# Patient Record
Sex: FEMALE | Race: BLACK OR AFRICAN AMERICAN | NOT HISPANIC OR LATINO | Employment: UNEMPLOYED | URBAN - NONMETROPOLITAN AREA
[De-identification: names, ages, dates, MRNs, and addresses within clinical notes are randomized per-mention and may not be internally consistent; named-entity substitution may affect disease eponyms.]

---

## 2017-03-14 ENCOUNTER — APPOINTMENT (OUTPATIENT)
Dept: GENERAL RADIOLOGY | Facility: HOSPITAL | Age: 42
End: 2017-03-14

## 2017-03-14 ENCOUNTER — HOSPITAL ENCOUNTER (EMERGENCY)
Facility: HOSPITAL | Age: 42
Discharge: HOME | End: 2017-03-14
Attending: EMERGENCY MEDICINE | Admitting: EMERGENCY MEDICINE

## 2017-03-14 ENCOUNTER — APPOINTMENT (OUTPATIENT)
Dept: CT IMAGING | Facility: HOSPITAL | Age: 42
End: 2017-03-14

## 2017-03-14 VITALS
RESPIRATION RATE: 16 BRPM | DIASTOLIC BLOOD PRESSURE: 78 MMHG | HEIGHT: 72 IN | OXYGEN SATURATION: 98 % | TEMPERATURE: 97.9 F | BODY MASS INDEX: 30.34 KG/M2 | HEART RATE: 72 BPM | SYSTOLIC BLOOD PRESSURE: 170 MMHG | WEIGHT: 224 LBS

## 2017-03-14 DIAGNOSIS — I10 ESSENTIAL HYPERTENSION: ICD-10-CM

## 2017-03-14 DIAGNOSIS — R11.2 NON-INTRACTABLE VOMITING WITH NAUSEA, UNSPECIFIED VOMITING TYPE: Primary | ICD-10-CM

## 2017-03-14 DIAGNOSIS — N39.0 ACUTE UTI (URINARY TRACT INFECTION): ICD-10-CM

## 2017-03-14 DIAGNOSIS — R73.9 HYPERGLYCEMIA: ICD-10-CM

## 2017-03-14 LAB
ALBUMIN SERPL-MCNC: 4.5 G/DL (ref 3.5–5)
ALBUMIN/GLOB SERPL: 1.2 G/DL (ref 1.1–2.5)
ALP SERPL-CCNC: 109 U/L (ref 24–120)
ALT SERPL W P-5'-P-CCNC: 51 U/L (ref 0–54)
AMYLASE SERPL-CCNC: 92 U/L (ref 30–110)
ANION GAP SERPL CALCULATED.3IONS-SCNC: 12 MMOL/L (ref 4–13)
ARTERIAL PATENCY WRIST A: ABNORMAL
AST SERPL-CCNC: 32 U/L (ref 7–45)
ATMOSPHERIC PRESS: ABNORMAL MMHG
BACTERIA UR QL AUTO: ABNORMAL /HPF
BASE EXCESS BLDA CALC-SCNC: 2.6 MMOL/L (ref -2–2)
BASOPHILS # BLD AUTO: 0.01 10*3/MM3 (ref 0–0.2)
BASOPHILS NFR BLD AUTO: 0.1 % (ref 0–2)
BDY SITE: ABNORMAL
BILIRUB SERPL-MCNC: 0.5 MG/DL (ref 0.1–1)
BILIRUB UR QL STRIP: ABNORMAL
BUN BLD-MCNC: 13 MG/DL (ref 5–21)
BUN/CREAT SERPL: 14.9 (ref 7–25)
CALCIUM SPEC-SCNC: 9.9 MG/DL (ref 8.4–10.4)
CHLORIDE SERPL-SCNC: 100 MMOL/L (ref 98–110)
CLARITY UR: ABNORMAL
CO2 SERPL-SCNC: 29 MMOL/L (ref 24–31)
COLOR UR: ABNORMAL
CREAT BLD-MCNC: 0.87 MG/DL (ref 0.5–1.4)
D-LACTATE SERPL-SCNC: 1.2 MMOL/L (ref 0.5–2)
DEPRECATED RDW RBC AUTO: 42.6 FL (ref 40–54)
EOSINOPHIL # BLD AUTO: 0 10*3/MM3 (ref 0–0.7)
EOSINOPHIL NFR BLD AUTO: 0 % (ref 0–4)
ERYTHROCYTE [DISTWIDTH] IN BLOOD BY AUTOMATED COUNT: 14.1 % (ref 12–15)
GFR SERPL CREATININE-BSD FRML MDRD: 87 ML/MIN/1.73
GLOBULIN UR ELPH-MCNC: 3.7 GM/DL
GLUCOSE BLD-MCNC: 227 MG/DL (ref 70–100)
GLUCOSE UR STRIP-MCNC: ABNORMAL MG/DL
HCG SERPL QL: NEGATIVE
HCO3 BLDA-SCNC: 27.9 MMOL/L (ref 22–26)
HCT VFR BLD AUTO: 41.7 % (ref 37–47)
HGB BLD-MCNC: 13.9 G/DL (ref 12–16)
HGB UR QL STRIP.AUTO: NEGATIVE
HOLD SPECIMEN: NORMAL
HOLD SPECIMEN: NORMAL
HYALINE CASTS UR QL AUTO: ABNORMAL /LPF
IMM GRANULOCYTES # BLD: 0.01 10*3/MM3 (ref 0–0.03)
IMM GRANULOCYTES NFR BLD: 0.1 % (ref 0–5)
KETONES UR QL STRIP: ABNORMAL
LEUKOCYTE ESTERASE UR QL STRIP.AUTO: NEGATIVE
LIPASE SERPL-CCNC: 28 U/L (ref 23–203)
LYMPHOCYTES # BLD AUTO: 1.52 10*3/MM3 (ref 0.72–4.86)
LYMPHOCYTES NFR BLD AUTO: 17.8 % (ref 15–45)
MCH RBC QN AUTO: 27.7 PG (ref 28–32)
MCHC RBC AUTO-ENTMCNC: 33.3 G/DL (ref 33–36)
MCV RBC AUTO: 83.2 FL (ref 82–98)
MODALITY: ABNORMAL
MONOCYTES # BLD AUTO: 0.35 10*3/MM3 (ref 0.19–1.3)
MONOCYTES NFR BLD AUTO: 4.1 % (ref 4–12)
NEUTROPHILS # BLD AUTO: 6.66 10*3/MM3 (ref 1.87–8.4)
NEUTROPHILS NFR BLD AUTO: 77.9 % (ref 39–78)
NITRITE UR QL STRIP: NEGATIVE
PCO2 BLDA: 45.4 MM HG (ref 35–45)
PH BLDA: 7.41 PH UNITS (ref 7.35–7.45)
PH UR STRIP.AUTO: 7 [PH] (ref 5–8)
PLATELET # BLD AUTO: 377 10*3/MM3 (ref 130–400)
PMV BLD AUTO: 11 FL (ref 6–12)
PO2 BLDA: 70.3 MM HG (ref 80–100)
POTASSIUM BLD-SCNC: 3.9 MMOL/L (ref 3.5–5.3)
PROCALCITONIN SERPL-MCNC: <0.25 NG/ML
PROT SERPL-MCNC: 8.2 G/DL (ref 6.3–8.7)
PROT UR QL STRIP: ABNORMAL
RBC # BLD AUTO: 5.01 10*6/MM3 (ref 4.2–5.4)
RBC # UR: ABNORMAL /HPF
REF LAB TEST METHOD: ABNORMAL
SAO2 % BLDCOA: 94.2 % (ref 94–100)
SAO2 % BLDCOA: 94.2 % (ref 94–100)
SODIUM BLD-SCNC: 141 MMOL/L (ref 135–145)
SP GR UR STRIP: >1.03 (ref 1–1.03)
SQUAMOUS #/AREA URNS HPF: ABNORMAL /HPF
UROBILINOGEN UR QL STRIP: ABNORMAL
WBC NRBC COR # BLD: 8.55 10*3/MM3 (ref 4.8–10.8)
WBC UR QL AUTO: ABNORMAL /HPF
WHOLE BLOOD HOLD SPECIMEN: NORMAL
WHOLE BLOOD HOLD SPECIMEN: NORMAL

## 2017-03-14 PROCEDURE — 25010000002 ONDANSETRON PER 1 MG

## 2017-03-14 PROCEDURE — 84145 PROCALCITONIN (PCT): CPT | Performed by: EMERGENCY MEDICINE

## 2017-03-14 PROCEDURE — 25010000002 ONDANSETRON PER 1 MG: Performed by: EMERGENCY MEDICINE

## 2017-03-14 PROCEDURE — 96365 THER/PROPH/DIAG IV INF INIT: CPT

## 2017-03-14 PROCEDURE — 71010 HC CHEST PA OR AP: CPT

## 2017-03-14 PROCEDURE — 0 IOPAMIDOL PER 1 ML: Performed by: EMERGENCY MEDICINE

## 2017-03-14 PROCEDURE — 84703 CHORIONIC GONADOTROPIN ASSAY: CPT | Performed by: EMERGENCY MEDICINE

## 2017-03-14 PROCEDURE — 83605 ASSAY OF LACTIC ACID: CPT | Performed by: EMERGENCY MEDICINE

## 2017-03-14 PROCEDURE — 96361 HYDRATE IV INFUSION ADD-ON: CPT

## 2017-03-14 PROCEDURE — 81001 URINALYSIS AUTO W/SCOPE: CPT | Performed by: EMERGENCY MEDICINE

## 2017-03-14 PROCEDURE — 87086 URINE CULTURE/COLONY COUNT: CPT | Performed by: EMERGENCY MEDICINE

## 2017-03-14 PROCEDURE — 99284 EMERGENCY DEPT VISIT MOD MDM: CPT

## 2017-03-14 PROCEDURE — 74177 CT ABD & PELVIS W/CONTRAST: CPT

## 2017-03-14 PROCEDURE — 36600 WITHDRAWAL OF ARTERIAL BLOOD: CPT

## 2017-03-14 PROCEDURE — 80053 COMPREHEN METABOLIC PANEL: CPT | Performed by: EMERGENCY MEDICINE

## 2017-03-14 PROCEDURE — 87040 BLOOD CULTURE FOR BACTERIA: CPT | Performed by: EMERGENCY MEDICINE

## 2017-03-14 PROCEDURE — 85025 COMPLETE CBC W/AUTO DIFF WBC: CPT | Performed by: EMERGENCY MEDICINE

## 2017-03-14 PROCEDURE — 82803 BLOOD GASES ANY COMBINATION: CPT

## 2017-03-14 PROCEDURE — 96375 TX/PRO/DX INJ NEW DRUG ADDON: CPT

## 2017-03-14 PROCEDURE — 25010000002 HYDROMORPHONE PER 4 MG: Performed by: EMERGENCY MEDICINE

## 2017-03-14 PROCEDURE — 82150 ASSAY OF AMYLASE: CPT | Performed by: EMERGENCY MEDICINE

## 2017-03-14 PROCEDURE — 83690 ASSAY OF LIPASE: CPT | Performed by: EMERGENCY MEDICINE

## 2017-03-14 PROCEDURE — 36415 COLL VENOUS BLD VENIPUNCTURE: CPT | Performed by: EMERGENCY MEDICINE

## 2017-03-14 RX ORDER — ONDANSETRON 4 MG/1
4 TABLET, FILM COATED ORAL EVERY 6 HOURS
Qty: 15 TABLET | Refills: 0 | Status: SHIPPED | OUTPATIENT
Start: 2017-03-14 | End: 2018-02-26

## 2017-03-14 RX ORDER — BENAZEPRIL HYDROCHLORIDE 20 MG/1
20 TABLET ORAL 2 TIMES DAILY
COMMUNITY

## 2017-03-14 RX ORDER — HYDRALAZINE HYDROCHLORIDE 25 MG/1
25 TABLET, FILM COATED ORAL 2 TIMES DAILY
COMMUNITY
End: 2017-07-12 | Stop reason: HOSPADM

## 2017-03-14 RX ORDER — INSULIN GLARGINE 100 [IU]/ML
INJECTION, SOLUTION SUBCUTANEOUS DAILY
COMMUNITY
End: 2018-02-26 | Stop reason: ALTCHOICE

## 2017-03-14 RX ORDER — NIFEDIPINE 10 MG/1
10 CAPSULE ORAL ONCE
Status: COMPLETED | OUTPATIENT
Start: 2017-03-14 | End: 2017-03-14

## 2017-03-14 RX ORDER — ONDANSETRON 2 MG/ML
4 INJECTION INTRAMUSCULAR; INTRAVENOUS ONCE
Status: COMPLETED | OUTPATIENT
Start: 2017-03-14 | End: 2017-03-14

## 2017-03-14 RX ORDER — SIMVASTATIN 20 MG
20 TABLET ORAL NIGHTLY
COMMUNITY
End: 2020-05-05

## 2017-03-14 RX ORDER — RANITIDINE 150 MG/1
300 TABLET ORAL DAILY
COMMUNITY
End: 2017-09-22 | Stop reason: DRUGHIGH

## 2017-03-14 RX ORDER — ONDANSETRON 2 MG/ML
INJECTION INTRAMUSCULAR; INTRAVENOUS
Status: COMPLETED
Start: 2017-03-14 | End: 2017-03-14

## 2017-03-14 RX ORDER — NITROFURANTOIN 25; 75 MG/1; MG/1
100 CAPSULE ORAL 2 TIMES DAILY
Qty: 20 CAPSULE | Refills: 0 | Status: SHIPPED | OUTPATIENT
Start: 2017-03-14 | End: 2017-07-12 | Stop reason: HOSPADM

## 2017-03-14 RX ORDER — TIZANIDINE 4 MG/1
4 TABLET ORAL NIGHTLY
COMMUNITY
End: 2021-12-31 | Stop reason: SDUPTHER

## 2017-03-14 RX ORDER — CLONIDINE HYDROCHLORIDE 0.2 MG/1
0.3 TABLET ORAL 2 TIMES DAILY
COMMUNITY
End: 2017-07-12 | Stop reason: HOSPADM

## 2017-03-14 RX ORDER — TRAZODONE HYDROCHLORIDE 100 MG/1
100 TABLET ORAL NIGHTLY
COMMUNITY
End: 2018-02-26

## 2017-03-14 RX ORDER — DULOXETIN HYDROCHLORIDE 60 MG/1
60 CAPSULE, DELAYED RELEASE ORAL DAILY
COMMUNITY
End: 2018-02-26

## 2017-03-14 RX ORDER — AMLODIPINE BESYLATE 10 MG/1
10 TABLET ORAL DAILY
Status: ON HOLD | COMMUNITY
End: 2017-07-12

## 2017-03-14 RX ORDER — LABETALOL HYDROCHLORIDE 5 MG/ML
20 INJECTION, SOLUTION INTRAVENOUS ONCE
Status: COMPLETED | OUTPATIENT
Start: 2017-03-14 | End: 2017-03-14

## 2017-03-14 RX ORDER — FUROSEMIDE 20 MG/1
20 TABLET ORAL DAILY
COMMUNITY
End: 2017-07-12 | Stop reason: HOSPADM

## 2017-03-14 RX ORDER — POTASSIUM CHLORIDE 750 MG/1
10 CAPSULE, EXTENDED RELEASE ORAL DAILY
COMMUNITY
End: 2017-07-12 | Stop reason: HOSPADM

## 2017-03-14 RX ORDER — GABAPENTIN 300 MG/1
300 CAPSULE ORAL 4 TIMES DAILY
COMMUNITY

## 2017-03-14 RX ADMIN — NIFEDIPINE 10 MG: 10 CAPSULE, LIQUID FILLED ORAL at 15:36

## 2017-03-14 RX ADMIN — SODIUM CHLORIDE 500 ML: 0.9 INJECTION, SOLUTION INTRAVENOUS at 14:22

## 2017-03-14 RX ADMIN — ERTAPENEM SODIUM 1 G: 1 INJECTION, POWDER, LYOPHILIZED, FOR SOLUTION INTRAMUSCULAR; INTRAVENOUS at 14:27

## 2017-03-14 RX ADMIN — LABETALOL HYDROCHLORIDE 20 MG: 5 INJECTION, SOLUTION INTRAVENOUS at 13:36

## 2017-03-14 RX ADMIN — HYDROMORPHONE HYDROCHLORIDE 1 MG: 1 INJECTION, SOLUTION INTRAMUSCULAR; INTRAVENOUS; SUBCUTANEOUS at 13:33

## 2017-03-14 RX ADMIN — ONDANSETRON HYDROCHLORIDE 4 MG: 2 SOLUTION INTRAMUSCULAR; INTRAVENOUS at 13:33

## 2017-03-14 RX ADMIN — ONDANSETRON 4 MG: 2 INJECTION INTRAMUSCULAR; INTRAVENOUS at 14:46

## 2017-03-14 RX ADMIN — ONDANSETRON HYDROCHLORIDE 4 MG: 2 SOLUTION INTRAMUSCULAR; INTRAVENOUS at 14:46

## 2017-03-14 RX ADMIN — IOPAMIDOL 100 ML: 755 INJECTION, SOLUTION INTRAVENOUS at 14:53

## 2017-03-14 NOTE — ED PROVIDER NOTES
Subjective   Patient is a 41 y.o. female presenting with abdominal pain.   Abdominal Pain   Pain location:  Generalized  Pain quality: aching and bloating    Pain radiates to:  Does not radiate  Pain severity:  No pain  Onset quality:  Gradual  Timing:  Constant  Progression:  Worsening  Context: not alcohol use, not awakening from sleep, not diet changes, not previous surgeries, not recent illness, not recent travel, not sick contacts and not suspicious food intake    Relieved by:  Nothing  Worsened by:  Nothing  Ineffective treatments:  None tried  Associated symptoms: diarrhea, nausea and vomiting    Associated symptoms: no anorexia, no belching, no chest pain, no cough, no hematochezia and no sore throat    Risk factors: no alcohol abuse, no NSAID use, not obese and no recent hospitalization        Review of Systems   Constitutional: Negative.    HENT: Negative.  Negative for sore throat.    Eyes: Negative.    Respiratory: Negative.  Negative for cough.    Cardiovascular: Negative.  Negative for chest pain.   Gastrointestinal: Positive for abdominal pain, diarrhea, nausea and vomiting. Negative for anorexia and hematochezia.   Endocrine: Negative.    Genitourinary: Negative.    Skin: Negative.    Neurological: Negative.    Hematological: Negative.    All other systems reviewed and are negative.      Past Medical History   Diagnosis Date   • Arthritis    • Depression    • Diabetes mellitus    • GERD (gastroesophageal reflux disease)    • Hyperlipidemia    • Hypertension    • Injury of back        No Known Allergies    Past Surgical History   Procedure Laterality Date   •  section     • Cholecystectomy     • Hysterectomy     • Cyst removal     • Colonoscopy         History reviewed. No pertinent family history.    Social History     Social History   • Marital status:      Spouse name: N/A   • Number of children: N/A   • Years of education: N/A     Social History Main Topics   • Smoking status: Never  Smoker   • Smokeless tobacco: None   • Alcohol use No   • Drug use: No   • Sexual activity: Not Asked     Other Topics Concern   • None     Social History Narrative   • None           Objective   Physical Exam   Constitutional: She is oriented to person, place, and time. She appears well-developed and well-nourished.  Non-toxic appearance.   HENT:   Head: Normocephalic and atraumatic.   Mouth/Throat: Oropharynx is clear and moist.   Eyes: Conjunctivae are normal. Pupils are equal, round, and reactive to light.   Neck: Normal range of motion. Neck supple. No hepatojugular reflux and no JVD present.   Cardiovascular: Normal rate, regular rhythm, normal heart sounds and intact distal pulses.  PMI is not displaced.  Exam reveals no decreased pulses.    No murmur heard.  Pulmonary/Chest: Effort normal and breath sounds normal. No accessory muscle usage. No apnea. No respiratory distress. She has no decreased breath sounds. She has no wheezes.   Abdominal: Normal appearance, normal aorta and bowel sounds are normal. She exhibits no shifting dullness, no distension, no fluid wave, no abdominal bruit, no ascites, no pulsatile midline mass and no mass. There is tenderness. There is no guarding.   Musculoskeletal: Normal range of motion.   Neurological: She is alert and oriented to person, place, and time. She has normal strength and normal reflexes. No cranial nerve deficit. GCS eye subscore is 4. GCS verbal subscore is 5. GCS motor subscore is 6.   Skin: Skin is warm and dry.   Psychiatric: She has a normal mood and affect. Her behavior is normal.   Nursing note and vitals reviewed.      Procedures         ED Course  ED Course   Comment By Time   Normal sinus rhythm Linus Rodriguez MD 03/14 2186   Patient came in with nausea and vomiting a non-cath specimen reveals her to have glycosuria and the slight amount of infection chemistries are within normal limits she is a diabetic giving rise to her elevated blood sugar 227 Linus  MD Jennifer 03/14 1640   Have been able to control her blood pressure Linus Rodriguez MD 03/14 1641   Copious amount of stool is noted in the distal descending and sigmoid  colon. Otherwise this exam is unremarkable.       Linus Rodriguez MD 03/14 1641   Patient has not vomited over here hemodynamically she is remaining stable and will discharge her with a follow the primary M.D. Linus Rodriguez MD 03/14 1642                  [facility]    Final diagnoses:   Non-intractable vomiting with nausea, unspecified vomiting type   Acute UTI (urinary tract infection)   Hyperglycemia   Essential hypertension            Linus Rodriguez MD  03/14/17 1651

## 2017-03-16 LAB — BACTERIA SPEC AEROBE CULT: ABNORMAL

## 2017-03-19 LAB
BACTERIA SPEC AEROBE CULT: NORMAL
BACTERIA SPEC AEROBE CULT: NORMAL

## 2017-04-13 ENCOUNTER — TRANSCRIBE ORDERS (OUTPATIENT)
Dept: ADMINISTRATIVE | Facility: HOSPITAL | Age: 42
End: 2017-04-13

## 2017-04-13 DIAGNOSIS — D49.89 ORBITAL TUMOR: Primary | ICD-10-CM

## 2017-04-17 ENCOUNTER — TRANSCRIBE ORDERS (OUTPATIENT)
Dept: ADMINISTRATIVE | Facility: HOSPITAL | Age: 42
End: 2017-04-17

## 2017-04-17 DIAGNOSIS — D49.89 ORBITAL TUMOR: Primary | ICD-10-CM

## 2017-04-18 ENCOUNTER — HOSPITAL ENCOUNTER (OUTPATIENT)
Dept: MRI IMAGING | Facility: HOSPITAL | Age: 42
Discharge: HOME | End: 2017-04-18
Attending: OPHTHALMOLOGY | Admitting: OPHTHALMOLOGY

## 2017-04-18 ENCOUNTER — TRANSCRIBE ORDERS (OUTPATIENT)
Dept: ADMINISTRATIVE | Facility: HOSPITAL | Age: 42
End: 2017-04-18

## 2017-04-18 ENCOUNTER — APPOINTMENT (OUTPATIENT)
Dept: LAB | Facility: HOSPITAL | Age: 42
End: 2017-04-18
Attending: OPHTHALMOLOGY

## 2017-04-18 DIAGNOSIS — D49.89 ORBITAL TUMOR: ICD-10-CM

## 2017-04-18 DIAGNOSIS — D49.89 ORBITAL TUMOR: Primary | ICD-10-CM

## 2017-04-18 LAB
T3FREE SERPL-MCNC: 3.73 PG/ML (ref 2.77–5.27)
T4 FREE SERPL-MCNC: 1.71 NG/DL (ref 0.78–2.19)
TSH SERPL DL<=0.05 MIU/L-ACNC: <0.02 MIU/ML (ref 0.47–4.68)

## 2017-04-18 PROCEDURE — 70543 MRI ORBT/FAC/NCK W/O &W/DYE: CPT

## 2017-04-18 PROCEDURE — 84439 ASSAY OF FREE THYROXINE: CPT | Performed by: OPHTHALMOLOGY

## 2017-04-18 PROCEDURE — 84481 FREE ASSAY (FT-3): CPT | Performed by: OPHTHALMOLOGY

## 2017-04-18 PROCEDURE — 36415 COLL VENOUS BLD VENIPUNCTURE: CPT | Performed by: OPHTHALMOLOGY

## 2017-04-18 PROCEDURE — 84443 ASSAY THYROID STIM HORMONE: CPT | Performed by: OPHTHALMOLOGY

## 2017-04-18 PROCEDURE — 0 GADOBENATE DIMEGLUMINE 529 MG/ML SOLUTION: Performed by: OPHTHALMOLOGY

## 2017-04-18 PROCEDURE — A9577 INJ MULTIHANCE: HCPCS | Performed by: OPHTHALMOLOGY

## 2017-04-18 RX ADMIN — GADOBENATE DIMEGLUMINE 20 ML: 529 INJECTION, SOLUTION INTRAVENOUS at 10:50

## 2017-07-08 ENCOUNTER — APPOINTMENT (OUTPATIENT)
Dept: CT IMAGING | Facility: HOSPITAL | Age: 42
End: 2017-07-08

## 2017-07-08 ENCOUNTER — HOSPITAL ENCOUNTER (INPATIENT)
Facility: HOSPITAL | Age: 42
LOS: 4 days | Discharge: HOME HEALTH | End: 2017-07-12
Attending: INTERNAL MEDICINE | Admitting: INTERNAL MEDICINE

## 2017-07-08 ENCOUNTER — APPOINTMENT (OUTPATIENT)
Dept: ULTRASOUND IMAGING | Facility: HOSPITAL | Age: 42
End: 2017-07-08

## 2017-07-08 ENCOUNTER — APPOINTMENT (OUTPATIENT)
Dept: GENERAL RADIOLOGY | Facility: HOSPITAL | Age: 42
End: 2017-07-08

## 2017-07-08 DIAGNOSIS — E05.90 HYPERTHYROIDISM: ICD-10-CM

## 2017-07-08 DIAGNOSIS — R51.9 NONINTRACTABLE HEADACHE, UNSPECIFIED CHRONICITY PATTERN, UNSPECIFIED HEADACHE TYPE: ICD-10-CM

## 2017-07-08 DIAGNOSIS — Z78.9 DECREASED ACTIVITIES OF DAILY LIVING (ADL): ICD-10-CM

## 2017-07-08 DIAGNOSIS — E11.49 TYPE 2 DIABETES MELLITUS WITH OTHER NEUROLOGIC COMPLICATION: ICD-10-CM

## 2017-07-08 DIAGNOSIS — R55 SYNCOPE, UNSPECIFIED SYNCOPE TYPE: Primary | ICD-10-CM

## 2017-07-08 DIAGNOSIS — F19.10 POLYSUBSTANCE ABUSE (HCC): ICD-10-CM

## 2017-07-08 DIAGNOSIS — Z74.09 IMPAIRED MOBILITY: ICD-10-CM

## 2017-07-08 LAB
ALBUMIN SERPL-MCNC: 4.4 G/DL (ref 3.5–5)
ALBUMIN/GLOB SERPL: 1.3 G/DL (ref 1.1–2.5)
ALP SERPL-CCNC: 135 U/L (ref 24–120)
ALT SERPL W P-5'-P-CCNC: 88 U/L (ref 0–54)
AMPHET+METHAMPHET UR QL: NEGATIVE
AMYLASE SERPL-CCNC: 75 U/L (ref 30–110)
ANION GAP SERPL CALCULATED.3IONS-SCNC: 13 MMOL/L (ref 4–13)
APTT PPP: 30.7 SECONDS (ref 24.1–34.8)
ARTERIAL PATENCY WRIST A: ABNORMAL
AST SERPL-CCNC: 45 U/L (ref 7–45)
ATMOSPHERIC PRESS: ABNORMAL MMHG
BARBITURATES UR QL SCN: NEGATIVE
BASE EXCESS BLDA CALC-SCNC: -3.2 MMOL/L (ref -2–2)
BASOPHILS # BLD AUTO: 0.01 10*3/MM3 (ref 0–0.2)
BASOPHILS NFR BLD AUTO: 0.2 % (ref 0–2)
BDY SITE: ABNORMAL
BENZODIAZ UR QL SCN: NEGATIVE
BILIRUB SERPL-MCNC: 1.1 MG/DL (ref 0.1–1)
BILIRUB UR QL STRIP: NEGATIVE
BUN BLD-MCNC: 20 MG/DL (ref 5–21)
BUN/CREAT SERPL: 25 (ref 7–25)
CA-I BLDA-SCNC: 1.22 MMOL/L (ref 1.1–1.35)
CALCIUM SPEC-SCNC: 10.6 MG/DL (ref 8.4–10.4)
CANNABINOIDS SERPL QL: POSITIVE
CHLORIDE SERPL-SCNC: 106 MMOL/L (ref 98–110)
CK MB SERPL-CCNC: 1.11 NG/ML (ref 0–5)
CK SERPL-CCNC: 90 U/L (ref 0–203)
CLARITY UR: CLEAR
CO2 SERPL-SCNC: 22 MMOL/L (ref 24–31)
COCAINE UR QL: POSITIVE
COHGB MFR BLD: 0.9 % (ref 0–5.1)
COLOR UR: YELLOW
CREAT BLD-MCNC: 0.8 MG/DL (ref 0.5–1.4)
CRP SERPL-MCNC: 2.01 MG/DL (ref 0–0.99)
D DIMER PPP FEU-MCNC: 0.41 MG/L (FEU) (ref 0–0.5)
DEPRECATED RDW RBC AUTO: 35.8 FL (ref 40–54)
EOSINOPHIL # BLD AUTO: 0.02 10*3/MM3 (ref 0–0.7)
EOSINOPHIL NFR BLD AUTO: 0.4 % (ref 0–4)
ERYTHROCYTE [DISTWIDTH] IN BLOOD BY AUTOMATED COUNT: 12.7 % (ref 12–15)
GFR SERPL CREATININE-BSD FRML MDRD: 95 ML/MIN/1.73
GLOBULIN UR ELPH-MCNC: 3.4 GM/DL
GLUCOSE BLD-MCNC: 145 MG/DL (ref 70–100)
GLUCOSE BLDC GLUCOMTR-MCNC: 163 MG/DL (ref 70–130)
GLUCOSE BLDC GLUCOMTR-MCNC: 249 MG/DL (ref 70–130)
GLUCOSE UR STRIP-MCNC: NEGATIVE MG/DL
HBA1C MFR BLD: 6.9 %
HCO3 BLDA-SCNC: 18.5 MMOL/L (ref 22–26)
HCT VFR BLD AUTO: 38.9 % (ref 37–47)
HCT VFR BLD CALC: 38 % (ref 38–51)
HGB BLD-MCNC: 13.1 G/DL (ref 12–16)
HGB BLDA-MCNC: 13 G/DL (ref 12–16)
HGB UR QL STRIP.AUTO: NEGATIVE
IMM GRANULOCYTES # BLD: 0 10*3/MM3 (ref 0–0.03)
IMM GRANULOCYTES NFR BLD: 0 % (ref 0–5)
INR PPP: 1.03 (ref 0.91–1.09)
KETONES UR QL STRIP: ABNORMAL
LEUKOCYTE ESTERASE UR QL STRIP.AUTO: NEGATIVE
LIPASE SERPL-CCNC: 89 U/L (ref 23–203)
LYMPHOCYTES # BLD AUTO: 1.99 10*3/MM3 (ref 0.72–4.86)
LYMPHOCYTES NFR BLD AUTO: 42.4 % (ref 15–45)
MCH RBC QN AUTO: 26.3 PG (ref 28–32)
MCHC RBC AUTO-ENTMCNC: 33.7 G/DL (ref 33–36)
MCV RBC AUTO: 78 FL (ref 82–98)
METHADONE UR QL SCN: NEGATIVE
METHGB BLD QL: 0.2 % (ref 0.4–1.5)
MODALITY: ABNORMAL
MONOCYTES # BLD AUTO: 0.39 10*3/MM3 (ref 0.19–1.3)
MONOCYTES NFR BLD AUTO: 8.3 % (ref 4–12)
MYOGLOBIN SERPL-MCNC: 47 NG/ML (ref 0–110)
NEUTROPHILS # BLD AUTO: 2.28 10*3/MM3 (ref 1.87–8.4)
NEUTROPHILS NFR BLD AUTO: 48.7 % (ref 39–78)
NITRITE UR QL STRIP: NEGATIVE
NT-PROBNP SERPL-MCNC: 209 PG/ML (ref 0–450)
OPIATES UR QL: NEGATIVE
OXYHGB MFR BLDV: 97 % (ref 94–97)
PCO2 BLDA: 24.9 MM HG (ref 35–45)
PCP UR QL SCN: NEGATIVE
PH BLDA: 7.49 PH UNITS (ref 7.35–7.45)
PH UR STRIP.AUTO: 6 [PH] (ref 5–8)
PLATELET # BLD AUTO: 377 10*3/MM3 (ref 130–400)
PMV BLD AUTO: 10.3 FL (ref 6–12)
PO2 BLDA: 98.4 MM HG (ref 80–100)
POTASSIUM BLD-SCNC: 4.1 MMOL/L (ref 3.5–5.3)
POTASSIUM BLDA-SCNC: 3.97 MMOL/L (ref 3.5–5)
PROT SERPL-MCNC: 7.8 G/DL (ref 6.3–8.7)
PROT UR QL STRIP: NEGATIVE
PROTHROMBIN TIME: 13.8 SECONDS (ref 11.9–14.6)
RBC # BLD AUTO: 4.99 10*6/MM3 (ref 4.2–5.4)
SODIUM BLD-SCNC: 141 MMOL/L (ref 135–145)
SODIUM BLDA-SCNC: 138.3 MMOL/L (ref 135–145)
SP GR UR STRIP: 1.02 (ref 1–1.03)
T3FREE SERPL-MCNC: 21 PG/ML (ref 2.77–5.27)
T4 FREE SERPL-MCNC: 5.49 NG/DL (ref 0.78–2.19)
TROPONIN I SERPL-MCNC: 0.01 NG/ML (ref 0–0.03)
TSH SERPL DL<=0.05 MIU/L-ACNC: <0.02 MIU/ML (ref 0.47–4.68)
UROBILINOGEN UR QL STRIP: ABNORMAL
WBC NRBC COR # BLD: 4.69 10*3/MM3 (ref 4.8–10.8)

## 2017-07-08 PROCEDURE — 83690 ASSAY OF LIPASE: CPT | Performed by: NURSE PRACTITIONER

## 2017-07-08 PROCEDURE — G0378 HOSPITAL OBSERVATION PER HR: HCPCS

## 2017-07-08 PROCEDURE — 83050 HGB METHEMOGLOBIN QUAN: CPT

## 2017-07-08 PROCEDURE — 80307 DRUG TEST PRSMV CHEM ANLYZR: CPT | Performed by: INTERNAL MEDICINE

## 2017-07-08 PROCEDURE — 81003 URINALYSIS AUTO W/O SCOPE: CPT | Performed by: NURSE PRACTITIONER

## 2017-07-08 PROCEDURE — 86140 C-REACTIVE PROTEIN: CPT | Performed by: NURSE PRACTITIONER

## 2017-07-08 PROCEDURE — 85730 THROMBOPLASTIN TIME PARTIAL: CPT | Performed by: NURSE PRACTITIONER

## 2017-07-08 PROCEDURE — 82150 ASSAY OF AMYLASE: CPT | Performed by: NURSE PRACTITIONER

## 2017-07-08 PROCEDURE — 71010 HC CHEST PA OR AP: CPT

## 2017-07-08 PROCEDURE — 70450 CT HEAD/BRAIN W/O DYE: CPT

## 2017-07-08 PROCEDURE — 80050 GENERAL HEALTH PANEL: CPT | Performed by: NURSE PRACTITIONER

## 2017-07-08 PROCEDURE — 36600 WITHDRAWAL OF ARTERIAL BLOOD: CPT

## 2017-07-08 PROCEDURE — 93880 EXTRACRANIAL BILAT STUDY: CPT

## 2017-07-08 PROCEDURE — 83880 ASSAY OF NATRIURETIC PEPTIDE: CPT | Performed by: NURSE PRACTITIONER

## 2017-07-08 PROCEDURE — 85379 FIBRIN DEGRADATION QUANT: CPT | Performed by: NURSE PRACTITIONER

## 2017-07-08 PROCEDURE — 82330 ASSAY OF CALCIUM: CPT

## 2017-07-08 PROCEDURE — 25010000002 MORPHINE SULFATE (PF) 2 MG/ML SOLUTION: Performed by: INTERNAL MEDICINE

## 2017-07-08 PROCEDURE — 93005 ELECTROCARDIOGRAM TRACING: CPT | Performed by: NURSE PRACTITIONER

## 2017-07-08 PROCEDURE — 84439 ASSAY OF FREE THYROXINE: CPT | Performed by: NURSE PRACTITIONER

## 2017-07-08 PROCEDURE — 83036 HEMOGLOBIN GLYCOSYLATED A1C: CPT | Performed by: INTERNAL MEDICINE

## 2017-07-08 PROCEDURE — 99285 EMERGENCY DEPT VISIT HI MDM: CPT

## 2017-07-08 PROCEDURE — 72125 CT NECK SPINE W/O DYE: CPT

## 2017-07-08 PROCEDURE — 82553 CREATINE MB FRACTION: CPT | Performed by: NURSE PRACTITIONER

## 2017-07-08 PROCEDURE — 93010 ELECTROCARDIOGRAM REPORT: CPT | Performed by: INTERNAL MEDICINE

## 2017-07-08 PROCEDURE — 82550 ASSAY OF CK (CPK): CPT | Performed by: NURSE PRACTITIONER

## 2017-07-08 PROCEDURE — 63710000001 INSULIN LISPRO (HUMAN) PER 5 UNITS: Performed by: INTERNAL MEDICINE

## 2017-07-08 PROCEDURE — 82962 GLUCOSE BLOOD TEST: CPT

## 2017-07-08 PROCEDURE — 82805 BLOOD GASES W/O2 SATURATION: CPT

## 2017-07-08 PROCEDURE — 74000 HC ABDOMEN KUB: CPT

## 2017-07-08 PROCEDURE — 82375 ASSAY CARBOXYHB QUANT: CPT

## 2017-07-08 PROCEDURE — 86800 THYROGLOBULIN ANTIBODY: CPT | Performed by: INTERNAL MEDICINE

## 2017-07-08 PROCEDURE — 25010000002 ONDANSETRON PER 1 MG: Performed by: NURSE PRACTITIONER

## 2017-07-08 PROCEDURE — 86376 MICROSOMAL ANTIBODY EACH: CPT | Performed by: INTERNAL MEDICINE

## 2017-07-08 PROCEDURE — 63710000001 INSULIN DETEMIR PER 5 UNITS: Performed by: INTERNAL MEDICINE

## 2017-07-08 PROCEDURE — 84481 FREE ASSAY (FT-3): CPT | Performed by: INTERNAL MEDICINE

## 2017-07-08 PROCEDURE — 85610 PROTHROMBIN TIME: CPT | Performed by: NURSE PRACTITIONER

## 2017-07-08 PROCEDURE — 84484 ASSAY OF TROPONIN QUANT: CPT | Performed by: NURSE PRACTITIONER

## 2017-07-08 PROCEDURE — 83874 ASSAY OF MYOGLOBIN: CPT | Performed by: NURSE PRACTITIONER

## 2017-07-08 PROCEDURE — 25010000002 HYDROMORPHONE PER 4 MG: Performed by: NURSE PRACTITIONER

## 2017-07-08 PROCEDURE — 82533 TOTAL CORTISOL: CPT | Performed by: INTERNAL MEDICINE

## 2017-07-08 RX ORDER — AMLODIPINE BESYLATE 10 MG/1
10 TABLET ORAL DAILY
Status: DISCONTINUED | OUTPATIENT
Start: 2017-07-08 | End: 2017-07-09

## 2017-07-08 RX ORDER — ACETAMINOPHEN 325 MG/1
650 TABLET ORAL EVERY 4 HOURS PRN
Status: DISCONTINUED | OUTPATIENT
Start: 2017-07-08 | End: 2017-07-12 | Stop reason: HOSPADM

## 2017-07-08 RX ORDER — MORPHINE SULFATE 2 MG/ML
1 INJECTION, SOLUTION INTRAMUSCULAR; INTRAVENOUS EVERY 4 HOURS PRN
Status: DISCONTINUED | OUTPATIENT
Start: 2017-07-08 | End: 2017-07-10

## 2017-07-08 RX ORDER — TIZANIDINE 4 MG/1
4 TABLET ORAL EVERY 12 HOURS SCHEDULED
Status: DISCONTINUED | OUTPATIENT
Start: 2017-07-08 | End: 2017-07-12 | Stop reason: HOSPADM

## 2017-07-08 RX ORDER — ONDANSETRON 2 MG/ML
4 INJECTION INTRAMUSCULAR; INTRAVENOUS ONCE
Status: COMPLETED | OUTPATIENT
Start: 2017-07-08 | End: 2017-07-08

## 2017-07-08 RX ORDER — BISACODYL 5 MG/1
5 TABLET, DELAYED RELEASE ORAL DAILY PRN
Status: DISCONTINUED | OUTPATIENT
Start: 2017-07-08 | End: 2017-07-12 | Stop reason: HOSPADM

## 2017-07-08 RX ORDER — FAMOTIDINE 20 MG/1
20 TABLET, FILM COATED ORAL EVERY 12 HOURS SCHEDULED
Status: DISCONTINUED | OUTPATIENT
Start: 2017-07-08 | End: 2017-07-12 | Stop reason: HOSPADM

## 2017-07-08 RX ORDER — CLONIDINE HYDROCHLORIDE 0.3 MG/1
0.3 TABLET ORAL 2 TIMES DAILY
COMMUNITY
End: 2017-07-12 | Stop reason: HOSPADM

## 2017-07-08 RX ORDER — SODIUM CHLORIDE 0.9 % (FLUSH) 0.9 %
1-10 SYRINGE (ML) INJECTION AS NEEDED
Status: DISCONTINUED | OUTPATIENT
Start: 2017-07-08 | End: 2017-07-12 | Stop reason: HOSPADM

## 2017-07-08 RX ORDER — TRAZODONE HYDROCHLORIDE 100 MG/1
100 TABLET ORAL NIGHTLY
Status: DISCONTINUED | OUTPATIENT
Start: 2017-07-08 | End: 2017-07-12 | Stop reason: HOSPADM

## 2017-07-08 RX ORDER — CLONIDINE HYDROCHLORIDE 0.3 MG/1
0.3 TABLET ORAL EVERY 12 HOURS SCHEDULED
Status: DISCONTINUED | OUTPATIENT
Start: 2017-07-08 | End: 2017-07-09

## 2017-07-08 RX ORDER — POTASSIUM CHLORIDE 750 MG/1
10 CAPSULE, EXTENDED RELEASE ORAL DAILY
Status: DISCONTINUED | OUTPATIENT
Start: 2017-07-08 | End: 2017-07-12 | Stop reason: HOSPADM

## 2017-07-08 RX ORDER — FUROSEMIDE 20 MG/1
20 TABLET ORAL DAILY
Status: DISCONTINUED | OUTPATIENT
Start: 2017-07-08 | End: 2017-07-12 | Stop reason: HOSPADM

## 2017-07-08 RX ORDER — TOPIRAMATE 25 MG/1
25 TABLET ORAL 2 TIMES DAILY
COMMUNITY
Start: 2017-03-30 | End: 2017-10-26 | Stop reason: SDUPTHER

## 2017-07-08 RX ORDER — TOPIRAMATE 25 MG/1
25 TABLET ORAL EVERY 12 HOURS SCHEDULED
Status: DISCONTINUED | OUTPATIENT
Start: 2017-07-08 | End: 2017-07-12 | Stop reason: HOSPADM

## 2017-07-08 RX ORDER — ATORVASTATIN CALCIUM 10 MG/1
10 TABLET, FILM COATED ORAL DAILY
Status: DISCONTINUED | OUTPATIENT
Start: 2017-07-08 | End: 2017-07-12 | Stop reason: HOSPADM

## 2017-07-08 RX ORDER — LORAZEPAM 1 MG/1
1 TABLET ORAL EVERY 6 HOURS PRN
Status: DISCONTINUED | OUTPATIENT
Start: 2017-07-08 | End: 2017-07-10

## 2017-07-08 RX ORDER — DEXTROSE MONOHYDRATE 25 G/50ML
25 INJECTION, SOLUTION INTRAVENOUS
Status: DISCONTINUED | OUTPATIENT
Start: 2017-07-08 | End: 2017-07-12 | Stop reason: HOSPADM

## 2017-07-08 RX ORDER — NALOXONE HCL 0.4 MG/ML
0.4 VIAL (ML) INJECTION
Status: DISCONTINUED | OUTPATIENT
Start: 2017-07-08 | End: 2017-07-10

## 2017-07-08 RX ORDER — GABAPENTIN 300 MG/1
300 CAPSULE ORAL 4 TIMES DAILY
Status: DISCONTINUED | OUTPATIENT
Start: 2017-07-08 | End: 2017-07-12 | Stop reason: HOSPADM

## 2017-07-08 RX ORDER — ONDANSETRON 2 MG/ML
4 INJECTION INTRAMUSCULAR; INTRAVENOUS EVERY 6 HOURS PRN
Status: DISCONTINUED | OUTPATIENT
Start: 2017-07-08 | End: 2017-07-12 | Stop reason: HOSPADM

## 2017-07-08 RX ORDER — NICOTINE POLACRILEX 4 MG
15 LOZENGE BUCCAL
Status: DISCONTINUED | OUTPATIENT
Start: 2017-07-08 | End: 2017-07-12 | Stop reason: HOSPADM

## 2017-07-08 RX ORDER — DULOXETIN HYDROCHLORIDE 30 MG/1
60 CAPSULE, DELAYED RELEASE ORAL DAILY
Status: DISCONTINUED | OUTPATIENT
Start: 2017-07-08 | End: 2017-07-12 | Stop reason: HOSPADM

## 2017-07-08 RX ORDER — HYDRALAZINE HYDROCHLORIDE 25 MG/1
25 TABLET, FILM COATED ORAL EVERY 12 HOURS SCHEDULED
Status: DISCONTINUED | OUTPATIENT
Start: 2017-07-08 | End: 2017-07-12 | Stop reason: HOSPADM

## 2017-07-08 RX ORDER — BENAZEPRIL HYDROCHLORIDE 20 MG/1
20 TABLET ORAL EVERY 12 HOURS SCHEDULED
Status: DISCONTINUED | OUTPATIENT
Start: 2017-07-08 | End: 2017-07-12 | Stop reason: HOSPADM

## 2017-07-08 RX ORDER — SODIUM CHLORIDE 0.9 % (FLUSH) 0.9 %
10 SYRINGE (ML) INJECTION AS NEEDED
Status: DISCONTINUED | OUTPATIENT
Start: 2017-07-08 | End: 2017-07-12 | Stop reason: HOSPADM

## 2017-07-08 RX ORDER — PROPRANOLOL HCL 60 MG
120 CAPSULE, EXTENDED RELEASE 24HR ORAL DAILY
Status: DISCONTINUED | OUTPATIENT
Start: 2017-07-08 | End: 2017-07-12 | Stop reason: HOSPADM

## 2017-07-08 RX ADMIN — ONDANSETRON 4 MG: 2 INJECTION, SOLUTION INTRAMUSCULAR; INTRAVENOUS at 11:58

## 2017-07-08 RX ADMIN — HYDROMORPHONE HYDROCHLORIDE 0.5 MG: 1 INJECTION, SOLUTION INTRAMUSCULAR; INTRAVENOUS; SUBCUTANEOUS at 11:58

## 2017-07-08 RX ADMIN — INSULIN LISPRO 10 UNITS: 100 INJECTION, SOLUTION INTRAVENOUS; SUBCUTANEOUS at 18:24

## 2017-07-08 RX ADMIN — INSULIN DETEMIR 20 UNITS: 100 INJECTION, SOLUTION SUBCUTANEOUS at 21:16

## 2017-07-08 RX ADMIN — MORPHINE SULFATE 1 MG: 2 INJECTION, SOLUTION INTRAMUSCULAR; INTRAVENOUS at 22:04

## 2017-07-08 RX ADMIN — SODIUM CHLORIDE 500 ML: 9 INJECTION, SOLUTION INTRAVENOUS at 12:00

## 2017-07-08 RX ADMIN — PROPRANOLOL HYDROCHLORIDE 120 MG: 60 CAPSULE, EXTENDED RELEASE ORAL at 21:30

## 2017-07-08 RX ADMIN — BENAZEPRIL HYDROCHLORIDE 20 MG: 20 TABLET, COATED ORAL at 21:30

## 2017-07-08 RX ADMIN — GABAPENTIN 300 MG: 300 CAPSULE ORAL at 21:32

## 2017-07-08 RX ADMIN — Medication 10 ML: at 11:59

## 2017-07-08 RX ADMIN — TRAZODONE HYDROCHLORIDE 100 MG: 100 TABLET, FILM COATED ORAL at 21:30

## 2017-07-08 RX ADMIN — ATORVASTATIN CALCIUM 10 MG: 10 TABLET, FILM COATED ORAL at 21:31

## 2017-07-08 RX ADMIN — MORPHINE SULFATE 1 MG: 2 INJECTION, SOLUTION INTRAMUSCULAR; INTRAVENOUS at 18:14

## 2017-07-08 RX ADMIN — TOPIRAMATE 25 MG: 25 TABLET, FILM COATED ORAL at 21:31

## 2017-07-08 RX ADMIN — FUROSEMIDE 20 MG: 20 TABLET ORAL at 21:32

## 2017-07-08 RX ADMIN — HYDROMORPHONE HYDROCHLORIDE 0.5 MG: 1 INJECTION, SOLUTION INTRAMUSCULAR; INTRAVENOUS; SUBCUTANEOUS at 12:58

## 2017-07-08 RX ADMIN — CLONIDINE HYDROCHLORIDE 0.3 MG: 0.3 TABLET ORAL at 21:30

## 2017-07-08 RX ADMIN — FAMOTIDINE 20 MG: 20 TABLET, FILM COATED ORAL at 21:30

## 2017-07-08 RX ADMIN — POTASSIUM CHLORIDE 10 MEQ: 750 CAPSULE, EXTENDED RELEASE ORAL at 21:31

## 2017-07-08 RX ADMIN — BISACODYL 5 MG: 5 TABLET, COATED ORAL at 21:30

## 2017-07-08 NOTE — H&P
[facility] Medicine Services  HISTORY AND PHYSICAL    Date of Admission: 2017  Primary Care Physician: David Mcallister MD    Subjective     Chief Complaint: Syncope    History of Present Illness  Patient is a 41-year-old -American female past medical history of type II diabetes who presents to the emergency room with a history of several syncopal episodes in the recent past.  Parent she had another one today and EMS was called.  She also has complained of tremors problem sleeping approximately a 24 pound weight loss in the last month without trying to lose weight.  She was evaluated in the ER her workup was unremarkable except for totally suppressed TSH of less than 0.20 and a free T4 that was 5.49.  This is about more than doubled the upper limit of normal.  She has also been evaluated for possible eye tumor recently with an MRI of her orbits which was negative.  On my exam it looks like she has the beginnings of exophthalmos possibly.  Otherwise her review of systems evaluation is negative she does state her blood sugars be more difficult to control recently as well.        Review of Systems   14 point review of systems negative except for as per HPI.  Otherwise complete ROS reviewed and negative except as mentioned in the HPI.      Past Medical History:   Past Medical History:   Diagnosis Date   • Arthritis    • Depression    • Diabetes mellitus    • GERD (gastroesophageal reflux disease)    • Hyperlipidemia    • Hypertension    • Injury of back        Past Surgical History:  Past Surgical History:   Procedure Laterality Date   •  SECTION     • CHOLECYSTECTOMY     • COLONOSCOPY     • CYST REMOVAL     • HYSTERECTOMY         Social History:  reports that she has never smoked. She does not have any smokeless tobacco history on file. She reports that she does not drink alcohol or use illicit drugs.    Family History: family history is not on file.   Family  history is positive for diabetes and thyroid problems in almost every female in the family.    Allergies:  No Known Allergies    Medications:  Prior to Admission medications    Medication Sig Start Date End Date Taking? Authorizing Provider   amLODIPine (NORVASC) 10 MG tablet Take 10 mg by mouth Daily.   Yes Historical Provider, MD   benazepril (LOTENSIN) 20 MG tablet Take 20 mg by mouth 2 (Two) Times a Day.   Yes Historical Provider, MD   CloNIDine (CATAPRES) 0.3 MG tablet Take 0.3 mg by mouth 2 (Two) Times a Day.   Yes Historical Provider, MD   DULoxetine (CYMBALTA) 60 MG capsule Take 60 mg by mouth Daily.   Yes Historical Provider, MD   furosemide (LASIX) 20 MG tablet Take 20 mg by mouth Daily. Take one to two daily   Yes Historical Provider, MD   gabapentin (NEURONTIN) 300 MG capsule Take 300 mg by mouth 4 (Four) Times a Day.   Yes Historical Provider, MD   hydrALAZINE (APRESOLINE) 25 MG tablet Take 25 mg by mouth 2 (Two) Times a Day.   Yes Historical Provider, MD   insulin glargine (LANTUS) 100 UNIT/ML injection Inject  under the skin Daily.   Yes Historical Provider, MD   insulin lispro (humaLOG) 100 UNIT/ML injection Inject  under the skin 3 (Three) Times a Day Before Meals.   Yes Historical Provider, MD   metFORMIN (GLUCOPHAGE) 500 MG tablet Take 1,000 mg by mouth 2 (Two) Times a Day With Meals.   Yes Historical Provider, MD   PIROXICAM PO Take 20 mg by mouth Daily.   Yes Historical Provider, MD   potassium chloride (MICRO-K) 10 MEQ CR capsule Take 10 mEq by mouth Daily. Take one to two tablets daily   Yes Historical Provider, MD   raNITIdine (ZANTAC) 150 MG tablet Take 300 mg by mouth Daily.   Yes Historical Provider, MD   simvastatin (ZOCOR) 20 MG tablet Take 20 mg by mouth Every Night.   Yes Historical Provider, MD   tiZANidine (ZANAFLEX) 4 MG tablet Take 4 mg by mouth 2 (Two) Times a Day.   Yes Historical Provider, MD   topiramate (TOPAMAX) 25 MG tablet Take 25 mg by mouth 2 (Two) Times a Day. Medication  "bottle:Take one tablet by mouth daily for 7 days then 1 tablet by mouth two times a day for 7 days then 1 tablet three times a day for 7 days then 2 tablets two times a day thereafter 3/30/17  Yes Historical Provider, MD   traZODone (DESYREL) 100 MG tablet Take 100 mg by mouth Every Night.   Yes Historical Provider, MD   CloNIDine (CATAPRES) 0.2 MG tablet Take 0.3 mg by mouth 2 (Two) Times a Day.    Historical Provider, MD   DICLOFENAC SODIUM PO Take 75 mg by mouth Daily. Take with food    Historical Provider, MD   nitrofurantoin, macrocrystal-monohydrate, (MACROBID) 100 MG capsule Take 1 capsule by mouth 2 (Two) Times a Day. 3/14/17   Linus Rodriguez MD   ondansetron (ZOFRAN) 4 MG tablet Take 1 tablet by mouth Every 6 (Six) Hours. 3/14/17   Linus Rodriguez MD       Objective     Vital Signs: /95  Pulse 102  Temp 97.7 °F (36.5 °C) (Oral)   Resp 18  Ht 74\" (188 cm)  Wt 200 lb (90.7 kg)  SpO2 100%  BMI 25.68 kg/m2  Physical Exam  Constitutional: She is oriented to person, place, and time. She appears well-developed and well-nourished.   HENT:   Head: Normocephalic and atraumatic.   Eyes: Conjunctivae are normal. Pupils are equal, round, and reactive to light. Questionable early exophthalmus  Neck: Normal range of motion. Neck supple.   Cardiovascular: tachyl rate, regular rhythm and normal heart sounds.   Pulmonary/Chest: Effort normal and breath sounds normal.   Abdominal: Soft. Bowel sounds are normal.   Musculoskeletal: Normal range of motion.   Neurological: She is alert and oriented to person, place, and time. She also has a fine tremor.  Skin: Skin is warm and dry.   Psychiatric: She has a normal mood and affect.   Nursing note and vitals reviewed.      Results Reviewed:  Lab Results (last 24 hours)     Procedure Component Value Units Date/Time    Blood Gas, Arterial With Co-Ox [[id number]]  (Abnormal) Collected:  07/08/17 1140    Specimen:  Arterial Blood Updated:  07/08/17 1143     Site Arterial: right " radial     Bill's Test --      Documented in Rapid Comm        pH, Arterial 7.490 (H) pH units      pCO2, Arterial 24.9 (L) mm Hg      pO2, Arterial 98.4 mm Hg      HCO3, Arterial 18.5 (L) mmol/L      Base Excess, Arterial -3.2 (L) mmol/L      Hemoglobin, Blood Gas 13.0 g/dL      Hematocrit, Blood Gas 38.0 %      Oxyhemoglobin 97.0 %      Methemoglobin 0.2 (L) %      Carboxyhemoglobin 0.9 %      Sodium, Arterial 138.3 mmol/L      Potassium, Arterial 3.97 mmol/L      Barometric Pressure for Blood Gas -- mmHg       Component not reported at this site.        Modality Room air    Narrative:       Serial Number: [id number]    : [id number]    CBC & Differential [[id number]] Collected:  07/08/17 1137    Specimen:  Blood Updated:  07/08/17 1149    Narrative:       The following orders were created for panel order CBC & Differential.  Procedure                               Abnormality         Status                     ---------                               -----------         ------                     CBC Auto Differential[[id number]]        Abnormal            Final result                 Please view results for these tests on the individual orders.    CBC Auto Differential [[id number]]  (Abnormal) Collected:  07/08/17 1137    Specimen:  Blood Updated:  07/08/17 1149     WBC 4.69 (L) 10*3/mm3      RBC 4.99 10*6/mm3      Hemoglobin 13.1 g/dL      Hematocrit 38.9 %      MCV 78.0 (L) fL      MCH 26.3 (L) pg      MCHC 33.7 g/dL      RDW 12.7 %      RDW-SD 35.8 (L) fl      MPV 10.3 fL      Platelets 377 10*3/mm3      Neutrophil % 48.7 %      Lymphocyte % 42.4 %      Monocyte % 8.3 %      Eosinophil % 0.4 %      Basophil % 0.2 %      Immature Grans % 0.0 %      Neutrophils, Absolute 2.28 10*3/mm3      Lymphocytes, Absolute 1.99 10*3/mm3      Monocytes, Absolute 0.39 10*3/mm3      Eosinophils, Absolute 0.02 10*3/mm3      Basophils, Absolute 0.01 10*3/mm3      Immature Grans, Absolute 0.00 10*3/mm3     Protime-INR [[id number]]   (Normal) Collected:  07/08/17 1137    Specimen:  Blood Updated:  07/08/17 1158     Protime 13.8 Seconds      INR 1.03    aPTT [[id number]]  (Normal) Collected:  07/08/17 1137    Specimen:  Blood Updated:  07/08/17 1158     PTT 30.7 seconds     D-dimer, Quantitative [[id number]]  (Normal) Collected:  07/08/17 1137    Specimen:  Blood Updated:  07/08/17 1158     D-Dimer, Quantitative 0.41 mg/L (FEU)     Narrative:       Reference Range is 0-0.50 mg/L FEU. However, results <0.50 mg/L FEU tends to rule out DVT or PE. Results >0.50 mg/L FEU are not useful in predicting absence or presence of DVT or PE.    Amylase [[id number]]  (Normal) Collected:  07/08/17 1137    Specimen:  Blood Updated:  07/08/17 1200     Amylase 75 U/L     C-reactive Protein [[id number]]  (Abnormal) Collected:  07/08/17 1137    Specimen:  Blood Updated:  07/08/17 1200     C-Reactive Protein 2.01 (H) mg/dL     Comprehensive Metabolic Panel [[id number]]  (Abnormal) Collected:  07/08/17 1137    Specimen:  Blood Updated:  07/08/17 1200     Glucose 145 (H) mg/dL      BUN 20 mg/dL      Creatinine 0.80 mg/dL      Sodium 141 mmol/L      Potassium 4.1 mmol/L      Chloride 106 mmol/L      CO2 22.0 (L) mmol/L      Calcium 10.6 (H) mg/dL      Total Protein 7.8 g/dL      Albumin 4.40 g/dL      ALT (SGPT) 88 (H) U/L      AST (SGOT) 45 U/L      Alkaline Phosphatase 135 (H) U/L      Total Bilirubin 1.1 (H) mg/dL      eGFR  African Amer 95 mL/min/1.73      Globulin 3.4 gm/dL      A/G Ratio 1.3 g/dL      BUN/Creatinine Ratio 25.0     Anion Gap 13.0 mmol/L     CK [[id number]]  (Normal) Collected:  07/08/17 1137    Specimen:  Blood Updated:  07/08/17 1200     Creatine Kinase 90 U/L     Lipase [[id number]]  (Normal) Collected:  07/08/17 1137    Specimen:  Blood Updated:  07/08/17 1200     Lipase 89 U/L     Urinalysis With / Culture If Indicated [[id number]]  (Abnormal) Collected:  07/08/17 1153    Specimen:  Urine from Urine, Clean Catch Updated:  07/08/17 1208     Color, UA Yellow      Appearance, UA Clear     pH, UA 6.0     Specific Gravity, UA 1.020     Glucose, UA Negative     Ketones, UA 15 mg/dL (1+) (A)     Bilirubin, UA Negative     Blood, UA Negative     Protein, UA Negative     Leuk Esterase, UA Negative     Nitrite, UA Negative     Urobilinogen, UA 2.0 E.U./dL (A)    Narrative:       Urine microscopic not indicated.    BNP [[id number]]  (Normal) Collected:  07/08/17 1137    Specimen:  Blood Updated:  07/08/17 1211     proBNP 209.0 pg/mL     CK-MB [[id number]]  (Normal) Collected:  07/08/17 1137    Specimen:  Blood Updated:  07/08/17 1211     CKMB 1.11 ng/mL     Narrative:       CKMB Index not indicated    Myoglobin, Serum [[id number]]  (Normal) Collected:  07/08/17 1137    Specimen:  Blood Updated:  07/08/17 1211     Myoglobin 47.0 ng/mL     Troponin [[id number]]  (Normal) Collected:  07/08/17 1137    Specimen:  Blood Updated:  07/08/17 1212     Troponin I 0.012 ng/mL     T4, Free [[id number]]  (Abnormal) Collected:  07/08/17 1137    Specimen:  Blood Updated:  07/08/17 1213     Free T4 5.49 (H) ng/dL     TSH [[id number]]  (Abnormal) Collected:  07/08/17 1137    Specimen:  Blood Updated:  07/08/17 1227     TSH <0.020 (L) mIU/mL         Imaging Results (last 24 hours)     Procedure Component Value Units Date/Time    XR Chest 1 View [[id number]] Collected:  07/08/17 1230     Updated:  07/08/17 1233    Narrative:       EXAMINATION:   XR CHEST 1 VW-  7/8/2017 12:30 PM CDT     HISTORY: Syncope      Frontal upright radiograph of the chest 7/8/2017 11:31 AM CDT     COMPARISON: 03/14/2017.     FINDINGS:   The lungs are clear. The cardiomediastinal silhouette and pulmonary  vascularity are within normal limits.      The osseous structures and surrounding soft tissues demonstrate no acute  abnormality.       Impression:       1. No radiographic evidence of acute cardiopulmonary process.        This report was finalized on 07/08/2017 12:30 by Dr. Abhijit Murcia MD.    CT Head Without Contrast [[id number]]  Collected:  07/08/17 1304     Updated:  07/08/17 1308    Narrative:       CT BRAIN without contrast dated 7/8/2017 12:40 PM CDT     HISTORY: Headache     COMPARISON: 11/10/2015      DOSE LENGTH PRODUCT: 586 mGy cm     In order to have a CT radiation dose as low as reasonably achievable,  Automated Exposure Control was utilized for adjustment of the mA and/or  KV according to patient size.     TECHNIQUE: Serial axial tomographic images of the brain were obtained  without the use of intravenous contrast.      FINDINGS:   The midline structures are nondisplaced. The ventricles and basilar  cisterns are normal in size and configuration. There is no evidence of  intracranial hemorrhage or mass-effect. The gray-white matter  differentiation is maintained. The sulci have a normal configuration,  and there are no abnormal extra-axial fluid collections. The structures  of the posterior fossa are unremarkable.      The included orbits and their contents are unremarkable. The visualized  paranasal sinuses, mastoid air cells and middle ear cavities are clear.  The visualized osseous structures and overlying soft tissues of the  skull and face are unremarkable.        Impression:       1. No acute intracranial process.        This report was finalized on 07/08/2017 13:05 by Dr. Abhijit Murcia MD.    CT Cervical Spine Without Contrast [[id number]] Collected:  07/08/17 1308     Updated:  07/08/17 1314    Narrative:       EXAMINATION:   CT CERVICAL SPINE WO CONTRAST-  7/8/2017 1:08 PM CDT     HISTORY: CT CERVICAL SPINE WO CONTRAST- 7/8/2017 12:40 PM CDT     HISTORY: neck pain, fall     COMPARISON: 11/10/2015     DOSE LENGTH PRODUCT: 278 mGy cm. Automated exposure control was also  utilized to decrease patient radiation dose.     TECHNIQUE: Serial helical tomographic images of the cervical spine were  obtained without the use of intravenous contrast. Additionally, sagittal  and coronal reformatted images were also provided for  review.      FINDINGS:   Alignment: Mild straightening and reversal of the normal cervical  lordosis is noted     Bones: There is no evidence of fracture. Vertebral body heights are  maintained.     Disc spaces: Degenerative changes noted with loss height at C3-4 and  C5-6 disc space levels. Mild hypertrophic degenerative change is  present.     Canal and neuroforamina: Patent.     Soft tissues: Unremarkable.     Lung apices: Clear. No pneumothorax.       Impression:       1. Mild degenerative changes present. There is no acute fracture.        This report was finalized on 07/08/2017 13:11 by Dr. Abhijit Murcia MD.          I have personally reviewed and interpreted the radiology studies and ECG obtained at time of admission.     Assessment / Plan     Assessment & Plan  Hospital Problem List     Syncope        1.  Syncope: Probably due to tachyarrhythmias related to thyrotoxicosis.  We will also get carotid ultrasounds 2-D echo I do not think we need to further workup in that.  We need to control her thyroid will initiate beta blocker therapy.  2.  Thyrotoxicosis symptomatic: She is significantly hyperthyroid.  I do not believe she is in thyroid storm however going to initiate a beta blocker.  I am going to get an ultrasound of her thyroid.  I do not know if we will be able to keep her around long enough to get a thyroid uptake scan since the weekend.  I will inform her of this that a lot of this can be done as an outpatient as long control her symptoms.  I will get a free T3 as well as thyroglobulin antibodies anti-TPO antibodies.  3.  Type II diabetes question control will check hemoglobin A1c monitor sliding scale insulin and serial Accu-Cheks.  4.  Hypertension poorly controlled her blood pressures been bouncing around a elevated in the emergency room once again going to start her on a beta blocker to see if this will help control regulated better.  She is on multiple medications for her blood pressure already  at high doses.    Code Status: Full     I discussed the patients findings and my recommendations with patient and family    Estimated length of stay 1-2 days.    Shashi Avalos MD   07/08/17   4:05 PM

## 2017-07-08 NOTE — ED NOTES
Patient states she has been having terrible headaches for two weeks. Patient reports she has been seeing her PCP for headaches for almost a year and was on topamax. Patient states the pain has increasingly worsened over the past two weeks and radiates to her right arm. Patient and family report the patient has been experiencing syncope upon standing and has hit her head several times.      Dona Currie RN  07/08/17 2063

## 2017-07-08 NOTE — ED NOTES
Attempted to give report, RN unavailable to take report at this time. Charge nurse unavailable to take report.      Dona Currie RN  07/08/17 3531

## 2017-07-09 ENCOUNTER — APPOINTMENT (OUTPATIENT)
Dept: CARDIOLOGY | Facility: HOSPITAL | Age: 42
End: 2017-07-09
Attending: INTERNAL MEDICINE

## 2017-07-09 ENCOUNTER — APPOINTMENT (OUTPATIENT)
Dept: ULTRASOUND IMAGING | Facility: HOSPITAL | Age: 42
End: 2017-07-09

## 2017-07-09 LAB
BH CV ECHO MEAS - AO MAX PG (FULL): 4.4 MMHG
BH CV ECHO MEAS - AO MAX PG: 12.8 MMHG
BH CV ECHO MEAS - AO MEAN PG (FULL): 2 MMHG
BH CV ECHO MEAS - AO MEAN PG: 7 MMHG
BH CV ECHO MEAS - AO ROOT AREA: 9.6 CM^2
BH CV ECHO MEAS - AO ROOT DIAM: 3.5 CM
BH CV ECHO MEAS - AO V2 MAX: 179 CM/SEC
BH CV ECHO MEAS - AO V2 MEAN: 124 CM/SEC
BH CV ECHO MEAS - AO V2 VTI: 32.2 CM
BH CV ECHO MEAS - AVA(I,A): 3.2 CM^2
BH CV ECHO MEAS - AVA(I,D): 3.2 CM^2
BH CV ECHO MEAS - AVA(V,A): 2.5 CM^2
BH CV ECHO MEAS - AVA(V,D): 2.5 CM^2
BH CV ECHO MEAS - CONTRAST EF 4CH: 68.5 ML/M^2
BH CV ECHO MEAS - EDV(CUBED): 57.5 ML
BH CV ECHO MEAS - EDV(MOD-SP4): 74.4 ML
BH CV ECHO MEAS - EDV(TEICH): 64.3 ML
BH CV ECHO MEAS - EF(CUBED): 74.1 %
BH CV ECHO MEAS - EF(MOD-SP4): 68.5 %
BH CV ECHO MEAS - EF(TEICH): 66.7 %
BH CV ECHO MEAS - ESV(CUBED): 14.9 ML
BH CV ECHO MEAS - ESV(MOD-SP4): 23.4 ML
BH CV ECHO MEAS - ESV(TEICH): 21.4 ML
BH CV ECHO MEAS - FS: 36.3 %
BH CV ECHO MEAS - IVS/LVPW: 1.1
BH CV ECHO MEAS - IVSD: 1.1 CM
BH CV ECHO MEAS - LA DIMENSION: 3.3 CM
BH CV ECHO MEAS - LA/AO: 0.94
BH CV ECHO MEAS - LAT PEAK E' VEL: 12 CM/SEC
BH CV ECHO MEAS - LV MASS(C)D: 122.1 GRAMS
BH CV ECHO MEAS - LV MAX PG: 8.4 MMHG
BH CV ECHO MEAS - LV MEAN PG: 5 MMHG
BH CV ECHO MEAS - LV V1 MAX: 145 CM/SEC
BH CV ECHO MEAS - LV V1 MEAN: 96.9 CM/SEC
BH CV ECHO MEAS - LV V1 VTI: 32.3 CM
BH CV ECHO MEAS - LVIDD: 3.9 CM
BH CV ECHO MEAS - LVIDS: 2.5 CM
BH CV ECHO MEAS - LVLD AP4: 7.3 CM
BH CV ECHO MEAS - LVLS AP4: 6.8 CM
BH CV ECHO MEAS - LVOT AREA (M): 3.1 CM^2
BH CV ECHO MEAS - LVOT AREA: 3.1 CM^2
BH CV ECHO MEAS - LVOT DIAM: 2 CM
BH CV ECHO MEAS - LVPWD: 0.97 CM
BH CV ECHO MEAS - MV A MAX VEL: 67.9 CM/SEC
BH CV ECHO MEAS - MV DEC TIME: 0.23 SEC
BH CV ECHO MEAS - MV E MAX VEL: 88.3 CM/SEC
BH CV ECHO MEAS - MV E/A: 1.3
BH CV ECHO MEAS - RAP SYSTOLE: 5 MMHG
BH CV ECHO MEAS - RVSP: 38.6 MMHG
BH CV ECHO MEAS - SV(AO): 309.8 ML
BH CV ECHO MEAS - SV(CUBED): 42.6 ML
BH CV ECHO MEAS - SV(LVOT): 101.5 ML
BH CV ECHO MEAS - SV(MOD-SP4): 51 ML
BH CV ECHO MEAS - SV(TEICH): 42.9 ML
BH CV ECHO MEAS - TR MAX VEL: 290 CM/SEC
E/E' RATIO: 11
GLUCOSE BLDC GLUCOMTR-MCNC: 135 MG/DL (ref 70–130)
GLUCOSE BLDC GLUCOMTR-MCNC: 139 MG/DL (ref 70–130)
GLUCOSE BLDC GLUCOMTR-MCNC: 255 MG/DL (ref 70–130)
GLUCOSE BLDC GLUCOMTR-MCNC: 98 MG/DL (ref 70–130)
LEFT ATRIUM VOLUME: 52 CM3

## 2017-07-09 PROCEDURE — G0378 HOSPITAL OBSERVATION PER HR: HCPCS

## 2017-07-09 PROCEDURE — 25010000002 MORPHINE SULFATE (PF) 2 MG/ML SOLUTION: Performed by: INTERNAL MEDICINE

## 2017-07-09 PROCEDURE — 93306 TTE W/DOPPLER COMPLETE: CPT

## 2017-07-09 PROCEDURE — 93306 TTE W/DOPPLER COMPLETE: CPT | Performed by: INTERNAL MEDICINE

## 2017-07-09 PROCEDURE — 82962 GLUCOSE BLOOD TEST: CPT

## 2017-07-09 PROCEDURE — 76536 US EXAM OF HEAD AND NECK: CPT

## 2017-07-09 RX ORDER — AMLODIPINE BESYLATE 5 MG/1
5 TABLET ORAL DAILY
Status: DISCONTINUED | OUTPATIENT
Start: 2017-07-10 | End: 2017-07-12 | Stop reason: HOSPADM

## 2017-07-09 RX ORDER — CLONIDINE HYDROCHLORIDE 0.2 MG/1
0.2 TABLET ORAL EVERY 12 HOURS SCHEDULED
Status: DISCONTINUED | OUTPATIENT
Start: 2017-07-09 | End: 2017-07-12 | Stop reason: HOSPADM

## 2017-07-09 RX ADMIN — GABAPENTIN 300 MG: 300 CAPSULE ORAL at 09:33

## 2017-07-09 RX ADMIN — TIZANIDINE 4 MG: 4 TABLET ORAL at 21:30

## 2017-07-09 RX ADMIN — INSULIN LISPRO 10 UNITS: 100 INJECTION, SOLUTION INTRAVENOUS; SUBCUTANEOUS at 09:33

## 2017-07-09 RX ADMIN — TOPIRAMATE 25 MG: 25 TABLET, FILM COATED ORAL at 09:32

## 2017-07-09 RX ADMIN — TOPIRAMATE 25 MG: 25 TABLET, FILM COATED ORAL at 21:30

## 2017-07-09 RX ADMIN — MORPHINE SULFATE 1 MG: 2 INJECTION, SOLUTION INTRAMUSCULAR; INTRAVENOUS at 04:59

## 2017-07-09 RX ADMIN — INSULIN DETEMIR 20 UNITS: 100 INJECTION, SOLUTION SUBCUTANEOUS at 21:30

## 2017-07-09 RX ADMIN — GABAPENTIN 300 MG: 300 CAPSULE ORAL at 12:16

## 2017-07-09 RX ADMIN — DULOXETINE HYDROCHLORIDE 60 MG: 30 CAPSULE, DELAYED RELEASE ORAL at 09:32

## 2017-07-09 RX ADMIN — FAMOTIDINE 20 MG: 20 TABLET, FILM COATED ORAL at 09:32

## 2017-07-09 RX ADMIN — BENAZEPRIL HYDROCHLORIDE 20 MG: 20 TABLET, COATED ORAL at 21:30

## 2017-07-09 RX ADMIN — MORPHINE SULFATE 1 MG: 2 INJECTION, SOLUTION INTRAMUSCULAR; INTRAVENOUS at 10:17

## 2017-07-09 RX ADMIN — INSULIN LISPRO 10 UNITS: 100 INJECTION, SOLUTION INTRAVENOUS; SUBCUTANEOUS at 12:16

## 2017-07-09 RX ADMIN — ACETAMINOPHEN 650 MG: 325 TABLET, FILM COATED ORAL at 12:22

## 2017-07-09 RX ADMIN — GABAPENTIN 300 MG: 300 CAPSULE ORAL at 21:36

## 2017-07-09 RX ADMIN — INSULIN LISPRO 10 UNITS: 100 INJECTION, SOLUTION INTRAVENOUS; SUBCUTANEOUS at 17:09

## 2017-07-09 RX ADMIN — TIZANIDINE 4 MG: 4 TABLET ORAL at 09:32

## 2017-07-09 RX ADMIN — ATORVASTATIN CALCIUM 10 MG: 10 TABLET, FILM COATED ORAL at 09:32

## 2017-07-09 RX ADMIN — FUROSEMIDE 20 MG: 20 TABLET ORAL at 09:32

## 2017-07-09 RX ADMIN — METFORMIN HYDROCHLORIDE 1000 MG: 500 TABLET ORAL at 09:32

## 2017-07-09 RX ADMIN — PROPRANOLOL HYDROCHLORIDE 120 MG: 60 CAPSULE, EXTENDED RELEASE ORAL at 09:33

## 2017-07-09 RX ADMIN — FAMOTIDINE 20 MG: 20 TABLET, FILM COATED ORAL at 21:30

## 2017-07-09 RX ADMIN — GABAPENTIN 300 MG: 300 CAPSULE ORAL at 17:09

## 2017-07-09 RX ADMIN — TRAZODONE HYDROCHLORIDE 100 MG: 100 TABLET, FILM COATED ORAL at 21:30

## 2017-07-09 RX ADMIN — METFORMIN HYDROCHLORIDE 1000 MG: 500 TABLET ORAL at 17:09

## 2017-07-09 RX ADMIN — CLONIDINE HYDROCHLORIDE 0.2 MG: 0.2 TABLET ORAL at 21:30

## 2017-07-09 RX ADMIN — POTASSIUM CHLORIDE 10 MEQ: 750 CAPSULE, EXTENDED RELEASE ORAL at 09:32

## 2017-07-09 RX ADMIN — MORPHINE SULFATE 1 MG: 2 INJECTION, SOLUTION INTRAMUSCULAR; INTRAVENOUS at 18:29

## 2017-07-09 NOTE — PLAN OF CARE
Problem: Patient Care Overview (Adult)  Goal: Plan of Care Review  Outcome: Ongoing (interventions implemented as appropriate)    07/09/17 0250   Coping/Psychosocial Response Interventions   Plan Of Care Reviewed With patient   Patient Care Overview   Progress no change   Outcome Evaluation   Outcome Summary/Follow up Plan pt rates her headache a 9-10/10 all the time, also c/o dizziness when ambulating, morphine given as ordered, tyllenol refused, pt has been educated on medications, getting up with assitance. VSS, no neuro changes, will cont. To monitor.         Problem: Fall Risk (Adult)  Goal: Identify Related Risk Factors and Signs and Symptoms  Outcome: Outcome(s) achieved Date Met:  07/09/17  Goal: Absence of Falls  Outcome: Ongoing (interventions implemented as appropriate)    Problem: Pain, Acute (Adult)  Goal: Identify Related Risk Factors and Signs and Symptoms  Outcome: Outcome(s) achieved Date Met:  07/09/17  Goal: Acceptable Pain Control/Comfort Level  Outcome: Ongoing (interventions implemented as appropriate)    Problem: Constipation (Adult)  Goal: Identify Related Risk Factors and Signs and Symptoms  Outcome: Outcome(s) achieved Date Met:  07/09/17  Goal: Effective Bowel Elimination  Outcome: Ongoing (interventions implemented as appropriate)  Goal: Comfort  Outcome: Ongoing (interventions implemented as appropriate)

## 2017-07-09 NOTE — PROGRESS NOTES
[facility] Medicine Services  INPATIENT PROGRESS NOTE    Length of Stay: 0  Date of Admission: 7/8/2017  Primary Care Physician: David Mcallister MD    Subjective   Chief Complaint: Follow up  HPI   She was admitted yesterday for thyrotoxicosis and syncope.  She has had no further syncope episodes.  She was started on Inderal yesterday her blood pressure and pulse are back in the normal range.  Her free T3 was found to be 21.  This is 4 times be on the upper limit of normal which is significant for extreme thyrotoxicosis.  Her blood sugars have remained relatively stable.  Her tremors are much better with the Inderal.  Her BP and heart rate are also better.  Of note her urine drug screen was positive for cocaine and THC.          Review of Systems   14 point review of systems are negative except for as per HPI.  All pertinent negatives and positives are as above. All other systems have been reviewed and are negative unless otherwise stated.     Objective    Temp:  [97.4 °F (36.3 °C)-98.6 °F (37 °C)] 98.6 °F (37 °C)  Heart Rate:  [] 79  Resp:  [16-20] 20  BP: (102-180)/(49-95) 130/62  Physical Exam  Constitutional: She is oriented to person, place, and time. She appears well-developed and well-nourished.   HENT:   Head: Normocephalic and atraumatic.   Eyes: Conjunctivae are normal. Pupils are equal, round, and reactive to light.   Neck: Normal range of motion. Neck supple.   Cardiovascular: Normal rate, regular rhythm and normal heart sounds.   Pulmonary/Chest: Effort normal and breath sounds normal.   Abdominal: Soft. Bowel sounds are normal.   Musculoskeletal: Normal range of motion.   Neurological: She is alert and oriented to person, place, and time. No tremors  Skin: Skin is warm and dry.   Psychiatric: She has a normal mood and affect.   Nursing note and vitals reviewed.      Results Review:  I have reviewed the labs, radiology results, and diagnostic  studies.    Laboratory Data:     Results from last 7 days  Lab Units 07/08/17  1137   WBC 10*3/mm3 4.69*   HEMOGLOBIN g/dL 13.1   HEMATOCRIT % 38.9   PLATELETS 10*3/mm3 377          Results from last 7 days  Lab Units 07/08/17  1140 07/08/17  1137   SODIUM mmol/L  --  141   SODIUM, ARTERIAL mmol/L 138.3  --    POTASSIUM mmol/L  --  4.1   CHLORIDE mmol/L  --  106   CO2 mmol/L  --  22.0*   BUN mg/dL  --  20   CREATININE mg/dL  --  0.80   CALCIUM mg/dL  --  10.6*   BILIRUBIN mg/dL  --  1.1*   ALK PHOS U/L  --  135*   ALT (SGPT) U/L  --  88*   AST (SGOT) U/L  --  45   GLUCOSE mg/dL  --  145*       Culture Data:   [unfilled]    Radiology Data:   Imaging Results (last 24 hours)     Procedure Component Value Units Date/Time    CT Head Without Contrast [[id number]] Collected:  07/08/17 1304     Updated:  07/08/17 1308    Narrative:       CT BRAIN without contrast dated 7/8/2017 12:40 PM CDT     HISTORY: Headache     COMPARISON: 11/10/2015      DOSE LENGTH PRODUCT: 586 mGy cm     In order to have a CT radiation dose as low as reasonably achievable,  Automated Exposure Control was utilized for adjustment of the mA and/or  KV according to patient size.     TECHNIQUE: Serial axial tomographic images of the brain were obtained  without the use of intravenous contrast.      FINDINGS:   The midline structures are nondisplaced. The ventricles and basilar  cisterns are normal in size and configuration. There is no evidence of  intracranial hemorrhage or mass-effect. The gray-white matter  differentiation is maintained. The sulci have a normal configuration,  and there are no abnormal extra-axial fluid collections. The structures  of the posterior fossa are unremarkable.      The included orbits and their contents are unremarkable. The visualized  paranasal sinuses, mastoid air cells and middle ear cavities are clear.  The visualized osseous structures and overlying soft tissues of the  skull and face are unremarkable.         Impression:       1. No acute intracranial process.        This report was finalized on 07/08/2017 13:05 by Dr. Abhijit Murcia MD.    CT Cervical Spine Without Contrast [[id number]] Collected:  07/08/17 1308     Updated:  07/08/17 1314    Narrative:       EXAMINATION:   CT CERVICAL SPINE WO CONTRAST-  7/8/2017 1:08 PM CDT     HISTORY: CT CERVICAL SPINE WO CONTRAST- 7/8/2017 12:40 PM CDT     HISTORY: neck pain, fall     COMPARISON: 11/10/2015     DOSE LENGTH PRODUCT: 278 mGy cm. Automated exposure control was also  utilized to decrease patient radiation dose.     TECHNIQUE: Serial helical tomographic images of the cervical spine were  obtained without the use of intravenous contrast. Additionally, sagittal  and coronal reformatted images were also provided for review.      FINDINGS:   Alignment: Mild straightening and reversal of the normal cervical  lordosis is noted     Bones: There is no evidence of fracture. Vertebral body heights are  maintained.     Disc spaces: Degenerative changes noted with loss height at C3-4 and  C5-6 disc space levels. Mild hypertrophic degenerative change is  present.     Canal and neuroforamina: Patent.     Soft tissues: Unremarkable.     Lung apices: Clear. No pneumothorax.       Impression:       1. Mild degenerative changes present. There is no acute fracture.        This report was finalized on 07/08/2017 13:11 by Dr. Abhijit Murcia MD.    US Carotid Bilateral [[id number]] Collected:  07/08/17 1715     Updated:  07/08/17 1722    Narrative:       EXAMINATION: US CAROTID BILATERAL- 7/8/2017 5:15 PM CDT     HISTORY: Syncope     COMPARISON: None     FINDINGS:  Real time ultrasound with the assistance of color and spectral Doppler  demonstrates intimal thickening with few scattered areas of plaque  throughout the carotid arteries. Elevated peak systolic velocities are  demonstrated bilaterally, yielding a normal ICA/CCA peak systolic  velocity ratio of 0.93 on the right and 0.95 on the  left.  Peak systolic  velocities within the right CCA, ICA, and ECA are as follows: 160 cm/s,  149 cm/s, and 173 cm/s.  Peak systolic velocities within the left CCA,  ICA, and ECA are as follows: 165 cm/s, 157 cm/s, and 83 cm/s..   Antegrade vertebral artery flow is seen on the right. The left vertebral  artery was not visualized.       Impression:          1. Findings suggest 50-69% stenosis bilaterally per Society of  Radiologists in Ultrasound Consensus Criteria.  See below. This could be  confirmed with CTA neck nonemergently.     2. Left vertebral artery is not visualized.           Consensus Panel Gray-Scale and Doppler US Criteria for Diagnosis of ICA  Stenosis:                                                                                                                Primary Parameters                                         Additional  Parameters     Degree of                                ICA PSV                Plaque  Estimate                 ICA/CCA PSV              ICA EDV  Stenosis (%)                          (cm/sec)                            (%)*                                     Ratio                       (cm/sec)  ________________________________________________________________________  _________________     Normal                                         <125                               None                                      <2.0                             <40     <50                                               <125                                <50                                       <2.0                             <40     50-69                                        125-230                            >/=50                                    2.0-4.0                             >/=70 but less than near             >230                              >/=50                                      >4.0                            >100       occlusion     Near occlusion                         High, low, or                      Visible                                  Variable                      Variable                                                    undetectable     Total occlusion                        Undetectable           Visible,  no detectable                     N/A                             N/A                                                                                                 lumen               This report was finalized on 07/08/2017 17:19 by Dr. Ricki Joy MD.    XR Abdomen KUB [[id number]] Collected:  07/08/17 2015     Updated:  07/08/17 2021    Narrative:       EXAMINATION: XR ABDOMEN KUB- 7/8/2017 8:15 PM CDT     HISTORY: Abdominal pain, constipation     COMPARISON: CT abdomen and pelvis with contrast 03/14/2017     FINDINGS:  There is gaseous distention of the stomach as well as several loops of  bowel within the central abdomen and left lower quadrant. There is no  evidence of bowel obstruction. Surgical clips are seen within the right  upper quadrant. There is no appreciable organomegaly. No pathologic  calcifications are seen within the abdomen. A phlebolith is noted in the  pelvis.       Impression:       No evidence of bowel obstruction. No significant stool  burden.  This report was finalized on 07/08/2017 20:18 by Dr. Ricki Joy MD.    US Thyroid [[id number]] Collected:  07/09/17 1233     Updated:  07/09/17 1238    Narrative:       EXAMINATION:   US THYROID-  7/9/2017 12:33 PM CDT     HISTORY: Thyrotoxicosis.     Images are obtained transverse longitudinal direction usual manner.  Color flow is present.     Right lobe of thyroid gland is 2 cm in AP diameter 5.2 cm in sagittal  length.     Left lobe of thyroid gland is visualized color flow is present. Left  lobe is 17 mm in AP diameter 49 mm in sagittal length. A complex 8 x 10  mm septated cyst is noted in the left lobe thyroid gland. Smaller cyst  is also present.     The isthmus is 4 mm.        Impression:       Thyroid gland is upper limits of normal to mildly enlarged  in size.  2. There are 2 cyst associated with the left lobe of thyroid gland. One  is 8 x 10 mm. The other is 3 x 5 mm.  This report was finalized on 07/09/2017 12:35 by Dr. Abhijit Murcia MD.          I have reviewed the patient current medications.     Assessment/Plan     Hospital Problem List     Syncope          1. Syncope: Probably due to tachyarrhythmias related to thyrotoxicosis. Carotid shows some disease but can't get CTA yet due to contrast.  She also has UDS positive for cocaine fortunately she didn't have a problem with the beta blocker.    2. Thyrotoxicosis symptomatic: She is significantly hyperthyroid. I do not believe she is in thyroid storm however going to initiate a beta blocker. Thyroid ultrasound shows two nodules.  Will go ahead with thyroid uptake scan to evaluate whether she has Hashimoto's or Graves disease.    3. Type II diabetes HGbA1C is in good control.  Continue accuchecks and SSI.  4. Hypertension better control with Beta blocker.  Will decrease other medications she is on to compensate for this don't want her to get hypotensive.              Discharge Planning: I expect patient to be discharged to home in 2-3 days.    Shashi Avalos MD   07/09/17   12:57 PM

## 2017-07-09 NOTE — PLAN OF CARE
Problem: Patient Care Overview (Adult)  Goal: Plan of Care Review  Outcome: Ongoing (interventions implemented as appropriate)    07/09/17 1505   Coping/Psychosocial Response Interventions   Plan Of Care Reviewed With patient   Patient Care Overview   Progress no change   Outcome Evaluation   Outcome Summary/Follow up Plan Prn pain med given as ordered. Perrla. Alert and orientated. Natan. Up with assist. No neuro changes and safety maintained. Family at bedside. Doctor notified of pain and urine results. VSS.       07/09/17 1505   Coping/Psychosocial Response Interventions   Plan Of Care Reviewed With patient   Patient Care Overview   Progress no change   Outcome Evaluation   Outcome Summary/Follow up Plan Prn pain med given as ordered. Perrla. Alert and orientated. Natan. Up with assist. No neuro changes and safety maintained. Family at bedside. Doctor notified of pain and urine results.            Goal: Adult Individualization and Mutuality  Outcome: Ongoing (interventions implemented as appropriate)  Goal: Discharge Needs Assessment  Outcome: Ongoing (interventions implemented as appropriate)    Problem: Fall Risk (Adult)  Goal: Absence of Falls  Outcome: Ongoing (interventions implemented as appropriate)    Problem: Pain, Acute (Adult)  Goal: Acceptable Pain Control/Comfort Level  Outcome: Ongoing (interventions implemented as appropriate)    Problem: Constipation (Adult)  Goal: Effective Bowel Elimination  Outcome: Ongoing (interventions implemented as appropriate)  Goal: Comfort  Outcome: Ongoing (interventions implemented as appropriate)

## 2017-07-09 NOTE — PLAN OF CARE
Problem: Patient Care Overview (Adult)  Goal: Plan of Care Review  Outcome: Ongoing (interventions implemented as appropriate)    07/08/17 2007   Coping/Psychosocial Response Interventions   Plan Of Care Reviewed With patient   Patient Care Overview   Progress no change   Outcome Evaluation   Outcome Summary/Follow up Plan Prn morphine given as ordered. Family at bedside. Up with assist. Alert and orientated times four. Perrla. Natan. Safety maintained.        Goal: Adult Individualization and Mutuality  Outcome: Ongoing (interventions implemented as appropriate)  Goal: Discharge Needs Assessment  Outcome: Ongoing (interventions implemented as appropriate)    Problem: Fall Risk (Adult)  Goal: Identify Related Risk Factors and Signs and Symptoms  Outcome: Ongoing (interventions implemented as appropriate)  Goal: Absence of Falls  Outcome: Ongoing (interventions implemented as appropriate)    Problem: Pain, Acute (Adult)  Goal: Identify Related Risk Factors and Signs and Symptoms  Outcome: Ongoing (interventions implemented as appropriate)  Goal: Acceptable Pain Control/Comfort Level  Outcome: Ongoing (interventions implemented as appropriate)    Problem: Constipation (Adult)  Goal: Identify Related Risk Factors and Signs and Symptoms  Outcome: Ongoing (interventions implemented as appropriate)  Goal: Effective Bowel Elimination  Outcome: Ongoing (interventions implemented as appropriate)  Goal: Comfort  Outcome: Ongoing (interventions implemented as appropriate)

## 2017-07-10 PROBLEM — F19.10 POLYSUBSTANCE ABUSE: Status: ACTIVE | Noted: 2017-07-10

## 2017-07-10 PROBLEM — E05.10 THYROID ADENOMA, TOXIC: Status: ACTIVE | Noted: 2017-07-10

## 2017-07-10 PROBLEM — I10 HYPERTENSION: Status: ACTIVE | Noted: 2017-07-10

## 2017-07-10 PROBLEM — E11.9 DIABETES: Status: ACTIVE | Noted: 2017-07-10

## 2017-07-10 LAB
GLUCOSE BLDC GLUCOMTR-MCNC: 106 MG/DL (ref 70–130)
GLUCOSE BLDC GLUCOMTR-MCNC: 133 MG/DL (ref 70–130)
GLUCOSE BLDC GLUCOMTR-MCNC: 138 MG/DL (ref 70–130)
GLUCOSE BLDC GLUCOMTR-MCNC: 154 MG/DL (ref 70–130)
GLUCOSE BLDC GLUCOMTR-MCNC: 158 MG/DL (ref 70–130)

## 2017-07-10 PROCEDURE — 82962 GLUCOSE BLOOD TEST: CPT

## 2017-07-10 PROCEDURE — 94760 N-INVAS EAR/PLS OXIMETRY 1: CPT

## 2017-07-10 PROCEDURE — G0378 HOSPITAL OBSERVATION PER HR: HCPCS

## 2017-07-10 PROCEDURE — 94799 UNLISTED PULMONARY SVC/PX: CPT

## 2017-07-10 PROCEDURE — 25010000002 MORPHINE SULFATE (PF) 2 MG/ML SOLUTION: Performed by: INTERNAL MEDICINE

## 2017-07-10 RX ADMIN — INSULIN DETEMIR 20 UNITS: 100 INJECTION, SOLUTION SUBCUTANEOUS at 21:41

## 2017-07-10 RX ADMIN — GABAPENTIN 300 MG: 300 CAPSULE ORAL at 08:21

## 2017-07-10 RX ADMIN — TRAZODONE HYDROCHLORIDE 100 MG: 100 TABLET, FILM COATED ORAL at 21:40

## 2017-07-10 RX ADMIN — PROPRANOLOL HYDROCHLORIDE 120 MG: 60 CAPSULE, EXTENDED RELEASE ORAL at 08:12

## 2017-07-10 RX ADMIN — FUROSEMIDE 20 MG: 20 TABLET ORAL at 08:11

## 2017-07-10 RX ADMIN — INSULIN LISPRO 10 UNITS: 100 INJECTION, SOLUTION INTRAVENOUS; SUBCUTANEOUS at 08:11

## 2017-07-10 RX ADMIN — TOPIRAMATE 25 MG: 25 TABLET, FILM COATED ORAL at 21:40

## 2017-07-10 RX ADMIN — TIZANIDINE 4 MG: 4 TABLET ORAL at 08:11

## 2017-07-10 RX ADMIN — CLONIDINE HYDROCHLORIDE 0.2 MG: 0.2 TABLET ORAL at 08:12

## 2017-07-10 RX ADMIN — GABAPENTIN 300 MG: 300 CAPSULE ORAL at 17:38

## 2017-07-10 RX ADMIN — GABAPENTIN 300 MG: 300 CAPSULE ORAL at 11:31

## 2017-07-10 RX ADMIN — POTASSIUM CHLORIDE 10 MEQ: 750 CAPSULE, EXTENDED RELEASE ORAL at 08:11

## 2017-07-10 RX ADMIN — FAMOTIDINE 20 MG: 20 TABLET, FILM COATED ORAL at 21:41

## 2017-07-10 RX ADMIN — METFORMIN HYDROCHLORIDE 1000 MG: 500 TABLET ORAL at 17:36

## 2017-07-10 RX ADMIN — CLONIDINE HYDROCHLORIDE 0.2 MG: 0.2 TABLET ORAL at 21:41

## 2017-07-10 RX ADMIN — AMLODIPINE BESYLATE 5 MG: 5 TABLET ORAL at 08:12

## 2017-07-10 RX ADMIN — ATORVASTATIN CALCIUM 10 MG: 10 TABLET, FILM COATED ORAL at 08:12

## 2017-07-10 RX ADMIN — INSULIN LISPRO 10 UNITS: 100 INJECTION, SOLUTION INTRAVENOUS; SUBCUTANEOUS at 17:38

## 2017-07-10 RX ADMIN — GABAPENTIN 300 MG: 300 CAPSULE ORAL at 21:47

## 2017-07-10 RX ADMIN — TIZANIDINE 4 MG: 4 TABLET ORAL at 21:40

## 2017-07-10 RX ADMIN — FAMOTIDINE 20 MG: 20 TABLET, FILM COATED ORAL at 08:11

## 2017-07-10 RX ADMIN — TOPIRAMATE 25 MG: 25 TABLET, FILM COATED ORAL at 08:11

## 2017-07-10 RX ADMIN — DULOXETINE HYDROCHLORIDE 60 MG: 30 CAPSULE, DELAYED RELEASE ORAL at 08:11

## 2017-07-10 RX ADMIN — METFORMIN HYDROCHLORIDE 1000 MG: 500 TABLET ORAL at 08:12

## 2017-07-10 RX ADMIN — BENAZEPRIL HYDROCHLORIDE 20 MG: 20 TABLET, COATED ORAL at 08:12

## 2017-07-10 RX ADMIN — MORPHINE SULFATE 1 MG: 2 INJECTION, SOLUTION INTRAMUSCULAR; INTRAVENOUS at 08:21

## 2017-07-10 RX ADMIN — ACETAMINOPHEN 650 MG: 325 TABLET, FILM COATED ORAL at 21:40

## 2017-07-10 NOTE — PLAN OF CARE
Problem: Patient Care Overview (Adult)  Goal: Plan of Care Review  Outcome: Ongoing (interventions implemented as appropriate)    07/10/17 1532   Coping/Psychosocial Response Interventions   Plan Of Care Reviewed With patient   Patient Care Overview   Progress no change   Outcome Evaluation   Outcome Summary/Follow up Plan patient has maintained safety this shift with complaint of head ache times 1 given prn pain medication with relief noted, patients vss, no neuro changes, family at bedside, will continue to monitor.          Problem: Fall Risk (Adult)  Goal: Absence of Falls  Outcome: Ongoing (interventions implemented as appropriate)    Problem: Pain, Acute (Adult)  Goal: Acceptable Pain Control/Comfort Level  Outcome: Ongoing (interventions implemented as appropriate)

## 2017-07-10 NOTE — PROGRESS NOTES
Discharge Planning Assessment  [facility]     Patient Name: Lynn Magana  MRN: [id number]  Today's Date: 7/10/2017    Admit Date: 7/8/2017          Discharge Needs Assessment       07/10/17 1613    Living Environment    Lives With child(neymar), dependent   Sister is able to assist, as well as other family members    Home Accessibility no concerns    Transportation Available family or friend will provide;car    Living Environment    Provides Primary Care For no one    Primary Care Provided By other (see comments)   Sister    Quality Of Family Relationships supportive;helpful;involved    Able to Return to Prior Living Arrangements yes    Discharge Needs Assessment    Concerns To Be Addressed mental health concerns;substance/tobacco abuse/use concerns    Readmission Within The Last 30 Days no previous admission in last 30 days    Outpatient/Agency/Support Group Needs outpatient psychiatric care (specify);outpatient substance abuse treatment (specify)    Equipment Currently Used at Home none    Current Discharge Risk substance abuse    Discharge Planning Comments Patient's children live with her. Patient's sister is very involved in her care and assists patient as needed and did confirm she will be able to assist patient at time of discharge. Patient's sister is very interested in outpatient treatment at [facility] and has contact information for [facility] to be able to make an appointment. Plan outpatient [facility].             Discharge Plan       07/10/17 1603    Case Management/Social Work Plan    Plan Home    Additional Comments SW spoke to patient and sister, Bre, re discharge planning. Plan is for discharge to home. Bre did advise that she plans to assist patient in making arrangements for counseling at [facility]. Bre denies any other discharge planning needs. Patient's sister does have contact information for [facility] and will be making an appointment according to patient's  schedule and so that she can take patient to her appointment.         Discharge Placement     No information found                Demographic Summary     None            Functional Status     None            Psychosocial     None            Abuse/Neglect     None            Legal     None            Substance Abuse     None            Patient Forms     None          ALFREDO CastroW

## 2017-07-10 NOTE — PROGRESS NOTES
[facility] Medicine Services  INPATIENT PROGRESS NOTE    Length of Stay: 0  Date of Admission: 7/8/2017  Primary Care Physician: David Mcallister MD    Subjective   Chief Complaint: syncope, hyperthyroid, hypertension.     HPI   Patient denies any chest pain.  Patient denies of any sob breath.  Patient still complain weakness.    Review of Systems   Constitutional: Positive for activity change, appetite change and fatigue. Negative for chills and fever.   HENT: Negative for hearing loss, nosebleeds, tinnitus and trouble swallowing.    Eyes: Negative for visual disturbance.   Respiratory: Negative for cough, chest tightness, shortness of breath and wheezing.    Cardiovascular: Negative for chest pain, palpitations and leg swelling.   Gastrointestinal: Negative for abdominal distention, abdominal pain, blood in stool, constipation, diarrhea, nausea and vomiting.   Endocrine: Negative for cold intolerance, heat intolerance, polydipsia, polyphagia and polyuria.   Genitourinary: Negative for decreased urine volume, difficulty urinating, dysuria, flank pain, frequency and hematuria.   Musculoskeletal: Negative for arthralgias, joint swelling and myalgias.   Skin: Negative for rash.   Allergic/Immunologic: Negative for immunocompromised state.   Neurological: Positive for weakness. Negative for dizziness, syncope, light-headedness and headaches.   Hematological: Negative for adenopathy. Does not bruise/bleed easily.   Psychiatric/Behavioral: Negative for confusion and sleep disturbance. The patient is not nervous/anxious.           All pertinent negatives and positives are as above. All other systems have been reviewed and are negative unless otherwise stated.     Objective    Temp:  [97.6 °F (36.4 °C)-98.5 °F (36.9 °C)] 98.5 °F (36.9 °C)  Heart Rate:  [80-96] 96  Resp:  [16-18] 16  BP: (110-135)/(44-76) 117/64    Intake/Output Summary (Last 24 hours) at 07/10/17 5106  Last data filed at  07/10/17 1538   Gross per 24 hour   Intake              480 ml   Output             1570 ml   Net            -1090 ml     Physical Exam   Constitutional: She is oriented to person, place, and time. She appears well-developed and well-nourished.   HENT:   Head: Normocephalic and atraumatic.   Eyes: Conjunctivae and EOM are normal. Pupils are equal, round, and reactive to light.   Neck: Neck supple. No JVD present. No thyromegaly present.   Cardiovascular: Normal rate, regular rhythm, normal heart sounds and intact distal pulses.  Exam reveals no gallop and no friction rub.    No murmur heard.  Pulmonary/Chest: Effort normal and breath sounds normal. No respiratory distress. She has no wheezes. She has no rales. She exhibits no tenderness.   Abdominal: Soft. Bowel sounds are normal. She exhibits no distension. There is no tenderness. There is no rebound and no guarding.   Musculoskeletal: Normal range of motion. She exhibits no edema, tenderness or deformity.   Lymphadenopathy:     She has no cervical adenopathy.   Neurological: She is alert and oriented to person, place, and time. She displays normal reflexes. No cranial nerve deficit. She exhibits normal muscle tone. Coordination abnormal.   Skin: Skin is warm and dry. No rash noted.   Psychiatric: She has a normal mood and affect. Her behavior is normal. Judgment and thought content normal.       Results Review:  Lab Results (last 24 hours)     Procedure Component Value Units Date/Time    POC Glucose Fingerstick [[id number]]  (Abnormal) Collected:  07/09/17 1708    Specimen:  Blood Updated:  07/09/17 1727     Glucose 139 (H) mg/dL       : [id number] Mario HansonMeter ID: [id number]       POC Glucose Fingerstick [[id number]]  (Normal) Collected:  07/09/17 2118    Specimen:  Blood Updated:  07/09/17 2129     Glucose 98 mg/dL       : [id number] Lan CelesteMeter ID: [id number]       POC Glucose Fingerstick [[id number]]  (Abnormal) Collected:  07/10/17 0308     Specimen:  Blood Updated:  07/10/17 0330     Glucose 138 (H) mg/dL       : [id number] [facility] ID: [id number]       POC Glucose Fingerstick [[id number]]  (Abnormal) Collected:  07/10/17 0806    Specimen:  Blood Updated:  07/10/17 0835     Glucose 154 (H) mg/dL       : [id number] Weather MandyMeter ID: [id number]       POC Glucose Fingerstick [[id number]]  (Normal) Collected:  07/10/17 1130    Specimen:  Blood Updated:  07/10/17 1144     Glucose 106 mg/dL       : [id number] Weather MandyMeter ID: [id number]              Cultures:       Radiology Data:    Imaging Results (last 24 hours)     ** No results found for the last 24 hours. **          No Known Allergies    Scheduled meds:     amLODIPine 5 mg Oral Daily   atorvastatin 10 mg Oral Daily   benazepril 20 mg Oral Q12H   CloNIDine 0.2 mg Oral Q12H   DULoxetine 60 mg Oral Daily   famotidine 20 mg Oral Q12H   furosemide 20 mg Oral Daily   gabapentin 300 mg Oral 4x Daily   hydrALAZINE 25 mg Oral Q12H   insulin detemir 20 Units Subcutaneous Nightly   insulin lispro 10 Units Subcutaneous TID AC   metFORMIN 1,000 mg Oral BID With Meals   potassium chloride 10 mEq Oral Daily   propranolol  mg Oral Daily   tiZANidine 4 mg Oral Q12H   topiramate 25 mg Oral Q12H   traZODone 100 mg Oral Nightly       PRN meds:  •  acetaminophen  •  bisacodyl  •  dextrose  •  dextrose  •  glucagon (human recombinant)  •  LORazepam  •  Morphine **AND** naloxone  •  ondansetron  •  sodium chloride  •  Insert peripheral IV **AND** sodium chloride    Assessment/Plan     Active Problems:    Syncope    Thyroid adenoma, toxic    Polysubstance abuse    Hypertension    Diabetes      Plan:    Thyrotoxicosis/hypothyroidism- consult endocrinology.  Nuclear medicine uptake and scan pending. US enlarge thyroid and thyroid cysts.     Polysubstance abuse- discussed patient.  Patient admitted to using cocaine and marijuana.      Hypertension- on Norvasc, Lotensin, clonidine,  hydralazine, Inderal    Diabetes- on Glucophage    Deconditioning-PT and OT consult    Discharge Plannin-3 days.  Consult  for drug counseling.    Devyn Moreno MD   07/10/17   3:48 PM

## 2017-07-10 NOTE — PROGRESS NOTES
Continued Stay Note  KRISTEN Locke     Patient Name: Lynn Mgaana  MRN: [id number]  Today's Date: 7/10/2017    Admit Date: 7/8/2017          Discharge Plan       07/10/17 1603    Case Management/Social Work Plan    Plan Home    Additional Comments ZAFAR spoke to patient and sister, Bre, re discharge planning. Plan is for discharge to home. Bre did advise that she plans to assist patient in making arrangements for counseling at [facility]. Bre denies any other discharge planning needs. Patient's sister does have contact information for [facility] and will be making an appointment according to patient's schedule and so that she can take patient to her appointment.               Discharge Codes     None            EMI Castro

## 2017-07-10 NOTE — PAYOR COMM NOTE
"FROM: JOEY LIVE  PHONE: [phone]  FAX: [phone]    ID: [id number]    Lynn Magana (41 y.o. Female)     Date of Birth Social Security Number Address Home Phone MRN    1975  [id number]  [facility] [phone] [id number]    [employer] Marital Status          None        Admission Date Admission Type Admitting Provider Attending Provider Department, Room/Bed    7/8/17 Emergency Devyn Moreno MD Truong, Khai C, MD [facility] 3A, 330/1    Discharge Date Discharge Disposition Discharge Destination                      Attending Provider: Devyn Moreno MD     Allergies:  No Known Allergies    Isolation:  None   Infection:  None   Code Status:  FULL    Ht:  74\" (188 cm)   Wt:  198 lb 8 oz (90 kg)    Admission Cmt:  None   Principal Problem:  None                Active Insurance as of 7/8/2017     Primary Coverage     Payor Plan Insurance Group Employer/Plan Group    [employer] [place] [employer] [place]ICA[place]      Payor Plan Address Payor Plan Phone Number Effective From Effective To    [address] [address] [phone] 3/14/2017     [place] [address]       Subscriber Name Subscriber Birth Date Member ID       LYNN MAGANA 1975 [id number]                 Emergency Contacts      (Rel.) Home Phone Work Phone Mobile Phone    Mago Carolina (Sister) -- -- [phone]               History & Physical      Shashi Avalos MD at 7/8/2017  4:05 PM              [facility] Medicine Services  HISTORY AND PHYSICAL    Date of Admission: 7/8/2017  Primary Care Physician: David Mcallister MD    Subjective     Chief Complaint: Syncope    History of Present Illness  Patient is a 41-year-old -American female past medical history of type II diabetes who presents to the emergency room with a history of several syncopal episodes in the recent past.  Parent she had another one today and EMS was called.  She also has complained of tremors " problem sleeping approximately a 24 pound weight loss in the last month without trying to lose weight.  She was evaluated in the ER her workup was unremarkable except for totally suppressed TSH of less than 0.20 and a free T4 that was 5.49.  This is about more than doubled the upper limit of normal.  She has also been evaluated for possible eye tumor recently with an MRI of her orbits which was negative.  On my exam it looks like she has the beginnings of exophthalmos possibly.  Otherwise her review of systems evaluation is negative she does state her blood sugars be more difficult to control recently as well.        Review of Systems   14 point review of systems negative except for as per HPI.  Otherwise complete ROS reviewed and negative except as mentioned in the HPI.      Past Medical History:   Past Medical History:   Diagnosis Date   • Arthritis    • Depression    • Diabetes mellitus    • GERD (gastroesophageal reflux disease)    • Hyperlipidemia    • Hypertension    • Injury of back        Past Surgical History:  Past Surgical History:   Procedure Laterality Date   •  SECTION     • CHOLECYSTECTOMY     • COLONOSCOPY     • CYST REMOVAL     • HYSTERECTOMY         Social History:  reports that she has never smoked. She does not have any smokeless tobacco history on file. She reports that she does not drink alcohol or use illicit drugs.    Family History: family history is not on file.   Family history is positive for diabetes and thyroid problems in almost every female in the family.    Allergies:  No Known Allergies    Medications:  Prior to Admission medications    Medication Sig Start Date End Date Taking? Authorizing Provider   amLODIPine (NORVASC) 10 MG tablet Take 10 mg by mouth Daily.   Yes Historical Provider, MD   benazepril (LOTENSIN) 20 MG tablet Take 20 mg by mouth 2 (Two) Times a Day.   Yes Historical Provider, MD   CloNIDine (CATAPRES) 0.3 MG tablet Take 0.3 mg by mouth 2 (Two) Times a Day.    Yes Historical Provider, MD   DULoxetine (CYMBALTA) 60 MG capsule Take 60 mg by mouth Daily.   Yes Historical Provider, MD   furosemide (LASIX) 20 MG tablet Take 20 mg by mouth Daily. Take one to two daily   Yes Historical Provider, MD   gabapentin (NEURONTIN) 300 MG capsule Take 300 mg by mouth 4 (Four) Times a Day.   Yes Historical Provider, MD   hydrALAZINE (APRESOLINE) 25 MG tablet Take 25 mg by mouth 2 (Two) Times a Day.   Yes Historical Provider, MD   insulin glargine (LANTUS) 100 UNIT/ML injection Inject  under the skin Daily.   Yes Historical Provider, MD   insulin lispro (humaLOG) 100 UNIT/ML injection Inject  under the skin 3 (Three) Times a Day Before Meals.   Yes Historical Provider, MD   metFORMIN (GLUCOPHAGE) 500 MG tablet Take 1,000 mg by mouth 2 (Two) Times a Day With Meals.   Yes Historical Provider, MD   PIROXICAM PO Take 20 mg by mouth Daily.   Yes Historical Provider, MD   potassium chloride (MICRO-K) 10 MEQ CR capsule Take 10 mEq by mouth Daily. Take one to two tablets daily   Yes Historical Provider, MD   raNITIdine (ZANTAC) 150 MG tablet Take 300 mg by mouth Daily.   Yes Historical Provider, MD   simvastatin (ZOCOR) 20 MG tablet Take 20 mg by mouth Every Night.   Yes Historical Provider, MD   tiZANidine (ZANAFLEX) 4 MG tablet Take 4 mg by mouth 2 (Two) Times a Day.   Yes Historical Provider, MD   topiramate (TOPAMAX) 25 MG tablet Take 25 mg by mouth 2 (Two) Times a Day. Medication bottle:Take one tablet by mouth daily for 7 days then 1 tablet by mouth two times a day for 7 days then 1 tablet three times a day for 7 days then 2 tablets two times a day thereafter 3/30/17  Yes Historical Provider, MD   traZODone (DESYREL) 100 MG tablet Take 100 mg by mouth Every Night.   Yes Historical Provider, MD   CloNIDine (CATAPRES) 0.2 MG tablet Take 0.3 mg by mouth 2 (Two) Times a Day.    Historical Provider, MD   DICLOFENAC SODIUM PO Take 75 mg by mouth Daily. Take with food    Historical Provider, MD  "  nitrofurantoin, macrocrystal-monohydrate, (MACROBID) 100 MG capsule Take 1 capsule by mouth 2 (Two) Times a Day. 3/14/17   Linus Rodriguez MD   ondansetron (ZOFRAN) 4 MG tablet Take 1 tablet by mouth Every 6 (Six) Hours. 3/14/17   Linus Rodriguez MD       Objective     Vital Signs: /95  Pulse 102  Temp 97.7 °F (36.5 °C) (Oral)   Resp 18  Ht 74\" (188 cm)  Wt 200 lb (90.7 kg)  SpO2 100%  BMI 25.68 kg/m2  Physical Exam  Constitutional: She is oriented to person, place, and time. She appears well-developed and well-nourished.   HENT:   Head: Normocephalic and atraumatic.   Eyes: Conjunctivae are normal. Pupils are equal, round, and reactive to light. Questionable early exophthalmus  Neck: Normal range of motion. Neck supple.   Cardiovascular: tachyl rate, regular rhythm and normal heart sounds.   Pulmonary/Chest: Effort normal and breath sounds normal.   Abdominal: Soft. Bowel sounds are normal.   Musculoskeletal: Normal range of motion.   Neurological: She is alert and oriented to person, place, and time. She also has a fine tremor.  Skin: Skin is warm and dry.   Psychiatric: She has a normal mood and affect.   Nursing note and vitals reviewed.      Results Reviewed:  Lab Results (last 24 hours)     Procedure Component Value Units Date/Time    Blood Gas, Arterial With Co-Ox [[id number]]  (Abnormal) Collected:  07/08/17 1140    Specimen:  Arterial Blood Updated:  07/08/17 1143     Site Arterial: right radial     Bill's Test --      Documented in Rapid Comm        pH, Arterial 7.490 (H) pH units      pCO2, Arterial 24.9 (L) mm Hg      pO2, Arterial 98.4 mm Hg      HCO3, Arterial 18.5 (L) mmol/L      Base Excess, Arterial -3.2 (L) mmol/L      Hemoglobin, Blood Gas 13.0 g/dL      Hematocrit, Blood Gas 38.0 %      Oxyhemoglobin 97.0 %      Methemoglobin 0.2 (L) %      Carboxyhemoglobin 0.9 %      Sodium, Arterial 138.3 mmol/L      Potassium, Arterial 3.97 mmol/L      Barometric Pressure for Blood Gas -- mmHg      "  Component not reported at this site.        Modality Room air    Narrative:       Serial Number: [id number]    : [id number]    CBC & Differential [[id number]] Collected:  07/08/17 1137    Specimen:  Blood Updated:  07/08/17 1149    Narrative:       The following orders were created for panel order CBC & Differential.  Procedure                               Abnormality         Status                     ---------                               -----------         ------                     CBC Auto Differential[[id number]]        Abnormal            Final result                 Please view results for these tests on the individual orders.    CBC Auto Differential [[id number]]  (Abnormal) Collected:  07/08/17 1137    Specimen:  Blood Updated:  07/08/17 1149     WBC 4.69 (L) 10*3/mm3      RBC 4.99 10*6/mm3      Hemoglobin 13.1 g/dL      Hematocrit 38.9 %      MCV 78.0 (L) fL      MCH 26.3 (L) pg      MCHC 33.7 g/dL      RDW 12.7 %      RDW-SD 35.8 (L) fl      MPV 10.3 fL      Platelets 377 10*3/mm3      Neutrophil % 48.7 %      Lymphocyte % 42.4 %      Monocyte % 8.3 %      Eosinophil % 0.4 %      Basophil % 0.2 %      Immature Grans % 0.0 %      Neutrophils, Absolute 2.28 10*3/mm3      Lymphocytes, Absolute 1.99 10*3/mm3      Monocytes, Absolute 0.39 10*3/mm3      Eosinophils, Absolute 0.02 10*3/mm3      Basophils, Absolute 0.01 10*3/mm3      Immature Grans, Absolute 0.00 10*3/mm3     Protime-INR [[id number]]  (Normal) Collected:  07/08/17 1137    Specimen:  Blood Updated:  07/08/17 1158     Protime 13.8 Seconds      INR 1.03    aPTT [[id number]]  (Normal) Collected:  07/08/17 1137    Specimen:  Blood Updated:  07/08/17 1158     PTT 30.7 seconds     D-dimer, Quantitative [[id number]]  (Normal) Collected:  07/08/17 1137    Specimen:  Blood Updated:  07/08/17 1158     D-Dimer, Quantitative 0.41 mg/L (FEU)     Narrative:       Reference Range is 0-0.50 mg/L FEU. However, results <0.50 mg/L FEU tends to rule out DVT or PE. Results  >0.50 mg/L FEU are not useful in predicting absence or presence of DVT or PE.    Amylase [[id number]]  (Normal) Collected:  07/08/17 1137    Specimen:  Blood Updated:  07/08/17 1200     Amylase 75 U/L     C-reactive Protein [[id number]]  (Abnormal) Collected:  07/08/17 1137    Specimen:  Blood Updated:  07/08/17 1200     C-Reactive Protein 2.01 (H) mg/dL     Comprehensive Metabolic Panel [[id number]]  (Abnormal) Collected:  07/08/17 1137    Specimen:  Blood Updated:  07/08/17 1200     Glucose 145 (H) mg/dL      BUN 20 mg/dL      Creatinine 0.80 mg/dL      Sodium 141 mmol/L      Potassium 4.1 mmol/L      Chloride 106 mmol/L      CO2 22.0 (L) mmol/L      Calcium 10.6 (H) mg/dL      Total Protein 7.8 g/dL      Albumin 4.40 g/dL      ALT (SGPT) 88 (H) U/L      AST (SGOT) 45 U/L      Alkaline Phosphatase 135 (H) U/L      Total Bilirubin 1.1 (H) mg/dL      eGFR  African Amer 95 mL/min/1.73      Globulin 3.4 gm/dL      A/G Ratio 1.3 g/dL      BUN/Creatinine Ratio 25.0     Anion Gap 13.0 mmol/L     CK [[id number]]  (Normal) Collected:  07/08/17 1137    Specimen:  Blood Updated:  07/08/17 1200     Creatine Kinase 90 U/L     Lipase [[id number]]  (Normal) Collected:  07/08/17 1137    Specimen:  Blood Updated:  07/08/17 1200     Lipase 89 U/L     Urinalysis With / Culture If Indicated [[id number]]  (Abnormal) Collected:  07/08/17 1153    Specimen:  Urine from Urine, Clean Catch Updated:  07/08/17 1208     Color, UA Yellow     Appearance, UA Clear     pH, UA 6.0     Specific Gravity, UA 1.020     Glucose, UA Negative     Ketones, UA 15 mg/dL (1+) (A)     Bilirubin, UA Negative     Blood, UA Negative     Protein, UA Negative     Leuk Esterase, UA Negative     Nitrite, UA Negative     Urobilinogen, UA 2.0 E.U./dL (A)    Narrative:       Urine microscopic not indicated.    BNP [[id number]]  (Normal) Collected:  07/08/17 1137    Specimen:  Blood Updated:  07/08/17 1211     proBNP 209.0 pg/mL     CK-MB [[id number]]  (Normal) Collected:  07/08/17  1137    Specimen:  Blood Updated:  07/08/17 1211     CKMB 1.11 ng/mL     Narrative:       CKMB Index not indicated    Myoglobin, Serum [[id number]]  (Normal) Collected:  07/08/17 1137    Specimen:  Blood Updated:  07/08/17 1211     Myoglobin 47.0 ng/mL     Troponin [[id number]]  (Normal) Collected:  07/08/17 1137    Specimen:  Blood Updated:  07/08/17 1212     Troponin I 0.012 ng/mL     T4, Free [[id number]]  (Abnormal) Collected:  07/08/17 1137    Specimen:  Blood Updated:  07/08/17 1213     Free T4 5.49 (H) ng/dL     TSH [[id number]]  (Abnormal) Collected:  07/08/17 1137    Specimen:  Blood Updated:  07/08/17 1227     TSH <0.020 (L) mIU/mL         Imaging Results (last 24 hours)     Procedure Component Value Units Date/Time    XR Chest 1 View [[id number]] Collected:  07/08/17 1230     Updated:  07/08/17 1233    Narrative:       EXAMINATION:   XR CHEST 1 VW-  7/8/2017 12:30 PM CDT     HISTORY: Syncope      Frontal upright radiograph of the chest 7/8/2017 11:31 AM CDT     COMPARISON: 03/14/2017.     FINDINGS:   The lungs are clear. The cardiomediastinal silhouette and pulmonary  vascularity are within normal limits.      The osseous structures and surrounding soft tissues demonstrate no acute  abnormality.       Impression:       1. No radiographic evidence of acute cardiopulmonary process.        This report was finalized on 07/08/2017 12:30 by Dr. Abhijit Murcia MD.    CT Head Without Contrast [[id number]] Collected:  07/08/17 1304     Updated:  07/08/17 1308    Narrative:       CT BRAIN without contrast dated 7/8/2017 12:40 PM CDT     HISTORY: Headache     COMPARISON: 11/10/2015      DOSE LENGTH PRODUCT: 586 mGy cm     In order to have a CT radiation dose as low as reasonably achievable,  Automated Exposure Control was utilized for adjustment of the mA and/or  KV according to patient size.     TECHNIQUE: Serial axial tomographic images of the brain were obtained  without the use of intravenous contrast.      FINDINGS:   The  midline structures are nondisplaced. The ventricles and basilar  cisterns are normal in size and configuration. There is no evidence of  intracranial hemorrhage or mass-effect. The gray-white matter  differentiation is maintained. The sulci have a normal configuration,  and there are no abnormal extra-axial fluid collections. The structures  of the posterior fossa are unremarkable.      The included orbits and their contents are unremarkable. The visualized  paranasal sinuses, mastoid air cells and middle ear cavities are clear.  The visualized osseous structures and overlying soft tissues of the  skull and face are unremarkable.        Impression:       1. No acute intracranial process.        This report was finalized on 07/08/2017 13:05 by Dr. Abhijit Murcia MD.    CT Cervical Spine Without Contrast [[id number]] Collected:  07/08/17 1308     Updated:  07/08/17 1314    Narrative:       EXAMINATION:   CT CERVICAL SPINE WO CONTRAST-  7/8/2017 1:08 PM CDT     HISTORY: CT CERVICAL SPINE WO CONTRAST- 7/8/2017 12:40 PM CDT     HISTORY: neck pain, fall     COMPARISON: 11/10/2015     DOSE LENGTH PRODUCT: 278 mGy cm. Automated exposure control was also  utilized to decrease patient radiation dose.     TECHNIQUE: Serial helical tomographic images of the cervical spine were  obtained without the use of intravenous contrast. Additionally, sagittal  and coronal reformatted images were also provided for review.      FINDINGS:   Alignment: Mild straightening and reversal of the normal cervical  lordosis is noted     Bones: There is no evidence of fracture. Vertebral body heights are  maintained.     Disc spaces: Degenerative changes noted with loss height at C3-4 and  C5-6 disc space levels. Mild hypertrophic degenerative change is  present.     Canal and neuroforamina: Patent.     Soft tissues: Unremarkable.     Lung apices: Clear. No pneumothorax.       Impression:       1. Mild degenerative changes present. There is no acute  fracture.        This report was finalized on 07/08/2017 13:11 by Dr. Abhijit Murcia MD.          I have personally reviewed and interpreted the radiology studies and ECG obtained at time of admission.     Assessment / Plan     Assessment & Plan  Hospital Problem List     Syncope        1.  Syncope: Probably due to tachyarrhythmias related to thyrotoxicosis.  We will also get carotid ultrasounds 2-D echo I do not think we need to further workup in that.  We need to control her thyroid will initiate beta blocker therapy.  2.  Thyrotoxicosis symptomatic: She is significantly hyperthyroid.  I do not believe she is in thyroid storm however going to initiate a beta blocker.  I am going to get an ultrasound of her thyroid.  I do not know if we will be able to keep her around long enough to get a thyroid uptake scan since the weekend.  I will inform her of this that a lot of this can be done as an outpatient as long control her symptoms.  I will get a free T3 as well as thyroglobulin antibodies anti-TPO antibodies.  3.  Type II diabetes question control will check hemoglobin A1c monitor sliding scale insulin and serial Accu-Cheks.  4.  Hypertension poorly controlled her blood pressures been bouncing around a elevated in the emergency room once again going to start her on a beta blocker to see if this will help control regulated better.  She is on multiple medications for her blood pressure already at high doses.    Code Status: Full     I discussed the patients findings and my recommendations with patient and family    Estimated length of stay 1-2 days.    Shashi Avalos MD   07/08/17   4:05 PM             Electronically signed by Shashi Avalos MD at 7/9/2017  7:32 AM           Emergency Department Notes      Dona Currie, RN at 7/8/2017 11:07 AM          Patient states she has been having terrible headaches for two weeks. Patient reports she has been seeing her PCP for headaches for almost a year and was on  topamax. Patient states the pain has increasingly worsened over the past two weeks and radiates to her right arm. Patient and family report the patient has been experiencing syncope upon standing and has hit her head several times.      Dona Currie RN  07/08/17 1412       Electronically signed by Dona Currie RN at 7/8/2017  2:12 PM      BRIANA Jeter at 7/8/2017 11:20 AM     Attestation signed by Linus Rodriguez MD at 7/8/2017  3:25 PM              For this patient encounter, I reviewed the NP or PA documentation, treatment plan, and medical decision making. Linus Rodriguez MD 7/8/2017 3:25 PM                               Subjective   HPI Comments: Patient is a 41-year-old -American female who presents to the ER today per EMS with complaint of headache, shaking, syncope, and shortness of breath.  The patient reports that she has a history of migraine headaches and is currently treated for Topamax.  She states that over the past 2 weeks her headache has become constant and severe.  States that it radiates from the back of her head to the front of her head.  Patient denies any visual changes.  She denies any head trauma, or thunderclap headache.  The patient states that she was previously thought to have a tumor in her eye.  She did see Dr. Duenas and had a MRI of the orbits and facials done in April 2017.  This exam was normal.  I did review the findings at this time.  The patient reports that for the past 2 weeks she has had constant uncontrollable shaking.  She states that she is concerned that she may have Parkinson's disease.  Patient denies any previous family history of this.  The patient also is reporting shortness of breath.  She denies any chest pain.  Patient states that she had shortness of breath for several months however has progressively gotten worse.  The patient is a nonsmoker.  The patient denies any recent long-distance travel, she denies any hormone treatment of birth  control use.  Patient denies any previous DVT or PE in the past.  The patient also reports that for the past 2 weeks every time she stands up she gets dizzy and has a syncopal episode.  Her daughter is at the bedside and states that this does not last very long and that she is able to wake the patient up without difficulty.  Patient reports that at one time when she had a syncopal episode she did lose control of her bowels at home.  The patient denies any known seizure history.  Patient denies any previous cardiac history.  She presents to the ER today for further evaluation.    Patient is a 41 y.o. female presenting with headaches.   History provided by:  Patient   used: No    Headache   Pain location:  Occipital  Quality:  Sharp  Radiates to:  Does not radiate  Severity currently:  8/10  Onset quality:  Sudden  Duration:  2 weeks  Timing:  Constant  Progression:  Partially resolved  Chronicity:  New  Similar to prior headaches: no    Context: not activity, not exposure to bright light, not caffeine, not coughing, not defecating, not eating, not stress, not exposure to cold air, not intercourse, not loud noise and not straining    Relieved by:  Nothing  Worsened by:  Nothing  Ineffective treatments:  None tried  Associated symptoms: syncope    Associated symptoms: no abdominal pain, no back pain, no blurred vision, no congestion, no cough, no diarrhea, no dizziness, no drainage, no ear pain, no eye pain, no facial pain, no fatigue, no fever, no focal weakness, no hearing loss, no loss of balance, no myalgias, no nausea, no near-syncope, no neck pain, no neck stiffness, no numbness, no paresthesias, no photophobia, no seizures, no sinus pressure, no sore throat, no swollen glands, no tingling, no URI, no visual change, no vomiting and no weakness    Risk factors: no anger, no family hx of SAH, does not have insomnia and lifestyle not sedentary        Review of Systems   Constitutional: Negative  for fatigue and fever.   HENT: Negative for congestion, ear pain, hearing loss, postnasal drip, sinus pressure and sore throat.    Eyes: Negative for blurred vision, photophobia and pain.   Respiratory: Negative for cough.    Cardiovascular: Positive for syncope. Negative for near-syncope.   Gastrointestinal: Negative for abdominal pain, diarrhea, nausea and vomiting.   Musculoskeletal: Negative for back pain, myalgias, neck pain and neck stiffness.   Neurological: Positive for headaches. Negative for dizziness, focal weakness, seizures, weakness, numbness, paresthesias and loss of balance.   All other systems reviewed and are negative.      Past Medical History:   Diagnosis Date   • Arthritis    • Depression    • Diabetes mellitus    • GERD (gastroesophageal reflux disease)    • Hyperlipidemia    • Hypertension    • Injury of back        No Known Allergies    Past Surgical History:   Procedure Laterality Date   •  SECTION     • CHOLECYSTECTOMY     • COLONOSCOPY     • CYST REMOVAL     • HYSTERECTOMY         History reviewed. No pertinent family history.    Social History     Social History   • Marital status:      Spouse name: N/A   • Number of children: N/A   • Years of education: N/A     Social History Main Topics   • Smoking status: Never Smoker   • Smokeless tobacco: None   • Alcohol use No   • Drug use: No   • Sexual activity: Not Asked     Other Topics Concern   • None     Social History Narrative           Objective   Physical Exam   Constitutional: She is oriented to person, place, and time. She appears well-developed and well-nourished.   HENT:   Head: Normocephalic and atraumatic.   Eyes: Conjunctivae are normal. Pupils are equal, round, and reactive to light.   Neck: Normal range of motion. Neck supple.   Cardiovascular: Normal rate, regular rhythm and normal heart sounds.    Pulmonary/Chest: Effort normal and breath sounds normal.   Abdominal: Soft. Bowel sounds are normal.    Musculoskeletal: Normal range of motion.   Neurological: She is alert and oriented to person, place, and time.   Skin: Skin is warm and dry.   Psychiatric: She has a normal mood and affect.   Nursing note and vitals reviewed.      Procedures        ED Course  ED Course   Comment By Time   Patient labs and CT scans were reviewed.  The patient's case was discussed with Dr. Roman, the hospitalist on call.  At this time the patient will be admitted in stable condition.  Patient is advised off all of the Xray, CT, radiology, and lab results. Kimberly GREGG Sr, APRN 07/08 1507        CT Cervical Spine Without Contrast   Final Result   1. Mild degenerative changes present. There is no acute fracture.           This report was finalized on 07/08/2017 13:11 by Dr. Abhijit Murcia MD.      CT Head Without Contrast   Final Result   1. No acute intracranial process.           This report was finalized on 07/08/2017 13:05 by Dr. Abhijit Murcia MD.      XR Chest 1 View   Final Result   1. No radiographic evidence of acute cardiopulmonary process.           This report was finalized on 07/08/2017 12:30 by Dr. Abhijit Murcia MD.        Labs Reviewed   COMPREHENSIVE METABOLIC PANEL - Abnormal; Notable for the following:        Result Value    Glucose 145 (*)     CO2 22.0 (*)     Calcium 10.6 (*)     ALT (SGPT) 88 (*)     Alkaline Phosphatase 135 (*)     Total Bilirubin 1.1 (*)     All other components within normal limits   URINALYSIS W/ CULTURE IF INDICATED - Abnormal; Notable for the following:     Ketones, UA 15 mg/dL (1+) (*)     Urobilinogen, UA 2.0 E.U./dL (*)     All other components within normal limits    Narrative:     Urine microscopic not indicated.   C-REACTIVE PROTEIN - Abnormal; Notable for the following:     C-Reactive Protein 2.01 (*)     All other components within normal limits   T4, FREE - Abnormal; Notable for the following:     Free T4 5.49 (*)     All other components within normal limits   TSH -  Abnormal; Notable for the following:     TSH <0.020 (*)     All other components within normal limits   CBC WITH AUTO DIFFERENTIAL - Abnormal; Notable for the following:     WBC 4.69 (*)     MCV 78.0 (*)     MCH 26.3 (*)     RDW-SD 35.8 (*)     All other components within normal limits   BLOOD GAS, ARTERIAL W/CO-OXIMETRY - Abnormal; Notable for the following:     pH, Arterial 7.490 (*)     pCO2, Arterial 24.9 (*)     HCO3, Arterial 18.5 (*)     Base Excess, Arterial -3.2 (*)     Methemoglobin 0.2 (*)     All other components within normal limits    Narrative:     Serial Number: [id number]    : [id number]   PROTIME-INR - Normal   APTT - Normal   BNP (IN-HOUSE) - Normal   AMYLASE - Normal   LIPASE - Normal   D-DIMER, QUANTITATIVE - Normal    Narrative:     Reference Range is 0-0.50 mg/L FEU. However, results <0.50 mg/L FEU tends to rule out DVT or PE. Results >0.50 mg/L FEU are not useful in predicting absence or presence of DVT or PE.   TROPONIN (IN-HOUSE) - Normal   CK MB - Normal    Narrative:     CKMB Index not indicated   MYOGLOBIN, SERUM - Normal   CK - Normal   BLOOD GAS, ARTERIAL W/CO-OXIMETRY   CBC AND DIFFERENTIAL    Narrative:     The following orders were created for panel order CBC & Differential.  Procedure                               Abnormality         Status                     ---------                               -----------         ------                     CBC Auto Differential[[id number]]        Abnormal            Final result                 Please view results for these tests on the individual orders.               MDM  Number of Diagnoses or Management Options  Hyperthyroidism: new and requires workup  Nonintractable headache, unspecified chronicity pattern, unspecified headache type: new and requires workup  Syncope, unspecified syncope type: new and requires workup     Amount and/or Complexity of Data Reviewed  Clinical lab tests: ordered and reviewed  Tests in the radiology section of  CPT®:  ordered and reviewed  Discuss the patient with other providers: yes    Patient Progress  Patient progress: stable      Final diagnoses:   Syncope, unspecified syncope type   Hyperthyroidism   Nonintractable headache, unspecified chronicity pattern, unspecified headache type            Kimberly ESCOBEDO Sr, BRIANA  07/08/17 1508       Electronically signed by Linus Rodriguez MD at 7/8/2017  3:25 PM      Dona Currie RN at 7/8/2017 11:32 AM          EKG completed.     Dona Currie RN  07/08/17 1139       Electronically signed by Dona Currie RN at 7/8/2017 11:39 AM      Dona Currie RN at 7/8/2017  3:13 PM          EKG completed     Dona Currie RN  07/08/17 1533       Electronically signed by Dona Currie RN at 7/8/2017  3:33 PM      Dona Currie RN at 7/8/2017  4:05 PM          Attempted to give report, RN unavailable to take report at this time. Charge nurse unavailable to take report.      Dona Currie RN  07/08/17 1606       Electronically signed by Dona Currie RN at 7/8/2017  4:06 PM        Vital Signs (last 72 hrs)       07/07 0700  -  07/08 0659 07/08 0700  -  07/09 0659 07/09 0700  -  07/10 0659 07/10 0700  -  07/10 1054   Most Recent    Temp (°F)   97.4 -  98.3    97.6 -  98.6      98.5     98.5 (36.9)    Heart Rate   85 -  116    79 -  85      96     96    Resp   16 -  20    18 -  20      16     16    BP   102/63 -  (!) 189/95    109/52 -  135/76      117/64     117/64    SpO2 (%)   97 -  100    98 -  100      99     99          Hospital Medications (all)       Dose Frequency Start End    acetaminophen (TYLENOL) tablet 650 mg 650 mg Every 4 Hours PRN 7/8/2017     Sig - Route: Take 2 tablets by mouth Every 4 (Four) Hours As Needed for Mild Pain . - Oral    amLODIPine (NORVASC) tablet 5 mg 5 mg Daily 7/10/2017     Sig - Route: Take 1 tablet by mouth Daily. - Oral    atorvastatin (LIPITOR) tablet 10 mg 10 mg Daily 7/8/2017     Sig - Route: Take 1 tablet by mouth Daily. -  Oral    benazepril (LOTENSIN) tablet 20 mg 20 mg Every 12 Hours Scheduled 7/8/2017     Sig - Route: Take 1 tablet by mouth Every 12 (Twelve) Hours. - Oral    bisacodyl (DULCOLAX) EC tablet 5 mg 5 mg Daily PRN 7/8/2017     Sig - Route: Take 1 tablet by mouth Daily As Needed for Constipation. - Oral    CloNIDine (CATAPRES) tablet 0.2 mg 0.2 mg Every 12 Hours Scheduled 7/9/2017     Sig - Route: Take 1 tablet by mouth Every 12 (Twelve) Hours. - Oral    dextrose (D50W) solution 25 g 25 g Every 15 Minutes PRN 7/8/2017     Sig - Route: Infuse 50 mL into a venous catheter Every 15 (Fifteen) Minutes As Needed for Low Blood Sugar (Blood Sugar Less Than 70, Patient Has IV Access - Unresponsive, NPO or Unable To Safely Swallow). - Intravenous    dextrose (GLUTOSE) oral gel 15 g 15 g Every 15 Minutes PRN 7/8/2017     Sig - Route: Take 15 g by mouth Every 15 (Fifteen) Minutes As Needed for Low Blood Sugar (Blood Sugar Less Than 70, Patient Alert, Is Not NPO & Can Safely Swallow). - Oral    DULoxetine (CYMBALTA) DR capsule 60 mg 60 mg Daily 7/8/2017     Sig - Route: Take 2 capsules by mouth Daily. - Oral    famotidine (PEPCID) tablet 20 mg 20 mg Every 12 Hours Scheduled 7/8/2017     Sig - Route: Take 1 tablet by mouth Every 12 (Twelve) Hours. - Oral    furosemide (LASIX) tablet 20 mg 20 mg Daily 7/8/2017     Sig - Route: Take 1 tablet by mouth Daily. - Oral    gabapentin (NEURONTIN) capsule 300 mg 300 mg 4 Times Daily 7/8/2017     Sig - Route: Take 1 capsule by mouth 4 (Four) Times a Day. - Oral    glucagon (human recombinant) (GLUCAGEN DIAGNOSTIC) injection 1 mg 1 mg Every 15 Minutes PRN 7/8/2017     Sig - Route: Inject 1 mg under the skin Every 15 (Fifteen) Minutes As Needed (Blood Glucose Less Than 70 - Patient Without IV Access - Unresponsive, NPO or Unable To Safely Swallow). - Subcutaneous    hydrALAZINE (APRESOLINE) tablet 25 mg 25 mg Every 12 Hours Scheduled 7/8/2017     Sig - Route: Take 1 tablet by mouth Every 12  "(Twelve) Hours. - Oral    HYDROmorphone (DILAUDID) injection 0.5 mg 0.5 mg Once 7/8/2017 7/8/2017    Sig - Route: Infuse 0.5 mg into a venous catheter 1 (One) Time. - Intravenous    Cosign for Ordering: Accepted by Linus Rodriguez MD on 7/8/2017  3:56 PM    HYDROmorphone (DILAUDID) injection 0.5 mg 0.5 mg Once 7/8/2017 7/8/2017    Sig - Route: Infuse 0.5 mg into a venous catheter 1 (One) Time. - Intravenous    Cosign for Ordering: Accepted by Linus Rodriguez MD on 7/8/2017  3:56 PM    insulin detemir (LEVEMIR) injection 20 Units 20 Units Nightly 7/8/2017     Sig - Route: Inject 20 Units under the skin Every Night. - Subcutaneous    insulin lispro (humaLOG) injection 10 Units 10 Units 3 Times Daily Before Meals 7/8/2017     Sig - Route: Inject 10 Units under the skin 3 (Three) Times a Day Before Meals. - Subcutaneous    LORazepam (ATIVAN) tablet 1 mg 1 mg Every 6 Hours PRN 7/8/2017 7/18/2017    Sig - Route: Take 1 tablet by mouth Every 6 (Six) Hours As Needed for Anxiety. - Oral    metFORMIN (GLUCOPHAGE) tablet 1,000 mg 1,000 mg 2 Times Daily With Meals 7/8/2017     Sig - Route: Take 2 tablets by mouth 2 (Two) Times a Day With Meals. - Oral    Morphine sulfate (PF) injection 1 mg 1 mg Every 4 Hours PRN 7/8/2017 7/18/2017    Sig - Route: Infuse 0.5 mL into a venous catheter Every 4 (Four) Hours As Needed for Moderate Pain (4-6). - Intravenous    Linked Group 1:  \"And\" Linked Group Details        naloxone (NARCAN) injection 0.4 mg 0.4 mg Every 5 Minutes PRN 7/8/2017     Sig - Route: Infuse 1 mL into a venous catheter Every 5 (Five) Minutes As Needed for Respiratory Depression. - Intravenous    Linked Group 1:  \"And\" Linked Group Details        ondansetron (ZOFRAN) injection 4 mg 4 mg Once 7/8/2017 7/8/2017    Sig - Route: Infuse 2 mL into a venous catheter 1 (One) Time. - Intravenous    Cosign for Ordering: Accepted by Linus Rodriguez MD on 7/8/2017  3:56 PM    ondansetron (ZOFRAN) injection 4 mg 4 mg Every 6 Hours PRN " "7/8/2017     Sig - Route: Infuse 2 mL into a venous catheter Every 6 (Six) Hours As Needed for Nausea or Vomiting. - Intravenous    potassium chloride (MICRO-K) CR capsule 10 mEq 10 mEq Daily 7/8/2017     Sig - Route: Take 1 capsule by mouth Daily. - Oral    propranolol LA (INDERAL LA) 24 hr capsule 120 mg 120 mg Daily 7/8/2017     Sig - Route: Take 2 capsules by mouth Daily. - Oral    sodium chloride 0.9 % bolus 500 mL 500 mL Once 7/8/2017 7/8/2017    Sig - Route: Infuse 500 mL into a venous catheter 1 (One) Time. - Intravenous    Cosign for Ordering: Accepted by Linus Rodriguez MD on 7/8/2017  3:56 PM    sodium chloride 0.9 % flush 1-10 mL 1-10 mL As Needed 7/8/2017     Sig - Route: Infuse 1-10 mL into a venous catheter As Needed for Line Care. - Intravenous    sodium chloride 0.9 % flush 10 mL 10 mL As Needed 7/8/2017     Sig - Route: Infuse 10 mL into a venous catheter As Needed for Line Care. - Intravenous    Cosign for Ordering: Accepted by Linus Rodriguez MD on 7/8/2017  3:56 PM    Linked Group 2:  \"And\" Linked Group Details        tiZANidine (ZANAFLEX) tablet 4 mg 4 mg Every 12 Hours Scheduled 7/8/2017     Sig - Route: Take 1 tablet by mouth Every 12 (Twelve) Hours. - Oral    topiramate (TOPAMAX) tablet 25 mg 25 mg Every 12 Hours Scheduled 7/8/2017     Sig - Route: Take 1 tablet by mouth Every 12 (Twelve) Hours. - Oral    traZODone (DESYREL) tablet 100 mg 100 mg Nightly 7/8/2017     Sig - Route: Take 1 tablet by mouth Every Night. - Oral    amLODIPine (NORVASC) tablet 10 mg (Discontinued) 10 mg Daily 7/8/2017 7/9/2017    Sig - Route: Take 1 tablet by mouth Daily. - Oral    CloNIDine (CATAPRES) tablet 0.3 mg (Discontinued) 0.3 mg Every 12 Hours Scheduled 7/8/2017 7/9/2017    Sig - Route: Take 1 tablet by mouth Every 12 (Twelve) Hours. - Oral          Lab Results (last 72 hours)     Procedure Component Value Units Date/Time    Blood Gas, Arterial With Co-Ox [[id number]]  (Abnormal) Collected:  07/08/17 1140    " Specimen:  Arterial Blood Updated:  07/08/17 1143     Site Arterial: right radial     Bill's Test --      Documented in Rapid Comm        pH, Arterial 7.490 (H) pH units      pCO2, Arterial 24.9 (L) mm Hg      pO2, Arterial 98.4 mm Hg      HCO3, Arterial 18.5 (L) mmol/L      Base Excess, Arterial -3.2 (L) mmol/L      Hemoglobin, Blood Gas 13.0 g/dL      Hematocrit, Blood Gas 38.0 %      Oxyhemoglobin 97.0 %      Methemoglobin 0.2 (L) %      Carboxyhemoglobin 0.9 %      Sodium, Arterial 138.3 mmol/L      Potassium, Arterial 3.97 mmol/L      Barometric Pressure for Blood Gas -- mmHg       Component not reported at this site.        Modality Room air    Narrative:       Serial Number: [id number]    : [id number]    CBC & Differential [[id number]] Collected:  07/08/17 1137    Specimen:  Blood Updated:  07/08/17 1149    Narrative:       The following orders were created for panel order CBC & Differential.  Procedure                               Abnormality         Status                     ---------                               -----------         ------                     CBC Auto Differential[[id number]]        Abnormal            Final result                 Please view results for these tests on the individual orders.    CBC Auto Differential [[id number]]  (Abnormal) Collected:  07/08/17 1137    Specimen:  Blood Updated:  07/08/17 1149     WBC 4.69 (L) 10*3/mm3      RBC 4.99 10*6/mm3      Hemoglobin 13.1 g/dL      Hematocrit 38.9 %      MCV 78.0 (L) fL      MCH 26.3 (L) pg      MCHC 33.7 g/dL      RDW 12.7 %      RDW-SD 35.8 (L) fl      MPV 10.3 fL      Platelets 377 10*3/mm3      Neutrophil % 48.7 %      Lymphocyte % 42.4 %      Monocyte % 8.3 %      Eosinophil % 0.4 %      Basophil % 0.2 %      Immature Grans % 0.0 %      Neutrophils, Absolute 2.28 10*3/mm3      Lymphocytes, Absolute 1.99 10*3/mm3      Monocytes, Absolute 0.39 10*3/mm3      Eosinophils, Absolute 0.02 10*3/mm3      Basophils, Absolute 0.01 10*3/mm3       Immature Grans, Absolute 0.00 10*3/mm3     Protime-INR [[id number]]  (Normal) Collected:  07/08/17 1137    Specimen:  Blood Updated:  07/08/17 1158     Protime 13.8 Seconds      INR 1.03    aPTT [[id number]]  (Normal) Collected:  07/08/17 1137    Specimen:  Blood Updated:  07/08/17 1158     PTT 30.7 seconds     D-dimer, Quantitative [[id number]]  (Normal) Collected:  07/08/17 1137    Specimen:  Blood Updated:  07/08/17 1158     D-Dimer, Quantitative 0.41 mg/L (FEU)     Narrative:       Reference Range is 0-0.50 mg/L FEU. However, results <0.50 mg/L FEU tends to rule out DVT or PE. Results >0.50 mg/L FEU are not useful in predicting absence or presence of DVT or PE.    Amylase [[id number]]  (Normal) Collected:  07/08/17 1137    Specimen:  Blood Updated:  07/08/17 1200     Amylase 75 U/L     C-reactive Protein [[id number]]  (Abnormal) Collected:  07/08/17 1137    Specimen:  Blood Updated:  07/08/17 1200     C-Reactive Protein 2.01 (H) mg/dL     Comprehensive Metabolic Panel [[id number]]  (Abnormal) Collected:  07/08/17 1137    Specimen:  Blood Updated:  07/08/17 1200     Glucose 145 (H) mg/dL      BUN 20 mg/dL      Creatinine 0.80 mg/dL      Sodium 141 mmol/L      Potassium 4.1 mmol/L      Chloride 106 mmol/L      CO2 22.0 (L) mmol/L      Calcium 10.6 (H) mg/dL      Total Protein 7.8 g/dL      Albumin 4.40 g/dL      ALT (SGPT) 88 (H) U/L      AST (SGOT) 45 U/L      Alkaline Phosphatase 135 (H) U/L      Total Bilirubin 1.1 (H) mg/dL      eGFR  African Amer 95 mL/min/1.73      Globulin 3.4 gm/dL      A/G Ratio 1.3 g/dL      BUN/Creatinine Ratio 25.0     Anion Gap 13.0 mmol/L     CK [[id number]]  (Normal) Collected:  07/08/17 1137    Specimen:  Blood Updated:  07/08/17 1200     Creatine Kinase 90 U/L     Lipase [[id number]]  (Normal) Collected:  07/08/17 1137    Specimen:  Blood Updated:  07/08/17 1200     Lipase 89 U/L     Urinalysis With / Culture If Indicated [[id number]]  (Abnormal) Collected:  07/08/17 1153    Specimen:  Urine  from Urine, Clean Catch Updated:  07/08/17 1208     Color, UA Yellow     Appearance, UA Clear     pH, UA 6.0     Specific Gravity, UA 1.020     Glucose, UA Negative     Ketones, UA 15 mg/dL (1+) (A)     Bilirubin, UA Negative     Blood, UA Negative     Protein, UA Negative     Leuk Esterase, UA Negative     Nitrite, UA Negative     Urobilinogen, UA 2.0 E.U./dL (A)    Narrative:       Urine microscopic not indicated.    BNP [[id number]]  (Normal) Collected:  07/08/17 1137    Specimen:  Blood Updated:  07/08/17 1211     proBNP 209.0 pg/mL     CK-MB [[id number]]  (Normal) Collected:  07/08/17 1137    Specimen:  Blood Updated:  07/08/17 1211     CKMB 1.11 ng/mL     Narrative:       CKMB Index not indicated    Myoglobin, Serum [[id number]]  (Normal) Collected:  07/08/17 1137    Specimen:  Blood Updated:  07/08/17 1211     Myoglobin 47.0 ng/mL     Troponin [[id number]]  (Normal) Collected:  07/08/17 1137    Specimen:  Blood Updated:  07/08/17 1212     Troponin I 0.012 ng/mL     T4, Free [[id number]]  (Abnormal) Collected:  07/08/17 1137    Specimen:  Blood Updated:  07/08/17 1213     Free T4 5.49 (H) ng/dL     TSH [[id number]]  (Abnormal) Collected:  07/08/17 1137    Specimen:  Blood Updated:  07/08/17 1227     TSH <0.020 (L) mIU/mL     Anti-Thyroglobulin Antibody [[id number]] Collected:  07/08/17 1754    Specimen:  Blood Updated:  07/08/17 1806    Thyroid Peroxidase Antibody [[id number]] Collected:  07/08/17 1754    Specimen:  Blood Updated:  07/08/17 1806    Cortisol [[id number]] Collected:  07/08/17 1754    Specimen:  Blood Updated:  07/08/17 1806    Calcium, Ionized [[id number]]  (Normal) Collected:  07/08/17 1814    Specimen:  Blood Updated:  07/08/17 1817     Ionized Calcium, Arterial 1.22 mmol/L     Narrative:       Serial Number: [id number]    : [id number]    Urine Drug Screen [[id number]]  (Abnormal) Collected:  07/08/17 1153    Specimen:  Urine from Urine, Clean Catch Updated:  07/08/17 1823     Amphetamine Screen, Urine Negative      Barbiturates Screen, Urine Negative     Benzodiazepine Screen, Urine Negative     Cocaine Screen, Urine Positive (A)     Methadone Screen, Urine Negative     Opiate Screen Negative     Phencyclidine (PCP), Urine Negative     THC, Screen, Urine Positive (A)    Narrative:       Negative Thresholds For Drugs Screened in Urine:    Amphetamines          500 ng/ml  Barbiturates          200 ng/ml  Benzodiazepines       200 ng/ml  Cocaine               150 ng/ml  Methadone             150 ng/ml  Opiates               300 ng/ml  Phencyclidine         25 ng/ml  THC                      50 ng/ml    The normal value for all drugs tested is negative. This report includes final unconfirmed screening results.  A positive result by this assay can be, at your request, sent to the Reference Lab for confirmation by gas chromatography. Unconfirmed results must not be used for non-medical purposes, such as employment or legal testing. Clinical consideration should be applied to any drug of abuse test result, particularly when unconfirmed results are used.    POC Glucose Fingerstick [[id number]]  (Abnormal) Collected:  07/08/17 1813    Specimen:  Blood Updated:  07/08/17 1825     Glucose 249 (H) mg/dL       : [id number] Mario [employer] ID: [id number]       Hemoglobin A1c [[id number]] Collected:  07/08/17 1754    Specimen:  Blood Updated:  07/08/17 1838     Hemoglobin A1C 6.9 %     Narrative:       Less than 6.0           Non-Diabetic Range  6.0-7.0                 ADA Therapeutic Target  Greater than 7.0        Action Suggested    T3, Free [[id number]]  (Abnormal) Collected:  07/08/17 1754    Specimen:  Blood Updated:  07/08/17 1848     T3, Free 21.00 (H) pg/mL     POC Glucose Fingerstick [[id number]]  (Abnormal) Collected:  07/08/17 2111    Specimen:  Blood Updated:  07/08/17 2132     Glucose 163 (H) mg/dL       : [id number] Lan [employer]eter ID: [id number]       POC Glucose Fingerstick [[id number]]  (Abnormal) Collected:   07/09/17 0822    Specimen:  Blood Updated:  07/09/17 0842     Glucose 255 (H) mg/dL       : [id number] Mario [employer] ID: [id number]       POC Glucose Fingerstick [[id number]]  (Abnormal) Collected:  07/09/17 1215    Specimen:  Blood Updated:  07/09/17 1230     Glucose 135 (H) mg/dL       : [id number] Mario LindseyMeter ID: [id number]       POC Glucose Fingerstick [[id number]]  (Abnormal) Collected:  07/09/17 1708    Specimen:  Blood Updated:  07/09/17 1727     Glucose 139 (H) mg/dL       : [id number] [employer]Meter ID: [id number]       POC Glucose Fingerstick [[id number]]  (Normal) Collected:  07/09/17 2118    Specimen:  Blood Updated:  07/09/17 2129     Glucose 98 mg/dL       : [id number] Lan [place]eter ID: [id number]       POC Glucose Fingerstick [[id number]]  (Abnormal) Collected:  07/10/17 0308    Specimen:  Blood Updated:  07/10/17 0330     Glucose 138 (H) mg/dL       : [id number] Lan [place]eter ID: [id number]       POC Glucose Fingerstick [[id number]]  (Abnormal) Collected:  07/10/17 0806    Specimen:  Blood Updated:  07/10/17 0835     Glucose 154 (H) mg/dL       : [id number] [facility] [employer] ID: [id number]             Imaging Results (last 72 hours)     Procedure Component Value Units Date/Time    XR Chest 1 View [[id number]] Collected:  07/08/17 1230     Updated:  07/08/17 1233    Narrative:       EXAMINATION:   XR CHEST 1 VW-  7/8/2017 12:30 PM CDT     HISTORY: Syncope      Frontal upright radiograph of the chest 7/8/2017 11:31 AM CDT     COMPARISON: 03/14/2017.     FINDINGS:   The lungs are clear. The cardiomediastinal silhouette and pulmonary  vascularity are within normal limits.      The osseous structures and surrounding soft tissues demonstrate no acute  abnormality.       Impression:       1. No radiographic evidence of acute cardiopulmonary process.        This report was finalized on 07/08/2017 12:30 by Dr. Abhijit Murcia MD.    CT Head Without Contrast  [[id number]] Collected:  07/08/17 1304     Updated:  07/08/17 1308    Narrative:       CT BRAIN without contrast dated 7/8/2017 12:40 PM CDT     HISTORY: Headache     COMPARISON: 11/10/2015      DOSE LENGTH PRODUCT: 586 mGy cm     In order to have a CT radiation dose as low as reasonably achievable,  Automated Exposure Control was utilized for adjustment of the mA and/or  KV according to patient size.     TECHNIQUE: Serial axial tomographic images of the brain were obtained  without the use of intravenous contrast.      FINDINGS:   The midline structures are nondisplaced. The ventricles and basilar  cisterns are normal in size and configuration. There is no evidence of  intracranial hemorrhage or mass-effect. The gray-white matter  differentiation is maintained. The sulci have a normal configuration,  and there are no abnormal extra-axial fluid collections. The structures  of the posterior fossa are unremarkable.      The included orbits and their contents are unremarkable. The visualized  paranasal sinuses, mastoid air cells and middle ear cavities are clear.  The visualized osseous structures and overlying soft tissues of the  skull and face are unremarkable.        Impression:       1. No acute intracranial process.        This report was finalized on 07/08/2017 13:05 by Dr. Abhijit Murcia MD.    CT Cervical Spine Without Contrast [[id number]] Collected:  07/08/17 1308     Updated:  07/08/17 1314    Narrative:       EXAMINATION:   CT CERVICAL SPINE WO CONTRAST-  7/8/2017 1:08 PM CDT     HISTORY: CT CERVICAL SPINE WO CONTRAST- 7/8/2017 12:40 PM CDT     HISTORY: neck pain, fall     COMPARISON: 11/10/2015     DOSE LENGTH PRODUCT: 278 mGy cm. Automated exposure control was also  utilized to decrease patient radiation dose.     TECHNIQUE: Serial helical tomographic images of the cervical spine were  obtained without the use of intravenous contrast. Additionally, sagittal  and coronal reformatted images were also  provided for review.      FINDINGS:   Alignment: Mild straightening and reversal of the normal cervical  lordosis is noted     Bones: There is no evidence of fracture. Vertebral body heights are  maintained.     Disc spaces: Degenerative changes noted with loss height at C3-4 and  C5-6 disc space levels. Mild hypertrophic degenerative change is  present.     Canal and neuroforamina: Patent.     Soft tissues: Unremarkable.     Lung apices: Clear. No pneumothorax.       Impression:       1. Mild degenerative changes present. There is no acute fracture.        This report was finalized on 07/08/2017 13:11 by Dr. Abhijit Murcia MD.    US Carotid Bilateral [[id number]] Collected:  07/08/17 1715     Updated:  07/08/17 1722    Narrative:       EXAMINATION: US CAROTID BILATERAL- 7/8/2017 5:15 PM CDT     HISTORY: Syncope     COMPARISON: None     FINDINGS:  Real time ultrasound with the assistance of color and spectral Doppler  demonstrates intimal thickening with few scattered areas of plaque  throughout the carotid arteries. Elevated peak systolic velocities are  demonstrated bilaterally, yielding a normal ICA/CCA peak systolic  velocity ratio of 0.93 on the right and 0.95 on the left.  Peak systolic  velocities within the right CCA, ICA, and ECA are as follows: 160 cm/s,  149 cm/s, and 173 cm/s.  Peak systolic velocities within the left CCA,  ICA, and ECA are as follows: 165 cm/s, 157 cm/s, and 83 cm/s..   Antegrade vertebral artery flow is seen on the right. The left vertebral  artery was not visualized.       Impression:          1. Findings suggest 50-69% stenosis bilaterally per Society of  Radiologists in Ultrasound Consensus Criteria.  See below. This could be  confirmed with CTA neck nonemergently.     2. Left vertebral artery is not visualized.           Consensus Panel Gray-Scale and Doppler US Criteria for Diagnosis of ICA  Stenosis:                                                                                                                 Primary Parameters                                         Additional  Parameters     Degree of                                ICA PSV                Plaque  Estimate                 ICA/CCA PSV              ICA EDV  Stenosis (%)                          (cm/sec)                            (%)*                                     Ratio                       (cm/sec)  ________________________________________________________________________  _________________     Normal                                         <125                               None                                      <2.0                             <40     <50                                               <125                                <50                                       <2.0                             <40     50-69                                        125-230                            >/=50                                    2.0-4.0                             >/=70 but less than near             >230                              >/=50                                      >4.0                            >100       occlusion     Near occlusion                        High, low, or                      Visible                                  Variable                      Variable                                                    undetectable     Total occlusion                        Undetectable           Visible,  no detectable                     N/A                             N/A                                                                                                 lumen               This report was finalized on 07/08/2017 17:19 by Dr. Ricki Joy MD.    XR Abdomen KUB [[id number]] Collected:  07/08/17 2015     Updated:  07/08/17 2021    Narrative:       EXAMINATION: XR ABDOMEN KUB- 7/8/2017 8:15 PM CDT     HISTORY: Abdominal pain, constipation     COMPARISON: CT abdomen and pelvis  with contrast 03/14/2017     FINDINGS:  There is gaseous distention of the stomach as well as several loops of  bowel within the central abdomen and left lower quadrant. There is no  evidence of bowel obstruction. Surgical clips are seen within the right  upper quadrant. There is no appreciable organomegaly. No pathologic  calcifications are seen within the abdomen. A phlebolith is noted in the  pelvis.       Impression:       No evidence of bowel obstruction. No significant stool  burden.  This report was finalized on 07/08/2017 20:18 by Dr. Ricki Joy MD.    US Thyroid [[id number]] Collected:  07/09/17 1233     Updated:  07/09/17 1238    Narrative:       EXAMINATION:   US THYROID-  7/9/2017 12:33 PM CDT     HISTORY: Thyrotoxicosis.     Images are obtained transverse longitudinal direction usual manner.  Color flow is present.     Right lobe of thyroid gland is 2 cm in AP diameter 5.2 cm in sagittal  length.     Left lobe of thyroid gland is visualized color flow is present. Left  lobe is 17 mm in AP diameter 49 mm in sagittal length. A complex 8 x 10  mm septated cyst is noted in the left lobe thyroid gland. Smaller cyst  is also present.     The isthmus is 4 mm.       Impression:       Thyroid gland is upper limits of normal to mildly enlarged  in size.  2. There are 2 cyst associated with the left lobe of thyroid gland. One  is 8 x 10 mm. The other is 3 x 5 mm.  This report was finalized on 07/09/2017 12:35 by Dr. Abhijit Murcia MD.          ECG/EMG Results (last 72 hours)     Procedure Component Value Units Date/Time    ECG 12 Lead [[id number]] Collected:  07/08/17 1513     Updated:  07/09/17 0727    Narrative:       Test Reason : tachycardia  Blood Pressure : **/** mmHG  Vent. Rate : 090 BPM     Atrial Rate : 090 BPM     P-R Int : 120 ms          QRS Dur : 068 ms      QT Int : 378 ms       P-R-T Axes : 049 019 063 degrees     QTc Int : 462 ms    Normal sinus rhythm  Nonspecific T wave abnormality  Prolonged  QT interval or tu fusion, consider myocardial disease,  electrolyte imbalance, or drug effects  Abnormal ECG  When compared with ECG of 08-JUL-2017 11:32,  No significant change was found    Referred By:  ADI           Confirmed By:Elbert Baugh MD    ECG 12 Lead [[id number]] Collected:  07/08/17 1132     Updated:  07/09/17 0730    Narrative:       Test Reason : syncope  Blood Pressure : **/** mmHG  Vent. Rate : 093 BPM     Atrial Rate : 093 BPM     P-R Int : 126 ms          QRS Dur : 066 ms      QT Int : 392 ms       P-R-T Axes : 052 018 067 degrees     QTc Int : 487 ms    Normal sinus rhythm  Nonspecific T wave abnormality  Prolonged QT interval or tu fusion, consider myocardial disease,  electrolyte imbalance, or drug effects  Abnormal ECG  When compared with ECG of 10-NOV-2015 18:34,  No significant change was found    Referred By:  ADI           Confirmed By:Elbert Baugh MD    Transthoracic Echocardiogram 2D Complete [[id number]] Collected:  07/09/17 0821     Updated:  07/09/17 1129     IVSd 1.1 cm      LVIDd 3.9 cm      LVIDs 2.5 cm      LVPWd 0.97 cm      IVS/LVPW 1.1     FS 36.3 %      EDV(Teich) 64.3 ml      ESV(Teich) 21.4 ml      EF(Teich) 66.7 %      EDV(cubed) 57.5 ml      ESV(cubed) 14.9 ml      EF(cubed) 74.1 %      LV mass(C)d 122.1 grams      SV(Teich) 42.9 ml      SV(cubed) 42.6 ml      Ao root diam 3.5 cm      Ao root area 9.6 cm^2      LA dimension 3.3 cm      LA/Ao 0.94     LVOT diam 2.0 cm      LVOT area 3.1 cm^2      LVOT area(traced) 3.1 cm^2      LVLd ap4 7.3 cm      EDV(MOD-sp4) 74.4 ml      LVLs ap4 6.8 cm      ESV(MOD-sp4) 23.4 ml      EF(MOD-sp4) 68.5 %      SV(MOD-sp4) 51.0 ml      CONTRAST EF 4CH 68.5 ml/m^2      MV E max kim 88.3 cm/sec      MV A max kim 67.9 cm/sec      MV E/A 1.3     MV dec time 0.23 sec      Ao pk kim 179.0 cm/sec      Ao max PG 12.8 mmHg      Ao max PG (full) 4.4 mmHg      Ao V2 mean 124.0 cm/sec      Ao mean PG 7.0 mmHg      Ao mean PG (full) 2.0 mmHg      Ao  V2 VTI 32.2 cm      SUGAR(I,A) 3.2 cm^2      SUGAR(I,D) 3.2 cm^2      SUGAR(V,A) 2.5 cm^2      SUGAR(V,D) 2.5 cm^2      LV V1 max PG 8.4 mmHg      LV V1 mean PG 5.0 mmHg      LV V1 max 145.0 cm/sec      LV V1 mean 96.9 cm/sec      LV V1 VTI 32.3 cm      SV(Ao) 309.8 ml      SV(LVOT) 101.5 ml      TR max robinson 290.0 cm/sec      RVSP(TR) 38.6 mmHg      RAP systole 5.0 mmHg      LA volume 52.0 cm3      E/E' ratio 11.0     Lat Peak E' Robinson 12.0 cm/sec     Narrative:       · Normal size and function of all cardiac chambers. LVEF >65%.  · No significant valvular pathology.             Orders (last 72 hrs)     Start     Ordered    07/10/17 0900  amLODIPine (NORVASC) tablet 5 mg  Daily      07/09/17 1251    07/10/17 0836  POC Glucose Fingerstick  Once      07/10/17 0835    07/10/17 0331  POC Glucose Fingerstick  Once      07/10/17 0330    07/10/17 0000  NM Thyroid Uptake & Scan  1 Time Imaging      07/09/17 1314    07/09/17 2129  POC Glucose Fingerstick  Once      07/09/17 2128    07/09/17 2100  CloNIDine (CATAPRES) tablet 0.2 mg  Every 12 Hours Scheduled      07/09/17 1252    07/09/17 1728  POC Glucose Fingerstick  Once      07/09/17 1727    07/09/17 1231  POC Glucose Fingerstick  Once      07/09/17 1230    07/09/17 0843  POC Glucose Fingerstick  Once      07/09/17 0842    07/08/17 2200  POC Glucose Fingerstick  4 Times Daily Before Meals & at Bedtime      07/08/17 1744 07/08/17 2133  POC Glucose Fingerstick  Once      07/08/17 2132 07/08/17 2100  benazepril (LOTENSIN) tablet 20 mg  Every 12 Hours Scheduled      07/08/17 1743    07/08/17 2100  CloNIDine (CATAPRES) tablet 0.3 mg  Every 12 Hours Scheduled,   Status:  Discontinued      07/08/17 1743    07/08/17 2100  gabapentin (NEURONTIN) capsule 300 mg  4 Times Daily      07/08/17 1743 07/08/17 2100  traZODone (DESYREL) tablet 100 mg  Nightly      07/08/17 1743 07/08/17 2100  insulin detemir (LEVEMIR) injection 20 Units  Nightly      07/08/17 1743 07/08/17 2100   tiZANidine (ZANAFLEX) tablet 4 mg  Every 12 Hours Scheduled      07/08/17 1743    07/08/17 2100  hydrALAZINE (APRESOLINE) tablet 25 mg  Every 12 Hours Scheduled      07/08/17 1743    07/08/17 2100  famotidine (PEPCID) tablet 20 mg  Every 12 Hours Scheduled      07/08/17 1743    07/08/17 2100  topiramate (TOPAMAX) tablet 25 mg  Every 12 Hours Scheduled      07/08/17 1743    07/08/17 2000  Vital Signs  Every 4 Hours      07/08/17 1744 07/08/17 1845  propranolol LA (INDERAL LA) 24 hr capsule 120 mg  Daily      07/08/17 1811 07/08/17 1826  POC Glucose Fingerstick  Once      07/08/17 1825 07/08/17 1818  Calcium, Ionized  Once      07/08/17 1817 07/08/17 1815  amLODIPine (NORVASC) tablet 10 mg  Daily,   Status:  Discontinued      07/08/17 1743    07/08/17 1815  metFORMIN (GLUCOPHAGE) tablet 1,000 mg  2 Times Daily With Meals      07/08/17 1743    07/08/17 1815  potassium chloride (MICRO-K) CR capsule 10 mEq  Daily      07/08/17 1743    07/08/17 1815  insulin lispro (humaLOG) injection 10 Units  3 Times Daily Before Meals      07/08/17 1743    07/08/17 1815  atorvastatin (LIPITOR) tablet 10 mg  Daily      07/08/17 1743    07/08/17 1815  DULoxetine (CYMBALTA) DR capsule 60 mg  Daily      07/08/17 1743    07/08/17 1815  furosemide (LASIX) tablet 20 mg  Daily      07/08/17 1743    07/08/17 1804  Urine Drug Screen  STAT      07/08/17 1744 07/08/17 1800  Strict Intake and Output  Every Hour      07/08/17 1744 07/08/17 1745  Weigh Patient  Once      07/08/17 1744 07/08/17 1745  Do NOT Hold Basal Insulin When Patient is NPO, Hold Bolus Dose if NPO  Continuous      07/08/17 1744 07/08/17 1745  Follow [facility] Hypoglycemia Protocol For Blood Glucose Less Than 70 mg/dL  Until Discontinued      07/08/17 1744 07/08/17 1745  Hypoglycemia Treatment - Alert Patient That is Not NPO and Can Safely Swallow  Until Discontinued     Comments:  Administer 4 oz Fruit Juice OR 4 oz Regular Soda OR 8 oz Milk OR 15-30 grams (1  tube) of Glucose Gel  Recheck Blood Glucose 15 Minutes After Ingestion, Repeat Treatment & Continue to Recheck Blood Sugar Every 15 Minutes Until Blood Glucose is 70 or Higher  Once Blood Glucose is 70 or Higher, Give Snack (Peanut Butter & Crackers) if Next Meal Will Be Given in More Than 60 Minutes, Provide Meal Tray As Soon As Possible    07/08/17 1744 07/08/17 1745  Hypoglycemia Treatment - Patient Has IV Access - Unresponsive, NPO or Unable To Safely Swallow  Until Discontinued     Comments:  Administer 25g D50W IV Push (1 Whole Vial)  Recheck Blood Glucose 15 Minutes After Administration, if Blood Glucose Remains Less Than 70, Repeat Treatment   Recheck Blood Glucose 15 Minutes After 2nd Administration, if Blood Glucose Remains Less Than 70 After 2nd Dose of D50W Contact Provider for Further Treatment Orders & Consider Adding IVF With D5 for Maintenance    07/08/17 1744 07/08/17 1745  Hypoglycemia Treatment - Patient Without IV Access - Unresponsive, NPO or Unable To Safely Swallow  Until Discontinued     Comments:  Administer 1mg Glucagon SQ & Establish IV Access  Turn Patient on Side - Nausea / Vomiting May Occur  Recheck Blood Glucose 15 Minutes After Administration  If IV Access Has Not Been Established & Blood Glucose Remains Less Than 70, Repeat Treatment With Glucagon  If IV Access Established, Administer 25g D50W IV Push (1 Whole Vial)  Recheck Blood Glucose 15 Minutes After Administration of 2nd Medication, if Blood Glucose Remains Less Than 70, Contact Provider for Further Treatment Orders & Consider Adding IVF With D5 for Maintenance    07/08/17 1744 07/08/17 1745  Oxygen Therapy-  Continuous      07/08/17 1744    07/08/17 1745  Insert Peripheral IV  Once      07/08/17 1744    07/08/17 1745  Saline Lock & Maintain IV Access  Continuous      07/08/17 1744    07/08/17 1745  Full Code  Continuous      07/08/17 1744 07/08/17 1745  VTE Risk Assessment - Low Risk  Once      07/08/17 1744     07/08/17 1745  Pharmacologic VTE Prophylaxis Not Indicated: Low Risk for VTE  Once      07/08/17 1744    07/08/17 1745  Place Sequential Compression Device  Once      07/08/17 1744    07/08/17 1745  Maintain Sequential Compression Device  Continuous      07/08/17 1744    07/08/17 1745  Cardiac Monitoring  Continuous      07/08/17 1744    07/08/17 1745  Diet Regular; Cardiac, Consistent Carbohydrate  Diet Effective Now      07/08/17 1744    07/08/17 1745  Hemoglobin A1c  STAT      07/08/17 1744    07/08/17 1745  Calcium, Ionized  STAT      07/08/17 1744    07/08/17 1745  Cortisol  Once      07/08/17 1744    07/08/17 1745  T3, Free  Once      07/08/17 1744    07/08/17 1745  Anti-Thyroglobulin Antibody  Once      07/08/17 1744    07/08/17 1745  US Thyroid  1 Time Imaging      07/08/17 1744    07/08/17 1745  Thyroid Peroxidase Antibody  Once      07/08/17 1744    07/08/17 1745  Transthoracic Echocardiogram 2D Complete  Once      07/08/17 1744    07/08/17 1745  XR Abdomen KUB  1 Time Imaging      07/08/17 1744    07/08/17 1744  dextrose (GLUTOSE) oral gel 15 g  Every 15 Minutes PRN      07/08/17 1744    07/08/17 1744  dextrose (D50W) solution 25 g  Every 15 Minutes PRN      07/08/17 1744    07/08/17 1744  glucagon (human recombinant) (GLUCAGEN DIAGNOSTIC) injection 1 mg  Every 15 Minutes PRN      07/08/17 1744    07/08/17 1744  sodium chloride 0.9 % flush 1-10 mL  As Needed      07/08/17 1744    07/08/17 1744  LORazepam (ATIVAN) tablet 1 mg  Every 6 Hours PRN      07/08/17 1744    07/08/17 1744  bisacodyl (DULCOLAX) EC tablet 5 mg  Daily PRN      07/08/17 1744    07/08/17 1744  ondansetron (ZOFRAN) injection 4 mg  Every 6 Hours PRN      07/08/17 1744    07/08/17 1744  acetaminophen (TYLENOL) tablet 650 mg  Every 4 Hours PRN      07/08/17 1744    07/08/17 1744  Morphine sulfate (PF) injection 1 mg  Every 4 Hours PRN      07/08/17 1744    07/08/17 1744  naloxone (NARCAN) injection 0.4 mg  Every 5 Minutes PRN      07/08/17  1744    07/08/17 1606  US Carotid Bilateral  1 Time Imaging      07/08/17 1606    07/08/17 1512  ECG 12 Lead  STAT      07/08/17 1511    07/08/17 1508  Initiate Observation Status  Once      07/08/17 1507    07/08/17 1229  HYDROmorphone (DILAUDID) injection 0.5 mg  Once      07/08/17 1227    07/08/17 1223  CT Head Without Contrast  1 Time Imaging      07/08/17 1223    07/08/17 1223  CT Cervical Spine Without Contrast  1 Time Imaging      07/08/17 1223    07/08/17 1146  HYDROmorphone (DILAUDID) injection 0.5 mg  Once      07/08/17 1144    07/08/17 1146  ondansetron (ZOFRAN) injection 4 mg  Once      07/08/17 1144    07/08/17 1144  Blood Gas, Arterial With Co-Ox  Once      07/08/17 1143    07/08/17 1122  sodium chloride 0.9 % bolus 500 mL  Once      07/08/17 1120    07/08/17 1121  Blood Gas, Arterial With Co-Ox  STAT      07/08/17 1120    07/08/17 1120  Orthostatic blood pressure  Once      07/08/17 1120    07/08/17 1119  CBC & Differential  Once      07/08/17 1120    07/08/17 1119  Comprehensive Metabolic Panel  Once      07/08/17 1120    07/08/17 1119  Protime-INR  Once      07/08/17 1120    07/08/17 1119  aPTT  Once      07/08/17 1120    07/08/17 1119  BNP  Once      07/08/17 1120    07/08/17 1119  Amylase  Once      07/08/17 1120    07/08/17 1119  Lipase  Once      07/08/17 1120    07/08/17 1119  D-dimer, Quantitative  Once      07/08/17 1120    07/08/17 1119  Urinalysis With / Culture If Indicated  Once      07/08/17 1120    07/08/17 1119  Troponin  Once      07/08/17 1120    07/08/17 1119  CK-MB  Once      07/08/17 1120    07/08/17 1119  Myoglobin, Serum  Once      07/08/17 1120    07/08/17 1119  CK  Once      07/08/17 1120    07/08/17 1119  C-reactive Protein  Once      07/08/17 1120    07/08/17 1119  T4, Free  Once      07/08/17 1120    07/08/17 1119  TSH  Once      07/08/17 1120    07/08/17 1119  ECG 12 Lead  Once      07/08/17 1120    07/08/17 1119  XR Chest 1 View  1 Time Imaging      07/08/17 1120     07/08/17 1119  NPO Diet  Diet Effective Now,   Status:  Canceled      07/08/17 1120    07/08/17 1119  Cardiac monitoring  Once      07/08/17 1120    07/08/17 1119  Pulse Oximetry, Continuous  Per Hospital Policy      07/08/17 1120    07/08/17 1119  Vital signs  Per Hospital Policy      07/08/17 1120    07/08/17 1119  Insert peripheral IV  Once      07/08/17 1120    07/08/17 1119  CBC Auto Differential  PROCEDURE ONCE      07/08/17 1120    07/08/17 1118  sodium chloride 0.9 % flush 10 mL  As Needed      07/08/17 1120    03/30/17 0000  topiramate (TOPAMAX) 25 MG tablet  2 Times Daily      07/08/17 1224    --  CloNIDine (CATAPRES) 0.3 MG tablet  2 Times Daily      07/08/17 1224    --  SCANNED - CARDIOLOGY      07/08/17 0000    --  SCANNED - TELEMETRY        07/08/17 0000             Physician Progress Notes (last 72 hours) (Notes from 7/7/2017 10:55 AM through 7/10/2017 10:55 AM)      Shashi Avalos MD at 7/9/2017  7:31 AM  Version 2 of 2             [facility] Medicine Services  INPATIENT PROGRESS NOTE    Length of Stay: 0  Date of Admission: 7/8/2017  Primary Care Physician: David Mcallister MD    Subjective   Chief Complaint: Follow up  HPI   She was admitted yesterday for thyrotoxicosis and syncope.  She has had no further syncope episodes.  She was started on Inderal yesterday her blood pressure and pulse are back in the normal range.  Her free T3 was found to be 21.  This is 4 times be on the upper limit of normal which is significant for extreme thyrotoxicosis.  Her blood sugars have remained relatively stable.  Her tremors are much better with the Inderal.  Her BP and heart rate are also better.  Of note her urine drug screen was positive for cocaine and THC.          Review of Systems   14 point review of systems are negative except for as per HPI.  All pertinent negatives and positives are as above. All other systems have been reviewed and are negative unless otherwise  stated.     Objective    Temp:  [97.4 °F (36.3 °C)-98.6 °F (37 °C)] 98.6 °F (37 °C)  Heart Rate:  [] 79  Resp:  [16-20] 20  BP: (102-180)/(49-95) 130/62  Physical Exam  Constitutional: She is oriented to person, place, and time. She appears well-developed and well-nourished.   HENT:   Head: Normocephalic and atraumatic.   Eyes: Conjunctivae are normal. Pupils are equal, round, and reactive to light.   Neck: Normal range of motion. Neck supple.   Cardiovascular: Normal rate, regular rhythm and normal heart sounds.   Pulmonary/Chest: Effort normal and breath sounds normal.   Abdominal: Soft. Bowel sounds are normal.   Musculoskeletal: Normal range of motion.   Neurological: She is alert and oriented to person, place, and time. No tremors  Skin: Skin is warm and dry.   Psychiatric: She has a normal mood and affect.   Nursing note and vitals reviewed.      Results Review:  I have reviewed the labs, radiology results, and diagnostic studies.    Laboratory Data:     Results from last 7 days  Lab Units 07/08/17  1137   WBC 10*3/mm3 4.69*   HEMOGLOBIN g/dL 13.1   HEMATOCRIT % 38.9   PLATELETS 10*3/mm3 377          Results from last 7 days  Lab Units 07/08/17  1140 07/08/17  1137   SODIUM mmol/L  --  141   SODIUM, ARTERIAL mmol/L 138.3  --    POTASSIUM mmol/L  --  4.1   CHLORIDE mmol/L  --  106   CO2 mmol/L  --  22.0*   BUN mg/dL  --  20   CREATININE mg/dL  --  0.80   CALCIUM mg/dL  --  10.6*   BILIRUBIN mg/dL  --  1.1*   ALK PHOS U/L  --  135*   ALT (SGPT) U/L  --  88*   AST (SGOT) U/L  --  45   GLUCOSE mg/dL  --  145*       Culture Data:   [facility]    Radiology Data:   Imaging Results (last 24 hours)     Procedure Component Value Units Date/Time    CT Head Without Contrast [[id number]] Collected:  07/08/17 1304     Updated:  07/08/17 1308    Narrative:       CT BRAIN without contrast dated 7/8/2017 12:40 PM CDT     HISTORY: Headache     COMPARISON: 11/10/2015      DOSE LENGTH PRODUCT: 586 mGy cm     In order to  have a CT radiation dose as low as reasonably achievable,  Automated Exposure Control was utilized for adjustment of the mA and/or  KV according to patient size.     TECHNIQUE: Serial axial tomographic images of the brain were obtained  without the use of intravenous contrast.      FINDINGS:   The midline structures are nondisplaced. The ventricles and basilar  cisterns are normal in size and configuration. There is no evidence of  intracranial hemorrhage or mass-effect. The gray-white matter  differentiation is maintained. The sulci have a normal configuration,  and there are no abnormal extra-axial fluid collections. The structures  of the posterior fossa are unremarkable.      The included orbits and their contents are unremarkable. The visualized  paranasal sinuses, mastoid air cells and middle ear cavities are clear.  The visualized osseous structures and overlying soft tissues of the  skull and face are unremarkable.        Impression:       1. No acute intracranial process.        This report was finalized on 07/08/2017 13:05 by Dr. Abhijit Murcia MD.    CT Cervical Spine Without Contrast [[id number]] Collected:  07/08/17 1308     Updated:  07/08/17 1314    Narrative:       EXAMINATION:   CT CERVICAL SPINE WO CONTRAST-  7/8/2017 1:08 PM CDT     HISTORY: CT CERVICAL SPINE WO CONTRAST- 7/8/2017 12:40 PM CDT     HISTORY: neck pain, fall     COMPARISON: 11/10/2015     DOSE LENGTH PRODUCT: 278 mGy cm. Automated exposure control was also  utilized to decrease patient radiation dose.     TECHNIQUE: Serial helical tomographic images of the cervical spine were  obtained without the use of intravenous contrast. Additionally, sagittal  and coronal reformatted images were also provided for review.      FINDINGS:   Alignment: Mild straightening and reversal of the normal cervical  lordosis is noted     Bones: There is no evidence of fracture. Vertebral body heights are  maintained.     Disc spaces: Degenerative changes  noted with loss height at C3-4 and  C5-6 disc space levels. Mild hypertrophic degenerative change is  present.     Canal and neuroforamina: Patent.     Soft tissues: Unremarkable.     Lung apices: Clear. No pneumothorax.       Impression:       1. Mild degenerative changes present. There is no acute fracture.        This report was finalized on 07/08/2017 13:11 by Dr. Abhijit Murcia MD.    US Carotid Bilateral [[id number]] Collected:  07/08/17 1715     Updated:  07/08/17 1722    Narrative:       EXAMINATION: US CAROTID BILATERAL- 7/8/2017 5:15 PM CDT     HISTORY: Syncope     COMPARISON: None     FINDINGS:  Real time ultrasound with the assistance of color and spectral Doppler  demonstrates intimal thickening with few scattered areas of plaque  throughout the carotid arteries. Elevated peak systolic velocities are  demonstrated bilaterally, yielding a normal ICA/CCA peak systolic  velocity ratio of 0.93 on the right and 0.95 on the left.  Peak systolic  velocities within the right CCA, ICA, and ECA are as follows: 160 cm/s,  149 cm/s, and 173 cm/s.  Peak systolic velocities within the left CCA,  ICA, and ECA are as follows: 165 cm/s, 157 cm/s, and 83 cm/s..   Antegrade vertebral artery flow is seen on the right. The left vertebral  artery was not visualized.       Impression:          1. Findings suggest 50-69% stenosis bilaterally per Society of  Radiologists in Ultrasound Consensus Criteria.  See below. This could be  confirmed with CTA neck nonemergently.     2. Left vertebral artery is not visualized.           Consensus Panel Gray-Scale and Doppler US Criteria for Diagnosis of ICA  Stenosis:                                                                                                                Primary Parameters                                         Additional  Parameters     Degree of                                ICA PSV                Plaque  Estimate                 ICA/CCA PSV              ICA  EDV  Stenosis (%)                          (cm/sec)                            (%)*                                     Ratio                       (cm/sec)  ________________________________________________________________________  _________________     Normal                                         <125                               None                                      <2.0                             <40     <50                                               <125                                <50                                       <2.0                             <40     50-69                                        125-230                            >/=50                                    2.0-4.0                             >/=70 but less than near             >230                              >/=50                                      >4.0                            >100       occlusion     Near occlusion                        High, low, or                      Visible                                  Variable                      Variable                                                    undetectable     Total occlusion                        Undetectable           Visible,  no detectable                     N/A                             N/A                                                                                                 lumen               This report was finalized on 07/08/2017 17:19 by Dr. Ricki Joy MD.    XR Abdomen KUB [[id number]] Collected:  07/08/17 2015     Updated:  07/08/17 2021    Narrative:       EXAMINATION: XR ABDOMEN KUB- 7/8/2017 8:15 PM CDT     HISTORY: Abdominal pain, constipation     COMPARISON: CT abdomen and pelvis with contrast 03/14/2017     FINDINGS:  There is gaseous distention of the stomach as well as several loops of  bowel within the central abdomen and left lower quadrant. There is no  evidence of bowel obstruction. Surgical clips are seen  within the right  upper quadrant. There is no appreciable organomegaly. No pathologic  calcifications are seen within the abdomen. A phlebolith is noted in the  pelvis.       Impression:       No evidence of bowel obstruction. No significant stool  burden.  This report was finalized on 07/08/2017 20:18 by Dr. Ricki Joy MD.    US Thyroid [[id number]] Collected:  07/09/17 1233     Updated:  07/09/17 1238    Narrative:       EXAMINATION:   US THYROID-  7/9/2017 12:33 PM CDT     HISTORY: Thyrotoxicosis.     Images are obtained transverse longitudinal direction usual manner.  Color flow is present.     Right lobe of thyroid gland is 2 cm in AP diameter 5.2 cm in sagittal  length.     Left lobe of thyroid gland is visualized color flow is present. Left  lobe is 17 mm in AP diameter 49 mm in sagittal length. A complex 8 x 10  mm septated cyst is noted in the left lobe thyroid gland. Smaller cyst  is also present.     The isthmus is 4 mm.       Impression:       Thyroid gland is upper limits of normal to mildly enlarged  in size.  2. There are 2 cyst associated with the left lobe of thyroid gland. One  is 8 x 10 mm. The other is 3 x 5 mm.  This report was finalized on 07/09/2017 12:35 by Dr. Abhijit Murcia MD.          I have reviewed the patient current medications.     Assessment/Plan     Hospital Problem List     Syncope          1. Syncope: Probably due to tachyarrhythmias related to thyrotoxicosis. Carotid shows some disease but can't get CTA yet due to contrast.  She also has UDS positive for cocaine fortunately she didn't have a problem with the beta blocker.    2. Thyrotoxicosis symptomatic: She is significantly hyperthyroid. I do not believe she is in thyroid storm however going to initiate a beta blocker. Thyroid ultrasound shows two nodules.  Will go ahead with thyroid uptake scan to evaluate whether she has Hashimoto's or Graves disease.    3. Type II diabetes HGbA1C is in good control.  Continue accuchecks  and SSI.  4. Hypertension better control with Beta blocker.  Will decrease other medications she is on to compensate for this don't want her to get hypotensive.              Discharge Planning: I expect patient to be discharged to home in 2-3 days.    Shashi Avalos MD   07/09/17   12:57 PM       Electronically signed by Shashi Avalos MD at 7/9/2017  1:16 PM      Shashi Avalos MD at 7/9/2017  7:31 AM  Version 1 of 2             [facility] Medicine Services  INPATIENT PROGRESS NOTE    Length of Stay: 0  Date of Admission: 7/8/2017  Primary Care Physician: David Mcallister MD    Subjective   Chief Complaint: Follow up  HPI   She was admitted yesterday for thyrotoxicosis and syncope.  She has had no further syncope episodes.  She was started on Inderal yesterday her blood pressure and pulse are back in the normal range.  Her free T3 was found to be 21.  This is 4 times be on the upper limit of normal which is significant for extreme thyrotoxicosis.  Her blood sugars have remained relatively stable.  Her tremors are much better with the Inderal.  Her BP and heart rate are also better.  Of note her urine drug screen was positive for cocaine and THC.          Review of Systems   14 point review of systems are negative except for as per HPI.  All pertinent negatives and positives are as above. All other systems have been reviewed and are negative unless otherwise stated.     Objective    Temp:  [97.4 °F (36.3 °C)-98.6 °F (37 °C)] 98.6 °F (37 °C)  Heart Rate:  [] 79  Resp:  [16-20] 20  BP: (102-180)/(49-95) 130/62  Physical Exam  Constitutional: She is oriented to person, place, and time. She appears well-developed and well-nourished.   HENT:   Head: Normocephalic and atraumatic.   Eyes: Conjunctivae are normal. Pupils are equal, round, and reactive to light.   Neck: Normal range of motion. Neck supple.   Cardiovascular: Normal rate, regular rhythm and normal heart sounds.    Pulmonary/Chest: Effort normal and breath sounds normal.   Abdominal: Soft. Bowel sounds are normal.   Musculoskeletal: Normal range of motion.   Neurological: She is alert and oriented to person, place, and time. No tremors  Skin: Skin is warm and dry.   Psychiatric: She has a normal mood and affect.   Nursing note and vitals reviewed.      Results Review:  I have reviewed the labs, radiology results, and diagnostic studies.    Laboratory Data:     Results from last 7 days  Lab Units 07/08/17  1137   WBC 10*3/mm3 4.69*   HEMOGLOBIN g/dL 13.1   HEMATOCRIT % 38.9   PLATELETS 10*3/mm3 377          Results from last 7 days  Lab Units 07/08/17  1140 07/08/17  1137   SODIUM mmol/L  --  141   SODIUM, ARTERIAL mmol/L 138.3  --    POTASSIUM mmol/L  --  4.1   CHLORIDE mmol/L  --  106   CO2 mmol/L  --  22.0*   BUN mg/dL  --  20   CREATININE mg/dL  --  0.80   CALCIUM mg/dL  --  10.6*   BILIRUBIN mg/dL  --  1.1*   ALK PHOS U/L  --  135*   ALT (SGPT) U/L  --  88*   AST (SGOT) U/L  --  45   GLUCOSE mg/dL  --  145*       Culture Data:   @[facility]@    Radiology Data:   Imaging Results (last 24 hours)     Procedure Component Value Units Date/Time    CT Head Without Contrast [[id number]] Collected:  07/08/17 1304     Updated:  07/08/17 1308    Narrative:       CT BRAIN without contrast dated 7/8/2017 12:40 PM CDT     HISTORY: Headache     COMPARISON: 11/10/2015      DOSE LENGTH PRODUCT: 586 mGy cm     In order to have a CT radiation dose as low as reasonably achievable,  Automated Exposure Control was utilized for adjustment of the mA and/or  KV according to patient size.     TECHNIQUE: Serial axial tomographic images of the brain were obtained  without the use of intravenous contrast.      FINDINGS:   The midline structures are nondisplaced. The ventricles and basilar  cisterns are normal in size and configuration. There is no evidence of  intracranial hemorrhage or mass-effect. The gray-white matter  differentiation is  maintained. The sulci have a normal configuration,  and there are no abnormal extra-axial fluid collections. The structures  of the posterior fossa are unremarkable.      The included orbits and their contents are unremarkable. The visualized  paranasal sinuses, mastoid air cells and middle ear cavities are clear.  The visualized osseous structures and overlying soft tissues of the  skull and face are unremarkable.        Impression:       1. No acute intracranial process.        This report was finalized on 07/08/2017 13:05 by Dr. Abhijit Murcia MD.    CT Cervical Spine Without Contrast [[id number]] Collected:  07/08/17 1308     Updated:  07/08/17 1314    Narrative:       EXAMINATION:   CT CERVICAL SPINE WO CONTRAST-  7/8/2017 1:08 PM CDT     HISTORY: CT CERVICAL SPINE WO CONTRAST- 7/8/2017 12:40 PM CDT     HISTORY: neck pain, fall     COMPARISON: 11/10/2015     DOSE LENGTH PRODUCT: 278 mGy cm. Automated exposure control was also  utilized to decrease patient radiation dose.     TECHNIQUE: Serial helical tomographic images of the cervical spine were  obtained without the use of intravenous contrast. Additionally, sagittal  and coronal reformatted images were also provided for review.      FINDINGS:   Alignment: Mild straightening and reversal of the normal cervical  lordosis is noted     Bones: There is no evidence of fracture. Vertebral body heights are  maintained.     Disc spaces: Degenerative changes noted with loss height at C3-4 and  C5-6 disc space levels. Mild hypertrophic degenerative change is  present.     Canal and neuroforamina: Patent.     Soft tissues: Unremarkable.     Lung apices: Clear. No pneumothorax.       Impression:       1. Mild degenerative changes present. There is no acute fracture.        This report was finalized on 07/08/2017 13:11 by Dr. Abhijit Murcia MD.    US Carotid Bilateral [[id number]] Collected:  07/08/17 1715     Updated:  07/08/17 1722    Narrative:       EXAMINATION: US  CAROTID BILATERAL- 7/8/2017 5:15 PM CDT     HISTORY: Syncope     COMPARISON: None     FINDINGS:  Real time ultrasound with the assistance of color and spectral Doppler  demonstrates intimal thickening with few scattered areas of plaque  throughout the carotid arteries. Elevated peak systolic velocities are  demonstrated bilaterally, yielding a normal ICA/CCA peak systolic  velocity ratio of 0.93 on the right and 0.95 on the left.  Peak systolic  velocities within the right CCA, ICA, and ECA are as follows: 160 cm/s,  149 cm/s, and 173 cm/s.  Peak systolic velocities within the left CCA,  ICA, and ECA are as follows: 165 cm/s, 157 cm/s, and 83 cm/s..   Antegrade vertebral artery flow is seen on the right. The left vertebral  artery was not visualized.       Impression:          1. Findings suggest 50-69% stenosis bilaterally per Society of  Radiologists in Ultrasound Consensus Criteria.  See below. This could be  confirmed with CTA neck nonemergently.     2. Left vertebral artery is not visualized.           Consensus Panel Gray-Scale and Doppler US Criteria for Diagnosis of ICA  Stenosis:                                                                                                                Primary Parameters                                         Additional  Parameters     Degree of                                ICA PSV                Plaque  Estimate                 ICA/CCA PSV              ICA EDV  Stenosis (%)                          (cm/sec)                            (%)*                                     Ratio                       (cm/sec)  ________________________________________________________________________  _________________     Normal                                         <125                               None                                      <2.0                             <40     <50                                               <125                                <50                                        <2.0                             <40     50-69                                        125-230                            >/=50                                    2.0-4.0                             >/=70 but less than near             >230                              >/=50                                      >4.0                            >100       occlusion     Near occlusion                        High, low, or                      Visible                                  Variable                      Variable                                                    undetectable     Total occlusion                        Undetectable           Visible,  no detectable                     N/A                             N/A                                                                                                 lumen               This report was finalized on 07/08/2017 17:19 by Dr. Ricki Joy MD.    XR Abdomen KUB [[id number]] Collected:  07/08/17 2015     Updated:  07/08/17 2021    Narrative:       EXAMINATION: XR ABDOMEN KUB- 7/8/2017 8:15 PM CDT     HISTORY: Abdominal pain, constipation     COMPARISON: CT abdomen and pelvis with contrast 03/14/2017     FINDINGS:  There is gaseous distention of the stomach as well as several loops of  bowel within the central abdomen and left lower quadrant. There is no  evidence of bowel obstruction. Surgical clips are seen within the right  upper quadrant. There is no appreciable organomegaly. No pathologic  calcifications are seen within the abdomen. A phlebolith is noted in the  pelvis.       Impression:       No evidence of bowel obstruction. No significant stool  burden.  This report was finalized on 07/08/2017 20:18 by Dr. Ricki Joy MD.    US Thyroid [[id number]] Collected:  07/09/17 1233     Updated:  07/09/17 1238    Narrative:       EXAMINATION:   US THYROID-  7/9/2017 12:33 PM CDT     HISTORY: Thyrotoxicosis.     Images are  obtained transverse longitudinal direction usual manner.  Color flow is present.     Right lobe of thyroid gland is 2 cm in AP diameter 5.2 cm in sagittal  length.     Left lobe of thyroid gland is visualized color flow is present. Left  lobe is 17 mm in AP diameter 49 mm in sagittal length. A complex 8 x 10  mm septated cyst is noted in the left lobe thyroid gland. Smaller cyst  is also present.     The isthmus is 4 mm.       Impression:       Thyroid gland is upper limits of normal to mildly enlarged  in size.  2. There are 2 cyst associated with the left lobe of thyroid gland. One  is 8 x 10 mm. The other is 3 x 5 mm.  This report was finalized on 07/09/2017 12:35 by Dr. Abhijit Murcia MD.          I have reviewed the patient current medications.     Assessment/Plan     Hospital Problem List     Syncope          1. Syncope: Probably due to tachyarrhythmias related to thyrotoxicosis. Carotid shows some disease but can't get CTA yet due to contrast.  She also has UDS positive for cocaine fortunately she didn't have a problem with the beta blocker.    2. Thyrotoxicosis symptomatic: She is significantly hyperthyroid. I do not believe she is in thyroid storm however going to initiate a beta blocker. Thyroid ultrasound shows two nodules.  Will go ahead with thyroid uptake scan to evaluate whether she has Hashimoto's or Graves disease.    3. Type II diabetes HGbA1C is in good control.  Continue accuchecks and SSI.  4. Hypertension poorly controlled her blood pressures been bouncing around a elevated in the emergency room once again going to start her on a beta blocker to see if this will help control regulated better. She is on multiple medications for her blood pressure already at high doses.              Discharge Planning: I expect patient to be discharged to home in 2-3 days.    Shashi Avalos MD   07/09/17   12:57 PM       Electronically signed by Shashi Avalos MD at 7/9/2017  1:13 PM        Consult Notes  (last 72 hours) (Notes from 7/7/2017 10:55 AM through 7/10/2017 10:55 AM)     No notes of this type exist for this encounter.

## 2017-07-10 NOTE — PROGRESS NOTES
[facility] does not do drug counseling but has spoken to patient and sister. Plan is for patient to go outpatient to [facility].

## 2017-07-10 NOTE — PLAN OF CARE
Problem: Patient Care Overview (Adult)  Goal: Plan of Care Review  Outcome: Ongoing (interventions implemented as appropriate)    07/10/17 0249   Coping/Psychosocial Response Interventions   Plan Of Care Reviewed With patient   Patient Care Overview   Progress no change   Outcome Evaluation   Outcome Summary/Follow up Plan pt has been resting well this shift, becomes drowsy after medication, has had family at bedside all night, VSS, no neuro chnages, will cont to monitor         Problem: Fall Risk (Adult)  Goal: Absence of Falls  Outcome: Ongoing (interventions implemented as appropriate)    Problem: Pain, Acute (Adult)  Goal: Acceptable Pain Control/Comfort Level  Outcome: Ongoing (interventions implemented as appropriate)    Problem: Constipation (Adult)  Goal: Effective Bowel Elimination  Outcome: Outcome(s) achieved Date Met:  07/10/17  Goal: Comfort  Outcome: Outcome(s) achieved Date Met:  07/10/17

## 2017-07-11 ENCOUNTER — APPOINTMENT (OUTPATIENT)
Dept: NUCLEAR MEDICINE | Facility: HOSPITAL | Age: 42
End: 2017-07-11

## 2017-07-11 LAB
ALBUMIN SERPL-MCNC: 3.4 G/DL (ref 3.5–5)
ALBUMIN/GLOB SERPL: 1.2 G/DL (ref 1.1–2.5)
ALP SERPL-CCNC: 79 U/L (ref 24–120)
ALT SERPL W P-5'-P-CCNC: 53 U/L (ref 0–54)
ANION GAP SERPL CALCULATED.3IONS-SCNC: 9 MMOL/L (ref 4–13)
AST SERPL-CCNC: 26 U/L (ref 7–45)
BASOPHILS # BLD AUTO: 0 10*3/MM3 (ref 0–0.2)
BASOPHILS NFR BLD AUTO: 0 % (ref 0–2)
BILIRUB SERPL-MCNC: 0.6 MG/DL (ref 0.1–1)
BUN BLD-MCNC: 24 MG/DL (ref 5–21)
BUN/CREAT SERPL: 28.9 (ref 7–25)
CALCIUM SPEC-SCNC: 9.5 MG/DL (ref 8.4–10.4)
CHLORIDE SERPL-SCNC: 106 MMOL/L (ref 98–110)
CO2 SERPL-SCNC: 23 MMOL/L (ref 24–31)
CORTIS SERPL-MCNC: 3.1 UG/DL
CREAT BLD-MCNC: 0.83 MG/DL (ref 0.5–1.4)
DEPRECATED RDW RBC AUTO: 35.4 FL (ref 40–54)
EOSINOPHIL # BLD AUTO: 0.04 10*3/MM3 (ref 0–0.7)
EOSINOPHIL NFR BLD AUTO: 1.1 % (ref 0–4)
ERYTHROCYTE [DISTWIDTH] IN BLOOD BY AUTOMATED COUNT: 12.4 % (ref 12–15)
GFR SERPL CREATININE-BSD FRML MDRD: 91 ML/MIN/1.73
GLOBULIN UR ELPH-MCNC: 2.9 GM/DL
GLUCOSE BLD-MCNC: 182 MG/DL (ref 70–100)
GLUCOSE BLDC GLUCOMTR-MCNC: 170 MG/DL (ref 70–130)
GLUCOSE BLDC GLUCOMTR-MCNC: 175 MG/DL (ref 70–130)
GLUCOSE BLDC GLUCOMTR-MCNC: 179 MG/DL (ref 70–130)
GLUCOSE BLDC GLUCOMTR-MCNC: 200 MG/DL (ref 70–130)
HCT VFR BLD AUTO: 34.9 % (ref 37–47)
HGB BLD-MCNC: 11.4 G/DL (ref 12–16)
IMM GRANULOCYTES # BLD: 0.01 10*3/MM3 (ref 0–0.03)
IMM GRANULOCYTES NFR BLD: 0.3 % (ref 0–5)
LYMPHOCYTES # BLD AUTO: 1.76 10*3/MM3 (ref 0.72–4.86)
LYMPHOCYTES NFR BLD AUTO: 46.7 % (ref 15–45)
MCH RBC QN AUTO: 25.6 PG (ref 28–32)
MCHC RBC AUTO-ENTMCNC: 32.7 G/DL (ref 33–36)
MCV RBC AUTO: 78.4 FL (ref 82–98)
MONOCYTES # BLD AUTO: 0.34 10*3/MM3 (ref 0.19–1.3)
MONOCYTES NFR BLD AUTO: 9 % (ref 4–12)
NEUTROPHILS # BLD AUTO: 1.62 10*3/MM3 (ref 1.87–8.4)
NEUTROPHILS NFR BLD AUTO: 42.9 % (ref 39–78)
PLATELET # BLD AUTO: 320 10*3/MM3 (ref 130–400)
PMV BLD AUTO: 10.2 FL (ref 6–12)
POTASSIUM BLD-SCNC: 3.8 MMOL/L (ref 3.5–5.3)
PROT SERPL-MCNC: 6.3 G/DL (ref 6.3–8.7)
RBC # BLD AUTO: 4.45 10*6/MM3 (ref 4.2–5.4)
SODIUM BLD-SCNC: 138 MMOL/L (ref 135–145)
THYROGLOB AB SERPL-ACNC: <1 IU/ML (ref 0–0.9)
THYROPEROXIDASE AB SERPL-ACNC: 10 IU/ML (ref 0–34)
WBC NRBC COR # BLD: 3.77 10*3/MM3 (ref 4.8–10.8)

## 2017-07-11 PROCEDURE — 0 SODIUM IODIDE 3.7 MBQ CAPSULE: Performed by: FAMILY MEDICINE

## 2017-07-11 PROCEDURE — G8978 MOBILITY CURRENT STATUS: HCPCS | Performed by: PHYSICAL THERAPIST

## 2017-07-11 PROCEDURE — 82962 GLUCOSE BLOOD TEST: CPT

## 2017-07-11 PROCEDURE — 97116 GAIT TRAINING THERAPY: CPT

## 2017-07-11 PROCEDURE — G8988 SELF CARE GOAL STATUS: HCPCS

## 2017-07-11 PROCEDURE — G8987 SELF CARE CURRENT STATUS: HCPCS

## 2017-07-11 PROCEDURE — G0378 HOSPITAL OBSERVATION PER HR: HCPCS

## 2017-07-11 PROCEDURE — 85025 COMPLETE CBC W/AUTO DIFF WBC: CPT | Performed by: FAMILY MEDICINE

## 2017-07-11 PROCEDURE — A9516 IODINE I-123 SOD IODIDE MIC: HCPCS | Performed by: FAMILY MEDICINE

## 2017-07-11 PROCEDURE — 97161 PT EVAL LOW COMPLEX 20 MIN: CPT | Performed by: PHYSICAL THERAPIST

## 2017-07-11 PROCEDURE — G8979 MOBILITY GOAL STATUS: HCPCS | Performed by: PHYSICAL THERAPIST

## 2017-07-11 PROCEDURE — 97110 THERAPEUTIC EXERCISES: CPT

## 2017-07-11 PROCEDURE — 97165 OT EVAL LOW COMPLEX 30 MIN: CPT

## 2017-07-11 PROCEDURE — 80053 COMPREHEN METABOLIC PANEL: CPT | Performed by: FAMILY MEDICINE

## 2017-07-11 RX ORDER — SODIUM IODIDE I 123 100 UCI/1
1 CAPSULE, GELATIN COATED ORAL
Status: COMPLETED | OUTPATIENT
Start: 2017-07-11 | End: 2017-07-11

## 2017-07-11 RX ADMIN — PROPRANOLOL HYDROCHLORIDE 120 MG: 60 CAPSULE, EXTENDED RELEASE ORAL at 08:02

## 2017-07-11 RX ADMIN — HYDRALAZINE HYDROCHLORIDE 25 MG: 25 TABLET ORAL at 08:02

## 2017-07-11 RX ADMIN — HYDRALAZINE HYDROCHLORIDE 25 MG: 25 TABLET ORAL at 22:19

## 2017-07-11 RX ADMIN — INSULIN LISPRO 10 UNITS: 100 INJECTION, SOLUTION INTRAVENOUS; SUBCUTANEOUS at 07:52

## 2017-07-11 RX ADMIN — TRAZODONE HYDROCHLORIDE 100 MG: 100 TABLET, FILM COATED ORAL at 22:18

## 2017-07-11 RX ADMIN — TOPIRAMATE 25 MG: 25 TABLET, FILM COATED ORAL at 08:02

## 2017-07-11 RX ADMIN — INSULIN LISPRO 10 UNITS: 100 INJECTION, SOLUTION INTRAVENOUS; SUBCUTANEOUS at 11:40

## 2017-07-11 RX ADMIN — GABAPENTIN 300 MG: 300 CAPSULE ORAL at 11:40

## 2017-07-11 RX ADMIN — Medication 1 CAPSULE: at 07:14

## 2017-07-11 RX ADMIN — TIZANIDINE 4 MG: 4 TABLET ORAL at 22:19

## 2017-07-11 RX ADMIN — ATORVASTATIN CALCIUM 10 MG: 10 TABLET, FILM COATED ORAL at 08:02

## 2017-07-11 RX ADMIN — GABAPENTIN 300 MG: 300 CAPSULE ORAL at 07:52

## 2017-07-11 RX ADMIN — GABAPENTIN 300 MG: 300 CAPSULE ORAL at 22:34

## 2017-07-11 RX ADMIN — CLONIDINE HYDROCHLORIDE 0.2 MG: 0.2 TABLET ORAL at 22:19

## 2017-07-11 RX ADMIN — FAMOTIDINE 20 MG: 20 TABLET, FILM COATED ORAL at 22:19

## 2017-07-11 RX ADMIN — TIZANIDINE 4 MG: 4 TABLET ORAL at 08:02

## 2017-07-11 RX ADMIN — AMLODIPINE BESYLATE 5 MG: 5 TABLET ORAL at 08:02

## 2017-07-11 RX ADMIN — ACETAMINOPHEN 650 MG: 325 TABLET, FILM COATED ORAL at 16:23

## 2017-07-11 RX ADMIN — FUROSEMIDE 20 MG: 20 TABLET ORAL at 08:02

## 2017-07-11 RX ADMIN — POTASSIUM CHLORIDE 10 MEQ: 750 CAPSULE, EXTENDED RELEASE ORAL at 08:02

## 2017-07-11 RX ADMIN — INSULIN LISPRO 10 UNITS: 100 INJECTION, SOLUTION INTRAVENOUS; SUBCUTANEOUS at 16:53

## 2017-07-11 RX ADMIN — GABAPENTIN 300 MG: 300 CAPSULE ORAL at 17:01

## 2017-07-11 RX ADMIN — DULOXETINE HYDROCHLORIDE 60 MG: 30 CAPSULE, DELAYED RELEASE ORAL at 08:02

## 2017-07-11 RX ADMIN — CLONIDINE HYDROCHLORIDE 0.2 MG: 0.2 TABLET ORAL at 08:02

## 2017-07-11 RX ADMIN — INSULIN DETEMIR 20 UNITS: 100 INJECTION, SOLUTION SUBCUTANEOUS at 22:36

## 2017-07-11 RX ADMIN — BENAZEPRIL HYDROCHLORIDE 20 MG: 20 TABLET, COATED ORAL at 08:02

## 2017-07-11 RX ADMIN — BENAZEPRIL HYDROCHLORIDE 20 MG: 20 TABLET, COATED ORAL at 22:20

## 2017-07-11 RX ADMIN — METFORMIN HYDROCHLORIDE 1000 MG: 500 TABLET ORAL at 17:01

## 2017-07-11 RX ADMIN — METFORMIN HYDROCHLORIDE 1000 MG: 500 TABLET ORAL at 07:52

## 2017-07-11 RX ADMIN — FAMOTIDINE 20 MG: 20 TABLET, FILM COATED ORAL at 08:02

## 2017-07-11 RX ADMIN — TOPIRAMATE 25 MG: 25 TABLET, FILM COATED ORAL at 22:19

## 2017-07-11 RX ADMIN — ACETAMINOPHEN 650 MG: 325 TABLET, FILM COATED ORAL at 22:35

## 2017-07-11 NOTE — PLAN OF CARE
Problem: Patient Care Overview (Adult)  Goal: Plan of Care Review  Outcome: Ongoing (interventions implemented as appropriate)    07/11/17 4699   Coping/Psychosocial Response Interventions   Plan Of Care Reviewed With patient   Patient Care Overview   Progress improving   Outcome Evaluation   Outcome Summary/Follow up Plan Patient has maintained safety this shift with complaint of pain to right shoulder given prn tylenol with minimal relief noted. Patient states that her legs still feel very weak when trying to ambulate to the bathroom, tolerating walker well, vss, will continue to monitor.          Problem: Fall Risk (Adult)  Goal: Absence of Falls  Outcome: Ongoing (interventions implemented as appropriate)    Problem: Pain, Acute (Adult)  Goal: Acceptable Pain Control/Comfort Level  Outcome: Ongoing (interventions implemented as appropriate)

## 2017-07-11 NOTE — PLAN OF CARE
"Problem: Patient Care Overview (Adult)  Goal: Plan of Care Review  Outcome: Ongoing (interventions implemented as appropriate)    07/11/17 1139   Coping/Psychosocial Response Interventions   Plan Of Care Reviewed With patient   Patient Care Overview   Progress progress toward functional goals as expected   Outcome Evaluation   Outcome Summary/Follow up Plan PT evaluation completed. The patient complains of her legs feeling \"wobbly\" however when standing and transfering to a wheelchair she did not demonstrate signs of this. The patient was unsteady when standing so PT will continue to work with the pt to improve her balance and her ability to walk farther with less assistance from anyone other than the walker.          Problem: Inpatient Physical Therapy  Goal: Transfer Training Goal 1 LTG- PT  Outcome: Ongoing (interventions implemented as appropriate)    07/11/17 1139   Transfer Training PT LTG   Transfer Training PT LTG, Date Established 07/11/17   Transfer Training PT LTG, Time to Achieve by discharge   Transfer Training PT LTG, Activity Type bed to chair /chair to bed;sit to stand/stand to sit   Transfer Training PT LTG, [place] Level independent       Goal: Gait Training Goal LTG- PT  Outcome: Ongoing (interventions implemented as appropriate)    07/11/17 1139   Gait Training PT LTG   Gait Training Goal PT LTG, Date Established 07/11/17   Gait Training Goal PT LTG, Time to Achieve by discharge   Gait Training Goal PT LTG, [place] Level supervision required   Gait Training Goal PT LTG, Assist Device walker, rolling   Gait Training Goal PT LTG, Distance to Achieve 200 ft       Goal: Stair Training Goal LTG- PT  Outcome: Ongoing (interventions implemented as appropriate)    07/11/17 1139   Stair Training PT LTG   Stair Training Goal PT LTG, Date Established 07/11/17   Stair Training Goal PT LTG, Time to Achieve by discharge   Stair Training Goal PT LTG, Number of Steps 11   Stair Training Goal PT LTG, " [place] Level contact guard assist       Goal: Dynamic Standing Balance Goal LTG- PT  Outcome: Ongoing (interventions implemented as appropriate)    07/11/17 1139   Dynamic Standing Balance PT LTG   Dynamic Standing Balance PT LTG, Date Established 07/11/17   Dynamic Standing Balance PT LTG, Time to Achieve by discharge   Dynamic Standing Balance PT LTG, [place] Level supervision required   Dynamic Standing Balance PT LTG, Additional Goal 1 UE support while reaching in all directions

## 2017-07-11 NOTE — THERAPY TREATMENT NOTE
Acute Care - Physical Therapy Treatment Note  [facility]     Patient Name: Lynn Magana  : 1975  MRN: [id number]  Today's Date: 2017  Onset of Illness/Injury or Date of Surgery Date: 17  Date of Referral to PT: 07/10/17  Referring Physician: Dr. Moreno    Admit Date: 2017    Visit Dx:    ICD-10-CM ICD-9-CM   1. Syncope, unspecified syncope type R55 780.2   2. Hyperthyroidism E05.90 242.90   3. Nonintractable headache, unspecified chronicity pattern, unspecified headache type R51 784.0   4. Impaired mobility Z74.09 799.89   5. Decreased activities of daily living (ADL) Z78.9 V49.89     Patient Active Problem List   Diagnosis   • Syncope   • Thyroid adenoma, toxic   • Polysubstance abuse   • Hypertension   • Diabetes               Adult Rehabilitation Note       17 1551          Rehab Assessment/Intervention    Discipline physical therapy assistant  -      Document Type therapy note (daily note)  -      Subjective Information agree to therapy;complains of;weakness  -      Precautions/Limitations fall precautions  -      Recorded by [] Veena Sr PTA      Pain Assessment    Pain Assessment 0-10  -MF      Pain Score 10  -      Pain Type Acute pain  -MF      Pain Location Leg  -MF      Pain Orientation Right;Left  -MF      Pain Descriptors Aching  -MF      Pain Frequency Constant/continuous  -MF      Pain Intervention(s) Medication (See MAR);Repositioned  -MF      Response to Interventions tolerated  -MF      Multiple Pain Sites Yes  -MF      Recorded by [] Veena Sr PTA      Pain 2    Pain Score 2 10  -MF      Pain Type 2 Chronic pain  -MF      Pain Location 2 Shoulder  -MF      Pain Orientation 2 Right  -MF      Pain Descriptors 2 Aching  -MF      Pain Frequency 2 Constant/continuous  -MF      Pain Intervention(s) 2 Repositioned;Medication (See MAR)  -MF      Response to Interventions 2 tolerated  -MF      Recorded by [] Veena Sr PTA       Bed Mobility, Assessment/Treatment    Bed Mob, Supine to Sit, [place] conditional independence  -      Bed Mob, Sit to Supine, [place] independent  -MF      Recorded by [] Veena Sr PTA      Transfer Assessment/Treatment    Transfers, Sit-Stand [place] contact guard assist  -      Transfers, Stand-Sit [place] verbal cues required;contact guard assist  -      Recorded by [] Veena Sr PTA      Gait Assessment/Treatment    Gait, [place] Level verbal cues required;contact guard assist  -      Gait, Assistive Device rolling walker  -      Gait, Distance (Feet) 30   x 2 with one standing rest  -      Gait, Gait Deviations sin decreased;narrow base;decreased heel strike  -      Gait, Safety Issues step length decreased  -      Gait, Comment Pt. states she feels dizzy and weak. Pt. took standing rest due to dizizness  -      Recorded by [] Veena Sr PTA      Balance Skills Training    Sitting-Level of Assistance Independent  -      Sitting-Balance Support Feet supported  -      Recorded by [CANDY] Veena Sr PTA      Therapy Exercises    Bilateral Lower Extremities AROM:;15 reps;sitting;ankle pumps/circles;hip flexion;LAQ   with rest breaks  -      Recorded by [CANDY] Veena Sr PTA      Positioning and Restraints    Pre-Treatment Position in bed  -      Post Treatment Position bed  -MF      In Bed supine;notified nsg;call light within reach;encouraged to call for assist;with family/caregiver;side rails up x2  -MF      Recorded by [CANDY] Veena Sr PTA        User Key  (r) = Recorded By, (t) = Taken By, (c) = Cosigned By    Initials Name Effective Dates    CANDY Sr PTA 08/02/16 -                 IP PT Goals       07/11/17 1139          Transfer Training PT LTG    Transfer Training PT LTG, Date Established 07/11/17  -MS      Transfer Training PT LTG, Time to Achieve by discharge  -MS      Transfer  Training PT LTG, Activity Type bed to chair /chair to bed;sit to stand/stand to sit  -MS      Transfer Training PT LTG, [place] Level independent  -MS      Gait Training PT LTG    Gait Training Goal PT LTG, Date Established 07/11/17  -MS      Gait Training Goal PT LTG, Time to Achieve by discharge  -MS      Gait Training Goal PT LTG, [place] Level supervision required  -MS      Gait Training Goal PT LTG, Assist Device walker, rolling  -MS      Gait Training Goal PT LTG, Distance to Achieve 200 ft  -MS      Stair Training PT LTG    Stair Training Goal PT LTG, Date Established 07/11/17  -MS      Stair Training Goal PT LTG, Time to Achieve by discharge  -MS      Stair Training Goal PT LTG, Number of Steps 11  -MS      Stair Training Goal PT LTG, [place] Level contact guard assist  -MS      Dynamic Standing Balance PT LTG    Dynamic Standing Balance PT LTG, Date Established 07/11/17  -MS      Dynamic Standing Balance PT LTG, Time to Achieve by discharge  -MS      Dynamic Standing Balance PT LTG, [place] Level supervision required  -MS      Dynamic Standing Balance PT LTG, Additional Goal 1 UE support while reaching in all directions  -MS        User Key  (r) = Recorded By, (t) = Taken By, (c) = Cosigned By    Initials Name Provider Type    MS Elizabeth Foster, PT DPT Physical Therapist          Physical Therapy Education     Title: PT OT SLP Therapies (Active)     Topic: Physical Therapy (Active)     Point: Mobility training (Done)    Learning Progress Summary    Learner Readiness Method Response Comment Documented by Status   Patient Acceptance E VU role of PT to assist improve her strength and safety MS 07/11/17 1138 Done                      User Key     Initials Effective Dates Name Provider Type Discipline    MS 08/02/16 -  Elizabeth Foster PT DPT Physical Therapist PT                    PT Recommendation and Plan  Anticipated Equipment Needs At Discharge: front wheeled walker  Anticipated  Discharge Disposition: home with assist  Planned Therapy Interventions: balance training, gait training, patient/family education, strengthening, stair training, transfer training  PT Frequency: daily, 2 times/day, per priority policy  Plan of Care Review  Plan Of Care Reviewed With: patient  Progress: progress toward functional goals is gradual  Outcome Summary/Follow up Plan: Pt. c/o weakness, dizziness when up, and pain in legs/R shoulder. Pt. participated with therapy and performed seated LE exercises. Pt. did these exercises actively, but had to take rest breaks. Pt. stood and walked 30' x 2 with rwx, CGA x x1. Will continue to work with pt. on strengthening and mobility while she is here.          Outcome Measures       07/11/17 1551 07/11/17 1400 07/11/17 1043    How much help from another person do you currently need...    Turning from your back to your side while in flat bed without using bedrails? 4  -MF  4  -MS    Moving from lying on back to sitting on the side of a flat bed without bedrails? 4  -MF  4  -MS    Moving to and from a bed to a chair (including a wheelchair)? 3  -MF  3  -MS    Standing up from a chair using your arms (e.g., wheelchair, bedside chair)? 3  -MF  3  -MS    Climbing 3-5 steps with a railing? 2  -MF  2  -MS    To walk in hospital room? 3  -MF  3  -MS    AM-PAC 6 Clicks Score 19  -MF  19  -MS    How much help from another is currently needed...    Putting on and taking off regular lower body clothing?  3  -ND     Bathing (including washing, rinsing, and drying)  3  -ND     Toileting (which includes using toilet bed pan or urinal)  3  -ND     Putting on and taking off regular upper body clothing  4  -ND     Taking care of personal grooming (such as brushing teeth)  4  -ND     Eating meals  4  -ND     Score  21  -ND     Functional Assessment    Outcome Measure Options AM-PAC 6 Clicks Basic Mobility (PT)  -MF AM-PAC 6 Clicks Daily Activity (OT)  -ND AM-PAC 6 Clicks Basic Mobility (PT)   -MS      User Key  (r) = Recorded By, (t) = Taken By, (c) = Cosigned By    Initials Name Provider Type    MS Elizabeth Foster, PT DPT Physical Therapist     Veena Sr PTA Physical Therapy Assistant    HUBERT Sloan, OTR/L Occupational Therapist           Time Calculation:         PT Charges       07/11/17 1628 07/11/17 1142       Time Calculation    Start Time 1551  -MF 1038  -MS     Stop Time 1618  -MF 1105  -MS     Time Calculation (min) 27 min  -MF 27 min  -MS     PT Non-Billable Time (min) 0 min  -MF      PT Received On 07/11/17  -MF 07/11/17  -MS     PT Goal Re-Cert Due Date 07/21/17  - 07/21/17  -MS     Time Calculation- PT    Total Timed Code Minutes- PT 27 minute(s)  -MF        User Key  (r) = Recorded By, (t) = Taken By, (c) = Cosigned By    Initials Name Provider Type    MS Elizabeth Foster, PT DPT Physical Therapist     Veena Sr PTA Physical Therapy Assistant          Therapy Charges for Today     Code Description Service Date Service Provider Modifiers Qty    83631737696 HC GAIT TRAINING EA 15 MIN 7/11/2017 Veena Sr PTA GP, KX 1    34794717278 HC PT THER PROC EA 15 MIN 7/11/2017 Veena Sr PTA GP, KX 1          PT G-Codes  Outcome Measure Options: AM-PAC 6 Clicks Basic Mobility (PT)  Score: 19  Functional Limitation: Mobility: Walking and moving around  Mobility: Walking and Moving Around Current Status (): At least 20 percent but less than 40 percent impaired, limited or restricted  Mobility: Walking and Moving Around Goal Status (): At least 1 percent but less than 20 percent impaired, limited or restricted    Veena Sr PTA  7/11/2017

## 2017-07-11 NOTE — PLAN OF CARE
"Problem: Patient Care Overview (Adult)  Goal: Plan of Care Review  Outcome: Ongoing (interventions implemented as appropriate)    07/11/17 1449   Coping/Psychosocial Response Interventions   Plan Of Care Reviewed With patient   Patient Care Overview   Progress progress toward functional goals as expected   Outcome Evaluation   Outcome Summary/Follow up Plan OT eval completed. Pt. c/o of BLE feeling like \"jello\" and blurry vision with movement. Pt. CGA for sit to stand t/f and fx mob with r/w. Pt. sup for LB dressing. Pt. cont to benefit from skilled OT d/t decreased balance, decreased strength, decreased knowledge, decreased ind in ADLs. 1450 7/11/17         Problem: Inpatient Occupational Therapy  Goal: Transfer Training Goal 1 LTG- OT  Outcome: Ongoing (interventions implemented as appropriate)    07/11/17 1449   Transfer Training OT LTG   Transfer Training OT LTG, Date Established 07/11/17   Transfer Training OT LTG, Time to Achieve by discharge   Transfer Training OT LTG, Activity Type all transfers   Transfer Training OT LTG, [place] Level conditional independence   Transfer Training OT LTG, Assist Device walker, rolling       Goal: Strength Goal LTG- OT  Outcome: Ongoing (interventions implemented as appropriate)    07/11/17 1449   Strength OT LTG   Strength Goal OT LTG, Date Established 07/11/17   Strength Goal OT LTG, Time to Achieve by discharge   Strength Goal OT LTG, Measure to Achieve Pt. will complete BUE strengthening exercises to increase BUE strength to 5/5 for ADLs.        Goal: ADL Goal LTG- OT  Outcome: Ongoing (interventions implemented as appropriate)    07/11/17 1449   ADL OT LTG   ADL OT LTG, Date Established 07/11/17   ADL OT LTG, Time to Achieve by discharge   ADL OT LTG, Activity Type ADL skills   ADL OT LTG, [place] Level contact guard           "

## 2017-07-11 NOTE — THERAPY EVALUATION
"Acute Care - Occupational Therapy Initial Evaluation  [facility]     Patient Name: Lynn Magana  : 1975  MRN: [id number]  Today's Date: 2017  Onset of Illness/Injury or Date of Surgery Date: 17  Date of Referral to OT: 07/10/17  Referring Physician: Dr. Moreno    Admit Date: 2017       ICD-10-CM ICD-9-CM   1. Syncope, unspecified syncope type R55 780.2   2. Hyperthyroidism E05.90 242.90   3. Nonintractable headache, unspecified chronicity pattern, unspecified headache type R51 784.0   4. Impaired mobility Z74.09 799.89   5. Decreased activities of daily living (ADL) Z78.9 V49.89     Patient Active Problem List   Diagnosis   • Syncope   • Thyroid adenoma, toxic   • Polysubstance abuse   • Hypertension   • Diabetes     Past Medical History:   Diagnosis Date   • Arthritis    • Depression    • Diabetes mellitus    • GERD (gastroesophageal reflux disease)    • Hyperlipidemia    • Hypertension    • Injury of back      Past Surgical History:   Procedure Laterality Date   •  SECTION     • CHOLECYSTECTOMY     • COLONOSCOPY     • CYST REMOVAL     • HYSTERECTOMY            OT ASSESSMENT FLOWSHEET (last 72 hours)      OT Evaluation       17 1101 17 1043 07/10/17 1613 17 2202 17 2200    Rehab Evaluation    Document Type evaluation   See isidoro SANCHEZ room at 1423  -ND evaluation  -MS       Subjective Information agree to therapy;complains of;weakness   \"legs like jello  -ND agree to therapy;complains of;dizziness   \"legs are wobbly\"  -MS       Evaluation Not Performed --  -ND        Evaluation Not Performed, Comment --  -ND --  -MS       Symptoms Noted Comment  pt reports for the past 3 weeks her daughter has been helping her walk and befor that she was holding onto the walls  -MS       General Information    Patient Profile Review yes  -ND yes  -MS       Onset of Illness/Injury or Date of Surgery Date 17  -ND 17  -MS       Referring Physician Dr. Moreno  -ND " Dr. Moreno  -MS       General Observations Pt. fowlers, alert, IV  -ND pt laying in bed, awake and alert in no apparent distress  -MS       Pertinent History Of Current Problem Pt. admitted d/t increased headaches radiating down R arm, SOB, shakiness, syncope. Positive drug screen for Cocaine and THC. Dx: Hyperthyroidism, thyrotoxicosis. H&H 11.4/34.9.   -ND admit for mulitple syncopal episodes, dx: thyrotoxcosis, positive for cocaine and THC  -MS       Precautions/Limitations fall precautions  -ND fall precautions  -MS       Prior Level of Function min assist:;transfer;bed mobility;mod assist:;ADL's;dependent:;driving  -ND min assist:;gait;mod assist:;dressing;bathing  -MS       Equipment Currently Used at Home none  -ND  none  -KP  none  -GG    Plans/Goals Discussed With patient;agreed upon  -ND patient;agreed upon  -MS       Risks Reviewed patient:;nausea/vomiting;LOB;dizziness;increased discomfort;change in vital signs;increased drainage;lines disloged  -ND patient:;LOB;nausea/vomiting;dizziness;increased discomfort  -MS       Benefits Reviewed patient:;improve function;increase independence;increase strength;decrease pain;decrease risk of DVT;increase balance;improve skin integrity;increase knowledge  -ND patient:;improve function;increase independence;increase strength;increase balance;decrease pain;increase knowledge  -MS       Barriers to Rehab medically complex;previous functional deficit;physical barrier  -ND medically complex;previous functional deficit;physical barrier  -MS       Living Environment    Lives With child(neymar), dependent   SISTER able to assist  -ND  child(neymar), dependent   Sister is able to assist, as well as other family members  - child(neymar), dependent  -GG     Living Arrangements apartment  -ND   apartment  -GG     Home Accessibility bed and bath on same level;stairs to enter home;tub/shower is not walk in  -ND stairs to enter home  -MS no concerns  -KP no concerns  -GG     Number of  Stairs to Enter Home 5  -ND 11  -MS       Stair Railings at Home none  -ND none  -MS  none  -GG     Type of Financial/Environmental Concern    none  -GG     Transportation Available   family or friend will provide;car  -KP car  -GG     Living Environment Comment  1 daughter 10 yo,  worksout [facility]  -MS       Clinical Impression    Date of Referral to OT 07/10/17  -ND        OT Diagnosis DECREASED ADL  -ND        Prognosis good  -ND        Impairments Found (describe specific impairments) gait, locomotion, and balance;ergonomics and body mechanics  -ND        Patient/Family Goals Statement go home  -ND        Criteria for Skilled Therapeutic Interventions Met yes;treatment indicated  -ND        Rehab Potential good, to achieve stated therapy goals  -ND        Therapy Frequency 3-5 times/wk  -ND        Predicted Duration of Therapy Intervention (days/wks) 10 days  -ND        Anticipated Discharge Disposition home with assist;home with home health  -ND        Functional Level Prior    Ambulation     0-->independent  -GG    Transferring     0-->independent  -GG    Toileting     0-->independent  -GG    Bathing     0-->independent  -GG    Dressing     0-->independent  -GG    Eating     0-->independent  -GG    Communication     0-->understands/communicates without difficulty  -GG    Swallowing     0-->swallows foods/liquids without difficulty  -GG    Pain Assessment    Pain Assessment No/denies pain  -ND No/denies pain  -MS       Vision Assessment/Intervention    Visual Impairment Comment Pt. states that her vision gets blurry with movement and standing.   -ND pt reports that her vision is blurry at times but not right now  -MS       Cognitive Assessment/Intervention    Current Cognitive/Communication Assessment functional  -ND functional  -MS       Orientation Status oriented x 4  -ND oriented x 4  -MS       Follows Commands/Answers Questions 100% of the time;able to follow multi-step instructions  -% of the  time  -MS       Personal Safety WNL/WFL  -ND WNL/WFL  -MS       Personal Safety Interventions fall prevention program maintained;gait belt;supervised activity;toileting scheduled  -ND fall prevention program maintained;gait belt;muscle strengthening facilitated;nonskid shoes/slippers when out of bed;supervised activity  -MS       ROM (Range of Motion)    General ROM upper extremity range of motion deficits identified  -ND no range of motion deficits identified  -MS       General ROM Detail R shoulder impaired approx 75% d/t pain   -ND        MMT (Manual Muscle Testing)    General MMT Assessment --  -ND        General MMT Assessment Detail Functionally 4/5 R shoulder 3-/5  -ND UEs grossly 5/5, LEs grossly 4/5  -MS       Bed Mobility, Assessment/Treatment    Bed Mobility, Roll Right, [place] conditional independence  -ND independent  -MS       Bed Mobility, Scoot/Bridge, [place] conditional independence  -ND independent  -MS       Bed Mob, Supine to Sit, [place] conditional independence  -ND independent  -MS       Transfer Assessment/Treatment    Transfers, Bed-Chair [place]  contact guard assist;hand held assist  -MS       Transfers, Sit-Stand [place] verbal cues required;nonverbal cues required (demo/gesture);contact guard assist  -ND contact guard assist  -MS       Transfers, Stand-Sit [place] verbal cues required;nonverbal cues required (demo/gesture);contact guard assist  -ND contact guard assist  -MS       Transfers, Sit-Stand-Sit, Assist Device rolling walker  -ND        Transfer, Safety Issues step length decreased;knees buckling  -ND        Transfer, Impairments strength decreased;impaired balance  -ND        Functional Mobility    Functional Mobility- Ind. Level verbal cues required;nonverbal cues required (demo/gesture);contact guard assist  -ND        Functional Mobility- Device rolling walker  -ND        Functional Mobility-Distance (Feet) --   steps on side of bed L and R   -ND        Functional Mobility- Safety Issues step length decreased  -ND        Lower Body Dressing Assessment/Training    LB Dressing Assess/Train, Clothing Type donning:;doffing:;socks  -ND        LB Dressing Assess/Train, Position edge of bed  -ND        LB Dressing Assess/Train, [place] supervision required  -ND        LB Dressing Assess/Train, Impairments strength decreased;impaired balance  -ND        Motor Skills/Interventions    Additional Documentation Balance Skills Training (Group);Fine Motor Coordination Training (Group);Gross Motor Coordination Training (Group)  -ND Balance Skills Training (Group)  -MS       Balance Skills Training    Sitting-Level of Assistance Independent  -ND Independent  -MS       Sitting-Balance Support Feet supported  -ND        Standing-Level of Assistance Contact guard  -ND Contact guard  -MS       Static Standing Balance Support assistive device  -ND Right upper extremity supported;Left upper extremity supported  -MS       Gait Balance-Level of Assistance Contact guard  -ND        Gait Balance Support assistive device  -ND        Gross Motor Coordination Training    Gross Motor Skill, Impairments Detail FTN intact LUE, refused to do RUE d/t pain  -ND        Fine Motor Coordination Training    Opposition Right:;Left:;intact  -ND        Sensory Assessment/Intervention    Sensory Impairment --   WFL per pt.   -ND        General Therapy Interventions    Planned Therapy Interventions activity intolerance;ADL retraining;balance training;bed mobility training;energy conservation;home exercise program;strengthening;transfer training  -ND        Positioning and Restraints    Pre-Treatment Position in bed  -ND        Post Treatment Position bed  -ND chair  -MS       In Bed fowlers;call light within reach;encouraged to call for assist;with family/caregiver;side rails up x2  -ND        In Chair  sitting;with other staff   in wheelchair going for testing  -MS         User Key  (r) =  Recorded By, (t) = Taken By, (c) = Cosigned By    Initials Name Effective Dates    MS Elizabeth CARROLL Cristian, PT DPT 08/02/16 -     NOEMY Montenegro RN 08/02/16 -     EDUARDO Montalvo, BSW 09/15/16 -     ND Melanie Sloan OTR/FROILAN 10/21/16 -            Occupational Therapy Education     Title: PT OT SLP Therapies (Active)     Topic: Occupational Therapy (Done)     Point: ADL training (Done)    Description: Instruct learner(s) on proper safety adaptation and remediation techniques during self care or transfers.   Instruct in proper use of assistive devices.    Learning Progress Summary    Learner Readiness Method Response Comment Documented by Status   Patient Acceptance E VU,NR Pt. educated on role of OT, safe t/f, benefit of activity, progression with poc ND 07/11/17 1448 Done               Point: Home exercise program (Done)    Description: Instruct learner(s) on appropriate technique for monitoring, assisting and/or progressing therapeutic exercises/activities.    Learning Progress Summary    Learner Readiness Method Response Comment Documented by Status   Patient Acceptance E VU,NR Pt. educated on role of OT, safe t/f, benefit of activity, progression with poc ND 07/11/17 1448 Done               Point: Body mechanics (Done)    Description: Instruct learner(s) on proper positioning and spine alignment during self-care, functional mobility activities and/or exercises.    Learning Progress Summary    Learner Readiness Method Response Comment Documented by Status   Patient Acceptance E VU,NR Pt. educated on role of OT, safe t/f, benefit of activity, progression with poc ND 07/11/17 1448 Done                      User Key     Initials Effective Dates Name Provider Type Discipline    ND 10/21/16 -  Melanie Sloan, OTR/L Occupational Therapist OT                  OT Recommendation and Plan  Anticipated Discharge Disposition: home with assist, home with home health  Planned Therapy Interventions: activity intolerance,  "ADL retraining, balance training, bed mobility training, energy conservation, home exercise program, strengthening, transfer training  Therapy Frequency: 3-5 times/wk  Plan of Care Review  Plan Of Care Reviewed With: patient  Progress: progress toward functional goals as expected  Outcome Summary/Follow up Plan: OT rajiv completed. Pt. c/o of BLE feeling like \"jello\" and blurry vision with movement. Pt. CGA for sit to stand t/f and fx mob with r/w. Pt. sup for LB dressing. Pt. cont to benefit from skilled OT d/t decreased balance, decreased strength, decreased knowledge, decreased ind in ADLs. 1450 7/11/17          OT Goals       07/11/17 1449          Transfer Training OT LTG    Transfer Training OT LTG, Date Established 07/11/17  -ND      Transfer Training OT LTG, Time to Achieve by discharge  -ND      Transfer Training OT LTG, Activity Type all transfers  -ND      Transfer Training OT LTG, [place] Level conditional independence  -ND      Transfer Training OT LTG, Assist Device walker, rolling  -ND      Strength OT LTG    Strength Goal OT LTG, Date Established 07/11/17  -ND      Strength Goal OT LTG, Time to Achieve by discharge  -ND      Strength Goal OT LTG, Measure to Achieve Pt. will complete BUE strengthening exercises to increase BUE strength to 5/5 for ADLs.   -ND      ADL OT LTG    ADL OT LTG, Date Established 07/11/17  -ND      ADL OT LTG, Time to Achieve by discharge  -ND      ADL OT LTG, Activity Type ADL skills  -ND      ADL OT LTG, [place] Level contact guard  -ND        User Key  (r) = Recorded By, (t) = Taken By, (c) = Cosigned By    Initials Name Provider Type    HUBERT Sloan, OTR/L Occupational Therapist                Outcome Measures       07/11/17 1400 07/11/17 1043       How much help from another person do you currently need...    Turning from your back to your side while in flat bed without using bedrails?  4  -MS     Moving from lying on back to sitting on the side of a flat " bed without bedrails?  4  -MS     Moving to and from a bed to a chair (including a wheelchair)?  3  -MS     Standing up from a chair using your arms (e.g., wheelchair, bedside chair)?  3  -MS     Climbing 3-5 steps with a railing?  2  -MS     To walk in hospital room?  3  -MS     AM-PAC 6 Clicks Score  19  -MS     How much help from another is currently needed...    Putting on and taking off regular lower body clothing? 3  -ND      Bathing (including washing, rinsing, and drying) 3  -ND      Toileting (which includes using toilet bed pan or urinal) 3  -ND      Putting on and taking off regular upper body clothing 4  -ND      Taking care of personal grooming (such as brushing teeth) 4  -ND      Eating meals 4  -ND      Score 21  -ND      Functional Assessment    Outcome Measure Options AM-PAC 6 Clicks Daily Activity (OT)  -ND AM-PAC 6 Clicks Basic Mobility (PT)  -MS       User Key  (r) = Recorded By, (t) = Taken By, (c) = Cosigned By    Initials Name Provider Type    MS Elizabeth Foster, PT DPT Physical Therapist    ND Melanie Sloan OTR/L Occupational Therapist          Time Calculation:   OT Start Time: 1423 (12 min chart review not included)  OT Stop Time: 1450  OT Time Calculation (min): 27 min    Therapy Charges for Today     Code Description Service Date Service Provider Modifiers Qty    82765758815 HC OT SELFCARE CURRENT 7/11/2017 Melanie Sloan OTR/L GO, CJ 1    [id number] HC OT SELFCARE PROJECTED 7/11/2017 Melanie Sloan OTR/L GO, CI 1    [id number]  OT EVAL LOW COMPLEXITY 3 7/11/2017 Melanie Sloan OTR/L GO, KX 1          OT G-codes  OT Functional Scales Options: AM-PAC 6 Clicks Daily Activity (OT)  Functional Limitation: Self care  Self Care Current Status (): At least 20 percent but less than 40 percent impaired, limited or restricted  Self Care Goal Status (): At least 1 percent but less than 20 percent impaired, limited or restricted    DAVID Porras/FROILAN  7/11/2017

## 2017-07-11 NOTE — PLAN OF CARE
Problem: Patient Care Overview (Adult)  Goal: Plan of Care Review  Outcome: Ongoing (interventions implemented as appropriate)    07/11/17 9261   Coping/Psychosocial Response Interventions   Plan Of Care Reviewed With patient   Patient Care Overview   Progress progress toward functional goals is gradual   Outcome Evaluation   Outcome Summary/Follow up Plan Pt. c/o weakness, dizziness when up, and pain in legs/R shoulder. Pt. participated with therapy and performed seated LE exercises. Pt. did these exercises actively, but had to take rest breaks. Pt. stood and walked 30' x 2 with rwx, CGA x x1. Will continue to work with pt. on strengthening and mobility while she is here.

## 2017-07-11 NOTE — PROGRESS NOTES
[facility] Medicine Services  INPATIENT PROGRESS NOTE    Length of Stay: 0  Date of Admission: 7/8/2017  Primary Care Physician: David Mcallister MD    Subjective   Chief Complaint: syncope, hyperthyroid, hypertension    HPI   Pt has been walking with walker with physical therapy. Patient still complaining weakness.    Review of Systems   Constitutional: Positive for activity change, appetite change and fatigue. Negative for chills and fever.   HENT: Negative for hearing loss, nosebleeds, tinnitus and trouble swallowing.   Eyes: Negative for visual disturbance.   Respiratory: Negative for cough, chest tightness, shortness of breath and wheezing.   Cardiovascular: Negative for chest pain, palpitations and leg swelling.   Gastrointestinal: Negative for abdominal distention, abdominal pain, blood in stool, constipation, diarrhea, nausea and vomiting.   Endocrine: Negative for cold intolerance, heat intolerance, polydipsia, polyphagia and polyuria.   Genitourinary: Negative for decreased urine volume, difficulty urinating, dysuria, flank pain, frequency and hematuria.   Musculoskeletal: Negative for arthralgias, joint swelling and myalgias.   Skin: Negative for rash.   Allergic/Immunologic: Negative for immunocompromised state.   Neurological: Positive for weakness. Negative for dizziness, syncope, light-headedness and headaches.   Hematological: Negative for adenopathy. Does not bruise/bleed easily.   Psychiatric/Behavioral: Negative for confusion and sleep disturbance. The patient is not nervous/anxious.          All pertinent negatives and positives are as above. All other systems have been reviewed and are negative unless otherwise stated.     Objective    Temp:  [98.6 °F (37 °C)-98.9 °F (37.2 °C)] 98.9 °F (37.2 °C)  Heart Rate:  [97] 97  Resp:  [16] 16  BP: (112-137)/(64-74) 137/74    Intake/Output Summary (Last 24 hours) at 07/11/17 5614  Last data filed at 07/11/17 3503   Gross  per 24 hour   Intake              750 ml   Output              800 ml   Net              -50 ml     Physical Exam  Constitutional: She is oriented to person, place, and time. She appears well-developed and well-nourished.   HENT:   Head: Normocephalic and atraumatic.   Eyes: Conjunctivae and EOM are normal. Pupils are equal, round, and reactive to light.   Neck: Neck supple. No JVD present. No thyromegaly present.   Cardiovascular: Normal rate, regular rhythm, normal heart sounds and intact distal pulses. Exam reveals no gallop and no friction rub.   No murmur heard.  Pulmonary/Chest: Effort normal and breath sounds normal. No respiratory distress. She has no wheezes. She has no rales. She exhibits no tenderness.   Abdominal: Soft. Bowel sounds are normal. She exhibits no distension. There is no tenderness. There is no rebound and no guarding.   Musculoskeletal: Normal range of motion. She exhibits no edema, tenderness or deformity.   Lymphadenopathy:   She has no cervical adenopathy.   Neurological: She is alert and oriented to person, place, and time. She displays normal reflexes. No cranial nerve deficit. She exhibits normal muscle tone. Coordination abnormal.   Skin: Skin is warm and dry. No rash noted.   Psychiatric: She has a normal mood and affect. Her behavior is normal. Judgment and thought content normal.      Results Review:  Lab Results (last 24 hours)     Procedure Component Value Units Date/Time    POC Glucose Fingerstick [[id number]]  (Abnormal) Collected:  07/10/17 1642    Specimen:  Blood Updated:  07/10/17 1655     Glucose 158 (H) mg/dL       : [id number] Weather MandyMeter ID: [id number]       POC Glucose Fingerstick [[id number]]  (Abnormal) Collected:  07/10/17 2142    Specimen:  Blood Updated:  07/10/17 2153     Glucose 133 (H) mg/dL       : [id number] T[place] AshleyMeter ID: [id number]       CBC & Differential [[id number]] Collected:  07/11/17 0431    Specimen:  Blood Updated:  07/11/17  0500    Narrative:       The following orders were created for panel order CBC & Differential.  Procedure                               Abnormality         Status                     ---------                               -----------         ------                     CBC Auto Differential[[id number]]        Abnormal            Final result                 Please view results for these tests on the individual orders.    CBC Auto Differential [[id number]]  (Abnormal) Collected:  07/11/17 0431    Specimen:  Blood Updated:  07/11/17 0500     WBC 3.77 (L) 10*3/mm3      RBC 4.45 10*6/mm3      Hemoglobin 11.4 (L) g/dL      Hematocrit 34.9 (L) %      MCV 78.4 (L) fL      MCH 25.6 (L) pg      MCHC 32.7 (L) g/dL      RDW 12.4 %      RDW-SD 35.4 (L) fl      MPV 10.2 fL      Platelets 320 10*3/mm3      Neutrophil % 42.9 %      Lymphocyte % 46.7 (H) %      Monocyte % 9.0 %      Eosinophil % 1.1 %      Basophil % 0.0 %      Immature Grans % 0.3 %      Neutrophils, Absolute 1.62 (L) 10*3/mm3      Lymphocytes, Absolute 1.76 10*3/mm3      Monocytes, Absolute 0.34 10*3/mm3      Eosinophils, Absolute 0.04 10*3/mm3      Basophils, Absolute 0.00 10*3/mm3      Immature Grans, Absolute 0.01 10*3/mm3     Comprehensive Metabolic Panel [[id number]]  (Abnormal) Collected:  07/11/17 0431    Specimen:  Blood Updated:  07/11/17 0511     Glucose 182 (H) mg/dL      BUN 24 (H) mg/dL      Creatinine 0.83 mg/dL      Sodium 138 mmol/L      Potassium 3.8 mmol/L      Chloride 106 mmol/L      CO2 23.0 (L) mmol/L      Calcium 9.5 mg/dL      Total Protein 6.3 g/dL      Albumin 3.40 (L) g/dL      ALT (SGPT) 53 U/L      AST (SGOT) 26 U/L      Alkaline Phosphatase 79 U/L      Total Bilirubin 0.6 mg/dL      eGFR  African Amer 91 mL/min/1.73      Globulin 2.9 gm/dL      A/G Ratio 1.2 g/dL      BUN/Creatinine Ratio 28.9 (H)     Anion Gap 9.0 mmol/L     Cortisol [[id number]] Collected:  07/08/17 1754    Specimen:  Blood Updated:  07/11/17 0731     Cortisol 3.1  ug/dL                               Cortisol AM         6.2 - 19.4                          Cortisol PM         2.3 - 11.9       Narrative:       Performed at:  [address] [place] [address]  [id number]  : Raúl Matthews PhD, Phone:  [phone]    POC Glucose Fingerstick [[id number]]  (Abnormal) Collected:  07/11/17 0751    Specimen:  Blood Updated:  07/11/17 0819     Glucose 200 (H) mg/dL       : [id number] Weather MandyMeter ID: [id number]       Anti-Thyroglobulin Antibody [[id number]] Collected:  07/08/17 1754    Specimen:  Blood Updated:  07/11/17 1115     Thyroglobulin Ab <1.0 IU/mL       Thyroglobulin Antibody measured by [place] [employer]       Narrative:       Performed at:  [address] [place] [address]  [id number]  : Raúl Matthews PhD, Phone:  [phone]    Thyroid Peroxidase Antibody [[id number]] Collected:  07/08/17 1754    Specimen:  Blood Updated:  07/11/17 1115     Thyroid Peroxidase Antibody 10 IU/mL     Narrative:       Performed at:  [address] [place] [address]  [id number]  : Raúl Matthews PhD, Phone:  [phone]    POC Glucose Fingerstick [[id number]]  (Abnormal) Collected:  07/11/17 1139    Specimen:  Blood Updated:  07/11/17 1204     Glucose 170 (H) mg/dL       : [id number] Weather MandyMeter ID: [id number]              Cultures:       Radiology Data:    Imaging Results (last 24 hours)     ** No results found for the last 24 hours. **          No Known Allergies    Scheduled meds:     amLODIPine 5 mg Oral Daily   atorvastatin 10 mg Oral Daily   benazepril 20 mg Oral Q12H   CloNIDine 0.2 mg Oral Q12H   DULoxetine 60 mg Oral Daily   famotidine 20 mg Oral Q12H   furosemide 20 mg Oral Daily   gabapentin 300 mg Oral 4x Daily   hydrALAZINE 25 mg Oral Q12H   insulin detemir 20 Units Subcutaneous Nightly   insulin lispro 10 Units Subcutaneous TID AC   metFORMIN 1,000 mg Oral BID With  Meals   potassium chloride 10 mEq Oral Daily   propranolol  mg Oral Daily   tiZANidine 4 mg Oral Q12H   topiramate 25 mg Oral Q12H   traZODone 100 mg Oral Nightly       PRN meds:  •  acetaminophen  •  bisacodyl  •  dextrose  •  dextrose  •  glucagon (human recombinant)  •  ondansetron  •  sodium chloride  •  Insert peripheral IV **AND** sodium chloride    Assessment/Plan     Active Problems:    Syncope    Thyroid adenoma, toxic    Polysubstance abuse    Hypertension    Diabetes      Plan:  Thyrotoxicosis/hypothyroidism- consult endocrinology. Nuclear medicine uptake and scan pending. US- enlarge thyroid and thyroid cysts.      Polysubstance abuse- discussed patient. Patient admitted to using cocaine and marijuana.      Hypertension-Stable.  On Norvasc, Lotensin, clonidine, hydralazine, Inderal.     Diabetes- on Glucophage     Deconditioning-PT and OT consult     Discharge Plannin-3 days.  Consult  for drug counseling.    Devyn Moreno MD   17   4:35 PM

## 2017-07-11 NOTE — PLAN OF CARE
Problem: Patient Care Overview (Adult)  Goal: Plan of Care Review  Outcome: Ongoing (interventions implemented as appropriate)    07/11/17 0336   Coping/Psychosocial Response Interventions   Plan Of Care Reviewed With patient   Patient Care Overview   Progress improving   Outcome Evaluation   Outcome Summary/Follow up Plan no syncope noted during shift, pt c/o severe headache pain relieved with PRN tylenol, VSS, will continue to monitor         Problem: Fall Risk (Adult)  Goal: Absence of Falls  Outcome: Ongoing (interventions implemented as appropriate)    Problem: Pain, Acute (Adult)  Goal: Acceptable Pain Control/Comfort Level  Outcome: Ongoing (interventions implemented as appropriate)

## 2017-07-11 NOTE — THERAPY EVALUATION
"Acute Care - Physical Therapy Initial Evaluation  [facility]     Patient Name: Lynn Magana  : 1975  MRN: [id number]  Today's Date: 2017   Onset of Illness/Injury or Date of Surgery Date: 17  Date of Referral to PT: 07/10/17  Referring Physician: Dr. Moreno      Admit Date: 2017     Visit Dx:    ICD-10-CM ICD-9-CM   1. Syncope, unspecified syncope type R55 780.2   2. Hyperthyroidism E05.90 242.90   3. Nonintractable headache, unspecified chronicity pattern, unspecified headache type R51 784.0   4. Impaired mobility Z74.09 799.89     Patient Active Problem List   Diagnosis   • Syncope   • Thyroid adenoma, toxic   • Polysubstance abuse   • Hypertension   • Diabetes     Past Medical History:   Diagnosis Date   • Arthritis    • Depression    • Diabetes mellitus    • GERD (gastroesophageal reflux disease)    • Hyperlipidemia    • Hypertension    • Injury of back      Past Surgical History:   Procedure Laterality Date   •  SECTION     • CHOLECYSTECTOMY     • COLONOSCOPY     • CYST REMOVAL     • HYSTERECTOMY            PT ASSESSMENT (last 72 hours)      PT Evaluation       17 1101 17 1043    Rehab Evaluation    Document Type --  -ND evaluation  -MS    Subjective Information  agree to therapy;complains of;dizziness   \"legs are wobbly\"  -MS    Evaluation Not Performed other (see comments)  -ND     Evaluation Not Performed, Comment Pt. down for testing at this time  -ND --  -MS    Symptoms Noted Comment  pt reports for the past 3 weeks her daughter has been helping her walk and befor that she was holding onto the walls  -MS    General Information    Patient Profile Review yes  -ND yes  -MS    Onset of Illness/Injury or Date of Surgery Date 17  -ND 17  -MS    Referring Physician Dr. Moreno  -ND Dr. Moreno  -MS    General Observations  pt laying in bed, awake and alert in no apparent distress  -MS    Pertinent History Of Current Problem Pt. admitted d/t increased " headaches radiating down R arm, SOB, shakiness, syncope. Positive drug screen for Cocaine and THC. Dx: Hyperthyroidism, thyrotoxicosis. H&H 11.4/34.9.   -ND admit for mulitple syncopal episodes, dx: thyrotoxcosis, positive for cocaine and THC  -MS    Precautions/Limitations  fall precautions  -MS    Prior Level of Function --  -ND min assist:;gait;mod assist:;dressing;bathing  -MS    Plans/Goals Discussed With  patient;agreed upon  -MS    Risks Reviewed  patient:;LOB;nausea/vomiting;dizziness;increased discomfort  -MS    Benefits Reviewed  patient:;improve function;increase independence;increase strength;increase balance;decrease pain;increase knowledge  -MS    Barriers to Rehab  medically complex;previous functional deficit;physical barrier  -MS    Living Environment    Home Accessibility  stairs to enter home  -MS    Number of Stairs to Enter Home  11  -MS    Stair Railings at Home  none  -MS    Living Environment Comment  1 daughter 10 yo,  worksout of [facility]  -MS    Clinical Impression    Date of Referral to PT  07/10/17  -MS    Patient/Family Goals Statement  return home and to walk without assistance  -MS    Criteria for Skilled Therapeutic Interventions Met  yes;treatment indicated  -MS    Pathology/Pathophysiology Noted (Describe Specifically for Each System)  musculoskeletal;endocrine/metabolic   possibly related to her thyroid issue  -MS    Impairments Found (describe specific impairments)  gait, locomotion, and balance;muscle performance  -MS    Rehab Potential  good, to achieve stated therapy goals  -MS    Predicted Duration of Therapy Intervention (days/wks)  until discharge  -MS    Pain Assessment    Pain Assessment  No/denies pain  -MS    Vision Assessment/Intervention    Visual Impairment Comment  pt reports that her vision is blurry at times but not right now  -MS    Cognitive Assessment/Intervention    Current Cognitive/Communication Assessment  functional  -MS    Orientation Status  oriented x  4  -MS    Follows Commands/Answers Questions  100% of the time  -MS    Personal Safety  WNL/WFL  -MS    Personal Safety Interventions  fall prevention program maintained;gait belt;muscle strengthening facilitated;nonskid shoes/slippers when out of bed;supervised activity  -MS    ROM (Range of Motion)    General ROM  no range of motion deficits identified  -MS    MMT (Manual Muscle Testing)    General MMT Assessment Detail  UEs grossly 5/5, LEs grossly 4/5  -MS    Bed Mobility, Assessment/Treatment    Bed Mobility, Roll Right, [place]  independent  -MS    Bed Mobility, Scoot/Bridge, [place]  independent  -MS    Bed Mob, Supine to Sit, [place]  independent  -MS    Transfer Assessment/Treatment    Transfers, Bed-Chair [place]  contact guard assist;hand held assist  -MS    Transfers, Sit-Stand [place]  contact guard assist  -MS    Transfers, Stand-Sit [place]  contact guard assist  -MS    Motor Skills/Interventions    Additional Documentation  Balance Skills Training (Group)  -MS    Balance Skills Training    Sitting-Level of Assistance  Independent  -MS    Standing-Level of Assistance  Contact guard  -MS    Static Standing Balance Support  Right upper extremity supported;Left upper extremity supported  -MS    Positioning and Restraints    Post Treatment Position  chair  -MS    In Chair  sitting;with other staff   in wheelchair going for testing  -MS      07/10/17 1613 07/08/17 2202    General Information    Equipment Currently Used at Home none  -     Living Environment    Lives With child(neymar), dependent   Sister is able to assist, as well as other family members  - child(neymar), dependent  -    Living Arrangements  apartment  -    Home Accessibility no concerns  - no concerns  -    Stair Railings at Home  none  -GG    Type of Financial/Environmental Concern  none  -GG    Transportation Available family or friend will provide;car  - car  -GG      07/08/17 2200       General  "Information    Equipment Currently Used at Home none  -GG       User Key  (r) = Recorded By, (t) = Taken By, (c) = Cosigned By    Initials Name Provider Type    MS Elizabeth Foster, PT DPT Physical Therapist    NOEMY Montenegro, RN Registered Nurse    EDUARDO Montalvo, BSW     HUBERT Sloan, OTR/L Occupational Therapist          Physical Therapy Education     Title: PT OT SLP Therapies (Active)     Topic: Physical Therapy (Active)     Point: Mobility training (Done)    Learning Progress Summary    Learner Readiness Method Response Comment Documented by Status   Patient Acceptance E VU role of PT to assist improve her strength and safety MS 07/11/17 1138 Done                      User Key     Initials Effective Dates Name Provider Type Discipline    MS 08/02/16 -  Elizabeth Foster, PT DPT Physical Therapist PT                PT Recommendation and Plan  Anticipated Equipment Needs At Discharge: front wheeled walker  Anticipated Discharge Disposition: home with assist  Planned Therapy Interventions: balance training, gait training, patient/family education, strengthening, stair training, transfer training  PT Frequency: daily, 2 times/day, per priority policy  Plan of Care Review  Plan Of Care Reviewed With: patient  Progress: progress toward functional goals as expected  Outcome Summary/Follow up Plan: PT evaluation completed. The patient complains of her legs feeling \"wobbly\" however when standing and transfering to a wheelchair she did not demonstrate signs of this. The patient was unsteady when standing so PT will continue to work with the pt to improve her balance and her ability to walk farther with less assistance from anyone other than the walker.           IP PT Goals       07/11/17 1139          Transfer Training PT LTG    Transfer Training PT LTG, Date Established 07/11/17  -MS      Transfer Training PT LTG, Time to Achieve by discharge  -MS      Transfer Training PT LTG, Activity Type " bed to chair /chair to bed;sit to stand/stand to sit  -MS      Transfer Training PT LTG, [place] Level independent  -MS      Gait Training PT LTG    Gait Training Goal PT LTG, Date Established 07/11/17  -MS      Gait Training Goal PT LTG, Time to Achieve by discharge  -MS      Gait Training Goal PT LTG, [place] Level supervision required  -MS      Gait Training Goal PT LTG, Assist Device walker, rolling  -MS      Gait Training Goal PT LTG, Distance to Achieve 200 ft  -MS      Stair Training PT LTG    Stair Training Goal PT LTG, Date Established 07/11/17  -MS      Stair Training Goal PT LTG, Time to Achieve by discharge  -MS      Stair Training Goal PT LTG, Number of Steps 11  -MS      Stair Training Goal PT LTG, [place] Level contact guard assist  -MS      Dynamic Standing Balance PT LTG    Dynamic Standing Balance PT LTG, Date Established 07/11/17  -MS      Dynamic Standing Balance PT LTG, Time to Achieve by discharge  -MS      Dynamic Standing Balance PT LTG, [place] Level supervision required  -MS      Dynamic Standing Balance PT LTG, Additional Goal 1 UE support while reaching in all directions  -MS        User Key  (r) = Recorded By, (t) = Taken By, (c) = Cosigned By    Initials Name Provider Type    MS Elizabeth Foster, PT DPT Physical Therapist                Outcome Measures       07/11/17 1043          How much help from another person do you currently need...    Turning from your back to your side while in flat bed without using bedrails? 4  -MS      Moving from lying on back to sitting on the side of a flat bed without bedrails? 4  -MS      Moving to and from a bed to a chair (including a wheelchair)? 3  -MS      Standing up from a chair using your arms (e.g., wheelchair, bedside chair)? 3  -MS      Climbing 3-5 steps with a railing? 2  -MS      To walk in hospital room? 3  -MS      AM-PAC 6 Clicks Score 19  -MS      Functional Assessment    Outcome Measure Options AM-PAC 6 Clicks Basic  Mobility (PT)  -MS        User Key  (r) = Recorded By, (t) = Taken By, (c) = Cosigned By    Initials Name Provider Type    MS Elizabeth Foster, PT DPT Physical Therapist           Time Calculation:         PT Charges       07/11/17 1142          Time Calculation    Start Time 1038  -MS      Stop Time 1105  -MS      Time Calculation (min) 27 min  -MS      PT Received On 07/11/17  -MS      PT Goal Re-Cert Due Date 07/21/17  -MS        User Key  (r) = Recorded By, (t) = Taken By, (c) = Cosigned By    Initials Name Provider Type    MS Elizabeth Foster, PT DPT Physical Therapist          Therapy Charges for Today     Code Description Service Date Service Provider Modifiers Qty    13984279245 HC PT MOBILITY CURRENT 7/11/2017 Elizabeth Foster, PT DPT GP, CJ 1    00841807411 HC PT MOBILITY PROJECTED 7/11/2017 Elizabeth Foster, PT DPT GP, CI 1    55910857678 HC PT EVAL LOW COMPLEXITY 2 7/11/2017 Elizabeth Foster, PT DPT GP 1          PT G-Codes  Outcome Measure Options: AM-PAC 6 Clicks Basic Mobility (PT)  Score: 19  Functional Limitation: Mobility: Walking and moving around  Mobility: Walking and Moving Around Current Status (): At least 20 percent but less than 40 percent impaired, limited or restricted  Mobility: Walking and Moving Around Goal Status (): At least 1 percent but less than 20 percent impaired, limited or restricted      Elizabeth Foster PT DPT  7/11/2017

## 2017-07-12 ENCOUNTER — APPOINTMENT (OUTPATIENT)
Dept: NUCLEAR MEDICINE | Facility: HOSPITAL | Age: 42
End: 2017-07-12

## 2017-07-12 VITALS
SYSTOLIC BLOOD PRESSURE: 96 MMHG | TEMPERATURE: 97.8 F | WEIGHT: 198.5 LBS | RESPIRATION RATE: 14 BRPM | HEART RATE: 85 BPM | DIASTOLIC BLOOD PRESSURE: 56 MMHG | HEIGHT: 72 IN | OXYGEN SATURATION: 100 % | BODY MASS INDEX: 26.89 KG/M2

## 2017-07-12 LAB
ALBUMIN SERPL-MCNC: 3.8 G/DL (ref 3.5–5)
ALBUMIN/GLOB SERPL: 1.4 G/DL (ref 1.1–2.5)
ALP SERPL-CCNC: 83 U/L (ref 24–120)
ALT SERPL W P-5'-P-CCNC: 50 U/L (ref 0–54)
ANION GAP SERPL CALCULATED.3IONS-SCNC: 11 MMOL/L (ref 4–13)
AST SERPL-CCNC: 31 U/L (ref 7–45)
BASOPHILS # BLD AUTO: 0 10*3/MM3 (ref 0–0.2)
BASOPHILS NFR BLD AUTO: 0 % (ref 0–2)
BILIRUB SERPL-MCNC: 0.8 MG/DL (ref 0.1–1)
BUN BLD-MCNC: 24 MG/DL (ref 5–21)
BUN/CREAT SERPL: 27 (ref 7–25)
CALCIUM SPEC-SCNC: 10.1 MG/DL (ref 8.4–10.4)
CHLORIDE SERPL-SCNC: 107 MMOL/L (ref 98–110)
CO2 SERPL-SCNC: 22 MMOL/L (ref 24–31)
CREAT BLD-MCNC: 0.89 MG/DL (ref 0.5–1.4)
DEPRECATED RDW RBC AUTO: 35.4 FL (ref 40–54)
EOSINOPHIL # BLD AUTO: 0.03 10*3/MM3 (ref 0–0.7)
EOSINOPHIL NFR BLD AUTO: 0.7 % (ref 0–4)
ERYTHROCYTE [DISTWIDTH] IN BLOOD BY AUTOMATED COUNT: 12.4 % (ref 12–15)
GFR SERPL CREATININE-BSD FRML MDRD: 84 ML/MIN/1.73
GLOBULIN UR ELPH-MCNC: 2.8 GM/DL
GLUCOSE BLD-MCNC: 174 MG/DL (ref 70–100)
GLUCOSE BLDC GLUCOMTR-MCNC: 165 MG/DL (ref 70–130)
GLUCOSE BLDC GLUCOMTR-MCNC: 213 MG/DL (ref 70–130)
GLUCOSE BLDC GLUCOMTR-MCNC: 96 MG/DL (ref 70–130)
HCT VFR BLD AUTO: 33.7 % (ref 37–47)
HGB BLD-MCNC: 11 G/DL (ref 12–16)
IMM GRANULOCYTES # BLD: 0 10*3/MM3 (ref 0–0.03)
IMM GRANULOCYTES NFR BLD: 0 % (ref 0–5)
LYMPHOCYTES # BLD AUTO: 1.99 10*3/MM3 (ref 0.72–4.86)
LYMPHOCYTES NFR BLD AUTO: 47.5 % (ref 15–45)
MCH RBC QN AUTO: 25.4 PG (ref 28–32)
MCHC RBC AUTO-ENTMCNC: 32.6 G/DL (ref 33–36)
MCV RBC AUTO: 77.8 FL (ref 82–98)
MONOCYTES # BLD AUTO: 0.3 10*3/MM3 (ref 0.19–1.3)
MONOCYTES NFR BLD AUTO: 7.2 % (ref 4–12)
NEUTROPHILS # BLD AUTO: 1.87 10*3/MM3 (ref 1.87–8.4)
NEUTROPHILS NFR BLD AUTO: 44.6 % (ref 39–78)
PLATELET # BLD AUTO: 352 10*3/MM3 (ref 130–400)
PMV BLD AUTO: 10.6 FL (ref 6–12)
POTASSIUM BLD-SCNC: 3.8 MMOL/L (ref 3.5–5.3)
PROT SERPL-MCNC: 6.6 G/DL (ref 6.3–8.7)
RBC # BLD AUTO: 4.33 10*6/MM3 (ref 4.2–5.4)
SODIUM BLD-SCNC: 140 MMOL/L (ref 135–145)
WBC NRBC COR # BLD: 4.19 10*3/MM3 (ref 4.8–10.8)

## 2017-07-12 PROCEDURE — 85025 COMPLETE CBC W/AUTO DIFF WBC: CPT | Performed by: FAMILY MEDICINE

## 2017-07-12 PROCEDURE — 78014 THYROID IMAGING W/BLOOD FLOW: CPT

## 2017-07-12 PROCEDURE — G0378 HOSPITAL OBSERVATION PER HR: HCPCS

## 2017-07-12 PROCEDURE — 97110 THERAPEUTIC EXERCISES: CPT

## 2017-07-12 PROCEDURE — 82962 GLUCOSE BLOOD TEST: CPT

## 2017-07-12 PROCEDURE — 80053 COMPREHEN METABOLIC PANEL: CPT | Performed by: FAMILY MEDICINE

## 2017-07-12 RX ORDER — PROPRANOLOL HYDROCHLORIDE 120 MG/1
120 CAPSULE, EXTENDED RELEASE ORAL DAILY
Qty: 30 CAPSULE | Refills: 2 | Status: SHIPPED | OUTPATIENT
Start: 2017-07-12 | End: 2020-05-05

## 2017-07-12 RX ORDER — METHIMAZOLE 10 MG/1
20 TABLET ORAL DAILY
Qty: 30 TABLET | Refills: 0 | Status: SHIPPED | OUTPATIENT
Start: 2017-07-12 | End: 2018-04-25 | Stop reason: SDUPTHER

## 2017-07-12 RX ORDER — METHIMAZOLE 10 MG/1
20 TABLET ORAL DAILY
Status: DISCONTINUED | OUTPATIENT
Start: 2017-07-12 | End: 2017-07-12 | Stop reason: HOSPADM

## 2017-07-12 RX ORDER — AMLODIPINE BESYLATE 10 MG/1
5 TABLET ORAL DAILY
Start: 2017-07-12

## 2017-07-12 RX ORDER — PROPRANOLOL HYDROCHLORIDE 120 MG/1
120 CAPSULE, EXTENDED RELEASE ORAL DAILY
Qty: 30 CAPSULE | Refills: 2 | Status: SHIPPED | OUTPATIENT
Start: 2017-07-12 | End: 2017-07-12

## 2017-07-12 RX ADMIN — GABAPENTIN 300 MG: 300 CAPSULE ORAL at 09:04

## 2017-07-12 RX ADMIN — BENAZEPRIL HYDROCHLORIDE 20 MG: 20 TABLET, COATED ORAL at 09:03

## 2017-07-12 RX ADMIN — FUROSEMIDE 20 MG: 20 TABLET ORAL at 09:05

## 2017-07-12 RX ADMIN — ATORVASTATIN CALCIUM 10 MG: 10 TABLET, FILM COATED ORAL at 09:05

## 2017-07-12 RX ADMIN — CLONIDINE HYDROCHLORIDE 0.2 MG: 0.2 TABLET ORAL at 09:04

## 2017-07-12 RX ADMIN — PROPRANOLOL HYDROCHLORIDE 120 MG: 60 CAPSULE, EXTENDED RELEASE ORAL at 09:04

## 2017-07-12 RX ADMIN — FAMOTIDINE 20 MG: 20 TABLET, FILM COATED ORAL at 09:03

## 2017-07-12 RX ADMIN — METFORMIN HYDROCHLORIDE 1000 MG: 500 TABLET ORAL at 09:03

## 2017-07-12 RX ADMIN — GABAPENTIN 300 MG: 300 CAPSULE ORAL at 17:23

## 2017-07-12 RX ADMIN — POTASSIUM CHLORIDE 10 MEQ: 750 CAPSULE, EXTENDED RELEASE ORAL at 09:05

## 2017-07-12 RX ADMIN — AMLODIPINE BESYLATE 5 MG: 5 TABLET ORAL at 09:04

## 2017-07-12 RX ADMIN — INSULIN LISPRO 10 UNITS: 100 INJECTION, SOLUTION INTRAVENOUS; SUBCUTANEOUS at 09:03

## 2017-07-12 RX ADMIN — METFORMIN HYDROCHLORIDE 1000 MG: 500 TABLET ORAL at 17:23

## 2017-07-12 RX ADMIN — ACETAMINOPHEN 650 MG: 325 TABLET, FILM COATED ORAL at 13:13

## 2017-07-12 RX ADMIN — INSULIN LISPRO 10 UNITS: 100 INJECTION, SOLUTION INTRAVENOUS; SUBCUTANEOUS at 11:32

## 2017-07-12 RX ADMIN — TIZANIDINE 4 MG: 4 TABLET ORAL at 09:04

## 2017-07-12 RX ADMIN — GABAPENTIN 300 MG: 300 CAPSULE ORAL at 11:29

## 2017-07-12 RX ADMIN — HYDRALAZINE HYDROCHLORIDE 25 MG: 25 TABLET ORAL at 09:03

## 2017-07-12 RX ADMIN — METHIMAZOLE 20 MG: 10 TABLET ORAL at 15:36

## 2017-07-12 RX ADMIN — TOPIRAMATE 25 MG: 25 TABLET, FILM COATED ORAL at 09:04

## 2017-07-12 RX ADMIN — DULOXETINE HYDROCHLORIDE 60 MG: 30 CAPSULE, DELAYED RELEASE ORAL at 09:04

## 2017-07-12 RX ADMIN — ACETAMINOPHEN 650 MG: 325 TABLET, FILM COATED ORAL at 09:05

## 2017-07-12 NOTE — PLAN OF CARE
Problem: Patient Care Overview (Adult)  Goal: Plan of Care Review  Outcome: Ongoing (interventions implemented as appropriate)    07/12/17 1002   Coping/Psychosocial Response Interventions   Plan Of Care Reviewed With patient   Patient Care Overview   Progress progress toward functional goals as expected   Outcome Evaluation   Outcome Summary/Follow up Plan Pt is independent with bed mobility. Pt deferred ambulation due to extreme dizziness and weakness. Pt c/o R shoulder pain and BLE pain. Sat EOB and performed BLE exercises. Will continue to benefit from therapy to increase strength and improve mobility.

## 2017-07-12 NOTE — PROGRESS NOTES
Continued Stay Note   [place]     Patient Name: Lynn Magana  MRN: [id number]  Today's Date: 7/12/2017    Admit Date: 7/8/2017          Discharge Plan       07/12/17 1551    Case Management/Social Work Plan    Plan [facility]    Final Note    Final Note Patient is discharged today and Dr. Moreno has ordered home health. SW spoke to patient in room re this and provided options to her for home health. Patient has selected [facility]. ZAFAR made referral to Dorene with [facility] [phone].              Discharge Codes       07/12/17 1551    Discharge Codes    Discharge Codes 86  R- To home health [facility]        Expected Discharge Date and Time     Expected Discharge Date Expected Discharge Time    Jul 12, 2017             EMI Castro

## 2017-07-12 NOTE — DISCHARGE SUMMARY
[facility] Medicine Services  DISCHARGE SUMMARY       Date of Admission: 7/8/2017  Date of Discharge:  7/12/2017  Primary Care Physician: David Mcallister MD    Presenting Problem/History of Present Illness:  Syncope, unspecified syncope type [R55]     Final Discharge Diagnoses:  Hospital Problem List     Syncope    Thyroid adenoma, toxic    Polysubstance abuse    Hypertension    Diabetes          Consults: Endocrinology    Pertinent Test Results:   IMPRESSION: Thyroid uptake and scan  1. Enlarged thyroid gland.  2. Abnormal 24-hour uptake of 63.8%. This may be due to either Graves'  disease or thyroiditis.    IMPRESSION: Ultrasound gallbladder  Thyroid gland is upper limits of normal to mildly enlarged  in size.  2. There are 2 cyst associated with the left lobe of thyroid gland. One  is 8 x 10 mm. The other is 3 x 5 mm.    IMPRESSION: CT scan ahead  1. No acute intracranial process    IMPRESSION: Carotid duplex  1. Findings suggest 50-69% stenosis bilaterally per Society of  Radiologists in Ultrasound Consensus Criteria. See below. This could be  confirmed with CTA neck nonemergently.    2. Left vertebral artery is not visualized.    IMPRESSION: C-spine  1. Mild degenerative changes present. There is no acute fracture.    Interpretation Summary echocardiogram      · Normal size and function of all cardiac chambers. LVEF >65%.  · No significant valvular pathology           Chief Complaint on Day of Discharge: none    History of Present Illness on Day of Discharge:   Patient is a 41-year-old -American female past medical history of type II diabetes who presents to the emergency room with a history of several syncopal episodes in the recent past. Parent she had another one today and EMS was called. She also has complained of tremors problem sleeping approximately a 24 pound weight loss in the last month without trying to lose weight. She was evaluated in the ER her workup was  "unremarkable except for totally suppressed TSH of less than 0.20 and a free T4 that was 5.49. This is about more than doubled the upper limit of normal. She has also been evaluated for possible eye tumor recently with an MRI of her orbits which was negative. On my exam it looks like she has the beginnings of exophthalmos possibly. Otherwise her review of systems evaluation is negative she does state her blood sugars be more difficult to control recently as well.    Hospital Course:  The patient is a 41 y.o. female who presented to [facility] with syncope episode and frequent falling.  During course evaluation discomfort, patient is very hypotensive with tachycardia.  Thyroid function shows hyper thyroidism.  Nuclear medicine uptake and scan shows Graves' disease. Endocrinology was consulted on the case.  Patient was started on methimazole.  Blood pressure has been fairly well controlled with medication.  Diabetes has also been stable with current treatment.  Patient has a positive drug screen for cocaine and marijuana.  Patient did admit to using it.  Drug rehabilitation was offered, patient refused.  Patient still remained weak but ambulatory.  Vital sign is stable, afebrile.  Endocrinology is has cleared patient to go home.  Plan to discharge patient home.  Patient follow with his PCP in 1 week.  Patient also to get lab checked by his PCP of  thyroid function in 3 weeks.  This is been discussed patient.    Condition on Discharge:  Stable    Physical Exam on Discharge:  BP 96/56 (BP Location: Left arm, Patient Position: Lying)  Pulse 85  Temp 97.8 °F (36.6 °C) (Oral)   Resp 14  Ht 74\" (188 cm)  Wt 198 lb 8 oz (90 kg)  SpO2 100%  BMI 25.49 kg/m2  Physical Exam   Constitutional: She is oriented to person, place, and time. She appears well-developed and well-nourished.   HENT:   Head: Normocephalic and atraumatic.   Eyes: Conjunctivae and EOM are normal. Pupils are equal, round, and reactive to " light.   Neck: Neck supple. No JVD present. No thyromegaly present.   Cardiovascular: Normal rate, regular rhythm, normal heart sounds and intact distal pulses.  Exam reveals no gallop and no friction rub.    No murmur heard.  Pulmonary/Chest: Effort normal and breath sounds normal. No respiratory distress. She has no wheezes. She has no rales. She exhibits no tenderness.   Abdominal: Soft. Bowel sounds are normal. She exhibits no distension. There is no tenderness. There is no rebound and no guarding.   Musculoskeletal: Normal range of motion. She exhibits no edema, tenderness or deformity.   Generalized weakness   Lymphadenopathy:     She has no cervical adenopathy.   Neurological: She is alert and oriented to person, place, and time. She displays normal reflexes. No cranial nerve deficit. She exhibits normal muscle tone.   Skin: Skin is warm and dry. No rash noted.   Psychiatric: She has a normal mood and affect. Her behavior is normal. Judgment and thought content normal.         Discharge Disposition:  Home or Self Care    Discharge Medications:   Lynn Magana   Home Medication Instructions CUCO:[id number]    Printed on:07/12/17 2891   Medication Information                      amLODIPine (NORVASC) 10 MG tablet  Take 0.5 tablets by mouth Daily.             benazepril (LOTENSIN) 20 MG tablet  Take 20 mg by mouth 2 (Two) Times a Day.             DULoxetine (CYMBALTA) 60 MG capsule  Take 60 mg by mouth Daily.             gabapentin (NEURONTIN) 300 MG capsule  Take 300 mg by mouth 4 (Four) Times a Day.             insulin glargine (LANTUS) 100 UNIT/ML injection  Inject  under the skin Daily.             insulin lispro (humaLOG) 100 UNIT/ML injection  Inject  under the skin 3 (Three) Times a Day Before Meals.             metFORMIN (GLUCOPHAGE) 500 MG tablet  Take 2 tablets by mouth 2 (Two) Times a Day With Meals.             methIMAzole (TAPAZOLE) 10 MG tablet  Take 2 tablets by mouth Daily.              ondansetron (ZOFRAN) 4 MG tablet  Take 1 tablet by mouth Every 6 (Six) Hours.             propranolol LA (INDERAL LA) 120 MG 24 hr capsule  Take 1 capsule by mouth Daily.             raNITIdine (ZANTAC) 150 MG tablet  Take 300 mg by mouth Daily.             simvastatin (ZOCOR) 20 MG tablet  Take 20 mg by mouth Every Night.             tiZANidine (ZANAFLEX) 4 MG tablet  Take 4 mg by mouth 2 (Two) Times a Day.             topiramate (TOPAMAX) 25 MG tablet  Take 25 mg by mouth 2 (Two) Times a Day. Medication bottle:Take one tablet by mouth daily for 7 days then 1 tablet by mouth two times a day for 7 days then 1 tablet three times a day for 7 days then 2 tablets two times a day thereafter             traZODone (DESYREL) 100 MG tablet  Take 100 mg by mouth Every Night.                 Discharge Diet:   Diet Instructions     Advance Diet As Tolerated                     Activity at Discharge:   Activity Instructions     Activity as Tolerated                     Discharge Care Plan/Instructions:   Patient to be discharge with home health.    Follow-up Appointments:   Follow-up with PCP 1 week.  Patient follow-up with PCP in 3 weeks to recheck TSH and free T3.    Devyn Moreno MD  07/12/17  4:13 PM    Time:Greater than 30 minutes

## 2017-07-12 NOTE — THERAPY TREATMENT NOTE
Acute Care - Physical Therapy Treatment Note  [facility]     Patient Name: Lynn Magana  : 1975  MRN: [id number]  Today's Date: 2017  Onset of Illness/Injury or Date of Surgery Date: 17  Date of Referral to PT: 07/10/17  Referring Physician: Dr. Moreno    Admit Date: 2017    Visit Dx:    ICD-10-CM ICD-9-CM   1. Syncope, unspecified syncope type R55 780.2   2. Hyperthyroidism E05.90 242.90   3. Nonintractable headache, unspecified chronicity pattern, unspecified headache type R51 784.0   4. Impaired mobility Z74.09 799.89   5. Decreased activities of daily living (ADL) Z78.9 V49.89     Patient Active Problem List   Diagnosis   • Syncope   • Thyroid adenoma, toxic   • Polysubstance abuse   • Hypertension   • Diabetes               Adult Rehabilitation Note       17 0948 17 1551       Rehab Assessment/Intervention    Discipline physical therapy assistant  -CW physical therapy assistant  -     Document Type therapy note (daily note)  - therapy note (daily note)  -     Subjective Information agree to therapy;complains of;pain;fatigue;weakness;dizziness  - agree to therapy;complains of;weakness  -     Patient Effort, Rehab Treatment adequate  -CW      Symptoms Noted During/After Treatment dizziness;fatigue  -CW      Precautions/Limitations fall precautions  -CW fall precautions  -     Recorded by [CW] Susanna Hernandez PTA [MF] Veena Sr PTA     Pain Assessment    Pain Assessment 0-10  -CW 0-10  -MF     Pain Score 7  -CW 10  -MF     Pain Type Acute pain  -CW Acute pain  -     Pain Location Leg  -CW Leg  -MF     Pain Orientation Right;Left  -CW Right;Left  -MF     Pain Descriptors Aching  -CW Aching  -MF     Pain Frequency Constant/continuous  -CW Constant/continuous  -MF     Pain Intervention(s) Repositioned  -CW Medication (See MAR);Repositioned  -MF     Response to Interventions  tolerated  -MF     Multiple Pain Sites  Yes  -MF     Recorded by [CW]  Susanna Hernandez PTA [MF] Veena Sr PTA     Pain 2    Pain Score 2 10  -CW 10  -MF     Pain Type 2 Chronic pain  -CW Chronic pain  -MF     Pain Location 2 Shoulder  -CW Shoulder  -MF     Pain Orientation 2 Right  -CW Right  -MF     Pain Descriptors 2 Aching  -CW Aching  -MF     Pain Frequency 2 Constant/continuous  -CW Constant/continuous  -MF     Pain Intervention(s) 2 Repositioned  -CW Repositioned;Medication (See MAR)  -MF     Response to Interventions 2  tolerated  -MF     Recorded by [CW] Susanna Hernandez PTA [MF] Veena Sr PTA     Cognitive Assessment/Intervention    Personal Safety Interventions fall prevention program maintained;gait belt;nonskid shoes/slippers when out of bed  -CW      Recorded by [CW] Susanna Hernandez PTA      Bed Mobility, Assessment/Treatment    Bed Mob, Supine to Sit, [place] independent  -CW conditional independence  -MF     Bed Mob, Sit to Supine, [place] independent  -CW independent  -MF     Recorded by [CW] Susanna Hernandez PTA [MF] Veena Sr PTA     Transfer Assessment/Treatment    Transfers, Sit-Stand [place]  contact guard assist  -MF     Transfers, Stand-Sit [place]  verbal cues required;contact guard assist  -MF     Transfer, Comment Deferred due to dizziness  -CW      Recorded by [CW] Susanna Hernandez PTA [MF] Veena Sr PTA     Gait Assessment/Treatment    Gait, [place] Level  verbal cues required;contact guard assist  -MF     Gait, Assistive Device  rolling walker  -     Gait, Distance (Feet)  30   x 2 with one standing rest  -     Gait, Gait Deviations  sin decreased;narrow base;decreased heel strike  -     Gait, Safety Issues  step length decreased  -     Gait, Comment  Pt. states she feels dizzy and weak. Pt. took standing rest due to dizizness  -MF     Recorded by  [MF] Veena Sr PTA     Balance Skills Training    Sitting-Level of Assistance  Independent  -      Sitting-Balance Support  Feet supported  -MF     Recorded by  [MF] Veena Sr PTA     Therapy Exercises    Bilateral Lower Extremities AROM:;15 reps;sitting  -CW AROM:;15 reps;sitting;ankle pumps/circles;hip flexion;LAQ   with rest breaks  -MF     Recorded by [CW] Susanna Hernandez PTA [MF] Veena Sr PTA     Positioning and Restraints    Pre-Treatment Position in bed  -CW in bed  -MF     Post Treatment Position bed  -CW bed  -MF     In Bed fowlers;call light within reach;encouraged to call for assist  -CW supine;notified nsg;call light within reach;encouraged to call for assist;with family/caregiver;side rails up x2  -MF     Recorded by [CW] Susanna Hernandez PTA [MF] Veena Sr PTA       User Key  (r) = Recorded By, (t) = Taken By, (c) = Cosigned By    Initials Name Effective Dates     Susanna Hernandez PTA 06/22/15 -      Veena Sr PTA 08/02/16 -                 IP PT Goals       07/11/17 1139          Transfer Training PT LTG    Transfer Training PT LTG, Date Established 07/11/17  -MS      Transfer Training PT LTG, Time to Achieve by discharge  -MS      Transfer Training PT LTG, Activity Type bed to chair /chair to bed;sit to stand/stand to sit  -MS      Transfer Training PT LTG, [place] Level independent  -MS      Gait Training PT LTG    Gait Training Goal PT LTG, Date Established 07/11/17  -MS      Gait Training Goal PT LTG, Time to Achieve by discharge  -MS      Gait Training Goal PT LTG, [place] Level supervision required  -MS      Gait Training Goal PT LTG, Assist Device walker, rolling  -MS      Gait Training Goal PT LTG, Distance to Achieve 200 ft  -MS      Stair Training PT LTG    Stair Training Goal PT LTG, Date Established 07/11/17  -MS      Stair Training Goal PT LTG, Time to Achieve by discharge  -MS      Stair Training Goal PT LTG, Number of Steps 11  -MS      Stair Training Goal PT LTG, [place] Level contact guard assist  -MS       Dynamic Standing Balance PT LTG    Dynamic Standing Balance PT LTG, Date Established 07/11/17  -MS      Dynamic Standing Balance PT LTG, Time to Achieve by discharge  -MS      Dynamic Standing Balance PT LTG, [place] Level supervision required  -MS      Dynamic Standing Balance PT LTG, Additional Goal 1 UE support while reaching in all directions  -MS        User Key  (r) = Recorded By, (t) = Taken By, (c) = Cosigned By    Initials Name Provider Type    MS Elizabeth Foster, PT DPT Physical Therapist          Physical Therapy Education     Title: PT OT SLP Therapies (Active)     Topic: Physical Therapy (Active)     Point: Mobility training (Done)    Learning Progress Summary    Learner Readiness Method Response Comment Documented by Status   Patient Acceptance E VU role of PT to assist improve her strength and safety MS 07/11/17 1138 Done               Point: Home exercise program (Active)    Learning Progress Summary    Learner Readiness Method Response Comment Documented by Status   Patient Acceptance E NR Benefit of activity, exercise, plan of care  07/12/17 1002 Active                      User Key     Initials Effective Dates Name Provider Type Discipline    MS 08/02/16 -  Elizabeth Foster, PT DPT Physical Therapist PT     06/22/15 -  Susanna Hernandez, CARLOS MANUEL Physical Therapy Assistant PT                    PT Recommendation and Plan  Anticipated Equipment Needs At Discharge: front wheeled walker  Anticipated Discharge Disposition: home with assist  Planned Therapy Interventions: balance training, gait training, patient/family education, strengthening, stair training, transfer training  PT Frequency: daily, 2 times/day, per priority policy  Plan of Care Review  Plan Of Care Reviewed With: patient  Progress: progress toward functional goals as expected  Outcome Summary/Follow up Plan: Pt is independent with bed mobility.  Pt deferred ambulation due to extreme dizziness and weakness.  Pt c/o R shoulder pain  and BLE pain.  Sat EOB and performed BLE exercises.  Will continue to benefit from therapy to increase strength and improve mobility.          Outcome Measures       07/12/17 1000 07/11/17 1551 07/11/17 1400    How much help from another person do you currently need...    Turning from your back to your side while in flat bed without using bedrails? 4  -CW 4  -MF     Moving from lying on back to sitting on the side of a flat bed without bedrails? 4  -CW 4  -MF     Moving to and from a bed to a chair (including a wheelchair)? 3  -CW 3  -MF     Standing up from a chair using your arms (e.g., wheelchair, bedside chair)? 3  -CW 3  -MF     Climbing 3-5 steps with a railing? 3  -CW 2  -MF     To walk in hospital room? 3  -CW 3  -MF     AM-PAC 6 Clicks Score 20  -CW 19  -MF     How much help from another is currently needed...    Putting on and taking off regular lower body clothing?   3  -ND    Bathing (including washing, rinsing, and drying)   3  -ND    Toileting (which includes using toilet bed pan or urinal)   3  -ND    Putting on and taking off regular upper body clothing   4  -ND    Taking care of personal grooming (such as brushing teeth)   4  -ND    Eating meals   4  -ND    Score   21  -ND    Functional Assessment    Outcome Measure Options AM-PAC 6 Clicks Basic Mobility (PT)  -CW AM-PAC 6 Clicks Basic Mobility (PT)  -MF AM-PAC 6 Clicks Daily Activity (OT)  -ND      07/11/17 1043          How much help from another person do you currently need...    Turning from your back to your side while in flat bed without using bedrails? 4  -MS      Moving from lying on back to sitting on the side of a flat bed without bedrails? 4  -MS      Moving to and from a bed to a chair (including a wheelchair)? 3  -MS      Standing up from a chair using your arms (e.g., wheelchair, bedside chair)? 3  -MS      Climbing 3-5 steps with a railing? 2  -MS      To walk in hospital room? 3  -MS      AM-PAC 6 Clicks Score 19  -MS      Functional  Assessment    Outcome Measure Options AM-PAC 6 Clicks Basic Mobility (PT)  -MS        User Key  (r) = Recorded By, (t) = Taken By, (c) = Cosigned By    Initials Name Provider Type    MS Elizabeth CARROLL Cristian, PT DPT Physical Therapist    CW Susanna Hernandez PTA Physical Therapy Assistant    CANDY Sr PTA Physical Therapy Assistant    HUBERT Sloan, OTR/L Occupational Therapist           Time Calculation:         PT Charges       07/12/17 1005          Time Calculation    Start Time 0948  -CW      Stop Time 1006  -CW      Time Calculation (min) 18 min  -CW      PT Received On 07/12/17  -CW      PT Goal Re-Cert Due Date 07/21/17  -CW      Time Calculation- PT    Total Timed Code Minutes- PT 18 minute(s)  -CW        User Key  (r) = Recorded By, (t) = Taken By, (c) = Cosigned By    Initials Name Provider Type    CW Susanna Hernandez PTA Physical Therapy Assistant          Therapy Charges for Today     Code Description Service Date Service Provider Modifiers Qty    08991433764 HC PT THER PROC EA 15 MIN 7/12/2017 Susanna Hernandez PTA GP, KX 1          PT G-Codes  Outcome Measure Options: AM-PAC 6 Clicks Basic Mobility (PT)  Score: 19  Functional Limitation: Mobility: Walking and moving around  Mobility: Walking and Moving Around Current Status (): At least 20 percent but less than 40 percent impaired, limited or restricted  Mobility: Walking and Moving Around Goal Status (): At least 1 percent but less than 20 percent impaired, limited or restricted    Susanna Hernandez PTA  7/12/2017

## 2017-07-12 NOTE — PLAN OF CARE
Problem: Patient Care Overview (Adult)  Goal: Plan of Care Review  Outcome: Ongoing (interventions implemented as appropriate)    07/12/17 6168   Coping/Psychosocial Response Interventions   Plan Of Care Reviewed With patient   Patient Care Overview   Progress no change   Outcome Evaluation   Outcome Summary/Follow up Plan Up X1 with walker. Pt states her legs feel weak when ambulating. A&O X4. Safety maintained. Room air. C/O right shoulder pain, PRN pain medication given. Pt will be discharged today. No neuro changes. LEY.        Goal: Adult Individualization and Mutuality  Outcome: Ongoing (interventions implemented as appropriate)  Goal: Discharge Needs Assessment  Outcome: Ongoing (interventions implemented as appropriate)    Problem: Fall Risk (Adult)  Goal: Absence of Falls  Outcome: Ongoing (interventions implemented as appropriate)    Problem: Pain, Acute (Adult)  Goal: Acceptable Pain Control/Comfort Level  Outcome: Ongoing (interventions implemented as appropriate)

## 2017-07-12 NOTE — PLAN OF CARE
Problem: Patient Care Overview (Adult)  Goal: Plan of Care Review  Outcome: Ongoing (interventions implemented as appropriate)    07/12/17 0540   Coping/Psychosocial Response Interventions   Plan Of Care Reviewed With patient   Patient Care Overview   Progress no change   Outcome Evaluation   Outcome Summary/Follow up Plan c/o R shoulder pain, tylenol given, c/o weakness to legs, up with SBA and wx to BR, plans home possibly today after finishing thyroid testing       Goal: Adult Individualization and Mutuality  Outcome: Ongoing (interventions implemented as appropriate)    Problem: Fall Risk (Adult)  Goal: Absence of Falls  Outcome: Ongoing (interventions implemented as appropriate)    Problem: Pain, Acute (Adult)  Goal: Acceptable Pain Control/Comfort Level  Outcome: Ongoing (interventions implemented as appropriate)

## 2017-07-12 NOTE — PROGRESS NOTES
" Endocrinology Progress Note    Patient:  Lynn Magana  YOB: 1975  MRN: [id number]     Acct: [id number]   Admit date: 7/8/2017    Subjective:     We were asked to see her hyperthyroidism.    Over the last month, she has lost 30 pounds. TSH was < 0.02, free T4 was 5.49 4 days ago.    She still gets tremors when she stands up and she is feeling poorly today. Diarrhea persists.    4 hour uptake was 23%, 24 hour uptake was 63.8%    Fasting blood sugar was 174 today, noon time sugar was 165.    Diet:  Diet Regular; Cardiac, Consistent Carbohydrate    Medications:  Scheduled Meds:  amLODIPine 5 mg Oral Daily   atorvastatin 10 mg Oral Daily   benazepril 20 mg Oral Q12H   CloNIDine 0.2 mg Oral Q12H   DULoxetine 60 mg Oral Daily   famotidine 20 mg Oral Q12H   furosemide 20 mg Oral Daily   gabapentin 300 mg Oral 4x Daily   hydrALAZINE 25 mg Oral Q12H   insulin detemir 20 Units Subcutaneous Nightly   insulin lispro 10 Units Subcutaneous TID AC   metFORMIN 1,000 mg Oral BID With Meals   potassium chloride 10 mEq Oral Daily   propranolol  mg Oral Daily   tiZANidine 4 mg Oral Q12H   topiramate 25 mg Oral Q12H   traZODone 100 mg Oral Nightly     Continuous Infusions:   PRN Meds:•  acetaminophen  •  bisacodyl  •  dextrose  •  dextrose  •  glucagon (human recombinant)  •  ondansetron  •  sodium chloride  •  Insert peripheral IV **AND** sodium chloride    Objective:    Physical Exam:  Vitals: BP 97/41 (BP Location: Left arm, Patient Position: Lying)  Pulse 78  Temp 98.1 °F (36.7 °C) (Oral)   Resp 14  Ht 74\" (188 cm)  Wt 198 lb 8 oz (90 kg)  SpO2 100%  BMI 25.49 kg/m2  24 hour intake/output:  Intake/Output Summary (Last 24 hours) at 07/12/17 1354  Last data filed at 07/12/17 1113   Gross per 24 hour   Intake              240 ml   Output             1350 ml   Net            -1110 ml     Last 3 weights:  Wt Readings from Last 3 Encounters:   07/08/17 198 lb 8 oz (90 kg)   03/14/17 224 lb (102 kg) "         General appearance - Awake, tired looking, Not in any obvious distress.  HEENT - Full eye movements, anicteric sclerae, no proptosis, no lid lag  Neck-  No neck vein engorgement, symmetrical, no masses or lymphadenopathy  Thyroid is not enlarged. No bruit.  Chest - Fair air entry, no rales, no wheezing, no crackles, no rhonchi, no tachypnea,  Heart - Distinct heart sounds,iregular rate and regular rhythm, S1 and S2 normal   Abdomen - Soft, nontender, non-distended, no masses or organomegaly  Neurological - Deep tendon reflexes are +1, no tremors, no fasciculations  Integumentary - Skin color, texture, turgor normal. No rashes or lesions.  Musculoskeletal - Good muscle tone, no muscle wasting or asymmetry  Extremities- Fair range of motion, no obvious deformities, no edema, no cyanosis, fair pulses    Labs:  4 hour uptake  23% , 24 hour uptake 63.8%  Results for MARCELO HAJI (MRN [id number]) as of 7/12/2017 14:02   Ref. Range 7/8/2017 11:37   TSH Baseline Latest Ref Range: 0.470 - 4.680 mIU/mL <0.020 (L)   Free T4 Latest Ref Range: 0.78 - 2.19 ng/dL 5.49 (H)   Results for MARCELO HAJI (MRN [id number]) as of 7/12/2017 14:02   Ref. Range 7/11/2017 16:52 7/11/2017 22:10 7/12/2017 04:17 7/12/2017 09:02 7/12/2017 11:31   Glucose Latest Ref Range: 70 - 130 mg/dL 175 (H) 179 (H) 174 (H) 213 (H) 165 (H)       Assessment:  Hyperthyroidism  Type 2 diabetes mellitus      Plan:  1.Start methimazole 20 mg daily. In 3 weeks, she can get a TSH and total T4 as an outpatient. Her PCP can do this for her.    2. Continue Levemir 20 units at bedtime, Humalog 10 units with meals, metformin 1000 mg twice a day.    3. She can go home any time from an Endocrine standpoint.    Electronically signed by Yogesh Poole MD on 7/12/2017 at 1:54 PM

## 2017-07-12 NOTE — PROGRESS NOTES
Spoke with patient about plans for [facility] and she is agreeable to admission. Patient requested a walker for DC. Requested Dr. Moreno to order and informed bedside [facility]. Dorene Quintanilla RN, [facility]

## 2017-07-12 NOTE — CONSULTS
Attending Physician: Devyn Moreno MD  Referring Provider: Shashi Avalos MD  Date of Admission: 7/8/2017  Today's Date: 07/11/17      Reason for Consultation:     We were asked to see her for hyperthyroidism    History of Present Illness:    In the last month, she has lost over 30 pounds. She has gotten dizzy with tremors on standing and she has felt really tired. Diarrhea started in the hospital 3 days ago.    She has had heat intolerance and she simply has not felt good. TSH was < 0.020, free T4 was 5.49 on 07/08/17. Her previous TSH was < 0.020 with a free T4 of 1.71 on 04/18/17.    The last time she received any contrast was about 4 months ago. A thyroid uptake and scan is underway, the 24 hour uptake will be done tomorrow.    She has not been amiodarone or biotin. She has never been on any medication for her thyroid and she is on no weight loss drugs.    She has had diabetes for the last 12 years. Hemoglobin A1c was 6.9% on 07/08/17.     Fasting sugar is near 160 and during that day this is closer to 190. She is on Levemir 20 units at bedtime, Humalog 10 units with meals, metformin 1000 mg twice a day.    Review of Systems:   Review of Systems   Constitutional: Positive for activity change, appetite change, fatigue and unexpected weight change. Negative for chills, diaphoresis and fever.   HENT: Negative for congestion, dental problem, drooling, ear discharge, ear pain, hearing loss, nosebleeds, postnasal drip, rhinorrhea, sneezing, sore throat and tinnitus.    Eyes: Negative for photophobia, pain, discharge, redness and itching.   Respiratory: Negative for apnea, cough, choking, chest tightness, shortness of breath and wheezing.    Cardiovascular: Negative for chest pain, palpitations and leg swelling.   Gastrointestinal: Positive for diarrhea. Negative for abdominal distention, abdominal pain, anal bleeding, constipation, nausea and rectal pain.   Endocrine: Positive for heat intolerance and polyphagia.  Negative for cold intolerance, polydipsia and polyuria.   Genitourinary: Negative for decreased urine volume, difficulty urinating, dyspareunia, enuresis, flank pain, genital sores, hematuria, urgency and vaginal bleeding.   Musculoskeletal: Positive for gait problem and myalgias. Negative for arthralgias and neck stiffness.   Skin: Negative for color change.   Allergic/Immunologic: Negative for environmental allergies and immunocompromised state.   Neurological: Positive for dizziness and light-headedness. Negative for tremors, seizures, facial asymmetry and numbness.   Hematological: Negative for adenopathy.   Psychiatric/Behavioral: Positive for suicidal ideas. Negative for agitation, behavioral problems and hallucinations.       Subjective        History  Past Medical History:   Diagnosis Date   • Arthritis    • Depression    • Diabetes mellitus    • GERD (gastroesophageal reflux disease)    • Hyperlipidemia    • Hypertension    • Injury of back      Past Surgical History:   Procedure Laterality Date   •  SECTION     • CHOLECYSTECTOMY     • COLONOSCOPY     • CYST REMOVAL     • HYSTERECTOMY       Family History:  Sister has hypothyroidism, another sister has hyperthyroidism.    Social History   Substance Use Topics   • Smoking status: Never Smoker   • Smokeless tobacco: None   • Alcohol use No   She used to work in fast food.    Prescriptions Prior to Admission   Medication Sig Dispense Refill Last Dose   • amLODIPine (NORVASC) 10 MG tablet Take 10 mg by mouth Daily.   2017 at Unknown time   • benazepril (LOTENSIN) 20 MG tablet Take 20 mg by mouth 2 (Two) Times a Day.   2017 at Unknown time   • CloNIDine (CATAPRES) 0.3 MG tablet Take 0.3 mg by mouth 2 (Two) Times a Day.   2017 at Unknown time   • DULoxetine (CYMBALTA) 60 MG capsule Take 60 mg by mouth Daily.   2017 at Unknown time   • furosemide (LASIX) 20 MG tablet Take 20 mg by mouth Daily. Take one to two daily   2017 at Unknown  time   • gabapentin (NEURONTIN) 300 MG capsule Take 300 mg by mouth 4 (Four) Times a Day.   7/7/2017 at Unknown time   • hydrALAZINE (APRESOLINE) 25 MG tablet Take 25 mg by mouth 2 (Two) Times a Day.   7/7/2017 at Unknown time   • insulin glargine (LANTUS) 100 UNIT/ML injection Inject  under the skin Daily.   7/7/2017 at Unknown time   • insulin lispro (humaLOG) 100 UNIT/ML injection Inject  under the skin 3 (Three) Times a Day Before Meals.   7/7/2017 at Unknown time   • metFORMIN (GLUCOPHAGE) 500 MG tablet Take 1,000 mg by mouth 2 (Two) Times a Day With Meals.   7/7/2017 at Unknown time   • PIROXICAM PO Take 20 mg by mouth Daily.   7/7/2017 at Unknown time   • potassium chloride (MICRO-K) 10 MEQ CR capsule Take 10 mEq by mouth Daily. Take one to two tablets daily   7/7/2017 at Unknown time   • raNITIdine (ZANTAC) 150 MG tablet Take 300 mg by mouth Daily.   7/7/2017 at Unknown time   • simvastatin (ZOCOR) 20 MG tablet Take 20 mg by mouth Every Night.   7/7/2017 at Unknown time   • tiZANidine (ZANAFLEX) 4 MG tablet Take 4 mg by mouth 2 (Two) Times a Day.   7/7/2017 at Unknown time   • topiramate (TOPAMAX) 25 MG tablet Take 25 mg by mouth 2 (Two) Times a Day. Medication bottle:Take one tablet by mouth daily for 7 days then 1 tablet by mouth two times a day for 7 days then 1 tablet three times a day for 7 days then 2 tablets two times a day thereafter   7/7/2017 at Unknown time   • traZODone (DESYREL) 100 MG tablet Take 100 mg by mouth Every Night.   7/7/2017 at Unknown time   • CloNIDine (CATAPRES) 0.2 MG tablet Take 0.3 mg by mouth 2 (Two) Times a Day.      • DICLOFENAC SODIUM PO Take 75 mg by mouth Daily. Take with food      • nitrofurantoin, macrocrystal-monohydrate, (MACROBID) 100 MG capsule Take 1 capsule by mouth 2 (Two) Times a Day. 20 capsule 0    • ondansetron (ZOFRAN) 4 MG tablet Take 1 tablet by mouth Every 6 (Six) Hours. 15 tablet 0        Scheduled Meds:   amLODIPine 5 mg Oral Daily   atorvastatin 10 mg  "Oral Daily   benazepril 20 mg Oral Q12H   CloNIDine 0.2 mg Oral Q12H   DULoxetine 60 mg Oral Daily   famotidine 20 mg Oral Q12H   furosemide 20 mg Oral Daily   gabapentin 300 mg Oral 4x Daily   hydrALAZINE 25 mg Oral Q12H   insulin detemir 20 Units Subcutaneous Nightly   insulin lispro 10 Units Subcutaneous TID AC   metFORMIN 1,000 mg Oral BID With Meals   potassium chloride 10 mEq Oral Daily   propranolol  mg Oral Daily   tiZANidine 4 mg Oral Q12H   topiramate 25 mg Oral Q12H   traZODone 100 mg Oral Nightly       Allergies:  Review of patient's allergies indicates no known allergies.    Objective     Vital Signs   Blood pressure 137/74, pulse 97, temperature 98.9 °F (37.2 °C), temperature source Oral, resp. rate 16, height 74\" (188 cm), weight 198 lb 8 oz (90 kg), SpO2 94 %.      Physical Exam:     General Appearance:    Alert, cooperative, in no acute distress   Head:    Normocephalic, without obvious abnormality, atraumatic   Eyes:            Lids and lashes normal, conjunctivae and sclerae normal, no   icterus, no pallor, corneas clear, PERRLA   Ears:    Ears appear intact with no abnormalities noted   Throat:   No tonsillar congestion, no discharge.   Neck:   No adenopathy, supple, trachea midline, no neck vein engorgement, no thyromegaly, no  thyroid  bruit,  thyroid is < 30 gm in volume   Back:     No kyphosis present, no scoliosis present, no skin lesions,      erythema or scars, no tenderness to percussion or                   palpation,   range of motion normal   Lungs:     Clear to auscultation,respirations regular, even and                  unlabored    Heart:    Irregular rhythm and normal rate, normal S1 and S2, no            murmur, no gallop, no rub, no click   Chest Wall:    No abnormalities observed   Abdomen:     Normal bowel sounds, no masses, no organomegaly, soft        non-tender, flabby,  no guarding, no rebound                tenderness   Extremities:   Moves all extremities well, no " edema, no cyanosis, no             Redness, No ulcerations, No erythema   Pulses:   Pulses palpable and equal bilaterally   Skin:   No bleeding, bruising or rash   Lymph nodes:   No palpable adenopathy   Neurologic:   Cranial nerves 2 - 12 grossly intact, sensation intact, DTR      + 2 with tremors on standing       Results Review:    CBC:   Lab Results   Component Value Date    WBC 3.77 (L) 07/11/2017    HGB 11.4 (L) 07/11/2017    HCT 34.9 (L) 07/11/2017    MCV 78.4 (L) 07/11/2017     07/11/2017       BMP: Lab Results   Component Value Date    GLUCOSE 182 (H) 07/11/2017    CALCIUM 9.5 07/11/2017     07/11/2017    K 3.8 07/11/2017    CO2 23.0 (L) 07/11/2017     07/11/2017    BUN 24 (H) 07/11/2017    CREATININE 0.83 07/11/2017    EGFRIFAFRI 91 07/11/2017    BCR 28.9 (H) 07/11/2017    ANIONGAP 9.0 07/11/2017       CMP: Lab Results   Component Value Date    GLUCOSE 182 (H) 07/11/2017    BUN 24 (H) 07/11/2017    CREATININE 0.83 07/11/2017    EGFRIFAFRI 91 07/11/2017    BCR 28.9 (H) 07/11/2017    CO2 23.0 (L) 07/11/2017    CALCIUM 9.5 07/11/2017    ALBUMIN 3.40 (L) 07/11/2017    LABIL2 1.2 07/11/2017    AST 26 07/11/2017    ALT 53 07/11/2017   Results for SHAMEKA HAJILEY (MRN [id number]) as of 7/11/2017 19:04   Ref. Range 4/18/2017 10:18 7/8/2017 11:37   TSH Baseline Latest Ref Range: 0.470 - 4.680 mIU/mL <0.020 (L) <0.020 (L)   Free T4 Latest Ref Range: 0.78 - 2.19 ng/dL 1.71 5.49 (H)     Results for RAISSA MARCELO (MRN [id number]) as of 7/11/2017 19:04   Ref. Range 7/8/2017 17:54   Hemoglobin A1C Latest Units: % 6.9          Assessment/Plan     Assessment:   Hyperthyroidism  Type 2 diabetes mellitus    Plan:   1. Follow up thyroid uptake and scan, the 24 hour uptake should be completed by tomorrow.    2. She should  Get a total T4 as a baseline study to aid with monitoring progress as an outpatient. This test is not available in this facility.    3. If her thyroid uptake is elevated, we will  offer her methimazole. She can then get a TSH and total T4 in 3 or 4 weeks..    4. It would be reasonable to continue Levemir 20 units at bedtime, Humalog 10 units with meals, metformin 1000 mg twice a day.    5. After she completes her 24 hour uptake, it should be okay for her to go home from an Endocrine standpoint.        Yogesh Poole MD  07/11/17  7:04 PM

## 2017-07-13 NOTE — THERAPY DISCHARGE NOTE
Acute Care - Physical Therapy Discharge Summary  [facility]       Patient Name: Lynn Magana  : 1975  MRN: [id number]    Today's Date: 2017  Onset of Illness/Injury or Date of Surgery Date: 17    Date of Referral to PT: 07/10/17  Referring Physician: Dr. Moreno      Admit Date: 2017      PT Recommendation and Plan    Visit Dx:    ICD-10-CM ICD-9-CM   1. Syncope, unspecified syncope type R55 780.2   2. Hyperthyroidism E05.90 242.90   3. Nonintractable headache, unspecified chronicity pattern, unspecified headache type R51 784.0   4. Impaired mobility Z74.09 799.89   5. Decreased activities of daily living (ADL) Z78.9 V49.89   6. Polysubstance abuse F19.10 305.90   7. Type 2 diabetes mellitus with other neurologic complication E11.49              Outcome Measures       17 1000 17 1551 17 1400    How much help from another person do you currently need...    Turning from your back to your side while in flat bed without using bedrails? 4  -CW 4  -MF     Moving from lying on back to sitting on the side of a flat bed without bedrails? 4  -CW 4  -MF     Moving to and from a bed to a chair (including a wheelchair)? 3  -CW 3  -MF     Standing up from a chair using your arms (e.g., wheelchair, bedside chair)? 3  -CW 3  -MF     Climbing 3-5 steps with a railing? 3  -CW 2  -MF     To walk in hospital room? 3  -CW 3  -MF     AM-PAC 6 Clicks Score 20  -CW 19  -MF     How much help from another is currently needed...    Putting on and taking off regular lower body clothing?   3  -ND    Bathing (including washing, rinsing, and drying)   3  -ND    Toileting (which includes using toilet bed pan or urinal)   3  -ND    Putting on and taking off regular upper body clothing   4  -ND    Taking care of personal grooming (such as brushing teeth)   4  -ND    Eating meals   4  -ND    Score   21  -ND    Functional Assessment    Outcome Measure Options AM-PAC 6 Clicks Basic Mobility (PT)  -CW AM-PAC 6  Clicks Basic Mobility (PT)  -[facility] 6 Clicks Daily Activity (OT)  -ND      07/11/17 1043          How much help from another person do you currently need...    Turning from your back to your side while in flat bed without using bedrails? 4  -MS      Moving from lying on back to sitting on the side of a flat bed without bedrails? 4  -MS      Moving to and from a bed to a chair (including a wheelchair)? 3  -MS      Standing up from a chair using your arms (e.g., wheelchair, bedside chair)? 3  -MS      Climbing 3-5 steps with a railing? 2  -MS      To walk in hospital room? 3  -MS      AM-PAC 6 Clicks Score 19  -MS      Functional Assessment    Outcome Measure Options AM-PAC 6 Clicks Basic Mobility (PT)  -MS        User Key  (r) = Recorded By, (t) = Taken By, (c) = Cosigned By    Initials Name Provider Type    MS Elizabeth Foster, PT DPT Physical Therapist     Susanna Hernandez, PTA Physical Therapy Assistant     Veena Sr, PTA Physical Therapy Assistant    HUBERT Sloan, OTR/L Occupational Therapist                      IP PT Goals       07/13/17 1548 07/11/17 1139       Transfer Training PT LTG    Transfer Training PT LTG, Date Established  07/11/17  -MS     Transfer Training PT LTG, Time to Achieve  by discharge  -MS     Transfer Training PT LTG, Activity Type  bed to chair /chair to bed;sit to stand/stand to sit  -MS     Transfer Training PT LTG, [place] Level  independent  -MS     Transfer Training PT  LTG, Date Goal Reviewed 07/13/17  -      Transfer Training PT LTG, Outcome goal not met  -CW      Transfer Training PT LTG, Reason Goal Not Met discharged from facility  -CW      Gait Training PT LTG    Gait Training Goal PT LTG, Date Established  07/11/17  -MS     Gait Training Goal PT LTG, Time to Achieve  by discharge  -MS     Gait Training Goal PT LTG, [place] Level  supervision required  -MS     Gait Training Goal PT LTG, Assist Device  walker, rolling  -MS     Gait Training  Goal PT LTG, Distance to Achieve  200 ft  -MS     Gait Training Goal PT LTG, Date Goal Reviewed 07/13/17  -CW      Gait Training Goal PT LTG, Outcome goal not met  -CW      Gait Training Goal PT LTG, Reason Goal Not Met discharged from facility  -CW      Stair Training PT LTG    Stair Training Goal PT LTG, Date Established  07/11/17  -MS     Stair Training Goal PT LTG, Time to Achieve  by discharge  -MS     Stair Training Goal PT LTG, Number of Steps  11  -MS     Stair Training Goal PT LTG, [place] Level  contact guard assist  -MS     Stair Training Goal PT LTG, Date Goal Reviewed 07/13/17  -CW      Stair Training Goal PT LTG, Outcome goal not met  -CW      Stair Training Goal PT LTG, Reason Goal Not Met discharged from facility  -CW      Dynamic Standing Balance PT LTG    Dynamic Standing Balance PT LTG, Date Established  07/11/17  -MS     Dynamic Standing Balance PT LTG, Time to Achieve  by discharge  -MS     Dynamic Standing Balance PT LTG, [place] Level  supervision required  -MS     Dynamic Standing Balance PT LTG, Additional Goal  1 UE support while reaching in all directions  -MS     Dynamic Standing Balance PT LTG, Date Goal Reviewed 07/13/17  -CW      Dynamic Standing Balance PT LTG, Outcome goal not met  -CW      Dynamic Standing Balance PT LTG, Reason Goal Not Met discharged from facility  -        User Key  (r) = Recorded By, (t) = Taken By, (c) = Cosigned By    Initials Name Provider Type    MS Elizabeth Foster, PT DPT Physical Therapist    CW Susanna Hernandez, CARLOS MANUEL Physical Therapy Assistant          Therapy Charges for Today     Code Description Service Date Service Provider Modifiers Qty    55674736222 HC PT THER PROC EA 15 MIN 7/12/2017 Susanna Hernandez PTA GP, KX 1          PT Discharge Summary  Reason for Discharge: Discharge from facility  Outcomes Achieved: Refer to plan of care for updates on goals achieved  Discharge Destination: Home with assist      Susanna Hernandez  PTA   7/13/2017

## 2017-07-13 NOTE — PLAN OF CARE
Problem: Inpatient Physical Therapy  Goal: Transfer Training Goal 1 LTG- PT  Outcome: Unable to achieve outcome(s) by discharge Date Met:  07/13/17 07/13/17 1548   Transfer Training PT LTG   Transfer Training PT LTG, Date Goal Reviewed 07/13/17   Transfer Training PT LTG, Outcome goal not met   Transfer Training PT LTG, Reason Goal Not Met discharged from facility       Goal: Gait Training Goal LTG- PT  Outcome: Unable to achieve outcome(s) by discharge Date Met:  07/13/17 07/13/17 1548   Gait Training PT LTG   Gait Training Goal PT LTG, Date Goal Reviewed 07/13/17   Gait Training Goal PT LTG, Outcome goal not met   Gait Training Goal PT LTG, Reason Goal Not Met discharged from facility       Goal: Stair Training Goal LTG- PT  Outcome: Unable to achieve outcome(s) by discharge Date Met:  07/13/17 07/13/17 1548   Stair Training PT LTG   Stair Training Goal PT LTG, Date Goal Reviewed 07/13/17   Stair Training Goal PT LTG, Outcome goal not met   Stair Training Goal PT LTG, Reason Goal Not Met discharged from facility       Goal: Dynamic Standing Balance Goal LTG- PT  Outcome: Unable to achieve outcome(s) by discharge Date Met:  07/13/17 07/13/17 1548   Dynamic Standing Balance PT LTG   Dynamic Standing Balance PT LTG, Date Goal Reviewed 07/13/17   Dynamic Standing Balance PT LTG, Outcome goal not met   Dynamic Standing Balance PT LTG, Reason Goal Not Met discharged from facility

## 2017-07-13 NOTE — THERAPY DISCHARGE NOTE
"Acute Care - Occupational Therapy Initial Eval/Discharge  [facility]     Patient Name: Lynn Magana  : 1975  MRN: [id number]  Today's Date: 2017  Onset of Illness/Injury or Date of Surgery Date: 17  Date of Referral to OT: 07/10/17  Referring Physician: Dr. Moreno      Admit Date: 2017       ICD-10-CM ICD-9-CM   1. Syncope, unspecified syncope type R55 780.2   2. Hyperthyroidism E05.90 242.90   3. Nonintractable headache, unspecified chronicity pattern, unspecified headache type R51 784.0   4. Impaired mobility Z74.09 799.89   5. Decreased activities of daily living (ADL) Z78.9 V49.89   6. Polysubstance abuse F19.10 305.90   7. Type 2 diabetes mellitus with other neurologic complication E11.49      Patient Active Problem List   Diagnosis   • Syncope   • Thyroid adenoma, toxic   • Polysubstance abuse   • Hypertension   • Diabetes     Past Medical History:   Diagnosis Date   • Arthritis    • Depression    • Diabetes mellitus    • GERD (gastroesophageal reflux disease)    • Hyperlipidemia    • Hypertension    • Injury of back      Past Surgical History:   Procedure Laterality Date   •  SECTION     • CHOLECYSTECTOMY     • COLONOSCOPY     • CYST REMOVAL     • HYSTERECTOMY            OT ASSESSMENT FLOWSHEET (last 72 hours)      OT Evaluation       17 0704 17 0948 17 1551 17 1101 17 1043    Rehab Evaluation    Document Type  therapy note (daily note)  -CW therapy note (daily note)  - evaluation   See MAR, entered room at 1423  -ND evaluation  -MS    Subjective Information  agree to therapy;complains of;pain;fatigue;weakness;dizziness  -CW agree to therapy;complains of;weakness  - agree to therapy;complains of;weakness   \"legs like jello  -ND agree to therapy;complains of;dizziness   \"legs are wobbly\"  -MS    Evaluation Not Performed    --  -ND     Evaluation Not Performed, Comment    --  -ND --  -MS    Patient Effort, Rehab Treatment  adequate  -CW       " Symptoms Noted During/After Treatment  dizziness;fatigue  -CW       Symptoms Noted Comment     pt reports for the past 3 weeks her daughter has been helping her walk and befor that she was holding onto the walls  -MS    General Information    Patient Profile Review    yes  -ND yes  -MS    Onset of Illness/Injury or Date of Surgery Date    07/08/17  -ND 07/08/17  -MS    Referring Physician    Dr. Moreno  -ND Dr. Moreno  -MS    General Observations    Pt. fowlers, alert, IV  -ND pt laying in bed, awake and alert in no apparent distress  -MS    Pertinent History Of Current Problem    Pt. admitted d/t increased headaches radiating down R arm, SOB, shakiness, syncope. Positive drug screen for Cocaine and THC. Dx: Hyperthyroidism, thyrotoxicosis. H&H 11.4/34.9.   -ND admit for mulitple syncopal episodes, dx: thyrotoxcosis, positive for cocaine and THC  -MS    Precautions/Limitations  fall precautions  -CW fall precautions  -MF fall precautions  -ND fall precautions  -MS    Prior Level of Function    min assist:;transfer;bed mobility;mod assist:;ADL's;dependent:;driving  -ND min assist:;gait;mod assist:;dressing;bathing  -MS    Equipment Currently Used at Home    none  -ND     Plans/Goals Discussed With    patient;agreed upon  -ND patient;agreed upon  -MS    Risks Reviewed    patient:;nausea/vomiting;LOB;dizziness;increased discomfort;change in vital signs;increased drainage;lines disloged  -ND patient:;LOB;nausea/vomiting;dizziness;increased discomfort  -MS    Benefits Reviewed    patient:;improve function;increase independence;increase strength;decrease pain;decrease risk of DVT;increase balance;improve skin integrity;increase knowledge  -ND patient:;improve function;increase independence;increase strength;increase balance;decrease pain;increase knowledge  -MS    Barriers to Rehab    medically complex;previous functional deficit;physical barrier  -ND medically complex;previous functional deficit;physical barrier  -MS     Living Environment    Lives With    child(neymar), dependent   SISTER able to assist  -ND     Living Arrangements    apartment  -ND     Home Accessibility    bed and bath on same level;stairs to enter home;tub/shower is not walk in  -ND stairs to enter home  -MS    Number of Stairs to Enter Home    5  -ND 11  -MS    Stair Railings at Home    none  -ND none  -MS    Living Environment Comment     1 daughter 8 yo,  worksout of [facility]  -MS    Clinical Impression    Date of Referral to OT    07/10/17  -ND     OT Diagnosis    DECREASED ADL  -ND     Prognosis    good  -ND     Impairments Found (describe specific impairments)    gait, locomotion, and balance;ergonomics and body mechanics  -ND     Patient/Family Goals Statement    go home  -ND     Criteria for Skilled Therapeutic Interventions Met    yes;treatment indicated  -ND     Rehab Potential    good, to achieve stated therapy goals  -ND     Therapy Frequency    3-5 times/wk  -ND     Predicted Duration of Therapy Intervention (days/wks)    10 days  -ND     Anticipated Discharge Disposition home  -ND   home with assist;home with home health  -ND     Pain Assessment    Pain Assessment  0-10  -CW 0-10  -MF No/denies pain  -ND No/denies pain  -MS    Pain Score  7  -CW 10  -MF      Pain Type  Acute pain  -CW Acute pain  -MF      Pain Location  Leg  -CW Leg  -MF      Pain Orientation  Right;Left  -CW Right;Left  -MF      Pain Descriptors  Aching  -CW Aching  -MF      Pain Frequency  Constant/continuous  -CW Constant/continuous  -MF      Pain Intervention(s)  Repositioned  -CW Medication (See MAR);Repositioned  -MF      Response to Interventions   tolerated  -MF      Multiple Pain Sites   Yes  -MF      Pain 2    Pain Score 2  10  -CW 10  -MF      Pain Type 2  Chronic pain  -CW Chronic pain  -MF      Pain Location 2  Shoulder  -CW Shoulder  -MF      Pain Orientation 2  Right  -CW Right  -MF      Pain Descriptors 2  Aching  -CW Aching  -MF      Pain Frequency 2   Constant/continuous  -CW Constant/continuous  -MF      Pain Intervention(s) 2  Repositioned  -CW Repositioned;Medication (See MAR)  -MF      Response to Interventions 2   tolerated  -MF      Vision Assessment/Intervention    Visual Impairment Comment    Pt. states that her vision gets blurry with movement and standing.   -ND pt reports that her vision is blurry at times but not right now  -MS    Cognitive Assessment/Intervention    Current Cognitive/Communication Assessment    functional  -ND functional  -MS    Orientation Status    oriented x 4  -ND oriented x 4  -MS    Follows Commands/Answers Questions    100% of the time;able to follow multi-step instructions  -% of the time  -MS    Personal Safety    WNL/WFL  -ND WNL/WFL  -MS    Personal Safety Interventions  fall prevention program maintained;gait belt;nonskid shoes/slippers when out of bed  -CW  fall prevention program maintained;gait belt;supervised activity;toileting scheduled  -ND fall prevention program maintained;gait belt;muscle strengthening facilitated;nonskid shoes/slippers when out of bed;supervised activity  -MS    ROM (Range of Motion)    General ROM    upper extremity range of motion deficits identified  -ND no range of motion deficits identified  -MS    General ROM Detail    R shoulder impaired approx 75% d/t pain   -ND     MMT (Manual Muscle Testing)    General MMT Assessment    --  -ND     General MMT Assessment Detail    Functionally 4/5 R shoulder 3-/5  -ND UEs grossly 5/5, LEs grossly 4/5  -MS    Bed Mobility, Assessment/Treatment    Bed Mobility, Roll Right, [place]    conditional independence  -ND independent  -MS    Bed Mobility, Scoot/Bridge, [place]    conditional independence  -ND independent  -MS    Bed Mob, Supine to Sit, Dawson  independent  -CW conditional independence  -MF conditional independence  -ND independent  -MS    Bed Mob, Sit to Supine, Dawson  independent  -CW independent  -MF      Transfer  Assessment/Treatment    Transfers, Bed-Chair Deuel     contact guard assist;hand held assist  -MS    Transfers, Sit-Stand Deuel   contact guard assist  -MF verbal cues required;nonverbal cues required (demo/gesture);contact guard assist  -ND contact guard assist  -MS    Transfers, Stand-Sit Deuel   verbal cues required;contact guard assist  -MF verbal cues required;nonverbal cues required (demo/gesture);contact guard assist  -ND contact guard assist  -MS    Transfers, Sit-Stand-Sit, Assist Device    rolling walker  -ND     Transfer, Safety Issues    step length decreased;knees buckling  -ND     Transfer, Impairments    strength decreased;impaired balance  -ND     Transfer, Comment  Deferred due to dizziness  -CW       Functional Mobility    Functional Mobility- Ind. Level    verbal cues required;nonverbal cues required (demo/gesture);contact guard assist  -ND     Functional Mobility- Device    rolling walker  -ND     Functional Mobility-Distance (Feet)    --   steps on side of bed L and R  -ND     Functional Mobility- Safety Issues    step length decreased  -ND     Lower Body Dressing Assessment/Training    LB Dressing Assess/Train, Clothing Type    donning:;doffing:;socks  -ND     LB Dressing Assess/Train, Position    edge of bed  -ND     LB Dressing Assess/Train, Deuel    supervision required  -ND     LB Dressing Assess/Train, Impairments    strength decreased;impaired balance  -ND     Motor Skills/Interventions    Additional Documentation    Balance Skills Training (Group);Fine Motor Coordination Training (Group);Gross Motor Coordination Training (Group)  -ND Balance Skills Training (Group)  -MS    Balance Skills Training    Sitting-Level of Assistance   Independent  -MF Independent  -ND Independent  -MS    Sitting-Balance Support   Feet supported  -MF Feet supported  -ND     Standing-Level of Assistance    Contact guard  -ND Contact guard  -MS    Static Standing Balance Support     assistive device  -ND Right upper extremity supported;Left upper extremity supported  -MS    Gait Balance-Level of Assistance    Contact guard  -ND     Gait Balance Support    assistive device  -ND     Therapy Exercises    Bilateral Lower Extremities  AROM:;15 reps;sitting  -CW AROM:;15 reps;sitting;ankle pumps/circles;hip flexion;LAQ   with rest breaks  -MF      Gross Motor Coordination Training    Gross Motor Skill, Impairments Detail    FTN intact LUE, refused to do RUE d/t pain  -ND     Fine Motor Coordination Training    Opposition    Right:;Left:;intact  -ND     Sensory Assessment/Intervention    Sensory Impairment    --   WFL per pt.   -ND     General Therapy Interventions    Planned Therapy Interventions    activity intolerance;ADL retraining;balance training;bed mobility training;energy conservation;home exercise program;strengthening;transfer training  -ND     Positioning and Restraints    Pre-Treatment Position  in bed  -CW in bed  -MF in bed  -ND     Post Treatment Position  bed  -CW bed  -MF bed  -ND chair  -MS    In Bed  fowlers;call light within reach;encouraged to call for assist  -CW supine;notified nsg;call light within reach;encouraged to call for assist;with family/caregiver;side rails up x2  -MF fowlers;call light within reach;encouraged to call for assist;with family/caregiver;side rails up x2  -ND     In Chair     sitting;with other staff   in wheelchair going for testing  -MS      07/10/17 1613                General Information    Equipment Currently Used at Home none  -        Living Environment    Lives With child(neymar), dependent   Sister is able to assist, as well as other family members  -        Home Accessibility no concerns  -        Transportation Available family or friend will provide;car  -          User Key  (r) = Recorded By, (t) = Taken By, (c) = Cosigned By    Initials Name Effective Dates    MS Elizabeth CARROLL Cristian, PT DPT 08/02/16 -     CW Susanna Hernandez, PTA 06/22/15  -      Veena Sr, PTA 08/02/16 -      Carlota Montalvo, BSW 09/15/16 -     ND Melanie Sloan, OTR/L 10/21/16 -           Occupational Therapy Education     Title: PT OT SLP Therapies (Active)     Topic: Occupational Therapy (Resolved)     Point: ADL training (Resolved)    Description: Instruct learner(s) on proper safety adaptation and remediation techniques during self care or transfers.   Instruct in proper use of assistive devices.    Learning Progress Summary    Learner Readiness Method Response Comment Documented by Status   Patient Acceptance E VU,NR Pt. educated on role of OT, safe t/f, benefit of activity, progression with poc ND 07/11/17 1448 Done               Point: Home exercise program (Resolved)    Description: Instruct learner(s) on appropriate technique for monitoring, assisting and/or progressing therapeutic exercises/activities.    Learning Progress Summary    Learner Readiness Method Response Comment Documented by Status   Patient Acceptance E VU,NR Pt. educated on role of OT, safe t/f, benefit of activity, progression with poc ND 07/11/17 1448 Done               Point: Body mechanics (Resolved)    Description: Instruct learner(s) on proper positioning and spine alignment during self-care, functional mobility activities and/or exercises.    Learning Progress Summary    Learner Readiness Method Response Comment Documented by Status   Patient Acceptance E VU,NR Pt. educated on role of OT, safe t/f, benefit of activity, progression with poc ND 07/11/17 1448 Done                      User Key     Initials Effective Dates Name Provider Type Discipline    ND 10/21/16 -  Melanie Sloan, OTR/L Occupational Therapist OT                OT Recommendation and Plan  Anticipated Discharge Disposition: home  Planned Therapy Interventions: activity intolerance, ADL retraining, balance training, bed mobility training, energy conservation, home exercise program, strengthening, transfer  "training  Therapy Frequency: 3-5 times/wk  Plan of Care Review  Plan Of Care Reviewed With: patient  Progress: progress toward functional goals as expected  Outcome Summary/Follow up Plan: OT rajiv completed. Pt. c/o of BLE feeling like \"jello\" and blurry vision with movement. Pt. CGA for sit to stand t/f and fx mob with r/w. Pt. sup for LB dressing. Pt. cont to benefit from skilled OT d/t decreased balance, decreased strength, decreased knowledge, decreased ind in ADLs. 1450 7/11/17           OT Goals       07/13/17 0703 07/11/17 1449       Transfer Training OT LTG    Transfer Training OT LTG, Date Established  07/11/17  -ND     Transfer Training OT LTG, Time to Achieve  by discharge  -ND     Transfer Training OT LTG, Activity Type  all transfers  -ND     Transfer Training OT LTG, [place] Level  conditional independence  -ND     Transfer Training OT LTG, Assist Device  walker, rolling  -ND     Transfer Training OT LTG, Date Goal Reviewed 07/13/17  -ND      Transfer Training OT LTG, Outcome goal not met  -ND      Transfer Training OT LTG, Reason Goal Not Met discharged from facility  -ND      Strength OT LTG    Strength Goal OT LTG, Date Established  07/11/17  -ND     Strength Goal OT LTG, Time to Achieve  by discharge  -ND     Strength Goal OT LTG, Measure to Achieve  Pt. will complete BUE strengthening exercises to increase BUE strength to 5/5 for ADLs.   -ND     Strength Goal OT LTG, Date Goal Reviewed 07/13/17  -ND      Strength Goal OT LTG, Outcome goal not met  -ND      Strength Goal OT LTG, Reason Goal Not Met discharged from facility  -ND      ADL OT LTG    ADL OT LTG, Date Established  07/11/17  -ND     ADL OT LTG, Time to Achieve  by discharge  -ND     ADL OT LTG, Activity Type  ADL skills  -ND     ADL OT LTG, [place] Level  contact guard  -ND     ADL OT LTG, Date Goal Reviewed 07/13/17  -ND      ADL OT LTG, Outcome goal not met  -ND      ADL OT LTG, Reason Goal Not Met discharged from facility  " -ND        User Key  (r) = Recorded By, (t) = Taken By, (c) = Cosigned By    Initials Name Provider Type    HUBERT Sloan OTR/L Occupational Therapist                Outcome Measures       07/12/17 1000 07/11/17 1551 07/11/17 1400    How much help from another person do you currently need...    Turning from your back to your side while in flat bed without using bedrails? 4  -CW 4  -MF     Moving from lying on back to sitting on the side of a flat bed without bedrails? 4  -CW 4  -MF     Moving to and from a bed to a chair (including a wheelchair)? 3  -CW 3  -MF     Standing up from a chair using your arms (e.g., wheelchair, bedside chair)? 3  -CW 3  -MF     Climbing 3-5 steps with a railing? 3  -CW 2  -MF     To walk in hospital room? 3  -CW 3  -MF     AM-PAC 6 Clicks Score 20  -CW 19  -MF     How much help from another is currently needed...    Putting on and taking off regular lower body clothing?   3  -ND    Bathing (including washing, rinsing, and drying)   3  -ND    Toileting (which includes using toilet bed pan or urinal)   3  -ND    Putting on and taking off regular upper body clothing   4  -ND    Taking care of personal grooming (such as brushing teeth)   4  -ND    Eating meals   4  -ND    Score   21  -ND    Functional Assessment    Outcome Measure Options AM-PAC 6 Clicks Basic Mobility (PT)  -CW AM-PAC 6 Clicks Basic Mobility (PT)  -MF AM-PAC 6 Clicks Daily Activity (OT)  -ND      07/11/17 1043          How much help from another person do you currently need...    Turning from your back to your side while in flat bed without using bedrails? 4  -MS      Moving from lying on back to sitting on the side of a flat bed without bedrails? 4  -MS      Moving to and from a bed to a chair (including a wheelchair)? 3  -MS      Standing up from a chair using your arms (e.g., wheelchair, bedside chair)? 3  -MS      Climbing 3-5 steps with a railing? 2  -MS      To walk in hospital room? 3  -MS      AM-PAC 6 Clicks  Score 19  -MS      Functional Assessment    Outcome Measure Options AM-PAC 6 Clicks Basic Mobility (PT)  -MS        User Key  (r) = Recorded By, (t) = Taken By, (c) = Cosigned By    Initials Name Provider Type    MS Elizabeth CARLY Foster, PT DPT Physical Therapist    CW Susanna Hernandez, PTA Physical Therapy Assistant    MF Veena Sr, PTA Physical Therapy Assistant    HUBERT Slaon, OTR/L Occupational Therapist          Time Calculation:           OT G-codes  OT Functional Scales Options: AM-PAC 6 Clicks Daily Activity (OT)  Functional Limitation: Self care  Self Care Current Status (): At least 20 percent but less than 40 percent impaired, limited or restricted  Self Care Goal Status (): At least 1 percent but less than 20 percent impaired, limited or restricted    OT Discharge Summary  Anticipated Discharge Disposition: home  Reason for Discharge: Discharge from facility  Outcomes Achieved: Refer to plan of care for updates on goals achieved  Discharge Destination: Home    Melanie Sloan, OTR/L  7/13/2017

## 2017-07-13 NOTE — PLAN OF CARE
Problem: Inpatient Occupational Therapy  Goal: Transfer Training Goal 1 LTG- OT  Outcome: Unable to achieve outcome(s) by discharge Date Met:  07/13/17 07/13/17 0703   Transfer Training OT LTG   Transfer Training OT LTG, Date Goal Reviewed 07/13/17   Transfer Training OT LTG, Outcome goal not met   Transfer Training OT LTG, Reason Goal Not Met discharged from facility       Goal: Strength Goal LTG- OT  Outcome: Unable to achieve outcome(s) by discharge Date Met:  07/13/17 07/13/17 0703   Strength OT LTG   Strength Goal OT LTG, Date Goal Reviewed 07/13/17   Strength Goal OT LTG, Outcome goal not met   Strength Goal OT LTG, Reason Goal Not Met discharged from facility       Goal: ADL Goal LTG- OT  Outcome: Unable to achieve outcome(s) by discharge Date Met:  07/13/17 07/13/17 0703   ADL OT LTG   ADL OT LTG, Date Goal Reviewed 07/13/17   ADL OT LTG, Outcome goal not met   ADL OT LTG, Reason Goal Not Met discharged from facility

## 2017-07-13 NOTE — PAYOR COMM NOTE
"[place] HOME 7-12-17  [id number]      Lynn Magana (41 y.o. Female)     Date of Birth Social Security Number Address Home Phone MRN    1975  [id number]  [facility] [phone] [id number]    Taoist Marital Status          None        Admission Date Admission Type Admitting Provider Attending Provider Department, Room/Bed    7/8/17 Emergency Devyn Moreno MD  [facility] 3A, 330/1    Discharge Date Discharge Disposition Discharge Destination        7/12/2017 Home or Self Care             Attending Provider: (none)    Allergies:  No Known Allergies    Isolation:  None   Infection:  None   Code Status:  Prior    Ht:  74\" (188 cm)   Wt:  198 lb 8 oz (90 kg)    Admission Cmt:  None   Principal Problem:  None                Active Insurance as of 7/8/2017     Primary Coverage     Payor Plan Insurance Group Employer/Plan Group    [employer] [place] [employer] [place]      Payor Plan Address Payor Plan Phone Number Effective From Effective To    [address] [address] [phone] 3/14/2017     [place] [address]       Subscriber Name Subscriber Birth Date Member ID       LYNN MAGANA 1975 [id number]                 Emergency Contacts      (Rel.) Home Phone Work Phone Mobile Phone    Mago Carolina (Sister) -- -- [phone]               Discharge Summary      Devyn Moreno MD at 7/12/2017  3:46 PM              [facility]  DISCHARGE SUMMARY       Date of Admission: 7/8/2017  Date of Discharge:  7/12/2017  Primary Care Physician: David Mcallister MD    Presenting Problem/History of Present Illness:  Syncope, unspecified syncope type [R55]     Final Discharge Diagnoses:  Hospital Problem List     Syncope    Thyroid adenoma, toxic    Polysubstance abuse    Hypertension    Diabetes          Consults: Endocrinology    Pertinent Test Results:   IMPRESSION: Thyroid uptake and scan  1. Enlarged thyroid gland.  2. Abnormal " 24-hour uptake of 63.8%. This may be due to either Graves'  disease or thyroiditis.    IMPRESSION: Ultrasound gallbladder  Thyroid gland is upper limits of normal to mildly enlarged  in size.  2. There are 2 cyst associated with the left lobe of thyroid gland. One  is 8 x 10 mm. The other is 3 x 5 mm.    IMPRESSION: CT scan ahead  1. No acute intracranial process    IMPRESSION: Carotid duplex  1. Findings suggest 50-69% stenosis bilaterally per Society of  Radiologists in Ultrasound Consensus Criteria. See below. This could be  confirmed with CTA neck nonemergently.    2. Left vertebral artery is not visualized.    IMPRESSION: C-spine  1. Mild degenerative changes present. There is no acute fracture.    Interpretation Summary echocardiogram      · Normal size and function of all cardiac chambers. LVEF >65%.  · No significant valvular pathology           Chief Complaint on Day of Discharge: none    History of Present Illness on Day of Discharge:   Patient is a 41-year-old -American female past medical history of type II diabetes who presents to the emergency room with a history of several syncopal episodes in the recent past. Parent she had another one today and EMS was called. She also has complained of tremors problem sleeping approximately a 24 pound weight loss in the last month without trying to lose weight. She was evaluated in the ER her workup was unremarkable except for totally suppressed TSH of less than 0.20 and a free T4 that was 5.49. This is about more than doubled the upper limit of normal. She has also been evaluated for possible eye tumor recently with an MRI of her orbits which was negative. On my exam it looks like she has the beginnings of exophthalmos possibly. Otherwise her review of systems evaluation is negative she does state her blood sugars be more difficult to control recently as well.    Hospital Course:  The patient is a 41 y.o. female who presented to  with  "syncope episode and frequent falling.  During course evaluation discomfort, patient is very hypotensive with tachycardia.  Thyroid function shows hyper thyroidism.  Nuclear medicine uptake and scan shows Graves' disease. Endocrinology was consulted on the case.  Patient was started on methimazole.  Blood pressure has been fairly well controlled with medication.  Diabetes has also been stable with current treatment.  Patient has a positive drug screen for cocaine and marijuana.  Patient did admit to using it.  Drug rehabilitation was offered, patient refused.  Patient still remained weak but ambulatory.  Vital sign is stable, afebrile.  Endocrinology is has cleared patient to go home.  Plan to discharge patient home.  Patient follow with his PCP in 1 week.  Patient also to get lab checked by his PCP of  thyroid function in 3 weeks.  This is been discussed patient.    Condition on Discharge:  Stable    Physical Exam on Discharge:  BP 96/56 (BP Location: Left arm, Patient Position: Lying)  Pulse 85  Temp 97.8 °F (36.6 °C) (Oral)   Resp 14  Ht 74\" (188 cm)  Wt 198 lb 8 oz (90 kg)  SpO2 100%  BMI 25.49 kg/m2  Physical Exam   Constitutional: She is oriented to person, place, and time. She appears well-developed and well-nourished.   HENT:   Head: Normocephalic and atraumatic.   Eyes: Conjunctivae and EOM are normal. Pupils are equal, round, and reactive to light.   Neck: Neck supple. No JVD present. No thyromegaly present.   Cardiovascular: Normal rate, regular rhythm, normal heart sounds and intact distal pulses.  Exam reveals no gallop and no friction rub.    No murmur heard.  Pulmonary/Chest: Effort normal and breath sounds normal. No respiratory distress. She has no wheezes. She has no rales. She exhibits no tenderness.   Abdominal: Soft. Bowel sounds are normal. She exhibits no distension. There is no tenderness. There is no rebound and no guarding.   Musculoskeletal: Normal range of motion. She exhibits no " edema, tenderness or deformity.   Generalized weakness   Lymphadenopathy:     She has no cervical adenopathy.   Neurological: She is alert and oriented to person, place, and time. She displays normal reflexes. No cranial nerve deficit. She exhibits normal muscle tone.   Skin: Skin is warm and dry. No rash noted.   Psychiatric: She has a normal mood and affect. Her behavior is normal. Judgment and thought content normal.         Discharge Disposition:  Home or Self Care    Discharge Medications:   Lynn Magana   Home Medication Instructions CUCO:[id number]    Printed on:07/12/17 1284   Medication Information                      amLODIPine (NORVASC) 10 MG tablet  Take 0.5 tablets by mouth Daily.             benazepril (LOTENSIN) 20 MG tablet  Take 20 mg by mouth 2 (Two) Times a Day.             DULoxetine (CYMBALTA) 60 MG capsule  Take 60 mg by mouth Daily.             gabapentin (NEURONTIN) 300 MG capsule  Take 300 mg by mouth 4 (Four) Times a Day.             insulin glargine (LANTUS) 100 UNIT/ML injection  Inject  under the skin Daily.             insulin lispro (humaLOG) 100 UNIT/ML injection  Inject  under the skin 3 (Three) Times a Day Before Meals.             metFORMIN (GLUCOPHAGE) 500 MG tablet  Take 2 tablets by mouth 2 (Two) Times a Day With Meals.             methIMAzole (TAPAZOLE) 10 MG tablet  Take 2 tablets by mouth Daily.             ondansetron (ZOFRAN) 4 MG tablet  Take 1 tablet by mouth Every 6 (Six) Hours.             propranolol LA (INDERAL LA) 120 MG 24 hr capsule  Take 1 capsule by mouth Daily.             raNITIdine (ZANTAC) 150 MG tablet  Take 300 mg by mouth Daily.             simvastatin (ZOCOR) 20 MG tablet  Take 20 mg by mouth Every Night.             tiZANidine (ZANAFLEX) 4 MG tablet  Take 4 mg by mouth 2 (Two) Times a Day.             topiramate (TOPAMAX) 25 MG tablet  Take 25 mg by mouth 2 (Two) Times a Day. Medication bottle:Take one tablet by mouth daily for 7 days then 1  tablet by mouth two times a day for 7 days then 1 tablet three times a day for 7 days then 2 tablets two times a day thereafter             traZODone (DESYREL) 100 MG tablet  Take 100 mg by mouth Every Night.                 Discharge Diet:   Diet Instructions     Advance Diet As Tolerated                     Activity at Discharge:   Activity Instructions     Activity as Tolerated                     Discharge Care Plan/Instructions:   Patient to be discharge with home health.    Follow-up Appointments:   Follow-up with PCP 1 week.  Patient follow-up with PCP in 3 weeks to recheck TSH and free T3.    Devyn Moreno MD  07/12/17  4:13 PM    Time:Greater than 30 minutes         Electronically signed by Devyn Moreno MD at 7/12/2017  4:13 PM

## 2017-09-15 ENCOUNTER — HOSPITAL ENCOUNTER (OUTPATIENT)
Dept: MRI IMAGING | Facility: HOSPITAL | Age: 42
Discharge: HOME | End: 2017-09-15
Attending: PSYCHIATRY & NEUROLOGY

## 2017-09-15 ENCOUNTER — LAB (OUTPATIENT)
Dept: LAB | Facility: HOSPITAL | Age: 42
End: 2017-09-15
Attending: PSYCHIATRY & NEUROLOGY

## 2017-09-15 ENCOUNTER — DOCUMENTATION (OUTPATIENT)
Dept: NEUROLOGY | Facility: CLINIC | Age: 42
End: 2017-09-15

## 2017-09-15 ENCOUNTER — HOSPITAL ENCOUNTER (OUTPATIENT)
Dept: NEUROLOGY | Facility: HOSPITAL | Age: 42
Discharge: HOME | End: 2017-09-15
Attending: PSYCHIATRY & NEUROLOGY | Admitting: PSYCHIATRY & NEUROLOGY

## 2017-09-15 ENCOUNTER — OFFICE VISIT (OUTPATIENT)
Dept: NEUROLOGY | Facility: CLINIC | Age: 42
End: 2017-09-15

## 2017-09-15 VITALS
HEART RATE: 74 BPM | WEIGHT: 194 LBS | RESPIRATION RATE: 18 BRPM | DIASTOLIC BLOOD PRESSURE: 80 MMHG | BODY MASS INDEX: 26.28 KG/M2 | SYSTOLIC BLOOD PRESSURE: 132 MMHG | HEIGHT: 72 IN

## 2017-09-15 DIAGNOSIS — R40.4 EPISODE OF ALTERED CONSCIOUSNESS: ICD-10-CM

## 2017-09-15 DIAGNOSIS — I65.23 BILATERAL CAROTID ARTERY STENOSIS: ICD-10-CM

## 2017-09-15 DIAGNOSIS — R51.9 CHRONIC DAILY HEADACHE: ICD-10-CM

## 2017-09-15 DIAGNOSIS — R40.4 EPISODE OF ALTERED CONSCIOUSNESS: Primary | ICD-10-CM

## 2017-09-15 DIAGNOSIS — S09.90XA HEAD TRAUMA, INITIAL ENCOUNTER: ICD-10-CM

## 2017-09-15 LAB
ERYTHROCYTE [SEDIMENTATION RATE] IN BLOOD: 10 MM/HR (ref 0–20)
FOLATE SERPL-MCNC: 6.25 NG/ML
HBA1C MFR BLD: 6.4 %
MAGNESIUM SERPL-MCNC: 1.9 MG/DL (ref 1.4–2.2)
T4 FREE SERPL-MCNC: 0.1 NG/DL (ref 0.78–2.19)
VIT B12 BLD-MCNC: 536 PG/ML (ref 239–931)

## 2017-09-15 PROCEDURE — 83735 ASSAY OF MAGNESIUM: CPT | Performed by: PSYCHIATRY & NEUROLOGY

## 2017-09-15 PROCEDURE — 70551 MRI BRAIN STEM W/O DYE: CPT

## 2017-09-15 PROCEDURE — 36415 COLL VENOUS BLD VENIPUNCTURE: CPT

## 2017-09-15 PROCEDURE — 95816 EEG AWAKE AND DROWSY: CPT

## 2017-09-15 PROCEDURE — 83036 HEMOGLOBIN GLYCOSYLATED A1C: CPT | Performed by: PSYCHIATRY & NEUROLOGY

## 2017-09-15 PROCEDURE — 82607 VITAMIN B-12: CPT | Performed by: PSYCHIATRY & NEUROLOGY

## 2017-09-15 PROCEDURE — 82746 ASSAY OF FOLIC ACID SERUM: CPT | Performed by: PSYCHIATRY & NEUROLOGY

## 2017-09-15 PROCEDURE — 80201 ASSAY OF TOPIRAMATE: CPT | Performed by: PSYCHIATRY & NEUROLOGY

## 2017-09-15 PROCEDURE — 99204 OFFICE O/P NEW MOD 45 MIN: CPT | Performed by: PSYCHIATRY & NEUROLOGY

## 2017-09-15 PROCEDURE — 95816 EEG AWAKE AND DROWSY: CPT | Performed by: PSYCHIATRY & NEUROLOGY

## 2017-09-15 PROCEDURE — 84439 ASSAY OF FREE THYROXINE: CPT | Performed by: PSYCHIATRY & NEUROLOGY

## 2017-09-15 PROCEDURE — 85651 RBC SED RATE NONAUTOMATED: CPT | Performed by: PSYCHIATRY & NEUROLOGY

## 2017-09-15 NOTE — PROGRESS NOTES
Patient has been notified of the lab results and MRI of brain.  Patient is to get back with her primary care doctor for further orders.  Labs faxed to Dr.Dale Mcallister.

## 2017-09-15 NOTE — PATIENT INSTRUCTIONS
Patient not to be driving.  Patient not to be using sharp cutting tools.  Patient safety precautions to take showers not baths and not to be climbing.  Not to be working over hot fires or water.  Not to be swimming by self.  If further seizures occur patient needs to be brought to the emergency room immediately  
No

## 2017-09-15 NOTE — PROGRESS NOTES
I contacted the patient and her home phone [phone] and discuss with her the fact that her EEG did not show any active seizure activity but to go to emergency room if further events occur.  She is getting with her family doctor about her thyroid problems

## 2017-09-15 NOTE — PROGRESS NOTES
Subjective   Lynn Magana, 1975, is a female who is being seen today for   Chief Complaint   Patient presents with   • Headache   • Seizures       HISTORY OF PRESENT ILLNESS: Patient seen for episodes of altered consciousness.  Patient is had these all her life with blackout spells supposedly particularly when she went out in the sun.  Patient is in July was in the hospital with thyrotoxicosis.  Patient on August 4 had her sister witnessed a generalized seizure with tonic-clonic activity both sides hitting her head against the wall.  Patient apparently had some incontinence with that event.  Patient had no tongue biting.  Patient has chronic daily headaches which are bifrontal and she takes muscle relaxants her for that.  Patient was noted on the hospitalization to have bilateral carotid stenosis and family did not know anything about that.  Apparently minutes 50-69%.  Patient is to get with PCP about whether she might be a candidate for using coated baby aspirin daily and she is seeing the PCP next Monday.  Patient is diabetic.  Patient is not taking her blood sugars during these events.  Patient does take gabapentin and also Topamax for her chronic daily headaches.  Patient is had previous use of cocaine and THC was positive for that on last drug screen in the hospital.  Patient has had apparently no blackout spells since August 4.  Patient has a father with seizures.  Patient does not drive.  Patient does not work.    REVIEW OF SYSTEMS:   GENERAL: No acute fever/chills  PULMONARY: No acute shortness of breath  CVS:  No acute chest pain  GASTROINTESTINAL: Patient has constipation  GENITOURINARY: No acute  distress  GYN: Post hysterectomy  MUSCULOSKELETAL: Chronic musculoskeletal symptoms  HEENT:  No acute vision or hearing change  ENDOCRINE:  As above  PSYCHIATRIC: No acute psychiatric symptoms  HEMATOLOGY: Patient has had a history of anemia in the past  SKIN: No acute skin changes  Family history  reviewed and noncontributory other than as above.  Social history: Patient denies smoking alcohol or drug use other than as noted above.    PHYSICAL EXAMINATION:    GENERAL: Patient has some mild diffuse tenderness over the cranium to palpation  CRANIUM: Normocephalic/atraumatic  HEENT: No acute fundic abnormalities.  Pupils equal round reactive to light.       EYES: Fields full to confrontation and EOMs intact without nystagmus       EARS:  Tympanic membranes normal hears tuning fork bilaterally       THROAT: No oropharynx abnormalities       NECK:  Neck is supple no bruits over the neck: No lymphadenopathy  CHEST: No acute cardiopulmonary abnormalities by auscultation  ABDOMEN: Nondistended  EXTREMITIES: Pulses symmetrical  NEURO: Patient alert and follows commands without difficulty  SPEECH:  Normal    CRANIAL NERVES:  Motor sensory about the face normal and symmetric    MOTOR STRENGTH:  Motor strength upper and lower extremities normal  STATION AND GAIT:  Gait is normal/Romberg negative  CEREBELLAR:  Finger-nose and heel shin normal  SENSORY:  Patient claims some decreased pin sensation in the fingers bilaterally but has normal cold sensation.  Patient has normal pin and vibration lower extremities except for some very slight decrease in vibration in the toes bilaterally/ otherwise normal pin and vibration throughout  REFLEXES:  Reflexes present but somewhat decreased throughout upper and lower extremities without Babinski's or clonus      ASSESSMENT AND PLAN:  Patient with episodes of altered consciousness with getting stat EEG and MRI brain.  Also doing further blood work.  Patient is to return to emergency room immediately if further episodes occur and safety precautions are reviewed in detail.      Lynn was seen today for headache and seizures.    Diagnoses and all orders for this visit:    Episode of altered consciousness  -     Cancel: EEG  -     Lipid Panel; Future  -     Magnesium; Future  -     MRI  Brain Without Contrast; Future  -     Sedimentation Rate; Future  -     T4, Free; Future  -     Vitamin B12; Future  -     Folate; Future  -     EEG (Hospital Performed); Future    Head trauma, initial encounter  -     Cancel: CT Head Without Contrast    Bilateral carotid artery stenosis  -     Ambulatory Referral to Vascular Surgery      Answers for HPI/ROS submitted by the patient on 9/8/2017   Neurologic complaint  loss of balance: Yes  memory loss: Yes  syncope: Yes  Chronicity: chronic  Onset: more than 1 year ago  Onset quality: suddenly  Progression since onset: rapidly worsening  Focality: no focality noted  abdominal pain: No  auditory change: No  aura: Yes  bladder incontinence: Yes  bowel incontinence: Yes  diaphoresis: Yes  dizziness: Yes  fatigue: Yes  fever: No  headaches: Yes  light-headedness: Yes  nausea: No  neck pain: No  palpitations: No  shortness of breath: Yes  vertigo: Yes  vomiting: No  Treatments tried: bed rest, medication  Improvement on treatment: no relief

## 2017-09-18 LAB — TOPIRAMATE, SERUM: 2 UG/ML (ref 2–25)

## 2017-10-05 ENCOUNTER — TELEPHONE (OUTPATIENT)
Dept: VASCULAR SURGERY | Facility: CLINIC | Age: 42
End: 2017-10-05

## 2017-10-05 NOTE — TELEPHONE ENCOUNTER
LEFT MESSAGE ABOUT MISSED APPOINTMENT AT 1 AND ADVISED HER TO CALL OFFICE TO RESCHEDULE HER APPOINTMENT.

## 2017-10-26 ENCOUNTER — OFFICE VISIT (OUTPATIENT)
Dept: VASCULAR SURGERY | Facility: CLINIC | Age: 42
End: 2017-10-26

## 2017-10-26 ENCOUNTER — OFFICE VISIT (OUTPATIENT)
Dept: NEUROLOGY | Facility: CLINIC | Age: 42
End: 2017-10-26

## 2017-10-26 VITALS
DIASTOLIC BLOOD PRESSURE: 80 MMHG | HEIGHT: 72 IN | BODY MASS INDEX: 26.28 KG/M2 | WEIGHT: 194 LBS | SYSTOLIC BLOOD PRESSURE: 122 MMHG | HEART RATE: 78 BPM

## 2017-10-26 VITALS
HEIGHT: 72 IN | WEIGHT: 194 LBS | DIASTOLIC BLOOD PRESSURE: 70 MMHG | SYSTOLIC BLOOD PRESSURE: 108 MMHG | BODY MASS INDEX: 26.28 KG/M2 | HEART RATE: 78 BPM | RESPIRATION RATE: 18 BRPM

## 2017-10-26 DIAGNOSIS — R40.4 EPISODE OF ALTERED CONSCIOUSNESS: ICD-10-CM

## 2017-10-26 DIAGNOSIS — R55 SYNCOPE, UNSPECIFIED SYNCOPE TYPE: ICD-10-CM

## 2017-10-26 DIAGNOSIS — I65.23 BILATERAL CAROTID ARTERY STENOSIS: Primary | ICD-10-CM

## 2017-10-26 DIAGNOSIS — I10 ESSENTIAL HYPERTENSION: ICD-10-CM

## 2017-10-26 DIAGNOSIS — I95.1 ORTHOSTASIS: ICD-10-CM

## 2017-10-26 DIAGNOSIS — R51.9 CHRONIC DAILY HEADACHE: Primary | ICD-10-CM

## 2017-10-26 PROCEDURE — 99214 OFFICE O/P EST MOD 30 MIN: CPT | Performed by: PSYCHIATRY & NEUROLOGY

## 2017-10-26 PROCEDURE — 99204 OFFICE O/P NEW MOD 45 MIN: CPT | Performed by: SURGERY

## 2017-10-26 RX ORDER — TOPIRAMATE 25 MG/1
TABLET ORAL
Qty: 90 TABLET | Refills: 1 | Status: SHIPPED | OUTPATIENT
Start: 2017-10-26 | End: 2018-01-03 | Stop reason: DRUGHIGH

## 2017-10-26 NOTE — PATIENT INSTRUCTIONS
Patient to get with PCP about diary.  Patient to get with PCP about orthostasis.  Patient take blood pressures regularly sitting and standing twice daily and keep diary for her PCP

## 2017-10-26 NOTE — PROGRESS NOTES
10/26/2017      Vitaly Caldera MD  [address]  [facility] [address]  [place] [address]    Lynn Magana  1975    Chief Complaint   Patient presents with   • Carotid Artery Disease     Recent seizures.States she passes out frequently Occasional weakness to left and sometimes right side.       Dear Vitaly Caldera MD:      HPI  I had the pleasure of seeing your patient Lynn Magana in the office today.  Thank you kindly for this consultation.  As you recall, Lynn Magana is a 42 y.o.  female who you are currently following for headaches and seizures.  She states her symptoms began about 4 months ago.  She states she passes out, particularly in the sun.  She reports being afraid of the outside.  She admits some shortness of breath And palpitations during these episodes.  She denies any classic strokelike symptoms, and she had noninvasive testing in which I personally reviewed.    Past Medical History:   Diagnosis Date   • Arthritis    • Carotid artery stenosis    • Depression    • Diabetes mellitus    • GERD (gastroesophageal reflux disease)    • Hyperlipidemia    • Hypertension    • Injury of back        Past Surgical History:   Procedure Laterality Date   •  SECTION     • CHOLECYSTECTOMY     • COLONOSCOPY     • CYST REMOVAL     • HYSTERECTOMY         Family History   Problem Relation Age of Onset   • Stroke Mother    • Diabetes Mother    • Stroke Father    • Diabetes Father    • Diabetes Sister    • Coronary artery disease Brother    • Diabetes Brother    • Stroke Sister    • Seizures Sister        Social History     Social History   • Marital status:      Spouse name: N/A   • Number of children: N/A   • Years of education: N/A     Occupational History   • Not on file.     Social History Main Topics   • Smoking status: Never Smoker   • Smokeless tobacco: Never Used   • Alcohol use No   • Drug use: No      Comment: past use marijuana cocaine   • Sexual activity: Defer     Other  Topics Concern   • Not on file     Social History Narrative       No Known Allergies    Prior to Admission medications    Medication Sig Start Date End Date Taking? Authorizing Provider   amLODIPine (NORVASC) 10 MG tablet Take 0.5 tablets by mouth Daily. 7/12/17  Yes Devyn Moreno MD   benazepril (LOTENSIN) 20 MG tablet Take 20 mg by mouth 2 (Two) Times a Day.   Yes Historical Provider, MD   DULoxetine (CYMBALTA) 60 MG capsule Take 60 mg by mouth Daily.   Yes Historical Provider, MD   gabapentin (NEURONTIN) 300 MG capsule Take 300 mg by mouth 4 (Four) Times a Day.   Yes Historical Provider, MD   HYDROcodone-acetaminophen (NORCO) 7.5-325 MG per tablet  9/18/17  Yes Historical Provider, MD   Insulin Glargine (BASAGLAR KWIKPEN) 100 UNIT/ML injection pen  9/11/17  Yes Historical Provider, MD   insulin glargine (LANTUS) 100 UNIT/ML injection Inject  under the skin Daily.   Yes Historical Provider, MD   insulin lispro (humaLOG) 100 UNIT/ML injection Inject  under the skin 3 (Three) Times a Day Before Meals.   Yes Historical Provider, MD   methIMAzole (TAPAZOLE) 10 MG tablet Take 2 tablets by mouth Daily. 7/12/17  Yes Devyn Moreno MD   ondansetron (ZOFRAN) 4 MG tablet Take 1 tablet by mouth Every 6 (Six) Hours. 3/14/17  Yes iLnus Rodriguez MD   polyethylene glycol (MIRALAX) powder  9/11/17  Yes Historical Provider, MD   propranolol LA (INDERAL LA) 120 MG 24 hr capsule Take 1 capsule by mouth Daily. 7/12/17  Yes Devyn Moreno MD   raNITIdine (ZANTAC) 300 MG tablet  9/11/17  Yes Historical Provider, MD   simvastatin (ZOCOR) 20 MG tablet Take 20 mg by mouth Every Night.   Yes Historical Provider, MD   tiZANidine (ZANAFLEX) 4 MG tablet Take 4 mg by mouth 2 (Two) Times a Day.   Yes Historical Provider, MD   topiramate (TOPAMAX) 25 MG tablet Take 25 mg by mouth 2 (Two) Times a Day. Medication bottle:Take one tablet by mouth daily for 7 days then 1 tablet by mouth two times a day for 7 days then 1 tablet three times a day for 7  "days then 2 tablets two times a day thereafter 3/30/17  Yes Historical Provider, MD   traZODone (DESYREL) 100 MG tablet Take 100 mg by mouth Every Night.   Yes Historical Provider, MD   Insulin Glargine (Insulin Glargine) 100 UNIT/ML injection pen  9/11/17 10/26/17 Yes Historical Provider, MD   CloNIDine (CATAPRES) 0.3 MG tablet  9/11/17 10/26/17  Historical Provider, MD   furosemide (LASIX) 20 MG tablet  9/11/17 10/26/17  Historical Provider, MD   hydrALAZINE (APRESOLINE) 25 MG tablet  9/11/17 10/26/17  Historical Provider, MD   metFORMIN ER (GLUCOPHAGE-XR) 500 MG 24 hr tablet  9/11/17 10/26/17  Historical Provider, MD       Review of Systems   Constitutional: Negative.    HENT: Negative.    Eyes: Negative.    Respiratory: Negative.    Cardiovascular: Positive for palpitations.   Gastrointestinal: Negative.    Endocrine: Negative.    Genitourinary: Negative.    Musculoskeletal: Negative.    Skin: Negative.    Allergic/Immunologic: Negative.    Neurological: Positive for dizziness.   Hematological: Negative.    Psychiatric/Behavioral: Negative.    All other systems reviewed and are negative.      /80  Pulse 78  Ht 74\" (188 cm)  Wt 194 lb (88 kg)  BMI 24.91 kg/m2  Physical Exam   Constitutional: She is oriented to person, place, and time. She appears well-developed and well-nourished.   HENT:   Head: Normocephalic and atraumatic.   Eyes: Pupils are equal, round, and reactive to light. No scleral icterus.   Neck: Neck supple. No JVD present. Carotid bruit is not present. No thyromegaly present.   Cardiovascular: Normal rate, regular rhythm and normal heart sounds.    Pulses:       Carotid pulses are 2+ on the right side, and 2+ on the left side.       Femoral pulses are 2+ on the right side, and 2+ on the left side.       Popliteal pulses are 2+ on the right side, and 2+ on the left side.        Dorsalis pedis pulses are 2+ on the right side, and 2+ on the left side.        Posterior tibial pulses are 2+ on " the right side, and 2+ on the left side.   Pulmonary/Chest: Effort normal and breath sounds normal.   Abdominal: Soft. Bowel sounds are normal. She exhibits no distension, no abdominal bruit and no mass. There is no hepatosplenomegaly. There is no tenderness.   Musculoskeletal: Normal range of motion. She exhibits no edema.   Lymphadenopathy:     She has no cervical adenopathy.   Neurological: She is alert and oriented to person, place, and time. She has normal strength. No cranial nerve deficit or sensory deficit.   Skin: Skin is warm, dry and intact.   Psychiatric: She has a normal mood and affect.   Nursing note and vitals reviewed.      Diagnostic data:  EXAMINATION: US CAROTID BILATERAL- 7/8/2017 5:15 PM CDT      HISTORY: Syncope      COMPARISON: None      FINDINGS:  Real time ultrasound with the assistance of color and spectral Doppler  demonstrates intimal thickening with few scattered areas of plaque  throughout the carotid arteries. Elevated peak systolic velocities are  demonstrated bilaterally, yielding a normal ICA/CCA peak systolic  velocity ratio of 0.93 on the right and 0.95 on the left.  Peak systolic  velocities within the right CCA, ICA, and ECA are as follows: 160 cm/s,  149 cm/s, and 173 cm/s.  Peak systolic velocities within the left CCA,  ICA, and ECA are as follows: 165 cm/s, 157 cm/s, and 83 cm/s..   Antegrade vertebral artery flow is seen on the right. The left vertebral  artery was not visualized.      IMPRESSION:      1. Findings suggest 50-69% stenosis bilaterally per Society of  Radiologists in Ultrasound Consensus Criteria.  See below. This could be  confirmed with CTA neck nonemergently.      2. Left vertebral artery is not visualized.    Patient Active Problem List   Diagnosis   • Syncope   • Thyroid adenoma, toxic   • Polysubstance abuse   • Hypertension   • Diabetes         ICD-10-CM ICD-9-CM   1. Bilateral carotid artery stenosis I65.23 433.10     433.30   2. Syncope, unspecified  syncope type R55 780.2   3. Essential hypertension I10 401.9       Lab Frequency Next Occurrence   MRI Orbit Face Neck With Contrast Once 4/18/2017   Lipid Panel Once 10/31/2017       Plan: After thoroughly evaluating Lynn Magana, I believe the best course of action is to Remain conservative from a vascular surgery standpoint.  I reviewed her testing very carefully and she has hyperdynamic flow.  Her symptoms sound more anxiety related with the palpitations and nervousness of being outside.  She may even need to see cardiology to make sure she has no underlying arrhythmias.  It is okay she takes a baby aspirin daily.  She can follow-up as needed.  I did  her about her vascular risk factors with regards to hypertension and diabetes.  The patient can continue taking her current medication regimen as previously planned.  This was all discussed in full with complete understanding.    Thank you for allowing me to participate in the care of your patient.  Please do not hesitate with any questions or concerns.  I will keep you aware of any further encounters with Lynn Magana.        Sincerely yours,         Edis Enriquez, DO David Mcallister MD

## 2017-10-26 NOTE — PROGRESS NOTES
Subjective   Lynn Magana, 1975, is a female who is being seen today for   Chief Complaint   Patient presents with   • Seizures   • Headache       HISTORY OF PRESENT ILLNESS:Extended follow-up.  Patient has been seen previously for chronic daily headaches/bifrontal with minimal nausea/vision change and episode of altered consciousness.  Patient had EEG that was normal and MRI brain normal.   Patient has seen Dr. Enriquez and he feels that the carotid stenosis is a false reading and does not recommend that she be on aspirin daily.  Patient had another episode of altered consciousness briefly where she was in the kitchen and was out briefly.  Patient  apparently had just gotten up to go to the kitchen.  Patient is having blood pressure drop.  Patient's blood pressure 120/80 sitting and 108/70 standing.  Patient continued to have abnormal drug screen with positive cocaine in THC.  Patient continues thyroid problems and is following with PCP on that.  Patient's Topamax level was 2.0.  Patient does not drive.  Patient does not work.  REVIEW OF SYSTEMS:   GENERAL: No fever/chills  PULMONARY: No acute shortness of breath    CVS:  No acute chest pain  GASTROINTESTINAL: as above  MUSCULOSKELETAL: chronic musculoskeletal symptoms   HEENT:  As above   ENDOCRINE:  as above   PSYCHIATRIC: no acute psychiatric symptoms  HEMATOLOGY:Patient has history of anemia  GENITOURINARY: No acute  distress   GYN: Post hysterectomy    family history of father with the seizure  Social history: Patient denies alcohol or smoking.    PHYSICAL EXAMINATION:    GENERAL: No acute distress.  Patient does walk with a walker   CRANIUM: Mild tenderness over the scalp areas to palpation   HEENT: No acute fundic abnormalities.  Pupils equal round reactive to light.  EYES: EOMs intact without nystagmus and fields full to confrontation        EARS:  Tympanic membranes normal hears tuning fork bilaterally        THROAT: No oropharynx abnormalities         NECK:  No bruits/no lymphadenopathy   CHEST: No acute cardiopulmonary abnormalities by auscultation   ABDOMEN: Nondistended  NEURO: Patient alert and follows commands without difficulty   SPEECH:  Normal     CRANIAL NERVES:  Motor sensory about the face normal and symmetric   EXTREMITIES:  PULSES SYMMETRICAL   MOTOR STRENGTH:  MOTOR STRENGTH UPPER AND LOWER EXTREMITIES NORMAL   STATION AND GAIT:  patient uses her walker to walk but no tendency to fall on Romberg negative   CEREBELLAR:  finger-nose and heel shin normal   SENSORY: Patient claims decreased pin and vibration distal to proximal in lower extremities and upper extremities bilaterally.  In the lower extremity this is to above the ankle bilaterally/ in the upper extremities is to the wrist bilaterally   REFLEXES:Reflexes decreased throughout upper and lower extremities without Babinski's or clonus.    ASSESSMENT AND PLAN:  patient with history of chronic daily headaches which may have some migraine component and were increasing her Topamax to 25 mg in the morning and 50 mg at night warning of side effects.  Patient is to get CBC and CMP in 2 weeks.  Patient has orthostasis and is to get back with PCP after checking her blood pressures regularly sitting and standing.  Safety precautions reviewed about her episodes of altered consciousness .  I spent 25 minutes with this patient with 15 minutes in counseling      Lynn was seen today for seizures and headache.    Diagnoses and all orders for this visit:    Chronic daily headache  -     CBC & Differential; Future  -     Comprehensive Metabolic Panel; Future    Orthostasis    Episode of altered consciousness    Other orders  -     topiramate (TOPAMAX) 25 MG tablet; Take 1 tab po in am and 2 tabs po at bedtime

## 2017-10-31 ENCOUNTER — DOCUMENTATION (OUTPATIENT)
Dept: NEUROLOGY | Facility: CLINIC | Age: 42
End: 2017-10-31

## 2017-10-31 NOTE — PROGRESS NOTES
Spoke with patient in regards to  suggestion to start a low dose baby ASA daily.  Patient is to first contact her PCP to discuss before starting the ASA.  I also reminded her to speak with her PCP about the heart palpitations she is having.  Patient voices understanding and states she will contact her family MD.

## 2017-11-09 ENCOUNTER — LAB (OUTPATIENT)
Dept: LAB | Facility: HOSPITAL | Age: 42
End: 2017-11-09
Attending: PSYCHIATRY & NEUROLOGY

## 2017-11-09 DIAGNOSIS — R40.4 EPISODE OF ALTERED CONSCIOUSNESS: ICD-10-CM

## 2017-11-09 DIAGNOSIS — R51.9 CHRONIC DAILY HEADACHE: ICD-10-CM

## 2017-11-09 LAB
ALBUMIN SERPL-MCNC: 4.3 G/DL (ref 3.5–5)
ALBUMIN/GLOB SERPL: 1.3 G/DL (ref 1.1–2.5)
ALP SERPL-CCNC: 115 U/L (ref 24–120)
ALT SERPL W P-5'-P-CCNC: 32 U/L (ref 0–54)
ANION GAP SERPL CALCULATED.3IONS-SCNC: 11 MMOL/L (ref 4–13)
ARTICHOKE IGE QN: 96 MG/DL (ref 0–99)
AST SERPL-CCNC: 22 U/L (ref 7–45)
BASOPHILS # BLD AUTO: 0.01 10*3/MM3 (ref 0–0.2)
BASOPHILS NFR BLD AUTO: 0.2 % (ref 0–2)
BILIRUB SERPL-MCNC: 0.3 MG/DL (ref 0.1–1)
BUN BLD-MCNC: 15 MG/DL (ref 5–21)
BUN/CREAT SERPL: 12.4 (ref 7–25)
CALCIUM SPEC-SCNC: 9.4 MG/DL (ref 8.4–10.4)
CHLORIDE SERPL-SCNC: 107 MMOL/L (ref 98–110)
CHOLEST SERPL-MCNC: 200 MG/DL (ref 130–200)
CO2 SERPL-SCNC: 24 MMOL/L (ref 24–31)
CREAT BLD-MCNC: 1.21 MG/DL (ref 0.5–1.4)
DEPRECATED RDW RBC AUTO: 45.6 FL (ref 40–54)
EOSINOPHIL # BLD AUTO: 0.12 10*3/MM3 (ref 0–0.7)
EOSINOPHIL NFR BLD AUTO: 2.3 % (ref 0–4)
ERYTHROCYTE [DISTWIDTH] IN BLOOD BY AUTOMATED COUNT: 15.8 % (ref 12–15)
GFR SERPL CREATININE-BSD FRML MDRD: 59 ML/MIN/1.73
GLOBULIN UR ELPH-MCNC: 3.2 GM/DL
GLUCOSE BLD-MCNC: 133 MG/DL (ref 70–100)
HCT VFR BLD AUTO: 42.7 % (ref 37–47)
HDLC SERPL-MCNC: 84 MG/DL
HGB BLD-MCNC: 14.5 G/DL (ref 12–16)
LDLC/HDLC SERPL: 1.1 {RATIO}
LYMPHOCYTES # BLD AUTO: 1.69 10*3/MM3 (ref 0.72–4.86)
LYMPHOCYTES NFR BLD AUTO: 32.9 % (ref 15–45)
MCH RBC QN AUTO: 27 PG (ref 28–32)
MCHC RBC AUTO-ENTMCNC: 34 G/DL (ref 33–36)
MCV RBC AUTO: 79.5 FL (ref 82–98)
MONOCYTES # BLD AUTO: 0.23 10*3/MM3 (ref 0.19–1.3)
MONOCYTES NFR BLD AUTO: 4.5 % (ref 4–12)
NEUTROPHILS # BLD AUTO: 3.09 10*3/MM3 (ref 1.87–8.4)
NEUTROPHILS NFR BLD AUTO: 60.1 % (ref 39–78)
PLATELET # BLD AUTO: 319 10*3/MM3 (ref 130–400)
PMV BLD AUTO: 10.8 FL (ref 6–12)
POTASSIUM BLD-SCNC: 4.4 MMOL/L (ref 3.5–5.3)
PROT SERPL-MCNC: 7.5 G/DL (ref 6.3–8.7)
RBC # BLD AUTO: 5.37 10*6/MM3 (ref 4.2–5.4)
SODIUM BLD-SCNC: 142 MMOL/L (ref 135–145)
TRIGL SERPL-MCNC: 120 MG/DL (ref 0–149)
WBC NRBC COR # BLD: 5.14 10*3/MM3 (ref 4.8–10.8)

## 2017-11-09 PROCEDURE — 80053 COMPREHEN METABOLIC PANEL: CPT | Performed by: PSYCHIATRY & NEUROLOGY

## 2017-11-09 PROCEDURE — 80061 LIPID PANEL: CPT | Performed by: PSYCHIATRY & NEUROLOGY

## 2017-11-09 PROCEDURE — 85025 COMPLETE CBC W/AUTO DIFF WBC: CPT | Performed by: PSYCHIATRY & NEUROLOGY

## 2017-11-27 ENCOUNTER — OFFICE VISIT (OUTPATIENT)
Dept: SURGERY | Age: 42
End: 2017-11-27
Payer: MEDICAID

## 2017-11-27 VITALS
WEIGHT: 204.4 LBS | BODY MASS INDEX: 27.68 KG/M2 | RESPIRATION RATE: 18 BRPM | DIASTOLIC BLOOD PRESSURE: 80 MMHG | SYSTOLIC BLOOD PRESSURE: 138 MMHG | HEART RATE: 84 BPM | HEIGHT: 72 IN

## 2017-11-27 DIAGNOSIS — R92.8 ABNORMAL MAMMOGRAM: ICD-10-CM

## 2017-11-27 DIAGNOSIS — N63.0 LUMP OR MASS IN BREAST: ICD-10-CM

## 2017-11-27 PROCEDURE — 99203 OFFICE O/P NEW LOW 30 MIN: CPT | Performed by: SURGERY

## 2017-11-27 RX ORDER — SIMVASTATIN 20 MG
20 TABLET ORAL
COMMUNITY

## 2017-11-27 RX ORDER — ONDANSETRON 4 MG/1
4 TABLET, FILM COATED ORAL
COMMUNITY
Start: 2017-03-14

## 2017-11-27 RX ORDER — METHIMAZOLE 10 MG/1
20 TABLET ORAL
COMMUNITY
Start: 2017-07-12 | End: 2018-12-10 | Stop reason: ALTCHOICE

## 2017-11-27 RX ORDER — DULOXETIN HYDROCHLORIDE 60 MG/1
60 CAPSULE, DELAYED RELEASE ORAL DAILY
COMMUNITY
End: 2017-12-19 | Stop reason: SDUPTHER

## 2017-11-27 RX ORDER — TIZANIDINE 4 MG/1
4 TABLET ORAL
COMMUNITY

## 2017-11-27 RX ORDER — PROPRANOLOL HYDROCHLORIDE 120 MG/1
120 CAPSULE, EXTENDED RELEASE ORAL
COMMUNITY
Start: 2017-07-12

## 2017-11-27 RX ORDER — TRAZODONE HYDROCHLORIDE 100 MG/1
100 TABLET ORAL
COMMUNITY

## 2017-11-27 RX ORDER — AMLODIPINE BESYLATE 10 MG/1
10 TABLET ORAL DAILY
COMMUNITY

## 2017-11-27 RX ORDER — RANITIDINE 300 MG/1
TABLET ORAL
COMMUNITY
Start: 2017-09-11

## 2017-11-27 RX ORDER — TOPIRAMATE 25 MG/1
TABLET ORAL
COMMUNITY
Start: 2017-10-26

## 2017-11-27 RX ORDER — INSULIN GLARGINE 100 [IU]/ML
INJECTION, SOLUTION SUBCUTANEOUS
COMMUNITY
Start: 2017-09-11

## 2017-11-27 RX ORDER — GABAPENTIN 300 MG/1
300 CAPSULE ORAL 2 TIMES DAILY
COMMUNITY

## 2017-11-27 RX ORDER — DULOXETIN HYDROCHLORIDE 60 MG/1
60 CAPSULE, DELAYED RELEASE ORAL
COMMUNITY

## 2017-11-27 RX ORDER — HYDROCODONE BITARTRATE AND ACETAMINOPHEN 7.5; 325 MG/1; MG/1
TABLET ORAL
COMMUNITY
Start: 2017-09-18 | End: 2018-12-10 | Stop reason: ALTCHOICE

## 2017-11-27 RX ORDER — POLYETHYLENE GLYCOL 3350 17 G/17G
POWDER, FOR SOLUTION ORAL
COMMUNITY
Start: 2017-09-11

## 2017-11-27 RX ORDER — BENAZEPRIL HYDROCHLORIDE 20 MG/1
20 TABLET ORAL DAILY
COMMUNITY

## 2017-11-27 NOTE — PROGRESS NOTES
HISTORY OF PRESENT ILLNESS:    Ms. Lupe Hernadez is a 36 y.o. black female who presents with a palpable mass in the right breast.      Mammogram done at [facility] demonstrates a lesion in this area. Ultrasound shows a 14 x 13 x 7 mm hyperechoic area with a lucent center. I suspect this represents trauma rather than neoplasm. She has a family history of breast cancer in her maternal aunt    She is premenopausal.    She has had no prior breast problems    BREAST EXAM:    Glen Zhao  has fibrocystic changes throughout both breasts. There is a palpable mass on the right but no skin or nipple changes and no axillary adenopathy. IMPRESSION: Palpable and ultrasound abnormality right breast                          Benign fibrocystic changes                           Low probability of malignancy    PLAN: I would recommend  ultrasound-guided mammotome biopsy at [facility] at her convenience. We discussed the risks and benefits and she understands and is agreeable to proceed. DISCUSSION:  We discussed  the fibrocystic disease and its relationship to breast cancer  , the pathophysiology of breast cancer, the pathophysiology of fibrocystic disease, the importance of routine mammograms, the technique of good breast self-examination and encouraged her and the effect of caffeine on her breasts    Over 50% of visit time was spent counseling patient. 30 minutes of face to face time spent with patient.

## 2017-12-02 PROBLEM — R92.8 ABNORMAL MAMMOGRAM: Status: ACTIVE | Noted: 2017-12-02

## 2017-12-02 PROBLEM — N63.0 LUMP OR MASS IN BREAST: Status: ACTIVE | Noted: 2017-12-02

## 2017-12-07 ENCOUNTER — HOSPITAL ENCOUNTER (OUTPATIENT)
Dept: WOMENS IMAGING | Age: 42
Discharge: HOME | End: 2017-12-07
Payer: MEDICAID

## 2017-12-07 DIAGNOSIS — N63.10 BREAST MASS, RIGHT: ICD-10-CM

## 2017-12-07 PROCEDURE — G0206 DX MAMMO INCL CAD UNI: HCPCS

## 2017-12-07 PROCEDURE — 19083 BX BREAST 1ST LESION US IMAG: CPT

## 2017-12-07 PROCEDURE — 88305 TISSUE EXAM BY PATHOLOGIST: CPT

## 2017-12-13 ENCOUNTER — TELEPHONE (OUTPATIENT)
Dept: SURGERY | Age: 42
End: 2017-12-13

## 2017-12-19 ENCOUNTER — OFFICE VISIT (OUTPATIENT)
Dept: SURGERY | Age: 42
End: 2017-12-19
Payer: MEDICAID

## 2017-12-19 VITALS — DIASTOLIC BLOOD PRESSURE: 86 MMHG | SYSTOLIC BLOOD PRESSURE: 110 MMHG | HEART RATE: 72 BPM

## 2017-12-19 DIAGNOSIS — N64.1 FAT NECROSIS (SEGMENTAL) OF BREAST: Primary | ICD-10-CM

## 2017-12-19 PROCEDURE — 99212 OFFICE O/P EST SF 10 MIN: CPT | Performed by: PHYSICIAN ASSISTANT

## 2017-12-24 NOTE — PROGRESS NOTES
HISTORY OF PRESENT ILLNESS:    Ms. Faheem Rowland comes in today for 2 week follow-up after uncomplicated  Right  ultrasound guided breast biopsy. Pathology demonstrated fat necrosis    She has appropriate postoperative discomfort, and no significant complaints. PHYSICAL EXAM:  Her wound is well healed with no evidence of infection. She has a small hematoma.       IMPRESSION:  fat necrosis    PLAN:  I'll need to see her back in 6 months for  unilateral right mammogram.

## 2018-01-03 ENCOUNTER — OFFICE VISIT (OUTPATIENT)
Dept: NEUROLOGY | Facility: CLINIC | Age: 43
End: 2018-01-03

## 2018-01-03 VITALS
SYSTOLIC BLOOD PRESSURE: 120 MMHG | HEIGHT: 72 IN | WEIGHT: 200 LBS | BODY MASS INDEX: 27.09 KG/M2 | DIASTOLIC BLOOD PRESSURE: 80 MMHG | HEART RATE: 74 BPM

## 2018-01-03 DIAGNOSIS — R55 SYNCOPE, UNSPECIFIED SYNCOPE TYPE: ICD-10-CM

## 2018-01-03 DIAGNOSIS — R68.89 SPELLS OF DECREASED ATTENTIVENESS: ICD-10-CM

## 2018-01-03 DIAGNOSIS — G89.29 CHRONIC INTRACTABLE HEADACHE, UNSPECIFIED HEADACHE TYPE: Primary | ICD-10-CM

## 2018-01-03 DIAGNOSIS — I10 ESSENTIAL HYPERTENSION: ICD-10-CM

## 2018-01-03 DIAGNOSIS — R51.9 CHRONIC INTRACTABLE HEADACHE, UNSPECIFIED HEADACHE TYPE: Primary | ICD-10-CM

## 2018-01-03 DIAGNOSIS — F19.10 POLYSUBSTANCE ABUSE (HCC): ICD-10-CM

## 2018-01-03 PROCEDURE — 99214 OFFICE O/P EST MOD 30 MIN: CPT | Performed by: CLINICAL NURSE SPECIALIST

## 2018-01-03 RX ORDER — TOPIRAMATE 50 MG/1
50 TABLET, FILM COATED ORAL 2 TIMES DAILY
Qty: 60 TABLET | Refills: 4 | Status: SHIPPED | OUTPATIENT
Start: 2018-01-03 | End: 2018-06-07 | Stop reason: SDUPTHER

## 2018-01-03 NOTE — PROGRESS NOTES
Subjective     Chief Complaint   Patient presents with   • Headache     No change   • Seizures     She states she has had 3 sz since last visit- they were not as intense as before       Lynn Magana is a 42 y.o. female right handed individual, not currently working. She is here today for follow up for headache and syncope/altered consciousness. She is accompanied by her . She was last seen 10/26/17. She was started on Topamax 25 mg AM and 50 mg PM. She reports no improvement of her headaches. She denies side effects with topamax. Denies paresthesias or impaired cognition.    Since October 2017 had 3 spells. One type was wrestling with her daughter and bumped the back of her head and was disoriented. Did not bite tongue, did not have incontinence of bowel/bladder. Lasts less than 5 minutes. Would put cold towel on back of neck and come right out of it. Patient initially described her spells in relation as she is afraid of being in the outside and would develop palpitations.     Reports Has not used marijuana or cocaine since September 2017. She denies alcohol use.     Carotid duplex scan did show 50-69% stenosis. Patient was referred to vascular surgery who suggested was results from hyperdynamic flow and was cleared to take ASA 81 mg daily but patient has not started.     Headache    This is a chronic problem. The current episode started more than 1 year ago (on/off for 10 years). Episode frequency: 3 days weekly. last about 6 hours or longer. The problem has been unchanged. The pain is located in the bilateral and frontal (occasionally occipital) region. The pain does not radiate. The pain quality is not similar to prior headaches. The quality of the pain is described as throbbing. Associated symptoms include blurred vision, nausea, phonophobia, photophobia and seizures. Pertinent negatives include no dizziness, vomiting or weakness. The symptoms are aggravated by noise. Treatments tried: topamax, beta  "blocker, gabapentin, trazodone. The treatment provided no relief. Her past medical history is significant for hypertension, migraine headaches, migraines in the family (sister ) and obesity. (Thyroid disorder, DM)   Seizures    Associated symptoms include headaches and nausea. Pertinent negatives include no confusion, no chest pain and no vomiting.   Altered Mental Status   This is a chronic problem. Episode onset: July 2017. The problem has been unchanged. Associated symptoms include headaches and nausea. Pertinent negatives include no arthralgias, chest pain, myalgias, vomiting or weakness. Associated symptoms comments: Started in July when out I bright light and very hot.waiting at bust stop.  Not light headed and passed out. Was unconscious for a few minutes but felt weak. No tongue biting, she did vomit. She made it to [employer] and \"passed out again\" . Very diaphoretic. She was incontinent of bowel. She had a HA that day and lasted for 2-3 days. She was shaking all over.. Exacerbated by: cocaine, THC.        Current Outpatient Prescriptions   Medication Sig Dispense Refill   • amLODIPine (NORVASC) 10 MG tablet Take 0.5 tablets by mouth Daily.     • benazepril (LOTENSIN) 20 MG tablet Take 20 mg by mouth 2 (Two) Times a Day.     • DULoxetine (CYMBALTA) 60 MG capsule Take 60 mg by mouth Daily.     • gabapentin (NEURONTIN) 300 MG capsule Take 300 mg by mouth 4 (Four) Times a Day.     • HYDROcodone-acetaminophen (NORCO) 7.5-325 MG per tablet Take 1 tablet by mouth Every 4 (Four) Hours As Needed.     • Insulin Glargine (BASAGLAR KWIKPEN) 100 UNIT/ML injection pen      • insulin glargine (LANTUS) 100 UNIT/ML injection Inject  under the skin Daily.     • insulin lispro (humaLOG) 100 UNIT/ML injection Inject  under the skin 3 (Three) Times a Day Before Meals.     • methIMAzole (TAPAZOLE) 10 MG tablet Take 2 tablets by mouth Daily. 30 tablet 0   • ondansetron (ZOFRAN) 4 MG tablet Take 1 tablet by mouth Every 6 (Six) Hours. " 15 tablet 0   • polyethylene glycol (MIRALAX) powder Take 17 g by mouth As Needed.     • propranolol LA (INDERAL LA) 120 MG 24 hr capsule Take 1 capsule by mouth Daily. 30 capsule 2   • raNITIdine (ZANTAC) 300 MG tablet Take 300 mg by mouth Every Night.     • simvastatin (ZOCOR) 20 MG tablet Take 20 mg by mouth Every Night.     • tiZANidine (ZANAFLEX) 4 MG tablet Take 4 mg by mouth 2 (Two) Times a Day.     • traZODone (DESYREL) 100 MG tablet Take 100 mg by mouth Every Night.     • topiramate (TOPAMAX) 50 MG tablet Take 1 tablet by mouth 2 (Two) Times a Day. 60 tablet 4     No current facility-administered medications for this visit.        Past Medical History:   Diagnosis Date   • Arthritis    • Carotid artery stenosis    • Depression    • Diabetes mellitus    • GERD (gastroesophageal reflux disease)    • Hyperlipidemia    • Hypertension    • Injury of back        Past Surgical History:   Procedure Laterality Date   •  SECTION     • CHOLECYSTECTOMY     • COLONOSCOPY     • CYST REMOVAL     • HYSTERECTOMY         family history includes Coronary artery disease in her brother; Diabetes in her brother, father, mother, and sister; Seizures in her sister; Stroke in her father, mother, and sister.    Social History   Substance Use Topics   • Smoking status: Never Smoker   • Smokeless tobacco: Never Used   • Alcohol use No       Review of Systems   Constitutional: Negative for unexpected weight change.   HENT: Negative for postnasal drip.    Eyes: Positive for blurred vision and photophobia.   Respiratory: Negative for chest tightness.    Cardiovascular: Negative for chest pain.   Gastrointestinal: Positive for nausea. Negative for vomiting.   Genitourinary: Negative for dysuria and frequency.   Musculoskeletal: Negative for arthralgias, gait problem and myalgias.   Neurological: Positive for seizures and headaches. Negative for dizziness and weakness.   Hematological: Negative for adenopathy.  "  Psychiatric/Behavioral: Negative for agitation, confusion and hallucinations.       Objective     /80  Pulse 74  Ht 188 cm (74\")  Wt 90.7 kg (200 lb)  BMI 25.68 kg/m2, Body mass index is 25.68 kg/(m^2).    Physical Exam   Constitutional: She is oriented to person, place, and time. She appears well-developed and well-nourished.   HENT:   Head: Normocephalic and atraumatic.   Right Ear: External ear normal.   Left Ear: External ear normal.   Nose: Nose normal.   Mouth/Throat: Oropharynx is clear and moist.   Eyes: Conjunctivae, EOM and lids are normal. Pupils are equal, round, and reactive to light.   Neck: Normal range of motion. Neck supple. Carotid bruit is not present.   Cardiovascular: Normal rate, regular rhythm and normal heart sounds.    No murmur heard.  Pulmonary/Chest: Effort normal and breath sounds normal.   Abdominal: Soft. Bowel sounds are normal.   Musculoskeletal: Normal range of motion.   Neurological: She is alert and oriented to person, place, and time. She has normal strength and normal reflexes. She displays no tremor. No sensory deficit. She exhibits normal muscle tone. She displays a negative Romberg sign. Gait normal. GCS eye subscore is 4. GCS verbal subscore is 5. GCS motor subscore is 6.   Reflex Scores:       Tricep reflexes are 2+ on the right side and 2+ on the left side.       Bicep reflexes are 2+ on the right side and 2+ on the left side.       Brachioradialis reflexes are 2+ on the right side and 2+ on the left side.       Patellar reflexes are 2+ on the right side and 2+ on the left side.       Achilles reflexes are 2+ on the right side and 2+ on the left side.  CN II:  Visual fields full.  Pupils equally reactive to light  CN III, IV, VI:  Extraocular Muscles full with no signs of nystagmus  CN V:  Facial sensory is symmetric with no asymetries.  CN VII:  Facial motor symmetric  CN VIII:  Gross hearing intact bilaterally  CN IX:  Palate elevates symmetrically  CN X:  " Palate elevates symmetrically  CN XI:  Shoulder shrug symmetric  CN XII:  Tongue is midline on protrusion   Skin: Skin is warm and dry.   Psychiatric: She has a normal mood and affect. Her speech is normal and behavior is normal. Cognition and memory are normal.   Nursing note and vitals reviewed.      Results for orders placed or performed in visit on 11/09/17   Comprehensive Metabolic Panel   Result Value Ref Range    Glucose 133 (H) 70 - 100 mg/dL    BUN 15 5 - 21 mg/dL    Creatinine 1.21 0.50 - 1.40 mg/dL    Sodium 142 135 - 145 mmol/L    Potassium 4.4 3.5 - 5.3 mmol/L    Chloride 107 98 - 110 mmol/L    CO2 24.0 24.0 - 31.0 mmol/L    Calcium 9.4 8.4 - 10.4 mg/dL    Total Protein 7.5 6.3 - 8.7 g/dL    Albumin 4.30 3.50 - 5.00 g/dL    ALT (SGPT) 32 0 - 54 U/L    AST (SGOT) 22 7 - 45 U/L    Alkaline Phosphatase 115 24 - 120 U/L    Total Bilirubin 0.3 0.1 - 1.0 mg/dL    eGFR  African Amer 59 (L) >60 mL/min/1.73    Globulin 3.2 gm/dL    A/G Ratio 1.3 1.1 - 2.5 g/dL    BUN/Creatinine Ratio 12.4 7.0 - 25.0    Anion Gap 11.0 4.0 - 13.0 mmol/L   Lipid Panel   Result Value Ref Range    Total Cholesterol 200 130 - 200 mg/dL    Triglycerides 120 0 - 149 mg/dL    HDL Cholesterol 84 >=50 mg/dL    LDL Cholesterol  96 0 - 99 mg/dL    LDL/HDL Ratio 1.10    CBC Auto Differential   Result Value Ref Range    WBC 5.14 4.80 - 10.80 10*3/mm3    RBC 5.37 4.20 - 5.40 10*6/mm3    Hemoglobin 14.5 12.0 - 16.0 g/dL    Hematocrit 42.7 37.0 - 47.0 %    MCV 79.5 (L) 82.0 - 98.0 fL    MCH 27.0 (L) 28.0 - 32.0 pg    MCHC 34.0 33.0 - 36.0 g/dL    RDW 15.8 (H) 12.0 - 15.0 %    RDW-SD 45.6 40.0 - 54.0 fl    MPV 10.8 6.0 - 12.0 fL    Platelets 319 130 - 400 10*3/mm3    Neutrophil % 60.1 39.0 - 78.0 %    Lymphocyte % 32.9 15.0 - 45.0 %    Monocyte % 4.5 4.0 - 12.0 %    Eosinophil % 2.3 0.0 - 4.0 %    Basophil % 0.2 0.0 - 2.0 %    Neutrophils, Absolute 3.09 1.87 - 8.40 10*3/mm3    Lymphocytes, Absolute 1.69 0.72 - 4.86 10*3/mm3    Monocytes, Absolute  0.23 0.19 - 1.30 10*3/mm3    Eosinophils, Absolute 0.12 0.00 - 0.70 10*3/mm3    Basophils, Absolute 0.01 0.00 - 0.20 10*3/mm3        ASSESSMENT/PLAN    Diagnoses and all orders for this visit:    Chronic intractable headache, unspecified headache type    Polysubstance abuse  -     Ambulatory EEG (Hospital Performed); Future    Syncope, unspecified syncope type  -     Ambulatory EEG (Hospital Performed); Future    Essential hypertension    Spells of decreased attentiveness  -     Ambulatory EEG (Hospital Performed); Future    Other orders  -     topiramate (TOPAMAX) 50 MG tablet; Take 1 tablet by mouth 2 (Two) Times a Day.      MEDICAL DECISION MAKIN. Increase topamax to 50 mg BID.  2. Uncertain etiology of spells. Will obtain 24 hour EEG. Spells may be anxiety related.  3. Patient applauded for cessation of marijuana and cocaine.  4. BP management per PCP  5. Does not use tobacco.  6. Patient's BMI is above normal parameters. Follow-up plan includes:  no follow-up required.  7. Patient does not drive.      At least 20 minutes of 25 spent in coordination of care and answering questions.      allergies and all known medications/prescriptions have been reviewed using resources available on this encounter.    Return in about 6 weeks (around 2018).        BRIANA Wallace

## 2018-01-03 NOTE — PATIENT INSTRUCTIONS

## 2018-01-16 ENCOUNTER — APPOINTMENT (OUTPATIENT)
Dept: NEUROLOGY | Facility: HOSPITAL | Age: 43
End: 2018-01-16

## 2018-01-17 ENCOUNTER — APPOINTMENT (OUTPATIENT)
Dept: NEUROLOGY | Facility: HOSPITAL | Age: 43
End: 2018-01-17

## 2018-02-26 ENCOUNTER — OFFICE VISIT (OUTPATIENT)
Dept: GASTROENTEROLOGY | Facility: CLINIC | Age: 43
End: 2018-02-26

## 2018-02-26 VITALS
OXYGEN SATURATION: 98 % | DIASTOLIC BLOOD PRESSURE: 70 MMHG | TEMPERATURE: 97.9 F | HEIGHT: 72 IN | BODY MASS INDEX: 26.01 KG/M2 | SYSTOLIC BLOOD PRESSURE: 118 MMHG | WEIGHT: 192 LBS | HEART RATE: 99 BPM

## 2018-02-26 DIAGNOSIS — K21.9 GERD WITHOUT ESOPHAGITIS: ICD-10-CM

## 2018-02-26 DIAGNOSIS — Z78.9 NONSMOKER: ICD-10-CM

## 2018-02-26 DIAGNOSIS — I10 ESSENTIAL HYPERTENSION: ICD-10-CM

## 2018-02-26 DIAGNOSIS — Z79.4 CONTROLLED TYPE 2 DIABETES MELLITUS WITHOUT COMPLICATION, WITH LONG-TERM CURRENT USE OF INSULIN (HCC): ICD-10-CM

## 2018-02-26 DIAGNOSIS — R11.2 NAUSEA AND VOMITING, INTRACTABILITY OF VOMITING NOT SPECIFIED, UNSPECIFIED VOMITING TYPE: Primary | ICD-10-CM

## 2018-02-26 DIAGNOSIS — E11.9 CONTROLLED TYPE 2 DIABETES MELLITUS WITHOUT COMPLICATION, WITH LONG-TERM CURRENT USE OF INSULIN (HCC): ICD-10-CM

## 2018-02-26 DIAGNOSIS — K59.01 SLOW TRANSIT CONSTIPATION: ICD-10-CM

## 2018-02-26 PROCEDURE — 99214 OFFICE O/P EST MOD 30 MIN: CPT | Performed by: CLINICAL NURSE SPECIALIST

## 2018-02-26 RX ORDER — ASPIRIN 81 MG/1
81 TABLET ORAL DAILY
COMMUNITY

## 2018-02-26 RX ORDER — DULOXETIN HYDROCHLORIDE 60 MG/1
60 CAPSULE, DELAYED RELEASE ORAL DAILY
COMMUNITY

## 2018-02-26 RX ORDER — OMEPRAZOLE 20 MG/1
20 CAPSULE, DELAYED RELEASE ORAL DAILY
Qty: 30 CAPSULE | Refills: 11 | Status: SHIPPED | OUTPATIENT
Start: 2018-02-26 | End: 2019-03-12 | Stop reason: SDUPTHER

## 2018-02-26 NOTE — PROGRESS NOTES
Lynn Magana  1975    2018  Chief Complaint   Patient presents with   • GI Problem     gastritis     Subjective   HPI  Lynn Magana is a 42 y.o. female who presents with a complaint of constant ongoing nausea with intermittent vomiting. She says that she has associated constipation that is severe not going for weeks this is worsening than usual for her. She says that she has always had an underlying constipation but the last 3 months worse. No certain triggers. She takes Miralax once per day and she still is not going as much as she should. No wt loss. No fever chills or sweats. No BRBPR. No melena. No new medications. She does take Zantac 150 mg once daily for her reflux that she has had ongoing for years. This manages fair.  No NSAIDs. She is taking Norco 7.5 mg for chronic back pain approx 3 times per week. She has had a barium enema at [facility] and this was normal per the patient approx 4 years ago. She has had a colonoscopy years ago but she is unsure. Her uncle has a hx of colon cancer.     Past Medical History:   Diagnosis Date   • Arthritis    • Carotid artery stenosis    • Depression    • Diabetes mellitus    • GERD (gastroesophageal reflux disease)    • Hyperlipidemia    • Hypertension    • Injury of back    • Seizure      Past Surgical History:   Procedure Laterality Date   •  SECTION     • CHOLECYSTECTOMY     • COLONOSCOPY     • CYST REMOVAL     • HYSTERECTOMY       Outpatient Prescriptions Marked as Taking for the 18 encounter (Office Visit) with BRIANA Lares   Medication Sig Dispense Refill   • amLODIPine (NORVASC) 10 MG tablet Take 0.5 tablets by mouth Daily.     • aspirin 81 MG EC tablet Take 81 mg by mouth Daily.     • benazepril (LOTENSIN) 20 MG tablet Take 20 mg by mouth 2 (Two) Times a Day.     • DULoxetine (CYMBALTA) 60 MG capsule Take 60 mg by mouth Daily.     • gabapentin (NEURONTIN) 300 MG capsule Take 300 mg by mouth 4 (Four) Times a  Day.     • HYDROcodone-acetaminophen (NORCO) 7.5-325 MG per tablet Take 1 tablet by mouth Every 4 (Four) Hours As Needed.     • Insulin Glargine (BASAGLAR KWIKPEN) 100 UNIT/ML injection pen      • insulin lispro (humaLOG) 100 UNIT/ML injection Inject  under the skin 3 (Three) Times a Day Before Meals.     • methIMAzole (TAPAZOLE) 10 MG tablet Take 2 tablets by mouth Daily. 30 tablet 0   • polyethylene glycol (MIRALAX) powder Take 17 g by mouth As Needed.     • propranolol LA (INDERAL LA) 120 MG 24 hr capsule Take 1 capsule by mouth Daily. 30 capsule 2   • raNITIdine (ZANTAC) 300 MG tablet Take 300 mg by mouth Every Night.     • simvastatin (ZOCOR) 20 MG tablet Take 20 mg by mouth Every Night.     • tiZANidine (ZANAFLEX) 4 MG tablet Take 4 mg by mouth 2 (Two) Times a Day.     • topiramate (TOPAMAX) 50 MG tablet Take 1 tablet by mouth 2 (Two) Times a Day. 60 tablet 4     Allergies   Allergen Reactions   • Oxycodone-Acetaminophen      Other reaction(s): Throat swelling     Social History     Social History   • Marital status:      Spouse name: N/A   • Number of children: N/A   • Years of education: N/A     Occupational History   • Not on file.     Social History Main Topics   • Smoking status: Never Smoker   • Smokeless tobacco: Never Used   • Alcohol use No   • Drug use: Yes     Special: Marijuana      Comment: past use marijuana cocaine   • Sexual activity: Defer     Other Topics Concern   • Not on file     Social History Narrative     Family History   Problem Relation Age of Onset   • Stroke Mother    • Diabetes Mother    • Stroke Father    • Diabetes Father    • Diabetes Sister    • Coronary artery disease Brother    • Diabetes Brother    • Stroke Sister    • Seizures Sister    • Colon cancer Paternal Uncle      Health Maintenance   Topic Date Due   • DIABETIC FOOT EXAM  1975   • DIABETIC EYE EXAM  1975   • URINE MICROALBUMIN  1975   • PNEUMOCOCCAL VACCINE (19-64 MEDIUM RISK) (1 of 1 -  "PPSV23) 09/15/1993   • TDAP/TD VACCINES (1 - Tdap) 09/15/1993   • PAP SMEAR  07/08/2017   • INFLUENZA VACCINE  08/01/2017   • HEMOGLOBIN A1C  03/15/2018   • LIPID PANEL  11/09/2018     Review of Systems   Constitutional: Positive for fatigue. Negative for activity change, appetite change, chills, diaphoresis, fever and unexpected weight change.   HENT: Negative for ear pain, hearing loss, mouth sores, sore throat, trouble swallowing and voice change.    Eyes: Negative.    Respiratory: Negative for cough, choking, shortness of breath and wheezing.    Cardiovascular: Negative for chest pain and palpitations.   Gastrointestinal: Positive for constipation, nausea and vomiting. Negative for abdominal pain, anal bleeding, blood in stool and diarrhea.   Endocrine: Negative for cold intolerance and heat intolerance.   Genitourinary: Negative for decreased urine volume, dysuria, frequency, hematuria and urgency.   Musculoskeletal: Negative for back pain, gait problem and myalgias.   Skin: Negative for color change, pallor and rash.   Allergic/Immunologic: Negative for food allergies and immunocompromised state.   Neurological: Negative for dizziness, tremors, seizures, syncope, weakness, light-headedness, numbness and headaches.   Hematological: Negative for adenopathy. Does not bruise/bleed easily.   Psychiatric/Behavioral: Negative for agitation and confusion. The patient is not nervous/anxious.    All other systems reviewed and are negative.    Objective   Vitals:    02/26/18 0809   BP: 118/70   BP Location: Left arm   Patient Position: Sitting   Cuff Size: Adult   Pulse: 99   Temp: 97.9 °F (36.6 °C)   SpO2: 98%   Weight: 87.1 kg (192 lb)   Height: 188 cm (74\")     Body mass index is 24.65 kg/(m^2).  Physical Exam   Constitutional: She is oriented to person, place, and time. She appears well-developed and well-nourished.   HENT:   Head: Normocephalic and atraumatic.   Eyes: Pupils are equal, round, and reactive to light. "   Neck: Normal range of motion. Neck supple. No tracheal deviation present. Thyromegaly present.   Cardiovascular: Normal rate, regular rhythm and normal heart sounds.  Exam reveals no gallop and no friction rub.    No murmur heard.  Pulmonary/Chest: Effort normal and breath sounds normal. No respiratory distress. She has no wheezes. She has no rales. She exhibits no tenderness.   Abdominal: Soft. Bowel sounds are normal. She exhibits no distension. There is no hepatosplenomegaly. There is no tenderness. There is no rigidity, no rebound and no guarding.   Musculoskeletal: Normal range of motion. She exhibits no edema, tenderness or deformity.   Neurological: She is alert and oriented to person, place, and time. She has normal reflexes.   Skin: Skin is warm and dry. No rash noted. No pallor.   Psychiatric: She has a normal mood and affect. Her behavior is normal. Judgment and thought content normal.     Assessment/Plan   Lynn was seen today for gi problem.    Diagnoses and all orders for this visit:    Nausea and vomiting, intractability of vomiting not specified, unspecified vomiting type  -     Case Request; Standing  -     Implement Anesthesia Orders Day of Procedure; Standing  -     Obtain Informed Consent; Standing  -     Case Request  -     omeprazole (priLOSEC) 20 MG capsule; Take 1 capsule by mouth Daily.    Slow transit constipation  -     polyethylene glycol (GoLYTELY) 236 g solution; Take as directed by office instructions.  -     Case Request; Standing  -     Implement Anesthesia Orders Day of Procedure; Standing  -     Obtain Informed Consent; Standing  -     Verify bowel prep was successful; Standing  -     Case Request    Essential hypertension    Nonsmoker    Controlled type 2 diabetes mellitus without complication, with long-term current use of insulin    GERD without esophagitis  Comments:  changed medication to Prilosec      Problem List Items Addressed This Visit        Cardiovascular and  Mediastinum    Hypertension       Digestive    Nausea and vomiting - Primary    Relevant Medications    omeprazole (priLOSEC) 20 MG capsule    Other Relevant Orders    Case Request (Completed)    NM gastric emptying    Slow transit constipation    Relevant Medications    polyethylene glycol (GoLYTELY) 236 g solution    Other Relevant Orders    Case Request (Completed)    GERD without esophagitis    Relevant Medications    omeprazole (priLOSEC) 20 MG capsule       Endocrine    Controlled type 2 diabetes mellitus without complication, with long-term current use of insulin       Other    Nonsmoker        COLONOSCOPY WITH ANESTHESIA (N/A)  EMR Dragon/transcription disclaimer: Much of this encounter note is electronic transcription/translation of spoken language to printed text. The electronic translation of spoken language may be erroneous, or at times, nonsensical words or phrases may be inadvertently transcribed. Although I have reviewed the note for such errors, some may still exist.  Body mass index is 24.65 kg/(m^2).  No Follow-up on file.    Patient's BMI is within normal parameters. No follow-up required.    I have made diet recommendations to include avoiding constipating foods. Miralax twice per day.  I changed medications to Prilosec rather than Zantac  I discussed non pharmaceutical treatment of gerd.  This includes gradually losing weight to achieve ideal body wt., elevation of the head of bed by 4-6 inches, nothing to eat or drink 3 hours prior to lying down, avoiding tight clothing, stress reduction, tobacco cessation, reduction of alcohol intake, and dietary restrictions (avoiding caffeine, coffee, fatty foods, mints, chocolate, spicy foods and tomato based sauces as much as possible).      I explained the effects diabetes may have on the digestive system and to make sure her sugar is managed. Her glucose on 1/22/2018 was 451. This has to be under better control for us to rule this out as a possible  source for her persistent nausea and vomiting. A1C was 9.2.   I noted enlarged thyroid on exam and she is following with Dr Mcallister in regards to this. She had an ultrasound on 7/8/2017 reviewed today and I have suggested she maintain follow up regarding this. I also feel that her hypothyroidism is a contributing factor to her constipation.     All risks, benefits, alternatives, and indications of colonoscopy and/or Endoscopy procedure have been discussed with the patient. Risks to include perforation of the colon requiring possible surgery or colostomy, risk of bleeding from biopsies or removal of colon tissue, possibility of missing a colon polyp or cancer, or adverse drug reaction.  Benefits to include the diagnosis and management of disease of the colon and rectum. Alternatives to include barium enema, radiographic evaluation, lab testing or no intervention. Pt verbalizes understanding and agrees.     Zora Stallworth, APRN  2/26/2018  8:58 AM

## 2018-03-15 PROBLEM — E05.00 GRAVES' DISEASE: Status: ACTIVE | Noted: 2017-07-10

## 2018-03-15 PROBLEM — E05.90 HYPERTHYROIDISM: Status: ACTIVE | Noted: 2018-03-15

## 2018-03-21 ENCOUNTER — TELEPHONE (OUTPATIENT)
Dept: GASTROENTEROLOGY | Facility: CLINIC | Age: 43
End: 2018-03-21

## 2018-03-21 NOTE — TELEPHONE ENCOUNTER
Janis, does Zora still want the NM Gastric Emptying test that was ordered on 2/26/18?    Thanks, Kerri De Los Santos MA 03/21/18

## 2018-03-26 ENCOUNTER — HOSPITAL ENCOUNTER (INPATIENT)
Facility: HOSPITAL | Age: 43
LOS: 2 days | Discharge: HOME | End: 2018-03-28
Attending: FAMILY MEDICINE | Admitting: INTERNAL MEDICINE

## 2018-03-26 ENCOUNTER — APPOINTMENT (OUTPATIENT)
Dept: CT IMAGING | Facility: HOSPITAL | Age: 43
End: 2018-03-26

## 2018-03-26 DIAGNOSIS — R11.2 NAUSEA AND VOMITING, INTRACTABILITY OF VOMITING NOT SPECIFIED, UNSPECIFIED VOMITING TYPE: Primary | ICD-10-CM

## 2018-03-26 DIAGNOSIS — E88.89 KETOSIS (HCC): ICD-10-CM

## 2018-03-26 DIAGNOSIS — R73.9 HYPERGLYCEMIA: ICD-10-CM

## 2018-03-26 DIAGNOSIS — R10.84 GENERALIZED ABDOMINAL PAIN: ICD-10-CM

## 2018-03-26 LAB
ACETONE BLD QL: ABNORMAL
ALBUMIN SERPL-MCNC: 3.3 G/DL (ref 3.5–5)
ALBUMIN SERPL-MCNC: 4 G/DL (ref 3.5–5)
ALBUMIN/GLOB SERPL: 1.1 G/DL (ref 1.1–2.5)
ALBUMIN/GLOB SERPL: 1.3 G/DL (ref 1.1–2.5)
ALP SERPL-CCNC: 124 U/L (ref 24–120)
ALP SERPL-CCNC: 96 U/L (ref 24–120)
ALT SERPL W P-5'-P-CCNC: 52 U/L (ref 0–54)
ALT SERPL W P-5'-P-CCNC: 54 U/L (ref 0–54)
ANION GAP SERPL CALCULATED.3IONS-SCNC: 10 MMOL/L (ref 4–13)
ANION GAP SERPL CALCULATED.3IONS-SCNC: 17 MMOL/L (ref 4–13)
ARTERIAL PATENCY WRIST A: POSITIVE
AST SERPL-CCNC: 38 U/L (ref 7–45)
AST SERPL-CCNC: 48 U/L (ref 7–45)
ATMOSPHERIC PRESS: 754 MMHG
B-HCG UR QL: NEGATIVE
BASE EXCESS BLDA CALC-SCNC: 1.1 MMOL/L (ref 0–2)
BASOPHILS # BLD AUTO: 0.02 10*3/MM3 (ref 0–0.2)
BASOPHILS NFR BLD AUTO: 0.3 % (ref 0–2)
BDY SITE: ABNORMAL
BILIRUB SERPL-MCNC: 0.4 MG/DL (ref 0.1–1)
BILIRUB SERPL-MCNC: 0.7 MG/DL (ref 0.1–1)
BILIRUB UR QL STRIP: NEGATIVE
BODY TEMPERATURE: 37 C
BUN BLD-MCNC: 11 MG/DL (ref 5–21)
BUN BLD-MCNC: 14 MG/DL (ref 5–21)
BUN/CREAT SERPL: 21.6 (ref 7–25)
BUN/CREAT SERPL: 23.3 (ref 7–25)
CALCIUM SPEC-SCNC: 10.1 MG/DL (ref 8.4–10.4)
CALCIUM SPEC-SCNC: 9.5 MG/DL (ref 8.4–10.4)
CHLORIDE SERPL-SCNC: 104 MMOL/L (ref 98–110)
CHLORIDE SERPL-SCNC: 99 MMOL/L (ref 98–110)
CLARITY UR: CLEAR
CO2 SERPL-SCNC: 22 MMOL/L (ref 24–31)
CO2 SERPL-SCNC: 26 MMOL/L (ref 24–31)
COLOR UR: YELLOW
CREAT BLD-MCNC: 0.51 MG/DL (ref 0.5–1.4)
CREAT BLD-MCNC: 0.6 MG/DL (ref 0.5–1.4)
D-LACTATE SERPL-SCNC: 1.1 MMOL/L (ref 0.5–2)
D-LACTATE SERPL-SCNC: 2.7 MMOL/L (ref 0.5–2)
DEPRECATED RDW RBC AUTO: 36.4 FL (ref 40–54)
EOSINOPHIL # BLD AUTO: 0 10*3/MM3 (ref 0–0.7)
EOSINOPHIL NFR BLD AUTO: 0 % (ref 0–4)
ERYTHROCYTE [DISTWIDTH] IN BLOOD BY AUTOMATED COUNT: 13.6 % (ref 12–15)
GFR SERPL CREATININE-BSD FRML MDRD: 132 ML/MIN/1.73
GFR SERPL CREATININE-BSD FRML MDRD: >150 ML/MIN/1.73
GLOBULIN UR ELPH-MCNC: 2.9 GM/DL
GLOBULIN UR ELPH-MCNC: 3.2 GM/DL
GLUCOSE BLD-MCNC: 224 MG/DL (ref 70–100)
GLUCOSE BLD-MCNC: 281 MG/DL (ref 70–100)
GLUCOSE BLDC GLUCOMTR-MCNC: 51 MG/DL (ref 70–130)
GLUCOSE UR STRIP-MCNC: ABNORMAL MG/DL
HCO3 BLDA-SCNC: 25 MMOL/L (ref 20–26)
HCT VFR BLD AUTO: 38.7 % (ref 37–47)
HGB BLD-MCNC: 12.9 G/DL (ref 12–16)
HGB UR QL STRIP.AUTO: NEGATIVE
HOLD SPECIMEN: NORMAL
IMM GRANULOCYTES # BLD: 0.03 10*3/MM3 (ref 0–0.03)
IMM GRANULOCYTES NFR BLD: 0.4 % (ref 0–5)
INTERNAL NEGATIVE CONTROL: NEGATIVE
INTERNAL POSITIVE CONTROL: POSITIVE
KETONES UR QL STRIP: ABNORMAL
LEUKOCYTE ESTERASE UR QL STRIP.AUTO: NEGATIVE
LIPASE SERPL-CCNC: 61 U/L (ref 23–203)
LYMPHOCYTES # BLD AUTO: 1.98 10*3/MM3 (ref 0.72–4.86)
LYMPHOCYTES NFR BLD AUTO: 25.9 % (ref 15–45)
Lab: ABNORMAL
Lab: NORMAL
MCH RBC QN AUTO: 25 PG (ref 28–32)
MCHC RBC AUTO-ENTMCNC: 33.3 G/DL (ref 33–36)
MCV RBC AUTO: 75 FL (ref 82–98)
MODALITY: ABNORMAL
MONOCYTES # BLD AUTO: 0.49 10*3/MM3 (ref 0.19–1.3)
MONOCYTES NFR BLD AUTO: 6.4 % (ref 4–12)
NEUTROPHILS # BLD AUTO: 5.13 10*3/MM3 (ref 1.87–8.4)
NEUTROPHILS NFR BLD AUTO: 67 % (ref 39–78)
NITRITE UR QL STRIP: NEGATIVE
NRBC BLD MANUAL-RTO: 0 /100 WBC (ref 0–0)
PCO2 BLDA: 36.4 MM HG (ref 35–45)
PH BLDA: 7.45 PH UNITS (ref 7.35–7.45)
PH UR STRIP.AUTO: <=5 [PH] (ref 5–8)
PLATELET # BLD AUTO: 540 10*3/MM3 (ref 130–400)
PMV BLD AUTO: 9.9 FL (ref 6–12)
PO2 BLDA: 81.5 MM HG (ref 83–108)
POTASSIUM BLD-SCNC: 3.7 MMOL/L (ref 3.5–5.3)
POTASSIUM BLD-SCNC: 3.8 MMOL/L (ref 3.5–5.3)
PROT SERPL-MCNC: 6.2 G/DL (ref 6.3–8.7)
PROT SERPL-MCNC: 7.2 G/DL (ref 6.3–8.7)
PROT UR QL STRIP: ABNORMAL
RBC # BLD AUTO: 5.16 10*6/MM3 (ref 4.2–5.4)
SAO2 % BLDCOA: 97.1 % (ref 94–99)
SODIUM BLD-SCNC: 138 MMOL/L (ref 135–145)
SODIUM BLD-SCNC: 140 MMOL/L (ref 135–145)
SP GR UR STRIP: >1.03 (ref 1–1.03)
TROPONIN I SERPL-MCNC: <0.012 NG/ML (ref 0–0.03)
UROBILINOGEN UR QL STRIP: ABNORMAL
VENTILATOR MODE: ABNORMAL
WBC NRBC COR # BLD: 7.65 10*3/MM3 (ref 4.8–10.8)
WHOLE BLOOD HOLD SPECIMEN: NORMAL
WHOLE BLOOD HOLD SPECIMEN: NORMAL

## 2018-03-26 PROCEDURE — 94760 N-INVAS EAR/PLS OXIMETRY 1: CPT

## 2018-03-26 PROCEDURE — 85025 COMPLETE CBC W/AUTO DIFF WBC: CPT | Performed by: FAMILY MEDICINE

## 2018-03-26 PROCEDURE — 25010000002 IOPAMIDOL 61 % SOLUTION: Performed by: FAMILY MEDICINE

## 2018-03-26 PROCEDURE — 74177 CT ABD & PELVIS W/CONTRAST: CPT

## 2018-03-26 PROCEDURE — 80053 COMPREHEN METABOLIC PANEL: CPT | Performed by: FAMILY MEDICINE

## 2018-03-26 PROCEDURE — 25010000002 ONDANSETRON PER 1 MG: Performed by: FAMILY MEDICINE

## 2018-03-26 PROCEDURE — 25010000002 ENOXAPARIN PER 10 MG: Performed by: FAMILY MEDICINE

## 2018-03-26 PROCEDURE — 87040 BLOOD CULTURE FOR BACTERIA: CPT | Performed by: FAMILY MEDICINE

## 2018-03-26 PROCEDURE — 83690 ASSAY OF LIPASE: CPT | Performed by: FAMILY MEDICINE

## 2018-03-26 PROCEDURE — 84100 ASSAY OF PHOSPHORUS: CPT | Performed by: FAMILY MEDICINE

## 2018-03-26 PROCEDURE — 84484 ASSAY OF TROPONIN QUANT: CPT | Performed by: FAMILY MEDICINE

## 2018-03-26 PROCEDURE — 83605 ASSAY OF LACTIC ACID: CPT | Performed by: FAMILY MEDICINE

## 2018-03-26 PROCEDURE — 99285 EMERGENCY DEPT VISIT HI MDM: CPT

## 2018-03-26 PROCEDURE — 82962 GLUCOSE BLOOD TEST: CPT

## 2018-03-26 PROCEDURE — 93005 ELECTROCARDIOGRAM TRACING: CPT | Performed by: FAMILY MEDICINE

## 2018-03-26 PROCEDURE — 83735 ASSAY OF MAGNESIUM: CPT | Performed by: FAMILY MEDICINE

## 2018-03-26 PROCEDURE — 82009 KETONE BODYS QUAL: CPT | Performed by: FAMILY MEDICINE

## 2018-03-26 PROCEDURE — 93010 ELECTROCARDIOGRAM REPORT: CPT | Performed by: INTERNAL MEDICINE

## 2018-03-26 PROCEDURE — 94799 UNLISTED PULMONARY SVC/PX: CPT

## 2018-03-26 PROCEDURE — 83036 HEMOGLOBIN GLYCOSYLATED A1C: CPT | Performed by: FAMILY MEDICINE

## 2018-03-26 PROCEDURE — 82803 BLOOD GASES ANY COMBINATION: CPT

## 2018-03-26 PROCEDURE — 36600 WITHDRAWAL OF ARTERIAL BLOOD: CPT

## 2018-03-26 PROCEDURE — 81003 URINALYSIS AUTO W/O SCOPE: CPT | Performed by: FAMILY MEDICINE

## 2018-03-26 RX ORDER — POTASSIUM CHLORIDE 1.5 G/1.77G
20 POWDER, FOR SOLUTION ORAL AS NEEDED
Status: DISCONTINUED | OUTPATIENT
Start: 2018-03-26 | End: 2018-03-28 | Stop reason: HOSPADM

## 2018-03-26 RX ORDER — METHIMAZOLE 10 MG/1
20 TABLET ORAL DAILY
Status: DISCONTINUED | OUTPATIENT
Start: 2018-03-27 | End: 2018-03-28 | Stop reason: HOSPADM

## 2018-03-26 RX ORDER — PANTOPRAZOLE SODIUM 40 MG/1
40 TABLET, DELAYED RELEASE ORAL
Status: DISCONTINUED | OUTPATIENT
Start: 2018-03-27 | End: 2018-03-28 | Stop reason: HOSPADM

## 2018-03-26 RX ORDER — POLYETHYLENE GLYCOL 3350 17 G/17G
17 POWDER, FOR SOLUTION ORAL DAILY
Status: DISCONTINUED | OUTPATIENT
Start: 2018-03-27 | End: 2018-03-28 | Stop reason: HOSPADM

## 2018-03-26 RX ORDER — FAMOTIDINE 20 MG/1
40 TABLET, FILM COATED ORAL NIGHTLY
Status: DISCONTINUED | OUTPATIENT
Start: 2018-03-26 | End: 2018-03-27

## 2018-03-26 RX ORDER — POTASSIUM CHLORIDE 7.46 G/1000ML
10 INJECTION, SOLUTION INTRAVENOUS AS NEEDED
Status: DISCONTINUED | OUTPATIENT
Start: 2018-03-26 | End: 2018-03-28 | Stop reason: HOSPADM

## 2018-03-26 RX ORDER — SODIUM CHLORIDE 9 MG/ML
250 INJECTION, SOLUTION INTRAVENOUS CONTINUOUS
Status: DISCONTINUED | OUTPATIENT
Start: 2018-03-26 | End: 2018-03-27

## 2018-03-26 RX ORDER — LABETALOL HYDROCHLORIDE 5 MG/ML
20 INJECTION, SOLUTION INTRAVENOUS ONCE
Status: COMPLETED | OUTPATIENT
Start: 2018-03-26 | End: 2018-03-26

## 2018-03-26 RX ORDER — TIZANIDINE 4 MG/1
4 TABLET ORAL EVERY 12 HOURS PRN
Status: DISCONTINUED | OUTPATIENT
Start: 2018-03-26 | End: 2018-03-28 | Stop reason: HOSPADM

## 2018-03-26 RX ORDER — DEXTROSE AND SODIUM CHLORIDE 5; .45 G/100ML; G/100ML
150 INJECTION, SOLUTION INTRAVENOUS CONTINUOUS PRN
Status: DISCONTINUED | OUTPATIENT
Start: 2018-03-26 | End: 2018-03-28 | Stop reason: HOSPADM

## 2018-03-26 RX ORDER — PROPRANOLOL HCL 60 MG
120 CAPSULE, EXTENDED RELEASE 24HR ORAL DAILY
Status: DISCONTINUED | OUTPATIENT
Start: 2018-03-27 | End: 2018-03-28 | Stop reason: HOSPADM

## 2018-03-26 RX ORDER — POTASSIUM CHLORIDE 1.5 G/1.77G
10 POWDER, FOR SOLUTION ORAL AS NEEDED
Status: DISCONTINUED | OUTPATIENT
Start: 2018-03-26 | End: 2018-03-28 | Stop reason: HOSPADM

## 2018-03-26 RX ORDER — GABAPENTIN 300 MG/1
300 CAPSULE ORAL EVERY 12 HOURS SCHEDULED
Status: DISCONTINUED | OUTPATIENT
Start: 2018-03-26 | End: 2018-03-28 | Stop reason: HOSPADM

## 2018-03-26 RX ORDER — DEXTROSE MONOHYDRATE 25 G/50ML
12.5 INJECTION, SOLUTION INTRAVENOUS
Status: DISCONTINUED | OUTPATIENT
Start: 2018-03-26 | End: 2018-03-27 | Stop reason: ALTCHOICE

## 2018-03-26 RX ORDER — ASPIRIN 81 MG/1
81 TABLET ORAL DAILY
Status: DISCONTINUED | OUTPATIENT
Start: 2018-03-27 | End: 2018-03-28 | Stop reason: HOSPADM

## 2018-03-26 RX ORDER — ONDANSETRON 2 MG/ML
4 INJECTION INTRAMUSCULAR; INTRAVENOUS ONCE
Status: COMPLETED | OUTPATIENT
Start: 2018-03-26 | End: 2018-03-26

## 2018-03-26 RX ORDER — DEXTROSE, SODIUM CHLORIDE, AND POTASSIUM CHLORIDE 5; .45; .15 G/100ML; G/100ML; G/100ML
150 INJECTION INTRAVENOUS CONTINUOUS PRN
Status: DISCONTINUED | OUTPATIENT
Start: 2018-03-26 | End: 2018-03-28 | Stop reason: HOSPADM

## 2018-03-26 RX ORDER — SODIUM CHLORIDE AND POTASSIUM CHLORIDE 150; 450 MG/100ML; MG/100ML
250 INJECTION, SOLUTION INTRAVENOUS CONTINUOUS PRN
Status: DISCONTINUED | OUTPATIENT
Start: 2018-03-26 | End: 2018-03-28 | Stop reason: HOSPADM

## 2018-03-26 RX ORDER — SODIUM CHLORIDE 450 MG/100ML
250 INJECTION, SOLUTION INTRAVENOUS CONTINUOUS
Status: DISCONTINUED | OUTPATIENT
Start: 2018-03-26 | End: 2018-03-27

## 2018-03-26 RX ORDER — AMLODIPINE BESYLATE 5 MG/1
5 TABLET ORAL DAILY
Status: DISCONTINUED | OUTPATIENT
Start: 2018-03-27 | End: 2018-03-28 | Stop reason: HOSPADM

## 2018-03-26 RX ORDER — SODIUM CHLORIDE 0.9 % (FLUSH) 0.9 %
1-10 SYRINGE (ML) INJECTION AS NEEDED
Status: DISCONTINUED | OUTPATIENT
Start: 2018-03-26 | End: 2018-03-28 | Stop reason: HOSPADM

## 2018-03-26 RX ORDER — DULOXETIN HYDROCHLORIDE 30 MG/1
60 CAPSULE, DELAYED RELEASE ORAL DAILY
Status: DISCONTINUED | OUTPATIENT
Start: 2018-03-27 | End: 2018-03-28 | Stop reason: HOSPADM

## 2018-03-26 RX ORDER — POTASSIUM CHLORIDE 750 MG/1
20 CAPSULE, EXTENDED RELEASE ORAL AS NEEDED
Status: DISCONTINUED | OUTPATIENT
Start: 2018-03-26 | End: 2018-03-28 | Stop reason: HOSPADM

## 2018-03-26 RX ORDER — TOPIRAMATE 25 MG/1
50 TABLET ORAL 2 TIMES DAILY
Status: DISCONTINUED | OUTPATIENT
Start: 2018-03-26 | End: 2018-03-28 | Stop reason: HOSPADM

## 2018-03-26 RX ORDER — POTASSIUM CHLORIDE 750 MG/1
40 CAPSULE, EXTENDED RELEASE ORAL AS NEEDED
Status: DISCONTINUED | OUTPATIENT
Start: 2018-03-26 | End: 2018-03-28 | Stop reason: HOSPADM

## 2018-03-26 RX ORDER — POTASSIUM CHLORIDE 750 MG/1
10 CAPSULE, EXTENDED RELEASE ORAL AS NEEDED
Status: DISCONTINUED | OUTPATIENT
Start: 2018-03-26 | End: 2018-03-28 | Stop reason: HOSPADM

## 2018-03-26 RX ORDER — POTASSIUM CHLORIDE 1.5 G/1.77G
40 POWDER, FOR SOLUTION ORAL AS NEEDED
Status: DISCONTINUED | OUTPATIENT
Start: 2018-03-26 | End: 2018-03-28 | Stop reason: HOSPADM

## 2018-03-26 RX ORDER — ATORVASTATIN CALCIUM 10 MG/1
10 TABLET, FILM COATED ORAL NIGHTLY
Status: DISCONTINUED | OUTPATIENT
Start: 2018-03-26 | End: 2018-03-28 | Stop reason: HOSPADM

## 2018-03-26 RX ORDER — DEXTROSE MONOHYDRATE 25 G/50ML
25-50 INJECTION, SOLUTION INTRAVENOUS
Status: CANCELLED | OUTPATIENT
Start: 2018-03-26

## 2018-03-26 RX ADMIN — SODIUM CHLORIDE 1000 ML: 9 INJECTION, SOLUTION INTRAVENOUS at 20:10

## 2018-03-26 RX ADMIN — GABAPENTIN 300 MG: 300 CAPSULE ORAL at 22:13

## 2018-03-26 RX ADMIN — IOPAMIDOL 100 ML: 612 INJECTION, SOLUTION INTRAVENOUS at 17:12

## 2018-03-26 RX ADMIN — SODIUM CHLORIDE 1000 ML: 9 INJECTION, SOLUTION INTRAVENOUS at 16:15

## 2018-03-26 RX ADMIN — LABETALOL HYDROCHLORIDE 20 MG: 5 INJECTION, SOLUTION INTRAVENOUS at 22:07

## 2018-03-26 RX ADMIN — ENOXAPARIN SODIUM 40 MG: 40 INJECTION SUBCUTANEOUS at 22:13

## 2018-03-26 RX ADMIN — TOPIRAMATE 50 MG: 25 TABLET, FILM COATED ORAL at 22:13

## 2018-03-26 RX ADMIN — ONDANSETRON HYDROCHLORIDE 4 MG: 2 INJECTION INTRAMUSCULAR; INTRAVENOUS at 16:15

## 2018-03-26 RX ADMIN — SODIUM CHLORIDE 250 ML/HR: 9 INJECTION, SOLUTION INTRAVENOUS at 21:14

## 2018-03-26 RX ADMIN — ATORVASTATIN CALCIUM 10 MG: 10 TABLET, FILM COATED ORAL at 22:13

## 2018-03-26 RX ADMIN — SODIUM CHLORIDE, POTASSIUM CHLORIDE, SODIUM LACTATE AND CALCIUM CHLORIDE 1000 ML: 600; 310; 30; 20 INJECTION, SOLUTION INTRAVENOUS at 17:25

## 2018-03-26 RX ADMIN — FAMOTIDINE 40 MG: 20 TABLET, FILM COATED ORAL at 22:13

## 2018-03-27 LAB
ALBUMIN SERPL-MCNC: 3.1 G/DL (ref 3.5–5)
ALBUMIN/GLOB SERPL: 1.1 G/DL (ref 1.1–2.5)
ALP SERPL-CCNC: 85 U/L (ref 24–120)
ALT SERPL W P-5'-P-CCNC: 43 U/L (ref 0–54)
ANION GAP SERPL CALCULATED.3IONS-SCNC: 11 MMOL/L (ref 4–13)
ANION GAP SERPL CALCULATED.3IONS-SCNC: 11 MMOL/L (ref 4–13)
ANION GAP SERPL CALCULATED.3IONS-SCNC: 9 MMOL/L (ref 4–13)
ARTERIAL PATENCY WRIST A: ABNORMAL
AST SERPL-CCNC: 24 U/L (ref 7–45)
ATMOSPHERIC PRESS: 753 MMHG
BASE EXCESS BLDA CALC-SCNC: -0.8 MMOL/L (ref 0–2)
BASOPHILS # BLD AUTO: 0.01 10*3/MM3 (ref 0–0.2)
BASOPHILS # BLD AUTO: 0.01 10*3/MM3 (ref 0–0.2)
BASOPHILS NFR BLD AUTO: 0.1 % (ref 0–2)
BASOPHILS NFR BLD AUTO: 0.1 % (ref 0–2)
BDY SITE: ABNORMAL
BILIRUB SERPL-MCNC: 0.5 MG/DL (ref 0.1–1)
BILIRUB UR QL STRIP: NEGATIVE
BODY TEMPERATURE: 37 C
BUN BLD-MCNC: 10 MG/DL (ref 5–21)
BUN BLD-MCNC: 6 MG/DL (ref 5–21)
BUN BLD-MCNC: 9 MG/DL (ref 5–21)
BUN/CREAT SERPL: 10.9 (ref 7–25)
BUN/CREAT SERPL: 16.7 (ref 7–25)
BUN/CREAT SERPL: 18.5 (ref 7–25)
CA-I BLD-MCNC: 4.67 MG/DL (ref 4.6–5.4)
CA-I BLD-MCNC: 4.7 MG/DL (ref 4.6–5.4)
CA-I BLD-MCNC: 4.74 MG/DL (ref 4.6–5.4)
CALCIUM SPEC-SCNC: 8.9 MG/DL (ref 8.4–10.4)
CALCIUM SPEC-SCNC: 8.9 MG/DL (ref 8.4–10.4)
CALCIUM SPEC-SCNC: 9.2 MG/DL (ref 8.4–10.4)
CHLORIDE SERPL-SCNC: 103 MMOL/L (ref 98–110)
CHLORIDE SERPL-SCNC: 103 MMOL/L (ref 98–110)
CHLORIDE SERPL-SCNC: 104 MMOL/L (ref 98–110)
CLARITY UR: CLEAR
CO2 SERPL-SCNC: 23 MMOL/L (ref 24–31)
CO2 SERPL-SCNC: 25 MMOL/L (ref 24–31)
CO2 SERPL-SCNC: 26 MMOL/L (ref 24–31)
COLOR UR: YELLOW
CREAT BLD-MCNC: 0.54 MG/DL (ref 0.5–1.4)
CREAT BLD-MCNC: 0.54 MG/DL (ref 0.5–1.4)
CREAT BLD-MCNC: 0.55 MG/DL (ref 0.5–1.4)
DEPRECATED RDW RBC AUTO: 37.4 FL (ref 40–54)
DEPRECATED RDW RBC AUTO: 39 FL (ref 40–54)
EOSINOPHIL # BLD AUTO: 0.01 10*3/MM3 (ref 0–0.7)
EOSINOPHIL # BLD AUTO: 0.02 10*3/MM3 (ref 0–0.7)
EOSINOPHIL NFR BLD AUTO: 0.1 % (ref 0–4)
EOSINOPHIL NFR BLD AUTO: 0.3 % (ref 0–4)
ERYTHROCYTE [DISTWIDTH] IN BLOOD BY AUTOMATED COUNT: 13.7 % (ref 12–15)
ERYTHROCYTE [DISTWIDTH] IN BLOOD BY AUTOMATED COUNT: 13.8 % (ref 12–15)
GFR SERPL CREATININE-BSD FRML MDRD: 146 ML/MIN/1.73
GFR SERPL CREATININE-BSD FRML MDRD: 149 ML/MIN/1.73
GFR SERPL CREATININE-BSD FRML MDRD: 149 ML/MIN/1.73
GLOBULIN UR ELPH-MCNC: 2.7 GM/DL
GLUCOSE BLD-MCNC: 181 MG/DL (ref 70–100)
GLUCOSE BLD-MCNC: 202 MG/DL (ref 70–100)
GLUCOSE BLD-MCNC: 219 MG/DL (ref 70–100)
GLUCOSE BLDC GLUCOMTR-MCNC: 125 MG/DL (ref 70–130)
GLUCOSE BLDC GLUCOMTR-MCNC: 174 MG/DL (ref 70–130)
GLUCOSE BLDC GLUCOMTR-MCNC: 178 MG/DL (ref 70–130)
GLUCOSE BLDC GLUCOMTR-MCNC: 201 MG/DL (ref 70–130)
GLUCOSE BLDC GLUCOMTR-MCNC: 255 MG/DL (ref 70–130)
GLUCOSE UR STRIP-MCNC: ABNORMAL MG/DL
HBA1C MFR BLD: 9.2 %
HCO3 BLDA-SCNC: 22.9 MMOL/L (ref 20–26)
HCT VFR BLD AUTO: 30.5 % (ref 37–47)
HCT VFR BLD AUTO: 31.4 % (ref 37–47)
HGB BLD-MCNC: 10.1 G/DL (ref 12–16)
HGB BLD-MCNC: 10.3 G/DL (ref 12–16)
HGB UR QL STRIP.AUTO: NEGATIVE
IMM GRANULOCYTES # BLD: 0.02 10*3/MM3 (ref 0–0.03)
IMM GRANULOCYTES # BLD: 0.03 10*3/MM3 (ref 0–0.03)
IMM GRANULOCYTES NFR BLD: 0.3 % (ref 0–5)
IMM GRANULOCYTES NFR BLD: 0.4 % (ref 0–5)
KETONES UR QL STRIP: ABNORMAL
LEUKOCYTE ESTERASE UR QL STRIP.AUTO: NEGATIVE
LYMPHOCYTES # BLD AUTO: 3.08 10*3/MM3 (ref 0.72–4.86)
LYMPHOCYTES # BLD AUTO: 3.11 10*3/MM3 (ref 0.72–4.86)
LYMPHOCYTES NFR BLD AUTO: 37.9 % (ref 15–45)
LYMPHOCYTES NFR BLD AUTO: 43.1 % (ref 15–45)
Lab: ABNORMAL
Lab: NORMAL
MAGNESIUM SERPL-MCNC: 1.4 MG/DL (ref 1.4–2.2)
MAGNESIUM SERPL-MCNC: 1.4 MG/DL (ref 1.4–2.2)
MCH RBC QN AUTO: 25 PG (ref 28–32)
MCH RBC QN AUTO: 25.5 PG (ref 28–32)
MCHC RBC AUTO-ENTMCNC: 32.8 G/DL (ref 33–36)
MCHC RBC AUTO-ENTMCNC: 33.1 G/DL (ref 33–36)
MCV RBC AUTO: 76.2 FL (ref 82–98)
MCV RBC AUTO: 77 FL (ref 82–98)
MODALITY: ABNORMAL
MONOCYTES # BLD AUTO: 0.49 10*3/MM3 (ref 0.19–1.3)
MONOCYTES # BLD AUTO: 0.56 10*3/MM3 (ref 0.19–1.3)
MONOCYTES NFR BLD AUTO: 6.8 % (ref 4–12)
MONOCYTES NFR BLD AUTO: 6.9 % (ref 4–12)
NEUTROPHILS # BLD AUTO: 3.57 10*3/MM3 (ref 1.87–8.4)
NEUTROPHILS # BLD AUTO: 4.43 10*3/MM3 (ref 1.87–8.4)
NEUTROPHILS NFR BLD AUTO: 49.4 % (ref 39–78)
NEUTROPHILS NFR BLD AUTO: 54.6 % (ref 39–78)
NITRITE UR QL STRIP: NEGATIVE
NRBC BLD MANUAL-RTO: 0 /100 WBC (ref 0–0)
NRBC BLD MANUAL-RTO: 0 /100 WBC (ref 0–0)
PCO2 BLDA: 33.4 MM HG (ref 35–45)
PH BLDA: 7.44 PH UNITS (ref 7.35–7.45)
PH UR STRIP.AUTO: 5.5 [PH] (ref 5–8)
PHOSPHATE SERPL-MCNC: 3.4 MG/DL (ref 2.5–4.5)
PHOSPHATE SERPL-MCNC: 4 MG/DL (ref 2.5–4.5)
PLATELET # BLD AUTO: 368 10*3/MM3 (ref 130–400)
PLATELET # BLD AUTO: 378 10*3/MM3 (ref 130–400)
PMV BLD AUTO: 10.1 FL (ref 6–12)
PMV BLD AUTO: 10.4 FL (ref 6–12)
PO2 BLDA: 93 MM HG (ref 83–108)
POTASSIUM BLD-SCNC: 3 MMOL/L (ref 3.5–5.3)
POTASSIUM BLD-SCNC: 3 MMOL/L (ref 3.5–5.3)
POTASSIUM BLD-SCNC: 4.1 MMOL/L (ref 3.5–5.3)
PROT SERPL-MCNC: 5.8 G/DL (ref 6.3–8.7)
PROT UR QL STRIP: NEGATIVE
RBC # BLD AUTO: 3.96 10*6/MM3 (ref 4.2–5.4)
RBC # BLD AUTO: 4.12 10*6/MM3 (ref 4.2–5.4)
SAO2 % BLDCOA: 98.4 % (ref 94–99)
SODIUM BLD-SCNC: 136 MMOL/L (ref 135–145)
SODIUM BLD-SCNC: 139 MMOL/L (ref 135–145)
SODIUM BLD-SCNC: 140 MMOL/L (ref 135–145)
SP GR UR STRIP: >1.03 (ref 1–1.03)
UROBILINOGEN UR QL STRIP: ABNORMAL
VENTILATOR MODE: ABNORMAL
WBC NRBC COR # BLD: 7.22 10*3/MM3 (ref 4.8–10.8)
WBC NRBC COR # BLD: 8.12 10*3/MM3 (ref 4.8–10.8)

## 2018-03-27 PROCEDURE — 81003 URINALYSIS AUTO W/O SCOPE: CPT | Performed by: FAMILY MEDICINE

## 2018-03-27 PROCEDURE — 82330 ASSAY OF CALCIUM: CPT

## 2018-03-27 PROCEDURE — 93005 ELECTROCARDIOGRAM TRACING: CPT | Performed by: FAMILY MEDICINE

## 2018-03-27 PROCEDURE — 84100 ASSAY OF PHOSPHORUS: CPT | Performed by: FAMILY MEDICINE

## 2018-03-27 PROCEDURE — 25010000002 ONDANSETRON PER 1 MG: Performed by: INTERNAL MEDICINE

## 2018-03-27 PROCEDURE — 63710000001 INSULIN DETEMIR PER 5 UNITS: Performed by: INTERNAL MEDICINE

## 2018-03-27 PROCEDURE — 82803 BLOOD GASES ANY COMBINATION: CPT

## 2018-03-27 PROCEDURE — 63710000001 INSULIN LISPRO (HUMAN) PER 5 UNITS: Performed by: FAMILY MEDICINE

## 2018-03-27 PROCEDURE — 83735 ASSAY OF MAGNESIUM: CPT | Performed by: FAMILY MEDICINE

## 2018-03-27 PROCEDURE — 82962 GLUCOSE BLOOD TEST: CPT

## 2018-03-27 PROCEDURE — 85025 COMPLETE CBC W/AUTO DIFF WBC: CPT | Performed by: FAMILY MEDICINE

## 2018-03-27 PROCEDURE — 25810000003 POTASSIUM CHLORIDE PER 2 MEQ: Performed by: INTERNAL MEDICINE

## 2018-03-27 PROCEDURE — 80053 COMPREHEN METABOLIC PANEL: CPT | Performed by: FAMILY MEDICINE

## 2018-03-27 PROCEDURE — 93010 ELECTROCARDIOGRAM REPORT: CPT | Performed by: INTERNAL MEDICINE

## 2018-03-27 PROCEDURE — 25010000002 ENOXAPARIN PER 10 MG: Performed by: FAMILY MEDICINE

## 2018-03-27 PROCEDURE — 36600 WITHDRAWAL OF ARTERIAL BLOOD: CPT

## 2018-03-27 RX ORDER — LABETALOL HYDROCHLORIDE 5 MG/ML
10 INJECTION, SOLUTION INTRAVENOUS ONCE
Status: COMPLETED | OUTPATIENT
Start: 2018-03-27 | End: 2018-03-27

## 2018-03-27 RX ORDER — DOCUSATE SODIUM 100 MG/1
100 CAPSULE, LIQUID FILLED ORAL 2 TIMES DAILY
Status: DISCONTINUED | OUTPATIENT
Start: 2018-03-27 | End: 2018-03-28 | Stop reason: HOSPADM

## 2018-03-27 RX ORDER — SODIUM CHLORIDE AND POTASSIUM CHLORIDE 150; 450 MG/100ML; MG/100ML
100 INJECTION, SOLUTION INTRAVENOUS CONTINUOUS
Status: DISCONTINUED | OUTPATIENT
Start: 2018-03-27 | End: 2018-03-28 | Stop reason: HOSPADM

## 2018-03-27 RX ORDER — SODIUM CHLORIDE 450 MG/100ML
100 INJECTION, SOLUTION INTRAVENOUS CONTINUOUS
Status: DISCONTINUED | OUTPATIENT
Start: 2018-03-27 | End: 2018-03-27 | Stop reason: SDUPTHER

## 2018-03-27 RX ORDER — DEXTROSE MONOHYDRATE 25 G/50ML
25 INJECTION, SOLUTION INTRAVENOUS
Status: DISCONTINUED | OUTPATIENT
Start: 2018-03-27 | End: 2018-03-28 | Stop reason: HOSPADM

## 2018-03-27 RX ORDER — NICOTINE POLACRILEX 4 MG
15 LOZENGE BUCCAL
Status: DISCONTINUED | OUTPATIENT
Start: 2018-03-27 | End: 2018-03-28 | Stop reason: HOSPADM

## 2018-03-27 RX ORDER — ONDANSETRON 2 MG/ML
4 INJECTION INTRAMUSCULAR; INTRAVENOUS EVERY 6 HOURS PRN
Status: DISCONTINUED | OUTPATIENT
Start: 2018-03-27 | End: 2018-03-28 | Stop reason: HOSPADM

## 2018-03-27 RX ORDER — SODIUM CHLORIDE 9 MG/ML
125 INJECTION, SOLUTION INTRAVENOUS CONTINUOUS
Status: DISCONTINUED | OUTPATIENT
Start: 2018-03-27 | End: 2018-03-27

## 2018-03-27 RX ORDER — ACETAMINOPHEN 325 MG/1
650 TABLET ORAL ONCE
Status: COMPLETED | OUTPATIENT
Start: 2018-03-27 | End: 2018-03-27

## 2018-03-27 RX ORDER — LISINOPRIL 10 MG/1
10 TABLET ORAL
Status: DISCONTINUED | OUTPATIENT
Start: 2018-03-27 | End: 2018-03-28 | Stop reason: HOSPADM

## 2018-03-27 RX ORDER — PROMETHAZINE HYDROCHLORIDE 25 MG/ML
12.5 INJECTION, SOLUTION INTRAMUSCULAR; INTRAVENOUS EVERY 6 HOURS PRN
Status: DISCONTINUED | OUTPATIENT
Start: 2018-03-27 | End: 2018-03-28 | Stop reason: HOSPADM

## 2018-03-27 RX ORDER — HYDROCODONE BITARTRATE AND ACETAMINOPHEN 7.5; 325 MG/1; MG/1
1 TABLET ORAL EVERY 6 HOURS PRN
Status: DISCONTINUED | OUTPATIENT
Start: 2018-03-27 | End: 2018-03-28 | Stop reason: HOSPADM

## 2018-03-27 RX ADMIN — ATORVASTATIN CALCIUM 10 MG: 10 TABLET, FILM COATED ORAL at 21:25

## 2018-03-27 RX ADMIN — POLYETHYLENE GLYCOL 3350 17 G: 17 POWDER, FOR SOLUTION ORAL at 08:20

## 2018-03-27 RX ADMIN — ENOXAPARIN SODIUM 40 MG: 40 INJECTION SUBCUTANEOUS at 21:25

## 2018-03-27 RX ADMIN — ONDANSETRON HYDROCHLORIDE 4 MG: 2 INJECTION INTRAMUSCULAR; INTRAVENOUS at 10:00

## 2018-03-27 RX ADMIN — PANTOPRAZOLE SODIUM 40 MG: 40 TABLET, DELAYED RELEASE ORAL at 08:10

## 2018-03-27 RX ADMIN — DOCUSATE SODIUM 100 MG: 100 CAPSULE, LIQUID FILLED ORAL at 21:25

## 2018-03-27 RX ADMIN — INSULIN LISPRO 2 UNITS: 100 INJECTION, SOLUTION INTRAVENOUS; SUBCUTANEOUS at 11:41

## 2018-03-27 RX ADMIN — GABAPENTIN 300 MG: 300 CAPSULE ORAL at 08:12

## 2018-03-27 RX ADMIN — PROPRANOLOL HYDROCHLORIDE 120 MG: 60 CAPSULE, EXTENDED RELEASE ORAL at 08:12

## 2018-03-27 RX ADMIN — GABAPENTIN 300 MG: 300 CAPSULE ORAL at 21:25

## 2018-03-27 RX ADMIN — DOCUSATE SODIUM 100 MG: 100 CAPSULE, LIQUID FILLED ORAL at 09:16

## 2018-03-27 RX ADMIN — LISINOPRIL 10 MG: 10 TABLET ORAL at 09:17

## 2018-03-27 RX ADMIN — DULOXETINE HYDROCHLORIDE 60 MG: 30 CAPSULE, DELAYED RELEASE ORAL at 08:12

## 2018-03-27 RX ADMIN — METHIMAZOLE 20 MG: 10 TABLET ORAL at 08:12

## 2018-03-27 RX ADMIN — INSULIN DETEMIR 25 UNITS: 100 INJECTION, SOLUTION SUBCUTANEOUS at 21:24

## 2018-03-27 RX ADMIN — SODIUM CHLORIDE 125 ML/HR: 9 INJECTION, SOLUTION INTRAVENOUS at 04:05

## 2018-03-27 RX ADMIN — INSULIN LISPRO 2 UNITS: 100 INJECTION, SOLUTION INTRAVENOUS; SUBCUTANEOUS at 21:24

## 2018-03-27 RX ADMIN — INSULIN LISPRO 4 UNITS: 100 INJECTION, SOLUTION INTRAVENOUS; SUBCUTANEOUS at 17:11

## 2018-03-27 RX ADMIN — TIZANIDINE 4 MG: 4 TABLET ORAL at 17:53

## 2018-03-27 RX ADMIN — ACETAMINOPHEN 650 MG: 325 TABLET, FILM COATED ORAL at 01:28

## 2018-03-27 RX ADMIN — AMLODIPINE BESYLATE 5 MG: 5 TABLET ORAL at 08:10

## 2018-03-27 RX ADMIN — POTASSIUM CHLORIDE 40 MEQ: 1.5 POWDER, FOR SOLUTION ORAL at 13:22

## 2018-03-27 RX ADMIN — LABETALOL HYDROCHLORIDE 10 MG: 5 INJECTION, SOLUTION INTRAVENOUS at 09:59

## 2018-03-27 RX ADMIN — SODIUM CHLORIDE AND POTASSIUM CHLORIDE 100 ML/HR: 4.5; 1.49 INJECTION, SOLUTION INTRAVENOUS at 17:56

## 2018-03-27 RX ADMIN — TOPIRAMATE 50 MG: 25 TABLET, FILM COATED ORAL at 21:25

## 2018-03-27 RX ADMIN — SODIUM CHLORIDE 250 ML/HR: 4.5 INJECTION, SOLUTION INTRAVENOUS at 01:09

## 2018-03-27 RX ADMIN — ASPIRIN 81 MG: 81 TABLET ORAL at 08:10

## 2018-03-27 RX ADMIN — INSULIN LISPRO 3 UNITS: 100 INJECTION, SOLUTION INTRAVENOUS; SUBCUTANEOUS at 04:04

## 2018-03-27 RX ADMIN — SODIUM CHLORIDE AND POTASSIUM CHLORIDE 100 ML/HR: 4.5; 1.49 INJECTION, SOLUTION INTRAVENOUS at 09:16

## 2018-03-27 RX ADMIN — TOPIRAMATE 50 MG: 25 TABLET, FILM COATED ORAL at 08:12

## 2018-03-27 NOTE — H&P
[facility] Medicine Services  HISTORY AND PHYSICAL    Date of Admission: 3/26/2018  Primary Care Physician: David Mcallister MD    Subjective     Chief Complaint: Intractable nausea/vomiting    History of Present Illness     42-year-old female with past medical history of diabetes mellitus, Graves' disease, hyperlipidemia, hypertension and hyperthyroidism comes into the ER with intractable nausea/vomiting for 4 days.  Patient states she has not been tolerating water or food for last 3-4 days.  She had multiple episodes of vomiting.  During initial evaluation patient's vital was noted for tachycardia.  He was noted for ketonuria.  Acetone was positive.  Close was noted to be above 280.  Anion gap was 17.  Initial lactate was 2.7 but repeat was noted to be normalized.  And was administered IV fluid in the ER.  EKG was noted for sinus tachycardia.  Even after IV fluid resuscitation her heart rate was elevated to 120s.  Patient will be admitted for possible underlying DKA/hyperosmolar hyperglycemic state with dehydration and tachycardia.    Critical care assessment time: 60 minutes    Review of Systems     Otherwise complete ROS reviewed and negative except as mentioned in the HPI.    Past Medical History:   Past Medical History:   Diagnosis Date   • Arthritis    • Carotid artery stenosis    • Depression    • Diabetes mellitus    • GERD (gastroesophageal reflux disease)    • Hyperlipidemia    • Hypertension    • Injury of back    • Seizure      Past Surgical History:  Past Surgical History:   Procedure Laterality Date   •  SECTION     • CHOLECYSTECTOMY     • COLONOSCOPY     • CYST REMOVAL     • HYSTERECTOMY       Social History:  reports that she has never smoked. She has never used smokeless tobacco. She reports that she uses drugs, including Marijuana. She reports that she does not drink alcohol.    Family History: family history includes Colon cancer in her paternal uncle;  Coronary artery disease in her brother; Diabetes in her brother, father, mother, and sister; Seizures in her sister; Stroke in her father, mother, and sister.      Allergies:  Allergies   Allergen Reactions   • Oxycodone-Acetaminophen      Other reaction(s): Throat swelling     Medications:  Prior to Admission medications    Medication Sig Start Date End Date Taking? Authorizing Provider   amLODIPine (NORVASC) 10 MG tablet Take 0.5 tablets by mouth Daily. 7/12/17   Devyn Moreno MD   aspirin 81 MG EC tablet Take 81 mg by mouth Daily.    Historical Provider, MD   benazepril (LOTENSIN) 20 MG tablet Take 20 mg by mouth 2 (Two) Times a Day.    Historical Provider, MD   DULoxetine (CYMBALTA) 60 MG capsule Take 60 mg by mouth Daily.    Historical Provider, MD   gabapentin (NEURONTIN) 300 MG capsule Take 300 mg by mouth 4 (Four) Times a Day.    Historical Provider, MD   HYDROcodone-acetaminophen (NORCO) 7.5-325 MG per tablet Take 1 tablet by mouth Every 4 (Four) Hours As Needed. 9/18/17   Historical Provider, MD   Insulin Glargine (BASAGLAR KWIKPEN) 100 UNIT/ML injection pen  9/11/17   Historical Provider, MD   insulin lispro (humaLOG) 100 UNIT/ML injection Inject  under the skin 3 (Three) Times a Day Before Meals.    Historical Provider, MD   methIMAzole (TAPAZOLE) 10 MG tablet Take 2 tablets by mouth Daily. 7/12/17   Devyn Moreno MD   omeprazole (priLOSEC) 20 MG capsule Take 1 capsule by mouth Daily. 2/26/18   BRIANA Lares   polyethylene glycol (GoLYTELY) 236 g solution Take as directed by office instructions. 2/26/18   BRIANA Lares   polyethylene glycol (MIRALAX) powder Take 17 g by mouth As Needed. 9/11/17   Historical Provider, MD   propranolol LA (INDERAL LA) 120 MG 24 hr capsule Take 1 capsule by mouth Daily. 7/12/17   Devyn Moreno MD   raNITIdine (ZANTAC) 300 MG tablet Take 300 mg by mouth Every Night. 9/11/17   Historical Provider, MD   simvastatin (ZOCOR) 20 MG tablet Take 20 mg by  "mouth Every Night.    Historical Provider, MD   tiZANidine (ZANAFLEX) 4 MG tablet Take 4 mg by mouth 2 (Two) Times a Day.    Historical Provider, MD   topiramate (TOPAMAX) 50 MG tablet Take 1 tablet by mouth 2 (Two) Times a Day. 1/3/18 1/3/19  Abi GREENE Lisbet, BRIANA     Objective     Vital Signs: /74   Pulse (!) 127   Temp 99.3 °F (37.4 °C) (Oral)   Resp 19   Ht 188 cm (74\")   Wt 81.6 kg (180 lb)   SpO2 95%   BMI 23.11 kg/m²        Physical Exam   Constitutional: She is oriented to person, place, and time. She appears well-developed and well-nourished.   HENT:   Head: Normocephalic and atraumatic.   Eyes: EOM are normal. Pupils are equal, round, and reactive to light.   Neck: Normal range of motion. Neck supple.   Cardiovascular: Normal rate, regular rhythm and normal heart sounds.    Pulmonary/Chest: Effort normal and breath sounds normal.   Abdominal: Soft. Bowel sounds are normal.   Musculoskeletal: Normal range of motion.   Neurological: She is alert and oriented to person, place, and time.   Skin: Skin is warm and dry.   Psychiatric: She has a normal mood and affect. Her behavior is normal. Thought content normal.             Results Reviewed:  Lab Results (last 24 hours)     Procedure Component Value Units Date/Time    Lactic Acid, Reflex [[id number]]  (Normal) Collected:  03/26/18 2003    Specimen:  Blood Updated:  03/26/18 2023     Lactate 1.1 mmol/L     Lactic Acid, Reflex Timer (This will reflex a repeat order 3-3:15 hours after ordered.) [[id number]] Collected:  03/26/18 1607    Specimen:  Blood Updated:  03/26/18 2001     Extra Tube Hold for add-ons.     Comment: Auto resulted.       Ketone Bodies, Serum (Not performed at [facility]) [[id number]] Collected:  03/26/18 1626    Specimen:  Blood Updated:  03/26/18 1939    Narrative:       The following orders were created for panel order Ketone Bodies, Serum (Not performed at [facility]).  Procedure                               Abnormality         " Status                     ---------                               -----------         ------                     Acetone[[id number]]                      Abnormal            Final result                 Please view results for these tests on the individual orders.    Acetone [[id number]]  (Abnormal) Collected:  03/26/18 1626    Specimen:  Blood Updated:  03/26/18 1939     Acetone Small (A)    Blood Gas, Arterial [[id number]]  (Abnormal) Collected:  03/26/18 1932    Specimen:  Arterial Blood Updated:  03/26/18 1938     Site Left Radial     Bill's Test Positive     pH, Arterial 7.445 pH units      pCO2, Arterial 36.4 mm Hg      pO2, Arterial 81.5 (L) mm Hg      HCO3, Arterial 25.0 mmol/L      Base Excess, Arterial 1.1 mmol/L      O2 Saturation, Arterial 97.1 %      Temperature 37.0 C      Barometric Pressure for Blood Gas 754 mmHg      Modality Room Air     Ventilator Mode NA     Collected by [id number]    [facility] Draw [[id number]] Collected:  03/26/18 1607    Specimen:  Blood Updated:  03/26/18 1716    Narrative:       The following orders were created for panel order [facility] Draw.  Procedure                               Abnormality         Status                     ---------                               -----------         ------                     Light Blue Top[[id number]]                                   Final result               Green Top (Gel)[[id number]]                                  Final result               Lavender Top[[id number]]                                     Final result               Red Top[[id number]]                                          Final result               Blood Culture Bottle Set[[id number]]                         Final result                 Please view results for these tests on the individual orders.    Blood Culture Bottle Set [[id number]] Collected:  03/26/18 1607    Specimen:  Blood from Arm, Right Updated:  03/26/18 1716     Extra Tube Hold for add-ons.     Comment: Auto  resulted.       Light Blue Top [[id number]] Collected:  03/26/18 1607    Specimen:  Blood Updated:  03/26/18 1716     Extra Tube hold for add-on     Comment: Auto resulted       Green Top (Gel) [[id number]] Collected:  03/26/18 1607    Specimen:  Blood Updated:  03/26/18 1716     Extra Tube Hold for add-ons.     Comment: Auto resulted.       Lavender Top [[id number]] Collected:  03/26/18 1607    Specimen:  Blood Updated:  03/26/18 1716     Extra Tube hold for add-on     Comment: Auto resulted       Red Top [[id number]] Collected:  03/26/18 1607    Specimen:  Blood Updated:  03/26/18 1716     Extra Tube Hold for add-ons.     Comment: Auto resulted.       Urinalysis With / Microscopic If Indicated - Urine, Clean Catch [[id number]]  (Abnormal) Collected:  03/26/18 1653    Specimen:  Urine from Urine, Clean Catch Updated:  03/26/18 1712     Color, UA Yellow     Appearance, UA Clear     pH, UA <=5.0     Specific Gravity, UA >1.030 (H)     Glucose, UA >=1000 mg/dL (3+) (A)     Ketones, UA 80 mg/dL (3+) (A)     Bilirubin, UA Negative     Blood, UA Negative     Protein, UA Trace (A)     Leuk Esterase, UA Negative     Nitrite, UA Negative     Urobilinogen, UA 1.0 E.U./dL    Narrative:       Urine microscopic not indicated.    Blood Culture - Blood, [[id number]] Collected:  03/26/18 1607    Specimen:  Blood from Arm, Right Updated:  03/26/18 1657    POCT Pregnancy, Urine [[id number]] Collected:  03/26/18 1654    Specimen:  Urine Updated:  03/26/18 1654     HCG, Urine, QL Negative     Lot Number \[id number]\     Internal Positive Control Positive     Internal Negative Control Negative    Lactic Acid, Plasma [[id number]]  (Abnormal) Collected:  03/26/18 1607    Specimen:  Blood Updated:  03/26/18 1652     Lactate 2.7 (C) mmol/L     Blood Culture - Blood, [[id number]] Collected:  03/26/18 1626    Specimen:  Blood from Arm, Left Updated:  03/26/18 1637    Comprehensive Metabolic Panel [[id number]]  (Abnormal) Collected:  03/26/18 1606     Specimen:  Blood Updated:  03/26/18 1631     Glucose 281 (H) mg/dL      BUN 14 mg/dL      Creatinine 0.60 mg/dL      Sodium 138 mmol/L      Potassium 3.7 mmol/L      Chloride 99 mmol/L      CO2 22.0 (L) mmol/L      Calcium 10.1 mg/dL      Total Protein 7.2 g/dL      Albumin 4.00 g/dL      ALT (SGPT) 52 U/L      AST (SGOT) 48 (H) U/L      Alkaline Phosphatase 124 (H) U/L      Total Bilirubin 0.7 mg/dL      eGFR  African Amer 132 mL/min/1.73      Globulin 3.2 gm/dL      A/G Ratio 1.3 g/dL      BUN/Creatinine Ratio 23.3     Anion Gap 17.0 (H) mmol/L     Lipase [[id number]]  (Normal) Collected:  03/26/18 1607    Specimen:  Blood Updated:  03/26/18 1631     Lipase 61 U/L     CBC & Differential [[id number]] Collected:  03/26/18 1607    Specimen:  Blood Updated:  03/26/18 1620    Narrative:       The following orders were created for panel order CBC & Differential.  Procedure                               Abnormality         Status                     ---------                               -----------         ------                     CBC Auto Differential[[id number]]        Abnormal            Final result                 Please view results for these tests on the individual orders.    CBC Auto Differential [[id number]]  (Abnormal) Collected:  03/26/18 1607    Specimen:  Blood Updated:  03/26/18 1620     WBC 7.65 10*3/mm3      RBC 5.16 10*6/mm3      Hemoglobin 12.9 g/dL      Hematocrit 38.7 %      MCV 75.0 (L) fL      MCH 25.0 (L) pg      MCHC 33.3 g/dL      RDW 13.6 %      RDW-SD 36.4 (L) fl      MPV 9.9 fL      Platelets 540 (H) 10*3/mm3      Neutrophil % 67.0 %      Lymphocyte % 25.9 %      Monocyte % 6.4 %      Eosinophil % 0.0 %      Basophil % 0.3 %      Immature Grans % 0.4 %      Neutrophils, Absolute 5.13 10*3/mm3      Lymphocytes, Absolute 1.98 10*3/mm3      Monocytes, Absolute 0.49 10*3/mm3      Eosinophils, Absolute 0.00 10*3/mm3      Basophils, Absolute 0.02 10*3/mm3      Immature Grans, Absolute 0.03 10*3/mm3       nRBC 0.0 /100 WBC         Imaging Results (last 24 hours)     Procedure Component Value Units Date/Time    CT Abdomen Pelvis With Contrast [[id number]] Collected:  03/26/18 1715     Updated:  03/26/18 1721    Narrative:       EXAMINATION: CT ABDOMEN PELVIS W CONTRAST-      3/26/2018 5:02 PM CDT     HISTORY: bilious vomiting, abdominal pain     In order to have a CT radiation dose as low as reasonably achievable  Automated Exposure Control was utilized for adjustment of the mA and/or  KV according to patient size.     DLP in mGycm= 481.     Axial, sagittal, and coronal CT imaging of the abdomen/pelvis with IV  contrast injection.     Comparison is made with 03/14/2017.     Normal heart size.  Clear lung bases.  Cholecystectomy clips.  Normal liver, pancreas, and spleen.  No bile duct dilation.  Normal and symmetric adrenal glands and kidneys.     No bowel dilation.  No appendicitis or diverticulitis.  No sign of colitis.     No pelvic mass or free fluid.     2 cm left ovarian cyst diffusely measured 1.5 cm.  3 cm right ovarian cyst noted previously has resolved.  The uterus is absent.     Summary:  1. No mass, fluid collection, or inflammatory process is seen.                                   This report was finalized on 03/26/2018 17:18 by Dr. Isaiah Diaz MD.        I have personally reviewed and interpreted the radiology studies and ECG obtained at time of admission.     Assessment / Plan     Assessment:   Hospital Problem List     Nausea and vomiting        1.  Intractable nausea/vomiting possibly secondary to underlying DKA/hyperosmolar hyperglycemic state  2.  Ketonuria  3.  Elevated serum acetone level  4.  Elevated anion gap  5.  Hyperglycemia    Plan:      -Admit to the unit  -Monitor vitals next line-continue cardiac monitoring  -Continuous pulse ox  -Initial EKG noted for sinus tachycardia  -Follow-up repeat EKG  -Consider echocardiogram and/or cardiac workup if indicated  -Continue DKA/hyperosmolar  state protocol  -Follow-up anion gap  -Lactate noted to be within acceptable range  -Status post IV fluid resuscitation in the ER  -Continue aggressive IV fluid resuscitation  -Monitor blood glucose level  -Continue home medications  -GI prophylaxis  -DVT prophylaxis        Code Status: Full code     I discussed the patients findings and my recommendations with the patient's RN    Estimated length of stay 2-3 days    Farhat Valle MD   03/26/18   9:11 PM

## 2018-03-27 NOTE — PAYOR COMM NOTE
"FROM: JOEY LIVE  PHONE: [phone]  FAX: [phone]    Id: [id number]    Lynn Magana (42 y.o. Female)     Date of Birth Social Security Number Address Home Phone MRN    1975  [id number]  [facility] [id number] [phone] [id number]    Voodoo Marital Status          Hinduism        Admission Date Admission Type Admitting Provider Attending Provider Department, Room/Bed    3/26/18 Emergency Rubin Bustos DO [place], Ali Janell, DO [facility], C012/1    Discharge Date Discharge Disposition Discharge Destination                       Attending Provider:  Rubin Bustos DO    Allergies:  Oxycodone-acetaminophen    Isolation:  None   Infection:  None   Code Status:  FULL    Ht:  182.9 cm (72\")   Wt:  82.9 kg (182 lb 12.8 oz)    Admission Cmt:  None   Principal Problem:  None                Active Insurance as of 3/26/2018     Primary Coverage     Payor Plan Insurance Group Employer/Plan Group    [employer] [place] [employer] [place]ICA[place]      Payor Plan Address Payor Plan Phone Number Effective From Effective To    [address] [address] [phone] 3/14/2017     [place] [address]       Subscriber Name Subscriber Birth Date Member ID       LYNN MAGANA 1975 [id number]                 Emergency Contacts      (Rel.) Home Phone Work Phone Mobile Phone    Gillian Cifuentes (Spouse) [phone] -- --               History & Physical      Farhat Valle MD at 3/26/2018  9:11 PM              [facility] Medicine Services  HISTORY AND PHYSICAL    Date of Admission: 3/26/2018  Primary Care Physician: David Mcallister MD    Subjective     Chief Complaint: Intractable nausea/vomiting    History of Present Illness     42-year-old female with past medical history of diabetes mellitus, Graves' disease, hyperlipidemia, hypertension and hyperthyroidism comes into the ER with intractable nausea/vomiting for 4 days.  Patient " states she has not been tolerating water or food for last 3-4 days.  She had multiple episodes of vomiting.  During initial evaluation patient's vital was noted for tachycardia.  He was noted for ketonuria.  Acetone was positive.  Close was noted to be above 280.  Anion gap was 17.  Initial lactate was 2.7 but repeat was noted to be normalized.  And was administered IV fluid in the ER.  EKG was noted for sinus tachycardia.  Even after IV fluid resuscitation her heart rate was elevated to 120s.  Patient will be admitted for possible underlying DKA/hyperosmolar hyperglycemic state with dehydration and tachycardia.    Critical care assessment time: 60 minutes    Review of Systems     Otherwise complete ROS reviewed and negative except as mentioned in the HPI.    Past Medical History:   Past Medical History:   Diagnosis Date   • Arthritis    • Carotid artery stenosis    • Depression    • Diabetes mellitus    • GERD (gastroesophageal reflux disease)    • Hyperlipidemia    • Hypertension    • Injury of back    • Seizure      Past Surgical History:  Past Surgical History:   Procedure Laterality Date   •  SECTION     • CHOLECYSTECTOMY     • COLONOSCOPY     • CYST REMOVAL     • HYSTERECTOMY       Social History:  reports that she has never smoked. She has never used smokeless tobacco. She reports that she uses drugs, including Marijuana. She reports that she does not drink alcohol.    Family History: family history includes Colon cancer in her paternal uncle; Coronary artery disease in her brother; Diabetes in her brother, father, mother, and sister; Seizures in her sister; Stroke in her father, mother, and sister.      Allergies:  Allergies   Allergen Reactions   • Oxycodone-Acetaminophen      Other reaction(s): Throat swelling     Medications:  Prior to Admission medications    Medication Sig Start Date End Date Taking? Authorizing Provider   amLODIPine (NORVASC) 10 MG tablet Take 0.5 tablets by mouth Daily.  "7/12/17   Devyn Moreno MD   aspirin 81 MG EC tablet Take 81 mg by mouth Daily.    Historical Provider, MD   benazepril (LOTENSIN) 20 MG tablet Take 20 mg by mouth 2 (Two) Times a Day.    Historical Provider, MD   DULoxetine (CYMBALTA) 60 MG capsule Take 60 mg by mouth Daily.    Historical Provider, MD   gabapentin (NEURONTIN) 300 MG capsule Take 300 mg by mouth 4 (Four) Times a Day.    Historical Provider, MD   HYDROcodone-acetaminophen (NORCO) 7.5-325 MG per tablet Take 1 tablet by mouth Every 4 (Four) Hours As Needed. 9/18/17   Historical Provider, MD   Insulin Glargine (BASAGLAR KWIKPEN) 100 UNIT/ML injection pen  9/11/17   Historical Provider, MD   insulin lispro (humaLOG) 100 UNIT/ML injection Inject  under the skin 3 (Three) Times a Day Before Meals.    Historical Provider, MD   methIMAzole (TAPAZOLE) 10 MG tablet Take 2 tablets by mouth Daily. 7/12/17   Devyn Moreno MD   omeprazole (priLOSEC) 20 MG capsule Take 1 capsule by mouth Daily. 2/26/18   BRIANA Lares   polyethylene glycol (GoLYTELY) 236 g solution Take as directed by office instructions. 2/26/18   BRIANA Lares   polyethylene glycol (MIRALAX) powder Take 17 g by mouth As Needed. 9/11/17   Historical Provider, MD   propranolol LA (INDERAL LA) 120 MG 24 hr capsule Take 1 capsule by mouth Daily. 7/12/17   Devyn Moreno MD   raNITIdine (ZANTAC) 300 MG tablet Take 300 mg by mouth Every Night. 9/11/17   Historical Provider, MD   simvastatin (ZOCOR) 20 MG tablet Take 20 mg by mouth Every Night.    Historical Provider, MD   tiZANidine (ZANAFLEX) 4 MG tablet Take 4 mg by mouth 2 (Two) Times a Day.    Historical Provider, MD   topiramate (TOPAMAX) 50 MG tablet Take 1 tablet by mouth 2 (Two) Times a Day. 1/3/18 1/3/19  BRIANA White     Objective     Vital Signs: /74   Pulse (!) 127   Temp 99.3 °F (37.4 °C) (Oral)   Resp 19   Ht 188 cm (74\")   Wt 81.6 kg (180 lb)   SpO2 95%   BMI 23.11 kg/m²         Physical " Exam   Constitutional: She is oriented to person, place, and time. She appears well-developed and well-nourished.   HENT:   Head: Normocephalic and atraumatic.   Eyes: EOM are normal. Pupils are equal, round, and reactive to light.   Neck: Normal range of motion. Neck supple.   Cardiovascular: Normal rate, regular rhythm and normal heart sounds.    Pulmonary/Chest: Effort normal and breath sounds normal.   Abdominal: Soft. Bowel sounds are normal.   Musculoskeletal: Normal range of motion.   Neurological: She is alert and oriented to person, place, and time.   Skin: Skin is warm and dry.   Psychiatric: She has a normal mood and affect. Her behavior is normal. Thought content normal.             Results Reviewed:  Lab Results (last 24 hours)     Procedure Component Value Units Date/Time    Lactic Acid, Reflex [[id number]]  (Normal) Collected:  03/26/18 2003    Specimen:  Blood Updated:  03/26/18 2023     Lactate 1.1 mmol/L     Lactic Acid, Reflex Timer (This will reflex a repeat order 3-3:15 hours after ordered.) [[id number]] Collected:  03/26/18 1607    Specimen:  Blood Updated:  03/26/18 2001     Extra Tube Hold for add-ons.     Comment: Auto resulted.       Ketone Bodies, Serum (Not performed at [place]) [[id number]] Collected:  03/26/18 1626    Specimen:  Blood Updated:  03/26/18 1939    Narrative:       The following orders were created for panel order Ketone Bodies, Serum (Not performed at [place]).  Procedure                               Abnormality         Status                     ---------                               -----------         ------                     Acetone[[id number]]                      Abnormal            Final result                 Please view results for these tests on the individual orders.    Acetone [[id number]]  (Abnormal) Collected:  03/26/18 1626    Specimen:  Blood Updated:  03/26/18 1939     Acetone Small (A)    Blood Gas, Arterial [[id number]]  (Abnormal) Collected:  03/26/18  1932    Specimen:  Arterial Blood Updated:  03/26/18 1938     Site Left Radial     Bill's Test Positive     pH, Arterial 7.445 pH units      pCO2, Arterial 36.4 mm Hg      pO2, Arterial 81.5 (L) mm Hg      HCO3, Arterial 25.0 mmol/L      Base Excess, Arterial 1.1 mmol/L      O2 Saturation, Arterial 97.1 %      Temperature 37.0 C      Barometric Pressure for Blood Gas 754 mmHg      Modality Room Air     Ventilator Mode NA     Collected by [id number]    [place] Draw [[id number]] Collected:  03/26/18 1607    Specimen:  Blood Updated:  03/26/18 1716    Narrative:       The following orders were created for panel order [place] Draw.  Procedure                               Abnormality         Status                     ---------                               -----------         ------                     Light Blue Top[[id number]]                                   Final result               Green Top (Gel)[[id number]]                                  Final result               Lavender Top[[id number]]                                     Final result               Red Top[[id number]]                                          Final result               Blood Culture Bottle Set[[id number]]                         Final result                 Please view results for these tests on the individual orders.    Blood Culture Bottle Set [[id number]] Collected:  03/26/18 1607    Specimen:  Blood from Arm, Right Updated:  03/26/18 1716     Extra Tube Hold for add-ons.     Comment: Auto resulted.       Light Blue Top [[id number]] Collected:  03/26/18 1607    Specimen:  Blood Updated:  03/26/18 1716     Extra Tube hold for add-on     Comment: Auto resulted       Green Top (Gel) [[id number]] Collected:  03/26/18 1607    Specimen:  Blood Updated:  03/26/18 1716     Extra Tube Hold for add-ons.     Comment: Auto resulted.       Lavender Top [[id number]] Collected:  03/26/18 1607    Specimen:  Blood Updated:  03/26/18 1716     Extra Tube hold for  add-on     Comment: Auto resulted       Red Top [[id number]] Collected:  03/26/18 1607    Specimen:  Blood Updated:  03/26/18 1716     Extra Tube Hold for add-ons.     Comment: Auto resulted.       Urinalysis With / Microscopic If Indicated - Urine, Clean Catch [[id number]]  (Abnormal) Collected:  03/26/18 1653    Specimen:  Urine from Urine, Clean Catch Updated:  03/26/18 1712     Color, UA Yellow     Appearance, UA Clear     pH, UA <=5.0     Specific Gravity, UA >1.030 (H)     Glucose, UA >=1000 mg/dL (3+) (A)     Ketones, UA 80 mg/dL (3+) (A)     Bilirubin, UA Negative     Blood, UA Negative     Protein, UA Trace (A)     Leuk Esterase, UA Negative     Nitrite, UA Negative     Urobilinogen, UA 1.0 E.U./dL    Narrative:       Urine microscopic not indicated.    Blood Culture - Blood, [[id number]] Collected:  03/26/18 1607    Specimen:  Blood from Arm, Right Updated:  03/26/18 1657    POCT Pregnancy, Urine [[id number]] Collected:  03/26/18 1654    Specimen:  Urine Updated:  03/26/18 1654     HCG, Urine, QL Negative     Lot Number \[id number]\     Internal Positive Control Positive     Internal Negative Control Negative    Lactic Acid, Plasma [[id number]]  (Abnormal) Collected:  03/26/18 1607    Specimen:  Blood Updated:  03/26/18 1652     Lactate 2.7 (C) mmol/L     Blood Culture - Blood, [[id number]] Collected:  03/26/18 1626    Specimen:  Blood from Arm, Left Updated:  03/26/18 1637    Comprehensive Metabolic Panel [[id number]]  (Abnormal) Collected:  03/26/18 1607    Specimen:  Blood Updated:  03/26/18 1631     Glucose 281 (H) mg/dL      BUN 14 mg/dL      Creatinine 0.60 mg/dL      Sodium 138 mmol/L      Potassium 3.7 mmol/L      Chloride 99 mmol/L      CO2 22.0 (L) mmol/L      Calcium 10.1 mg/dL      Total Protein 7.2 g/dL      Albumin 4.00 g/dL      ALT (SGPT) 52 U/L      AST (SGOT) 48 (H) U/L      Alkaline Phosphatase 124 (H) U/L      Total Bilirubin 0.7 mg/dL      eGFR  African Amer 132 mL/min/1.73      Globulin 3.2  gm/dL      A/G Ratio 1.3 g/dL      BUN/Creatinine Ratio 23.3     Anion Gap 17.0 (H) mmol/L     Lipase [[id number]]  (Normal) Collected:  03/26/18 1607    Specimen:  Blood Updated:  03/26/18 1631     Lipase 61 U/L     CBC & Differential [[id number]] Collected:  03/26/18 1607    Specimen:  Blood Updated:  03/26/18 1620    Narrative:       The following orders were created for panel order CBC & Differential.  Procedure                               Abnormality         Status                     ---------                               -----------         ------                     CBC Auto Differential[[id number]]        Abnormal            Final result                 Please view results for these tests on the individual orders.    CBC Auto Differential [[id number]]  (Abnormal) Collected:  03/26/18 1607    Specimen:  Blood Updated:  03/26/18 1620     WBC 7.65 10*3/mm3      RBC 5.16 10*6/mm3      Hemoglobin 12.9 g/dL      Hematocrit 38.7 %      MCV 75.0 (L) fL      MCH 25.0 (L) pg      MCHC 33.3 g/dL      RDW 13.6 %      RDW-SD 36.4 (L) fl      MPV 9.9 fL      Platelets 540 (H) 10*3/mm3      Neutrophil % 67.0 %      Lymphocyte % 25.9 %      Monocyte % 6.4 %      Eosinophil % 0.0 %      Basophil % 0.3 %      Immature Grans % 0.4 %      Neutrophils, Absolute 5.13 10*3/mm3      Lymphocytes, Absolute 1.98 10*3/mm3      Monocytes, Absolute 0.49 10*3/mm3      Eosinophils, Absolute 0.00 10*3/mm3      Basophils, Absolute 0.02 10*3/mm3      Immature Grans, Absolute 0.03 10*3/mm3      nRBC 0.0 /100 WBC         Imaging Results (last 24 hours)     Procedure Component Value Units Date/Time    CT Abdomen Pelvis With Contrast [[id number]] Collected:  03/26/18 1715     Updated:  03/26/18 1721    Narrative:       EXAMINATION: CT ABDOMEN PELVIS W CONTRAST-      3/26/2018 5:02 PM CDT     HISTORY: bilious vomiting, abdominal pain     In order to have a CT radiation dose as low as reasonably achievable  Automated Exposure Control was utilized  for adjustment of the mA and/or  KV according to patient size.     DLP in mGycm= 481.     Axial, sagittal, and coronal CT imaging of the abdomen/pelvis with IV  contrast injection.     Comparison is made with 03/14/2017.     Normal heart size.  Clear lung bases.  Cholecystectomy clips.  Normal liver, pancreas, and spleen.  No bile duct dilation.  Normal and symmetric adrenal glands and kidneys.     No bowel dilation.  No appendicitis or diverticulitis.  No sign of colitis.     No pelvic mass or free fluid.     2 cm left ovarian cyst diffusely measured 1.5 cm.  3 cm right ovarian cyst noted previously has resolved.  The uterus is absent.     Summary:  1. No mass, fluid collection, or inflammatory process is seen.                                   This report was finalized on 03/26/2018 17:18 by Dr. Isaiah Diaz MD.        I have personally reviewed and interpreted the radiology studies and ECG obtained at time of admission.     Assessment / Plan     Assessment:   Hospital Problem List     Nausea and vomiting        1.  Intractable nausea/vomiting possibly secondary to underlying DKA/hyperosmolar hyperglycemic state  2.  Ketonuria  3.  Elevated serum acetone level  4.  Elevated anion gap  5.  Hyperglycemia    Plan:      -Admit to the unit  -Monitor vitals next line-continue cardiac monitoring  -Continuous pulse ox  -Initial EKG noted for sinus tachycardia  -Follow-up repeat EKG  -Consider echocardiogram and/or cardiac workup if indicated  -Continue DKA/hyperosmolar state protocol  -Follow-up anion gap  -Lactate noted to be within acceptable range  -Status post IV fluid resuscitation in the ER  -Continue aggressive IV fluid resuscitation  -Monitor blood glucose level  -Continue home medications  -GI prophylaxis  -DVT prophylaxis        Code Status: Full code     I discussed the patients findings and my recommendations with the patient's RN    Estimated length of stay 2-3 days    Farhat Valle MD   03/26/18   9:11  "PM              Electronically signed by Farhat Valle MD at 3/26/2018  9:34 PM          Emergency Department Notes      Lia Velazquez DO at 3/26/2018  8:49 PM              [facility]  eMERGENCY dEPARTMENT eNCOUnter      Pt Name: Lynn Magana  MRN: [id number]  Birthdate 1975  Date of evaluation: 3/26/2018      CHIEF COMPLAINT       Chief Complaint   Patient presents with   • Abdominal Pain   • Vomiting   • Nausea   • Rapid Heart Rate       Nurses Notes reviewed and I agree except as noted in the HPI.      HISTORY OF PRESENT ILLNESS    Lynn Magana is a 42 y.o. female who presents     Patient presents for abdominal pain and vomiting.  Patient reports that she has had several days of intractable vomiting or what she tries to drink or eat it comes right back up.  She states she is not eaten since last night's she is now puking up bilious material that is very foul tasting.  Patient states that she does have diabetes she reports that her hemoglobin A1c is \"very out of whack\" per her PCP.  She does admit to not checking her glucose as instructed secondary to \"i get very tired of sticking myself in the finger.\"    REVIEW OF SYSTEMS     Review of Systems  CONSTITUION: No Fever, No chills, No activity change, No diaphoresis, No unexpected wt change.    HENT: No congestion, no dental problems, no ear pain or discharge,   EYES: No drainage, no itching, no photophobia, no visual disturbance  RESPIRATORY: No apnea, no chest tightness, no cough, no shortness of breath, no stridor, no wheezing  CARDIOVASCULAR: No chest pain, no palpitations, no leg swelling  GI: As per the HPI otherwise negative.  ENDOCRINE: As per the HPI otherwise negative.  : No difficulty urinating, no dyspareunia, nodysuria, no flank pain, no frequency, no genital sore, no hematuria, no menstrual problem if female, no decreased urniation, no hesitation of urination, no vaginal discharge if female, no vaginal pain if " "female no penile pain if male  ALLERG/IMMUNO:  No env or food allergies, not immunocompromised  NEUROLOGICAL: No dizziness, no facial asymmetry, no Headaches, no Light-headedness, no numbess nor paresthesias, no seizures, no speech difficulty, No syncope, no temors, no weakness  HEMATOLOGIC: No adenopathy, no unusual bleeding or bruising  MUSC: No arthralgia, no back pain, no joint swelling, no myalgias, no neck pain, no neck stiffness  SKIN: No rash, no color change, no pallor, no wound patient does report foul-smelling material that she is able to get from her umbilicus periodically despite cleaning it regularly.  PSYCH: No agitation, no behavior problem, no confusion, no decr concentration, no hallucinations, no suicidal ideation, no homicidal ideation, no self injury, no sleep disturbance  All other systems negative      PHYSICAL EXAM     INITIAL VITALS:  height is 188 cm (74\") and weight is 81.6 kg (180 lb). Her oral temperature is 99.3 °F (37.4 °C). Her blood pressure is 105/74 and her pulse is 127 (abnormal). Her respiration is 19 and oxygen saturation is 95%.    Physical Exam    CONSTITUTIONAL: Well developed, well nourished, not diaphoretic nor distressed  HENT: Normocephalic, atraumatic, oropharynx clear and dry strong odor of ketones  EYES: PERRL, EOM normal, no discharge, no scleral icterus  NECK: ROM normal, supple, no tracheal deviation nor JVD, no stridor  CARDIOVASCULAR: Normal rate and rhythm, heart sounds normal, no rub no gallop, intact distal pulses, normal cap refill  PULMONARY: Normal effort and breath sounds, no distress, no wheezes, rhonci or rales, no chest tenderness  ABDOMINAL: Soft, nontender, no guarding, no mass, no rebound, no hernia umbilicus normal inspection  GENITOURINARY/ANORECTAL: deferred  MUSCKULOSKELETAL: ROM normal, no tenderness nor deformity, no edema  NEUROLOGICAL: Alert, oriented x 3, DTRS normal, CN x 10 normal, normal tone  SKIN: Warm, dry, no erythema, no rash, normal " color   PSYCH: Mood and affect normal, behavior normal, thought content and judgement normal.    DIFFERENTIAL DIAGNOSIS:         DIAGNOSTIC RESULTS     EKG: All EKG's are interpreted by the Emergency Department Physician who either signs or Co-signs this chart in the absence of a cardiologist.      RADIOLOGY: non-plain film images(s) such as CT, Ultrasound and MRI are read by the radiologist.  Plain radiographic images are visualized and preliminarily interpreted by the emergency physician unless otherwise stated below.  Ct Abdomen Pelvis With Contrast    Result Date: 3/26/2018  Narrative: EXAMINATION: CT ABDOMEN PELVIS W CONTRAST-   3/26/2018 5:02 PM CDT  HISTORY: bilious vomiting, abdominal pain  In order to have a CT radiation dose as low as reasonably achievable Automated Exposure Control was utilized for adjustment of the mA and/or KV according to patient size.  DLP in mGycm= 481.  Axial, sagittal, and coronal CT imaging of the abdomen/pelvis with IV contrast injection.  Comparison is made with 03/14/2017.  Normal heart size. Clear lung bases. Cholecystectomy clips. Normal liver, pancreas, and spleen. No bile duct dilation. Normal and symmetric adrenal glands and kidneys.  No bowel dilation. No appendicitis or diverticulitis. No sign of colitis.  No pelvic mass or free fluid.  2 cm left ovarian cyst diffusely measured 1.5 cm. 3 cm right ovarian cyst noted previously has resolved. The uterus is absent.  Summary: 1. No mass, fluid collection, or inflammatory process is seen.            This report was finalized on 03/26/2018 17:18 by Dr. Isaiah Diaz MD.             LABS:   Lab Results (last 24 hours)     Procedure Component Value Units Date/Time    Blood Culture Bottle Set [[id number]] Collected:  03/26/18 1607    Specimen:  Blood from Arm, Right Updated:  03/26/18 1716     Extra Tube Hold for add-ons.     Comment: Auto resulted.       CBC & Differential [[id number]] Collected:  03/26/18 1607    Specimen:  Blood  Updated:  03/26/18 1620    Narrative:       The following orders were created for panel order CBC & Differential.  Procedure                               Abnormality         Status                     ---------                               -----------         ------                     CBC Auto Differential[[id number]]        Abnormal            Final result                 Please view results for these tests on the individual orders.    Comprehensive Metabolic Panel [[id number]]  (Abnormal) Collected:  03/26/18 1607    Specimen:  Blood Updated:  03/26/18 1631     Glucose 281 (H) mg/dL      BUN 14 mg/dL      Creatinine 0.60 mg/dL      Sodium 138 mmol/L      Potassium 3.7 mmol/L      Chloride 99 mmol/L      CO2 22.0 (L) mmol/L      Calcium 10.1 mg/dL      Total Protein 7.2 g/dL      Albumin 4.00 g/dL      ALT (SGPT) 52 U/L      AST (SGOT) 48 (H) U/L      Alkaline Phosphatase 124 (H) U/L      Total Bilirubin 0.7 mg/dL      eGFR  African Amer 132 mL/min/1.73      Globulin 3.2 gm/dL      A/G Ratio 1.3 g/dL      BUN/Creatinine Ratio 23.3     Anion Gap 17.0 (H) mmol/L     Lipase [[id number]]  (Normal) Collected:  03/26/18 1607    Specimen:  Blood Updated:  03/26/18 1631     Lipase 61 U/L     Lactic Acid, Plasma [[id number]]  (Abnormal) Collected:  03/26/18 1607    Specimen:  Blood Updated:  03/26/18 1652     Lactate 2.7 (C) mmol/L     CBC Auto Differential [[id number]]  (Abnormal) Collected:  03/26/18 1607    Specimen:  Blood Updated:  03/26/18 1620     WBC 7.65 10*3/mm3      RBC 5.16 10*6/mm3      Hemoglobin 12.9 g/dL      Hematocrit 38.7 %      MCV 75.0 (L) fL      MCH 25.0 (L) pg      MCHC 33.3 g/dL      RDW 13.6 %      RDW-SD 36.4 (L) fl      MPV 9.9 fL      Platelets 540 (H) 10*3/mm3      Neutrophil % 67.0 %      Lymphocyte % 25.9 %      Monocyte % 6.4 %      Eosinophil % 0.0 %      Basophil % 0.3 %      Immature Grans % 0.4 %      Neutrophils, Absolute 5.13 10*3/mm3      Lymphocytes, Absolute 1.98 10*3/mm3       Monocytes, Absolute 0.49 10*3/mm3      Eosinophils, Absolute 0.00 10*3/mm3      Basophils, Absolute 0.02 10*3/mm3      Immature Grans, Absolute 0.03 10*3/mm3      nRBC 0.0 /100 WBC     Blood Culture - Blood, [[id number]] Collected:  03/26/18 1607    Specimen:  Blood from Arm, Right Updated:  03/26/18 1657    Lactic Acid, Reflex Timer (This will reflex a repeat order 3-3:15 hours after ordered.) [[id number]] Collected:  03/26/18 1607    Specimen:  Blood Updated:  03/26/18 2001     Extra Tube Hold for add-ons.     Comment: Auto resulted.       Blood Culture - Blood, [[id number]] Collected:  03/26/18 1626    Specimen:  Blood from Arm, Left Updated:  03/26/18 1637    Ketone Bodies, Serum (Not performed at [facility]) [[id number]] Collected:  03/26/18 1626    Specimen:  Blood Updated:  03/26/18 1939    Narrative:       The following orders were created for panel order Ketone Bodies, Serum (Not performed at [facility]).  Procedure                               Abnormality         Status                     ---------                               -----------         ------                     Acetone[[id number]]                      Abnormal            Final result                 Please view results for these tests on the individual orders.    Acetone [[id number]]  (Abnormal) Collected:  03/26/18 1626    Specimen:  Blood Updated:  03/26/18 1939     Acetone Small (A)    Urinalysis With / Microscopic If Indicated - Urine, Clean Catch [[id number]]  (Abnormal) Collected:  03/26/18 1653    Specimen:  Urine from Urine, Clean Catch Updated:  03/26/18 1712     Color, UA Yellow     Appearance, UA Clear     pH, UA <=5.0     Specific Gravity, UA >1.030 (H)     Glucose, UA >=1000 mg/dL (3+) (A)     Ketones, UA 80 mg/dL (3+) (A)     Bilirubin, UA Negative     Blood, UA Negative     Protein, UA Trace (A)     Leuk Esterase, UA Negative     Nitrite, UA Negative     Urobilinogen, UA 1.0 E.U./dL    Narrative:       Urine microscopic not indicated.     POCT Pregnancy, Urine [[id number]] Collected:  03/26/18 1654    Specimen:  Urine Updated:  03/26/18 1654     HCG, Urine, QL Negative     Lot Number \[id number]\     Internal Positive Control Positive     Internal Negative Control Negative    Blood Gas, Arterial [[id number]]  (Abnormal) Collected:  03/26/18 1932    Specimen:  Arterial Blood Updated:  03/26/18 1938     Site Left Radial     Bill's Test Positive     pH, Arterial 7.445 pH units      pCO2, Arterial 36.4 mm Hg      pO2, Arterial 81.5 (L) mm Hg      HCO3, Arterial 25.0 mmol/L      Base Excess, Arterial 1.1 mmol/L      O2 Saturation, Arterial 97.1 %      Temperature 37.0 C      Barometric Pressure for Blood Gas 754 mmHg      Modality Room Air     Ventilator Mode NA     Collected by [id number]    Lactic Acid, Reflex [[id number]]  (Normal) Collected:  03/26/18 2003    Specimen:  Blood Updated:  03/26/18 2023     Lactate 1.1 mmol/L           EMERGENCY DEPARTMENT COURSE:   Vitals:    Vitals:    03/26/18 1944 03/26/18 1953 03/26/18 2014 03/26/18 2029   BP:  105/74     BP Location:       Patient Position:       Pulse: 119 (!) 126 113 (!) 127   Resp:       Temp:       TempSrc:       SpO2: 100% 96% 99% 95%   Weight:       Height:           The patient was given the following medications:  Medications   sodium chloride 0.9 % bolus 1,000 mL (1,000 mL Intravenous New Bag 3/26/18 2010)   sodium chloride 0.9 % infusion (not administered)   ondansetron (ZOFRAN) injection 4 mg (4 mg Intravenous Given 3/26/18 1615)   sodium chloride 0.9 % bolus 1,000 mL (0 mL Intravenous Stopped 3/26/18 1725)   lactated ringers bolus 1,000 mL (0 mL Intravenous Stopped 3/26/18 1909)   iopamidol (ISOVUE-300) 61 % injection 100 mL (100 mL Intravenous Given 3/26/18 1712)       ED Course       CRITICAL CARE:  none    CONSULTS:  none    PROCEDURES:  None    FINAL IMPRESSION      1. Nausea and vomiting, intractability of vomiting not specified, unspecified vomiting type    2. Generalized abdominal pain     3. Hyperglycemia    4. Ketosis          DISPOSITION/PLAN   Patient admitted for further evaluation and treatment to the hospitalist team.      PATIENT REFERRED TO:  No follow-up provider specified.    DISCHARGE MEDICATIONS:     Medication List      ASK your doctor about these medications    amLODIPine 10 MG tablet  Commonly known as:  NORVASC  Take 0.5 tablets by mouth Daily.     aspirin 81 MG EC tablet     benazepril 20 MG tablet  Commonly known as:  LOTENSIN     DULoxetine 60 MG capsule  Commonly known as:  CYMBALTA     gabapentin 300 MG capsule  Commonly known as:  NEURONTIN     HYDROcodone-acetaminophen 7.5-325 MG per tablet  Commonly known as:  NORCO     Insulin Glargine 100 UNIT/ML injection pen  Commonly known as:  BASAGLAR KWIKPEN     insulin lispro 100 UNIT/ML injection  Commonly known as:  humaLOG     methIMAzole 10 MG tablet  Commonly known as:  TAPAZOLE  Take 2 tablets by mouth Daily.     omeprazole 20 MG capsule  Commonly known as:  priLOSEC  Take 1 capsule by mouth Daily.     polyethylene glycol 236 g solution  Commonly known as:  GoLYTELY  Take as directed by office instructions.     polyethylene glycol powder  Commonly known as:  MIRALAX     propranolol  MG 24 hr capsule  Commonly known as:  INDERAL LA  Take 1 capsule by mouth Daily.     raNITIdine 300 MG tablet  Commonly known as:  ZANTAC     simvastatin 20 MG tablet  Commonly known as:  ZOCOR     tiZANidine 4 MG tablet  Commonly known as:  ZANAFLEX     topiramate 50 MG tablet  Commonly known as:  TOPAMAX  Take 1 tablet by mouth 2 (Two) Times a Day.          (Please note that portions of this note were completed with a voice recognition program.)    DO Lia Marcial DO  03/26/18 2054      Electronically signed by Lia Velazquez DO at 3/26/2018  8:54 PM             ICU Vital Signs     Row Name 03/27/18 0758 03/27/18 0650 03/27/18 0520 03/27/18 0435 03/27/18 0305       Vitals    Temp 98.7 °F (37.1  "°C)  --  -- 98.5 °F (36.9 °C)  --    Temp src Oral  --  -- Oral  --    Pulse  -- 98 108 105 104    BP  -- 140/71 155/70 148/74 132/59    Noninvasive MAP (mmHg)  -- 90 93 93 79       Oxygen Therapy    SpO2  -- 100 % 100 % 100 %  --    Row Name 03/27/18 0205 03/27/18 0105 03/27/18 0005 03/26/18 2253 03/26/18 2235       Vitals    Temp  --  -- 98.8 °F (37.1 °C)  --  --    Temp src  --  -- Oral  --  --    Pulse 113 113 113 113 110    /59 144/78 140/70  -- 149/71    Noninvasive MAP (mmHg) 80 91 88  -- 95       Oxygen Therapy    SpO2 99 % 100 % 100 % 100 % 100 %    Device (Oxygen Therapy)  --  --  -- room air  --    Row Name 03/26/18 2200 03/26/18 2150 03/26/18 2136 03/26/18 2115 03/26/18 2100       Height and Weight    Height  --  -- 182.9 cm (72\")  --  --    Height Method  --  -- Stated  --  --    Weight  --  -- 82.9 kg (182 lb 12.8 oz)  --  --    Weight Method  --  -- Bed scale  --  --    Ideal Body Weight (IBW) (kg)  --  -- 73.29  --  --    BSA (Calculated - sq m)  --  -- 2.05 sq meters  --  --    BMI (Calculated)  --  -- 24.8  --  --    Weight in (lb) to have BMI = 25  --  -- 183.9  --  --       Vitals    Temp  --  -- 99.1 °F (37.3 °C) 98.7 °F (37.1 °C)  --    Temp src  --  -- Oral  --  --    Pulse 118 (!)  126  -- 114 (!)  123    Resp  --  --  -- 17  --    /62 (!)  190/82   MD notified. See orders  -- 158/84  --    Noninvasive MAP (mmHg) 90 115  --  --  --       Oxygen Therapy    SpO2 100 % 100 %  -- 100 % 100 %    Row Name 03/26/18 2045 03/26/18 20:29:48 03/26/18 2014 03/26/18 1953 03/26/18 1944       Vitals    Pulse (!)  124 (!)  127 113 (!)  126 119    BP  --  --  -- 105/74  --    Noninvasive MAP (mmHg)  --  --  -- 82  --       Oxygen Therapy    SpO2 100 % 95 % 99 % 96 % 100 %    Row Name 03/26/18 1930 03/26/18 1914 03/26/18 1901 03/26/18 1831 03/26/18 1802       Vitals    Pulse 120 (!)  121 (!)  124 (!)  124 (!)  128    BP  --  -- 173/78 152/92 131/79    Noninvasive MAP (mmHg)  --  -- 102 109 97       " "Oxygen Therapy    SpO2 100 % 100 % 100 % 100 % 100 %    Row Name 03/26/18 1731 03/26/18 1715 03/26/18 1631 03/26/18 1612 03/26/18 1605       Vitals    Temp  --  --  -- 99.3 °F (37.4 °C)  --    Temp src  --  --  -- Oral  --    Pulse (!)  124 (!)  129 (!)  129  --  --    /69 153/95 167/82  -- 132/100    Noninvasive MAP (mmHg) 91 107 104  --  --    BP Location  --  --  --  -- Left arm    BP Method  --  --  --  -- Automatic    Patient Position  --  --  --  -- Sitting       Oxygen Therapy    SpO2 99 % 94 % 100 %  --  --    Row Name 03/26/18 1603                   Height and Weight    Height 188 cm (74\")        Height Method Stated        Weight 81.6 kg (180 lb)        Weight Method Stated        Ideal Body Weight (IBW) (kg) 77.86        BSA (Calculated - sq m) 2.08 sq meters        BMI (Calculated) 23.1        Weight in (lb) to have BMI = 25 194.3           Vitals    Pulse (!)  145        Heart Rate Source Monitor        Resp 19        Resp Rate Source Monitor           Oxygen Therapy    SpO2 100 %            Hospital Medications (all)       Dose Frequency Start End    acetaminophen (TYLENOL) tablet 650 mg 650 mg Once 3/27/2018 3/27/2018    Sig - Route: Take 2 tablets by mouth 1 (One) Time. - Oral    amLODIPine (NORVASC) tablet 5 mg 5 mg Daily 3/27/2018     Sig - Route: Take 1 tablet by mouth Daily. - Oral    aspirin EC tablet 81 mg 81 mg Daily 3/27/2018     Sig - Route: Take 1 tablet by mouth Daily. - Oral    atorvastatin (LIPITOR) tablet 10 mg 10 mg Nightly 3/26/2018     Sig - Route: Take 1 tablet by mouth Every Night. - Oral    dextrose (D50W) solution 25 g 25 g Every 15 Minutes PRN 3/27/2018     Sig - Route: Infuse 50 mL into a venous catheter Every 15 (Fifteen) Minutes As Needed for Low Blood Sugar (Blood Sugar Less Than 70). - Intravenous    dextrose (GLUTOSE) oral gel 15 g 15 g Every 15 Minutes PRN 3/27/2018     Sig - Route: Take 15 g by mouth Every 15 (Fifteen) Minutes As Needed for Low Blood Sugar (Blood " sugar less than 70). - Oral    dextrose 5 % and sodium chloride 0.45 % infusion 150 mL/hr Continuous PRN 3/26/2018     Sig - Route: Infuse 150 mL/hr into a venous catheter Continuous As Needed (Once Glucose Less Than or Equal to 200 mg/dL and Serum Potassium Greater Than 5). - Intravenous    dextrose 5 % and sodium chloride 0.45 % with KCl 20 mEq/L infusion 150 mL/hr Continuous PRN 3/26/2018     Sig - Route: Infuse 150 mL/hr into a venous catheter Continuous As Needed (Once Glucose Less Than or Equal to 200 mg/dL and Serum Potassium Less Than or Equal to 5). - Intravenous    docusate sodium (COLACE) capsule 100 mg 100 mg 2 Times Daily 3/27/2018     Sig - Route: Take 1 capsule by mouth 2 (Two) Times a Day. - Oral    DULoxetine (CYMBALTA) DR capsule 60 mg 60 mg Daily 3/27/2018     Sig - Route: Take 2 capsules by mouth Daily. - Oral    enoxaparin (LOVENOX) syringe 40 mg 40 mg Nightly 3/26/2018     Sig - Route: Inject 0.4 mL under the skin Every Night. - Subcutaneous    gabapentin (NEURONTIN) capsule 300 mg 300 mg Every 12 Hours Scheduled 3/26/2018     Sig - Route: Take 1 capsule by mouth Every 12 (Twelve) Hours. - Oral    glucagon (human recombinant) (GLUCAGEN DIAGNOSTIC) injection 1 mg 1 mg As Needed 3/27/2018     Sig - Route: Inject 1 mg under the skin As Needed (Blood Glucose Less Than 70). - Subcutaneous    HYDROcodone-acetaminophen (NORCO) 7.5-325 MG per tablet 1 tablet 1 tablet Every 6 Hours PRN 3/27/2018     Sig - Route: Take 1 tablet by mouth Every 6 (Six) Hours As Needed for Mild Pain . - Oral    insulin detemir (LEVEMIR) injection 25 Units 25 Units Nightly 3/27/2018     Sig - Route: Inject 25 Units under the skin Every Night. - Subcutaneous    insulin lispro (humaLOG) injection 2-7 Units 2-7 Units 4 Times Daily With Meals & Nightly 3/27/2018     Sig - Route: Inject 2-7 Units under the skin 4 (Four) Times a Day With Meals & at Bedtime. - Subcutaneous    iopamidol (ISOVUE-300) 61 % injection 100 mL 100 mL Once  "in Imaging 3/26/2018 3/26/2018    Sig - Route: Infuse 100 mL into a venous catheter Once. - Intravenous    labetalol (NORMODYNE,TRANDATE) injection 20 mg 20 mg Once 3/26/2018 3/26/2018    Sig - Route: Infuse 4 mL into a venous catheter 1 (One) Time. - Intravenous    lactated ringers bolus 1,000 mL 1,000 mL Once 3/26/2018 3/26/2018    Sig - Route: Infuse 1,000 mL into a venous catheter 1 (One) Time. - Intravenous    lisinopril (PRINIVIL,ZESTRIL) tablet 10 mg 10 mg Every 24 Hours Scheduled 3/27/2018     Sig - Route: Take 1 tablet by mouth Daily. - Oral    methIMAzole (TAPAZOLE) tablet 20 mg 20 mg Daily 3/27/2018     Sig - Route: Take 2 tablets by mouth Daily. - Oral    ondansetron (ZOFRAN) injection 4 mg 4 mg Once 3/26/2018 3/26/2018    Sig - Route: Infuse 2 mL into a venous catheter 1 (One) Time. - Intravenous    pantoprazole (PROTONIX) EC tablet 40 mg 40 mg Every Early Morning 3/27/2018     Sig - Route: Take 1 tablet by mouth Every Morning. - Oral    polyethylene glycol (MIRALAX) powder 17 g 17 g Daily 3/27/2018     Sig - Route: Take 17 g by mouth Daily. - Oral    potassium chloride (KLOR-CON) packet 10 mEq 10 mEq As Needed 3/26/2018     Sig - Route: Take 10 mEq by mouth As Needed (Potassium replacement per admin instructions). - Oral    Linked Group 1:  \"Or\" Linked Group Details        potassium chloride (KLOR-CON) packet 20 mEq 20 mEq As Needed 3/26/2018     Sig - Route: Take 20 mEq by mouth As Needed (Potassium replacement per admin instructions). - Oral    Linked Group 2:  \"Or\" Linked Group Details        potassium chloride (KLOR-CON) packet 40 mEq 40 mEq As Needed 3/26/2018     Sig - Route: Take 40 mEq by mouth As Needed (Potassium replacement per admin instructions). - Oral    Linked Group 3:  \"Or\" Linked Group Details        potassium chloride (MICRO-K) CR capsule 10 mEq 10 mEq As Needed 3/26/2018     Sig - Route: Take 1 capsule by mouth As Needed (Potassium replacement per admin instructions). - Oral    " "Linked Group 1:  \"Or\" Linked Group Details        potassium chloride (MICRO-K) CR capsule 20 mEq 20 mEq As Needed 3/26/2018     Sig - Route: Take 2 capsules by mouth As Needed (Potassium replacement per admin instructions). - Oral    Linked Group 2:  \"Or\" Linked Group Details        potassium chloride (MICRO-K) CR capsule 40 mEq 40 mEq As Needed 3/26/2018     Sig - Route: Take 4 capsules by mouth As Needed (Potassium replacement per admin instructions). - Oral    Linked Group 3:  \"Or\" Linked Group Details        potassium chloride 10 mEq in 100 mL IVPB 10 mEq As Needed 3/26/2018     Sig - Route: Infuse 100 mL into a venous catheter As Needed (Potassium replacement per admin instructions). - Intravenous    Linked Group 3:  \"Or\" Linked Group Details        potassium chloride 10 mEq in 100 mL IVPB 10 mEq As Needed 3/26/2018     Sig - Route: Infuse 100 mL into a venous catheter As Needed (Potassium replacement per admin instructions). - Intravenous    Linked Group 2:  \"Or\" Linked Group Details        potassium chloride 10 mEq in 100 mL IVPB 10 mEq As Needed 3/26/2018     Sig - Route: Infuse 100 mL into a venous catheter As Needed (Potassium replacement per admin instructions). - Intravenous    Linked Group 1:  \"Or\" Linked Group Details        propranolol LA (INDERAL LA) 24 hr capsule 120 mg 120 mg Daily 3/27/2018     Sig - Route: Take 2 capsules by mouth Daily. - Oral    sodium chloride 0.45 % with KCl 20 mEq/L infusion 250 mL/hr Continuous PRN 3/26/2018     Sig - Route: Infuse 250 mL/hr into a venous catheter Continuous As Needed (After Initial 2 Hours - If Potassium Level is Less Than or Equal to 5 (If Greater Than 5 use 0.45%)). - Intravenous    sodium chloride 0.45 % with KCl 20 mEq/L infusion 100 mL/hr Continuous 3/27/2018     Sig - Route: Infuse 100 mL/hr into a venous catheter Continuous. - Intravenous    sodium chloride 0.9 % bolus 1,000 mL 1,000 mL Once 3/26/2018 3/26/2018    Sig - Route: Infuse 1,000 mL into a " venous catheter 1 (One) Time. - Intravenous    sodium chloride 0.9 % bolus 1,000 mL 1,000 mL Once 3/26/2018 3/26/2018    Sig - Route: Infuse 1,000 mL into a venous catheter 1 (One) Time. - Intravenous    sodium chloride 0.9 % bolus 1,000 mL 1,000 mL Once 3/26/2018     Sig - Route: Infuse 1,000 mL into a venous catheter 1 (One) Time. - Intravenous    sodium chloride 0.9 % flush 1-10 mL 1-10 mL As Needed 3/26/2018     Sig - Route: Infuse 1-10 mL into a venous catheter As Needed for Line Care. - Intravenous    tiZANidine (ZANAFLEX) tablet 4 mg 4 mg Every 12 Hours PRN 3/26/2018     Sig - Route: Take 1 tablet by mouth Every 12 (Twelve) Hours As Needed for Muscle Spasms. - Oral    topiramate (TOPAMAX) tablet 50 mg 50 mg 2 Times Daily 3/26/2018 1/3/2019    Sig - Route: Take 2 tablets by mouth 2 (Two) Times a Day. - Oral    dextrose (D50W) solution 12.5 g (Discontinued) 12.5 g Every 15 Minutes PRN 3/26/2018 3/27/2018    Sig - Route: Infuse 25 mL into a venous catheter Every 15 (Fifteen) Minutes As Needed for Low Blood Sugar (for blood glucose < 100 mg/dL). - Intravenous    Reason for Discontinue: Alternate therapy    famotidine (PEPCID) tablet 40 mg (Discontinued) 40 mg Nightly 3/26/2018 3/27/2018    Sig - Route: Take 2 tablets by mouth Every Night. - Oral    sodium chloride 0.45 % infusion (Discontinued) 250 mL/hr Continuous 3/26/2018 3/27/2018    Sig - Route: Infuse 250 mL/hr into a venous catheter Continuous. - Intravenous    sodium chloride 0.45 % infusion (Discontinued) 100 mL/hr Continuous 3/27/2018 3/27/2018    Sig - Route: Infuse 100 mL/hr into a venous catheter Continuous. - Intravenous    Notes to Pharmacy: Please add 20 meq of potassium    Reason for Discontinue: Reorder    sodium chloride 0.9 % infusion (Discontinued) 250 mL/hr Continuous 3/26/2018 3/27/2018    Sig - Route: Infuse 250 mL/hr into a venous catheter Continuous. - Intravenous    sodium chloride 0.9 % infusion (Discontinued) 125 mL/hr Continuous  3/27/2018 3/27/2018    Sig - Route: Infuse 125 mL/hr into a venous catheter Continuous. - Intravenous            Lab Results (last 24 hours)     Procedure Component Value Units Date/Time    Calcium, Ionized [[id number]] Collected:  03/27/18 0809    Specimen:  Blood Updated:  03/27/18 0823     Ionized Calcium 4.67 mg/dL      Collected by [id number]    POC Glucose Once [[id number]]  (Normal) Collected:  03/27/18 0808    Specimen:  Blood Updated:  03/27/18 0819     Glucose 125 mg/dL      Comment: : [id number] Matt Hodges ID: [id number]       Phosphorus [[id number]]  (Normal) Collected:  03/27/18 0418    Specimen:  Blood Updated:  03/27/18 0542     Phosphorus 4.0 mg/dL     Magnesium [[id number]]  (Normal) Collected:  03/27/18 0418    Specimen:  Blood Updated:  03/27/18 0542     Magnesium 1.4 mg/dL     Comprehensive Metabolic Panel [[id number]]  (Abnormal) Collected:  03/27/18 0418    Specimen:  Blood Updated:  03/27/18 0535     Glucose 181 (H) mg/dL      BUN 9 mg/dL      Creatinine 0.54 mg/dL      Sodium 139 mmol/L      Potassium 3.0 (L) mmol/L      Chloride 103 mmol/L      CO2 25.0 mmol/L      Calcium 8.9 mg/dL      Total Protein 5.8 (L) g/dL      Albumin 3.10 (L) g/dL      ALT (SGPT) 43 U/L      AST (SGOT) 24 U/L      Alkaline Phosphatase 85 U/L      Total Bilirubin 0.5 mg/dL      eGFR  African Amer 149 mL/min/1.73      Globulin 2.7 gm/dL      A/G Ratio 1.1 g/dL      BUN/Creatinine Ratio 16.7     Anion Gap 11.0 mmol/L     CBC Auto Differential [[id number]]  (Abnormal) Collected:  03/27/18 0418    Specimen:  Blood Updated:  03/27/18 0517     WBC 7.22 10*3/mm3      RBC 4.12 (L) 10*6/mm3      Hemoglobin 10.3 (L) g/dL      Hematocrit 31.4 (L) %      MCV 76.2 (L) fL      MCH 25.0 (L) pg      MCHC 32.8 (L) g/dL      RDW 13.7 %      RDW-SD 37.4 (L) fl      MPV 10.4 fL      Platelets 368 10*3/mm3      Neutrophil % 49.4 %      Lymphocyte % 43.1 %      Monocyte % 6.8 %      Eosinophil % 0.3 %      Basophil % 0.1 %       Immature Grans % 0.3 %      Neutrophils, Absolute 3.57 10*3/mm3      Lymphocytes, Absolute 3.11 10*3/mm3      Monocytes, Absolute 0.49 10*3/mm3      Eosinophils, Absolute 0.02 10*3/mm3      Basophils, Absolute 0.01 10*3/mm3      Immature Grans, Absolute 0.02 10*3/mm3      nRBC 0.0 /100 WBC     Blood Culture - Blood, [[id number]]  (Normal) Collected:  03/26/18 1607    Specimen:  Blood from Arm, Right Updated:  03/27/18 0501     Blood Culture No growth at less than 24 hours    Blood Culture - Blood, [[id number]]  (Normal) Collected:  03/26/18 1626    Specimen:  Blood from Arm, Left Updated:  03/27/18 0446     Blood Culture No growth at less than 24 hours    Calcium, Ionized [[id number]] Collected:  03/27/18 0420    Specimen:  Blood Updated:  03/27/18 0422     Ionized Calcium 4.74 mg/dL      Collected by [id number]    POC Glucose Once [[id number]]  (Abnormal) Collected:  03/27/18 0346    Specimen:  Blood Updated:  03/27/18 0358     Glucose 201 (H) mg/dL      Comment: : [id number] Ely TaKbrentonhaMeter ID: [id number]       Urinalysis With / Culture If Indicated - Urine, Clean Catch [[id number]]  (Abnormal) Collected:  03/27/18 0133    Specimen:  Urine from Urine, Clean Catch Updated:  03/27/18 0152     Color, UA Yellow     Appearance, UA Clear     pH, UA 5.5     Specific Gravity, UA >1.030 (H)     Glucose, UA >=1000 mg/dL (3+) (A)     Ketones, UA 40 mg/dL (2+) (A)     Bilirubin, UA Negative     Blood, UA Negative     Protein, UA Negative     Leuk Esterase, UA Negative     Nitrite, UA Negative     Urobilinogen, UA >=8.0 E.U./dL (A)    Narrative:       Urine microscopic not indicated.    Hemoglobin A1c [[id number]] Collected:  03/26/18 2328    Specimen:  Blood Updated:  03/27/18 0134     Hemoglobin A1C 9.2 %     Narrative:       Less than 6.0           Non-Diabetic Range  6.0-7.0                 ADA Therapeutic Target  Greater than 7.0        Action Suggested    CBC & Differential [[id number]] Collected:  03/26/18 2327     Specimen:  Blood Updated:  03/27/18 0026    Narrative:       The following orders were created for panel order CBC & Differential.  Procedure                               Abnormality         Status                     ---------                               -----------         ------                     CBC Auto Differential[[id number]]        Abnormal            Final result                 Please view results for these tests on the individual orders.    CBC Auto Differential [[id number]]  (Abnormal) Collected:  03/26/18 2328    Specimen:  Blood Updated:  03/27/18 0026     WBC 8.12 10*3/mm3      RBC 3.96 (L) 10*6/mm3      Hemoglobin 10.1 (L) g/dL      Hematocrit 30.5 (L) %      MCV 77.0 (L) fL      MCH 25.5 (L) pg      MCHC 33.1 g/dL      RDW 13.8 %      RDW-SD 39.0 (L) fl      MPV 10.1 fL      Platelets 378 10*3/mm3      Neutrophil % 54.6 %      Lymphocyte % 37.9 %      Monocyte % 6.9 %      Eosinophil % 0.1 %      Basophil % 0.1 %      Immature Grans % 0.4 %      Neutrophils, Absolute 4.43 10*3/mm3      Lymphocytes, Absolute 3.08 10*3/mm3      Monocytes, Absolute 0.56 10*3/mm3      Eosinophils, Absolute 0.01 10*3/mm3      Basophils, Absolute 0.01 10*3/mm3      Immature Grans, Absolute 0.03 10*3/mm3      nRBC 0.0 /100 WBC     Blood Gas, Arterial [[id number]]  (Abnormal) Collected:  03/27/18 0020    Specimen:  Arterial Blood Updated:  03/27/18 0023     Site Left Brachial     Bill's Test N/A     pH, Arterial 7.444 pH units      pCO2, Arterial 33.4 (L) mm Hg      pO2, Arterial 93.0 mm Hg      HCO3, Arterial 22.9 mmol/L      Base Excess, Arterial -0.8 (L) mmol/L      O2 Saturation, Arterial 98.4 %      Temperature 37.0 C      Barometric Pressure for Blood Gas 753 mmHg      Modality Room Air     Ventilator Mode NA     Collected by [id number]    Calcium, Ionized [[id number]] Collected:  03/27/18 0020    Specimen:  Blood Updated:  03/27/18 0021     Ionized Calcium 4.70 mg/dL      Collected by [id number]    Phosphorus [[id number]]   (Normal) Collected:  03/26/18 2328    Specimen:  Blood Updated:  03/27/18 0008     Phosphorus 3.4 mg/dL     Basic Metabolic Panel [[id number]]  (Abnormal) Collected:  03/26/18 2328    Specimen:  Blood Updated:  03/27/18 0008     Glucose 202 (H) mg/dL      BUN 10 mg/dL      Creatinine 0.54 mg/dL      Sodium 140 mmol/L      Potassium 3.0 (L) mmol/L      Chloride 103 mmol/L      CO2 26.0 mmol/L      Calcium 8.9 mg/dL      eGFR  African Amer 149 mL/min/1.73      BUN/Creatinine Ratio 18.5     Anion Gap 11.0 mmol/L     Narrative:       GFR Normal >60  Chronic Kidney Disease <60  Kidney Failure <15    Magnesium [[id number]]  (Normal) Collected:  03/26/18 2328    Specimen:  Blood Updated:  03/27/18 0008     Magnesium 1.4 mg/dL     Troponin [[id number]]  (Normal) Collected:  03/26/18 2003    Specimen:  Blood Updated:  03/26/18 2254     Troponin I <0.012 ng/mL     Comprehensive Metabolic Panel [[id number]]  (Abnormal) Collected:  03/26/18 2003    Specimen:  Blood Updated:  03/26/18 2237     Glucose 224 (H) mg/dL      BUN 11 mg/dL      Creatinine 0.51 mg/dL      Sodium 140 mmol/L      Potassium 3.8 mmol/L      Chloride 104 mmol/L      CO2 26.0 mmol/L      Calcium 9.5 mg/dL      Total Protein 6.2 (L) g/dL      Albumin 3.30 (L) g/dL      ALT (SGPT) 54 U/L      AST (SGOT) 38 U/L      Alkaline Phosphatase 96 U/L      Total Bilirubin 0.4 mg/dL      eGFR  African Amer >150 mL/min/1.73      Globulin 2.9 gm/dL      A/G Ratio 1.1 g/dL      BUN/Creatinine Ratio 21.6     Anion Gap 10.0 mmol/L     POC Glucose Once [[id number]]  (Abnormal) Collected:  03/26/18 2110    Specimen:  Blood Updated:  03/26/18 2121     Glucose 51 (L) mg/dL      Comment: : [id number]  [employer] ID: [id number]       Lactic Acid, Reflex [[id number]]  (Normal) Collected:  03/26/18 2003    Specimen:  Blood Updated:  03/26/18 2023     Lactate 1.1 mmol/L     Lactic Acid, Reflex Timer (This will reflex a repeat order 3-3:15 hours after ordered.) [[id number]]  Collected:  03/26/18 1607    Specimen:  Blood Updated:  03/26/18 2001     Extra Tube Hold for add-ons.     Comment: Auto resulted.       Ketone Bodies, Serum (Not performed at [facility]) [[id number]] Collected:  03/26/18 1626    Specimen:  Blood Updated:  03/26/18 1939    Narrative:       The following orders were created for panel order Ketone Bodies, Serum (Not performed at [facility]).  Procedure                               Abnormality         Status                     ---------                               -----------         ------                     Acetone[[id number]]                      Abnormal            Final result                 Please view results for these tests on the individual orders.    Acetone [[id number]]  (Abnormal) Collected:  03/26/18 1626    Specimen:  Blood Updated:  03/26/18 1939     Acetone Small (A)    Blood Gas, Arterial [[id number]]  (Abnormal) Collected:  03/26/18 1932    Specimen:  Arterial Blood Updated:  03/26/18 1938     Site Left Radial     Bill's Test Positive     pH, Arterial 7.445 pH units      pCO2, Arterial 36.4 mm Hg      pO2, Arterial 81.5 (L) mm Hg      HCO3, Arterial 25.0 mmol/L      Base Excess, Arterial 1.1 mmol/L      O2 Saturation, Arterial 97.1 %      Temperature 37.0 C      Barometric Pressure for Blood Gas 754 mmHg      Modality Room Air     Ventilator Mode NA     Collected by [id number]    [place] Draw [[id number]] Collected:  03/26/18 1607    Specimen:  Blood Updated:  03/26/18 1716    Narrative:       The following orders were created for panel order [place] Draw.  Procedure                               Abnormality         Status                     ---------                               -----------         ------                     Light Blue Top[[id number]]                                   Final result               Green Top (Gel)[[id number]]                                  Final result               Lavender Top[[id number]]                                      Final result               Red Top[[id number]]                                          Final result               Blood Culture Bottle Set[[id number]]                         Final result                 Please view results for these tests on the individual orders.    Blood Culture Bottle Set [[id number]] Collected:  03/26/18 1607    Specimen:  Blood from Arm, Right Updated:  03/26/18 1716     Extra Tube Hold for add-ons.     Comment: Auto resulted.       Light Blue Top [[id number]] Collected:  03/26/18 1607    Specimen:  Blood Updated:  03/26/18 1716     Extra Tube hold for add-on     Comment: Auto resulted       Green Top (Gel) [[id number]] Collected:  03/26/18 1607    Specimen:  Blood Updated:  03/26/18 1716     Extra Tube Hold for add-ons.     Comment: Auto resulted.       Lavender Top [[id number]] Collected:  03/26/18 1607    Specimen:  Blood Updated:  03/26/18 1716     Extra Tube hold for add-on     Comment: Auto resulted       Red Top [[id number]] Collected:  03/26/18 1607    Specimen:  Blood Updated:  03/26/18 1716     Extra Tube Hold for add-ons.     Comment: Auto resulted.       Urinalysis With / Microscopic If Indicated - Urine, Clean Catch [[id number]]  (Abnormal) Collected:  03/26/18 1653    Specimen:  Urine from Urine, Clean Catch Updated:  03/26/18 1712     Color, UA Yellow     Appearance, UA Clear     pH, UA <=5.0     Specific Gravity, UA >1.030 (H)     Glucose, UA >=1000 mg/dL (3+) (A)     Ketones, UA 80 mg/dL (3+) (A)     Bilirubin, UA Negative     Blood, UA Negative     Protein, UA Trace (A)     Leuk Esterase, UA Negative     Nitrite, UA Negative     Urobilinogen, UA 1.0 E.U./dL    Narrative:       Urine microscopic not indicated.    POCT Pregnancy, Urine [[id number]] Collected:  03/26/18 1654    Specimen:  Urine Updated:  03/26/18 1654     HCG, Urine, QL Negative     Lot Number \[id number]\     Internal Positive Control Positive     Internal Negative Control Negative    Lactic Acid, Plasma [[id number]]   (Abnormal) Collected:  03/26/18 1607    Specimen:  Blood Updated:  03/26/18 1652     Lactate 2.7 (C) mmol/L     Comprehensive Metabolic Panel [[id number]]  (Abnormal) Collected:  03/26/18 1607    Specimen:  Blood Updated:  03/26/18 1631     Glucose 281 (H) mg/dL      BUN 14 mg/dL      Creatinine 0.60 mg/dL      Sodium 138 mmol/L      Potassium 3.7 mmol/L      Chloride 99 mmol/L      CO2 22.0 (L) mmol/L      Calcium 10.1 mg/dL      Total Protein 7.2 g/dL      Albumin 4.00 g/dL      ALT (SGPT) 52 U/L      AST (SGOT) 48 (H) U/L      Alkaline Phosphatase 124 (H) U/L      Total Bilirubin 0.7 mg/dL      eGFR  African Amer 132 mL/min/1.73      Globulin 3.2 gm/dL      A/G Ratio 1.3 g/dL      BUN/Creatinine Ratio 23.3     Anion Gap 17.0 (H) mmol/L     Lipase [[id number]]  (Normal) Collected:  03/26/18 1607    Specimen:  Blood Updated:  03/26/18 1631     Lipase 61 U/L     CBC & Differential [[id number]] Collected:  03/26/18 1607    Specimen:  Blood Updated:  03/26/18 1620    Narrative:       The following orders were created for panel order CBC & Differential.  Procedure                               Abnormality         Status                     ---------                               -----------         ------                     CBC Auto Differential[[id number]]        Abnormal            Final result                 Please view results for these tests on the individual orders.    CBC Auto Differential [[id number]]  (Abnormal) Collected:  03/26/18 1607    Specimen:  Blood Updated:  03/26/18 1620     WBC 7.65 10*3/mm3      RBC 5.16 10*6/mm3      Hemoglobin 12.9 g/dL      Hematocrit 38.7 %      MCV 75.0 (L) fL      MCH 25.0 (L) pg      MCHC 33.3 g/dL      RDW 13.6 %      RDW-SD 36.4 (L) fl      MPV 9.9 fL      Platelets 540 (H) 10*3/mm3      Neutrophil % 67.0 %      Lymphocyte % 25.9 %      Monocyte % 6.4 %      Eosinophil % 0.0 %      Basophil % 0.3 %      Immature Grans % 0.4 %      Neutrophils, Absolute 5.13 10*3/mm3       Lymphocytes, Absolute 1.98 10*3/mm3      Monocytes, Absolute 0.49 10*3/mm3      Eosinophils, Absolute 0.00 10*3/mm3      Basophils, Absolute 0.02 10*3/mm3      Immature Grans, Absolute 0.03 10*3/mm3      nRBC 0.0 /100 WBC         Imaging Results (last 24 hours)     Procedure Component Value Units Date/Time    CT Abdomen Pelvis With Contrast [[id number]] Collected:  03/26/18 1715     Updated:  03/26/18 1721    Narrative:       EXAMINATION: CT ABDOMEN PELVIS W CONTRAST-      3/26/2018 5:02 PM CDT     HISTORY: bilious vomiting, abdominal pain     In order to have a CT radiation dose as low as reasonably achievable  Automated Exposure Control was utilized for adjustment of the mA and/or  KV according to patient size.     DLP in mGycm= 481.     Axial, sagittal, and coronal CT imaging of the abdomen/pelvis with IV  contrast injection.     Comparison is made with 03/14/2017.     Normal heart size.  Clear lung bases.  Cholecystectomy clips.  Normal liver, pancreas, and spleen.  No bile duct dilation.  Normal and symmetric adrenal glands and kidneys.     No bowel dilation.  No appendicitis or diverticulitis.  No sign of colitis.     No pelvic mass or free fluid.     2 cm left ovarian cyst diffusely measured 1.5 cm.  3 cm right ovarian cyst noted previously has resolved.  The uterus is absent.     Summary:  1. No mass, fluid collection, or inflammatory process is seen.                                   This report was finalized on 03/26/2018 17:18 by Dr. sIaiah Diaz MD.        ECG/EMG Results (last 24 hours)     Procedure Component Value Units Date/Time    SCANNED EKG [[id number]] Resulted:  03/26/18      Updated:  03/27/18 0603    ECG 12 Lead [[id number]] Collected:  03/27/18 0655     Updated:  03/27/18 0657    Narrative:       Test Reason : tachy  Blood Pressure : **/** mmHG  Vent. Rate : 106 BPM     Atrial Rate : 106 BPM     P-R Int : 076 ms          QRS Dur : 080 ms      QT Int : 502 ms       P-R-T Axes : 059 434 036  degrees     QTc Int : 666 ms    Sinus tachycardia with short MT with Premature supraventricular complexes  T wave abnormality, consider inferior ischemia  Prolonged QT interval or tu fusion, consider myocardial disease,  electrolyte imbalance, or drug effects  Abnormal ECG  When compared with ECG of 26-MAR-2018 16:01,  T wave inversion now evident in Inferior leads  Nonspecific T wave abnormality now evident in Anterior-lateral leads    Referred By:  MITZI           Confirmed By:     ECG 12 Lead [[id number]] Collected:  03/26/18 1601     Updated:  03/27/18 0812    Narrative:       Test Reason : abd pain  Blood Pressure : **/** mmHG  Vent. Rate : 144 BPM     Atrial Rate : 144 BPM     P-R Int : 128 ms          QRS Dur : 064 ms      QT Int : 282 ms       P-R-T Axes : 067 045 068 degrees     QTc Int : 436 ms    Sinus tachycardia  Otherwise normal ECG  When compared with ECG of 08-JUL-2017 15:13,  Vent. rate has increased BY  54 BPM  Nonspecific T wave abnormality no longer evident in Anterior-lateral leads    Referred By:  LUCIAN           Confirmed By:Velasquez Coates MD          Orders (last 24 hrs)     Start     Ordered    03/27/18 2100  insulin detemir (LEVEMIR) injection 25 Units  Nightly      03/27/18 0836    03/27/18 1600  Basic Metabolic Panel  Now Then Every 8 Hours     Comments:  After 24 hours, change frequency to q 6 hours until DKA resolved. If Potassium is greater than 5mEq/L, obtain BMP every 2 hours until less than 5 mEq/L.      03/27/18 0830    03/27/18 1200  POC Glucose Q4H  Every 4 Hours      03/27/18 0830    03/27/18 0930  sodium chloride 0.45 % with KCl 20 mEq/L infusion  Continuous      03/27/18 0845    03/27/18 0915  sodium chloride 0.45 % infusion  Continuous,   Status:  Discontinued     Comments:  Please add 20 meq of potassium    03/27/18 0830    03/27/18 0915  docusate sodium (COLACE) capsule 100 mg  2 Times Daily      03/27/18 0832    03/27/18 0915  lisinopril (PRINIVIL,ZESTRIL) tablet 10  mg  Every 24 Hours Scheduled      03/27/18 0836    03/27/18 0900  methIMAzole (TAPAZOLE) tablet 20 mg  Daily      03/26/18 2157 03/27/18 0900  polyethylene glycol (MIRALAX) powder 17 g  Daily      03/26/18 2157 03/27/18 0900  propranolol LA (INDERAL LA) 24 hr capsule 120 mg  Daily      03/26/18 2157 03/27/18 0900  amLODIPine (NORVASC) tablet 5 mg  Daily      03/26/18 2157 03/27/18 0900  aspirin EC tablet 81 mg  Daily      03/26/18 2157 03/27/18 0900  DULoxetine (CYMBALTA) DR capsule 60 mg  Daily      03/26/18 2157 03/27/18 0837  Transfer Patient  Once      03/27/18 0836    03/27/18 0833  HYDROcodone-acetaminophen (NORCO) 7.5-325 MG per tablet 1 tablet  Every 6 Hours PRN      03/27/18 0836    03/27/18 0831  Transfer Patient  Once      03/27/18 0830    03/27/18 0829  Diet Regular; Consistent Carbohydrate  Diet Effective Now      03/27/18 0830    03/27/18 0824  Calcium, Ionized  Once      03/27/18 0823    03/27/18 0820  POC Glucose Once  Once      03/27/18 0819    03/27/18 0800  insulin lispro (humaLOG) injection 2-7 Units  4 Times Daily With Meals & Nightly      03/27/18 0358    03/27/18 0700  POC Glucose 4x Daily AC & at Bedtime  4 Times Daily Before Meals & at Bedtime      03/27/18 0358    03/27/18 0600  pantoprazole (PROTONIX) EC tablet 40 mg  Every Early Morning      03/26/18 2157 03/27/18 0600  Blood Gas, Arterial  Morning Draw,   Status:  Canceled      03/26/18 2156 03/27/18 0600  CBC Auto Differential  Morning Draw      03/26/18 2156 03/27/18 0600  Comprehensive Metabolic Panel  Morning Draw      03/26/18 2156 03/27/18 0600  Magnesium  Morning Draw,   Status:  Canceled      03/26/18 2156 03/27/18 0600  Phosphorus  Morning Draw,   Status:  Canceled      03/26/18 2156 03/27/18 0500  ECG 12 Lead  Tomorrow AM      03/26/18 2156    03/27/18 0430  sodium chloride 0.9 % infusion  Continuous,   Status:  Discontinued      03/27/18 0359    03/27/18 0423  Calcium, Ionized  Once       03/27/18 0422    03/27/18 0359  POC Glucose Once  Once      03/27/18 0358 03/27/18 0357  Do NOT Hold Basal Insulin When Patient is NPO, Hold Bolus Dose if NPO  Continuous      03/27/18 0358 03/27/18 0357  Follow [facility] Hypoglycemia Protocol For Blood Glucose Less Than 70 mg/dL  Until Discontinued      03/27/18 0358 03/27/18 0357  Hypoglycemia Treatment - Alert Patient That is Not NPO and Can Safely Swallow  Until Discontinued     Comments:  Administer 4 oz Fruit Juice OR 4 oz Regular Soda OR 8 oz Milk OR 15-30 grams (1 tube) of Glucose Gel.  Recheck Blood Glucose 15 Minutes After Ingestion, Repeat Treatment & Continue to Recheck Blood Sugar Every 15 Minutes Until Blood Glucose is 70 mg/dL or Higher.  Once Blood Glucose is 70 mg/dL or Higher and if It Will Be more than 60 Minutes Until the Next Meal, Provide Appropriate Snack (Including Carbohydrate Food) Based on Meal Plan Order. Give Meal Tray As Soon As Possible.    03/27/18 0358 03/27/18 0357  Hypoglycemia Treatment - Patient Has IV Access - Unresponsive, NPO or Unable To Safely Swallow  Until Discontinued     Comments:  Administer 25g (50ml) D50W IV Push.  Recheck Blood Glucose 15 Minutes After Administration, if Blood Glucose Remains Less Than 70 mg/dl, Repeat Treatment   Recheck Blood Glucose 15 Minutes After 2nd Administration, if Blood Glucose Remains Less Than 70 mg/dL After 2nd Dose of D50W, Contact Provider for Further Treatment Orders & Consider Adding IVF With D5W for Maintenance    03/27/18 0358 03/27/18 0357  Hypoglycemia Treatment - Patient Without IV Access - Unresponsive, NPO or Unable To Safely Swallow  Until Discontinued     Comments:  Administer 1mg Glucagon SQ & Establish IV Access.  Turn Patient on Side - Nausea / Vomiting May Occur.  Recheck Blood Glucose 15 Minutes After Administration.  If Blood Glucose Remains Less Than 70, Administer 25g D50W IV Push (50ml).  Recheck Blood Glucose 15 Minutes After Administration of D50W, if  Blood Glucose Remains Less Than 70 mg/dl, Contact Provider for Further Treatment Orders & Consider Adding IVF With D5 for Maintenance    03/27/18 0358    03/27/18 0357  Notify Provider of event. Communicate needs and document in EMR.  Once      03/27/18 0358    03/27/18 0357  Document event and patients response to treatment in EMR, any medications given to be documented on MAR.  Once      03/27/18 0358    03/27/18 0356  dextrose (GLUTOSE) oral gel 15 g  Every 15 Minutes PRN      03/27/18 0358    03/27/18 0356  dextrose (D50W) solution 25 g  Every 15 Minutes PRN      03/27/18 0358    03/27/18 0356  glucagon (human recombinant) (GLUCAGEN DIAGNOSTIC) injection 1 mg  As Needed      03/27/18 0358    03/27/18 0200  Blood Gas, Arterial  Once      03/26/18 2156 03/27/18 0200  acetaminophen (TYLENOL) tablet 650 mg  Once      03/27/18 0120    03/27/18 0024  Blood Gas, Arterial  Once      03/27/18 0023    03/27/18 0022  Calcium, Ionized  Once      03/27/18 0021    03/27/18 0000  Strict Intake & Output  Every 4 Hours     Comments:  While on insulin infusion.    03/26/18 2156 03/27/18 0000  Phosphorus  Every 4 Hours,   Status:  Canceled     Comments:  After 24 hours, change frequency to q 6 hours until DKA resolved.       03/26/18 2156 03/27/18 0000  Basic Metabolic Panel  Every 4 Hours,   Status:  Canceled     Comments:  After 24 hours, change frequency to q 6 hours until DKA resolved. If Potassium is greater than 5mEq/L, obtain BMP every 2 hours until less than 5 mEq/L.      03/26/18 2156 03/27/18 0000  Magnesium  Every 4 Hours,   Status:  Canceled     Comments:  After 24 hours, change frequency to q 6 hours until DKA resolved.       03/26/18 2156 03/27/18 0000  Calcium, Ionized  Every 4 Hours,   Status:  Canceled     Comments:  After 24 hours, change frequency to q 6 hours until DKA resolved.      03/26/18 2156 03/26/18 2330  sodium chloride 0.45 % infusion  Continuous,   Status:  Discontinued      03/26/18  2156 03/26/18 2245  labetalol (NORMODYNE,TRANDATE) injection 20 mg  Once      03/26/18 2203 03/26/18 2230  topiramate (TOPAMAX) tablet 50 mg  2 Times Daily      03/26/18 2157 03/26/18 2230  gabapentin (NEURONTIN) capsule 300 mg  Every 12 Hours Scheduled      03/26/18 2157 03/26/18 2230  sodium chloride 0.9 % bolus 1,000 mL  Once      03/26/18 2156 03/26/18 2215  famotidine (PEPCID) tablet 40 mg  Nightly,   Status:  Discontinued      03/26/18 2157 03/26/18 2215  atorvastatin (LIPITOR) tablet 10 mg  Nightly      03/26/18 2157 03/26/18 2215  enoxaparin (LOVENOX) syringe 40 mg  Nightly      03/26/18 2156 03/26/18 2200  Vital Signs Every Hour and Per Hospital Policy Based on Patient Condition  Every Hour      03/26/18 2156 03/26/18 2200  POC Glucose Q1H  Every Hour,   Status:  Canceled      03/26/18 2156 03/26/18 2200  Intake and Output  Every Hour      03/26/18 2156 03/26/18 2157  tiZANidine (ZANAFLEX) tablet 4 mg  Every 12 Hours PRN      03/26/18 2157 03/26/18 2157  Cardiac Monitoring  Continuous      03/26/18 2156 03/26/18 2157  Continuous Pulse Oximetry  Continuous      03/26/18 2156 03/26/18 2157  Strict Bed Rest  Until Discontinued      03/26/18 2156 03/26/18 2157  Use Mobility Guidelines for Advancement of Activity  Continuous      03/26/18 2156 03/26/18 2157  Use a Dedicated Line for Insulin Infusion (If Possible).  May Use a Carrier Fluid of NS at KVO Rate if Insulin Rate is Insufficient to Maintain IV Patency.  May Prime IV Line With Insulin Infusion  Continuous      03/26/18 2156 03/26/18 2157  Notify Provider - SBP Less Than 90, Urine Output Less Than 50 mL/hr, Changes in Mental Status, Low Calcium Level or Resolution of DKA  Until Discontinued      03/26/18 2156 03/26/18 2157  Initiate Hypoglycemia Protocol if BG is Less Than 70 mg/dL  Continuous      03/26/18 2156 03/26/18 2157  Potassium Replacement Orders Per DKA Replacement Protocol  Continuous       03/26/18 2156 03/26/18 2157  DKA - Blood Glucose Testing (BG) TARGET Range is 150-200 mg/dl. See Insulin Infusion Table.  Continuous      03/26/18 2156 03/26/18 2157  DKA - May Use Fingerstick, Venous or Arterial Blood As Indicated By Patient Condition  Continuous      03/26/18 2156 03/26/18 2157  DKA - STAT Serum Glucose if Fingerstick is Greater Than 450 or Less Than 50  Continuous      03/26/18 2156 03/26/18 2157  DKA - If Hourly Glucose Remains in Desired Range for 4 Consecutive Hours May Test Glucose Every 2 Hours  Continuous      03/26/18 2156 03/26/18 2157  DKA - If Every 2 Hour Glucose Remains in Target Range for 4 Consecutive Checks (8 Hours) May Test Blood Glucose Every 4 Hours  Continuous      03/26/18 2156 03/26/18 2157  DKA - If Clinical Condition Changes Resume Hourly Glucose Monitoring  Continuous     Comments:  Clinical Condition Change:  Glucose Outside Target Range  Insulin Dose Change  Hemodynamically Unstable  Change in Level of Consciousness    03/26/18 2156 03/26/18 2157  Height & Weight  Once      03/26/18 2156 03/26/18 2157  Daily Weights  Daily      03/26/18 2156 03/26/18 2157  NPO Diet  Diet Effective Now,   Status:  Canceled      03/26/18 2156 03/26/18 2157  Inpatient Nutrition Consult  Once     Provider:  (Not yet assigned)    03/26/18 2156 03/26/18 2157  Inpatient Diabetes Educator Consult  Once     Provider:  (Not yet assigned)    03/26/18 2156 03/26/18 2157  Hemoglobin A1c  STAT      03/26/18 2156 03/26/18 2157  CBC & Differential  STAT      03/26/18 2156 03/26/18 2157  Comprehensive Metabolic Panel  STAT      03/26/18 2156 03/26/18 2157  Urinalysis With / Culture If Indicated - Urine, Clean Catch  STAT      03/26/18 2156 03/26/18 2157  Insert Peripheral IV  Once      03/26/18 2156 03/26/18 2157  Saline Lock & Maintain IV Access  Continuous      03/26/18 2156 03/26/18 2157  Calculate Corrected Serum Sodium Level With Each BMP -  Contact Provider if Less Than 130mEq/L  Continuous     Comments:  Corrected Serum Sodium Formula - Use Approved Formula for Your Facility    03/26/18 2156 03/26/18 2157  Contact Provider for IV Fluid Orders When DKA is Resolved  Continuous     Comments:  DKA Resolved When:  - Glucose Less Than 200 mg/dL AND  2 of the Following:  - Serum Bicarb / CO2 Greater Than or Equal to 15  - Venous pH Greater Than 7.3  - Anion Gap Less Than or Equal to 12    03/26/18 2156 03/26/18 2157  No Potassium Replacement  if K+ 4.5 mmol/L or Greater  Continuous      03/26/18 2156 03/26/18 2157  If initial BG is less than 200 mg/dL contact provider for subcutaneous insulin orders  Once     Comments:  If initial BG is less than 200 mg/dL contact provider for subcutaneous insulin orders    03/26/18 2156 03/26/18 2157  Full Code  Continuous      03/26/18 2156 03/26/18 2157  VTE Risk Assessment - Moderate Risk  Once      03/26/18 2156 03/26/18 2157  Place Compression Stockings (TEDs)  Once      03/26/18 2156 03/26/18 2157  Remove & Replace Compression Stockings (TEDS) Daily  Daily      03/26/18 2156 03/26/18 2157  Troponin  STAT      03/26/18 2156 03/26/18 2157  CBC Auto Differential  PROCEDURE ONCE      03/26/18 2156 03/26/18 2156  sodium chloride 0.9 % flush 1-10 mL  As Needed      03/26/18 2156 03/26/18 2156  potassium chloride (MICRO-K) CR capsule 40 mEq  As Needed      03/26/18 2156 03/26/18 2156  potassium chloride (KLOR-CON) packet 40 mEq  As Needed      03/26/18 2156 03/26/18 2156  potassium chloride 10 mEq in 100 mL IVPB  As Needed      03/26/18 2156 03/26/18 2156  potassium chloride (MICRO-K) CR capsule 20 mEq  As Needed      03/26/18 2156 03/26/18 2156  potassium chloride (KLOR-CON) packet 20 mEq  As Needed      03/26/18 2156 03/26/18 2156  potassium chloride 10 mEq in 100 mL IVPB  As Needed      03/26/18 2156 03/26/18 2156  potassium chloride (MICRO-K) CR capsule 10 mEq  As Needed       03/26/18 2156 03/26/18 2156  potassium chloride (KLOR-CON) packet 10 mEq  As Needed      03/26/18 2156 03/26/18 2156  potassium chloride 10 mEq in 100 mL IVPB  As Needed      03/26/18 2156 03/26/18 2156  sodium chloride 0.45 % with KCl 20 mEq/L infusion  Continuous PRN      03/26/18 2156 03/26/18 2156  dextrose 5 % and sodium chloride 0.45 % infusion  Continuous PRN      03/26/18 2156 03/26/18 2156  dextrose 5 % and sodium chloride 0.45 % with KCl 20 mEq/L infusion  Continuous PRN      03/26/18 2156 03/26/18 2156  dextrose (D50W) solution 12.5 g  Every 15 Minutes PRN,   Status:  Discontinued      03/26/18 2156 03/26/18 2130  Inpatient Admission  Once      03/26/18 2133 03/26/18 2122  POC Glucose Once  Once      03/26/18 2121 03/26/18 2114  POC Glucose STAT  STAT      03/26/18 2113 03/26/18 2048  sodium chloride 0.9 % infusion  Continuous,   Status:  Discontinued      03/26/18 2046 03/26/18 2048  Inpatient Admission  Once      03/26/18 2048 03/26/18 2047  POC Glucose Once  Once      03/26/18 2046 03/26/18 2009  sodium chloride 0.9 % bolus 1,000 mL  Once      03/26/18 2007 03/26/18 2002  Lactic Acid, Reflex  STAT      03/26/18 2001 03/26/18 1939  Blood Gas, Arterial  Once      03/26/18 1938 03/26/18 1915  Ketone Bodies, Serum (Not performed at [place])  Once      03/26/18 1914 03/26/18 1915  Acetone  PROCEDURE ONCE      03/26/18 1914    03/26/18 1914  Blood Gas, Arterial  Once      03/26/18 1914 03/26/18 1712  iopamidol (ISOVUE-300) 61 % injection 100 mL  Once in Imaging      03/26/18 1710    03/26/18 1653  Lactic Acid, Reflex Timer (This will reflex a repeat order 3-3:15 hours after ordered.)  Once      03/26/18 1652    03/26/18 1622  Blood Culture - Blood,  Once      03/26/18 1621    03/26/18 1619  CBC Auto Differential  Once      03/26/18 1618    03/26/18 1618  sodium chloride 0.9 % bolus 1,000 mL  Once      03/26/18 1616    03/26/18 1618  lactated ringers  bolus 1,000 mL  Once      03/26/18 1616    03/26/18 1617  CT Abdomen Pelvis With Contrast  1 Time Imaging     Comments:  IV CONTRAST ONLY      03/26/18 1616    03/26/18 1616  CBC & Differential  Once      03/26/18 1616    03/26/18 1616  Comprehensive Metabolic Panel  Once      03/26/18 1616    03/26/18 1616  Lipase  Once      03/26/18 1616    03/26/18 1616  POCT Pregnancy, Urine  Once      03/26/18 1616    03/26/18 1616  Urinalysis With / Microscopic If Indicated - Urine, Clean Catch  Once      03/26/18 1616    03/26/18 1616  Lactic Acid, Plasma  Once      03/26/18 1616    03/26/18 1616  Blood Culture - Blood,  Once,   Status:  Canceled      03/26/18 1616    03/26/18 1616  Blood Culture - Blood,  Once      03/26/18 1616    03/26/18 1614  ondansetron (ZOFRAN) injection 4 mg  Once      03/26/18 1612    03/26/18 1607  [place] Draw  Once      03/26/18 1607    03/26/18 1607  Light Blue Top  PROCEDURE ONCE      03/26/18 1607    03/26/18 1607  Green Top (Gel)  PROCEDURE ONCE      03/26/18 1607    03/26/18 1607  Lavender Top  PROCEDURE ONCE      03/26/18 1607    03/26/18 1607  Red Top  PROCEDURE ONCE      03/26/18 1607    03/26/18 1607  Blood Culture Bottle Set  PROCEDURE ONCE      03/26/18 1607    03/26/18 1558  ECG 12 Lead  Once      03/26/18 1557    Signed and Held  Initiate Insulin Drip if Patient Has Blood Glucose 180 or Greater on 2 Consecutive Checks, or if Blood Glucose 250 or Greater at Any Time  Continuous,   Status:  Canceled      Signed and Held    Signed and Held  Ensure All Previous Diabetes Medication Orders Are Discontinued  Once,   Status:  Canceled      Signed and Held    Signed and Held  Check Blood Glucose On Arrival & Every Hour; If Glucose 100-180 on Same Insulin Rate for 4 Hours, Check Blood Glucose Every 2 Hours  Continuous,   Status:  Canceled      Signed and Held    Signed and Held  Goal - Lower Blood Glucose By No More Than 100/hr - Optimal Glucose 111-180  Continuous,   Status:  Canceled       Signed and Held    Signed and Held  Insulin Infusion Adjustment Criteria  Continuous,   Status:  Canceled     Comments:  A) Glucose Drops  mg/dL Since Last Check - Do NOT Increase Insulin Infusion  B) Glucose Drops Greater Than 100 mg/dL Since Last Check - STOP INSULIN Infusion & Recheck Glucose Every Hour       - When Blood Glucose Greater Than 180, Restart Insulin Drip at 50% of Previous Insulin Rate (Round to the Nearest Whole Unit)    Signed and Held    Signed and Held  Notify Provider if Glucose Remains Greater Than 250 After 4 Hours of Insulin Infusion  Until Discontinued,   Status:  Canceled      Signed and Held    Signed and Held  Notify Provider for Changes in Nutrition or Steroids  Until Discontinued,   Status:  Canceled      Signed and Held    Signed and Held  Do NOT Discontinue Insulin Drip Without Transition Insulin or Subcutaneous Insulin Orders  Continuous,   Status:  Canceled      Signed and Held    Signed and Held  POC Glucose Q1H  Every Hour,   Status:  Canceled      Signed and Held    Signed and Held  insulin regular (HumuLIN R,NovoLIN R) 100 Units in sodium chloride 0.9 % 100 mL (1 Units/mL) infusion  Titrated,   Status:  Canceled      Signed and Held    Signed and Held  dextrose (D50W) solution 25-50 mL  Every 30 Minutes PRN,   Status:  Canceled      Signed and Held    --  SCANNED EKG      03/26/18 0000             Physician Progress Notes (last 24 hours) (Notes from 3/26/2018  8:58 AM through 3/27/2018  8:58 AM)      Rubin Bustos DO at 3/27/2018  8:37 AM              [facility] Medicine Services  INPATIENT PROGRESS NOTE    Length of Stay: 1  Date of Admission: 3/26/2018  Primary Care Physician: David Mcallister MD    Subjective   Chief Complaint:   NV, abdominal pain    HPI   43 YO BF admitted on 3/26/18. Patient has not had any vomiting. Patient states that she has not eaten anything for 4 days. States that she has not had a BM for 2 weeks. Mild  abdominal pain, she states she thinks that it is from vomiting. Patient was not placed on an insulin drip. States that she does feel like eating something at this point.         Review of Systems   Abdominal pain  NV  Constipation    All pertinent negatives and positives are as above. All other systems have been reviewed and are negative unless otherwise stated.     Objective    Temp:  [98.5 °F (36.9 °C)-99.3 °F (37.4 °C)] 98.7 °F (37.1 °C)  Heart Rate:  [] 98  Resp:  [17-19] 17  BP: (105-190)/() 140/71  Physical Exam   HENT:   Head: Normocephalic and atraumatic.   Nose: Nose normal.   Mouth/Throat: Oropharynx is clear and moist.   Eyes: Conjunctivae and EOM are normal.   Neck: Normal range of motion. Neck supple.   Cardiovascular: Regular rhythm and normal heart sounds.    Tachycardia   Pulmonary/Chest: Effort normal and breath sounds normal.   Abdominal: Soft. Bowel sounds are normal.   Musculoskeletal: She exhibits no edema or tenderness.   Neurological: She is alert. No cranial nerve deficit.   Skin: Skin is warm and dry.   Psychiatric: She has a normal mood and affect.   Vitals reviewed.          Results Review:  I have reviewed the labs, radiology results, and diagnostic studies.    Laboratory Data:       Assessment/Plan     Hospital Problem List     Nausea and vomiting          Impression:  1. Mild DKA resolved with fluids  2. NV  3. Abdominal pain  4. Constipation  5. Non-specific anemia, possibly dilutional  6. Hypokalemia    Plan:  1. Advance diet  2. Add colace BID  3. PRN zofran  4. Restart patient's home long acting insulin and deescalate labs and accu-checks  5. Replace potassium/add to fluids    Transfer to med/surg      Discharge Planning: I expect the patient to be discharged to home in 2-3 days.    Rubin Bustos DO   03/27/18   8:37 AM      Electronically signed by Rubin Bustos DO at 3/27/2018  8:44 AM       Consult Notes (last 24 hours) (Notes from 3/26/2018  8:58 AM through  3/27/2018  8:58 AM)     No notes of this type exist for this encounter.

## 2018-03-27 NOTE — NURSING NOTE
Patient arrived to CCU at 2136 3/26/18 via stretcher, with monitor and RN present. Initial 's/100's. MD notified. One time dose 20 mg Labetalol IV push administered.  SBP has since maintained between 120's-150's. Elevated 's upon arrival, since has decreased to low 100's. C/O of H/A this morning. One time dose 650 mg acetaminophen administered with effectiveness. AC/HS accuchecks ordered along with low dose SSI. Patient has not complained about nausea this shift. Currently NPO, tolerates sips and chips. Continue to assess.

## 2018-03-27 NOTE — PROGRESS NOTES
Discharge Planning Assessment  [facility]     Patient Name: Lynn Magana  MRN: [id number]  Today's Date: 3/27/2018    Admit Date: 3/26/2018          Discharge Needs Assessment     Row Name 03/27/18 0956       Living Environment    Lives With child(neymar), dependent;significant other    Current Living Arrangements home/apartment/condo    Primary Care Provided by self    Provides Primary Care For child(neymar)    Family Caregiver if Needed none    Quality of Family Relationships unable to assess    Able to Return to Prior Arrangements yes       Resource/Environmental Concerns    Resource/Environmental Concerns none    Transportation Concerns car, none       Transition Planning    Patient/Family Anticipates Transition to home with family    Patient/Family Anticipated Services at Transition none    Transportation Anticipated family or friend will provide       Discharge Needs Assessment    Readmission Within the Last 30 Days no previous admission in last 30 days    Concerns to be Addressed childcare    Equipment Currently Used at Home walker, standard    Anticipated Changes Related to Illness none    Equipment Needed After Discharge none    Current Discharge Risk lack of support system/caregiver    Discharge Coordination/Progress Patient was admitted from home where she resides with her significant other and 10 year old daughter.  Patient's nurse advised patient's 10 year old daughter is currently staying at hospital with patient and patient states she has no family to care for child and her fiance is out of town.  Guest trays to be ordered for child.  CPS report made to [facility], ID# [id number].  Patient plans to return home at discharge.              Discharge Plan    No documentation.       Destination     No service coordination in this encounter.      Durable Medical Equipment     No service coordination in this encounter.      Dialysis/Infusion     No service coordination in this encounter.      Home Medical Care     No  service coordination in this encounter.      Social Care     No service coordination in this encounter.                Demographic Summary    No documentation.           Functional Status    No documentation.           Psychosocial    No documentation.           Abuse/Neglect    No documentation.           Legal    No documentation.           Substance Abuse    No documentation.           Patient Forms    No documentation.         EMI Figueroa

## 2018-03-27 NOTE — PLAN OF CARE
Problem: Patient Care Overview  Goal: Plan of Care Review  Outcome: Ongoing (interventions implemented as appropriate)   03/27/18 1335   Coping/Psychosocial   Plan of Care Reviewed With patient   Plan of Care Review   Progress improving   Coping/Psychosocial   Patient Agreement with Plan of Care agrees   OTHER   Outcome Summary Transfer to  from CCU. C/o intermittent nausea and vomiting. Pt states she vomited x2 earlier this morning, however she has had no nausea or vomiting since. Tolerated small amount of lunch and fluids. Accuchecks AC/HS, SS insulin given per parameters. Up ad norm, voiding, BM x3. K+=3. K+ replacement given per PRN protocol orders. IVF continues.      Goal: Individualization and Mutuality  Outcome: Ongoing (interventions implemented as appropriate)    Goal: Discharge Needs Assessment  Outcome: Ongoing (interventions implemented as appropriate)    Goal: Interprofessional Rounds/Family Conf  Outcome: Ongoing (interventions implemented as appropriate)      Problem: Diabetes, Type 2 (Adult)  Goal: Signs and Symptoms of Listed Potential Problems Will be Absent, Minimized or Managed (Diabetes, Type 2)  Outcome: Ongoing (interventions implemented as appropriate)   03/27/18 0542 03/27/18 1335   Goal/Outcome Evaluation   Problems Assessed (Type 2 Diabetes) --  all   Problems Present (Type 2 Diabetes) hyperglycemia;situational response --        Problem: Hypertensive Disease/Crisis (Arterial) (Adult)  Goal: Signs and Symptoms of Listed Potential Problems Will be Absent, Minimized or Managed (Hypertensive Disease/Crisis)  Outcome: Ongoing (interventions implemented as appropriate)   03/27/18 1335   Goal/Outcome Evaluation   Problems Assessed (Hypertensive Disease/Crisis (Arterial)) all   Problems Present (Hypertensive Disease) situational response       Problem: Fall Risk (Adult)  Goal: Absence of Fall  Outcome: Ongoing (interventions implemented as appropriate)      Problem: Skin Injury Risk  (Adult)  Goal: Identify Related Risk Factors and Signs and Symptoms  Outcome: Ongoing (interventions implemented as appropriate)    Goal: Skin Health and Integrity  Outcome: Ongoing (interventions implemented as appropriate)      Problem: Pain, Chronic (Adult)  Goal: Acceptable Pain/Comfort Level and Functional Ability  Outcome: Ongoing (interventions implemented as appropriate)      Problem: Patient Care Overview  Goal: Plan of Care Review  Outcome: Ongoing (interventions implemented as appropriate)    Goal: Individualization and Mutuality  Outcome: Ongoing (interventions implemented as appropriate)    Goal: Discharge Needs Assessment  Outcome: Ongoing (interventions implemented as appropriate)    Goal: Interprofessional Rounds/Family Conf  Outcome: Ongoing (interventions implemented as appropriate)

## 2018-03-27 NOTE — PROGRESS NOTES
[facility] Medicine Services  INPATIENT PROGRESS NOTE    Length of Stay: 1  Date of Admission: 3/26/2018  Primary Care Physician: David Mcallister MD    Subjective   Chief Complaint:   NV, abdominal pain    HPI   43 YO BF admitted on 3/26/18. Patient has not had any vomiting. Patient states that she has not eaten anything for 4 days. States that she has not had a BM for 2 weeks. Mild abdominal pain, she states she thinks that it is from vomiting. Patient was not placed on an insulin drip. States that she does feel like eating something at this point.         Review of Systems   Abdominal pain  NV  Constipation    All pertinent negatives and positives are as above. All other systems have been reviewed and are negative unless otherwise stated.     Objective    Temp:  [98.5 °F (36.9 °C)-99.3 °F (37.4 °C)] 98.7 °F (37.1 °C)  Heart Rate:  [] 98  Resp:  [17-19] 17  BP: (105-190)/() 140/71  Physical Exam   HENT:   Head: Normocephalic and atraumatic.   Nose: Nose normal.   Mouth/Throat: Oropharynx is clear and moist.   Eyes: Conjunctivae and EOM are normal.   Neck: Normal range of motion. Neck supple.   Cardiovascular: Regular rhythm and normal heart sounds.    Tachycardia   Pulmonary/Chest: Effort normal and breath sounds normal.   Abdominal: Soft. Bowel sounds are normal.   Musculoskeletal: She exhibits no edema or tenderness.   Neurological: She is alert. No cranial nerve deficit.   Skin: Skin is warm and dry.   Psychiatric: She has a normal mood and affect.   Vitals reviewed.          Results Review:  I have reviewed the labs, radiology results, and diagnostic studies.    Laboratory Data:     Results from last 7 days  Lab Units 03/27/18 0418 03/26/18 2328 03/26/18  1607   WBC 10*3/mm3 7.22 8.12 7.65   HEMOGLOBIN g/dL 10.3* 10.1* 12.9   HEMATOCRIT % 31.4* 30.5* 38.7   PLATELETS 10*3/mm3 368 378 540*          Results from last 7 days  Lab Units 03/27/18 0418 03/26/18 2328  03/26/18 2003 03/26/18  1607   SODIUM mmol/L 139 140 140 138   POTASSIUM mmol/L 3.0* 3.0* 3.8 3.7   CHLORIDE mmol/L 103 103 104 99   CO2 mmol/L 25.0 26.0 26.0 22.0*   BUN mg/dL 9 10 11 14   CREATININE mg/dL 0.54 0.54 0.51 0.60   CALCIUM mg/dL 8.9 8.9 9.5 10.1   BILIRUBIN mg/dL 0.5  --  0.4 0.7   ALK PHOS U/L 85  --  96 124*   ALT (SGPT) U/L 43  --  54 52   AST (SGOT) U/L 24  --  38 48*   GLUCOSE mg/dL 181* 202* 224* 281*       Culture Data:   Blood Culture   Date Value Ref Range Status   03/26/2018 No growth at less than 24 hours  Preliminary   03/26/2018 No growth at less than 24 hours  Preliminary       Radiology Data:   Imaging Results (last 24 hours)     Procedure Component Value Units Date/Time    CT Abdomen Pelvis With Contrast [[id number]] Collected:  03/26/18 1715     Updated:  03/26/18 1721    Narrative:       EXAMINATION: CT ABDOMEN PELVIS W CONTRAST-      3/26/2018 5:02 PM CDT     HISTORY: bilious vomiting, abdominal pain     In order to have a CT radiation dose as low as reasonably achievable  Automated Exposure Control was utilized for adjustment of the mA and/or  KV according to patient size.     DLP in mGycm= 481.     Axial, sagittal, and coronal CT imaging of the abdomen/pelvis with IV  contrast injection.     Comparison is made with 03/14/2017.     Normal heart size.  Clear lung bases.  Cholecystectomy clips.  Normal liver, pancreas, and spleen.  No bile duct dilation.  Normal and symmetric adrenal glands and kidneys.     No bowel dilation.  No appendicitis or diverticulitis.  No sign of colitis.     No pelvic mass or free fluid.     2 cm left ovarian cyst diffusely measured 1.5 cm.  3 cm right ovarian cyst noted previously has resolved.  The uterus is absent.     Summary:  1. No mass, fluid collection, or inflammatory process is seen.                                   This report was finalized on 03/26/2018 17:18 by Dr. Isaiah Diaz MD.          I have reviewed the patient current medications.      Assessment/Plan     Hospital Problem List     Nausea and vomiting          Impression:  1. Mild DKA resolved with fluids  2. NV  3. Abdominal pain  4. Constipation  5. Non-specific anemia, possibly dilutional  6. Hypokalemia    Plan:  1. Advance diet  2. Add colace BID  3. PRN zofran  4. Restart patient's home long acting insulin and deescalate labs and accu-checks  5. Replace potassium/add to fluids    Transfer to med/surg      Discharge Planning: I expect the patient to be discharged to home in 2-3 days.    Rubin Bustos,    03/27/18   8:37 AM

## 2018-03-27 NOTE — PLAN OF CARE
Problem: Patient Care Overview  Goal: Plan of Care Review  Outcome: Ongoing (interventions implemented as appropriate)      Problem: Patient Care Overview  Goal: Plan of Care Review  Outcome: Ongoing (interventions implemented as appropriate)   03/27/18 1441   Plan of Care Review   Progress improving   OTHER   Outcome Summary CCHO diet, appetite improving per pt. DM education provided. cont to follow   Coping/Psychosocial   Plan of Care Reviewed With patient

## 2018-03-27 NOTE — ED PROVIDER NOTES
"    [facility]  eMERGENCY dEPARTMENT eNCOUnter      Pt Name: Lynn Magana  MRN: [id number]  Birthdate 1975  Date of evaluation: 3/26/2018      CHIEF COMPLAINT       Chief Complaint   Patient presents with   • Abdominal Pain   • Vomiting   • Nausea   • Rapid Heart Rate       Nurses Notes reviewed and I agree except as noted in the HPI.      HISTORY OF PRESENT ILLNESS    Lynn Magana is a 42 y.o. female who presents     Patient presents for abdominal pain and vomiting.  Patient reports that she has had several days of intractable vomiting or what she tries to drink or eat it comes right back up.  She states she is not eaten since last night's she is now puking up bilious material that is very foul tasting.  Patient states that she does have diabetes she reports that her hemoglobin A1c is \"very out of whack\" per her PCP.  She does admit to not checking her glucose as instructed secondary to \"i get very tired of sticking myself in the finger.\"    REVIEW OF SYSTEMS     Review of Systems  CONSTITUION: No Fever, No chills, No activity change, No diaphoresis, No unexpected wt change.    HENT: No congestion, no dental problems, no ear pain or discharge,   EYES: No drainage, no itching, no photophobia, no visual disturbance  RESPIRATORY: No apnea, no chest tightness, no cough, no shortness of breath, no stridor, no wheezing  CARDIOVASCULAR: No chest pain, no palpitations, no leg swelling  GI: As per the HPI otherwise negative.  ENDOCRINE: As per the HPI otherwise negative.  : No difficulty urinating, no dyspareunia, nodysuria, no flank pain, no frequency, no genital sore, no hematuria, no menstrual problem if female, no decreased urniation, no hesitation of urination, no vaginal discharge if female, no vaginal pain if female no penile pain if male  ALLERG/IMMUNO:  No env or food allergies, not immunocompromised  NEUROLOGICAL: No dizziness, no facial asymmetry, no Headaches, no Light-headedness, " no numbess nor paresthesias, no seizures, no speech difficulty, No syncope, no temors, no weakness  HEMATOLOGIC: No adenopathy, no unusual bleeding or bruising  MUSC: No arthralgia, no back pain, no joint swelling, no myalgias, no neck pain, no neck stiffness  SKIN: No rash, no color change, no pallor, no wound patient does report foul-smelling material that she is able to get from her umbilicus periodically despite cleaning it regularly.  PSYCH: No agitation, no behavior problem, no confusion, no decr concentration, no hallucinations, no suicidal ideation, no homicidal ideation, no self injury, no sleep disturbance  All other systems negative    PAST MEDICAL HISTORY     Past Medical History:   Diagnosis Date   • Arthritis    • Carotid artery stenosis    • Depression    • Diabetes mellitus    • GERD (gastroesophageal reflux disease)    • Hyperlipidemia    • Hypertension    • Injury of back    • Seizure        SURGICAL HISTORY      has a past surgical history that includes  section; Cholecystectomy; Hysterectomy; Cyst Removal; and Colonoscopy.    CURRENT MEDICATIONS        Medication List      ASK your doctor about these medications    amLODIPine 10 MG tablet  Commonly known as:  NORVASC  Take 0.5 tablets by mouth Daily.     aspirin 81 MG EC tablet     benazepril 20 MG tablet  Commonly known as:  LOTENSIN     DULoxetine 60 MG capsule  Commonly known as:  CYMBALTA     gabapentin 300 MG capsule  Commonly known as:  NEURONTIN     HYDROcodone-acetaminophen 7.5-325 MG per tablet  Commonly known as:  NORCO     Insulin Glargine 100 UNIT/ML injection pen  Commonly known as:  BASAGLAR KWIKPEN     insulin lispro 100 UNIT/ML injection  Commonly known as:  humaLOG     methIMAzole 10 MG tablet  Commonly known as:  TAPAZOLE  Take 2 tablets by mouth Daily.     omeprazole 20 MG capsule  Commonly known as:  priLOSEC  Take 1 capsule by mouth Daily.     polyethylene glycol 236 g solution  Commonly known as:  GoLYTELY  Take as  "directed by office instructions.     polyethylene glycol powder  Commonly known as:  MIRALAX     propranolol  MG 24 hr capsule  Commonly known as:  INDERAL LA  Take 1 capsule by mouth Daily.     raNITIdine 300 MG tablet  Commonly known as:  ZANTAC     simvastatin 20 MG tablet  Commonly known as:  ZOCOR     tiZANidine 4 MG tablet  Commonly known as:  ZANAFLEX     topiramate 50 MG tablet  Commonly known as:  TOPAMAX  Take 1 tablet by mouth 2 (Two) Times a Day.          ALLERGIES     is allergic to oxycodone-acetaminophen.    FAMILY HISTORY     indicated that her mother is . She indicated that her father is . She indicated that three of her four sisters are alive. She indicated that the status of her brother is unknown. She indicated that the status of her paternal uncle is unknown.    family history includes Colon cancer in her paternal uncle; Coronary artery disease in her brother; Diabetes in her brother, father, mother, and sister; Seizures in her sister; Stroke in her father, mother, and sister.    SOCIAL HISTORY      reports that she has never smoked. She has never used smokeless tobacco. She reports that she uses drugs, including Marijuana. She reports that she does not drink alcohol.    PHYSICAL EXAM     INITIAL VITALS:  height is 188 cm (74\") and weight is 81.6 kg (180 lb). Her oral temperature is 99.3 °F (37.4 °C). Her blood pressure is 105/74 and her pulse is 127 (abnormal). Her respiration is 19 and oxygen saturation is 95%.    Physical Exam    CONSTITUTIONAL: Well developed, well nourished, not diaphoretic nor distressed  HENT: Normocephalic, atraumatic, oropharynx clear and dry strong odor of ketones  EYES: PERRL, EOM normal, no discharge, no scleral icterus  NECK: ROM normal, supple, no tracheal deviation nor JVD, no stridor  CARDIOVASCULAR: Normal rate and rhythm, heart sounds normal, no rub no gallop, intact distal pulses, normal cap refill  PULMONARY: Normal effort and breath " sounds, no distress, no wheezes, rhonci or rales, no chest tenderness  ABDOMINAL: Soft, nontender, no guarding, no mass, no rebound, no hernia umbilicus normal inspection  GENITOURINARY/ANORECTAL: deferred  MUSCKULOSKELETAL: ROM normal, no tenderness nor deformity, no edema  NEUROLOGICAL: Alert, oriented x 3, DTRS normal, CN x 10 normal, normal tone  SKIN: Warm, dry, no erythema, no rash, normal color   PSYCH: Mood and affect normal, behavior normal, thought content and judgement normal.    DIFFERENTIAL DIAGNOSIS:         DIAGNOSTIC RESULTS     EKG: All EKG's are interpreted by the Emergency Department Physician who either signs or Co-signs this chart in the absence of a cardiologist.      RADIOLOGY: non-plain film images(s) such as CT, Ultrasound and MRI are read by the radiologist.  Plain radiographic images are visualized and preliminarily interpreted by the emergency physician unless otherwise stated below.  Ct Abdomen Pelvis With Contrast    Result Date: 3/26/2018  Narrative: EXAMINATION: CT ABDOMEN PELVIS W CONTRAST-   3/26/2018 5:02 PM CDT  HISTORY: bilious vomiting, abdominal pain  In order to have a CT radiation dose as low as reasonably achievable Automated Exposure Control was utilized for adjustment of the mA and/or KV according to patient size.  DLP in mGycm= 481.  Axial, sagittal, and coronal CT imaging of the abdomen/pelvis with IV contrast injection.  Comparison is made with 03/14/2017.  Normal heart size. Clear lung bases. Cholecystectomy clips. Normal liver, pancreas, and spleen. No bile duct dilation. Normal and symmetric adrenal glands and kidneys.  No bowel dilation. No appendicitis or diverticulitis. No sign of colitis.  No pelvic mass or free fluid.  2 cm left ovarian cyst diffusely measured 1.5 cm. 3 cm right ovarian cyst noted previously has resolved. The uterus is absent.  Summary: 1. No mass, fluid collection, or inflammatory process is seen.            This report was finalized on  03/26/2018 17:18 by Dr. Isaiah Diaz MD.             LABS:   Lab Results (last 24 hours)     Procedure Component Value Units Date/Time    Blood Culture Bottle Set [[id number]] Collected:  03/26/18 1607    Specimen:  Blood from Arm, Right Updated:  03/26/18 1716     Extra Tube Hold for add-ons.     Comment: Auto resulted.       CBC & Differential [[id number]] Collected:  03/26/18 1607    Specimen:  Blood Updated:  03/26/18 1620    Narrative:       The following orders were created for panel order CBC & Differential.  Procedure                               Abnormality         Status                     ---------                               -----------         ------                     CBC Auto Differential[[id number]]        Abnormal            Final result                 Please view results for these tests on the individual orders.    Comprehensive Metabolic Panel [[id number]]  (Abnormal) Collected:  03/26/18 1607    Specimen:  Blood Updated:  03/26/18 1631     Glucose 281 (H) mg/dL      BUN 14 mg/dL      Creatinine 0.60 mg/dL      Sodium 138 mmol/L      Potassium 3.7 mmol/L      Chloride 99 mmol/L      CO2 22.0 (L) mmol/L      Calcium 10.1 mg/dL      Total Protein 7.2 g/dL      Albumin 4.00 g/dL      ALT (SGPT) 52 U/L      AST (SGOT) 48 (H) U/L      Alkaline Phosphatase 124 (H) U/L      Total Bilirubin 0.7 mg/dL      eGFR  African Amer 132 mL/min/1.73      Globulin 3.2 gm/dL      A/G Ratio 1.3 g/dL      BUN/Creatinine Ratio 23.3     Anion Gap 17.0 (H) mmol/L     Lipase [[id number]]  (Normal) Collected:  03/26/18 1607    Specimen:  Blood Updated:  03/26/18 1631     Lipase 61 U/L     Lactic Acid, Plasma [[id number]]  (Abnormal) Collected:  03/26/18 1607    Specimen:  Blood Updated:  03/26/18 1652     Lactate 2.7 (C) mmol/L     CBC Auto Differential [[id number]]  (Abnormal) Collected:  03/26/18 1607    Specimen:  Blood Updated:  03/26/18 1620     WBC 7.65 10*3/mm3      RBC 5.16 10*6/mm3      Hemoglobin 12.9 g/dL       Hematocrit 38.7 %      MCV 75.0 (L) fL      MCH 25.0 (L) pg      MCHC 33.3 g/dL      RDW 13.6 %      RDW-SD 36.4 (L) fl      MPV 9.9 fL      Platelets 540 (H) 10*3/mm3      Neutrophil % 67.0 %      Lymphocyte % 25.9 %      Monocyte % 6.4 %      Eosinophil % 0.0 %      Basophil % 0.3 %      Immature Grans % 0.4 %      Neutrophils, Absolute 5.13 10*3/mm3      Lymphocytes, Absolute 1.98 10*3/mm3      Monocytes, Absolute 0.49 10*3/mm3      Eosinophils, Absolute 0.00 10*3/mm3      Basophils, Absolute 0.02 10*3/mm3      Immature Grans, Absolute 0.03 10*3/mm3      nRBC 0.0 /100 WBC     Blood Culture - Blood, [[id number]] Collected:  03/26/18 1607    Specimen:  Blood from Arm, Right Updated:  03/26/18 1657    Lactic Acid, Reflex Timer (This will reflex a repeat order 3-3:15 hours after ordered.) [[id number]] Collected:  03/26/18 1607    Specimen:  Blood Updated:  03/26/18 2001     Extra Tube Hold for add-ons.     Comment: Auto resulted.       Blood Culture - Blood, [[id number]] Collected:  03/26/18 1626    Specimen:  Blood from Arm, Left Updated:  03/26/18 1637    Ketone Bodies, Serum (Not performed at [facility]) [[id number]] Collected:  03/26/18 1626    Specimen:  Blood Updated:  03/26/18 1939    Narrative:       The following orders were created for panel order Ketone Bodies, Serum (Not performed at [facility]).  Procedure                               Abnormality         Status                     ---------                               -----------         ------                     Acetone[[id number]]                      Abnormal            Final result                 Please view results for these tests on the individual orders.    Acetone [[id number]]  (Abnormal) Collected:  03/26/18 1626    Specimen:  Blood Updated:  03/26/18 1939     Acetone Small (A)    Urinalysis With / Microscopic If Indicated - Urine, Clean Catch [[id number]]  (Abnormal) Collected:  03/26/18 1653    Specimen:  Urine from Urine, Clean Catch Updated:   03/26/18 1712     Color, UA Yellow     Appearance, UA Clear     pH, UA <=5.0     Specific Gravity, UA >1.030 (H)     Glucose, UA >=1000 mg/dL (3+) (A)     Ketones, UA 80 mg/dL (3+) (A)     Bilirubin, UA Negative     Blood, UA Negative     Protein, UA Trace (A)     Leuk Esterase, UA Negative     Nitrite, UA Negative     Urobilinogen, UA 1.0 E.U./dL    Narrative:       Urine microscopic not indicated.    POCT Pregnancy, Urine [[id number]] Collected:  03/26/18 1654    Specimen:  Urine Updated:  03/26/18 1654     HCG, Urine, QL Negative     Lot Number \[id number]     Internal Positive Control Positive     Internal Negative Control Negative    Blood Gas, Arterial [[id number]]  (Abnormal) Collected:  03/26/18 1932    Specimen:  Arterial Blood Updated:  03/26/18 1938     Site Left Radial     Bill's Test Positive     pH, Arterial 7.445 pH units      pCO2, Arterial 36.4 mm Hg      pO2, Arterial 81.5 (L) mm Hg      HCO3, Arterial 25.0 mmol/L      Base Excess, Arterial 1.1 mmol/L      O2 Saturation, Arterial 97.1 %      Temperature 37.0 C      Barometric Pressure for Blood Gas 754 mmHg      Modality Room Air     Ventilator Mode NA     Collected by [id number]    Lactic Acid, Reflex [[id number]]  (Normal) Collected:  03/26/18 2003    Specimen:  Blood Updated:  03/26/18 2023     Lactate 1.1 mmol/L           EMERGENCY DEPARTMENT COURSE:   Vitals:    Vitals:    03/26/18 1944 03/26/18 1953 03/26/18 2014 03/26/18 2029   BP:  105/74     BP Location:       Patient Position:       Pulse: 119 (!) 126 113 (!) 127   Resp:       Temp:       TempSrc:       SpO2: 100% 96% 99% 95%   Weight:       Height:           The patient was given the following medications:  Medications   sodium chloride 0.9 % bolus 1,000 mL (1,000 mL Intravenous New Bag 3/26/18 2010)   sodium chloride 0.9 % infusion (not administered)   ondansetron (ZOFRAN) injection 4 mg (4 mg Intravenous Given 3/26/18 1615)   sodium chloride 0.9 % bolus 1,000 mL (0 mL Intravenous Stopped  3/26/18 1725)   lactated ringers bolus 1,000 mL (0 mL Intravenous Stopped 3/26/18 1909)   iopamidol (ISOVUE-300) 61 % injection 100 mL (100 mL Intravenous Given 3/26/18 1712)       ED Course       CRITICAL CARE:  none    CONSULTS:  none    PROCEDURES:  None    FINAL IMPRESSION      1. Nausea and vomiting, intractability of vomiting not specified, unspecified vomiting type    2. Generalized abdominal pain    3. Hyperglycemia    4. Ketosis          DISPOSITION/PLAN   Patient admitted for further evaluation and treatment to the hospitalist team.      PATIENT REFERRED TO:  No follow-up provider specified.    DISCHARGE MEDICATIONS:     Medication List      ASK your doctor about these medications    amLODIPine 10 MG tablet  Commonly known as:  NORVASC  Take 0.5 tablets by mouth Daily.     aspirin 81 MG EC tablet     benazepril 20 MG tablet  Commonly known as:  LOTENSIN     DULoxetine 60 MG capsule  Commonly known as:  CYMBALTA     gabapentin 300 MG capsule  Commonly known as:  NEURONTIN     HYDROcodone-acetaminophen 7.5-325 MG per tablet  Commonly known as:  NORCO     Insulin Glargine 100 UNIT/ML injection pen  Commonly known as:  BASAGLAR KWIKPEN     insulin lispro 100 UNIT/ML injection  Commonly known as:  humaLOG     methIMAzole 10 MG tablet  Commonly known as:  TAPAZOLE  Take 2 tablets by mouth Daily.     omeprazole 20 MG capsule  Commonly known as:  priLOSEC  Take 1 capsule by mouth Daily.     polyethylene glycol 236 g solution  Commonly known as:  GoLYTELY  Take as directed by office instructions.     polyethylene glycol powder  Commonly known as:  MIRALAX     propranolol  MG 24 hr capsule  Commonly known as:  INDERAL LA  Take 1 capsule by mouth Daily.     raNITIdine 300 MG tablet  Commonly known as:  ZANTAC     simvastatin 20 MG tablet  Commonly known as:  ZOCOR     tiZANidine 4 MG tablet  Commonly known as:  ZANAFLEX     topiramate 50 MG tablet  Commonly known as:  TOPAMAX  Take 1 tablet by mouth 2 (Two)  Times a Day.          (Please note that portions of this note were completed with a voice recognition program.)    Lia Velazquez, DO Lia Velazquez,   03/26/18 2054

## 2018-03-27 NOTE — PLAN OF CARE
Problem: Patient Care Overview  Goal: Plan of Care Review   03/27/18 0542   Coping/Psychosocial   Plan of Care Reviewed With patient   Plan of Care Review   Progress improving       Problem: Fall Risk (Adult)  Goal: Identify Related Risk Factors and Signs and Symptoms  Outcome: Outcome(s) achieved Date Met: 03/27/18      Problem: Pain, Chronic (Adult)  Goal: Identify Related Risk Factors and Signs and Symptoms  Outcome: Outcome(s) achieved Date Met: 03/27/18

## 2018-03-28 VITALS
TEMPERATURE: 98 F | BODY MASS INDEX: 24.76 KG/M2 | HEIGHT: 72 IN | WEIGHT: 182.8 LBS | OXYGEN SATURATION: 98 % | HEART RATE: 87 BPM | DIASTOLIC BLOOD PRESSURE: 54 MMHG | RESPIRATION RATE: 18 BRPM | SYSTOLIC BLOOD PRESSURE: 115 MMHG

## 2018-03-28 LAB
ANION GAP SERPL CALCULATED.3IONS-SCNC: 11 MMOL/L (ref 4–13)
ANION GAP SERPL CALCULATED.3IONS-SCNC: 7 MMOL/L (ref 4–13)
BUN BLD-MCNC: 6 MG/DL (ref 5–21)
BUN BLD-MCNC: 7 MG/DL (ref 5–21)
BUN/CREAT SERPL: 10.7 (ref 7–25)
BUN/CREAT SERPL: 12.5 (ref 7–25)
CALCIUM SPEC-SCNC: 9 MG/DL (ref 8.4–10.4)
CALCIUM SPEC-SCNC: 9.3 MG/DL (ref 8.4–10.4)
CHLORIDE SERPL-SCNC: 108 MMOL/L (ref 98–110)
CHLORIDE SERPL-SCNC: 109 MMOL/L (ref 98–110)
CO2 SERPL-SCNC: 23 MMOL/L (ref 24–31)
CO2 SERPL-SCNC: 23 MMOL/L (ref 24–31)
CREAT BLD-MCNC: 0.56 MG/DL (ref 0.5–1.4)
CREAT BLD-MCNC: 0.56 MG/DL (ref 0.5–1.4)
GFR SERPL CREATININE-BSD FRML MDRD: 143 ML/MIN/1.73
GFR SERPL CREATININE-BSD FRML MDRD: 143 ML/MIN/1.73
GLUCOSE BLD-MCNC: 128 MG/DL (ref 70–100)
GLUCOSE BLD-MCNC: 217 MG/DL (ref 70–100)
GLUCOSE BLDC GLUCOMTR-MCNC: 122 MG/DL (ref 70–130)
GLUCOSE BLDC GLUCOMTR-MCNC: 279 MG/DL (ref 70–130)
POTASSIUM BLD-SCNC: 3.5 MMOL/L (ref 3.5–5.3)
POTASSIUM BLD-SCNC: 3.9 MMOL/L (ref 3.5–5.3)
SODIUM BLD-SCNC: 139 MMOL/L (ref 135–145)
SODIUM BLD-SCNC: 142 MMOL/L (ref 135–145)

## 2018-03-28 PROCEDURE — 25810000003 POTASSIUM CHLORIDE PER 2 MEQ: Performed by: INTERNAL MEDICINE

## 2018-03-28 PROCEDURE — 63710000001 INSULIN LISPRO (HUMAN) PER 5 UNITS: Performed by: FAMILY MEDICINE

## 2018-03-28 PROCEDURE — 82962 GLUCOSE BLOOD TEST: CPT

## 2018-03-28 PROCEDURE — 80048 BASIC METABOLIC PNL TOTAL CA: CPT | Performed by: INTERNAL MEDICINE

## 2018-03-28 RX ORDER — ONDANSETRON 4 MG/1
4 TABLET, FILM COATED ORAL EVERY 8 HOURS PRN
Qty: 30 TABLET | Refills: 0 | Status: SHIPPED | OUTPATIENT
Start: 2018-03-28 | End: 2020-05-05

## 2018-03-28 RX ADMIN — PROPRANOLOL HYDROCHLORIDE 120 MG: 60 CAPSULE, EXTENDED RELEASE ORAL at 09:28

## 2018-03-28 RX ADMIN — AMLODIPINE BESYLATE 5 MG: 5 TABLET ORAL at 09:27

## 2018-03-28 RX ADMIN — GABAPENTIN 300 MG: 300 CAPSULE ORAL at 10:44

## 2018-03-28 RX ADMIN — ASPIRIN 81 MG: 81 TABLET ORAL at 09:27

## 2018-03-28 RX ADMIN — DOCUSATE SODIUM 100 MG: 100 CAPSULE, LIQUID FILLED ORAL at 09:29

## 2018-03-28 RX ADMIN — SODIUM CHLORIDE AND POTASSIUM CHLORIDE 100 ML/HR: 4.5; 1.49 INJECTION, SOLUTION INTRAVENOUS at 03:56

## 2018-03-28 RX ADMIN — PANTOPRAZOLE SODIUM 40 MG: 40 TABLET, DELAYED RELEASE ORAL at 05:57

## 2018-03-28 RX ADMIN — POLYETHYLENE GLYCOL 3350 17 G: 17 POWDER, FOR SOLUTION ORAL at 09:29

## 2018-03-28 RX ADMIN — LISINOPRIL 10 MG: 10 TABLET ORAL at 09:29

## 2018-03-28 RX ADMIN — DULOXETINE HYDROCHLORIDE 60 MG: 30 CAPSULE, DELAYED RELEASE ORAL at 09:29

## 2018-03-28 RX ADMIN — INSULIN LISPRO 4 UNITS: 100 INJECTION, SOLUTION INTRAVENOUS; SUBCUTANEOUS at 11:36

## 2018-03-28 RX ADMIN — METHIMAZOLE 20 MG: 10 TABLET ORAL at 09:29

## 2018-03-28 RX ADMIN — TOPIRAMATE 50 MG: 25 TABLET, FILM COATED ORAL at 09:27

## 2018-03-28 NOTE — DISCHARGE SUMMARY
[facility] Medicine Services  DISCHARGE SUMMARY       Date of Admission: 3/26/2018  Date of Discharge:  3/28/2018  Primary Care Physician: David Mcallister MD    Discharge Diagnoses:  Diabetic ketoacidosis, mild resolved, A1c greater than 9  Nausea and vomiting resolved  Abdominal pain resolved  Hypokalemia resolved    Procedures Performed: None    Pertinent Test Results:   Lab Results (last 72 hours)     Procedure Component Value Units Date/Time    Blood Culture - Blood, [[id number]]  (Normal) Collected:  03/26/18 1607    Specimen:  Blood from Arm, Right Updated:  03/28/18 1701     Blood Culture No growth at 2 days    Blood Culture - Blood, [[id number]]  (Normal) Collected:  03/26/18 1626    Specimen:  Blood from Arm, Left Updated:  03/28/18 1646     Blood Culture No growth at 2 days    POC Glucose Once [[id number]]  (Abnormal) Collected:  03/28/18 1132    Specimen:  Blood Updated:  03/28/18 1144     Glucose 279 (H) mg/dL      Comment: : [id number] Remotium ID: [id number]       Basic Metabolic Panel [[id number]]  (Abnormal) Collected:  03/28/18 0849    Specimen:  Blood Updated:  03/28/18 0920     Glucose 128 (H) mg/dL      BUN 7 mg/dL      Creatinine 0.56 mg/dL      Sodium 142 mmol/L      Potassium 3.5 mmol/L      Chloride 108 mmol/L      CO2 23.0 (L) mmol/L      Calcium 9.3 mg/dL      eGFR  African Amer 143 mL/min/1.73      BUN/Creatinine Ratio 12.5     Anion Gap 11.0 mmol/L     Narrative:       GFR Normal >60  Chronic Kidney Disease <60  Kidney Failure <15    POC Glucose Once [[id number]]  (Normal) Collected:  03/28/18 0741    Specimen:  Blood Updated:  03/28/18 0753     Glucose 122 mg/dL      Comment: : [id number] Remotium ID: [id number]       Basic Metabolic Panel [[id number]]  (Abnormal) Collected:  03/28/18 0009    Specimen:  Blood Updated:  03/28/18 0037     Glucose 217 (H) mg/dL      BUN 6 mg/dL      Creatinine 0.56 mg/dL      Sodium 139 mmol/L       Potassium 3.9 mmol/L      Chloride 109 mmol/L      CO2 23.0 (L) mmol/L      Calcium 9.0 mg/dL      eGFR  African Amer 143 mL/min/1.73      BUN/Creatinine Ratio 10.7     Anion Gap 7.0 mmol/L     POC Glucose Once [[id number]]  (Abnormal) Collected:  03/27/18 2114    Specimen:  Blood Updated:  03/27/18 2127     Glucose 174 (H) mg/dL      Comment: : [id number] Harsh AngelitaMeter ID: [id number]       POC Glucose Once [[id number]]  (Abnormal) Collected:  03/27/18 1645    Specimen:  Blood Updated:  03/27/18 1657     Glucose 255 (H) mg/dL      Comment: : [id number] Eduard KristieMeter ID: [id number]       Basic Metabolic Panel [[id number]]  (Abnormal) Collected:  03/27/18 1505    Specimen:  Blood Updated:  03/27/18 1528     Glucose 219 (H) mg/dL      BUN 6 mg/dL      Creatinine 0.55 mg/dL      Sodium 136 mmol/L      Potassium 4.1 mmol/L      Chloride 104 mmol/L      CO2 23.0 (L) mmol/L      Calcium 9.2 mg/dL      eGFR  African Amer 146 mL/min/1.73      BUN/Creatinine Ratio 10.9     Anion Gap 9.0 mmol/L     POC Glucose Once [[id number]]  (Abnormal) Collected:  03/27/18 1120    Specimen:  Blood Updated:  03/27/18 1131     Glucose 178 (H) mg/dL      Comment: : [id number] Eduard KristieMeter ID: [id number]       Calcium, Ionized [[id number]] Collected:  03/27/18 0809    Specimen:  Blood Updated:  03/27/18 0823     Ionized Calcium 4.67 mg/dL      Collected by [id number]    POC Glucose Once [[id number]]  (Normal) Collected:  03/27/18 0808    Specimen:  Blood Updated:  03/27/18 0819     Glucose 125 mg/dL      Comment: : [id number] Matt Graf  AMeter ID: [id number]       Phosphorus [[id number]]  (Normal) Collected:  03/27/18 0418    Specimen:  Blood Updated:  03/27/18 0542     Phosphorus 4.0 mg/dL     Magnesium [[id number]]  (Normal) Collected:  03/27/18 0418    Specimen:  Blood Updated:  03/27/18 0542     Magnesium 1.4 mg/dL     Comprehensive Metabolic Panel [[id number]]  (Abnormal) Collected:  03/27/18 0418     Specimen:  Blood Updated:  03/27/18 0535     Glucose 181 (H) mg/dL      BUN 9 mg/dL      Creatinine 0.54 mg/dL      Sodium 139 mmol/L      Potassium 3.0 (L) mmol/L      Chloride 103 mmol/L      CO2 25.0 mmol/L      Calcium 8.9 mg/dL      Total Protein 5.8 (L) g/dL      Albumin 3.10 (L) g/dL      ALT (SGPT) 43 U/L      AST (SGOT) 24 U/L      Alkaline Phosphatase 85 U/L      Total Bilirubin 0.5 mg/dL      eGFR  African Amer 149 mL/min/1.73      Globulin 2.7 gm/dL      A/G Ratio 1.1 g/dL      BUN/Creatinine Ratio 16.7     Anion Gap 11.0 mmol/L     CBC Auto Differential [[id number]]  (Abnormal) Collected:  03/27/18 0418    Specimen:  Blood Updated:  03/27/18 0517     WBC 7.22 10*3/mm3      RBC 4.12 (L) 10*6/mm3      Hemoglobin 10.3 (L) g/dL      Hematocrit 31.4 (L) %      MCV 76.2 (L) fL      MCH 25.0 (L) pg      MCHC 32.8 (L) g/dL      RDW 13.7 %      RDW-SD 37.4 (L) fl      MPV 10.4 fL      Platelets 368 10*3/mm3      Neutrophil % 49.4 %      Lymphocyte % 43.1 %      Monocyte % 6.8 %      Eosinophil % 0.3 %      Basophil % 0.1 %      Immature Grans % 0.3 %      Neutrophils, Absolute 3.57 10*3/mm3      Lymphocytes, Absolute 3.11 10*3/mm3      Monocytes, Absolute 0.49 10*3/mm3      Eosinophils, Absolute 0.02 10*3/mm3      Basophils, Absolute 0.01 10*3/mm3      Immature Grans, Absolute 0.02 10*3/mm3      nRBC 0.0 /100 WBC     Calcium, Ionized [[id number]] Collected:  03/27/18 0420    Specimen:  Blood Updated:  03/27/18 0422     Ionized Calcium 4.74 mg/dL      Collected by [id number]    POC Glucose Once [[id number]]  (Abnormal) Collected:  03/27/18 0346    Specimen:  Blood Updated:  03/27/18 0358     Glucose 201 (H) mg/dL      Comment: : [id number] Ely Montalvo ID: [id number]       Urinalysis With / Culture If Indicated - Urine, Clean Catch [[id number]]  (Abnormal) Collected:  03/27/18 0133    Specimen:  Urine from Urine, Clean Catch Updated:  03/27/18 0152     Color, UA Yellow     Appearance, UA Clear     pH, UA  5.5     Specific Gravity, UA >1.030 (H)     Glucose, UA >=1000 mg/dL (3+) (A)     Ketones, UA 40 mg/dL (2+) (A)     Bilirubin, UA Negative     Blood, UA Negative     Protein, UA Negative     Leuk Esterase, UA Negative     Nitrite, UA Negative     Urobilinogen, UA >=8.0 E.U./dL (A)    Narrative:       Urine microscopic not indicated.    Hemoglobin A1c [[id number]] Collected:  03/26/18 2328    Specimen:  Blood Updated:  03/27/18 0134     Hemoglobin A1C 9.2 %     Narrative:       Less than 6.0           Non-Diabetic Range  6.0-7.0                 ADA Therapeutic Target  Greater than 7.0        Action Suggested    CBC Auto Differential [[id number]]  (Abnormal) Collected:  03/26/18 2328    Specimen:  Blood Updated:  03/27/18 0026     WBC 8.12 10*3/mm3      RBC 3.96 (L) 10*6/mm3      Hemoglobin 10.1 (L) g/dL      Hematocrit 30.5 (L) %      MCV 77.0 (L) fL      MCH 25.5 (L) pg      MCHC 33.1 g/dL      RDW 13.8 %      RDW-SD 39.0 (L) fl      MPV 10.1 fL      Platelets 378 10*3/mm3      Neutrophil % 54.6 %      Lymphocyte % 37.9 %      Monocyte % 6.9 %      Eosinophil % 0.1 %      Basophil % 0.1 %      Immature Grans % 0.4 %      Neutrophils, Absolute 4.43 10*3/mm3      Lymphocytes, Absolute 3.08 10*3/mm3      Monocytes, Absolute 0.56 10*3/mm3      Eosinophils, Absolute 0.01 10*3/mm3      Basophils, Absolute 0.01 10*3/mm3      Immature Grans, Absolute 0.03 10*3/mm3      nRBC 0.0 /100 WBC     Blood Gas, Arterial [[id number]]  (Abnormal) Collected:  03/27/18 0020    Specimen:  Arterial Blood Updated:  03/27/18 0023     Site Left Brachial     Bill's Test N/A     pH, Arterial 7.444 pH units      pCO2, Arterial 33.4 (L) mm Hg      pO2, Arterial 93.0 mm Hg      HCO3, Arterial 22.9 mmol/L      Base Excess, Arterial -0.8 (L) mmol/L      O2 Saturation, Arterial 98.4 %      Temperature 37.0 C      Barometric Pressure for Blood Gas 753 mmHg      Modality Room Air     Ventilator Mode NA     Collected by [id number]    Calcium, Ionized  [[id number]] Collected:  03/27/18 0020    Specimen:  Blood Updated:  03/27/18 0021     Ionized Calcium 4.70 mg/dL      Collected by [id number]    Phosphorus [[id number]]  (Normal) Collected:  03/26/18 2328    Specimen:  Blood Updated:  03/27/18 0008     Phosphorus 3.4 mg/dL     Basic Metabolic Panel [[id number]]  (Abnormal) Collected:  03/26/18 2328    Specimen:  Blood Updated:  03/27/18 0008     Glucose 202 (H) mg/dL      BUN 10 mg/dL      Creatinine 0.54 mg/dL      Sodium 140 mmol/L      Potassium 3.0 (L) mmol/L      Chloride 103 mmol/L      CO2 26.0 mmol/L      Calcium 8.9 mg/dL      eGFR  African Amer 149 mL/min/1.73      BUN/Creatinine Ratio 18.5     Anion Gap 11.0 mmol/L     Magnesium [[id number]]  (Normal) Collected:  03/26/18 2328    Specimen:  Blood Updated:  03/27/18 0008     Magnesium 1.4 mg/dL     Troponin [[id number]]  (Normal) Collected:  03/26/18 2003    Specimen:  Blood Updated:  03/26/18 2254     Troponin I <0.012 ng/mL     Comprehensive Metabolic Panel [[id number]]  (Abnormal) Collected:  03/26/18 2003    Specimen:  Blood Updated:  03/26/18 2237     Glucose 224 (H) mg/dL      BUN 11 mg/dL      Creatinine 0.51 mg/dL      Sodium 140 mmol/L      Potassium 3.8 mmol/L      Chloride 104 mmol/L      CO2 26.0 mmol/L      Calcium 9.5 mg/dL      Total Protein 6.2 (L) g/dL      Albumin 3.30 (L) g/dL      ALT (SGPT) 54 U/L      AST (SGOT) 38 U/L      Alkaline Phosphatase 96 U/L      Total Bilirubin 0.4 mg/dL      eGFR  African Amer >150 mL/min/1.73      Globulin 2.9 gm/dL      A/G Ratio 1.1 g/dL      BUN/Creatinine Ratio 21.6     Anion Gap 10.0 mmol/L     POC Glucose Once [[id number]]  (Abnormal) Collected:  03/26/18 2110    Specimen:  Blood Updated:  03/26/18 2121     Glucose 51 (L) mg/dL      Comment: : [id number]  CharityMeter ID: [id number]       Lactic Acid, Reflex [[id number]]  (Normal) Collected:  03/26/18 2003    Specimen:  Blood Updated:  03/26/18 2023     Lactate 1.1 mmol/L     Lactic Acid, Reflex  Timer (This will reflex a repeat order 3-3:15 hours after ordered.) [[id number]] Collected:  03/26/18 1607    Specimen:  Blood Updated:  03/26/18 2001     Extra Tube Hold for add-ons.     Comment: Auto resulted.       Ketone Bodies, Serum (Not performed at [place]) [[id number]] Collected:  03/26/18 1626    Acetone [[id number]]  (Abnormal) Collected:  03/26/18 1626    Specimen:  Blood Updated:  03/26/18 1939     Acetone Small (A)    Blood Gas, Arterial [[id number]]  (Abnormal) Collected:  03/26/18 1932    Specimen:  Arterial Blood Updated:  03/26/18 1938     Site Left Radial     Bill's Test Positive     pH, Arterial 7.445 pH units      pCO2, Arterial 36.4 mm Hg      pO2, Arterial 81.5 (L) mm Hg      HCO3, Arterial 25.0 mmol/L      Base Excess, Arterial 1.1 mmol/L      O2 Saturation, Arterial 97.1 %      Temperature 37.0 C      Barometric Pressure for Blood Gas 754 mmHg      Modality Room Air     Ventilator Mode NA     Collected by [id number]    Urinalysis With / Microscopic If Indicated - Urine, Clean Catch [[id number]]  (Abnormal) Collected:  03/26/18 1653    Specimen:  Urine from Urine, Clean Catch Updated:  03/26/18 1712     Color, UA Yellow     Appearance, UA Clear     pH, UA <=5.0     Specific Gravity, UA >1.030 (H)     Glucose, UA >=1000 mg/dL (3+) (A)     Ketones, UA 80 mg/dL (3+) (A)     Bilirubin, UA Negative     Blood, UA Negative     Protein, UA Trace (A)     Leuk Esterase, UA Negative     Nitrite, UA Negative     Urobilinogen, UA 1.0 E.U./dL    Narrative:       Urine microscopic not indicated.    POCT Pregnancy, Urine [[id number]] Collected:  03/26/18 1654    Specimen:  Urine Updated:  03/26/18 1654     HCG, Urine, QL Negative     Lot Number \[id number]\     Internal Positive Control Positive     Internal Negative Control Negative    Lactic Acid, Plasma [[id number]]  (Abnormal) Collected:  03/26/18 1607    Specimen:  Blood Updated:  03/26/18 1652     Lactate 2.7 (C) mmol/L     Comprehensive Metabolic Panel  [[id number]]  (Abnormal) Collected:  03/26/18 1607    Specimen:  Blood Updated:  03/26/18 1631     Glucose 281 (H) mg/dL      BUN 14 mg/dL      Creatinine 0.60 mg/dL      Sodium 138 mmol/L      Potassium 3.7 mmol/L      Chloride 99 mmol/L      CO2 22.0 (L) mmol/L      Calcium 10.1 mg/dL      Total Protein 7.2 g/dL      Albumin 4.00 g/dL      ALT (SGPT) 52 U/L      AST (SGOT) 48 (H) U/L      Alkaline Phosphatase 124 (H) U/L      Total Bilirubin 0.7 mg/dL      eGFR  African Amer 132 mL/min/1.73      Globulin 3.2 gm/dL      A/G Ratio 1.3 g/dL      BUN/Creatinine Ratio 23.3     Anion Gap 17.0 (H) mmol/L     Lipase [[id number]]  (Normal) Collected:  03/26/18 1607    Specimen:  Blood Updated:  03/26/18 1631     Lipase 61 U/L     CBC Auto Differential [[id number]]  (Abnormal) Collected:  03/26/18 1607    Specimen:  Blood Updated:  03/26/18 1620     WBC 7.65 10*3/mm3      RBC 5.16 10*6/mm3      Hemoglobin 12.9 g/dL      Hematocrit 38.7 %      MCV 75.0 (L) fL      MCH 25.0 (L) pg      MCHC 33.3 g/dL      RDW 13.6 %      RDW-SD 36.4 (L) fl      MPV 9.9 fL      Platelets 540 (H) 10*3/mm3      Neutrophil % 67.0 %      Lymphocyte % 25.9 %      Monocyte % 6.4 %      Eosinophil % 0.0 %      Basophil % 0.3 %      Immature Grans % 0.4 %      Neutrophils, Absolute 5.13 10*3/mm3      Lymphocytes, Absolute 1.98 10*3/mm3      Monocytes, Absolute 0.49 10*3/mm3      Eosinophils, Absolute 0.00 10*3/mm3      Basophils, Absolute 0.02 10*3/mm3      Immature Grans, Absolute 0.03 10*3/mm3      nRBC 0.0 /100 WBC         Imaging Results (all)     Procedure Component Value Units Date/Time    CT Abdomen Pelvis With Contrast [[id number]] Collected:  03/26/18 1715     Updated:  03/26/18 1721    Narrative:       EXAMINATION: CT ABDOMEN PELVIS W CONTRAST-      3/26/2018 5:02 PM CDT     HISTORY: bilious vomiting, abdominal pain     In order to have a CT radiation dose as low as reasonably achievable  Automated Exposure Control was utilized for adjustment of  the mA and/or  KV according to patient size.     DLP in mGycm= 481.     Axial, sagittal, and coronal CT imaging of the abdomen/pelvis with IV  contrast injection.     Comparison is made with 03/14/2017.     Normal heart size.  Clear lung bases.  Cholecystectomy clips.  Normal liver, pancreas, and spleen.  No bile duct dilation.  Normal and symmetric adrenal glands and kidneys.     No bowel dilation.  No appendicitis or diverticulitis.  No sign of colitis.     No pelvic mass or free fluid.     2 cm left ovarian cyst diffusely measured 1.5 cm.  3 cm right ovarian cyst noted previously has resolved.  The uterus is absent.     Summary:  1. No mass, fluid collection, or inflammatory process is seen.      This report was finalized on 03/26/2018 17:18 by Dr. Isaiah Diaz MD.          Consults: None    Chief Complaint on Day of Discharge: Patient setting up on side of bed eating chicken.  She denies nausea, abdominal pain or tenderness.  She is looking forward to discharge today.    Hospital Course  Patient is a 42 y.o. female presented with intractable nausea and vomiting.  Patient has history of diabetes mellitus type II, Graves' disease, hyperlipidemia hypertension and hyperthyroidism.  Patient reported that she had had 4 days of nausea and vomiting and was unable to tolerate liquids or solids.  ER evaluation revealed tachycardia, ketonuria, acetone positive, eat and ion gap 17, lactic acid 2.7-repeat normalized, glucose of 281.  Patient was diagnosed with diabetic keto acidosis however mild was not treated with insulin drip only with fluids.  She did have delusional hypokalemia which was normalized after replacement.  Abdominal pain nausea vomiting resolved quickly patient is quite stable for discharge home.  She does have a microcytic anemia that will need to be followed up by primary care provider.  Patient is stable for discharge via private vehicle    Condition on Discharge:  Stable    Physical Exam on  "Discharge:  /54 (BP Location: Left arm, Patient Position: Lying)   Pulse 87   Temp 98 °F (36.7 °C) (Oral)   Resp 18   Ht 182.9 cm (72\")   Wt 82.9 kg (182 lb 12.8 oz)   SpO2 98%   BMI 24.79 kg/m²      Physical Exam   Constitutional: She is oriented to person, place, and time. She appears well-developed and well-nourished. No distress.   HENT:   Head: Normocephalic and atraumatic.   Eyes: Conjunctivae and EOM are normal. Pupils are equal, round, and reactive to light. No scleral icterus.   Neck: Normal range of motion. Neck supple. No JVD present. No tracheal deviation present.   Cardiovascular: Normal rate, regular rhythm, normal heart sounds and intact distal pulses.  Exam reveals no gallop.    No murmur heard.  Pulmonary/Chest: Effort normal and breath sounds normal. No respiratory distress. She has no wheezes. She has no rales.   Abdominal: Soft. Bowel sounds are normal. She exhibits no distension. There is no tenderness. There is no guarding.   Musculoskeletal: Normal range of motion. She exhibits no edema.   Neurological: She is alert and oriented to person, place, and time.   Skin: Skin is warm and dry. No rash noted. She is not diaphoretic. No erythema. No pallor.   Psychiatric: She has a normal mood and affect. Her behavior is normal.   Vitals reviewed.      Discharge Disposition:  Home or Self Care    Discharge Medications:   Lynn Magana   Home Medication Instructions CUCO:[id number]    Printed on:03/28/18 3362   Medication Information                      amLODIPine (NORVASC) 10 MG tablet  Take 0.5 tablets by mouth Daily.             aspirin 81 MG EC tablet  Take 81 mg by mouth Daily.             benazepril (LOTENSIN) 20 MG tablet  Take 20 mg by mouth 2 (Two) Times a Day.             DULoxetine (CYMBALTA) 60 MG capsule  Take 60 mg by mouth Daily.             gabapentin (NEURONTIN) 300 MG capsule  Take 300 mg by mouth 4 (Four) Times a Day.             HYDROcodone-acetaminophen (NORCO) " 7.5-325 MG per tablet  Take 1 tablet by mouth Every 4 (Four) Hours As Needed.             Insulin Glargine (BASAGLAR KWIKPEN) 100 UNIT/ML injection pen               insulin lispro (humaLOG) 100 UNIT/ML injection  Inject  under the skin 3 (Three) Times a Day Before Meals.             methIMAzole (TAPAZOLE) 10 MG tablet  Take 2 tablets by mouth Daily.             omeprazole (priLOSEC) 20 MG capsule  Take 1 capsule by mouth Daily.             ondansetron (ZOFRAN) 4 MG tablet  Take 1 tablet by mouth Every 8 (Eight) Hours As Needed for Nausea or Vomiting.             polyethylene glycol (GoLYTELY) 236 g solution  Take as directed by office instructions.             polyethylene glycol (MIRALAX) powder  Take 17 g by mouth As Needed.             propranolol LA (INDERAL LA) 120 MG 24 hr capsule  Take 1 capsule by mouth Daily.             raNITIdine (ZANTAC) 300 MG tablet  Take 300 mg by mouth Every Night.             simvastatin (ZOCOR) 20 MG tablet  Take 20 mg by mouth Every Night.             tiZANidine (ZANAFLEX) 4 MG tablet  Take 4 mg by mouth 2 (Two) Times a Day.             topiramate (TOPAMAX) 50 MG tablet  Take 1 tablet by mouth 2 (Two) Times a Day.                 Discharge Diet:   Diet Instructions     Diet: Cardiac, Consistent Carbohydrate; Thin       Discharge Diet:   Cardiac  Consistent Carbohydrate       Fluid Consistency:  Thin    Low fat          Discharge Care Plan / Instructions: Return to emergency department if symptoms return.    Activity at Discharge:   Activity Instructions     Activity as Tolerated             Follow-up Appointments: Primary care provider 1 week    Test Results Pending at Discharge: None     Plan discussed with Dr. Rubin Dubois.     Patient seen and examined. Family at bedside. Patient eating KF. No abdominal pain and no further nausea and vomiting. Agree with DC.     Time spent in face-to-face evaluation, chart review, planning and education 35 minutes.    Alma Rhodes,  APRN  03/28/18  5:40 PM

## 2018-03-28 NOTE — PLAN OF CARE
Problem: Patient Care Overview  Goal: Plan of Care Review  Outcome: Ongoing (interventions implemented as appropriate)   03/28/18 0509   Coping/Psychosocial   Plan of Care Reviewed With patient   Plan of Care Review   Progress improving   Coping/Psychosocial   Patient Agreement with Plan of Care agrees   OTHER   Outcome Summary Pt feeling better. HS  with long acting and SSI coverage. VSS. Pt is up ad norm. Cont to monitor.     Goal: Individualization and Mutuality  Outcome: Ongoing (interventions implemented as appropriate)    Goal: Discharge Needs Assessment  Outcome: Ongoing (interventions implemented as appropriate)      Problem: Diabetes, Type 2 (Adult)  Goal: Signs and Symptoms of Listed Potential Problems Will be Absent, Minimized or Managed (Diabetes, Type 2)  Outcome: Ongoing (interventions implemented as appropriate)      Problem: Hypertensive Disease/Crisis (Arterial) (Adult)  Goal: Signs and Symptoms of Listed Potential Problems Will be Absent, Minimized or Managed (Hypertensive Disease/Crisis)  Outcome: Ongoing (interventions implemented as appropriate)      Problem: Fall Risk (Adult)  Goal: Absence of Fall  Outcome: Ongoing (interventions implemented as appropriate)      Problem: Skin Injury Risk (Adult)  Goal: Identify Related Risk Factors and Signs and Symptoms  Outcome: Outcome(s) achieved Date Met: 03/28/18    Goal: Skin Health and Integrity  Outcome: Outcome(s) achieved Date Met: 03/28/18      Problem: Pain, Chronic (Adult)  Goal: Acceptable Pain/Comfort Level and Functional Ability  Outcome: Outcome(s) achieved Date Met: 03/28/18

## 2018-03-28 NOTE — PLAN OF CARE
Problem: Patient Care Overview  Goal: Plan of Care Review  Outcome: Ongoing (interventions implemented as appropriate)  Patient wants to go home today    03/28/18 5474   Coping/Psychosocial   Plan of Care Reviewed With patient   Plan of Care Review   Progress improving       Problem: Diabetes, Type 2 (Adult)  Goal: Signs and Symptoms of Listed Potential Problems Will be Absent, Minimized or Managed (Diabetes, Type 2)  Outcome: Ongoing (interventions implemented as appropriate)  Blood glucose continue to need coverage with sliding scale medications.  Lunch blood glucose 287   03/28/18 1535   Goal/Outcome Evaluation   Problems Assessed (Type 2 Diabetes) all   Problems Present (Type 2 Diabetes) none       Problem: Hypertensive Disease/Crisis (Arterial) (Adult)  Goal: Signs and Symptoms of Listed Potential Problems Will be Absent, Minimized or Managed (Hypertensive Disease/Crisis)  Outcome: Ongoing (interventions implemented as appropriate)  No hypertension today that needs prn medication     Problem: Fall Risk (Adult)  Goal: Absence of Fall  Outcome: Ongoing (interventions implemented as appropriate)      Problem: Patient Care Overview  Goal: Plan of Care Review  Outcome: Ongoing (interventions implemented as appropriate)    Goal: Individualization and Mutuality  Outcome: Ongoing (interventions implemented as appropriate)    Goal: Discharge Needs Assessment  Outcome: Ongoing (interventions implemented as appropriate)

## 2018-03-29 NOTE — PAYOR COMM NOTE
"DC HOME 3-28-18  UR  [id number]    Lynn Magana (42 y.o. Female)     Date of Birth Social Security Number Address Home Phone MRN    [id number]  [id number]  [facility] [phone] [id number]    [employer] Marital Status          Christian        Admission Date Admission Type Admitting Provider Attending Provider Department, Room/Bed    3/26/18 Emergency Rubin Bustos DO  Psychiatric 3C, 388/1    Discharge Date Discharge Disposition Discharge Destination        3/28/2018 Home or Self Care              Attending Provider:  (none)   Allergies:  Oxycodone-acetaminophen    Isolation:  None   Infection:  None   Code Status:  Prior    Ht:  182.9 cm (72\")   Wt:  82.9 kg (182 lb 12.8 oz)    Admission Cmt:  None   Principal Problem:  None                Active Insurance as of 3/26/2018     Primary Coverage     Payor Plan Insurance Group Employer/Plan Group    [employer] [place] [employer] [place]      Payor Plan Address Payor Plan Phone Number Effective From Effective To    [address] [address] [phone] 3/14/2017     [place] [address]       Subscriber Name Subscriber Birth Date Member ID       LYNN MAGANA 1975 [id number]                 Emergency Contacts      (Rel.) Home Phone Work Phone Mobile Phone    Gillian Cifuentes (Spouse) [phone] -- --               Physician Progress Notes (last 72 hours) (Notes from 3/26/2018  6:26 AM through 3/29/2018  6:26 AM)      Rubin Bustos DO at 3/27/2018  8:37 AM              [facility] Medicine Services  INPATIENT PROGRESS NOTE    Length of Stay: 1  Date of Admission: 3/26/2018  Primary Care Physician: David Mcallister MD    Subjective   Chief Complaint:   NV, abdominal pain    HPI   43 YO BF admitted on 3/26/18. Patient has not had any vomiting. Patient states that she has not eaten anything for 4 days. States that she has not had a BM for 2 weeks. Mild abdominal pain, she " states she thinks that it is from vomiting. Patient was not placed on an insulin drip. States that she does feel like eating something at this point.         Review of Systems   Abdominal pain  NV  Constipation    All pertinent negatives and positives are as above. All other systems have been reviewed and are negative unless otherwise stated.     Objective    Temp:  [98.5 °F (36.9 °C)-99.3 °F (37.4 °C)] 98.7 °F (37.1 °C)  Heart Rate:  [] 98  Resp:  [17-19] 17  BP: (105-190)/() 140/71  Physical Exam   HENT:   Head: Normocephalic and atraumatic.   Nose: Nose normal.   Mouth/Throat: Oropharynx is clear and moist.   Eyes: Conjunctivae and EOM are normal.   Neck: Normal range of motion. Neck supple.   Cardiovascular: Regular rhythm and normal heart sounds.    Tachycardia   Pulmonary/Chest: Effort normal and breath sounds normal.   Abdominal: Soft. Bowel sounds are normal.   Musculoskeletal: She exhibits no edema or tenderness.   Neurological: She is alert. No cranial nerve deficit.   Skin: Skin is warm and dry.   Psychiatric: She has a normal mood and affect.   Vitals reviewed.          Results Review:  I have reviewed the labs, radiology results, and diagnostic studies.    Laboratory Data:     Results from last 7 days  Lab Units 03/27/18 0418 03/26/18 2328 03/26/18  1607   WBC 10*3/mm3 7.22 8.12 7.65   HEMOGLOBIN g/dL 10.3* 10.1* 12.9   HEMATOCRIT % 31.4* 30.5* 38.7   PLATELETS 10*3/mm3 368 378 540*          Results from last 7 days  Lab Units 03/27/18 0418 03/26/18 2328 03/26/18 2003 03/26/18  1607   SODIUM mmol/L 139 140 140 138   POTASSIUM mmol/L 3.0* 3.0* 3.8 3.7   CHLORIDE mmol/L 103 103 104 99   CO2 mmol/L 25.0 26.0 26.0 22.0*   BUN mg/dL 9 10 11 14   CREATININE mg/dL 0.54 0.54 0.51 0.60   CALCIUM mg/dL 8.9 8.9 9.5 10.1   BILIRUBIN mg/dL 0.5  --  0.4 0.7   ALK PHOS U/L 85  --  96 124*   ALT (SGPT) U/L 43  --  54 52   AST (SGOT) U/L 24  --  38 48*   GLUCOSE mg/dL 181* 202* 224* 281*       Culture  Data:   Blood Culture   Date Value Ref Range Status   03/26/2018 No growth at less than 24 hours  Preliminary   03/26/2018 No growth at less than 24 hours  Preliminary       Radiology Data:   Imaging Results (last 24 hours)     Procedure Component Value Units Date/Time    CT Abdomen Pelvis With Contrast [[id number]] Collected:  03/26/18 1715     Updated:  03/26/18 1721    Narrative:       EXAMINATION: CT ABDOMEN PELVIS W CONTRAST-      3/26/2018 5:02 PM CDT     HISTORY: bilious vomiting, abdominal pain     In order to have a CT radiation dose as low as reasonably achievable  Automated Exposure Control was utilized for adjustment of the mA and/or  KV according to patient size.     DLP in mGycm= 481.     Axial, sagittal, and coronal CT imaging of the abdomen/pelvis with IV  contrast injection.     Comparison is made with 03/14/2017.     Normal heart size.  Clear lung bases.  Cholecystectomy clips.  Normal liver, pancreas, and spleen.  No bile duct dilation.  Normal and symmetric adrenal glands and kidneys.     No bowel dilation.  No appendicitis or diverticulitis.  No sign of colitis.     No pelvic mass or free fluid.     2 cm left ovarian cyst diffusely measured 1.5 cm.  3 cm right ovarian cyst noted previously has resolved.  The uterus is absent.     Summary:  1. No mass, fluid collection, or inflammatory process is seen.                                   This report was finalized on 03/26/2018 17:18 by Dr. Isaiah Diaz MD.          I have reviewed the patient current medications.     Assessment/Plan     Hospital Problem List     Nausea and vomiting          Impression:  1. Mild DKA resolved with fluids  2. NV  3. Abdominal pain  4. Constipation  5. Non-specific anemia, possibly dilutional  6. Hypokalemia    Plan:  1. Advance diet  2. Add colace BID  3. PRN zofran  4. Restart patient's home long acting insulin and deescalate labs and accu-checks  5. Replace potassium/add to fluids    Transfer to  med/surg      Discharge Planning: I expect the patient to be discharged to home in 2-3 days.    Rubin Bustos DO   03/27/18   8:37 AM      Electronically signed by Rubin Bustos DO at 3/27/2018  8:44 AM       Consult Notes (last 7 days) (Notes from 03/22/18 through 03/29/18)     No notes of this type exist for this encounter.           Discharge Summary      Rubin Bustos DO at 3/28/2018  5:30 PM              [facility] Medicine Services  DISCHARGE SUMMARY       Date of Admission: 3/26/2018  Date of Discharge:  3/28/2018  Primary Care Physician: David Mcallister MD    Discharge Diagnoses:  Diabetic ketoacidosis, mild resolved, A1c greater than 9  Nausea and vomiting resolved  Abdominal pain resolved  Hypokalemia resolved    Procedures Performed: None    Pertinent Test Results:   Lab Results (last 72 hours)     Procedure Component Value Units Date/Time    Blood Culture - Blood, [[id number]]  (Normal) Collected:  03/26/18 1607    Specimen:  Blood from Arm, Right Updated:  03/28/18 1701     Blood Culture No growth at 2 days    Blood Culture - Blood, [[id number]]  (Normal) Collected:  03/26/18 1626    Specimen:  Blood from Arm, Left Updated:  03/28/18 1646     Blood Culture No growth at 2 days    POC Glucose Once [[id number]]  (Abnormal) Collected:  03/28/18 1132    Specimen:  Blood Updated:  03/28/18 1144     Glucose 279 (H) mg/dL      Comment: : [id number] Anurag Muñiz ID: [id number]       Basic Metabolic Panel [[id number]]  (Abnormal) Collected:  03/28/18 0849    Specimen:  Blood Updated:  03/28/18 0920     Glucose 128 (H) mg/dL      BUN 7 mg/dL      Creatinine 0.56 mg/dL      Sodium 142 mmol/L      Potassium 3.5 mmol/L      Chloride 108 mmol/L      CO2 23.0 (L) mmol/L      Calcium 9.3 mg/dL      eGFR  African Amer 143 mL/min/1.73      BUN/Creatinine Ratio 12.5     Anion Gap 11.0 mmol/L     Narrative:       GFR Normal >60  Chronic Kidney Disease <60  Kidney  Failure <15    POC Glucose Once [[id number]]  (Normal) Collected:  03/28/18 0741    Specimen:  Blood Updated:  03/28/18 0753     Glucose 122 mg/dL      Comment: : [id number] Anurag Muñiz ID: [id number]       Basic Metabolic Panel [[id number]]  (Abnormal) Collected:  03/28/18 0009    Specimen:  Blood Updated:  03/28/18 0037     Glucose 217 (H) mg/dL      BUN 6 mg/dL      Creatinine 0.56 mg/dL      Sodium 139 mmol/L      Potassium 3.9 mmol/L      Chloride 109 mmol/L      CO2 23.0 (L) mmol/L      Calcium 9.0 mg/dL      eGFR  African Amer 143 mL/min/1.73      BUN/Creatinine Ratio 10.7     Anion Gap 7.0 mmol/L     POC Glucose Once [[id number]]  (Abnormal) Collected:  03/27/18 2114    Specimen:  Blood Updated:  03/27/18 2127     Glucose 174 (H) mg/dL      Comment: : [id number] Harsh AngelitaMeter ID: [id number]       POC Glucose Once [[id number]]  (Abnormal) Collected:  03/27/18 1645    Specimen:  Blood Updated:  03/27/18 1657     Glucose 255 (H) mg/dL      Comment: : [id number] Montgomery KristieMeter ID: [id number]       Basic Metabolic Panel [[id number]]  (Abnormal) Collected:  03/27/18 1505    Specimen:  Blood Updated:  03/27/18 1528     Glucose 219 (H) mg/dL      BUN 6 mg/dL      Creatinine 0.55 mg/dL      Sodium 136 mmol/L      Potassium 4.1 mmol/L      Chloride 104 mmol/L      CO2 23.0 (L) mmol/L      Calcium 9.2 mg/dL      eGFR  African Amer 146 mL/min/1.73      BUN/Creatinine Ratio 10.9     Anion Gap 9.0 mmol/L     POC Glucose Once [[id number]]  (Abnormal) Collected:  03/27/18 1120    Specimen:  Blood Updated:  03/27/18 1131     Glucose 178 (H) mg/dL      Comment: : [id number] Montgomery KristieMeter ID: [id number]       Calcium, Ionized [[id number]] Collected:  03/27/18 0809    Specimen:  Blood Updated:  03/27/18 0823     Ionized Calcium 4.67 mg/dL      Collected by [id number]    POC Glucose Once [[id number]]  (Normal) Collected:  03/27/18 0808    Specimen:  Blood Updated:  03/27/18 0819     Glucose 125  mg/dL      Comment: : [id number] Matt Hodges ID: [id number]       Phosphorus [[id number]]  (Normal) Collected:  03/27/18 0418    Specimen:  Blood Updated:  03/27/18 0542     Phosphorus 4.0 mg/dL     Magnesium [[id number]]  (Normal) Collected:  03/27/18 0418    Specimen:  Blood Updated:  03/27/18 0542     Magnesium 1.4 mg/dL     Comprehensive Metabolic Panel [[id number]]  (Abnormal) Collected:  03/27/18 0418    Specimen:  Blood Updated:  03/27/18 0535     Glucose 181 (H) mg/dL      BUN 9 mg/dL      Creatinine 0.54 mg/dL      Sodium 139 mmol/L      Potassium 3.0 (L) mmol/L      Chloride 103 mmol/L      CO2 25.0 mmol/L      Calcium 8.9 mg/dL      Total Protein 5.8 (L) g/dL      Albumin 3.10 (L) g/dL      ALT (SGPT) 43 U/L      AST (SGOT) 24 U/L      Alkaline Phosphatase 85 U/L      Total Bilirubin 0.5 mg/dL      eGFR  African Amer 149 mL/min/1.73      Globulin 2.7 gm/dL      A/G Ratio 1.1 g/dL      BUN/Creatinine Ratio 16.7     Anion Gap 11.0 mmol/L     CBC Auto Differential [[id number]]  (Abnormal) Collected:  03/27/18 0418    Specimen:  Blood Updated:  03/27/18 0517     WBC 7.22 10*3/mm3      RBC 4.12 (L) 10*6/mm3      Hemoglobin 10.3 (L) g/dL      Hematocrit 31.4 (L) %      MCV 76.2 (L) fL      MCH 25.0 (L) pg      MCHC 32.8 (L) g/dL      RDW 13.7 %      RDW-SD 37.4 (L) fl      MPV 10.4 fL      Platelets 368 10*3/mm3      Neutrophil % 49.4 %      Lymphocyte % 43.1 %      Monocyte % 6.8 %      Eosinophil % 0.3 %      Basophil % 0.1 %      Immature Grans % 0.3 %      Neutrophils, Absolute 3.57 10*3/mm3      Lymphocytes, Absolute 3.11 10*3/mm3      Monocytes, Absolute 0.49 10*3/mm3      Eosinophils, Absolute 0.02 10*3/mm3      Basophils, Absolute 0.01 10*3/mm3      Immature Grans, Absolute 0.02 10*3/mm3      nRBC 0.0 /100 WBC     Calcium, Ionized [[id number]] Collected:  03/27/18 0420    Specimen:  Blood Updated:  03/27/18 0422     Ionized Calcium 4.74 mg/dL      Collected by [id number]    POC Glucose Once  [[id number]]  (Abnormal) Collected:  03/27/18 0346    Specimen:  Blood Updated:  03/27/18 0358     Glucose 201 (H) mg/dL      Comment: : [id number] Montalvo ID: [id number]       Urinalysis With / Culture If Indicated - Urine, Clean Catch [[id number]]  (Abnormal) Collected:  03/27/18 0133    Specimen:  Urine from Urine, Clean Catch Updated:  03/27/18 0152     Color, UA Yellow     Appearance, UA Clear     pH, UA 5.5     Specific Gravity, UA >1.030 (H)     Glucose, UA >=1000 mg/dL (3+) (A)     Ketones, UA 40 mg/dL (2+) (A)     Bilirubin, UA Negative     Blood, UA Negative     Protein, UA Negative     Leuk Esterase, UA Negative     Nitrite, UA Negative     Urobilinogen, UA >=8.0 E.U./dL (A)    Narrative:       Urine microscopic not indicated.    Hemoglobin A1c [[id number]] Collected:  03/26/18 2328    Specimen:  Blood Updated:  03/27/18 0134     Hemoglobin A1C 9.2 %     Narrative:       Less than 6.0           Non-Diabetic Range  6.0-7.0                 ADA Therapeutic Target  Greater than 7.0        Action Suggested    CBC Auto Differential [[id number]]  (Abnormal) Collected:  03/26/18 2328    Specimen:  Blood Updated:  03/27/18 0026     WBC 8.12 10*3/mm3      RBC 3.96 (L) 10*6/mm3      Hemoglobin 10.1 (L) g/dL      Hematocrit 30.5 (L) %      MCV 77.0 (L) fL      MCH 25.5 (L) pg      MCHC 33.1 g/dL      RDW 13.8 %      RDW-SD 39.0 (L) fl      MPV 10.1 fL      Platelets 378 10*3/mm3      Neutrophil % 54.6 %      Lymphocyte % 37.9 %      Monocyte % 6.9 %      Eosinophil % 0.1 %      Basophil % 0.1 %      Immature Grans % 0.4 %      Neutrophils, Absolute 4.43 10*3/mm3      Lymphocytes, Absolute 3.08 10*3/mm3      Monocytes, Absolute 0.56 10*3/mm3      Eosinophils, Absolute 0.01 10*3/mm3      Basophils, Absolute 0.01 10*3/mm3      Immature Grans, Absolute 0.03 10*3/mm3      nRBC 0.0 /100 WBC     Blood Gas, Arterial [[id number]]  (Abnormal) Collected:  03/27/18 0020    Specimen:  Arterial Blood Updated:   03/27/18 0023     Site Left Brachial     Bill's Test N/A     pH, Arterial 7.444 pH units      pCO2, Arterial 33.4 (L) mm Hg      pO2, Arterial 93.0 mm Hg      HCO3, Arterial 22.9 mmol/L      Base Excess, Arterial -0.8 (L) mmol/L      O2 Saturation, Arterial 98.4 %      Temperature 37.0 C      Barometric Pressure for Blood Gas 753 mmHg      Modality Room Air     Ventilator Mode NA     Collected by [id number]    Calcium, Ionized [[id number]] Collected:  03/27/18 0020    Specimen:  Blood Updated:  03/27/18 0021     Ionized Calcium 4.70 mg/dL      Collected by [id number]    Phosphorus [[id number]]  (Normal) Collected:  03/26/18 2328    Specimen:  Blood Updated:  03/27/18 0008     Phosphorus 3.4 mg/dL     Basic Metabolic Panel [[id number]]  (Abnormal) Collected:  03/26/18 2328    Specimen:  Blood Updated:  03/27/18 0008     Glucose 202 (H) mg/dL      BUN 10 mg/dL      Creatinine 0.54 mg/dL      Sodium 140 mmol/L      Potassium 3.0 (L) mmol/L      Chloride 103 mmol/L      CO2 26.0 mmol/L      Calcium 8.9 mg/dL      eGFR  African Amer 149 mL/min/1.73      BUN/Creatinine Ratio 18.5     Anion Gap 11.0 mmol/L     Magnesium [[id number]]  (Normal) Collected:  03/26/18 2328    Specimen:  Blood Updated:  03/27/18 0008     Magnesium 1.4 mg/dL     Troponin [[id number]]  (Normal) Collected:  03/26/18 2003    Specimen:  Blood Updated:  03/26/18 2254     Troponin I <0.012 ng/mL     Comprehensive Metabolic Panel [[id number]]  (Abnormal) Collected:  03/26/18 2003    Specimen:  Blood Updated:  03/26/18 2237     Glucose 224 (H) mg/dL      BUN 11 mg/dL      Creatinine 0.51 mg/dL      Sodium 140 mmol/L      Potassium 3.8 mmol/L      Chloride 104 mmol/L      CO2 26.0 mmol/L      Calcium 9.5 mg/dL      Total Protein 6.2 (L) g/dL      Albumin 3.30 (L) g/dL      ALT (SGPT) 54 U/L      AST (SGOT) 38 U/L      Alkaline Phosphatase 96 U/L      Total Bilirubin 0.4 mg/dL      eGFR  African Amer >150 mL/min/1.73      Globulin 2.9 gm/dL      A/G Ratio 1.1 g/dL       BUN/Creatinine Ratio 21.6     Anion Gap 10.0 mmol/L     POC Glucose Once [[id number]]  (Abnormal) Collected:  03/26/18 2110    Specimen:  Blood Updated:  03/26/18 2121     Glucose 51 (L) mg/dL      Comment: : [id number]  CharityMeter ID: [id number]       Lactic Acid, Reflex [[id number]]  (Normal) Collected:  03/26/18 2003    Specimen:  Blood Updated:  03/26/18 2023     Lactate 1.1 mmol/L     Lactic Acid, Reflex Timer (This will reflex a repeat order 3-3:15 hours after ordered.) [[id number]] Collected:  03/26/18 1607    Specimen:  Blood Updated:  03/26/18 2001     Extra Tube Hold for add-ons.     Comment: Auto resulted.       Ketone Bodies, Serum (Not performed at [place]) [[id number]] Collected:  03/26/18 1626    Acetone [[id number]]  (Abnormal) Collected:  03/26/18 1626    Specimen:  Blood Updated:  03/26/18 1939     Acetone Small (A)    Blood Gas, Arterial [[id number]]  (Abnormal) Collected:  03/26/18 1932    Specimen:  Arterial Blood Updated:  03/26/18 1938     Site Left Radial     Bill's Test Positive     pH, Arterial 7.445 pH units      pCO2, Arterial 36.4 mm Hg      pO2, Arterial 81.5 (L) mm Hg      HCO3, Arterial 25.0 mmol/L      Base Excess, Arterial 1.1 mmol/L      O2 Saturation, Arterial 97.1 %      Temperature 37.0 C      Barometric Pressure for Blood Gas 754 mmHg      Modality Room Air     Ventilator Mode NA     Collected by [id number]    Urinalysis With / Microscopic If Indicated - Urine, Clean Catch [[id number]]  (Abnormal) Collected:  03/26/18 1653    Specimen:  Urine from Urine, Clean Catch Updated:  03/26/18 1712     Color, UA Yellow     Appearance, UA Clear     pH, UA <=5.0     Specific Gravity, UA >1.030 (H)     Glucose, UA >=1000 mg/dL (3+) (A)     Ketones, UA 80 mg/dL (3+) (A)     Bilirubin, UA Negative     Blood, UA Negative     Protein, UA Trace (A)     Leuk Esterase, UA Negative     Nitrite, UA Negative     Urobilinogen, UA 1.0 E.U./dL    Narrative:       Urine microscopic not indicated.     POCT Pregnancy, Urine [[id number]] Collected:  03/26/18 1654    Specimen:  Urine Updated:  03/26/18 1654     HCG, Urine, QL Negative     Lot Number \[id number]\     Internal Positive Control Positive     Internal Negative Control Negative    Lactic Acid, Plasma [[id number]]  (Abnormal) Collected:  03/26/18 1607    Specimen:  Blood Updated:  03/26/18 1652     Lactate 2.7 (C) mmol/L     Comprehensive Metabolic Panel [[id number]]  (Abnormal) Collected:  03/26/18 1607    Specimen:  Blood Updated:  03/26/18 1631     Glucose 281 (H) mg/dL      BUN 14 mg/dL      Creatinine 0.60 mg/dL      Sodium 138 mmol/L      Potassium 3.7 mmol/L      Chloride 99 mmol/L      CO2 22.0 (L) mmol/L      Calcium 10.1 mg/dL      Total Protein 7.2 g/dL      Albumin 4.00 g/dL      ALT (SGPT) 52 U/L      AST (SGOT) 48 (H) U/L      Alkaline Phosphatase 124 (H) U/L      Total Bilirubin 0.7 mg/dL      eGFR  African Amer 132 mL/min/1.73      Globulin 3.2 gm/dL      A/G Ratio 1.3 g/dL      BUN/Creatinine Ratio 23.3     Anion Gap 17.0 (H) mmol/L     Lipase [[id number]]  (Normal) Collected:  03/26/18 1607    Specimen:  Blood Updated:  03/26/18 1631     Lipase 61 U/L     CBC Auto Differential [[id number]]  (Abnormal) Collected:  03/26/18 1607    Specimen:  Blood Updated:  03/26/18 1620     WBC 7.65 10*3/mm3      RBC 5.16 10*6/mm3      Hemoglobin 12.9 g/dL      Hematocrit 38.7 %      MCV 75.0 (L) fL      MCH 25.0 (L) pg      MCHC 33.3 g/dL      RDW 13.6 %      RDW-SD 36.4 (L) fl      MPV 9.9 fL      Platelets 540 (H) 10*3/mm3      Neutrophil % 67.0 %      Lymphocyte % 25.9 %      Monocyte % 6.4 %      Eosinophil % 0.0 %      Basophil % 0.3 %      Immature Grans % 0.4 %      Neutrophils, Absolute 5.13 10*3/mm3      Lymphocytes, Absolute 1.98 10*3/mm3      Monocytes, Absolute 0.49 10*3/mm3      Eosinophils, Absolute 0.00 10*3/mm3      Basophils, Absolute 0.02 10*3/mm3      Immature Grans, Absolute 0.03 10*3/mm3      nRBC 0.0 /100 WBC         Imaging Results  (all)     Procedure Component Value Units Date/Time    CT Abdomen Pelvis With Contrast [[id number]] Collected:  03/26/18 1715     Updated:  03/26/18 1721    Narrative:       EXAMINATION: CT ABDOMEN PELVIS W CONTRAST-      3/26/2018 5:02 PM CDT     HISTORY: bilious vomiting, abdominal pain     In order to have a CT radiation dose as low as reasonably achievable  Automated Exposure Control was utilized for adjustment of the mA and/or  KV according to patient size.     DLP in mGycm= 481.     Axial, sagittal, and coronal CT imaging of the abdomen/pelvis with IV  contrast injection.     Comparison is made with 03/14/2017.     Normal heart size.  Clear lung bases.  Cholecystectomy clips.  Normal liver, pancreas, and spleen.  No bile duct dilation.  Normal and symmetric adrenal glands and kidneys.     No bowel dilation.  No appendicitis or diverticulitis.  No sign of colitis.     No pelvic mass or free fluid.     2 cm left ovarian cyst diffusely measured 1.5 cm.  3 cm right ovarian cyst noted previously has resolved.  The uterus is absent.     Summary:  1. No mass, fluid collection, or inflammatory process is seen.      This report was finalized on 03/26/2018 17:18 by Dr. Isaiah Diaz MD.          Consults: None    Chief Complaint on Day of Discharge: Patient setting up on side of bed eating chicken.  She denies nausea, abdominal pain or tenderness.  She is looking forward to discharge today.    Hospital Course  Patient is a 42 y.o. female presented with intractable nausea and vomiting.  Patient has history of diabetes mellitus type II, Graves' disease, hyperlipidemia hypertension and hyperthyroidism.  Patient reported that she had had 4 days of nausea and vomiting and was unable to tolerate liquids or solids.  ER evaluation revealed tachycardia, ketonuria, acetone positive, eat and ion gap 17, lactic acid 2.7-repeat normalized, glucose of 281.  Patient was diagnosed with diabetic keto acidosis however mild was not treated  "with insulin drip only with fluids.  She did have delusional hypokalemia which was normalized after replacement.  Abdominal pain nausea vomiting resolved quickly patient is quite stable for discharge home.  She does have a microcytic anemia that will need to be followed up by primary care provider.  Patient is stable for discharge via private vehicle    Condition on Discharge:  Stable    Physical Exam on Discharge:  /54 (BP Location: Left arm, Patient Position: Lying)   Pulse 87   Temp 98 °F (36.7 °C) (Oral)   Resp 18   Ht 182.9 cm (72\")   Wt 82.9 kg (182 lb 12.8 oz)   SpO2 98%   BMI 24.79 kg/m²       Physical Exam   Constitutional: She is oriented to person, place, and time. She appears well-developed and well-nourished. No distress.   HENT:   Head: Normocephalic and atraumatic.   Eyes: Conjunctivae and EOM are normal. Pupils are equal, round, and reactive to light. No scleral icterus.   Neck: Normal range of motion. Neck supple. No JVD present. No tracheal deviation present.   Cardiovascular: Normal rate, regular rhythm, normal heart sounds and intact distal pulses.  Exam reveals no gallop.    No murmur heard.  Pulmonary/Chest: Effort normal and breath sounds normal. No respiratory distress. She has no wheezes. She has no rales.   Abdominal: Soft. Bowel sounds are normal. She exhibits no distension. There is no tenderness. There is no guarding.   Musculoskeletal: Normal range of motion. She exhibits no edema.   Neurological: She is alert and oriented to person, place, and time.   Skin: Skin is warm and dry. No rash noted. She is not diaphoretic. No erythema. No pallor.   Psychiatric: She has a normal mood and affect. Her behavior is normal.   Vitals reviewed.      Discharge Disposition:  Home or Self Care    Discharge Medications:   Lynn Magana   Home Medication Instructions CUCO:[id number]    Printed on:03/28/18 8469   Medication Information                      amLODIPine (NORVASC) 10 MG " tablet  Take 0.5 tablets by mouth Daily.             aspirin 81 MG EC tablet  Take 81 mg by mouth Daily.             benazepril (LOTENSIN) 20 MG tablet  Take 20 mg by mouth 2 (Two) Times a Day.             DULoxetine (CYMBALTA) 60 MG capsule  Take 60 mg by mouth Daily.             gabapentin (NEURONTIN) 300 MG capsule  Take 300 mg by mouth 4 (Four) Times a Day.             HYDROcodone-acetaminophen (NORCO) 7.5-325 MG per tablet  Take 1 tablet by mouth Every 4 (Four) Hours As Needed.             Insulin Glargine (BASAGLAR KWIKPEN) 100 UNIT/ML injection pen               insulin lispro (humaLOG) 100 UNIT/ML injection  Inject  under the skin 3 (Three) Times a Day Before Meals.             methIMAzole (TAPAZOLE) 10 MG tablet  Take 2 tablets by mouth Daily.             omeprazole (priLOSEC) 20 MG capsule  Take 1 capsule by mouth Daily.             ondansetron (ZOFRAN) 4 MG tablet  Take 1 tablet by mouth Every 8 (Eight) Hours As Needed for Nausea or Vomiting.             polyethylene glycol (GoLYTELY) 236 g solution  Take as directed by office instructions.             polyethylene glycol (MIRALAX) powder  Take 17 g by mouth As Needed.             propranolol LA (INDERAL LA) 120 MG 24 hr capsule  Take 1 capsule by mouth Daily.             raNITIdine (ZANTAC) 300 MG tablet  Take 300 mg by mouth Every Night.             simvastatin (ZOCOR) 20 MG tablet  Take 20 mg by mouth Every Night.             tiZANidine (ZANAFLEX) 4 MG tablet  Take 4 mg by mouth 2 (Two) Times a Day.             topiramate (TOPAMAX) 50 MG tablet  Take 1 tablet by mouth 2 (Two) Times a Day.                 Discharge Diet:   Diet Instructions     Diet: Cardiac, Consistent Carbohydrate; Thin       Discharge Diet:   Cardiac  Consistent Carbohydrate       Fluid Consistency:  Thin    Low fat          Discharge Care Plan / Instructions: Return to emergency department if symptoms return.    Activity at Discharge:   Activity Instructions     Activity as  Tolerated             Follow-up Appointments: Primary care provider 1 week    Test Results Pending at Discharge: None     Plan discussed with Dr. Rubin Dubois.     Patient seen and examined. Family at bedside. Patient eating [employer]. No abdominal pain and no further nausea and vomiting. Agree with DC.     Time spent in face-to-face evaluation, chart review, planning and education 35 minutes.    Alma Rhodes, BRIANA  03/28/18  5:40 PM                  Electronically signed by Rubin Bustos DO at 3/28/2018  6:22 PM

## 2018-03-31 LAB
BACTERIA SPEC AEROBE CULT: NORMAL
BACTERIA SPEC AEROBE CULT: NORMAL

## 2018-04-16 ENCOUNTER — HOSPITAL ENCOUNTER (OUTPATIENT)
Dept: NUCLEAR MEDICINE | Facility: HOSPITAL | Age: 43
Discharge: HOME | End: 2018-04-16

## 2018-04-16 DIAGNOSIS — R11.2 NAUSEA AND VOMITING, INTRACTABILITY OF VOMITING NOT SPECIFIED, UNSPECIFIED VOMITING TYPE: ICD-10-CM

## 2018-04-16 PROCEDURE — 78264 GASTRIC EMPTYING IMG STUDY: CPT

## 2018-04-16 PROCEDURE — A9541 TC99M SULFUR COLLOID: HCPCS | Performed by: CLINICAL NURSE SPECIALIST

## 2018-04-16 PROCEDURE — 0 TECHNETIUM SULFUR COLLOID: Performed by: CLINICAL NURSE SPECIALIST

## 2018-04-16 RX ADMIN — TECHNETIUM TC 99M SULFUR COLLOID 1 DOSE: KIT at 10:00

## 2018-04-17 ENCOUNTER — HOSPITAL ENCOUNTER (OUTPATIENT)
Facility: HOSPITAL | Age: 43
Setting detail: HOSPITAL OUTPATIENT SURGERY
Discharge: HOME | End: 2018-04-17
Attending: INTERNAL MEDICINE | Admitting: INTERNAL MEDICINE

## 2018-04-17 ENCOUNTER — ANESTHESIA (OUTPATIENT)
Dept: GASTROENTEROLOGY | Facility: HOSPITAL | Age: 43
End: 2018-04-17

## 2018-04-17 ENCOUNTER — ANESTHESIA EVENT (OUTPATIENT)
Dept: GASTROENTEROLOGY | Facility: HOSPITAL | Age: 43
End: 2018-04-17

## 2018-04-17 VITALS
BODY MASS INDEX: 25.06 KG/M2 | DIASTOLIC BLOOD PRESSURE: 74 MMHG | RESPIRATION RATE: 18 BRPM | WEIGHT: 185 LBS | OXYGEN SATURATION: 100 % | SYSTOLIC BLOOD PRESSURE: 127 MMHG | HEART RATE: 73 BPM | TEMPERATURE: 97.3 F | HEIGHT: 72 IN

## 2018-04-17 DIAGNOSIS — R11.2 NAUSEA AND VOMITING, INTRACTABILITY OF VOMITING NOT SPECIFIED, UNSPECIFIED VOMITING TYPE: ICD-10-CM

## 2018-04-17 LAB — GLUCOSE BLDC GLUCOMTR-MCNC: 121 MG/DL (ref 70–130)

## 2018-04-17 PROCEDURE — 43239 EGD BIOPSY SINGLE/MULTIPLE: CPT | Performed by: INTERNAL MEDICINE

## 2018-04-17 PROCEDURE — 25010000002 PROPOFOL 10 MG/ML EMULSION: Performed by: NURSE ANESTHETIST, CERTIFIED REGISTERED

## 2018-04-17 PROCEDURE — 82962 GLUCOSE BLOOD TEST: CPT

## 2018-04-17 PROCEDURE — 87081 CULTURE SCREEN ONLY: CPT | Performed by: INTERNAL MEDICINE

## 2018-04-17 RX ORDER — PROPOFOL 10 MG/ML
VIAL (ML) INTRAVENOUS AS NEEDED
Status: DISCONTINUED | OUTPATIENT
Start: 2018-04-17 | End: 2018-04-17 | Stop reason: SURG

## 2018-04-17 RX ORDER — LIDOCAINE HYDROCHLORIDE 20 MG/ML
INJECTION, SOLUTION INFILTRATION; PERINEURAL AS NEEDED
Status: DISCONTINUED | OUTPATIENT
Start: 2018-04-17 | End: 2018-04-17 | Stop reason: SURG

## 2018-04-17 RX ORDER — SODIUM CHLORIDE 0.9 % (FLUSH) 0.9 %
3 SYRINGE (ML) INJECTION AS NEEDED
Status: DISCONTINUED | OUTPATIENT
Start: 2018-04-17 | End: 2018-04-17 | Stop reason: HOSPADM

## 2018-04-17 RX ORDER — SODIUM CHLORIDE 9 MG/ML
500 INJECTION, SOLUTION INTRAVENOUS CONTINUOUS PRN
Status: DISCONTINUED | OUTPATIENT
Start: 2018-04-17 | End: 2018-04-17 | Stop reason: HOSPADM

## 2018-04-17 RX ADMIN — LIDOCAINE HYDROCHLORIDE 0.5 ML: 10 INJECTION, SOLUTION EPIDURAL; INFILTRATION; INTRACAUDAL; PERINEURAL at 07:56

## 2018-04-17 RX ADMIN — LIDOCAINE HYDROCHLORIDE 100 MG: 20 INJECTION, SOLUTION INFILTRATION; PERINEURAL at 09:21

## 2018-04-17 RX ADMIN — PROPOFOL 100 MG: 10 INJECTION, EMULSION INTRAVENOUS at 09:18

## 2018-04-17 RX ADMIN — LIDOCAINE HYDROCHLORIDE 100 MG: 20 INJECTION, SOLUTION INFILTRATION; PERINEURAL at 09:19

## 2018-04-17 RX ADMIN — SODIUM CHLORIDE 500 ML: 9 INJECTION, SOLUTION INTRAVENOUS at 07:56

## 2018-04-17 NOTE — ANESTHESIA POSTPROCEDURE EVALUATION
Patient: Lynn Magana    Procedure Summary     Date:  04/17/18 Room / Location:  [facility] ENDOSCOPY 2 / [facility] ENDOSCOPY    Anesthesia Start:  0916 Anesthesia Stop:  0924    Procedure:  ESOPHAGOGASTRODUODENOSCOPY WITH ANESTHESIA (N/A Esophagus) Diagnosis:       Nausea and vomiting, intractability of vomiting not specified, unspecified vomiting type      (Nausea and vomiting, intractability of vomiting not specified, unspecified vomiting type [R11.2])    Surgeon:  Jose Ivey MD Provider:  Hollis Adrian CRNA    Anesthesia Type:  general ASA Status:  3          Anesthesia Type: general  Last vitals  BP   (P) 103/64 (04/17/18 0926)   Temp   97.3 °F (36.3 °C) (04/17/18 0735)   Pulse   (P) 73 (04/17/18 0926)   Resp   (P) 15 (04/17/18 0926)     SpO2   (P) 99 % (04/17/18 0926)     Post Anesthesia Care and Evaluation    Patient location during evaluation: PHASE II  Patient participation: complete - patient participated  Level of consciousness: awake and alert  Pain management: adequate  Airway patency: patent  Anesthetic complications: No anesthetic complications    Cardiovascular status: acceptable  Respiratory status: acceptable  Hydration status: acceptable

## 2018-04-17 NOTE — H&P
[facility] Gastroenterology  Pre Procedure History & Physical    Chief Complaint:   Abdominal pain    Subjective     HPI:   Here for endoscopy.  History of abdominal pain.    Past Medical History:   Past Medical History:   Diagnosis Date   • Arthritis    • Carotid artery stenosis    • Depression    • Diabetes mellitus    • GERD (gastroesophageal reflux disease)    • Hyperlipidemia    • Hypertension    • Injury of back    • Seizure        Past Surgical History:  Past Surgical History:   Procedure Laterality Date   •  SECTION     • CHOLECYSTECTOMY     • COLONOSCOPY     • CYST REMOVAL     • HYSTERECTOMY         Family History:  Family History   Problem Relation Age of Onset   • Stroke Mother    • Diabetes Mother    • Stroke Father    • Diabetes Father    • Diabetes Sister    • Coronary artery disease Brother    • Diabetes Brother    • Stroke Sister    • Seizures Sister    • Colon cancer Paternal Uncle        Social History:   reports that she has never smoked. She has never used smokeless tobacco. She reports that she uses drugs, including Marijuana. She reports that she does not drink alcohol.    Medications:   Prior to Admission medications    Medication Sig Start Date End Date Taking? Authorizing Provider   amLODIPine (NORVASC) 10 MG tablet Take 0.5 tablets by mouth Daily. 17  Yes Dveyn Moreno MD   aspirin 81 MG EC tablet Take 81 mg by mouth Daily.   Yes Historical Provider, MD   benazepril (LOTENSIN) 20 MG tablet Take 20 mg by mouth 2 (Two) Times a Day.   Yes Historical Provider, MD   DULoxetine (CYMBALTA) 60 MG capsule Take 60 mg by mouth Daily.   Yes Historical Provider, MD   gabapentin (NEURONTIN) 300 MG capsule Take 300 mg by mouth 4 (Four) Times a Day.   Yes Historical Provider, MD   Insulin Glargine (BASAGLAR KWIKPEN) 100 UNIT/ML injection pen  17  Yes Historical Provider, MD   insulin lispro (humaLOG) 100 UNIT/ML injection Inject  under the skin 3 (Three) Times a Day Before Meals.    "Yes Historical Provider, MD   methIMAzole (TAPAZOLE) 10 MG tablet Take 2 tablets by mouth Daily. 7/12/17  Yes Devyn Moreno MD   omeprazole (priLOSEC) 20 MG capsule Take 1 capsule by mouth Daily. 2/26/18  Yes BRIAAN Lares   ondansetron (ZOFRAN) 4 MG tablet Take 1 tablet by mouth Every 8 (Eight) Hours As Needed for Nausea or Vomiting. 3/28/18  Yes BRIANA Tolentino   polyethylene glycol (MIRALAX) powder Take 17 g by mouth As Needed. 9/11/17  Yes Historical Provider, MD   propranolol LA (INDERAL LA) 120 MG 24 hr capsule Take 1 capsule by mouth Daily. 7/12/17  Yes Devyn Moreno MD   raNITIdine (ZANTAC) 300 MG tablet Take 300 mg by mouth Every Night. 9/11/17  Yes Historical Provider, MD   simvastatin (ZOCOR) 20 MG tablet Take 20 mg by mouth Every Night.   Yes Historical Provider, MD   tiZANidine (ZANAFLEX) 4 MG tablet Take 4 mg by mouth 2 (Two) Times a Day.   Yes Historical Provider, MD   topiramate (TOPAMAX) 50 MG tablet Take 1 tablet by mouth 2 (Two) Times a Day. 1/3/18 1/3/19 Yes BRIANA White   polyethylene glycol (GoLYTELY) 236 g solution Take as directed by office instructions. 2/26/18   BRIANA Lares   HYDROcodone-acetaminophen (NORCO) 7.5-325 MG per tablet Take 1 tablet by mouth Every 4 (Four) Hours As Needed. 9/18/17 4/17/18  Historical Provider, MD       Allergies:  Oxycodone-acetaminophen    Objective     Blood pressure 127/83, pulse 89, temperature 97.3 °F (36.3 °C), temperature source Temporal Artery , resp. rate 18, height 188 cm (74\"), weight 83.9 kg (185 lb), SpO2 100 %, not currently breastfeeding.    Physical Exam   Constitutional: Pt is oriented to person, place, and in no distress.   HENT: Mouth/Throat: Oropharynx is clear.   Cardiovascular: Normal rate, regular rhythm.    Pulmonary/Chest: Effort normal. No respiratory distress. No  wheezes.   Abdominal: Soft. Non-distended.  Skin: Skin is warm and dry.   Psychiatric: Mood, memory, affect and judgment appear " normal.     Assessment/Plan     Diagnosis:  Abdominal pain    Anticipated Surgical Procedure:    Proceed with endoscopy as scheduled    The following major R/B/A were discussed with the patient, however the list is not all inclusive . Risk:  Bleeding (immediate and delayed), perforation (rupture or tear), reaction to medication, missed lesion/cancer, pain during the procedure, infection, need for surgery, need for ostomy, need for mechanical ventilation (breathing machine), death.  Benefits: removal of polyp/tissue, burn/clip/or inject to stop bleeding, removal of foreign body, dilate any stricture.  Alternatives: Xray or CT, surgery, do nothing with associated risk   The patient was given time to ask question and received explanation, and agrees to proceed as per History and Physical.   No guarantee given or expressed.    EMR Dragon/transcription disclaimer: Much of this encounter note is an electronic transcription/translation of spoken language to printed text.  The electronic translation of spoken language may permit erroneous, or at times, nonsensical words or phrases to be inadvertently transcribed.  Although I have reviewed the note for such errors, some may still exist.    Jose Ivey MD  9:18 AM  4/17/2018

## 2018-04-17 NOTE — ANESTHESIA PREPROCEDURE EVALUATION
Anesthesia Evaluation     Patient summary reviewed and Nursing notes reviewed   NPO Solid Status: > 8 hours  NPO Liquid Status: > 8 hours           Airway   Mallampati: I  TM distance: >3 FB  Neck ROM: full  No difficulty expected  Dental      Pulmonary    (+) shortness of breath,   (-) not a smoker  Cardiovascular   Exercise tolerance: poor (<4 METS)    Patient on routine beta blocker and Beta blocker given within 24 hours of surgery    (+) hypertension, hyperlipidemia,  carotid artery disease (50-69% bilat stenosis) carotid bilateral      Neuro/Psych  (+) seizures (last seizure 1 month ago, sees Dr. Caldera, increased topamax dose after last seizure), headaches, psychiatric history Depression,     GI/Hepatic/Renal/Endo    (+)  GERD,  diabetes mellitus type 2 using insulin, hyperthyroidism (grave's disease, on methimazole, stable)    Musculoskeletal     Abdominal    Substance History      OB/GYN          Other   (+) arthritis                     Anesthesia Plan    ASA 3     general     intravenous induction   Anesthetic plan and risks discussed with patient.

## 2018-04-18 LAB — UREASE TISS QL: NEGATIVE

## 2018-04-20 ENCOUNTER — TELEPHONE (OUTPATIENT)
Dept: GASTROENTEROLOGY | Facility: HOSPITAL | Age: 43
End: 2018-04-20

## 2018-04-23 NOTE — PROGRESS NOTES
Vitaly Givens MD     Chief Complaint   Patient presents with   • Thyroid Problem       HPI   Lynn Magana is a  42 y.o.  female who presents for evaluation of thyroid problems. She has had findings of thyroid nodules and hyperthyroidism. This was found on routine laboratory workup in the past. She has had with rapid heart rate that is controlled with propranolol.  She has had jitteriness.  She has had a sensation of neck tightness that has mild to moderate in nature.  The patient denies a history of radiation exposure in the past. The patient denies a family history of thyroid malignancy.     Review of Systems:  Reviewed per patient intake note    Past History:  Past Medical History:   Diagnosis Date   • Arthritis    • Carotid artery stenosis    • Degenerative disc disease, cervical    • Depression    • Diabetes mellitus     type 1   • GERD (gastroesophageal reflux disease)    • Hyperlipidemia    • Hypertension    • Injury of back    • Seizure      Past Surgical History:   Procedure Laterality Date   •  SECTION     • CHOLECYSTECTOMY     • COLONOSCOPY     • CYST REMOVAL     • ENDOSCOPY N/A 2018    Procedure: ESOPHAGOGASTRODUODENOSCOPY WITH ANESTHESIA;  Surgeon: Jose Ivey MD;  Location: [facility] ENDOSCOPY;  Service: Gastroenterology   • HYSTERECTOMY       Family History   Problem Relation Age of Onset   • Stroke Mother    • Diabetes Mother    • Stroke Father    • Diabetes Father    • Diabetes Sister    • Coronary artery disease Brother    • Diabetes Brother    • Stroke Sister    • Seizures Sister    • Colon cancer Paternal Uncle      Social History   Substance Use Topics   • Smoking status: Never Smoker   • Smokeless tobacco: Never Used   • Alcohol use No     Outpatient Prescriptions Marked as Taking for the 18 encounter (Office Visit) with Vitaly Givens MD   Medication Sig Dispense Refill   • amLODIPine (NORVASC) 10 MG tablet Take 0.5 tablets by mouth Daily.     • aspirin 81  MG EC tablet Take 81 mg by mouth Daily.     • benazepril (LOTENSIN) 20 MG tablet Take 20 mg by mouth 2 (Two) Times a Day.     • DULoxetine (CYMBALTA) 60 MG capsule Take 60 mg by mouth Daily.     • gabapentin (NEURONTIN) 300 MG capsule Take 300 mg by mouth 4 (Four) Times a Day.     • Insulin Glargine (BASAGLAR KWIKPEN) 100 UNIT/ML injection pen      • insulin lispro (humaLOG) 100 UNIT/ML injection Inject  under the skin 3 (Three) Times a Day Before Meals.     • methIMAzole (TAPAZOLE) 10 MG tablet Take 1 tablet by mouth 3 (Three) Times a Day. 30 tablet 0   • omeprazole (priLOSEC) 20 MG capsule Take 1 capsule by mouth Daily. 30 capsule 11   • ondansetron (ZOFRAN) 4 MG tablet Take 1 tablet by mouth Every 8 (Eight) Hours As Needed for Nausea or Vomiting. 30 tablet 0   • polyethylene glycol (MIRALAX) powder Take 17 g by mouth As Needed.     • propranolol LA (INDERAL LA) 120 MG 24 hr capsule Take 1 capsule by mouth Daily. 30 capsule 2   • raNITIdine (ZANTAC) 300 MG tablet Take 300 mg by mouth Every Night.     • simvastatin (ZOCOR) 20 MG tablet Take 20 mg by mouth Every Night.     • tiZANidine (ZANAFLEX) 4 MG tablet Take 4 mg by mouth 2 (Two) Times a Day.     • topiramate (TOPAMAX) 50 MG tablet Take 1 tablet by mouth 2 (Two) Times a Day. 60 tablet 4   • [DISCONTINUED] methIMAzole (TAPAZOLE) 10 MG tablet Take 2 tablets by mouth Daily. 30 tablet 0     Allergies:  Oxycodone-acetaminophen    Vital Signs:  Temp:  [98.1 °F (36.7 °C)] 98.1 °F (36.7 °C)  Heart Rate:  [77] 77  Resp:  [20] 20  BP: (125)/(88) 125/88    Physical Exam  CONSTITUTIONAL: well nourished, well-developed, alert, oriented, in no acute distress   COMMUNICATION AND VOICE: able to communicate normally, normal voice quality  HEAD: normocephalic, no lesions, atraumatic, no tenderness, no masses   FACE: appearance normal, no lesions, no tenderness, no deformities, facial motion symmetric  SALIVARY GLANDS: parotid glands with no tenderness, no swelling, no masses,  submandibular glands with normal size, nontender  EYES: ocular motility normal, eyelids normal, orbits normal, no proptosis, conjunctiva normal , pupils equal, round  HEARING: response to conversational voice normal bilaterally   EXTERNAL EARS: auricles without lesions  EXTERNAL EAR CANALS: normal ear canals without stenosis or significant cerumen  TYMPANIC MEMBRANES: tympanic membrane appearance normal, no lesions, no perforation, normal mobility, no fluid  EXTERNAL NOSE: structure normal, no tenderness on palpation, no nasal discharge, no lesions, no evidence of trauma, nostrils patent  INTRANASAL EXAM: nasal mucosa normal, vestibule within normal limits, inferior turbinate normal,  nasal septum without overt anterior deviation  NASOPHARYNX: nasopharyngeal mucosa, adenoids within normal limits  LIPS: structure normal, no tenderness on palpation, no lesions, no evidence of trauma  TEETH: dentition within normal limits for age  GUMS: gingivae healthy  ORAL MUCOSA: oral mucosa normal, no mucosal lesions  FLOOR OF MOUTH: Warthin's duct patent, mucosa normal  TONGUE: lingual mucosa normal without lesions, normal tongue mobility  PALATE: soft and hard palates with normal mucosa and structure  OROPHARYNX: oropharyngeal mucosa normal, tonsil fossa normal in appearance  HYPOPHARYNX: hypopharyngeal mucosa normal  LARYNX: epiglottis and arytenoid cartilage within normal limits, vocal cord mucosa normal with normal mobility   NECK: neck appearance normal, no masses or tenderness  THYROID: diffuse moderate to severe bilateral thyromegaly present  LYMPH NODES: no lymphadenopathy  CHEST/RESPIRATORY: respiratory effort normal, normal breath sounds  CARDIOVASCULAR: rate and rhythm normal, extremities without cyanosis or edema, no overt jugulovenous distension present  NEUROLOGIC/PSYCHIATRIC: oriented appropriately for age, mood normal, affect appropriate, cranial nerves intact grossly unless specifically mentioned above      RESULTS REVIEW:    Labs at :  Lab Results   Component Value Date    T3FREE 21.00 (H) 07/08/2017    T3FREE 3.73 04/18/2017    TSH <0.020 (L) 07/08/2017    TSH <0.020 (L) 04/18/2017    TSH 0.78 11/10/2015    FREET4 0.10 (L) 09/15/2017    FREET4 5.49 (H) 07/08/2017    FREET4 1.71 04/18/2017    THYROIDAB 10 07/08/2017     On 12/21/2017, her TSH was 29.65 with a T4 level of 0.16 (low).  On 01/22/2018 her TSH was 0.008 with a T4 level of 1.77  On 07/09/2017, and iodine scan showed a 24 hour uptake of 63%.  The thyroid gland was noted to be enlarged  Thyroid ultrasound dated 07/09/2017 showed mild thyroid enlargement with a right lobe measuring 5.2 x 2 cm in the left lobe measuring 4.9 x 1.7 cm.  There were small cysts noted.    Assessment   1. Hyperthyroidism    2. Graves' disease        Plan    Orders Placed This Encounter   Procedures   • T3   • T3, Free   • T4   • TSH   • Thyroid Stimulating Immunoglobulin   • Thyroid Peroxidase Antibody     New Medications Ordered This Visit   Medications   • methIMAzole (TAPAZOLE) 10 MG tablet     Sig: Take 1 tablet by mouth 3 (Three) Times a Day.     Dispense:  30 tablet     Refill:  0     We will need to get her thyroid levels stabilized and then proceed with thyroidectomy.  We discussed radioactive iodine ablation, but she prefers surgical management.  We will repeat her labs in 6 weeks after the new change for methimazole dosing to 10 mg 3 times a day.    Return in about 6 weeks (around 6/6/2018).    Vitaly Givens MD  04/25/18  8:42 AM

## 2018-04-25 ENCOUNTER — OFFICE VISIT (OUTPATIENT)
Dept: OTOLARYNGOLOGY | Facility: CLINIC | Age: 43
End: 2018-04-25

## 2018-04-25 VITALS
DIASTOLIC BLOOD PRESSURE: 88 MMHG | WEIGHT: 189 LBS | SYSTOLIC BLOOD PRESSURE: 125 MMHG | BODY MASS INDEX: 25.6 KG/M2 | RESPIRATION RATE: 20 BRPM | HEIGHT: 72 IN | TEMPERATURE: 98.1 F | HEART RATE: 77 BPM

## 2018-04-25 DIAGNOSIS — E05.00 GRAVES' DISEASE: ICD-10-CM

## 2018-04-25 DIAGNOSIS — E05.90 HYPERTHYROIDISM: Primary | ICD-10-CM

## 2018-04-25 PROCEDURE — 99204 OFFICE O/P NEW MOD 45 MIN: CPT | Performed by: OTOLARYNGOLOGY

## 2018-04-25 RX ORDER — METHIMAZOLE 10 MG/1
10 TABLET ORAL 3 TIMES DAILY
Qty: 30 TABLET | Refills: 0 | Status: SHIPPED | OUTPATIENT
Start: 2018-04-25 | End: 2018-05-29 | Stop reason: SDUPTHER

## 2018-04-25 NOTE — PROGRESS NOTES
Kirstin Appiah RN   Patient Intake Note    Review of Systems  Review of Systems   Constitutional: Negative for chills, fatigue and fever.   HENT:        See HPI   Eyes: Negative.    Respiratory: Positive for choking and shortness of breath. Negative for cough.    Cardiovascular: Positive for palpitations.   Gastrointestinal: Negative for diarrhea, nausea and vomiting.   Endocrine: Positive for cold intolerance.   Genitourinary: Negative.    Musculoskeletal: Positive for back pain.   Skin: Negative.    Neurological: Positive for dizziness and light-headedness. Negative for headaches.   Hematological: Bruises/bleeds easily.   Psychiatric/Behavioral: Negative for sleep disturbance. The patient is not nervous/anxious.        QUALITY MEASURES    Body Mass Index Screening and Follow-Up Plan  Body mass index is 24.27 kg/m².      Tobacco Use: Screening and Cessation Intervention  Smoking status: Never Smoker                                                              Smokeless tobacco: Never Used                            Kirstin Appiah RN  4/25/2018  8:22 AM

## 2018-05-07 ENCOUNTER — HOSPITAL ENCOUNTER (OUTPATIENT)
Dept: WOMENS IMAGING | Age: 43
Discharge: HOME | End: 2018-05-07
Payer: MEDICAID

## 2018-05-07 DIAGNOSIS — N64.1 FAT NECROSIS (SEGMENTAL) OF BREAST: ICD-10-CM

## 2018-05-07 PROCEDURE — G0279 TOMOSYNTHESIS, MAMMO: HCPCS

## 2018-05-24 ENCOUNTER — APPOINTMENT (OUTPATIENT)
Dept: LAB | Facility: HOSPITAL | Age: 43
End: 2018-05-24
Attending: OTOLARYNGOLOGY

## 2018-05-24 LAB
T3FREE SERPL-MCNC: 7.52 PG/ML (ref 2.77–5.27)
TSH SERPL DL<=0.05 MIU/L-ACNC: <0.02 MIU/ML (ref 0.47–4.68)

## 2018-05-24 PROCEDURE — 84436 ASSAY OF TOTAL THYROXINE: CPT | Performed by: OTOLARYNGOLOGY

## 2018-05-24 PROCEDURE — 84481 FREE ASSAY (FT-3): CPT | Performed by: OTOLARYNGOLOGY

## 2018-05-24 PROCEDURE — 84443 ASSAY THYROID STIM HORMONE: CPT | Performed by: OTOLARYNGOLOGY

## 2018-05-24 PROCEDURE — 84445 ASSAY OF TSI GLOBULIN: CPT | Performed by: OTOLARYNGOLOGY

## 2018-05-24 PROCEDURE — 86376 MICROSOMAL ANTIBODY EACH: CPT | Performed by: OTOLARYNGOLOGY

## 2018-05-24 PROCEDURE — 84480 ASSAY TRIIODOTHYRONINE (T3): CPT | Performed by: OTOLARYNGOLOGY

## 2018-05-24 PROCEDURE — 36415 COLL VENOUS BLD VENIPUNCTURE: CPT | Performed by: OTOLARYNGOLOGY

## 2018-05-25 LAB
T3 SERPL-MCNC: 266 NG/DL (ref 71–180)
T4 SERPL-MCNC: 7.7 UG/DL (ref 4.5–12)
THYROPEROXIDASE AB SERPL-ACNC: 15 IU/ML (ref 0–34)

## 2018-05-26 LAB — TSI SER-MCNC: 37.1 IU/L (ref 0–0.55)

## 2018-05-29 ENCOUNTER — TELEPHONE (OUTPATIENT)
Dept: OTOLARYNGOLOGY | Facility: CLINIC | Age: 43
End: 2018-05-29

## 2018-05-29 DIAGNOSIS — E05.90 HYPERTHYROIDISM: Primary | ICD-10-CM

## 2018-05-29 RX ORDER — METHIMAZOLE 10 MG/1
10 TABLET ORAL 3 TIMES DAILY
Qty: 90 TABLET | Refills: 2 | Status: ON HOLD | OUTPATIENT
Start: 2018-05-29 | End: 2018-11-19

## 2018-05-29 NOTE — TELEPHONE ENCOUNTER
----- Message from Vitaly Givens MD sent at 5/26/2018  4:58 PM CDT -----  She is sstill hyperthyroid- continue methimazole at current dose and recheck full thyroid panel (tsh and T3 and T4) in 6 weeks (move back follow up till after labs)    LAN Givens MD

## 2018-06-07 RX ORDER — TOPIRAMATE 50 MG/1
TABLET, FILM COATED ORAL
Qty: 60 TABLET | Refills: 0 | Status: SHIPPED | OUTPATIENT
Start: 2018-06-07 | End: 2018-07-02 | Stop reason: SDUPTHER

## 2018-06-12 ENCOUNTER — TELEPHONE (OUTPATIENT)
Dept: SURGERY | Age: 43
End: 2018-06-12

## 2018-07-02 RX ORDER — TOPIRAMATE 50 MG/1
TABLET, FILM COATED ORAL
Qty: 60 TABLET | Refills: 5 | Status: ON HOLD | OUTPATIENT
Start: 2018-07-02 | End: 2018-11-20

## 2018-10-11 DIAGNOSIS — Z12.39 SCREENING BREAST EXAMINATION: Primary | ICD-10-CM

## 2018-10-17 ENCOUNTER — TELEPHONE (OUTPATIENT)
Dept: SURGERY | Age: 43
End: 2018-10-17

## 2018-10-24 ENCOUNTER — LAB (OUTPATIENT)
Dept: LAB | Facility: HOSPITAL | Age: 43
End: 2018-10-24
Attending: OTOLARYNGOLOGY

## 2018-10-24 DIAGNOSIS — E05.90 HYPERTHYROIDISM: ICD-10-CM

## 2018-10-24 LAB — TSH SERPL DL<=0.05 MIU/L-ACNC: 62.3 MIU/ML (ref 0.47–4.68)

## 2018-10-24 PROCEDURE — 36415 COLL VENOUS BLD VENIPUNCTURE: CPT

## 2018-10-24 PROCEDURE — 84443 ASSAY THYROID STIM HORMONE: CPT | Performed by: OTOLARYNGOLOGY

## 2018-10-24 PROCEDURE — 84436 ASSAY OF TOTAL THYROXINE: CPT | Performed by: OTOLARYNGOLOGY

## 2018-10-24 PROCEDURE — 84481 FREE ASSAY (FT-3): CPT | Performed by: OTOLARYNGOLOGY

## 2018-10-24 PROCEDURE — 84480 ASSAY TRIIODOTHYRONINE (T3): CPT | Performed by: OTOLARYNGOLOGY

## 2018-10-25 LAB
T3 SERPL-MCNC: 42 NG/DL (ref 71–180)
T3FREE SERPL-MCNC: 0.7 PG/ML (ref 2–4.4)
T4 SERPL-MCNC: 0.5 UG/DL (ref 4.5–12)

## 2018-10-28 NOTE — PROGRESS NOTES
Vitaly Givens MD     Chief Complaint   Patient presents with   • Follow-up       HPI   Lynn Magana is a  43 y.o. female who is here for follow up.She has a history of hyperthyroidism.  We have been treating her with methimazole in preparation for an anticipated total thyroidectomy.  Her initial levels were not appropriate for surgery and therefore the methimazole was continued for a little longer.  Her most recent TSH was 62 on 10/24/2018. She has had fatigue and fullness in the neck.  She complains of tearing of the eyes.    Review of Systems:  Reviewed per patient intake note    Past History:  Past medical and surgical history, family history and social history reviewed and updated when appropriate.  Current medications and allergies reviewed and updated when appropriate.  Allergies:  Oxycodone-acetaminophen    Vital Signs:   Temp:  [96.3 °F (35.7 °C)] 96.3 °F (35.7 °C)  BP: (144)/(92) 144/92    Physical Exam   CONSTITUTIONAL: well nourished, well-developed, alert, oriented, in no acute distress   COMMUNICATION AND VOICE: able to communicate normally, normal voice quality  HEAD: normocephalic, no lesions, atraumatic, no tenderness, no masses   FACE: appearance normal, no lesions, no tenderness, no deformities, facial motion symmetric  EYES: ocular motility normal, no nystagmus present, eyelids normal, mild conjunctival erythema, no exopthalmus  HEARING: response to conversational voice normal bilaterally   EXTERNAL EARS: auricles without lesions  EXTERNAL NOSE: structure normal, no tenderness on palpation, no nasal discharge, no lesions, no evidence of trauma, nostrils patent  LIPS: structure normal, no tenderness on palpation, no lesions, no evidence of trauma  NECK: neck appearance normal  THYROID: diffuse moderate to severe bilateral nodular thyromegaly present  LYMPH NODES: no lymphadenopathy  CHEST/RESPIRATORY: respiratory effort normal  CARDIOVASCULAR: extremities without cyanosis or edema, no  overt jugulovenous distension present  NEUROLOGIC/PSYCHIATRIC: oriented appropriately for age, mood normal, affect appropriate, cranial nerves intact grossly unless specifically mentioned above     RESULTS REVIEW:    Lab Results   Component Value Date    TSH 62.300 (H) 10/24/2018    [id number] 42 (L) 10/24/2018    [id number] 0.5 (L) 10/24/2018    THYROIDAB 15 05/24/2018       Assessment   1. Hyperthyroidism    2. Graves' disease    3. Thyromegaly        Plan   We can go down on her methimazole dosing but keep her on it to prevent thyroid storm during surgery.     -----SURGERY SCHEDULING:-----  Schedule total thyroidectomy     ---INFORMED CONSENT DISCUSSION:---  THYROIDECTOMY: A thyroidectomy was recommended. The risks and benefits were explained including but not limited to bleeding, infection, persistent and/or recurrent disease, risks of the general anesthesia, pain, recurrent laryngeal nerve injury with hoarseness and airway loss, parathyroid injury and hypocalcemia. Operative possibilities including hemithyroidectomy, total thyroidectomy and darrell dissections were discussed. Possibilities for delayed need for total thyroidectomy pending pathologic diagnosis were also discussed. Alternatives were discussed. No guarantees were made or implied. Questions were asked appropriately answered.       ---PREOPERATIVE WORKUP:---  CBC  CMP  Chest X-Ray  EKG  TSH preoperatively     Follow up  Postoperatively    We may need to consider ophthalmologic evaluation to rule out Graves' ophthalmopathy.  If so, we may need to get a referral for surgical considerations.    Vitaly Givens MD  10/31/18  4:05 PM

## 2018-10-29 ENCOUNTER — PREP FOR SURGERY (OUTPATIENT)
Dept: OTHER | Facility: HOSPITAL | Age: 43
End: 2018-10-29

## 2018-10-29 DIAGNOSIS — E05.90 HYPERTHYROIDISM: Primary | ICD-10-CM

## 2018-10-29 DIAGNOSIS — E05.00 GRAVES DISEASE: ICD-10-CM

## 2018-10-31 ENCOUNTER — OFFICE VISIT (OUTPATIENT)
Dept: OTOLARYNGOLOGY | Facility: CLINIC | Age: 43
End: 2018-10-31

## 2018-10-31 VITALS
DIASTOLIC BLOOD PRESSURE: 92 MMHG | WEIGHT: 224.6 LBS | HEIGHT: 72 IN | SYSTOLIC BLOOD PRESSURE: 144 MMHG | BODY MASS INDEX: 30.42 KG/M2 | TEMPERATURE: 96.3 F

## 2018-10-31 DIAGNOSIS — E01.0 THYROMEGALY: ICD-10-CM

## 2018-10-31 DIAGNOSIS — E05.00 GRAVES' DISEASE: ICD-10-CM

## 2018-10-31 DIAGNOSIS — E05.90 HYPERTHYROIDISM: Primary | ICD-10-CM

## 2018-10-31 PROCEDURE — 99214 OFFICE O/P EST MOD 30 MIN: CPT | Performed by: OTOLARYNGOLOGY

## 2018-10-31 NOTE — PROGRESS NOTES
Margie Amador MA   Patient Intake Note    Review of Systems  Review of Systems   Constitutional: Positive for chills.   HENT:        See hpi     Respiratory: Positive for cough, shortness of breath and wheezing. Negative for choking.    Gastrointestinal: Negative for nausea and vomiting.   Endocrine: Positive for cold intolerance.   Neurological: Negative for dizziness, light-headedness and headaches.   Psychiatric/Behavioral: Negative for sleep disturbance.   All other systems reviewed and are negative.      QUALITY MEASURES    Body Mass Index Screening and Follow-Up Plan  Body mass index is 28.84 kg/m².  Patient's Body mass index is 28.84 kg/m². BMI is above normal parameters. Recommendations include: referral to primary care.    Tobacco Use: Screening and Cessation Intervention  Smoking status: Never Smoker                                                              Smokeless tobacco: Never Used                            Margie Amador MA  10/31/2018  3:25 PM

## 2018-10-31 NOTE — PATIENT INSTRUCTIONS
MyPlate from [employer]  The general, healthful diet is based on the 2010 Dietary Guidelines for Americans. The amount of food you need to eat from each food group depends on your age, sex, and level of physical activity and can be individualized by a dietitian. Go to ChooseMyPlate.gov for more information.  What do I need to know about the MyPlate plan?  · Enjoy your food, but eat less.  · Avoid oversized portions.  ? ½ of your plate should include fruits and vegetables.  ? ¼ of your plate should be grains.  ? ¼ of your plate should be protein.  Grains  · Make at least half of your grains whole grains.  · For a 2,000 calorie daily food plan, eat 6 oz every day.  · 1 oz is about 1 slice bread, 1 cup cereal, or ½ cup cooked rice, cereal, or pasta.  Vegetables  · Make half your plate fruits and vegetables.  · For a 2,000 calorie daily food plan, eat 2½ cups every day.  · 1 cup is about 1 cup raw or cooked vegetables or vegetable juice or 2 cups raw leafy greens.  Fruits  · Make half your plate fruits and vegetables.  · For a 2,000 calorie daily food plan, eat 2 cups every day.  · 1 cup is about 1 cup fruit or 100% fruit juice or ½ cup dried fruit.  Protein  · For a 2,000 calorie daily food plan, eat 5½ oz every day.  · 1 oz is about 1 oz meat, poultry, or fish, ¼ cup cooked beans, 1 egg, 1 Tbsp peanut butter, or ½ oz nuts or seeds.  Dairy  · Switch to fat-free or low-fat (1%) milk.  · For a 2,000 calorie daily food plan, eat 3 cups every day.  · 1 cup is about 1 cup milk or yogurt or soy milk (soy beverage), 1½ oz natural cheese, or 2 oz processed cheese.  Fats, Oils, and Empty Calories  · Only small amounts of oils are recommended.  · Empty calories are calories from solid fats or added sugars.  · Compare sodium in foods like soup, bread, and frozen meals. Choose the foods with lower numbers.  · Drink water instead of sugary drinks.  What foods can I eat?  Grains  Whole grains such as whole wheat, quinoa, millet, and  bulgur. Bread, rolls, and pasta made from whole grains. Brown or wild rice. Hot or cold cereals made from whole grains and without added sugar.  Vegetables  All fresh vegetables, especially fresh red, dark green, or orange vegetables. Peas and beans. Low-sodium frozen or canned vegetables prepared without added salt. Low-sodium vegetable juices.  Fruits  All fresh, frozen, and dried fruits. Canned fruit packed in water or fruit juice without added sugar. Fruit juices without added sugar.  Meats and Other Protein Sources  Boiled, baked, or grilled lean meat trimmed of fat. Skinless poultry. Fresh seafood and shellfish. Canned seafood packed in water. Unsalted nuts and unsalted nut butters. Tofu. Dried beans and pea. Eggs.  Dairy  Low-fat or fat-free milk, yogurt, and cheeses.  Sweets and Desserts  Frozen desserts made from low-fat milk.  Fats and Oils  Olive, peanut, and canola oils and margarine. Salad dressing and mayonnaise made from these oils.  Other  Soups and casseroles made from allowed ingredients and without added fat or salt.  The items listed above may not be a complete list of recommended foods or beverages. Contact your dietitian for more options.  What foods are not recommended?  Grains  Sweetened, low-fiber cereals. Packaged baked goods. Snack crackers and chips. Cheese crackers, butter crackers, and biscuits. Frozen waffles, sweet breads, doughnuts, pastries, packaged baking mixes, pancakes, cakes, and cookies.  Vegetables  Regular canned or frozen vegetables or vegetables prepared with salt. Canned tomatoes. Canned tomato sauce. Fried vegetables. Vegetables in cream sauce or cheese sauce.  Fruits  Fruits packed in syrup or made with added sugar.  Meats and Other Protein Sources  Marbled or fatty meats such as ribs. Poultry with skin. Fried meats, poultry, eggs, or fish. Sausages, hot dogs, and deli meats such as pastrami, bologna, or salami.  Dairy  Whole milk, cream, cheeses made from whole milk,  sour cream. Ice cream or yogurt made from whole milk or with added sugar.  Beverages  For adults, no more than one alcoholic drink per day. Regular soft drinks or other sugary beverages. Juice drinks.  Sweets and Desserts  Sugary or fatty desserts, candy, and other sweets.  Fats and Oils  Solid shortening or partially hydrogenated oils. Solid margarine. Margarine that contains trans fats. Butter.  The items listed above may not be a complete list of foods and beverages to avoid. Contact your dietitian for more information.  This information is not intended to replace advice given to you by your health care provider. Make sure you discuss any questions you have with your health care provider.  Document Released: 01/06/2009 Document Revised: 05/25/2017 Document Reviewed: 11/26/2014  [employer] Interactive Patient Education © 2018 [employer]

## 2018-11-01 PROBLEM — E01.0 THYROMEGALY: Status: ACTIVE | Noted: 2018-11-01

## 2018-11-12 ENCOUNTER — HOSPITAL ENCOUNTER (OUTPATIENT)
Dept: GENERAL RADIOLOGY | Facility: HOSPITAL | Age: 43
Discharge: HOME | End: 2018-11-12
Admitting: OTOLARYNGOLOGY

## 2018-11-12 ENCOUNTER — APPOINTMENT (OUTPATIENT)
Dept: PREADMISSION TESTING | Facility: HOSPITAL | Age: 43
End: 2018-11-12

## 2018-11-12 VITALS
RESPIRATION RATE: 15 BRPM | BODY MASS INDEX: 31.29 KG/M2 | WEIGHT: 231.04 LBS | SYSTOLIC BLOOD PRESSURE: 144 MMHG | DIASTOLIC BLOOD PRESSURE: 89 MMHG | HEART RATE: 72 BPM | HEIGHT: 72 IN | OXYGEN SATURATION: 100 %

## 2018-11-12 DIAGNOSIS — E05.00 GRAVES' DISEASE: ICD-10-CM

## 2018-11-12 DIAGNOSIS — E01.0 THYROMEGALY: ICD-10-CM

## 2018-11-12 DIAGNOSIS — E05.90 HYPERTHYROIDISM: ICD-10-CM

## 2018-11-12 LAB
ALBUMIN SERPL-MCNC: 4.2 G/DL (ref 3.5–5)
ALBUMIN/GLOB SERPL: 1.4 G/DL (ref 1.1–2.5)
ALP SERPL-CCNC: 85 U/L (ref 24–120)
ALT SERPL W P-5'-P-CCNC: 55 U/L (ref 0–54)
ANION GAP SERPL CALCULATED.3IONS-SCNC: 6 MMOL/L (ref 4–13)
AST SERPL-CCNC: 44 U/L (ref 7–45)
BILIRUB SERPL-MCNC: 0.3 MG/DL (ref 0.1–1)
BUN BLD-MCNC: 15 MG/DL (ref 5–21)
BUN/CREAT SERPL: 12.3 (ref 7–25)
CALCIUM SPEC-SCNC: 8.6 MG/DL (ref 8.4–10.4)
CHLORIDE SERPL-SCNC: 102 MMOL/L (ref 98–110)
CO2 SERPL-SCNC: 31 MMOL/L (ref 24–31)
CREAT BLD-MCNC: 1.22 MG/DL (ref 0.5–1.4)
DEPRECATED RDW RBC AUTO: 45.1 FL (ref 40–54)
ERYTHROCYTE [DISTWIDTH] IN BLOOD BY AUTOMATED COUNT: 14.2 % (ref 12–15)
GFR SERPL CREATININE-BSD FRML MDRD: 58 ML/MIN/1.73
GLOBULIN UR ELPH-MCNC: 3.1 GM/DL
GLUCOSE BLD-MCNC: 66 MG/DL (ref 70–100)
HCT VFR BLD AUTO: 43.4 % (ref 37–47)
HGB BLD-MCNC: 14.3 G/DL (ref 12–16)
MCH RBC QN AUTO: 28.9 PG (ref 28–32)
MCHC RBC AUTO-ENTMCNC: 32.9 G/DL (ref 33–36)
MCV RBC AUTO: 87.7 FL (ref 82–98)
PLATELET # BLD AUTO: 263 10*3/MM3 (ref 130–400)
PMV BLD AUTO: 10.9 FL (ref 6–12)
POTASSIUM BLD-SCNC: 4.2 MMOL/L (ref 3.5–5.3)
PROT SERPL-MCNC: 7.3 G/DL (ref 6.3–8.7)
RBC # BLD AUTO: 4.95 10*6/MM3 (ref 4.2–5.4)
SODIUM BLD-SCNC: 139 MMOL/L (ref 135–145)
WBC NRBC COR # BLD: 6.12 10*3/MM3 (ref 4.8–10.8)

## 2018-11-12 PROCEDURE — 36415 COLL VENOUS BLD VENIPUNCTURE: CPT

## 2018-11-12 PROCEDURE — 71046 X-RAY EXAM CHEST 2 VIEWS: CPT

## 2018-11-12 PROCEDURE — 80053 COMPREHEN METABOLIC PANEL: CPT | Performed by: OTOLARYNGOLOGY

## 2018-11-12 PROCEDURE — 93010 ELECTROCARDIOGRAM REPORT: CPT | Performed by: INTERNAL MEDICINE

## 2018-11-12 PROCEDURE — 85027 COMPLETE CBC AUTOMATED: CPT | Performed by: OTOLARYNGOLOGY

## 2018-11-12 PROCEDURE — 93005 ELECTROCARDIOGRAM TRACING: CPT

## 2018-11-12 NOTE — DISCHARGE INSTRUCTIONS
DAY OF SURGERY INSTRUCTIONS        YOUR SURGEON: ***ISAIAS GALINDO     PROCEDURE: ***TOTAL THYROIDECTOMY    DATE OF SURGERY: ***11/19/2018  ARRIVAL TIME: AS DIRECTED BY OFFICE    DAY OF SURGERY TAKE ONLY THESE MEDICATIONS UNLESS OTHERWISE INSTRUCTED BY YOUR PHYSICIAN: ***NONE        MANAGING PAIN AFTER SURGERY    We know you are probably wondering what your pain will be like after surgery.  Following surgery it is unrealistic to expect you will not have pain.   Pain is how our bodies let us know that something is wrong or cautions us to be careful.  That said, our goal is to make your pain tolerable.    Methods we may use to treat your pain include (oral or IV medications, PCAs, epidurals, nerve blocks, etc.)   While some procedures require IV pain medications for a short time after surgery, transitioning to pain medications by mouth allows for better management of pain.   Your nurse will encourage you to take oral pain medications whenever possible.  IV medications work almost immediately, but only last a short while.  Taking medications by mouth allows for a more constant level of medication in your blood stream for a longer period of time.      Once your pain is out of control it is harder to get back under control.  It is important you are aware when your next dose of pain medication is due.  If you are admitted, your nurse may write the time of your next dose on the white board in your room to help you remember.      We are interested in your pain and encourage you to inform us about aggravating factors during your visit.   Many times a simple repositioning every few hours can make a big difference.    If your physician says it is okay, do not let your pain prevent you from getting out of bed. Be sure to call your nurse for assistance prior to getting up so you do not fall.      Before surgery, please decide your tolerable pain goal.  These faces help describe the pain ratings we use on a 0-10 scale.   Be  prepared to tell us your goal and whether or not you take pain or anxiety medications at home.          BEFORE YOU COME TO THE HOSPITAL  (Pre-op instructions)  • Do not eat, drink, smoke or chew gum after midnight the night before surgery.  This also includes no mints.  • Morning of surgery take only the medicines you have been instructed with a sip of water unless otherwise instructed  by your physician.  • Do not shave, wear makeup or dark nail polish.  • Remove all jewelry including rings.  • Leave anything you consider valuable at home.  • Leave your suitcase in the car until after your surgery.  • Bring the following with you if applicable:  o Picture ID and insurance, Medicare or Medicaid cards  o Co-pay/deductible required by insurance (cash, check, credit card)  o Copy of advance directive, living will or power-of- documents if not brought to PAT  o CPAP or BIPAP mask and tubing  o Relaxation aids (MP3 player, book, magazine)  • On the day of surgery check in at registration located at the main entrance of the hospital.       Outpatient Surgery Guidelines, Adult  Outpatient procedures are those for which the person having the procedure is allowed to go home the same day as the procedure. Various procedures are done on an outpatient basis. You should follow some general guidelines if you will be having an outpatient procedure.  LET YOUR HEALTH CARE PROVIDER KNOW ABOUT:  · Any allergies you have.  · All medicines you are taking, including vitamins, herbs, eye drops, creams, and over-the-counter medicines.  · Previous problems you or members of your family have had with the use of anesthetics.  · Any blood disorders you have.  · Previous surgeries you have had.  · Medical conditions you have.  RISKS AND COMPLICATIONS  Your health care provider will discuss possible risks and complications with you before surgery. Common risks and complications include:    · Problems due to the use of  anesthetics.  · Blood loss and replacement (does not apply to minor surgical procedures).  · Temporary increase in pain due to surgery.  · Uncorrected pain or problems that the surgery was meant to correct.  · Infection.  · New damage.  BEFORE THE PROCEDURE  · Ask your health care provider about changing or stopping your regular medicines. You may need to stop taking certain medicines in the days or weeks before the procedure.  · Stop smoking at least 2 weeks before surgery. This lowers your risk for complications during and after surgery. Ask your health care provider for help with this if needed.  · Eat your usual meals and a light supper the day before surgery. Continue fluid intake. Do not drink alcohol.  · Do not eat or drink after midnight the night before your surgery.   · Arrange for someone to take you home and to stay with you for 24 hours after the procedure. Medicine given for your procedure may affect your ability to drive or to care for yourself.  · Call your health care provider's office if you develop an illness or problem that may prevent you from safely having your procedure.  AFTER THE PROCEDURE  After surgery, you will be taken to a recovery area, where your progress will be monitored. If there are no complications, you will be allowed to go home when you are awake, stable, and taking fluids well. You may have numbness around the surgical site. Healing will take some time. You will have tenderness at the surgical site and may have some swelling and bruising. You may also have some nausea.  HOME CARE INSTRUCTIONS  · Do not drive for 24 hours, or as directed by your health care provider. Do not drive while taking prescription pain medicines.  · Do not drink alcohol for 24 hours.  · Do not make important decisions or sign legal documents for 24 hours.  · You may resume a normal diet and activities as directed.  · Do not lift anything heavier than 10 pounds (4.5 kg) or play contact sports until your  health care provider says it is okay.  · Change your bandages (dressings) as directed.  · Only take over-the-counter or prescription medicines as directed by your health care provider.  · Follow up with your health care provider as directed.  SEEK MEDICAL CARE IF:  · You have increased bleeding (more than a small spot) from the surgical site.  · You have redness, swelling, or increasing pain in the wound.  · You see pus coming from the wound.  · You have a fever.  · You notice a bad smell coming from the wound or dressing.  · You feel lightheaded or faint.  · You develop a rash.  · You have trouble breathing.  · You develop allergies.  MAKE SURE YOU:  · Understand these instructions.  · Will watch your condition.  · Will get help right away if you are not doing well or get worse.     This information is not intended to replace advice given to you by your health care provider. Make sure you discuss any questions you have with your health care provider.     Document Released: 09/12/2002 Document Revised: 05/03/2016 Document Reviewed: 05/22/2014  [employer] Interactive Patient Education ©2016 [employer]       Fall Prevention in Hospitals, Adult  As a hospital patient, your condition and the treatments you receive can increase your risk for falls. Some additional risk factors for falls in a hospital include:  · Being in an unfamiliar environment.  · Being on bed rest.  · Your surgery.  · Taking certain medicines.  · Your tubing requirements, such as intravenous (IV) therapy or catheters.  It is important that you learn how to decrease fall risks while at the hospital. Below are important tips that can help prevent falls.  SAFETY TIPS FOR PREVENTING FALLS  Talk about your risk of falling.  · Ask your health care provider why you are at risk for falling. Is it your medicine, illness, tubing placement, or something else?  · Make a plan with your health care provider to keep you safe from falls.  · Ask your health care  provider or pharmacist about side effects of your medicines. Some medicines can make you dizzy or affect your coordination.  Ask for help.  · Ask for help before getting out of bed. You may need to press your call button.  · Ask for assistance in getting safely to the toilet.  · Ask for a walker or cane to be put at your bedside. Ask that most of the side rails on your bed be placed up before your health care provider leaves the room.  · Ask family or friends to sit with you.  · Ask for things that are out of your reach, such as your glasses, hearing aids, telephone, bedside table, or call button.  Follow these tips to avoid falling:  · Stay lying or seated, rather than standing, while waiting for help.  · Wear rubber-soled slippers or shoes whenever you walk in the hospital.  · Avoid quick, sudden movements.  ¨ Change positions slowly.  ¨ Sit on the side of your bed before standing.  ¨ Stand up slowly and wait before you start to walk.  · Let your health care provider know if there is a spill on the floor.  · Pay careful attention to the medical equipment, electrical cords, and tubes around you.  · When you need help, use your call button by your bed or in the bathroom. Wait for one of your health care providers to help you.  · If you feel dizzy or unsure of your footing, return to bed and wait for assistance.  · Avoid being distracted by the TV, telephone, or another person in your room.  · Do not lean or support yourself on rolling objects, such as IV poles or bedside tables.     This information is not intended to replace advice given to you by your health care provider. Make sure you discuss any questions you have with your health care provider.     Document Released: 12/15/2001 Document Revised: 01/08/2016 Document Reviewed: 08/25/2013  [employer] Interactive Patient Education ©2016 [employer]       Surgical Site Infections FAQs  What is a Surgical Site Infection (SSI)?  A surgical site infection is an  infection that occurs after surgery in the part of the body where the surgery took place. Most patients who have surgery do not develop an infection. However, infections develop in about 1 to 3 out of every 100 patients who have surgery.  Some of the common symptoms of a surgical site infection are:  · Redness and pain around the area where you had surgery  · Drainage of cloudy fluid from your surgical wound  · Fever  Can SSIs be treated?  Yes. Most surgical site infections can be treated with antibiotics. The antibiotic given to you depends on the bacteria (germs) causing the infection. Sometimes patients with SSIs also need another surgery to treat the infection.  What are some of the things that hospitals are doing to prevent SSIs?  To prevent SSIs, doctors, nurses, and other healthcare providers:  · Clean their hands and arms up to their elbows with an antiseptic agent just before the surgery.  · Clean their hands with soap and water or an alcohol-based hand rub before and after caring for each patient.  · May remove some of your hair immediately before your surgery using electric clippers if the hair is in the same area where the procedure will occur. They should not shave you with a razor.  · Wear special hair covers, masks, gowns, and gloves during surgery to keep the surgery area clean.  · Give you antibiotics before your surgery starts. In most cases, you should get antibiotics within 60 minutes before the surgery starts and the antibiotics should be stopped within 24 hours after surgery.  · Clean the skin at the site of your surgery with a special soap that kills germs.  What can I do to help prevent SSIs?  Before your surgery:  · Tell your doctor about other medical problems you may have. Health problems such as allergies, diabetes, and obesity could affect your surgery and your treatment.  · Quit smoking. Patients who smoke get more infections. Talk to your doctor about how you can quit before your  surgery.  · Do not shave near where you will have surgery. Shaving with a razor can irritate your skin and make it easier to develop an infection.  At the time of your surgery:  · Speak up if someone tries to shave you with a razor before surgery. Ask why you need to be shaved and talk with your surgeon if you have any concerns.  · Ask if you will get antibiotics before surgery.  After your surgery:  · Make sure that your healthcare providers clean their hands before examining you, either with soap and water or an alcohol-based hand rub.  · If you do not see your providers clean their hands, please ask them to do so.  · Family and friends who visit you should not touch the surgical wound or dressings.  · Family and friends should clean their hands with soap and water or an alcohol-based hand rub before and after visiting you. If you do not see them clean their hands, ask them to clean their hands.  What do I need to do when I go home from the hospital?  · Before you go home, your doctor or nurse should explain everything you need to know about taking care of your wound. Make sure you understand how to care for your wound before you leave the hospital.  · Always clean your hands before and after caring for your wound.  · Before you go home, make sure you know who to contact if you have questions or problems after you get home.  · If you have any symptoms of an infection, such as redness and pain at the surgery site, drainage, or fever, call your doctor immediately.  If you have additional questions, please ask your doctor or nurse.  Developed and co-sponsored by The Society for Healthcare Epidemiology of [place] (SHEA); Infectious Diseases Society of [place] (IDSA); American Hospital Association; Association for Professionals in Infection Control and Epidemiology (APIC); Centers for Disease Control and Prevention (CDC); and The Joint Commission.     This information is not intended to replace advice given to you by  your health care provider. Make sure you discuss any questions you have with your health care provider.     Document Released: 12/23/2014 Document Revised: 01/08/2016 Document Reviewed: 03/02/2016  [employer] Interactive Patient Education ©2016 [employer]       [facility]  CHG 4% Patient Instruction Sheet    Preparing the Skin Before Surgery  Preparing or “prepping” skin before surgery can reduce the risk of infection at the surgical site. To make the process easier,[facility] has chosen 4% Chlorhexidine Gluconate (CHG) antiseptic solution.   The steps below outline the prepping process and should be carefully followed.                                                                                                                                                      Prep the skin at the following time(s):                                                      We recommend you shower the night before surgery, and again the morning of surgery with the 4% CHG antiseptic solution using half of the bottle and a cloth each time.  Dress in clean clothes/sleepwear after showering.  See instructions below for application.          Do not apply any lotions or moisturizers.       Do not shave the area to be prepped for at least 2 days prior to surgery.    Clipping the hair may be done immediately prior to your surgery at the hospital    if needed.    Directions:  Thoroughly rinse your body with water.  Apply 4% CHG to a cloth and wash skin gently, paying special attention to the operative site.  Rinse again thoroughly.  Once you have begun using this product do not apply anything else to your skin. If itching or redness persists, rinse affected areas and discontinue use.    When using this product:  • Keep out of eyes, ears, and mouth.  • If solution should contact these areas, rinse out promptly and thoroughly with water.  • For external use only.  • Do not use in genital area, on your face or  head.      PATIENT/FAMILY/RESPONSIBLE PARTY VERBALIZES UNDERSTANDING OF ABOVE EDUCATION.  COPY OF PAIN SCALE GIVEN AND REVIEWED WITH VERBALIZED UNDERSTANDING.

## 2018-11-19 ENCOUNTER — ANESTHESIA EVENT (OUTPATIENT)
Dept: PERIOP | Facility: HOSPITAL | Age: 43
End: 2018-11-19

## 2018-11-19 ENCOUNTER — HOSPITAL ENCOUNTER (OUTPATIENT)
Facility: HOSPITAL | Age: 43
Discharge: HOME | End: 2018-11-20
Attending: OTOLARYNGOLOGY | Admitting: OTOLARYNGOLOGY

## 2018-11-19 ENCOUNTER — ANESTHESIA (OUTPATIENT)
Dept: PERIOP | Facility: HOSPITAL | Age: 43
End: 2018-11-19

## 2018-11-19 DIAGNOSIS — E01.0 THYROMEGALY: ICD-10-CM

## 2018-11-19 DIAGNOSIS — E05.90 HYPERTHYROIDISM: ICD-10-CM

## 2018-11-19 DIAGNOSIS — E05.00 GRAVES' DISEASE: ICD-10-CM

## 2018-11-19 DIAGNOSIS — E89.0 POST-SURGICAL HYPOTHYROIDISM: Primary | ICD-10-CM

## 2018-11-19 LAB
CALCIUM SPEC-SCNC: 8.5 MG/DL (ref 8.4–10.4)
CALCIUM SPEC-SCNC: 9.7 MG/DL (ref 8.4–10.4)
GLUCOSE BLDC GLUCOMTR-MCNC: 129 MG/DL (ref 70–130)
GLUCOSE BLDC GLUCOMTR-MCNC: 130 MG/DL (ref 70–130)
GLUCOSE BLDC GLUCOMTR-MCNC: 150 MG/DL (ref 70–130)
GLUCOSE BLDC GLUCOMTR-MCNC: 169 MG/DL (ref 70–130)
PTH-INTACT SERPL-MCNC: 226.4 PG/ML (ref 7.5–53.5)
TSH SERPL DL<=0.05 MIU/L-ACNC: 66.6 MIU/ML (ref 0.47–4.68)

## 2018-11-19 PROCEDURE — 60240 REMOVAL OF THYROID: CPT | Performed by: OTOLARYNGOLOGY

## 2018-11-19 PROCEDURE — 25010000002 SUCCINYLCHOLINE PER 20 MG: Performed by: NURSE ANESTHETIST, CERTIFIED REGISTERED

## 2018-11-19 PROCEDURE — 88307 TISSUE EXAM BY PATHOLOGIST: CPT | Performed by: OTOLARYNGOLOGY

## 2018-11-19 PROCEDURE — 25010000002 FENTANYL CITRATE (PF) 250 MCG/5ML SOLUTION: Performed by: NURSE ANESTHETIST, CERTIFIED REGISTERED

## 2018-11-19 PROCEDURE — 25010000002 DEXAMETHASONE PER 1 MG: Performed by: ANESTHESIOLOGY

## 2018-11-19 PROCEDURE — 63710000001 INSULIN LISPRO (HUMAN) PER 5 UNITS: Performed by: OTOLARYNGOLOGY

## 2018-11-19 PROCEDURE — 63710000001 INSULIN DETEMIR PER 5 UNITS: Performed by: OTOLARYNGOLOGY

## 2018-11-19 PROCEDURE — 82310 ASSAY OF CALCIUM: CPT | Performed by: OTOLARYNGOLOGY

## 2018-11-19 PROCEDURE — 25010000003 CEFAZOLIN PER 500 MG: Performed by: NURSE ANESTHETIST, CERTIFIED REGISTERED

## 2018-11-19 PROCEDURE — 82962 GLUCOSE BLOOD TEST: CPT

## 2018-11-19 PROCEDURE — 83970 ASSAY OF PARATHORMONE: CPT | Performed by: OTOLARYNGOLOGY

## 2018-11-19 PROCEDURE — 88305 TISSUE EXAM BY PATHOLOGIST: CPT | Performed by: OTOLARYNGOLOGY

## 2018-11-19 PROCEDURE — 88331 PATH CONSLTJ SURG 1 BLK 1SPC: CPT | Performed by: PATHOLOGY

## 2018-11-19 PROCEDURE — 25010000002 PROPOFOL 10 MG/ML EMULSION: Performed by: NURSE ANESTHETIST, CERTIFIED REGISTERED

## 2018-11-19 PROCEDURE — 25010000002 HYDROMORPHONE PER 4 MG: Performed by: NURSE ANESTHETIST, CERTIFIED REGISTERED

## 2018-11-19 PROCEDURE — 25010000002 MIDAZOLAM PER 1 MG: Performed by: ANESTHESIOLOGY

## 2018-11-19 PROCEDURE — 84443 ASSAY THYROID STIM HORMONE: CPT | Performed by: OTOLARYNGOLOGY

## 2018-11-19 PROCEDURE — 25010000002 ONDANSETRON PER 1 MG: Performed by: NURSE ANESTHETIST, CERTIFIED REGISTERED

## 2018-11-19 RX ORDER — DEXTROSE MONOHYDRATE 25 G/50ML
25 INJECTION, SOLUTION INTRAVENOUS
Status: DISCONTINUED | OUTPATIENT
Start: 2018-11-19 | End: 2018-11-20 | Stop reason: HOSPADM

## 2018-11-19 RX ORDER — PROPRANOLOL HCL 60 MG
120 CAPSULE, EXTENDED RELEASE 24HR ORAL DAILY
Status: DISCONTINUED | OUTPATIENT
Start: 2018-11-19 | End: 2018-11-20 | Stop reason: HOSPADM

## 2018-11-19 RX ORDER — IBUPROFEN 600 MG/1
600 TABLET ORAL ONCE AS NEEDED
Status: DISCONTINUED | OUTPATIENT
Start: 2018-11-19 | End: 2018-11-19 | Stop reason: HOSPADM

## 2018-11-19 RX ORDER — SODIUM CHLORIDE 0.9 % (FLUSH) 0.9 %
1-10 SYRINGE (ML) INJECTION AS NEEDED
Status: DISCONTINUED | OUTPATIENT
Start: 2018-11-19 | End: 2018-11-19 | Stop reason: HOSPADM

## 2018-11-19 RX ORDER — NALOXONE HCL 0.4 MG/ML
0.4 VIAL (ML) INJECTION AS NEEDED
Status: DISCONTINUED | OUTPATIENT
Start: 2018-11-19 | End: 2018-11-19 | Stop reason: HOSPADM

## 2018-11-19 RX ORDER — LISINOPRIL 20 MG/1
20 TABLET ORAL EVERY 12 HOURS SCHEDULED
Status: DISCONTINUED | OUTPATIENT
Start: 2018-11-19 | End: 2018-11-20 | Stop reason: HOSPADM

## 2018-11-19 RX ORDER — PANTOPRAZOLE SODIUM 40 MG/1
40 TABLET, DELAYED RELEASE ORAL
Status: DISCONTINUED | OUTPATIENT
Start: 2018-11-20 | End: 2018-11-20 | Stop reason: HOSPADM

## 2018-11-19 RX ORDER — NICOTINE POLACRILEX 4 MG
15 LOZENGE BUCCAL
Status: DISCONTINUED | OUTPATIENT
Start: 2018-11-19 | End: 2018-11-20 | Stop reason: HOSPADM

## 2018-11-19 RX ORDER — VASOPRESSIN 20 U/ML
INJECTION PARENTERAL AS NEEDED
Status: DISCONTINUED | OUTPATIENT
Start: 2018-11-19 | End: 2018-11-19 | Stop reason: SURG

## 2018-11-19 RX ORDER — FENTANYL CITRATE 50 UG/ML
25 INJECTION, SOLUTION INTRAMUSCULAR; INTRAVENOUS AS NEEDED
Status: DISCONTINUED | OUTPATIENT
Start: 2018-11-19 | End: 2018-11-19 | Stop reason: HOSPADM

## 2018-11-19 RX ORDER — SODIUM CHLORIDE 0.9 % (FLUSH) 0.9 %
3 SYRINGE (ML) INJECTION EVERY 12 HOURS SCHEDULED
Status: DISCONTINUED | OUTPATIENT
Start: 2018-11-19 | End: 2018-11-19 | Stop reason: HOSPADM

## 2018-11-19 RX ORDER — ROCURONIUM BROMIDE 10 MG/ML
INJECTION, SOLUTION INTRAVENOUS AS NEEDED
Status: DISCONTINUED | OUTPATIENT
Start: 2018-11-19 | End: 2018-11-19 | Stop reason: SURG

## 2018-11-19 RX ORDER — HYDROCODONE BITARTRATE AND ACETAMINOPHEN 5; 325 MG/1; MG/1
1 TABLET ORAL EVERY 4 HOURS PRN
Status: DISCONTINUED | OUTPATIENT
Start: 2018-11-19 | End: 2018-11-20 | Stop reason: HOSPADM

## 2018-11-19 RX ORDER — GLYCOPYRROLATE 0.2 MG/ML
INJECTION INTRAMUSCULAR; INTRAVENOUS AS NEEDED
Status: DISCONTINUED | OUTPATIENT
Start: 2018-11-19 | End: 2018-11-19 | Stop reason: SURG

## 2018-11-19 RX ORDER — ACETAMINOPHEN 500 MG
1000 TABLET ORAL ONCE
Status: COMPLETED | OUTPATIENT
Start: 2018-11-19 | End: 2018-11-19

## 2018-11-19 RX ORDER — HYDROMORPHONE HCL 110MG/55ML
PATIENT CONTROLLED ANALGESIA SYRINGE INTRAVENOUS AS NEEDED
Status: DISCONTINUED | OUTPATIENT
Start: 2018-11-19 | End: 2018-11-19 | Stop reason: SURG

## 2018-11-19 RX ORDER — ONDANSETRON 2 MG/ML
4 INJECTION INTRAMUSCULAR; INTRAVENOUS ONCE AS NEEDED
Status: DISCONTINUED | OUTPATIENT
Start: 2018-11-19 | End: 2018-11-19 | Stop reason: HOSPADM

## 2018-11-19 RX ORDER — SODIUM CHLORIDE 0.9 % (FLUSH) 0.9 %
3 SYRINGE (ML) INJECTION AS NEEDED
Status: DISCONTINUED | OUTPATIENT
Start: 2018-11-19 | End: 2018-11-19 | Stop reason: HOSPADM

## 2018-11-19 RX ORDER — LABETALOL HYDROCHLORIDE 5 MG/ML
5 INJECTION, SOLUTION INTRAVENOUS
Status: DISCONTINUED | OUTPATIENT
Start: 2018-11-19 | End: 2018-11-19 | Stop reason: HOSPADM

## 2018-11-19 RX ORDER — MORPHINE SULFATE 2 MG/ML
2 INJECTION, SOLUTION INTRAMUSCULAR; INTRAVENOUS
Status: DISCONTINUED | OUTPATIENT
Start: 2018-11-19 | End: 2018-11-19 | Stop reason: HOSPADM

## 2018-11-19 RX ORDER — SODIUM CHLORIDE AND POTASSIUM CHLORIDE 150; 450 MG/100ML; MG/100ML
75 INJECTION, SOLUTION INTRAVENOUS CONTINUOUS
Status: DISCONTINUED | OUTPATIENT
Start: 2018-11-19 | End: 2018-11-20 | Stop reason: HOSPADM

## 2018-11-19 RX ORDER — FAMOTIDINE 10 MG/ML
20 INJECTION, SOLUTION INTRAVENOUS
Status: DISCONTINUED | OUTPATIENT
Start: 2018-11-19 | End: 2018-11-19 | Stop reason: HOSPADM

## 2018-11-19 RX ORDER — MIDAZOLAM HYDROCHLORIDE 1 MG/ML
2 INJECTION INTRAMUSCULAR; INTRAVENOUS
Status: DISCONTINUED | OUTPATIENT
Start: 2018-11-19 | End: 2018-11-19 | Stop reason: HOSPADM

## 2018-11-19 RX ORDER — CEFAZOLIN SODIUM 1 G/3ML
INJECTION, POWDER, FOR SOLUTION INTRAMUSCULAR; INTRAVENOUS AS NEEDED
Status: DISCONTINUED | OUTPATIENT
Start: 2018-11-19 | End: 2018-11-19 | Stop reason: SURG

## 2018-11-19 RX ORDER — ONDANSETRON 2 MG/ML
INJECTION INTRAMUSCULAR; INTRAVENOUS AS NEEDED
Status: DISCONTINUED | OUTPATIENT
Start: 2018-11-19 | End: 2018-11-19 | Stop reason: SURG

## 2018-11-19 RX ORDER — METOPROLOL TARTRATE 5 MG/5ML
2 INJECTION INTRAVENOUS ONCE AS NEEDED
Status: COMPLETED | OUTPATIENT
Start: 2018-11-19 | End: 2018-11-19

## 2018-11-19 RX ORDER — ONDANSETRON 2 MG/ML
4 INJECTION INTRAMUSCULAR; INTRAVENOUS ONCE AS NEEDED
Status: DISCONTINUED | OUTPATIENT
Start: 2018-11-19 | End: 2018-11-20 | Stop reason: HOSPADM

## 2018-11-19 RX ORDER — PROPOFOL 10 MG/ML
VIAL (ML) INTRAVENOUS AS NEEDED
Status: DISCONTINUED | OUTPATIENT
Start: 2018-11-19 | End: 2018-11-19 | Stop reason: SURG

## 2018-11-19 RX ORDER — LEVOTHYROXINE SODIUM 0.12 MG/1
125 TABLET ORAL
Status: DISCONTINUED | OUTPATIENT
Start: 2018-11-20 | End: 2018-11-20 | Stop reason: HOSPADM

## 2018-11-19 RX ORDER — FAMOTIDINE 20 MG/1
40 TABLET, FILM COATED ORAL DAILY
Status: DISCONTINUED | OUTPATIENT
Start: 2018-11-20 | End: 2018-11-20 | Stop reason: HOSPADM

## 2018-11-19 RX ORDER — AMLODIPINE BESYLATE 5 MG/1
5 TABLET ORAL DAILY
Status: DISCONTINUED | OUTPATIENT
Start: 2018-11-19 | End: 2018-11-20 | Stop reason: HOSPADM

## 2018-11-19 RX ORDER — FENTANYL CITRATE 50 UG/ML
INJECTION, SOLUTION INTRAMUSCULAR; INTRAVENOUS AS NEEDED
Status: DISCONTINUED | OUTPATIENT
Start: 2018-11-19 | End: 2018-11-19 | Stop reason: SURG

## 2018-11-19 RX ORDER — HYDROCODONE BITARTRATE AND ACETAMINOPHEN 7.5; 325 MG/1; MG/1
1 TABLET ORAL ONCE AS NEEDED
Status: DISCONTINUED | OUTPATIENT
Start: 2018-11-19 | End: 2018-11-19 | Stop reason: HOSPADM

## 2018-11-19 RX ORDER — DEXAMETHASONE SODIUM PHOSPHATE 4 MG/ML
4 INJECTION, SOLUTION INTRA-ARTICULAR; INTRALESIONAL; INTRAMUSCULAR; INTRAVENOUS; SOFT TISSUE ONCE AS NEEDED
Status: COMPLETED | OUTPATIENT
Start: 2018-11-19 | End: 2018-11-19

## 2018-11-19 RX ORDER — METOCLOPRAMIDE HYDROCHLORIDE 5 MG/ML
5 INJECTION INTRAMUSCULAR; INTRAVENOUS
Status: DISCONTINUED | OUTPATIENT
Start: 2018-11-19 | End: 2018-11-19 | Stop reason: HOSPADM

## 2018-11-19 RX ORDER — MEPERIDINE HYDROCHLORIDE 25 MG/ML
12.5 INJECTION INTRAMUSCULAR; INTRAVENOUS; SUBCUTANEOUS
Status: DISCONTINUED | OUTPATIENT
Start: 2018-11-19 | End: 2018-11-19 | Stop reason: HOSPADM

## 2018-11-19 RX ORDER — TIZANIDINE 4 MG/1
4 TABLET ORAL NIGHTLY
Status: DISCONTINUED | OUTPATIENT
Start: 2018-11-19 | End: 2018-11-20 | Stop reason: HOSPADM

## 2018-11-19 RX ORDER — ATORVASTATIN CALCIUM 10 MG/1
10 TABLET, FILM COATED ORAL NIGHTLY
Status: DISCONTINUED | OUTPATIENT
Start: 2018-11-19 | End: 2018-11-20 | Stop reason: HOSPADM

## 2018-11-19 RX ORDER — SODIUM CHLORIDE, SODIUM LACTATE, POTASSIUM CHLORIDE, CALCIUM CHLORIDE 600; 310; 30; 20 MG/100ML; MG/100ML; MG/100ML; MG/100ML
1000 INJECTION, SOLUTION INTRAVENOUS CONTINUOUS
Status: DISCONTINUED | OUTPATIENT
Start: 2018-11-19 | End: 2018-11-19 | Stop reason: HOSPADM

## 2018-11-19 RX ORDER — SODIUM CHLORIDE, SODIUM LACTATE, POTASSIUM CHLORIDE, CALCIUM CHLORIDE 600; 310; 30; 20 MG/100ML; MG/100ML; MG/100ML; MG/100ML
100 INJECTION, SOLUTION INTRAVENOUS CONTINUOUS
Status: DISCONTINUED | OUTPATIENT
Start: 2018-11-19 | End: 2018-11-19 | Stop reason: HOSPADM

## 2018-11-19 RX ORDER — TOPIRAMATE 25 MG/1
50 TABLET ORAL 2 TIMES DAILY
Status: DISCONTINUED | OUTPATIENT
Start: 2018-11-19 | End: 2018-11-20 | Stop reason: HOSPADM

## 2018-11-19 RX ORDER — LIDOCAINE HYDROCHLORIDE AND EPINEPHRINE 10; 10 MG/ML; UG/ML
INJECTION, SOLUTION INFILTRATION; PERINEURAL AS NEEDED
Status: DISCONTINUED | OUTPATIENT
Start: 2018-11-19 | End: 2018-11-19 | Stop reason: HOSPADM

## 2018-11-19 RX ORDER — HYDROCODONE BITARTRATE AND ACETAMINOPHEN 10; 325 MG/1; MG/1
1 TABLET ORAL ONCE AS NEEDED
Status: DISCONTINUED | OUTPATIENT
Start: 2018-11-19 | End: 2018-11-19 | Stop reason: HOSPADM

## 2018-11-19 RX ORDER — SUCCINYLCHOLINE CHLORIDE 20 MG/ML
INJECTION INTRAMUSCULAR; INTRAVENOUS AS NEEDED
Status: DISCONTINUED | OUTPATIENT
Start: 2018-11-19 | End: 2018-11-19 | Stop reason: SURG

## 2018-11-19 RX ORDER — GABAPENTIN 300 MG/1
300 CAPSULE ORAL EVERY 8 HOURS SCHEDULED
Status: DISCONTINUED | OUTPATIENT
Start: 2018-11-19 | End: 2018-11-20 | Stop reason: HOSPADM

## 2018-11-19 RX ORDER — IPRATROPIUM BROMIDE AND ALBUTEROL SULFATE 2.5; .5 MG/3ML; MG/3ML
3 SOLUTION RESPIRATORY (INHALATION) ONCE AS NEEDED
Status: DISCONTINUED | OUTPATIENT
Start: 2018-11-19 | End: 2018-11-19 | Stop reason: HOSPADM

## 2018-11-19 RX ORDER — DULOXETIN HYDROCHLORIDE 30 MG/1
60 CAPSULE, DELAYED RELEASE ORAL DAILY
Status: DISCONTINUED | OUTPATIENT
Start: 2018-11-19 | End: 2018-11-20 | Stop reason: HOSPADM

## 2018-11-19 RX ORDER — ONDANSETRON 4 MG/1
4 TABLET, FILM COATED ORAL EVERY 8 HOURS PRN
Status: DISCONTINUED | OUTPATIENT
Start: 2018-11-19 | End: 2018-11-20 | Stop reason: HOSPADM

## 2018-11-19 RX ORDER — SODIUM CHLORIDE 9 MG/ML
INJECTION, SOLUTION INTRAVENOUS AS NEEDED
Status: DISCONTINUED | OUTPATIENT
Start: 2018-11-19 | End: 2018-11-19 | Stop reason: HOSPADM

## 2018-11-19 RX ORDER — CALCIUM CARBONATE 200(500)MG
3 TABLET,CHEWABLE ORAL 3 TIMES DAILY
Status: DISCONTINUED | OUTPATIENT
Start: 2018-11-19 | End: 2018-11-20 | Stop reason: HOSPADM

## 2018-11-19 RX ORDER — PHENYLEPHRINE HCL IN 0.9% NACL 0.8MG/10ML
SYRINGE (ML) INTRAVENOUS AS NEEDED
Status: DISCONTINUED | OUTPATIENT
Start: 2018-11-19 | End: 2018-11-19 | Stop reason: SURG

## 2018-11-19 RX ORDER — MIDAZOLAM HYDROCHLORIDE 1 MG/ML
1 INJECTION INTRAMUSCULAR; INTRAVENOUS
Status: DISCONTINUED | OUTPATIENT
Start: 2018-11-19 | End: 2018-11-19 | Stop reason: HOSPADM

## 2018-11-19 RX ADMIN — Medication 80 MCG: at 10:36

## 2018-11-19 RX ADMIN — LISINOPRIL 20 MG: 20 TABLET ORAL at 21:43

## 2018-11-19 RX ADMIN — Medication 80 MCG: at 11:01

## 2018-11-19 RX ADMIN — CEFAZOLIN 2 G: 1 INJECTION, POWDER, FOR SOLUTION INTRAVENOUS at 10:20

## 2018-11-19 RX ADMIN — ACETAMINOPHEN 1000 MG: 500 TABLET ORAL at 09:54

## 2018-11-19 RX ADMIN — GLYCOPYRROLATE 0.1 MG: 0.2 INJECTION, SOLUTION INTRAMUSCULAR; INTRAVENOUS at 10:36

## 2018-11-19 RX ADMIN — INSULIN LISPRO 2 UNITS: 100 INJECTION, SOLUTION INTRAVENOUS; SUBCUTANEOUS at 21:42

## 2018-11-19 RX ADMIN — DULOXETINE HYDROCHLORIDE 60 MG: 30 CAPSULE, DELAYED RELEASE ORAL at 14:12

## 2018-11-19 RX ADMIN — Medication 80 MCG: at 11:34

## 2018-11-19 RX ADMIN — ROCURONIUM BROMIDE 10 MG: 10 INJECTION INTRAVENOUS at 10:08

## 2018-11-19 RX ADMIN — FENTANYL CITRATE 150 MCG: 50 INJECTION INTRAMUSCULAR; INTRAVENOUS at 10:08

## 2018-11-19 RX ADMIN — ONDANSETRON HYDROCHLORIDE 4 MG: 2 SOLUTION INTRAMUSCULAR; INTRAVENOUS at 11:22

## 2018-11-19 RX ADMIN — SUCCINYLCHOLINE CHLORIDE 120 MG: 20 INJECTION, SOLUTION INTRAMUSCULAR; INTRAVENOUS at 10:10

## 2018-11-19 RX ADMIN — Medication 80 MCG: at 10:28

## 2018-11-19 RX ADMIN — TOPIRAMATE 50 MG: 25 TABLET, FILM COATED ORAL at 21:43

## 2018-11-19 RX ADMIN — SODIUM CHLORIDE, POTASSIUM CHLORIDE, SODIUM LACTATE AND CALCIUM CHLORIDE: 600; 310; 30; 20 INJECTION, SOLUTION INTRAVENOUS at 11:44

## 2018-11-19 RX ADMIN — DEXAMETHASONE SODIUM PHOSPHATE 4 MG: 4 INJECTION, SOLUTION INTRA-ARTICULAR; INTRALESIONAL; INTRAMUSCULAR; INTRAVENOUS; SOFT TISSUE at 09:54

## 2018-11-19 RX ADMIN — GLYCOPYRROLATE 0.1 MG: 0.2 INJECTION, SOLUTION INTRAMUSCULAR; INTRAVENOUS at 10:40

## 2018-11-19 RX ADMIN — INSULIN LISPRO 2 UNITS: 100 INJECTION, SOLUTION INTRAVENOUS; SUBCUTANEOUS at 17:18

## 2018-11-19 RX ADMIN — CALCIUM CARBONATE 3 TABLET: 500 TABLET, CHEWABLE ORAL at 21:43

## 2018-11-19 RX ADMIN — Medication 80 MCG: at 10:40

## 2018-11-19 RX ADMIN — GABAPENTIN 300 MG: 300 CAPSULE ORAL at 21:43

## 2018-11-19 RX ADMIN — PROPRANOLOL HYDROCHLORIDE 120 MG: 60 CAPSULE, EXTENDED RELEASE ORAL at 14:12

## 2018-11-19 RX ADMIN — POTASSIUM CHLORIDE AND SODIUM CHLORIDE 75 ML/HR: 450; 150 INJECTION, SOLUTION INTRAVENOUS at 14:12

## 2018-11-19 RX ADMIN — FENTANYL CITRATE 100 MCG: 50 INJECTION INTRAMUSCULAR; INTRAVENOUS at 10:29

## 2018-11-19 RX ADMIN — AMLODIPINE BESYLATE 5 MG: 5 TABLET ORAL at 14:12

## 2018-11-19 RX ADMIN — HYDROMORPHONE HYDROCHLORIDE 1 MG: 2 INJECTION, SOLUTION INTRAMUSCULAR; INTRAVENOUS; SUBCUTANEOUS at 11:19

## 2018-11-19 RX ADMIN — PROPOFOL 150 MG: 10 INJECTION, EMULSION INTRAVENOUS at 10:08

## 2018-11-19 RX ADMIN — VASOPRESSIN 0.5 UNITS: 20 INJECTION INTRAVENOUS at 10:49

## 2018-11-19 RX ADMIN — HYDROCODONE BITARTRATE AND ACETAMINOPHEN 1 TABLET: 5; 325 TABLET ORAL at 17:22

## 2018-11-19 RX ADMIN — HYDROCODONE BITARTRATE AND ACETAMINOPHEN 1 TABLET: 5; 325 TABLET ORAL at 21:43

## 2018-11-19 RX ADMIN — ATORVASTATIN CALCIUM 10 MG: 10 TABLET, FILM COATED ORAL at 21:43

## 2018-11-19 RX ADMIN — CALCIUM CARBONATE 3 TABLET: 500 TABLET, CHEWABLE ORAL at 15:00

## 2018-11-19 RX ADMIN — LIDOCAINE HYDROCHLORIDE 0.5 ML: 10 INJECTION, SOLUTION EPIDURAL; INFILTRATION; INTRACAUDAL; PERINEURAL at 08:09

## 2018-11-19 RX ADMIN — GABAPENTIN 300 MG: 300 CAPSULE ORAL at 14:12

## 2018-11-19 RX ADMIN — FAMOTIDINE 20 MG: 10 INJECTION INTRAVENOUS at 09:54

## 2018-11-19 RX ADMIN — INSULIN DETEMIR 73 UNITS: 100 INJECTION, SOLUTION SUBCUTANEOUS at 21:48

## 2018-11-19 RX ADMIN — TIZANIDINE 4 MG: 4 TABLET ORAL at 21:43

## 2018-11-19 RX ADMIN — VASOPRESSIN 0.5 UNITS: 20 INJECTION INTRAVENOUS at 10:42

## 2018-11-19 RX ADMIN — MIDAZOLAM HYDROCHLORIDE 2 MG: 1 INJECTION, SOLUTION INTRAMUSCULAR; INTRAVENOUS at 09:54

## 2018-11-19 RX ADMIN — METOPROLOL TARTRATE 2 MG: 5 INJECTION, SOLUTION INTRAVENOUS at 09:54

## 2018-11-19 RX ADMIN — SODIUM CHLORIDE, POTASSIUM CHLORIDE, SODIUM LACTATE AND CALCIUM CHLORIDE 1000 ML: 600; 310; 30; 20 INJECTION, SOLUTION INTRAVENOUS at 08:10

## 2018-11-19 NOTE — PLAN OF CARE
Problem: Patient Care Overview  Goal: Plan of Care Review  Outcome: Ongoing (interventions implemented as appropriate)   11/19/18 1550   Coping/Psychosocial   Plan of Care Reviewed With patient;spouse   Plan of Care Review   Progress improving   OTHER   Outcome Summary Pt admitted to  following a thyroidectomy per Dr. Givens. Steri strips in place. DIYA with dark red output. DTV. IVF infusing. Calcium: 9.7, tums given. AccuChecks ac&hs with ss coverage available. VSS. Will continue to monitor.       Problem: Surgery Nonspecified (Adult)  Goal: Signs and Symptoms of Listed Potential Problems Will be Absent, Minimized or Managed (Surgery Nonspecified)  Outcome: Ongoing (interventions implemented as appropriate)   11/19/18 1550   Goal/Outcome Evaluation   Problems Assessed (Surgery) all   Problems Present (Surgery) none

## 2018-11-19 NOTE — ANESTHESIA POSTPROCEDURE EVALUATION
"Patient: Lynn Magana    Procedure Summary     Date:  11/19/18 Room / Location:   [facility] 03 /  [facility]    Anesthesia Start:  1003 Anesthesia Stop:  1250    Procedure:  total thyroidectomy (Bilateral Neck) Diagnosis:       Thyromegaly      Graves' disease      Hyperthyroidism      (Thyromegaly [E01.0])      (Graves' disease [E05.00])      (Hyperthyroidism [E05.90])    Surgeon:  Vitaly Givens MD Provider:  Odalys David CRNA    Anesthesia Type:  general ASA Status:  3          Anesthesia Type: general  Last vitals  BP   130/92 (11/19/18 1416)   Temp   97.8 °F (36.6 °C) (11/19/18 1416)   Pulse   66 (11/19/18 1416)   Resp   16 (11/19/18 1416)     SpO2   94 % (11/19/18 1416)     Post Anesthesia Care and Evaluation    Patient location during evaluation: PACU  Patient participation: complete - patient participated  Level of consciousness: awake and alert  Pain management: adequate  Airway patency: patent  Anesthetic complications: No anesthetic complications    Cardiovascular status: acceptable  Respiratory status: acceptable  Hydration status: acceptable    Comments: Blood pressure 130/92, pulse 66, temperature 97.8 °F (36.6 °C), temperature source Oral, resp. rate 16, height 185 cm (72.84\"), weight 105 kg (231 lb 0.7 oz), SpO2 94 %, not currently breastfeeding.    Pt discharged from PACU based on juan score >8      "

## 2018-11-19 NOTE — ANESTHESIA PREPROCEDURE EVALUATION
Anesthesia Evaluation     Patient summary reviewed and Nursing notes reviewed   no history of anesthetic complications:  NPO Solid Status: > 8 hours  NPO Liquid Status: > 8 hours           Airway   Mallampati: III  TM distance: >3 FB  Neck ROM: full  Large neck circumference  Dental      Pulmonary    (-) not a smoker  Cardiovascular   Exercise tolerance: poor (<4 METS)    ECG reviewed  Patient on routine beta blocker and Beta blocker given within 24 hours of surgery    (+) hypertension, dysrhythmias (palpitations, 6 months ago, now resolved), hyperlipidemia,  carotid artery disease (50-69% bilateral) carotid bilateral    ROS comment: Pt was instructed to call her PCP about prooranolol perioperatively. Pt reports she could not get ahold of him. She stopped the propranolol on 11/12. Will pre tx with toprol iv.     Neuro/Psych  (+) seizures (on topamax, most recent 3 months prior), headaches, psychiatric history Depression,     GI/Hepatic/Renal/Endo    (+) obesity,  GERD,  diabetes mellitus type 2 using insulin, hyperthyroidism    ROS Comment: Hyperthyroid with graves, now treated with methimazole and Dr Givens reports she is now hypothyroid    Musculoskeletal     Abdominal    Substance History   (+) drug use (marijuana smoking, previously using cocaine but quit 8 months ago)     OB/GYN          Other   (+) arthritis         Phys Exam Other: Very large bilateral thyroid enlargement                Anesthesia Plan    ASA 3     general     intravenous induction   Anesthetic plan, all risks, benefits, and alternatives have been provided, discussed and informed consent has been obtained with: patient.

## 2018-11-19 NOTE — OP NOTE
Vitaly Givens MD   Operative Note    Lynn Magana  11/19/2018    Pre-op Diagnosis:   Thyromegaly [E01.0]  Graves' disease [E05.00]  Hyperthyroidism [E05.90]    Post-op Diagnosis:     Post-Op Diagnosis Codes:     * Thyromegaly [E01.0]     * Graves' disease [E05.00]     * Hyperthyroidism [E05.90]    Procedure/CPT® Codes:  IA THYROIDECTOMY [87441]    Procedure(s):  total thyroidectomy    Surgeon(s):  Vitaly Givens MD    Anesthesia: General    Staff:   Circulator: Srinivasan Lerma RN; Yogesh Fallon RN  Scrub Person: Jane Kumar  Assistant: Daxa HAINES RN; Srinivasan Holguin; Tj Irving    Estimated Blood Loss:   < 20 cc    Specimens:                ID Type Source Tests Collected by Time   A :  Tissue Thyroid TISSUE PATHOLOGY EXAM Vitaly Givens MD 11/19/2018 1041   B : Left Inferior Parathyroid Tissue Parathyroid Gland TISSUE PATHOLOGY EXAM Vitaly Givens MD 11/19/2018 1105         Drains:   10f Prakash drain    Findings:   There is a very enlarged thyroid with multiple nodules.  There is increased vascularity of the thyroid.  The recurrent laryngeal nerves were intact bilaterally.  I did not specifically encounter the parathyroid glands    Complications:   none    Reason for the Operation:  Lynn Magana is a 43 y.o. female who presented to the office for evaluation of Graves disease.  A total thyroidectomy was recommended. The risks and benefits were explained including but not limited to bleeding, infection, persistent and/or recurrent disease, risks of the general anesthesia, pain, recurrent laryngeal nerve injury with hoarseness and airway loss, parathyroid injury and hypocalcemia. Possibilities for further medical and or surgical therapy pending the pathologic diagnosis were also discussed. Alternatives were discussed. No guarantees were made or implied. Questions were asked appropriately answered.      Procedure Description:  The patient was taken back  to the operating room, placed supine on the operating table and placed under anesthesia by the anesthesia staff. Once this was done a time out was performed to confirm the patient and the proper procedure. The [employer] Nerve Integrity Monitor (NIM) was set up to monitor the recurrent laryngeal by placing a nerve monitoring endotracheal tube and placement of ground and reference leads. The NIM was then tested and found to be in appropriate working order. After sterile prep, a handheld stimulating probe was attached and used for verifying nerve integrity. A total thyroidectomy was performed in the following fashion: A shoulder roll was placed under the patient's shoulders and the neck was extended. A horizontal incision was made one to two finger breadths above the clavicles and placed in a skin crease. The strap muscles were identified in the midline and dissection was carried out under the muscle over the lobe of the thyroid. The strap muscles were retracted for exposure of the thyroid. The lateral aspect of the left lobe was dissected from the overlying musculature. The thyroid was very enlarged and extended underneath the sternocleidomastoid muscle laterally.  There is multiple large veins feeding the thyroid itself. The middle thyroid vein was identified and ligated with harmonic scalpel. The superior pole was dissected out and the vessles were divided with the harmonic scalpel close the the capsule of the gland. The remaining attatchments of the superior pole were bluntly dissected free of the surrounding tissue. The inferior pole was dissected free and the inferior pole vessles were similarly divided with the harmonic scalpel. The thyroid was then mobilized to rotate it medially and delivered out of the wound. Dissection was then slowly performed in the area of the tracheoesophageal groove to locate the recurrent laryngeal nerve. The nerve was identified and confirmed with the stimulating probe. The nerve was  dissected superiorly into the cricothyroid joint. The remainder of the thyroid gland and isthmus was freed from the trachea and was left intact to the opposite side at the isthmus. Then, a similar excision was carried out on the opposite side. Dissection was carried out under the muscle over the lobe of the thyroid. The strap muscles were retracted for exposure of the thyroid. The lateral aspect of the lobe was dissected from the overlying musculature. Again this side was also enlarged with increased vascularity. The middle thyroid vein was identified and ligated with harmonic scalpel. The superior pole was dissected out and the vessles were divided with the harmonic scalpel close the the capsule of the gland. The remaining attatchments of the superior pole were bluntly dissected free of the surrounding tissue. The inferior pole was dissected free and the inferior pole vessles were similarly divided with the harmonic scalpel. The thyroid was then mobilized to rotate it medially and delivered out of the wound. Dissection was then slowly performed in the area of the tracheoesophageal groove to locate the recurrent laryngeal nerve. The nerve was identified and confirmed with the stimulating probe. The nerve was dissected superiorly into the cricothyroid joint. The remander of the thyroid and isthmus was freed up from the trachea to the opposite lobe and the entire gland was removed. The thyroid was sent for permanent section after inspecting for potential parathyroid tissue. There was no pair of thyroid tissue noted.  There was one small nodule on the left side which was sent for frozen section but turned out to be normal thyroid tissue. The wound was then irrigated with warm saline solution. Adequate hemostasis was attained with bipolar cautery. A Prakash drain was placed. This was secured with a silk suture.  The muscle layer was closed with 3-0 vicryl interrupted sutures. The skin was closed with a 4-0 monocryl  subcuticular sitich. Mastisol and steristrips were placed.   The patient was then turned over to the anesthesia team and allowed to wake from anesthesia. The patient was transported to the recovery room in a stable condition.       Vitaly Givens MD     Date: 11/19/2018  Time: 12:33 PM

## 2018-11-19 NOTE — ANESTHESIA PROCEDURE NOTES
ANESTHESIA INTUBATION  Urgency: elective    Airway not difficult    General Information and Staff    Patient location during procedure: OR  CRNA: Odalys David CRNA    Indications and Patient Condition  Indications for airway management: airway protection    Preoxygenated: yes  Mask difficulty assessment: 2 - vent by mask + OA or adjuvant +/- NMBA    Final Airway Details  Final airway type: endotracheal airway      Successful airway: ETT and NIM tube  Cuffed: yes   Successful intubation technique: direct laryngoscopy  Facilitating devices/methods: intubating stylet  Endotracheal tube insertion site: oral  Blade: Deluna  Blade size: 4  ETT size (mm): 7.0  Cormack-Lehane Classification: grade I - full view of glottis  Placement verified by: chest auscultation and capnometry   Measured from: lips  ETT to lips (cm): 22  Number of attempts at approach: 1    Additional Comments  Easy MV. Small mouth opening. First intubation, ETT came out with inflation of balloon. Second ETT placed atraumatically. Slight bronchospasm post intubation with wheezing in left upper lung. Resolved with inhalational. VC were deviated to the left. Atraumatic. Dentition unchanged.

## 2018-11-20 ENCOUNTER — TELEPHONE (OUTPATIENT)
Dept: OTOLARYNGOLOGY | Facility: CLINIC | Age: 43
End: 2018-11-20

## 2018-11-20 VITALS
SYSTOLIC BLOOD PRESSURE: 116 MMHG | RESPIRATION RATE: 16 BRPM | TEMPERATURE: 98.3 F | OXYGEN SATURATION: 100 % | WEIGHT: 231.04 LBS | DIASTOLIC BLOOD PRESSURE: 65 MMHG | HEIGHT: 72 IN | HEART RATE: 61 BPM | BODY MASS INDEX: 31.29 KG/M2

## 2018-11-20 PROBLEM — E05.90 HYPERTHYROIDISM: Status: RESOLVED | Noted: 2018-03-15 | Resolved: 2018-11-20

## 2018-11-20 PROBLEM — E01.0 THYROMEGALY: Status: RESOLVED | Noted: 2018-11-01 | Resolved: 2018-11-20

## 2018-11-20 PROBLEM — E05.00 GRAVES' DISEASE: Status: RESOLVED | Noted: 2017-07-10 | Resolved: 2018-11-20

## 2018-11-20 PROBLEM — E89.0 POST-SURGICAL HYPOTHYROIDISM: Status: ACTIVE | Noted: 2018-11-20

## 2018-11-20 LAB
CALCIUM SPEC-SCNC: 8.5 MG/DL (ref 8.4–10.4)
CALCIUM SPEC-SCNC: 8.9 MG/DL (ref 8.4–10.4)
CYTO UR: NORMAL
GLUCOSE BLDC GLUCOMTR-MCNC: 225 MG/DL (ref 70–130)
GLUCOSE BLDC GLUCOMTR-MCNC: 86 MG/DL (ref 70–130)
LAB AP CASE REPORT: NORMAL
Lab: NORMAL
PATH REPORT.FINAL DX SPEC: NORMAL
PATH REPORT.GROSS SPEC: NORMAL

## 2018-11-20 PROCEDURE — 82310 ASSAY OF CALCIUM: CPT | Performed by: OTOLARYNGOLOGY

## 2018-11-20 PROCEDURE — 82962 GLUCOSE BLOOD TEST: CPT

## 2018-11-20 PROCEDURE — 99024 POSTOP FOLLOW-UP VISIT: CPT | Performed by: OTOLARYNGOLOGY

## 2018-11-20 PROCEDURE — 63710000001 INSULIN LISPRO (HUMAN) PER 5 UNITS: Performed by: OTOLARYNGOLOGY

## 2018-11-20 RX ORDER — HYDROCODONE BITARTRATE AND ACETAMINOPHEN 5; 325 MG/1; MG/1
1 TABLET ORAL EVERY 4 HOURS PRN
Qty: 25 TABLET | Refills: 0 | Status: SHIPPED | OUTPATIENT
Start: 2018-11-20 | End: 2018-11-20

## 2018-11-20 RX ORDER — TOPIRAMATE 50 MG/1
50 TABLET, FILM COATED ORAL 2 TIMES DAILY
COMMUNITY
End: 2019-03-12 | Stop reason: SDUPTHER

## 2018-11-20 RX ORDER — HYDROCODONE BITARTRATE AND ACETAMINOPHEN 5; 325 MG/1; MG/1
1 TABLET ORAL EVERY 4 HOURS PRN
Qty: 25 TABLET | Refills: 0 | Status: SHIPPED | OUTPATIENT
Start: 2018-11-20 | End: 2018-11-23

## 2018-11-20 RX ORDER — LEVOTHYROXINE SODIUM 0.12 MG/1
125 TABLET ORAL DAILY
Qty: 30 TABLET | Refills: 3 | Status: SHIPPED | OUTPATIENT
Start: 2018-11-20 | End: 2018-12-10

## 2018-11-20 RX ADMIN — TOPIRAMATE 50 MG: 25 TABLET, FILM COATED ORAL at 09:03

## 2018-11-20 RX ADMIN — FAMOTIDINE 40 MG: 20 TABLET, FILM COATED ORAL at 09:04

## 2018-11-20 RX ADMIN — GABAPENTIN 300 MG: 300 CAPSULE ORAL at 06:46

## 2018-11-20 RX ADMIN — PANTOPRAZOLE SODIUM 40 MG: 40 TABLET, DELAYED RELEASE ORAL at 06:45

## 2018-11-20 RX ADMIN — POTASSIUM CHLORIDE AND SODIUM CHLORIDE 75 ML/HR: 450; 150 INJECTION, SOLUTION INTRAVENOUS at 03:47

## 2018-11-20 RX ADMIN — HYDROCODONE BITARTRATE AND ACETAMINOPHEN 1 TABLET: 5; 325 TABLET ORAL at 15:27

## 2018-11-20 RX ADMIN — CALCIUM CARBONATE 3 TABLET: 500 TABLET, CHEWABLE ORAL at 15:27

## 2018-11-20 RX ADMIN — INSULIN LISPRO 4 UNITS: 100 INJECTION, SOLUTION INTRAVENOUS; SUBCUTANEOUS at 12:05

## 2018-11-20 RX ADMIN — PROPRANOLOL HYDROCHLORIDE 120 MG: 60 CAPSULE, EXTENDED RELEASE ORAL at 09:03

## 2018-11-20 RX ADMIN — HYDROCODONE BITARTRATE AND ACETAMINOPHEN 1 TABLET: 5; 325 TABLET ORAL at 06:50

## 2018-11-20 RX ADMIN — DULOXETINE HYDROCHLORIDE 60 MG: 30 CAPSULE, DELAYED RELEASE ORAL at 09:03

## 2018-11-20 RX ADMIN — HYDROCODONE BITARTRATE AND ACETAMINOPHEN 1 TABLET: 5; 325 TABLET ORAL at 11:07

## 2018-11-20 RX ADMIN — CALCIUM CARBONATE 3 TABLET: 500 TABLET, CHEWABLE ORAL at 09:04

## 2018-11-20 RX ADMIN — LEVOTHYROXINE SODIUM 125 MCG: 0.12 TABLET ORAL at 06:46

## 2018-11-20 RX ADMIN — LISINOPRIL 20 MG: 20 TABLET ORAL at 09:03

## 2018-11-20 RX ADMIN — AMLODIPINE BESYLATE 5 MG: 5 TABLET ORAL at 09:03

## 2018-11-20 RX ADMIN — GABAPENTIN 300 MG: 300 CAPSULE ORAL at 14:34

## 2018-11-20 NOTE — PLAN OF CARE
Problem: Patient Care Overview  Goal: Plan of Care Review  Outcome: Ongoing (interventions implemented as appropriate)  ivf cont. Po pain med effective.  Neck incision with steri strip patent, edges well approx. kia drain x1 with serosang drainage.  Voiding without difficulty.  Hs blood sugar 150, covered with ssi per order. Continue to monitor.   11/20/18 0132   Coping/Psychosocial   Plan of Care Reviewed With patient   Plan of Care Review   Progress improving     Goal: Individualization and Mutuality  Outcome: Ongoing (interventions implemented as appropriate)    Goal: Discharge Needs Assessment  Outcome: Ongoing (interventions implemented as appropriate)      Problem: Surgery Nonspecified (Adult)  Goal: Signs and Symptoms of Listed Potential Problems Will be Absent, Minimized or Managed (Surgery Nonspecified)  Outcome: Ongoing (interventions implemented as appropriate)

## 2018-11-20 NOTE — DISCHARGE SUMMARY
Vitaly Givens MD   DISCHARGE SUMMARY:     PATIENT NAME: Lynn Magana  ACCOUNT NUMBER: [id number]  ADMISSION DATE:  11/19/2018  DISCHARGE DATE:  11/20/2018  ATTENDING PHYSICIAN: Vitaly Givens MD  CONDITION ON DISCHARGE: stable    ADMITTING DIAGNOSIS:   1. Post-surgical hypothyroidism    2. Thyromegaly    3. Graves' disease    4. Hyperthyroidism        PROCEDURES:   total thyroidectomy     REASON FOR ADMISSION:   Lynn Magana is a 43 y.o. female who was admitted for observation after surgery.    HOSPITAL COURSE:   She underwent the procedure and was recovered and then sent to the floor for observation. She was observed overnight on IV fluids, The next morning, she was tolerating oral intake well with adequate pain control and no overt nausea or vomiting. The calcium levels were stable with of symptoms of hypocalcemia. The drain had decreasing amount and was pulled before discharge. It was felt she could be discharged to home.    LABS:  Lab Results   Component Value Date    .4 (H) 11/19/2018    CALCIUM 8.9 11/20/2018    CAION 1.22 07/08/2017    PHOS 4.0 03/27/2018       PHYSICAL EXAM:  Vitals:    11/20/18 1137   BP: 116/65   Pulse: 61   Resp: 16   Temp: 98.3 °F (36.8 °C)   SpO2: 100%     CONSTITUTIONAL: well nourished, well-developed, alert, oriented, in no acute distress   COMMUNICATION AND VOICE: able to communicate normally for age, normal voice quality  HEAD: normocephalic, no lesions, atraumatic, no tenderness, no masses   FACE: appearance normal, no lesions, no tenderness, no deformities, facial motion symmetric  EYES: ocular motility normal, eyelids normal, orbits normal, no proptosis, conjunctiva normal , pupils equal, round   EARS:  Hearing: hearing to conversational voice intact bilaterally   External Ears: normal bilaterally, no lesions  NOSE:  External Nose: external nasal structure normal, no tenderness on palpation, no nasal discharge, no lesions, no evidence of trauma,  nostrils patent   ORAL:  Lips: upper and lower lips without lesion   NECK:  Inspection and Palpation: neck appearance normal, no masses or tenderness  THYROID: healing thyroidectomy incision, no signs of infection or hematoma   CHEST/RESPIRATORY: normal respiratory effort   CARDIOVASCULAR: no cyanosis or edema   NEUROLOGICAL/PSYCHIATRIC: oriented, mood normal, affect appropriate, CN II-XII intact grossly      DISCHARGE MEDICATIONS:     Discharge Medications      New Medications      Instructions Start Date   levothyroxine 125 MCG tablet  Commonly known as:  SYNTHROID, LEVOTHROID   125 mcg, Oral, Daily         Continue These Medications      Instructions Start Date   amLODIPine 10 MG tablet  Commonly known as:  NORVASC   5 mg, Oral, Daily      aspirin 81 MG EC tablet   81 mg, Oral, Daily      benazepril 20 MG tablet  Commonly known as:  LOTENSIN   20 mg, Oral, 2 Times Daily      DULoxetine 60 MG capsule  Commonly known as:  CYMBALTA   60 mg, Oral, Daily      gabapentin 300 MG capsule  Commonly known as:  NEURONTIN   300 mg, Oral, 4 Times Daily      Insulin Glargine 100 UNIT/ML injection pen  Commonly known as:  BASAGLAR KWIKPEN   72 Units, Nightly      omeprazole 20 MG capsule  Commonly known as:  priLOSEC   20 mg, Oral, Daily      ondansetron 4 MG tablet  Commonly known as:  ZOFRAN   4 mg, Oral, Every 8 Hours PRN      raNITIdine 300 MG tablet  Commonly known as:  ZANTAC   300 mg, Oral, Nightly      simvastatin 20 MG tablet  Commonly known as:  ZOCOR   20 mg, Oral, Nightly      tiZANidine 4 MG tablet  Commonly known as:  ZANAFLEX   4 mg, Oral, Nightly      topiramate 50 MG tablet  Commonly known as:  TOPAMAX   TAKE 1 TABLET BY MOUTH TWICE DAILY.         ASK your doctor about these medications      Instructions Start Date   propranolol  MG 24 hr capsule  Commonly known as:  INDERAL LA   120 mg, Oral, Daily             DISCHARGE INSTRUCTIONS:         Your medication list      START taking these medications       Instructions Last Dose Given Next Dose Due   levothyroxine 125 MCG tablet  Commonly known as:  SYNTHROID, LEVOTHROID      Take 1 tablet by mouth Daily.          CONTINUE taking these medications      Instructions Last Dose Given Next Dose Due   amLODIPine 10 MG tablet  Commonly known as:  NORVASC      Take 0.5 tablets by mouth Daily.       aspirin 81 MG EC tablet      Take 81 mg by mouth Daily.       benazepril 20 MG tablet  Commonly known as:  LOTENSIN      Take 20 mg by mouth 2 (Two) Times a Day.       DULoxetine 60 MG capsule  Commonly known as:  CYMBALTA      Take 60 mg by mouth Daily.       gabapentin 300 MG capsule  Commonly known as:  NEURONTIN      Take 300 mg by mouth 4 (Four) Times a Day.       Insulin Glargine 100 UNIT/ML injection pen  Commonly known as:  BASAGLAR KWIKPEN      72 Units Every Night.       omeprazole 20 MG capsule  Commonly known as:  priLOSEC      Take 1 capsule by mouth Daily.       ondansetron 4 MG tablet  Commonly known as:  ZOFRAN      Take 1 tablet by mouth Every 8 (Eight) Hours As Needed for Nausea or Vomiting.       raNITIdine 300 MG tablet  Commonly known as:  ZANTAC      Take 300 mg by mouth Every Night.       simvastatin 20 MG tablet  Commonly known as:  ZOCOR      Take 20 mg by mouth Every Night.       tiZANidine 4 MG tablet  Commonly known as:  ZANAFLEX      Take 4 mg by mouth Every Night.       topiramate 50 MG tablet  Commonly known as:  TOPAMAX      TAKE 1 TABLET BY MOUTH TWICE DAILY.          ASK your doctor about these medications      Instructions Last Dose Given Next Dose Due   propranolol  MG 24 hr capsule  Commonly known as:  INDERAL LA      Take 1 capsule by mouth Daily.             Where to Get Your Medications      These medications were sent to [facility] - [place], [place] - [address]  - [phone]  - [phone] FX  [address], [place] [address]    Phone:  [phone]   · levothyroxine 125 MCG tablet         FOLLOW UP:  Follow up in 3 weeks  with with midlevel practitioner  TSH in 2-3 weeks  Calcium level in 2-3 weeks  Repeat her parathyroid level.  I suspect this is irritation from the surgery causing the increased levels.      Vitaly Givens MD  11/20/2018  1:14 PM

## 2018-11-21 NOTE — TELEPHONE ENCOUNTER
Patient called for pain medication to be called in to pharmacy for her.  Called LAN Givens MD and medication sent to pharmacy for her to .

## 2018-12-07 ENCOUNTER — LAB (OUTPATIENT)
Dept: LAB | Facility: HOSPITAL | Age: 43
End: 2018-12-07
Attending: OTOLARYNGOLOGY

## 2018-12-07 DIAGNOSIS — E05.00 GRAVES' DISEASE: ICD-10-CM

## 2018-12-07 DIAGNOSIS — E89.0 POST-SURGICAL HYPOTHYROIDISM: ICD-10-CM

## 2018-12-07 LAB
CALCIUM SPEC-SCNC: 9.8 MG/DL (ref 8.4–10.4)
PTH-INTACT SERPL-MCNC: 86.3 PG/ML (ref 7.5–53.5)
TSH SERPL DL<=0.05 MIU/L-ACNC: 23.6 MIU/ML (ref 0.47–4.68)

## 2018-12-07 PROCEDURE — 84443 ASSAY THYROID STIM HORMONE: CPT | Performed by: OTOLARYNGOLOGY

## 2018-12-07 PROCEDURE — 36415 COLL VENOUS BLD VENIPUNCTURE: CPT

## 2018-12-07 PROCEDURE — 83970 ASSAY OF PARATHORMONE: CPT | Performed by: OTOLARYNGOLOGY

## 2018-12-07 PROCEDURE — 82310 ASSAY OF CALCIUM: CPT | Performed by: OTOLARYNGOLOGY

## 2018-12-08 ENCOUNTER — APPOINTMENT (OUTPATIENT)
Dept: CT IMAGING | Facility: HOSPITAL | Age: 43
End: 2018-12-08

## 2018-12-08 ENCOUNTER — HOSPITAL ENCOUNTER (EMERGENCY)
Facility: HOSPITAL | Age: 43
Discharge: HOME | End: 2018-12-08
Admitting: EMERGENCY MEDICINE

## 2018-12-08 VITALS
WEIGHT: 225 LBS | BODY MASS INDEX: 30.48 KG/M2 | TEMPERATURE: 98.3 F | RESPIRATION RATE: 18 BRPM | SYSTOLIC BLOOD PRESSURE: 144 MMHG | DIASTOLIC BLOOD PRESSURE: 99 MMHG | HEART RATE: 81 BPM | HEIGHT: 72 IN | OXYGEN SATURATION: 99 %

## 2018-12-08 DIAGNOSIS — R11.2 NON-INTRACTABLE VOMITING WITH NAUSEA, UNSPECIFIED VOMITING TYPE: ICD-10-CM

## 2018-12-08 DIAGNOSIS — K52.9 GASTROENTERITIS: Primary | ICD-10-CM

## 2018-12-08 DIAGNOSIS — N39.0 ACUTE UTI: ICD-10-CM

## 2018-12-08 DIAGNOSIS — K59.00 CONSTIPATION, UNSPECIFIED CONSTIPATION TYPE: ICD-10-CM

## 2018-12-08 LAB
ALBUMIN SERPL-MCNC: 4.8 G/DL (ref 3.5–5)
ALBUMIN/GLOB SERPL: 1.4 G/DL (ref 1.1–2.5)
ALP SERPL-CCNC: 109 U/L (ref 24–120)
ALT SERPL W P-5'-P-CCNC: 35 U/L (ref 0–54)
AMYLASE SERPL-CCNC: 109 U/L (ref 30–110)
ANION GAP SERPL CALCULATED.3IONS-SCNC: 13 MMOL/L (ref 4–13)
AST SERPL-CCNC: 27 U/L (ref 7–45)
BACTERIA UR QL AUTO: ABNORMAL /HPF
BASOPHILS # BLD AUTO: 0.02 10*3/MM3 (ref 0–0.2)
BASOPHILS NFR BLD AUTO: 0.3 % (ref 0–2)
BILIRUB SERPL-MCNC: 0.5 MG/DL (ref 0.1–1)
BILIRUB UR QL STRIP: ABNORMAL
BUN BLD-MCNC: 17 MG/DL (ref 5–21)
BUN/CREAT SERPL: 18.7 (ref 7–25)
CALCIUM SPEC-SCNC: 9.5 MG/DL (ref 8.4–10.4)
CHLORIDE SERPL-SCNC: 96 MMOL/L (ref 98–110)
CLARITY UR: ABNORMAL
CO2 SERPL-SCNC: 30 MMOL/L (ref 24–31)
COLOR UR: ABNORMAL
CREAT BLD-MCNC: 0.91 MG/DL (ref 0.5–1.4)
D-LACTATE SERPL-SCNC: 1.9 MMOL/L (ref 0.5–2)
DEPRECATED RDW RBC AUTO: 44 FL (ref 40–54)
EOSINOPHIL # BLD AUTO: 0 10*3/MM3 (ref 0–0.7)
EOSINOPHIL NFR BLD AUTO: 0 % (ref 0–4)
ERYTHROCYTE [DISTWIDTH] IN BLOOD BY AUTOMATED COUNT: 14.5 % (ref 12–15)
GFR SERPL CREATININE-BSD FRML MDRD: 81 ML/MIN/1.73
GLOBULIN UR ELPH-MCNC: 3.4 GM/DL
GLUCOSE BLD-MCNC: 186 MG/DL (ref 70–100)
GLUCOSE BLDC GLUCOMTR-MCNC: 190 MG/DL (ref 70–130)
GLUCOSE UR STRIP-MCNC: ABNORMAL MG/DL
HCT VFR BLD AUTO: 40.5 % (ref 37–47)
HGB BLD-MCNC: 13.9 G/DL (ref 12–16)
HGB UR QL STRIP.AUTO: NEGATIVE
HOLD SPECIMEN: NORMAL
HOLD SPECIMEN: NORMAL
IMM GRANULOCYTES # BLD: 0.02 10*3/MM3 (ref 0–0.03)
IMM GRANULOCYTES NFR BLD: 0.3 % (ref 0–5)
KETONES UR QL STRIP: ABNORMAL
LEUKOCYTE ESTERASE UR QL STRIP.AUTO: NEGATIVE
LIPASE SERPL-CCNC: 133 U/L (ref 23–203)
LYMPHOCYTES # BLD AUTO: 1.76 10*3/MM3 (ref 0.72–4.86)
LYMPHOCYTES NFR BLD AUTO: 22.6 % (ref 15–45)
MCH RBC QN AUTO: 28.9 PG (ref 28–32)
MCHC RBC AUTO-ENTMCNC: 34.3 G/DL (ref 33–36)
MCV RBC AUTO: 84.2 FL (ref 82–98)
MONOCYTES # BLD AUTO: 0.31 10*3/MM3 (ref 0.19–1.3)
MONOCYTES NFR BLD AUTO: 4 % (ref 4–12)
MUCOUS THREADS URNS QL MICRO: ABNORMAL /HPF
NEUTROPHILS # BLD AUTO: 5.69 10*3/MM3 (ref 1.87–8.4)
NEUTROPHILS NFR BLD AUTO: 72.8 % (ref 39–78)
NITRITE UR QL STRIP: NEGATIVE
NRBC BLD MANUAL-RTO: 0 /100 WBC (ref 0–0)
PH UR STRIP.AUTO: 6 [PH] (ref 5–8)
PLATELET # BLD AUTO: 299 10*3/MM3 (ref 130–400)
PMV BLD AUTO: 10.6 FL (ref 6–12)
POTASSIUM BLD-SCNC: 3.8 MMOL/L (ref 3.5–5.3)
PROT SERPL-MCNC: 8.2 G/DL (ref 6.3–8.7)
PROT UR QL STRIP: ABNORMAL
RBC # BLD AUTO: 4.81 10*6/MM3 (ref 4.2–5.4)
RBC # UR: ABNORMAL /HPF
REF LAB TEST METHOD: ABNORMAL
SODIUM BLD-SCNC: 139 MMOL/L (ref 135–145)
SP GR UR STRIP: >=1.03 (ref 1–1.03)
SQUAMOUS #/AREA URNS HPF: ABNORMAL /HPF
UROBILINOGEN UR QL STRIP: ABNORMAL
WBC NRBC COR # BLD: 7.8 10*3/MM3 (ref 4.8–10.8)
WBC UR QL AUTO: ABNORMAL /HPF
WHOLE BLOOD HOLD SPECIMEN: NORMAL
WHOLE BLOOD HOLD SPECIMEN: NORMAL

## 2018-12-08 PROCEDURE — 25010000002 IOPAMIDOL 61 % SOLUTION: Performed by: NURSE PRACTITIONER

## 2018-12-08 PROCEDURE — 99284 EMERGENCY DEPT VISIT MOD MDM: CPT

## 2018-12-08 PROCEDURE — 82962 GLUCOSE BLOOD TEST: CPT

## 2018-12-08 PROCEDURE — 81001 URINALYSIS AUTO W/SCOPE: CPT | Performed by: NURSE PRACTITIONER

## 2018-12-08 PROCEDURE — 83690 ASSAY OF LIPASE: CPT | Performed by: NURSE PRACTITIONER

## 2018-12-08 PROCEDURE — 80053 COMPREHEN METABOLIC PANEL: CPT | Performed by: NURSE PRACTITIONER

## 2018-12-08 PROCEDURE — 25010000002 PROMETHAZINE PER 50 MG: Performed by: NURSE PRACTITIONER

## 2018-12-08 PROCEDURE — 85025 COMPLETE CBC W/AUTO DIFF WBC: CPT | Performed by: NURSE PRACTITIONER

## 2018-12-08 PROCEDURE — 96361 HYDRATE IV INFUSION ADD-ON: CPT

## 2018-12-08 PROCEDURE — 25010000002 ONDANSETRON PER 1 MG: Performed by: NURSE PRACTITIONER

## 2018-12-08 PROCEDURE — 96374 THER/PROPH/DIAG INJ IV PUSH: CPT

## 2018-12-08 PROCEDURE — 74177 CT ABD & PELVIS W/CONTRAST: CPT

## 2018-12-08 PROCEDURE — 82150 ASSAY OF AMYLASE: CPT | Performed by: NURSE PRACTITIONER

## 2018-12-08 PROCEDURE — 96375 TX/PRO/DX INJ NEW DRUG ADDON: CPT

## 2018-12-08 PROCEDURE — 25010000002 DIPHENHYDRAMINE PER 50 MG: Performed by: NURSE PRACTITIONER

## 2018-12-08 PROCEDURE — 83605 ASSAY OF LACTIC ACID: CPT | Performed by: NURSE PRACTITIONER

## 2018-12-08 RX ORDER — PROMETHAZINE HYDROCHLORIDE 25 MG/1
25 TABLET ORAL EVERY 6 HOURS PRN
Qty: 10 TABLET | Refills: 0 | Status: SHIPPED | OUTPATIENT
Start: 2018-12-08 | End: 2020-05-05

## 2018-12-08 RX ORDER — CEFDINIR 300 MG/1
300 CAPSULE ORAL 2 TIMES DAILY
Qty: 20 CAPSULE | Refills: 0 | Status: SHIPPED | OUTPATIENT
Start: 2018-12-08 | End: 2018-12-18

## 2018-12-08 RX ORDER — PROMETHAZINE HYDROCHLORIDE 25 MG/ML
12.5 INJECTION, SOLUTION INTRAMUSCULAR; INTRAVENOUS ONCE
Status: COMPLETED | OUTPATIENT
Start: 2018-12-08 | End: 2018-12-08

## 2018-12-08 RX ORDER — FAMOTIDINE 10 MG/ML
20 INJECTION, SOLUTION INTRAVENOUS ONCE
Status: COMPLETED | OUTPATIENT
Start: 2018-12-08 | End: 2018-12-08

## 2018-12-08 RX ORDER — ONDANSETRON 2 MG/ML
8 INJECTION INTRAMUSCULAR; INTRAVENOUS ONCE
Status: COMPLETED | OUTPATIENT
Start: 2018-12-08 | End: 2018-12-08

## 2018-12-08 RX ORDER — POLYETHYLENE GLYCOL 3350 17 G/17G
17 POWDER, FOR SOLUTION ORAL DAILY
Qty: 30 PACKET | Refills: 0 | Status: SHIPPED | OUTPATIENT
Start: 2018-12-08

## 2018-12-08 RX ORDER — DIPHENHYDRAMINE HYDROCHLORIDE 50 MG/ML
50 INJECTION INTRAMUSCULAR; INTRAVENOUS ONCE
Status: COMPLETED | OUTPATIENT
Start: 2018-12-08 | End: 2018-12-08

## 2018-12-08 RX ADMIN — IOPAMIDOL 100 ML: 612 INJECTION, SOLUTION INTRAVENOUS at 11:24

## 2018-12-08 RX ADMIN — FAMOTIDINE 20 MG: 10 INJECTION, SOLUTION INTRAVENOUS at 10:10

## 2018-12-08 RX ADMIN — SODIUM CHLORIDE 1000 ML: 9 INJECTION, SOLUTION INTRAVENOUS at 10:07

## 2018-12-08 RX ADMIN — DIPHENHYDRAMINE HYDROCHLORIDE 50 MG: 50 INJECTION, SOLUTION INTRAMUSCULAR; INTRAVENOUS at 13:41

## 2018-12-08 RX ADMIN — PROMETHAZINE HYDROCHLORIDE 12.5 MG: 25 INJECTION INTRAMUSCULAR; INTRAVENOUS at 12:51

## 2018-12-08 RX ADMIN — ONDANSETRON HYDROCHLORIDE 8 MG: 2 INJECTION, SOLUTION INTRAMUSCULAR; INTRAVENOUS at 10:13

## 2018-12-08 NOTE — DISCHARGE INSTRUCTIONS
Return to ER if symptoms worsen   Clear liquids only for 24 hours, then advance as tolerated  Increase water intake    Antibiotic Medicine, Adult  Antibiotic medicines treat infections caused by a type of germ called bacteria. They work by killing the bacteria that make you sick.  When do I need to take antibiotics?  You often need these medicines to treat bacterial infections, such as:  · A urinary tract infection (UTI).  · Strep throat.  · Meningitis. This affects the spinal cord and brain.  · A bad lung infection.    You may start the medicines while your doctor waits for tests to come back. When the tests come back, your doctor may change or stop your medicine.  When are antibiotics not needed?  You do not need these medicines for most common illnesses, such as:  · A cold.  · The flu.  · A sore throat.    Antibiotics are not always needed for all infections caused by bacteria. Do not ask for these medicines, or take them, when they are not needed.  What are the risks of taking antibiotics?  Most antibiotics can cause an infection called Clostridium difficile.This causes watery poop (diarrhea). Let your doctor know right away if:  · You have watery poop while taking an antibiotic.  · You have watery poop after you stop taking an antibiotic. The illness can happen weeks after you stop the medicine.    You also have a risk of getting an infection in the future that antibiotics cannot treat (antibiotic-resistant infection). This type of infection can be dangerous.  What else should I know about taking antibiotics?  · You need to take the entire prescription.  ? Take the medicine for as long as told by your doctor.  ? Do not stop taking it even if you start to feel better.  · Try not to miss any doses. If you miss a dose, call your doctor.  · Birth control pills may not work. If you take birth control pills:  ? Keep on taking them.  ? Use a second form of birth control, such as a condom. Do this for as long as told  by your doctor.  · Ask your doctor:  ? How long to wait in between doses.  ? If you should take the medicine with food.  ? If there is anything you should stay away from while taking the antibiotic, such as:  ? Food.  ? Drinks.  ? Medicines.  ? If there are any side effects you should watch for.  · Only take the medicines that your doctor told you to take. Do not take medicines that were given to someone else.  · Drink a large glass of water with the medicine.  · Ask the pharmacist for a tool to measure the medicine, such as:  ? A syringe.  ? A cup.  ? A spoon.  · Throw away any extra medicine.  Contact a doctor if:  · You get worse.  · You have new joint pain or muscle aches after starting the medicine.  · You have side effects from the medicine, such as:  ? Stomach pain.  ? Watery poop.  ? Feeling sick to your stomach (nausea).  Get help right away if:  · You have signs of a very bad allergic reaction. If this happens, stop taking the medicine right away. Signs may include:  ? Hives. These are raised, itchy, red bumps on the skin.  ? Skin rash.  ? Trouble breathing.  ? Wheezing.  ? Swelling.  ? Feeling dizzy.  ? Throwing up (vomiting).  · Your pee (urine) is dark, or is the color of blood.  · Your skin turns yellow.  · You bruise easily.  · You bleed easily.  · You have very bad watery poop and cramps in your belly.  · You have a very bad headache.  Summary  · Antibiotics are often used to treat infections caused by bacteria.  · Only take these medicines when needed.  · Let your doctor know if you have watery poop while taking an antibiotic.  · You need to take the entire prescription.  This information is not intended to replace advice given to you by your health care provider. Make sure you discuss any questions you have with your health care provider.  Document Released: 09/26/2009 Document Revised: 12/20/2017 Document Reviewed: 12/20/2017  [employer] Interactive Patient Education © 2017 [employer]      Constipation, Adult  Constipation is when a person:  · Poops (has a bowel movement) fewer times in a week than normal.  · Has a hard time pooping.  · Has poop that is dry, hard, or bigger than normal.    Follow these instructions at home:  Eating and drinking    · Eat foods that have a lot of fiber, such as:  ? Fresh fruits and vegetables.  ? Whole grains.  ? Beans.  · Eat less of foods that are high in fat, low in fiber, or overly processed, such as:  ? French fries.  ? Hamburgers.  ? Cookies.  ? Candy.  ? Soda.  · Drink enough fluid to keep your pee (urine) clear or pale yellow.  General instructions  · Exercise regularly or as told by your doctor.  · Go to the restroom when you feel like you need to poop. Do not hold it in.  · Take over-the-counter and prescription medicines only as told by your doctor. These include any fiber supplements.  · Do pelvic floor retraining exercises, such as:  ? Doing deep breathing while relaxing your lower belly (abdomen).  ? Relaxing your pelvic floor while pooping.  · Watch your condition for any changes.  · Keep all follow-up visits as told by your doctor. This is important.  Contact a doctor if:  · You have pain that gets worse.  · You have a fever.  · You have not pooped for 4 days.  · You throw up (vomit).  · You are not hungry.  · You lose weight.  · You are bleeding from the anus.  · You have thin, pencil-like poop (stool).  Get help right away if:  · You have a fever, and your symptoms suddenly get worse.  · You leak poop or have blood in your poop.  · Your belly feels hard or bigger than normal (is bloated).  · You have very bad belly pain.  · You feel dizzy or you faint.  This information is not intended to replace advice given to you by your health care provider. Make sure you discuss any questions you have with your health care provider.  Document Released: 06/05/2009 Document Revised: 07/07/2017 Document Reviewed: 06/07/2017  [employer] Patient  Education © 2018 [employer]      Constipation, Adult  Constipation is when a person has fewer bowel movements in a week than normal, has difficulty having a bowel movement, or has stools that are dry, hard, or larger than normal. Constipation may be caused by an underlying condition. It may become worse with age if a person takes certain medicines and does not take in enough fluids.  Follow these instructions at home:  Eating and drinking    · Eat foods that have a lot of fiber, such as fresh fruits and vegetables, whole grains, and beans.  · Limit foods that are high in fat, low in fiber, or overly processed, such as french fries, hamburgers, cookies, candies, and soda.  · Drink enough fluid to keep your urine clear or pale yellow.  General instructions  · Exercise regularly or as told by your health care provider.  · Go to the restroom when you have the urge to go. Do not hold it in.  · Take over-the-counter and prescription medicines only as told by your health care provider. These include any fiber supplements.  · Practice pelvic floor retraining exercises, such as deep breathing while relaxing the lower abdomen and pelvic floor relaxation during bowel movements.  · Watch your condition for any changes.  · Keep all follow-up visits as told by your health care provider. This is important.  Contact a health care provider if:  · You have pain that gets worse.  · You have a fever.  · You do not have a bowel movement after 4 days.  · You vomit.  · You are not hungry.  · You lose weight.  · You are bleeding from the anus.  · You have thin, pencil-like stools.  Get help right away if:  · You have a fever and your symptoms suddenly get worse.  · You leak stool or have blood in your stool.  · Your abdomen is bloated.  · You have severe pain in your abdomen.  · You feel dizzy or you faint.  This information is not intended to replace advice given to you by your health care provider. Make sure you discuss any questions  you have with your health care provider.  Document Released: 09/15/2005 Document Revised: 07/07/2017 Document Reviewed: 06/07/2017  [employer] Interactive Patient Education © 2018 [employer]      Nausea and Vomiting, Adult  Feeling sick to your stomach (nausea) means that your stomach is upset or you feel like you have to throw up (vomit). Feeling more and more sick to your stomach can lead to throwing up. Throwing up happens when food and liquid from your stomach are thrown up and out the mouth. Throwing up can make you feel weak and cause you to get dehydrated. Dehydration can make you tired and thirsty, make you have a dry mouth, and make it so you pee (urinate) less often. Older adults and people with other diseases or a weak defense system (immune system) are at higher risk for dehydration. If you feel sick to your stomach or if you throw up, it is important to follow instructions from your doctor about how to take care of yourself.  Follow these instructions at home:  Eating and drinking  Follow these instructions as told by your doctor:  · Take an oral rehydration solution (ORS). This is a drink that is sold at pharmacies and stores.  · Drink clear fluids in small amounts as you are able, such as:  ? Water.  ? Ice chips.  ? Diluted fruit juice.  ? Low-calorie sports drinks.  · Eat bland, easy-to-digest foods in small amounts as you are able, such as:  ? Bananas.  ? Applesauce.  ? Rice.  ? Low-fat (lean) meats.  ? Toast.  ? Crackers.  · Avoid fluids that have a lot of sugar or caffeine in them.  · Avoid alcohol.  · Avoid spicy or fatty foods.    General instructions  · Drink enough fluid to keep your pee (urine) clear or pale yellow.  · Wash your hands often. If you cannot use soap and water, use hand .  · Make sure that all people in your home wash their hands well and often.  · Take over-the-counter and prescription medicines only as told by your doctor.  · Rest at home while you get  better.  · Watch your condition for any changes.  · Breathe slowly and deeply when you feel sick to your stomach.  · Keep all follow-up visits as told by your doctor. This is important.  Contact a doctor if:  · You have a fever.  · You cannot keep fluids down.  · Your symptoms get worse.  · You have new symptoms.  · You feel sick to your stomach for more than two days.  · You feel light-headed or dizzy.  · You have a headache.  · You have muscle cramps.  Get help right away if:  · You have pain in your chest, neck, arm, or jaw.  · You feel very weak or you pass out (faint).  · You throw up again and again.  · You see blood in your throw-up.  · Your throw-up looks like black coffee grounds.  · You have bloody or black poop (stools) or poop that look like tar.  · You have a very bad headache, a stiff neck, or both.  · You have a rash.  · You have very bad pain, cramping, or bloating in your belly (abdomen).  · You have trouble breathing.  · You are breathing very quickly.  · Your heart is beating very quickly.  · Your skin feels cold and clammy.  · You feel confused.  · You have pain when you pee.  · You have signs of dehydration, such as:  ? Dark pee, hardly any pee, or no pee.  ? Cracked lips.  ? Dry mouth.  ? Sunken eyes.  ? Sleepiness.  ? Weakness.  These symptoms may be an emergency. Do not wait to see if the symptoms will go away. Get medical help right away. Call your local emergency services (911 in the [place] Do not drive yourself to the hospital.  This information is not intended to replace advice given to you by your health care provider. Make sure you discuss any questions you have with your health care provider.  Document Released: 06/05/2009 Document Revised: 07/07/2017 Document Reviewed: 08/23/2016  [employer] Interactive Patient Education © 2018 [employer]      Urinary Tract Infection, Adult  A urinary tract infection (UTI) is an infection of any part of the urinary tract, which includes the kidneys,  ureters, bladder, and urethra. These organs make, store, and get rid of urine in the body. UTI can be a bladder infection (cystitis) or kidney infection (pyelonephritis).  What are the causes?  This infection may be caused by fungi, viruses, or bacteria. Bacteria are the most common cause of UTIs. This condition can also be caused by repeated incomplete emptying of the bladder during urination.  What increases the risk?  This condition is more likely to develop if:  · You ignore your need to urinate or hold urine for long periods of time.  · You do not empty your bladder completely during urination.  · You wipe back to front after urinating or having a bowel movement, if you are female.  · You are uncircumcised, if you are male.  · You are constipated.  · You have a urinary catheter that stays in place (indwelling).  · You have a weak defense (immune) system.  · You have a medical condition that affects your bowels, kidneys, or bladder.  · You have diabetes.  · You take antibiotic medicines frequently or for long periods of time, and the antibiotics no longer work well against certain types of infections (antibiotic resistance).  · You take medicines that irritate your urinary tract.  · You are exposed to chemicals that irritate your urinary tract.  · You are female.    What are the signs or symptoms?  Symptoms of this condition include:  · Fever.  · Frequent urination or passing small amounts of urine frequently.  · Needing to urinate urgently.  · Pain or burning with urination.  · Urine that smells bad or unusual.  · Cloudy urine.  · Pain in the lower abdomen or back.  · Trouble urinating.  · Blood in the urine.  · Vomiting or being less hungry than normal.  · Diarrhea or abdominal pain.  · Vaginal discharge, if you are female.    How is this diagnosed?  This condition is diagnosed with a medical history and physical exam. You will also need to provide a urine sample to test your urine. Other tests may be done,  including:  · Blood tests.  · Sexually transmitted disease (STD) testing.    If you have had more than one UTI, a cystoscopy or imaging studies may be done to determine the cause of the infections.  How is this treated?  Treatment for this condition often includes a combination of two or more of the following:  · Antibiotic medicine.  · Other medicines to treat less common causes of UTI.  · Over-the-counter medicines to treat pain.  · Drinking enough water to stay hydrated.    Follow these instructions at home:  · Take over-the-counter and prescription medicines only as told by your health care provider.  · If you were prescribed an antibiotic, take it as told by your health care provider. Do not stop taking the antibiotic even if you start to feel better.  · Avoid alcohol, caffeine, tea, and carbonated beverages. They can irritate your bladder.  · Drink enough fluid to keep your urine clear or pale yellow.  · Keep all follow-up visits as told by your health care provider. This is important.  · Make sure to:  ? Empty your bladder often and completely. Do not hold urine for long periods of time.  ? Empty your bladder before and after sex.  ? Wipe from front to back after a bowel movement if you are female. Use each tissue one time when you wipe.  Contact a health care provider if:  · You have back pain.  · You have a fever.  · You feel nauseous or vomit.  · Your symptoms do not get better after 3 days.  · Your symptoms go away and then return.  Get help right away if:  · You have severe back pain or lower abdominal pain.  · You are vomiting and cannot keep down any medicines or water.  This information is not intended to replace advice given to you by your health care provider. Make sure you discuss any questions you have with your health care provider.  Document Released: 09/27/2006 Document Revised: 05/31/2017 Document Reviewed: 11/07/2016  [employer] Interactive Patient Education © 2018 [employer]

## 2018-12-08 NOTE — ED NOTES
Pt has not vomited or had a BM since she has been here.  Pt has been resting quietly.     Georgette Cordova RN  12/08/18 [id number]

## 2018-12-08 NOTE — ED PROVIDER NOTES
Subjective   Patient is a 43-year-old black female presents with generalized abdominal pain, nausea, and vomiting for about the past 12 hours.  She states last time she vomited was just prior to arrival.  She denies diarrhea.  She states her last bowel movement has been about a week ago.  She denies fever or chills.  Patient states that she is about 1 week postop thyroidectomy and has been doing well from that.        History provided by:  Patient   used: No        Review of Systems   Constitutional: Negative.    HENT: Negative.    Eyes: Negative.    Respiratory: Negative.    Cardiovascular: Negative.    Gastrointestinal:        Patient is a 43-year-old black female presents with generalized abdominal pain, nausea, and vomiting for about the past 12 hours.  She states last time she vomited was just prior to arrival.  She denies diarrhea.  She states her last bowel movement has been about a week ago.  She denies fever or chills.  Patient states that she is about 1 week postop thyroidectomy and has been doing well from that.   Endocrine: Negative.    Genitourinary: Negative.    Musculoskeletal: Negative.    Skin: Negative.    Allergic/Immunologic: Negative.    Neurological: Negative.    Hematological: Negative.    Psychiatric/Behavioral: Negative.    All other systems reviewed and are negative.      Past Medical History:   Diagnosis Date   • Arthritis    • Carotid artery stenosis    • Degenerative disc disease, cervical    • Depression    • Diabetes mellitus (CMS/HCC)     type 1   • Disease of thyroid gland    • GERD (gastroesophageal reflux disease)    • Graves disease    • Heart palpitations    • Hyperlipidemia    • Hypertension    • Injury of back    • Seizure (CMS/HCC)        Allergies   Allergen Reactions   • Oxycodone-Acetaminophen Swelling     Other reaction(s): Throat swelling       Past Surgical History:   Procedure Laterality Date   •  SECTION     • CHOLECYSTECTOMY     •  COLONOSCOPY     • CYST REMOVAL     • HYSTERECTOMY         Family History   Problem Relation Age of Onset   • Stroke Mother    • Diabetes Mother    • Stroke Father    • Diabetes Father    • Diabetes Sister    • Coronary artery disease Brother    • Diabetes Brother    • Stroke Sister    • Seizures Sister    • Colon cancer Paternal Uncle        Social History     Socioeconomic History   • Marital status: Significant Other     Spouse name: Not on file   • Number of children: Not on file   • Years of education: Not on file   • Highest education level: Not on file   Tobacco Use   • Smoking status: Never Smoker   • Smokeless tobacco: Never Used   Substance and Sexual Activity   • Alcohol use: No   • Drug use: Yes     Types: Marijuana   • Sexual activity: Defer       Prior to Admission medications    Medication Sig Start Date End Date Taking? Authorizing Provider   amLODIPine (NORVASC) 10 MG tablet Take 0.5 tablets by mouth Daily. 7/12/17   Devyn Moreno MD   aspirin 81 MG EC tablet Take 81 mg by mouth Daily.    ProviderLuciano MD   benazepril (LOTENSIN) 20 MG tablet Take 20 mg by mouth 2 (Two) Times a Day.    Luciano Hayes MD   DULoxetine (CYMBALTA) 60 MG capsule Take 60 mg by mouth Daily.    Luciano Hayes MD   gabapentin (NEURONTIN) 300 MG capsule Take 300 mg by mouth 4 (Four) Times a Day.    Luciano Hayes MD   Insulin Glargine (BASAGLAR KWIKPEN) 100 UNIT/ML injection pen 72 Units Every Night. 9/11/17   Luciano Haeys MD   levothyroxine (SYNTHROID, LEVOTHROID) 125 MCG tablet Take 1 tablet by mouth Daily. 11/20/18   Vitaly Givens MD   omeprazole (priLOSEC) 20 MG capsule Take 1 capsule by mouth Daily. 2/26/18   Zora Stallworth APRN   ondansetron (ZOFRAN) 4 MG tablet Take 1 tablet by mouth Every 8 (Eight) Hours As Needed for Nausea or Vomiting. 3/28/18   Alma Rhodes APRN   propranolol LA (INDERAL LA) 120 MG 24 hr capsule Take 1 capsule by mouth Daily. 7/12/17    "Devyn Moreno MD   raNITIdine (ZANTAC) 300 MG tablet Take 300 mg by mouth Every Night. 9/11/17   Luciano Hayes MD   simvastatin (ZOCOR) 20 MG tablet Take 20 mg by mouth Every Night.    Luciano Hayes MD   tiZANidine (ZANAFLEX) 4 MG tablet Take 4 mg by mouth Every Night.    Luciano Hayes MD   topiramate (TOPAMAX) 50 MG tablet Take 50 mg by mouth 2 (Two) Times a Day.    Luciano Hayes MD       /99   Pulse 81   Temp 98.3 °F (36.8 °C)   Resp 18   Ht 188 cm (74\")   Wt 102 kg (225 lb)   SpO2 99%   BMI 28.89 kg/m²     Objective   Physical Exam   Constitutional: She is oriented to person, place, and time. She appears well-developed and well-nourished.   HENT:   Head: Normocephalic and atraumatic.   Eyes: Conjunctivae and EOM are normal. Pupils are equal, round, and reactive to light.   Neck: Normal range of motion. Neck supple. No tracheal deviation present. No thyromegaly present.   Cardiovascular: Normal rate, regular rhythm, normal heart sounds and intact distal pulses.   Pulmonary/Chest: Effort normal and breath sounds normal. No respiratory distress. She has no wheezes. She has no rales. She exhibits no tenderness.   Abdominal: Soft. Bowel sounds are normal.   abd soft, non distended. Moderate tenderness to generalized abd. No guarding or rebound. bs x 4 quads.    Musculoskeletal: Normal range of motion.   Neurological: She is alert and oriented to person, place, and time. She has normal reflexes. No cranial nerve deficit.   Skin: Skin is warm and dry.   Sl pallor noted.    Psychiatric: She has a normal mood and affect. Her behavior is normal. Judgment and thought content normal.   Nursing note and vitals reviewed.      Procedures         Lab Results (last 24 hours)     Procedure Component Value Units Date/Time    POC Glucose Once [[id number]]  (Abnormal) Collected:  12/08/18 0931    Specimen:  Blood Updated:  12/08/18 0943     Glucose 190 mg/dL      Comment: : [id number] " Art Gannon ID: [id number]       Urinalysis With Culture If Indicated - Urine, Clean Catch [[id number]]  (Abnormal) Collected:  12/08/18 0948    Specimen:  Urine, Clean Catch Updated:  12/08/18 1031     Color, UA Dark Yellow     Appearance, UA Cloudy     pH, UA 6.0     Specific Gravity, UA >=1.030     Glucose,  mg/dL (1+)     Ketones, UA Trace     Bilirubin, UA Small (1+)     Blood, UA Negative     Protein, UA 30 mg/dL (1+)     Leuk Esterase, UA Negative     Nitrite, UA Negative     Urobilinogen, UA 1.0 E.U./dL    Urinalysis, Microscopic Only - Urine, Clean Catch [[id number]]  (Abnormal) Collected:  12/08/18 0948    Specimen:  Urine, Clean Catch Updated:  12/08/18 1031     RBC, UA None Seen /HPF      WBC, UA 0-2 /HPF      Bacteria, UA Trace /HPF      Squamous Epithelial Cells, UA 3-6 /HPF      Mucus, UA Small/1+ /HPF      Methodology Manual Light Microscopy    CBC & Differential [[id number]] Collected:  12/08/18 1006    Specimen:  Blood Updated:  12/08/18 1015    Narrative:       The following orders were created for panel order CBC & Differential.  Procedure                               Abnormality         Status                     ---------                               -----------         ------                     CBC Auto Differential[[id number]]        Normal              Final result                 Please view results for these tests on the individual orders.    Lipase [[id number]]  (Normal) Collected:  12/08/18 1006    Specimen:  Blood Updated:  12/08/18 1030     Lipase 133 U/L     Amylase [[id number]]  (Normal) Collected:  12/08/18 1006    Specimen:  Blood Updated:  12/08/18 1030     Amylase 109 U/L     Comprehensive Metabolic Panel [[id number]]  (Abnormal) Collected:  12/08/18 1006    Specimen:  Blood Updated:  12/08/18 1030     Glucose 186 mg/dL      BUN 17 mg/dL      Creatinine 0.91 mg/dL      Sodium 139 mmol/L      Potassium 3.8 mmol/L      Chloride 96 mmol/L      CO2 30.0 mmol/L      Calcium  9.5 mg/dL      Total Protein 8.2 g/dL      Albumin 4.80 g/dL      ALT (SGPT) 35 U/L      AST (SGOT) 27 U/L      Alkaline Phosphatase 109 U/L      Total Bilirubin 0.5 mg/dL      eGFR   Amer 81 mL/min/1.73      Globulin 3.4 gm/dL      A/G Ratio 1.4 g/dL      BUN/Creatinine Ratio 18.7     Anion Gap 13.0 mmol/L     Lactic Acid, Plasma [[id number]]  (Normal) Collected:  12/08/18 1006    Specimen:  Blood Updated:  12/08/18 1031     Lactate 1.9 mmol/L     CBC Auto Differential [[id number]]  (Normal) Collected:  12/08/18 1006    Specimen:  Blood Updated:  12/08/18 1015     WBC 7.80 10*3/mm3      RBC 4.81 10*6/mm3      Hemoglobin 13.9 g/dL      Hematocrit 40.5 %      MCV 84.2 fL      MCH 28.9 pg      MCHC 34.3 g/dL      RDW 14.5 %      RDW-SD 44.0 fl      MPV 10.6 fL      Platelets 299 10*3/mm3      Neutrophil % 72.8 %      Lymphocyte % 22.6 %      Monocyte % 4.0 %      Eosinophil % 0.0 %      Basophil % 0.3 %      Immature Grans % 0.3 %      Neutrophils, Absolute 5.69 10*3/mm3      Lymphocytes, Absolute 1.76 10*3/mm3      Monocytes, Absolute 0.31 10*3/mm3      Eosinophils, Absolute 0.00 10*3/mm3      Basophils, Absolute 0.02 10*3/mm3      Immature Grans, Absolute 0.02 10*3/mm3      nRBC 0.0 /100 WBC           CT Abdomen Pelvis With Contrast   ED Interpretation   1. There is mild constipation with increased stool within the right and   transverse colon. There is no evidence of obstruction or free air. No   localized inflammatory process is demonstrated.   2. The patient is status post cholecystectomy and hysterectomy.   3. Small hiatal hernia.   4. Mild steatosis of the liver.   5. Right adrenal nodule-likely representing an adenoma and unchanged   from the previous study. This is also unchanged from previous more   remote study of 3/14/2017..            This report was finalized on 12/08/2018 11:56 by Dr. Tito Sweet MD.      Final Result   1. There is mild constipation with increased stool within the right and    transverse colon. There is no evidence of obstruction or free air. No   localized inflammatory process is demonstrated.   2. The patient is status post cholecystectomy and hysterectomy.   3. Small hiatal hernia.   4. Mild steatosis of the liver.   5. Right adrenal nodule-likely representing an adenoma and unchanged   from the previous study. This is also unchanged from previous more   remote study of 3/14/2017..            This report was finalized on 12/08/2018 11:56 by Dr. Tito Sweet MD.          ED Course  ED Course as of Dec 08 1618   Sat Dec 08, 2018   1107 Pending ct scan of abd at this time   [CW]   1234 Reeval pt- states that she is still feeling nauseated. Have ordered phenergan at this time and will reeval   [CW]   1322 Pt states that she is still nauseated at this time. Will order benadryl 50mg iv and will reeeval   [CW]   1414 Pt nausea is improved with benadryl. Will discharged home shortly in stable condition to follow up with dr martinez this next week- advised to return if symptoms worsen   [CW]      ED Course User Index  [CW] Michelle Pedraza, BRIANA          MDM  Number of Diagnoses or Management Options  Acute UTI: minor  Constipation, unspecified constipation type: minor  Gastroenteritis: minor  Non-intractable vomiting with nausea, unspecified vomiting type: minor     Amount and/or Complexity of Data Reviewed  Clinical lab tests: ordered and reviewed  Tests in the radiology section of CPT®: ordered and reviewed  Independent visualization of images, tracings, or specimens: yes    Patient Progress  Patient progress: stable      Final diagnoses:   Gastroenteritis   Non-intractable vomiting with nausea, unspecified vomiting type   Constipation, unspecified constipation type   Acute UTI          Michelle Pedraza APRN  12/08/18 1618

## 2018-12-10 ENCOUNTER — TELEPHONE (OUTPATIENT)
Dept: OTOLARYNGOLOGY | Facility: CLINIC | Age: 43
End: 2018-12-10

## 2018-12-10 ENCOUNTER — HOSPITAL ENCOUNTER (OUTPATIENT)
Dept: WOMENS IMAGING | Age: 43
Discharge: HOME | End: 2018-12-10
Payer: MEDICAID

## 2018-12-10 ENCOUNTER — OFFICE VISIT (OUTPATIENT)
Dept: SURGERY | Age: 43
End: 2018-12-10
Payer: MEDICAID

## 2018-12-10 VITALS
TEMPERATURE: 97.2 F | SYSTOLIC BLOOD PRESSURE: 114 MMHG | WEIGHT: 224.8 LBS | BODY MASS INDEX: 30.45 KG/M2 | DIASTOLIC BLOOD PRESSURE: 70 MMHG | HEIGHT: 72 IN

## 2018-12-10 DIAGNOSIS — Z12.39 SCREENING BREAST EXAMINATION: Primary | ICD-10-CM

## 2018-12-10 DIAGNOSIS — E89.0 POSTOPERATIVE HYPOTHYROIDISM: Primary | ICD-10-CM

## 2018-12-10 DIAGNOSIS — Z12.39 SCREENING BREAST EXAMINATION: ICD-10-CM

## 2018-12-10 PROCEDURE — 99212 OFFICE O/P EST SF 10 MIN: CPT | Performed by: PHYSICIAN ASSISTANT

## 2018-12-10 PROCEDURE — 77067 SCR MAMMO BI INCL CAD: CPT

## 2018-12-10 RX ORDER — PROMETHAZINE HYDROCHLORIDE 25 MG/1
25 TABLET ORAL EVERY 6 HOURS PRN
COMMUNITY

## 2018-12-10 RX ORDER — CEFDINIR 300 MG/1
300 CAPSULE ORAL 2 TIMES DAILY
COMMUNITY

## 2018-12-10 RX ORDER — LEVOTHYROXINE SODIUM 0.15 MG/1
150 TABLET ORAL DAILY
Qty: 30 TABLET | Refills: 1 | Status: SHIPPED | OUTPATIENT
Start: 2018-12-10 | End: 2019-02-13 | Stop reason: SDUPTHER

## 2018-12-10 NOTE — TELEPHONE ENCOUNTER
----- Message from Vitaly Givens MD sent at 12/7/2018 12:12 PM CST -----  She is hypothyroid. Needs to go up on synthroid by 0.025mg and recheck TSH in 3-4 weeks. Her PTH was elevated postop but she has normal calcium levels. I would watch and recheck a PTH in 4 months. Oma Molina is seeing postop. Make sure she knows I am aware of PTH and that we will follow with PTH level and follow up in 4 months     LAN Givens MD

## 2018-12-11 ENCOUNTER — OFFICE VISIT (OUTPATIENT)
Dept: OTOLARYNGOLOGY | Facility: CLINIC | Age: 43
End: 2018-12-11

## 2018-12-11 VITALS
HEIGHT: 72 IN | BODY MASS INDEX: 31.02 KG/M2 | TEMPERATURE: 96.7 F | DIASTOLIC BLOOD PRESSURE: 88 MMHG | HEART RATE: 78 BPM | SYSTOLIC BLOOD PRESSURE: 137 MMHG | WEIGHT: 229 LBS

## 2018-12-11 DIAGNOSIS — E34.9 ELEVATED PARATHYROID HORMONE: ICD-10-CM

## 2018-12-11 DIAGNOSIS — E89.0 POSTOPERATIVE HYPOTHYROIDISM: ICD-10-CM

## 2018-12-11 DIAGNOSIS — R53.83 OTHER FATIGUE: Primary | ICD-10-CM

## 2018-12-11 PROCEDURE — 99024 POSTOP FOLLOW-UP VISIT: CPT | Performed by: NURSE PRACTITIONER

## 2018-12-11 NOTE — PROGRESS NOTES
YOB: 1975  Location: [facility]  Location Address: [address] [place] [address], [facility], [address] [place] [address]  Location Phone: [phone]    Chief Complaint   Patient presents with   • Thyroid Problem       History of Present Illness  Lynn Magana is a 43 y.o. female.  Lynn Magana is here for follow up of ENT complaints. The patient has had problems with fatigue s/p total thyroidectomy.  The symptoms are not localized to a particular location. The patient has had improving symptoms. The symptoms have been present for the last several weeks The symptoms are aggravated by  surgery. The symptoms are improved by medications.      Tissue Pathology Exam [[id number]] (Order [id number])   Order   Date: 2018 Department: [address] Released By: Srinivasan Lerma RN Authorizing: Vitaly Givens MD   Reprint Order Requisition     Tissue Pathology Exam (Order #[id number]) on 18       Result Notes for Tissue Pathology Exam     Notes recorded by Kirstin Appiah RN on 2018 at 12:06 PM CST  Called patient and let her know pathology and reminded her of her follow up appointment.  ------    Notes recorded by Vitaly Givens MD on 2018 at 2:44 PM CST  Call pt about benign pathology   Tissue Pathology Exam: [id number]   Order: [id number]   Collected:  2018 11:05 Status:  Final result   Visible to patient:  Yes (MyChart) Dx:  Thyromegaly; Graves' disease; Hyperth...   Component    Final Diagnosis   1.  Left neck soft tissue, biopsy:       Benign thyroid glandular tissue.     2.  Thyroid gland, total thyroidectomy:       Hyperplastic thyroid glandular tissue consistent with Graves' disease.   Electronically signed by Nathaniel Lewis MD on 2018 at 1351                  Past Medical History:   Diagnosis Date   • Arthritis    • Carotid artery stenosis    • Degenerative disc disease, cervical    • Depression    • Diabetes mellitus (CMS/HCC)     type  1   • Disease of thyroid gland    • GERD (gastroesophageal reflux disease)    • Graves disease    • Heart palpitations    • Hyperlipidemia    • Hypertension    • Hypothyroidism    • Seizure (CMS/HCC)    • Thyromegaly        Past Surgical History:   Procedure Laterality Date   •  SECTION     • CHOLECYSTECTOMY     • COLONOSCOPY     • CYST REMOVAL     • HYSTERECTOMY         Outpatient Medications Marked as Taking for the 18 encounter (Office Visit) with Oma Molina APRN   Medication Sig Dispense Refill   • amLODIPine (NORVASC) 10 MG tablet Take 0.5 tablets by mouth Daily.     • aspirin 81 MG EC tablet Take 81 mg by mouth Daily.     • benazepril (LOTENSIN) 20 MG tablet Take 20 mg by mouth 2 (Two) Times a Day.     • cefdinir (OMNICEF) 300 MG capsule Take 1 capsule by mouth 2 (Two) Times a Day for 10 days. 20 capsule 0   • DULoxetine (CYMBALTA) 60 MG capsule Take 60 mg by mouth Daily.     • gabapentin (NEURONTIN) 300 MG capsule Take 300 mg by mouth 4 (Four) Times a Day.     • Insulin Glargine (BASAGLAR KWIKPEN) 100 UNIT/ML injection pen 72 Units Every Night.     • levothyroxine (SYNTHROID, LEVOTHROID) 150 MCG tablet Take 1 tablet by mouth Daily. 30 tablet 1   • omeprazole (priLOSEC) 20 MG capsule Take 1 capsule by mouth Daily. 30 capsule 11   • ondansetron (ZOFRAN) 4 MG tablet Take 1 tablet by mouth Every 8 (Eight) Hours As Needed for Nausea or Vomiting. 30 tablet 0   • polyethylene glycol (MIRALAX) packet Take 17 g by mouth Daily. 30 packet 0   • promethazine (PHENERGAN) 25 MG tablet Take 1 tablet by mouth Every 6 (Six) Hours As Needed for Nausea or Vomiting. 10 tablet 0   • propranolol LA (INDERAL LA) 120 MG 24 hr capsule Take 1 capsule by mouth Daily. 30 capsule 2   • raNITIdine (ZANTAC) 300 MG tablet Take 300 mg by mouth Every Night.     • simvastatin (ZOCOR) 20 MG tablet Take 20 mg by mouth Every Night.     • tiZANidine (ZANAFLEX) 4 MG tablet Take 4 mg by mouth Every Night.     • topiramate (TOPAMAX)  50 MG tablet Take 50 mg by mouth 2 (Two) Times a Day.         Oxycodone-acetaminophen    Family History   Problem Relation Age of Onset   • Stroke Mother    • Diabetes Mother    • Stroke Father    • Diabetes Father    • Diabetes Sister    • Coronary artery disease Brother    • Diabetes Brother    • Stroke Sister    • Seizures Sister    • Colon cancer Paternal Uncle        Social History     Socioeconomic History   • Marital status: Significant Other     Spouse name: Not on file   • Number of children: Not on file   • Years of education: Not on file   • Highest education level: Not on file   Social Needs   • Financial resource strain: Not on file   • Food insecurity - worry: Not on file   • Food insecurity - inability: Not on file   • Transportation needs - medical: Not on file   • Transportation needs - non-medical: Not on file   Occupational History   • Not on file   Tobacco Use   • Smoking status: Never Smoker   • Smokeless tobacco: Never Used   Substance and Sexual Activity   • Alcohol use: No   • Drug use: Yes     Types: Marijuana   • Sexual activity: Defer   Other Topics Concern   • Not on file   Social History Narrative   • Not on file       Review of Systems   Constitutional: Positive for fatigue.   HENT:        SEE HPI   Eyes: Negative.    Respiratory: Negative.    Cardiovascular: Negative.    Gastrointestinal: Negative.    Endocrine: Negative.    Genitourinary: Negative.    Musculoskeletal: Negative.    Skin: Negative.    Allergic/Immunologic: Negative.    Neurological: Negative.    Hematological: Negative.    Psychiatric/Behavioral: Negative.        Vitals:    12/11/18 0938   BP: 137/88   Pulse: 78   Temp: 96.7 °F (35.9 °C)       Body mass index is 29.4 kg/m².    Objective     Physical Exam  CONSTITUTIONAL: well nourished, alert, oriented, in no acute distress     COMMUNICATION AND VOICE: able to communicate normally, normal voice quality    HEAD: normocephalic, no lesions, atraumatic, no tenderness, no  masses     FACE: appearance normal, no lesions, no tenderness, no deformities, facial motion symmetric    SALIVARY GLANDS: parotid glands with no tenderness, no swelling, no masses, submandibular glands with normal size, nontender    EYES: ocular motility normal, eyelids normal, orbits normal, no proptosis, conjunctiva normal , pupils equal, round     EARS:  Hearing: response to conversational voice normal bilaterally   External Ears: auricles without lesions  Otoscopic: tympanic membrane appearance normal, no lesions, no perforation, normal mobility, no fluid    NOSE:  External Nose: structure normal, no tenderness on palpation, no nasal discharge, no lesions, no evidence of trauma, nostrils patent   Intranasal Exam: nasal mucosa normal, vestibule within normal limits, inferior turbinate normal, nasal septum midline     ORAL:  Lips: upper and lower lips without lesion   Teeth: dentition within normal limits for age   Gums: gingivae healthy   Oral Mucosa: oral mucosa normal, no mucosal lesions   Floor of Mouth: Warthin’s duct patent, mucosa normal  Tongue: lingual mucosa normal without lesions, normal tongue mobility   Palate: soft and hard palates with normal mucosa and structure  Oropharynx: oropharyngeal mucosa normal    NECK: incision well healed  THYROID:absent  LYMPH NODES: no lymphadenopathy    CHEST/RESPIRATORY: respiratory effort normal,     CARDIOVASCULAR:  extremities without cyanosis or edema      NEUROLOGIC/PSYCHIATRIC: oriented to time, place and person, mood normal, affect appropriate, CN II-XII intact grossly    Assessment/Plan   Lynn was seen today for thyroid problem.    Diagnoses and all orders for this visit:    Other fatigue    Postoperative hypothyroidism    Elevated parathyroid hormone      * Surgery not found *  No orders of the defined types were placed in this encounter.    Return in about 4 months (around 4/11/2019).       Patient Instructions   Call for problems or worsening  symptoms    Synthroid was increased to 150mcg continue this an recheck labs in 3-4 weeks.

## 2018-12-11 NOTE — PATIENT INSTRUCTIONS
Call for problems or worsening symptoms    Synthroid was increased to 150mcg continue this an recheck labs in 3-4 weeks.

## 2019-02-06 ENCOUNTER — TRANSCRIBE ORDERS (OUTPATIENT)
Dept: ADMINISTRATIVE | Facility: HOSPITAL | Age: 44
End: 2019-02-06

## 2019-02-06 DIAGNOSIS — H57.89 THYROID EYE DISEASE: Primary | ICD-10-CM

## 2019-02-06 DIAGNOSIS — H05.20 EXOPHTHALMOS: ICD-10-CM

## 2019-02-06 DIAGNOSIS — E07.9 THYROID EYE DISEASE: Primary | ICD-10-CM

## 2019-02-13 ENCOUNTER — HOSPITAL ENCOUNTER (OUTPATIENT)
Dept: MRI IMAGING | Facility: HOSPITAL | Age: 44
Discharge: HOME | End: 2019-02-13

## 2019-02-13 ENCOUNTER — HOSPITAL ENCOUNTER (OUTPATIENT)
Dept: MRI IMAGING | Facility: HOSPITAL | Age: 44
Discharge: HOME | End: 2019-02-13
Admitting: OPHTHALMOLOGY

## 2019-02-13 DIAGNOSIS — H57.89 THYROID EYE DISEASE: ICD-10-CM

## 2019-02-13 DIAGNOSIS — H05.20 EXOPHTHALMOS: ICD-10-CM

## 2019-02-13 DIAGNOSIS — E07.9 THYROID EYE DISEASE: ICD-10-CM

## 2019-02-13 LAB — CREAT BLDA-MCNC: 0.9 MG/DL (ref 0.6–1.3)

## 2019-02-13 PROCEDURE — 70553 MRI BRAIN STEM W/O & W/DYE: CPT

## 2019-02-13 PROCEDURE — A9577 INJ MULTIHANCE: HCPCS | Performed by: INTERNAL MEDICINE

## 2019-02-13 PROCEDURE — 0 GADOBENATE DIMEGLUMINE 529 MG/ML SOLUTION: Performed by: INTERNAL MEDICINE

## 2019-02-13 PROCEDURE — 70543 MRI ORBT/FAC/NCK W/O &W/DYE: CPT

## 2019-02-13 PROCEDURE — 82565 ASSAY OF CREATININE: CPT

## 2019-02-13 RX ORDER — LEVOTHYROXINE SODIUM 0.15 MG/1
TABLET ORAL
Qty: 30 TABLET | Refills: 0 | Status: SHIPPED | OUTPATIENT
Start: 2019-02-13 | End: 2019-03-12 | Stop reason: SDUPTHER

## 2019-02-13 RX ADMIN — GADOBENATE DIMEGLUMINE 20 ML: 529 INJECTION, SOLUTION INTRAVENOUS at 07:13

## 2019-02-18 RX ORDER — BENAZEPRIL HYDROCHLORIDE 20 MG/1
TABLET ORAL
Qty: 60 TABLET | Refills: 0 | OUTPATIENT
Start: 2019-02-18

## 2019-02-18 NOTE — TELEPHONE ENCOUNTER
Dr. Givens is not the prescribing physician for this medication.  She should contact her primary care provider.

## 2019-03-12 DIAGNOSIS — R11.2 NAUSEA AND VOMITING, INTRACTABILITY OF VOMITING NOT SPECIFIED, UNSPECIFIED VOMITING TYPE: ICD-10-CM

## 2019-03-12 RX ORDER — OMEPRAZOLE 20 MG/1
20 CAPSULE, DELAYED RELEASE ORAL DAILY
Qty: 30 CAPSULE | Refills: 11 | Status: SHIPPED | OUTPATIENT
Start: 2019-03-12 | End: 2020-05-05 | Stop reason: SDUPTHER

## 2019-03-12 RX ORDER — TOPIRAMATE 50 MG/1
TABLET, FILM COATED ORAL
Qty: 60 TABLET | Refills: 0 | Status: SHIPPED | OUTPATIENT
Start: 2019-03-12 | End: 2019-04-23 | Stop reason: SDUPTHER

## 2019-03-12 RX ORDER — LEVOTHYROXINE SODIUM 0.15 MG/1
TABLET ORAL
Qty: 30 TABLET | Refills: 0 | Status: SHIPPED | OUTPATIENT
Start: 2019-03-12 | End: 2019-05-20 | Stop reason: DRUGHIGH

## 2019-03-20 RX ORDER — HYDROCODONE BITARTRATE AND ACETAMINOPHEN 7.5; 325 MG/1; MG/1
1 TABLET ORAL EVERY 6 HOURS PRN
Qty: 20 TABLET | Refills: 0 | Status: SHIPPED | OUTPATIENT
Start: 2019-03-20 | End: 2019-05-20

## 2019-03-20 RX ORDER — PREDNISONE 20 MG/1
60 TABLET ORAL DAILY
Qty: 90 TABLET | Refills: 1 | Status: SHIPPED | OUTPATIENT
Start: 2019-03-20 | End: 2020-05-05

## 2019-03-28 ENCOUNTER — DOCUMENTATION (OUTPATIENT)
Dept: OTOLARYNGOLOGY | Facility: CLINIC | Age: 44
End: 2019-03-28

## 2019-03-28 DIAGNOSIS — E03.9 HYPOTHYROIDISM, UNSPECIFIED TYPE: Primary | ICD-10-CM

## 2019-03-28 RX ORDER — LEVOTHYROXINE SODIUM 175 MCG
175 TABLET ORAL DAILY
Qty: 30 TABLET | Refills: 3 | Status: SHIPPED | OUTPATIENT
Start: 2019-03-28 | End: 2019-04-27

## 2019-03-28 NOTE — PROGRESS NOTES
Discussed to increase to 175mcg, she did not get the 150mcg apparently did not escribe but theoretically needed an increase at last TSH draw

## 2019-04-11 ENCOUNTER — TELEPHONE (OUTPATIENT)
Dept: OTOLARYNGOLOGY | Facility: CLINIC | Age: 44
End: 2019-04-11

## 2019-04-23 RX ORDER — TOPIRAMATE 50 MG/1
TABLET, FILM COATED ORAL
Qty: 60 TABLET | Refills: 0 | Status: SHIPPED | OUTPATIENT
Start: 2019-04-23 | End: 2019-05-20

## 2019-04-23 RX ORDER — LEVOTHYROXINE SODIUM 0.15 MG/1
TABLET ORAL
Qty: 30 TABLET | Refills: 0 | OUTPATIENT
Start: 2019-04-23

## 2019-04-23 NOTE — TELEPHONE ENCOUNTER
Patient has been told to have labwork done prior to any refills.  She has not had her blood drawn to see what strength synthroid is appropriate for her.

## 2019-05-16 DIAGNOSIS — E03.9 HYPOTHYROIDISM, UNSPECIFIED TYPE: ICD-10-CM

## 2019-05-16 DIAGNOSIS — E89.0 POSTOPERATIVE HYPOTHYROIDISM: ICD-10-CM

## 2019-05-17 ENCOUNTER — APPOINTMENT (OUTPATIENT)
Dept: LAB | Facility: HOSPITAL | Age: 44
End: 2019-05-17

## 2019-05-17 ENCOUNTER — TRANSCRIBE ORDERS (OUTPATIENT)
Dept: ADMINISTRATIVE | Facility: HOSPITAL | Age: 44
End: 2019-05-17

## 2019-05-17 DIAGNOSIS — E03.9 HYPOTHYROIDISM, UNSPECIFIED TYPE: Primary | ICD-10-CM

## 2019-05-17 LAB — TSH SERPL DL<=0.05 MIU/L-ACNC: 2.12 MIU/ML (ref 0.47–4.68)

## 2019-05-17 PROCEDURE — 36415 COLL VENOUS BLD VENIPUNCTURE: CPT

## 2019-05-17 PROCEDURE — 84443 ASSAY THYROID STIM HORMONE: CPT | Performed by: OTOLARYNGOLOGY

## 2019-05-20 ENCOUNTER — OFFICE VISIT (OUTPATIENT)
Dept: OTOLARYNGOLOGY | Facility: CLINIC | Age: 44
End: 2019-05-20

## 2019-05-20 VITALS
SYSTOLIC BLOOD PRESSURE: 146 MMHG | HEIGHT: 72 IN | TEMPERATURE: 98 F | WEIGHT: 224.2 LBS | BODY MASS INDEX: 30.37 KG/M2 | DIASTOLIC BLOOD PRESSURE: 109 MMHG | OXYGEN SATURATION: 98 % | HEART RATE: 70 BPM

## 2019-05-20 DIAGNOSIS — E89.0 POST-SURGICAL HYPOTHYROIDISM: ICD-10-CM

## 2019-05-20 DIAGNOSIS — E05.00 GRAVES' DISEASE: Primary | ICD-10-CM

## 2019-05-20 PROCEDURE — 99213 OFFICE O/P EST LOW 20 MIN: CPT | Performed by: OTOLARYNGOLOGY

## 2019-05-20 RX ORDER — LEVOTHYROXINE SODIUM 175 UG/1
175 TABLET ORAL DAILY
COMMUNITY
End: 2019-08-30 | Stop reason: SDUPTHER

## 2019-05-20 RX ORDER — INSULIN LISPRO 100 U/ML
INJECTION, SOLUTION SUBCUTANEOUS
COMMUNITY
Start: 2019-03-12

## 2019-05-20 NOTE — PROGRESS NOTES
Vitaly Givens MD     Chief Complaint   Patient presents with   • Thyroid Problem        History of Present Illness  Lynn Magana is a  43 y.o. female who is here for follow up. She is sp total thyroidectomy for Graves disase on 11/19/18. She has had no current complaints. She just had eye surgery for the Graves effects    Review of Systems  Reviewed per patient intake note    Past History:  Past medical and surgical history, family history and social history reviewed and updated when appropriate.  Current medications and allergies reviewed and updated when appropriate.  Allergies:  Oxycodone-acetaminophen        Vital Signs:   Temp:  [98 °F (36.7 °C)] 98 °F (36.7 °C)  Heart Rate:  [70] 70  BP: (146)/(109) 146/109    Physical Exam:  CONSTITUTIONAL: well nourished, well-developed, alert, oriented, in no acute distress   COMMUNICATION AND VOICE: able to communicate normally, normal voice quality  HEAD: normocephalic, no lesions, atraumatic, no tenderness, no masses   FACE: appearance normal, no lesions, no tenderness, no deformities, facial motion symmetric  EYES: ocular motility normal, eyelids normal, orbits normal, no proptosis, conjunctiva normal , pupils equal, round  HEARING: response to conversational voice normal bilaterally   EXTERNAL EARS: auricles without lesions  EXTERNAL NOSE: structure normal, no tenderness on palpation, no nasal discharge, no lesions, no evidence of trauma, nostrils patent  LIPS: structure normal, no tenderness on palpation, no lesions, no evidence of trauma  NECK: neck appearance normal  THYROID: well healed thyroidectomy scar, no mass or nodule present  LYMPH NODES: no lymphadenopathy  CHEST/RESPIRATORY: respiratory effort normal  CARDIOVASCULAR: extremities without cyanosis or edema, no overt jugulovenous distension present  NEUROLOGIC/PSYCHIATRIC: oriented appropriately for age, mood normal, affect appropriate, cranial nerves intact grossly unless specifically mentioned  above     RESULTS REVIEW:   Lab Results   Component Value Date    TSH 2.120 05/17/2019         Assessment   1. Graves' disease    2. Post-surgical hypothyroidism        Plan    Continue current management plan.  Continue currect Synthoid dose (150 cmg)  Follow up with primary care physician for routine thyroid monitoring and refills. Call or return if problems or questions.           Return if symptoms worsen or fail to improve.    Vitaly Givens MD  05/20/19  12:41 PM

## 2019-05-20 NOTE — PROGRESS NOTES
Kirstin Appiah RN   Patient Intake Note    Review of Systems  Review of Systems   Constitutional: Negative for chills and fever.   HENT:        See HPI   Respiratory: Negative for cough, choking and shortness of breath.    Gastrointestinal: Negative for diarrhea, nausea and vomiting.   Neurological: Negative for headaches.       QUALITY MEASURES    Body Mass Index Screening and Follow-Up Plan  Body mass index is 28.79 kg/m².  Patient's Body mass index is 28.79 kg/m². BMI is above normal parameters. Recommendations include: referral to primary care.    Tobacco Use: Screening and Cessation Intervention  Social History    Tobacco Use      Smoking status: Never Smoker      Smokeless tobacco: Never Used        Kirstin Appiah RN  5/20/2019  2:25 PM

## 2019-06-10 NOTE — ED PROVIDER NOTES
Subjective   HPI Comments: Patient is a 41-year-old -American female who presents to the ER today per EMS with complaint of headache, shaking, syncope, and shortness of breath.  The patient reports that she has a history of migraine headaches and is currently treated for Topamax.  She states that over the past 2 weeks her headache has become constant and severe.  States that it radiates from the back of her head to the front of her head.  Patient denies any visual changes.  She denies any head trauma, or thunderclap headache.  The patient states that she was previously thought to have a tumor in her eye.  She did see Dr. Duenas and had a MRI of the orbits and facials done in April 2017.  This exam was normal.  I did review the findings at this time.  The patient reports that for the past 2 weeks she has had constant uncontrollable shaking.  She states that she is concerned that she may have Parkinson's disease.  Patient denies any previous family history of this.  The patient also is reporting shortness of breath.  She denies any chest pain.  Patient states that she had shortness of breath for several months however has progressively gotten worse.  The patient is a nonsmoker.  The patient denies any recent long-distance travel, she denies any hormone treatment of birth control use.  Patient denies any previous DVT or PE in the past.  The patient also reports that for the past 2 weeks every time she stands up she gets dizzy and has a syncopal episode.  Her daughter is at the bedside and states that this does not last very long and that she is able to wake the patient up without difficulty.  Patient reports that at one time when she had a syncopal episode she did lose control of her bowels at home.  The patient denies any known seizure history.  Patient denies any previous cardiac history.  She presents to the ER today for further evaluation.    Patient is a 41 y.o. female presenting with headaches.    History provided by:  Patient   used: No    Headache   Pain location:  Occipital  Quality:  Sharp  Radiates to:  Does not radiate  Severity currently:  8/10  Onset quality:  Sudden  Duration:  2 weeks  Timing:  Constant  Progression:  Partially resolved  Chronicity:  New  Similar to prior headaches: no    Context: not activity, not exposure to bright light, not caffeine, not coughing, not defecating, not eating, not stress, not exposure to cold air, not intercourse, not loud noise and not straining    Relieved by:  Nothing  Worsened by:  Nothing  Ineffective treatments:  None tried  Associated symptoms: syncope    Associated symptoms: no abdominal pain, no back pain, no blurred vision, no congestion, no cough, no diarrhea, no dizziness, no drainage, no ear pain, no eye pain, no facial pain, no fatigue, no fever, no focal weakness, no hearing loss, no loss of balance, no myalgias, no nausea, no near-syncope, no neck pain, no neck stiffness, no numbness, no paresthesias, no photophobia, no seizures, no sinus pressure, no sore throat, no swollen glands, no tingling, no URI, no visual change, no vomiting and no weakness    Risk factors: no anger, no family hx of SAH, does not have insomnia and lifestyle not sedentary        Review of Systems   Constitutional: Negative for fatigue and fever.   HENT: Negative for congestion, ear pain, hearing loss, postnasal drip, sinus pressure and sore throat.    Eyes: Negative for blurred vision, photophobia and pain.   Respiratory: Negative for cough.    Cardiovascular: Positive for syncope. Negative for near-syncope.   Gastrointestinal: Negative for abdominal pain, diarrhea, nausea and vomiting.   Musculoskeletal: Negative for back pain, myalgias, neck pain and neck stiffness.   Neurological: Positive for headaches. Negative for dizziness, focal weakness, seizures, weakness, numbness, paresthesias and loss of balance.   All other systems reviewed and are  negative.      Past Medical History:   Diagnosis Date   • Arthritis    • Depression    • Diabetes mellitus    • GERD (gastroesophageal reflux disease)    • Hyperlipidemia    • Hypertension    • Injury of back        No Known Allergies    Past Surgical History:   Procedure Laterality Date   •  SECTION     • CHOLECYSTECTOMY     • COLONOSCOPY     • CYST REMOVAL     • HYSTERECTOMY         History reviewed. No pertinent family history.    Social History     Social History   • Marital status:      Spouse name: N/A   • Number of children: N/A   • Years of education: N/A     Social History Main Topics   • Smoking status: Never Smoker   • Smokeless tobacco: None   • Alcohol use No   • Drug use: No   • Sexual activity: Not Asked     Other Topics Concern   • None     Social History Narrative           Objective   Physical Exam   Constitutional: She is oriented to person, place, and time. She appears well-developed and well-nourished.   HENT:   Head: Normocephalic and atraumatic.   Eyes: Conjunctivae are normal. Pupils are equal, round, and reactive to light.   Neck: Normal range of motion. Neck supple.   Cardiovascular: Normal rate, regular rhythm and normal heart sounds.    Pulmonary/Chest: Effort normal and breath sounds normal.   Abdominal: Soft. Bowel sounds are normal.   Musculoskeletal: Normal range of motion.   Neurological: She is alert and oriented to person, place, and time.   Skin: Skin is warm and dry.   Psychiatric: She has a normal mood and affect.   Nursing note and vitals reviewed.      Procedures         ED Course  ED Course   Comment By Time   Patient labs and CT scans were reviewed.  The patient's case was discussed with Dr. Roman, the hospitalist on call.  At this time the patient will be admitted in stable condition.  Patient is advised off all of the Xray, CT, radiology, and lab results. Kimberly Sr, APRN  1507        CT Cervical Spine Without Contrast   Final Result   1.  Mild degenerative changes present. There is no acute fracture.           This report was finalized on 07/08/2017 13:11 by Dr. Abhijit Murcia MD.      CT Head Without Contrast   Final Result   1. No acute intracranial process.           This report was finalized on 07/08/2017 13:05 by Dr. Abhijit Murcia MD.      XR Chest 1 View   Final Result   1. No radiographic evidence of acute cardiopulmonary process.           This report was finalized on 07/08/2017 12:30 by Dr. Abhijit Murcia MD.        Labs Reviewed   COMPREHENSIVE METABOLIC PANEL - Abnormal; Notable for the following:        Result Value    Glucose 145 (*)     CO2 22.0 (*)     Calcium 10.6 (*)     ALT (SGPT) 88 (*)     Alkaline Phosphatase 135 (*)     Total Bilirubin 1.1 (*)     All other components within normal limits   URINALYSIS W/ CULTURE IF INDICATED - Abnormal; Notable for the following:     Ketones, UA 15 mg/dL (1+) (*)     Urobilinogen, UA 2.0 E.U./dL (*)     All other components within normal limits    Narrative:     Urine microscopic not indicated.   C-REACTIVE PROTEIN - Abnormal; Notable for the following:     C-Reactive Protein 2.01 (*)     All other components within normal limits   T4, FREE - Abnormal; Notable for the following:     Free T4 5.49 (*)     All other components within normal limits   TSH - Abnormal; Notable for the following:     TSH <0.020 (*)     All other components within normal limits   CBC WITH AUTO DIFFERENTIAL - Abnormal; Notable for the following:     WBC 4.69 (*)     MCV 78.0 (*)     MCH 26.3 (*)     RDW-SD 35.8 (*)     All other components within normal limits   BLOOD GAS, ARTERIAL W/CO-OXIMETRY - Abnormal; Notable for the following:     pH, Arterial 7.490 (*)     pCO2, Arterial 24.9 (*)     HCO3, Arterial 18.5 (*)     Base Excess, Arterial -3.2 (*)     Methemoglobin 0.2 (*)     All other components within normal limits    Narrative:     Serial Number: [id number]    : [id number]   PROTIME-INR - Normal   APTT - Normal    BNP (IN-HOUSE) - Normal   AMYLASE - Normal   LIPASE - Normal   D-DIMER, QUANTITATIVE - Normal    Narrative:     Reference Range is 0-0.50 mg/L FEU. However, results <0.50 mg/L FEU tends to rule out DVT or PE. Results >0.50 mg/L FEU are not useful in predicting absence or presence of DVT or PE.   TROPONIN (IN-HOUSE) - Normal   CK MB - Normal    Narrative:     CKMB Index not indicated   MYOGLOBIN, SERUM - Normal   CK - Normal   BLOOD GAS, ARTERIAL W/CO-OXIMETRY   CBC AND DIFFERENTIAL    Narrative:     The following orders were created for panel order CBC & Differential.  Procedure                               Abnormality         Status                     ---------                               -----------         ------                     CBC Auto Differential[[id number]]        Abnormal            Final result                 Please view results for these tests on the individual orders.               MDM  Number of Diagnoses or Management Options  Hyperthyroidism: new and requires workup  Nonintractable headache, unspecified chronicity pattern, unspecified headache type: new and requires workup  Syncope, unspecified syncope type: new and requires workup     Amount and/or Complexity of Data Reviewed  Clinical lab tests: ordered and reviewed  Tests in the radiology section of CPT®: ordered and reviewed  Discuss the patient with other providers: yes    Patient Progress  Patient progress: stable      Final diagnoses:   Syncope, unspecified syncope type   Hyperthyroidism   Nonintractable headache, unspecified chronicity pattern, unspecified headache type            Kimberly Sr, APRN  07/08/17 1508     normal...

## 2019-06-27 RX ORDER — TOPIRAMATE 50 MG/1
TABLET, FILM COATED ORAL
Qty: 60 TABLET | Refills: 0 | OUTPATIENT
Start: 2019-06-27

## 2019-06-27 NOTE — TELEPHONE ENCOUNTER
No I do not want to refill her Topamax. She will need to contact PCP or be seen next available which will be probably in September.

## 2019-06-27 NOTE — TELEPHONE ENCOUNTER
[facility] said she filled Topiramate 4-23-19.  She called and requested all her meds be refilled.  Do you want to refill it?

## 2019-07-30 ENCOUNTER — TRANSCRIBE ORDERS (OUTPATIENT)
Dept: ADMINISTRATIVE | Facility: HOSPITAL | Age: 44
End: 2019-07-30

## 2019-07-30 DIAGNOSIS — Z98.890 POSTSURGICAL STATE, EYE: ICD-10-CM

## 2019-07-30 DIAGNOSIS — E05.00 GRAVES' DISEASE: ICD-10-CM

## 2019-07-30 DIAGNOSIS — G89.18 POST-OPERATIVE PAIN: Primary | ICD-10-CM

## 2019-08-19 ENCOUNTER — HOSPITAL ENCOUNTER (EMERGENCY)
Facility: HOSPITAL | Age: 44
Discharge: HOME | End: 2019-08-19
Attending: EMERGENCY MEDICINE | Admitting: EMERGENCY MEDICINE

## 2019-08-19 ENCOUNTER — APPOINTMENT (OUTPATIENT)
Dept: CT IMAGING | Facility: HOSPITAL | Age: 44
End: 2019-08-19

## 2019-08-19 VITALS
HEIGHT: 62 IN | DIASTOLIC BLOOD PRESSURE: 105 MMHG | SYSTOLIC BLOOD PRESSURE: 166 MMHG | TEMPERATURE: 97.8 F | HEART RATE: 93 BPM | BODY MASS INDEX: 42.33 KG/M2 | WEIGHT: 230 LBS | OXYGEN SATURATION: 100 % | RESPIRATION RATE: 17 BRPM

## 2019-08-19 DIAGNOSIS — R11.2 NAUSEA AND VOMITING, INTRACTABILITY OF VOMITING NOT SPECIFIED, UNSPECIFIED VOMITING TYPE: Primary | ICD-10-CM

## 2019-08-19 DIAGNOSIS — K52.9 ENTERITIS: ICD-10-CM

## 2019-08-19 LAB
ALBUMIN SERPL-MCNC: 4.6 G/DL (ref 3.5–5)
ALBUMIN/GLOB SERPL: 1.5 G/DL (ref 1.1–2.5)
ALP SERPL-CCNC: 75 U/L (ref 24–120)
ALT SERPL W P-5'-P-CCNC: 39 U/L (ref 0–54)
ANION GAP SERPL CALCULATED.3IONS-SCNC: 12 MMOL/L (ref 4–13)
AST SERPL-CCNC: 30 U/L (ref 7–45)
BASOPHILS # BLD AUTO: 0.02 10*3/MM3 (ref 0–0.2)
BASOPHILS NFR BLD AUTO: 0.3 % (ref 0–1.5)
BILIRUB SERPL-MCNC: 0.7 MG/DL (ref 0.1–1)
BILIRUB UR QL STRIP: NEGATIVE
BUN BLD-MCNC: 11 MG/DL (ref 5–21)
BUN/CREAT SERPL: 12.1 (ref 7–25)
CALCIUM SPEC-SCNC: 9.8 MG/DL (ref 8.4–10.4)
CHLORIDE SERPL-SCNC: 100 MMOL/L (ref 98–110)
CLARITY UR: CLEAR
CO2 SERPL-SCNC: 25 MMOL/L (ref 24–31)
COLOR UR: YELLOW
CREAT BLD-MCNC: 0.91 MG/DL (ref 0.5–1.4)
DEPRECATED RDW RBC AUTO: 46.2 FL (ref 37–54)
EOSINOPHIL # BLD AUTO: 0 10*3/MM3 (ref 0–0.4)
EOSINOPHIL NFR BLD AUTO: 0 % (ref 0.3–6.2)
ERYTHROCYTE [DISTWIDTH] IN BLOOD BY AUTOMATED COUNT: 14.4 % (ref 12.3–15.4)
GFR SERPL CREATININE-BSD FRML MDRD: 81 ML/MIN/1.73
GLOBULIN UR ELPH-MCNC: 3.1 GM/DL
GLUCOSE BLD-MCNC: 368 MG/DL (ref 70–100)
GLUCOSE BLDC GLUCOMTR-MCNC: 190 MG/DL (ref 70–130)
GLUCOSE UR STRIP-MCNC: ABNORMAL MG/DL
HCT VFR BLD AUTO: 45.2 % (ref 34–46.6)
HGB BLD-MCNC: 14.7 G/DL (ref 12–15.9)
HGB UR QL STRIP.AUTO: NEGATIVE
HOLD SPECIMEN: NORMAL
HOLD SPECIMEN: NORMAL
IMM GRANULOCYTES # BLD AUTO: 0.02 10*3/MM3 (ref 0–0.05)
IMM GRANULOCYTES NFR BLD AUTO: 0.3 % (ref 0–0.5)
KETONES UR QL STRIP: ABNORMAL
LEUKOCYTE ESTERASE UR QL STRIP.AUTO: NEGATIVE
LIPASE SERPL-CCNC: 78 U/L (ref 23–203)
LYMPHOCYTES # BLD AUTO: 0.95 10*3/MM3 (ref 0.7–3.1)
LYMPHOCYTES NFR BLD AUTO: 13.5 % (ref 19.6–45.3)
MCH RBC QN AUTO: 28.4 PG (ref 26.6–33)
MCHC RBC AUTO-ENTMCNC: 32.5 G/DL (ref 31.5–35.7)
MCV RBC AUTO: 87.3 FL (ref 79–97)
MONOCYTES # BLD AUTO: 0.28 10*3/MM3 (ref 0.1–0.9)
MONOCYTES NFR BLD AUTO: 4 % (ref 5–12)
NEUTROPHILS # BLD AUTO: 5.77 10*3/MM3 (ref 1.7–7)
NEUTROPHILS NFR BLD AUTO: 81.9 % (ref 42.7–76)
NITRITE UR QL STRIP: NEGATIVE
NRBC BLD AUTO-RTO: 0 /100 WBC (ref 0–0.2)
PH UR STRIP.AUTO: 6 [PH] (ref 5–8)
PLATELET # BLD AUTO: 241 10*3/MM3 (ref 140–450)
PMV BLD AUTO: 11.3 FL (ref 6–12)
POTASSIUM BLD-SCNC: 4 MMOL/L (ref 3.5–5.3)
PROT SERPL-MCNC: 7.7 G/DL (ref 6.3–8.7)
PROT UR QL STRIP: NEGATIVE
RBC # BLD AUTO: 5.18 10*6/MM3 (ref 3.77–5.28)
SODIUM BLD-SCNC: 137 MMOL/L (ref 135–145)
SP GR UR STRIP: >1.03 (ref 1–1.03)
UROBILINOGEN UR QL STRIP: ABNORMAL
WBC NRBC COR # BLD: 7.04 10*3/MM3 (ref 3.4–10.8)
WHOLE BLOOD HOLD SPECIMEN: NORMAL
WHOLE BLOOD HOLD SPECIMEN: NORMAL

## 2019-08-19 PROCEDURE — 81003 URINALYSIS AUTO W/O SCOPE: CPT | Performed by: EMERGENCY MEDICINE

## 2019-08-19 PROCEDURE — 96361 HYDRATE IV INFUSION ADD-ON: CPT

## 2019-08-19 PROCEDURE — 36415 COLL VENOUS BLD VENIPUNCTURE: CPT

## 2019-08-19 PROCEDURE — 83690 ASSAY OF LIPASE: CPT | Performed by: EMERGENCY MEDICINE

## 2019-08-19 PROCEDURE — 80053 COMPREHEN METABOLIC PANEL: CPT | Performed by: EMERGENCY MEDICINE

## 2019-08-19 PROCEDURE — 25010000002 IOPAMIDOL 61 % SOLUTION: Performed by: EMERGENCY MEDICINE

## 2019-08-19 PROCEDURE — 85025 COMPLETE CBC W/AUTO DIFF WBC: CPT | Performed by: EMERGENCY MEDICINE

## 2019-08-19 PROCEDURE — 82962 GLUCOSE BLOOD TEST: CPT

## 2019-08-19 PROCEDURE — 74177 CT ABD & PELVIS W/CONTRAST: CPT

## 2019-08-19 PROCEDURE — 63710000001 INSULIN REGULAR HUMAN PER 5 UNITS: Performed by: EMERGENCY MEDICINE

## 2019-08-19 PROCEDURE — 99283 EMERGENCY DEPT VISIT LOW MDM: CPT

## 2019-08-19 PROCEDURE — 96374 THER/PROPH/DIAG INJ IV PUSH: CPT

## 2019-08-19 PROCEDURE — 96375 TX/PRO/DX INJ NEW DRUG ADDON: CPT

## 2019-08-19 PROCEDURE — 25010000002 METOCLOPRAMIDE PER 10 MG: Performed by: EMERGENCY MEDICINE

## 2019-08-19 PROCEDURE — 93010 ELECTROCARDIOGRAM REPORT: CPT | Performed by: INTERNAL MEDICINE

## 2019-08-19 PROCEDURE — 93005 ELECTROCARDIOGRAM TRACING: CPT | Performed by: EMERGENCY MEDICINE

## 2019-08-19 RX ORDER — SODIUM CHLORIDE 0.9 % (FLUSH) 0.9 %
10 SYRINGE (ML) INJECTION AS NEEDED
Status: DISCONTINUED | OUTPATIENT
Start: 2019-08-19 | End: 2019-08-19 | Stop reason: HOSPADM

## 2019-08-19 RX ORDER — ONDANSETRON 4 MG/1
4 TABLET, ORALLY DISINTEGRATING ORAL EVERY 8 HOURS PRN
Qty: 12 TABLET | Refills: 0 | Status: SHIPPED | OUTPATIENT
Start: 2019-08-19 | End: 2020-05-05

## 2019-08-19 RX ORDER — FAMOTIDINE 10 MG/ML
20 INJECTION, SOLUTION INTRAVENOUS ONCE
Status: COMPLETED | OUTPATIENT
Start: 2019-08-19 | End: 2019-08-19

## 2019-08-19 RX ORDER — ALUMINA, MAGNESIA, AND SIMETHICONE 2400; 2400; 240 MG/30ML; MG/30ML; MG/30ML
15 SUSPENSION ORAL ONCE
Status: COMPLETED | OUTPATIENT
Start: 2019-08-19 | End: 2019-08-19

## 2019-08-19 RX ORDER — LIDOCAINE HYDROCHLORIDE 20 MG/ML
15 SOLUTION OROPHARYNGEAL ONCE
Status: COMPLETED | OUTPATIENT
Start: 2019-08-19 | End: 2019-08-19

## 2019-08-19 RX ORDER — METOCLOPRAMIDE HYDROCHLORIDE 5 MG/ML
10 INJECTION INTRAMUSCULAR; INTRAVENOUS ONCE
Status: COMPLETED | OUTPATIENT
Start: 2019-08-19 | End: 2019-08-19

## 2019-08-19 RX ADMIN — IOPAMIDOL 100 ML: 612 INJECTION, SOLUTION INTRAVENOUS at 17:05

## 2019-08-19 RX ADMIN — LIDOCAINE HYDROCHLORIDE 15 ML: 20 SOLUTION ORAL; TOPICAL at 15:20

## 2019-08-19 RX ADMIN — FAMOTIDINE 20 MG: 10 INJECTION INTRAVENOUS at 15:30

## 2019-08-19 RX ADMIN — INSULIN HUMAN 7 UNITS: 100 INJECTION, SOLUTION PARENTERAL at 17:22

## 2019-08-19 RX ADMIN — ALUMINUM HYDROXIDE, MAGNESIUM HYDROXIDE, AND DIMETHICONE 15 ML: 400; 400; 40 SUSPENSION ORAL at 15:20

## 2019-08-19 RX ADMIN — SODIUM CHLORIDE 1000 ML: 9 INJECTION, SOLUTION INTRAVENOUS at 17:22

## 2019-08-19 RX ADMIN — SODIUM CHLORIDE 1000 ML: 9 INJECTION, SOLUTION INTRAVENOUS at 15:25

## 2019-08-19 RX ADMIN — METOCLOPRAMIDE 10 MG: 5 INJECTION, SOLUTION INTRAMUSCULAR; INTRAVENOUS at 15:26

## 2019-08-19 NOTE — ED PROVIDER NOTES
Subjective   43-year-old female presenting to the emergency department with abdominal pain and nausea and vomiting for 2 days.  Patient states that it feels like she has had a tearing sensation in the muscle in her abdomen.  Patient denies any fevers chills or diarrhea.  Patient denies any chest pain or shortness of breath.  Patient denies eating anything out of the unusual or any other sick contacts.            Review of Systems   Gastrointestinal: Positive for abdominal pain, nausea and vomiting.   All other systems reviewed and are negative.      Past Medical History:   Diagnosis Date   • Arthritis    • Carotid artery stenosis    • Degenerative disc disease, cervical    • Depression    • Diabetes mellitus (CMS/HCC)     type 1   • Disease of thyroid gland    • GERD (gastroesophageal reflux disease)    • Graves disease    • Heart palpitations    • Hyperlipidemia    • Hypertension    • Hypothyroidism    • Seizure (CMS/HCC)    • Thyromegaly        Allergies   Allergen Reactions   • Oxycodone-Acetaminophen Swelling     Other reaction(s): Throat swelling       Past Surgical History:   Procedure Laterality Date   •  SECTION     • CHOLECYSTECTOMY     • COLONOSCOPY     • CYST REMOVAL     • ENDOSCOPY N/A 2018    Procedure: ESOPHAGOGASTRODUODENOSCOPY WITH ANESTHESIA;  Surgeon: Jose Ivey MD;  Location: [facility] ENDOSCOPY;  Service: Gastroenterology   • HYSTERECTOMY     • THYROIDECTOMY Bilateral 2018    Procedure: total thyroidectomy;  Surgeon: Vitaly Givens MD;  Location: [facility] OR;  Service: ENT       Family History   Problem Relation Age of Onset   • Stroke Mother    • Diabetes Mother    • Stroke Father    • Diabetes Father    • Diabetes Sister    • Coronary artery disease Brother    • Diabetes Brother    • Stroke Sister    • Seizures Sister    • Colon cancer Paternal Uncle        Social History     Socioeconomic History   • Marital status:      Spouse name: Not on file   • Number of  children: Not on file   • Years of education: Not on file   • Highest education level: Not on file   Tobacco Use   • Smoking status: Never Smoker   • Smokeless tobacco: Never Used   Substance and Sexual Activity   • Alcohol use: No   • Drug use: Yes     Types: Marijuana   • Sexual activity: Defer           Objective   Physical Exam   Constitutional: She is oriented to person, place, and time. She appears well-developed and well-nourished.   HENT:   Head: Normocephalic and atraumatic.   Eyes: EOM are normal. Pupils are equal, round, and reactive to light.   Neck: Normal range of motion. Neck supple.   Cardiovascular: Normal rate, regular rhythm and normal heart sounds.   2+ pulses in upper and lower extremities   Pulmonary/Chest: Effort normal and breath sounds normal.   Abdominal: Soft. Bowel sounds are normal. There is tenderness.   Musculoskeletal: Normal range of motion.   Neurological: She is alert and oriented to person, place, and time.   Skin: Skin is warm. Capillary refill takes less than 2 seconds.   Psychiatric: She has a normal mood and affect. Her behavior is normal. Thought content normal.   Nursing note and vitals reviewed.      Procedures           ED Course  ED Course as of Aug 19 1836   Mon Aug 19, 2019   1831 Is doing well at this time heart rate less than 100 and tolerating p.o.  Be discharged home with Zofran.  Patient has likely a simple enteritis.  Low suspicion for sepsis DKA or any emergent condition that could warrant admission at this time or surgery.  [AP]      ED Course User Index  [AP] Almas Mcclure MD                  [facility]      Final diagnoses:   Nausea and vomiting, intractability of vomiting not specified, unspecified vomiting type   Enteritis            Almas Mcclure MD  08/19/19 1836

## 2019-08-23 ENCOUNTER — TRANSCRIBE ORDERS (OUTPATIENT)
Dept: ADMINISTRATIVE | Facility: HOSPITAL | Age: 44
End: 2019-08-23

## 2019-08-23 DIAGNOSIS — G89.18 PAIN FOLLOWING SURGERY OR PROCEDURE: Primary | ICD-10-CM

## 2019-08-29 ENCOUNTER — APPOINTMENT (OUTPATIENT)
Dept: MRI IMAGING | Facility: HOSPITAL | Age: 44
End: 2019-08-29

## 2019-08-29 ENCOUNTER — TRANSCRIBE ORDERS (OUTPATIENT)
Dept: ADMINISTRATIVE | Facility: HOSPITAL | Age: 44
End: 2019-08-29

## 2019-08-29 DIAGNOSIS — G89.18 POST-OPERATIVE PAIN: Primary | ICD-10-CM

## 2019-08-30 RX ORDER — LEVOTHYROXINE SODIUM 175 UG/1
175 TABLET ORAL DAILY
Qty: 30 TABLET | Refills: 2 | Status: SHIPPED | OUTPATIENT
Start: 2019-08-30 | End: 2019-12-10 | Stop reason: SDUPTHER

## 2019-09-03 ENCOUNTER — TRANSCRIBE ORDERS (OUTPATIENT)
Dept: ADMINISTRATIVE | Facility: HOSPITAL | Age: 44
End: 2019-09-03

## 2019-09-03 DIAGNOSIS — G89.18 POST-OPERATIVE PAIN: Primary | ICD-10-CM

## 2019-09-04 ENCOUNTER — HOSPITAL ENCOUNTER (OUTPATIENT)
Dept: MRI IMAGING | Facility: HOSPITAL | Age: 44
Discharge: HOME | End: 2019-09-04
Admitting: OPHTHALMOLOGY

## 2019-09-04 ENCOUNTER — HOSPITAL ENCOUNTER (OUTPATIENT)
Dept: MRI IMAGING | Facility: HOSPITAL | Age: 44
Discharge: HOME | End: 2019-09-04

## 2019-09-04 PROCEDURE — 0 GADOBENATE DIMEGLUMINE 529 MG/ML SOLUTION: Performed by: OPHTHALMOLOGY

## 2019-09-04 PROCEDURE — 70543 MRI ORBT/FAC/NCK W/O &W/DYE: CPT

## 2019-09-04 PROCEDURE — 70553 MRI BRAIN STEM W/O & W/DYE: CPT

## 2019-09-04 PROCEDURE — A9577 INJ MULTIHANCE: HCPCS | Performed by: OPHTHALMOLOGY

## 2019-09-04 RX ADMIN — GADOBENATE DIMEGLUMINE 20 ML: 529 INJECTION, SOLUTION INTRAVENOUS at 14:12

## 2019-12-10 RX ORDER — LEVOTHYROXINE SODIUM 175 UG/1
TABLET ORAL
Qty: 30 TABLET | Refills: 0 | Status: SHIPPED | OUTPATIENT
Start: 2019-12-10 | End: 2020-02-06

## 2019-12-23 ENCOUNTER — HOSPITAL ENCOUNTER (OUTPATIENT)
Dept: WOMENS IMAGING | Age: 44
Discharge: HOME | End: 2019-12-23
Payer: MEDICAID

## 2019-12-23 DIAGNOSIS — Z12.39 SCREENING BREAST EXAMINATION: ICD-10-CM

## 2019-12-23 PROCEDURE — 77063 BREAST TOMOSYNTHESIS BI: CPT

## 2020-01-02 ENCOUNTER — OFFICE VISIT (OUTPATIENT)
Dept: SURGERY | Age: 45
End: 2020-01-02
Payer: MEDICAID

## 2020-01-02 VITALS
BODY MASS INDEX: 29.93 KG/M2 | WEIGHT: 221 LBS | HEIGHT: 72 IN | DIASTOLIC BLOOD PRESSURE: 102 MMHG | TEMPERATURE: 97.6 F | SYSTOLIC BLOOD PRESSURE: 150 MMHG | HEART RATE: 80 BPM

## 2020-01-02 PROCEDURE — 99213 OFFICE O/P EST LOW 20 MIN: CPT | Performed by: PHYSICIAN ASSISTANT

## 2020-01-09 NOTE — PROGRESS NOTES
HPI:  Emmy Olson is in for yearly follow-up breast check. She has not noticed any changes in her breasts. EXAMINATION: [facility]AS DIGITAL SCREEN W OR WO CAD BILATERAL 12/23/2019 3:18   PM   HISTORY: Annual screening exam. No current breast complaints. Family   history of breast cancer in maternal aunt. Previous benign right   breast biopsy. FINDINGS:   Bilateral digital CC and MLO views obtained. Porsche Gutierres is made to   exams dated 12/10/2018, 12/7/2017, 11/2/2017. 3-D tomosynthesis views   also obtained.  Computer-aided detection technology was utilized for   assessment of this examination. There are scattered areas of fibroglandular density (Category B). A   biopsy marking clip is seen in the lower inner right breast.   Benign-appearing calcifications are present. No dominant mass,   architectural distortion, or suspicious appearing microcalcifications   within either breast.       Impression   1.  No mammographic evidence of malignancy within either breast.   Annual screening mammogram is recommended. 2.  BI-RADS category 2- Benign. BREAST EXAM:  On examination, she has fibrocystic changes throughout both breasts, no dominant masses, no skin or nipple changes and no axillary adenopathy. I see nothing suspicious for breast cancer. ASSESSMENT:  Benign fibrocystic changes              PLAN:  I will plan to see her back in 1 year for physical exam and bilateral mammograms. She will contact me if anything significant changes. 15 minutes spent,  which includes face to face with patient, record review, evaluation, planning, and education.

## 2020-02-06 RX ORDER — LEVOTHYROXINE SODIUM 175 UG/1
TABLET ORAL
Qty: 30 TABLET | Refills: 0 | Status: SHIPPED | OUTPATIENT
Start: 2020-02-06 | End: 2020-03-13

## 2020-03-13 RX ORDER — LEVOTHYROXINE SODIUM 175 UG/1
TABLET ORAL
Qty: 30 TABLET | Refills: 0 | Status: SHIPPED | OUTPATIENT
Start: 2020-03-13 | End: 2020-04-15

## 2020-04-15 RX ORDER — LEVOTHYROXINE SODIUM 175 UG/1
TABLET ORAL
Qty: 30 TABLET | Refills: 0 | Status: SHIPPED | OUTPATIENT
Start: 2020-04-15 | End: 2020-06-03

## 2020-04-27 DIAGNOSIS — R11.2 NAUSEA AND VOMITING, INTRACTABILITY OF VOMITING NOT SPECIFIED, UNSPECIFIED VOMITING TYPE: ICD-10-CM

## 2020-04-27 RX ORDER — OMEPRAZOLE 20 MG/1
20 CAPSULE, DELAYED RELEASE ORAL DAILY
Qty: 30 CAPSULE | Refills: 0 | OUTPATIENT
Start: 2020-04-27

## 2020-05-05 ENCOUNTER — TELEMEDICINE (OUTPATIENT)
Dept: GASTROENTEROLOGY | Facility: CLINIC | Age: 45
End: 2020-05-05

## 2020-05-05 VITALS — WEIGHT: 232 LBS | BODY MASS INDEX: 31.42 KG/M2 | HEIGHT: 72 IN

## 2020-05-05 DIAGNOSIS — E66.9 OBESITY, UNSPECIFIED OBESITY SEVERITY, UNSPECIFIED OBESITY TYPE: ICD-10-CM

## 2020-05-05 DIAGNOSIS — K21.9 GASTROESOPHAGEAL REFLUX DISEASE, ESOPHAGITIS PRESENCE NOT SPECIFIED: Primary | ICD-10-CM

## 2020-05-05 PROCEDURE — 99213 OFFICE O/P EST LOW 20 MIN: CPT | Performed by: CLINICAL NURSE SPECIALIST

## 2020-05-05 RX ORDER — ARIPIPRAZOLE 2 MG/1
2 TABLET ORAL DAILY
COMMUNITY

## 2020-05-05 RX ORDER — OMEPRAZOLE 20 MG/1
20 CAPSULE, DELAYED RELEASE ORAL DAILY
Qty: 30 CAPSULE | Refills: 11 | Status: SHIPPED | OUTPATIENT
Start: 2020-05-05

## 2020-05-05 RX ORDER — PRAZOSIN HYDROCHLORIDE 1 MG/1
1 CAPSULE ORAL NIGHTLY
COMMUNITY
End: 2020-12-10

## 2020-05-05 RX ORDER — SERTRALINE HYDROCHLORIDE 100 MG/1
100 TABLET, FILM COATED ORAL DAILY
COMMUNITY

## 2020-05-05 NOTE — PROGRESS NOTES
Lynn Magana  1975    2020  Chief Complaint   Patient presents with   • GI Problem     Discuss reflux and get refills of Omeprazole     Subjective   HPI  Lynn Magana is a 44 y.o. female who presents with a complaint of ongoing reflux. She is on Prilosec and was taking Zantac which she has discontinued due to the warnings. She is worse at night. She says that when she stopped the Zantac her symptoms worsened. It is described as a burning to her mid chest. She does not have the head of her bed elevated. Her symptoms are moderate to severe for her. No dysphagia. Her last Endoscopy has been reviewed from 2018.   Past Medical History:   Diagnosis Date   • Arthritis    • Carotid artery stenosis    • Degenerative disc disease, cervical    • Depression    • Diabetes mellitus (CMS/HCC)     type 1   • Disease of thyroid gland    • GERD (gastroesophageal reflux disease)    • Graves disease    • Heart palpitations    • Hyperlipidemia    • Hypertension    • Hypothyroidism    • Seizure (CMS/HCC)    • Thyromegaly      Past Surgical History:   Procedure Laterality Date   •  SECTION     • CHOLECYSTECTOMY     • COLONOSCOPY     • COLONOSCOPY  2015    Normal exam   • CYST REMOVAL     • ENDOSCOPY N/A 2018    Normal exam   • HYSTERECTOMY     • THYROIDECTOMY Bilateral 2018    Procedure: total thyroidectomy;  Surgeon: Vitaly Givens MD;  Location: [facility] OR;  Service: ENT       Outpatient Medications Marked as Taking for the 20 encounter (Telemedicine) with Zora Stallworth APRN   Medication Sig Dispense Refill   • ADMELOG SOLOSTAR 100 UNIT/ML solution pen-injector      • amLODIPine (NORVASC) 10 MG tablet Take 0.5 tablets by mouth Daily.     • ARIPiprazole (ABILIFY) 2 MG tablet Take 2 mg by mouth Daily.     • aspirin 81 MG EC tablet Take 81 mg by mouth Daily.     • benazepril (LOTENSIN) 20 MG tablet Take 20 mg by mouth 2 (Two) Times a Day.     • DULoxetine (CYMBALTA) 60 MG  capsule Take 60 mg by mouth Daily.     • gabapentin (NEURONTIN) 300 MG capsule Take 300 mg by mouth 4 (Four) Times a Day.     • Insulin Glargine (BASAGLAR KWIKPEN) 100 UNIT/ML injection pen 72 Units Every Night.     • levothyroxine (SYNTHROID, LEVOTHROID) 175 MCG tablet TAKE 1 TABLET BY MOUTH ONCE DAILY. 30 tablet 0   • omeprazole (priLOSEC) 20 MG capsule Take 1 capsule by mouth Daily. 30 capsule 11   • polyethylene glycol (MIRALAX) packet Take 17 g by mouth Daily. 30 packet 0   • prazosin (MINIPRESS) 1 MG capsule Take 1 mg by mouth Every Night.     • sertraline (ZOLOFT) 100 MG tablet Take 100 mg by mouth Daily.     • tiZANidine (ZANAFLEX) 4 MG tablet Take 4 mg by mouth Every Night.     • [DISCONTINUED] omeprazole (priLOSEC) 20 MG capsule TAKE 1 CAPSULE BY MOUTH DAILY 30 capsule 11     Allergies   Allergen Reactions   • Oxycodone-Acetaminophen Swelling     Other reaction(s): Throat swelling     Social History     Socioeconomic History   • Marital status:      Spouse name: Not on file   • Number of children: Not on file   • Years of education: Not on file   • Highest education level: Not on file   Tobacco Use   • Smoking status: Never Smoker   • Smokeless tobacco: Never Used   Substance and Sexual Activity   • Alcohol use: No   • Drug use: Yes     Types: Marijuana   • Sexual activity: Defer     Family History   Problem Relation Age of Onset   • Stroke Mother    • Diabetes Mother    • Stroke Father    • Diabetes Father    • Diabetes Sister    • Coronary artery disease Brother    • Diabetes Brother    • Stroke Sister    • Seizures Sister    • Colon cancer Paternal Uncle    • Colon polyps Neg Hx      Health Maintenance   Topic Date Due   • URINE MICROALBUMIN  1975   • ANNUAL PHYSICAL  09/15/1977   • TDAP/TD VACCINES (1 - Tdap) 09/15/1985   • PNEUMOCOCCAL VACCINE (19-64 MEDIUM RISK) (1 of 1 - PPSV23) 09/15/1993   • HEPATITIS C SCREENING  07/08/2017   • DIABETIC FOOT EXAM  07/08/2017   • PAP SMEAR  07/08/2017  "  • DIABETIC EYE EXAM  07/08/2017   • HEMOGLOBIN A1C  09/26/2018   • LIPID PANEL  11/09/2018   • INFLUENZA VACCINE  08/01/2020     Review of Systems   Constitutional: Negative for activity change, appetite change, chills, diaphoresis, fatigue, fever and unexpected weight change.   HENT: Negative for ear pain, hearing loss, mouth sores, sore throat, trouble swallowing and voice change.    Eyes: Negative.    Respiratory: Negative for cough, choking, shortness of breath and wheezing.    Cardiovascular: Negative for chest pain and palpitations.   Gastrointestinal: Negative for abdominal pain, blood in stool, constipation, diarrhea, nausea and vomiting.   Endocrine: Negative for cold intolerance and heat intolerance.   Genitourinary: Negative for decreased urine volume, dysuria, frequency, hematuria and urgency.   Musculoskeletal: Negative for back pain, gait problem and myalgias.   Skin: Negative for color change, pallor and rash.   Allergic/Immunologic: Negative for food allergies and immunocompromised state.   Neurological: Negative for dizziness, tremors, seizures, syncope, weakness, light-headedness, numbness and headaches.   Hematological: Negative for adenopathy. Does not bruise/bleed easily.   Psychiatric/Behavioral: Negative for agitation and confusion. The patient is not nervous/anxious.    All other systems reviewed and are negative.    Objective   Vitals:    05/05/20 0925   Weight: 105 kg (232 lb)   Height: 188 cm (74\")     Body mass index is 29.79 kg/m².  Physical Exam   Constitutional: She appears well-developed and well-nourished.   HENT:   Head: Normocephalic and atraumatic.   Eyes: Pupils are equal, round, and reactive to light. Conjunctivae and EOM are normal.   Pulmonary/Chest: Effort normal.   Musculoskeletal: Normal range of motion.   Neurological: She is alert.   Psychiatric: She has a normal mood and affect.     Assessment/Plan   Lynn was seen today for gi problem.    Diagnoses and all orders " for this visit:    Gastroesophageal reflux disease, esophagitis presence not specified  -     omeprazole (priLOSEC) 20 MG capsule; Take 1 capsule by mouth Daily.    Obesity, unspecified obesity severity, unspecified obesity type      I have suggested that she add Gaviscon to her regimen for breakthrough symptoms. Elevate the head of bed 4-6 inches may be helpful brett for night time symptoms. She can add Omeprazole twice daily if she needs to.   I discussed non pharmaceutical treatment of gerd.  This includes gradually losing weight to achieve ideal body wt., elevation of the head of bed by 4-6 inches, nothing to eat or drink 3 hours prior to lying down, avoiding tight clothing, stress reduction, tobacco cessation, reduction of alcohol intake, and dietary restrictions (avoiding caffeine, coffee, fatty foods, mints, chocolate, spicy foods and tomato based sauces as much as possible).    You have chosen to receive care through a telehealth visit.  Do you consent to use a video/audio connection for your medical care today? Yes      Part of this note may be an electronic transcription/translation of spoken language to printed text using the Dragon Dictation System.  Body mass index is 29.79 kg/m².  No follow-ups on file.    Patient's Body mass index is 29.79 kg/m². BMI is above normal parameters. Recommendations include: nutrition counseling.    You have chosen to receive care through a telehealth visit.  Do you consent to use a video/audio connection for your medical care today? Yes      All risks, benefits, alternatives, and indications of colonoscopy and/or Endoscopy procedure have been discussed with the patient. Risks to include perforation of the colon requiring possible surgery or colostomy, risk of bleeding from biopsies or removal of colon tissue, possibility of missing a colon polyp or cancer, or adverse drug reaction.  Benefits to include the diagnosis and management of disease of the colon and rectum.  Alternatives to include barium enema, radiographic evaluation, lab testing or no intervention. Pt verbalizes understanding and agrees.     Zora Terra Stallworth, APRN  5/5/2020  09:55      Obesity, Adult  Obesity is the condition of having too much total body fat. Being overweight or obese means that your weight is greater than what is considered healthy for your body size. Obesity is determined by a measurement called BMI. BMI is an estimate of body fat and is calculated from height and weight. For adults, a BMI of 30 or higher is considered obese.  Obesity can eventually lead to other health concerns and major illnesses, including:  · Stroke.  · Coronary artery disease (CAD).  · Type 2 diabetes.  · Some types of cancer, including cancers of the colon, breast, uterus, and gallbladder.  · Osteoarthritis.  · High blood pressure (hypertension).  · High cholesterol.  · Sleep apnea.  · Gallbladder stones.  · Infertility problems.  What are the causes?  The main cause of obesity is taking in (consuming) more calories than your body uses for energy. Other factors that contribute to this condition may include:  · Being born with genes that make you more likely to become obese.  · Having a medical condition that causes obesity. These conditions include:  ¨ Hypothyroidism.  ¨ Polycystic ovarian syndrome (PCOS).  ¨ Binge-eating disorder.  ¨ Cushing syndrome.  · Taking certain medicines, such as steroids, antidepressants, and seizure medicines.  · Not being physically active (sedentary lifestyle).  · Living where there are limited places to exercise safely or buy healthy foods.  · Not getting enough sleep.  What increases the risk?  The following factors may increase your risk of this condition:  · Having a family history of obesity.  · Being a woman of -American descent.  · Being a man of  descent.  What are the signs or symptoms?  Having excessive body fat is the main symptom of this condition.  How is this  diagnosed?  This condition may be diagnosed based on:  · Your symptoms.  · Your medical history.  · A physical exam. Your health care provider may measure:  ¨ Your BMI. If you are an adult with a BMI between 25 and less than 30, you are considered overweight. If you are an adult with a BMI of 30 or higher, you are considered obese.  ¨ The distances around your hips and your waist (circumferences). These may be compared to each other to help diagnose your condition.  ¨ Your skinfold thickness. Your health care provider may gently pinch a fold of your skin and measure it.  How is this treated?  Treatment for this condition often includes changing your lifestyle. Treatment may include some or all of the following:  · Dietary changes. Work with your health care provider and a dietitian to set a weight-loss goal that is healthy and reasonable for you. Dietary changes may include eating:  ¨ Smaller portions. A portion size is the amount of a particular food that is healthy for you to eat at one time. This varies from person to person.  ¨ Low-calorie or low-fat options.  ¨ More whole grains, fruits, and vegetables.  · Regular physical activity. This may include aerobic activity (cardio) and strength training.  · Medicine to help you lose weight. Your health care provider may prescribe medicine if you are unable to lose 1 pound a week after 6 weeks of eating more healthily and doing more physical activity.  · Surgery. Surgical options may include gastric banding and gastric bypass. Surgery may be done if:  ¨ Other treatments have not helped to improve your condition.  ¨ You have a BMI of 40 or higher.  ¨ You have life-threatening health problems related to obesity.  Follow these instructions at home:     Eating and drinking     · Follow recommendations from your health care provider about what you eat and drink. Your health care provider may advise you to:  ¨ Limit fast foods, sweets, and processed snack foods.  ¨ Choose  low-fat options, such as low-fat milk instead of whole milk.  ¨ Eat 5 or more servings of fruits or vegetables every day.  ¨ Eat at home more often. This gives you more control over what you eat.  ¨ Choose healthy foods when you eat out.  ¨ Learn what a healthy portion size is.  ¨ Keep low-fat snacks on hand.  ¨ Avoid sugary drinks, such as soda, fruit juice, iced tea sweetened with sugar, and flavored milk.  ¨ Eat a healthy breakfast.  · Drink enough water to keep your urine clear or pale yellow.  · Do not go without eating for long periods of time (do not fast) or follow a fad diet. Fasting and fad diets can be unhealthy and even dangerous.  Physical Activity   · Exercise regularly, as told by your health care provider. Ask your health care provider what types of exercise are safe for you and how often you should exercise.  · Warm up and stretch before being active.  · Cool down and stretch after being active.  · Rest between periods of activity.  Lifestyle   · Limit the time that you spend in front of your TV, computer, or video game system.  · Find ways to reward yourself that do not involve food.  · Limit alcohol intake to no more than 1 drink a day for nonpregnant women and 2 drinks a day for men. One drink equals 12 oz of beer, 5 oz of wine, or 1½ oz of hard liquor.  General instructions   · Keep a weight loss journal to keep track of the food you eat and how much you exercise you get.  · Take over-the-counter and prescription medicines only as told by your health care provider.  · Take vitamins and supplements only as told by your health care provider.  · Consider joining a support group. Your health care provider may be able to recommend a support group.  · Keep all follow-up visits as told by your health care provider. This is important.  Contact a health care provider if:  · You are unable to meet your weight loss goal after 6 weeks of dietary and lifestyle changes.  This information is not intended to  replace advice given to you by your health care provider. Make sure you discuss any questions you have with your health care provider.  Document Released: 01/25/2006 Document Revised: 05/22/2017 Document Reviewed: 10/05/2016  [employer] Interactive Patient Education © 2017 [employer]      If you smoke or use tobacco, 4 minutes reading provided  Steps to Quit Smoking  Smoking tobacco can be harmful to your health and can affect almost every organ in your body. Smoking puts you, and those around you, at risk for developing many serious chronic diseases. Quitting smoking is difficult, but it is one of the best things that you can do for your health. It is never too late to quit.  What are the benefits of quitting smoking?  When you quit smoking, you lower your risk of developing serious diseases and conditions, such as:  · Lung cancer or lung disease, such as COPD.  · Heart disease.  · Stroke.  · Heart attack.  · Infertility.  · Osteoporosis and bone fractures.  Additionally, symptoms such as coughing, wheezing, and shortness of breath may get better when you quit. You may also find that you get sick less often because your body is stronger at fighting off colds and infections. If you are pregnant, quitting smoking can help to reduce your chances of having a baby of low birth weight.  How do I get ready to quit?  When you decide to quit smoking, create a plan to make sure that you are successful. Before you quit:  · Pick a date to quit. Set a date within the next two weeks to give you time to prepare.  · Write down the reasons why you are quitting. Keep this list in places where you will see it often, such as on your bathroom mirror or in your car or wallet.  · Identify the people, places, things, and activities that make you want to smoke (triggers) and avoid them. Make sure to take these actions:  ¨ Throw away all cigarettes at home, at work, and in your car.  ¨ Throw away smoking accessories, such as ashtrays and  lighters.  ¨ Clean your car and make sure to empty the ashtray.  ¨ Clean your home, including curtains and carpets.  · Tell your family, friends, and coworkers that you are quitting. Support from your loved ones can make quitting easier.  · Talk with your health care provider about your options for quitting smoking.  · Find out what treatment options are covered by your health insurance.  What strategies can I use to quit smoking?  Talk with your healthcare provider about different strategies to quit smoking. Some strategies include:  · Quitting smoking altogether instead of gradually lessening how much you smoke over a period of time. Research shows that quitting “cold turkey” is more successful than gradually quitting.  · Attending in-person counseling to help you build problem-solving skills. You are more likely to have success in quitting if you attend several counseling sessions. Even short sessions of 10 minutes can be effective.  · Finding resources and support systems that can help you to quit smoking and remain smoke-free after you quit. These resources are most helpful when you use them often. They can include:  ¨ Online chats with a counselor.  ¨ Telephone quitlines.  ¨ Printed self-help materials.  ¨ Support groups or group counseling.  ¨ Text messaging programs.  ¨ Mobile phone applications.  · Taking medicines to help you quit smoking. (If you are pregnant or breastfeeding, talk with your health care provider first.) Some medicines contain nicotine and some do not. Both types of medicines help with cravings, but the medicines that include nicotine help to relieve withdrawal symptoms. Your health care provider may recommend:  ¨ Nicotine patches, gum, or lozenges.  ¨ Nicotine inhalers or sprays.  ¨ Non-nicotine medicine that is taken by mouth.  Talk with your health care provider about combining strategies, such as taking medicines while you are also receiving in-person counseling. Using these two  strategies together makes you more likely to succeed in quitting than if you used either strategy on its own.  If you are pregnant or breastfeeding, talk with your health care provider about finding counseling or other support strategies to quit smoking. Do not take medicine to help you quit smoking unless told to do so by your health care provider.  What things can I do to make it easier to quit?  Quitting smoking might feel overwhelming at first, but there is a lot that you can do to make it easier. Take these important actions:  · Reach out to your family and friends and ask that they support and encourage you during this time. Call telephone quitlines, reach out to support groups, or work with a counselor for support.  · Ask people who smoke to avoid smoking around you.  · Avoid places that trigger you to smoke, such as bars, parties, or smoke-break areas at work.  · Spend time around people who do not smoke.  · Lessen stress in your life, because stress can be a smoking trigger for some people. To lessen stress, try:  ¨ Exercising regularly.  ¨ Deep-breathing exercises.  ¨ Yoga.  ¨ Meditating.  ¨ Performing a body scan. This involves closing your eyes, scanning your body from head to toe, and noticing which parts of your body are particularly tense. Purposefully relax the muscles in those areas.  · Download or purchase mobile phone or tablet apps (applications) that can help you stick to your quit plan by providing reminders, tips, and encouragement. There are many free apps, such as QuitGuide from the CDC (Centers for Disease Control and Prevention). You can find other support for quitting smoking (smoking cessation) through smokefree.gov and other websites.  How will I feel when I quit smoking?  Within the first 24 hours of quitting smoking, you may start to feel some withdrawal symptoms. These symptoms are usually most noticeable 2-3 days after quitting, but they usually do not last beyond 2-3 weeks. Changes  or symptoms that you might experience include:  · Mood swings.  · Restlessness, anxiety, or irritation.  · Difficulty concentrating.  · Dizziness.  · Strong cravings for sugary foods in addition to nicotine.  · Mild weight gain.  · Constipation.  · Nausea.  · Coughing or a sore throat.  · Changes in how your medicines work in your body.  · A depressed mood.  · Difficulty sleeping (insomnia).  After the first 2-3 weeks of quitting, you may start to notice more positive results, such as:  · Improved sense of smell and taste.  · Decreased coughing and sore throat.  · Slower heart rate.  · Lower blood pressure.  · Clearer skin.  · The ability to breathe more easily.  · Fewer sick days.  Quitting smoking is very challenging for most people. Do not get discouraged if you are not successful the first time. Some people need to make many attempts to quit before they achieve long-term success. Do your best to stick to your quit plan, and talk with your health care provider if you have any questions or concerns.  This information is not intended to replace advice given to you by your health care provider. Make sure you discuss any questions you have with your health care provider.  Document Released: 12/12/2002 Document Revised: 08/15/2017 Document Reviewed: 05/03/2016  [employer] Interactive Patient Education © 2017 [employer]

## 2020-06-03 RX ORDER — LEVOTHYROXINE SODIUM 175 UG/1
TABLET ORAL
Qty: 30 TABLET | Refills: 0 | Status: SHIPPED | OUTPATIENT
Start: 2020-06-03 | End: 2020-08-27

## 2020-08-05 DIAGNOSIS — E89.0 POST-SURGICAL HYPOTHYROIDISM: Primary | ICD-10-CM

## 2020-08-05 DIAGNOSIS — E05.00 GRAVES' DISEASE: ICD-10-CM

## 2020-08-10 ENCOUNTER — LAB (OUTPATIENT)
Dept: LAB | Facility: HOSPITAL | Age: 45
End: 2020-08-10

## 2020-08-10 DIAGNOSIS — E05.00 GRAVES' DISEASE: ICD-10-CM

## 2020-08-10 DIAGNOSIS — E89.0 POST-SURGICAL HYPOTHYROIDISM: ICD-10-CM

## 2020-08-10 LAB — TSH SERPL DL<=0.05 MIU/L-ACNC: 36.2 UIU/ML (ref 0.27–4.2)

## 2020-08-10 PROCEDURE — 36415 COLL VENOUS BLD VENIPUNCTURE: CPT

## 2020-08-10 PROCEDURE — 84443 ASSAY THYROID STIM HORMONE: CPT | Performed by: NURSE PRACTITIONER

## 2020-08-27 DIAGNOSIS — E89.0 POST-SURGICAL HYPOTHYROIDISM: Primary | ICD-10-CM

## 2020-08-27 RX ORDER — LEVOTHYROXINE SODIUM 0.2 MG/1
200 TABLET ORAL DAILY
Qty: 30 TABLET | Refills: 0 | Status: SHIPPED | OUTPATIENT
Start: 2020-08-27 | End: 2020-12-10

## 2020-09-11 ENCOUNTER — TRANSCRIBE ORDERS (OUTPATIENT)
Dept: ADMINISTRATIVE | Facility: HOSPITAL | Age: 45
End: 2020-09-11

## 2020-09-11 DIAGNOSIS — R60.9 EDEMA, UNSPECIFIED TYPE: Primary | ICD-10-CM

## 2020-09-11 DIAGNOSIS — M54.16 RADICULOPATHY OF LUMBAR REGION: Primary | ICD-10-CM

## 2020-09-16 ENCOUNTER — HOSPITAL ENCOUNTER (OUTPATIENT)
Dept: GENERAL RADIOLOGY | Facility: HOSPITAL | Age: 45
Discharge: HOME | End: 2020-09-16

## 2020-09-16 ENCOUNTER — HOSPITAL ENCOUNTER (OUTPATIENT)
Dept: ULTRASOUND IMAGING | Facility: HOSPITAL | Age: 45
Discharge: HOME | End: 2020-09-16

## 2020-09-16 DIAGNOSIS — M54.16 RADICULOPATHY OF LUMBAR REGION: ICD-10-CM

## 2020-09-16 DIAGNOSIS — R60.9 EDEMA, UNSPECIFIED TYPE: ICD-10-CM

## 2020-09-16 PROCEDURE — 72114 X-RAY EXAM L-S SPINE BENDING: CPT

## 2020-09-16 PROCEDURE — 93971 EXTREMITY STUDY: CPT

## 2020-11-12 ENCOUNTER — TELEPHONE (OUTPATIENT)
Dept: NEUROSURGERY | Age: 45
End: 2020-11-12

## 2020-12-10 ENCOUNTER — OFFICE VISIT (OUTPATIENT)
Dept: OBSTETRICS AND GYNECOLOGY | Facility: CLINIC | Age: 45
End: 2020-12-10

## 2020-12-10 VITALS
SYSTOLIC BLOOD PRESSURE: 124 MMHG | WEIGHT: 248 LBS | BODY MASS INDEX: 33.59 KG/M2 | HEIGHT: 72 IN | DIASTOLIC BLOOD PRESSURE: 84 MMHG

## 2020-12-10 DIAGNOSIS — N76.0 BV (BACTERIAL VAGINOSIS): ICD-10-CM

## 2020-12-10 DIAGNOSIS — N89.8 VAGINAL DRYNESS: ICD-10-CM

## 2020-12-10 DIAGNOSIS — Z01.419 WELL WOMAN EXAM WITH ROUTINE GYNECOLOGICAL EXAM: Primary | ICD-10-CM

## 2020-12-10 DIAGNOSIS — B96.89 BV (BACTERIAL VAGINOSIS): ICD-10-CM

## 2020-12-10 PROCEDURE — 87210 SMEAR WET MOUNT SALINE/INK: CPT | Performed by: NURSE PRACTITIONER

## 2020-12-10 PROCEDURE — 99396 PREV VISIT EST AGE 40-64: CPT | Performed by: NURSE PRACTITIONER

## 2020-12-10 RX ORDER — SIMVASTATIN 20 MG
TABLET ORAL
COMMUNITY
Start: 2020-10-21

## 2020-12-10 RX ORDER — METRONIDAZOLE 7.5 MG/G
GEL VAGINAL
Qty: 1 TUBE | Refills: 1 | Status: SHIPPED | OUTPATIENT
Start: 2020-12-10

## 2020-12-10 RX ORDER — BLOOD SUGAR DIAGNOSTIC
STRIP MISCELLANEOUS
COMMUNITY
Start: 2020-11-19

## 2020-12-10 RX ORDER — DEXAMETHASONE 4 MG/1
TABLET ORAL
COMMUNITY
Start: 2020-10-13

## 2020-12-10 RX ORDER — PROPRANOLOL HYDROCHLORIDE 120 MG/1
CAPSULE, EXTENDED RELEASE ORAL
COMMUNITY
Start: 2020-11-17

## 2020-12-10 RX ORDER — LANCETS 33 GAUGE
EACH MISCELLANEOUS
COMMUNITY
Start: 2020-11-19

## 2020-12-10 RX ORDER — ERGOCALCIFEROL 1.25 MG/1
CAPSULE ORAL
COMMUNITY
Start: 2020-11-17

## 2020-12-10 RX ORDER — TRAZODONE HYDROCHLORIDE 100 MG/1
100 TABLET ORAL
COMMUNITY

## 2020-12-10 RX ORDER — DICLOFENAC SODIUM 75 MG/1
TABLET, DELAYED RELEASE ORAL
COMMUNITY
Start: 2020-11-17 | End: 2022-06-15

## 2020-12-10 RX ORDER — CARVEDILOL 25 MG/1
TABLET ORAL
COMMUNITY
Start: 2020-11-17

## 2020-12-10 RX ORDER — TOPIRAMATE 50 MG/1
TABLET, FILM COATED ORAL
COMMUNITY
Start: 2020-11-17

## 2020-12-10 RX ORDER — BUMETANIDE 0.5 MG/1
TABLET ORAL
COMMUNITY
Start: 2020-09-23

## 2020-12-10 RX ORDER — LIOTHYRONINE SODIUM 25 UG/1
TABLET ORAL
COMMUNITY
Start: 2020-12-01

## 2020-12-10 RX ORDER — CONJUGATED ESTROGENS 0.62 MG/G
CREAM VAGINAL DAILY
Qty: 30 G | Refills: 3 | Status: SHIPPED | OUTPATIENT
Start: 2020-12-10

## 2020-12-10 RX ORDER — ESTRADIOL 0.1 MG/G
CREAM VAGINAL
Qty: 1 EACH | Refills: 3 | Status: SHIPPED | OUTPATIENT
Start: 2020-12-10 | End: 2020-12-10

## 2020-12-10 RX ORDER — LEVOTHYROXINE SODIUM 175 UG/1
TABLET ORAL
COMMUNITY
Start: 2020-11-17

## 2020-12-10 RX ORDER — SPIRONOLACTONE 25 MG/1
TABLET ORAL
COMMUNITY
Start: 2020-09-02

## 2020-12-10 NOTE — PROGRESS NOTES
Lynn Magana is a 45 y.o.      Chief Complaint   Patient presents with   • Gynecologic Exam     pt here for annual exam, pt c/o vaginal odor as well as odorous urine           HPI - Patient is in for gyn exam.  She has had a hyst . due to pelvic pain.  She states she was not placed on HRT, she is unsure about her ovaries.  Patient has a  mammogram scheduled.  Patient does not exercise..      The following portions of the patient's history were reviewed and updated as appropriate:vital signs, allergies, current medications, past family history, past medical history, past social history, past surgical history and problem list.      Current Outpatient Medications:   •  ADMELOG SOLOSTAR 100 UNIT/ML solution pen-injector, , Disp: , Rfl:   •  amLODIPine (NORVASC) 10 MG tablet, Take 0.5 tablets by mouth Daily., Disp: , Rfl:   •  ARIPiprazole (ABILIFY) 2 MG tablet, Take 2 mg by mouth Daily., Disp: , Rfl:   •  aspirin 81 MG EC tablet, Take 81 mg by mouth Daily., Disp: , Rfl:   •  benazepril (LOTENSIN) 20 MG tablet, Take 20 mg by mouth 2 (Two) Times a Day., Disp: , Rfl:   •  bumetanide (BUMEX) 0.5 MG tablet, , Disp: , Rfl:   •  carvedilol (COREG) 25 MG tablet, , Disp: , Rfl:   •  dexamethasone (DECADRON) 4 MG tablet, , Disp: , Rfl:   •  diclofenac (VOLTAREN) 75 MG EC tablet, , Disp: , Rfl:   •  DULoxetine (CYMBALTA) 60 MG capsule, Take 60 mg by mouth Daily., Disp: , Rfl:   •  gabapentin (NEURONTIN) 300 MG capsule, Take 300 mg by mouth 4 (Four) Times a Day., Disp: , Rfl:   •  Insulin Glargine (BASAGLAR KWIKPEN) 100 UNIT/ML injection pen, 72 Units Every Night., Disp: , Rfl:   •  Lancets (OneTouch Delica Plus Metkig88H) misc, , Disp: , Rfl:   •  levothyroxine (SYNTHROID, LEVOTHROID) 175 MCG tablet, , Disp: , Rfl:   •  liothyronine (CYTOMEL) 25 MCG tablet, , Disp: , Rfl:   •  omeprazole (priLOSEC) 20 MG capsule, Take 1 capsule by mouth Daily., Disp: 30 capsule, Rfl: 11  •  OneTouch Ultra test strip, , Disp: ,  "Rfl:   •  polyethylene glycol (MIRALAX) packet, Take 17 g by mouth Daily., Disp: 30 packet, Rfl: 0  •  propranolol LA (INDERAL LA) 120 MG 24 hr capsule, , Disp: , Rfl:   •  sertraline (ZOLOFT) 100 MG tablet, Take 100 mg by mouth Daily., Disp: , Rfl:   •  simvastatin (ZOCOR) 20 MG tablet, , Disp: , Rfl:   •  spironolactone (ALDACTONE) 25 MG tablet, , Disp: , Rfl:   •  tiZANidine (ZANAFLEX) 4 MG tablet, Take 4 mg by mouth Every Night., Disp: , Rfl:   •  topiramate (TOPAMAX) 50 MG tablet, , Disp: , Rfl:   •  traZODone (DESYREL) 100 MG tablet, Take 100 mg by mouth., Disp: , Rfl:   •  vitamin D (ERGOCALCIFEROL) 1.25 MG (94437 UT) capsule capsule, , Disp: , Rfl:     Review of Systems   Constitutional: Negative for activity change, diaphoresis and fatigue.   HENT: Negative for congestion, ear discharge, hearing loss and swollen glands.    Eyes: Negative for double vision.        Glasses   Cardiovascular:        CAD   Gastrointestinal: Positive for GERD. Negative for abdominal distention, anal bleeding and vomiting.   Endocrine:        History of Grave's Disease    Diabetic   Genitourinary: Positive for decreased libido, dyspareunia and vaginal discharge. Negative for dysuria and urinary incontinence.   Musculoskeletal: Positive for arthralgias. Negative for gait problem and neck pain.   Neurological: Negative for dizziness, tremors and numbness.   Psychiatric/Behavioral: Negative for agitation, decreased concentration, negative for hyperactivity and stress.     Breast ROS: ordered by Dr. Lemus's office  (2017) previous needle biopsy)    Objective      /84 (BP Location: Right arm, Patient Position: Sitting)   Ht 188 cm (74\")   Wt 112 kg (248 lb)   Breastfeeding No   BMI 31.84 kg/m²       Physical Exam  Vitals signs and nursing note reviewed. Exam conducted with a chaperone present.   Constitutional:       Appearance: She is well-developed.   HENT:      Head: Normocephalic and atraumatic.      Right Ear: External " ear normal.      Left Ear: External ear normal.   Eyes:      General: No scleral icterus.        Right eye: No discharge.         Left eye: No discharge.      Conjunctiva/sclera: Conjunctivae normal.   Neck:      Musculoskeletal: Normal range of motion and neck supple.      Thyroid: No thyromegaly.      Vascular: No carotid bruit.   Cardiovascular:      Rate and Rhythm: Regular rhythm.      Heart sounds: Normal heart sounds. No murmur.   Pulmonary:      Effort: Pulmonary effort is normal. No respiratory distress.      Breath sounds: Normal breath sounds.   Chest:      Breasts: Breasts are symmetrical.         Right: No inverted nipple, mass, nipple discharge, skin change or tenderness.         Left: No inverted nipple, mass, nipple discharge, skin change or tenderness.   Abdominal:      General: Bowel sounds are normal. There is no distension.      Palpations: Abdomen is soft. There is no mass.      Tenderness: There is no abdominal tenderness. There is no guarding.      Hernia: No hernia is present. There is no hernia in the left inguinal area or right inguinal area.   Genitourinary:     General: Normal vulva.      Exam position: Lithotomy position.      Labia:         Right: No rash, tenderness, lesion or injury.         Left: No rash, tenderness, lesion or injury.       Vagina: Normal. No signs of injury. No vaginal discharge, erythema or bleeding.      Uterus: Absent.       Adnexa: Right adnexa normal and left adnexa normal.        Right: No mass.          Left: No mass.        Rectum: Normal. No mass.      Comments: Cervix, Uterus are surgically absent.  Urethra and urethral meatus normal  Bladder, normal no prolapse  Perineum and anus examined and without lesions  Vaginal tissues are atrophic  Musculoskeletal: Normal range of motion.         General: No tenderness.   Lymphadenopathy:      Head:      Right side of head: No submental, submandibular, tonsillar, preauricular, posterior auricular or occipital  adenopathy.      Left side of head: No submental, submandibular, tonsillar, preauricular, posterior auricular or occipital adenopathy.      Cervical: No cervical adenopathy.      Right cervical: No superficial, deep or posterior cervical adenopathy.     Left cervical: No superficial, deep or posterior cervical adenopathy.      Lower Body: No right inguinal adenopathy. No left inguinal adenopathy.   Skin:     General: Skin is warm and dry.      Findings: No bruising, erythema or rash.   Neurological:      Mental Status: She is alert and oriented to person, place, and time.      Motor: No abnormal muscle tone.      Coordination: Coordination normal.   Psychiatric:         Mood and Affect: Mood normal.         Behavior: Behavior normal.         Thought Content: Thought content normal.         Judgment: Judgment normal.          Assessment/Plan     Diagnoses and all orders for this visit:    1. Well woman exam with routine gynecological exam (Primary)  Comments:  Patient has multiple chronic illnesses including diabetes   hyst for pelvic pain, patient has ovaries  Patient does not take HRT  Orders:  -     Liquid-based Pap Smear, Screening  -     HPV DNA Probe, Direct - ThinPrep Vial, Cervix, Endocervix    2. BV (bacterial vaginosis)  -     POC Wet Prep  BV  -     metroNIDAZOLE (METROGEL VAGINAL) 0.75 % vaginal gel; 1 applicator vaginal q hs x 5 nights  Dispense: 1 tube; Refill: 1    3. Vaginal dryness  Comments:  causing dyspareunia    Other orders  Premarin Vaginal Cream  1/2 GM vaginally twice per week for vaginal dryness.    Patient is counseled re: BSE, diet, exercise, mammogram, calcium and Vit. D3    Cindy will contact patient to see if she needs a mammogram ordered or if this is done by Dr. Lemus.                Lesia Maharaj, APRN  12/10/2020

## 2020-12-10 NOTE — TELEPHONE ENCOUNTER
Pharmacy calls stating that pt's newly prescribed Estrace will have to have a PA unless it is changed to Premarin cream. After speaking with Jane, informed pharmacy that as fine. Rx changed, see pended med for chart correction purposes.

## 2020-12-11 PROCEDURE — G0123 SCREEN CERV/VAG THIN LAYER: HCPCS | Performed by: NURSE PRACTITIONER

## 2020-12-11 PROCEDURE — 87624 HPV HI-RISK TYP POOLED RSLT: CPT | Performed by: NURSE PRACTITIONER

## 2020-12-15 LAB
GEN CATEG CVX/VAG CYTO-IMP: NORMAL
HPV I/H RISK 4 DNA CVX QL PROBE+SIG AMP: NOT DETECTED
LAB AP CASE REPORT: NORMAL
LAB AP GYN ADDITIONAL INFORMATION: NORMAL
LAB AP GYN OTHER FINDINGS: NORMAL
PATH INTERP SPEC-IMP: NORMAL
STAT OF ADQ CVX/VAG CYTO-IMP: NORMAL

## 2020-12-30 LAB — WET PREP GENITAL: ABNORMAL

## 2021-05-11 ENCOUNTER — TELEPHONE (OUTPATIENT)
Dept: NEUROSURGERY | Age: 46
End: 2021-05-11

## 2021-10-25 ENCOUNTER — TELEPHONE (OUTPATIENT)
Dept: OTOLARYNGOLOGY | Facility: CLINIC | Age: 46
End: 2021-10-25

## 2021-10-25 NOTE — TELEPHONE ENCOUNTER
I have left message for patient to call me back in regards to getting her in for eval of eyelid lesion. Waiting on call back

## 2021-12-31 ENCOUNTER — APPOINTMENT (OUTPATIENT)
Dept: GENERAL RADIOLOGY | Facility: HOSPITAL | Age: 46
End: 2021-12-31

## 2021-12-31 ENCOUNTER — HOSPITAL ENCOUNTER (EMERGENCY)
Facility: HOSPITAL | Age: 46
Discharge: HOME | End: 2021-12-31
Admitting: EMERGENCY MEDICINE

## 2021-12-31 VITALS
WEIGHT: 234 LBS | SYSTOLIC BLOOD PRESSURE: 141 MMHG | HEART RATE: 97 BPM | OXYGEN SATURATION: 99 % | RESPIRATION RATE: 18 BRPM | HEIGHT: 72 IN | BODY MASS INDEX: 31.69 KG/M2 | TEMPERATURE: 98 F | DIASTOLIC BLOOD PRESSURE: 89 MMHG

## 2021-12-31 DIAGNOSIS — T14.8XXA MUSCLE STRAIN: Primary | ICD-10-CM

## 2021-12-31 PROCEDURE — 96372 THER/PROPH/DIAG INJ SC/IM: CPT

## 2021-12-31 PROCEDURE — 25010000002 ORPHENADRINE CITRATE PER 60 MG: Performed by: NURSE PRACTITIONER

## 2021-12-31 PROCEDURE — 25010000002 KETOROLAC TROMETHAMINE PER 15 MG: Performed by: NURSE PRACTITIONER

## 2021-12-31 PROCEDURE — 99282 EMERGENCY DEPT VISIT SF MDM: CPT

## 2021-12-31 PROCEDURE — 73030 X-RAY EXAM OF SHOULDER: CPT

## 2021-12-31 RX ORDER — TIZANIDINE 4 MG/1
4 TABLET ORAL EVERY 8 HOURS PRN
Qty: 21 TABLET | Refills: 0 | Status: SHIPPED | OUTPATIENT
Start: 2021-12-31 | End: 2022-01-07

## 2021-12-31 RX ORDER — ORPHENADRINE CITRATE 30 MG/ML
60 INJECTION INTRAMUSCULAR; INTRAVENOUS ONCE
Status: COMPLETED | OUTPATIENT
Start: 2021-12-31 | End: 2021-12-31

## 2021-12-31 RX ORDER — KETOROLAC TROMETHAMINE 30 MG/ML
60 INJECTION, SOLUTION INTRAMUSCULAR; INTRAVENOUS ONCE
Status: COMPLETED | OUTPATIENT
Start: 2021-12-31 | End: 2021-12-31

## 2021-12-31 RX ADMIN — ORPHENADRINE CITRATE 60 MG: 30 INJECTION INTRAMUSCULAR; INTRAVENOUS at 14:38

## 2021-12-31 RX ADMIN — KETOROLAC TROMETHAMINE 60 MG: 30 INJECTION, SOLUTION INTRAMUSCULAR at 14:38

## 2022-01-19 ENCOUNTER — HOSPITAL ENCOUNTER (EMERGENCY)
Facility: HOSPITAL | Age: 47
Discharge: LEFT BEFORE DISCHARGE | End: 2022-01-19

## 2022-01-19 VITALS
RESPIRATION RATE: 18 BRPM | HEART RATE: 104 BPM | TEMPERATURE: 99.8 F | WEIGHT: 244 LBS | SYSTOLIC BLOOD PRESSURE: 183 MMHG | DIASTOLIC BLOOD PRESSURE: 100 MMHG | OXYGEN SATURATION: 95 % | BODY MASS INDEX: 33.05 KG/M2 | HEIGHT: 72 IN

## 2022-01-19 PROCEDURE — 99211 OFF/OP EST MAY X REQ PHY/QHP: CPT

## 2022-02-16 ENCOUNTER — TRANSCRIBE ORDERS (OUTPATIENT)
Dept: PHYSICAL THERAPY | Facility: CLINIC | Age: 47
End: 2022-02-16

## 2022-02-16 DIAGNOSIS — M25.512 LEFT SHOULDER PAIN, UNSPECIFIED CHRONICITY: Primary | ICD-10-CM

## 2022-03-01 ENCOUNTER — TREATMENT (OUTPATIENT)
Dept: PHYSICAL THERAPY | Facility: CLINIC | Age: 47
End: 2022-03-01

## 2022-03-01 DIAGNOSIS — M25.512 CHRONIC LEFT SHOULDER PAIN: ICD-10-CM

## 2022-03-01 DIAGNOSIS — G89.29 CHRONIC LEFT SHOULDER PAIN: ICD-10-CM

## 2022-03-01 DIAGNOSIS — M54.12 RADICULOPATHY, CERVICAL: Primary | ICD-10-CM

## 2022-03-01 PROCEDURE — 97161 PT EVAL LOW COMPLEX 20 MIN: CPT | Performed by: PHYSICAL THERAPIST

## 2022-03-01 NOTE — PROGRESS NOTES
Physical Therapy Initial Evaluation and Plan of Care    Patient: Lynn Magana               : 1975  Visit Date: 3/1/2022  Referring practitioner: Darryl Andrews DO  Date of Initial Visit: 3/1/2022  Patient seen for 1 sessions    Visit Diagnoses:    ICD-10-CM ICD-9-CM   1. Radiculopathy, cervical  M54.12 723.4   2. Chronic left shoulder pain  M25.512 719.41    G89.29 338.29     Past Medical History:   Diagnosis Date   • Arthritis    • Carotid artery stenosis    • Degenerative disc disease, cervical    • Depression    • Diabetes mellitus (HCC)     type 1   • Disease of thyroid gland    • GERD (gastroesophageal reflux disease)    • Graves disease    • Heart palpitations    • Hyperlipidemia    • Hypertension    • Hypothyroidism    • Seizure (HCC)    • Thyromegaly      Past Surgical History:   Procedure Laterality Date   •  SECTION     • CHOLECYSTECTOMY     • COLONOSCOPY  2015    Normal exam Dr. Morgan    • COLONOSCOPY  2015    Normal exam   • CYST REMOVAL     • ENDOSCOPY N/A 2018    Normal exam   • HYSTERECTOMY     • THYROIDECTOMY Bilateral 2018    Procedure: total thyroidectomy;  Surgeon: Vitaly Givens MD;  Location: [facility];  Service: ENT         SUBJECTIVE     Subjective Evaluation    History of Present Illness    Subjective comment: Pt reports that she has been dealing with L shoulder pain with no known onset. The pain radiates down the arm and into the hand. This pain has progressively gotten worse over the past 2 months. She notes relief when her bra strap is not on.  Patient Occupation: unemployed Pain  Current pain ratin  At best pain rating: 3  At worst pain ratin  Location: L shoulder  Quality: burning, dull ache and tight (numbness and tingling)  Aggravating factors: overhead activity, movement and lifting    Social Support  Lives with: spouse (14 year old DTR)    Hand dominance:  right    Diagnostic Tests  X-ray: normal    Patient Goals  Patient goals for therapy: decreased pain, increased strength, independence with ADLs/IADLs and increased motion         Outcome Measure:   PT G-Codes  Outcome Measure Options: Quick DASH  Quick DASH Score: 65.91    OBJECTIVE     Objective          Palpation   Left   Hypertonic in the upper trapezius.   Tenderness of the anterior deltoid, biceps, cervical interspinals, cervical paraspinals, deltoid, levator scapulae, suboccipitals and upper trapezius.     Tenderness   Cervical Spine   Tenderness in the facet joint and spinous process.     Neurological Testing     Sensation   Cervical/Thoracic   Left   Hypersensation: light touch    Right   Intact: light touch    Reflexes   Left   Biceps (C5/C6): normal (2+)  Brachioradialis (C6): normal (2+)  Triceps (C7): normal (2+)    Right   Biceps (C5/C6): normal (2+)  Brachioradialis (C6): normal (2+)  Triceps (C7): normal (2+)    Active Range of Motion   Cervical/Thoracic Spine   Cervical    Flexion: 28 degrees with pain  Extension: 19 degrees with pain  Left rotation: 35 degrees   Right rotation: 50 degrees   Left Shoulder   Flexion: 74 degrees   Abduction: 85 degrees     Right Shoulder   Flexion: 180 degrees   Abduction: 180 degrees     Passive Range of Motion   Left Shoulder   Normal passive range of motion    Joint Play   Left Shoulder  Joints within functional limits are the anterior capsule, posterior capsule and inferior capsule.     Strength/Myotome Testing     Left Shoulder     Planes of Motion   Flexion: 2+   Abduction: 3   External rotation at 0°: 4   Internal rotation at 0°: 4     Right Shoulder     Planes of Motion   Flexion: 5   Abduction: 5   External rotation at 0°: 5   Internal rotation at 0°: 5     Tests   Cervical     Left   Positive Spurling's sign.   Negative active compression ([place]) and cervical distraction.     Left Shoulder   Negative crossover, drop arm, empty can, full can, Hawkin's,  lift-off, Neer's and painful arc.         Therapy Education/Self Care 64102   Details: Educated pt on anatomy, PT POC and HEP   Given Home Exercise Program  postural retraining  symptom relief   Progress: New   Education provided to:  Patient   Level of understanding Verbalized and Demonstrated   Timed Minutes          Total Timed Treatment:     0   mins  Total Time of Visit:            45   mins    ASSESSMENT/PLAN     GOALS:  .  Goals                                          Progress Note due by 3/30/22                                                       Re-Cert due by 5/29/22    STG by: 3 weeks Comments Status Date   Pt to demonstrate improved postural awareness and ability to self correct.         Pt to demonstrate B cervical rotation of > 60 deg.      Pt to demonstrate decreased muscle guarding upon palpation.      Pt demonstrates cervical extension of 30 deg.      LTG by: 6 weeks      Pt to demonstrate independence with comprehensive HEP.       Pt to demonstrate L shoulder strength of 4+/5      Pt to report pain <2/10 for 1 week.      Pt to score <20 on the NDI.                    Assessment & Plan     Assessment  Impairments: abnormal or restricted ROM, activity intolerance, lacks appropriate home exercise program and pain with function  Functional Limitations: lifting and uncomfortable because of pain  Assessment details: Mrs. Magana presents to the clinic this date with a chief complaint L shoulder pain that radiates down the arm and has progressively gotten worse over the past 2 months. She reports that this has lead to decreased use of the UE, and difficulty with ADL's. Upon evaluation she demonstrates impaired active shoulder ROM in all planes and pain. With testing of the shoulder special tests were negative, full PROM was present without pain. After additional testing it seems that her symptoms are stemming from the cervical spine and are consistent with cervical radiculopathy. Her cervical ROM is  limited and pain is reproduced down the arm with extension and rotation. She is very guarded upon palpation and hypomobility is noted in the facet joints. Hopefully as her sensitivity calms down this will allow for greater range in the neck and the shoulder. Skilled therapy is indicated to address these deficits and improve her functional mobility. Thank you for this referral.    Prognosis: good    Plan  Therapy options: will be seen for skilled therapy services  Planned modality interventions: dry needling, low level laser therapy and TENS  Planned therapy interventions: manual therapy, body mechanics training, neuromuscular re-education, motor coordination training, postural training, soft tissue mobilization, spinal/joint mobilization, flexibility, functional ROM exercises, strengthening, stretching, home exercise program, therapeutic activities and joint mobilization  Frequency: 2x week  Duration in weeks: 6  Treatment plan discussed with: patient  Plan details: We will initially work on her soft tissue restrictions, flexibility and cervical spinal mobility. A progression will be made with an emphasis on scapular stabilizers and proximal shoulder strengtheing. An HEP will be developed working towards independence upon discharge.          SIGNATURE: Carlos Mcallister PT DPT, License #: [id number]  Electronically Signed on 3/1/2022    Initial Certification  Certification Period: 3/1/2022 through 5/29/2022  I certify that the therapy services are furnished while this patient is under my care.  The services outlined above are required by this patient, and will be reviewed every 90 days.     PHYSICIAN: Darryl Andrews DO (NPI: [id number])    Signature____________________________________________DATE: _________     Please sign and return via fax to [phone].   Thank you so much for letting us work with Lynn. I appreciate your letting us work with your patients. If you have any questions or concerns, please don't  hesitate to contact me.          [address] [place] [address]  [phone]

## 2022-03-02 ENCOUNTER — HOSPITAL ENCOUNTER (OUTPATIENT)
Dept: NEUROLOGY | Age: 47
Discharge: HOME | End: 2022-03-02
Payer: MEDICAID

## 2022-03-02 DIAGNOSIS — M25.512 ACUTE PAIN OF LEFT SHOULDER: ICD-10-CM

## 2022-03-02 PROCEDURE — 95909 NRV CNDJ TST 5-6 STUDIES: CPT

## 2022-03-02 PROCEDURE — 95886 MUSC TEST DONE W/N TEST COMP: CPT | Performed by: PSYCHIATRY & NEUROLOGY

## 2022-03-02 PROCEDURE — 95886 MUSC TEST DONE W/N TEST COMP: CPT

## 2022-03-02 PROCEDURE — 95909 NRV CNDJ TST 5-6 STUDIES: CPT | Performed by: PSYCHIATRY & NEUROLOGY

## 2022-03-11 ENCOUNTER — TREATMENT (OUTPATIENT)
Dept: PHYSICAL THERAPY | Facility: CLINIC | Age: 47
End: 2022-03-11

## 2022-03-11 DIAGNOSIS — G89.29 CHRONIC LEFT SHOULDER PAIN: ICD-10-CM

## 2022-03-11 DIAGNOSIS — M25.512 CHRONIC LEFT SHOULDER PAIN: ICD-10-CM

## 2022-03-11 DIAGNOSIS — M54.12 RADICULOPATHY, CERVICAL: Primary | ICD-10-CM

## 2022-03-11 PROCEDURE — 97140 MANUAL THERAPY 1/> REGIONS: CPT | Performed by: PHYSICAL THERAPIST

## 2022-03-11 PROCEDURE — 97535 SELF CARE MNGMENT TRAINING: CPT | Performed by: PHYSICAL THERAPIST

## 2022-03-11 NOTE — PROGRESS NOTES
Physical Therapy Treatment Note    Patient: Lynn Magana                                                                     Visit Date: 3/11/2022  :     1975    Referring practitioner:    No ref. provider found  Date of Initial Visit:          Type: THERAPY  Noted: 3/1/2022    Patient seen for 2 sessions    Visit Diagnoses:    ICD-10-CM ICD-9-CM   1. Radiculopathy, cervical  M54.12 723.4   2. Chronic left shoulder pain  M25.512 719.41    G89.29 338.29     SUBJECTIVE     Subjective She cannot tolerate lying on her L side. She reports feeling sore today and following her evaluation. She reports that pain is moving up into her neck. She reports that she has been told that she has an impinged nerve in her neck.     PAIN: /10 > 9/10 (reports TENS made her feel better)     OBJECTIVE     Objective      Manual Therapy     69555  Comments   IASTM with BL FlexiBar, B UE unweighted Thoracic paraspinals   Attempted cervical T/D, suboccipital release, cervical PA's and side glides Reclined on navy wedge, unable to tolerate any pressure beyond light touch   STM with massage cream, sitting upright B UT, cervical paraspinals, suboccipitals -- very light pressure       Timed Minutes 25     Modalities Comments   Cold laser/estim combo B UE unweighted/leaned against table, B UT, cervical paraspinals  Nicole-chronic-inflammation mode -- tx completed thus far: 1   TENS (pre-mod)  B UT, maxA 14, B UE unweighted leaned against table with moist heat pack to B UT -- 20 min unbilled       Minutes 10     Therapeutic Exercises    81551 Comments   Attempted ball presses into 45 cm phyisioball Reclined on navy wedge, unable to isolate abdominals   Attempted horizontal abduction with YTB  Reclined on navy wedge, painful               Timed Minutes 5     Therapy Education/Self Care 76537   Details: Laser stim, TENS, POC   Given symptom relief   Progress: New   Education  provided to:  Patient   Level of understanding Verbalized   Timed Minutes 10       Total Timed Treatment:     45   mins  Total Time of Visit:             70   mins         ASSESSMENT/PLAN     GOALS  Goals                                          Progress Note due by 3/30/22                                                       Re-Cert due by 5/29/22     STG by: 3 weeks Comments Date Status   Pt to demonstrate improved postural awareness and ability to self correct.   ongoing   Pt to demonstrate B cervical rotation of > 60 deg. L: 35 deg and R: 50 deg on 3/1/22 3/1/22 ongoing   Pt to demonstrate decreased muscle guarding upon palpation. Extremely guarded and unable to tolerate >light pressure  3/11/22 ongoing   Pt demonstrates cervical extension of 30 deg.  19 deg with pain on 3/1/2022 3/1/22 ongoing   LTG by: 6 weeks         Pt to demonstrate independence with comprehensive HEP.      ongoing   Pt to demonstrate L shoulder strength of 4+/5     ongoing   Pt to report pain <2/10 for 1 week.     ongoing   Pt to score <20 on the NDI.  65.91 on 3/1/2022 (eval) 3/1/22 ongoing     Assessment/Plan     ASSESSMENT: No goals have been met at this time; however, this is her first tx session since her initial evaluation. She presents with exorbitantly guarded B UT and cervical paraspinals today. She was very TTP, even to light pressure, and was unable to tolerate any cervical mobilizations or T/D d/t pain. As result, attempted light postural/core exercises; however, she was unable to tolerate this as well. Ended session with use of TENS, to which she responded well and reported pain relief following use of this modality.     PLAN: Assess response to tx today and consider beginning session with TENS/moist heat pack and follow with light manual therapies.     SIGNATURE: Marga Hughes PTA, License #: [id number]    Electronically Signed on 3/11/2022        [address]  [place] [address]  [phone]

## 2022-03-17 ENCOUNTER — TREATMENT (OUTPATIENT)
Dept: PHYSICAL THERAPY | Facility: CLINIC | Age: 47
End: 2022-03-17

## 2022-03-17 DIAGNOSIS — M25.512 CHRONIC LEFT SHOULDER PAIN: ICD-10-CM

## 2022-03-17 DIAGNOSIS — G89.29 CHRONIC LEFT SHOULDER PAIN: ICD-10-CM

## 2022-03-17 DIAGNOSIS — M54.12 RADICULOPATHY, CERVICAL: Primary | ICD-10-CM

## 2022-03-17 PROCEDURE — 97140 MANUAL THERAPY 1/> REGIONS: CPT

## 2022-03-17 PROCEDURE — 97014 ELECTRIC STIMULATION THERAPY: CPT

## 2022-03-17 NOTE — PROGRESS NOTES
Physical Therapy Treatment Note    Patient: Lynn Magana                                                                     Visit Date: 3/17/2022  :     1975    Referring practitioner:    Darryl Andrews DO  Date of Initial Visit:          Type: THERAPY  Noted: 3/1/2022    Patient seen for 3 sessions    Visit Diagnoses:    ICD-10-CM ICD-9-CM   1. Radiculopathy, cervical  M54.12 723.4   2. Chronic left shoulder pain  M25.512 719.41    G89.29 338.29     SUBJECTIVE     Subjective She reports she had to take her daughter to the ED this AM with inflammation in her chest, so she can't really feel any pain or tension in her neck currently as she is still worried about her. She has had so much going on, she can't really remember how she felt following last session, but knows that it felt better.     PAIN: 7/10 > 5/10     OBJECTIVE     Objective      Manual Therapy     98373  Comments   IASTM with BL FlexiBar, B UE unweighted    Attempted cervical T/D, suboccipital release, cervical PA's and side glides    STM with massage cream, sitting upright        Timed Minutes 29     Modalities Comments   TENS (pre-mod)  B UT, maxA 18/19, B UE unweighted leaned against table with moist heat pack to B UT       Minutes 16     Unbilled: applied KT tape (2.5 sq ea) and sure prep for B UT inhibition and postural correction     Therapy Education/Self Care 49818   Details:    Given symptom relief   Progress: New   Education provided to:  Patient   Level of understanding Verbalized   Timed Minutes      Total Timed Treatment:     29   mins  Total Time of Visit:             45   mins         ASSESSMENT/PLAN     GOALS  Goals                                          Progress Note due by 3/30/22                                                       Re-Cert due by 22     STG by: 3 weeks Comments Date Status   Pt to demonstrate improved postural awareness and  ability to self correct. KT tape for postural awareness today  3/17/22 ongoing   Pt to demonstrate B cervical rotation of > 60 deg. L: 35 deg and R: 50 deg on 3/1/22 3/1/22 ongoing   Pt to demonstrate decreased muscle guarding upon palpation. Extremely guarded and unable to tolerate >light pressure  3/11/22 ongoing   Pt demonstrates cervical extension of 30 deg.  19 deg with pain on 3/1/2022 3/1/22 ongoing   LTG by: 6 weeks         Pt to demonstrate independence with comprehensive HEP.      ongoing   Pt to demonstrate L shoulder strength of 4+/5 Flex: 2+/5, abd: 3/5, ER/IR: 4/5 on 3/1/22 3/1/22 ongoing   Pt to report pain <2/10 for 1 week.     ongoing   Pt to score <20 on the NDI.  65.91 on 3/1/2022 (eval) 3/1/22 ongoing     Assessment/Plan     ASSESSMENT: Began session with use of TENS for pain relief and desensitization, followed with manual therapies. Patient responded very well to this and would benefit from continuing this course of tx. She was able to tolerate min-mod pressure with STM and reported good response to IASTM today whereas she was unable to tolerate this last session.     PLAN: Consider scheduling more apt as next session will technically be due PN d/t lack of future apt. Assess response to tx today and consider continuing current approach by beginning session with TENS/moist heat pack and follow with light manual therapies.     SIGNATURE: Marga Hughes PTA, License #: [id number]    Electronically Signed on 3/17/2022        [address] [place] [address] [place]  [place] [address]  [phone]

## 2022-03-18 ENCOUNTER — TRANSCRIBE ORDERS (OUTPATIENT)
Dept: ADMINISTRATIVE | Facility: HOSPITAL | Age: 47
End: 2022-03-18

## 2022-03-18 DIAGNOSIS — M54.12 CERVICAL RADICULOPATHY: Primary | ICD-10-CM

## 2022-03-22 ENCOUNTER — TREATMENT (OUTPATIENT)
Dept: PHYSICAL THERAPY | Facility: CLINIC | Age: 47
End: 2022-03-22

## 2022-03-22 DIAGNOSIS — M25.512 CHRONIC LEFT SHOULDER PAIN: ICD-10-CM

## 2022-03-22 DIAGNOSIS — M54.12 RADICULOPATHY, CERVICAL: Primary | ICD-10-CM

## 2022-03-22 DIAGNOSIS — G89.29 CHRONIC LEFT SHOULDER PAIN: ICD-10-CM

## 2022-03-22 PROCEDURE — 97032 APPL MODALITY 1+ESTIM EA 15: CPT | Performed by: PHYSICAL THERAPIST

## 2022-03-22 PROCEDURE — 97140 MANUAL THERAPY 1/> REGIONS: CPT | Performed by: PHYSICAL THERAPIST

## 2022-03-22 NOTE — PROGRESS NOTES
Physical Therapy Treatment Note    Patient: Lynn Magana                                                                     Visit Date: 3/22/2022  :     1975    Referring practitioner:    Darryl Andrews DO  Date of Initial Visit:          Type: THERAPY  Noted: 3/1/2022    Patient seen for 4 sessions    Visit Diagnoses:    ICD-10-CM ICD-9-CM   1. Radiculopathy, cervical  M54.12 723.4   2. Chronic left shoulder pain  M25.512 719.41    G89.29 338.29     SUBJECTIVE     Subjective She reports that she is here today. She continues to have pain in the neck that radiates down the RUE.    PAIN: 7/10 > 5/10     OBJECTIVE     Objective      Manual Therapy     62441  Comments   IASTM thoracic with pink foam roller Mild to moderate OP   STM with massage cream, sitting upright B UT, cervical paraspinals   Applied KT tape and sure prep for B UT inhibition and postural correction    Timed Minutes 35     Modalities Comments   TENS (pre-mod)  B UT, maxA 23-24 B UE unweighted leaned against table with moist heat pack to B UT       Minutes 20         Therapy Education/Self Care 02485   Details:    Given symptom relief   Progress: New   Education provided to:  Patient   Level of understanding Verbalized   Timed Minutes      Total Timed Treatment:     35   mins  Total Time of Visit:             55   mins         ASSESSMENT/PLAN     GOALS  Goals                                          Progress Note due by 3/30/22                                                       Re-Cert due by 22     STG by: 3 weeks Comments Date Status   Pt to demonstrate improved postural awareness and ability to self correct. KT tape for postural awareness today  3/17/22 ongoing   Pt to demonstrate B cervical rotation of > 60 deg. L: 35 deg and R: 50 deg on 3/1/22 3/1/22 ongoing   Pt to demonstrate decreased muscle guarding upon palpation. Extremely guarded and unable to  tolerate >light pressure  3/11/22 ongoing   Pt demonstrates cervical extension of 30 deg.  19 deg with pain on 3/1/2022 3/1/22 ongoing   LTG by: 6 weeks         Pt to demonstrate independence with comprehensive HEP.      ongoing   Pt to demonstrate L shoulder strength of 4+/5 Flex: 2+/5, abd: 3/5, ER/IR: 4/5 on 3/1/22 3/1/22 ongoing   Pt to report pain <2/10 for 1 week.     ongoing   Pt to score <20 on the NDI.  65.91 on 3/1/2022 (eval) 3/1/22 ongoing     Assessment/Plan     ASSESSMENT: She tolerated increased pressure today following TENs. She had relief following tapping last session. She is gradually getting better and tolerating increased manual therapy over the past few sessions.    PLAN: Progress with more manual techniques and focus on mobility as symptoms allow.    SIGNATURE: Carlos Mcallister, PT DPT, License #: [id number]    Electronically Signed on 3/22/2022        [address] [place] [address] [place] [address]  [phone]

## 2022-04-01 ENCOUNTER — HOSPITAL ENCOUNTER (OUTPATIENT)
Dept: MRI IMAGING | Facility: HOSPITAL | Age: 47
Discharge: HOME | End: 2022-04-01
Admitting: INTERNAL MEDICINE

## 2022-04-01 DIAGNOSIS — M54.12 CERVICAL RADICULOPATHY: ICD-10-CM

## 2022-04-01 PROCEDURE — 72141 MRI NECK SPINE W/O DYE: CPT

## 2022-04-05 ENCOUNTER — TREATMENT (OUTPATIENT)
Dept: PHYSICAL THERAPY | Facility: CLINIC | Age: 47
End: 2022-04-05

## 2022-04-05 DIAGNOSIS — G89.29 CHRONIC LEFT SHOULDER PAIN: ICD-10-CM

## 2022-04-05 DIAGNOSIS — M25.512 CHRONIC LEFT SHOULDER PAIN: ICD-10-CM

## 2022-04-05 DIAGNOSIS — M54.12 RADICULOPATHY, CERVICAL: Primary | ICD-10-CM

## 2022-04-05 PROCEDURE — 97110 THERAPEUTIC EXERCISES: CPT

## 2022-04-05 PROCEDURE — 97140 MANUAL THERAPY 1/> REGIONS: CPT

## 2022-04-05 PROCEDURE — 97014 ELECTRIC STIMULATION THERAPY: CPT

## 2022-04-05 NOTE — PROGRESS NOTES
Progress Note Addendum      Patient: Lynn Magana           : 1975  Visit Date: 2022  Referring practitioner: Darryl Andrews DO  Date of Initial Visit: Type: THERAPY  Noted: 3/1/2022  Patient seen for 5 sessions  Visit Diagnoses:    ICD-10-CM ICD-9-CM   1. Radiculopathy, cervical  M54.12 723.4   2. Chronic left shoulder pain  M25.512 719.41    G89.29 338.29       PT G-Codes  Outcome Measure Options: Quick DASH  Quick DASH Score: 65.91  Neck Disability Index Score: 27  Clinical Progress: improved  Home Program Compliance: N/A  Progress toward previous goals: Not Met  Prognosis to achieve goals: good    Objective     Assessment & Plan     Assessment  Impairments: abnormal or restricted ROM, activity intolerance, lacks appropriate home exercise program and pain with function  Functional Limitations: lifting and uncomfortable because of painPrognosis: good    Plan  Therapy options: will be seen for skilled therapy services  Planned modality interventions: dry needling, low level laser therapy and TENS  Planned therapy interventions: manual therapy, body mechanics training, neuromuscular re-education, motor coordination training, postural training, soft tissue mobilization, spinal/joint mobilization, flexibility, functional ROM exercises, strengthening, stretching, home exercise program, therapeutic activities and joint mobilization  Frequency: 2x week  Duration in weeks: 6  Treatment plan discussed with: patient  Plan details:           I spent 5 minutes with the patient and Marga Hughes PTA and reviewed their progress and plan of care. The patient is in agreement with this plan.     SIGNATURE: Gabi Cartagena, PT DPT, License #: [id number]  Electronically Signed on 2022

## 2022-04-05 NOTE — PROGRESS NOTES
"                                                                Physical Therapy Treatment Note and 30 Day Progress Note    Patient: Lynn Magana                                                                     Visit Date: 2022  :     1975    Referring practitioner:    Darryl Andrews DO  Date of Initial Visit:          Type: THERAPY  Noted: 3/1/2022    Patient seen for 5 sessions    Visit Diagnoses:    ICD-10-CM ICD-9-CM   1. Radiculopathy, cervical  M54.12 723.4   2. Chronic left shoulder pain  M25.512 719.41    G89.29 338.29     SUBJECTIVE     Subjective She reports she feels improved since beginning PT, \"It's workin' that's for sure. That's all I can tell you.\" Patient reports that pain is unchanged, but feels that her mobility is improving.     PAIN: 5/10     OBJECTIVE     Objective      Manual Therapy     81398  Comments   Cervical manual T/D, reclined on navy wedge VERY gentle. Did not tolerate > Gr 1; however, able to tolerate light touch today vs following initial evaluation where unable to tolerate    STM with massage cream, massage chair Able to tolerate mod OP today, whereas unable to tolerate following evaluation    Guarded UT, soft tissue restrictions in UT (R > L), guarded in cervical paraspinals as well   GENTLE cervical side glides and PA's, reclined on navy wedge Able to tolerate very gentle (nothing > Gr 1), but able to tolerate today vs following eval.    Applied KT tape and sure prep for B UT inhibition and postural correction Continues to respond very well   Timed Minutes 27     Modalities Comments   TENS (pre-mod)  B UT, maxA 9/10, massage chair, moist heat pack to B UT (not billed)       Minutes 25     Therapeutic Exercises    29422 Comments   Cervical ext and lat rotation AROM See goals   L shoulder MMT See goals       Timed Minutes 8     Therapy Education/Self Care 99347   Details: Progression with PT, nervous system regulation, symptom management, postural " awareness, consider use of sports bra or straps that cross in back to assist with improved posture affected by large breast tissue, KT tape for postural awareness   Given postural retraining  symptom relief   Progress: New   Education provided to:  Patient   Level of understanding Verbalized   Timed Minutes      Total Timed Treatment:     35   mins  Total Time of Visit:             60   mins         ASSESSMENT/PLAN     GOALS  Goals                                          Progress Note due by 5/5/22                                                          Re-Cert due by 5/29/22     STG by: 3 weeks Comments Date Status   Pt to demonstrate improved postural awareness and ability to self correct. Patient has responded well to KT tape for postural awareness. Requires continued education in future.  4/5/22 ongoing   Pt to demonstrate B cervical rotation of > 60 deg. L: 35 deg and R: 50 deg on 3/1/22  L: 38 deg and R: 45 deg on 4/5/22 4/5/22 ongoing   Pt to demonstrate decreased muscle guarding upon palpation. Guarding improved, especially as inflammation decreases/tolerance to touch improves. But, still very guarded with several soft tissue restrictions present, brett in her UT (R > L) 4/5/22 ongoing   Pt demonstrates cervical extension of 30 deg.  19 deg with pain on 3/1/2022   17 deg with pain on 4/5/22  ongoing   LTG by: 6 weeks         Pt to demonstrate independence with comprehensive HEP.  Have been focusing on pain relief, no official HEP assigned at this time. Patient tolerating PT better, will assign HEP when more appropriate.  4/5/22 ongoing   Pt to demonstrate L shoulder strength of 4+/5 Flex: 2+/5, abd: 3/5, ER/IR: 4/5 on 3/1/22  Flex: 4+/5 (very painful), abd: 4-/5, IR/ER 4+/5 4/5/22 ongoing   Pt to report pain <2/10 for 1 week. 8/10 at worst, occurs most days. Pain not improved, reports mobility is improving.  4/5/22 ongoing   Pt to score <20 on the NDI. 27 on 4/5/22 3/1/22 ongoing     QuickDASH: 65.91 on  3/1/2022 (eval)  QuickDASH: 65.91 on 4/5/2022    Assessment/Plan     ASSESSMENT: Patient reports that while her pain is unchanged, she feels benefit from PT thus far, especially regarding her mobility. Progression with PT thus far has been slower in nature d/t patient's sensitivity to touch/exercise and pain levels. However, despite this, she has made significant gains with her shoulder MMT. She is responding well to current approach and we are cautiously progressing her as appropriate. She would benefit from continued skilled PT to continue progressing pain relief and reinforcing postural awareness, as well as strength and mobility deficits.     PLAN: Continue with conservative approach, progressing with manual therapies, especially prioritizing mobility as tolerated. When more appropriate, introduce chin tucks and very light postural strengthening. Once better tolerated and more appropriate, consider bolstering HEP to reflect. Consider taping technique in inverted triangle pattern for postural awareness.     SIGNATURE: Marga Hughes PTA, License #: [id number]    Electronically Signed on 4/5/2022        [facility] [address]  [phone]

## 2022-04-08 ENCOUNTER — TREATMENT (OUTPATIENT)
Dept: PHYSICAL THERAPY | Facility: CLINIC | Age: 47
End: 2022-04-08

## 2022-04-08 DIAGNOSIS — M25.512 CHRONIC LEFT SHOULDER PAIN: ICD-10-CM

## 2022-04-08 DIAGNOSIS — M54.12 RADICULOPATHY, CERVICAL: Primary | ICD-10-CM

## 2022-04-08 DIAGNOSIS — G89.29 CHRONIC LEFT SHOULDER PAIN: ICD-10-CM

## 2022-04-08 PROCEDURE — 97140 MANUAL THERAPY 1/> REGIONS: CPT | Performed by: PHYSICAL THERAPIST

## 2022-04-08 PROCEDURE — 97110 THERAPEUTIC EXERCISES: CPT | Performed by: PHYSICAL THERAPIST

## 2022-04-08 NOTE — PROGRESS NOTES
"                                                                        Physical Therapy Therapy Dry Needle Treatment Note    Patient: Lynn Magana                                                                                Today's Date: 2022    : 1975  Date of Initial Visit: Type: THERAPY  Noted: 3/1/2022  Patient seen for 6 sessions    Visit Diagnoses:    ICD-10-CM ICD-9-CM   1. Radiculopathy, cervical  M54.12 723.4   2. Chronic left shoulder pain  M25.512 719.41    G89.29 338.29       SUBJECTIVE   Subjective  SUBJECTIVE: see PT note    Pain: see PT note/10    OBJECTIVE   Objective    Dry Needle Location [ (1-2 muscles)/ (3 or more muscles)]   Location Depth (\") Comment   R UT  0.5 X 3 one very tender point, did not piston here                         TRIAL                     Total Timed Treatment:     5   mins  Total Visit Time:     5   mins    Education: Patient instructed on the expected effects and possible reactions of dry needling. The patient reported understanding of this.     ASSESSMENT/PLAN     Assessment/Plan    ASSESSMENT: She tolerated treatment well, will continue with PT treatments. No adverse reaction noted.     PLAN: Continue with Dry Needling as requested.      SIGNATURE: Nereyda Murray, PT DPT, [place] License #: [id number]  Electronically Signed on 2022      "

## 2022-04-08 NOTE — PROGRESS NOTES
"                                                                Physical Therapy Treatment Note    Patient: Lynn Magana                                                                     Visit Date: 2022  :     1975    Referring practitioner:    Darryl Andrews DO  Date of Initial Visit:          Type: THERAPY  Noted: 3/1/2022    Patient seen for 6 sessions    Visit Diagnoses:    ICD-10-CM ICD-9-CM   1. Radiculopathy, cervical  M54.12 723.4   2. Chronic left shoulder pain  M25.512 719.41    G89.29 338.29     SUBJECTIVE     Subjective She is not in pain today! She has been away from people the past few days. She hasn't had any HA the past few days.     PAIN: 0/10 > 5/10 (soreness)     OBJECTIVE     Objective Nereyda Murray, PT, DPT performed DN trial, see attached note for details.      Manual Therapy     [id number]  Comments   Cervical manual T/D, HL Very gentle, no subocc release, flat palm    STM with massage cream, massage chair B UT, cervical paraspinals -- tender and more guarded on R   cervical side glides and PA's, HL Gr 1-2       Timed Minutes 27     Therapeutic Exercises    83295 Comments   Chin tucks 2 x 10 -- min cues required   B alternating horizontal abduction with YTB  x15   HL \"Y's\" against YTB  2 x 10    Attempted B UE phasic Reported pain d/t tightness across her pecs       Timed Minutes 11     Therapy Education/Self Care 00910   Details: Introduced light postural strengthening, chin tucks - not added to HEP today as will reinforce next session before adding   Given Home Exercise Program  postural retraining  symptom relief   Progress: New   Education provided to:  Patient   Level of understanding Verbalized   Timed Minutes      Total Timed Treatment:     38   mins  Total Time of Visit:             50   mins         ASSESSMENT/PLAN     GOALS  Goals                                          Progress Note due by 22                                                          " Re-Cert due by 5/29/22     STG by: 3 weeks Comments Date Status   Pt to demonstrate improved postural awareness and ability to self correct. Patient has responded well to KT tape for postural awareness. Requires continued education in future.  4/5/22 ongoing   Pt to demonstrate B cervical rotation of > 60 deg. L: 35 deg and R: 50 deg on 3/1/22  L: 38 deg and R: 45 deg on 4/5/22 4/5/22 ongoing   Pt to demonstrate decreased muscle guarding upon palpation. Able to tolerate min-mod pressure today! More restricted in R UT > L. Use of DN trial today too 4/8/22 ongoing   Pt demonstrates cervical extension of 30 deg.  19 deg with pain on 3/1/2022   17 deg with pain on 4/5/22 4/5/22 ongoing   LTG by: 6 weeks         Pt to demonstrate independence with comprehensive HEP.  Have been focusing on pain relief, no official HEP assigned at this time. Patient tolerating PT better, will assign HEP when more appropriate.  4/5/22 ongoing   Pt to demonstrate L shoulder strength of 4+/5 Flex: 2+/5, abd: 3/5, ER/IR: 4/5 on 3/1/22  Flex: 4+/5 (very painful), abd: 4-/5, IR/ER 4+/5 4/5/22 ongoing   Pt to report pain <2/10 for 1 week. 8/10 at worst, occurs most days. Pain not improved, reports mobility is improving.  4/5/22 ongoing   Pt to score <20 on the NDI. 27 on 4/5/22 3/1/22 ongoing     QuickDASH: 65.91 on 3/1/2022 (eval)  QuickDASH: 65.91 on 4/5/2022    Assessment/Plan     ASSESSMENT: Patient presents today with no pain! She was more restricted in R UT > L, causing her to be more tender to pressure in this region. As a result of tenderness, she demonstrated decreased tolerance to mobilizations on R vs L; however, was able to tolerate all manual interventions and exercises today without need for TENS! Utilized DN trial for guarding and pain today as well. She does present with cervical hypomobility as well and will continue to address in future sessions as tolerance increases.     PLAN: Continue to address guarding in B UT/cervical  paraspinals and spinal mobility as needed. Assess response to DN trial and postural strengthening today. If reports good long term response, continue postural strengthening and consider adding chin tucks to HEP. Consider taping technique in inverted triangle pattern for postural awareness.     SIGNATURE: Marga Hughes PTA, License #: [id number]    Electronically Signed on 4/8/2022        [address] [place] [address]  [phone]

## 2022-04-12 ENCOUNTER — TREATMENT (OUTPATIENT)
Dept: PHYSICAL THERAPY | Facility: CLINIC | Age: 47
End: 2022-04-12

## 2022-04-12 DIAGNOSIS — G89.29 CHRONIC LEFT SHOULDER PAIN: ICD-10-CM

## 2022-04-12 DIAGNOSIS — M25.512 CHRONIC LEFT SHOULDER PAIN: ICD-10-CM

## 2022-04-12 DIAGNOSIS — M54.12 RADICULOPATHY, CERVICAL: Primary | ICD-10-CM

## 2022-04-12 PROCEDURE — 97110 THERAPEUTIC EXERCISES: CPT

## 2022-04-12 PROCEDURE — 97140 MANUAL THERAPY 1/> REGIONS: CPT

## 2022-04-12 NOTE — PROGRESS NOTES
"                                                                Physical Therapy Treatment Note    Patient: Lynn Magana                                                                     Visit Date: 2022  :     1975    Referring practitioner:    Darryl Andrews DO  Date of Initial Visit:          Type: THERAPY  Noted: 3/1/2022    Patient seen for 7 sessions    Visit Diagnoses:    ICD-10-CM ICD-9-CM   1. Radiculopathy, cervical  M54.12 723.4   2. Chronic left shoulder pain  M25.512 719.41    G89.29 338.29     SUBJECTIVE     Subjective She reports she felt \"great\" after last session. She went home and went to sleep and slept all the way through.     PAIN: 5/10 > 4/10     OBJECTIVE     Objective      Manual Therapy     56553  Comments   Cervical manual T/D, supine Tolerated Gr 2-3 today, reported relief   STM with massage cream, massage chair B UT, cervical paraspinals -- increased guarding in B UT and cervical paraspinals, relies heavily on flexors for stability    cervical side glides, supine Gr 2 -- hypomobile on L   Passive side bends, lateral rotation Good ROM passively in supine           Timed Minutes 25     Therapeutic Exercises    85683 Comments   Chin tucks 2 x 10  -- reported cavitation with first rep   B alternating horizontal abduction with YTB  2 x 10    HL \"Y's\" against YTB     Attempted towel roll thoracic/pec stretch Very painful, d/c'd   Applied sure prep and KT tape for postural awareness in inverted triangle pattern        Timed Minutes 23     Therapy Education/Self Care 05624   Details:    Given Home Exercise Program  postural retraining  symptom relief   Progress: New   Education provided to:  Patient   Level of understanding Verbalized   Timed Minutes      Total Timed Treatment:     48   mins  Total Time of Visit:             50   mins         ASSESSMENT/PLAN     GOALS   Goals                                          Progress Note due by 22                             "                              Re-Cert due by 5/29/22     STG by: 3 weeks Comments Date Status   Pt to demonstrate improved postural awareness and ability to self correct. Utilized KT tape for postural awareness today 4/12/22 ongoing   Pt to demonstrate B cervical rotation of > 60 deg. L: 35 deg and R: 50 deg on 3/1/22  L: 38 deg and R: 45 deg on 4/5/22 4/5/22 ongoing   Pt to demonstrate decreased muscle guarding upon palpation. Able to tolerate min-mod pressure today! More restricted in R UT > L. Use of DN trial today too 4/8/22 ongoing   Pt demonstrates cervical extension of 30 deg.  19 deg with pain on 3/1/2022   17 deg with pain on 4/5/22 4/5/22 ongoing   LTG by: 6 weeks         Pt to demonstrate independence with comprehensive HEP.  Have been focusing on pain relief, no official HEP assigned at this time. Patient tolerating PT better, will assign HEP when more appropriate.  4/5/22 ongoing   Pt to demonstrate L shoulder strength of 4+/5 Flex: 2+/5, abd: 3/5, ER/IR: 4/5 on 3/1/22  Flex: 4+/5 (very painful), abd: 4-/5, IR/ER 4+/5 4/5/22 ongoing   Pt to report pain <2/10 for 1 week. 8/10 at worst, occurs most days. Pain not improved, reports mobility is improving.  4/5/22 ongoing   Pt to score <20 on the NDI. 27 on 4/5/22 3/1/22 ongoing     QuickDASH: 65.91 on 3/1/2022 (eval)  QuickDASH: 65.91 on 4/5/2022    Assessment/Plan     ASSESSMENT: Patient continues to make improvements each session, especially with her tolerance. She reported good response to T/D and mobilizations today, demonstrating hypomobility in L cervical facets. She also will frequently rely on her cervical flexors for stability, utilized KT tape for postural awareness today. She was able to tolerate exercises today that she could not last session, further demonstrating improvements.     PLAN: Continue to address guarding in B UT/cervical paraspinals and spinal mobility as needed. Assess response to KT tape Continue with postural strengthening,  consider bolstering her HEP.     SIGNATURE: Marga Hughes PTA, License #: [id number]    Electronically Signed on 4/12/2022        [address] [place] [address]  [phone]

## 2022-04-19 ENCOUNTER — TREATMENT (OUTPATIENT)
Dept: PHYSICAL THERAPY | Facility: CLINIC | Age: 47
End: 2022-04-19

## 2022-04-19 DIAGNOSIS — M54.12 RADICULOPATHY, CERVICAL: Primary | ICD-10-CM

## 2022-04-19 DIAGNOSIS — M25.512 CHRONIC LEFT SHOULDER PAIN: ICD-10-CM

## 2022-04-19 DIAGNOSIS — G89.29 CHRONIC LEFT SHOULDER PAIN: ICD-10-CM

## 2022-04-19 PROCEDURE — 97110 THERAPEUTIC EXERCISES: CPT | Performed by: PHYSICAL THERAPIST

## 2022-04-19 PROCEDURE — 97140 MANUAL THERAPY 1/> REGIONS: CPT | Performed by: PHYSICAL THERAPIST

## 2022-04-19 NOTE — PROGRESS NOTES
"                                                                Physical Therapy Treatment Note    Patient: Lynn Magana                                                                     Visit Date: 2022  :     1975    Referring practitioner:    Darryl Andrews DO  Date of Initial Visit:          Type: THERAPY  Noted: 3/1/2022    Patient seen for 8 sessions    Visit Diagnoses:    ICD-10-CM ICD-9-CM   1. Radiculopathy, cervical  M54.12 723.4   2. Chronic left shoulder pain  M25.512 719.41    G89.29 338.29     SUBJECTIVE     Subjective She reports she went home and took a nap after her last session, she was sleepy. She reports that the tape helps her very well.     PAIN: 6/10 > \"This L shoulder over here feel a lot better than it did this mornin'.\"     OBJECTIVE     Objective      Manual Therapy     67158  Comments   STM with massage cream, massage chair B UT, cervical paraspinals -- improving, still increased soft tissue restrictions in L UT       Timed Minutes 12     Therapeutic Exercises    26965 Comments   Chin tucks Added to HEP   B alternating horizontal abduction with YTB  2 x 10 -- added to HEP   HL \"Y's\" against YTB  Attempted today, difficulty with hand position, d/c'd   SA punches 2 x 10 ea   B UE phasic  YTB was painful to L shoulder so d/c'd but cont'd without resistance, added to HEP and educ on progression at home. 2 x 10 today   B lateral raises with 1 lb on R side, standing 2 x 10   B front raises to 90 deg 2 x 10    Applied sure prep and KT tape (2.5 sq) anchored distally to thoracic spine for postural awareness and to offload c-spine        Timed Minutes 30     Therapy Education/Self Care 58779   Details: See below   Given Home Exercise Program  [employer] HEP (access code [email or web address])  postural retraining   Progress: New and Reinforced   Education provided to:  Patient   Level of understanding Verbalized   Timed Minutes      Access Code: [email or web address]  Date: 2022  Prepared by: " Marga Hughes    Exercises  Supine Shoulder Horizontal Abduction with Resistance - 1-2 x daily - 7 x weekly - 2 sets - 10 reps  B UE Phasic with Resistance - 1-2 x daily - 7 x weekly - 2 sets - 10 reps  Supine Chin Tuck - 1-2 x daily - 7 x weekly - 2 sets - 10 reps    Total Timed Treatment:     42   mins  Total Time of Visit:             45   mins         ASSESSMENT/PLAN     GOALS   Goals                                          Progress Note due by 5/5/22                                                          Re-Cert due by 5/29/22     STG by: 3 weeks Comments Date Status   Pt to demonstrate improved postural awareness and ability to self correct. Utilized KT tape for postural awareness today 4/12/22 ongoing   Pt to demonstrate B cervical rotation of > 60 deg. L: 35 deg and R: 50 deg on 3/1/22  L: 38 deg and R: 45 deg on 4/5/22 4/5/22 ongoing   Pt to demonstrate decreased muscle guarding upon palpation. Able to tolerate min-mod pressure today! More restricted in R UT > L. Use of DN trial today too 4/8/22 ongoing   Pt demonstrates cervical extension of 30 deg.  19 deg with pain on 3/1/2022   17 deg with pain on 4/5/22 4/5/22 ongoing   LTG by: 6 weeks         Pt to demonstrate independence with comprehensive HEP.  Provided med bridge code and written printout to reflect updated HEP from this date. 4/19/22 ongoing   Pt to demonstrate L shoulder strength of 4+/5 Flex: 2+/5, abd: 3/5, ER/IR: 4/5 on 3/1/22  Flex: 4+/5 (very painful), abd: 4-/5, IR/ER 4+/5 4/5/22 ongoing   Pt to report pain <2/10 for 1 week. 8/10 at worst, occurs most days. Pain not improved, reports mobility is improving.  4/5/22 ongoing   Pt to score <20 on the NDI. 27 on 4/5/22 3/1/22 ongoing     QuickDASH: 65.91 on 3/1/2022 (eval)  QuickDASH: 65.91 on 4/5/2022    Assessment/Plan     ASSESSMENT: Patient continues to respond well to current course of tx, but does fatigue and become SOA quickly. She is no longer TTP but does continue to present with  soft tissue restrictions in L UT.     PLAN: Continue to progress postural and shoulder strengthening/endurance.    SIGNATURE: Marga Hughes PTA, License #: [id number]    Electronically Signed on 4/19/2022        [address] [place] [address]. [address]  [phone]

## 2022-06-15 ENCOUNTER — OFFICE VISIT (OUTPATIENT)
Dept: NEUROSURGERY | Facility: CLINIC | Age: 47
End: 2022-06-15

## 2022-06-15 VITALS — BODY MASS INDEX: 32.51 KG/M2 | HEIGHT: 72 IN | WEIGHT: 240 LBS

## 2022-06-15 DIAGNOSIS — M25.511 ACUTE PAIN OF RIGHT SHOULDER: ICD-10-CM

## 2022-06-15 DIAGNOSIS — Z78.9 NONSMOKER: ICD-10-CM

## 2022-06-15 DIAGNOSIS — M54.12 CERVICAL RADICULOPATHY: ICD-10-CM

## 2022-06-15 DIAGNOSIS — M50.30 DEGENERATION OF CERVICAL INTERVERTEBRAL DISC: Primary | ICD-10-CM

## 2022-06-15 DIAGNOSIS — E66.09 CLASS 1 OBESITY DUE TO EXCESS CALORIES WITH BODY MASS INDEX (BMI) OF 30.0 TO 30.9 IN ADULT, UNSPECIFIED WHETHER SERIOUS COMORBIDITY PRESENT: ICD-10-CM

## 2022-06-15 PROBLEM — E66.811 CLASS 1 OBESITY DUE TO EXCESS CALORIES WITH BODY MASS INDEX (BMI) OF 30.0 TO 30.9 IN ADULT: Status: ACTIVE | Noted: 2022-06-15

## 2022-06-15 PROCEDURE — 99214 OFFICE O/P EST MOD 30 MIN: CPT | Performed by: NURSE PRACTITIONER

## 2022-06-15 RX ORDER — METFORMIN HYDROCHLORIDE 500 MG/1
TABLET, EXTENDED RELEASE ORAL
COMMUNITY
Start: 2022-06-09

## 2022-06-15 RX ORDER — LEVOTHYROXINE SODIUM 0.2 MG/1
TABLET ORAL
COMMUNITY
Start: 2022-05-31

## 2022-06-15 RX ORDER — CYCLOBENZAPRINE HCL 5 MG
TABLET ORAL
COMMUNITY
Start: 2022-03-25

## 2022-06-15 RX ORDER — TIZANIDINE 4 MG/1
TABLET ORAL
COMMUNITY
Start: 2022-05-31

## 2022-06-15 RX ORDER — PRAZOSIN HYDROCHLORIDE 1 MG/1
CAPSULE ORAL
COMMUNITY
Start: 2022-05-31

## 2022-06-15 RX ORDER — HYDROXYZINE PAMOATE 25 MG/1
CAPSULE ORAL
COMMUNITY
Start: 2022-05-31

## 2022-06-15 RX ORDER — EMPAGLIFLOZIN 10 MG/1
TABLET, FILM COATED ORAL
COMMUNITY
Start: 2022-06-10

## 2022-06-15 RX ORDER — SPIRONOLACTONE 50 MG/1
TABLET, FILM COATED ORAL
COMMUNITY
Start: 2022-05-31

## 2022-06-15 NOTE — PATIENT INSTRUCTIONS
"BMI for Adults  What is BMI?  Body mass index (BMI) is a number that is calculated from a person's weight and height. BMI can help estimate how much of a person's weight is composed of fat. BMI does not measure body fat directly. Rather, it is an alternative to procedures that directly measure body fat, which can be difficult and expensive.  BMI can help identify people who may be at higher risk for certain medical problems.  What are BMI measurements used for?  BMI is used as a screening tool to identify possible weight problems. It helps determine whether a person is obese, overweight, a healthy weight, or underweight.  BMI is useful for:  Identifying a weight problem that may be related to a medical condition or may increase the risk for medical problems.  Promoting changes, such as changes in diet and exercise, to help reach a healthy weight. BMI screening can be repeated to see if these changes are working.  How is BMI calculated?  BMI involves measuring your weight in relation to your height. Both height and weight are measured, and the BMI is calculated from those numbers. This can be done either in English (U[place] or metric measurements. Note that charts and online BMI calculators are available to help you find your BMI quickly and easily without having to do these calculations yourself.  To calculate your BMI in English (U[place] measurements:    Measure your weight in pounds (lb).  Multiply the number of pounds by 703.  For example, for a person who weighs 180 lb, multiply that number by 703, which equals 126,540.  Measure your height in inches. Then multiply that number by itself to get a measurement called \"inches squared.\"  For example, for a person who is 70 inches tall, the \"inches squared\" measurement is 70 inches x 70 inches, which equals 4,900 inches squared.  Divide the total from step 2 (number of lb x 703) by the total from step 3 (inches squared): 126,540 ÷ 4,900 = 25.8. This is your BMI.    To " "calculate your BMI in metric measurements:  Measure your weight in kilograms (kg).  Measure your height in meters (m). Then multiply that number by itself to get a measurement called \"meters squared.\"  For example, for a person who is 1.75 m tall, the \"meters squared\" measurement is 1.75 m x 1.75 m, which is equal to 3.1 meters squared.  Divide the number of kilograms (your weight) by the meters squared number. In this example: 70 ÷ 3.1 = 22.6. This is your BMI.  What do the results mean?  BMI charts are used to identify whether you are underweight, normal weight, overweight, or obese. The following guidelines will be used:  Underweight: BMI less than 18.5.  Normal weight: BMI between 18.5 and 24.9.  Overweight: BMI between 25 and 29.9.  Obese: BMI of 30 or above.  Keep these notes in mind:  Weight includes both fat and muscle, so someone with a muscular build, such as an athlete, may have a BMI that is higher than 24.9. In cases like these, BMI is not an accurate measure of body fat.  To determine if excess body fat is the cause of a BMI of 25 or higher, further assessments may need to be done by a health care provider.  BMI is usually interpreted in the same way for men and women.  Where to find more information  For more information about BMI, including tools to quickly calculate your BMI, go to these websites:  Centers for Disease Control and Prevention: www.cdc.gov  American Heart Association: www.heart.org  National Heart, Lung, and Blood [place]: www.nhlbi.nih.gov  Summary  Body mass index (BMI) is a number that is calculated from a person's weight and height.  BMI may help estimate how much of a person's weight is composed of fat. BMI can help identify those who may be at higher risk for certain medical problems.  BMI can be measured using English measurements or metric measurements.  BMI charts are used to identify whether you are underweight, normal weight, overweight, or obese.  This information is not " "intended to replace advice given to you by your health care provider. Make sure you discuss any questions you have with your health care provider.  Document Revised: 09/09/2020 Document Reviewed: 07/17/2020  [employer]upesh Patient Education © 2021 [employer]  https://www.nhlbi.nih.gov/files/docs/public/heart/dash_brief.pdf\">   DASH Eating Plan  DASH stands for Dietary Approaches to Stop Hypertension. The DASH eating plan is a healthy eating plan that has been shown to:  Reduce high blood pressure (hypertension).  Reduce your risk for type 2 diabetes, heart disease, and stroke.  Help with weight loss.  What are tips for following this plan?  Reading food labels  Check food labels for the amount of salt (sodium) per serving. Choose foods with less than 5 percent of the Daily Value of sodium. Generally, foods with less than 300 milligrams (mg) of sodium per serving fit into this eating plan.  To find whole grains, look for the word \"whole\" as the first word in the ingredient list.  Shopping  Buy products labeled as \"low-sodium\" or \"no salt added.\"  Buy fresh foods. Avoid canned foods and pre-made or frozen meals.  Cooking  Avoid adding salt when cooking. Use salt-free seasonings or herbs instead of table salt or sea salt. Check with your health care provider or pharmacist before using salt substitutes.  Do not valdivia foods. Cook foods using healthy methods such as baking, boiling, grilling, roasting, and broiling instead.  Cook with heart-healthy oils, such as olive, canola, avocado, soybean, or sunflower oil.  Meal planning    Eat a balanced diet that includes:  4 or more servings of fruits and 4 or more servings of vegetables each day. Try to fill one-half of your plate with fruits and vegetables.  6-8 servings of whole grains each day.  Less than 6 oz (170 g) of lean meat, poultry, or fish each day. A 3-oz (85-g) serving of meat is about the same size as a deck of cards. One egg equals 1 oz (28 g).  2-3 servings of " low-fat dairy each day. One serving is 1 cup (237 mL).  1 serving of nuts, seeds, or beans 5 times each week.  2-3 servings of heart-healthy fats. Healthy fats called omega-3 fatty acids are found in foods such as walnuts, flaxseeds, fortified milks, and eggs. These fats are also found in cold-water fish, such as sardines, salmon, and mackerel.  Limit how much you eat of:  Canned or prepackaged foods.  Food that is high in trans fat, such as some fried foods.  Food that is high in saturated fat, such as fatty meat.  Desserts and other sweets, sugary drinks, and other foods with added sugar.  Full-fat dairy products.  Do not salt foods before eating.  Do not eat more than 4 egg yolks a week.  Try to eat at least 2 vegetarian meals a week.  Eat more home-cooked food and less restaurant, buffet, and fast food.    Lifestyle  When eating at a restaurant, ask that your food be prepared with less salt or no salt, if possible.  If you drink alcohol:  Limit how much you use to:  0-1 drink a day for women who are not pregnant.  0-2 drinks a day for men.  Be aware of how much alcohol is in your drink. In the [place] one drink equals one 12 oz bottle of beer (355 mL), one 5 oz glass of wine (148 mL), or one 1½ oz glass of hard liquor (44 mL).  General information  Avoid eating more than 2,300 mg of salt a day. If you have hypertension, you may need to reduce your sodium intake to 1,500 mg a day.  Work with your health care provider to maintain a healthy body weight or to lose weight. Ask what an ideal weight is for you.  Get at least 30 minutes of exercise that causes your heart to beat faster (aerobic exercise) most days of the week. Activities may include walking, swimming, or biking.  Work with your health care provider or dietitian to adjust your eating plan to your individual calorie needs.  What foods should I eat?  Fruits  All fresh, dried, or frozen fruit. Canned fruit in natural juice (without added  sugar).  Vegetables  Fresh or frozen vegetables (raw, steamed, roasted, or grilled). Low-sodium or reduced-sodium tomato and vegetable juice. Low-sodium or reduced-sodium tomato sauce and tomato paste. Low-sodium or reduced-sodium canned vegetables.  Grains  Whole-grain or whole-wheat bread. Whole-grain or whole-wheat pasta. Brown rice. Oatmeal. Quinoa. Bulgur. Whole-grain and low-sodium cereals. [place]annah bread. Low-fat, low-sodium crackers. Whole-wheat flour tortillas.  Meats and other proteins  Skinless chicken or turkey. Ground chicken or turkey. Pork with fat trimmed off. Fish and seafood. Egg whites. Dried beans, peas, or lentils. Unsalted nuts, nut butters, and seeds. Unsalted canned beans. Lean cuts of beef with fat trimmed off. Low-sodium, lean precooked or cured meat, such as sausages or meat loaves.  Dairy  Low-fat (1%) or fat-free (skim) milk. Reduced-fat, low-fat, or fat-free cheeses. Nonfat, low-sodium ricotta or cottage cheese. Low-fat or nonfat yogurt. Low-fat, low-sodium cheese.  Fats and oils  Soft margarine without trans fats. Vegetable oil. Reduced-fat, low-fat, or light mayonnaise and salad dressings (reduced-sodium). Canola, safflower, olive, avocado, soybean, and sunflower oils. Avocado.  Seasonings and condiments  Herbs. Spices. Seasoning mixes without salt.  Other foods  Unsalted popcorn and pretzels. Fat-free sweets.  The items listed above may not be a complete list of foods and beverages you can eat. Contact a dietitian for more information.  What foods should I avoid?  Fruits  Canned fruit in a light or heavy syrup. Fried fruit. Fruit in cream or butter sauce.  Vegetables  Creamed or fried vegetables. Vegetables in a cheese sauce. Regular canned vegetables (not low-sodium or reduced-sodium). Regular canned tomato sauce and paste (not low-sodium or reduced-sodium). Regular tomato and vegetable juice (not low-sodium or reduced-sodium). Pickles. Olives.  Grains  Baked goods made with fat, such  as croissants, muffins, or some breads. Dry pasta or rice meal packs.  Meats and other proteins  Fatty cuts of meat. Ribs. Fried meat. Ricardo. Bologna, salami, and other precooked or cured meats, such as sausages or meat loaves. Fat from the back of a pig (fatback). Bratwurst. Salted nuts and seeds. Canned beans with added salt. Canned or smoked fish. Whole eggs or egg yolks. Chicken or turkey with skin.  Dairy  Whole or 2% milk, cream, and half-and-half. Whole or full-fat cream cheese. Whole-fat or sweetened yogurt. Full-fat cheese. Nondairy creamers. Whipped toppings. Processed cheese and cheese spreads.  Fats and oils  Butter. Stick margarine. Lard. Shortening. Ghee. Ricardo fat. Tropical oils, such as coconut, palm kernel, or palm oil.  Seasonings and condiments  Onion salt, garlic salt, seasoned salt, table salt, and sea salt. Worcestershire sauce. Tartar sauce. Barbecue sauce. Teriyaki sauce. Soy sauce, including reduced-sodium. Steak sauce. Canned and packaged gravies. Fish sauce. Oyster sauce. Cocktail sauce. Store-bought horseradish. Ketchup. Mustard. Meat flavorings and tenderizers. Bouillon cubes. Hot sauces. Pre-made or packaged marinades. Pre-made or packaged taco seasonings. Relishes. Regular salad dressings.  Other foods  Salted popcorn and pretzels.  The items listed above may not be a complete list of foods and beverages you should avoid. Contact a dietitian for more information.  Where to find more information  National Heart, Lung, and Blood [place]: www.nhlbi.nih.gov  American Heart Association: www.heart.org  Academy of Nutrition and Dietetics: www.eatright.org  National Kidney Foundation: www.kidney.org  Summary  The DASH eating plan is a healthy eating plan that has been shown to reduce high blood pressure (hypertension). It may also reduce your risk for type 2 diabetes, heart disease, and stroke.  When on the DASH eating plan, aim to eat more fresh fruits and vegetables, whole grains, lean  proteins, low-fat dairy, and heart-healthy fats.  With the DASH eating plan, you should limit salt (sodium) intake to 2,300 mg a day. If you have hypertension, you may need to reduce your sodium intake to 1,500 mg a day.  Work with your health care provider or dietitian to adjust your eating plan to your individual calorie needs.  This information is not intended to replace advice given to you by your health care provider. Make sure you discuss any questions you have with your health care provider.  Document Revised: 11/20/2020 Document Reviewed: 11/20/2020  Elsevier Patient Education © 2021 [employer]

## 2022-06-15 NOTE — PROGRESS NOTES
Chief complaint:   Chief Complaint   Patient presents with   • Back Pain     Pt is here for a evaluation of her neck, back and shoulder pain. Some of this is better because since making the appointment with us, her PCP sent her to pain management and she is doing better. She has only got 1 injection but is better. She also went to PT twice and it has helped.    • Neck Pain   • Shoulder Pain       Subjective     HPI: This is a 46-year-old female patient who was referred to us by Dr. Darryl Andrews for neck and right arm pain.  She is here to be evaluated today.  The patient states that the pain in her neck has been going on for around 6 months.  There is no accident or injury associated with this.  The pain in her neck is constant.  It is worse with lying down.  Nothing makes it better.  She has pain that radiates from her neck over to her right shoulder that is constant.  There is no modifying factors for this pain.  Denies any bowel or bladder incontinence.  She does have a lot of pain in the right shoulder as well.  She did physical therapy last month without any significant improvement.  She is not any chiropractic care.  She has been working with the [facility] and has had 1 injection without any improvement.  She has also been taking gabapentin and Zanaflex.  She was on diclofenac but stated this was not helping.  She is right-hand dominant.  She is a housewife.  She denies any tobacco.  She does drink alcohol and does smoke marijuana daily.    Review of Systems   Constitutional: Positive for fatigue.   Eyes: Positive for discharge.   Respiratory: Positive for shortness of breath.    Cardiovascular: Positive for leg swelling.   Endocrine: Positive for heat intolerance.   Neurological: Positive for dizziness, seizures and headaches.   Psychiatric/Behavioral: The patient is nervous/anxious.    All other systems reviewed and are negative.       Past Medical History:   Diagnosis  "Date   • Arthritis    • Carotid artery stenosis    • Degenerative disc disease, cervical    • Depression    • Diabetes mellitus (HCC)     type 1   • Disease of thyroid gland    • GERD (gastroesophageal reflux disease)    • Graves disease    • Heart palpitations    • Hyperlipidemia    • Hypertension    • Hypothyroidism    • Seizure (HCC)    • Thyromegaly      Past Surgical History:   Procedure Laterality Date   •  SECTION     • CHOLECYSTECTOMY     • COLONOSCOPY  2015    Normal exam Dr. Morgan    • COLONOSCOPY  2015    Normal exam   • CYST REMOVAL     • ENDOSCOPY N/A 2018    Normal exam   • HYSTERECTOMY     • THYROIDECTOMY Bilateral 2018    Procedure: total thyroidectomy;  Surgeon: Vitaly Givens MD;  Location: [facility] OR;  Service: ENT     Family History   Problem Relation Age of Onset   • Stroke Mother    • Diabetes Mother    • Stroke Father    • Diabetes Father    • Diabetes Sister    • Coronary artery disease Brother    • Diabetes Brother    • Stroke Sister    • Seizures Sister    • Colon cancer Paternal Uncle    • Colon polyps Neg Hx      Social History     Tobacco Use   • Smoking status: Never Smoker   • Smokeless tobacco: Never Used   Vaping Use   • Vaping Use: Never used   Substance Use Topics   • Alcohol use: Yes   • Drug use: Yes     Types: Marijuana     (Not in a hospital admission)    Allergies:  Oxycodone-acetaminophen    Objective      Vital Signs  Ht 188 cm (74\")   Wt 109 kg (240 lb)   BMI 30.81 kg/m²     Physical Exam  Constitutional:       Appearance: Normal appearance. She is well-developed.   HENT:      Head: Normocephalic.   Eyes:      General: Lids are normal.      Extraocular Movements: EOM normal.      Conjunctiva/sclera: Conjunctivae normal.      Pupils: Pupils are equal, round, and reactive to light.   Cardiovascular:      Rate and Rhythm: Normal rate and regular rhythm.   Pulmonary:      Effort: Pulmonary effort is normal.      Breath sounds: Normal " breath sounds.   Musculoskeletal:         General: Normal range of motion.      Cervical back: Normal range of motion.      Comments: Neck and right arm pain.  Pain with active and passive range of motion of the right shoulder   Skin:     General: Skin is warm.   Neurological:      Mental Status: She is alert and oriented to person, place, and time.      GCS: GCS eye subscore is 4. GCS verbal subscore is 5. GCS motor subscore is 6.      Cranial Nerves: No cranial nerve deficit.      Sensory: No sensory deficit.      Gait: Gait is intact.      Deep Tendon Reflexes: Strength normal and reflexes are normal and symmetric. Reflexes normal.   Psychiatric:         Speech: Speech normal.         Behavior: Behavior normal.         Thought Content: Thought content normal.         Neurologic Exam     Mental Status   Oriented to person, place, and time.   Attention: normal. Concentration: normal.   Speech: speech is normal   Level of consciousness: alert  Normal comprehension.     Cranial Nerves     CN II   Visual fields full to confrontation.     CN III, IV, VI   Pupils are equal, round, and reactive to light.  Extraocular motions are normal.     CN V   Facial sensation intact.     CN VII   Facial expression full, symmetric.     CN VIII   CN VIII normal.     CN IX, X   CN IX normal.   CN X normal.     CN XI   CN XI normal.     CN XII   CN XII normal.     Motor Exam   Muscle bulk: normal    Strength   Strength 5/5 throughout.     Sensory Exam   Light touch normal.     Gait, Coordination, and Reflexes     Gait  Gait: normal    Reflexes   Reflexes 2+ except as noted.       Imaging review: MRI of the cervical spine that was done on April 1, 2022 shows loss of the normal cervical curvature mild disc degeneration is noted at C3-4.  There is also right-sided foraminal narrowing.  At see 5-6 there is disc degeneration with Modic endplate changes and bilateral foraminal narrowing.  C6-7 disc degeneration with mild bilateral foraminal  narrowing with the left being worse than the right.  No fracture visualized.  No cord signal change.    X-ray of the right shoulder did not show any acute abnormalities    Assessment/Plan: I am going to send the patient for an MRI of the right shoulder to make sure there is no other underlying shoulder issue as there was a significant amount of pain with active and passive range of motion of the shoulder.  I will follow-up with her in 6 to 8 weeks for further evaluation and recommendation.  Her questions and concerns were addressed    Patient is a nonsmoker  The patient's Body mass index is 30.81 kg/m².. BMI is above normal parameters. Recommendations include: educational material and nutrition counseling    Diagnoses and all orders for this visit:    1. Degeneration of cervical intervertebral disc (Primary)    2. Cervical radiculopathy    3. Acute pain of right shoulder  -     MRI shoulder right wo contrast; Future    4. Class 1 obesity due to excess calories with body mass index (BMI) of 30.0 to 30.9 in adult, unspecified whether serious comorbidity present    5. Nonsmoker          I discussed the patients findings and my recommendations with patient    Jefferson Donovan, APRN  06/15/22  10:31 CDT

## 2022-07-07 ENCOUNTER — DOCUMENTATION (OUTPATIENT)
Dept: PHYSICAL THERAPY | Facility: CLINIC | Age: 47
End: 2022-07-07

## 2022-07-07 DIAGNOSIS — M25.512 CHRONIC LEFT SHOULDER PAIN: ICD-10-CM

## 2022-07-07 DIAGNOSIS — M54.12 RADICULOPATHY, CERVICAL: Primary | ICD-10-CM

## 2022-07-07 DIAGNOSIS — G89.29 CHRONIC LEFT SHOULDER PAIN: ICD-10-CM

## 2022-07-07 NOTE — PROGRESS NOTES
Physical Therapy Discharge Summary    Patient: Lynn Magana                                                                                     Today's Date: 2022  :     1975    Date of Initial Visit:          No linked episodes    Patient seen for Visit count could not be calculated. Make sure you are using a visit which is associated with an episode. sessions    Visit Diagnoses:    ICD-10-CM ICD-9-CM   1. Radiculopathy, cervical  M54.12 723.4   2. Chronic left shoulder pain  M25.512 719.41    G89.29 338.29     GOALS:  Goals                                          Progress Note due by 22                                                          Re-Cert due by 22     STG by: 3 weeks Comments Date Status   Pt to demonstrate improved postural awareness and ability to self correct. Utilized KT tape for postural awareness today 22 unmet   Pt to demonstrate B cervical rotation of > 60 deg. L: 35 deg and R: 50 deg on 3/1/22  L: 38 deg and R: 45 deg on 22 unmet   Pt to demonstrate decreased muscle guarding upon palpation. Able to tolerate min-mod pressure today! More restricted in R UT > L. Use of DN trial today too 22 unmet   Pt demonstrates cervical extension of 30 deg.  19 deg with pain on 3/1/2022   17 deg with pain on 22 unmet   LTG by: 6 weeks         Pt to demonstrate independence with comprehensive HEP.  Provided med bridge code and written printout to reflect updated HEP from this date. 22 unmet   Pt to demonstrate L shoulder strength of 4+/5 Flex: 2+/5, abd: 3/5, ER/IR: 4/5 on 3/1/22  Flex: 4+/5 (very painful), abd: 4-/5, IR/ER 4+/5 22 unmet   Pt to report pain <2/10 for 1 week. 8/10 at worst, occurs most days. Pain not improved, reports mobility is improving.  22 unmet   Pt to score <20 on the NDI. 27 on 4/5/22 3/1/22 unmet     DISCHARGE SUMMARY   Discharge date 2022   Dates of this episode 3/1/2022 through 2022   Number of visits on  this episode 8   Reason for discharge noncompliant with attendance  patient did not return to therapy   Outcomes achieved Refer to the goals table for specifics on goals   Discharge plan Continue with current home exercise program as instructed   Summary of care Patient no showed three of her apt, one of which was last scheduled apt and has not since returned.    Discharge instruction D/C home with comprehensive HEP       Signature: Marga Hughes PTA, License #: [id number]    Electronically Signed on 7/7/2022

## 2022-07-07 NOTE — PROGRESS NOTES
I have reviewed the notes, assessments, and/or procedures performed by Marga Hughes PTA, I concur with her/his documentation of Lynn Magana.

## 2023-03-15 ENCOUNTER — HOSPITAL ENCOUNTER (EMERGENCY)
Facility: HOSPITAL | Age: 48
Discharge: HOME | End: 2023-03-15
Admitting: STUDENT IN AN ORGANIZED HEALTH CARE EDUCATION/TRAINING PROGRAM
Payer: COMMERCIAL

## 2023-03-15 ENCOUNTER — APPOINTMENT (OUTPATIENT)
Dept: CT IMAGING | Facility: HOSPITAL | Age: 48
End: 2023-03-15
Payer: COMMERCIAL

## 2023-03-15 VITALS
BODY MASS INDEX: 32.37 KG/M2 | TEMPERATURE: 98.1 F | OXYGEN SATURATION: 98 % | HEART RATE: 91 BPM | DIASTOLIC BLOOD PRESSURE: 74 MMHG | WEIGHT: 239 LBS | RESPIRATION RATE: 18 BRPM | SYSTOLIC BLOOD PRESSURE: 121 MMHG | HEIGHT: 72 IN

## 2023-03-15 DIAGNOSIS — M54.50 ACUTE BILATERAL LOW BACK PAIN WITHOUT SCIATICA: Primary | ICD-10-CM

## 2023-03-15 PROCEDURE — 25010000002 DEXAMETHASONE PER 1 MG: Performed by: NURSE PRACTITIONER

## 2023-03-15 PROCEDURE — 25010000002 KETOROLAC TROMETHAMINE PER 15 MG: Performed by: NURSE PRACTITIONER

## 2023-03-15 PROCEDURE — 96372 THER/PROPH/DIAG INJ SC/IM: CPT

## 2023-03-15 PROCEDURE — 25010000002 ORPHENADRINE CITRATE PER 60 MG: Performed by: NURSE PRACTITIONER

## 2023-03-15 PROCEDURE — 99282 EMERGENCY DEPT VISIT SF MDM: CPT

## 2023-03-15 PROCEDURE — 72131 CT LUMBAR SPINE W/O DYE: CPT

## 2023-03-15 RX ORDER — DEXAMETHASONE SODIUM PHOSPHATE 10 MG/ML
10 INJECTION INTRAMUSCULAR; INTRAVENOUS ONCE
Status: COMPLETED | OUTPATIENT
Start: 2023-03-15 | End: 2023-03-15

## 2023-03-15 RX ORDER — KETOROLAC TROMETHAMINE 30 MG/ML
60 INJECTION, SOLUTION INTRAMUSCULAR; INTRAVENOUS ONCE
Status: COMPLETED | OUTPATIENT
Start: 2023-03-15 | End: 2023-03-15

## 2023-03-15 RX ORDER — ORPHENADRINE CITRATE 30 MG/ML
60 INJECTION INTRAMUSCULAR; INTRAVENOUS ONCE
Status: COMPLETED | OUTPATIENT
Start: 2023-03-15 | End: 2023-03-15

## 2023-03-15 RX ADMIN — ORPHENADRINE CITRATE 60 MG: 30 INJECTION INTRAMUSCULAR; INTRAVENOUS at 18:58

## 2023-03-15 RX ADMIN — KETOROLAC TROMETHAMINE 60 MG: 30 INJECTION, SOLUTION INTRAMUSCULAR at 18:58

## 2023-03-15 RX ADMIN — DEXAMETHASONE SODIUM PHOSPHATE 10 MG: 10 INJECTION INTRAMUSCULAR; INTRAVENOUS at 18:58

## 2023-03-16 NOTE — DISCHARGE INSTRUCTIONS
Drink plenty of fluid.  Continue home medication.  Can add tylenol as needed for pain.  Can apply heat to affected area. Follow up with PCP - call tomorrow for appointment. Return to ED if condition does not improve or worsens

## 2023-03-16 NOTE — ED PROVIDER NOTES
Subjective   History of Present Illness  47 yof presents with PMH of carotid artery stenosis, cervical DDD, DM, thyroid disease, GERD, Graves disease, hyperlipidemia and seizure presents with c/o low back pain.  Onset of pain was yesterday afternoon. She is unaware of any injury.  She denies bowel/bladder incontinence. She denies numbness or tingling of the lower extremities. No saddle anesthesia.  No fever.  No recent spinal injections. No CP or SOB.            Review of Systems   Constitutional: Negative for activity change, appetite change, fatigue and fever.   HENT: Negative for congestion, ear pain, facial swelling and sore throat.    Eyes: Negative for discharge and visual disturbance.   Respiratory: Negative for apnea, chest tightness, shortness of breath, wheezing and stridor.    Cardiovascular: Negative for chest pain and palpitations.   Gastrointestinal: Negative for abdominal distention, abdominal pain, diarrhea, nausea and vomiting.   Genitourinary: Negative for difficulty urinating and dysuria.   Musculoskeletal: Positive for back pain. Negative for arthralgias and myalgias.   Skin: Negative for rash and wound.   Neurological: Negative for dizziness and seizures.   Psychiatric/Behavioral: Negative for agitation and confusion.       Past Medical History:   Diagnosis Date   • Arthritis    • Carotid artery stenosis    • Degenerative disc disease, cervical    • Depression    • Diabetes mellitus (HCC)     type 1   • Disease of thyroid gland    • GERD (gastroesophageal reflux disease)    • Graves disease    • Heart palpitations    • Hyperlipidemia    • Hypertension    • Hypothyroidism    • Seizure (HCC)    • Thyromegaly        Allergies   Allergen Reactions   • Oxycodone-Acetaminophen Swelling     Other reaction(s): Throat swelling       Past Surgical History:   Procedure Laterality Date   •  SECTION     • CHOLECYSTECTOMY     • COLONOSCOPY  2015    Normal exam Dr. Morgan    • COLONOSCOPY   11/16/2015    Normal exam   • CYST REMOVAL     • ENDOSCOPY N/A 4/17/2018    Normal exam   • HYSTERECTOMY     • THYROIDECTOMY Bilateral 11/19/2018    Procedure: total thyroidectomy;  Surgeon: Vitaly Givens MD;  Location: [facility] OR;  Service: ENT       Family History   Problem Relation Age of Onset   • Stroke Mother    • Diabetes Mother    • Stroke Father    • Diabetes Father    • Diabetes Sister    • Coronary artery disease Brother    • Diabetes Brother    • Stroke Sister    • Seizures Sister    • Colon cancer Paternal Uncle    • Colon polyps Neg Hx        Social History     Socioeconomic History   • Marital status:      Spouse name: Evens   • Number of children: 1   • Years of education: 12   Tobacco Use   • Smoking status: Never   • Smokeless tobacco: Never   Vaping Use   • Vaping Use: Never used   Substance and Sexual Activity   • Alcohol use: Yes   • Drug use: Yes     Types: Marijuana   • Sexual activity: Not Currently     Partners: Male           Objective   Physical Exam  Vitals and nursing note reviewed.   Constitutional:       General: She is not in acute distress.     Appearance: She is well-developed. She is not ill-appearing or toxic-appearing.   HENT:      Head: Normocephalic.   Eyes:      Pupils: Pupils are equal, round, and reactive to light.   Cardiovascular:      Rate and Rhythm: Normal rate and regular rhythm.      Heart sounds: No murmur heard.  Pulmonary:      Effort: Pulmonary effort is normal.      Breath sounds: Normal breath sounds.   Abdominal:      General: Bowel sounds are normal.      Palpations: Abdomen is soft.   Musculoskeletal:      Cervical back: Normal range of motion and neck supple.      Lumbar back: No swelling, deformity, spasms or tenderness. Normal range of motion.      Comments: No tenderness, swelling or deformity present to the lumbar spine.  She has +PMS of the lower extremities.     Skin:     General: Skin is warm and dry.   Neurological:      Mental Status:  She is alert and oriented to person, place, and time.      GCS: GCS eye subscore is 4. GCS verbal subscore is 5. GCS motor subscore is 6.      Comments: She is neurologically intact         Procedures           ED Course  ED Course as of 03/28/23 1446   Wed Mar 15, 2023   1912 The patient did not want to wait on her scan results stating she would 'just check it online.'  I do not feel comfortable discharging her without the results.  [KS]   1923 The patient then decided to stay.    CT of the lumbar spine - IMPRESSION: No fracture, no acute osseous lumbar spine abnormality. Advanced degenerative disc disease L4-5 and L5-S1 as detailed above.   She remains neurologically intact. She has muscle relaxers and neurontin at home to take. She will be dc'd home to f/u with PCP.  She voiced understanding of all results and instructions.    [KS]      ED Course User Index  [KS] Shoulders, Vanesa Kearney, APRN                                           Medical Decision Making  The patient did not want to wait on her scan results stating she would 'just check it online.'  I do not feel comfortable discharging her without the results.     The patient then decided to stay.    CT of the lumbar spine - IMPRESSION: No fracture, no acute osseous lumbar spine abnormality. Advanced degenerative disc disease L4-5 and L5-S1 as detailed above.   She remains neurologically intact. She has muscle relaxers and neurontin at home to take. She will be dc'd home to f/u with PCP.  She voiced understanding of all results and instructions.         Acute bilateral low back pain without sciatica: acute illness or injury  Amount and/or Complexity of Data Reviewed  Radiology: ordered.      Risk  Prescription drug management.          Final diagnoses:   Acute bilateral low back pain without sciatica       ED Disposition  ED Disposition     ED Disposition   Discharge    Condition   Stable    Comment   --             Darryl Andrews,   [address]  [place] [address]  [phone]    Call in 1 day  Routine ED follow up         Medication List      No changes were made to your prescriptions during this visit.          Vanesa Quintana, APRN  03/28/23 144

## 2023-03-16 NOTE — ED NOTES
While giving patient scheduled medications, patient voiced that she was told she would have to wait 45 minutes for her results of CT scan and did not want to wait that long. She stated that she had the [place] [employer] vinita on her phone to see her results. Discussed with patient that she could leave but it would be AMA, she verbalized understanding. Provider aware.

## 2023-04-19 ENCOUNTER — TRANSCRIBE ORDERS (OUTPATIENT)
Dept: ADMINISTRATIVE | Facility: HOSPITAL | Age: 48
End: 2023-04-19
Payer: COMMERCIAL

## 2023-04-19 DIAGNOSIS — Z12.31 SCREENING MAMMOGRAM FOR BREAST CANCER: Primary | ICD-10-CM

## 2023-05-04 ENCOUNTER — HOSPITAL ENCOUNTER (OUTPATIENT)
Dept: MAMMOGRAPHY | Facility: HOSPITAL | Age: 48
Discharge: HOME | End: 2023-05-04
Admitting: INTERNAL MEDICINE
Payer: COMMERCIAL

## 2023-05-04 PROCEDURE — 77067 SCR MAMMO BI INCL CAD: CPT

## 2023-05-04 PROCEDURE — 77063 BREAST TOMOSYNTHESIS BI: CPT

## 2024-09-17 ENCOUNTER — APPOINTMENT (OUTPATIENT)
Dept: GENERAL RADIOLOGY | Facility: HOSPITAL | Age: 49
End: 2024-09-17
Payer: COMMERCIAL

## 2024-09-17 ENCOUNTER — APPOINTMENT (OUTPATIENT)
Dept: CT IMAGING | Facility: HOSPITAL | Age: 49
End: 2024-09-17
Payer: COMMERCIAL

## 2024-09-17 ENCOUNTER — HOSPITAL ENCOUNTER (EMERGENCY)
Facility: HOSPITAL | Age: 49
Discharge: HOME | End: 2024-09-17
Payer: COMMERCIAL

## 2024-09-17 VITALS
TEMPERATURE: 97.9 F | BODY MASS INDEX: 31.15 KG/M2 | DIASTOLIC BLOOD PRESSURE: 62 MMHG | HEIGHT: 72 IN | OXYGEN SATURATION: 100 % | HEART RATE: 87 BPM | RESPIRATION RATE: 19 BRPM | SYSTOLIC BLOOD PRESSURE: 99 MMHG | WEIGHT: 230 LBS
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
## 2024-09-17 DIAGNOSIS — R42 DIZZINESS: ICD-10-CM

## 2024-09-17 DIAGNOSIS — S00.03XA CONTUSION OF SCALP, INITIAL ENCOUNTER: ICD-10-CM

## 2024-09-17 LAB
ALBUMIN SERPL-MCNC: 3.4 G/DL (ref 3.5–5.2)
ALBUMIN/GLOB SERPL: 1.2 G/DL
ALP SERPL-CCNC: 68 U/L (ref 39–117)
ALT SERPL W P-5'-P-CCNC: 34 U/L (ref 1–33)
AMPHET+METHAMPHET UR QL: NEGATIVE
AMPHETAMINES UR QL: NEGATIVE
ANION GAP SERPL CALCULATED.3IONS-SCNC: 10 MMOL/L (ref 5–15)
AST SERPL-CCNC: 32 U/L (ref 1–32)
B PARAPERT DNA SPEC QL NAA+PROBE: NOT DETECTED
B PERT DNA SPEC QL NAA+PROBE: NOT DETECTED
BARBITURATES UR QL SCN: NEGATIVE
BASOPHILS # BLD AUTO: 0.02 10*3/MM3 (ref 0–0.2)
BASOPHILS NFR BLD AUTO: 0.3 % (ref 0–1.5)
BENZODIAZ UR QL SCN: NEGATIVE
BILIRUB SERPL-MCNC: 0.3 MG/DL (ref 0–1.2)
BILIRUB UR QL STRIP: ABNORMAL
BUN SERPL-MCNC: 10 MG/DL (ref 6–20)
BUN/CREAT SERPL: 8.4 (ref 7–25)
BUPRENORPHINE SERPL-MCNC: NEGATIVE NG/ML
C PNEUM DNA NPH QL NAA+NON-PROBE: NOT DETECTED
CALCIUM SPEC-SCNC: 8.9 MG/DL (ref 8.6–10.5)
CANNABINOIDS SERPL QL: POSITIVE
CHLORIDE SERPL-SCNC: 101 MMOL/L (ref 98–107)
CLARITY UR: ABNORMAL
CO2 SERPL-SCNC: 25 MMOL/L (ref 22–29)
COCAINE UR QL: NEGATIVE
COLOR UR: ABNORMAL
CREAT SERPL-MCNC: 1.19 MG/DL (ref 0.57–1)
D DIMER PPP FEU-MCNC: <0.27 MCGFEU/ML (ref 0–0.5)
DEPRECATED RDW RBC AUTO: 42.8 FL (ref 37–54)
EGFRCR SERPLBLD CKD-EPI 2021: 55.8 ML/MIN/1.73
EOSINOPHIL # BLD AUTO: 0.02 10*3/MM3 (ref 0–0.4)
EOSINOPHIL NFR BLD AUTO: 0.3 % (ref 0.3–6.2)
ERYTHROCYTE [DISTWIDTH] IN BLOOD BY AUTOMATED COUNT: 14 % (ref 12.3–15.4)
ETHANOL UR QL: <0.01 %
FENTANYL UR-MCNC: NEGATIVE NG/ML
FLUAV SUBTYP SPEC NAA+PROBE: NOT DETECTED
FLUBV RNA ISLT QL NAA+PROBE: NOT DETECTED
GLOBULIN UR ELPH-MCNC: 2.8 GM/DL
GLUCOSE SERPL-MCNC: 198 MG/DL (ref 65–99)
GLUCOSE UR STRIP-MCNC: ABNORMAL MG/DL
HADV DNA SPEC NAA+PROBE: NOT DETECTED
HCOV 229E RNA SPEC QL NAA+PROBE: NOT DETECTED
HCOV HKU1 RNA SPEC QL NAA+PROBE: NOT DETECTED
HCOV NL63 RNA SPEC QL NAA+PROBE: NOT DETECTED
HCOV OC43 RNA SPEC QL NAA+PROBE: NOT DETECTED
HCT VFR BLD AUTO: 36.3 % (ref 34–46.6)
HGB BLD-MCNC: 11.7 G/DL (ref 12–15.9)
HGB UR QL STRIP.AUTO: NEGATIVE
HMPV RNA NPH QL NAA+NON-PROBE: NOT DETECTED
HOLD SPECIMEN: NORMAL
HOLD SPECIMEN: NORMAL
HPIV1 RNA ISLT QL NAA+PROBE: NOT DETECTED
HPIV2 RNA SPEC QL NAA+PROBE: NOT DETECTED
HPIV3 RNA NPH QL NAA+PROBE: NOT DETECTED
HPIV4 P GENE NPH QL NAA+PROBE: NOT DETECTED
IMM GRANULOCYTES # BLD AUTO: 0.02 10*3/MM3 (ref 0–0.05)
IMM GRANULOCYTES NFR BLD AUTO: 0.3 % (ref 0–0.5)
INR PPP: 0.98 (ref 0.91–1.09)
KETONES UR QL STRIP: ABNORMAL
LEUKOCYTE ESTERASE UR QL STRIP.AUTO: NEGATIVE
LYMPHOCYTES # BLD AUTO: 2.39 10*3/MM3 (ref 0.7–3.1)
LYMPHOCYTES NFR BLD AUTO: 38.2 % (ref 19.6–45.3)
M PNEUMO IGG SER IA-ACNC: NOT DETECTED
MAGNESIUM SERPL-MCNC: 1.8 MG/DL (ref 1.6–2.6)
MCH RBC QN AUTO: 27.3 PG (ref 26.6–33)
MCHC RBC AUTO-ENTMCNC: 32.2 G/DL (ref 31.5–35.7)
MCV RBC AUTO: 84.6 FL (ref 79–97)
METHADONE UR QL SCN: NEGATIVE
MONOCYTES # BLD AUTO: 0.32 10*3/MM3 (ref 0.1–0.9)
MONOCYTES NFR BLD AUTO: 5.1 % (ref 5–12)
NEUTROPHILS NFR BLD AUTO: 3.49 10*3/MM3 (ref 1.7–7)
NEUTROPHILS NFR BLD AUTO: 55.8 % (ref 42.7–76)
NITRITE UR QL STRIP: NEGATIVE
NRBC BLD AUTO-RTO: 0 /100 WBC (ref 0–0.2)
OPIATES UR QL: NEGATIVE
OXYCODONE UR QL SCN: NEGATIVE
PCP UR QL SCN: NEGATIVE
PH UR STRIP.AUTO: 5.5 [PH] (ref 5–8)
PLATELET # BLD AUTO: 205 10*3/MM3 (ref 140–450)
PMV BLD AUTO: 10.6 FL (ref 6–12)
POTASSIUM SERPL-SCNC: 4.5 MMOL/L (ref 3.5–5.2)
PROT SERPL-MCNC: 6.2 G/DL (ref 6–8.5)
PROT UR QL STRIP: NEGATIVE
PROTHROMBIN TIME: 13.4 SECONDS (ref 11.8–14.8)
RBC # BLD AUTO: 4.29 10*6/MM3 (ref 3.77–5.28)
RHINOVIRUS RNA SPEC NAA+PROBE: NOT DETECTED
RSV RNA NPH QL NAA+NON-PROBE: NOT DETECTED
SARS-COV-2 RNA NPH QL NAA+NON-PROBE: NOT DETECTED
SODIUM SERPL-SCNC: 136 MMOL/L (ref 136–145)
SP GR UR STRIP: >1.03 (ref 1–1.03)
T4 FREE SERPL-MCNC: 1.45 NG/DL (ref 0.93–1.7)
TRICYCLICS UR QL SCN: NEGATIVE
TROPONIN T SERPL HS-MCNC: 17 NG/L
TSH SERPL DL<=0.05 MIU/L-ACNC: 0.02 UIU/ML (ref 0.27–4.2)
UROBILINOGEN UR QL STRIP: ABNORMAL
WBC NRBC COR # BLD AUTO: 6.26 10*3/MM3 (ref 3.4–10.8)
WHOLE BLOOD HOLD COAG: NORMAL
WHOLE BLOOD HOLD SPECIMEN: NORMAL

## 2024-09-17 PROCEDURE — 82077 ASSAY SPEC XCP UR&BREATH IA: CPT | Performed by: NURSE PRACTITIONER

## 2024-09-17 PROCEDURE — 70450 CT HEAD/BRAIN W/O DYE: CPT

## 2024-09-17 PROCEDURE — 85610 PROTHROMBIN TIME: CPT | Performed by: NURSE PRACTITIONER

## 2024-09-17 PROCEDURE — 81003 URINALYSIS AUTO W/O SCOPE: CPT

## 2024-09-17 PROCEDURE — 0202U NFCT DS 22 TRGT SARS-COV-2: CPT | Performed by: NURSE PRACTITIONER

## 2024-09-17 PROCEDURE — 93010 ELECTROCARDIOGRAM REPORT: CPT | Performed by: INTERNAL MEDICINE

## 2024-09-17 PROCEDURE — 80050 GENERAL HEALTH PANEL: CPT

## 2024-09-17 PROCEDURE — 93005 ELECTROCARDIOGRAM TRACING: CPT

## 2024-09-17 PROCEDURE — 80307 DRUG TEST PRSMV CHEM ANLYZR: CPT | Performed by: NURSE PRACTITIONER

## 2024-09-17 PROCEDURE — 84484 ASSAY OF TROPONIN QUANT: CPT

## 2024-09-17 PROCEDURE — 72125 CT NECK SPINE W/O DYE: CPT

## 2024-09-17 PROCEDURE — 71045 X-RAY EXAM CHEST 1 VIEW: CPT

## 2024-09-17 PROCEDURE — 99284 EMERGENCY DEPT VISIT MOD MDM: CPT

## 2024-09-17 PROCEDURE — 84439 ASSAY OF FREE THYROXINE: CPT | Performed by: NURSE PRACTITIONER

## 2024-09-17 PROCEDURE — 85379 FIBRIN DEGRADATION QUANT: CPT | Performed by: NURSE PRACTITIONER

## 2024-09-17 PROCEDURE — 83735 ASSAY OF MAGNESIUM: CPT

## 2024-09-17 RX ORDER — SODIUM CHLORIDE 0.9 % (FLUSH) 0.9 %
10 SYRINGE (ML) INJECTION AS NEEDED
Status: DISCONTINUED | OUTPATIENT
Start: 2024-09-17 | End: 2024-09-17 | Stop reason: HOSPADM

## 2024-09-20 LAB
QT INTERVAL: 394 MS
QTC INTERVAL: 460 MS

## 2024-12-03 ENCOUNTER — HOSPITAL ENCOUNTER (OUTPATIENT)
Facility: HOSPITAL | Age: 49
Setting detail: OBSERVATION
Discharge: HOME | End: 2024-12-04
Attending: EMERGENCY MEDICINE | Admitting: FAMILY MEDICINE
Payer: COMMERCIAL

## 2024-12-03 DIAGNOSIS — E05.00 GRAVES' DISEASE: ICD-10-CM

## 2024-12-03 DIAGNOSIS — Z79.4 CONTROLLED TYPE 2 DIABETES MELLITUS WITHOUT COMPLICATION, WITH LONG-TERM CURRENT USE OF INSULIN: ICD-10-CM

## 2024-12-03 DIAGNOSIS — E66.09 CLASS 1 OBESITY DUE TO EXCESS CALORIES WITH BODY MASS INDEX (BMI) OF 30.0 TO 30.9 IN ADULT, UNSPECIFIED WHETHER SERIOUS COMORBIDITY PRESENT: ICD-10-CM

## 2024-12-03 DIAGNOSIS — M25.511 ACUTE PAIN OF RIGHT SHOULDER: ICD-10-CM

## 2024-12-03 DIAGNOSIS — E16.2 HYPOGLYCEMIA: Primary | ICD-10-CM

## 2024-12-03 DIAGNOSIS — K21.9 GERD WITHOUT ESOPHAGITIS: ICD-10-CM

## 2024-12-03 DIAGNOSIS — Z78.9 NONSMOKER: ICD-10-CM

## 2024-12-03 DIAGNOSIS — E11.9 CONTROLLED TYPE 2 DIABETES MELLITUS WITHOUT COMPLICATION, WITH LONG-TERM CURRENT USE OF INSULIN: ICD-10-CM

## 2024-12-03 DIAGNOSIS — R11.2 NAUSEA AND VOMITING, UNSPECIFIED VOMITING TYPE: ICD-10-CM

## 2024-12-03 DIAGNOSIS — E13.40 OTHER SPECIFIED DIABETES MELLITUS WITH DIABETIC NEUROPATHY, UNSPECIFIED WHETHER LONG TERM INSULIN USE: ICD-10-CM

## 2024-12-03 DIAGNOSIS — G89.29 CHRONIC INTRACTABLE HEADACHE, UNSPECIFIED HEADACHE TYPE: ICD-10-CM

## 2024-12-03 DIAGNOSIS — E89.0 POST-SURGICAL HYPOTHYROIDISM: ICD-10-CM

## 2024-12-03 DIAGNOSIS — I15.9 SECONDARY HYPERTENSION: ICD-10-CM

## 2024-12-03 DIAGNOSIS — E66.811 CLASS 1 OBESITY DUE TO EXCESS CALORIES WITH BODY MASS INDEX (BMI) OF 30.0 TO 30.9 IN ADULT, UNSPECIFIED WHETHER SERIOUS COMORBIDITY PRESENT: ICD-10-CM

## 2024-12-03 DIAGNOSIS — R68.89 SPELLS OF DECREASED ATTENTIVENESS: ICD-10-CM

## 2024-12-03 DIAGNOSIS — M54.12 CERVICAL RADICULOPATHY: ICD-10-CM

## 2024-12-03 DIAGNOSIS — F19.10 POLYSUBSTANCE ABUSE: ICD-10-CM

## 2024-12-03 DIAGNOSIS — K59.01 SLOW TRANSIT CONSTIPATION: ICD-10-CM

## 2024-12-03 DIAGNOSIS — E05.90 HYPERTHYROIDISM: ICD-10-CM

## 2024-12-03 DIAGNOSIS — M50.30 DEGENERATION OF CERVICAL INTERVERTEBRAL DISC: ICD-10-CM

## 2024-12-03 DIAGNOSIS — E01.0 THYROMEGALY: ICD-10-CM

## 2024-12-03 DIAGNOSIS — R51.9 CHRONIC INTRACTABLE HEADACHE, UNSPECIFIED HEADACHE TYPE: ICD-10-CM

## 2024-12-03 DIAGNOSIS — R55 SYNCOPE, UNSPECIFIED SYNCOPE TYPE: ICD-10-CM

## 2024-12-03 LAB
ALBUMIN SERPL-MCNC: 4 G/DL (ref 3.5–5.2)
ALBUMIN/GLOB SERPL: 1.3 G/DL
ALP SERPL-CCNC: 74 U/L (ref 39–117)
ALT SERPL W P-5'-P-CCNC: 41 U/L (ref 1–33)
ANION GAP SERPL CALCULATED.3IONS-SCNC: 13 MMOL/L (ref 5–15)
AST SERPL-CCNC: 28 U/L (ref 1–32)
ATMOSPHERIC PRESS: 766 MMHG
BASE EXCESS BLDV CALC-SCNC: -0.4 MMOL/L (ref 0–2)
BASOPHILS # BLD AUTO: 0.01 10*3/MM3 (ref 0–0.2)
BASOPHILS NFR BLD AUTO: 0.2 % (ref 0–1.5)
BDY SITE: ABNORMAL
BILIRUB SERPL-MCNC: 0.4 MG/DL (ref 0–1.2)
BODY TEMPERATURE: 37
BUN SERPL-MCNC: 12 MG/DL (ref 6–20)
BUN/CREAT SERPL: 10.7 (ref 7–25)
CALCIUM SPEC-SCNC: 9.1 MG/DL (ref 8.6–10.5)
CHLORIDE SERPL-SCNC: 100 MMOL/L (ref 98–107)
CO2 SERPL-SCNC: 24 MMOL/L (ref 22–29)
COHGB MFR BLD: 1 % (ref 0–5)
CREAT SERPL-MCNC: 1.12 MG/DL (ref 0.57–1)
DEPRECATED RDW RBC AUTO: 42.4 FL (ref 37–54)
EGFRCR SERPLBLD CKD-EPI 2021: 60 ML/MIN/1.73
EOSINOPHIL # BLD AUTO: 0 10*3/MM3 (ref 0–0.4)
EOSINOPHIL NFR BLD AUTO: 0 % (ref 0.3–6.2)
ERYTHROCYTE [DISTWIDTH] IN BLOOD BY AUTOMATED COUNT: 13.5 % (ref 12.3–15.4)
GLOBULIN UR ELPH-MCNC: 3.1 GM/DL
GLUCOSE BLDC GLUCOMTR-MCNC: 122 MG/DL (ref 70–130)
GLUCOSE BLDC GLUCOMTR-MCNC: 155 MG/DL (ref 70–130)
GLUCOSE BLDC GLUCOMTR-MCNC: 202 MG/DL (ref 70–130)
GLUCOSE BLDC GLUCOMTR-MCNC: 210 MG/DL (ref 70–130)
GLUCOSE BLDC GLUCOMTR-MCNC: 268 MG/DL (ref 70–130)
GLUCOSE BLDC GLUCOMTR-MCNC: 283 MG/DL (ref 70–130)
GLUCOSE BLDC GLUCOMTR-MCNC: 33 MG/DL (ref 70–130)
GLUCOSE BLDC GLUCOMTR-MCNC: 56 MG/DL (ref 70–130)
GLUCOSE SERPL-MCNC: 131 MG/DL (ref 65–99)
HCO3 BLDV-SCNC: 28.1 MMOL/L (ref 22–28)
HCT VFR BLD AUTO: 39.6 % (ref 34–46.6)
HGB BLD-MCNC: 13.1 G/DL (ref 12–15.9)
HGB BLDA-MCNC: 14.2 G/DL (ref 12–16)
IMM GRANULOCYTES # BLD AUTO: 0.01 10*3/MM3 (ref 0–0.05)
IMM GRANULOCYTES NFR BLD AUTO: 0.2 % (ref 0–0.5)
LYMPHOCYTES # BLD AUTO: 1.03 10*3/MM3 (ref 0.7–3.1)
LYMPHOCYTES NFR BLD AUTO: 22.6 % (ref 19.6–45.3)
Lab: ABNORMAL
MAGNESIUM SERPL-MCNC: 2.1 MG/DL (ref 1.6–2.6)
MCH RBC QN AUTO: 28.3 PG (ref 26.6–33)
MCHC RBC AUTO-ENTMCNC: 33.1 G/DL (ref 31.5–35.7)
MCV RBC AUTO: 85.5 FL (ref 79–97)
METHGB BLD QL: 0.4 % (ref 0–3)
MODALITY: ABNORMAL
MONOCYTES # BLD AUTO: 0.19 10*3/MM3 (ref 0.1–0.9)
MONOCYTES NFR BLD AUTO: 4.2 % (ref 5–12)
NEUTROPHILS NFR BLD AUTO: 3.31 10*3/MM3 (ref 1.7–7)
NEUTROPHILS NFR BLD AUTO: 72.8 % (ref 42.7–76)
NRBC BLD AUTO-RTO: 0 /100 WBC (ref 0–0.2)
OXYHGB MFR BLDV: 23 % (ref 60–85)
PCO2 BLDV: 62 MM HG (ref 41–51)
PH BLDV: 7.26 PH UNITS (ref 7.32–7.42)
PLATELET # BLD AUTO: 211 10*3/MM3 (ref 140–450)
PMV BLD AUTO: 10.1 FL (ref 6–12)
PO2 BLDV: 19 MM HG (ref 27–53)
POTASSIUM BLDV-SCNC: 4 MMOL/L (ref 3.5–5.2)
POTASSIUM SERPL-SCNC: 5 MMOL/L (ref 3.5–5.2)
PROT SERPL-MCNC: 7.1 G/DL (ref 6–8.5)
RBC # BLD AUTO: 4.63 10*6/MM3 (ref 3.77–5.28)
SAO2 % BLDCOV: 23.3 % (ref 45–75)
SODIUM BLDV-SCNC: 143 MMOL/L (ref 136–145)
SODIUM SERPL-SCNC: 137 MMOL/L (ref 136–145)
VENTILATOR MODE: ABNORMAL
WBC NRBC COR # BLD AUTO: 4.55 10*3/MM3 (ref 3.4–10.8)

## 2024-12-03 PROCEDURE — 82805 BLOOD GASES W/O2 SATURATION: CPT

## 2024-12-03 PROCEDURE — 96365 THER/PROPH/DIAG IV INF INIT: CPT

## 2024-12-03 PROCEDURE — 82948 REAGENT STRIP/BLOOD GLUCOSE: CPT

## 2024-12-03 PROCEDURE — G0378 HOSPITAL OBSERVATION PER HR: HCPCS

## 2024-12-03 PROCEDURE — 82820 HEMOGLOBIN-OXYGEN AFFINITY: CPT

## 2024-12-03 PROCEDURE — 85025 COMPLETE CBC W/AUTO DIFF WBC: CPT | Performed by: EMERGENCY MEDICINE

## 2024-12-03 PROCEDURE — 93005 ELECTROCARDIOGRAM TRACING: CPT | Performed by: EMERGENCY MEDICINE

## 2024-12-03 PROCEDURE — 36415 COLL VENOUS BLD VENIPUNCTURE: CPT

## 2024-12-03 PROCEDURE — 96376 TX/PRO/DX INJ SAME DRUG ADON: CPT

## 2024-12-03 PROCEDURE — 99285 EMERGENCY DEPT VISIT HI MDM: CPT

## 2024-12-03 PROCEDURE — 96375 TX/PRO/DX INJ NEW DRUG ADDON: CPT

## 2024-12-03 PROCEDURE — 84481 FREE ASSAY (FT-3): CPT | Performed by: FAMILY MEDICINE

## 2024-12-03 PROCEDURE — 93010 ELECTROCARDIOGRAM REPORT: CPT | Performed by: HOSPITALIST

## 2024-12-03 PROCEDURE — 83735 ASSAY OF MAGNESIUM: CPT | Performed by: EMERGENCY MEDICINE

## 2024-12-03 PROCEDURE — 80053 COMPREHEN METABOLIC PANEL: CPT | Performed by: EMERGENCY MEDICINE

## 2024-12-03 RX ORDER — AMLODIPINE BESYLATE 5 MG/1
5 TABLET ORAL DAILY
Status: DISCONTINUED | OUTPATIENT
Start: 2024-12-03 | End: 2024-12-04 | Stop reason: HOSPADM

## 2024-12-03 RX ORDER — DICLOFENAC SODIUM 75 MG/1
75 TABLET, DELAYED RELEASE ORAL 2 TIMES DAILY PRN
COMMUNITY
End: 2024-12-04 | Stop reason: HOSPADM

## 2024-12-03 RX ORDER — POLYETHYLENE GLYCOL 3350 17 G/17G
17 POWDER, FOR SOLUTION ORAL DAILY PRN
Status: DISCONTINUED | OUTPATIENT
Start: 2024-12-03 | End: 2024-12-04 | Stop reason: HOSPADM

## 2024-12-03 RX ORDER — DEXTROSE MONOHYDRATE 100 MG/ML
75 INJECTION, SOLUTION INTRAVENOUS CONTINUOUS
Status: DISPENSED | OUTPATIENT
Start: 2024-12-03 | End: 2024-12-03

## 2024-12-03 RX ORDER — CARVEDILOL 6.25 MG/1
12.5 TABLET ORAL EVERY 12 HOURS SCHEDULED
Status: DISCONTINUED | OUTPATIENT
Start: 2024-12-03 | End: 2024-12-04 | Stop reason: HOSPADM

## 2024-12-03 RX ORDER — SODIUM CHLORIDE 9 MG/ML
40 INJECTION, SOLUTION INTRAVENOUS AS NEEDED
Status: DISCONTINUED | OUTPATIENT
Start: 2024-12-03 | End: 2024-12-04 | Stop reason: HOSPADM

## 2024-12-03 RX ORDER — BUMETANIDE 1 MG/1
0.5 TABLET ORAL DAILY
Status: DISCONTINUED | OUTPATIENT
Start: 2024-12-03 | End: 2024-12-04

## 2024-12-03 RX ORDER — SERTRALINE HYDROCHLORIDE 100 MG/1
100 TABLET, FILM COATED ORAL DAILY
Status: DISCONTINUED | OUTPATIENT
Start: 2024-12-03 | End: 2024-12-04 | Stop reason: HOSPADM

## 2024-12-03 RX ORDER — ONDANSETRON 2 MG/ML
4 INJECTION INTRAMUSCULAR; INTRAVENOUS EVERY 6 HOURS PRN
Status: DISCONTINUED | OUTPATIENT
Start: 2024-12-03 | End: 2024-12-04 | Stop reason: HOSPADM

## 2024-12-03 RX ORDER — LIOTHYRONINE SODIUM 25 UG/1
25 TABLET ORAL DAILY
Status: DISCONTINUED | OUTPATIENT
Start: 2024-12-03 | End: 2024-12-04 | Stop reason: HOSPADM

## 2024-12-03 RX ORDER — ATORVASTATIN CALCIUM 10 MG/1
10 TABLET, FILM COATED ORAL DAILY
Status: DISCONTINUED | OUTPATIENT
Start: 2024-12-03 | End: 2024-12-04

## 2024-12-03 RX ORDER — PROPRANOLOL HYDROCHLORIDE 80 MG/1
80 CAPSULE, EXTENDED RELEASE ORAL
Status: DISCONTINUED | OUTPATIENT
Start: 2024-12-03 | End: 2024-12-04 | Stop reason: HOSPADM

## 2024-12-03 RX ORDER — AMLODIPINE BESYLATE 10 MG/1
10 TABLET ORAL DAILY
Status: ON HOLD | COMMUNITY
End: 2024-12-04

## 2024-12-03 RX ORDER — POLYETHYLENE GLYCOL 3350 17 G/17G
17 POWDER, FOR SOLUTION ORAL DAILY
Status: DISCONTINUED | OUTPATIENT
Start: 2024-12-03 | End: 2024-12-04

## 2024-12-03 RX ORDER — ARIPIPRAZOLE 2 MG/1
2 TABLET ORAL DAILY
Status: DISCONTINUED | OUTPATIENT
Start: 2024-12-03 | End: 2024-12-04 | Stop reason: HOSPADM

## 2024-12-03 RX ORDER — DEXTROSE MONOHYDRATE 25 G/50ML
INJECTION, SOLUTION INTRAVENOUS
Status: COMPLETED
Start: 2024-12-03 | End: 2024-12-03

## 2024-12-03 RX ORDER — BISACODYL 10 MG
10 SUPPOSITORY, RECTAL RECTAL DAILY PRN
Status: DISCONTINUED | OUTPATIENT
Start: 2024-12-03 | End: 2024-12-04 | Stop reason: HOSPADM

## 2024-12-03 RX ORDER — LEVOTHYROXINE SODIUM 100 UG/1
200 TABLET ORAL
Status: DISCONTINUED | OUTPATIENT
Start: 2024-12-04 | End: 2024-12-04 | Stop reason: HOSPADM

## 2024-12-03 RX ORDER — ROSUVASTATIN CALCIUM 10 MG/1
10 TABLET, COATED ORAL DAILY
COMMUNITY

## 2024-12-03 RX ORDER — SODIUM CHLORIDE 0.9 % (FLUSH) 0.9 %
10 SYRINGE (ML) INJECTION AS NEEDED
Status: DISCONTINUED | OUTPATIENT
Start: 2024-12-03 | End: 2024-12-04 | Stop reason: SDUPTHER

## 2024-12-03 RX ORDER — TRAZODONE HYDROCHLORIDE 100 MG/1
100 TABLET ORAL NIGHTLY
Status: DISCONTINUED | OUTPATIENT
Start: 2024-12-03 | End: 2024-12-04

## 2024-12-03 RX ORDER — DEXTROSE MONOHYDRATE 25 G/50ML
25 INJECTION, SOLUTION INTRAVENOUS ONCE
Status: DISCONTINUED | OUTPATIENT
Start: 2024-12-03 | End: 2024-12-04 | Stop reason: HOSPADM

## 2024-12-03 RX ORDER — DULOXETIN HYDROCHLORIDE 30 MG/1
30 CAPSULE, DELAYED RELEASE ORAL DAILY
Status: DISCONTINUED | OUTPATIENT
Start: 2024-12-03 | End: 2024-12-04

## 2024-12-03 RX ORDER — AMOXICILLIN 250 MG
2 CAPSULE ORAL 2 TIMES DAILY PRN
Status: DISCONTINUED | OUTPATIENT
Start: 2024-12-03 | End: 2024-12-04 | Stop reason: HOSPADM

## 2024-12-03 RX ORDER — BISACODYL 5 MG/1
5 TABLET, DELAYED RELEASE ORAL DAILY PRN
Status: DISCONTINUED | OUTPATIENT
Start: 2024-12-03 | End: 2024-12-04 | Stop reason: HOSPADM

## 2024-12-03 RX ORDER — PANTOPRAZOLE SODIUM 40 MG/1
40 TABLET, DELAYED RELEASE ORAL
Status: DISCONTINUED | OUTPATIENT
Start: 2024-12-04 | End: 2024-12-04 | Stop reason: HOSPADM

## 2024-12-03 RX ORDER — SODIUM CHLORIDE 0.9 % (FLUSH) 0.9 %
10 SYRINGE (ML) INJECTION EVERY 12 HOURS SCHEDULED
Status: DISCONTINUED | OUTPATIENT
Start: 2024-12-03 | End: 2024-12-04 | Stop reason: HOSPADM

## 2024-12-03 RX ORDER — LISINOPRIL 20 MG/1
20 TABLET ORAL
Status: DISCONTINUED | OUTPATIENT
Start: 2024-12-03 | End: 2024-12-04

## 2024-12-03 RX ORDER — SPIRONOLACTONE 25 MG/1
25 TABLET ORAL DAILY
Status: DISCONTINUED | OUTPATIENT
Start: 2024-12-03 | End: 2024-12-04 | Stop reason: HOSPADM

## 2024-12-03 RX ORDER — CYCLOBENZAPRINE HCL 10 MG
5 TABLET ORAL 3 TIMES DAILY PRN
Status: DISCONTINUED | OUTPATIENT
Start: 2024-12-03 | End: 2024-12-04

## 2024-12-03 RX ORDER — ASPIRIN 81 MG/1
81 TABLET ORAL DAILY
Status: DISCONTINUED | OUTPATIENT
Start: 2024-12-03 | End: 2024-12-04 | Stop reason: HOSPADM

## 2024-12-03 RX ORDER — DEXTROSE MONOHYDRATE 25 G/50ML
25 INJECTION, SOLUTION INTRAVENOUS ONCE
Status: COMPLETED | OUTPATIENT
Start: 2024-12-03 | End: 2024-12-03

## 2024-12-03 RX ORDER — SODIUM CHLORIDE 0.9 % (FLUSH) 0.9 %
10 SYRINGE (ML) INJECTION AS NEEDED
Status: DISCONTINUED | OUTPATIENT
Start: 2024-12-03 | End: 2024-12-04 | Stop reason: HOSPADM

## 2024-12-03 RX ADMIN — AMLODIPINE BESYLATE 5 MG: 5 TABLET ORAL at 20:10

## 2024-12-03 RX ADMIN — ARIPIPRAZOLE 2 MG: 2 TABLET ORAL at 20:10

## 2024-12-03 RX ADMIN — CARVEDILOL 12.5 MG: 6.25 TABLET, FILM COATED ORAL at 20:10

## 2024-12-03 RX ADMIN — LIOTHYRONINE SODIUM 25 MCG: 25 TABLET ORAL at 20:10

## 2024-12-03 RX ADMIN — DEXTROSE MONOHYDRATE 25 G: 25 INJECTION, SOLUTION INTRAVENOUS at 13:43

## 2024-12-03 RX ADMIN — SPIRONOLACTONE 25 MG: 25 TABLET ORAL at 20:10

## 2024-12-03 RX ADMIN — DEXTROSE MONOHYDRATE 100 ML/HR: 100 INJECTION, SOLUTION INTRAVENOUS at 14:18

## 2024-12-03 RX ADMIN — SERTRALINE HYDROCHLORIDE 100 MG: 100 TABLET ORAL at 20:10

## 2024-12-03 RX ADMIN — ASPIRIN 81 MG: 81 TABLET, COATED ORAL at 20:10

## 2024-12-03 RX ADMIN — PROPRANOLOL HYDROCHLORIDE 80 MG: 80 CAPSULE, EXTENDED RELEASE ORAL at 20:10

## 2024-12-03 RX ADMIN — Medication 10 ML: at 20:12

## 2024-12-03 NOTE — H&P
[facility]  HISTORY AND PHYSICAL    Date of Admission: 12/3/2024  Primary Care Physician: Darryl Andrews DO Subjective   Primary Historian: Patient .    Chief Complaint: Hypoglycemia.    History of Present Illness  Patient is a 49-year-old presented ER by EMS with low blood sugar.  Blood sugar was really low by EMS  about 20, status post 1 amp glucose and oral glucose and sugar continues to drop patient was brought here to be evaluated . patient is currently requiring D10 drip to maintain blood sugar.    Patient has a past medical history of arthritis, coronary artery stenosis, degenerative disc disease-cervical, depression, diabetes type 1, thyroid gland disease, reflux, Graves' disease, heart palpitation, hyperlipidemia, hypertension, hypothyroidism, seizure, thyromegaly.    Review of Systems   Constitutional:  Positive for activity change and appetite change. Negative for chills and fever.   HENT:  Negative for hearing loss, nosebleeds, tinnitus and trouble swallowing.    Eyes:  Negative for visual disturbance.   Respiratory:  Negative for cough, chest tightness, shortness of breath and wheezing.    Cardiovascular:  Negative for chest pain, palpitations and leg swelling.   Gastrointestinal:  Negative for abdominal distention, abdominal pain, blood in stool, constipation, diarrhea, nausea and vomiting.   Endocrine: Negative for cold intolerance, heat intolerance, polydipsia, polyphagia and polyuria.   Genitourinary:  Negative for decreased urine volume, difficulty urinating, dysuria, flank pain, frequency and hematuria.   Musculoskeletal:  Negative for arthralgias, joint swelling and myalgias.   Skin:  Negative for rash.   Allergic/Immunologic: Negative for immunocompromised state.   Neurological:  Positive for weakness. Negative for dizziness, syncope, light-headedness and headaches.   Hematological:  Negative for adenopathy. Does not bruise/bleed  easily.   Psychiatric/Behavioral:  Negative for confusion and sleep disturbance. The patient is not nervous/anxious.       Otherwise complete ROS reviewed and negative except as mentioned in the HPI.    Past Medical History:   Past Medical History:   Diagnosis Date    Arthritis     Carotid artery stenosis     Degenerative disc disease, cervical     Depression     Diabetes mellitus     type 1    Disease of thyroid gland     GERD (gastroesophageal reflux disease)     Graves disease     Heart palpitations     Hyperlipidemia     Hypertension     Hypothyroidism     Seizure     Thyromegaly      Past Surgical History:  Past Surgical History:   Procedure Laterality Date     SECTION      CHOLECYSTECTOMY      COLONOSCOPY  2015    Normal exam Dr. Morgan     COLONOSCOPY  2015    Normal exam    CYST REMOVAL      ENDOSCOPY N/A 2018    Normal exam    HYSTERECTOMY      THYROIDECTOMY Bilateral 2018    Procedure: total thyroidectomy;  Surgeon: Vitaly Givens MD;  Location: [facility];  Service: ENT     Social History:  reports that she has never smoked. She has never used smokeless tobacco. She reports current alcohol use. She reports current drug use. Drug: Marijuana.    Family History: family history includes Breast cancer in her maternal aunt and sister; Colon cancer in her paternal uncle; Coronary artery disease in her brother; Diabetes in her brother, father, mother, and sister; Seizures in her sister; Stroke in her father, mother, and sister.       Allergies:  Allergies   Allergen Reactions    Oxycodone-Acetaminophen Swelling     Other reaction(s): Throat swelling       Medications:  Prior to Admission medications    Medication Sig Start Date End Date Taking? Authorizing Provider   ADMELOG SOLOSTAR 100 UNIT/ML solution pen-injector  3/12/19   Provider, MD Luciano   amLODIPine (NORVASC) 10 MG tablet Take 0.5 tablets by mouth Daily. 17   Devyn Moreno MD   ARIPiprazole (ABILIFY) 2  MG tablet Take 1 tablet by mouth Daily.    Luciano Hayes MD   aspirin 81 MG EC tablet Take 1 tablet by mouth Daily.    Luciano Hayes MD   benazepril (LOTENSIN) 20 MG tablet Take 1 tablet by mouth 2 (Two) Times a Day.    Luciano Hayes MD   bumetanide (BUMEX) 0.5 MG tablet  9/23/20   Luciano Hayes MD   carvedilol (COREG) 25 MG tablet  11/17/20   Luciano Hayes MD   conjugated estrogens (Premarin) 0.625 MG/GM vaginal cream Insert  into the vagina Daily. Insert 1g intravaginally twice weekly 12/10/20   Philippe Randall MD   cyclobenzaprine (FLEXERIL) 5 MG tablet  3/25/22   Luciano Hayes MD   DULoxetine (CYMBALTA) 60 MG capsule Take 1 capsule by mouth Daily.    Luciano Hayes MD   gabapentin (NEURONTIN) 300 MG capsule Take 300 mg by mouth 4 (Four) Times a Day.    Luciano Hayes MD   Insulin Glargine (BASAGLAR KWIKPEN) 100 UNIT/ML injection pen 72 Units Every Night. 9/11/17   Luciano Hayes MD   insulin lispro (humaLOG) 100 UNIT/ML injection Inject  under the skin into the appropriate area as directed.    Luciano Hayes MD   Jardiance 10 MG tablet tablet  6/10/22   Luciano Hayes MD   Lancets (OneTouch Delica Plus Byucbz92A) misc  11/19/20   Luciano Hayes MD   levothyroxine (SYNTHROID, LEVOTHROID) 175 MCG tablet  11/17/20   Luciano Hayes MD   levothyroxine (SYNTHROID, LEVOTHROID) 200 MCG tablet  5/31/22   Luciano Hayes MD   liothyronine (CYTOMEL) 25 MCG tablet  12/1/20   Luciano Hayes MD   metFORMIN ER (GLUCOPHAGE-XR) 500 MG 24 hr tablet  6/9/22   Luciano Hayes MD   methylPREDNISolone (MEDROL) 4 MG dose pack Take as directed on package instructions. 6/21/23   Jovita Tafoya MD   metroNIDAZOLE (METROGEL VAGINAL) 0.75 % vaginal gel 1 applicator vaginal q hs x 5 nights 12/10/20   Lesia Maharaj APRN   omeprazole (priLOSEC) 20 MG capsule Take 1 capsule by mouth Daily. 5/5/20   Zora Stallworth  "BRIANA Ellison   OneTouch Ultra test strip  11/19/20   Luciano Hayes MD   polyethylene glycol (MIRALAX) packet Take 17 g by mouth Daily. 12/8/18   Michelle Pedraza APRN   prazosin (MINIPRESS) 1 MG capsule  5/31/22   Luciano Hayes MD   propranolol LA (INDERAL LA) 120 MG 24 hr capsule  11/17/20   Luciano Hayes MD   sertraline (ZOLOFT) 100 MG tablet Take 1 tablet by mouth Daily.    Luciano Hayes MD   simvastatin (ZOCOR) 20 MG tablet  10/21/20   Luciano Hayes MD   spironolactone (ALDACTONE) 25 MG tablet  9/2/20   Luciano Hayes MD   spironolactone (ALDACTONE) 50 MG tablet  5/31/22   Luciano Hayes MD   tiZANidine (ZANAFLEX) 4 MG tablet  5/31/22   Luciano Hayes MD   topiramate (TOPAMAX) 50 MG tablet  11/17/20   Luciano Hayes MD   traZODone (DESYREL) 100 MG tablet Take 1 tablet by mouth.    Luciano Hayes MD   vitamin D (ERGOCALCIFEROL) 1.25 MG (15376 UT) capsule capsule  11/17/20   Luciano Hayes MD     I have utilized all available immediate resources to obtain, update, or review the patient's current medications (including all prescriptions, over-the-counter products, herbals, cannabis/cannabidiol products, and vitamin/mineral/dietary (nutritional) supplements).    Objective     Vital Signs: /95   Pulse 85   Temp 97.6 °F (36.4 °C) (Oral)   Resp 16   Ht 188 cm (74\")   Wt 101 kg (223 lb)   SpO2 97%   BMI 28.63 kg/m²   Physical Exam  Vitals and nursing note reviewed.   Constitutional:       General: She is not in acute distress.     Appearance: She is not toxic-appearing.   HENT:      Head: Normocephalic and atraumatic.      Mouth/Throat:      Mouth: Mucous membranes are moist.      Pharynx: Oropharynx is clear.   Eyes:      Extraocular Movements: Extraocular movements intact.      Conjunctiva/sclera: Conjunctivae normal.      Pupils: Pupils are equal, round, and reactive to light.   Cardiovascular:      Rate and Rhythm: " Normal rate and regular rhythm.      Pulses: Normal pulses.      Heart sounds: No murmur heard.  Pulmonary:      Effort: Pulmonary effort is normal. No respiratory distress.      Breath sounds: Normal breath sounds. No wheezing or rhonchi.   Abdominal:      General: Bowel sounds are normal. There is no distension.      Palpations: Abdomen is soft.      Tenderness: There is no abdominal tenderness.   Musculoskeletal:         General: No swelling or tenderness. Normal range of motion.      Cervical back: Normal range of motion and neck supple. No muscular tenderness.   Skin:     General: Skin is warm and dry.      Capillary Refill: Capillary refill takes 2 to 3 seconds.      Findings: No erythema or rash.   Neurological:      General: No focal deficit present.      Mental Status: She is alert and oriented to person, place, and time.      Cranial Nerves: No cranial nerve deficit.      Motor: No weakness.   Psychiatric:         Mood and Affect: Mood normal.         Behavior: Behavior normal.              Results Reviewed:  Lab Results (last 24 hours)       Procedure Component Value Units Date/Time    POC Glucose Once [[id number]]  (Abnormal) Collected: 12/03/24 1534    Specimen: Blood Updated: 12/03/24 1545     Glucose 202 mg/dL      Comment: : [id number] Shannon BrandonMeter ID: [id number]       Blood Gas, Venous With Co-Ox [[id number]]  (Abnormal) Collected: 12/03/24 1533    Specimen: Venous Blood Updated: 12/03/24 1532     Site OTHER     pH, Venous 7.265 pH Units      Comment: 84 Value below reference range        pCO2, Venous 62.0 mm Hg      Comment: 83 Value above reference range        pO2, Venous 19.0 mm Hg      Comment: 84 Value below reference range        HCO3, Venous 28.1 mmol/L      Comment: 83 Value above reference range        Base Excess, Venous -0.4 mmol/L      Comment: 84 Value below reference range        O2 Saturation, Venous 23.3 %      Comment: 84 Value below reference range        Hemoglobin,  Blood Gas 14.2 g/dL      Oxyhemoglobin Venous 23.0 %      Comment: 84 Value below reference range        Methemoglobin Venous 0.4 %      Carboxyhemoglobin Venous 1.0 %      Temperature 37.0     Sodium, Venous 143 mmol/L      Potassium, Venous 4.0 mmol/L      Barometric Pressure for Blood Gas 766 mmHg      Modality Room Air     Ventilator Mode NA     Collected by [id number]     Comment: Meter: [id number]     :  Breana Brown RRT       Comprehensive Metabolic Panel [[id number]]  (Abnormal) Collected: 12/03/24 1405    Specimen: Blood Updated: 12/03/24 1438     Glucose 131 mg/dL      BUN 12 mg/dL      Creatinine 1.12 mg/dL      Sodium 137 mmol/L      Potassium 5.0 mmol/L      Chloride 100 mmol/L      CO2 24.0 mmol/L      Calcium 9.1 mg/dL      Total Protein 7.1 g/dL      Albumin 4.0 g/dL      ALT (SGPT) 41 U/L      AST (SGOT) 28 U/L      Alkaline Phosphatase 74 U/L      Total Bilirubin 0.4 mg/dL      Globulin 3.1 gm/dL      A/G Ratio 1.3 g/dL      BUN/Creatinine Ratio 10.7     Anion Gap 13.0 mmol/L      eGFR 60.0 mL/min/1.73     Narrative:      GFR Normal >60  Chronic Kidney Disease <60  Kidney Failure <15      Magnesium [[id number]]  (Normal) Collected: 12/03/24 1405    Specimen: Blood Updated: 12/03/24 1437     Magnesium 2.1 mg/dL     CBC & Differential [[id number]]  (Abnormal) Collected: 12/03/24 1413    Specimen: Blood Updated: 12/03/24 1420    Narrative:      The following orders were created for panel order CBC & Differential.  Procedure                               Abnormality         Status                     ---------                               -----------         ------                     CBC Auto Differential[[id number]]        Abnormal            Final result                 Please view results for these tests on the individual orders.    CBC Auto Differential [[id number]]  (Abnormal) Collected: 12/03/24 1413    Specimen: Blood Updated: 12/03/24 1420     WBC 4.55 10*3/mm3      RBC 4.63 10*6/mm3       Hemoglobin 13.1 g/dL      Hematocrit 39.6 %      MCV 85.5 fL      MCH 28.3 pg      MCHC 33.1 g/dL      RDW 13.5 %      RDW-SD 42.4 fl      MPV 10.1 fL      Platelets 211 10*3/mm3      Neutrophil % 72.8 %      Lymphocyte % 22.6 %      Monocyte % 4.2 %      Eosinophil % 0.0 %      Basophil % 0.2 %      Immature Grans % 0.2 %      Neutrophils, Absolute 3.31 10*3/mm3      Lymphocytes, Absolute 1.03 10*3/mm3      Monocytes, Absolute 0.19 10*3/mm3      Eosinophils, Absolute 0.00 10*3/mm3      Basophils, Absolute 0.01 10*3/mm3      Immature Grans, Absolute 0.01 10*3/mm3      nRBC 0.0 /100 WBC     POC Glucose Once [[id number]]  (Normal) Collected: 12/03/24 1406    Specimen: Blood Updated: 12/03/24 1417     Glucose 122 mg/dL      Comment: : [id number] Barroso Andre PhillipeieMeter ID: [id number]       POC Glucose Once [[id number]]  (Abnormal) Collected: 12/03/24 1348    Specimen: Blood Updated: 12/03/24 1400     Glucose 56 mg/dL      Comment: : [id number] BarrosoPrivaliaieMeter ID: [id number]       POC Glucose Once [[id number]]  (Abnormal) Collected: 12/03/24 1333    Specimen: Blood Updated: 12/03/24 1349     Glucose 33 mg/dL      Comment: : [id number] BarrosoPrivaliaieMeter ID: [id number]             Imaging Results (Last 24 Hours)       ** No results found for the last 24 hours. **          I have personally reviewed and interpreted the radiology studies and ECG obtained at time of admission.     Assessment / Plan   Assessment:   Active Hospital Problems    Diagnosis     **Hypoglycemia     Degeneration of cervical intervertebral disc     Cervical radiculopathy     Post-surgical hypothyroidism     Thyromegaly     Hyperthyroidism     GERD without esophagitis     Graves' disease     Hypertension     Diabetes        Treatment Plan  The patient will be admitted to my service here at [facility].     Hypoglycemia.  Diabetes type 2.  Patient is on 8 units before every meal, and long-acting 30 units at night.  Hold all insulin  for now.  Hemoglobin A1c . Sugars running 200, Stop D50 for now, fingerstick every hour.  Hold metformin.  Stick every hour.  Glucoses in the 200, hold D50 for now.    Renal sufficiency.    Hypertension/Hyperlipidemia.  Norvasc . Aspirin.  Benazepril.  Bumex.  Coreg.  Hold Jardiance due to hypoglycemia.  Propranolol.  Aldactone.  Lipitor.      Chronic pain/neuropathy.  Flexeril as needed.    Hypothyroidism/Graves' disease/post thyroid surgery.  Synthroid . Cytomel.  TSH.  Free T4.  Free T3.    Anxiety/depression.  Abilify.  Cymbalta.  Zoloft.  Trazodone nightly.    Reflux.  Protonix.    Constipation.  MiraLAX daily.    Nutrition.  Diabetic diet/cardiac.    Medical Decision Making  Number and Complexity of problems: Hypocalcemia/type 2 diabetes/renal sufficiency/hypertension/hypothyroidism  Differential Diagnosis: None    Conditions and Status        Condition is unchanged.     MDM Data  External documents reviewed: Note .  Cardiac tracing (EKG, telemetry) interpretation: Normal sinus rhythm.  Radiology interpretation: None  Labs reviewed: Laboratory   Any tests that were considered but not ordered: Lab in a.m.     Decision rules/scores evaluated (example [id number], Wells, etc): None     Discussed with: Patient and      Care Planning  Shared decision making: Patient and   Code status and discussions: Full code    Disposition  Social Determinants of Health that impact treatment or disposition: From home  Estimated length of stay is 1 to 3 days.     I confirmed that the patient's advanced care plan is present, code status is documented, and a surrogate decision maker is listed in the patient's medical record.     The patient's surrogate decision maker is .     The patient was seen and examined by me on 12/3/2024 at 1610.    Electronically signed by Devyn Moreno MD, 12/03/24, 16:11 CST.

## 2024-12-03 NOTE — ED PROVIDER NOTES
Subjective   History of Present Illness  Patient 49-year-old diabetic on insulin had a hypoglycemic episode blood sugar went down to 20 was given amp of D50 and oral glucose blood sugar came up and started going down again therefore was started on D10 drip.  Brought to the ED.    Hypoglycemia  Severity:  Moderate  Onset quality:  Gradual  Timing:  Constant  Progression:  Worsening  Chronicity:  New  Diabetic status:  Controlled with insulin  Context: not decreased oral intake, not diet changes, not ingestion, not new diabetes diagnosis and not treatment noncompliance    Relieved by:  Nothing  Ineffective treatments:  None tried  Associated symptoms: sweats and weakness    Associated symptoms: no altered mental status, no anxiety, no dizziness, no seizures, no shortness of breath, no speech difficulty and no visual change    Risk factors: no alcohol abuse, no chronic liver disease, no family hx of hypoglycemia, no hypothyroidism and no recent surgery        Review of Systems   Constitutional:  Positive for diaphoresis.   HENT: Negative.     Eyes: Negative.    Respiratory: Negative.  Negative for shortness of breath.    Cardiovascular: Negative.    Gastrointestinal: Negative.    Musculoskeletal: Negative.  Negative for back pain and neck pain.   Skin: Negative.    Neurological:  Positive for weakness. Negative for dizziness, seizures and speech difficulty.   Psychiatric/Behavioral:  The patient is not nervous/anxious.    All other systems reviewed and are negative.      Past Medical History:   Diagnosis Date    Arthritis     Carotid artery stenosis     Degenerative disc disease, cervical     Depression     Diabetes mellitus     type 1    Disease of thyroid gland     GERD (gastroesophageal reflux disease)     Graves disease     Heart palpitations     Hyperlipidemia     Hypertension     Hypothyroidism     Seizure     Thyromegaly        Allergies   Allergen Reactions    Oxycodone-Acetaminophen Swelling     Other  reaction(s): Throat swelling       Past Surgical History:   Procedure Laterality Date     SECTION      CHOLECYSTECTOMY      COLONOSCOPY  2015    Normal exam Dr. Morgan     COLONOSCOPY  2015    Normal exam    CYST REMOVAL      ENDOSCOPY N/A 2018    Normal exam    HYSTERECTOMY      THYROIDECTOMY Bilateral 2018    Procedure: total thyroidectomy;  Surgeon: Vitaly Givens MD;  Location: [facility] OR;  Service: ENT       Family History   Problem Relation Age of Onset    Stroke Mother     Diabetes Mother     Stroke Father     Diabetes Father     Breast cancer Sister     Diabetes Sister     Stroke Sister     Seizures Sister     Coronary artery disease Brother     Diabetes Brother     Breast cancer Maternal Aunt     Colon cancer Paternal Uncle     Colon polyps Neg Hx        Social History     Socioeconomic History    Marital status:      Spouse name: Evens    Number of children: 1    Years of education: 12   Tobacco Use    Smoking status: Never    Smokeless tobacco: Never   Vaping Use    Vaping status: Never Used   Substance and Sexual Activity    Alcohol use: Yes    Drug use: Yes     Types: Marijuana    Sexual activity: Not Currently     Partners: Male           Objective   Physical Exam  Vitals and nursing note reviewed. Exam conducted with a chaperone present.   Constitutional:       General: She is awake. She is not in acute distress.     Appearance: Normal appearance. She is well-developed. She is not toxic-appearing or diaphoretic.   HENT:      Head: Normocephalic and atraumatic.      Mouth/Throat:      Mouth: Mucous membranes are moist.      Pharynx: Oropharynx is clear.   Eyes:      General: Lids are normal. Lids are everted, no foreign bodies appreciated.      Pupils: Pupils are equal, round, and reactive to light.   Neck:      Thyroid: No thyromegaly.      Vascular: Normal carotid pulses. No carotid bruit or JVD.      Trachea: Trachea and phonation normal. No tracheal  tenderness or tracheal deviation.      Meningeal: Brudzinski's sign and Kernig's sign absent.   Cardiovascular:      Rate and Rhythm: Normal rate and regular rhythm.      Pulses: Normal pulses.      Heart sounds: Normal heart sounds. No murmur heard.  Pulmonary:      Effort: Pulmonary effort is normal. No tachypnea or respiratory distress.      Breath sounds: Normal breath sounds. No stridor.   Abdominal:      General: Abdomen is flat. Bowel sounds are normal. There is no distension.      Palpations: Abdomen is soft. There is no mass.      Tenderness: There is no abdominal tenderness. There is no guarding.   Musculoskeletal:         General: Normal range of motion.      Cervical back: Full passive range of motion without pain, normal range of motion and neck supple. No rigidity.      Right lower leg: No edema.      Left lower leg: No edema.   Skin:     General: Skin is warm and dry.      Capillary Refill: Capillary refill takes less than 2 seconds.      Coloration: Skin is not pale.      Findings: No lesion.      Nails: There is no clubbing.   Neurological:      General: No focal deficit present.      Mental Status: She is alert and oriented to person, place, and time. Mental status is at baseline. She is not disoriented.      GCS: GCS eye subscore is 4. GCS verbal subscore is 5. GCS motor subscore is 6.      Cranial Nerves: No cranial nerve deficit.      Sensory: Sensation is intact. No sensory deficit.      Motor: Motor function is intact. No abnormal muscle tone.      Coordination: Coordination is intact. Coordination normal.      Deep Tendon Reflexes: Reflexes are normal and symmetric. Reflexes normal. Babinski sign absent on the right side. Babinski sign absent on the left side.      Reflex Scores:       Bicep reflexes are 2+ on the right side and 2+ on the left side.       Patellar reflexes are 2+ on the right side and 2+ on the left side.  Psychiatric:         Behavior: Behavior is cooperative.          Procedures           ED Course  ED Course as of 12/03/24 1542   Tue Dec 03, 2024   1538 Patient with multiple different drugs with hypoglycemic episode responded to D50 infusion and then D10 was started the patient had another episode of hypoglycemia another amp of D50 was given and since then on a D10 infusion she has been stable will be admitted to the medicine service.  Her venous blood gas shows a pH 7.2 but she does not have a anion gap and I do not think this is a lab error versus inappropriate collection.  There is no clinical evidence of DKA there is no clinical evidence of metabolic acidosis her bicarbonate level is 24 and there is no anion gap there is no respiratory distress oxygen saturation 97% breathing at 14/min. [TS]      ED Course User Index  [TS] Linus Rodriguez MD                                                       Medical Decision Making  Patient with hypoglycemia responded initially to D10 and then started dropping her blood sugar again was given amp of D50 and D10 was increased.  Patient was fed and has been Taine her blood sugar at this time.  Will admit to the hospital service for overnight observation since the patient is on long-acting insulin and has not been able to prevent hypoglycemia at home.    Problems Addressed:  Hypoglycemia: acute illness or injury    Amount and/or Complexity of Data Reviewed  Labs: ordered.     Details: Labs reviewed  ECG/medicine tests: ordered.  Discussion of management or test interpretation with external provider(s): Cussed with the hospitalist PARVEZ    Risk  Prescription drug management.  Decision regarding hospitalization.  Risk Details: Patient with a D10 infusion and taking by mouth neurologically intact can be admitted.        Final diagnoses:   Hypoglycemia       ED Disposition  ED Disposition       ED Disposition   Decision to Admit    Condition   --    Comment   Level of Care: Telemetry [5]   Diagnosis: Hypoglycemia [748733]   Admitting  Physician: NINA CULVER [[id number]]   Attending Physician: NINA CULVER [[id number]]                 No follow-up provider specified.       Medication List      No changes were made to your prescriptions during this visit.            Linus Rodriguez MD  12/03/24 1341       Linus Rodriguez MD  12/03/24 1542

## 2024-12-04 VITALS
WEIGHT: 202.2 LBS | HEIGHT: 72 IN | BODY MASS INDEX: 27.39 KG/M2 | TEMPERATURE: 98.1 F | OXYGEN SATURATION: 98 % | DIASTOLIC BLOOD PRESSURE: 64 MMHG | RESPIRATION RATE: 18 BRPM | HEART RATE: 78 BPM | SYSTOLIC BLOOD PRESSURE: 113 MMHG

## 2024-12-04 LAB
ALBUMIN SERPL-MCNC: 3.7 G/DL (ref 3.5–5.2)
ALBUMIN/GLOB SERPL: 1.4 G/DL
ALP SERPL-CCNC: 63 U/L (ref 39–117)
ALT SERPL W P-5'-P-CCNC: 31 U/L (ref 1–33)
AMPHET+METHAMPHET UR QL: NEGATIVE
AMPHETAMINES UR QL: NEGATIVE
ANION GAP SERPL CALCULATED.3IONS-SCNC: 11 MMOL/L (ref 5–15)
AST SERPL-CCNC: 18 U/L (ref 1–32)
BARBITURATES UR QL SCN: NEGATIVE
BASOPHILS # BLD AUTO: 0.01 10*3/MM3 (ref 0–0.2)
BASOPHILS NFR BLD AUTO: 0.2 % (ref 0–1.5)
BENZODIAZ UR QL SCN: NEGATIVE
BILIRUB SERPL-MCNC: 0.4 MG/DL (ref 0–1.2)
BILIRUB UR QL STRIP: NEGATIVE
BUN SERPL-MCNC: 12 MG/DL (ref 6–20)
BUN/CREAT SERPL: 12.6 (ref 7–25)
BUPRENORPHINE SERPL-MCNC: NEGATIVE NG/ML
CALCIUM SPEC-SCNC: 9.5 MG/DL (ref 8.6–10.5)
CANNABINOIDS SERPL QL: POSITIVE
CHLORIDE SERPL-SCNC: 103 MMOL/L (ref 98–107)
CHOLEST SERPL-MCNC: 141 MG/DL (ref 0–200)
CLARITY UR: CLEAR
CO2 SERPL-SCNC: 26 MMOL/L (ref 22–29)
COCAINE UR QL: NEGATIVE
COLOR UR: YELLOW
CREAT SERPL-MCNC: 0.95 MG/DL (ref 0.57–1)
DEPRECATED RDW RBC AUTO: 42.2 FL (ref 37–54)
EGFRCR SERPLBLD CKD-EPI 2021: 73.1 ML/MIN/1.73
EOSINOPHIL # BLD AUTO: 0.01 10*3/MM3 (ref 0–0.4)
EOSINOPHIL NFR BLD AUTO: 0.2 % (ref 0.3–6.2)
ERYTHROCYTE [DISTWIDTH] IN BLOOD BY AUTOMATED COUNT: 13.3 % (ref 12.3–15.4)
FENTANYL UR-MCNC: NEGATIVE NG/ML
GLOBULIN UR ELPH-MCNC: 2.6 GM/DL
GLUCOSE BLDC GLUCOMTR-MCNC: 101 MG/DL (ref 70–130)
GLUCOSE BLDC GLUCOMTR-MCNC: 110 MG/DL (ref 70–130)
GLUCOSE BLDC GLUCOMTR-MCNC: 116 MG/DL (ref 70–130)
GLUCOSE BLDC GLUCOMTR-MCNC: 83 MG/DL (ref 70–130)
GLUCOSE BLDC GLUCOMTR-MCNC: 96 MG/DL (ref 70–130)
GLUCOSE BLDC GLUCOMTR-MCNC: 97 MG/DL (ref 70–130)
GLUCOSE BLDC GLUCOMTR-MCNC: 99 MG/DL (ref 70–130)
GLUCOSE SERPL-MCNC: 89 MG/DL (ref 65–99)
GLUCOSE UR STRIP-MCNC: ABNORMAL MG/DL
HBA1C MFR BLD: 5.4 % (ref 4.8–5.6)
HCT VFR BLD AUTO: 35.4 % (ref 34–46.6)
HDLC SERPL-MCNC: 68 MG/DL (ref 40–60)
HGB BLD-MCNC: 11.3 G/DL (ref 12–15.9)
HGB UR QL STRIP.AUTO: NEGATIVE
IMM GRANULOCYTES # BLD AUTO: 0.02 10*3/MM3 (ref 0–0.05)
IMM GRANULOCYTES NFR BLD AUTO: 0.4 % (ref 0–0.5)
KETONES UR QL STRIP: NEGATIVE
LDLC SERPL CALC-MCNC: 56 MG/DL (ref 0–100)
LDLC/HDLC SERPL: 0.8 {RATIO}
LEUKOCYTE ESTERASE UR QL STRIP.AUTO: NEGATIVE
LYMPHOCYTES # BLD AUTO: 1.45 10*3/MM3 (ref 0.7–3.1)
LYMPHOCYTES NFR BLD AUTO: 25.9 % (ref 19.6–45.3)
MCH RBC QN AUTO: 27.4 PG (ref 26.6–33)
MCHC RBC AUTO-ENTMCNC: 31.9 G/DL (ref 31.5–35.7)
MCV RBC AUTO: 85.9 FL (ref 79–97)
METHADONE UR QL SCN: NEGATIVE
MONOCYTES # BLD AUTO: 0.43 10*3/MM3 (ref 0.1–0.9)
MONOCYTES NFR BLD AUTO: 7.7 % (ref 5–12)
NEUTROPHILS NFR BLD AUTO: 3.68 10*3/MM3 (ref 1.7–7)
NEUTROPHILS NFR BLD AUTO: 65.6 % (ref 42.7–76)
NITRITE UR QL STRIP: NEGATIVE
NRBC BLD AUTO-RTO: 0 /100 WBC (ref 0–0.2)
OPIATES UR QL: NEGATIVE
OXYCODONE UR QL SCN: NEGATIVE
PCP UR QL SCN: NEGATIVE
PH UR STRIP.AUTO: 5.5 [PH] (ref 5–8)
PLATELET # BLD AUTO: 233 10*3/MM3 (ref 140–450)
PMV BLD AUTO: 10.4 FL (ref 6–12)
POTASSIUM SERPL-SCNC: 4.1 MMOL/L (ref 3.5–5.2)
PROT SERPL-MCNC: 6.3 G/DL (ref 6–8.5)
PROT UR QL STRIP: NEGATIVE
QT INTERVAL: 396 MS
QTC INTERVAL: 479 MS
RBC # BLD AUTO: 4.12 10*6/MM3 (ref 3.77–5.28)
SODIUM SERPL-SCNC: 140 MMOL/L (ref 136–145)
SP GR UR STRIP: 1.03 (ref 1–1.03)
T3FREE SERPL-MCNC: 4.06 PG/ML (ref 2–4.4)
T4 FREE SERPL-MCNC: 1.87 NG/DL (ref 0.93–1.7)
TRICYCLICS UR QL SCN: NEGATIVE
TRIGL SERPL-MCNC: 94 MG/DL (ref 0–150)
TSH SERPL DL<=0.05 MIU/L-ACNC: <0.005 UIU/ML (ref 0.27–4.2)
UROBILINOGEN UR QL STRIP: ABNORMAL
VLDLC SERPL-MCNC: 17 MG/DL (ref 5–40)
WBC NRBC COR # BLD AUTO: 5.6 10*3/MM3 (ref 3.4–10.8)

## 2024-12-04 PROCEDURE — 80061 LIPID PANEL: CPT | Performed by: FAMILY MEDICINE

## 2024-12-04 PROCEDURE — 84439 ASSAY OF FREE THYROXINE: CPT | Performed by: FAMILY MEDICINE

## 2024-12-04 PROCEDURE — G0378 HOSPITAL OBSERVATION PER HR: HCPCS

## 2024-12-04 PROCEDURE — 80050 GENERAL HEALTH PANEL: CPT | Performed by: FAMILY MEDICINE

## 2024-12-04 PROCEDURE — 81003 URINALYSIS AUTO W/O SCOPE: CPT | Performed by: FAMILY MEDICINE

## 2024-12-04 PROCEDURE — 82948 REAGENT STRIP/BLOOD GLUCOSE: CPT

## 2024-12-04 PROCEDURE — 80307 DRUG TEST PRSMV CHEM ANLYZR: CPT | Performed by: FAMILY MEDICINE

## 2024-12-04 PROCEDURE — 83036 HEMOGLOBIN GLYCOSYLATED A1C: CPT | Performed by: FAMILY MEDICINE

## 2024-12-04 RX ORDER — ARIPIPRAZOLE 10 MG/1
5 TABLET ORAL DAILY
Start: 2024-12-04

## 2024-12-04 RX ORDER — CARVEDILOL 25 MG/1
12.5 TABLET ORAL 2 TIMES DAILY WITH MEALS
Start: 2024-12-04 | End: 2024-12-04 | Stop reason: HOSPADM

## 2024-12-04 RX ORDER — CARVEDILOL 12.5 MG/1
12.5 TABLET ORAL EVERY 12 HOURS SCHEDULED
Qty: 180 TABLET | Refills: 0
Start: 2024-12-04

## 2024-12-04 RX ORDER — GABAPENTIN 300 MG/1
300 CAPSULE ORAL NIGHTLY
Start: 2024-12-04

## 2024-12-04 RX ORDER — SPIRONOLACTONE 50 MG/1
25 TABLET, FILM COATED ORAL DAILY
Start: 2024-12-04

## 2024-12-04 RX ORDER — AMLODIPINE BESYLATE 10 MG/1
5 TABLET ORAL DAILY
Start: 2024-12-04

## 2024-12-04 RX ADMIN — ASPIRIN 81 MG: 81 TABLET, COATED ORAL at 10:15

## 2024-12-04 RX ADMIN — PANTOPRAZOLE SODIUM 40 MG: 40 TABLET, DELAYED RELEASE ORAL at 06:01

## 2024-12-04 RX ADMIN — LEVOTHYROXINE SODIUM 200 MCG: 100 TABLET ORAL at 06:02

## 2024-12-04 RX ADMIN — LIOTHYRONINE SODIUM 25 MCG: 25 TABLET ORAL at 10:15

## 2024-12-04 RX ADMIN — SERTRALINE HYDROCHLORIDE 100 MG: 100 TABLET ORAL at 10:15

## 2024-12-04 RX ADMIN — ARIPIPRAZOLE 2 MG: 2 TABLET ORAL at 10:15

## 2024-12-04 RX ADMIN — AMLODIPINE BESYLATE 5 MG: 5 TABLET ORAL at 10:14

## 2024-12-04 RX ADMIN — Medication 10 ML: at 10:33

## 2024-12-04 RX ADMIN — SPIRONOLACTONE 25 MG: 25 TABLET ORAL at 10:15

## 2024-12-04 RX ADMIN — CARVEDILOL 12.5 MG: 6.25 TABLET, FILM COATED ORAL at 10:14

## 2024-12-04 RX ADMIN — PROPRANOLOL HYDROCHLORIDE 80 MG: 80 CAPSULE, EXTENDED RELEASE ORAL at 10:15

## 2024-12-04 NOTE — PLAN OF CARE
Goal Outcome Evaluation:  Plan of Care Reviewed With: patient        Progress: improving  Outcome Evaluation: pt VSS on RA, tele - NSR 80-92. pt glucose checked every 3hrs, was 83 this morning. pt up to bathroom with standby assist, states she is feeling better this morning. safety measures in place

## 2024-12-04 NOTE — DISCHARGE SUMMARY
[facility] Medicine Services  DISCHARGE SUMMARY       Date of Admission: 12/3/2024  Date of Discharge:  12/4/2024  Primary Care Physician: Darryl Andrews DO    Presenting Problem/History of Present Illness:    Final Discharge Diagnoses:  Active Hospital Problems    Diagnosis     **Hypoglycemia     Degeneration of cervical intervertebral disc     Cervical radiculopathy     Post-surgical hypothyroidism     Thyromegaly     Hyperthyroidism     GERD without esophagitis     Graves' disease     Hypertension     Diabetes        Pertinent Test Results:   Results for orders placed during the hospital encounter of 07/08/17    Transthoracic Echocardiogram 2D Complete    Interpretation Summary  · Normal size and function of all cardiac chambers. LVEF >65%.  · No significant valvular pathology.      Imaging Results (All)       None          LAB RESULTS:      Lab 12/04/24  0457 12/03/24  1413   WBC 5.60 4.55   HEMOGLOBIN 11.3* 13.1   HEMATOCRIT 35.4 39.6   PLATELETS 233 211   NEUTROS ABS 3.68 3.31   IMMATURE GRANS (ABS) 0.02 0.01   LYMPHS ABS 1.45 1.03   MONOS ABS 0.43 0.19   EOS ABS 0.01 0.00   MCV 85.9 85.5         Lab 12/04/24  0457 12/03/24  1533 12/03/24  1405   SODIUM 140  --  137   SODIUM, VENOUS  --  143  --    POTASSIUM 4.1  --  5.0   POTASSIUM, VENOUS  --  4.0  --    CHLORIDE 103  --  100   CO2 26.0  --  24.0   ANION GAP 11.0  --  13.0   BUN 12  --  12   CREATININE 0.95  --  1.12*   EGFR 73.1  --  60.0*   GLUCOSE 89  --  131*   CALCIUM 9.5  --  9.1   MAGNESIUM  --   --  2.1   HEMOGLOBIN A1C 5.40  --   --    TSH <0.005*  --   --          Lab 12/04/24  0457 12/03/24  1405   TOTAL PROTEIN 6.3 7.1   ALBUMIN 3.7 4.0   GLOBULIN 2.6 3.1   ALT (SGPT) 31 41*   AST (SGOT) 18 28   BILIRUBIN 0.4 0.4   ALK PHOS 63 74             Lab 12/04/24  0457   CHOLESTEROL 141   LDL CHOL 56   HDL CHOL 68*   TRIGLYCERIDES 94             Lab 12/03/24  1533   CARBOXYHEMOGLOBIN (VENOUS) 1.0     Brief  Urine Lab Results  (Last result in the past 365 days)        Color   Clarity   Blood   Leuk Est   Nitrite   Protein   CREAT   Urine HCG        12/04/24 0610 Yellow   Clear   Negative   Negative   Negative   Negative                 Microbiology Results (last 10 days)       ** No results found for the last 240 hours. **        History of Present Illness  Patient is a 49-year-old presented ER by EMS with low blood sugar.  Blood sugar was really low by EMS  about 20, status post 1 amp glucose and oral glucose and sugar continues to drop patient was brought here to be evaluated . patient is currently requiring D10 drip to maintain blood sugar.     Patient has a past medical history of arthritis, coronary artery stenosis, degenerative disc disease-cervical, depression, diabetes type 1, thyroid gland disease, reflux, Graves' disease, heart palpitation, hyperlipidemia, hypertension, hypothyroidism, seizure, thyromegaly.    Hospital Course:   Hypoglycemia.  Diabetes type 2.  Patient is on 8 units before every meal, and long-acting 30 units at night.  Hold all insulin for now.  Hemoglobin A1c . Sugars running 200, Stop D50 for now, fingerstick every hour.  Hold metformin.  Stick every hour.  Glucoses in the 200, hold D50 for now.     Renal sufficiency.     Hypertension/Hyperlipidemia.  Norvasc . Aspirin.  Benazepril.  Bumex.  Coreg.  Hold Jardiance due to hypoglycemia.  Propranolol.  Aldactone.  Lipitor.       Chronic pain/neuropathy.  Flexeril as needed.     Hypothyroidism/Graves' disease/post thyroid surgery.  Synthroid . Cytomel.  TSH.  Free T4.  Free T3.     Anxiety/depression.  Abilify.  Cymbalta.  Zoloft.  Trazodone nightly.     Reflux.  Protonix.     Constipation.  MiraLAX daily.     Nutrition.  Diabetic diet/cardiac.    Vital signs stable, afebrile.  Plan to discharge patient home today this evening.  Discussed with patient to hold off on long-acting insulin for now just to sliding scale for short acting.  Start back  "slowly, patient states she knows what to do.  Asked for her thyroid, complicated issues, will leave it to her primary care physician who knows a lot more about her than I do.  Follow-up with PCP as soon as able, within a week.    Physical Exam on Discharge:  /64 (BP Location: Left arm, Patient Position: Lying)   Pulse 78   Temp 98.1 °F (36.7 °C) (Oral)   Resp 18   Ht 188 cm (74\")   Wt 91.7 kg (202 lb 3.2 oz)   SpO2 98%   BMI 25.96 kg/m²   Physical Exam  Vitals and nursing note reviewed.   Constitutional:       General: She is not in acute distress.     Appearance: She is not toxic-appearing.   HENT:      Head: Normocephalic and atraumatic.      Mouth/Throat:      Mouth: Mucous membranes are moist.      Pharynx: Oropharynx is clear.   Eyes:      Extraocular Movements: Extraocular movements intact.      Conjunctiva/sclera: Conjunctivae normal.      Pupils: Pupils are equal, round, and reactive to light.   Cardiovascular:      Rate and Rhythm: Normal rate and regular rhythm.      Pulses: Normal pulses.      Heart sounds: No murmur heard.  Pulmonary:      Effort: Pulmonary effort is normal. No respiratory distress.      Breath sounds: Normal breath sounds. No wheezing or rhonchi.   Abdominal:      General: Bowel sounds are normal. There is no distension.      Palpations: Abdomen is soft.      Tenderness: There is no abdominal tenderness.   Musculoskeletal:         General: No swelling or tenderness. Normal range of motion.      Cervical back: Normal range of motion and neck supple. No muscular tenderness.   Skin:     General: Skin is warm and dry.      Capillary Refill: Capillary refill takes 2 to 3 seconds.      Findings: No erythema or rash.   Neurological:      General: No focal deficit present.      Mental Status: She is alert and oriented to person, place, and time.      Cranial Nerves: No cranial nerve deficit.      Motor: No weakness.   Psychiatric:         Mood and Affect: Mood normal.         " Behavior: Behavior normal.        Condition on Discharge: Stable.    Discharge Disposition: Discharge home with family.  Home or Self Care    Discharge Medications:     Discharge Medications        New Medications        Instructions Start Date   influenza virus vacc split PF 0.5 ML suspension prefilled syringe   0.5 mL, Intramuscular, During Hospitalization             Changes to Medications        Instructions Start Date   amLODIPine 10 MG tablet  Commonly known as: NORVASC  What changed: how much to take   5 mg, Oral, Daily      ARIPiprazole 10 MG tablet  Commonly known as: ABILIFY  What changed: how much to take   5 mg, Oral, Daily      carvedilol 12.5 MG tablet  Commonly known as: COREG  What changed:   medication strength  how much to take  when to take this   12.5 mg, Oral, Every 12 Hours Scheduled      gabapentin 300 MG capsule  Commonly known as: NEURONTIN  What changed: when to take this   300 mg, Oral, Nightly      spironolactone 50 MG tablet  Commonly known as: ALDACTONE  What changed:   how much to take  when to take this   25 mg, Oral, Daily             Continue These Medications        Instructions Start Date   Admelog SoloStar 100 UNIT/ML solution pen-injector  Generic drug: Insulin Lispro (1 Unit Dial)   No dose, route, or frequency recorded.      aspirin 81 MG EC tablet   81 mg, Daily      empagliflozin 25 MG tablet tablet  Commonly known as: JARDIANCE   25 mg, Oral, Daily      Evolocumab solution auto-injector SureClick injection  Commonly known as: REPATHA   140 mg, Subcutaneous, Every 14 Days      levothyroxine 200 MCG tablet  Commonly known as: SYNTHROID, LEVOTHROID   200 mcg, Oral, Every Early Morning      liothyronine 25 MCG tablet  Commonly known as: CYTOMEL   25 mcg, Oral, 2 Times Daily      metFORMIN  MG 24 hr tablet  Commonly known as: GLUCOPHAGE-XR   500 mg, Oral, 2 Times Daily      omeprazole 20 MG capsule  Commonly known as: priLOSEC   20 mg, Oral, Daily      OneTouch Delica Plus  [id number] misc   No dose, route, or frequency recorded.      OneTouch Ultra test strip  Generic drug: glucose blood   No dose, route, or frequency recorded.      rosuvastatin 10 MG tablet  Commonly known as: CRESTOR   10 mg, Oral, Daily      sertraline 100 MG tablet  Commonly known as: ZOLOFT   100 mg, Oral, 2 Times Daily             Stop These Medications      BASAGLAR KWIKPEN 100 UNIT/ML injection pen     diclofenac 75 MG EC tablet  Commonly known as: VOLTAREN     propranolol  MG 24 hr capsule  Commonly known as: INDERAL LA              Discharge Diet:   Diet Instructions       Advance Diet As Tolerated -Target Diet: Diabetes .      Target Diet: Diabetes .            Activity at Discharge:   Activity Instructions       Activity as Tolerated              Follow-up Appointments:   Follow with PCP 1 week .      Electronically signed by Devyn Moreno MD, 12/04/24, 08:46 CST.    Time: Greater than 30 minutes.

## 2024-12-04 NOTE — PLAN OF CARE
Problem: Adult Inpatient Plan of Care  Goal: Plan of Care Review  Outcome: Adequate for Care Transition  Goal: Patient-Specific Goal (Individualized)  Outcome: Adequate for Care Transition  Goal: Absence of Hospital-Acquired Illness or Injury  Outcome: Adequate for Care Transition  Intervention: Identify and Manage Fall Risk  Recent Flowsheet Documentation  Taken 12/4/2024 0800 by Dejuan Shah RN  Safety Promotion/Fall Prevention: safety round/check completed  Goal: Optimal Comfort and Wellbeing  Outcome: Adequate for Care Transition  Intervention: Provide Person-Centered Care  Recent Flowsheet Documentation  Taken 12/4/2024 0800 by Dejuan Shah RN  Trust Relationship/Rapport:   care explained   choices provided   empathic listening provided   emotional support provided   questions answered   questions encouraged   thoughts/feelings acknowledged   reassurance provided  Goal: Readiness for Transition of Care  Outcome: Adequate for Care Transition     Problem: Pain Acute  Goal: Optimal Pain Control and Function  Outcome: Adequate for Care Transition  Intervention: Optimize Psychosocial Wellbeing  Recent Flowsheet Documentation  Taken 12/4/2024 0800 by Dejuan Shah RN  Supportive Measures: active listening utilized     Problem: Diabetes  Goal: Optimal Coping  Outcome: Adequate for Care Transition  Intervention: Support Wellbeing and Self-Management Success  Recent Flowsheet Documentation  Taken 12/4/2024 0800 by Dejuan Shah RN  Supportive Measures: active listening utilized  Family/Support System Care:   self-care encouraged   support provided  Goal: Optimal Functional Ability  Outcome: Adequate for Care Transition  Intervention: Optimize Functional Ability  Recent Flowsheet Documentation  Taken 12/4/2024 0800 by Dejuan Shah RN  Activity Management:   ambulated in room   up in chair  Goal: Blood Glucose Level Within Target Range  Outcome: Adequate for Care Transition  Goal:  Minimize Hypoglycemia Risk  Outcome: Adequate for Care Transition       Goal Outcome Evaluation:

## 2025-02-15 ENCOUNTER — APPOINTMENT (OUTPATIENT)
Dept: GENERAL RADIOLOGY | Facility: HOSPITAL | Age: 50
DRG: 683 | End: 2025-02-15
Payer: COMMERCIAL

## 2025-02-15 ENCOUNTER — HOSPITAL ENCOUNTER (INPATIENT)
Facility: HOSPITAL | Age: 50
LOS: 1 days | Discharge: HOME | DRG: 683 | End: 2025-02-16
Attending: EMERGENCY MEDICINE | Admitting: INTERNAL MEDICINE
Payer: COMMERCIAL

## 2025-02-15 DIAGNOSIS — N17.9 ACUTE KIDNEY INJURY: Primary | ICD-10-CM

## 2025-02-15 DIAGNOSIS — E88.89 KETOSIS: ICD-10-CM

## 2025-02-15 DIAGNOSIS — E87.20 METABOLIC ACIDOSIS: ICD-10-CM

## 2025-02-15 PROBLEM — E87.29 STARVATION KETOACIDOSIS: Status: RESOLVED | Noted: 2025-02-15 | Resolved: 2025-02-15

## 2025-02-15 PROBLEM — E87.29 STARVATION KETOACIDOSIS: Status: ACTIVE | Noted: 2025-02-15

## 2025-02-15 PROBLEM — T73.0XXA STARVATION KETOACIDOSIS: Status: ACTIVE | Noted: 2025-02-15

## 2025-02-15 PROBLEM — T73.0XXA STARVATION KETOACIDOSIS: Status: RESOLVED | Noted: 2025-02-15 | Resolved: 2025-02-15

## 2025-02-15 LAB
ALBUMIN SERPL-MCNC: 4.6 G/DL (ref 3.5–5.2)
ALBUMIN/GLOB SERPL: 1.4 G/DL
ALP SERPL-CCNC: 61 U/L (ref 39–117)
ALT SERPL W P-5'-P-CCNC: 67 U/L (ref 1–33)
ANION GAP SERPL CALCULATED.3IONS-SCNC: 20 MMOL/L (ref 5–15)
AST SERPL-CCNC: 35 U/L (ref 1–32)
ATMOSPHERIC PRESS: 749 MMHG
B PARAPERT DNA SPEC QL NAA+PROBE: NOT DETECTED
B PERT DNA SPEC QL NAA+PROBE: NOT DETECTED
BASE EXCESS BLDV CALC-SCNC: -3.8 MMOL/L (ref 0–2)
BASOPHILS # BLD AUTO: 0.02 10*3/MM3 (ref 0–0.2)
BASOPHILS NFR BLD AUTO: 0.4 % (ref 0–1.5)
BDY SITE: ABNORMAL
BILIRUB SERPL-MCNC: 0.5 MG/DL (ref 0–1.2)
BILIRUB UR QL STRIP: NEGATIVE
BODY TEMPERATURE: 37
BUN SERPL-MCNC: 21 MG/DL (ref 6–20)
BUN/CREAT SERPL: 13.2 (ref 7–25)
C PNEUM DNA NPH QL NAA+NON-PROBE: NOT DETECTED
CALCIUM SPEC-SCNC: 10.6 MG/DL (ref 8.6–10.5)
CHLORIDE SERPL-SCNC: 92 MMOL/L (ref 98–107)
CLARITY UR: CLEAR
CO2 SERPL-SCNC: 19 MMOL/L (ref 22–29)
COHGB MFR BLD: 1 % (ref 0–5)
COLOR UR: ABNORMAL
CREAT SERPL-MCNC: 1.59 MG/DL (ref 0.57–1)
D DIMER PPP FEU-MCNC: <0.27 MCGFEU/ML (ref 0–0.5)
D-LACTATE SERPL-SCNC: 1.6 MMOL/L (ref 0.5–2)
DEPRECATED RDW RBC AUTO: 37.1 FL (ref 37–54)
EGFRCR SERPLBLD CKD-EPI 2021: 39.4 ML/MIN/1.73
EOSINOPHIL # BLD AUTO: 0.01 10*3/MM3 (ref 0–0.4)
EOSINOPHIL NFR BLD AUTO: 0.2 % (ref 0.3–6.2)
ERYTHROCYTE [DISTWIDTH] IN BLOOD BY AUTOMATED COUNT: 12.8 % (ref 12.3–15.4)
FLUAV SUBTYP SPEC NAA+PROBE: NOT DETECTED
FLUBV RNA ISLT QL NAA+PROBE: NOT DETECTED
GEN 5 1HR TROPONIN T REFLEX: 27 NG/L
GLOBULIN UR ELPH-MCNC: 3.3 GM/DL
GLUCOSE BLDC GLUCOMTR-MCNC: 116 MG/DL (ref 70–130)
GLUCOSE BLDC GLUCOMTR-MCNC: 91 MG/DL (ref 70–130)
GLUCOSE SERPL-MCNC: 126 MG/DL (ref 65–99)
GLUCOSE UR STRIP-MCNC: ABNORMAL MG/DL
HADV DNA SPEC NAA+PROBE: NOT DETECTED
HAV IGM SERPL QL IA: NORMAL
HBV CORE IGM SERPL QL IA: NORMAL
HBV SURFACE AG SERPL QL IA: NORMAL
HCO3 BLDV-SCNC: 22.7 MMOL/L (ref 22–28)
HCOV 229E RNA SPEC QL NAA+PROBE: NOT DETECTED
HCOV HKU1 RNA SPEC QL NAA+PROBE: NOT DETECTED
HCOV NL63 RNA SPEC QL NAA+PROBE: NOT DETECTED
HCOV OC43 RNA SPEC QL NAA+PROBE: NOT DETECTED
HCT VFR BLD AUTO: 39.9 % (ref 34–46.6)
HCV AB SER QL: NORMAL
HGB BLD-MCNC: 13.5 G/DL (ref 12–15.9)
HGB BLDA-MCNC: 13.8 G/DL (ref 12–16)
HGB UR QL STRIP.AUTO: NEGATIVE
HMPV RNA NPH QL NAA+NON-PROBE: NOT DETECTED
HOLD SPECIMEN: NORMAL
HOLD SPECIMEN: NORMAL
HPIV1 RNA ISLT QL NAA+PROBE: NOT DETECTED
HPIV2 RNA SPEC QL NAA+PROBE: NOT DETECTED
HPIV3 RNA NPH QL NAA+PROBE: NOT DETECTED
HPIV4 P GENE NPH QL NAA+PROBE: NOT DETECTED
IMM GRANULOCYTES # BLD AUTO: 0.01 10*3/MM3 (ref 0–0.05)
IMM GRANULOCYTES NFR BLD AUTO: 0.2 % (ref 0–0.5)
KETONES UR QL STRIP: ABNORMAL
LEUKOCYTE ESTERASE UR QL STRIP.AUTO: NEGATIVE
LYMPHOCYTES # BLD AUTO: 1.87 10*3/MM3 (ref 0.7–3.1)
LYMPHOCYTES NFR BLD AUTO: 39.4 % (ref 19.6–45.3)
Lab: ABNORMAL
M PNEUMO IGG SER IA-ACNC: NOT DETECTED
MAGNESIUM SERPL-MCNC: 1.8 MG/DL (ref 1.6–2.6)
MCH RBC QN AUTO: 27.3 PG (ref 26.6–33)
MCHC RBC AUTO-ENTMCNC: 33.8 G/DL (ref 31.5–35.7)
MCV RBC AUTO: 80.6 FL (ref 79–97)
METHGB BLD QL: 0.3 % (ref 0–3)
MODALITY: ABNORMAL
MONOCYTES # BLD AUTO: 0.26 10*3/MM3 (ref 0.1–0.9)
MONOCYTES NFR BLD AUTO: 5.5 % (ref 5–12)
NEUTROPHILS NFR BLD AUTO: 2.58 10*3/MM3 (ref 1.7–7)
NEUTROPHILS NFR BLD AUTO: 54.3 % (ref 42.7–76)
NITRITE UR QL STRIP: NEGATIVE
NRBC BLD AUTO-RTO: 0 /100 WBC (ref 0–0.2)
NT-PROBNP SERPL-MCNC: 90.8 PG/ML (ref 0–900)
OXYHGB MFR BLDV: 32.6 % (ref 60–85)
PCO2 BLDV: 45.3 MM HG (ref 41–51)
PH BLDV: 7.31 PH UNITS (ref 7.32–7.42)
PH UR STRIP.AUTO: <=5 [PH] (ref 5–8)
PLATELET # BLD AUTO: 200 10*3/MM3 (ref 140–450)
PMV BLD AUTO: 10.4 FL (ref 6–12)
PO2 BLDV: 21.6 MM HG (ref 27–53)
POTASSIUM BLDV-SCNC: 4.4 MMOL/L (ref 3.5–5.2)
POTASSIUM SERPL-SCNC: 4.6 MMOL/L (ref 3.5–5.2)
PROT SERPL-MCNC: 7.9 G/DL (ref 6–8.5)
PROT UR QL STRIP: NEGATIVE
RBC # BLD AUTO: 4.95 10*6/MM3 (ref 3.77–5.28)
RHINOVIRUS RNA SPEC NAA+PROBE: NOT DETECTED
RSV RNA NPH QL NAA+NON-PROBE: NOT DETECTED
SAO2 % BLDCOV: 33 % (ref 45–75)
SARS-COV-2 RNA RESP QL NAA+PROBE: NOT DETECTED
SODIUM BLDV-SCNC: 138 MMOL/L (ref 136–145)
SODIUM SERPL-SCNC: 131 MMOL/L (ref 136–145)
SP GR UR STRIP: 1.03 (ref 1–1.03)
T3FREE SERPL-MCNC: 2.75 PG/ML (ref 2–4.4)
T4 FREE SERPL-MCNC: 1.65 NG/DL (ref 0.93–1.7)
TROPONIN T % DELTA: -13
TROPONIN T NUMERIC DELTA: -4 NG/L
TROPONIN T SERPL HS-MCNC: 31 NG/L
TSH SERPL DL<=0.05 MIU/L-ACNC: 0.03 UIU/ML (ref 0.27–4.2)
UROBILINOGEN UR QL STRIP: ABNORMAL
VENTILATOR MODE: ABNORMAL
WBC NRBC COR # BLD AUTO: 4.75 10*3/MM3 (ref 3.4–10.8)
WHOLE BLOOD HOLD COAG: NORMAL
WHOLE BLOOD HOLD SPECIMEN: NORMAL

## 2025-02-15 PROCEDURE — 82820 HEMOGLOBIN-OXYGEN AFFINITY: CPT

## 2025-02-15 PROCEDURE — 84481 FREE ASSAY (FT-3): CPT

## 2025-02-15 PROCEDURE — 80074 ACUTE HEPATITIS PANEL: CPT | Performed by: INTERNAL MEDICINE

## 2025-02-15 PROCEDURE — 25810000003 SODIUM CHLORIDE 0.9 % SOLUTION

## 2025-02-15 PROCEDURE — 93005 ELECTROCARDIOGRAM TRACING: CPT

## 2025-02-15 PROCEDURE — 0202U NFCT DS 22 TRGT SARS-COV-2: CPT | Performed by: NURSE PRACTITIONER

## 2025-02-15 PROCEDURE — 74019 RADEX ABDOMEN 2 VIEWS: CPT

## 2025-02-15 PROCEDURE — 99285 EMERGENCY DEPT VISIT HI MDM: CPT

## 2025-02-15 PROCEDURE — 83605 ASSAY OF LACTIC ACID: CPT | Performed by: NURSE PRACTITIONER

## 2025-02-15 PROCEDURE — 25810000003 SODIUM CHLORIDE 0.9 % SOLUTION: Performed by: NURSE PRACTITIONER

## 2025-02-15 PROCEDURE — 81003 URINALYSIS AUTO W/O SCOPE: CPT | Performed by: NURSE PRACTITIONER

## 2025-02-15 PROCEDURE — 82805 BLOOD GASES W/O2 SATURATION: CPT

## 2025-02-15 PROCEDURE — 96374 THER/PROPH/DIAG INJ IV PUSH: CPT

## 2025-02-15 PROCEDURE — 84439 ASSAY OF FREE THYROXINE: CPT | Performed by: NURSE PRACTITIONER

## 2025-02-15 PROCEDURE — 82948 REAGENT STRIP/BLOOD GLUCOSE: CPT

## 2025-02-15 PROCEDURE — 83735 ASSAY OF MAGNESIUM: CPT | Performed by: NURSE PRACTITIONER

## 2025-02-15 PROCEDURE — 84484 ASSAY OF TROPONIN QUANT: CPT | Performed by: NURSE PRACTITIONER

## 2025-02-15 PROCEDURE — 71045 X-RAY EXAM CHEST 1 VIEW: CPT

## 2025-02-15 PROCEDURE — 96361 HYDRATE IV INFUSION ADD-ON: CPT

## 2025-02-15 PROCEDURE — 83880 ASSAY OF NATRIURETIC PEPTIDE: CPT | Performed by: NURSE PRACTITIONER

## 2025-02-15 PROCEDURE — 36415 COLL VENOUS BLD VENIPUNCTURE: CPT

## 2025-02-15 PROCEDURE — 93010 ELECTROCARDIOGRAM REPORT: CPT | Performed by: EMERGENCY MEDICINE

## 2025-02-15 PROCEDURE — 80050 GENERAL HEALTH PANEL: CPT | Performed by: NURSE PRACTITIONER

## 2025-02-15 PROCEDURE — 85379 FIBRIN DEGRADATION QUANT: CPT | Performed by: NURSE PRACTITIONER

## 2025-02-15 PROCEDURE — 25010000002 ONDANSETRON PER 1 MG: Performed by: NURSE PRACTITIONER

## 2025-02-15 RX ORDER — SODIUM CHLORIDE 9 MG/ML
40 INJECTION, SOLUTION INTRAVENOUS AS NEEDED
Status: DISCONTINUED | OUTPATIENT
Start: 2025-02-15 | End: 2025-02-16 | Stop reason: HOSPADM

## 2025-02-15 RX ORDER — AMOXICILLIN 250 MG
2 CAPSULE ORAL 2 TIMES DAILY PRN
Status: DISCONTINUED | OUTPATIENT
Start: 2025-02-15 | End: 2025-02-16 | Stop reason: HOSPADM

## 2025-02-15 RX ORDER — ONDANSETRON 4 MG/1
4 TABLET, ORALLY DISINTEGRATING ORAL EVERY 6 HOURS PRN
Status: DISCONTINUED | OUTPATIENT
Start: 2025-02-15 | End: 2025-02-16 | Stop reason: HOSPADM

## 2025-02-15 RX ORDER — CARVEDILOL 6.25 MG/1
12.5 TABLET ORAL EVERY 12 HOURS SCHEDULED
Status: DISCONTINUED | OUTPATIENT
Start: 2025-02-15 | End: 2025-02-16 | Stop reason: HOSPADM

## 2025-02-15 RX ORDER — ASPIRIN 81 MG/1
81 TABLET ORAL DAILY
Status: DISCONTINUED | OUTPATIENT
Start: 2025-02-15 | End: 2025-02-16 | Stop reason: HOSPADM

## 2025-02-15 RX ORDER — ARIPIPRAZOLE 5 MG/1
5 TABLET ORAL DAILY
Status: DISCONTINUED | OUTPATIENT
Start: 2025-02-15 | End: 2025-02-16 | Stop reason: HOSPADM

## 2025-02-15 RX ORDER — ROSUVASTATIN CALCIUM 10 MG/1
10 TABLET, COATED ORAL DAILY
Status: DISCONTINUED | OUTPATIENT
Start: 2025-02-15 | End: 2025-02-16 | Stop reason: HOSPADM

## 2025-02-15 RX ORDER — NICOTINE POLACRILEX 4 MG
15 LOZENGE BUCCAL
Status: DISCONTINUED | OUTPATIENT
Start: 2025-02-15 | End: 2025-02-16 | Stop reason: HOSPADM

## 2025-02-15 RX ORDER — DEXTROSE MONOHYDRATE 25 G/50ML
25 INJECTION, SOLUTION INTRAVENOUS
Status: DISCONTINUED | OUTPATIENT
Start: 2025-02-15 | End: 2025-02-16 | Stop reason: HOSPADM

## 2025-02-15 RX ORDER — SODIUM CHLORIDE 9 MG/ML
100 INJECTION, SOLUTION INTRAVENOUS CONTINUOUS
Status: DISCONTINUED | OUTPATIENT
Start: 2025-02-15 | End: 2025-02-16 | Stop reason: HOSPADM

## 2025-02-15 RX ORDER — SODIUM CHLORIDE 0.9 % (FLUSH) 0.9 %
10 SYRINGE (ML) INJECTION AS NEEDED
Status: DISCONTINUED | OUTPATIENT
Start: 2025-02-15 | End: 2025-02-16 | Stop reason: HOSPADM

## 2025-02-15 RX ORDER — ONDANSETRON 2 MG/ML
4 INJECTION INTRAMUSCULAR; INTRAVENOUS ONCE
Status: COMPLETED | OUTPATIENT
Start: 2025-02-15 | End: 2025-02-15

## 2025-02-15 RX ORDER — GABAPENTIN 300 MG/1
300 CAPSULE ORAL NIGHTLY
Status: DISCONTINUED | OUTPATIENT
Start: 2025-02-15 | End: 2025-02-16 | Stop reason: HOSPADM

## 2025-02-15 RX ORDER — PANTOPRAZOLE SODIUM 40 MG/1
40 TABLET, DELAYED RELEASE ORAL
Status: DISCONTINUED | OUTPATIENT
Start: 2025-02-16 | End: 2025-02-16 | Stop reason: HOSPADM

## 2025-02-15 RX ORDER — ONDANSETRON 2 MG/ML
4 INJECTION INTRAMUSCULAR; INTRAVENOUS EVERY 6 HOURS PRN
Status: DISCONTINUED | OUTPATIENT
Start: 2025-02-15 | End: 2025-02-16 | Stop reason: HOSPADM

## 2025-02-15 RX ORDER — AMLODIPINE BESYLATE 5 MG/1
5 TABLET ORAL DAILY
Status: DISCONTINUED | OUTPATIENT
Start: 2025-02-15 | End: 2025-02-16 | Stop reason: HOSPADM

## 2025-02-15 RX ORDER — IBUPROFEN 600 MG/1
1 TABLET ORAL
Status: DISCONTINUED | OUTPATIENT
Start: 2025-02-15 | End: 2025-02-16 | Stop reason: HOSPADM

## 2025-02-15 RX ORDER — BISACODYL 5 MG/1
5 TABLET, DELAYED RELEASE ORAL DAILY PRN
Status: DISCONTINUED | OUTPATIENT
Start: 2025-02-15 | End: 2025-02-16 | Stop reason: HOSPADM

## 2025-02-15 RX ORDER — BISACODYL 10 MG
10 SUPPOSITORY, RECTAL RECTAL DAILY PRN
Status: DISCONTINUED | OUTPATIENT
Start: 2025-02-15 | End: 2025-02-16 | Stop reason: HOSPADM

## 2025-02-15 RX ORDER — INSULIN LISPRO 100 [IU]/ML
2-7 INJECTION, SOLUTION INTRAVENOUS; SUBCUTANEOUS
Status: DISCONTINUED | OUTPATIENT
Start: 2025-02-15 | End: 2025-02-16 | Stop reason: HOSPADM

## 2025-02-15 RX ORDER — SPIRONOLACTONE 25 MG/1
25 TABLET ORAL DAILY
Status: DISCONTINUED | OUTPATIENT
Start: 2025-02-15 | End: 2025-02-16 | Stop reason: HOSPADM

## 2025-02-15 RX ORDER — LIOTHYRONINE SODIUM 25 UG/1
25 TABLET ORAL 2 TIMES DAILY
Status: CANCELLED | OUTPATIENT
Start: 2025-02-15

## 2025-02-15 RX ORDER — SERTRALINE HYDROCHLORIDE 100 MG/1
100 TABLET, FILM COATED ORAL 2 TIMES DAILY
Status: DISCONTINUED | OUTPATIENT
Start: 2025-02-15 | End: 2025-02-16 | Stop reason: HOSPADM

## 2025-02-15 RX ORDER — POLYETHYLENE GLYCOL 3350 17 G/17G
17 POWDER, FOR SOLUTION ORAL DAILY PRN
Status: DISCONTINUED | OUTPATIENT
Start: 2025-02-15 | End: 2025-02-16 | Stop reason: HOSPADM

## 2025-02-15 RX ORDER — SODIUM CHLORIDE 0.9 % (FLUSH) 0.9 %
10 SYRINGE (ML) INJECTION EVERY 12 HOURS SCHEDULED
Status: DISCONTINUED | OUTPATIENT
Start: 2025-02-15 | End: 2025-02-16 | Stop reason: HOSPADM

## 2025-02-15 RX ORDER — LEVOTHYROXINE SODIUM 100 UG/1
200 TABLET ORAL
Status: CANCELLED | OUTPATIENT
Start: 2025-02-16

## 2025-02-15 RX ADMIN — ARIPIPRAZOLE 5 MG: 5 TABLET ORAL at 15:50

## 2025-02-15 RX ADMIN — SODIUM CHLORIDE 1000 ML: 9 INJECTION, SOLUTION INTRAVENOUS at 10:44

## 2025-02-15 RX ADMIN — GABAPENTIN 300 MG: 300 CAPSULE ORAL at 20:15

## 2025-02-15 RX ADMIN — CARVEDILOL 12.5 MG: 6.25 TABLET, FILM COATED ORAL at 20:14

## 2025-02-15 RX ADMIN — AMLODIPINE BESYLATE 5 MG: 5 TABLET ORAL at 15:50

## 2025-02-15 RX ADMIN — ROSUVASTATIN 10 MG: 10 TABLET, FILM COATED ORAL at 20:33

## 2025-02-15 RX ADMIN — SERTRALINE HYDROCHLORIDE 100 MG: 100 TABLET ORAL at 20:15

## 2025-02-15 RX ADMIN — ASPIRIN 81 MG: 81 TABLET, COATED ORAL at 20:33

## 2025-02-15 RX ADMIN — SODIUM CHLORIDE 100 ML/HR: 9 INJECTION, SOLUTION INTRAVENOUS at 16:46

## 2025-02-15 RX ADMIN — Medication 10 ML: at 20:14

## 2025-02-15 RX ADMIN — ONDANSETRON 4 MG: 2 INJECTION INTRAMUSCULAR; INTRAVENOUS at 10:23

## 2025-02-15 NOTE — ED NOTES
"Nursing report ED to floor  Lynn Magana  49 y.o.  female    HPI:   Chief Complaint   Patient presents with    Shortness of Breath    Weakness - Generalized       Admitting doctor:   Casper Gao MD    Consulting provider(s):  Consults       No orders found from 1/17/2025 to 2/16/2025.             Admitting diagnosis:   The primary encounter diagnosis was Acute kidney injury. Diagnoses of Ketosis and Metabolic acidosis were also pertinent to this visit.    Code status:   Current Code Status       Date Active Code Status Order ID Comments User Context       Prior            Allergies:   Oxycodone-acetaminophen    Intake and Output    Intake/Output Summary (Last 24 hours) at 2/15/2025 1408  Last data filed at 2/15/2025 1320  Gross per 24 hour   Intake 1000 ml   Output --   Net 1000 ml       Weight:       02/15/25  0833   Weight: 79.4 kg (175 lb)       Most recent vitals:   Vitals:    02/15/25 0833 02/15/25 1102 02/15/25 1300 02/15/25 1322   BP: 121/86 111/75 120/88    BP Location: Right arm      Patient Position: Sitting      Pulse: 94 87 90 83   Resp: 16      Temp: 97.8 °F (36.6 °C)      TempSrc: Oral      SpO2: 100% 98%  96%   Weight: 79.4 kg (175 lb)      Height: 188 cm (74\")        Oxygen Therapy: .    Active LDAs/IV Access:   Lines, Drains & Airways       Active LDAs       Name Placement date Placement time Site Days    Peripheral IV 12/03/24 1333 Left Antecubital 12/03/24  1333  Antecubital  74    Peripheral IV 02/15/25 1021 Right Antecubital 02/15/25  1021  Antecubital  less than 1                    Labs (abnormal labs have a star):   Labs Reviewed   COMPREHENSIVE METABOLIC PANEL - Abnormal; Notable for the following components:       Result Value    Glucose 126 (*)     BUN 21 (*)     Creatinine 1.59 (*)     Sodium 131 (*)     Chloride 92 (*)     CO2 19.0 (*)     Calcium 10.6 (*)     ALT (SGPT) 67 (*)     AST (SGOT) 35 (*)     Anion Gap 20.0 (*)     eGFR 39.4 (*)     All other components " within normal limits    Narrative:     GFR Categories in Chronic Kidney Disease (CKD)      GFR Category          GFR (mL/min/1.73)    Interpretation  G1                     90 or greater         Normal or high (1)  G2                      60-89                Mild decrease (1)  G3a                   45-59                Mild to moderate decrease  G3b                   30-44                Moderate to severe decrease  G4                    15-29                Severe decrease  G5                    14 or less           Kidney failure          (1)In the absence of evidence of kidney disease, neither GFR category G1 or G2 fulfill the criteria for CKD.    eGFR calculation 2021 CKD-EPI creatinine equation, which does not include race as a factor   TROPONIN - Abnormal; Notable for the following components:    HS Troponin T 31 (*)     All other components within normal limits    Narrative:     High Sensitive Troponin T Reference Range:  <14.0 ng/L- Negative Female for AMI  <22.0 ng/L- Negative Male for AMI  >=14 - Abnormal Female indicating possible myocardial injury.  >=22 - Abnormal Male indicating possible myocardial injury.   Clinicians would have to utilize clinical acumen, EKG, Troponin, and serial changes to determine if it is an Acute Myocardial Infarction or myocardial injury due to an underlying chronic condition.        CBC WITH AUTO DIFFERENTIAL - Abnormal; Notable for the following components:    Eosinophil % 0.2 (*)     All other components within normal limits   URINALYSIS W/ CULTURE IF INDICATED - Abnormal; Notable for the following components:    Color, UA Dark Yellow (*)     Glucose, UA >=1000 mg/dL (3+) (*)     Ketones, UA 80 mg/dL (3+) (*)     All other components within normal limits    Narrative:     In absence of clinical symptoms, the presence of pyuria, bacteria, and/or nitrites on the urinalysis result does not correlate with infection.  Urine microscopic not indicated.   TSH RFX ON ABNORMAL TO  FREE T4 - Abnormal; Notable for the following components:    TSH 0.034 (*)     All other components within normal limits   HIGH SENSITIVITIY TROPONIN T 1HR - Abnormal; Notable for the following components:    HS Troponin T 27 (*)     All other components within normal limits    Narrative:     High Sensitive Troponin T Reference Range:  <14.0 ng/L- Negative Female for AMI  <22.0 ng/L- Negative Male for AMI  >=14 - Abnormal Female indicating possible myocardial injury.  >=22 - Abnormal Male indicating possible myocardial injury.   Clinicians would have to utilize clinical acumen, EKG, Troponin, and serial changes to determine if it is an Acute Myocardial Infarction or myocardial injury due to an underlying chronic condition.        BLOOD GAS, VENOUS W/CO-OXIMETRY - Abnormal; Notable for the following components:    pH, Venous 7.308 (*)     pO2, Venous 21.6 (*)     Base Excess, Venous -3.8 (*)     O2 Saturation, Venous 33.0 (*)     Oxyhemoglobin Venous 32.6 (*)     All other components within normal limits   RESPIRATORY PANEL PCR W/ COVID-19 (SARS-COV-2), NP SWAB IN UTM/VTP, 2 HR TAT - Normal    Narrative:     In the setting of a positive respiratory panel with a viral infection PLUS a negative procalcitonin without other underlying concern for bacterial infection, consider observing off antibiotics or discontinuation of antibiotics and continue supportive care. If the respiratory panel is positive for atypical bacterial infection (Bordetella pertussis, Chlamydophila pneumoniae, or Mycoplasma pneumoniae), consider antibiotic de-escalation to target atypical bacterial infection.   MAGNESIUM - Normal   LACTIC ACID, PLASMA - Normal   D-DIMER, QUANTITATIVE - Normal    Narrative:     According to the assay 's published package insert, a normal (<0.50 MCGFEU/mL) D-dimer result in conjunction with a non-high clinical probability assessment, excludes deep vein thrombosis (DVT) and pulmonary embolism (PE) with high  "sensitivity.    D-dimer values increase with age and this can make VTE exclusion of an older population difficult. To address this, the [employer], based on best available evidence and recent guidelines, recommends that clinicians use age-adjusted D-dimer thresholds in patients greater than 50 years of age with: a) a low probability of PE who do not meet all Pulmonary Embolism Rule Out Criteria, or b) in those with intermediate probability of PE.   The formula for an age-adjusted D-dimer cut-off is \"age/100\".  For example, a 60 year old patient would have an age-adjusted cut-off of 0.60 MCGFEU/mL and an 80 year old 0.80 MCGFEU/mL.   BNP (IN-HOUSE) - Normal    Narrative:     This assay is used as an aid in the diagnosis of individuals suspected of having heart failure. It can be used as an aid in the diagnosis of acute decompensated heart failure (ADHF) in patients presenting with signs and symptoms of ADHF to the emergency department (ED). In addition, NT-proBNP of <300 pg/mL indicates ADHF is not likely.    Age Range Result Interpretation  NT-proBNP Concentration (pg/mL:      <50             Positive            >450                   Gray                 300-450                    Negative             <300    50-75           Positive            >900                  Gray                300-900                  Negative            <300      >75             Positive            >1800                  Gray                300-1800                  Negative            <300   T4, FREE - Normal   RAINBOW DRAW    Narrative:     The following orders were created for panel order [place] Draw.  Procedure                               Abnormality         Status                     ---------                               -----------         ------                     Green Top (Gel)[[id number]]                                  Final result               Lavender Top[[id number]]                                   "   Final result               Red Top[[id number]]                                          Final result               Light Blue Top[[id number]]                                   Final result                 Please view results for these tests on the individual orders.   BLOOD GAS, VENOUS W/CO-OXIMETRY   T3, FREE   GREEN TOP   LAVENDER TOP   RED TOP   LIGHT BLUE TOP   CBC AND DIFFERENTIAL    Narrative:     The following orders were created for panel order CBC & Differential.  Procedure                               Abnormality         Status                     ---------                               -----------         ------                     CBC Auto Differential[[id number]]        Abnormal            Final result                 Please view results for these tests on the individual orders.       Meds given in ED:   Medications   sodium chloride 0.9 % flush 10 mL (has no administration in time range)   ondansetron (ZOFRAN) injection 4 mg (4 mg Intravenous Given 2/15/25 1023)   sodium chloride 0.9 % bolus 1,000 mL (0 mL Intravenous Stopped 2/15/25 1320)           NIH Stroke Scale:       Isolation/Infection(s):  No active isolations   No active infections     COVID Testing  Collected .  Resulted .    Nursing report ED to floor:  Mental status: .axox4  Ambulatory status: .up adlib  Precautions: .none    ED nurse phone extentsion- ..   1821  Azra rn

## 2025-02-15 NOTE — H&P
[facility] Medicine Services  HISTORY AND PHYSICAL    Date of Admission: 2/15/2025  Primary Care Physician: Darryl Andrews DO    Subjective   Primary Historian:    Chief Complaint: Nausea and extreme lethargy    History of Present Illness  Lynn Magana is a 49-year-old female with a past medical history of arthritis, carotid artery stenosis, degenerative disc disease, diabetes mellitus type 1, GERD, Graves' disease, hyperlipidemia, hypertension, hypothyroidism, seizures and thyromegaly.  Patient presented to [facility] emergency department on 2/15/2025 with complaints of fatigue, shortness of breath and nausea over the past 2 weeks.  States it is hard to describe she said it just hits her and she feels like staying in bed for the last several weeks.  Shortness of breath is mainly exertional and she states it happens without warning.  The workup revealed a VBG pH of 7.3, pO2 of 21.6, initial troponin of 31, subsequent of 27, , CL 92, CO2 19 0, anion gap of 20, BUN 21, CR 1.59, , Ca 10.6, TSH 0.034, CBC unremarkable, urinalysis unremarkable, respiratory PCR negative.  Patient does use chronic marijuana and we discussed with patient that this is contributing significantly to her hyperemesis.  In addition her thyroid function has not been assessed in quite some time and so patient's Cytomel and Synthroid will be placed on hold until can be reviewed by Dr. Darryl Andrews.  Patient will be hydrated overnight, nausea controlled with Zofran and placed on a GI low irritant diet.  All patient and spouse's questions were answered to the best my ability and they are in agreement for admission and treatment.        Review of Systems   Constitutional:  Positive for fatigue.   Respiratory:  Negative for shortness of breath.    Cardiovascular:  Negative for chest pain.   Gastrointestinal:  Positive for nausea.      Otherwise complete ROS reviewed and negative except as mentioned  in the HPI.    Past Medical History:   Past Medical History:   Diagnosis Date    Arthritis     Carotid artery stenosis     Degenerative disc disease, cervical     Depression     Diabetes mellitus     type 1    Disease of thyroid gland     GERD (gastroesophageal reflux disease)     Graves disease     Heart palpitations     Hyperlipidemia     Hypertension     Hypothyroidism     Seizure     Thyromegaly      Past Surgical History:  Past Surgical History:   Procedure Laterality Date     SECTION      CHOLECYSTECTOMY      COLONOSCOPY  2015    Normal exam Dr. Morgan     COLONOSCOPY  2015    Normal exam    CYST REMOVAL      ENDOSCOPY N/A 2018    Normal exam    HYSTERECTOMY      THYROIDECTOMY Bilateral 2018    Procedure: total thyroidectomy;  Surgeon: Vitaly Givens MD;  Location: [facility];  Service: ENT     Social History:  reports that she has never smoked. She has never used smokeless tobacco. She reports current alcohol use of about 6.0 standard drinks of alcohol per week. She reports current drug use. Drug: Marijuana.    Family History: family history includes Breast cancer in her maternal aunt and sister; Colon cancer in her paternal uncle; Coronary artery disease in her brother; Diabetes in her brother, father, mother, and sister; Seizures in her sister; Stroke in her father, mother, and sister.       Allergies:  Allergies   Allergen Reactions    Oxycodone-Acetaminophen Swelling     Other reaction(s): Throat swelling       Medications:  Prior to Admission medications    Medication Sig Start Date End Date Taking? Authorizing Provider   ADMELOG SOLOSTAR 100 UNIT/ML solution pen-injector  3/12/19   Provider, MD Luciano   amLODIPine (NORVASC) 10 MG tablet Take 0.5 tablets by mouth Daily. 24   Devyn Moreno MD   ARIPiprazole (ABILIFY) 10 MG tablet Take 0.5 tablets by mouth Daily. 24   Devyn Moreno MD   aspirin 81 MG EC tablet Take 1 tablet by mouth Daily.     Luciano Hayes MD   carvedilol (COREG) 12.5 MG tablet Take 1 tablet by mouth Every 12 (Twelve) Hours. 12/4/24   Devyn Moreno MD   empagliflozin (JARDIANCE) 25 MG tablet tablet Take 1 tablet by mouth Daily. 6/10/22   Luciano Hayes MD   Evolocumab (REPATHA) solution auto-injector SureClick injection Inject 1 mL under the skin into the appropriate area as directed Every 14 (Fourteen) Days.    Luciano Hayes MD   gabapentin (NEURONTIN) 300 MG capsule Take 1 capsule by mouth Every Night. 12/4/24   Devyn Moreno MD   influenza virus vacc split PF 0.5 ML suspension prefilled syringe Inject 0.5 mL into the appropriate muscle as directed by prescriber During Hospitalization (If patient wants it) for up to 1 dose. 12/4/24   Devyn Moreno MD   Lancets (OneTouch Delica Plus [id number]) misc  11/19/20   Luciano Hayes MD   levothyroxine (SYNTHROID, LEVOTHROID) 200 MCG tablet Take 1 tablet by mouth Every Morning. 5/31/22   Luciano Hayes MD   liothyronine (CYTOMEL) 25 MCG tablet Take 1 tablet by mouth 2 (Two) Times a Day. 12/1/20   Luciano Hayes MD   metFORMIN ER (GLUCOPHAGE-XR) 500 MG 24 hr tablet Take 1 tablet by mouth 2 (Two) Times a Day. 6/9/22   Luciano Hayes MD   omeprazole (priLOSEC) 20 MG capsule Take 1 capsule by mouth Daily. 5/5/20   Zora Stallworth APRN   OneTouch Ultra test strip  11/19/20   Luciano Hayes MD   rosuvastatin (CRESTOR) 10 MG tablet Take 1 tablet by mouth Daily.    Luciano Hayes MD   sertraline (ZOLOFT) 100 MG tablet Take 1 tablet by mouth 2 (Two) Times a Day.    Luciano Hayes MD   spironolactone (ALDACTONE) 50 MG tablet Take 0.5 tablets by mouth Daily. 12/4/24   Devyn Moreno MD     I have utilized all available immediate resources to obtain, update, or review the patient's current medications (including all prescriptions, over-the-counter products, herbals, cannabis/cannabidiol products, and vitamin/mineral/dietary  "(nutritional) supplements).    Objective     Vital Signs: /68 (BP Location: Right arm, Patient Position: Lying)   Pulse 81   Temp 98.3 °F (36.8 °C) (Oral)   Resp 16   Ht 188 cm (74\")   Wt 79.4 kg (175 lb)   SpO2 97%   BMI 22.47 kg/m²   Physical Exam  Constitutional:       General: She is not in acute distress.     Appearance: Normal appearance. She is obese. She is not ill-appearing or toxic-appearing.   HENT:      Head: Normocephalic and atraumatic.      Right Ear: Tympanic membrane normal.      Left Ear: Tympanic membrane normal.      Nose: Nose normal.      Mouth/Throat:      Mouth: Mucous membranes are moist.      Pharynx: Oropharynx is clear.   Eyes:      Pupils: Pupils are equal, round, and reactive to light.   Cardiovascular:      Rate and Rhythm: Normal rate and regular rhythm.      Pulses: Normal pulses.      Heart sounds: Normal heart sounds.   Pulmonary:      Effort: Pulmonary effort is normal.      Breath sounds: Normal breath sounds.   Abdominal:      General: Abdomen is flat. Bowel sounds are normal.      Palpations: Abdomen is soft.   Genitourinary:     Comments: Per bathroom privileges  Musculoskeletal:         General: Normal range of motion.   Skin:     General: Skin is warm.      Capillary Refill: Capillary refill takes less than 2 seconds.      Coloration: Skin is pale.   Neurological:      General: No focal deficit present.      Mental Status: She is alert and oriented to person, place, and time.   Psychiatric:         Mood and Affect: Mood normal.         Behavior: Behavior normal.         Thought Content: Thought content normal.         Judgment: Judgment normal.        Results Reviewed:  Lab Results (last 24 hours)       Procedure Component Value Units Date/Time    Blood Gas, Venous With Co-Ox [[id number]]  (Abnormal) Collected: 02/15/25 1212    Specimen: Venous Blood Updated: 02/15/25 1211     Site OTHER     pH, Venous 7.308 pH Units      Comment: 84 Value below reference range "        pCO2, Venous 45.3 mm Hg      pO2, Venous 21.6 mm Hg      Comment: 84 Value below reference range        HCO3, Venous 22.7 mmol/L      Base Excess, Venous -3.8 mmol/L      Comment: 84 Value below reference range        O2 Saturation, Venous 33.0 %      Comment: 84 Value below reference range        Hemoglobin, Blood Gas 13.8 g/dL      Oxyhemoglobin Venous 32.6 %      Comment: 84 Value below reference range        Methemoglobin Venous 0.3 %      Carboxyhemoglobin Venous 1.0 %      Temperature 37.0     Sodium, Venous 138 mmol/L      Potassium, Venous 4.4 mmol/L      Barometric Pressure for Blood Gas 749 mmHg      Modality Room Air     Ventilator Mode NA     Collected by [id number]     Comment: Meter: [id number]     :  Janell Gilmore RRT       T4, Free [[id number]]  (Normal) Collected: 02/15/25 1021    Specimen: Blood Updated: 02/15/25 1200     Free T4 1.65 ng/dL     High Sensitivity Troponin T 1Hr [[id number]]  (Abnormal) Collected: 02/15/25 1119    Specimen: Blood from Arm, Left Updated: 02/15/25 1145     HS Troponin T 27 ng/L      Troponin T Numeric Delta -4 ng/L      Troponin T % Delta -13            TSH Rfx On Abnormal To Free T4 [[id number]]  (Abnormal) Collected: 02/15/25 1021    Specimen: Blood Updated: 02/15/25 1103     TSH 0.034 uIU/mL     Comprehensive Metabolic Panel [[id number]]  (Abnormal) Collected: 02/15/25 1021    Specimen: Blood Updated: 02/15/25 1058     Glucose 126 mg/dL      BUN 21 mg/dL      Creatinine 1.59 mg/dL      Sodium 131 mmol/L      Potassium 4.6 mmol/L      Chloride 92 mmol/L      CO2 19.0 mmol/L      Calcium 10.6 mg/dL      Total Protein 7.9 g/dL      Albumin 4.6 g/dL      ALT (SGPT) 67 U/L      AST (SGOT) 35 U/L      Alkaline Phosphatase 61 U/L      Total Bilirubin 0.5 mg/dL      Globulin 3.3 gm/dL      A/G Ratio 1.4 g/dL      BUN/Creatinine Ratio 13.2     Anion Gap 20.0 mmol/L      eGFR 39.4 mL/min/1.73             Magnesium [[id number]]  (Normal) Collected: 02/15/25  1021    Specimen: Blood Updated: 02/15/25 1057     Magnesium 1.8 mg/dL     Lactic Acid, Plasma [[id number]]  (Normal) Collected: 02/15/25 1021    Specimen: Blood Updated: 02/15/25 1056     Lactate 1.6 mmol/L     BNP [[id number]]  (Normal) Collected: 02/15/25 1021    Specimen: Blood Updated: 02/15/25 1056     proBNP 90.8 pg/mL             High Sensitivity Troponin T [[id number]]  (Abnormal) Collected: 02/15/25 1021    Specimen: Blood Updated: 02/15/25 1055     HS Troponin T 31 ng/L             D-dimer, Quantitative [[id number]]  (Normal) Collected: 02/15/25 1021    Specimen: Blood Updated: 02/15/25 1048     D-Dimer, Quantitative <0.27 MCGFEU/mL             Urinalysis With Culture If Indicated - Urine, Clean Catch [[id number]]  (Abnormal) Collected: 02/15/25 1010    Specimen: Urine, Clean Catch Updated: 02/15/25 1039     Color, UA Dark Yellow     Appearance, UA Clear     pH, UA <=5.0     Specific Gravity, UA 1.028     Glucose, UA >=1000 mg/dL (3+)     Ketones, UA 80 mg/dL (3+)     Bilirubin, UA Negative     Blood, UA Negative     Protein, UA Negative     Leuk Esterase, UA Negative     Nitrite, UA Negative     Urobilinogen, UA 1.0 E.U./dL            CBC & Differential [[id number]]  (Abnormal) Collected: 02/15/25 1021    Specimen: Blood Updated: 02/15/25 1037            CBC Auto Differential [[id number]]  (Abnormal) Collected: 02/15/25 1021    Specimen: Blood Updated: 02/15/25 1037     WBC 4.75 10*3/mm3      RBC 4.95 10*6/mm3      Hemoglobin 13.5 g/dL      Hematocrit 39.9 %      MCV 80.6 fL      MCH 27.3 pg      MCHC 33.8 g/dL      RDW 12.8 %      RDW-SD 37.1 fl      MPV 10.4 fL      Platelets 200 10*3/mm3      Neutrophil % 54.3 %      Lymphocyte % 39.4 %      Monocyte % 5.5 %      Eosinophil % 0.2 %      Basophil % 0.4 %      Immature Grans % 0.2 %      Neutrophils, Absolute 2.58 10*3/mm3      Lymphocytes, Absolute 1.87 10*3/mm3      Monocytes, Absolute 0.26 10*3/mm3      Eosinophils, Absolute 0.01 10*3/mm3      Basophils,  Absolute 0.02 10*3/mm3      Immature Grans, Absolute 0.01 10*3/mm3      nRBC 0.0 /100 WBC      Comment: Auto resulted       Respiratory Panel PCR w/COVID-19(SARS-CoV-2) JAMIL/ESME/GRACE/PAD/COR/BRENDON In-House, NP Swab in UTM/VTM, 2 HR TAT - Swab, Nasopharynx [[id number]]  (Normal) Collected: 02/15/25 0856    Specimen: Swab from Nasopharynx Updated: 02/15/25 0951     ADENOVIRUS, PCR Not Detected     Coronavirus 229E Not Detected     Coronavirus HKU1 Not Detected     Coronavirus NL63 Not Detected     Coronavirus OC43 Not Detected     COVID19 Not Detected     Human Metapneumovirus Not Detected     Human Rhinovirus/Enterovirus Not Detected     Influenza A PCR Not Detected     Influenza B PCR Not Detected     Parainfluenza Virus 1 Not Detected     Parainfluenza Virus 2 Not Detected     Parainfluenza Virus 3 Not Detected     Parainfluenza Virus 4 Not Detected     RSV, PCR Not Detected     Bordetella pertussis pcr Not Detected     Bordetella parapertussis PCR Not Detected     Chlamydophila pneumoniae PCR Not Detected     Mycoplasma pneumo by PCR Not Detected                  Imaging Results (Last 24 Hours)       Procedure Component Value Units Date/Time    XR Abdomen Flat & Upright [[id number]] Collected: 02/15/25 0937     Updated: 02/15/25 0945    Narrative:      EXAMINATION: XR ABDOMEN FLAT AND UPRIGHT- 2/15/2025 9:37 AM     HISTORY: constipation.     REPORT: Upright and supine views of the abdomen were obtained.     COMPARISON: [facility] 7/8/2017.     Cholecystectomy clips are present. There is a surgical clip in the mid  pelvis. Moderate stool volume is seen throughout the colon and gas is  seen throughout numerous small bowel loops, without significant  distention. This is nonspecific. No evidence of bowel obstruction,  though mild adynamic ileus is not excluded. The lung bases are clear. No  free air is identified. The osseous structures show no acute findings.  Degenerative disc disease is present L4-5 and L5-S1.       Impression:       1. Findings suggestive of mild adynamic ileus with associated  constipation. No evidence of bowel obstruction or free air.     This report was signed and finalized on 2/15/2025 9:38 AM by Dr. Triston Escobedo MD.       XR Chest 1 View [[id number]] Collected: 02/15/25 0930     Updated: 02/15/25 0933    Narrative:      EXAMINATION: XR CHEST 1 VW- 2/15/2025 9:30 AM     HISTORY: dyspnea.     COMPARISON: Chest x-ray 9/17/2024.     REPORT:  A frontal view of the chest was obtained. The lungs are clear and  normally expanded. The cardiac and mediastinal silhouettes are within  normal limits. The visualized bony thorax and upper abdomen are  unremarkable.                                                                                                                                                       Impression:             No acute abnormality.                                                                         This report was signed and finalized on 2/15/2025 9:30 AM by Dr. Triston Escobedo MD.             Assessment / Plan   Assessment:   Active Hospital Problems    Diagnosis     **Stage 1 acute kidney injury     Thyromegaly     Hyperthyroidism     Controlled type 2 diabetes mellitus without complication, with long-term current use of insulin     Hypertension     Graves' disease        Treatment Plan  The patient will be admitted to Dr. Gao's service here at [facility].     1.  Stage I acute kidney injury-creatinine on admission was 1.59, baseline of 0.95, 1.5 times baseline.  Initiate normal saline 100 mL/HR repeat BMP in the AM.  Hold Aldactone and Jardiance.    2.  Thyroid megaly, hyperthyroidism, Graves' disease-currently patient is on Synthroid and Cytomel TSH of 0.034 Free T41.65, free T3 pending.  Hold both medications.    3.  Type 2 diabetes mellitus-A1c pending, Accu-Cheks before meals and at bedtime with low-dose sliding scale insulin.    4.  Hypertension-every 4 vital signs  continue home Norvasc and Coreg    5.  Resumed and restarted home medications as appropriate.    VTE prophylaxis with SCDs  Labs in a.m.    Medical Decision Making  1 acute, high complexity, unchanged  3 chronic, moderate complexity, worsening  2 chronic, moderate complexity, stable    Number and Complexity of problems: 6  Differential Diagnosis: None    Conditions and Status        Condition is unchanged.     [facility] Data  External documents reviewed: Review of last PCP office note  Cardiac tracing (EKG, telemetry) interpretation:/15/2025 EKG personal review normal sinus rhythm ventricular rate of 87, atrial rate of 87, QTc 430  Radiology interpretation: None  Labs reviewed: 2/15/2025 respiratory PCR, urinalysis, CBC, CMP, magnesium, lactic acid, D-dimer, BNP, TSH, T4 reviewed, repeat labs in a.m.  Any tests that were considered but not ordered: None     Decision rules/scores evaluated (example [id number], Wells, etc): None     Discussed with: Dr. Gao, patient and her  Evens     Care Planning  Shared decision making: Dr. Gao, patient and her  Evens  Code status and discussions: Full code per patient    Disposition  Social Determinants of Health that impact treatment or disposition: None determined at this time  Estimated length of stay is 1-2 days.     I confirmed that the patient's advanced care plan is present, code status is documented, and a surrogate decision maker is listed in the patient's medical record.     The patient's surrogate decision maker is her  Evens.     The patient was seen and examined by me on 2/15/2025 at 1353.    Electronically signed by BRIANA Rodrigues, 02/15/25, 16:02 CST.

## 2025-02-15 NOTE — ED PROVIDER NOTES
"Subjective   History of Present Illness  49-year-old female patient presents to the ED with complaints of fatigue, shortness of breath, and nausea for the past 2 weeks.  She states \"it just hit me\", \"it is weird to me\", \"I have never felt like that before\". She states that she has been \"staying in the bed\" for the majority of the past 2 weeks due to unusual fatigue.  She does not work outside of the home, and her child is age 17.  The shortness of breath is with rest, but worsens with activity.  She states \"I cannot do anything without being out of breath\".      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     She denies a history of known pulmonary diseases.  No inhaler use. No history of DVT or PE. No peripheral edema, leg swelling, or leg pain.  No chest pain, palpitations, pain with inspiration, or cough. No trauma. She denies fever, but reports chills, stating \"I just stay so cold\".  She denies any recent infections.      She reports nausea, but denies vomiting.  She keeps stating \"I cannot get food to go down\".  She denies any sore throat or pain with swallowing.  She is equating this to the nausea; she cannot eat because she is so nauseated.  She denies abdominal pain or distention. No diarrhea. She endorses constipation, with last bowel movement one week ago.  She denies any black or bloody stools. She denies dysuria, hematuria, and difficulty urinating.  She reports decreased urine output, but she has also had decreased oral intake.  She endorses back pain, but states that she has a history of spinal arthritis and it is at baseline.  She denies any new medications or missed medication doses.       ED Triage Vitals   Temp Heart Rate Resp BP SpO2   02/15/25 0833 02/15/25 0833 02/15/25 0833 02/15/25 0833 02/15/25 0833   97.8 " °F (36.6 °C) 94 16 121/86 100 %      Temp src Heart Rate Source Patient Position BP Location FiO2 (%)   02/15/25 0832 02/15/25 0833 02/15/25 0833 02/15/25 0833 --   Oral Monitor Sitting Right arm        Review of Systems   Constitutional:  Positive for appetite change, chills and fatigue.   Respiratory:  Positive for shortness of breath.    Gastrointestinal:  Positive for constipation and diarrhea.   Genitourinary:  Positive for decreased urine volume.   Musculoskeletal:  Positive for back pain (at baseline).       Past Medical History:   Diagnosis Date    Arthritis     Carotid artery stenosis     Degenerative disc disease, cervical     Depression     Diabetes mellitus     type 1    Disease of thyroid gland     GERD (gastroesophageal reflux disease)     Graves disease     Heart palpitations     Hyperlipidemia     Hypertension     Hypothyroidism     Seizure     Thyromegaly        Allergies   Allergen Reactions    Oxycodone-Acetaminophen Swelling     Other reaction(s): Throat swelling       Past Surgical History:   Procedure Laterality Date     SECTION      CHOLECYSTECTOMY      COLONOSCOPY  2015    Normal exam Dr. Morgan     COLONOSCOPY  2015    Normal exam    CYST REMOVAL      ENDOSCOPY N/A 2018    Normal exam    HYSTERECTOMY      THYROIDECTOMY Bilateral 2018    Procedure: total thyroidectomy;  Surgeon: Vitaly Givens MD;  Location: [facility] OR;  Service: ENT       Family History   Problem Relation Age of Onset    Stroke Mother     Diabetes Mother     Stroke Father     Diabetes Father     Breast cancer Sister     Diabetes Sister     Stroke Sister     Seizures Sister     Coronary artery disease Brother     Diabetes Brother     Breast cancer Maternal Aunt     Colon cancer Paternal Uncle     Colon polyps Neg Hx        Social History     Socioeconomic History    Marital status:      Spouse name: Evens    Number of children: 1    Years of education: 12   Tobacco Use    Smoking  status: Never    Smokeless tobacco: Never   Vaping Use    Vaping status: Never Used   Substance and Sexual Activity    Alcohol use: Yes    Drug use: Yes     Types: Marijuana    Sexual activity: Not Currently     Partners: Male           Objective   Physical Exam  Vitals and nursing note reviewed.   Constitutional:       General: She is awake. She is not in acute distress.     Appearance: She is not ill-appearing, toxic-appearing or diaphoretic.   HENT:      Head: Normocephalic and atraumatic.      Right Ear: External ear normal.      Left Ear: External ear normal.      Nose: Nose normal.      Mouth/Throat:      Mouth: Mucous membranes are moist.      Pharynx: Oropharynx is clear.   Eyes:      Extraocular Movements: Extraocular movements intact.      Conjunctiva/sclera: Conjunctivae normal.      Pupils: Pupils are equal, round, and reactive to light.   Cardiovascular:      Rate and Rhythm: Normal rate and regular rhythm.      Pulses: Normal pulses.      Heart sounds: Normal heart sounds. No murmur heard.  Pulmonary:      Effort: Pulmonary effort is normal. No respiratory distress.      Breath sounds: Normal breath sounds. No stridor.   Chest:      Chest wall: No tenderness.   Abdominal:      General: Bowel sounds are normal. There is no distension.      Palpations: Abdomen is soft.      Tenderness: There is no abdominal tenderness. There is no right CVA tenderness, left CVA tenderness or guarding.   Musculoskeletal:      Cervical back: Normal range of motion and neck supple. No rigidity.      Comments: Moves all extremities independently and equally.    Skin:     General: Skin is warm and dry.      Capillary Refill: Capillary refill takes less than 2 seconds.   Neurological:      General: No focal deficit present.      Mental Status: She is alert and oriented to person, place, and time.   Psychiatric:         Mood and Affect: Mood normal.         Behavior: Behavior normal. Behavior is cooperative.          Procedures       Lab Results (last 24 hours)       Procedure Component Value Units Date/Time    Respiratory Panel PCR w/COVID-19(SARS-CoV-2) JAMIL/ESME/GRACE/PAD/COR/BRENDON In-House, NP Swab in UTM/VTM, 2 HR TAT - Swab, Nasopharynx [[id number]]  (Normal) Collected: 02/15/25 0856    Specimen: Swab from Nasopharynx Updated: 02/15/25 0951     ADENOVIRUS, PCR Not Detected     Coronavirus 229E Not Detected     Coronavirus HKU1 Not Detected     Coronavirus NL63 Not Detected     Coronavirus OC43 Not Detected     COVID19 Not Detected     Human Metapneumovirus Not Detected     Human Rhinovirus/Enterovirus Not Detected     Influenza A PCR Not Detected     Influenza B PCR Not Detected     Parainfluenza Virus 1 Not Detected     Parainfluenza Virus 2 Not Detected     Parainfluenza Virus 3 Not Detected     Parainfluenza Virus 4 Not Detected     RSV, PCR Not Detected     Bordetella pertussis pcr Not Detected     Bordetella parapertussis PCR Not Detected     Chlamydophila pneumoniae PCR Not Detected     Mycoplasma pneumo by PCR Not Detected    Narrative:      In the setting of a positive respiratory panel with a viral infection PLUS a negative procalcitonin without other underlying concern for bacterial infection, consider observing off antibiotics or discontinuation of antibiotics and continue supportive care. If the respiratory panel is positive for atypical bacterial infection (Bordetella pertussis, Chlamydophila pneumoniae, or Mycoplasma pneumoniae), consider antibiotic de-escalation to target atypical bacterial infection.    Urinalysis With Culture If Indicated - Urine, Clean Catch [[id number]]  (Abnormal) Collected: 02/15/25 1010    Specimen: Urine, Clean Catch Updated: 02/15/25 1039     Color, UA Dark Yellow     Appearance, UA Clear     pH, UA <=5.0     Specific Gravity, UA 1.028     Glucose, UA >=1000 mg/dL (3+)     Ketones, UA 80 mg/dL (3+)     Bilirubin, UA Negative     Blood, UA Negative     Protein, UA Negative     Leuk  Esterase, UA Negative     Nitrite, UA Negative     Urobilinogen, UA 1.0 E.U./dL    Narrative:      In absence of clinical symptoms, the presence of pyuria, bacteria, and/or nitrites on the urinalysis result does not correlate with infection.  Urine microscopic not indicated.    CBC & Differential [[id number]]  (Abnormal) Collected: 02/15/25 1021    Specimen: Blood Updated: 02/15/25 1037    Narrative:      The following orders were created for panel order CBC & Differential.  Procedure                               Abnormality         Status                     ---------                               -----------         ------                     CBC Auto Differential[[id number]]        Abnormal            Final result                 Please view results for these tests on the individual orders.    Comprehensive Metabolic Panel [[id number]]  (Abnormal) Collected: 02/15/25 1021    Specimen: Blood Updated: 02/15/25 1058     Glucose 126 mg/dL      BUN 21 mg/dL      Creatinine 1.59 mg/dL      Sodium 131 mmol/L      Potassium 4.6 mmol/L      Chloride 92 mmol/L      CO2 19.0 mmol/L      Calcium 10.6 mg/dL      Total Protein 7.9 g/dL      Albumin 4.6 g/dL      ALT (SGPT) 67 U/L      AST (SGOT) 35 U/L      Alkaline Phosphatase 61 U/L      Total Bilirubin 0.5 mg/dL      Globulin 3.3 gm/dL      A/G Ratio 1.4 g/dL      BUN/Creatinine Ratio 13.2     Anion Gap 20.0 mmol/L      eGFR 39.4 mL/min/1.73     Narrative:      GFR Categories in Chronic Kidney Disease (CKD)      GFR Category          GFR (mL/min/1.73)    Interpretation  G1                     90 or greater         Normal or high (1)  G2                      60-89                Mild decrease (1)  G3a                   45-59                Mild to moderate decrease  G3b                   30-44                Moderate to severe decrease  G4                    15-29                Severe decrease  G5                    14 or less           Kidney failure          (1)In the  "absence of evidence of kidney disease, neither GFR category G1 or G2 fulfill the criteria for CKD.    eGFR calculation 2021 CKD-EPI creatinine equation, which does not include race as a factor    Magnesium [[id number]]  (Normal) Collected: 02/15/25 1021    Specimen: Blood Updated: 02/15/25 1057     Magnesium 1.8 mg/dL     Lactic Acid, Plasma [[id number]]  (Normal) Collected: 02/15/25 1021    Specimen: Blood Updated: 02/15/25 1056     Lactate 1.6 mmol/L     D-dimer, Quantitative [[id number]]  (Normal) Collected: 02/15/25 1021    Specimen: Blood Updated: 02/15/25 1048     D-Dimer, Quantitative <0.27 MCGFEU/mL     Narrative:      According to the assay 's published package insert, a normal (<0.50 MCGFEU/mL) D-dimer result in conjunction with a non-high clinical probability assessment, excludes deep vein thrombosis (DVT) and pulmonary embolism (PE) with high sensitivity.    D-dimer values increase with age and this can make VTE exclusion of an older population difficult. To address this, the [employer], based on best available evidence and recent guidelines, recommends that clinicians use age-adjusted D-dimer thresholds in patients greater than 50 years of age with: a) a low probability of PE who do not meet all Pulmonary Embolism Rule Out Criteria, or b) in those with intermediate probability of PE.   The formula for an age-adjusted D-dimer cut-off is \"age/100\".  For example, a 60 year old patient would have an age-adjusted cut-off of 0.60 MCGFEU/mL and an 80 year old 0.80 MCGFEU/mL.    High Sensitivity Troponin T [[id number]]  (Abnormal) Collected: 02/15/25 1021    Specimen: Blood Updated: 02/15/25 1055     HS Troponin T 31 ng/L     Narrative:      High Sensitive Troponin T Reference Range:  <14.0 ng/L- Negative Female for AMI  <22.0 ng/L- Negative Male for AMI  >=14 - Abnormal Female indicating possible myocardial injury.  >=22 - Abnormal Male indicating possible myocardial injury. "   Clinicians would have to utilize clinical acumen, EKG, Troponin, and serial changes to determine if it is an Acute Myocardial Infarction or myocardial injury due to an underlying chronic condition.         BNP [[id number]]  (Normal) Collected: 02/15/25 1021    Specimen: Blood Updated: 02/15/25 1056     proBNP 90.8 pg/mL     Narrative:      This assay is used as an aid in the diagnosis of individuals suspected of having heart failure. It can be used as an aid in the diagnosis of acute decompensated heart failure (ADHF) in patients presenting with signs and symptoms of ADHF to the emergency department (ED). In addition, NT-proBNP of <300 pg/mL indicates ADHF is not likely.    Age Range Result Interpretation  NT-proBNP Concentration (pg/mL:      <50             Positive            >450                   Gray                 300-450                    Negative             <300    50-75           Positive            >900                  Gray                300-900                  Negative            <300      >75             Positive            >1800                  Gray                300-1800                  Negative            <300    CBC Auto Differential [[id number]]  (Abnormal) Collected: 02/15/25 1021    Specimen: Blood Updated: 02/15/25 1037     WBC 4.75 10*3/mm3      RBC 4.95 10*6/mm3      Hemoglobin 13.5 g/dL      Hematocrit 39.9 %      MCV 80.6 fL      MCH 27.3 pg      MCHC 33.8 g/dL      RDW 12.8 %      RDW-SD 37.1 fl      MPV 10.4 fL      Platelets 200 10*3/mm3      Neutrophil % 54.3 %      Lymphocyte % 39.4 %      Monocyte % 5.5 %      Eosinophil % 0.2 %      Basophil % 0.4 %      Immature Grans % 0.2 %      Neutrophils, Absolute 2.58 10*3/mm3      Lymphocytes, Absolute 1.87 10*3/mm3      Monocytes, Absolute 0.26 10*3/mm3      Eosinophils, Absolute 0.01 10*3/mm3      Basophils, Absolute 0.02 10*3/mm3      Immature Grans, Absolute 0.01 10*3/mm3      nRBC 0.0 /100 WBC     TSH Rfx On Abnormal To Free T4  [[id number]]  (Abnormal) Collected: 02/15/25 1021    Specimen: Blood Updated: 02/15/25 1103     TSH 0.034 uIU/mL     T4, Free [[id number]]  (Normal) Collected: 02/15/25 1021    Specimen: Blood Updated: 02/15/25 1200     Free T4 1.65 ng/dL     High Sensitivity Troponin T 1Hr [[id number]]  (Abnormal) Collected: 02/15/25 1119    Specimen: Blood from Arm, Left Updated: 02/15/25 1145     HS Troponin T 27 ng/L      Troponin T Numeric Delta -4 ng/L      Troponin T % Delta -13    Narrative:      High Sensitive Troponin T Reference Range:  <14.0 ng/L- Negative Female for AMI  <22.0 ng/L- Negative Male for AMI  >=14 - Abnormal Female indicating possible myocardial injury.  >=22 - Abnormal Male indicating possible myocardial injury.   Clinicians would have to utilize clinical acumen, EKG, Troponin, and serial changes to determine if it is an Acute Myocardial Infarction or myocardial injury due to an underlying chronic condition.         Blood Gas, Venous With Co-Ox [[id number]]  (Abnormal) Collected: 02/15/25 1212    Specimen: Venous Blood Updated: 02/15/25 1211     Site OTHER     pH, Venous 7.308 pH Units      Comment: 84 Value below reference range        pCO2, Venous 45.3 mm Hg      pO2, Venous 21.6 mm Hg      Comment: 84 Value below reference range        HCO3, Venous 22.7 mmol/L      Base Excess, Venous -3.8 mmol/L      Comment: 84 Value below reference range        O2 Saturation, Venous 33.0 %      Comment: 84 Value below reference range        Hemoglobin, Blood Gas 13.8 g/dL      Oxyhemoglobin Venous 32.6 %      Comment: 84 Value below reference range        Methemoglobin Venous 0.3 %      Carboxyhemoglobin Venous 1.0 %      Temperature 37.0     Sodium, Venous 138 mmol/L      Potassium, Venous 4.4 mmol/L      Barometric Pressure for Blood Gas 749 mmHg      Modality Room Air     Ventilator Mode NA     Collected by [id number]     Comment: Meter: [id number]     :  Janell Gilmore, RRT                   ED  "Course  ED Course as of 02/15/25 1336   Sat Feb 15, 2025   0840 ECG 12 Lead Dyspnea  Normal sinus rhythm 87bpm  Normal ECG  When compared with ECG of 03-Dec-2024 13:46,  ST no longer elevated in Lateral leads   [TD]   0843 Wells' Criteria for Pulmonary Embolism - MDCalc  4.5 points -> Moderate risk group: 16.2% chance of PE in an ED population. Another study assigned scores <= 4 as \"PE Unlikely\" and had a 3% incidence of PE. Another study assigned scores > 4 as \"PE Likely\" and had a 28% incidence of PE. [TD]   0944 XR Chest 1 View  IMPRESSION:         No acute abnormality.         [TD]   1024 XR Abdomen Flat & Upright  IMPRESSION:  1. Findings suggestive of mild adynamic ileus with associated  constipation. No evidence of bowel obstruction or free air.   [TD]   1025 NS IVF bolus ordered [TD]   1025 Respiratory Panel PCR w/COVID-19(SARS-CoV-2) JAMIL/ESME/GRACE/PAD/COR/BRENDON In-House, NP Swab in UTM/VTM, 2 HR TAT - Swab, Nasopharynx  Negative  [TD]   1035 Updated patient on respiratory panel and imaging results.  [TD]   1155 VBG ordered. Care turned over to Dr. Rodriguez pending additional work-up and/or disposition.  [TD]   1334 Patient with multiple different complaints complaint generalized weakness decreased p.o. intake and some nausea.  She is in acute kidney injury with metabolic acidosis almost appears to have starvation ketosis for some reason.  Will be admitted to medicine service with IV fluids. [TS]      ED Course User Index  [TD] Dorene Núñez APRN  [TS] Linus Rodriguez MD                                                       Medical Decision Making  Problems Addressed:  Acute kidney injury: acute illness or injury  Ketosis: acute illness or injury  Metabolic acidosis: acute illness or injury    Amount and/or Complexity of Data Reviewed  Labs: ordered. Decision-making details documented in ED Course.  Radiology: ordered. Decision-making details documented in ED Course.  ECG/medicine tests:  Decision-making details " documented in ED Course.  Discussion of management or test interpretation with external provider(s): Discussed with the hospitalist    Risk  Prescription drug management.  Decision regarding hospitalization.  Risk Details: This patient came in with the decreased p.o. intake persistent nausea not feeling well.  Not able to eat.  There is no abdominal pain associate with this.  There is no persistent vomiting associate with this.  Looks like she got starvation ketosis with anion gap.  And acute kidney injury will be admitted to medicine service for IV fluids.        Final diagnoses:   Acute kidney injury   Ketosis   Metabolic acidosis       ED Disposition  ED Disposition       ED Disposition   Decision to Admit    Condition   --    Comment   Level of Care: Med/Surg [1]   Diagnosis: FRANK (acute kidney injury) [517329]   Admitting Physician: SIA RUIZ [[id number]]   Attending Physician: SIA RUIZ [[id number]]   Certification: I Certify That Inpatient Hospital Services Are Medically Necessary For Greater Than 2 Midnights                 No follow-up provider specified.       Medication List      No changes were made to your prescriptions during this visit.            Linus Rodriguez MD  02/15/25 [id number]

## 2025-02-16 ENCOUNTER — APPOINTMENT (OUTPATIENT)
Dept: CARDIOLOGY | Facility: HOSPITAL | Age: 50
DRG: 683 | End: 2025-02-16
Payer: COMMERCIAL

## 2025-02-16 VITALS
DIASTOLIC BLOOD PRESSURE: 58 MMHG | WEIGHT: 175 LBS | HEART RATE: 85 BPM | BODY MASS INDEX: 23.7 KG/M2 | TEMPERATURE: 98.2 F | SYSTOLIC BLOOD PRESSURE: 90 MMHG | OXYGEN SATURATION: 99 % | HEIGHT: 72 IN | RESPIRATION RATE: 16 BRPM

## 2025-02-16 LAB
ANION GAP SERPL CALCULATED.3IONS-SCNC: 16 MMOL/L (ref 5–15)
AV MEAN PRESS GRAD SYS DOP V1V2: 3 MMHG
AV VMAX SYS DOP: 127 CM/SEC
BH CV ECHO MEAS - AO MAX PG: 6.5 MMHG
BH CV ECHO MEAS - AO ROOT DIAM: 2.7 CM
BH CV ECHO MEAS - AO V2 VTI: 25.1 CM
BH CV ECHO MEAS - AVA(I,D): 2.9 CM2
BH CV ECHO MEAS - EDV(CUBED): 51.9 ML
BH CV ECHO MEAS - EDV(MOD-SP4): 63.5 ML
BH CV ECHO MEAS - EF(MOD-SP4): 70.2 %
BH CV ECHO MEAS - ESV(CUBED): 15.3 ML
BH CV ECHO MEAS - ESV(MOD-SP4): 18.9 ML
BH CV ECHO MEAS - FS: 33.5 %
BH CV ECHO MEAS - IVS/LVPW: 0.84 CM
BH CV ECHO MEAS - IVSD: 0.64 CM
BH CV ECHO MEAS - LA DIMENSION: 2.7 CM
BH CV ECHO MEAS - LAT PEAK E' VEL: 11.4 CM/SEC
BH CV ECHO MEAS - LV DIASTOLIC VOL/BSA (35-75): 30.9 CM2
BH CV ECHO MEAS - LV MASS(C)D: 69.7 GRAMS
BH CV ECHO MEAS - LV MAX PG: 6 MMHG
BH CV ECHO MEAS - LV MEAN PG: 3 MMHG
BH CV ECHO MEAS - LV SYSTOLIC VOL/BSA (12-30): 9.2 CM2
BH CV ECHO MEAS - LV V1 MAX: 122 CM/SEC
BH CV ECHO MEAS - LV V1 VTI: 23.4 CM
BH CV ECHO MEAS - LVIDD: 3.7 CM
BH CV ECHO MEAS - LVIDS: 2.48 CM
BH CV ECHO MEAS - LVOT AREA: 3.1 CM2
BH CV ECHO MEAS - LVOT DIAM: 2 CM
BH CV ECHO MEAS - LVPWD: 0.76 CM
BH CV ECHO MEAS - MED PEAK E' VEL: 8.9 CM/SEC
BH CV ECHO MEAS - MV A MAX VEL: 62.9 CM/SEC
BH CV ECHO MEAS - MV DEC TIME: 0.19 SEC
BH CV ECHO MEAS - MV E MAX VEL: 84.9 CM/SEC
BH CV ECHO MEAS - MV E/A: 1.35
BH CV ECHO MEAS - SV(LVOT): 73.5 ML
BH CV ECHO MEAS - SV(MOD-SP4): 44.6 ML
BH CV ECHO MEAS - SVI(LVOT): 35.8 ML/M2
BH CV ECHO MEAS - SVI(MOD-SP4): 21.7 ML/M2
BH CV ECHO MEASUREMENTS AVERAGE E/E' RATIO: 8.36
BH CV XLRA - RV BASE: 3.1 CM
BUN SERPL-MCNC: 18 MG/DL (ref 6–20)
BUN/CREAT SERPL: 14.9 (ref 7–25)
CALCIUM SPEC-SCNC: 9.2 MG/DL (ref 8.6–10.5)
CHLORIDE SERPL-SCNC: 101 MMOL/L (ref 98–107)
CO2 SERPL-SCNC: 20 MMOL/L (ref 22–29)
CREAT SERPL-MCNC: 1.21 MG/DL (ref 0.57–1)
DEPRECATED RDW RBC AUTO: 38.2 FL (ref 37–54)
EGFRCR SERPLBLD CKD-EPI 2021: 54.7 ML/MIN/1.73
ERYTHROCYTE [DISTWIDTH] IN BLOOD BY AUTOMATED COUNT: 12.8 % (ref 12.3–15.4)
GLUCOSE BLDC GLUCOMTR-MCNC: 104 MG/DL (ref 70–130)
GLUCOSE BLDC GLUCOMTR-MCNC: 110 MG/DL (ref 70–130)
GLUCOSE SERPL-MCNC: 116 MG/DL (ref 65–99)
HBA1C MFR BLD: 10.4 % (ref 4.8–5.6)
HCT VFR BLD AUTO: 34.1 % (ref 34–46.6)
HGB BLD-MCNC: 11.2 G/DL (ref 12–15.9)
LEFT ATRIUM VOLUME INDEX: 12.9 ML/M2
LEFT ATRIUM VOLUME: 26.4 ML
MAGNESIUM SERPL-MCNC: 1.5 MG/DL (ref 1.6–2.6)
MCH RBC QN AUTO: 26.8 PG (ref 26.6–33)
MCHC RBC AUTO-ENTMCNC: 32.8 G/DL (ref 31.5–35.7)
MCV RBC AUTO: 81.6 FL (ref 79–97)
PLATELET # BLD AUTO: 157 10*3/MM3 (ref 140–450)
PMV BLD AUTO: 10.4 FL (ref 6–12)
POTASSIUM SERPL-SCNC: 4.4 MMOL/L (ref 3.5–5.2)
QT INTERVAL: 358 MS
QTC INTERVAL: 430 MS
RBC # BLD AUTO: 4.18 10*6/MM3 (ref 3.77–5.28)
SODIUM SERPL-SCNC: 137 MMOL/L (ref 136–145)
WBC NRBC COR # BLD AUTO: 3.78 10*3/MM3 (ref 3.4–10.8)

## 2025-02-16 PROCEDURE — 83735 ASSAY OF MAGNESIUM: CPT

## 2025-02-16 PROCEDURE — 25510000001 PERFLUTREN 6.52 MG/ML SUSPENSION 2 ML VIAL: Performed by: INTERNAL MEDICINE

## 2025-02-16 PROCEDURE — 25810000003 SODIUM CHLORIDE 0.9 % SOLUTION

## 2025-02-16 PROCEDURE — 25010000002 MAGNESIUM SULFATE 2 GM/50ML SOLUTION

## 2025-02-16 PROCEDURE — 93306 TTE W/DOPPLER COMPLETE: CPT

## 2025-02-16 PROCEDURE — 82948 REAGENT STRIP/BLOOD GLUCOSE: CPT

## 2025-02-16 PROCEDURE — 93306 TTE W/DOPPLER COMPLETE: CPT | Performed by: HOSPITALIST

## 2025-02-16 PROCEDURE — 83036 HEMOGLOBIN GLYCOSYLATED A1C: CPT

## 2025-02-16 PROCEDURE — 85027 COMPLETE CBC AUTOMATED: CPT

## 2025-02-16 PROCEDURE — 80048 BASIC METABOLIC PNL TOTAL CA: CPT

## 2025-02-16 PROCEDURE — 96361 HYDRATE IV INFUSION ADD-ON: CPT

## 2025-02-16 RX ORDER — DICLOFENAC SODIUM 75 MG/1
75 TABLET, DELAYED RELEASE ORAL 2 TIMES DAILY
COMMUNITY

## 2025-02-16 RX ORDER — MELATONIN 10 MG
1 CAPSULE ORAL NIGHTLY PRN
Qty: 30 CAPSULE | Refills: 0 | Status: SHIPPED | OUTPATIENT
Start: 2025-02-16

## 2025-02-16 RX ORDER — ERGOCALCIFEROL 1.25 MG/1
1 CAPSULE, LIQUID FILLED ORAL WEEKLY
COMMUNITY

## 2025-02-16 RX ORDER — HYDROXYZINE PAMOATE 25 MG/1
25 CAPSULE ORAL 3 TIMES DAILY PRN
COMMUNITY

## 2025-02-16 RX ORDER — GABAPENTIN 300 MG/1
300 CAPSULE ORAL 3 TIMES DAILY
COMMUNITY

## 2025-02-16 RX ORDER — ARIPIPRAZOLE 10 MG/1
10 TABLET ORAL DAILY
COMMUNITY

## 2025-02-16 RX ORDER — SPIRONOLACTONE 50 MG/1
50 TABLET, FILM COATED ORAL 2 TIMES DAILY
COMMUNITY

## 2025-02-16 RX ORDER — CARVEDILOL 25 MG/1
25 TABLET ORAL 2 TIMES DAILY WITH MEALS
COMMUNITY

## 2025-02-16 RX ORDER — MAGNESIUM SULFATE HEPTAHYDRATE 40 MG/ML
2 INJECTION, SOLUTION INTRAVENOUS ONCE
Status: COMPLETED | OUTPATIENT
Start: 2025-02-16 | End: 2025-02-16

## 2025-02-16 RX ORDER — PROPRANOLOL HYDROCHLORIDE 120 MG/1
120 CAPSULE, EXTENDED RELEASE ORAL DAILY
COMMUNITY
End: 2025-02-16 | Stop reason: HOSPADM

## 2025-02-16 RX ORDER — INSULIN GLARGINE 100 [IU]/ML
75 INJECTION, SOLUTION SUBCUTANEOUS DAILY
COMMUNITY

## 2025-02-16 RX ORDER — AMLODIPINE BESYLATE 10 MG/1
10 TABLET ORAL DAILY
COMMUNITY

## 2025-02-16 RX ORDER — ONDANSETRON 4 MG/1
4 TABLET, ORALLY DISINTEGRATING ORAL EVERY 6 HOURS PRN
Qty: 30 TABLET | Refills: 0 | Status: SHIPPED | OUTPATIENT
Start: 2025-02-16

## 2025-02-16 RX ADMIN — MAGNESIUM SULFATE HEPTAHYDRATE 2 G: 40 INJECTION, SOLUTION INTRAVENOUS at 11:31

## 2025-02-16 RX ADMIN — ARIPIPRAZOLE 5 MG: 5 TABLET ORAL at 08:59

## 2025-02-16 RX ADMIN — PANTOPRAZOLE SODIUM 40 MG: 40 TABLET, DELAYED RELEASE ORAL at 05:35

## 2025-02-16 RX ADMIN — SODIUM CHLORIDE 100 ML/HR: 9 INJECTION, SOLUTION INTRAVENOUS at 05:40

## 2025-02-16 RX ADMIN — SERTRALINE HYDROCHLORIDE 100 MG: 100 TABLET ORAL at 08:59

## 2025-02-16 RX ADMIN — PERFLUTREN 10 ML: 6.52 INJECTION, SUSPENSION INTRAVENOUS at 12:51

## 2025-02-16 NOTE — PLAN OF CARE
Goal Outcome Evaluation:  Plan of Care Reviewed With: patient        Progress: improving  Outcome Evaluation: PT. ADMITTED FROM ED. DIAGNOSIS - FRANK.   ALERT AND ORIENTED. PT. TO BE UP WITH ASSISTANCE. C/O WEAKNESS. IV FLUIDS PER ORDER. ROOM AIR. CONT. TO MONITOR LAB VALUES. TELEMETRY ON WITH NSR NOTED PER MONITOR TECH. . SCD'S ON. GI, DIABETIC DIET. ACCUCHECKS PER ORDER. 2D ECHO ORDERED BUT HAS NOT BEEN DONE YET.  NO DISTRESS NOTED.

## 2025-02-16 NOTE — PAYOR COMM NOTE
"ADMIT INPT 2-15-25    Lynn Magana (49 y.o. Female)       Date of Birth   1975    Social Security Number       Address   [address] [place], [place] [address]    Home Phone   [phone]    MRN   [id number]       [facility]    Marital Status                               Admission Date   2/15/25    Admission Type   Emergency    Admitting Provider   Casper Gao MD    Attending Provider   Casper Gao MD    Department, Room/Bed   [facility] 3C, 391/1       Discharge Date       Discharge Disposition       Discharge Destination                                 Attending Provider: Casper Gao MD    Allergies: Oxycodone-acetaminophen    Isolation: None   Infection: None   Code Status: CPR    Ht: 188 cm (74\")   Wt: 79.4 kg (175 lb)    Admission Cmt: None   Principal Problem: Stage 1 acute kidney injury [N17.9]                   Active Insurance as of 2/15/2025       Primary Coverage       Payor Plan Insurance Group Employer/Plan Group    [employer] [place] [employer] [place]ICA[place]        Payor Plan Address Payor Plan Phone Number Payor Plan Fax Number Effective Dates    [address] [address] [phone]  3/14/2017 - None Entered    [facility] [id number]         Subscriber Name Subscriber Birth Date Member ID       LYNN MAGANA 1975 [id number]                     Emergency Contacts        (Rel.) Home Phone Work Phone Mobile Phone    Evens Cifuentes (Spouse) -- -- [phone]                 History & Physical        , BRIANA Thompson at 02/15/25 1353       Attestation signed by Casper Gao MD at 02/15/25 3177    I have reviewed this documentation and agree.  I performed a substantive part of the MDM during the patient’s E/M visit. I personally evaluated   and examined the patient. I personally made or approved the documented management plan and acknowledge its risk   of complications.   (Independent " Interpretation) My (EKG/X-Ray/US/CT) interpretation as outlined below  (Discussion) Management/test interpretation discussed with patient and spouse.  Discussed with NP and Ria  I personally seen and examined patient in room 391  She is accompanied by her spouse  Soon as I entered the room, I smelled marijuana despite wearing facial mask.  Her main concern is she has no appetite  They have questions about her kidneys.  Patient has not had any appetite for the past couple of weeks.  She has no nausea vomiting or diarrhea endorsed to me.  She has no other symptoms.  I informed them of diagnostic findings which suggest dehydration as evidenced by elevated creatinine, hypercalcemia, hyponatremia and hypochloremia-this might be related to poor oral intake.  I noted that she is on spironolactone also from home medication.     Her TSH is low with normal free T4 and she is on liothyronine and levothyroxine for history of thyroid surgery.  Will hold off on hormone replacement given very low TSH     Her LFT is mildly elevated.  She has not endorse any alcohol intake.  Since hepatitis can cause decreased appetite, I will check viral hepatitis panel.     She is diabetic.  Her sugars 126.  She had a venous blood gas which when corrected probably close to normal pH     Goals of care discussed with patient and NP Deborah.  Patient and spouse in agreement with plan of care.  All questions were answered to the best of my abilities.   Electronically signed by Casper Gao MD, 2/15/2025, 13:51 CST.                                      [facility] Medicine Services  HISTORY AND PHYSICAL    Date of Admission: 2/15/2025  Primary Care Physician: Darryl Andrews,     Subjective   Primary Historian:    Chief Complaint: Nausea and extreme lethargy    History of Present Illness  Lynn Magana is a 49-year-old female with a past medical history of arthritis, carotid artery stenosis,  degenerative disc disease, diabetes mellitus type 1, GERD, Graves' disease, hyperlipidemia, hypertension, hypothyroidism, seizures and thyromegaly.  Patient presented to [facility] emergency department on 2/15/2025 with complaints of fatigue, shortness of breath and nausea over the past 2 weeks.  States it is hard to describe she said it just hits her and she feels like staying in bed for the last several weeks.  Shortness of breath is mainly exertional and she states it happens without warning.  The workup revealed a VBG pH of 7.3, pO2 of 21.6, initial troponin of 31, subsequent of 27, , CL 92, CO2 19 0, anion gap of 20, BUN 21, CR 1.59, , Ca 10.6, TSH 0.034, CBC unremarkable, urinalysis unremarkable, respiratory PCR negative.  Patient does use chronic marijuana and we discussed with patient that this is contributing significantly to her hyperemesis.  In addition her thyroid function has not been assessed in quite some time and so patient's Cytomel and Synthroid will be placed on hold until can be reviewed by Dr. Darryl Andrews.  Patient will be hydrated overnight, nausea controlled with Zofran and placed on a GI low irritant diet.  All patient and spouse's questions were answered to the best my ability and they are in agreement for admission and treatment.        Review of Systems   Constitutional:  Positive for fatigue.   Respiratory:  Negative for shortness of breath.    Cardiovascular:  Negative for chest pain.   Gastrointestinal:  Positive for nausea.      Otherwise complete ROS reviewed and negative except as mentioned in the HPI.    Past Medical History:   Past Medical History:   Diagnosis Date    Arthritis     Carotid artery stenosis     Degenerative disc disease, cervical     Depression     Diabetes mellitus     type 1    Disease of thyroid gland     GERD (gastroesophageal reflux disease)     Graves disease     Heart palpitations     Hyperlipidemia     Hypertension     Hypothyroidism     Seizure      Thyromegaly      Past Surgical History:  Past Surgical History:   Procedure Laterality Date     SECTION      CHOLECYSTECTOMY      COLONOSCOPY  2015    Normal exam Pedro Luis Hernandezo     COLONOSCOPY  2015    Normal exam    CYST REMOVAL      ENDOSCOPY N/A 2018    Normal exam    HYSTERECTOMY      THYROIDECTOMY Bilateral 2018    Procedure: total thyroidectomy;  Surgeon: Vitaly Givens MD;  Location: [facility];  Service: ENT     Social History:  reports that she has never smoked. She has never used smokeless tobacco. She reports current alcohol use of about 6.0 standard drinks of alcohol per week. She reports current drug use. Drug: Marijuana.    Family History: family history includes Breast cancer in her maternal aunt and sister; Colon cancer in her paternal uncle; Coronary artery disease in her brother; Diabetes in her brother, father, mother, and sister; Seizures in her sister; Stroke in her father, mother, and sister.       Allergies:  Allergies   Allergen Reactions    Oxycodone-Acetaminophen Swelling     Other reaction(s): Throat swelling       Medications:  Prior to Admission medications    Medication Sig Start Date End Date Taking? Authorizing Provider   ADMELOG SOLOSTAR 100 UNIT/ML solution pen-injector  3/12/19   Luciano Hayes MD   amLODIPine (NORVASC) 10 MG tablet Take 0.5 tablets by mouth Daily. 24   Devyn Moreno MD   ARIPiprazole (ABILIFY) 10 MG tablet Take 0.5 tablets by mouth Daily. 24   Devyn Moreno MD   aspirin 81 MG EC tablet Take 1 tablet by mouth Daily.    Luciano Hayes MD   carvedilol (COREG) 12.5 MG tablet Take 1 tablet by mouth Every 12 (Twelve) Hours. 24   Devyn Moreno MD   empagliflozin (JARDIANCE) 25 MG tablet tablet Take 1 tablet by mouth Daily. 6/10/22   Luciano Hayes MD   Evolocumab (REPATHA) solution auto-injector SureClick injection Inject 1 mL under the skin into the appropriate area as directed Every 14  "(Fourteen) Days.    Luciano Hayes MD   gabapentin (NEURONTIN) 300 MG capsule Take 1 capsule by mouth Every Night. 12/4/24   Devyn Moreno MD   influenza virus vacc split PF 0.5 ML suspension prefilled syringe Inject 0.5 mL into the appropriate muscle as directed by prescriber During Hospitalization (If patient wants it) for up to 1 dose. 12/4/24   Devyn Moreno MD   Lancets (OneTouch Delica Plus [id number]) misc  11/19/20   Luciano Hayes MD   levothyroxine (SYNTHROID, LEVOTHROID) 200 MCG tablet Take 1 tablet by mouth Every Morning. 5/31/22   Luciano Hayes MD   liothyronine (CYTOMEL) 25 MCG tablet Take 1 tablet by mouth 2 (Two) Times a Day. 12/1/20   Luciano Hayes MD   metFORMIN ER (GLUCOPHAGE-XR) 500 MG 24 hr tablet Take 1 tablet by mouth 2 (Two) Times a Day. 6/9/22   Luciano Hayes MD   omeprazole (priLOSEC) 20 MG capsule Take 1 capsule by mouth Daily. 5/5/20   Zora Stallworth APRN   OneTouch Ultra test strip  11/19/20   Luciano Hayes MD   rosuvastatin (CRESTOR) 10 MG tablet Take 1 tablet by mouth Daily.    Luciano Hayes MD   sertraline (ZOLOFT) 100 MG tablet Take 1 tablet by mouth 2 (Two) Times a Day.    Luciano Hayes MD   spironolactone (ALDACTONE) 50 MG tablet Take 0.5 tablets by mouth Daily. 12/4/24   Devyn Moreno MD     I have utilized all available immediate resources to obtain, update, or review the patient's current medications (including all prescriptions, over-the-counter products, herbals, cannabis/cannabidiol products, and vitamin/mineral/dietary (nutritional) supplements).    Objective     Vital Signs: /68 (BP Location: Right arm, Patient Position: Lying)   Pulse 81   Temp 98.3 °F (36.8 °C) (Oral)   Resp 16   Ht 188 cm (74\")   Wt 79.4 kg (175 lb)   SpO2 97%   BMI 22.47 kg/m²   Physical Exam  Constitutional:       General: She is not in acute distress.     Appearance: Normal appearance. She is obese. She is not " ill-appearing or toxic-appearing.   HENT:      Head: Normocephalic and atraumatic.      Right Ear: Tympanic membrane normal.      Left Ear: Tympanic membrane normal.      Nose: Nose normal.      Mouth/Throat:      Mouth: Mucous membranes are moist.      Pharynx: Oropharynx is clear.   Eyes:      Pupils: Pupils are equal, round, and reactive to light.   Cardiovascular:      Rate and Rhythm: Normal rate and regular rhythm.      Pulses: Normal pulses.      Heart sounds: Normal heart sounds.   Pulmonary:      Effort: Pulmonary effort is normal.      Breath sounds: Normal breath sounds.   Abdominal:      General: Abdomen is flat. Bowel sounds are normal.      Palpations: Abdomen is soft.   Genitourinary:     Comments: Per bathroom privileges  Musculoskeletal:         General: Normal range of motion.   Skin:     General: Skin is warm.      Capillary Refill: Capillary refill takes less than 2 seconds.      Coloration: Skin is pale.   Neurological:      General: No focal deficit present.      Mental Status: She is alert and oriented to person, place, and time.   Psychiatric:         Mood and Affect: Mood normal.         Behavior: Behavior normal.         Thought Content: Thought content normal.         Judgment: Judgment normal.        Results Reviewed:  Lab Results (last 24 hours)       Procedure Component Value Units Date/Time    Blood Gas, Venous With Co-Ox [[id number]]  (Abnormal) Collected: 02/15/25 1212    Specimen: Venous Blood Updated: 02/15/25 1211     Site OTHER     pH, Venous 7.308 pH Units      Comment: 84 Value below reference range        pCO2, Venous 45.3 mm Hg      pO2, Venous 21.6 mm Hg      Comment: 84 Value below reference range        HCO3, Venous 22.7 mmol/L      Base Excess, Venous -3.8 mmol/L      Comment: 84 Value below reference range        O2 Saturation, Venous 33.0 %      Comment: 84 Value below reference range        Hemoglobin, Blood Gas 13.8 g/dL      Oxyhemoglobin Venous 32.6 %      Comment:  84 Value below reference range        Methemoglobin Venous 0.3 %      Carboxyhemoglobin Venous 1.0 %      Temperature 37.0     Sodium, Venous 138 mmol/L      Potassium, Venous 4.4 mmol/L      Barometric Pressure for Blood Gas 749 mmHg      Modality Room Air     Ventilator Mode NA     Collected by [id number]     Comment: Meter: [id number]     :  Janell Gilmore, EMMA       T4, Free [[id number]]  (Normal) Collected: 02/15/25 1021    Specimen: Blood Updated: 02/15/25 1200     Free T4 1.65 ng/dL     High Sensitivity Troponin T 1Hr [[id number]]  (Abnormal) Collected: 02/15/25 1119    Specimen: Blood from Arm, Left Updated: 02/15/25 1145     HS Troponin T 27 ng/L      Troponin T Numeric Delta -4 ng/L      Troponin T % Delta -13            TSH Rfx On Abnormal To Free T4 [[id number]]  (Abnormal) Collected: 02/15/25 1021    Specimen: Blood Updated: 02/15/25 1103     TSH 0.034 uIU/mL     Comprehensive Metabolic Panel [[id number]]  (Abnormal) Collected: 02/15/25 1021    Specimen: Blood Updated: 02/15/25 1058     Glucose 126 mg/dL      BUN 21 mg/dL      Creatinine 1.59 mg/dL      Sodium 131 mmol/L      Potassium 4.6 mmol/L      Chloride 92 mmol/L      CO2 19.0 mmol/L      Calcium 10.6 mg/dL      Total Protein 7.9 g/dL      Albumin 4.6 g/dL      ALT (SGPT) 67 U/L      AST (SGOT) 35 U/L      Alkaline Phosphatase 61 U/L      Total Bilirubin 0.5 mg/dL      Globulin 3.3 gm/dL      A/G Ratio 1.4 g/dL      BUN/Creatinine Ratio 13.2     Anion Gap 20.0 mmol/L      eGFR 39.4 mL/min/1.73             Magnesium [[id number]]  (Normal) Collected: 02/15/25 1021    Specimen: Blood Updated: 02/15/25 1057     Magnesium 1.8 mg/dL     Lactic Acid, Plasma [[id number]]  (Normal) Collected: 02/15/25 1021    Specimen: Blood Updated: 02/15/25 1056     Lactate 1.6 mmol/L     BNP [[id number]]  (Normal) Collected: 02/15/25 1021    Specimen: Blood Updated: 02/15/25 1056     proBNP 90.8 pg/mL             High Sensitivity Troponin T [[id number]]   (Abnormal) Collected: 02/15/25 1021    Specimen: Blood Updated: 02/15/25 1055     HS Troponin T 31 ng/L             D-dimer, Quantitative [[id number]]  (Normal) Collected: 02/15/25 1021    Specimen: Blood Updated: 02/15/25 1048     D-Dimer, Quantitative <0.27 MCGFEU/mL             Urinalysis With Culture If Indicated - Urine, Clean Catch [[id number]]  (Abnormal) Collected: 02/15/25 1010    Specimen: Urine, Clean Catch Updated: 02/15/25 1039     Color, UA Dark Yellow     Appearance, UA Clear     pH, UA <=5.0     Specific Gravity, UA 1.028     Glucose, UA >=1000 mg/dL (3+)     Ketones, UA 80 mg/dL (3+)     Bilirubin, UA Negative     Blood, UA Negative     Protein, UA Negative     Leuk Esterase, UA Negative     Nitrite, UA Negative     Urobilinogen, UA 1.0 E.U./dL            CBC & Differential [[id number]]  (Abnormal) Collected: 02/15/25 1021    Specimen: Blood Updated: 02/15/25 1037            CBC Auto Differential [[id number]]  (Abnormal) Collected: 02/15/25 1021    Specimen: Blood Updated: 02/15/25 1037     WBC 4.75 10*3/mm3      RBC 4.95 10*6/mm3      Hemoglobin 13.5 g/dL      Hematocrit 39.9 %      MCV 80.6 fL      MCH 27.3 pg      MCHC 33.8 g/dL      RDW 12.8 %      RDW-SD 37.1 fl      MPV 10.4 fL      Platelets 200 10*3/mm3      Neutrophil % 54.3 %      Lymphocyte % 39.4 %      Monocyte % 5.5 %      Eosinophil % 0.2 %      Basophil % 0.4 %      Immature Grans % 0.2 %      Neutrophils, Absolute 2.58 10*3/mm3      Lymphocytes, Absolute 1.87 10*3/mm3      Monocytes, Absolute 0.26 10*3/mm3      Eosinophils, Absolute 0.01 10*3/mm3      Basophils, Absolute 0.02 10*3/mm3      Immature Grans, Absolute 0.01 10*3/mm3      nRBC 0.0 /100 WBC      Comment: Auto resulted       Respiratory Panel PCR w/COVID-19(SARS-CoV-2) JAMIL/ESME/GRACE/PAD/COR/BRENDON In-House, NP Swab in UTM/VTM, 2 HR TAT - Swab, Nasopharynx [[id number]]  (Normal) Collected: 02/15/25 0856    Specimen: Swab from Nasopharynx Updated: 02/15/25 0951     ADENOVIRUS, PCR Not  Detected     Coronavirus 229E Not Detected     Coronavirus HKU1 Not Detected     Coronavirus NL63 Not Detected     Coronavirus OC43 Not Detected     COVID19 Not Detected     Human Metapneumovirus Not Detected     Human Rhinovirus/Enterovirus Not Detected     Influenza A PCR Not Detected     Influenza B PCR Not Detected     Parainfluenza Virus 1 Not Detected     Parainfluenza Virus 2 Not Detected     Parainfluenza Virus 3 Not Detected     Parainfluenza Virus 4 Not Detected     RSV, PCR Not Detected     Bordetella pertussis pcr Not Detected     Bordetella parapertussis PCR Not Detected     Chlamydophila pneumoniae PCR Not Detected     Mycoplasma pneumo by PCR Not Detected                  Imaging Results (Last 24 Hours)       Procedure Component Value Units Date/Time    XR Abdomen Flat & Upright [[id number]] Collected: 02/15/25 0937     Updated: 02/15/25 0945    Narrative:      EXAMINATION: XR ABDOMEN FLAT AND UPRIGHT- 2/15/2025 9:37 AM     HISTORY: constipation.     REPORT: Upright and supine views of the abdomen were obtained.     COMPARISON: [facility] 7/8/2017.     Cholecystectomy clips are present. There is a surgical clip in the mid  pelvis. Moderate stool volume is seen throughout the colon and gas is  seen throughout numerous small bowel loops, without significant  distention. This is nonspecific. No evidence of bowel obstruction,  though mild adynamic ileus is not excluded. The lung bases are clear. No  free air is identified. The osseous structures show no acute findings.  Degenerative disc disease is present L4-5 and L5-S1.       Impression:      1. Findings suggestive of mild adynamic ileus with associated  constipation. No evidence of bowel obstruction or free air.     This report was signed and finalized on 2/15/2025 9:38 AM by Dr. Triston Escobedo MD.       XR Chest 1 View [[id number]] Collected: 02/15/25 0930     Updated: 02/15/25 0933    Narrative:      EXAMINATION: XR CHEST 1 VW- 2/15/2025 9:30 AM      HISTORY: dyspnea.     COMPARISON: Chest x-ray 9/17/2024.     REPORT:  A frontal view of the chest was obtained. The lungs are clear and  normally expanded. The cardiac and mediastinal silhouettes are within  normal limits. The visualized bony thorax and upper abdomen are  unremarkable.                                                                                                                                                       Impression:             No acute abnormality.                                                                         This report was signed and finalized on 2/15/2025 9:30 AM by Dr. Triston Escobedo MD.             Assessment / Plan   Assessment:   Active Hospital Problems    Diagnosis     **Stage 1 acute kidney injury     Thyromegaly     Hyperthyroidism     Controlled type 2 diabetes mellitus without complication, with long-term current use of insulin     Hypertension     Graves' disease        Treatment Plan  The patient will be admitted to Dr. Gao's service here at [facility].     1.  Stage I acute kidney injury-creatinine on admission was 1.59, baseline of 0.95, 1.5 times baseline.  Initiate normal saline 100 mL/HR repeat BMP in the AM.  Hold Aldactone and Jardiance.    2.  Thyroid megaly, hyperthyroidism, Graves' disease-currently patient is on Synthroid and Cytomel TSH of 0.034 Free T41.65, free T3 pending.  Hold both medications.    3.  Type 2 diabetes mellitus-A1c pending, Accu-Cheks before meals and at bedtime with low-dose sliding scale insulin.    4.  Hypertension-every 4 vital signs continue home Norvasc and Coreg    5.  Resumed and restarted home medications as appropriate.    VTE prophylaxis with SCDs  Labs in a.m.    Medical Decision Making  1 acute, high complexity, unchanged  3 chronic, moderate complexity, worsening  2 chronic, moderate complexity, stable    Number and Complexity of problems: 6  Differential Diagnosis: None    Conditions and  Status        Condition is unchanged.     [facility] Data  External documents reviewed: Review of last PCP office note  Cardiac tracing (EKG, telemetry) interpretation:/15/2025 EKG personal review normal sinus rhythm ventricular rate of 87, atrial rate of 87, QTc 430  Radiology interpretation: None  Labs reviewed: 2/15/2025 respiratory PCR, urinalysis, CBC, CMP, magnesium, lactic acid, D-dimer, BNP, TSH, T4 reviewed, repeat labs in a.m.  Any tests that were considered but not ordered: None     Decision rules/scores evaluated (example [id number], Wells, etc): None     Discussed with: Dr. Gao, patient and her  Evens     Care Planning  Shared decision making: Dr. Gao, patient and her  Evens  Code status and discussions: Full code per patient    Disposition  Social Determinants of Health that impact treatment or disposition: None determined at this time  Estimated length of stay is 1-2 days.     I confirmed that the patient's advanced care plan is present, code status is documented, and a surrogate decision maker is listed in the patient's medical record.     The patient's surrogate decision maker is her  Evens.     The patient was seen and examined by me on 2/15/2025 at 1353.    Electronically signed by BRIANA Rodrigues, 02/15/25, 16:02 CST.               Electronically signed by Casper Gao MD at 02/15/25 1637          Emergency Department Notes        Ria Camp, RN at 02/15/25 1404          Nursing report ED to floor  Lynn Magana  49 y.o.  female    HPI:   Chief Complaint   Patient presents with    Shortness of Breath    Weakness - Generalized       Admitting doctor:   Casper Gao MD    Consulting provider(s):  Consults       No orders found from 1/17/2025 to 2/16/2025.             Admitting diagnosis:   The primary encounter diagnosis was Acute kidney injury. Diagnoses of Ketosis and Metabolic acidosis were also pertinent to this  "visit.    Code status:   Current Code Status       Date Active Code Status Order ID Comments User Context       Prior            Allergies:   Oxycodone-acetaminophen    Intake and Output    Intake/Output Summary (Last 24 hours) at 2/15/2025 1408  Last data filed at 2/15/2025 1320  Gross per 24 hour   Intake 1000 ml   Output --   Net 1000 ml       Weight:       02/15/25  0833   Weight: 79.4 kg (175 lb)       Most recent vitals:   Vitals:    02/15/25 0833 02/15/25 1102 02/15/25 1300 02/15/25 1322   BP: 121/86 111/75 120/88    BP Location: Right arm      Patient Position: Sitting      Pulse: 94 87 90 83   Resp: 16      Temp: 97.8 °F (36.6 °C)      TempSrc: Oral      SpO2: 100% 98%  96%   Weight: 79.4 kg (175 lb)      Height: 188 cm (74\")        Oxygen Therapy: .    Active LDAs/IV Access:   Lines, Drains & Airways       Active LDAs       Name Placement date Placement time Site Days    Peripheral IV 12/03/24 1333 Left Antecubital 12/03/24  1333  Antecubital  74    Peripheral IV 02/15/25 1021 Right Antecubital 02/15/25  1021  Antecubital  less than 1                    Labs (abnormal labs have a star):   Labs Reviewed   COMPREHENSIVE METABOLIC PANEL - Abnormal; Notable for the following components:       Result Value    Glucose 126 (*)     BUN 21 (*)     Creatinine 1.59 (*)     Sodium 131 (*)     Chloride 92 (*)     CO2 19.0 (*)     Calcium 10.6 (*)     ALT (SGPT) 67 (*)     AST (SGOT) 35 (*)     Anion Gap 20.0 (*)     eGFR 39.4 (*)     All other components within normal limits    Narrative:     GFR Categories in Chronic Kidney Disease (CKD)      GFR Category          GFR (mL/min/1.73)    Interpretation  G1                     90 or greater         Normal or high (1)  G2                      60-89                Mild decrease (1)  G3a                   45-59                Mild to moderate decrease  G3b                   30-44                Moderate to severe decrease  G4                    15-29                Severe " decrease  G5                    14 or less           Kidney failure          (1)In the absence of evidence of kidney disease, neither GFR category G1 or G2 fulfill the criteria for CKD.    eGFR calculation 2021 CKD-EPI creatinine equation, which does not include race as a factor   TROPONIN - Abnormal; Notable for the following components:    HS Troponin T 31 (*)     All other components within normal limits    Narrative:     High Sensitive Troponin T Reference Range:  <14.0 ng/L- Negative Female for AMI  <22.0 ng/L- Negative Male for AMI  >=14 - Abnormal Female indicating possible myocardial injury.  >=22 - Abnormal Male indicating possible myocardial injury.   Clinicians would have to utilize clinical acumen, EKG, Troponin, and serial changes to determine if it is an Acute Myocardial Infarction or myocardial injury due to an underlying chronic condition.        CBC WITH AUTO DIFFERENTIAL - Abnormal; Notable for the following components:    Eosinophil % 0.2 (*)     All other components within normal limits   URINALYSIS W/ CULTURE IF INDICATED - Abnormal; Notable for the following components:    Color, UA Dark Yellow (*)     Glucose, UA >=1000 mg/dL (3+) (*)     Ketones, UA 80 mg/dL (3+) (*)     All other components within normal limits    Narrative:     In absence of clinical symptoms, the presence of pyuria, bacteria, and/or nitrites on the urinalysis result does not correlate with infection.  Urine microscopic not indicated.   TSH RFX ON ABNORMAL TO FREE T4 - Abnormal; Notable for the following components:    TSH 0.034 (*)     All other components within normal limits   HIGH SENSITIVITIY TROPONIN T 1HR - Abnormal; Notable for the following components:    HS Troponin T 27 (*)     All other components within normal limits    Narrative:     High Sensitive Troponin T Reference Range:  <14.0 ng/L- Negative Female for AMI  <22.0 ng/L- Negative Male for AMI  >=14 - Abnormal Female indicating possible myocardial  injury.  >=22 - Abnormal Male indicating possible myocardial injury.   Clinicians would have to utilize clinical acumen, EKG, Troponin, and serial changes to determine if it is an Acute Myocardial Infarction or myocardial injury due to an underlying chronic condition.        BLOOD GAS, VENOUS W/CO-OXIMETRY - Abnormal; Notable for the following components:    pH, Venous 7.308 (*)     pO2, Venous 21.6 (*)     Base Excess, Venous -3.8 (*)     O2 Saturation, Venous 33.0 (*)     Oxyhemoglobin Venous 32.6 (*)     All other components within normal limits   RESPIRATORY PANEL PCR W/ COVID-19 (SARS-COV-2), NP SWAB IN UTM/VTP, 2 HR TAT - Normal    Narrative:     In the setting of a positive respiratory panel with a viral infection PLUS a negative procalcitonin without other underlying concern for bacterial infection, consider observing off antibiotics or discontinuation of antibiotics and continue supportive care. If the respiratory panel is positive for atypical bacterial infection (Bordetella pertussis, Chlamydophila pneumoniae, or Mycoplasma pneumoniae), consider antibiotic de-escalation to target atypical bacterial infection.   MAGNESIUM - Normal   LACTIC ACID, PLASMA - Normal   D-DIMER, QUANTITATIVE - Normal    Narrative:     According to the assay 's published package insert, a normal (<0.50 MCGFEU/mL) D-dimer result in conjunction with a non-high clinical probability assessment, excludes deep vein thrombosis (DVT) and pulmonary embolism (PE) with high sensitivity.    D-dimer values increase with age and this can make VTE exclusion of an older population difficult. To address this, the [employer], based on best available evidence and recent guidelines, recommends that clinicians use age-adjusted D-dimer thresholds in patients greater than 50 years of age with: a) a low probability of PE who do not meet all Pulmonary Embolism Rule Out Criteria, or b) in those with intermediate probability  "of PE.   The formula for an age-adjusted D-dimer cut-off is \"age/100\".  For example, a 60 year old patient would have an age-adjusted cut-off of 0.60 MCGFEU/mL and an 80 year old 0.80 MCGFEU/mL.   BNP (IN-HOUSE) - Normal    Narrative:     This assay is used as an aid in the diagnosis of individuals suspected of having heart failure. It can be used as an aid in the diagnosis of acute decompensated heart failure (ADHF) in patients presenting with signs and symptoms of ADHF to the emergency department (ED). In addition, NT-proBNP of <300 pg/mL indicates ADHF is not likely.    Age Range Result Interpretation  NT-proBNP Concentration (pg/mL:      <50             Positive            >450                   Gray                 300-450                    Negative             <300    50-75           Positive            >900                  Gray                300-900                  Negative            <300      >75             Positive            >1800                  Gray                300-1800                  Negative            <300   T4, FREE - Normal   RAINBOW DRAW    Narrative:     The following orders were created for panel order [place] Draw.  Procedure                               Abnormality         Status                     ---------                               -----------         ------                     Green Top (Gel)[[id number]]                                  Final result               Lavender Top[[id number]]                                     Final result               Red Top[[id number]]                                          Final result               Light Blue Top[[id number]]                                   Final result                 Please view results for these tests on the individual orders.   BLOOD GAS, VENOUS W/CO-OXIMETRY   T3, FREE   GREEN TOP   LAVENDER TOP   RED TOP   LIGHT BLUE TOP   CBC AND DIFFERENTIAL    Narrative:     The following orders were created for panel order CBC & " "Differential.  Procedure                               Abnormality         Status                     ---------                               -----------         ------                     CBC Auto Differential[[id number]]        Abnormal            Final result                 Please view results for these tests on the individual orders.       Meds given in ED:   Medications   sodium chloride 0.9 % flush 10 mL (has no administration in time range)   ondansetron (ZOFRAN) injection 4 mg (4 mg Intravenous Given 2/15/25 1023)   sodium chloride 0.9 % bolus 1,000 mL (0 mL Intravenous Stopped 2/15/25 1320)           NIH Stroke Scale:       Isolation/Infection(s):  No active isolations   No active infections     COVID Testing  Collected .  Resulted .    Nursing report ED to floor:  Mental status: .axox4  Ambulatory status: .up adlib  Precautions: .none    ED nurse phone extentsion- ..   [id number]  angela Oquendo    Electronically signed by Ria Camp RN at 02/15/25 3090       Linus Rodriguez MD at 02/15/25 1904          Subjective   History of Present Illness  49-year-old female patient presents to the ED with complaints of fatigue, shortness of breath, and nausea for the past 2 weeks.  She states \"it just hit me\", \"it is weird to me\", \"I have never felt like that before\". She states that she has been \"staying in the bed\" for the majority of the past 2 weeks due to unusual fatigue.  She does not work outside of the home, and her child is age 17.  The shortness of breath is with rest, but worsens with activity.  She states \"I cannot do anything without being out of breath\".      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     She denies a history of known pulmonary diseases.  No inhaler use. No history of DVT or PE. No peripheral edema, leg " "swelling, or leg pain.  No chest pain, palpitations, pain with inspiration, or cough. No trauma. She denies fever, but reports chills, stating \"I just stay so cold\".  She denies any recent infections.      She reports nausea, but denies vomiting.  She keeps stating \"I cannot get food to go down\".  She denies any sore throat or pain with swallowing.  She is equating this to the nausea; she cannot eat because she is so nauseated.  She denies abdominal pain or distention. No diarrhea. She endorses constipation, with last bowel movement one week ago.  She denies any black or bloody stools. She denies dysuria, hematuria, and difficulty urinating.  She reports decreased urine output, but she has also had decreased oral intake.  She endorses back pain, but states that she has a history of spinal arthritis and it is at baseline.  She denies any new medications or missed medication doses.       ED Triage Vitals   Temp Heart Rate Resp BP SpO2   02/15/25 0833 02/15/25 0833 02/15/25 0833 02/15/25 0833 02/15/25 0833   97.8 °F (36.6 °C) 94 16 121/86 100 %      Temp src Heart Rate Source Patient Position BP Location FiO2 (%)   02/15/25 0832 02/15/25 0833 02/15/25 0833 02/15/25 0833 --   Oral Monitor Sitting Right arm        Review of Systems   Constitutional:  Positive for appetite change, chills and fatigue.   Respiratory:  Positive for shortness of breath.    Gastrointestinal:  Positive for constipation and diarrhea.   Genitourinary:  Positive for decreased urine volume.   Musculoskeletal:  Positive for back pain (at baseline).       Past Medical History:   Diagnosis Date    Arthritis     Carotid artery stenosis     Degenerative disc disease, cervical     Depression     Diabetes mellitus     type 1    Disease of thyroid gland     GERD (gastroesophageal reflux disease)     Graves disease     Heart palpitations     Hyperlipidemia     Hypertension     Hypothyroidism     Seizure     Thyromegaly        Allergies   Allergen Reactions "    Oxycodone-Acetaminophen Swelling     Other reaction(s): Throat swelling       Past Surgical History:   Procedure Laterality Date     SECTION      CHOLECYSTECTOMY      COLONOSCOPY  2015    Normal exam Dr. Morgan     COLONOSCOPY  2015    Normal exam    CYST REMOVAL      ENDOSCOPY N/A 2018    Normal exam    HYSTERECTOMY      THYROIDECTOMY Bilateral 2018    Procedure: total thyroidectomy;  Surgeon: Vitaly Givens MD;  Location: [facility];  Service: ENT       Family History   Problem Relation Age of Onset    Stroke Mother     Diabetes Mother     Stroke Father     Diabetes Father     Breast cancer Sister     Diabetes Sister     Stroke Sister     Seizures Sister     Coronary artery disease Brother     Diabetes Brother     Breast cancer Maternal Aunt     Colon cancer Paternal Uncle     Colon polyps Neg Hx        Social History     Socioeconomic History    Marital status:      Spouse name: Evens    Number of children: 1    Years of education: 12   Tobacco Use    Smoking status: Never    Smokeless tobacco: Never   Vaping Use    Vaping status: Never Used   Substance and Sexual Activity    Alcohol use: Yes    Drug use: Yes     Types: Marijuana    Sexual activity: Not Currently     Partners: Male           Objective   Physical Exam  Vitals and nursing note reviewed.   Constitutional:       General: She is awake. She is not in acute distress.     Appearance: She is not ill-appearing, toxic-appearing or diaphoretic.   HENT:      Head: Normocephalic and atraumatic.      Right Ear: External ear normal.      Left Ear: External ear normal.      Nose: Nose normal.      Mouth/Throat:      Mouth: Mucous membranes are moist.      Pharynx: Oropharynx is clear.   Eyes:      Extraocular Movements: Extraocular movements intact.      Conjunctiva/sclera: Conjunctivae normal.      Pupils: Pupils are equal, round, and reactive to light.   Cardiovascular:      Rate and Rhythm: Normal rate and  regular rhythm.      Pulses: Normal pulses.      Heart sounds: Normal heart sounds. No murmur heard.  Pulmonary:      Effort: Pulmonary effort is normal. No respiratory distress.      Breath sounds: Normal breath sounds. No stridor.   Chest:      Chest wall: No tenderness.   Abdominal:      General: Bowel sounds are normal. There is no distension.      Palpations: Abdomen is soft.      Tenderness: There is no abdominal tenderness. There is no right CVA tenderness, left CVA tenderness or guarding.   Musculoskeletal:      Cervical back: Normal range of motion and neck supple. No rigidity.      Comments: Moves all extremities independently and equally.    Skin:     General: Skin is warm and dry.      Capillary Refill: Capillary refill takes less than 2 seconds.   Neurological:      General: No focal deficit present.      Mental Status: She is alert and oriented to person, place, and time.   Psychiatric:         Mood and Affect: Mood normal.         Behavior: Behavior normal. Behavior is cooperative.         Procedures      Lab Results (last 24 hours)       Procedure Component Value Units Date/Time    Respiratory Panel PCR w/COVID-19(SARS-CoV-2) JAMIL/ESME/GRACE/PAD/COR/BRENDON In-House, NP Swab in UTM/VTM, 2 HR TAT - Swab, Nasopharynx [[id number]]  (Normal) Collected: 02/15/25 0856    Specimen: Swab from Nasopharynx Updated: 02/15/25 0951     ADENOVIRUS, PCR Not Detected     Coronavirus 229E Not Detected     Coronavirus HKU1 Not Detected     Coronavirus NL63 Not Detected     Coronavirus OC43 Not Detected     COVID19 Not Detected     Human Metapneumovirus Not Detected     Human Rhinovirus/Enterovirus Not Detected     Influenza A PCR Not Detected     Influenza B PCR Not Detected     Parainfluenza Virus 1 Not Detected     Parainfluenza Virus 2 Not Detected     Parainfluenza Virus 3 Not Detected     Parainfluenza Virus 4 Not Detected     RSV, PCR Not Detected     Bordetella pertussis pcr Not Detected     Bordetella parapertussis PCR  Not Detected     Chlamydophila pneumoniae PCR Not Detected     Mycoplasma pneumo by PCR Not Detected    Narrative:      In the setting of a positive respiratory panel with a viral infection PLUS a negative procalcitonin without other underlying concern for bacterial infection, consider observing off antibiotics or discontinuation of antibiotics and continue supportive care. If the respiratory panel is positive for atypical bacterial infection (Bordetella pertussis, Chlamydophila pneumoniae, or Mycoplasma pneumoniae), consider antibiotic de-escalation to target atypical bacterial infection.    Urinalysis With Culture If Indicated - Urine, Clean Catch [[id number]]  (Abnormal) Collected: 02/15/25 1010    Specimen: Urine, Clean Catch Updated: 02/15/25 1039     Color, UA Dark Yellow     Appearance, UA Clear     pH, UA <=5.0     Specific Gravity, UA 1.028     Glucose, UA >=1000 mg/dL (3+)     Ketones, UA 80 mg/dL (3+)     Bilirubin, UA Negative     Blood, UA Negative     Protein, UA Negative     Leuk Esterase, UA Negative     Nitrite, UA Negative     Urobilinogen, UA 1.0 E.U./dL    Narrative:      In absence of clinical symptoms, the presence of pyuria, bacteria, and/or nitrites on the urinalysis result does not correlate with infection.  Urine microscopic not indicated.    CBC & Differential [[id number]]  (Abnormal) Collected: 02/15/25 1021    Specimen: Blood Updated: 02/15/25 1037    Narrative:      The following orders were created for panel order CBC & Differential.  Procedure                               Abnormality         Status                     ---------                               -----------         ------                     CBC Auto Differential[[id number]]        Abnormal            Final result                 Please view results for these tests on the individual orders.    Comprehensive Metabolic Panel [[id number]]  (Abnormal) Collected: 02/15/25 1021    Specimen: Blood Updated: 02/15/25 1058     Glucose  126 mg/dL      BUN 21 mg/dL      Creatinine 1.59 mg/dL      Sodium 131 mmol/L      Potassium 4.6 mmol/L      Chloride 92 mmol/L      CO2 19.0 mmol/L      Calcium 10.6 mg/dL      Total Protein 7.9 g/dL      Albumin 4.6 g/dL      ALT (SGPT) 67 U/L      AST (SGOT) 35 U/L      Alkaline Phosphatase 61 U/L      Total Bilirubin 0.5 mg/dL      Globulin 3.3 gm/dL      A/G Ratio 1.4 g/dL      BUN/Creatinine Ratio 13.2     Anion Gap 20.0 mmol/L      eGFR 39.4 mL/min/1.73     Narrative:      GFR Categories in Chronic Kidney Disease (CKD)      GFR Category          GFR (mL/min/1.73)    Interpretation  G1                     90 or greater         Normal or high (1)  G2                      60-89                Mild decrease (1)  G3a                   45-59                Mild to moderate decrease  G3b                   30-44                Moderate to severe decrease  G4                    15-29                Severe decrease  G5                    14 or less           Kidney failure          (1)In the absence of evidence of kidney disease, neither GFR category G1 or G2 fulfill the criteria for CKD.    eGFR calculation 2021 CKD-EPI creatinine equation, which does not include race as a factor    Magnesium [[id number]]  (Normal) Collected: 02/15/25 1021    Specimen: Blood Updated: 02/15/25 1057     Magnesium 1.8 mg/dL     Lactic Acid, Plasma [[id number]]  (Normal) Collected: 02/15/25 1021    Specimen: Blood Updated: 02/15/25 1056     Lactate 1.6 mmol/L     D-dimer, Quantitative [[id number]]  (Normal) Collected: 02/15/25 1021    Specimen: Blood Updated: 02/15/25 1048     D-Dimer, Quantitative <0.27 MCGFEU/mL     Narrative:      According to the assay 's published package insert, a normal (<0.50 MCGFEU/mL) D-dimer result in conjunction with a non-high clinical probability assessment, excludes deep vein thrombosis (DVT) and pulmonary embolism (PE) with high sensitivity.    D-dimer values increase with age and this can make  "VTE exclusion of an older population difficult. To address this, the [employer], based on best available evidence and recent guidelines, recommends that clinicians use age-adjusted D-dimer thresholds in patients greater than 50 years of age with: a) a low probability of PE who do not meet all Pulmonary Embolism Rule Out Criteria, or b) in those with intermediate probability of PE.   The formula for an age-adjusted D-dimer cut-off is \"age/100\".  For example, a 60 year old patient would have an age-adjusted cut-off of 0.60 MCGFEU/mL and an 80 year old 0.80 MCGFEU/mL.    High Sensitivity Troponin T [[id number]]  (Abnormal) Collected: 02/15/25 1021    Specimen: Blood Updated: 02/15/25 1055     HS Troponin T 31 ng/L     Narrative:      High Sensitive Troponin T Reference Range:  <14.0 ng/L- Negative Female for AMI  <22.0 ng/L- Negative Male for AMI  >=14 - Abnormal Female indicating possible myocardial injury.  >=22 - Abnormal Male indicating possible myocardial injury.   Clinicians would have to utilize clinical acumen, EKG, Troponin, and serial changes to determine if it is an Acute Myocardial Infarction or myocardial injury due to an underlying chronic condition.         BNP [[id number]]  (Normal) Collected: 02/15/25 1021    Specimen: Blood Updated: 02/15/25 1056     proBNP 90.8 pg/mL     Narrative:      This assay is used as an aid in the diagnosis of individuals suspected of having heart failure. It can be used as an aid in the diagnosis of acute decompensated heart failure (ADHF) in patients presenting with signs and symptoms of ADHF to the emergency department (ED). In addition, NT-proBNP of <300 pg/mL indicates ADHF is not likely.    Age Range Result Interpretation  NT-proBNP Concentration (pg/mL:      <50             Positive            >450                   Gray                 300-450                    Negative             <300    50-75           Positive            >900                  " [place]                300-900                  Negative            <300      >75             Positive            >1800                  Gray                300-1800                  Negative            <300    CBC Auto Differential [[id number]]  (Abnormal) Collected: 02/15/25 1021    Specimen: Blood Updated: 02/15/25 1037     WBC 4.75 10*3/mm3      RBC 4.95 10*6/mm3      Hemoglobin 13.5 g/dL      Hematocrit 39.9 %      MCV 80.6 fL      MCH 27.3 pg      MCHC 33.8 g/dL      RDW 12.8 %      RDW-SD 37.1 fl      MPV 10.4 fL      Platelets 200 10*3/mm3      Neutrophil % 54.3 %      Lymphocyte % 39.4 %      Monocyte % 5.5 %      Eosinophil % 0.2 %      Basophil % 0.4 %      Immature Grans % 0.2 %      Neutrophils, Absolute 2.58 10*3/mm3      Lymphocytes, Absolute 1.87 10*3/mm3      Monocytes, Absolute 0.26 10*3/mm3      Eosinophils, Absolute 0.01 10*3/mm3      Basophils, Absolute 0.02 10*3/mm3      Immature Grans, Absolute 0.01 10*3/mm3      nRBC 0.0 /100 WBC     TSH Rfx On Abnormal To Free T4 [[id number]]  (Abnormal) Collected: 02/15/25 1021    Specimen: Blood Updated: 02/15/25 1103     TSH 0.034 uIU/mL     T4, Free [[id number]]  (Normal) Collected: 02/15/25 1021    Specimen: Blood Updated: 02/15/25 1200     Free T4 1.65 ng/dL     High Sensitivity Troponin T 1Hr [[id number]]  (Abnormal) Collected: 02/15/25 1119    Specimen: Blood from Arm, Left Updated: 02/15/25 1145     HS Troponin T 27 ng/L      Troponin T Numeric Delta -4 ng/L      Troponin T % Delta -13    Narrative:      High Sensitive Troponin T Reference Range:  <14.0 ng/L- Negative Female for AMI  <22.0 ng/L- Negative Male for AMI  >=14 - Abnormal Female indicating possible myocardial injury.  >=22 - Abnormal Male indicating possible myocardial injury.   Clinicians would have to utilize clinical acumen, EKG, Troponin, and serial changes to determine if it is an Acute Myocardial Infarction or myocardial injury due to an underlying chronic condition.         Blood Gas,  "Venous With Co-Ox [[id number]]  (Abnormal) Collected: 02/15/25 1212    Specimen: Venous Blood Updated: 02/15/25 1211     Site OTHER     pH, Venous 7.308 pH Units      Comment: 84 Value below reference range        pCO2, Venous 45.3 mm Hg      pO2, Venous 21.6 mm Hg      Comment: 84 Value below reference range        HCO3, Venous 22.7 mmol/L      Base Excess, Venous -3.8 mmol/L      Comment: 84 Value below reference range        O2 Saturation, Venous 33.0 %      Comment: 84 Value below reference range        Hemoglobin, Blood Gas 13.8 g/dL      Oxyhemoglobin Venous 32.6 %      Comment: 84 Value below reference range        Methemoglobin Venous 0.3 %      Carboxyhemoglobin Venous 1.0 %      Temperature 37.0     Sodium, Venous 138 mmol/L      Potassium, Venous 4.4 mmol/L      Barometric Pressure for Blood Gas 749 mmHg      Modality Room Air     Ventilator Mode NA     Collected by [id number]     Comment: Meter: [id number]     :  Janell Gilmore, RRT                   ED Course  ED Course as of 02/15/25 1336   Sat Feb 15, 2025   0840 ECG 12 Lead Dyspnea  Normal sinus rhythm 87bpm  Normal ECG  When compared with ECG of 03-Dec-2024 13:46,  ST no longer elevated in Lateral leads   [TD]   0843 Wells' Criteria for Pulmonary Embolism - MDCalc  4.5 points -> Moderate risk group: 16.2% chance of PE in an ED population. Another study assigned scores <= 4 as \"PE Unlikely\" and had a 3% incidence of PE. Another study assigned scores > 4 as \"PE Likely\" and had a 28% incidence of PE. [TD]   0944 XR Chest 1 View  IMPRESSION:         No acute abnormality.         [TD]   1024 XR Abdomen Flat & Upright  IMPRESSION:  1. Findings suggestive of mild adynamic ileus with associated  constipation. No evidence of bowel obstruction or free air.   [TD]   1025 NS IVF bolus ordered [TD]   1025 Respiratory Panel PCR w/COVID-19(SARS-CoV-2) JAMIL/ESME/GRACE/PAD/COR/BRENDON In-House, NP Swab in UTM/VTM, 2 HR TAT - Swab, Nasopharynx  Negative  [TD] "   1035 Updated patient on respiratory panel and imaging results.  [TD]   1155 VBG ordered. Care turned over to Dr. Rodriguez pending additional work-up and/or disposition.  [TD]   1334 Patient with multiple different complaints complaint generalized weakness decreased p.o. intake and some nausea.  She is in acute kidney injury with metabolic acidosis almost appears to have starvation ketosis for some reason.  Will be admitted to medicine service with IV fluids. [TS]      ED Course User Index  [TD] Dorene Núñez APRN  [TS] Linus Rodriguez MD                                                       Medical Decision Making  Problems Addressed:  Acute kidney injury: acute illness or injury  Ketosis: acute illness or injury  Metabolic acidosis: acute illness or injury    Amount and/or Complexity of Data Reviewed  Labs: ordered. Decision-making details documented in ED Course.  Radiology: ordered. Decision-making details documented in ED Course.  ECG/medicine tests:  Decision-making details documented in ED Course.  Discussion of management or test interpretation with external provider(s): Discussed with the hospitalist    Risk  Prescription drug management.  Decision regarding hospitalization.  Risk Details: This patient came in with the decreased p.o. intake persistent nausea not feeling well.  Not able to eat.  There is no abdominal pain associate with this.  There is no persistent vomiting associate with this.  Looks like she got starvation ketosis with anion gap.  And acute kidney injury will be admitted to medicine service for IV fluids.        Final diagnoses:   Acute kidney injury   Ketosis   Metabolic acidosis       ED Disposition  ED Disposition       ED Disposition   Decision to Admit    Condition   --    Comment   Level of Care: Med/Surg [1]   Diagnosis: FRANK (acute kidney injury) [036047]   Admitting Physician: SIA RUIZ [[id number]]   Attending Physician: SIA RUIZ [[id number]]   Certification: I  Certify That Inpatient Hospital Services Are Medically Necessary For Greater Than 2 Midnights                 No follow-up provider specified.       Medication List      No changes were made to your prescriptions during this visit.            Linus Rodriguez MD  02/15/25 1336      Electronically signed by Linus Rodriguez MD at 02/15/25 1336       Facility-Administered Medications as of 2/16/2025   Medication Dose Route Frequency Provider Last Rate Last Admin    amLODIPine (NORVASC) tablet 5 mg  5 mg Oral Daily Donna Parks APRN   5 mg at 02/15/25 1550    ARIPiprazole (ABILIFY) tablet 5 mg  5 mg Oral Daily Donna Parks APRN   5 mg at 02/15/25 1550    aspirin EC tablet 81 mg  81 mg Oral Daily Donna Parks APRN   81 mg at 02/15/25 2033    sennosides-docusate (PERICOLACE) 8.6-50 MG per tablet 2 tablet  2 tablet Oral BID PRN Donna Parks APRN        And    polyethylene glycol (MIRALAX) packet 17 g  17 g Oral Daily PRN Donna Parks APRN        And    bisacodyl (DULCOLAX) EC tablet 5 mg  5 mg Oral Daily PRN Donna Parks APRN        And    bisacodyl (DULCOLAX) suppository 10 mg  10 mg Rectal Daily PRN Donna Parks APRN        carvedilol (COREG) tablet 12.5 mg  12.5 mg Oral Q12H Donna Parks APRN   12.5 mg at 02/15/25 2014    dextrose (D50W) (25 g/50 mL) IV injection 25 g  25 g Intravenous Q15 Min PRN Donna Parks APRN        dextrose (GLUTOSE) oral gel 15 g  15 g Oral Q15 Min PRN Donna Parks APRN        [Held by provider] empagliflozin (JARDIANCE) tablet 25 mg  25 mg Oral Daily Donna Parks APRN        gabapentin (NEURONTIN) capsule 300 mg  300 mg Oral Nightly Casper Gao MD   300 mg at 02/15/25 2015    glucagon (GLUCAGEN) injection 1 mg  1 mg Intramuscular Q15 Min PRN Donna Parks APRN        Insulin Lispro (humaLOG) injection 2-7 Units  2-7 Units Subcutaneous 4x Daily AC & at Bedtime Donna APRN        [COMPLETED] ondansetron (ZOFRAN)  injection 4 mg  4 mg Intravenous Once Dorene Núñez APRN   4 mg at 02/15/25 1023    ondansetron ODT (ZOFRAN-ODT) disintegrating tablet 4 mg  4 mg Oral Q6H PRN Donna Parks APRN        Or    ondansetron (ZOFRAN) injection 4 mg  4 mg Intravenous Q6H PRN Donna Parks APRN        pantoprazole (PROTONIX) EC tablet 40 mg  40 mg Oral Q AM Donna Parks APRN   40 mg at 02/16/25 0535    rosuvastatin (CRESTOR) tablet 10 mg  10 mg Oral Daily Donna Parks APRN   10 mg at 02/15/25 2033    sertraline (ZOLOFT) tablet 100 mg  100 mg Oral BID Donna Parks APRN   100 mg at 02/15/25 2015    [COMPLETED] sodium chloride 0.9 % bolus 1,000 mL  1,000 mL Intravenous Once Dorene Núñez APRN   Stopped at 02/15/25 1320    sodium chloride 0.9 % flush 10 mL  10 mL Intravenous PRN Dorene Núñez APRN        sodium chloride 0.9 % flush 10 mL  10 mL Intravenous Q12H Donna Parks APRN   10 mL at 02/15/25 2014    sodium chloride 0.9 % flush 10 mL  10 mL Intravenous PRN Donna Parks APRN        sodium chloride 0.9 % infusion 40 mL  40 mL Intravenous PRN Donna Parks APRN        sodium chloride 0.9 % infusion  100 mL/hr Intravenous Continuous Donna Parks APRN 100 mL/hr at 02/16/25 0540 100 mL/hr at 02/16/25 0540    [Held by provider] spironolactone (ALDACTONE) tablet 25 mg  25 mg Oral Daily Donna Parks APRN         Orders (all)        Start     Ordered    02/16/25 0610  Basic Metabolic Panel  Morning Draw         02/15/25 1448    02/16/25 0610  Magnesium  Morning Draw         02/15/25 1448    02/16/25 0607  CBC (No Diff)  Morning Draw         02/15/25 1448    02/16/25 0600  pantoprazole (PROTONIX) EC tablet 40 mg  Every Early Morning         02/15/25 1444    02/16/25 0600  Hemoglobin A1c  Morning Draw         02/15/25 1448    02/15/25 2119  POC Glucose Once  PROCEDURE ONCE        Comments: Complete no more than 45 minutes prior to patient eating      02/15/25 2107    02/15/25 2100  carvedilol  (COREG) tablet 12.5 mg  Every 12 Hours Scheduled         02/15/25 1444    02/15/25 2100  sertraline (ZOLOFT) tablet 100 mg  2 Times Daily         02/15/25 1444    02/15/25 2100  gabapentin (NEURONTIN) capsule 300 mg  Nightly         02/15/25 1445    02/15/25 2100  sodium chloride 0.9 % flush 10 mL  Every 12 Hours Scheduled         02/15/25 1448    02/15/25 1800  Oral Care  2 Times Daily       02/15/25 1448    02/15/25 1730  Insulin Lispro (humaLOG) injection 2-7 Units  4 Times Daily Before Meals & Nightly         02/15/25 1448    02/15/25 1729  POC Glucose Once  PROCEDURE ONCE        Comments: Complete no more than 45 minutes prior to patient eating      02/15/25 1703    02/15/25 1700  POC Glucose 4x Daily Before Meals & at Bedtime  4 Times Daily Before Meals & at Bedtime      Comments: Complete no more than 45 minutes prior to patient eating      02/15/25 1448    02/15/25 1700  sodium chloride 0.9 % infusion  Continuous         02/15/25 1601    02/15/25 1600  Vital Signs  Every 4 Hours       02/15/25 1448    02/15/25 1530  amLODIPine (NORVASC) tablet 5 mg  Daily         02/15/25 1444    02/15/25 1530  ARIPiprazole (ABILIFY) tablet 5 mg  Daily         02/15/25 1444    02/15/25 1530  aspirin EC tablet 81 mg  Daily         02/15/25 1444    02/15/25 1530  [Held by provider]  empagliflozin (JARDIANCE) tablet 25 mg  Daily        (On hold since yesterday at 1444 until manually unheld; held by Donna APRNHold Reason: Abnormal Labs)    02/15/25 1444    02/15/25 1530  rosuvastatin (CRESTOR) tablet 10 mg  Daily         02/15/25 1444    02/15/25 1530  [Held by provider]  spironolactone (ALDACTONE) tablet 25 mg  Daily        (On hold since yesterday at 1444 until manually unheld; held by Donna ring APRNHold Reason: Abnormal Labs)    02/15/25 1444    02/15/25 1520  Hepatitis Panel, Acute  Once         02/15/25 1519    02/15/25 1500  Strict Intake & Output  Every Hour       02/15/25 1448    02/15/25 144   "ondansetron ODT (ZOFRAN-ODT) disintegrating tablet 4 mg  Every 6 Hours PRN        Placed in \"Or\" Linked Group    02/15/25 1448    02/15/25 1448  ondansetron (ZOFRAN) injection 4 mg  Every 6 Hours PRN        Placed in \"Or\" Linked Group    02/15/25 1448    02/15/25 1448  sennosides-docusate (PERICOLACE) 8.6-50 MG per tablet 2 tablet  2 Times Daily PRN        Placed in \"And\" Linked Group    02/15/25 1448    02/15/25 1448  polyethylene glycol (MIRALAX) packet 17 g  Daily PRN        Placed in \"And\" Linked Group    02/15/25 1448    02/15/25 1448  bisacodyl (DULCOLAX) EC tablet 5 mg  Daily PRN        Placed in \"And\" Linked Group    02/15/25 1448    02/15/25 1448  bisacodyl (DULCOLAX) suppository 10 mg  Daily PRN        Placed in \"And\" Linked Group    02/15/25 1448    02/15/25 1448  Code Status and Medical Interventions: CPR (Attempt to Resuscitate); Full Support  Continuous         02/15/25 1448    02/15/25 1448  Place Sequential Compression Device  Once         02/15/25 1448    02/15/25 1448  Maintain Sequential Compression Device  Continuous         02/15/25 1448    02/15/25 1448  Diet: Gastrointestinal, Diabetic; Consistent Carbohydrate; Low Irritant; Texture: Regular (IDDSI 7); Fluid Consistency: Thin (IDDSI 0)  Diet Effective Now         02/15/25 1448    02/15/25 1447  Weigh Patient  Once         02/15/25 1448    02/15/25 1447  Insert Peripheral IV  Once         02/15/25 1448    02/15/25 1447  Saline Lock & Maintain IV Access  Continuous         02/15/25 1448    02/15/25 1446  dextrose (GLUTOSE) oral gel 15 g  Every 15 Minutes PRN         02/15/25 1448    02/15/25 1446  dextrose (D50W) (25 g/50 mL) IV injection 25 g  Every 15 Minutes PRN         02/15/25 1448    02/15/25 1446  glucagon (GLUCAGEN) injection 1 mg  Every 15 Minutes PRN         02/15/25 1448    02/15/25 1446  sodium chloride 0.9 % flush 10 mL  As Needed         02/15/25 1448    02/15/25 1446  sodium chloride 0.9 % infusion 40 mL  As Needed         " 02/15/25 1448    02/15/25 1426  Adult Transthoracic Echo Complete w/ Color, Spectral and Contrast if Necessary Per Protocol  Once         02/15/25 1425    02/15/25 1355  T3, Free  Once         02/15/25 1354    02/15/25 1336  Inpatient Admission  Once         02/15/25 1335    02/15/25 1336  Discontinue Cardiac Monitoring  Once         02/15/25 1335    02/15/25 1211  Blood Gas, Venous With Co-Ox  PROCEDURE ONCE         02/15/25 1212    02/15/25 1151  Blood Gas, Venous With Co-Ox  Once         02/15/25 1150    02/15/25 1121  High Sensitivity Troponin T 1Hr  PROCEDURE ONCE         02/15/25 1055    02/15/25 1104  T4, Free  Once         02/15/25 1103    02/15/25 1041  sodium chloride 0.9 % bolus 1,000 mL  Once         02/15/25 1025    02/15/25 0916  XR Abdomen Flat & Upright  1 Time Imaging         02/15/25 0915    02/15/25 0909  ondansetron (ZOFRAN) injection 4 mg  Once         02/15/25 0853    02/15/25 0900  TSH Rfx On Abnormal To Free T4  Once         02/15/25 0859    02/15/25 0857  Cardiac Monitoring  Continuous,   Status:  Canceled        Comments: Follow Standing Orders As Outlined in Process Instructions (Open Order Report to View Full Instructions)    02/15/25 0856    02/15/25 0857  Continuous Pulse Oximetry  Continuous         02/15/25 0856    02/15/25 0854  Urinalysis With Culture If Indicated - Urine, Clean Catch  Once         02/15/25 0853    02/15/25 0843  D-dimer, Quantitative  STAT         02/15/25 0843    02/15/25 0843  High Sensitivity Troponin T  STAT         02/15/25 0843    02/15/25 0843  BNP  STAT         02/15/25 0843    02/15/25 0841  Magnesium  STAT         02/15/25 0843    02/15/25 0841  Lactic Acid, Plasma  STAT         02/15/25 0843    02/15/25 0840  XR Chest 1 View  1 Time Imaging         02/15/25 0843    02/15/25 0840  CBC & Differential  Once         02/15/25 0843    02/15/25 0840  Comprehensive Metabolic Panel  Once         02/15/25 0843    02/15/25 0840  CBC Auto Differential  PROCEDURE  "ONCE         02/15/25 0843    02/15/25 0837  Respiratory Panel PCR w/COVID-19(SARS-CoV-2) [facility] In-House, NP Swab in UTM/VTM, 2 HR TAT - Swab, Nasopharynx  Once         02/15/25 0836    02/15/25 0836  Insert Peripheral IV  Once        Placed in \"And\" Linked Group    02/15/25 0835    02/15/25 0836  [facility] Draw  Once         02/15/25 0835    02/15/25 0836  Green Top (Gel)  PROCEDURE ONCE         02/15/25 0835    02/15/25 0836  Lavender Top  PROCEDURE ONCE         02/15/25 0835    02/15/25 0836  Red Top  PROCEDURE ONCE         02/15/25 0835    02/15/25 0836  Light Blue Top  PROCEDURE ONCE         02/15/25 0835    02/15/25 0835  sodium chloride 0.9 % flush 10 mL  As Needed        Placed in \"And\" Linked Group    02/15/25 0835    02/15/25 0830  ECG 12 Lead Dyspnea  Once         02/15/25 0829    02/15/25 0000  Telemetry Scan  Once         02/15/25 0000    Unscheduled  Follow Hypoglycemia Standing Orders For Blood Glucose <70 & Notify Provider of Treatment  As Needed      Comments: Follow Hypoglycemia Orders As Outlined in Process Instructions (Open Order Report to View Full Instructions)  Notify Provider Any Time Hypoglycemia Treatment is Administered    02/15/25 1448    Unscheduled  Oxygen Therapy- Nasal Cannula; Titrate 1-6 LPM Per SpO2; 90 - 95%  Continuous PRN       02/15/25 1448    Signed and Held  levothyroxine (SYNTHROID, LEVOTHROID) tablet 200 mcg  Every Early Morning,   Status:  Canceled         Signed and Held    Signed and Held  liothyronine (CYTOMEL) tablet 25 mcg  2 Times Daily,   Status:  Canceled         Signed and Held                  "

## 2025-02-16 NOTE — PLAN OF CARE
Goal Outcome Evaluation:  Plan of Care Reviewed With: patient        Progress: no change  Outcome Evaluation: Patient received alert and orientated; patient reports she does has a short term memory loss, yet responds to all questions appropriately.  Patient IV in Right A/C and alarming.  Nurse attempted three times and requested charge nurse to use doppler u/s to obtain new IV site.  Patient has IV to Left lower forearm with ordered IVF running as prescribed.  Patient took all prescribed medications well with no reports of side effects.  Patient gaining strength and is able to ambulate with minimal assistance to the bathroom; patient has remained free from injury.  Patient remains on continuous cardiac monitoring in [facility] in no acute cardiac distress.  Will continue to assess and treat patient per plan of care.

## 2025-02-16 NOTE — DISCHARGE SUMMARY
[facility] Medicine Services  DISCHARGE SUMMARY       Date of Admission: 2/15/2025  Date of Discharge:  2/16/2025  Primary Care Physician: Darryl Andrews DO    Presenting Problem/History of Present Illness:  Nausea and extreme lethargy    Final Discharge Diagnoses:  Active Hospital Problems    Diagnosis     **Stage 1 acute kidney injury     Thyromegaly     Hyperthyroidism     Type 2 diabetes mellitus, with long-term current use of insulin     Hypertension     Graves' disease        Consults: None    Procedures Performed: None    Pertinent Test Results:   Results for orders placed during the hospital encounter of 07/08/17    Transthoracic Echocardiogram 2D Complete    Interpretation Summary  · Normal size and function of all cardiac chambers. LVEF >65%.  · No significant valvular pathology.      Imaging Results (All)       Procedure Component Value Units Date/Time    XR Abdomen Flat & Upright [[id number]] Collected: 02/15/25 0937     Updated: 02/15/25 0945    Narrative:      EXAMINATION: XR ABDOMEN FLAT AND UPRIGHT- 2/15/2025 9:37 AM     HISTORY: constipation.     REPORT: Upright and supine views of the abdomen were obtained.     COMPARISON: KUB 7/8/2017.     Cholecystectomy clips are present. There is a surgical clip in the mid  pelvis. Moderate stool volume is seen throughout the colon and gas is  seen throughout numerous small bowel loops, without significant  distention. This is nonspecific. No evidence of bowel obstruction,  though mild adynamic ileus is not excluded. The lung bases are clear. No  free air is identified. The osseous structures show no acute findings.  Degenerative disc disease is present L4-5 and L5-S1.       Impression:      1. Findings suggestive of mild adynamic ileus with associated  constipation. No evidence of bowel obstruction or free air.     This report was signed and finalized on 2/15/2025 9:38 AM by Dr. Triston Escobedo MD.       XR Chest 1  View [[id number]] Collected: 02/15/25 0930     Updated: 02/15/25 0933    Narrative:      EXAMINATION: XR CHEST 1 VW- 2/15/2025 9:30 AM     HISTORY: dyspnea.     COMPARISON: Chest x-ray 9/17/2024.     REPORT:  A frontal view of the chest was obtained. The lungs are clear and  normally expanded. The cardiac and mediastinal silhouettes are within  normal limits. The visualized bony thorax and upper abdomen are  unremarkable.                                                                                                                                                       Impression:             No acute abnormality.                                                                         This report was signed and finalized on 2/15/2025 9:30 AM by Dr. Triston Escobedo MD.             LAB RESULTS:      Lab 02/16/25  0958 02/15/25  1021   WBC 3.78 4.75   HEMOGLOBIN 11.2* 13.5   HEMATOCRIT 34.1 39.9   PLATELETS 157 200   NEUTROS ABS  --  2.58   IMMATURE GRANS (ABS)  --  0.01   LYMPHS ABS  --  1.87   MONOS ABS  --  0.26   EOS ABS  --  0.01   MCV 81.6 80.6   LACTATE  --  1.6   D DIMER QUANT  --  <0.27         Lab 02/16/25  0958 02/16/25  0456 02/15/25  1212 02/15/25  1021   SODIUM 137  --   --  131*   SODIUM, VENOUS  --   --  138  --    POTASSIUM 4.4  --   --  4.6   POTASSIUM, VENOUS  --   --  4.4  --    CHLORIDE 101  --   --  92*   CO2 20.0*  --   --  19.0*   ANION GAP 16.0*  --   --  20.0*   BUN 18  --   --  21*   CREATININE 1.21*  --   --  1.59*   EGFR 54.7*  --   --  39.4*   GLUCOSE 116*  --   --  126*   CALCIUM 9.2  --   --  10.6*   MAGNESIUM 1.5*  --   --  1.8   HEMOGLOBIN A1C  --  10.40*  --   --    TSH  --   --   --  0.034*         Lab 02/15/25  1021   TOTAL PROTEIN 7.9   ALBUMIN 4.6   GLOBULIN 3.3   ALT (SGPT) 67*   AST (SGOT) 35*   BILIRUBIN 0.5   ALK PHOS 61         Lab 02/15/25  1119 02/15/25  1021   PROBNP  --  90.8   HSTROP T 27* 31*                 Lab 02/15/25  1212   CARBOXYHEMOGLOBIN (VENOUS) 1.0     Brief  Urine Lab Results  (Last result in the past 365 days)        Color   Clarity   Blood   Leuk Est   Nitrite   Protein   CREAT   Urine HCG        02/15/25 1010 Dark Yellow   Clear   Negative   Negative   Negative   Negative                 Microbiology Results (last 10 days)       Procedure Component Value - Date/Time    Respiratory Panel PCR w/COVID-19(SARS-CoV-2) JAMIL/ESME/GRACE/PAD/COR/BRENDON In-House, NP Swab in UTM/VTM, 2 HR TAT - Swab, Nasopharynx [[id number]]  (Normal) Collected: 02/15/25 0856    Lab Status: Final result Specimen: Swab from Nasopharynx Updated: 02/15/25 0951     ADENOVIRUS, PCR Not Detected     Coronavirus 229E Not Detected     Coronavirus HKU1 Not Detected     Coronavirus NL63 Not Detected     Coronavirus OC43 Not Detected     COVID19 Not Detected     Human Metapneumovirus Not Detected     Human Rhinovirus/Enterovirus Not Detected     Influenza A PCR Not Detected     Influenza B PCR Not Detected     Parainfluenza Virus 1 Not Detected     Parainfluenza Virus 2 Not Detected     Parainfluenza Virus 3 Not Detected     Parainfluenza Virus 4 Not Detected     RSV, PCR Not Detected     Bordetella pertussis pcr Not Detected     Bordetella parapertussis PCR Not Detected     Chlamydophila pneumoniae PCR Not Detected     Mycoplasma pneumo by PCR Not Detected    Narrative:      In the setting of a positive respiratory panel with a viral infection PLUS a negative procalcitonin without other underlying concern for bacterial infection, consider observing off antibiotics or discontinuation of antibiotics and continue supportive care. If the respiratory panel is positive for atypical bacterial infection (Bordetella pertussis, Chlamydophila pneumoniae, or Mycoplasma pneumoniae), consider antibiotic de-escalation to target atypical bacterial infection.          Microbiology Results (last 10 days)       Procedure Component Value - Date/Time    Respiratory Panel PCR w/COVID-19(SARS-CoV-2) JAMIL/ESME/GRACE/PAD/COR/BRENDON In-House,  NP Swab in UTM/VTM, 2 HR TAT - Swab, Nasopharynx [[id number]]  (Normal) Collected: 02/15/25 0856    Lab Status: Final result Specimen: Swab from Nasopharynx Updated: 02/15/25 0951     ADENOVIRUS, PCR Not Detected     Coronavirus 229E Not Detected     Coronavirus HKU1 Not Detected     Coronavirus NL63 Not Detected     Coronavirus OC43 Not Detected     COVID19 Not Detected     Human Metapneumovirus Not Detected     Human Rhinovirus/Enterovirus Not Detected     Influenza A PCR Not Detected     Influenza B PCR Not Detected     Parainfluenza Virus 1 Not Detected     Parainfluenza Virus 2 Not Detected     Parainfluenza Virus 3 Not Detected     Parainfluenza Virus 4 Not Detected     RSV, PCR Not Detected     Bordetella pertussis pcr Not Detected     Bordetella parapertussis PCR Not Detected     Chlamydophila pneumoniae PCR Not Detected     Mycoplasma pneumo by PCR Not Detected    Narrative:      In the setting of a positive respiratory panel with a viral infection PLUS a negative procalcitonin without other underlying concern for bacterial infection, consider observing off antibiotics or discontinuation of antibiotics and continue supportive care. If the respiratory panel is positive for atypical bacterial infection (Bordetella pertussis, Chlamydophila pneumoniae, or Mycoplasma pneumoniae), consider antibiotic de-escalation to target atypical bacterial infection.             Documented weights    02/15/25 0833   Weight: 79.4 kg (175 lb)        Hospital Course: Lynn Magana is a 49-year-old female with a past medical history of arthritis, carotid artery stenosis, degenerative disc disease, diabetes mellitus type 1, GERD, Graves' disease, hyperlipidemia, hypertension, hypothyroidism, seizures and thyromegaly.  Patient presented to [facility] [place] emergency department on 2/15/2025 with complaints of fatigue, shortness of breath and nausea over the past 2 weeks.  States it is hard to describe she said it just hits her and she feels  like staying in bed for the last several weeks.  Shortness of breath is mainly exertional and she states it happens without warning.  The workup revealed a VBG pH of 7.3, pO2 of 21.6, initial troponin of 31, subsequent of 27, , CL 92, CO2 19 0, anion gap of 20, BUN 21, CR 1.59, , Ca 10.6, TSH 0.034, CBC unremarkable, urinalysis unremarkable, respiratory PCR negative.  Patient does use chronic marijuana and we discussed with patient that this is contributing significantly to her hyperemesis.  In addition her thyroid function has not been assessed in quite some time and so patient's Cytomel and Synthroid will be placed on hold until can be reviewed by Dr. Darryl Andrews.  Patient will be hydrated overnight, nausea controlled with Zofran and placed on a GI low irritant diet.  All patient and spouse's questions were answered to the best my ability and they are in agreement for admission and treatment.     Stage I acute kidney injury-creatinine on admission was 1.59, baseline of 0.95, 1.5 times baseline.  Patient received fluid hydration overnight, complete resolution to all nausea and vomiting.  Creatinine stable this morning at 1.21 encourage patient to continue p.o. intake.  Hold Aldactone and Jardiance an additional 2 days and to discuss with PCP.     Thyroid megaly, hyperthyroidism, Graves' disease-currently patient is on Synthroid and Cytomel TSH of 0.034 Free T4 1.65, free T3 2.75 patient was instructed to hold her Synthroid and Cytomel until she can discuss with Dr. Andrwes.    Type 2 diabetes mellitus-A1c 10.4, patient was instructed to continue her long-acting insulin and metformin and to discuss changes with PCP next week.    This morning patient is resting comfortably in a chair on room air with no family at bedside.  Patient states her nausea and vomiting is completely resolved.  She feels much better after fluid hydration.  Additionally discussed her use of propranolol and Coreg and to clarify with  "Dr. Andrews.  We discussed her thyroid levels and her A1c and her need to discuss with Dr. Andrews as she needs long-term management and changes.  In addition we had a long discussion regarding her excessive THC use and her need to discontinue as it is significantly contributing to her hyperemesis.  Patient verbalized understanding all questions were answered to the best of my ability and she is in agreement for discharge home today.    Patient has reached the maximum benefit of hospitalization and is stable for discharge  Patient has been evaluated today 02/16/25 and is stable for discharge.     Physical Exam on Discharge:  BP 90/58 (BP Location: Right arm, Patient Position: Lying)   Pulse 85   Temp 98.2 °F (36.8 °C) (Oral)   Resp 16   Ht 188 cm (74\")   Wt 79.4 kg (175 lb)   SpO2 99%   BMI 22.47 kg/m²   Physical Exam  Constitutional:       General: She is not in acute distress.     Appearance: Normal appearance. She is obese. She is not ill-appearing or toxic-appearing.   HENT:      Head: Normocephalic and atraumatic.      Right Ear: Tympanic membrane normal.      Left Ear: Tympanic membrane normal.      Nose: Nose normal.      Mouth/Throat:      Mouth: Mucous membranes are moist.      Pharynx: Oropharynx is clear.   Eyes:      Pupils: Pupils are equal, round, and reactive to light.   Cardiovascular:      Rate and Rhythm: Normal rate and regular rhythm.      Pulses: Normal pulses.      Heart sounds: Normal heart sounds.   Pulmonary:      Effort: Pulmonary effort is normal.      Breath sounds: Normal breath sounds.   Abdominal:      General: Abdomen is flat. Bowel sounds are normal.      Palpations: Abdomen is soft.   Genitourinary:     Comments: Per bathroom privileges  Musculoskeletal:         General: Normal range of motion.   Skin:     General: Skin is warm.      Capillary Refill: Capillary refill takes less than 2 seconds.      Coloration: Skin is pale.   Neurological:      General: No focal deficit " present.      Mental Status: She is alert and oriented to person, place, and time.   Psychiatric:         Mood and Affect: Mood normal.         Behavior: Behavior normal.         Thought Content: Thought content normal.         Judgment: Judgment normal.           Condition on Discharge: Able for discharge home    Discharge Disposition:  Home or Self Care    Discharge Medications:     Discharge Medications        New Medications        Instructions Start Date   Melatonin 10 MG capsule   10 mg, Oral, Nightly PRN      ondansetron ODT 4 MG disintegrating tablet  Commonly known as: ZOFRAN-ODT   4 mg, Oral, Every 6 Hours PRN             Continue These Medications        Instructions Start Date   amLODIPine 10 MG tablet  Commonly known as: NORVASC   10 mg, Oral, Daily      ARIPiprazole 10 MG tablet  Commonly known as: ABILIFY   10 mg, Oral, Daily      carvedilol 25 MG tablet  Commonly known as: COREG   25 mg, Oral, 2 Times Daily With Meals      diclofenac 75 MG EC tablet  Commonly known as: VOLTAREN   75 mg, 2 Times Daily      empagliflozin 25 MG tablet tablet  Commonly known as: JARDIANCE   25 mg, Daily      Evolocumab solution auto-injector SureClick injection  Commonly known as: REPATHA   140 mg, Every 14 Days      gabapentin 300 MG capsule  Commonly known as: NEURONTIN   300 mg, 3 Times Daily      hydrOXYzine pamoate 25 MG capsule  Commonly known as: VISTARIL   25 mg, Oral, 3 Times Daily PRN      insulin glargine 100 UNIT/ML injection  Commonly known as: LANTUS, SEMGLEE   75 Units, Daily      metFORMIN  MG 24 hr tablet  Commonly known as: GLUCOPHAGE-XR   500 mg, 2 Times Daily      rosuvastatin 20 MG tablet  Commonly known as: CRESTOR   20 mg, Oral, Daily      spironolactone 50 MG tablet  Commonly known as: ALDACTONE   50 mg, 2 Times Daily      vitamin D 1.25 MG (63471 UT) capsule capsule  Commonly known as: ERGOCALCIFEROL   1 capsule, Weekly             Stop These Medications      levothyroxine 175 MCG  tablet  Commonly known as: SYNTHROID, LEVOTHROID     propranolol  MG 24 hr capsule  Commonly known as: INDERAL LA                Discharge Diet:   Diet Instructions       Diet: Gastrointestinal Diets; Low Irritant; Regular (IDDSI 7); Thin (IDDSI 0)      Discharge Diet: Gastrointestinal Diets    Gastrointestinal Diet: Low Irritant    Texture: Regular (IDDSI 7)    Fluid Consistency: Thin (IDDSI 0)            Activity at Discharge:   Activity Instructions       Activity as Tolerated              Discharge Instructions:   1.  Patient was instructed return for medical attention for any new or worsening nausea and vomiting, chest pain or shortness of breath.  2.  Patient was instructed to follow-up with PCP in 1 week to discuss her thyroid function and increasing A1c.  3.  Patient was instructed verbally and with discharge instruction on all new medications.  4.  Patient was instructed to discontinue THC use as it is significantly contributing to her hyperemesis    Follow-up Appointments:   No future appointments.    Test Results Pending at Discharge: None    Electronically signed by NATHALY Rodrigues, 02/16/25, 11:45 CST.    Time: 35 minutes.

## 2025-02-17 ENCOUNTER — READMISSION MANAGEMENT (OUTPATIENT)
Dept: CALL CENTER | Facility: HOSPITAL | Age: 50
End: 2025-02-17
Payer: COMMERCIAL

## 2025-02-17 NOTE — PAYOR COMM NOTE
"DC HOME 2-16-25    Lynn Haji (49 y.o. Female)       Date of Birth   1975    Social Security Number       Address   [address] [facility] [address] [facility] [address]    Home Phone   [phone]    MRN   [id number]       [facility]    Marital Status                               Admission Date   2/15/25    Admission Type   Emergency    Admitting Provider   Casper Gao MD    Attending Provider       Department, Room/Bed   [facility] 3C, 391/1       Discharge Date   2/16/2025    Discharge Disposition   Home or Self Care    Discharge Destination                                 Attending Provider: (none)   Allergies: Oxycodone-acetaminophen    Isolation: None   Infection: None   Code Status: Prior    Ht: 188 cm (74\")   Wt: 79.4 kg (175 lb)    Admission Cmt: None   Principal Problem: Stage 1 acute kidney injury [N17.9]                   Active Insurance as of 2/15/2025       Primary Coverage       Payor Plan Insurance Group Employer/Plan Group    [employer] [place] [employer]ICA[place]        Payor Plan Address Payor Plan Phone Number Payor Plan Fax Number Effective Dates    [address] [address] [phone]  3/14/2017 - None Entered    [facility] [id number]         Subscriber Name Subscriber Birth Date Member ID       LYNN HAJI 1975 [id number]                     Emergency Contacts        (Rel.) Home Phone Work Phone Mobile Phone    Evens Cifuentes (Spouse) -- -- [phone]              Physician Progress Notes (all)    No notes of this type exist for this encounter.       Consult Notes (all)    No notes of this type exist for this encounter.          Discharge Summary        Donna APRN at 02/16/25 1141       Attestation signed by Casper Gao MD at 02/16/25 1527    I have reviewed this documentation and agree.    I performed a substantive part of the MDM during the patient’s E/M visit. I personally evaluated   and examined the " patient. I personally made or approved the documented management plan and acknowledge its risk   of complications.   (Independent Interpretation) My (EKG/X-Ray/US/CT) interpretation reviewed.   (Discussion) Management/test interpretation discussed with NP Deborah, patient  Creatinine has improved  A1c noted greater than 10%.  Important to control blood sugar, important to follow-up with yearly eye exam.  No new complaints.  Magnesium noted to be low and will be replaced prior to discharge  Counseled on diabetes, marijuana, all other things  Viral hepatitis panel was negative  All questions were answered to the best of my abilities.     Electronically signed by Casper Gao MD, 2/16/2025, 10:46 CST.                                      [facility] Medicine Services  DISCHARGE SUMMARY       Date of Admission: 2/15/2025  Date of Discharge:  2/16/2025  Primary Care Physician: Darryl Andrews DO    Presenting Problem/History of Present Illness:  Nausea and extreme lethargy    Final Discharge Diagnoses:  Active Hospital Problems    Diagnosis     **Stage 1 acute kidney injury     Thyromegaly     Hyperthyroidism     Type 2 diabetes mellitus, with long-term current use of insulin     Hypertension     Graves' disease        Consults: None    Procedures Performed: None    Pertinent Test Results:   Results for orders placed during the hospital encounter of 07/08/17    Transthoracic Echocardiogram 2D Complete    Interpretation Summary  · Normal size and function of all cardiac chambers. LVEF >65%.  · No significant valvular pathology.      Imaging Results (All)       Procedure Component Value Units Date/Time    XR Abdomen Flat & Upright [[id number]] Collected: 02/15/25 0937     Updated: 02/15/25 0945    Narrative:      EXAMINATION: XR ABDOMEN FLAT AND UPRIGHT- 2/15/2025 9:37 AM     HISTORY: constipation.     REPORT: Upright and supine views of the abdomen were obtained.      COMPARISON: [facility] 7/8/2017.     Cholecystectomy clips are present. There is a surgical clip in the mid  pelvis. Moderate stool volume is seen throughout the colon and gas is  seen throughout numerous small bowel loops, without significant  distention. This is nonspecific. No evidence of bowel obstruction,  though mild adynamic ileus is not excluded. The lung bases are clear. No  free air is identified. The osseous structures show no acute findings.  Degenerative disc disease is present L4-5 and L5-S1.       Impression:      1. Findings suggestive of mild adynamic ileus with associated  constipation. No evidence of bowel obstruction or free air.     This report was signed and finalized on 2/15/2025 9:38 AM by Dr. Triston Escobedo MD.       XR Chest 1 View [[id number]] Collected: 02/15/25 0930     Updated: 02/15/25 0933    Narrative:      EXAMINATION: XR CHEST 1 VW- 2/15/2025 9:30 AM     HISTORY: dyspnea.     COMPARISON: Chest x-ray 9/17/2024.     REPORT:  A frontal view of the chest was obtained. The lungs are clear and  normally expanded. The cardiac and mediastinal silhouettes are within  normal limits. The visualized bony thorax and upper abdomen are  unremarkable.                                                                                                                                                       Impression:             No acute abnormality.                                                                         This report was signed and finalized on 2/15/2025 9:30 AM by Dr. Triston Escobedo MD.             LAB RESULTS:      Lab 02/16/25  0958 02/15/25  1021   WBC 3.78 4.75   HEMOGLOBIN 11.2* 13.5   HEMATOCRIT 34.1 39.9   PLATELETS 157 200   NEUTROS ABS  --  2.58   IMMATURE GRANS (ABS)  --  0.01   LYMPHS ABS  --  1.87   MONOS ABS  --  0.26   EOS ABS  --  0.01   MCV 81.6 80.6   LACTATE  --  1.6   D DIMER QUANT  --  <0.27         Lab 02/16/25  0958 02/16/25  0456 02/15/25  1212 02/15/25  1021    SODIUM 137  --   --  131*   SODIUM, VENOUS  --   --  138  --    POTASSIUM 4.4  --   --  4.6   POTASSIUM, VENOUS  --   --  4.4  --    CHLORIDE 101  --   --  92*   CO2 20.0*  --   --  19.0*   ANION GAP 16.0*  --   --  20.0*   BUN 18  --   --  21*   CREATININE 1.21*  --   --  1.59*   EGFR 54.7*  --   --  39.4*   GLUCOSE 116*  --   --  126*   CALCIUM 9.2  --   --  10.6*   MAGNESIUM 1.5*  --   --  1.8   HEMOGLOBIN A1C  --  10.40*  --   --    TSH  --   --   --  0.034*         Lab 02/15/25  1021   TOTAL PROTEIN 7.9   ALBUMIN 4.6   GLOBULIN 3.3   ALT (SGPT) 67*   AST (SGOT) 35*   BILIRUBIN 0.5   ALK PHOS 61         Lab 02/15/25  1119 02/15/25  1021   PROBNP  --  90.8   HSTROP T 27* 31*                 Lab 02/15/25  1212   CARBOXYHEMOGLOBIN (VENOUS) 1.0     Brief Urine Lab Results  (Last result in the past 365 days)        Color   Clarity   Blood   Leuk Est   Nitrite   Protein   CREAT   Urine HCG        02/15/25 1010 Dark Yellow   Clear   Negative   Negative   Negative   Negative                 Microbiology Results (last 10 days)       Procedure Component Value - Date/Time    Respiratory Panel PCR w/COVID-19(SARS-CoV-2) JAMIL/ESME/GRACE/PAD/COR/BRENDON In-House, NP Swab in UTM/VTM, 2 HR TAT - Swab, Nasopharynx [[id number]]  (Normal) Collected: 02/15/25 0856    Lab Status: Final result Specimen: Swab from Nasopharynx Updated: 02/15/25 0975     ADENOVIRUS, PCR Not Detected     Coronavirus 229E Not Detected     Coronavirus HKU1 Not Detected     Coronavirus NL63 Not Detected     Coronavirus OC43 Not Detected     COVID19 Not Detected     Human Metapneumovirus Not Detected     Human Rhinovirus/Enterovirus Not Detected     Influenza A PCR Not Detected     Influenza B PCR Not Detected     Parainfluenza Virus 1 Not Detected     Parainfluenza Virus 2 Not Detected     Parainfluenza Virus 3 Not Detected     Parainfluenza Virus 4 Not Detected     RSV, PCR Not Detected     Bordetella pertussis pcr Not Detected     Bordetella parapertussis PCR  Not Detected     Chlamydophila pneumoniae PCR Not Detected     Mycoplasma pneumo by PCR Not Detected    Narrative:      In the setting of a positive respiratory panel with a viral infection PLUS a negative procalcitonin without other underlying concern for bacterial infection, consider observing off antibiotics or discontinuation of antibiotics and continue supportive care. If the respiratory panel is positive for atypical bacterial infection (Bordetella pertussis, Chlamydophila pneumoniae, or Mycoplasma pneumoniae), consider antibiotic de-escalation to target atypical bacterial infection.          Microbiology Results (last 10 days)       Procedure Component Value - Date/Time    Respiratory Panel PCR w/COVID-19(SARS-CoV-2) JAMIL/ESME/GRACE/PAD/COR/BRENDON In-House, NP Swab in UTM/VTM, 2 HR TAT - Swab, Nasopharynx [[id number]]  (Normal) Collected: 02/15/25 0856    Lab Status: Final result Specimen: Swab from Nasopharynx Updated: 02/15/25 0951     ADENOVIRUS, PCR Not Detected     Coronavirus 229E Not Detected     Coronavirus HKU1 Not Detected     Coronavirus NL63 Not Detected     Coronavirus OC43 Not Detected     COVID19 Not Detected     Human Metapneumovirus Not Detected     Human Rhinovirus/Enterovirus Not Detected     Influenza A PCR Not Detected     Influenza B PCR Not Detected     Parainfluenza Virus 1 Not Detected     Parainfluenza Virus 2 Not Detected     Parainfluenza Virus 3 Not Detected     Parainfluenza Virus 4 Not Detected     RSV, PCR Not Detected     Bordetella pertussis pcr Not Detected     Bordetella parapertussis PCR Not Detected     Chlamydophila pneumoniae PCR Not Detected     Mycoplasma pneumo by PCR Not Detected    Narrative:      In the setting of a positive respiratory panel with a viral infection PLUS a negative procalcitonin without other underlying concern for bacterial infection, consider observing off antibiotics or discontinuation of antibiotics and continue supportive care. If the respiratory  panel is positive for atypical bacterial infection (Bordetella pertussis, Chlamydophila pneumoniae, or Mycoplasma pneumoniae), consider antibiotic de-escalation to target atypical bacterial infection.             Documented weights    02/15/25 0833   Weight: 79.4 kg (175 lb)        Hospital Course: Lynn Magana is a 49-year-old female with a past medical history of arthritis, carotid artery stenosis, degenerative disc disease, diabetes mellitus type 1, GERD, Graves' disease, hyperlipidemia, hypertension, hypothyroidism, seizures and thyromegaly.  Patient presented to [facility] emergency department on 2/15/2025 with complaints of fatigue, shortness of breath and nausea over the past 2 weeks.  States it is hard to describe she said it just hits her and she feels like staying in bed for the last several weeks.  Shortness of breath is mainly exertional and she states it happens without warning.  The workup revealed a VBG pH of 7.3, pO2 of 21.6, initial troponin of 31, subsequent of 27, , CL 92, CO2 19 0, anion gap of 20, BUN 21, CR 1.59, , Ca 10.6, TSH 0.034, CBC unremarkable, urinalysis unremarkable, respiratory PCR negative.  Patient does use chronic marijuana and we discussed with patient that this is contributing significantly to her hyperemesis.  In addition her thyroid function has not been assessed in quite some time and so patient's Cytomel and Synthroid will be placed on hold until can be reviewed by Dr. Darryl Andrews.  Patient will be hydrated overnight, nausea controlled with Zofran and placed on a GI low irritant diet.  All patient and spouse's questions were answered to the best my ability and they are in agreement for admission and treatment.     Stage I acute kidney injury-creatinine on admission was 1.59, baseline of 0.95, 1.5 times baseline.  Patient received fluid hydration overnight, complete resolution to all nausea and vomiting.  Creatinine stable this morning at 1.21 encourage patient  "to continue p.o. intake.  Hold Aldactone and Jardiance an additional 2 days and to discuss with PCP.     Thyroid megaly, hyperthyroidism, Graves' disease-currently patient is on Synthroid and Cytomel TSH of 0.034 Free T4 1.65, free T3 2.75 patient was instructed to hold her Synthroid and Cytomel until she can discuss with Dr. Andrews.    Type 2 diabetes mellitus-A1c 10.4, patient was instructed to continue her long-acting insulin and metformin and to discuss changes with PCP next week.    This morning patient is resting comfortably in a chair on room air with no family at bedside.  Patient states her nausea and vomiting is completely resolved.  She feels much better after fluid hydration.  Additionally discussed her use of propranolol and Coreg and to clarify with Dr. Andrews.  We discussed her thyroid levels and her A1c and her need to discuss with Dr. Andrews as she needs long-term management and changes.  In addition we had a long discussion regarding her excessive THC use and her need to discontinue as it is significantly contributing to her hyperemesis.  Patient verbalized understanding all questions were answered to the best of my ability and she is in agreement for discharge home today.    Patient has reached the maximum benefit of hospitalization and is stable for discharge  Patient has been evaluated today 02/16/25 and is stable for discharge.     Physical Exam on Discharge:  BP 90/58 (BP Location: Right arm, Patient Position: Lying)   Pulse 85   Temp 98.2 °F (36.8 °C) (Oral)   Resp 16   Ht 188 cm (74\")   Wt 79.4 kg (175 lb)   SpO2 99%   BMI 22.47 kg/m²   Physical Exam  Constitutional:       General: She is not in acute distress.     Appearance: Normal appearance. She is obese. She is not ill-appearing or toxic-appearing.   HENT:      Head: Normocephalic and atraumatic.      Right Ear: Tympanic membrane normal.      Left Ear: Tympanic membrane normal.      Nose: Nose normal.      Mouth/Throat:      " Mouth: Mucous membranes are moist.      Pharynx: Oropharynx is clear.   Eyes:      Pupils: Pupils are equal, round, and reactive to light.   Cardiovascular:      Rate and Rhythm: Normal rate and regular rhythm.      Pulses: Normal pulses.      Heart sounds: Normal heart sounds.   Pulmonary:      Effort: Pulmonary effort is normal.      Breath sounds: Normal breath sounds.   Abdominal:      General: Abdomen is flat. Bowel sounds are normal.      Palpations: Abdomen is soft.   Genitourinary:     Comments: Per bathroom privileges  Musculoskeletal:         General: Normal range of motion.   Skin:     General: Skin is warm.      Capillary Refill: Capillary refill takes less than 2 seconds.      Coloration: Skin is pale.   Neurological:      General: No focal deficit present.      Mental Status: She is alert and oriented to person, place, and time.   Psychiatric:         Mood and Affect: Mood normal.         Behavior: Behavior normal.         Thought Content: Thought content normal.         Judgment: Judgment normal.           Condition on Discharge: Able for discharge home    Discharge Disposition:  Home or Self Care    Discharge Medications:     Discharge Medications        New Medications        Instructions Start Date   Melatonin 10 MG capsule   10 mg, Oral, Nightly PRN      ondansetron ODT 4 MG disintegrating tablet  Commonly known as: ZOFRAN-ODT   4 mg, Oral, Every 6 Hours PRN             Continue These Medications        Instructions Start Date   amLODIPine 10 MG tablet  Commonly known as: NORVASC   10 mg, Oral, Daily      ARIPiprazole 10 MG tablet  Commonly known as: ABILIFY   10 mg, Oral, Daily      carvedilol 25 MG tablet  Commonly known as: COREG   25 mg, Oral, 2 Times Daily With Meals      diclofenac 75 MG EC tablet  Commonly known as: VOLTAREN   75 mg, 2 Times Daily      empagliflozin 25 MG tablet tablet  Commonly known as: JARDIANCE   25 mg, Daily      Evolocumab solution auto-injector SureClick  injection  Commonly known as: REPATHA   140 mg, Every 14 Days      gabapentin 300 MG capsule  Commonly known as: NEURONTIN   300 mg, 3 Times Daily      hydrOXYzine pamoate 25 MG capsule  Commonly known as: VISTARIL   25 mg, Oral, 3 Times Daily PRN      insulin glargine 100 UNIT/ML injection  Commonly known as: LANTUS, SEMGLEE   75 Units, Daily      metFORMIN  MG 24 hr tablet  Commonly known as: GLUCOPHAGE-XR   500 mg, 2 Times Daily      rosuvastatin 20 MG tablet  Commonly known as: CRESTOR   20 mg, Oral, Daily      spironolactone 50 MG tablet  Commonly known as: ALDACTONE   50 mg, 2 Times Daily      vitamin D 1.25 MG (81056 UT) capsule capsule  Commonly known as: ERGOCALCIFEROL   1 capsule, Weekly             Stop These Medications      levothyroxine 175 MCG tablet  Commonly known as: SYNTHROID, LEVOTHROID     propranolol  MG 24 hr capsule  Commonly known as: INDERAL LA                Discharge Diet:   Diet Instructions       Diet: Gastrointestinal Diets; Low Irritant; Regular (IDDSI 7); Thin (IDDSI 0)      Discharge Diet: Gastrointestinal Diets    Gastrointestinal Diet: Low Irritant    Texture: Regular (IDDSI 7)    Fluid Consistency: Thin (IDDSI 0)            Activity at Discharge:   Activity Instructions       Activity as Tolerated              Discharge Instructions:   1.  Patient was instructed return for medical attention for any new or worsening nausea and vomiting, chest pain or shortness of breath.  2.  Patient was instructed to follow-up with PCP in 1 week to discuss her thyroid function and increasing A1c.  3.  Patient was instructed verbally and with discharge instruction on all new medications.  4.  Patient was instructed to discontinue THC use as it is significantly contributing to her hyperemesis    Follow-up Appointments:   No future appointments.    Test Results Pending at Discharge: None    Electronically signed by NATHALY Rodrigues, 02/16/25, 11:45 CST.    Time: 35 minutes.           Electronically signed by Casper Gao MD at 02/16/25 5247

## 2025-02-17 NOTE — OUTREACH NOTE
Prep Survey      Flowsheet Row Responses   [facility] facility patient discharged from? [facility]   Is LACE score < 7 ? No   Eligibility Readm Mgmt   Discharge diagnosis Stage 1 acute kidney injury   Does the patient have one of the following disease processes/diagnoses(primary or secondary)? Other   Does the patient have Home health ordered? No   Is there a DME ordered? No   Medication alerts for this patient see avs   Prep survey completed? Yes            Coty WILDER - Registered Nurse

## 2025-02-19 ENCOUNTER — READMISSION MANAGEMENT (OUTPATIENT)
Dept: CALL CENTER | Facility: HOSPITAL | Age: 50
End: 2025-02-19
Payer: COMMERCIAL

## 2025-02-19 NOTE — OUTREACH NOTE
Medical Week 1 Survey      Flowsheet Row Responses   [facility] patient discharged from? [place]   Does the patient have one of the following disease processes/diagnoses(primary or secondary)? Other   Week 1 attempt successful? Yes   Call start time 1718   Call end time 1720   Discharge diagnosis Stage 1 acute kidney injury       Thyromegaly       Hyperthyroidism       Type 2 diabetes mellitus, with long-term current use of insulin       Hypertension       Graves' disease   Person spoke with today (if not patient) and relationship Patient   Meds reviewed with patient/caregiver? Yes   Does the patient have all medications ordered at discharge? Yes   Is the patient taking all medications as directed (includes completed medication regime)? Yes   Does the patient have a primary care provider?  Yes   Does the patient have an appointment with their PCP within 7 days of discharge? Greater than 7 days   Comments regarding PCP Patient reports appt scheduled with PCP next week.   Nursing Interventions Verified appointment date/time/provider   Has the patient kept scheduled appointments due by today? N/A   Has home health visited the patient within 72 hours of discharge? N/A   Psychosocial issues? No   Did the patient receive a copy of their discharge instructions? Yes   Nursing interventions Reviewed instructions with patient   What is the patient's perception of their health status since discharge? Improving  [Patient reports appetite still poor.]   Is the patient/caregiver able to teach back signs and symptoms related to disease process for when to call PCP? Yes   Is the patient/caregiver able to teach back signs and symptoms related to disease process for when to call 911? Yes   Is the patient/caregiver able to teach back the hierarchy of who to call/visit for symptoms/problems? PCP, Specialist, Home health nurse, Urgent Care, ED, 911 Yes   If the patient is a current smoker, are they able to teach back resources for  cessation? Not a smoker   Week 1 call completed? Yes   Would this patient benefit from a Referral to [facility] Social Work? No   Is the patient interested in additional calls from an ambulatory ? No   Call end time 1720            ROSALINDA CALVILLO - Registered Nurse

## 2025-03-05 ENCOUNTER — OFFICE VISIT (OUTPATIENT)
Age: 50
End: 2025-03-05
Payer: MEDICAID

## 2025-03-05 ENCOUNTER — HOSPITAL ENCOUNTER (EMERGENCY)
Age: 50
Discharge: HOME | End: 2025-03-05

## 2025-03-05 VITALS
SYSTOLIC BLOOD PRESSURE: 82 MMHG | HEART RATE: 88 BPM | HEIGHT: 72 IN | TEMPERATURE: 97.4 F | DIASTOLIC BLOOD PRESSURE: 58 MMHG | RESPIRATION RATE: 18 BRPM | BODY MASS INDEX: 32.78 KG/M2 | OXYGEN SATURATION: 98 % | WEIGHT: 242 LBS

## 2025-03-05 DIAGNOSIS — S09.90XA INJURY OF HEAD, INITIAL ENCOUNTER: Primary | ICD-10-CM

## 2025-03-05 DIAGNOSIS — R42 DIZZINESS: ICD-10-CM

## 2025-03-05 PROCEDURE — 99203 OFFICE O/P NEW LOW 30 MIN: CPT

## 2025-03-05 ASSESSMENT — ENCOUNTER SYMPTOMS
SINUS PRESSURE: 0
COLOR CHANGE: 0
SORE THROAT: 0
CONSTIPATION: 0
SINUS PAIN: 0
VOMITING: 0
TROUBLE SWALLOWING: 0
CHEST TIGHTNESS: 0
APNEA: 0
ABDOMINAL DISTENTION: 0
NAUSEA: 0
ABDOMINAL PAIN: 0
COUGH: 0
SHORTNESS OF BREATH: 0
EYE PAIN: 0
RHINORRHEA: 0
PHOTOPHOBIA: 1
EYE ITCHING: 0
WHEEZING: 0
DIARRHEA: 0
EYE DISCHARGE: 0

## 2025-03-05 NOTE — PATIENT INSTRUCTIONS
- Patient sent to ER for higher level of care.  - Report called to JORDYN Gray by Nader Hale MA.  - Offered EMS, patient refused at this time. AMA formed signed by patient.  - Patients  is present, he took her out to the car by wheelchair and states he is taking her straight to [facility].   - Neuro exam WNL.  - Patient stable at this time.

## 2025-03-05 NOTE — PROGRESS NOTES
[facility]  [facility]  [address] [place] [address] [address]IVE  [facility] [address]  Dept: [phone]  Dept Fax: [phone]  Loc: [phone]    Sydney Hassan is a 49 y.o. female who presents today for her medical conditions/complaints as noted below.  Sydney Hassan is c/o of Fall (While standing up and getting out of chair had a dizzy spell and hit back of head on concrete, tender to the touch, no change in vision, did not loose consciousness  )        HPI:     Sydney Hassan presents with complaints of head injury. Patient states she was standing up out of her chair about 45 minutes ago, had a dizzy spell, and fell and hit the back of her head on the concrete floor. She denies losing consciousness. She states she was home alone at the time and she called her , he came and picked her up and brought her here. He is very worried about her because he states she has been having these dizzy spells for quite some time now and it causing her to fall often. Her blood pressure in office today is 82/58. She has taken all of her daily medications this morning, including her diabetic medication and her BP medication. She states her blood sugar this morning was around 100. She denies any change in vision or loss of sensation. She states she is pain and it hurts between her eyes. She is photophobic. She is lethargic and does not appear to feel well during the exam.    Denies any recent antibiotic or steroid administration.      Past Medical History:   Diagnosis Date    Anxiety     Depression     GERD (gastroesophageal reflux disease)     Heart palpitations     Hyperlipidemia     Hypertension     Hyperthyroidism     Type 2 diabetes mellitus without complication (HCC)      Past Surgical History:   Procedure Laterality Date     SECTION      CHOLECYSTECTOMY      HYSTERECTOMY, VAGINAL  2011    Cervix only removed    THYROIDECTOMY  2018    total-Dr Murrell

## 2025-03-14 ENCOUNTER — OFFICE VISIT (OUTPATIENT)
Dept: GASTROENTEROLOGY | Age: 50
End: 2025-03-14
Payer: MEDICAID

## 2025-03-14 ENCOUNTER — HOSPITAL ENCOUNTER (OUTPATIENT)
Dept: CT IMAGING | Age: 50
Discharge: HOME | End: 2025-03-14
Payer: MEDICAID

## 2025-03-14 VITALS
OXYGEN SATURATION: 100 % | DIASTOLIC BLOOD PRESSURE: 80 MMHG | HEIGHT: 72 IN | BODY MASS INDEX: 23.03 KG/M2 | WEIGHT: 170 LBS | HEART RATE: 100 BPM | SYSTOLIC BLOOD PRESSURE: 115 MMHG

## 2025-03-14 DIAGNOSIS — R63.4 WEIGHT LOSS: Primary | ICD-10-CM

## 2025-03-14 DIAGNOSIS — R11.2 NAUSEA AND VOMITING, UNSPECIFIED VOMITING TYPE: ICD-10-CM

## 2025-03-14 DIAGNOSIS — R19.7 DIARRHEA, UNSPECIFIED TYPE: ICD-10-CM

## 2025-03-14 DIAGNOSIS — R13.10 DYSPHAGIA, UNSPECIFIED TYPE: ICD-10-CM

## 2025-03-14 DIAGNOSIS — R63.4 WEIGHT LOSS: ICD-10-CM

## 2025-03-14 PROCEDURE — 6360000004 HC RX CONTRAST MEDICATION: Performed by: NURSE PRACTITIONER

## 2025-03-14 PROCEDURE — G8417 CALC BMI ABV UP PARAM F/U: HCPCS | Performed by: NURSE PRACTITIONER

## 2025-03-14 PROCEDURE — 3017F COLORECTAL CA SCREEN DOC REV: CPT | Performed by: NURSE PRACTITIONER

## 2025-03-14 PROCEDURE — 74178 CT ABD&PLV WO CNTR FLWD CNTR: CPT

## 2025-03-14 PROCEDURE — 99214 OFFICE O/P EST MOD 30 MIN: CPT | Performed by: NURSE PRACTITIONER

## 2025-03-14 PROCEDURE — 1036F TOBACCO NON-USER: CPT | Performed by: NURSE PRACTITIONER

## 2025-03-14 PROCEDURE — G8427 DOCREV CUR MEDS BY ELIG CLIN: HCPCS | Performed by: NURSE PRACTITIONER

## 2025-03-14 RX ORDER — IOPAMIDOL 755 MG/ML
75 INJECTION, SOLUTION INTRAVASCULAR
Status: COMPLETED | OUTPATIENT
Start: 2025-03-14 | End: 2025-03-14

## 2025-03-14 RX ADMIN — IOPAMIDOL 75 ML: 755 INJECTION, SOLUTION INTRAVENOUS at 16:10

## 2025-03-14 NOTE — PROGRESS NOTES
Subjective:     Patient ID: Sydney Hassan is a 49 y.o. female  PCP: Khurram Garcia DO  Referring Provider: Khurram Garcia DO    HPI  Patient presents to the office today with the following complaints: New Patient and Abdominal Pain      Patient seen in the office today with complaints of difficulty swallowing   States she can not eat anything as it comes right back up   This has been going about 3 months     She is eating very little she has lost over 30 pounds in 2 months     Reports she is also having diarrhea, states she is having more than one loose stool per day   Denies seeing blood in her stool   Jaron abd pain       Assessment:     1. Weight loss  -     CT ABDOMEN PELVIS W WO CONTRAST Additional Contrast? None; Future  2. Nausea and vomiting, unspecified vomiting type  -     CT ABDOMEN PELVIS W WO CONTRAST Additional Contrast? None; Future  3. Dysphagia, unspecified type  -     CT ABDOMEN PELVIS W WO CONTRAST Additional Contrast? None; Future  4. Diarrhea, unspecified type       Review of Systems   Constitutional:  Negative for activity change, appetite change, fatigue, fever and unexpected weight change.   HENT:  Positive for trouble swallowing.    Respiratory:  Negative for cough, choking and shortness of breath.    Cardiovascular:  Negative for chest pain.   Gastrointestinal:  Positive for diarrhea, nausea and vomiting. Negative for abdominal distention, abdominal pain, anal bleeding, blood in stool, constipation and rectal pain.   Allergic/Immunologic: Negative for food allergies.   All other systems reviewed and are negative.      Plan:   CT abd  Schedule Colonoscopy and EGD  Instruct on bowel prep.   Nothing to eat or drink after midnight the day of the exam.  Unable to drive for 24 hours after the procedure.   No aspirin or nonsteroidal anti-inflammatories for 5 days before procedure.  I have discussed the benefits, alternatives, and risks (including bleeding, perforation and death)  for pursuing

## 2025-03-19 ENCOUNTER — RESULTS FOLLOW-UP (OUTPATIENT)
Dept: CT IMAGING | Age: 50
End: 2025-03-19

## 2025-03-20 ENCOUNTER — APPOINTMENT (OUTPATIENT)
Dept: OPERATING ROOM | Age: 50
End: 2025-03-20
Attending: INTERNAL MEDICINE

## 2025-03-20 ENCOUNTER — TELEPHONE (OUTPATIENT)
Dept: GASTROENTEROLOGY | Age: 50
End: 2025-03-20

## 2025-03-20 ENCOUNTER — HOSPITAL ENCOUNTER (OUTPATIENT)
Age: 50
Setting detail: SPECIMEN
Discharge: HOME | End: 2025-03-20
Payer: MEDICAID

## 2025-03-20 ENCOUNTER — ANESTHESIA (OUTPATIENT)
Dept: OPERATING ROOM | Age: 50
End: 2025-03-20

## 2025-03-20 ENCOUNTER — ANESTHESIA EVENT (OUTPATIENT)
Dept: OPERATING ROOM | Age: 50
End: 2025-03-20

## 2025-03-20 ENCOUNTER — HOSPITAL ENCOUNTER (OUTPATIENT)
Age: 50
Setting detail: OUTPATIENT SURGERY
Discharge: HOME | End: 2025-03-20
Attending: INTERNAL MEDICINE | Admitting: INTERNAL MEDICINE

## 2025-03-20 VITALS
HEART RATE: 84 BPM | TEMPERATURE: 97 F | BODY MASS INDEX: 29.8 KG/M2 | RESPIRATION RATE: 16 BRPM | WEIGHT: 220 LBS | OXYGEN SATURATION: 96 % | DIASTOLIC BLOOD PRESSURE: 72 MMHG | SYSTOLIC BLOOD PRESSURE: 106 MMHG | HEIGHT: 72 IN

## 2025-03-20 PROCEDURE — 88305 TISSUE EXAM BY PATHOLOGIST: CPT

## 2025-03-20 PROCEDURE — 88342 IMHCHEM/IMCYTCHM 1ST ANTB: CPT

## 2025-03-20 PROCEDURE — 43239 EGD BIOPSY SINGLE/MULTIPLE: CPT

## 2025-03-20 PROCEDURE — 45378 DIAGNOSTIC COLONOSCOPY: CPT

## 2025-03-20 RX ORDER — SODIUM CHLORIDE, SODIUM LACTATE, POTASSIUM CHLORIDE, CALCIUM CHLORIDE 600; 310; 30; 20 MG/100ML; MG/100ML; MG/100ML; MG/100ML
INJECTION, SOLUTION INTRAVENOUS CONTINUOUS
Status: DISCONTINUED | OUTPATIENT
Start: 2025-03-20 | End: 2025-03-20 | Stop reason: HOSPADM

## 2025-03-20 RX ORDER — PROPOFOL 10 MG/ML
INJECTION, EMULSION INTRAVENOUS
Status: DISCONTINUED | OUTPATIENT
Start: 2025-03-20 | End: 2025-03-20 | Stop reason: SDUPTHER

## 2025-03-20 RX ORDER — OMEPRAZOLE 40 MG/1
40 CAPSULE, DELAYED RELEASE ORAL
Qty: 30 CAPSULE | Refills: 3 | Status: SHIPPED | OUTPATIENT
Start: 2025-03-20

## 2025-03-20 RX ORDER — LEVOTHYROXINE SODIUM 200 UG/1
TABLET ORAL
COMMUNITY
Start: 2025-01-15

## 2025-03-20 RX ORDER — LIDOCAINE HYDROCHLORIDE 10 MG/ML
INJECTION, SOLUTION EPIDURAL; INFILTRATION; INTRACAUDAL; PERINEURAL
Status: DISCONTINUED | OUTPATIENT
Start: 2025-03-20 | End: 2025-03-20 | Stop reason: SDUPTHER

## 2025-03-20 RX ADMIN — LIDOCAINE HYDROCHLORIDE 50 MG: 10 INJECTION, SOLUTION EPIDURAL; INFILTRATION; INTRACAUDAL; PERINEURAL at 09:28

## 2025-03-20 RX ADMIN — PROPOFOL 100 MG: 10 INJECTION, EMULSION INTRAVENOUS at 09:28

## 2025-03-20 RX ADMIN — PROPOFOL 20 MG: 10 INJECTION, EMULSION INTRAVENOUS at 09:32

## 2025-03-20 RX ADMIN — PROPOFOL 20 MG: 10 INJECTION, EMULSION INTRAVENOUS at 09:41

## 2025-03-20 RX ADMIN — PROPOFOL 20 MG: 10 INJECTION, EMULSION INTRAVENOUS at 09:39

## 2025-03-20 RX ADMIN — PROPOFOL 20 MG: 10 INJECTION, EMULSION INTRAVENOUS at 09:35

## 2025-03-20 RX ADMIN — SODIUM CHLORIDE, SODIUM LACTATE, POTASSIUM CHLORIDE, CALCIUM CHLORIDE: 600; 310; 30; 20 INJECTION, SOLUTION INTRAVENOUS at 07:50

## 2025-03-20 RX ADMIN — PROPOFOL 20 MG: 10 INJECTION, EMULSION INTRAVENOUS at 09:37

## 2025-03-20 ASSESSMENT — PAIN - FUNCTIONAL ASSESSMENT
PAIN_FUNCTIONAL_ASSESSMENT: NONE - DENIES PAIN
PAIN_FUNCTIONAL_ASSESSMENT: NONE - DENIES PAIN

## 2025-03-20 NOTE — OP NOTE
Endoscopic Procedure Note    Patient: Sydney Hassan : 1975  Med Rec#: [id number] Acc#: [id number]     Primary Care Provider Khurram Garcia DO  Referring Provider: KALA Cuevas    Endoscopist: Sergey Petty MD    Date of Procedure:  3/20/2025    Procedure:   1. EGD with biopsy    Indications:   1. Nausea   2. Weight loss    Anesthesia:  Sedation was administered by anesthesia who monitored the patient during the procedure.    Estimated Blood Loss: minimal    Procedure:   After reviewing the patient's chart and obtaining informed consent, the patient was placed in the left lateral decubitus position.  A forward-viewing Olympus endoscope was lubricated and inserted through the mouth into the oropharynx. Under direct visualization, the upper esophagus was intubated. The scope was advanced to the level of the third portion of duodenum. Scope was slowly withdrawn with careful inspection of the mucosal surfaces. The scope was retroflexed for inspection of the gastric fundus and incisura. Findings and maneuvers are listed in impression below. The patient tolerated the procedure well. The scope was removed. There were no immediate complications.    Findings:   Esophagus: abnormal: there are questionable mucosal changes throughout the esophagus, random biopsies obtained.     There is no hiatal hernia present.      Stomach:  Abnormal: Mild mucosal changes suggestive of gastritis noted -  Gastric biopsies were taken from the antrum and body to rule out Helicobacter pylori infection.      Duodenum: normal      IMPRESSION:  1. Gastritis- biopsied  2. Esophagitis- biopsied     RECOMMENDATIONS:    1.  Await path results  2.  Start Prilosec daily- Rx sent  3.  Minimize/avoid NSAID use  4.  Follow up OV with KALA Trujillo in 8 weeks.     The results were discussed with the patient and family.  A copy of the images obtained were given to the patient.     Yanelis FRANCE, kimberly scribing for and in the

## 2025-03-20 NOTE — ANESTHESIA PRE PROCEDURE
solid food consumption: 03/18/25    BMI:   Wt Readings from Last 3 Encounters:   03/20/25 99.8 kg (220 lb)   03/14/25 77.1 kg (170 lb)   03/05/25 109.8 kg (242 lb)     Body mass index is 28.25 kg/m².    CBC: No results found for: \"WBC\", \"RBC\", \"HGB\", \"HCT\", \"MCV\", \"RDW\", \"PLT\"    CMP: No results found for: \"NA\", \"K\", \"CL\", \"CO2\", \"BUN\", \"CREATININE\", \"GFRAA\", \"AGRATIO\", \"LABGLOM\", \"GLUCOSE\", \"GLU\", \"CALCIUM\", \"BILITOT\", \"ALKPHOS\", \"AST\", \"ALT\"    POC Tests: No results for input(s): \"POCGLU\", \"POCNA\", \"POCK\", \"POCCL\", \"POCBUN\", \"POCHEMO\", \"POCHCT\" in the last 72 hours.    Coags: No results found for: \"PROTIME\", \"INR\", \"APTT\"    HCG (If Applicable): No results found for: \"PREGTESTUR\", \"PREGSERUM\", \"HCG\", \"HCGQUANT\"     ABGs: No results found for: \"PHART\", \"PO2ART\", \"LJQ3GXY\", \"LEC1KYL\", \"BEART\", \"E8PUVVPP\"     Type & Screen (If Applicable):  No results found for: \"ABORH\", \"LABANTI\"    Drug/Infectious Status (If Applicable):  No results found for: \"HIV\", \"HEPCAB\"    COVID-19 Screening (If Applicable): No results found for: \"COVID19\"        Anesthesia Evaluation  Patient summary reviewed and Nursing notes reviewed  Airway: Mallampati: I  TM distance: >3 FB   Neck ROM: full  Mouth opening: > = 3 FB   Dental: normal exam         Pulmonary:Negative Pulmonary ROS                              Cardiovascular:    (+) hypertension: moderate         Beta Blocker:  Dose within 24 Hrs         Neuro/Psych:   (+) psychiatric history:            GI/Hepatic/Renal:   (+) GERD:          Endo/Other:    (+) DiabetesType II DM, using insulin, hyperthyroidism::..                 Abdominal:             Vascular:          Other Findings:         Anesthesia Plan      general and TIVA     ASA 2       Induction: intravenous.      Anesthetic plan and risks discussed with patient.      Plan discussed with CRNA.                KALA Broussard - CARMELINA   3/20/2025

## 2025-03-20 NOTE — ANESTHESIA POSTPROCEDURE EVALUATION
Department of Anesthesiology  Postprocedure Note    Patient: Sydney Hassan  MRN: [id number]  YOB: 1975  Date of evaluation: 3/20/2025    Procedure Summary       Date: 03/20/25 Room / Location: [place] [address] / [facility]    Anesthesia Start: 0913 Anesthesia Stop: 0946    Procedures:       ESOPHAGOGASTRODUODENOSCOPY BIOPSY (Esophagus)      COLONOSCOPY DIAGNOSTIC (Abdomen) Diagnosis:       Dysphagia, unspecified type      Nausea and vomiting, unspecified vomiting type      Weight loss      Diarrhea, unspecified type      (Dysphagia, unspecified type [R13.10])      (Nausea and vomiting, unspecified vomiting type [R11.2])      (Weight loss [R63.4])      (Diarrhea, unspecified type [R19.7])    Surgeons: Sergey Petty MD Responsible Provider: Joni Liu APRN - CRNA    Anesthesia Type: general, TIVA ASA Status: 2            Anesthesia Type: No value filed.    La Phase I:      La Phase II:      Anesthesia Post Evaluation    Patient location during evaluation: bedside  Patient participation: complete - patient cannot participate  Level of consciousness: responsive to physical stimuli  Pain score: 0  Airway patency: patent  Nausea & Vomiting: no nausea and no vomiting  Cardiovascular status: hemodynamically stable  Respiratory status: acceptable, spontaneous ventilation and room air  Hydration status: euvolemic  Pain management: adequate    No notable events documented.

## 2025-03-20 NOTE — OP NOTE
Patient: Sydney Hassan : 1975  Med Rec#: [id number] Acc#: [id number]   Primary Care Provider Khurram Garcia DO    Date of Procedure:  3/20/2025    Endoscopist: Sergey Petty MD    Referring Provider: Khurram Garcia DO, KALA Cuevas    Operation Performed: Colonoscopy (aborted)    Indications: weight loss, nausea    Anesthesia:  Sedation was administered by anesthesia who monitored the patient during the procedure.    I met with Sydney Hassan prior to procedure. We discussed the procedure itself, and I have discussed the risks of endoscopy (including-- but not limited to-- pain, discomfort, bleeding potentially requiring second endoscopic procedure and/or blood transfusion, organ perforation requiring operative repair, damage to organs near the colon, infection, aspiration, cardiopulmonary/allergic reaction), benefits, indications to endoscopy. Additionally, we discussed options other than colonoscopy. The patient expressed understanding. All questions answered. The patient decided to proceed with the procedure.  Signed informed consent was placed on the chart.    Blood Loss: minimal    Withdrawal time: n/a  Bowel Prep: poor    Complications: no immediate complications    DESCRIPTION OF PROCEDURE:     A time out was performed. After written informed consent was obtained, the patient was placed in the left lateral position.     The perianal area was inspected, and a digital rectal exam was performed. A rectal exam was performed: normal tone, no palpable lesions. At this point, a forward viewing Olympus colonoscope was inserted into the anus and carefully advanced to the sigmoid colon. The colonoscope was then slowly withdrawn with careful inspection of the mucosa in a linear and circumferential fashion. The scope was retroflexed in the rectum. Suction was utilized during the procedure to remove as much air as possible from the bowel. The colonoscope was removed from the patient, and the

## 2025-03-20 NOTE — TELEPHONE ENCOUNTER
Patient r/s to 5/8/25 - 2 day prep.  In talking to patient - she drank the morning prep the night before with the other bottle, she dfidn't want to get up at 3:00AM   Expressed the importance of following prep instructions and drinking plenty of clear liquids

## 2025-03-20 NOTE — H&P
Patient Name: Sydney Hassan  : 1975  MRN: [id number]  DATE: 25    Allergies:   Allergies   Allergen Reactions    Oxycodone Swelling        ENDOSCOPY  History and Physical    Procedure:    [x] Diagnostic Colonoscopy       [] Screening Colonoscopy  [x] EGD      [] ERCP      [] EUS       [] Other    [x] Previous office notes/History and Physical reviewed from the patients chart. Please see EMR for further details of HPI. I have examined the patient's status immediately prior to the procedure and:      Indications/HPI:    []Abdominal Pain   []Barretts  []Screening/Surveillance   []History of Polyps  [x]Dysphagia            [] +Cologard/DNA testing  []Abnormal Imaging              []EOE Hx              [] Family Hx of CRC/Polyps  []Anemia                            []Food Impaction       []Recent Poor Prep  []GI Bleed             []Lymphadenopathy  []History of Polyps  []Change in bowel habits [x]Heartburn/Reflux  []Cancer- GI/Lung  []Chest Pain - Non Cardiac []Heme (+) Stool []Ulcers  []Constipation  []Hemoptysis  []Incontinence    []Diarrhea  []Hypoxemia  []Rectal Bleed (BRBPR)  []Nausea/Vomiting   [] Varices  []Crohns/Colitis  []Pancreatic Cyst   [] Cirrhosis   []Pancreatitis    []Abnormal MRCP  []Elevated LFT [] Stent Removal, Previous ERCP  [x]Other: weight loss    Anesthesia:   [x] MAC [] Moderate Sedation   [] General   [] None     ROS: 12 pt Review of Symptoms was negative unless mentioned above    Medications:   Prior to Admission medications    Medication Sig Start Date End Date Taking? Authorizing Provider   promethazine (PHENERGAN) 25 MG tablet Take 1 tablet by mouth every 6 hours as needed for Nausea    Nelson Jarvis MD   aspirin 81 MG tablet Take 1 tablet by mouth daily    Nelson Jarvis MD   amLODIPine (NORVASC) 10 MG tablet Take 1 tablet by mouth daily Take 0.5 tablets by mouth daily    Nelson Jarvis MD   benazepril (LOTENSIN) 20 MG tablet Take 1 tablet by

## 2025-03-20 NOTE — TELEPHONE ENCOUNTER
Ahsan,    Per Dr Petty:    Findings:      Immediately upon entering the colon there was a large amount of retained solid and thick liquid stool. The volume and consistency was too great for an adequate exam. The procedure was aborted.      Recommendations:  1. Repeat colonoscopy: next available time with 2 day prep.        Sergey Petty MD  3/20/2025

## 2025-03-20 NOTE — DISCHARGE INSTRUCTIONS
EGD RECOMMENDATIONS:    1.  Await path results  2.  Start Prilosec daily- Rx sent  3.  Minimize/avoid NSAID use  4.  Follow up OV with KALA Trujillo in 8 weeks.     Recommendations:  1. Repeat colonoscopy: next available time with 2 day prep.NSAIDS (Non-steroidal Anti-Inflammatory)    You have been directed by your physician to avoid any NSAID's; the following medications are a list of those to avoid. If you think that you are taking any NSAID's notify your physician.     Over The Counter    Advil                      Motrin  Nuprin                   Ibuprofen  Midol                     Aleve  Naproxen              Orudis  Aspirin                   Michelle-Seeley    Prescriptions and Generics    Cataflam              Relafen  Voltaren               Clinoril  Indocin                 Naproxen  Arthrotec              Lodine  Daypro                 Nalfon  Toradol                Ansaid  Feldene               Meclofenamate  Fenoprofen          Ponstel  Mobic                   Celebrex  Vioxx    POST-OP ORDERS: ENDOSCOPY & COLONOSCOPY:    1. Rest today.    2. DO NOT eat or drink until wide awake; eat your usual diet today in moderate amount only.    3. DO NOT drive today.    4. Call physician if you have severe pain, vomiting, fever, rectal bleeding or black bowel movements.    5.  If a biopsy was taken or a polyp removed, you should expect to hear results in about 21 days.  If you have heard nothing from your physician by then, call the office for results.    6.  Discharge home when patient awake, vitals signs stable and tolerating liquids.    7. Call with questions or concerns [phone].

## 2025-03-21 ENCOUNTER — RESULTS FOLLOW-UP (OUTPATIENT)
Dept: LAB | Age: 50
End: 2025-03-21

## 2025-04-01 ENCOUNTER — TRANSCRIBE ORDERS (OUTPATIENT)
Dept: ADMINISTRATIVE | Age: 50
End: 2025-04-01

## 2025-04-01 DIAGNOSIS — Z12.31 ENCOUNTER FOR SCREENING MAMMOGRAM FOR MALIGNANT NEOPLASM OF BREAST: Primary | ICD-10-CM

## 2025-04-14 ENCOUNTER — HOSPITAL ENCOUNTER (OUTPATIENT)
Dept: WOMENS IMAGING | Age: 50
Discharge: HOME | End: 2025-04-14
Attending: INTERNAL MEDICINE
Payer: MEDICAID

## 2025-04-14 VITALS — WEIGHT: 220 LBS | BODY MASS INDEX: 28.25 KG/M2

## 2025-04-14 DIAGNOSIS — Z12.31 ENCOUNTER FOR SCREENING MAMMOGRAM FOR MALIGNANT NEOPLASM OF BREAST: ICD-10-CM

## 2025-04-14 PROCEDURE — 77063 BREAST TOMOSYNTHESIS BI: CPT

## 2025-04-18 ENCOUNTER — APPOINTMENT (OUTPATIENT)
Dept: GENERAL RADIOLOGY | Facility: HOSPITAL | Age: 50
End: 2025-04-18
Payer: COMMERCIAL

## 2025-04-18 ENCOUNTER — HOSPITAL ENCOUNTER (EMERGENCY)
Facility: HOSPITAL | Age: 50
Discharge: HOME | End: 2025-04-18
Payer: COMMERCIAL

## 2025-04-18 ENCOUNTER — APPOINTMENT (OUTPATIENT)
Dept: CT IMAGING | Facility: HOSPITAL | Age: 50
End: 2025-04-18
Payer: COMMERCIAL

## 2025-04-18 VITALS
HEART RATE: 97 BPM | OXYGEN SATURATION: 100 % | WEIGHT: 160 LBS | TEMPERATURE: 97.7 F | HEIGHT: 72 IN | RESPIRATION RATE: 28 BRPM | SYSTOLIC BLOOD PRESSURE: 148 MMHG | BODY MASS INDEX: 21.67 KG/M2 | DIASTOLIC BLOOD PRESSURE: 99 MMHG

## 2025-04-18 DIAGNOSIS — R79.89 ELEVATED LFTS: ICD-10-CM

## 2025-04-18 DIAGNOSIS — R63.4 WEIGHT LOSS: Primary | ICD-10-CM

## 2025-04-18 DIAGNOSIS — D72.819 LEUKOPENIA, UNSPECIFIED TYPE: ICD-10-CM

## 2025-04-18 DIAGNOSIS — R06.02 SHORTNESS OF BREATH: ICD-10-CM

## 2025-04-18 LAB
ALBUMIN SERPL-MCNC: 4 G/DL (ref 3.5–5.2)
ALBUMIN/GLOB SERPL: 1.4 G/DL
ALP SERPL-CCNC: 47 U/L (ref 39–117)
ALT SERPL W P-5'-P-CCNC: 203 U/L (ref 1–33)
AMMONIA BLD-SCNC: 14 UMOL/L (ref 11–51)
ANION GAP SERPL CALCULATED.3IONS-SCNC: 19 MMOL/L (ref 5–15)
ARTERIAL PATENCY WRIST A: ABNORMAL
AST SERPL-CCNC: 137 U/L (ref 1–32)
ATMOSPHERIC PRESS: 751 MMHG
BACTERIA UR QL AUTO: ABNORMAL /HPF
BASE EXCESS BLDA CALC-SCNC: -1.7 MMOL/L (ref 0–2)
BASOPHILS # BLD AUTO: 0.02 10*3/MM3 (ref 0–0.2)
BASOPHILS NFR BLD AUTO: 0.6 % (ref 0–1.5)
BDY SITE: ABNORMAL
BILIRUB SERPL-MCNC: 0.7 MG/DL (ref 0–1.2)
BILIRUB UR QL STRIP: ABNORMAL
BODY TEMPERATURE: 37
BUN SERPL-MCNC: 10 MG/DL (ref 6–20)
BUN/CREAT SERPL: 10.1 (ref 7–25)
CALCIUM SPEC-SCNC: 9.8 MG/DL (ref 8.6–10.5)
CHLORIDE SERPL-SCNC: 96 MMOL/L (ref 98–107)
CLARITY UR: ABNORMAL
CO2 SERPL-SCNC: 21 MMOL/L (ref 22–29)
COLOR UR: ABNORMAL
CREAT SERPL-MCNC: 0.99 MG/DL (ref 0.57–1)
DEPRECATED RDW RBC AUTO: 53.8 FL (ref 37–54)
EGFRCR SERPLBLD CKD-EPI 2021: 69.6 ML/MIN/1.73
EOSINOPHIL # BLD AUTO: 0.03 10*3/MM3 (ref 0–0.4)
EOSINOPHIL NFR BLD AUTO: 0.9 % (ref 0.3–6.2)
ERYTHROCYTE [DISTWIDTH] IN BLOOD BY AUTOMATED COUNT: 16.6 % (ref 12.3–15.4)
GEN 5 1HR TROPONIN T REFLEX: 23 NG/L
GLOBULIN UR ELPH-MCNC: 2.9 GM/DL
GLUCOSE SERPL-MCNC: 78 MG/DL (ref 65–99)
GLUCOSE UR STRIP-MCNC: NEGATIVE MG/DL
HCO3 BLDA-SCNC: 22.1 MMOL/L (ref 20–26)
HCT VFR BLD AUTO: 34.4 % (ref 34–46.6)
HGB BLD-MCNC: 11.1 G/DL (ref 12–15.9)
HGB UR QL STRIP.AUTO: NEGATIVE
HYALINE CASTS UR QL AUTO: ABNORMAL /LPF
IMM GRANULOCYTES # BLD AUTO: 0.01 10*3/MM3 (ref 0–0.05)
IMM GRANULOCYTES NFR BLD AUTO: 0.3 % (ref 0–0.5)
KETONES UR QL STRIP: ABNORMAL
LEUKOCYTE ESTERASE UR QL STRIP.AUTO: ABNORMAL
LYMPHOCYTES # BLD AUTO: 1.46 10*3/MM3 (ref 0.7–3.1)
LYMPHOCYTES NFR BLD AUTO: 44.2 % (ref 19.6–45.3)
Lab: ABNORMAL
MAGNESIUM SERPL-MCNC: 1.8 MG/DL (ref 1.6–2.6)
MCH RBC QN AUTO: 28.6 PG (ref 26.6–33)
MCHC RBC AUTO-ENTMCNC: 32.3 G/DL (ref 31.5–35.7)
MCV RBC AUTO: 88.7 FL (ref 79–97)
MODALITY: ABNORMAL
MONOCYTES # BLD AUTO: 0.24 10*3/MM3 (ref 0.1–0.9)
MONOCYTES NFR BLD AUTO: 7.3 % (ref 5–12)
NEUTROPHILS NFR BLD AUTO: 1.54 10*3/MM3 (ref 1.7–7)
NEUTROPHILS NFR BLD AUTO: 46.7 % (ref 42.7–76)
NITRITE UR QL STRIP: NEGATIVE
NRBC BLD AUTO-RTO: 0 /100 WBC (ref 0–0.2)
NT-PROBNP SERPL-MCNC: 147.8 PG/ML (ref 0–900)
PCO2 BLDA: 33.2 MM HG (ref 35–45)
PCO2 TEMP ADJ BLD: 33.2 MM HG (ref 35–45)
PH BLDA: 7.43 PH UNITS (ref 7.35–7.45)
PH UR STRIP.AUTO: 5.5 [PH] (ref 5–8)
PH, TEMP CORRECTED: 7.43 PH UNITS (ref 7.35–7.45)
PLATELET # BLD AUTO: 236 10*3/MM3 (ref 140–450)
PMV BLD AUTO: 10 FL (ref 6–12)
PO2 BLDA: 95.5 MM HG (ref 83–108)
PO2 TEMP ADJ BLD: 95.5 MM HG (ref 83–108)
POTASSIUM SERPL-SCNC: 4 MMOL/L (ref 3.5–5.2)
PROT SERPL-MCNC: 6.9 G/DL (ref 6–8.5)
PROT UR QL STRIP: ABNORMAL
RBC # BLD AUTO: 3.88 10*6/MM3 (ref 3.77–5.28)
RBC # UR STRIP: ABNORMAL /HPF
REF LAB TEST METHOD: ABNORMAL
SAO2 % BLDCOA: 99 % (ref 94–99)
SODIUM SERPL-SCNC: 136 MMOL/L (ref 136–145)
SP GR UR STRIP: 1.03 (ref 1–1.03)
SQUAMOUS #/AREA URNS HPF: ABNORMAL /HPF
TROPONIN T % DELTA: -8
TROPONIN T NUMERIC DELTA: -2 NG/L
TROPONIN T SERPL HS-MCNC: 25 NG/L
UROBILINOGEN UR QL STRIP: ABNORMAL
VENTILATOR MODE: ABNORMAL
WBC # UR STRIP: ABNORMAL /HPF
WBC NRBC COR # BLD AUTO: 3.3 10*3/MM3 (ref 3.4–10.8)

## 2025-04-18 PROCEDURE — 82803 BLOOD GASES ANY COMBINATION: CPT

## 2025-04-18 PROCEDURE — 82140 ASSAY OF AMMONIA: CPT | Performed by: PHYSICIAN ASSISTANT

## 2025-04-18 PROCEDURE — 25510000001 IOPAMIDOL PER 1 ML: Performed by: PHYSICIAN ASSISTANT

## 2025-04-18 PROCEDURE — 81001 URINALYSIS AUTO W/SCOPE: CPT | Performed by: PHYSICIAN ASSISTANT

## 2025-04-18 PROCEDURE — 85025 COMPLETE CBC W/AUTO DIFF WBC: CPT | Performed by: PHYSICIAN ASSISTANT

## 2025-04-18 PROCEDURE — 25810000003 LACTATED RINGERS PER 1000 ML: Performed by: PHYSICIAN ASSISTANT

## 2025-04-18 PROCEDURE — 93005 ELECTROCARDIOGRAM TRACING: CPT | Performed by: PHYSICIAN ASSISTANT

## 2025-04-18 PROCEDURE — 83735 ASSAY OF MAGNESIUM: CPT | Performed by: PHYSICIAN ASSISTANT

## 2025-04-18 PROCEDURE — 36600 WITHDRAWAL OF ARTERIAL BLOOD: CPT

## 2025-04-18 PROCEDURE — 96360 HYDRATION IV INFUSION INIT: CPT

## 2025-04-18 PROCEDURE — 80053 COMPREHEN METABOLIC PANEL: CPT | Performed by: PHYSICIAN ASSISTANT

## 2025-04-18 PROCEDURE — 99285 EMERGENCY DEPT VISIT HI MDM: CPT

## 2025-04-18 PROCEDURE — 71275 CT ANGIOGRAPHY CHEST: CPT

## 2025-04-18 PROCEDURE — 70450 CT HEAD/BRAIN W/O DYE: CPT

## 2025-04-18 PROCEDURE — 93010 ELECTROCARDIOGRAM REPORT: CPT | Performed by: STUDENT IN AN ORGANIZED HEALTH CARE EDUCATION/TRAINING PROGRAM

## 2025-04-18 PROCEDURE — 71045 X-RAY EXAM CHEST 1 VIEW: CPT

## 2025-04-18 PROCEDURE — 25810000003 LACTATED RINGERS SOLUTION: Performed by: PHYSICIAN ASSISTANT

## 2025-04-18 PROCEDURE — 83880 ASSAY OF NATRIURETIC PEPTIDE: CPT | Performed by: PHYSICIAN ASSISTANT

## 2025-04-18 PROCEDURE — 93005 ELECTROCARDIOGRAM TRACING: CPT

## 2025-04-18 PROCEDURE — 96361 HYDRATE IV INFUSION ADD-ON: CPT

## 2025-04-18 PROCEDURE — 84484 ASSAY OF TROPONIN QUANT: CPT | Performed by: PHYSICIAN ASSISTANT

## 2025-04-18 PROCEDURE — 36415 COLL VENOUS BLD VENIPUNCTURE: CPT

## 2025-04-18 RX ORDER — SODIUM CHLORIDE, SODIUM LACTATE, POTASSIUM CHLORIDE, CALCIUM CHLORIDE 600; 310; 30; 20 MG/100ML; MG/100ML; MG/100ML; MG/100ML
125 INJECTION, SOLUTION INTRAVENOUS CONTINUOUS
Status: DISCONTINUED | OUTPATIENT
Start: 2025-04-18 | End: 2025-04-18 | Stop reason: HOSPADM

## 2025-04-18 RX ORDER — SODIUM CHLORIDE, SODIUM LACTATE, POTASSIUM CHLORIDE, CALCIUM CHLORIDE 600; 310; 30; 20 MG/100ML; MG/100ML; MG/100ML; MG/100ML
125 INJECTION, SOLUTION INTRAVENOUS CONTINUOUS
Status: DISCONTINUED | OUTPATIENT
Start: 2025-04-18 | End: 2025-04-18

## 2025-04-18 RX ORDER — IOPAMIDOL 755 MG/ML
100 INJECTION, SOLUTION INTRAVASCULAR
Status: COMPLETED | OUTPATIENT
Start: 2025-04-18 | End: 2025-04-18

## 2025-04-18 RX ORDER — SODIUM CHLORIDE 0.9 % (FLUSH) 0.9 %
10 SYRINGE (ML) INJECTION AS NEEDED
Status: DISCONTINUED | OUTPATIENT
Start: 2025-04-18 | End: 2025-04-18 | Stop reason: HOSPADM

## 2025-04-18 RX ADMIN — IOPAMIDOL 100 ML: 755 INJECTION, SOLUTION INTRAVENOUS at 10:06

## 2025-04-18 RX ADMIN — SODIUM CHLORIDE, POTASSIUM CHLORIDE, SODIUM LACTATE AND CALCIUM CHLORIDE 125 ML/HR: 600; 310; 30; 20 INJECTION, SOLUTION INTRAVENOUS at 09:07

## 2025-04-18 RX ADMIN — SODIUM CHLORIDE, SODIUM LACTATE, POTASSIUM CHLORIDE, CALCIUM CHLORIDE 1000 ML: 20; 30; 600; 310 INJECTION, SOLUTION INTRAVENOUS at 10:47

## 2025-04-18 NOTE — ED PROVIDER NOTES
Subjective   History of Present Illness    Patient is a pleasant 49-year-old female who presents to ED with her .   is the predominant historian because the patient feels foggy headed and does not feel like she is a clear historian.    Chief complaint is shortness of breath and significant weight loss.    Has been describes her issues began in the past couple months or so when her appetite has significantly decreased.  The patient says when she attempts to eat, she salivates excessively and food just taste bad altogether.  She feels like her food cannot get past her mouth.  She denies any dysphagia.  Because of her limited eating, she has lost about 50 pounds in the past couple months as stated by .  She denies any abdominal pain.  She denies any fever, night sweats, or any other specific pain.    However, in the past 2 weeks, he has noted that she is exertionally short of breath.  She is not displaying any orthopnea or PND.  The duration of her shortness of breath is worsened with less steps that she takes.  She denies any chest pain, pressure, tightness.  She has not any respiratory issues with cough or congestion.  He denies any underlying pulmonary issue.    Last month prior to her shortness of breath, they did seek medical attention at an outlying clinic about her loss of appetite and weight loss so she completed a CT scan of her abdomen pelvis with IV contrast.  It was unremarkable.  She was scheduled to complete a colonoscopy recently but was unsuccessful in completing it since she had quite a bit of stool left in her.  She is scheduled to have another colonoscopy next week.  She saw a primary care provider about this and with her strong family history of breast cancer, she had a mammogram completed in the past week or 2.    She expresses concern that she is not being heard and she is not getting enough test completed so they are frustrated.  With the worsening symptoms,  brought  her to the ER to be further evaluated.    Patient denies any change in her bowel movement although is a little bit less.  She denies diarrhea.  She is urinating per usual without any issues.  She denies any headache or obvious swollen lymph nodes.  She is more confused and foggy headed but denies any specific one-sided weakness or other neurological deficits.    Review of Systems   Constitutional:  Positive for activity change, appetite change, fatigue and unexpected weight change.   HENT: Negative.  Negative for trouble swallowing and voice change.    Eyes: Negative.  Negative for visual disturbance.   Respiratory:  Positive for shortness of breath. Negative for chest tightness and wheezing.    Cardiovascular: Negative.  Negative for chest pain, palpitations and leg swelling.   Gastrointestinal: Negative.  Negative for abdominal pain, constipation, diarrhea, nausea and vomiting.   Genitourinary: Negative.  Negative for difficulty urinating and dyspareunia.   Musculoskeletal: Negative.    Neurological:  Positive for weakness. Negative for light-headedness.   Psychiatric/Behavioral:  Positive for behavioral problems, confusion and decreased concentration.    All other systems reviewed and are negative.      Past Medical History:   Diagnosis Date    Arthritis     Carotid artery stenosis     Degenerative disc disease, cervical     Depression     Diabetes mellitus     type 1    Disease of thyroid gland     GERD (gastroesophageal reflux disease)     Graves disease     Heart palpitations     Hyperlipidemia     Hypertension     Hypothyroidism     Seizure     Thyromegaly        Allergies   Allergen Reactions    Oxycodone-Acetaminophen Swelling     Other reaction(s): Throat swelling       Past Surgical History:   Procedure Laterality Date     SECTION      CHOLECYSTECTOMY      COLONOSCOPY  2015    Normal exam Dr. Morgan     COLONOSCOPY  2015    Normal exam    CYST REMOVAL      ENDOSCOPY N/A 2018     Normal exam    HYSTERECTOMY      THYROIDECTOMY Bilateral 11/19/2018    Procedure: total thyroidectomy;  Surgeon: Vitaly Givens MD;  Location: [facility] OR;  Service: ENT       Family History   Problem Relation Age of Onset    Stroke Mother     Diabetes Mother     Stroke Father     Diabetes Father     Breast cancer Sister     Diabetes Sister     Stroke Sister     Seizures Sister     Coronary artery disease Brother     Diabetes Brother     Breast cancer Maternal Aunt     Colon cancer Paternal Uncle     Colon polyps Neg Hx        Social History     Socioeconomic History    Marital status:      Spouse name: Evens    Number of children: 1    Years of education: 12   Tobacco Use    Smoking status: Never    Smokeless tobacco: Never   Vaping Use    Vaping status: Never Used   Substance and Sexual Activity    Alcohol use: Yes     Alcohol/week: 6.0 standard drinks of alcohol     Types: 6 Cans of beer per week    Drug use: Yes     Types: Marijuana    Sexual activity: Defer     Partners: Male       Prior to Admission medications    Medication Sig Start Date End Date Taking? Authorizing Provider   amLODIPine (NORVASC) 10 MG tablet Take 1 tablet by mouth Daily.    Luciano Hayes MD   ARIPiprazole (ABILIFY) 10 MG tablet Take 1 tablet by mouth Daily.    ProviderLuciano MD   carvedilol (COREG) 25 MG tablet Take 1 tablet by mouth 2 (Two) Times a Day With Meals.    Luciano Hayes MD   diclofenac (VOLTAREN) 75 MG EC tablet Take 1 tablet by mouth 2 (Two) Times a Day.    Luciano Hayes MD   empagliflozin (JARDIANCE) 25 MG tablet tablet Take 1 tablet by mouth Daily. 6/10/22   Luciano Hayes MD   Evolocumab (REPATHA) solution auto-injector SureClick injection Inject 1 mL under the skin into the appropriate area as directed Every 14 (Fourteen) Days.    Luciano Hayes MD   gabapentin (NEURONTIN) 300 MG capsule Take 1 capsule by mouth 3 (Three) Times a Day.  Patient taking differently:  "Take 1 capsule by mouth Daily.    Luciano Hayes MD   hydrOXYzine pamoate (VISTARIL) 25 MG capsule Take 1 capsule by mouth 3 (Three) Times a Day As Needed for Anxiety.    Luciano Hayes MD   insulin glargine (LANTUS, SEMGLEE) 100 UNIT/ML injection Inject 75 Units under the skin into the appropriate area as directed Daily.    Luciano Hayes MD   Melatonin 10 MG capsule Take 1 capsule by mouth At Night As Needed (INSOMNIA). 2/16/25   Donna Parks APRN   metFORMIN ER (GLUCOPHAGE-XR) 500 MG 24 hr tablet Take 1 tablet by mouth 2 (Two) Times a Day. 6/9/22   Luciano Hayes MD   ondansetron ODT (ZOFRAN-ODT) 4 MG disintegrating tablet Take 1 tablet by mouth Every 6 (Six) Hours As Needed for Nausea or Vomiting. 2/16/25   Donna Parks APRN   rosuvastatin (CRESTOR) 20 MG tablet Take 1 tablet by mouth Daily.    Luciano Hayes MD   spironolactone (ALDACTONE) 50 MG tablet Take 1 tablet by mouth 2 (Two) Times a Day.    Luciano Hayes MD   vitamin D (ERGOCALCIFEROL) 1.25 MG (69278 UT) capsule capsule Take 1 capsule by mouth 1 (One) Time Per Week.    Luciano Hayes MD       Medications   sodium chloride 0.9 % flush 10 mL (has no administration in time range)   lactated ringers infusion (0 mL/hr Intravenous Stopped 4/18/25 1308)   lactated ringers bolus 1,000 mL (0 mL Intravenous Stopped 4/18/25 1225)   iopamidol (ISOVUE-370) 76 % injection 100 mL (100 mL Intravenous Given 4/18/25 1006)       /99   Pulse 97   Temp 97.7 °F (36.5 °C) (Oral)   Resp 28   Ht 188 cm (74\")   Wt 72.6 kg (160 lb)   SpO2 100%   BMI 20.54 kg/m²       Objective   Physical Exam  Vitals and nursing note reviewed.   Constitutional:       General: She is not in acute distress.     Appearance: She is well-developed. She is not diaphoretic.   HENT:      Head: Normocephalic and atraumatic.   Eyes:      Extraocular Movements: Extraocular movements intact.      Conjunctiva/sclera: Conjunctivae normal. "      Pupils: Pupils are equal, round, and reactive to light.   Neck:      Trachea: No tracheal deviation.   Cardiovascular:      Rate and Rhythm: Normal rate and regular rhythm.      Heart sounds: Normal heart sounds. No murmur heard.  Pulmonary:      Effort: Pulmonary effort is normal. No tachypnea.      Breath sounds: Normal breath sounds. No decreased breath sounds, wheezing, rhonchi or rales.   Abdominal:      General: Bowel sounds are normal. There is no distension.      Palpations: Abdomen is soft. There is no mass.      Tenderness: There is no abdominal tenderness. There is no guarding or rebound.   Musculoskeletal:         General: Normal range of motion.      Cervical back: Normal range of motion and neck supple.      Right lower leg: No tenderness. No edema.      Left lower leg: No tenderness. No edema.   Lymphadenopathy:      Cervical: No cervical adenopathy.   Skin:     General: Skin is warm and dry.      Capillary Refill: Capillary refill takes less than 2 seconds.   Neurological:      General: No focal deficit present.      Mental Status: She is alert and oriented to person, place, and time.   Psychiatric:         Behavior: Behavior normal.         Thought Content: Thought content normal.         Judgment: Judgment normal.         Procedures         Lab Results (last 24 hours)       Procedure Component Value Units Date/Time    CBC & Differential [[id number]]  (Abnormal) Collected: 04/18/25 0908    Specimen: Blood Updated: 04/18/25 0921    Narrative:      The following orders were created for panel order CBC & Differential.  Procedure                               Abnormality         Status                     ---------                               -----------         ------                     CBC Auto Differential[[id number]]        Abnormal            Final result                 Please view results for these tests on the individual orders.    Comprehensive Metabolic Panel [[id number]]  (Abnormal)  Collected: 04/18/25 0908    Specimen: Blood Updated: 04/18/25 0947     Glucose 78 mg/dL      BUN 10 mg/dL      Creatinine 0.99 mg/dL      Sodium 136 mmol/L      Potassium 4.0 mmol/L      Chloride 96 mmol/L      CO2 21.0 mmol/L      Calcium 9.8 mg/dL      Total Protein 6.9 g/dL      Albumin 4.0 g/dL      ALT (SGPT) 203 U/L      AST (SGOT) 137 U/L      Alkaline Phosphatase 47 U/L      Total Bilirubin 0.7 mg/dL      Globulin 2.9 gm/dL      A/G Ratio 1.4 g/dL      BUN/Creatinine Ratio 10.1     Anion Gap 19.0 mmol/L      eGFR 69.6 mL/min/1.73     Narrative:      GFR Categories in Chronic Kidney Disease (CKD)      GFR Category          GFR (mL/min/1.73)    Interpretation  G1                     90 or greater         Normal or high (1)  G2                      60-89                Mild decrease (1)  G3a                   45-59                Mild to moderate decrease  G3b                   30-44                Moderate to severe decrease  G4                    15-29                Severe decrease  G5                    14 or less           Kidney failure          (1)In the absence of evidence of kidney disease, neither GFR category G1 or G2 fulfill the criteria for CKD.    eGFR calculation 2021 CKD-EPI creatinine equation, which does not include race as a factor    High Sensitivity Troponin T [[id number]]  (Abnormal) Collected: 04/18/25 0908    Specimen: Blood Updated: 04/18/25 0941     HS Troponin T 25 ng/L     Narrative:      High Sensitive Troponin T Reference Range:  <14.0 ng/L- Negative Female for AMI  <22.0 ng/L- Negative Male for AMI  >=14 - Abnormal Female indicating possible myocardial injury.  >=22 - Abnormal Male indicating possible myocardial injury.   Clinicians would have to utilize clinical acumen, EKG, Troponin, and serial changes to determine if it is an Acute Myocardial Infarction or myocardial injury due to an underlying chronic condition.         BNP [[id number]]  (Normal) Collected: 04/18/25 0908     Specimen: Blood Updated: 04/18/25 0941     proBNP 147.8 pg/mL     Narrative:      This assay is used as an aid in the diagnosis of individuals suspected of having heart failure. It can be used as an aid in the diagnosis of acute decompensated heart failure (ADHF) in patients presenting with signs and symptoms of ADHF to the emergency department (ED). In addition, NT-proBNP of <300 pg/mL indicates ADHF is not likely.    Age Range Result Interpretation  NT-proBNP Concentration (pg/mL:      <50             Positive            >450                   Gray                 300-450                    Negative             <300    50-75           Positive            >900                  Gray                300-900                  Negative            <300      >75             Positive            >1800                  Gray                300-1800                  Negative            <300    Magnesium [[id number]]  (Normal) Collected: 04/18/25 0908    Specimen: Blood Updated: 04/18/25 0947     Magnesium 1.8 mg/dL     Ammonia [[id number]]  (Normal) Collected: 04/18/25 0908    Specimen: Blood Updated: 04/18/25 0946     Ammonia 14 umol/L     CBC Auto Differential [[id number]]  (Abnormal) Collected: 04/18/25 0908    Specimen: Blood Updated: 04/18/25 0921     WBC 3.30 10*3/mm3      RBC 3.88 10*6/mm3      Hemoglobin 11.1 g/dL      Hematocrit 34.4 %      MCV 88.7 fL      MCH 28.6 pg      MCHC 32.3 g/dL      RDW 16.6 %      RDW-SD 53.8 fl      MPV 10.0 fL      Platelets 236 10*3/mm3      Neutrophil % 46.7 %      Lymphocyte % 44.2 %      Monocyte % 7.3 %      Eosinophil % 0.9 %      Basophil % 0.6 %      Immature Grans % 0.3 %      Neutrophils, Absolute 1.54 10*3/mm3      Lymphocytes, Absolute 1.46 10*3/mm3      Monocytes, Absolute 0.24 10*3/mm3      Eosinophils, Absolute 0.03 10*3/mm3      Basophils, Absolute 0.02 10*3/mm3      Immature Grans, Absolute 0.01 10*3/mm3      nRBC 0.0 /100 WBC     Urinalysis With Culture If Indicated -  Urine, Clean Catch [[id number]]  (Abnormal) Collected: 04/18/25 0918    Specimen: Urine, Clean Catch Updated: 04/18/25 0951     Color, UA Brookesmith     Appearance, UA Cloudy     pH, UA 5.5     Specific Gravity, UA 1.026     Glucose, UA Negative     Ketones, UA 80 mg/dL (3+)     Bilirubin, UA Large (3+)     Blood, UA Negative     Protein, UA 30 mg/dL (1+)     Leuk Esterase, UA Trace     Nitrite, UA Negative     Urobilinogen, UA 1.0 E.U./dL    Narrative:      Dipstick results may be inaccurate due to color interference.    Urinalysis, Microscopic Only - Urine, Clean Catch [[id number]]  (Abnormal) Collected: 04/18/25 0918    Specimen: Urine, Clean Catch Updated: 04/18/25 0951     RBC, UA 0-2 /HPF      WBC, UA 3-5 /HPF      Comment: Urine culture not indicated.        Bacteria, UA Trace /HPF      Squamous Epithelial Cells, UA 13-20 /HPF      Hyaline Casts, UA 3-6 /LPF      Methodology Manual Light Microscopy    Blood Gas, Arterial - [[id number]]  (Abnormal) Collected: 04/18/25 1021    Specimen: Arterial Blood Updated: 04/18/25 1019     Site Left Brachial     Bill's Test N/A     pH, Arterial 7.432 pH units      pCO2, Arterial 33.2 mm Hg      Comment: 84 Value below reference range        pO2, Arterial 95.5 mm Hg      HCO3, Arterial 22.1 mmol/L      Base Excess, Arterial -1.7 mmol/L      Comment: 84 Value below reference range        O2 Saturation, Arterial 99.0 %      Temperature 37.0     Barometric Pressure for Blood Gas 751 mmHg      Modality Room Air     Ventilator Mode NA     Collected by [id number]     Comment: Meter: [id number]     :  Breana Brown, RRT        pCO2, Temperature Corrected 33.2 mm Hg      pH, Temp Corrected 7.432 pH Units      pO2, Temperature Corrected 95.5 mm Hg     High Sensitivity Troponin T 1Hr [[id number]]  (Abnormal) Collected: 04/18/25 1038    Specimen: Blood Updated: 04/18/25 1104     HS Troponin T 23 ng/L      Troponin T Numeric Delta -2 ng/L      Troponin T % Delta -8    Narrative:       High Sensitive Troponin T Reference Range:  <14.0 ng/L- Negative Female for AMI  <22.0 ng/L- Negative Male for AMI  >=14 - Abnormal Female indicating possible myocardial injury.  >=22 - Abnormal Male indicating possible myocardial injury.   Clinicians would have to utilize clinical acumen, EKG, Troponin, and serial changes to determine if it is an Acute Myocardial Infarction or myocardial injury due to an underlying chronic condition.                 CT Angiogram Chest  Result Date: 4/18/2025  Narrative: EXAM: CT ANGIOGRAM CHEST- 4/18/2025 9:00 AM  HISTORY: Shortness of breath with tachycardia.  DOSE LENGTH PRODUCT: 217.74 mGy.cm mGy cm. Automated exposure control was also utilized to decrease patient radiation dose.  COMPARISON: None  TECHNIQUE: Helical tomographic images of the chest were obtained after the administration of intravenous contrast following angiogram protocol. Additionally, 3D and multiplanar reformatted images as well as MIPS were provided.   FINDINGS:  Lungs/Pleura: No consolidation or pleural effusion. No significant fibrotic changes. No pneumothorax.  Lower Neck: Unremarkable.  Mediastinum/Hilum: No enlarged lymphadenopathy.  Heart/Aorta: No pericardial effusion. Aorta is normal in caliber with mild atherosclerosis.  Pulmonary Arteries: Pulmonary arteries are well-opacified to the segmental level. No pulmonary embolism. Main pulmonary artery is normal in caliber.  Chest wall/Soft Tissues: Unremarkable.  Upper Abdomen: Hepatic steatosis. Stable mild nodular thickening of the right adrenal gland, likely hyperplasia or small adenomas.  Osseous Structures: No acute or suspicious osseous finding.      Impression:  No pulmonary embolism or other acute finding of the chest.  Hepatic steatosis.    This report was signed and finalized on 4/18/2025 10:26 AM by Ignacio Grajeda.      CT Head Without Contrast  Result Date: 4/18/2025  Narrative: EXAMINATION: CT HEAD WO CONTRAST-   4/18/2025 9:00 AM   HISTORY: confusion  In order to have a CT radiation dose as low as reasonably achievable Automated Exposure Control was utilized for adjustment of the mA and/or KV according to patient size.  CT Dose DLP = 648.85 mGy.cm. (If there are multiple studies performed at the same time this represents the total dose).  Images are stored in PACS per institutional protocol.   Comparison: 9/17/2024.  Axial, sagittal, and coronal noncontrast CT imaging of the head.  The visualized paranasal sinuses are clear.  The brain has an age appropriate appearance. While the ventricles are not significantly enlarged there is slight increased prominence of the lateral ventricles and third ventricle compared with 7 months ago. Lateral ventricles measure approximately 10 mm on coronal image 21 compared with 8 mm.  The third ventricle measures 10 mm on coronal image 21 as compared with 7-8 mm.  No intracranial hemorrhage or obstructing mass is seen.  The fourth ventricle is a normal and unchanged appearance.  There is no hemorrhage or mass-effect. No acute infarction is seen.  No calvarial abnormality.      Impression: 1. No intracranial hemorrhage or mass. 2. No midline shift. 3. Ventricle size is within normal limits though the lateral ventricles and third ventricle are a few millimeters larger than the exam from 7 months ago.    This report was signed and finalized on 4/18/2025 10:15 AM by Dr. Isaiah Diaz MD.      XR Chest 1 View  Result Date: 4/18/2025  Narrative: EXAM: XR CHEST 1 VW-  DATE: 4/18/2025 8:23 AM  HISTORY: sob   COMPARISON: 2/15/2025.  TECHNIQUE:  Frontal view(s) of the chest submitted.  FINDINGS:  The lungs are grossly clear. No effusion or pneumothorax is seen. Heart and mediastinum are unremarkable.       Impression:  1. No active disease in the chest.  This report was signed and finalized on 4/18/2025 9:27 AM by Ramon Cifuentes.      Mammo Screening Bilateral With CAD  Result Date: 4/14/2025  Narrative: EXAM: BILATERAL  DIGITAL SCREENING MAMMOGRAM. TECHNIQUE: Bilateral digital CC and MLO images with tomosynthesis.  CAD was utilized. HISTORY: Routine screening mammogram. Sister had breast cancer COMPARISON: 12/23/2019. FINDINGS: There are scattered areas of fibroglandular density.  There is no dominant mass, suspicious microcalcifications, or architectural distortion in either breast.    Impression: BI-RADS 1- Negative. RECOMMENDATION: Recommend patient return in 1 year for annual screening mammogram. ______________________________________ Electronically signed by: TRAVIS HARRELL M.D. Date:     04/14/2025 Time:    10:54 Performing Facility: [place] [address] Phone: [phone]      ED Course  ED Course as of 04/18/25 1452   Fri Apr 18, 2025   [id number] I reviewed this case with Dr. Bailon who has reviewed the laboratory data and previous studies in comparison.  There is a trending downward white blood cell count to point she is leukopenic today at 3.3.  Previous liver enzymes been evaluated and they have been rising in the past few months as well.  Dr. Bailon has spoken to the patient and  at length in regards to her presentation and recommendation. [TK]      ED Course User Index  [TK] Chyna Love PA          MDM     Amount and/or Complexity of Data Reviewed  Clinical lab tests: reviewed  Tests in the radiology section of CPT®: reviewed  Tests in the medicine section of CPT®: reviewed  Decide to obtain previous medical records or to obtain history from someone other than the patient: yes        Final diagnoses:   Weight loss   Leukopenia, unspecified type   Elevated LFTs   Shortness of breath            Chyna Love PA  04/18/25 7162

## 2025-04-18 NOTE — DISCHARGE INSTRUCTIONS
Please follow-up with your family doctor, Dr. Andrews.  I also recommend follow-up with an oncologist.  Information has been provided.  Return the ER for any acute worsening issues.

## 2025-04-19 LAB
QT INTERVAL: 328 MS
QT INTERVAL: 348 MS
QTC INTERVAL: 439 MS
QTC INTERVAL: 443 MS

## 2025-05-08 ENCOUNTER — HOSPITAL ENCOUNTER (OUTPATIENT)
Age: 50
Setting detail: OUTPATIENT SURGERY
Discharge: HOME | End: 2025-05-08
Attending: INTERNAL MEDICINE | Admitting: INTERNAL MEDICINE

## 2025-05-08 VITALS
RESPIRATION RATE: 18 BRPM | BODY MASS INDEX: 27.09 KG/M2 | WEIGHT: 200 LBS | OXYGEN SATURATION: 100 % | HEART RATE: 104 BPM | DIASTOLIC BLOOD PRESSURE: 91 MMHG | SYSTOLIC BLOOD PRESSURE: 135 MMHG | HEIGHT: 72 IN | TEMPERATURE: 97.5 F

## 2025-05-08 RX ORDER — SODIUM CHLORIDE, SODIUM LACTATE, POTASSIUM CHLORIDE, CALCIUM CHLORIDE 600; 310; 30; 20 MG/100ML; MG/100ML; MG/100ML; MG/100ML
INJECTION, SOLUTION INTRAVENOUS CONTINUOUS
Status: DISCONTINUED | OUTPATIENT
Start: 2025-05-08 | End: 2025-05-08 | Stop reason: HOSPADM

## 2025-05-08 ASSESSMENT — PAIN - FUNCTIONAL ASSESSMENT: PAIN_FUNCTIONAL_ASSESSMENT: 0-10

## 2025-05-08 NOTE — H&P
Patient Name: Sydney Hassan  : 1975  MRN: [id number]  DATE: 25    Allergies:   Allergies   Allergen Reactions    Oxycodone Swelling        ENDOSCOPY  History and Physical    Procedure:    [x] Diagnostic Colonoscopy       [] Screening Colonoscopy  [] EGD      [] ERCP      [] EUS       [] Other    [x] Previous office notes/History and Physical reviewed from the patients chart. Please see EMR for further details of HPI. I have examined the patient's status immediately prior to the procedure and:      Indications/HPI:    []Abdominal Pain   []Barretts  []Screening/Surveillance   []History of Polyps  []Dysphagia            [] +Cologard/DNA testing  []Abnormal Imaging              []EOE Hx              [] Family Hx of CRC/Polyps  []Anemia                            []Food Impaction       []Recent Poor Prep  []GI Bleed             []Lymphadenopathy  []History of Polyps  []Change in bowel habits []Heartburn/Reflux  []Cancer- GI/Lung  []Chest Pain - Non Cardiac []Heme (+) Stool []Ulcers  []Constipation  []Hemoptysis  []Incontinence    [x]Diarrhea  []Hypoxemia  []Rectal Bleed (BRBPR)  []Nausea/Vomiting   [] Varices  []Crohns/Colitis  []Pancreatic Cyst   [] Cirrhosis   []Pancreatitis    []Abnormal MRCP  []Elevated LFT [] Stent Removal, Previous ERCP  []Other:     Anesthesia:   [x] MAC [] Moderate Sedation   [] General   [] None     ROS: 12 pt Review of Symptoms was negative unless mentioned above    Medications:   Prior to Admission medications    Medication Sig Start Date End Date Taking? Authorizing Provider   levothyroxine (SYNTHROID) 200 MCG tablet  1/15/25   Nelson Jarvis MD   omeprazole (PRILOSEC) 40 MG delayed release capsule Take 1 capsule by mouth every morning (before breakfast) 3/20/25   Sergey Petty MD   promethazine (PHENERGAN) 25 MG tablet Take 1 tablet by mouth every 6 hours as needed for Nausea    Nelson Jarvis MD   aspirin 81 MG tablet Take 1 tablet by mouth daily

## 2025-05-08 NOTE — CARE COORDINATION
Procedure cancelled due to SOA with minimal exertion reported by patient. CRNA, patient and patient's  discussed risks of doing procedure today, patient and  verbalized understanding and states they are going to the ER for further workup.

## 2025-05-11 ENCOUNTER — APPOINTMENT (OUTPATIENT)
Dept: CT IMAGING | Facility: HOSPITAL | Age: 50
End: 2025-05-11
Payer: COMMERCIAL

## 2025-05-11 ENCOUNTER — HOSPITAL ENCOUNTER (OUTPATIENT)
Facility: HOSPITAL | Age: 50
Discharge: LEFT BEFORE DISCHARGE | End: 2025-05-14
Attending: INTERNAL MEDICINE | Admitting: INTERNAL MEDICINE
Payer: COMMERCIAL

## 2025-05-11 DIAGNOSIS — N30.00 ACUTE CYSTITIS WITHOUT HEMATURIA: ICD-10-CM

## 2025-05-11 DIAGNOSIS — E86.0 DEHYDRATION: Primary | ICD-10-CM

## 2025-05-11 DIAGNOSIS — R10.84 GENERALIZED ABDOMINAL PAIN: ICD-10-CM

## 2025-05-11 DIAGNOSIS — R63.8 UNABLE TO EAT: ICD-10-CM

## 2025-05-11 DIAGNOSIS — E87.6 HYPOKALEMIA: ICD-10-CM

## 2025-05-11 DIAGNOSIS — R63.4 LOSS OF WEIGHT: ICD-10-CM

## 2025-05-11 PROBLEM — R13.10 DYSPHAGIA: Status: ACTIVE | Noted: 2025-05-11

## 2025-05-11 LAB
ALBUMIN SERPL-MCNC: 3.9 G/DL (ref 3.5–5.2)
ALBUMIN/GLOB SERPL: 1.6 G/DL
ALP SERPL-CCNC: 36 U/L (ref 39–117)
ALT SERPL W P-5'-P-CCNC: 58 U/L (ref 1–33)
AMPHET+METHAMPHET UR QL: NEGATIVE
AMPHETAMINES UR QL: NEGATIVE
ANION GAP SERPL CALCULATED.3IONS-SCNC: 16 MMOL/L (ref 5–15)
AST SERPL-CCNC: 52 U/L (ref 1–32)
BACTERIA UR QL AUTO: ABNORMAL /HPF
BARBITURATES UR QL SCN: NEGATIVE
BASOPHILS # BLD AUTO: 0.03 10*3/MM3 (ref 0–0.2)
BASOPHILS NFR BLD AUTO: 0.6 % (ref 0–1.5)
BENZODIAZ UR QL SCN: NEGATIVE
BILIRUB SERPL-MCNC: 0.6 MG/DL (ref 0–1.2)
BILIRUB UR QL STRIP: ABNORMAL
BUN SERPL-MCNC: 20 MG/DL (ref 6–20)
BUN/CREAT SERPL: 15 (ref 7–25)
BUPRENORPHINE SERPL-MCNC: NEGATIVE NG/ML
CALCIUM SPEC-SCNC: 9.7 MG/DL (ref 8.6–10.5)
CANNABINOIDS SERPL QL: POSITIVE
CHLORIDE SERPL-SCNC: 92 MMOL/L (ref 98–107)
CLARITY UR: ABNORMAL
CO2 SERPL-SCNC: 25 MMOL/L (ref 22–29)
COCAINE UR QL: NEGATIVE
COLOR UR: ABNORMAL
CREAT SERPL-MCNC: 1.33 MG/DL (ref 0.57–1)
D-LACTATE SERPL-SCNC: 1.9 MMOL/L (ref 0.5–2)
DEPRECATED RDW RBC AUTO: 43.2 FL (ref 37–54)
EGFRCR SERPLBLD CKD-EPI 2021: 48.8 ML/MIN/1.73
EOSINOPHIL # BLD AUTO: 0.01 10*3/MM3 (ref 0–0.4)
EOSINOPHIL NFR BLD AUTO: 0.2 % (ref 0.3–6.2)
ERYTHROCYTE [DISTWIDTH] IN BLOOD BY AUTOMATED COUNT: 13.9 % (ref 12.3–15.4)
FENTANYL UR-MCNC: NEGATIVE NG/ML
GLOBULIN UR ELPH-MCNC: 2.4 GM/DL
GLUCOSE BLDC GLUCOMTR-MCNC: 158 MG/DL (ref 70–130)
GLUCOSE SERPL-MCNC: 174 MG/DL (ref 65–99)
GLUCOSE UR STRIP-MCNC: ABNORMAL MG/DL
HCT VFR BLD AUTO: 34.4 % (ref 34–46.6)
HGB BLD-MCNC: 11.7 G/DL (ref 12–15.9)
HGB UR QL STRIP.AUTO: NEGATIVE
HYALINE CASTS UR QL AUTO: ABNORMAL /LPF
IMM GRANULOCYTES # BLD AUTO: 0.02 10*3/MM3 (ref 0–0.05)
IMM GRANULOCYTES NFR BLD AUTO: 0.4 % (ref 0–0.5)
IRON 24H UR-MRATE: 63 MCG/DL (ref 37–145)
IRON SATN MFR SERPL: 28 % (ref 20–50)
KETONES UR QL STRIP: ABNORMAL
LEUKOCYTE ESTERASE UR QL STRIP.AUTO: ABNORMAL
LIPASE SERPL-CCNC: 23 U/L (ref 13–60)
LYMPHOCYTES # BLD AUTO: 1.7 10*3/MM3 (ref 0.7–3.1)
LYMPHOCYTES NFR BLD AUTO: 35.9 % (ref 19.6–45.3)
MAGNESIUM SERPL-MCNC: 1.8 MG/DL (ref 1.6–2.6)
MCH RBC QN AUTO: 29 PG (ref 26.6–33)
MCHC RBC AUTO-ENTMCNC: 34 G/DL (ref 31.5–35.7)
MCV RBC AUTO: 85.4 FL (ref 79–97)
METHADONE UR QL SCN: NEGATIVE
MONOCYTES # BLD AUTO: 0.17 10*3/MM3 (ref 0.1–0.9)
MONOCYTES NFR BLD AUTO: 3.6 % (ref 5–12)
NEUTROPHILS NFR BLD AUTO: 2.81 10*3/MM3 (ref 1.7–7)
NEUTROPHILS NFR BLD AUTO: 59.3 % (ref 42.7–76)
NITRITE UR QL STRIP: NEGATIVE
NRBC BLD AUTO-RTO: 0 /100 WBC (ref 0–0.2)
OPIATES UR QL: NEGATIVE
OXYCODONE UR QL SCN: NEGATIVE
PCP UR QL SCN: NEGATIVE
PH UR STRIP.AUTO: 5.5 [PH] (ref 5–8)
PLATELET # BLD AUTO: 224 10*3/MM3 (ref 140–450)
PMV BLD AUTO: 10 FL (ref 6–12)
POTASSIUM SERPL-SCNC: 3.1 MMOL/L (ref 3.5–5.2)
PROT SERPL-MCNC: 6.3 G/DL (ref 6–8.5)
PROT UR QL STRIP: ABNORMAL
RBC # BLD AUTO: 4.03 10*6/MM3 (ref 3.77–5.28)
RBC # UR STRIP: ABNORMAL /HPF
REF LAB TEST METHOD: ABNORMAL
SODIUM SERPL-SCNC: 133 MMOL/L (ref 136–145)
SP GR UR STRIP: 1.03 (ref 1–1.03)
SQUAMOUS #/AREA URNS HPF: ABNORMAL /HPF
TIBC SERPL-MCNC: 226 MCG/DL (ref 298–536)
TRANSFERRIN SERPL-MCNC: 152 MG/DL (ref 200–360)
TRICYCLICS UR QL SCN: NEGATIVE
UROBILINOGEN UR QL STRIP: ABNORMAL
WBC # UR STRIP: ABNORMAL /HPF
WBC NRBC COR # BLD AUTO: 4.74 10*3/MM3 (ref 3.4–10.8)

## 2025-05-11 PROCEDURE — G0378 HOSPITAL OBSERVATION PER HR: HCPCS

## 2025-05-11 PROCEDURE — 83540 ASSAY OF IRON: CPT | Performed by: FAMILY MEDICINE

## 2025-05-11 PROCEDURE — 83690 ASSAY OF LIPASE: CPT | Performed by: INTERNAL MEDICINE

## 2025-05-11 PROCEDURE — 74177 CT ABD & PELVIS W/CONTRAST: CPT

## 2025-05-11 PROCEDURE — 80053 COMPREHEN METABOLIC PANEL: CPT | Performed by: INTERNAL MEDICINE

## 2025-05-11 PROCEDURE — 25010000002 ONDANSETRON PER 1 MG: Performed by: INTERNAL MEDICINE

## 2025-05-11 PROCEDURE — 83605 ASSAY OF LACTIC ACID: CPT | Performed by: INTERNAL MEDICINE

## 2025-05-11 PROCEDURE — 82948 REAGENT STRIP/BLOOD GLUCOSE: CPT

## 2025-05-11 PROCEDURE — 25010000002 ONDANSETRON PER 1 MG: Performed by: FAMILY MEDICINE

## 2025-05-11 PROCEDURE — 83735 ASSAY OF MAGNESIUM: CPT | Performed by: INTERNAL MEDICINE

## 2025-05-11 PROCEDURE — 25810000003 LACTATED RINGERS SOLUTION: Performed by: INTERNAL MEDICINE

## 2025-05-11 PROCEDURE — 96375 TX/PRO/DX INJ NEW DRUG ADDON: CPT

## 2025-05-11 PROCEDURE — 63710000001 INSULIN LISPRO (HUMAN) PER 5 UNITS: Performed by: FAMILY MEDICINE

## 2025-05-11 PROCEDURE — 63710000001 INSULIN GLARGINE PER 5 UNITS: Performed by: FAMILY MEDICINE

## 2025-05-11 PROCEDURE — 25010000002 MORPHINE PER 10 MG: Performed by: INTERNAL MEDICINE

## 2025-05-11 PROCEDURE — 25010000002 HYDROMORPHONE PER 4 MG: Performed by: FAMILY MEDICINE

## 2025-05-11 PROCEDURE — 81001 URINALYSIS AUTO W/SCOPE: CPT | Performed by: INTERNAL MEDICINE

## 2025-05-11 PROCEDURE — 96367 TX/PROPH/DG ADDL SEQ IV INF: CPT

## 2025-05-11 PROCEDURE — 25010000002 PROCHLORPERAZINE 10 MG/2ML SOLUTION: Performed by: FAMILY MEDICINE

## 2025-05-11 PROCEDURE — 85025 COMPLETE CBC W/AUTO DIFF WBC: CPT | Performed by: INTERNAL MEDICINE

## 2025-05-11 PROCEDURE — 99285 EMERGENCY DEPT VISIT HI MDM: CPT | Performed by: INTERNAL MEDICINE

## 2025-05-11 PROCEDURE — 96365 THER/PROPH/DIAG IV INF INIT: CPT

## 2025-05-11 PROCEDURE — 80307 DRUG TEST PRSMV CHEM ANLYZR: CPT | Performed by: FAMILY MEDICINE

## 2025-05-11 PROCEDURE — 96361 HYDRATE IV INFUSION ADD-ON: CPT

## 2025-05-11 PROCEDURE — 84466 ASSAY OF TRANSFERRIN: CPT | Performed by: FAMILY MEDICINE

## 2025-05-11 PROCEDURE — 25010000002 POTASSIUM CHLORIDE 10 MEQ/100ML SOLUTION: Performed by: INTERNAL MEDICINE

## 2025-05-11 PROCEDURE — 83036 HEMOGLOBIN GLYCOSYLATED A1C: CPT | Performed by: INTERNAL MEDICINE

## 2025-05-11 PROCEDURE — 25010000002 CEFTRIAXONE PER 250 MG: Performed by: INTERNAL MEDICINE

## 2025-05-11 PROCEDURE — 96376 TX/PRO/DX INJ SAME DRUG ADON: CPT

## 2025-05-11 PROCEDURE — 25510000001 IOPAMIDOL 61 % SOLUTION: Performed by: INTERNAL MEDICINE

## 2025-05-11 PROCEDURE — 25810000003 SODIUM CHLORIDE 0.9 % SOLUTION: Performed by: FAMILY MEDICINE

## 2025-05-11 RX ORDER — HYDROMORPHONE HYDROCHLORIDE 1 MG/ML
0.25 INJECTION, SOLUTION INTRAMUSCULAR; INTRAVENOUS; SUBCUTANEOUS ONCE
Status: COMPLETED | OUTPATIENT
Start: 2025-05-11 | End: 2025-05-11

## 2025-05-11 RX ORDER — SODIUM CHLORIDE 9 MG/ML
100 INJECTION, SOLUTION INTRAVENOUS CONTINUOUS
Status: DISPENSED | OUTPATIENT
Start: 2025-05-11 | End: 2025-05-13

## 2025-05-11 RX ORDER — AMOXICILLIN 250 MG
2 CAPSULE ORAL 2 TIMES DAILY PRN
Status: DISCONTINUED | OUTPATIENT
Start: 2025-05-11 | End: 2025-05-14 | Stop reason: HOSPADM

## 2025-05-11 RX ORDER — ONDANSETRON 2 MG/ML
4 INJECTION INTRAMUSCULAR; INTRAVENOUS ONCE
Status: COMPLETED | OUTPATIENT
Start: 2025-05-11 | End: 2025-05-11

## 2025-05-11 RX ORDER — NICOTINE POLACRILEX 4 MG
15 LOZENGE BUCCAL
Status: DISCONTINUED | OUTPATIENT
Start: 2025-05-11 | End: 2025-05-14 | Stop reason: HOSPADM

## 2025-05-11 RX ORDER — BISACODYL 10 MG
10 SUPPOSITORY, RECTAL RECTAL DAILY PRN
Status: DISCONTINUED | OUTPATIENT
Start: 2025-05-11 | End: 2025-05-14 | Stop reason: HOSPADM

## 2025-05-11 RX ORDER — IBUPROFEN 600 MG/1
1 TABLET ORAL
Status: DISCONTINUED | OUTPATIENT
Start: 2025-05-11 | End: 2025-05-14 | Stop reason: HOSPADM

## 2025-05-11 RX ORDER — INSULIN LISPRO 100 [IU]/ML
2-7 INJECTION, SOLUTION INTRAVENOUS; SUBCUTANEOUS
Status: DISCONTINUED | OUTPATIENT
Start: 2025-05-11 | End: 2025-05-14 | Stop reason: HOSPADM

## 2025-05-11 RX ORDER — ONDANSETRON 2 MG/ML
4 INJECTION INTRAMUSCULAR; INTRAVENOUS EVERY 8 HOURS
Status: DISCONTINUED | OUTPATIENT
Start: 2025-05-11 | End: 2025-05-12

## 2025-05-11 RX ORDER — BISACODYL 5 MG/1
5 TABLET, DELAYED RELEASE ORAL DAILY PRN
Status: DISCONTINUED | OUTPATIENT
Start: 2025-05-11 | End: 2025-05-14 | Stop reason: HOSPADM

## 2025-05-11 RX ORDER — PROCHLORPERAZINE EDISYLATE 5 MG/ML
5 INJECTION INTRAMUSCULAR; INTRAVENOUS ONCE
Status: COMPLETED | OUTPATIENT
Start: 2025-05-11 | End: 2025-05-11

## 2025-05-11 RX ORDER — POTASSIUM CHLORIDE 7.45 MG/ML
10 INJECTION INTRAVENOUS ONCE
Status: COMPLETED | OUTPATIENT
Start: 2025-05-11 | End: 2025-05-11

## 2025-05-11 RX ORDER — SODIUM CHLORIDE 0.9 % (FLUSH) 0.9 %
10 SYRINGE (ML) INJECTION EVERY 12 HOURS SCHEDULED
Status: DISCONTINUED | OUTPATIENT
Start: 2025-05-11 | End: 2025-05-14 | Stop reason: HOSPADM

## 2025-05-11 RX ORDER — MORPHINE SULFATE 2 MG/ML
2 INJECTION, SOLUTION INTRAMUSCULAR; INTRAVENOUS ONCE
Status: COMPLETED | OUTPATIENT
Start: 2025-05-11 | End: 2025-05-11

## 2025-05-11 RX ORDER — IOPAMIDOL 612 MG/ML
100 INJECTION, SOLUTION INTRAVASCULAR
Status: COMPLETED | OUTPATIENT
Start: 2025-05-11 | End: 2025-05-11

## 2025-05-11 RX ORDER — SODIUM CHLORIDE 9 MG/ML
40 INJECTION, SOLUTION INTRAVENOUS AS NEEDED
Status: DISCONTINUED | OUTPATIENT
Start: 2025-05-11 | End: 2025-05-14 | Stop reason: HOSPADM

## 2025-05-11 RX ORDER — DEXTROSE MONOHYDRATE 25 G/50ML
25 INJECTION, SOLUTION INTRAVENOUS
Status: DISCONTINUED | OUTPATIENT
Start: 2025-05-11 | End: 2025-05-14 | Stop reason: HOSPADM

## 2025-05-11 RX ORDER — SODIUM CHLORIDE 0.9 % (FLUSH) 0.9 %
10 SYRINGE (ML) INJECTION AS NEEDED
Status: DISCONTINUED | OUTPATIENT
Start: 2025-05-11 | End: 2025-05-14 | Stop reason: HOSPADM

## 2025-05-11 RX ORDER — POLYETHYLENE GLYCOL 3350 17 G/17G
17 POWDER, FOR SOLUTION ORAL DAILY PRN
Status: DISCONTINUED | OUTPATIENT
Start: 2025-05-11 | End: 2025-05-14 | Stop reason: HOSPADM

## 2025-05-11 RX ADMIN — SODIUM CHLORIDE, POTASSIUM CHLORIDE, SODIUM LACTATE AND CALCIUM CHLORIDE 1000 ML: 600; 310; 30; 20 INJECTION, SOLUTION INTRAVENOUS at 16:40

## 2025-05-11 RX ADMIN — INSULIN LISPRO 2 UNITS: 100 INJECTION, SOLUTION INTRAVENOUS; SUBCUTANEOUS at 21:18

## 2025-05-11 RX ADMIN — POTASSIUM CHLORIDE 10 MEQ: 7.46 INJECTION, SOLUTION INTRAVENOUS at 18:38

## 2025-05-11 RX ADMIN — HYDROMORPHONE HYDROCHLORIDE 0.25 MG: 1 INJECTION, SOLUTION INTRAMUSCULAR; INTRAVENOUS; SUBCUTANEOUS at 19:48

## 2025-05-11 RX ADMIN — INSULIN GLARGINE 10 UNITS: 100 INJECTION, SOLUTION SUBCUTANEOUS at 21:18

## 2025-05-11 RX ADMIN — IOPAMIDOL 100 ML: 612 INJECTION, SOLUTION INTRAVENOUS at 16:50

## 2025-05-11 RX ADMIN — SODIUM CHLORIDE 100 ML/HR: 9 INJECTION, SOLUTION INTRAVENOUS at 21:18

## 2025-05-11 RX ADMIN — ONDANSETRON 4 MG: 2 INJECTION INTRAMUSCULAR; INTRAVENOUS at 19:49

## 2025-05-11 RX ADMIN — MORPHINE SULFATE 2 MG: 2 INJECTION, SOLUTION INTRAMUSCULAR; INTRAVENOUS at 18:17

## 2025-05-11 RX ADMIN — ONDANSETRON 4 MG: 2 INJECTION INTRAMUSCULAR; INTRAVENOUS at 16:38

## 2025-05-11 RX ADMIN — SODIUM CHLORIDE 100 ML/HR: 9 INJECTION, SOLUTION INTRAVENOUS at 19:49

## 2025-05-11 RX ADMIN — Medication 10 ML: at 21:18

## 2025-05-11 RX ADMIN — SODIUM CHLORIDE 1000 MG: 900 INJECTION INTRAVENOUS at 17:51

## 2025-05-11 RX ADMIN — PROCHLORPERAZINE EDISYLATE 5 MG: 5 INJECTION INTRAMUSCULAR; INTRAVENOUS at 19:49

## 2025-05-11 NOTE — Clinical Note
Level of Care: Med/Surg [1]   Diagnosis: Dehydration [276.51.ICD-9-CM]   Admitting Physician: FAYE RAMON [[id number]]   Attending Physician: FAYE RAMON [[id number]]

## 2025-05-11 NOTE — ED PROVIDER NOTES
Subjective   History of Present Illness  49-year-old female who presents to the emergency department with abdominal pain.  The patient notes that she has been to the emergency department and evaluated for this recently.  This has been ongoing for the last 2 months.  She has had decreased appetite.  She tells me her whole belly hurts.  She describes the pain as a stabbing-like pain.  It is worse after eating, but she states she is not getting much down.  It appears of difficulty with swallowing is in the mouth and oral phase.  She states she is not getting food stuck but it gets into her mouth and it gets too slimy and she cannot swallow it.   notes that over the last 2 months that she has lost about 100 pounds.  The patient has had no vomiting.  She does not have regular bowel movements this does not know when her last bowel movement was.  She is post to have a colonoscopy done at [facility], but states they had to cancel this because of her breathing issues.  She has not had an endoscopy done.  The patient has no chest pain.  No shortness of breath.  No lower extremity changes.  She is able to continue making urine.    Review of Systems   Constitutional:  Negative for chills and fever.   Respiratory:  Negative for shortness of breath.    Cardiovascular:  Negative for chest pain and leg swelling.   Gastrointestinal:  Positive for abdominal pain and nausea. Negative for constipation, diarrhea and vomiting.   Genitourinary:  Negative for dysuria and hematuria.       Past Medical History:   Diagnosis Date    Arthritis     Carotid artery stenosis     Degenerative disc disease, cervical     Depression     Diabetes mellitus     type 1    Disease of thyroid gland     GERD (gastroesophageal reflux disease)     Graves disease     Heart palpitations     Hyperlipidemia     Hypertension     Hypothyroidism     Seizure     Thyromegaly        Allergies   Allergen Reactions    Oxycodone-Acetaminophen Swelling     Other  reaction(s): Throat swelling       Past Surgical History:   Procedure Laterality Date     SECTION      CHOLECYSTECTOMY      COLONOSCOPY  2015    Normal exam Dr. Morgan     COLONOSCOPY  2015    Normal exam    CYST REMOVAL      ENDOSCOPY N/A 2018    Normal exam    HYSTERECTOMY      THYROIDECTOMY Bilateral 2018    Procedure: total thyroidectomy;  Surgeon: Vitaly Givens MD;  Location: [facility] OR;  Service: ENT       Family History   Problem Relation Age of Onset    Stroke Mother     Diabetes Mother     Stroke Father     Diabetes Father     Breast cancer Sister     Diabetes Sister     Stroke Sister     Seizures Sister     Coronary artery disease Brother     Diabetes Brother     Breast cancer Maternal Aunt     Colon cancer Paternal Uncle     Colon polyps Neg Hx        Social History     Socioeconomic History    Marital status:      Spouse name: Evens    Number of children: 1    Years of education: 12   Tobacco Use    Smoking status: Never    Smokeless tobacco: Never   Vaping Use    Vaping status: Never Used   Substance and Sexual Activity    Alcohol use: Yes     Alcohol/week: 6.0 standard drinks of alcohol     Types: 6 Cans of beer per week    Drug use: Yes     Types: Marijuana    Sexual activity: Defer     Partners: Male           Objective   Physical Exam  Constitutional:       Appearance: She is not ill-appearing or diaphoretic.   HENT:      Head: Normocephalic and atraumatic.   Cardiovascular:      Rate and Rhythm: Normal rate and regular rhythm.   Pulmonary:      Effort: Pulmonary effort is normal. No respiratory distress.      Breath sounds: Normal breath sounds.   Abdominal:      General: Abdomen is flat. There is no distension.      Comments: Diffuse generalized palpable abdominal pain.   Skin:     General: Skin is warm.      Coloration: Skin is mottled. Skin is not cyanotic.   Neurological:      General: No focal deficit present.      Mental Status: She is alert and  oriented to person, place, and time.   Psychiatric:         Mood and Affect: Mood normal. Mood is not anxious.         Procedures       Lab Results (last 24 hours)       Procedure Component Value Units Date/Time    Urinalysis With Culture If Indicated - Urine, Clean Catch [[id number]]  (Abnormal) Collected: 05/11/25 1614    Specimen: Urine, Clean Catch Updated: 05/11/25 1626     Color, UA Dark Yellow     Appearance, UA Cloudy     pH, UA 5.5     Specific Gravity, UA 1.029     Glucose,  mg/dL (2+)     Ketones, UA 15 mg/dL (1+)     Bilirubin, UA Small (1+)     Blood, UA Negative     Protein, UA 30 mg/dL (1+)     Leuk Esterase, UA Trace     Nitrite, UA Negative     Urobilinogen, UA 1.0 E.U./dL    Narrative:      Dipstick results may be inaccurate due to color interference.    Urinalysis, Microscopic Only - Urine, Clean Catch [[id number]]  (Abnormal) Collected: 05/11/25 1614    Specimen: Urine, Clean Catch Updated: 05/11/25 1624     RBC, UA 0-2 /HPF      WBC, UA 3-5 /HPF      Comment: Urine culture not indicated.        Bacteria, UA 4+ /HPF      Squamous Epithelial Cells, UA 13-20 /HPF      Hyaline Casts, UA 3-6 /LPF      Methodology Automated Microscopy    CBC & Differential [[id number]]  (Abnormal) Collected: 05/11/25 1633    Specimen: Blood from Arm, Left Updated: 05/11/25 1649    Narrative:      The following orders were created for panel order CBC & Differential.  Procedure                               Abnormality         Status                     ---------                               -----------         ------                     CBC Auto Differential[[id number]]        Abnormal            Final result                 Please view results for these tests on the individual orders.    Comprehensive Metabolic Panel [[id number]]  (Abnormal) Collected: 05/11/25 1633    Specimen: Blood from Arm, Left Updated: 05/11/25 1710     Glucose 174 mg/dL      BUN 20 mg/dL      Creatinine 1.33 mg/dL      Sodium 133 mmol/L       Potassium 3.1 mmol/L      Chloride 92 mmol/L      CO2 25.0 mmol/L      Calcium 9.7 mg/dL      Total Protein 6.3 g/dL      Albumin 3.9 g/dL      ALT (SGPT) 58 U/L      AST (SGOT) 52 U/L      Alkaline Phosphatase 36 U/L      Total Bilirubin 0.6 mg/dL      Globulin 2.4 gm/dL      A/G Ratio 1.6 g/dL      BUN/Creatinine Ratio 15.0     Anion Gap 16.0 mmol/L      eGFR 48.8 mL/min/1.73     Narrative:      GFR Categories in Chronic Kidney Disease (CKD)              GFR Category          GFR (mL/min/1.73)    Interpretation  G1                    90 or greater        Normal or high (1)  G2                    60-89                Mild decrease (1)  G3a                   45-59                Mild to moderate decrease  G3b                   30-44                Moderate to severe decrease  G4                    15-29                Severe decrease  G5                    14 or less           Kidney failure    (1)In the absence of evidence of kidney disease, neither GFR category G1 or G2 fulfill the criteria for CKD.    eGFR calculation 2021 CKD-EPI creatinine equation, which does not include race as a factor    Lipase [[id number]]  (Normal) Collected: 05/11/25 1633    Specimen: Blood from Arm, Left Updated: 05/11/25 1705     Lipase 23 U/L     CBC Auto Differential [[id number]]  (Abnormal) Collected: 05/11/25 1633    Specimen: Blood from Arm, Left Updated: 05/11/25 1649     WBC 4.74 10*3/mm3      RBC 4.03 10*6/mm3      Hemoglobin 11.7 g/dL      Hematocrit 34.4 %      MCV 85.4 fL      MCH 29.0 pg      MCHC 34.0 g/dL      RDW 13.9 %      RDW-SD 43.2 fl      MPV 10.0 fL      Platelets 224 10*3/mm3      Neutrophil % 59.3 %      Lymphocyte % 35.9 %      Monocyte % 3.6 %      Eosinophil % 0.2 %      Basophil % 0.6 %      Immature Grans % 0.4 %      Neutrophils, Absolute 2.81 10*3/mm3      Lymphocytes, Absolute 1.70 10*3/mm3      Monocytes, Absolute 0.17 10*3/mm3      Eosinophils, Absolute 0.01 10*3/mm3      Basophils, Absolute 0.03  10*3/mm3      Immature Grans, Absolute 0.02 10*3/mm3      nRBC 0.0 /100 WBC     Magnesium [[id number]]  (Normal) Collected: 05/11/25 1633    Specimen: Blood from Arm, Left Updated: 05/11/25 1746     Magnesium 1.8 mg/dL           CT Abdomen Pelvis With Contrast   Final Result       1.  Urinary bladder wall thickening which could reflect an acute   cystitis. Otherwise, no acute intra-abdominal process.   2.  Diffuse liver steatosis.   3.  No acute and/or inflammatory process identified along the bowel.               This report was signed and finalized on 5/11/2025 5:27 PM by Dr Chandrakant Stark.                ED Course  ED Course as of 05/11/25 1942   Sun May 11, 2025   1854 Dr. Luis SILVA is agreeable to consult.  [AJ]   1854 Patient aware of the plan for possible admission.  [AJ]   1940 Hospitalist is agreeable to admission.  [AJ]      ED Course User Index  [AJ] Rubin Bustos DO                                                       Medical Decision Making  Differential diagnosis includes psychogenic issue, esophagitis, gastritis, gastric tumor, small bowel obstruction    Problems Addressed:  Acute cystitis without hematuria: complicated acute illness or injury  Dehydration: complicated acute illness or injury  Generalized abdominal pain: complicated acute illness or injury  Hypokalemia: complicated acute illness or injury  Unable to eat: complicated acute illness or injury    Amount and/or Complexity of Data Reviewed  Labs: ordered.     Details: CBC CMP UA lactic lipase  Radiology: ordered.     Details: CT abdomen pelvis    Risk  Prescription drug management.  Decision regarding hospitalization.        Final diagnoses:   Dehydration   Unable to eat   Hypokalemia   Generalized abdominal pain   Acute cystitis without hematuria       ED Disposition  ED Disposition       ED Disposition   Decision to Admit    Condition   --    Comment   Level of Care: Med/Surg [1]   Diagnosis: Dehydration [276.51.ICD-9-CM]    Admitting Physician: FAYE RAMON [[id number]]   Attending Physician: FAYE RAMON [[id number]]                 No follow-up provider specified.       Medication List      No changes were made to your prescriptions during this visit.            Rubin Bustos,   05/11/25 1731       Rubin Bustos,   05/11/25 1945

## 2025-05-12 PROBLEM — F12.10 MARIJUANA ABUSE: Status: ACTIVE | Noted: 2025-05-12

## 2025-05-12 PROBLEM — K76.0 STEATOSIS OF LIVER: Status: ACTIVE | Noted: 2025-05-12

## 2025-05-12 PROBLEM — E87.6 HYPOKALEMIA: Status: ACTIVE | Noted: 2025-05-12

## 2025-05-12 PROBLEM — R63.4 LOSS OF WEIGHT: Status: ACTIVE | Noted: 2025-05-12

## 2025-05-12 PROBLEM — E43 SEVERE PROTEIN-CALORIE MALNUTRITION: Status: ACTIVE | Noted: 2025-05-12

## 2025-05-12 LAB
ANION GAP SERPL CALCULATED.3IONS-SCNC: 14 MMOL/L (ref 5–15)
BUN SERPL-MCNC: 17 MG/DL (ref 6–20)
BUN/CREAT SERPL: 14.4 (ref 7–25)
CALCIUM SPEC-SCNC: 8.6 MG/DL (ref 8.6–10.5)
CHLORIDE SERPL-SCNC: 101 MMOL/L (ref 98–107)
CO2 SERPL-SCNC: 19 MMOL/L (ref 22–29)
CREAT SERPL-MCNC: 1.18 MG/DL (ref 0.57–1)
EGFRCR SERPLBLD CKD-EPI 2021: 56.4 ML/MIN/1.73
GLUCOSE BLDC GLUCOMTR-MCNC: 130 MG/DL (ref 70–130)
GLUCOSE BLDC GLUCOMTR-MCNC: 63 MG/DL (ref 70–130)
GLUCOSE BLDC GLUCOMTR-MCNC: 63 MG/DL (ref 70–130)
GLUCOSE BLDC GLUCOMTR-MCNC: 64 MG/DL (ref 70–130)
GLUCOSE BLDC GLUCOMTR-MCNC: 65 MG/DL (ref 70–130)
GLUCOSE BLDC GLUCOMTR-MCNC: 69 MG/DL (ref 70–130)
GLUCOSE BLDC GLUCOMTR-MCNC: 95 MG/DL (ref 70–130)
GLUCOSE SERPL-MCNC: 57 MG/DL (ref 65–99)
HBA1C MFR BLD: 4.4 % (ref 4.8–5.6)
POTASSIUM SERPL-SCNC: 3 MMOL/L (ref 3.5–5.2)
POTASSIUM SERPL-SCNC: 3.6 MMOL/L (ref 3.5–5.2)
SODIUM SERPL-SCNC: 134 MMOL/L (ref 136–145)
T3FREE SERPL-MCNC: 0.86 PG/ML (ref 2–4.4)
T4 FREE SERPL-MCNC: 0.35 NG/DL (ref 0.92–1.68)
TSH SERPL DL<=0.05 MIU/L-ACNC: 53.4 UIU/ML (ref 0.27–4.2)
VIT B12 BLD-MCNC: >2000 PG/ML (ref 211–946)

## 2025-05-12 PROCEDURE — 36415 COLL VENOUS BLD VENIPUNCTURE: CPT | Performed by: FAMILY MEDICINE

## 2025-05-12 PROCEDURE — 84439 ASSAY OF FREE THYROXINE: CPT | Performed by: INTERNAL MEDICINE

## 2025-05-12 PROCEDURE — 84132 ASSAY OF SERUM POTASSIUM: CPT | Performed by: INTERNAL MEDICINE

## 2025-05-12 PROCEDURE — 82607 VITAMIN B-12: CPT | Performed by: FAMILY MEDICINE

## 2025-05-12 PROCEDURE — 84481 FREE ASSAY (FT-3): CPT | Performed by: INTERNAL MEDICINE

## 2025-05-12 PROCEDURE — 99222 1ST HOSP IP/OBS MODERATE 55: CPT | Performed by: INTERNAL MEDICINE

## 2025-05-12 PROCEDURE — 84443 ASSAY THYROID STIM HORMONE: CPT | Performed by: INTERNAL MEDICINE

## 2025-05-12 PROCEDURE — 25010000002 ONDANSETRON PER 1 MG: Performed by: FAMILY MEDICINE

## 2025-05-12 PROCEDURE — 96366 THER/PROPH/DIAG IV INF ADDON: CPT

## 2025-05-12 PROCEDURE — G0378 HOSPITAL OBSERVATION PER HR: HCPCS

## 2025-05-12 PROCEDURE — 25810000003 SODIUM CHLORIDE 0.9 % SOLUTION: Performed by: FAMILY MEDICINE

## 2025-05-12 PROCEDURE — 96375 TX/PRO/DX INJ NEW DRUG ADDON: CPT

## 2025-05-12 PROCEDURE — 96376 TX/PRO/DX INJ SAME DRUG ADON: CPT

## 2025-05-12 PROCEDURE — 96361 HYDRATE IV INFUSION ADD-ON: CPT

## 2025-05-12 PROCEDURE — 80048 BASIC METABOLIC PNL TOTAL CA: CPT | Performed by: FAMILY MEDICINE

## 2025-05-12 PROCEDURE — 82948 REAGENT STRIP/BLOOD GLUCOSE: CPT

## 2025-05-12 PROCEDURE — 25010000002 POTASSIUM CHLORIDE 10 MEQ/100ML SOLUTION: Performed by: INTERNAL MEDICINE

## 2025-05-12 RX ORDER — LEVOTHYROXINE SODIUM 175 UG/1
175 TABLET ORAL
Status: ON HOLD | COMMUNITY
End: 2025-05-14

## 2025-05-12 RX ORDER — ERGOCALCIFEROL 1.25 MG/1
50000 CAPSULE, LIQUID FILLED ORAL WEEKLY
COMMUNITY

## 2025-05-12 RX ORDER — AMLODIPINE BESYLATE 10 MG/1
10 TABLET ORAL DAILY
COMMUNITY

## 2025-05-12 RX ORDER — HYDROXYZINE PAMOATE 25 MG/1
25 CAPSULE ORAL 3 TIMES DAILY PRN
COMMUNITY

## 2025-05-12 RX ORDER — POTASSIUM CHLORIDE 7.45 MG/ML
10 INJECTION INTRAVENOUS
Status: COMPLETED | OUTPATIENT
Start: 2025-05-12 | End: 2025-05-12

## 2025-05-12 RX ORDER — ONDANSETRON 4 MG/1
4 TABLET, ORALLY DISINTEGRATING ORAL EVERY 6 HOURS
Status: DISPENSED | OUTPATIENT
Start: 2025-05-12 | End: 2025-05-13

## 2025-05-12 RX ORDER — ONDANSETRON 2 MG/ML
4 INJECTION INTRAMUSCULAR; INTRAVENOUS EVERY 6 HOURS PRN
Status: DISCONTINUED | OUTPATIENT
Start: 2025-05-12 | End: 2025-05-12

## 2025-05-12 RX ORDER — MIRTAZAPINE 15 MG/1
15 TABLET, FILM COATED ORAL NIGHTLY
COMMUNITY

## 2025-05-12 RX ORDER — LEVOTHYROXINE SODIUM 100 UG/1
200 TABLET ORAL
Status: DISCONTINUED | OUTPATIENT
Start: 2025-05-12 | End: 2025-05-14 | Stop reason: HOSPADM

## 2025-05-12 RX ORDER — LIOTHYRONINE SODIUM 25 UG/1
25 TABLET ORAL 2 TIMES DAILY
Status: ON HOLD | COMMUNITY
End: 2025-05-14

## 2025-05-12 RX ORDER — PHENOL 1.4 %
1 AEROSOL, SPRAY (ML) MUCOUS MEMBRANE NIGHTLY PRN
COMMUNITY

## 2025-05-12 RX ORDER — CARVEDILOL 25 MG/1
25 TABLET ORAL 2 TIMES DAILY WITH MEALS
COMMUNITY

## 2025-05-12 RX ORDER — INSULIN GLARGINE 100 [IU]/ML
75 INJECTION, SOLUTION SUBCUTANEOUS NIGHTLY
COMMUNITY

## 2025-05-12 RX ORDER — ONDANSETRON 4 MG/1
4 TABLET, ORALLY DISINTEGRATING ORAL EVERY 6 HOURS PRN
Status: DISCONTINUED | OUTPATIENT
Start: 2025-05-12 | End: 2025-05-12

## 2025-05-12 RX ORDER — METFORMIN HYDROCHLORIDE 500 MG/1
1000 TABLET, EXTENDED RELEASE ORAL
COMMUNITY

## 2025-05-12 RX ORDER — OMEPRAZOLE 20 MG/1
20 CAPSULE, DELAYED RELEASE ORAL DAILY
COMMUNITY

## 2025-05-12 RX ORDER — ROSUVASTATIN CALCIUM 20 MG/1
20 TABLET, COATED ORAL NIGHTLY
COMMUNITY

## 2025-05-12 RX ORDER — PANTOPRAZOLE SODIUM 40 MG/10ML
40 INJECTION, POWDER, LYOPHILIZED, FOR SOLUTION INTRAVENOUS
Status: DISCONTINUED | OUTPATIENT
Start: 2025-05-12 | End: 2025-05-14

## 2025-05-12 RX ORDER — SPIRONOLACTONE 50 MG/1
50 TABLET, FILM COATED ORAL 2 TIMES DAILY
COMMUNITY

## 2025-05-12 RX ORDER — LIOTHYRONINE SODIUM 25 UG/1
25 TABLET ORAL DAILY
Status: DISCONTINUED | OUTPATIENT
Start: 2025-05-12 | End: 2025-05-14 | Stop reason: HOSPADM

## 2025-05-12 RX ADMIN — POTASSIUM CHLORIDE 10 MEQ: 7.46 INJECTION, SOLUTION INTRAVENOUS at 13:22

## 2025-05-12 RX ADMIN — ONDANSETRON 4 MG: 2 INJECTION INTRAMUSCULAR; INTRAVENOUS at 11:29

## 2025-05-12 RX ADMIN — POTASSIUM CHLORIDE 10 MEQ: 7.46 INJECTION, SOLUTION INTRAVENOUS at 09:44

## 2025-05-12 RX ADMIN — POTASSIUM CHLORIDE 10 MEQ: 7.46 INJECTION, SOLUTION INTRAVENOUS at 15:57

## 2025-05-12 RX ADMIN — LIOTHYRONINE SODIUM 25 MCG: 25 TABLET ORAL at 18:07

## 2025-05-12 RX ADMIN — SODIUM CHLORIDE 100 ML/HR: 9 INJECTION, SOLUTION INTRAVENOUS at 08:24

## 2025-05-12 RX ADMIN — LEVOTHYROXINE SODIUM 200 MCG: 100 TABLET ORAL at 18:07

## 2025-05-12 RX ADMIN — ONDANSETRON 4 MG: 2 INJECTION INTRAMUSCULAR; INTRAVENOUS at 04:31

## 2025-05-12 RX ADMIN — DEXTROSE 15 G: 15 GEL ORAL at 19:05

## 2025-05-12 RX ADMIN — POTASSIUM CHLORIDE 10 MEQ: 7.46 INJECTION, SOLUTION INTRAVENOUS at 08:26

## 2025-05-12 RX ADMIN — PANTOPRAZOLE SODIUM 40 MG: 40 INJECTION, POWDER, FOR SOLUTION INTRAVENOUS at 18:07

## 2025-05-12 RX ADMIN — POTASSIUM CHLORIDE 10 MEQ: 7.46 INJECTION, SOLUTION INTRAVENOUS at 11:20

## 2025-05-12 RX ADMIN — POTASSIUM CHLORIDE 10 MEQ: 7.46 INJECTION, SOLUTION INTRAVENOUS at 14:45

## 2025-05-12 NOTE — H&P
[facility] Medicine Services  HISTORY AND PHYSICAL    Date of Admission: 2025  Primary Care Physician: Darryl Andrews DO    Subjective   Primary Historian: Patient    Chief Complaint: poor oral intake    History of Present Illness  49-year-old female with past medical history of diabetes type 1, hypothyroidism, hypertension, presents to the emergency room with complaints of poor oral intake and weight loss.  Reportedly she had dysphagia and had not been able to eat anything for several days.  She presented hypotensive with signs of dehydration.  When I interviewed the patient she states she had eaten without any problem and was not complain of dysphagia.  She had recently EGD and colonoscopy.  She was little drowsy when I evaluated states possible her report was not accurate.  She has had a similar presentation a few times recently.    Review of Systems   Otherwise complete ROS reviewed and negative except as mentioned in the HPI.    Past Medical History:   Past Medical History:   Diagnosis Date    Arthritis     Carotid artery stenosis     Degenerative disc disease, cervical     Depression     Diabetes mellitus     type 1    Disease of thyroid gland     GERD (gastroesophageal reflux disease)     Graves disease     Heart palpitations     Hyperlipidemia     Hypertension     Hypothyroidism     Seizure     Thyromegaly      Past Surgical History:  Past Surgical History:   Procedure Laterality Date     SECTION      CHOLECYSTECTOMY      COLONOSCOPY  2015    Normal exam Dr. Morgan     COLONOSCOPY  2015    Normal exam    CYST REMOVAL      ENDOSCOPY N/A 2018    Normal exam    HYSTERECTOMY      THYROIDECTOMY Bilateral 2018    Procedure: total thyroidectomy;  Surgeon: Vitaly Givens MD;  Location: [facility];  Service: ENT     Social History:  reports that she has never smoked. She has never used smokeless tobacco. She reports current alcohol  use of about 6.0 standard drinks of alcohol per week. She reports current drug use. Drug: Marijuana.    Family History: family history includes Breast cancer in her maternal aunt and sister; Colon cancer in her paternal uncle; Coronary artery disease in her brother; Diabetes in her brother, father, mother, and sister; Seizures in her sister; Stroke in her father, mother, and sister.       Allergies:  Allergies   Allergen Reactions    Oxycodone-Acetaminophen Swelling     Other reaction(s): Throat swelling       Medications:  Prior to Admission medications    Medication Sig Start Date End Date Taking? Authorizing Provider   amLODIPine (NORVASC) 10 MG tablet Take 1 tablet by mouth Daily.    Luciano Hayes MD   ARIPiprazole (ABILIFY) 10 MG tablet Take 1 tablet by mouth Daily.    Luciano Hayes MD   carvedilol (COREG) 25 MG tablet Take 1 tablet by mouth 2 (Two) Times a Day With Meals.    Luciano Hayes MD   diclofenac (VOLTAREN) 75 MG EC tablet Take 1 tablet by mouth 2 (Two) Times a Day.    Luciano Hayes MD   empagliflozin (JARDIANCE) 25 MG tablet tablet Take 1 tablet by mouth Daily. 6/10/22   Luciano Hayes MD   Evolocumab (REPATHA) solution auto-injector SureClick injection Inject 1 mL under the skin into the appropriate area as directed Every 14 (Fourteen) Days.    Luciano Hayes MD   gabapentin (NEURONTIN) 300 MG capsule Take 1 capsule by mouth 3 (Three) Times a Day.  Patient taking differently: Take 1 capsule by mouth Daily.    Luciano Hayes MD   hydrOXYzine pamoate (VISTARIL) 25 MG capsule Take 1 capsule by mouth 3 (Three) Times a Day As Needed for Anxiety.    Luciano Hayes MD   insulin glargine (LANTUS, SEMGLEE) 100 UNIT/ML injection Inject 75 Units under the skin into the appropriate area as directed Daily.    Luciano Hayes MD   Melatonin 10 MG capsule Take 1 capsule by mouth At Night As Needed (INSOMNIA). 2/16/25   , BRIANA Thompson  "  metFORMIN ER (GLUCOPHAGE-XR) 500 MG 24 hr tablet Take 1 tablet by mouth 2 (Two) Times a Day. 6/9/22   Luciano Hayes MD   ondansetron ODT (ZOFRAN-ODT) 4 MG disintegrating tablet Take 1 tablet by mouth Every 6 (Six) Hours As Needed for Nausea or Vomiting. 2/16/25   Donna APRN   rosuvastatin (CRESTOR) 20 MG tablet Take 1 tablet by mouth Daily.    Luciano Hayes MD   spironolactone (ALDACTONE) 50 MG tablet Take 1 tablet by mouth 2 (Two) Times a Day.    Luciano Hayes MD   vitamin D (ERGOCALCIFEROL) 1.25 MG (31922 UT) capsule capsule Take 1 capsule by mouth 1 (One) Time Per Week.    Luciano Hayes MD     I have utilized all available immediate resources to obtain, update, or review the patient's current medications (including all prescriptions, over-the-counter products, herbals, cannabis/cannabidiol products, and vitamin/mineral/dietary (nutritional) supplements).    Objective     Vital Signs: /95   Pulse 59   Temp 98.1 °F (36.7 °C) (Oral)   Resp 18   Ht 188 cm (74\")   Wt 68.7 kg (151 lb 6.4 oz)   SpO2 98%   BMI 19.44 kg/m²   Physical Exam  Vitals reviewed.   Constitutional:       General: She is not in acute distress.     Appearance: She is well-developed. She is not toxic-appearing.   HENT:      Head: Normocephalic and atraumatic.      Right Ear: External ear normal.      Left Ear: External ear normal.      Mouth/Throat:      Mouth: Mucous membranes are dry.      Pharynx: Oropharynx is clear.   Eyes:      General:         Right eye: No discharge.         Left eye: No discharge.      Extraocular Movements: Extraocular movements intact.      Conjunctiva/sclera: Conjunctivae normal.      Pupils: Pupils are equal, round, and reactive to light.   Neck:      Vascular: No JVD.   Cardiovascular:      Rate and Rhythm: Normal rate and regular rhythm.      Pulses: Normal pulses.      Heart sounds: Normal heart sounds. No murmur heard.     No friction rub. No gallop. "   Pulmonary:      Effort: Pulmonary effort is normal. No respiratory distress.      Breath sounds: No stridor. No wheezing, rhonchi or rales.   Chest:      Chest wall: No tenderness.   Abdominal:      General: Bowel sounds are normal. There is no distension.      Palpations: Abdomen is soft.      Tenderness: There is no abdominal tenderness. There is no guarding or rebound.      Hernia: No hernia is present.   Musculoskeletal:         General: No swelling, tenderness or deformity. Normal range of motion.      Cervical back: Normal range of motion and neck supple. No rigidity or tenderness. No muscular tenderness.      Right lower leg: No edema.      Left lower leg: No edema.   Skin:     General: Skin is warm and dry.      Findings: No erythema or rash.   Neurological:      General: No focal deficit present.      Mental Status: She is alert and oriented to person, place, and time.      Cranial Nerves: No cranial nerve deficit.      Sensory: No sensory deficit.      Motor: Weakness present. No abnormal muscle tone.      Deep Tendon Reflexes: Reflexes normal.   Psychiatric:         Mood and Affect: Mood normal.         Behavior: Behavior normal.              Results Reviewed:  Lab Results (last 24 hours)       Procedure Component Value Units Date/Time    Magnesium [[id number]]  (Normal) Collected: 05/11/25 1633    Specimen: Blood from Arm, Left Updated: 05/11/25 1746     Magnesium 1.8 mg/dL     Comprehensive Metabolic Panel [[id number]]  (Abnormal) Collected: 05/11/25 1633    Specimen: Blood from Arm, Left Updated: 05/11/25 1710     Glucose 174 mg/dL      BUN 20 mg/dL      Creatinine 1.33 mg/dL      Sodium 133 mmol/L      Potassium 3.1 mmol/L      Chloride 92 mmol/L      CO2 25.0 mmol/L      Calcium 9.7 mg/dL      Total Protein 6.3 g/dL      Albumin 3.9 g/dL      ALT (SGPT) 58 U/L      AST (SGOT) 52 U/L      Alkaline Phosphatase 36 U/L      Total Bilirubin 0.6 mg/dL      Globulin 2.4 gm/dL      A/G Ratio 1.6 g/dL       BUN/Creatinine Ratio 15.0     Anion Gap 16.0 mmol/L      eGFR 48.8 mL/min/1.73     Narrative:      GFR Categories in Chronic Kidney Disease (CKD)              GFR Category          GFR (mL/min/1.73)    Interpretation  G1                    90 or greater        Normal or high (1)  G2                    60-89                Mild decrease (1)  G3a                   45-59                Mild to moderate decrease  G3b                   30-44                Moderate to severe decrease  G4                    15-29                Severe decrease  G5                    14 or less           Kidney failure    (1)In the absence of evidence of kidney disease, neither GFR category G1 or G2 fulfill the criteria for CKD.    eGFR calculation 2021 CKD-EPI creatinine equation, which does not include race as a factor    Lipase [[id number]]  (Normal) Collected: 05/11/25 1633    Specimen: Blood from Arm, Left Updated: 05/11/25 1705     Lipase 23 U/L     CBC & Differential [[id number]]  (Abnormal) Collected: 05/11/25 1633    Specimen: Blood from Arm, Left Updated: 05/11/25 1649    Narrative:      The following orders were created for panel order CBC & Differential.  Procedure                               Abnormality         Status                     ---------                               -----------         ------                     CBC Auto Differential[[id number]]        Abnormal            Final result                 Please view results for these tests on the individual orders.    CBC Auto Differential [[id number]]  (Abnormal) Collected: 05/11/25 1633    Specimen: Blood from Arm, Left Updated: 05/11/25 1649     WBC 4.74 10*3/mm3      RBC 4.03 10*6/mm3      Hemoglobin 11.7 g/dL      Hematocrit 34.4 %      MCV 85.4 fL      MCH 29.0 pg      MCHC 34.0 g/dL      RDW 13.9 %      RDW-SD 43.2 fl      MPV 10.0 fL      Platelets 224 10*3/mm3      Neutrophil % 59.3 %      Lymphocyte % 35.9 %      Monocyte % 3.6 %      Eosinophil % 0.2 %       Basophil % 0.6 %      Immature Grans % 0.4 %      Neutrophils, Absolute 2.81 10*3/mm3      Lymphocytes, Absolute 1.70 10*3/mm3      Monocytes, Absolute 0.17 10*3/mm3      Eosinophils, Absolute 0.01 10*3/mm3      Basophils, Absolute 0.03 10*3/mm3      Immature Grans, Absolute 0.02 10*3/mm3      nRBC 0.0 /100 WBC     Urinalysis With Culture If Indicated - Urine, Clean Catch [[id number]]  (Abnormal) Collected: 05/11/25 1614    Specimen: Urine, Clean Catch Updated: 05/11/25 1626     Color, UA Dark Yellow     Appearance, UA Cloudy     pH, UA 5.5     Specific Gravity, UA 1.029     Glucose,  mg/dL (2+)     Ketones, UA 15 mg/dL (1+)     Bilirubin, UA Small (1+)     Blood, UA Negative     Protein, UA 30 mg/dL (1+)     Leuk Esterase, UA Trace     Nitrite, UA Negative     Urobilinogen, UA 1.0 E.U./dL    Narrative:      Dipstick results may be inaccurate due to color interference.    Urinalysis, Microscopic Only - Urine, Clean Catch [[id number]]  (Abnormal) Collected: 05/11/25 1614    Specimen: Urine, Clean Catch Updated: 05/11/25 1624     RBC, UA 0-2 /HPF      WBC, UA 3-5 /HPF      Comment: Urine culture not indicated.        Bacteria, UA 4+ /HPF      Squamous Epithelial Cells, UA 13-20 /HPF      Hyaline Casts, UA 3-6 /LPF      Methodology Automated Microscopy          Imaging Results (Last 24 Hours)       Procedure Component Value Units Date/Time    CT Abdomen Pelvis With Contrast [[id number]] Collected: 05/11/25 1717     Updated: 05/11/25 1730    Narrative:      CT ABDOMEN PELVIS W CONTRAST- 5/11/2025 3:45 PM     HISTORY: ABd pain       COMPARISON: Abdominal CT dated 8/19/2019.     DOSE LENGTH PRODUCT: 257.29 mGy.cm. Automated exposure control was also  utilized to decrease patient radiation dose.     TECHNIQUE: Following the intravenous administration of contrast, helical  CT tomographic images of the abdomen and pelvis were acquired.  Multiplanar reformatted images were provided for review.     FINDINGS:  LOWER CHEST:  The lung bases are clear. Included mediastinal structures  are unremarkable.     LIVER: Diffuse liver steatosis. No suspicious lesion. Portal and hepatic  veins are patent.     BILIARY SYSTEM: The gallbladder has been removed. There is no evidence  of biliary ductal dilation.     PANCREAS: No focal pancreatic lesion identified.      SPLEEN: Unremarkable.     KIDNEYS AND ADRENALS: 2 cm right adrenal nodule which is pre-existing,  stable and considered benign; favor adenoma. Kidneys are unremarkable.  No hydronephrosis. Ureters are decompressed.     RETROPERITONEUM: No mass, lymphadenopathy or hemorrhage.     GI TRACT: The stomach is nondistended. Small bowel loops are nondilated.  The colon is nondistended. Cecum is flipped towards the midline but is  nondistended. No inflammatory changes are identified along the bowel.     OTHER: There is no mesenteric mass, lymphadenopathy, free fluid or free  air. Abdominal vascular structures are patent. Aorta is normal in  caliber. Lumbar spine osteoarthritis change. No acute bony abnormality.     PELVIS: No pelvic mass or adenopathy. Urinary bladder is nondistended.  Mild bladder wall thickening. Trace free fluid in the pelvis.       Impression:         1.  Urinary bladder wall thickening which could reflect an acute  cystitis. Otherwise, no acute intra-abdominal process.  2.  Diffuse liver steatosis.  3.  No acute and/or inflammatory process identified along the bowel.           This report was signed and finalized on 5/11/2025 5:27 PM by Dr Chandrakant Stark.             I have personally reviewed and interpreted the radiology studies and ECG obtained at time of admission.     Assessment / Plan   Assessment:   Active Hospital Problems    Diagnosis     **Dehydration     Hypokalemia     Type 2 diabetes mellitus, with long-term current use of insulin     Hypertension      Treatment Plan  The patient will be admitted to my service here at [facility].   Admit to medical  floor  Vitals every 4 hours  Diet consistent carbohydrate    Weight loss and poor oral tolerance  Zofran 4 mg IVP q8h scheduled  Compazine 5 mg IVP q6h prn N/V  UDS due to concerns with cannabis hyperemesis  CT abd/pevlis noted  Last EGD:   Dr Zack Mcallister in March 2025  Findings:   Esophagus: abnormal: there are questionable mucosal changes throughout the esophagus, random biopsies obtained.   There is no hiatal hernia present.   Stomach: Abnormal: Mild mucosal changes suggestive of gastritis noted - Gastric biopsies were taken from the antrum and body to rule out Helicobacter pylori infection.  Duodenum: normal  IMPRESSION:  1. Gastritis- biopsied  2. Esophagitis- biopsied    colonoscopy 5/08/2025 Dr Esvin Dixon    Abnormal urinalysis with large amount of epithelial cells  Repeat urinalysis  CT mentions concern for bladder thickening  Rocephin 1 gram IVPB once given in the ER    DM 2 ID  Lantus 10 units nightly  Humalog low dose sliding scale  Hypoglycemia protocol    DVT prophylaxis > SCD    Medical Decision Making  Number and Complexity of problems: 4 complex medical problems  Differential Diagnosis: see above    Conditions and Status        Condition is unchanged.     [facility] Data  External documents reviewed: [employer] EHR  Cardiac tracing (EKG, telemetry) interpretation: -  Radiology interpretation: see above  Labs reviewed: see above  Any tests that were considered but not ordered: none     Decision rules/scores evaluated (example [id number], Wells, etc): N/A     Discussed with: Patient     Care Planning  Shared decision making: Patient  Code status and discussions: Full code    Disposition  Social Determinants of Health that impact treatment or disposition: none  Estimated length of stay is overnight.     I confirmed that the patient's advanced care plan is present, code status is documented, and a surrogate decision maker is listed in the patient's medical record.     The patient's surrogate decision maker is  Evens Cifuentes (Spouse) .     The patient was seen and examined by me on 5/11/2025 at 1949.    Electronically signed by Alireza Lam MD, 05/11/25, 19:49 CDT.

## 2025-05-12 NOTE — PROGRESS NOTES
Nutrition Services    Patient Name:  Lynn Magana  YOB: 1975  MRN: [id number]  Admit Date:  5/11/2025    Complete nutrition assessment performed this morning. See note from 5/12 at 10:36 for details. Will monitor per protocol    Electronically signed by:  Lucila Acosta RDN, RENETTA  05/12/25 12:04 CDT

## 2025-05-12 NOTE — PLAN OF CARE
Goal Outcome Evaluation:  Plan of Care Reviewed With: patient        Progress: no change  Outcome Evaluation: No complaints of pain. 6 runs of potassium given today. IVF infusing per order. NPO at midnight for gastric emptying test tomorrow. Voiding unable to collect urine sample. VSS. Safety maintained.

## 2025-05-12 NOTE — CONSULTS
Adult Nutrition  Assessment/PES    Patient Name:  Lynn Magana  YOB: 1975  MRN: [id number]  Admit Date:  5/11/2025    Assessment Date:  5/12/2025   Reason for Assessment       Row Name 05/12/25 1013          Reason for Assessment    Reason For Assessment physician consult     Diagnosis other (see comments);diabetes diagnosis/complications  Dehydration     Identified At Risk by Screening Criteria MST SCORE 2+;unintentional loss of 10 lbs or more in the past 2 mos;reduced oral intake over the last month;difficulty chewing/swallowing               Malnutrition Severity Assessment       Row Name 05/12/25 1025          Malnutrition Severity Assessment    Malnutrition Type Acute Disease or Injury - Related Malnutrition        Unintentional Weight Loss     Unintentional Weight Loss Findings Severe     Unintentional Weight Loss  Weight loss greater than 7.5% in three months  Pt was 202lb in December; Admission wt 151lb reflecting 25% wt loss in the last 5 months        Muscle Loss    Loss of Muscle Mass Findings Moderate     [place] Region Moderate - slight depression     Clavicle Bone Region Moderate - some protrusion in females, visible in males     Patellar Region Moderate - patella more prominent, less muscle definition around patella     Anterior Thigh Region Moderate - mild depression on inner thigh     Posterior Calf Region Moderate - some roundness, slight firmness        Fat Loss    Subcutaneous Fat Loss Findings Moderate     Orbital Region  Moderate -  somewhat hollowness, slightly dark circles     Upper Arm Region Moderate - some fat tissue, not ample        Criteria Met (Must meet criteria for severity in at least 2 of these categories: M Wasting, Fat Loss, Fluid, Secondary Signs, Wt. Status, Intake)    Patient meets criteria for  Severe Malnutrition               Nutrition/Diet History       Row Name 05/12/25 1015          Nutrition/Diet History    Typical Intake (Food/Fluid/EN/PN) Pt found  in room alone. NFPE performed; pt is severely malnourished in the context of acute illness. Pt reports a weight loss of 100 lb over the past 2 months. Per recorded weights, pt was 202 lb on 12/4/2024; admission weight is 151 lb, reflecting a 25% weight loss over five months.  Pt reports diarrhea approximately once per week and intermittent vomiting, averaging one episode per month. Denies any problems with chewing or swallowing but reported increased salivation to the dietitian. At home, pt reports good intake with three meals per day.  Recent EGD noted gastritis and esophagitis. PMH includes DM; last A1C was 10.4 on 2/16/2025. Pt wears a blood glucose monitor.  Dietitian discussed the effects of weight loss on muscle mass and encouraged pt to maintain good intake while inpatient. Pt agreeable to Boost BID. Will monitor for POC.     Food Intolerance(s) NKFA     Factors Affecting Nutritional Intake altered gastrointestinal function;nausea               Labs/Tests/Procedures/Meds       Row Name 05/12/25 1023          Labs/Procedures/Meds    Lab Results Reviewed reviewed     Lab Results Comments Na 134, K 3.0, Creat 1.18, ALT 58, Glu 57        Diagnostic Tests/Procedures    Diagnostic Test/Procedure Reviewed reviewed        Medications    Pertinent Medications Reviewed reviewed     Pertinent Medications Comments Lantus, HumaLOG, Zofran, IV 100ml/hr               Physical Findings       Row Name 05/12/25 1023          Physical Findings    Overall Physical Appearance Jag Score 19, rooma ir, fatigued, BM 5/6               Estimated/Assessed Needs - Anthropometrics       Row Name 05/12/25 1023          Anthropometrics    Calculation Weight 69 kg (152 lb 1.9 oz)        Estimated/Assessed Needs    Additional Documentation KCAL/KG (Group);Protein Requirements (Group);Fluid Requirements (Group)        KCAL/KG    KCAL/KG 30 Kcal/Kg (kcal);25 Kcal/Kg (kcal)     25 Kcal/Kg (kcal) 1725     30 Kcal/Kg (kcal) 2070        Protein  Requirements    Weight Used For Protein Calculations 69 kg (152 lb 1.9 oz)     Est Protein Requirement Amount (gms/kg) 1.1 gm protein     Estimated Protein Requirements (gms/day) 75.9        Fluid Requirements    Fluid Requirements (mL/day) 2070               Nutrition Prescription Ordered       Row Name 05/12/25 1025          Nutrition Prescription PO    Fluid Consistency Thin     Common Modifiers Consistent Carbohydrate               Evaluation of Received Nutrient/Fluid Intake       Row Name 05/12/25 1025          Nutrient/Fluid Evaluation    Number of Days Evaluated --  Insufficient data; admitted <24hr        Fluid Intake Evaluation    Oral Fluid (mL) --  Insufficient data; admitted <24hr        PO Evaluation    Number of Days PO Intake Evaluated Insufficient Data              Problem/Interventions:   Problem 1       Row Name 05/12/25 1031          Nutrition Diagnoses Problem 1    Problem 1 Malnutrition  In context of Acute Illness     Etiology (related to) Medical Diagnosis;Factors Affecting Nutrition     Gastrointestinal Other (comment)  Gastritis; Esophagitis     Oral Excess Saliva     Signs/Symptoms (evidenced by) Unintended Weight Change;% UBW;Report/Observation     Percent (%) UBW 74 %     Unintended Weight Change Loss     Number of Pounds Lost 51  Per recorded wt of 202lb in December 2024     Weight loss time period 5 months     Reported/Observed By RD/Tech     Other Comment NFPE findings               Intervention Goal       Row Name 05/12/25 1034          Intervention Goal    General Meet nutritional needs for age/condition;Reduce/improve symptoms;Disease management/therapy     PO Tolerate PO;Meet estimated needs     Weight Appropriate weight gain               Nutrition Intervention       Row Name 05/12/25 1034          Nutrition Intervention    RD/Tech Action Follow Tx progress;Care plan reviewd;Encourage intake;Recommend/ordered;Advise available snack     Recommended/Ordered Supplement                Nutrition Prescription       Row Name 05/12/25 1034          Nutrition Prescription PO    PO Prescription Begin/change supplement     Supplement Boost Glucose Control     Supplement Frequency 3 times a day               Education/Evaluation       Row Name 05/12/25 1034          Education    Education Provided education regarding     Provided education regarding Nutrition related factor        Monitor/Evaluation    Monitor Per protocol              Electronically signed by:  Lucila Acosta RDN, RENETTA  05/12/25 10:36 CDT

## 2025-05-12 NOTE — PROGRESS NOTES
"    [facility] Medicine Services  INPATIENT PROGRESS NOTE    Patient Name: Lynn Magana  Date of Admission: 5/11/2025  Today's Date: 05/12/25  Length of Stay: 0  Primary Care Physician: Darryl Andrews DO    Subjective   Chief Complaint: Decreased PO intake  HPI   Patient reports that she has been experiencing ongoing decreased p.o. intake.  She reports having intermittent episodes of vomiting, and reports having lingering symptoms of nausea.  She describes having an EGD performed in March (see details below) and also reports that she was supposed to have a colonoscopy but this was never completed.  She occasionally reports having some pain in her mid epigastrium.  She denies any diarrhea, and can be more prone towards constipation.  She estimates that she has had excessive weight loss, initially told me \"about 100 pounds\" but indicated that her family would be able to give more accurate information.  Of note, she did have a weight of 230 pounds in September 2024.  Her most recent weight during this hospitalization is 151 pounds      Review of Systems   All pertinent negatives and positives are as above. All other systems have been reviewed and are negative unless otherwise stated.     Objective    Temp:  [97.4 °F (36.3 °C)-98.1 °F (36.7 °C)] 97.5 °F (36.4 °C)  Heart Rate:  [52-89] 79  Resp:  [16-18] 16  BP: ()/(37-96) 121/79  Physical Exam  Vitals reviewed.   Constitutional:       General: She is not in acute distress.     Appearance: She is not toxic-appearing.   HENT:      Head: Normocephalic.      Mouth/Throat:      Mouth: Mucous membranes are moist.   Pulmonary:      Effort: Pulmonary effort is normal. No respiratory distress.   Abdominal:      General: There is no distension.      Palpations: Abdomen is soft.      Tenderness: There is abdominal tenderness (BABAK).   Skin:     General: Skin is warm.   Neurological:      General: No focal deficit present.      " "Mental Status: She is alert.   Psychiatric:         Mood and Affect: Mood normal.         Results Review:  I have reviewed the labs, radiology results, and diagnostic studies.    Laboratory Data:   Results from last 7 days   Lab Units 05/11/25  1633   WBC 10*3/mm3 4.74   HEMOGLOBIN g/dL 11.7*   HEMATOCRIT % 34.4   PLATELETS 10*3/mm3 224        Results from last 7 days   Lab Units 05/12/25  0658 05/11/25  1633   SODIUM mmol/L 134* 133*   POTASSIUM mmol/L 3.0* 3.1*   CHLORIDE mmol/L 101 92*   CO2 mmol/L 19.0* 25.0   BUN mg/dL 17 20   CREATININE mg/dL 1.18* 1.33*   CALCIUM mg/dL 8.6 9.7   BILIRUBIN mg/dL  --  0.6   ALK PHOS U/L  --  36*   ALT (SGPT) U/L  --  58*   AST (SGOT) U/L  --  52*   GLUCOSE mg/dL 57* 174*       Culture Data:   No results found for: \"BLOODCX\", \"URINECX\", \"WOUNDCX\", \"MRSACX\", \"RESPCX\", \"STOOLCX\"    Radiology Data:   Imaging Results (Last 24 Hours)       Procedure Component Value Units Date/Time    CT Abdomen Pelvis With Contrast [[id number]] Collected: 05/11/25 1717     Updated: 05/11/25 1730    Narrative:      CT ABDOMEN PELVIS W CONTRAST- 5/11/2025 3:45 PM     HISTORY: ABd pain       COMPARISON: Abdominal CT dated 8/19/2019.     DOSE LENGTH PRODUCT: 257.29 mGy.cm. Automated exposure control was also  utilized to decrease patient radiation dose.     TECHNIQUE: Following the intravenous administration of contrast, helical  CT tomographic images of the abdomen and pelvis were acquired.  Multiplanar reformatted images were provided for review.     FINDINGS:  LOWER CHEST: The lung bases are clear. Included mediastinal structures  are unremarkable.     LIVER: Diffuse liver steatosis. No suspicious lesion. Portal and hepatic  veins are patent.     BILIARY SYSTEM: The gallbladder has been removed. There is no evidence  of biliary ductal dilation.     PANCREAS: No focal pancreatic lesion identified.      SPLEEN: Unremarkable.     KIDNEYS AND ADRENALS: 2 cm right adrenal nodule which is " pre-existing,  stable and considered benign; favor adenoma. Kidneys are unremarkable.  No hydronephrosis. Ureters are decompressed.     RETROPERITONEUM: No mass, lymphadenopathy or hemorrhage.     GI TRACT: The stomach is nondistended. Small bowel loops are nondilated.  The colon is nondistended. Cecum is flipped towards the midline but is  nondistended. No inflammatory changes are identified along the bowel.     OTHER: There is no mesenteric mass, lymphadenopathy, free fluid or free  air. Abdominal vascular structures are patent. Aorta is normal in  caliber. Lumbar spine osteoarthritis change. No acute bony abnormality.     PELVIS: No pelvic mass or adenopathy. Urinary bladder is nondistended.  Mild bladder wall thickening. Trace free fluid in the pelvis.       Impression:         1.  Urinary bladder wall thickening which could reflect an acute  cystitis. Otherwise, no acute intra-abdominal process.  2.  Diffuse liver steatosis.  3.  No acute and/or inflammatory process identified along the bowel.           This report was signed and finalized on 5/11/2025 5:27 PM by Dr Chandrakant Stark.               I have reviewed the patient's current medications.     Assessment/Plan   Assessment  Active Hospital Problems    Diagnosis     **Dehydration     Hypokalemia     Steatosis of liver     Marijuana abuse     Loss of weight     Nausea and vomiting     Type 2 diabetes mellitus, with long-term current use of insulin     Hypertension     Graves' disease        Treatment Plan  CT A/P mentions bladder thickening - UA is pending  Recent outpatient GI workup as follows including pathology results from EGD on 3/20:    Recent EGD with Dr. Zack Mcallister of GI at [facility] on 3/20/2025 with the following results:  Findings:   Esophagus: abnormal: there are questionable mucosal changes throughout the esophagus, random biopsies obtained.     There is no hiatal hernia present.     Stomach: Abnormal: Mild mucosal changes suggestive of  gastritis noted - Gastric biopsies were taken from the antrum and body to rule out Helicobacter pylori infection.    Duodenum: normal    IMPRESSION:  1. Gastritis- biopsied  2. Esophagitis- biopsied    RECOMMENDATIONS:   1. Await path results  2. Start Prilosec daily- Rx sent  3. Minimize/avoid NSAID use  4. Follow up OV with BRIANA Dasilva in 8 weeks.     Pathology results as follows:  FINAL DIAGNOSIS:     A: Stomach, biopsies:   Mild chronic active gastritis.   Immunohistochemical stain for Helicobacter pylori is negative.     B: Random esophagus, biopsies:   Esophageal squamous mucosa with no significant pathologic changes.   No evidence of eosinophilic esophagitis, intestinal metaplasia, or   dysplasia.     3.  IV PPI BID for now  4.  NM Gastric emptying scan  5.  GI consultation  6.  Currently on scheduled Zofran  7.  K replacement  8.  Check TSH, FT4 and FT3  9.  Check A1c  10.  UDS positive for THC - risk factor for cannabinoid hyperemesis syndrome.  We discussed importance of complete cessation.  11.  Continue IVFs  12.  Nutrition consultation  13.  Blood sugar trend reviewed - will hold her scheduled Lantus this evening; SSI coverage available  14.  Of note, she did have a weight of 230 pounds in September 2024.  Her most recent weight during this hospitalization is 151 pounds  15.  Workup continues        Medical Decision Making  Number and Complexity of problems: 2 acute; high complexity      Conditions and Status        Condition is worsening.     [facility] Data  External documents reviewed: see above  Cardiac tracing (EKG, telemetry) interpretation: no new EKGs  Radiology interpretation: CT A/P results reviewed  Labs reviewed: as above  Any tests that were considered but not ordered: as above     Decision rules/scores evaluated (example [id number], [place], etc): none     Discussed with: patient and bedside nurse (Zoraida)     Care Planning  Shared decision making: Discussed with patient with agreement to  proceed with treatment plan as outlined  Code status and discussions: Full code    Disposition  Social Determinants of Health that impact treatment or disposition: None apparent at this time  I expect the patient to be discharged to: TBD        Electronically signed by Uri Rhodes MD, 05/12/25, 12:02 CDT.

## 2025-05-12 NOTE — CONSULTS
".[facility]  Initial Inpatient Consult Note    Referring Provider: No Known Provider    Date of Admission: 2025  Date of Service:  25    Reason for Consultation: \"Decreased p.o. intake, nausea/vomiting, weight loss\"    Subjective     History of present illness: 49-year-old woman reports decreased p.o. intake for the past 10 weeks as everything she masticates turns into saliva.  She denies odynophagia, nausea and vomiting, frequent heartburn, and underwent an EGD on 2025 which showed abnormal esophageal mucosa and gastritis.  She reports drinking an electrolyte prep plus 2 bottles of magnesium citrate in preparation for a colonoscopy on the same day which was not done to her dismay.  She describes a profound weight loss and her medical record indicates that she weighed 230 pounds in  and 151 pounds this admission.  She reports that her last bowel movement was 3 days ago.      Past Medical History:  Past Medical History:   Diagnosis Date    Arthritis     Carotid artery stenosis     Degenerative disc disease, cervical     Depression     Diabetes mellitus     type 1    Disease of thyroid gland     GERD (gastroesophageal reflux disease)     Graves disease     Heart palpitations     Hyperlipidemia     Hypertension     Hypothyroidism     Seizure     Thyromegaly        Past Surgical History:  Past Surgical History:   Procedure Laterality Date     SECTION      CHOLECYSTECTOMY      COLONOSCOPY  2015    Normal exam Dr. Morgan     COLONOSCOPY  2015    Normal exam    CYST REMOVAL      ENDOSCOPY N/A 2018    Normal exam    HYSTERECTOMY      THYROIDECTOMY Bilateral 2018    Procedure: total thyroidectomy;  Surgeon: Vitaly Givens MD;  Location: [facility];  Service: ENT        Social History:   Social History     Tobacco Use    Smoking status: Never    Smokeless tobacco: Never   Substance Use Topics    Alcohol use: Yes     Alcohol/week: " 6.0 standard drinks of alcohol     Types: 6 Cans of beer per week        Family History:  Family History   Problem Relation Age of Onset    Stroke Mother     Diabetes Mother     Stroke Father     Diabetes Father     Breast cancer Sister     Diabetes Sister     Stroke Sister     Seizures Sister     Coronary artery disease Brother     Diabetes Brother     Breast cancer Maternal Aunt     Colon cancer Paternal Uncle     Colon polyps Neg Hx        Home Meds:  Medications Prior to Admission   Medication Sig Dispense Refill Last Dose/Taking    amLODIPine (NORVASC) 10 MG tablet Take 1 tablet by mouth Daily.   Taking    carvedilol (COREG) 25 MG tablet Take 1 tablet by mouth 2 (Two) Times a Day With Meals.   Taking    empagliflozin (JARDIANCE) 25 MG tablet tablet Take 1 tablet by mouth Daily.   Taking    insulin glargine (LANTUS, SEMGLEE) 100 UNIT/ML injection Inject 75 Units under the skin into the appropriate area as directed Every Night. (Patient taking differently: Inject 30 Units under the skin into the appropriate area as directed Every Night.)   Patient Taking Differently    levothyroxine (SYNTHROID, LEVOTHROID) 175 MCG tablet Take 1 tablet by mouth Every Morning.   Taking    liothyronine (CYTOMEL) 25 MCG tablet Take 1 tablet by mouth 2 (Two) Times a Day.   Taking    metFORMIN ER (GLUCOPHAGE-XR) 500 MG 24 hr tablet Take 2 tablets by mouth Daily With Breakfast.   Taking    mirtazapine (REMERON) 15 MG tablet Take 1 tablet by mouth Every Night.   Taking    omeprazole (priLOSEC) 20 MG capsule Take 1 capsule by mouth Daily.   Taking    rosuvastatin (CRESTOR) 20 MG tablet Take 1 tablet by mouth Every Night.   Taking    spironolactone (ALDACTONE) 50 MG tablet Take 1 tablet by mouth 2 (Two) Times a Day.   Taking    vitamin D (ERGOCALCIFEROL) 1.25 MG (98907 UT) capsule capsule Take 1 capsule by mouth 1 (One) Time Per Week.   Taking    ARIPiprazole (ABILIFY) 10 MG tablet Take 1 tablet by mouth Daily.       diclofenac (VOLTAREN)  75 MG EC tablet Take 1 tablet by mouth 2 (Two) Times a Day.       Evolocumab (REPATHA) solution auto-injector SureClick injection Inject 1 mL under the skin into the appropriate area as directed Every 14 (Fourteen) Days.       gabapentin (NEURONTIN) 300 MG capsule Take 1 capsule by mouth 3 (Three) Times a Day. (Patient taking differently: Take 1 capsule by mouth Daily.)       hydrOXYzine pamoate (VISTARIL) 25 MG capsule Take 1 capsule by mouth 3 (Three) Times a Day As Needed for Anxiety.       Melatonin 10 MG tablet Take 1 tablet by mouth At Night As Needed (sleep).       ondansetron ODT (ZOFRAN-ODT) 4 MG disintegrating tablet Take 1 tablet by mouth Every 6 (Six) Hours As Needed for Nausea or Vomiting. 30 tablet 0        Current Meds:     Current Facility-Administered Medications:     sennosides-docusate (PERICOLACE) 8.6-50 MG per tablet 2 tablet, 2 tablet, Oral, BID PRN **AND** polyethylene glycol (MIRALAX) packet 17 g, 17 g, Oral, Daily PRN **AND** bisacodyl (DULCOLAX) EC tablet 5 mg, 5 mg, Oral, Daily PRN **AND** bisacodyl (DULCOLAX) suppository 10 mg, 10 mg, Rectal, Daily PRN, Alireza Lam MD    Calcium Replacement - Follow Nurse / BPA Driven Protocol, , Not Applicable, PRN, Alireza Lam MD    dextrose (D50W) (25 g/50 mL) IV injection 25 g, 25 g, Intravenous, Q15 Min PRN, Alireza Lam MD    dextrose (GLUTOSE) oral gel 15 g, 15 g, Oral, Q15 Min PRN, Alireza Lam MD    glucagon (GLUCAGEN) injection 1 mg, 1 mg, Intramuscular, Q15 Min PRN, Alireza Lam MD    [Held by provider] insulin glargine (LANTUS, SEMGLEE) injection 10 Units, 10 Units, Subcutaneous, Nightly, Alireza Lam MD, 10 Units at 05/11/25 2118    Insulin Lispro (humaLOG) injection 2-7 Units, 2-7 Units, Subcutaneous, 4x Daily AC & at Bedtime, Alireza Lam MD, 2 Units at 05/11/25 2118    Magnesium Standard Dose Replacement - Follow Nurse / BPA Driven Protocol, , Not  Applicable, Carole ROGERS Mariano Ariel, MD    ondansetron (ZOFRAN) injection 4 mg, 4 mg, Intravenous, Q8H, Alireza Lam MD, 4 mg at 05/12/25 1129    pantoprazole (PROTONIX) injection 40 mg, 40 mg, Intravenous, BID AC, Uri Rhodes MD    Phosphorus Replacement - Follow Nurse / BPA Driven Protocol, , Not Applicable, Carole ROGERS Mariano Ariel, MD    potassium chloride 10 mEq in 100 mL IVPB, 10 mEq, Intravenous, Q1H, Uri Rhodes MD, Last Rate: 100 mL/hr at 05/12/25 1120, 10 mEq at 05/12/25 1120    Potassium Replacement - Follow Nurse / BPA Driven Protocol, , Not Applicable, Carole ROGERS Mariano Ariel, MD    sodium chloride 0.9 % flush 10 mL, 10 mL, Intravenous, Q12H, Alireza Lam MD, 10 mL at 05/11/25 2118    sodium chloride 0.9 % flush 10 mL, 10 mL, Intravenous, Carole ROGERS Mariano Ariel, MD    sodium chloride 0.9 % infusion 40 mL, 40 mL, Intravenous, PRCarole COSTELLO Mariano Ariel, MD    sodium chloride 0.9 % infusion, 100 mL/hr, Intravenous, Continuous, Alireza Lam MD, Last Rate: 100 mL/hr at 05/12/25 0824, 100 mL/hr at 05/12/25 0824    Allergies:  Allergies   Allergen Reactions    Oxycodone-Acetaminophen Swelling     Other reaction(s): Throat swelling       Vital Signs  Temp:  [97.4 °F (36.3 °C)-98.1 °F (36.7 °C)] 97.5 °F (36.4 °C)  Heart Rate:  [52-89] 79  Resp:  [16-18] 16  BP: ()/(37-96) 121/79  Body mass index is 19.44 kg/m².    Intake/Output Summary (Last 24 hours) at 5/12/2025 1256  Last data filed at 5/11/2025 1938  Gross per 24 hour   Intake 100 ml   Output --   Net 100 ml     No intake/output data recorded.    Review of Systems     Constitution:  negative for chills, fatigue and fevers  ENT:   negative for sore throat and voice change  Respiratory: negative for cough and shortness of air  Cardiovascular:  Negative for chest pain or palpitations  Gastrointestinal:  negative for  See HPI  Endocrine: 80 pound involuntary weight  loss        Objective      Physical Exam:    General Appearance: Alert, cooperative, in no acute distress  Eyes:            No icterus  Lungs:         Clear to auscultation, respiration regular, even, and unlabored  Heart::          No murmur  Abdomen:    Normal bowel sounds, no masses, soft, mild epigastric and suprapubic tenderness to palpation, non-distended, no guarding  Extremities: no edema  Psychiatric: She claims not to remember details such as denying an EGD on March 20, 2025.  She is oriented to person, place, and time. Normal mood and affect     Results Review:    I have reviewed all of the patients current test results  Results from last 7 days   Lab Units 05/11/25  1633   WBC 10*3/mm3 4.74   HEMOGLOBIN g/dL 11.7*   HEMATOCRIT % 34.4   PLATELETS 10*3/mm3 224       Results from last 7 days   Lab Units 05/12/25  0658 05/11/25  1633   SODIUM mmol/L 134* 133*   POTASSIUM mmol/L 3.0* 3.1*   CHLORIDE mmol/L 101 92*   CO2 mmol/L 19.0* 25.0   BUN mg/dL 17 20   CREATININE mg/dL 1.18* 1.33*   CALCIUM mg/dL 8.6 9.7   BILIRUBIN mg/dL  --  0.6   ALK PHOS U/L  --  36*   ALT (SGPT) U/L  --  58*   AST (SGOT) U/L  --  52*   GLUCOSE mg/dL 57* 174*             Lab Results   Lab Value Date/Time    LIPASE 23 05/11/2025 1633    LIPASE 78 08/19/2019 1349    LIPASE 133 12/08/2018 1006    LIPASE 61 03/26/2018 1607    LIPASE 89 07/08/2017 1137    LIPASE 28 03/14/2017 1239       Radiology:    Imaging Results (Last 24 Hours)       Procedure Component Value Units Date/Time    CT Abdomen Pelvis With Contrast [[id number]] Collected: 05/11/25 1717     Updated: 05/11/25 1730    Narrative:      CT ABDOMEN PELVIS W CONTRAST- 5/11/2025 3:45 PM     HISTORY: ABd pain       COMPARISON: Abdominal CT dated 8/19/2019.     DOSE LENGTH PRODUCT: 257.29 mGy.cm. Automated exposure control was also  utilized to decrease patient radiation dose.     TECHNIQUE: Following the intravenous administration of contrast, helical  CT tomographic images of the  abdomen and pelvis were acquired.  Multiplanar reformatted images were provided for review.     FINDINGS:  LOWER CHEST: The lung bases are clear. Included mediastinal structures  are unremarkable.     LIVER: Diffuse liver steatosis. No suspicious lesion. Portal and hepatic  veins are patent.     BILIARY SYSTEM: The gallbladder has been removed. There is no evidence  of biliary ductal dilation.     PANCREAS: No focal pancreatic lesion identified.      SPLEEN: Unremarkable.     KIDNEYS AND ADRENALS: 2 cm right adrenal nodule which is pre-existing,  stable and considered benign; favor adenoma. Kidneys are unremarkable.  No hydronephrosis. Ureters are decompressed.     RETROPERITONEUM: No mass, lymphadenopathy or hemorrhage.     GI TRACT: The stomach is nondistended. Small bowel loops are nondilated.  The colon is nondistended. Cecum is flipped towards the midline but is  nondistended. No inflammatory changes are identified along the bowel.     OTHER: There is no mesenteric mass, lymphadenopathy, free fluid or free  air. Abdominal vascular structures are patent. Aorta is normal in  caliber. Lumbar spine osteoarthritis change. No acute bony abnormality.     PELVIS: No pelvic mass or adenopathy. Urinary bladder is nondistended.  Mild bladder wall thickening. Trace free fluid in the pelvis.       Impression:         1.  Urinary bladder wall thickening which could reflect an acute  cystitis. Otherwise, no acute intra-abdominal process.  2.  Diffuse liver steatosis.  3.  No acute and/or inflammatory process identified along the bowel.           This report was signed and finalized on 5/11/2025 5:27 PM by Dr Chandrakant Stark.                 Assessment & Plan       Dehydration    Graves' disease    Hypertension    Nausea and vomiting    Type 2 diabetes mellitus, with long-term current use of insulin    Hypokalemia    Steatosis of liver    Marijuana abuse    Loss of weight      Impression/Plan  The patient's history of decreased  p.o. intake is substantiated by her profound weight loss.  Her abdominal/pelvic CT scan with IV contrast shows no worrisome findings. Recommend a repeat EGD tomorrow morning.  Consider repeating the patient's thyroid function tests.        Electronically signed by Jadon Smith MD, 05/12/25, 12:56 PM CDT.         Jadon Smith MD  05/12/25  12:56 CDT

## 2025-05-12 NOTE — PLAN OF CARE
Goal Outcome Evaluation:  Plan of Care Reviewed With: patient        Progress: no change        Patient admitted this shift from the ED with dehydration. IVF infusing per order. Urine sample still needs to be collect. Speech and dietary consult placed. VSS. Safety maintained.

## 2025-05-13 ENCOUNTER — APPOINTMENT (OUTPATIENT)
Dept: NUCLEAR MEDICINE | Facility: HOSPITAL | Age: 50
End: 2025-05-13
Payer: COMMERCIAL

## 2025-05-13 ENCOUNTER — ANESTHESIA EVENT (OUTPATIENT)
Dept: GASTROENTEROLOGY | Facility: HOSPITAL | Age: 50
End: 2025-05-13
Payer: COMMERCIAL

## 2025-05-13 ENCOUNTER — ANESTHESIA (OUTPATIENT)
Dept: GASTROENTEROLOGY | Facility: HOSPITAL | Age: 50
End: 2025-05-13
Payer: COMMERCIAL

## 2025-05-13 LAB
ALBUMIN SERPL-MCNC: 3.3 G/DL (ref 3.5–5.2)
ALBUMIN/GLOB SERPL: 1.6 G/DL
ALP SERPL-CCNC: 33 U/L (ref 39–117)
ALT SERPL W P-5'-P-CCNC: 52 U/L (ref 1–33)
ANION GAP SERPL CALCULATED.3IONS-SCNC: 12 MMOL/L (ref 5–15)
AST SERPL-CCNC: 36 U/L (ref 1–32)
BASOPHILS # BLD AUTO: 0.02 10*3/MM3 (ref 0–0.2)
BASOPHILS NFR BLD AUTO: 0.4 % (ref 0–1.5)
BILIRUB SERPL-MCNC: 0.4 MG/DL (ref 0–1.2)
BUN SERPL-MCNC: 13 MG/DL (ref 6–20)
BUN/CREAT SERPL: 10 (ref 7–25)
CALCIUM SPEC-SCNC: 8.8 MG/DL (ref 8.6–10.5)
CHLORIDE SERPL-SCNC: 101 MMOL/L (ref 98–107)
CO2 SERPL-SCNC: 22 MMOL/L (ref 22–29)
CREAT SERPL-MCNC: 1.3 MG/DL (ref 0.57–1)
DEPRECATED RDW RBC AUTO: 44.2 FL (ref 37–54)
EGFRCR SERPLBLD CKD-EPI 2021: 50.2 ML/MIN/1.73
EOSINOPHIL # BLD AUTO: 0.02 10*3/MM3 (ref 0–0.4)
EOSINOPHIL NFR BLD AUTO: 0.4 % (ref 0.3–6.2)
ERYTHROCYTE [DISTWIDTH] IN BLOOD BY AUTOMATED COUNT: 14.1 % (ref 12.3–15.4)
GLOBULIN UR ELPH-MCNC: 2.1 GM/DL
GLUCOSE BLDC GLUCOMTR-MCNC: 73 MG/DL (ref 70–130)
GLUCOSE BLDC GLUCOMTR-MCNC: 82 MG/DL (ref 70–130)
GLUCOSE BLDC GLUCOMTR-MCNC: 91 MG/DL (ref 70–130)
GLUCOSE BLDC GLUCOMTR-MCNC: 94 MG/DL (ref 70–130)
GLUCOSE SERPL-MCNC: 86 MG/DL (ref 65–99)
HCT VFR BLD AUTO: 32.5 % (ref 34–46.6)
HGB BLD-MCNC: 11 G/DL (ref 12–15.9)
IMM GRANULOCYTES # BLD AUTO: 0.02 10*3/MM3 (ref 0–0.05)
IMM GRANULOCYTES NFR BLD AUTO: 0.4 % (ref 0–0.5)
LYMPHOCYTES # BLD AUTO: 2.01 10*3/MM3 (ref 0.7–3.1)
LYMPHOCYTES NFR BLD AUTO: 40.9 % (ref 19.6–45.3)
MCH RBC QN AUTO: 29.2 PG (ref 26.6–33)
MCHC RBC AUTO-ENTMCNC: 33.8 G/DL (ref 31.5–35.7)
MCV RBC AUTO: 86.2 FL (ref 79–97)
MONOCYTES # BLD AUTO: 0.25 10*3/MM3 (ref 0.1–0.9)
MONOCYTES NFR BLD AUTO: 5.1 % (ref 5–12)
NEUTROPHILS NFR BLD AUTO: 2.59 10*3/MM3 (ref 1.7–7)
NEUTROPHILS NFR BLD AUTO: 52.8 % (ref 42.7–76)
NRBC BLD AUTO-RTO: 0 /100 WBC (ref 0–0.2)
PLATELET # BLD AUTO: 199 10*3/MM3 (ref 140–450)
PMV BLD AUTO: 10.2 FL (ref 6–12)
POTASSIUM SERPL-SCNC: 3.3 MMOL/L (ref 3.5–5.2)
PROT SERPL-MCNC: 5.4 G/DL (ref 6–8.5)
RBC # BLD AUTO: 3.77 10*6/MM3 (ref 3.77–5.28)
SODIUM SERPL-SCNC: 135 MMOL/L (ref 136–145)
WBC NRBC COR # BLD AUTO: 4.91 10*3/MM3 (ref 3.4–10.8)

## 2025-05-13 PROCEDURE — 25810000003 SODIUM CHLORIDE 0.9 % SOLUTION: Performed by: FAMILY MEDICINE

## 2025-05-13 PROCEDURE — 25010000002 PROPOFOL 10 MG/ML EMULSION

## 2025-05-13 PROCEDURE — 25010000002 VASOPRESSIN 20 UNIT/ML SOLUTION

## 2025-05-13 PROCEDURE — 43235 EGD DIAGNOSTIC BRUSH WASH: CPT | Performed by: INTERNAL MEDICINE

## 2025-05-13 PROCEDURE — 82948 REAGENT STRIP/BLOOD GLUCOSE: CPT

## 2025-05-13 PROCEDURE — G0378 HOSPITAL OBSERVATION PER HR: HCPCS

## 2025-05-13 PROCEDURE — 85025 COMPLETE CBC W/AUTO DIFF WBC: CPT | Performed by: INTERNAL MEDICINE

## 2025-05-13 PROCEDURE — 63710000001 ONDANSETRON ODT 4 MG TABLET DISPERSIBLE

## 2025-05-13 PROCEDURE — 80053 COMPREHEN METABOLIC PANEL: CPT | Performed by: INTERNAL MEDICINE

## 2025-05-13 PROCEDURE — 96361 HYDRATE IV INFUSION ADD-ON: CPT

## 2025-05-13 PROCEDURE — 25010000002 POTASSIUM CHLORIDE 10 MEQ/100ML SOLUTION: Performed by: INTERNAL MEDICINE

## 2025-05-13 RX ORDER — PROPOFOL 10 MG/ML
VIAL (ML) INTRAVENOUS AS NEEDED
Status: DISCONTINUED | OUTPATIENT
Start: 2025-05-13 | End: 2025-05-13 | Stop reason: SURG

## 2025-05-13 RX ORDER — POTASSIUM CHLORIDE 7.45 MG/ML
10 INJECTION INTRAVENOUS
Status: DISPENSED | OUTPATIENT
Start: 2025-05-13 | End: 2025-05-13

## 2025-05-13 RX ADMIN — PANTOPRAZOLE SODIUM 40 MG: 40 INJECTION, POWDER, FOR SOLUTION INTRAVENOUS at 17:21

## 2025-05-13 RX ADMIN — ONDANSETRON 4 MG: 4 TABLET, ORALLY DISINTEGRATING ORAL at 09:10

## 2025-05-13 RX ADMIN — POTASSIUM CHLORIDE 10 MEQ: 7.46 INJECTION, SOLUTION INTRAVENOUS at 09:09

## 2025-05-13 RX ADMIN — ONDANSETRON 4 MG: 4 TABLET, ORALLY DISINTEGRATING ORAL at 15:38

## 2025-05-13 RX ADMIN — Medication 10 ML: at 20:31

## 2025-05-13 RX ADMIN — SODIUM CHLORIDE: 9 INJECTION, SOLUTION INTRAVENOUS at 11:45

## 2025-05-13 RX ADMIN — SODIUM CHLORIDE 100 ML/HR: 9 INJECTION, SOLUTION INTRAVENOUS at 09:09

## 2025-05-13 RX ADMIN — PROPOFOL 200 MG: 10 INJECTION, EMULSION INTRAVENOUS at 11:45

## 2025-05-13 NOTE — ANESTHESIA PREPROCEDURE EVALUATION
Anesthesia Evaluation     Patient summary reviewed   no history of anesthetic complications:   NPO Solid Status: > 8 hours  NPO Liquid Status: > 8 hours           Airway   Mallampati: III  TM distance: >3 FB  Neck ROM: full  Small opening  Dental      Pulmonary    (+) a smoker,  Cardiovascular     (+) hypertension, hyperlipidemia,  carotid artery disease      Neuro/Psych  (+) seizures (several years since last seizure - not a regular occurence), headaches, syncope, numbness, psychiatric history Depression  GI/Hepatic/Renal/Endo    (+) GERD, liver disease, diabetes mellitus, thyroid problem hypothyroidism    Musculoskeletal     Abdominal    Substance History   (+) alcohol use, drug use     OB/GYN          Other   arthritis,                 Anesthesia Plan    ASA 3 - emergent     MAC       Anesthetic plan, risks, benefits, and alternatives have been provided, discussed and informed consent has been obtained with: patient.    CODE STATUS:    Code Status (Patient has no pulse and is not breathing): CPR (Attempt to Resuscitate)  Medical Interventions (Patient has pulse or is breathing): Full Support

## 2025-05-13 NOTE — ANESTHESIA POSTPROCEDURE EVALUATION
Patient: Lynn Magana    Procedure Summary       Date: 05/13/25 Room / Location: [facility] ENDOSCOPY 4 / [facility] ENDOSCOPY    Anesthesia Start: 1133 Anesthesia Stop: 1201    Procedure: ESOPHAGOGASTRODUODENOSCOPY WITH ANESTHESIA Diagnosis:       Loss of weight      (Loss of weight [R63.4])    Surgeons: Jadon Smith MD Provider: Deborah Kuo CRNA    Anesthesia Type: MAC ASA Status: 3 - Emergent            Anesthesia Type: MAC    Vitals  Vitals Value Taken Time   /93 05/13/25 12:21   Temp     Pulse 90 05/13/25 12:26   Resp 18 05/13/25 12:20   SpO2 94 % 05/13/25 12:20   Vitals shown include unfiled device data.        Post Anesthesia Care and Evaluation    Patient location during evaluation: bedside  Patient participation: complete - patient participated  Level of consciousness: awake and alert  Pain management: adequate    Airway patency: patent  Anesthetic complications: No anesthetic complications  PONV Status: none  Cardiovascular status: acceptable  Respiratory status: acceptable  Hydration status: acceptable  No anesthesia care post op

## 2025-05-13 NOTE — PLAN OF CARE
Goal Outcome Evaluation:           Progress: no change  Outcome Evaluation: VSS. No c/o nausea or pain.  Still really poor appetite.  Pt lost IV access prior to shift starting and nursing staff (including charge nurse and nurse supervisor) attempted IV start unsuccesfully.  On call hospitalist, Dr. Armendariz, made aware and placed consult for vascular access team.  Safety maintained.

## 2025-05-13 NOTE — PROGRESS NOTES
[facility] Medicine Services  INPATIENT PROGRESS NOTE    Patient Name: Lynn Magana  Date of Admission: 5/11/2025  Today's Date: 05/13/25  Length of Stay: 0  Primary Care Physician: Darryl Andrews DO    Subjective   Chief Complaint: Decreased PO intake  HPI   Patient reports that she was unable to get her gastric emptying study completed earlier this morning-she had nausea and vomiting when administered the eggs to begin the study.  When I saw her this morning transport had just arrived and she is getting ready to go to the GI suite for an EGD.  Case discussed with her in addition to her spouse at bedside this morning.      Review of Systems   All pertinent negatives and positives are as above. All other systems have been reviewed and are negative unless otherwise stated.     Objective    Temp:  [97.5 °F (36.4 °C)-98.9 °F (37.2 °C)] 98 °F (36.7 °C)  Heart Rate:  [] 95  Resp:  [16-18] 18  BP: (116-138)/(76-90) 138/85  Physical Exam  Vitals reviewed.   Constitutional:       General: She is not in acute distress.     Appearance: She is not toxic-appearing.   HENT:      Head: Normocephalic.      Mouth/Throat:      Mouth: Mucous membranes are moist.   Pulmonary:      Effort: Pulmonary effort is normal. No respiratory distress.   Abdominal:      General: There is no distension.      Palpations: Abdomen is soft.   Skin:     General: Skin is warm.   Neurological:      General: No focal deficit present.      Mental Status: She is alert.   Psychiatric:         Mood and Affect: Mood normal.         Results Review:  I have reviewed the labs, radiology results, and diagnostic studies.    Laboratory Data:   Results from last 7 days   Lab Units 05/13/25  0325 05/11/25  1633   WBC 10*3/mm3 4.91 4.74   HEMOGLOBIN g/dL 11.0* 11.7*   HEMATOCRIT % 32.5* 34.4   PLATELETS 10*3/mm3 199 224        Results from last 7 days   Lab Units 05/13/25  0325 05/12/25  1928 05/12/25  0658  "05/11/25  1633   SODIUM mmol/L 135*  --  134* 133*   POTASSIUM mmol/L 3.3* 3.6 3.0* 3.1*   CHLORIDE mmol/L 101  --  101 92*   CO2 mmol/L 22.0  --  19.0* 25.0   BUN mg/dL 13  --  17 20   CREATININE mg/dL 1.30*  --  1.18* 1.33*   CALCIUM mg/dL 8.8  --  8.6 9.7   BILIRUBIN mg/dL 0.4  --   --  0.6   ALK PHOS U/L 33*  --   --  36*   ALT (SGPT) U/L 52*  --   --  58*   AST (SGOT) U/L 36*  --   --  52*   GLUCOSE mg/dL 86  --  57* 174*       Culture Data:   No results found for: \"BLOODCX\", \"URINECX\", \"WOUNDCX\", \"MRSACX\", \"RESPCX\", \"STOOLCX\"    Radiology Data:   Imaging Results (Last 24 Hours)       ** No results found for the last 24 hours. **            I have reviewed the patient's current medications.     Assessment/Plan   Assessment  Active Hospital Problems    Diagnosis     **Dehydration     Hypokalemia     Steatosis of liver     Marijuana abuse     Loss of weight     Severe protein-calorie malnutrition     Nausea and vomiting     Type 2 diabetes mellitus, with long-term current use of insulin     Hypertension     Graves' disease        Treatment Plan  Unable to complete gastric emptying study due to vomiting  EGD planned for this AM  Gastroenterology following and appreciate their assistance  Recent outpatient GI workup as follows including pathology results from EGD on 3/20:    Recent EGD with Dr. Zack Mcallister of GI at [facility] on 3/20/2025 with the following results:  Findings:   Esophagus: abnormal: there are questionable mucosal changes throughout the esophagus, random biopsies obtained.     There is no hiatal hernia present.     Stomach: Abnormal: Mild mucosal changes suggestive of gastritis noted - Gastric biopsies were taken from the antrum and body to rule out Helicobacter pylori infection.    Duodenum: normal    IMPRESSION:  1. Gastritis- biopsied  2. Esophagitis- biopsied    RECOMMENDATIONS:   1. Await path results  2. Start Prilosec daily- Rx sent  3. Minimize/avoid NSAID use  4. Follow up OV with VINNY " BRIANA Espino in 8 weeks.     Pathology results as follows:  FINAL DIAGNOSIS:     A: Stomach, biopsies:   Mild chronic active gastritis.   Immunohistochemical stain for Helicobacter pylori is negative.     B: Random esophagus, biopsies:   Esophageal squamous mucosa with no significant pathologic changes.   No evidence of eosinophilic esophagitis, intestinal metaplasia, or   dysplasia.     5.  IV PPI BID for now  6.  Currently on scheduled Zofran  7.  K replacement again today  8.  Patient reports she has not been compliant with her thyroid medication in the outpatient setting - exacerbated by her N/V symptoms; her TSH noted to be markedly elevated with low FT4 and FT3.   Synthroid restarted at 200 mcg in addition to Cytomel at 25 mcg.  If unable to tolerate p.o. may have to consider IV dosing over the short-term.  9.  A1c 4.4 Blood sugar trend reviewed - will hold her scheduled Lantus; SSI coverage available  10.  UDS positive for THC - risk factor for cannabinoid hyperemesis syndrome.  We discussed importance of complete cessation.  11.  Continue IVFs  12.  Nutrition consultation and need for supplements  13.  Of note, she did have a weight of 230 pounds in September 2024.  Her most recent weight during this hospitalization is 151 pounds  14.  Workup ongoing      Medical Decision Making  Number and Complexity of problems: 2 acute; high complexity      Conditions and Status       Condition is unchanged     MDM Data  External documents reviewed: see above  Cardiac tracing (EKG, telemetry) interpretation: no new EKGs  Radiology interpretation: no new radiology studies  Labs reviewed: as above  Any tests that were considered but not ordered: as above     Decision rules/scores evaluated (example [id number], Wells, etc): none     Discussed with: patient and bedside nurse (Madhuri); also discussed with spouse at bedside     Care Planning  Shared decision making: Discussed with patient with agreement to proceed with  treatment plan as outlined  Code status and discussions: Full code    Disposition  Social Determinants of Health that impact treatment or disposition: None apparent at this time  I expect the patient to be discharged to: TBD        Electronically signed by Uri Rhodes MD, 05/13/25, 11:08 CDT.

## 2025-05-13 NOTE — NURSING NOTE
Multiple unsuccessful sticks by nursing staff (including charge nurse and nurse supervisor) to obtain IV access.  Encouraging patient to drink fluids.  Notified on call hospitalist.

## 2025-05-13 NOTE — PLAN OF CARE
Goal Outcome Evaluation:  Plan of Care Reviewed With: patient, spouse        Progress: no change     Pt is alert and flat. Spouse at bedside. Procedure complete. Pt still not eating well. Pt encouraged to eat and drink. Pt refused potassium labs and notified MD Rhodes. Meds given per MAR. Safety measures in place.

## 2025-05-14 VITALS
RESPIRATION RATE: 14 BRPM | HEART RATE: 92 BPM | HEIGHT: 72 IN | SYSTOLIC BLOOD PRESSURE: 141 MMHG | OXYGEN SATURATION: 100 % | WEIGHT: 151.4 LBS | TEMPERATURE: 99.1 F | DIASTOLIC BLOOD PRESSURE: 88 MMHG | BODY MASS INDEX: 20.51 KG/M2

## 2025-05-14 PROBLEM — E03.9 HYPOTHYROID: Status: ACTIVE | Noted: 2025-05-14

## 2025-05-14 PROCEDURE — G0378 HOSPITAL OBSERVATION PER HR: HCPCS

## 2025-05-14 RX ORDER — LIOTHYRONINE SODIUM 25 UG/1
25 TABLET ORAL DAILY
Qty: 30 TABLET | Refills: 2 | Status: SHIPPED | OUTPATIENT
Start: 2025-05-14

## 2025-05-14 RX ORDER — PANTOPRAZOLE SODIUM 40 MG/1
40 TABLET, DELAYED RELEASE ORAL
Status: DISCONTINUED | OUTPATIENT
Start: 2025-05-14 | End: 2025-05-14 | Stop reason: HOSPADM

## 2025-05-14 RX ORDER — LEVOTHYROXINE SODIUM 175 UG/1
175 TABLET ORAL
Qty: 30 TABLET | Refills: 2 | Status: SHIPPED | OUTPATIENT
Start: 2025-05-14

## 2025-05-14 RX ORDER — ROSUVASTATIN CALCIUM 20 MG/1
20 TABLET, COATED ORAL NIGHTLY
Status: DISCONTINUED | OUTPATIENT
Start: 2025-05-14 | End: 2025-05-14 | Stop reason: HOSPADM

## 2025-05-14 RX ADMIN — LEVOTHYROXINE SODIUM 200 MCG: 100 TABLET ORAL at 06:20

## 2025-05-14 NOTE — PLAN OF CARE
Goal Outcome Evaluation:              Outcome Evaluation: No changes overnight. VSS on RA. No c/o pain. Slept through night. Safety maintained.

## 2025-05-14 NOTE — NURSING NOTE
Patient was seen leaving floor. Patient did not return to room. Patient was called at number on file. Patient stated she had left the hospital and was not returning. When asked if she still had her IV access in she stated she had removed the IV. This nurse then called [place] police and spoke with genaro. They were going to perform a wellness check. Police called later and stated patient did no longer have the IV .

## 2025-05-14 NOTE — DISCHARGE SUMMARY
[facility] Medicine Services  DISCHARGE SUMMARY       Date of Admission: 5/11/2025  Date of Discharge:  5/14/2025  Primary Care Physician: Darryl Andrews DO    Presenting Problem/History of Present Illness:  Poor PO intake; weight loss    Final Discharge Diagnoses:  Active Hospital Problems    Diagnosis     **Dehydration     Hypothyroid     Hypokalemia     Steatosis of liver     Marijuana abuse     Loss of weight     Severe protein-calorie malnutrition     Nausea and vomiting     Type 2 diabetes mellitus, with long-term current use of insulin     Hypertension        Consults: Gastroenterology    Procedures Performed: EGD completed on 5/13/2025:  - Small hiatal hernia. - Z-line regular, 40 cm from the incisors. - Normal stomach. - Normal examined duodenum. - No specimens collected.    Pertinent Test Results:   Results for orders placed during the hospital encounter of 02/15/25    Adult Transthoracic Echo Complete w/ Color, Spectral and Contrast if Necessary Per Protocol    Interpretation Summary    Left ventricular systolic function is normal. Left ventricular ejection fraction appears to be 66 - 70%.    Left ventricular diastolic function was normal.    Normal right ventricular cavity size and systolic function.    Normal biatrial size.    There is no significant (more than mild) valve stenosis or regurgitation appreciated.    There is a very small (<0.5cm) pericardial effusion adjacent to the right ventricle. There is no evidence of cardiac tamponade.      Imaging Results (All)       Procedure Component Value Units Date/Time    CT Abdomen Pelvis With Contrast [[id number]] Collected: 05/11/25 1717     Updated: 05/11/25 1730    Narrative:      CT ABDOMEN PELVIS W CONTRAST- 5/11/2025 3:45 PM     HISTORY: ABd pain       COMPARISON: Abdominal CT dated 8/19/2019.     DOSE LENGTH PRODUCT: 257.29 mGy.cm. Automated exposure control was also  utilized to decrease patient radiation  You may take the oral dissolving Zofran as needed for nausea.  If that medication is not working well for you you may try the Reglan for the next dose.  Do not take these medications together.    Please call your primary care physician for complete recheck within 3 to 5 days.    Return to the emergency department if you develop any new or worsening symptoms including abdominal pain, recurrent nausea or vomiting, lightheadedness, shortness of breath, fevers, or any further concerns.   dose.     TECHNIQUE: Following the intravenous administration of contrast, helical  CT tomographic images of the abdomen and pelvis were acquired.  Multiplanar reformatted images were provided for review.     FINDINGS:  LOWER CHEST: The lung bases are clear. Included mediastinal structures  are unremarkable.     LIVER: Diffuse liver steatosis. No suspicious lesion. Portal and hepatic  veins are patent.     BILIARY SYSTEM: The gallbladder has been removed. There is no evidence  of biliary ductal dilation.     PANCREAS: No focal pancreatic lesion identified.      SPLEEN: Unremarkable.     KIDNEYS AND ADRENALS: 2 cm right adrenal nodule which is pre-existing,  stable and considered benign; favor adenoma. Kidneys are unremarkable.  No hydronephrosis. Ureters are decompressed.     RETROPERITONEUM: No mass, lymphadenopathy or hemorrhage.     GI TRACT: The stomach is nondistended. Small bowel loops are nondilated.  The colon is nondistended. Cecum is flipped towards the midline but is  nondistended. No inflammatory changes are identified along the bowel.     OTHER: There is no mesenteric mass, lymphadenopathy, free fluid or free  air. Abdominal vascular structures are patent. Aorta is normal in  caliber. Lumbar spine osteoarthritis change. No acute bony abnormality.     PELVIS: No pelvic mass or adenopathy. Urinary bladder is nondistended.  Mild bladder wall thickening. Trace free fluid in the pelvis.       Impression:         1.  Urinary bladder wall thickening which could reflect an acute  cystitis. Otherwise, no acute intra-abdominal process.  2.  Diffuse liver steatosis.  3.  No acute and/or inflammatory process identified along the bowel.           This report was signed and finalized on 5/11/2025 5:27 PM by Dr Chandrakant Stark.             LAB RESULTS:      Lab 05/13/25  0325 05/11/25  1942 05/11/25  1633   WBC 4.91  --  4.74   HEMOGLOBIN 11.0*  --  11.7*   HEMATOCRIT 32.5*  --  34.4   PLATELETS 199  --  224   NEUTROS  ABS 2.59  --  2.81   IMMATURE GRANS (ABS) 0.02  --  0.02   LYMPHS ABS 2.01  --  1.70   MONOS ABS 0.25  --  0.17   EOS ABS 0.02  --  0.01   MCV 86.2  --  85.4   LACTATE  --  1.9  --          Lab 05/13/25  0325 05/12/25  1928 05/12/25  0658 05/11/25  1633   SODIUM 135*  --  134* 133*   POTASSIUM 3.3* 3.6 3.0* 3.1*   CHLORIDE 101  --  101 92*   CO2 22.0  --  19.0* 25.0   ANION GAP 12.0  --  14.0 16.0*   BUN 13  --  17 20   CREATININE 1.30*  --  1.18* 1.33*   EGFR 50.2*  --  56.4* 48.8*   GLUCOSE 86  --  57* 174*   CALCIUM 8.8  --  8.6 9.7   MAGNESIUM  --   --   --  1.8   HEMOGLOBIN A1C  --   --   --  4.40*   TSH  --   --  53.400*  --          Lab 05/13/25  0325 05/11/25  1633   TOTAL PROTEIN 5.4* 6.3   ALBUMIN 3.3* 3.9   GLOBULIN 2.1 2.4   ALT (SGPT) 52* 58*   AST (SGOT) 36* 52*   BILIRUBIN 0.4 0.6   ALK PHOS 33* 36*   LIPASE  --  23                 Lab 05/12/25  1239 05/11/25  1633   IRON  --  63   IRON SATURATION (TSAT)  --  28   TIBC  --  226*   TRANSFERRIN  --  152*   VITAMIN B 12 >2,000*  --          Brief Urine Lab Results  (Last result in the past 365 days)        Color   Clarity   Blood   Leuk Est   Nitrite   Protein   CREAT   Urine HCG        05/11/25 1614 Dark Yellow   Cloudy   Negative   Trace   Negative   30 mg/dL (1+)                 Microbiology Results (last 10 days)       ** No results found for the last 240 hours. **            Hospital Course:   Patient is a 49-year-old -American female with past medical history significant for type 2 diabetes, hypothyroidism, hypertension, that presented to our emergency department on the evening of 5/11/2025 secondary to ongoing issues with abdominal discomfort, poor p.o. intake, in addition to weight loss.  The symptoms have been ongoing for the past couple of months.  She has been evaluated by gastroenterology at [facility].  This evaluation occurred in March 2025.  Patient had an EGD completed by Dr. Zack Mcallister with the following  results:    Findings:   Esophagus: abnormal: there are questionable mucosal changes throughout the esophagus, random biopsies obtained.     There is no hiatal hernia present.     Stomach: Abnormal: Mild mucosal changes suggestive of gastritis noted - Gastric biopsies were taken from the antrum and body to rule out Helicobacter pylori infection.    Duodenum: normal    IMPRESSION:  1. Gastritis- biopsied  2. Esophagitis- biopsied    RECOMMENDATIONS:   1. Await path results  2. Start Prilosec daily- Rx sent  3. Minimize/avoid NSAID use  4. Follow up OV with BRIANA Dasilva in 8 weeks.      Pathology results as follows:  FINAL DIAGNOSIS:     A: Stomach, biopsies:   Mild chronic active gastritis.   Immunohistochemical stain for Helicobacter pylori is negative.     B: Random esophagus, biopsies:   Esophageal squamous mucosa with no significant pathologic changes.   No evidence of eosinophilic esophagitis, intestinal metaplasia, or   dysplasia.     Patient also reported that she was scheduled to get a colonoscopy, but for unclear reasons it sounds like this study was ultimately postponed.    Looking back at records from 2020 patient had a weight of 230-240 pounds at that time.  Patient's weight on admission to our facility was 151 pounds.    She was placed in observation on the hospitalist service.  She was initially started on IV Protonix and gastroenterology was consulted.    Given her history of diabetes I did order a gastric emptying study.  Patient did have an episode of vomiting after attempting to eat the scrambled eggs required to begin this study.  The study was aborted.  We also started her on scheduled Zofran given ongoing symptoms of nausea and poor p.o. intake.  She was taken for an EGD on 5/13/2025.  Results of that study are noted above-small hiatal hernia but no other acute findings were identified.      We discussed that the cause of her recent poor p.o. intake and weight loss is likely multifactorial.   I did inquire with her spouse if she has been having any issues of depression or other mood disorder.  He did report that she recently lost a family member that she was very close to, but felt that it was less likely that this was a contributing factor.  In addition, she also has findings of significant hypothyroidism.  Patient did report to me that she has not been taking her thyroid medications in the outpatient setting.  Her TSH was noted to be markedly elevated and also with a low free T4 and free T3.  I am going to give her a new prescription for her Synthroid and Cytomel, she was given instructions on the importance of strict compliance with these medications, and she will need repeat thyroid function tests in approximately 4 weeks.  We did discuss that untreated hypothyroidism can cause multiple symptoms, and cannot rule out that some of her GI symptoms could be related to this condition.      She also has a known history of insulin-dependent diabetes, however her blood sugars have been very well-controlled.  We were unable to get a gastric emptying study as outlined above.  Her A1c was found to be only 4.4.  I have not been giving her any of her diabetes medicines during this hospitalization and her most recent blood sugar trend has been as follows: 63, 95, 86, 91, 73, 82, 94.  I do want her to hold her outpatient diabetes medications over the short-term, continue monitoring her blood sugars in the outpatient basis, and plans for repeat discussion with her primary care provider in 1 week.    Finally, her urine drug screen on admission was positive for marijuana.  Given her ongoing symptoms of nausea and vomiting we did discuss the possibility of cannabinoid hyperemesis syndrome.  In short, we focused our discussion on complete cessation of all marijuana with hopes that this strategy will also aide in improving her GI symptoms.    Nutrition did evaluate patient during this hospitalization as well.  Patient  "was started on nutrition supplements.  I spoke with both patient and spouse regarding the importance of utilizing a nutrition supplement at least 2-3 times daily in the outpatient setting.    Sadly, Mrs. Magana left the hospital this morning without notifying any of our care team.  When I went to see her this morning she was not in her room and went to discuss with her bedside nurse.  They had assumed that she had gone outside for a few moments and would return shortly.  When nursing staff called her on her cell phone she indicated that she left the hospital, she was already at home, and had no intention of returning.  She indicated that she had removed her peripheral IV, however nursing staff indicated they would have to notify police so this could be verified.  I still am going to call her in new Rxs for her thyroid medication and would like her to see her PCP in 1 week for post-hospitalization assessment regarding the above mentioned issues.  In this situation, she technically left the hospital AGAINST MEDICAL ADVICE.      Physical Exam on Discharge:  /88 (BP Location: Right arm, Patient Position: Lying)   Pulse 92   Temp 99.1 °F (37.3 °C) (Oral)   Resp 14   Ht 188 cm (74\")   Wt 68.7 kg (151 lb 6.4 oz)   SpO2 100%   BMI 19.44 kg/m²   Physical Exam  Vitals reviewed. Nursing note reviewed: exam could not be completed as patient left the hospital AMA.        Condition on Discharge: she left AMA    Discharge Disposition:  Home or Self Care    Discharge Medications:     Discharge Medications        PAUSE taking these medications        Instructions Start Date   amLODIPine 10 MG tablet  Wait to take this until your doctor or other care provider tells you to start again.  Commonly known as: NORVASC   10 mg, Oral, Daily      carvedilol 25 MG tablet  Wait to take this until your doctor or other care provider tells you to start again.  Commonly known as: COREG   25 mg, Oral, 2 Times Daily With Meals    "   empagliflozin 25 MG tablet tablet  Wait to take this until your doctor or other care provider tells you to start again.  Commonly known as: JARDIANCE   25 mg, Oral, Daily      insulin glargine 100 UNIT/ML injection  Wait to take this until your doctor or other care provider tells you to start again.  Commonly known as: LANTUS, SEMGLEE   75 Units, Nightly      metFORMIN  MG 24 hr tablet  Wait to take this until your doctor or other care provider tells you to start again.  Commonly known as: GLUCOPHAGE-XR   1,000 mg, Oral, Daily With Breakfast      spironolactone 50 MG tablet  Wait to take this until your doctor or other care provider tells you to start again.  Commonly known as: ALDACTONE   50 mg, Oral, 2 Times Daily             Changes to Medications        Instructions Start Date   liothyronine 25 MCG tablet  Commonly known as: CYTOMEL  What changed: when to take this   25 mcg, Oral, Daily             Continue These Medications        Instructions Start Date   ARIPiprazole 10 MG tablet  Commonly known as: ABILIFY   10 mg, Oral, Daily      Evolocumab solution auto-injector SureClick injection  Commonly known as: REPATHA   140 mg, Every 14 Days      hydrOXYzine pamoate 25 MG capsule  Commonly known as: VISTARIL   25 mg, Oral, 3 Times Daily PRN      levothyroxine 175 MCG tablet  Commonly known as: SYNTHROID, LEVOTHROID   175 mcg, Oral, Every Early Morning      Melatonin 10 MG tablet   1 tablet, Oral, Nightly PRN      mirtazapine 15 MG tablet  Commonly known as: REMERON   15 mg, Oral, Nightly      omeprazole 20 MG capsule  Commonly known as: priLOSEC   20 mg, Oral, Daily      ondansetron ODT 4 MG disintegrating tablet  Commonly known as: ZOFRAN-ODT   4 mg, Oral, Every 6 Hours PRN      rosuvastatin 20 MG tablet  Commonly known as: CRESTOR   20 mg, Oral, Nightly      vitamin D 1.25 MG (95562 UT) capsule capsule  Commonly known as: ERGOCALCIFEROL   50,000 Units, Oral, Weekly             Stop These Medications       diclofenac 75 MG EC tablet  Commonly known as: VOLTAREN     gabapentin 300 MG capsule  Commonly known as: NEURONTIN              Discharge Diet: Very important - nutrition supplements 3 times daily (Glucerna, Boost, etc).  This was discussed with patient and spouse on 5/13    Activity at Discharge: as tolerated    Follow-up Appointments:   1 week follow-up with primary care provider, Dr. Darryl Andrews.  Patient will need repeat thyroid function test completed in approximately 4 weeks.  In addition, plan for outpatient follow-up with [facility] gastroenterology in 3-4 weeks.    Test Results Pending at Discharge: none    Electronically signed by Uri Rhodes MD, 05/14/25, 08:05 CDT.    Time: 25 minutes.

## 2025-05-16 ENCOUNTER — HOSPITAL ENCOUNTER (EMERGENCY)
Facility: HOSPITAL | Age: 50
Discharge: HOME | End: 2025-05-16
Attending: STUDENT IN AN ORGANIZED HEALTH CARE EDUCATION/TRAINING PROGRAM
Payer: COMMERCIAL

## 2025-05-16 ENCOUNTER — APPOINTMENT (OUTPATIENT)
Dept: CT IMAGING | Facility: HOSPITAL | Age: 50
End: 2025-05-16
Payer: COMMERCIAL

## 2025-05-16 VITALS
TEMPERATURE: 97.6 F | HEIGHT: 72 IN | OXYGEN SATURATION: 100 % | DIASTOLIC BLOOD PRESSURE: 82 MMHG | WEIGHT: 155 LBS | SYSTOLIC BLOOD PRESSURE: 118 MMHG | BODY MASS INDEX: 20.99 KG/M2 | HEART RATE: 87 BPM | RESPIRATION RATE: 16 BRPM

## 2025-05-16 DIAGNOSIS — E87.6 HYPOKALEMIA: ICD-10-CM

## 2025-05-16 DIAGNOSIS — K52.9 ENTERITIS: ICD-10-CM

## 2025-05-16 DIAGNOSIS — G91.9 HYDROCEPHALUS, UNSPECIFIED TYPE: ICD-10-CM

## 2025-05-16 DIAGNOSIS — E16.2 HYPOGLYCEMIA: Primary | ICD-10-CM

## 2025-05-16 LAB
ANION GAP SERPL CALCULATED.3IONS-SCNC: 12 MMOL/L (ref 5–15)
BASOPHILS # BLD AUTO: 0.02 10*3/MM3 (ref 0–0.2)
BASOPHILS NFR BLD AUTO: 0.4 % (ref 0–1.5)
BUN SERPL-MCNC: 9 MG/DL (ref 6–20)
BUN/CREAT SERPL: 8.5 (ref 7–25)
CALCIUM SPEC-SCNC: 9.6 MG/DL (ref 8.6–10.5)
CHLORIDE SERPL-SCNC: 103 MMOL/L (ref 98–107)
CO2 SERPL-SCNC: 26 MMOL/L (ref 22–29)
CREAT SERPL-MCNC: 1.06 MG/DL (ref 0.57–1)
DEPRECATED RDW RBC AUTO: 46.8 FL (ref 37–54)
EGFRCR SERPLBLD CKD-EPI 2021: 64.1 ML/MIN/1.73
EOSINOPHIL # BLD AUTO: 0.02 10*3/MM3 (ref 0–0.4)
EOSINOPHIL NFR BLD AUTO: 0.4 % (ref 0.3–6.2)
ERYTHROCYTE [DISTWIDTH] IN BLOOD BY AUTOMATED COUNT: 14.6 % (ref 12.3–15.4)
GLUCOSE BLDC GLUCOMTR-MCNC: 128 MG/DL (ref 70–130)
GLUCOSE BLDC GLUCOMTR-MCNC: 48 MG/DL (ref 70–130)
GLUCOSE SERPL-MCNC: 37 MG/DL (ref 65–99)
HCT VFR BLD AUTO: 40 % (ref 34–46.6)
HGB BLD-MCNC: 13.5 G/DL (ref 12–15.9)
HOLD SPECIMEN: NORMAL
IMM GRANULOCYTES # BLD AUTO: 0.01 10*3/MM3 (ref 0–0.05)
IMM GRANULOCYTES NFR BLD AUTO: 0.2 % (ref 0–0.5)
LYMPHOCYTES # BLD AUTO: 1.72 10*3/MM3 (ref 0.7–3.1)
LYMPHOCYTES NFR BLD AUTO: 37.1 % (ref 19.6–45.3)
MCH RBC QN AUTO: 29.7 PG (ref 26.6–33)
MCHC RBC AUTO-ENTMCNC: 33.8 G/DL (ref 31.5–35.7)
MCV RBC AUTO: 87.9 FL (ref 79–97)
MONOCYTES # BLD AUTO: 0.18 10*3/MM3 (ref 0.1–0.9)
MONOCYTES NFR BLD AUTO: 3.9 % (ref 5–12)
NEUTROPHILS NFR BLD AUTO: 2.68 10*3/MM3 (ref 1.7–7)
NEUTROPHILS NFR BLD AUTO: 58 % (ref 42.7–76)
NRBC BLD AUTO-RTO: 0 /100 WBC (ref 0–0.2)
PLATELET # BLD AUTO: 181 10*3/MM3 (ref 140–450)
PMV BLD AUTO: 10.5 FL (ref 6–12)
POTASSIUM SERPL-SCNC: 3.2 MMOL/L (ref 3.5–5.2)
QT INTERVAL: 382 MS
QTC INTERVAL: 464 MS
RBC # BLD AUTO: 4.55 10*6/MM3 (ref 3.77–5.28)
SODIUM SERPL-SCNC: 141 MMOL/L (ref 136–145)
TROPONIN T SERPL HS-MCNC: 19 NG/L
WBC NRBC COR # BLD AUTO: 4.63 10*3/MM3 (ref 3.4–10.8)
WHOLE BLOOD HOLD COAG: NORMAL
WHOLE BLOOD HOLD SPECIMEN: NORMAL

## 2025-05-16 PROCEDURE — 84484 ASSAY OF TROPONIN QUANT: CPT | Performed by: STUDENT IN AN ORGANIZED HEALTH CARE EDUCATION/TRAINING PROGRAM

## 2025-05-16 PROCEDURE — 82948 REAGENT STRIP/BLOOD GLUCOSE: CPT

## 2025-05-16 PROCEDURE — 99285 EMERGENCY DEPT VISIT HI MDM: CPT | Performed by: STUDENT IN AN ORGANIZED HEALTH CARE EDUCATION/TRAINING PROGRAM

## 2025-05-16 PROCEDURE — 70450 CT HEAD/BRAIN W/O DYE: CPT

## 2025-05-16 PROCEDURE — 85025 COMPLETE CBC W/AUTO DIFF WBC: CPT | Performed by: STUDENT IN AN ORGANIZED HEALTH CARE EDUCATION/TRAINING PROGRAM

## 2025-05-16 PROCEDURE — 25010000002 ONDANSETRON PER 1 MG: Performed by: STUDENT IN AN ORGANIZED HEALTH CARE EDUCATION/TRAINING PROGRAM

## 2025-05-16 PROCEDURE — 96374 THER/PROPH/DIAG INJ IV PUSH: CPT

## 2025-05-16 PROCEDURE — 80048 BASIC METABOLIC PNL TOTAL CA: CPT | Performed by: STUDENT IN AN ORGANIZED HEALTH CARE EDUCATION/TRAINING PROGRAM

## 2025-05-16 PROCEDURE — 96375 TX/PRO/DX INJ NEW DRUG ADDON: CPT

## 2025-05-16 PROCEDURE — 74177 CT ABD & PELVIS W/CONTRAST: CPT

## 2025-05-16 PROCEDURE — 36415 COLL VENOUS BLD VENIPUNCTURE: CPT

## 2025-05-16 PROCEDURE — 25510000001 IOPAMIDOL 61 % SOLUTION: Performed by: STUDENT IN AN ORGANIZED HEALTH CARE EDUCATION/TRAINING PROGRAM

## 2025-05-16 PROCEDURE — 93005 ELECTROCARDIOGRAM TRACING: CPT | Performed by: STUDENT IN AN ORGANIZED HEALTH CARE EDUCATION/TRAINING PROGRAM

## 2025-05-16 RX ORDER — DEXTROSE MONOHYDRATE 25 G/50ML
INJECTION, SOLUTION INTRAVENOUS
Status: COMPLETED
Start: 2025-05-16 | End: 2025-05-16

## 2025-05-16 RX ORDER — DEXTROSE MONOHYDRATE 25 G/50ML
25 INJECTION, SOLUTION INTRAVENOUS ONCE
Status: COMPLETED | OUTPATIENT
Start: 2025-05-16 | End: 2025-05-16

## 2025-05-16 RX ORDER — POTASSIUM CHLORIDE 1500 MG/1
40 TABLET, EXTENDED RELEASE ORAL ONCE
Status: COMPLETED | OUTPATIENT
Start: 2025-05-16 | End: 2025-05-16

## 2025-05-16 RX ORDER — IOPAMIDOL 612 MG/ML
100 INJECTION, SOLUTION INTRAVASCULAR
Status: COMPLETED | OUTPATIENT
Start: 2025-05-16 | End: 2025-05-16

## 2025-05-16 RX ORDER — ONDANSETRON 2 MG/ML
4 INJECTION INTRAMUSCULAR; INTRAVENOUS ONCE
Status: COMPLETED | OUTPATIENT
Start: 2025-05-16 | End: 2025-05-16

## 2025-05-16 RX ORDER — BENAZEPRIL HYDROCHLORIDE 20 MG/1
1 TABLET ORAL DAILY
COMMUNITY

## 2025-05-16 RX ADMIN — ONDANSETRON 4 MG: 2 INJECTION INTRAMUSCULAR; INTRAVENOUS at 03:27

## 2025-05-16 RX ADMIN — DEXTROSE MONOHYDRATE 25 G: 25 INJECTION, SOLUTION INTRAVENOUS at 03:23

## 2025-05-16 RX ADMIN — POTASSIUM CHLORIDE 40 MEQ: 1500 TABLET, EXTENDED RELEASE ORAL at 05:49

## 2025-05-16 RX ADMIN — IOPAMIDOL 100 ML: 612 INJECTION, SOLUTION INTRAVENOUS at 04:32

## 2025-05-16 NOTE — ED PROVIDER NOTES
Subjective   History of Present Illness  The patient is a female with a history of insulin-dependent diabetes mellitus who presents after a syncopal episode at home. Her  called EMS after witnessing what he thought was a seizure-like activity. Initial blood glucose was found to be 48. The patient reports taking both Lantus insulin at night and regular insulin during the day. She denies taking too much insulin the previous night but does report feeling sick today. She denies any chest pain or shortness of breath. No prior history of similar episodes.        Review of Systems    Past Medical History:   Diagnosis Date    Arthritis     Carotid artery stenosis     Degenerative disc disease, cervical     Depression     Diabetes mellitus     type 1    Disease of thyroid gland     GERD (gastroesophageal reflux disease)     Graves disease     Heart palpitations     Hyperlipidemia     Hypertension     Hypothyroidism     Seizure     Thyromegaly        Allergies   Allergen Reactions    Oxycodone-Acetaminophen Swelling     Other reaction(s): Throat swelling       Past Surgical History:   Procedure Laterality Date     SECTION      CHOLECYSTECTOMY      COLONOSCOPY  2015    Normal exam Dr. Morgan     COLONOSCOPY  2015    Normal exam    CYST REMOVAL      ENDOSCOPY N/A 2018    Normal exam    ENDOSCOPY N/A 2025    Procedure: ESOPHAGOGASTRODUODENOSCOPY WITH ANESTHESIA;  Surgeon: Jadon Smith MD;  Location: [facility] ENDOSCOPY;  Service: Gastroenterology;  Laterality: N/A;  pre op: Nausea and vomiting  post op: normal  pcp: Darryl Andrews,     HYSTERECTOMY      THYROIDECTOMY Bilateral 2018    Procedure: total thyroidectomy;  Surgeon: Vitaly Givens MD;  Location: [facility] OR;  Service: ENT       Family History   Problem Relation Age of Onset    Stroke Mother     Diabetes Mother     Stroke Father     Diabetes Father     Breast cancer Sister     Diabetes Sister     Stroke Sister      Seizures Sister     Coronary artery disease Brother     Diabetes Brother     Breast cancer Maternal Aunt     Colon cancer Paternal Uncle     Colon polyps Neg Hx        Social History     Socioeconomic History    Marital status:      Spouse name: Evens    Number of children: 1    Years of education: 12   Tobacco Use    Smoking status: Never    Smokeless tobacco: Never   Vaping Use    Vaping status: Never Used   Substance and Sexual Activity    Alcohol use: Yes     Alcohol/week: 6.0 standard drinks of alcohol     Types: 6 Cans of beer per week    Drug use: Yes     Types: Marijuana    Sexual activity: Defer     Partners: Male           Objective   Physical Exam  Constitutional:  General: Patient is not in acute distress.  Appearance: Patient is not diaphoretic.    HENT:  Head: Normocephalic and atraumatic.  Right Ear: External ear normal.  Left Ear: External ear normal.  Nose: Nose normal.    Eyes:  General: No scleral icterus.  Conjunctiva/sclera: Conjunctivae normal.    Cardiovascular:  Rate and Rhythm: Normal rate and regular rhythm.  Heart sounds: No friction rub.    Pulmonary:  Effort: Pulmonary effort is normal. No respiratory distress.  Breath sounds: No stridor. No wheezing.    Abdominal:  Palpations: **Abdomen is soft with mild tenderness to palpation** No guarding or rebound    Musculoskeletal:  General: No deformity.    Skin:  General: Skin is warm and dry. No rashes present. Normal color. Normal turgor.    Neurological:  General: **Neurovascularly intact. No focal deficits present.**  Mental Status: **Alert with some difficulty with speech**      Procedures           ED Course  ED Course as of 05/16/25 0603   Fri May 16, 2025   0349 Fingerstick is now 120. [SG]   0525 CT positive for concerns of lateral and third dilated ventricle [SG]   0527 Although CT reported as dilation of the lateral and third ventricle, this is a chronic finding.  The patient will benefit from neurosurgery follow-up.  She  is neurovascular intact, she has a normal neuroexam, the etiology of her symptoms is likely hypoglycemia.  Which has been fixed in the emergency room.  Based on her history and my conversation with her she does not appear to be in a long-acting medication that needs admission for.  She does not have any chest pain.  EKG is reassuring.  Her troponin level is at baseline at this time.  No concerns for ACS. [SG]   0529 Mild hypokalemia, repletion was ordered. [SG]   0538 CT concerning for possible enteritis.  The patient is stable. Abd is negative for concerns of guarding or rebound, no distention. Last BM was yesterday and normal. Afebrile reassuring WBC count. No indication for repeat troponin. PT feels good and will like to be discharge.  [SG]   0541 Patient has family support,  at the bedside.  We discussed the need for neurosurgery follow-up for ventricles dilation, which she has had in the past. [SG]   0542  in agreement to continue to observe the patient, the patient is agreeable to check sugar at home and 2 hours after discharge [SG]   0547 Was no concerns for tonic-clonic movement.  No concerns for tongue biting, urinary or fecal incontinence.  No concerns for seizure activity. [SG]   0602 PT left before discharge papers were provided, or repeat glucose was done. I informed of the need of her papers, as well as repeat glucose for safety. She was also made of aware of need of Neuro surgery follow up.  [SG]      ED Course User Index  [SG] Zackary Brownlee MD                                                       Medical Decision Making  Patient presented with hypoglycemia and syncope requiring emergency evaluation.    Laboratory studies:  - Initial blood glucose: 48  - Urinalysis ordered and showed significant findings  - Troponin ordered    Imaging studies:  - CT brain ordered to rule out intracranial hemorrhage given syncope in setting of hypoglycemia  - CT abdomen/pelvis with contrast ordered due  to abdominal tenderness    Medications administered:  - Levaquin initiated for presumed urinary tract infection  - Glucose supplementation provided    EKG ordered for cardiac evaluation    Problems Addressed:  Enteritis: complicated acute illness or injury  Hydrocephalus, unspecified type: complicated acute illness or injury  Hypoglycemia: complicated acute illness or injury  Hypokalemia: complicated acute illness or injury    Amount and/or Complexity of Data Reviewed  Labs: ordered.  Radiology: ordered.  ECG/medicine tests: ordered.    Risk  Prescription drug management.        Final diagnoses:   Hypoglycemia   Hypokalemia   Enteritis   Hydrocephalus, unspecified type       ED Disposition  ED Disposition       ED Disposition   Discharge    Condition   Stable    Comment   --               Darryl Andrews, DO  [address] [address]  [phone]    Schedule an appointment as soon as possible for a visit       Chris Jones MD  [address] [facility]  [address]  [facility] [address]  [phone]    Schedule an appointment as soon as possible for a visit            Medication List        PAUSE taking these medications      amLODIPine 10 MG tablet  Wait to take this until your doctor or other care provider tells you to start again.  Commonly known as: NORVASC     carvedilol 25 MG tablet  Wait to take this until your doctor or other care provider tells you to start again.  Commonly known as: COREG     empagliflozin 25 MG tablet tablet  Wait to take this until your doctor or other care provider tells you to start again.  Commonly known as: JARDIANCE     insulin glargine 100 UNIT/ML injection  Wait to take this until your doctor or other care provider tells you to start again.  Commonly known as: LANTUS, SEMGLEE     metFORMIN  MG 24 hr tablet  Wait to take this until your doctor or other care provider tells you to start again.  Commonly known as: GLUCOPHAGE-XR     spironolactone 50 MG  tablet  Wait to take this until your doctor or other care provider tells you to start again.  Commonly known as: Zackary Contreras MD  05/16/25 0603

## 2025-05-16 NOTE — DISCHARGE INSTRUCTIONS
As discussed please continue to check sugar at home for monitoring for low blood glucose a.k.a. low sugar.  We advised as we inform you to have family support and be in companionship with someone that can help in the event you develop low sugar again.     Please follow up with Neurosurgery, for further evaluation of concerns of brain ventricles dilation.

## 2025-05-22 LAB
QT INTERVAL: 382 MS
QTC INTERVAL: 464 MS

## 2025-05-29 ENCOUNTER — HOSPITAL ENCOUNTER (INPATIENT)
Facility: HOSPITAL | Age: 50
LOS: 13 days | Discharge: NURSING, REHAB OR LONG-TERM CARE | End: 2025-06-11
Attending: FAMILY MEDICINE | Admitting: INTERNAL MEDICINE
Payer: COMMERCIAL

## 2025-05-29 ENCOUNTER — APPOINTMENT (OUTPATIENT)
Dept: MRI IMAGING | Facility: HOSPITAL | Age: 50
End: 2025-05-29
Payer: COMMERCIAL

## 2025-05-29 ENCOUNTER — APPOINTMENT (OUTPATIENT)
Dept: GENERAL RADIOLOGY | Facility: HOSPITAL | Age: 50
End: 2025-05-29
Payer: COMMERCIAL

## 2025-05-29 DIAGNOSIS — Z74.09 IMPAIRED FUNCTIONAL MOBILITY AND ACTIVITY TOLERANCE: ICD-10-CM

## 2025-05-29 DIAGNOSIS — N39.0 URINARY TRACT INFECTION WITHOUT HEMATURIA, SITE UNSPECIFIED: ICD-10-CM

## 2025-05-29 DIAGNOSIS — R13.10 DYSPHAGIA, UNSPECIFIED TYPE: ICD-10-CM

## 2025-05-29 DIAGNOSIS — R53.1 GENERALIZED WEAKNESS: Primary | ICD-10-CM

## 2025-05-29 DIAGNOSIS — R79.89 LOW TSH LEVEL: ICD-10-CM

## 2025-05-29 DIAGNOSIS — R11.2 NAUSEA AND VOMITING, UNSPECIFIED VOMITING TYPE: ICD-10-CM

## 2025-05-29 DIAGNOSIS — F12.90 MARIJUANA USER: ICD-10-CM

## 2025-05-29 PROBLEM — G91.1 ACQUIRED CEREBRAL VENTRICULOMEGALY: Status: ACTIVE | Noted: 2025-05-29

## 2025-05-29 LAB
ALBUMIN SERPL-MCNC: 4 G/DL (ref 3.5–5.2)
ALBUMIN/GLOB SERPL: 1 G/DL
ALP SERPL-CCNC: 50 U/L (ref 39–117)
ALT SERPL W P-5'-P-CCNC: 88 U/L (ref 1–33)
AMMONIA BLD-SCNC: 36 UMOL/L (ref 11–51)
AMPHET+METHAMPHET UR QL: NEGATIVE
AMPHETAMINES UR QL: NEGATIVE
ANION GAP SERPL CALCULATED.3IONS-SCNC: 23 MMOL/L (ref 5–15)
AST SERPL-CCNC: 58 U/L (ref 1–32)
BACTERIA UR QL AUTO: ABNORMAL /HPF
BARBITURATES UR QL SCN: NEGATIVE
BASOPHILS # BLD AUTO: 0.04 10*3/MM3 (ref 0–0.2)
BASOPHILS NFR BLD AUTO: 0.5 % (ref 0–1.5)
BENZODIAZ UR QL SCN: NEGATIVE
BILIRUB SERPL-MCNC: 1.1 MG/DL (ref 0–1.2)
BILIRUB UR QL STRIP: ABNORMAL
BUN SERPL-MCNC: 29 MG/DL (ref 6–20)
BUN/CREAT SERPL: 29.6 (ref 7–25)
BUPRENORPHINE SERPL-MCNC: NEGATIVE NG/ML
CALCIUM SPEC-SCNC: 10.2 MG/DL (ref 8.6–10.5)
CANNABINOIDS SERPL QL: POSITIVE
CHLORIDE SERPL-SCNC: 103 MMOL/L (ref 98–107)
CLARITY UR: CLEAR
CO2 SERPL-SCNC: 16 MMOL/L (ref 22–29)
COCAINE UR QL: NEGATIVE
COLOR UR: ABNORMAL
CREAT SERPL-MCNC: 0.98 MG/DL (ref 0.57–1)
DEPRECATED RDW RBC AUTO: 46.4 FL (ref 37–54)
EGFRCR SERPLBLD CKD-EPI 2021: 70.5 ML/MIN/1.73
EOSINOPHIL # BLD AUTO: 0.02 10*3/MM3 (ref 0–0.4)
EOSINOPHIL NFR BLD AUTO: 0.2 % (ref 0.3–6.2)
ERYTHROCYTE [DISTWIDTH] IN BLOOD BY AUTOMATED COUNT: 13.6 % (ref 12.3–15.4)
ETHANOL UR QL: <0.01 %
FENTANYL UR-MCNC: NEGATIVE NG/ML
GLOBULIN UR ELPH-MCNC: 4 GM/DL
GLUCOSE BLDC GLUCOMTR-MCNC: 140 MG/DL (ref 70–130)
GLUCOSE SERPL-MCNC: 144 MG/DL (ref 65–99)
GLUCOSE UR STRIP-MCNC: ABNORMAL MG/DL
HCT VFR BLD AUTO: 41.1 % (ref 34–46.6)
HGB BLD-MCNC: 13 G/DL (ref 12–15.9)
HGB UR QL STRIP.AUTO: NEGATIVE
HYALINE CASTS UR QL AUTO: ABNORMAL /LPF
IMM GRANULOCYTES # BLD AUTO: 0.13 10*3/MM3 (ref 0–0.05)
IMM GRANULOCYTES NFR BLD AUTO: 1.5 % (ref 0–0.5)
INR PPP: 0.97 (ref 0.91–1.09)
KETONES UR QL STRIP: ABNORMAL
LEUKOCYTE ESTERASE UR QL STRIP.AUTO: ABNORMAL
LYMPHOCYTES # BLD AUTO: 1.06 10*3/MM3 (ref 0.7–3.1)
LYMPHOCYTES NFR BLD AUTO: 12.2 % (ref 19.6–45.3)
MAGNESIUM SERPL-MCNC: 2.4 MG/DL (ref 1.6–2.6)
MCH RBC QN AUTO: 29.2 PG (ref 26.6–33)
MCHC RBC AUTO-ENTMCNC: 31.6 G/DL (ref 31.5–35.7)
MCV RBC AUTO: 92.4 FL (ref 79–97)
METHADONE UR QL SCN: NEGATIVE
MONOCYTES # BLD AUTO: 0.36 10*3/MM3 (ref 0.1–0.9)
MONOCYTES NFR BLD AUTO: 4.1 % (ref 5–12)
NEUTROPHILS NFR BLD AUTO: 7.11 10*3/MM3 (ref 1.7–7)
NEUTROPHILS NFR BLD AUTO: 81.5 % (ref 42.7–76)
NITRITE UR QL STRIP: POSITIVE
NRBC BLD AUTO-RTO: 0.2 /100 WBC (ref 0–0.2)
OPIATES UR QL: NEGATIVE
OXYCODONE UR QL SCN: NEGATIVE
PCP UR QL SCN: NEGATIVE
PH UR STRIP.AUTO: 6 [PH] (ref 5–8)
PLATELET # BLD AUTO: 200 10*3/MM3 (ref 140–450)
PMV BLD AUTO: 10.8 FL (ref 6–12)
POTASSIUM SERPL-SCNC: 3.3 MMOL/L (ref 3.5–5.2)
PROT SERPL-MCNC: 8 G/DL (ref 6–8.5)
PROT UR QL STRIP: ABNORMAL
PROTHROMBIN TIME: 13.3 SECONDS (ref 11.8–14.8)
RBC # BLD AUTO: 4.45 10*6/MM3 (ref 3.77–5.28)
RBC # UR STRIP: ABNORMAL /HPF
REF LAB TEST METHOD: ABNORMAL
SODIUM SERPL-SCNC: 142 MMOL/L (ref 136–145)
SP GR UR STRIP: >1.03 (ref 1–1.03)
SQUAMOUS #/AREA URNS HPF: ABNORMAL /HPF
TRICYCLICS UR QL SCN: NEGATIVE
TSH SERPL DL<=0.05 MIU/L-ACNC: 0.05 UIU/ML (ref 0.27–4.2)
UROBILINOGEN UR QL STRIP: ABNORMAL
WBC # UR STRIP: ABNORMAL /HPF
WBC NRBC COR # BLD AUTO: 8.72 10*3/MM3 (ref 3.4–10.8)

## 2025-05-29 PROCEDURE — 85610 PROTHROMBIN TIME: CPT | Performed by: NURSE PRACTITIONER

## 2025-05-29 PROCEDURE — 25010000002 CEFTRIAXONE PER 250 MG: Performed by: NURSE PRACTITIONER

## 2025-05-29 PROCEDURE — 82077 ASSAY SPEC XCP UR&BREATH IA: CPT | Performed by: NURSE PRACTITIONER

## 2025-05-29 PROCEDURE — 80050 GENERAL HEALTH PANEL: CPT | Performed by: NURSE PRACTITIONER

## 2025-05-29 PROCEDURE — 93005 ELECTROCARDIOGRAM TRACING: CPT | Performed by: NURSE PRACTITIONER

## 2025-05-29 PROCEDURE — 82140 ASSAY OF AMMONIA: CPT | Performed by: NURSE PRACTITIONER

## 2025-05-29 PROCEDURE — 83735 ASSAY OF MAGNESIUM: CPT | Performed by: NURSE PRACTITIONER

## 2025-05-29 PROCEDURE — 80307 DRUG TEST PRSMV CHEM ANLYZR: CPT | Performed by: NURSE PRACTITIONER

## 2025-05-29 PROCEDURE — 70553 MRI BRAIN STEM W/O & W/DYE: CPT

## 2025-05-29 PROCEDURE — 81001 URINALYSIS AUTO W/SCOPE: CPT | Performed by: NURSE PRACTITIONER

## 2025-05-29 PROCEDURE — 25510000001 GADOPICLENOL 0.5 MMOL/ML SOLUTION: Performed by: FAMILY MEDICINE

## 2025-05-29 PROCEDURE — 25810000003 SODIUM CHLORIDE 0.9 % SOLUTION: Performed by: NURSE PRACTITIONER

## 2025-05-29 PROCEDURE — 25010000002 HYDRALAZINE PER 20 MG: Performed by: FAMILY MEDICINE

## 2025-05-29 PROCEDURE — 99285 EMERGENCY DEPT VISIT HI MDM: CPT

## 2025-05-29 PROCEDURE — 71045 X-RAY EXAM CHEST 1 VIEW: CPT

## 2025-05-29 PROCEDURE — A9573 GADOPICLENOL 0.5 MMOL/ML SOLUTION: HCPCS | Performed by: FAMILY MEDICINE

## 2025-05-29 PROCEDURE — 93010 ELECTROCARDIOGRAM REPORT: CPT | Performed by: HOSPITALIST

## 2025-05-29 PROCEDURE — 82948 REAGENT STRIP/BLOOD GLUCOSE: CPT

## 2025-05-29 PROCEDURE — 25810000003 SODIUM CHLORIDE 0.9 % SOLUTION: Performed by: FAMILY MEDICINE

## 2025-05-29 PROCEDURE — 87040 BLOOD CULTURE FOR BACTERIA: CPT | Performed by: NURSE PRACTITIONER

## 2025-05-29 PROCEDURE — 36415 COLL VENOUS BLD VENIPUNCTURE: CPT | Performed by: NURSE PRACTITIONER

## 2025-05-29 RX ORDER — AMOXICILLIN 250 MG
2 CAPSULE ORAL 2 TIMES DAILY PRN
Status: DISCONTINUED | OUTPATIENT
Start: 2025-05-29 | End: 2025-06-11 | Stop reason: HOSPADM

## 2025-05-29 RX ORDER — ARIPIPRAZOLE 5 MG/1
10 TABLET ORAL DAILY
Status: DISCONTINUED | OUTPATIENT
Start: 2025-05-30 | End: 2025-06-11 | Stop reason: HOSPADM

## 2025-05-29 RX ORDER — SODIUM CHLORIDE 9 MG/ML
75 INJECTION, SOLUTION INTRAVENOUS CONTINUOUS
Status: DISCONTINUED | OUTPATIENT
Start: 2025-05-29 | End: 2025-05-30

## 2025-05-29 RX ORDER — ONDANSETRON 4 MG/1
4 TABLET, ORALLY DISINTEGRATING ORAL EVERY 6 HOURS PRN
Status: DISCONTINUED | OUTPATIENT
Start: 2025-05-29 | End: 2025-06-11 | Stop reason: HOSPADM

## 2025-05-29 RX ORDER — LISINOPRIL 20 MG/1
20 TABLET ORAL
Status: DISCONTINUED | OUTPATIENT
Start: 2025-05-30 | End: 2025-05-30

## 2025-05-29 RX ORDER — PANTOPRAZOLE SODIUM 40 MG/1
40 TABLET, DELAYED RELEASE ORAL
Status: DISCONTINUED | OUTPATIENT
Start: 2025-05-30 | End: 2025-06-11 | Stop reason: HOSPADM

## 2025-05-29 RX ORDER — CARVEDILOL 25 MG/1
25 TABLET ORAL 2 TIMES DAILY WITH MEALS
Status: DISCONTINUED | OUTPATIENT
Start: 2025-05-29 | End: 2025-05-31

## 2025-05-29 RX ORDER — BISACODYL 5 MG/1
5 TABLET, DELAYED RELEASE ORAL DAILY PRN
Status: DISCONTINUED | OUTPATIENT
Start: 2025-05-29 | End: 2025-06-11 | Stop reason: HOSPADM

## 2025-05-29 RX ORDER — LEVOTHYROXINE SODIUM 175 UG/1
175 TABLET ORAL
Status: DISCONTINUED | OUTPATIENT
Start: 2025-05-30 | End: 2025-05-30

## 2025-05-29 RX ORDER — POLYETHYLENE GLYCOL 3350 17 G/17G
17 POWDER, FOR SOLUTION ORAL DAILY PRN
Status: DISCONTINUED | OUTPATIENT
Start: 2025-05-29 | End: 2025-06-11 | Stop reason: HOSPADM

## 2025-05-29 RX ORDER — POTASSIUM CHLORIDE 1500 MG/1
40 TABLET, EXTENDED RELEASE ORAL ONCE
Status: DISCONTINUED | OUTPATIENT
Start: 2025-05-29 | End: 2025-05-30

## 2025-05-29 RX ORDER — ONDANSETRON 2 MG/ML
4 INJECTION INTRAMUSCULAR; INTRAVENOUS EVERY 6 HOURS PRN
Status: DISCONTINUED | OUTPATIENT
Start: 2025-05-29 | End: 2025-06-11 | Stop reason: HOSPADM

## 2025-05-29 RX ORDER — BISACODYL 10 MG
10 SUPPOSITORY, RECTAL RECTAL DAILY PRN
Status: DISCONTINUED | OUTPATIENT
Start: 2025-05-29 | End: 2025-06-11 | Stop reason: HOSPADM

## 2025-05-29 RX ORDER — AMLODIPINE BESYLATE 10 MG/1
10 TABLET ORAL DAILY
Status: DISCONTINUED | OUTPATIENT
Start: 2025-05-30 | End: 2025-05-30

## 2025-05-29 RX ORDER — SPIRONOLACTONE 50 MG/1
50 TABLET, FILM COATED ORAL 2 TIMES DAILY
Status: DISCONTINUED | OUTPATIENT
Start: 2025-05-29 | End: 2025-05-30

## 2025-05-29 RX ORDER — HYDRALAZINE HYDROCHLORIDE 20 MG/ML
10 INJECTION INTRAMUSCULAR; INTRAVENOUS EVERY 6 HOURS PRN
Status: DISCONTINUED | OUTPATIENT
Start: 2025-05-29 | End: 2025-06-05

## 2025-05-29 RX ORDER — SODIUM CHLORIDE 0.9 % (FLUSH) 0.9 %
10 SYRINGE (ML) INJECTION AS NEEDED
Status: DISCONTINUED | OUTPATIENT
Start: 2025-05-29 | End: 2025-06-11 | Stop reason: HOSPADM

## 2025-05-29 RX ADMIN — SODIUM CHLORIDE 75 ML/HR: 9 INJECTION, SOLUTION INTRAVENOUS at 17:05

## 2025-05-29 RX ADMIN — CEFTRIAXONE SODIUM 1000 MG: 1 INJECTION, POWDER, FOR SOLUTION INTRAMUSCULAR; INTRAVENOUS at 16:13

## 2025-05-29 RX ADMIN — HYDRALAZINE HYDROCHLORIDE 10 MG: 20 INJECTION INTRAMUSCULAR; INTRAVENOUS at 23:52

## 2025-05-29 RX ADMIN — HYDRALAZINE HYDROCHLORIDE 10 MG: 20 INJECTION INTRAMUSCULAR; INTRAVENOUS at 17:14

## 2025-05-29 RX ADMIN — SODIUM CHLORIDE 75 ML/HR: 9 INJECTION, SOLUTION INTRAVENOUS at 21:36

## 2025-05-29 RX ADMIN — SODIUM CHLORIDE 500 ML: 9 INJECTION, SOLUTION INTRAVENOUS at 14:59

## 2025-05-29 RX ADMIN — CARVEDILOL 25 MG: 25 TABLET, FILM COATED ORAL at 21:36

## 2025-05-29 RX ADMIN — GADOPICLENOL 7.5 ML: 485.1 INJECTION INTRAVENOUS at 18:19

## 2025-05-29 NOTE — ED PROVIDER NOTES
Subjective   History of Present Illness  Patient is a 49-year-old female who presents to the ER via EMS due to generalized weakness.  She was evaluated early this morning with what was described as seizure-like activity.  Glucose was 48.  She had a CT of the head and abdomen and pelvis.    Ct of head:  IMPRESSION:  1. Moderate further increase in size of the lateral and third ventricle  since the previous study. The fourth ventricle is a normal and  unchanged. A small obstructing lesion at the level of aqueduct of  Sylvius may not be excluded. Further evaluation with contrast enhanced  MR imaging of the brain may be obtained.  2. No visible acute intracranial abnormality. No mass. No intracranial  hemorrhage or hematoma.  3. Chronic ischemic and atrophic changes.    Ct abd/pelvis:  Mild fluid-filled dilated small bowel loops with air-fluid levels and   the normal distal ileum. This may represent an ileus, acute nonspecific   enteritis or involving small bowel obstruction. Further follow-up may be   obtained.   2. Appendix is normal. The gallbladder is surgically absent.   3. Fatty infiltration of the liver.   4. Stable adrenal nodules.     Patient was hypoglycemic and hypokalemic.  Per review of note this was replaced.  CT finding of the head appeared to be a chronic finding and recommended follow-up with neurosurgery.  Patient wanted discharge and left before discharge papers given.  See ER note for details.    Per review patient had a recent admission May 11, 2025 to May 14, 2025 for dehydration, hypothyroid, hypokalemia, steatosis of liver, marijuana abuse, malnutrition, nausea vomiting, type 2 diabetes.  She had an EGD on May 13 which showed a small hiatal hernia and normal duodenum as well the stomach.  No specimens were obtained.  CT of the abdomen pelvis showed bladder wall thickening, acute cystitis, no inflammatory process.  The note mentioned that patient had a decreased p.o. intake and question  depression.  Family members stated there was a recent loss in the family and she may be experiencing decreased p.o. intake due to depression.  Patient also is a regular marijuana user and it was felt that the nausea/vomiting may be contributed from marijuana use.  Patient had told nursing staff she was going to step outside and return and apparently had removed her IV and eloped from the hospital.  See admission note for details.    Additionally she had another recent admission at [facility] with similar complaints of ongoing abdominal pain and nausea with vomiting.  She had an EGD performed by Dr. Mcallister at [facility] with biopsies obtained.  Findings were consistent with gastritis and H. pylori specimens were obtained.    Patient returns seemingly groggy and states that she is just weak all over.  She is tachycardic on exam.  She had followed up with her PCP this morning after being evaluated in the ER late last night/early this morning and it was recommended for her to return to the ER.  Past medical history significant for carotid artery stenosis, arthritis, degenerative disc disease, depression, diabetes, thyroid disease, GERD, Graves' disease, heart palpitations, hyperlipidemia, hypertension, hypothyroidism, seizure, thyroid megaly        Review of Systems   HENT: Negative.     Eyes: Negative.    Respiratory: Negative.     Cardiovascular: Negative.    Gastrointestinal: Negative.    Endocrine: Negative.    Genitourinary: Negative.    Musculoskeletal: Negative.    Skin: Negative.    Allergic/Immunologic: Negative.    Neurological:  Positive for weakness.   Hematological: Negative.    Psychiatric/Behavioral: Negative.     All other systems reviewed and are negative.      Past Medical History:   Diagnosis Date    Arthritis     Carotid artery stenosis     Degenerative disc disease, cervical     Depression     Diabetes mellitus     type 1    Disease of thyroid gland     GERD (gastroesophageal reflux  disease)     Graves disease     Heart palpitations     Hyperlipidemia     Hypertension     Hypothyroidism     Seizure     Thyromegaly        Allergies   Allergen Reactions    Oxycodone-Acetaminophen Swelling     Other reaction(s): Throat swelling       Past Surgical History:   Procedure Laterality Date     SECTION      CHOLECYSTECTOMY      COLONOSCOPY  2015    Normal exam Dr. Morgan     COLONOSCOPY  2015    Normal exam    CYST REMOVAL      ENDOSCOPY N/A 2018    Normal exam    ENDOSCOPY N/A 2025    Procedure: ESOPHAGOGASTRODUODENOSCOPY WITH ANESTHESIA;  Surgeon: Jadon Smith MD;  Location:  [facility];  Service: Gastroenterology;  Laterality: N/A;  pre op: Nausea and vomiting  post op: normal  pcp: Darryl Andrews,     HYSTERECTOMY      THYROIDECTOMY Bilateral 2018    Procedure: total thyroidectomy;  Surgeon: Vitaly Givens MD;  Location:  [facility] OR;  Service: ENT       Family History   Problem Relation Age of Onset    Stroke Mother     Diabetes Mother     Stroke Father     Diabetes Father     Breast cancer Sister     Diabetes Sister     Stroke Sister     Seizures Sister     Coronary artery disease Brother     Diabetes Brother     Breast cancer Maternal Aunt     Colon cancer Paternal Uncle     Colon polyps Neg Hx        Social History     Socioeconomic History    Marital status:      Spouse name: Evens    Number of children: 1    Years of education: 12   Tobacco Use    Smoking status: Never    Smokeless tobacco: Never   Vaping Use    Vaping status: Never Used   Substance and Sexual Activity    Alcohol use: Yes     Alcohol/week: 6.0 standard drinks of alcohol     Types: 6 Cans of beer per week    Drug use: Yes     Types: Marijuana    Sexual activity: Defer     Partners: Male           Objective   Physical Exam  Vitals and nursing note reviewed.   Constitutional:       Appearance: She is well-developed.   HENT:      Head: Normocephalic and atraumatic.       Nose: Nose normal.   Eyes:      Conjunctiva/sclera: Conjunctivae normal.      Pupils: Pupils are equal, round, and reactive to light.   Cardiovascular:      Rate and Rhythm: Regular rhythm. Tachycardia present.      Heart sounds: Normal heart sounds.   Pulmonary:      Effort: Pulmonary effort is normal.      Breath sounds: Normal breath sounds.   Abdominal:      General: Bowel sounds are normal.      Palpations: Abdomen is soft.   Musculoskeletal:         General: Normal range of motion.      Cervical back: Normal range of motion and neck supple.   Skin:     General: Skin is warm and dry.   Neurological:      Mental Status: She is alert and oriented to person, place, and time.   Psychiatric:      Comments: Patient appears lethargic and groggy on exam, answers questions appropriately however slow to answer, no focal deficit identified         Procedures           ED Course                                Total (NIH Stroke Scale): 2                      Medical Decision Making  Patient is a 49-year-old female who presents to the ER via EMS due to generalized weakness.  She was evaluated early this morning with what was described as seizure-like activity.  Glucose was 48.  She had a CT of the head and abdomen and pelvis.    Ct of head:  IMPRESSION:  1. Moderate further increase in size of the lateral and third ventricle  since the previous study. The fourth ventricle is a normal and  unchanged. A small obstructing lesion at the level of aqueduct of  Sylvius may not be excluded. Further evaluation with contrast enhanced  MR imaging of the brain may be obtained.  2. No visible acute intracranial abnormality. No mass. No intracranial  hemorrhage or hematoma.  3. Chronic ischemic and atrophic changes.    Ct abd/pelvis:  Mild fluid-filled dilated small bowel loops with air-fluid levels and   the normal distal ileum. This may represent an ileus, acute nonspecific   enteritis or involving small bowel obstruction. Further  follow-up may be   obtained.   2. Appendix is normal. The gallbladder is surgically absent.   3. Fatty infiltration of the liver.   4. Stable adrenal nodules.     Patient was hypoglycemic and hypokalemic.  Per review of note this was replaced.  CT finding of the head appeared to be a chronic finding and recommended follow-up with neurosurgery.  Patient wanted discharge and left before discharge papers given.  See ER note for details.    Per review patient had a recent admission May 11, 2025 to May 14, 2025 for dehydration, hypothyroid, hypokalemia, steatosis of liver, marijuana abuse, malnutrition, nausea vomiting, type 2 diabetes.  She had an EGD on May 13 which showed a small hiatal hernia and normal duodenum as well the stomach.  No specimens were obtained.  CT of the abdomen pelvis showed bladder wall thickening, acute cystitis, no inflammatory process.  The note mentioned that patient had a decreased p.o. intake and question depression.  Family members stated there was a recent loss in the family and she may be experiencing decreased p.o. intake due to depression.  Patient also is a regular marijuana user and it was felt that the nausea/vomiting may be contributed from marijuana use.  Patient had told nursing staff she was going to step outside and return and apparently had removed her IV and eloped from the hospital.  See admission note for details.    Additionally she had another recent admission at [facility] with similar complaints of ongoing abdominal pain and nausea with vomiting.  She had an EGD performed by Dr. Mcallister at [facility] with biopsies obtained.  Findings were consistent with gastritis and H. pylori specimens were obtained.    Patient returns seemingly groggy and states that she is just weak all over.  She is tachycardic on exam.  She had followed up with her PCP this morning after being evaluated in the ER late last night/early this morning and it was recommended for her to return  to the ER.  Past medical history significant for carotid artery stenosis, arthritis, degenerative disc disease, depression, diabetes, thyroid disease, GERD, Graves' disease, heart palpitations, hyperlipidemia, hypertension, hypothyroidism, seizure, thyroid megaly    Differential diagnosis includes but not limited to hypoglycemia, positive drug screen, electrolyte abnormality, and other etiologies    Problems Addressed:  Generalized weakness: complicated acute illness or injury  Low TSH level: complicated acute illness or injury  Marijuana user: complicated acute illness or injury  Nausea and vomiting, unspecified vomiting type: complicated acute illness or injury  Urinary tract infection without hematuria, site unspecified: complicated acute illness or injury    Amount and/or Complexity of Data Reviewed  Labs: ordered.  Radiology: ordered.  ECG/medicine tests: ordered.    Risk  Prescription drug management.  Decision regarding hospitalization.      Labs Reviewed   COMPREHENSIVE METABOLIC PANEL - Abnormal; Notable for the following components:       Result Value    Glucose 144 (*)     BUN 29.0 (*)     Potassium 3.3 (*)     CO2 16.0 (*)     ALT (SGPT) 88 (*)     AST (SGOT) 58 (*)     BUN/Creatinine Ratio 29.6 (*)     Anion Gap 23.0 (*)     All other components within normal limits    Narrative:     GFR Categories in Chronic Kidney Disease (CKD)              GFR Category          GFR (mL/min/1.73)    Interpretation  G1                    90 or greater        Normal or high (1)  G2                    60-89                Mild decrease (1)  G3a                   45-59                Mild to moderate decrease  G3b                   30-44                Moderate to severe decrease  G4                    15-29                Severe decrease  G5                    14 or less           Kidney failure    (1)In the absence of evidence of kidney disease, neither GFR category G1 or G2 fulfill the criteria for CKD.    eGFR  calculation 2021 CKD-EPI creatinine equation, which does not include race as a factor   TSH - Abnormal; Notable for the following components:    TSH 0.050 (*)     All other components within normal limits   CBC WITH AUTO DIFFERENTIAL - Abnormal; Notable for the following components:    Neutrophil % 81.5 (*)     Lymphocyte % 12.2 (*)     Monocyte % 4.1 (*)     Eosinophil % 0.2 (*)     Immature Grans % 1.5 (*)     Neutrophils, Absolute 7.11 (*)     Immature Grans, Absolute 0.13 (*)     All other components within normal limits   URINE DRUG SCREEN - Abnormal; Notable for the following components:    THC, Screen, Urine Positive (*)     All other components within normal limits    Narrative:     Cutoff For Drugs Screened:    Amphetamines               500 ng/ml  Barbiturates               200 ng/ml  Benzodiazepines            150 ng/ml  Cocaine                    150 ng/ml  Methadone                  200 ng/ml  Opiates                    100 ng/ml  Phencyclidine               25 ng/ml  THC                         50 ng/ml  Methamphetamine            500 ng/ml  Tricyclic Antidepressants  300 ng/ml  Oxycodone                  100 ng/ml  Buprenorphine               10 ng/ml    The normal value for all drugs tested is negative. This report includes unconfirmed screening results, with the cutoff values listed, to be used for medical treatment purposes only.  Unconfirmed results must not be used for non-medical purposes such as employment or legal testing.  Clinical consideration should be applied to any drug of abuse test, particularly when unconfirmed results are used.     URINALYSIS W/ MICROSCOPIC IF INDICATED (NO CULTURE) - Abnormal; Notable for the following components:    Color, UA Orange (*)     Specific Gravity, UA >1.030 (*)     Glucose,  mg/dL (2+) (*)     Ketones, UA 15 mg/dL (1+) (*)     Bilirubin, UA Large (3+) (*)     Protein, UA 30 mg/dL (1+) (*)     Leuk Esterase, UA Small (1+) (*)     Nitrite, UA  Positive (*)     All other components within normal limits    Narrative:     Dipstick results may be inaccurate due to color interference.   URINALYSIS, MICROSCOPIC ONLY - Abnormal; Notable for the following components:    WBC, UA 3-5 (*)     Bacteria, UA Trace (*)     Squamous Epithelial Cells, UA 3-6 (*)     All other components within normal limits   POCT GLUCOSE FINGERSTICK - Abnormal; Notable for the following components:    Glucose 140 (*)     All other components within normal limits   PROTIME-INR - Normal   MAGNESIUM - Normal   AMMONIA - Normal   FENTANYL, URINE - Normal    Narrative:     Negative Threshold:      Fentanyl 5 ng/mL     The normal value for the drug tested is negative. This report includes final unconfirmed screening results to be used for medical treatment purposes only. Unconfirmed results must not be used for non-medical purposes such as employment or legal testing. Clinical consideration should be applied to any drug of abuse test, particularly when unconfirmed results are used.          BLOOD CULTURE   BLOOD CULTURE   ETHANOL    Narrative:     Not for legal purposes. Chain of Custody not followed.    POCT GLUCOSE FINGERSTICK   CBC AND DIFFERENTIAL    Narrative:     The following orders were created for panel order CBC & Differential.  Procedure                               Abnormality         Status                     ---------                               -----------         ------                     CBC Auto Differential[[id number]]        Abnormal            Final result                 Please view results for these tests on the individual orders.      XR Chest 1 View   Final Result       1.  No acute cardiopulmonary process.               This report was signed and finalized on 5/29/2025 1:59 PM by Dr. Ori Woods MD.          previous CT scan findings performed earlier this morning described above.  Patient presented with generalized weakness and tachycardia.  She has a low  tsh and UTI -she was given IV Rocephin and IV fluids in the ER.  She is positive for marijuana.  Plan is to admit to the hospitalist for complaints of nausea, vomiting, and has a UTI.  Additionally she had an abnormal CT scan of the head performed earlier today.  Uncertain if there is any relation with the symptoms.  She may need MR performed while admitted. Discussed with hospitalist who is agreeable for admission.    Final diagnoses:   Generalized weakness   Nausea and vomiting, unspecified vomiting type   Marijuana user   Low TSH level   Urinary tract infection without hematuria, site unspecified       ED Disposition  ED Disposition       ED Disposition   Decision to Admit    Condition   --    Comment   --               No follow-up provider specified.       Medication List        PAUSE taking these medications      amLODIPine 10 MG tablet  Wait to take this until your doctor or other care provider tells you to start again.  Commonly known as: NORVASC     carvedilol 25 MG tablet  Wait to take this until your doctor or other care provider tells you to start again.  Commonly known as: COREG     empagliflozin 25 MG tablet tablet  Wait to take this until your doctor or other care provider tells you to start again.  Commonly known as: JARDIANCE     insulin glargine 100 UNIT/ML injection  Wait to take this until your doctor or other care provider tells you to start again.  Commonly known as: LANTUS, SEMGLEE     metFORMIN  MG 24 hr tablet  Wait to take this until your doctor or other care provider tells you to start again.  Commonly known as: GLUCOPHAGE-XR     spironolactone 50 MG tablet  Wait to take this until your doctor or other care provider tells you to start again.  Commonly known as: ALDACTONE                 Safai Wood, APRN  05/29/25 0545

## 2025-05-29 NOTE — H&P
[facility] Medicine Services  HISTORY AND PHYSICAL    Date of Admission: 5/29/2025  Primary Care Physician: Darryl Andrews DO Subjective   Primary Historian:  and patient.    Chief Complaint: Confusion, abnormal gait    History of Present Illness  49-year-old female with history of hypothyroidism, liver steatosis, cannabis use, malnutrition, diabetes mellitus type 2, hypertension, admitted May 11 through May 14, 2025; at that time she was admitted due to poor oral intake and weight loss.  She had an upper endoscopy performed 5/13/2025 with findings of a small hiatal hernia and no other abnormalities reported.  Per reports reviewed the patient was not taking levothyroxine at the time, and her TSH was markedly elevated.  She was restarted on levothyroxine.  Her blood glucose was low, and did not require insulin management.  Drug screening urine was positive for cannabis, and considered to be secondary to cannabinoid hyperemesis syndrome.  The patient left the hospital AGAINST MEDICAL ADVICE, apparently she eloped; after being called, she stated was already home and did not plan to return to the hospital.  On 5/16/2025, the patient returned to the hospital reporting a syncopal event.  There was questionable seizure-like activity.   A CT brain performed on that evaluation showed moderate further increase in size of the lateral and third ventricle since the previous study. The fourth ventricle reported as normal and unchanged. A small obstructing lesion at the level of aqueduct of Sylvius not excluded. Further evaluation with contrast enhanced MR imaging of the brain was suggested. She again left the ER AGAINST MEDICAL ADVICE.  Patient comes to hospital today reporting dizziness, lightheadedness, worsening when standing up, abnormal gait with unsteadiness and  reports confusion.  Symptom has been going on for approximately 1 month.        Review of Systems    Otherwise complete ROS reviewed and negative except as mentioned in the HPI.    Past Medical History:   Past Medical History:   Diagnosis Date    Arthritis     Carotid artery stenosis     Degenerative disc disease, cervical     Depression     Diabetes mellitus     type 1    Disease of thyroid gland     GERD (gastroesophageal reflux disease)     Graves disease     Heart palpitations     Hyperlipidemia     Hypertension     Hypothyroidism     Seizure     Thyromegaly      Past Surgical History:  Past Surgical History:   Procedure Laterality Date     SECTION      CHOLECYSTECTOMY      COLONOSCOPY  2015    Normal exam Dr. Morgan     COLONOSCOPY  2015    Normal exam    CYST REMOVAL      ENDOSCOPY N/A 2018    Normal exam    ENDOSCOPY N/A 2025    Procedure: ESOPHAGOGASTRODUODENOSCOPY WITH ANESTHESIA;  Surgeon: Jadon Smith MD;  Location: [facility] ENDOSCOPY;  Service: Gastroenterology;  Laterality: N/A;  pre op: Nausea and vomiting  post op: normal  pcp: Darryl Andrews,     HYSTERECTOMY      THYROIDECTOMY Bilateral 2018    Procedure: total thyroidectomy;  Surgeon: Vitaly Givens MD;  Location: [facility] OR;  Service: ENT     Social History:  reports that she has never smoked. She has never used smokeless tobacco. She reports current alcohol use of about 6.0 standard drinks of alcohol per week. She reports current drug use. Drug: Marijuana.    Family History: family history includes Breast cancer in her maternal aunt and sister; Colon cancer in her paternal uncle; Coronary artery disease in her brother; Diabetes in her brother, father, mother, and sister; Seizures in her sister; Stroke in her father, mother, and sister.   reviewed    Allergies:  Allergies   Allergen Reactions    Oxycodone-Acetaminophen Swelling     Other reaction(s): Throat swelling       Medications:  Prior to Admission medications    Medication Sig Start Date End Date Taking? Authorizing Provider    amLODIPine (NORVASC) 10 MG tablet Take 1 tablet by mouth Daily.    Luciano Hayes MD   ARIPiprazole (ABILIFY) 10 MG tablet Take 1 tablet by mouth Daily.    Luciano Hayes MD   benazepril (LOTENSIN) 20 MG tablet Take 1 tablet by mouth Daily.    Luciano Hayes MD   carvedilol (COREG) 25 MG tablet Take 1 tablet by mouth 2 (Two) Times a Day With Meals.    Luciano Hayes MD   empagliflozin (JARDIANCE) 25 MG tablet tablet Take 1 tablet by mouth Daily.    Luciano Hayes MD   Evolocumab (REPATHA) solution auto-injector SureClick injection Inject 1 mL under the skin into the appropriate area as directed Every 14 (Fourteen) Days.    Luciano Hayes MD   hydrOXYzine pamoate (VISTARIL) 25 MG capsule Take 1 capsule by mouth 3 (Three) Times a Day As Needed for Anxiety.    Luciano Hayes MD   insulin glargine (LANTUS, SEMGLEE) 100 UNIT/ML injection Inject 75 Units under the skin into the appropriate area as directed Every Night.  Patient taking differently: Inject 30 Units under the skin into the appropriate area as directed Every Night.    Luciano Hayes MD   levothyroxine (SYNTHROID, LEVOTHROID) 175 MCG tablet Take 1 tablet by mouth Every Morning. 5/14/25   Uri Rhodes MD   liothyronine (CYTOMEL) 25 MCG tablet Take 1 tablet by mouth Daily. 5/14/25   Uri Rhodes MD   Melatonin 10 MG tablet Take 1 tablet by mouth At Night As Needed (sleep).    Luciano Hayes MD   metFORMIN ER (GLUCOPHAGE-XR) 500 MG 24 hr tablet Take 2 tablets by mouth Daily With Breakfast.    Luciano Hayes MD   mirtazapine (REMERON) 15 MG tablet Take 1 tablet by mouth Every Night.    Luciano Hayes MD   omeprazole (priLOSEC) 20 MG capsule Take 1 capsule by mouth Daily.    Luciano Hayes MD   ondansetron ODT (ZOFRAN-ODT) 4 MG disintegrating tablet Take 1 tablet by mouth Every 6 (Six) Hours As Needed for Nausea or Vomiting. 2/16/25   Donna APRN  "  rosuvastatin (CRESTOR) 20 MG tablet Take 1 tablet by mouth Every Night.    Provider, MD Luciano   spironolactone (ALDACTONE) 50 MG tablet Take 1 tablet by mouth 2 (Two) Times a Day.    ProviderLuciano MD   vitamin D (ERGOCALCIFEROL) 1.25 MG (76281 UT) capsule capsule Take 1 capsule by mouth 1 (One) Time Per Week.    ProviderLuciano MD     I have utilized all available immediate resources to obtain, update, or review the patient's current medications (including all prescriptions, over-the-counter products, herbals, cannabis/cannabidiol products, and vitamin/mineral/dietary (nutritional) supplements).    Objective     Vital Signs: BP (!) 154/110   Pulse 118   Temp 97.6 °F (36.4 °C)   Resp 15   Ht 188 cm (74\")   Wt 66 kg (145 lb 9.6 oz)   SpO2 100%   BMI 18.69 kg/m²   Physical Exam   Constitutional:       Appearance: Alert, oriented to person and place.  No respiratory distress.  HENT:      Head: Normocephalic and atraumatic.      Nose: Nose normal.      Mouth/Throat:      Mouth: Mucous membranes are moist.   Eyes:      Extraocular Movements: Not able to evaluate due to dizziness on exam     Conjunctiva/sclera: Conjunctivae normal.      Pupils: Pupils are equal, round  Cardiovascular:      Rate and Rhythm: Normal rate and regular rhythm.      Pulses: Normal pulses.   Pulmonary:      Effort: No respiratory distress.      Breath sounds: Normal breath sounds. No wheezing, rhonchi or rales.   Abdominal:      General: Abdomen is flat. Bowel sounds are normal.      Palpations: Abdomen is soft.      Tenderness: There is no guarding or rebound.   Extremities:  No lower extremity edema.  Skin:     Capillary Refill: Capillary refill takes less than 2 seconds.      Coloration: Skin is not jaundiced.      Findings: No rash.   Neurological:   Alert and oriented.  Normal speech.  Follows commands.  Symmetric strength 4 out of 5 in upper and lower extremities.  Gait was not evaluated.  No gross visual " defects.          Results Reviewed:  Lab Results (last 24 hours)       Procedure Component Value Units Date/Time    Blood Culture - Blood, Arm, Left [[id number]] Collected: 05/29/25 1547    Specimen: Blood from Arm, Left Updated: 05/29/25 1553    Urinalysis With Microscopic If Indicated (No Culture) - Straight Cath [[id number]]  (Abnormal) Collected: 05/29/25 1301    Specimen: Urine from Straight Cath Updated: 05/29/25 1343     Color, UA Hamlin     Appearance, UA Clear     pH, UA 6.0     Specific Gravity, UA >1.030     Glucose,  mg/dL (2+)     Ketones, UA 15 mg/dL (1+)     Bilirubin, UA Large (3+)     Blood, UA Negative     Protein, UA 30 mg/dL (1+)     Leuk Esterase, UA Small (1+)     Nitrite, UA Positive     Urobilinogen, UA 1.0 E.U./dL    Narrative:      Dipstick results may be inaccurate due to color interference.    Comprehensive Metabolic Panel [[id number]]  (Abnormal) Collected: 05/29/25 1247    Specimen: Blood Updated: 05/29/25 1327     Glucose 144 mg/dL      BUN 29.0 mg/dL      Creatinine 0.98 mg/dL      Sodium 142 mmol/L      Potassium 3.3 mmol/L      Comment: Slight hemolysis detected by analyzer. Result may be falsely elevated.        Chloride 103 mmol/L      CO2 16.0 mmol/L      Calcium 10.2 mg/dL      Total Protein 8.0 g/dL      Albumin 4.0 g/dL      ALT (SGPT) 88 U/L      AST (SGOT) 58 U/L      Alkaline Phosphatase 50 U/L      Total Bilirubin 1.1 mg/dL      Globulin 4.0 gm/dL      A/G Ratio 1.0 g/dL      BUN/Creatinine Ratio 29.6     Anion Gap 23.0 mmol/L      eGFR 70.5 mL/min/1.73     Narrative:      GFR Categories in Chronic Kidney Disease (CKD)              GFR Category          GFR (mL/min/1.73)    Interpretation  G1                    90 or greater        Normal or high (1)  G2                    60-89                Mild decrease (1)  G3a                   45-59                Mild to moderate decrease  G3b                   30-44                Moderate to severe decrease  G4                     15-29                Severe decrease  G5                    14 or less           Kidney failure    (1)In the absence of evidence of kidney disease, neither GFR category G1 or G2 fulfill the criteria for CKD.    eGFR calculation 2021 CKD-EPI creatinine equation, which does not include race as a factor    TSH [[id number]]  (Abnormal) Collected: 05/29/25 1247    Specimen: Blood Updated: 05/29/25 1327     TSH 0.050 uIU/mL     Magnesium [[id number]]  (Normal) Collected: 05/29/25 1247    Specimen: Blood Updated: 05/29/25 1327     Magnesium 2.4 mg/dL     Ethanol [[id number]] Collected: 05/29/25 1247    Specimen: Blood Updated: 05/29/25 1327     Ethanol % <0.010 %     Narrative:      Not for legal purposes. Chain of Custody not followed.     Urinalysis, Microscopic Only - Straight Cath [[id number]]  (Abnormal) Collected: 05/29/25 1301    Specimen: Urine from Straight Cath Updated: 05/29/25 1326     RBC, UA None Seen /HPF      WBC, UA 3-5 /HPF      Bacteria, UA Trace /HPF      Squamous Epithelial Cells, UA 3-6 /HPF      Hyaline Casts, UA None Seen /LPF      Methodology Manual Light Microscopy    Fentanyl, Urine - Straight Cath [[id number]]  (Normal) Collected: 05/29/25 1301    Specimen: Urine from Straight Cath Updated: 05/29/25 1322     Fentanyl, Urine Negative    Narrative:      Negative Threshold:      Fentanyl 5 ng/mL     The normal value for the drug tested is negative. This report includes final unconfirmed screening results to be used for medical treatment purposes only. Unconfirmed results must not be used for non-medical purposes such as employment or legal testing. Clinical consideration should be applied to any drug of abuse test, particularly when unconfirmed results are used.           Ammonia [[id number]]  (Normal) Collected: 05/29/25 1247    Specimen: Blood Updated: 05/29/25 1320     Ammonia 36 umol/L     Urine Drug Screen - Straight Cath [[id number]]  (Abnormal) Collected: 05/29/25 1301    Specimen: Urine from  Straight Cath Updated: 05/29/25 1319     THC, Screen, Urine Positive     Phencyclidine (PCP), Urine Negative     Cocaine Screen, Urine Negative     Methamphetamine, Ur Negative     Opiate Screen Negative     Amphetamine Screen, Urine Negative     Benzodiazepine Screen, Urine Negative     Tricyclic Antidepressants Screen Negative     Methadone Screen, Urine Negative     Barbiturates Screen, Urine Negative     Oxycodone Screen, Urine Negative     Buprenorphine, Screen, Urine Negative    Narrative:      Cutoff For Drugs Screened:    Amphetamines               500 ng/ml  Barbiturates               200 ng/ml  Benzodiazepines            150 ng/ml  Cocaine                    150 ng/ml  Methadone                  200 ng/ml  Opiates                    100 ng/ml  Phencyclidine               25 ng/ml  THC                         50 ng/ml  Methamphetamine            500 ng/ml  Tricyclic Antidepressants  300 ng/ml  Oxycodone                  100 ng/ml  Buprenorphine               10 ng/ml    The normal value for all drugs tested is negative. This report includes unconfirmed screening results, with the cutoff values listed, to be used for medical treatment purposes only.  Unconfirmed results must not be used for non-medical purposes such as employment or legal testing.  Clinical consideration should be applied to any drug of abuse test, particularly when unconfirmed results are used.      Protime-INR [[id number]]  (Normal) Collected: 05/29/25 1247    Specimen: Blood Updated: 05/29/25 1309     Protime 13.3 Seconds      INR 0.97    CBC & Differential [[id number]]  (Abnormal) Collected: 05/29/25 1247    Specimen: Blood Updated: 05/29/25 1301    Narrative:      The following orders were created for panel order CBC & Differential.  Procedure                               Abnormality         Status                     ---------                               -----------         ------                     CBC Auto Differential[[id number]]         Abnormal            Final result                 Please view results for these tests on the individual orders.    CBC Auto Differential [[id number]]  (Abnormal) Collected: 05/29/25 1247    Specimen: Blood Updated: 05/29/25 1301     WBC 8.72 10*3/mm3      RBC 4.45 10*6/mm3      Hemoglobin 13.0 g/dL      Hematocrit 41.1 %      MCV 92.4 fL      MCH 29.2 pg      MCHC 31.6 g/dL      RDW 13.6 %      RDW-SD 46.4 fl      MPV 10.8 fL      Platelets 200 10*3/mm3      Neutrophil % 81.5 %      Lymphocyte % 12.2 %      Monocyte % 4.1 %      Eosinophil % 0.2 %      Basophil % 0.5 %      Immature Grans % 1.5 %      Neutrophils, Absolute 7.11 10*3/mm3      Lymphocytes, Absolute 1.06 10*3/mm3      Monocytes, Absolute 0.36 10*3/mm3      Eosinophils, Absolute 0.02 10*3/mm3      Basophils, Absolute 0.04 10*3/mm3      Immature Grans, Absolute 0.13 10*3/mm3      nRBC 0.2 /100 WBC     POC Glucose Once [[id number]]  (Abnormal) Collected: 05/29/25 1138    Specimen: Blood Updated: 05/29/25 1150     Glucose 140 mg/dL      Comment: : [id number] [id number]             Imaging Results (Last 24 Hours)       Procedure Component Value Units Date/Time    XR Chest 1 View [[id number]] Collected: 05/29/25 1356     Updated: 05/29/25 1403    Narrative:      EXAMINATION: XR CHEST 1 VW-  5/29/2025 1:56 PM 1 view     HISTORY: shortness of breath     COMPARISON: 4/18/2025     TECHNIQUE: A single frontal view of the chest was obtained.     FINDINGS:  The lungs are clear. The cardiomediastinal silhouette and pulmonary  vascularity are within normal limits. The osseous structures and  surrounding soft tissues demonstrate no acute abnormality.       Impression:         1.  No acute cardiopulmonary process.           This report was signed and finalized on 5/29/2025 1:59 PM by Dr. Ori Woods MD.             I have personally reviewed and interpreted the radiology studies and ECG obtained at time of admission.     Assessment /  Plan   Assessment:   Active Hospital Problems    Diagnosis     **Acquired cerebral ventriculomegaly     Generalized weakness        Ventriculomegaly, rule out obstructive hydrocephalus  Secondary syncope??  Hypothyroidism  Sinus tachycardia  Hypertension, not controlled  Psychiatric disorder, NOS  Hepatic steatosis  Chronic gastritis      Sodium 142, potassium 3.3.  Creatinine 2.98.  Glucose 144.  AST 50, ALT 88.  TSH 0.05  White blood cell count 8700 hemoglobin 13.      CT brain 5/16/25  1. Moderate further increase in size of the lateral and third ventricle  since the previous study. The fourth ventricle is a normal and  unchanged. A small obstructing lesion at the level of aqueduct of  Sylvius may not be excluded. Further evaluation with contrast enhanced  MR imaging of the brain may be obtained.  2. No visible acute intracranial abnormality. No mass. No intracranial  hemorrhage or hematoma.  3. Chronic ischemic and atrophic changes.    Treatment Plan  The patient will be admitted to my service here at [facility].     Fall precautions.  MRI of the brain with and without contrast as recommended per last imaging performed 5/16/2025.  Once report reviewed, will consider neurosurgery evaluation.      Free T4, T3 levels    Restart antihypertensive medications, amlodipine, lisinopril, and carvedilol.  Orthostatic vital signs.    Replace potassium.    PT OT evaluation.        Medical Decision Making  Number and Complexity of problems: 8, moderate to high    Differential Diagnosis: see above    Conditions and Status        Condition is unchanged.     [facility] Data  External documents reviewed: no  Cardiac tracing (EKG, telemetry) interpretation: sinus tachycardia  Radiology interpretation: radiology report reviewed  Labs reviewed: yes  Any tests that were considered but not ordered: no     Decision rules/scores evaluated (example [id number], Wells, etc): none     Discussed with: patient and      Care  Planning  Shared decision making: patient  Code status and discussions: FC    Disposition  Social Determinants of Health that impact treatment or disposition: none  I expect the patient to be discharged to SNF once approved.  Electronically signed by Bautista Limon MD, 05/29/25, 16:49 CDT.

## 2025-05-29 NOTE — ED NOTES
Phlebotomy contacted in regards to patient lab specimen collection. Spoke with Lola. States that it will be at least 30 minute before they can collect lab specimens.

## 2025-05-29 NOTE — ED NOTES
Two attempt to collect blood specimen with iv insertion. No success. IV insertion to left hand unable to collect blood specimen. Charge nurse Ngozi notified with request to come and attempt blood specimen collection.

## 2025-05-29 NOTE — ED NOTES
Patient assisted to bedpan for clean catch urine specimen collection. Patient unable to urinate at this time. Patient agreed for in and out cath urine collection. Phlebotomy at bedside, then will collect urine.

## 2025-05-30 LAB
ALBUMIN SERPL-MCNC: 3.5 G/DL (ref 3.5–5.2)
ALBUMIN/GLOB SERPL: 1.3 G/DL
ALP SERPL-CCNC: 37 U/L (ref 39–117)
ALT SERPL W P-5'-P-CCNC: 61 U/L (ref 1–33)
ANION GAP SERPL CALCULATED.3IONS-SCNC: 16 MMOL/L (ref 5–15)
AST SERPL-CCNC: 30 U/L (ref 1–32)
BASOPHILS # BLD AUTO: 0.02 10*3/MM3 (ref 0–0.2)
BASOPHILS NFR BLD AUTO: 0.3 % (ref 0–1.5)
BILIRUB SERPL-MCNC: 0.6 MG/DL (ref 0–1.2)
BUN SERPL-MCNC: 32.3 MG/DL (ref 6–20)
BUN/CREAT SERPL: 46.1 (ref 7–25)
CALCIUM SPEC-SCNC: 9.2 MG/DL (ref 8.6–10.5)
CHLORIDE SERPL-SCNC: 109 MMOL/L (ref 98–107)
CO2 SERPL-SCNC: 20 MMOL/L (ref 22–29)
CREAT SERPL-MCNC: 0.7 MG/DL (ref 0.57–1)
DEPRECATED RDW RBC AUTO: 42.2 FL (ref 37–54)
EGFRCR SERPLBLD CKD-EPI 2021: 105.5 ML/MIN/1.73
EOSINOPHIL # BLD AUTO: 0.01 10*3/MM3 (ref 0–0.4)
EOSINOPHIL NFR BLD AUTO: 0.2 % (ref 0.3–6.2)
ERYTHROCYTE [DISTWIDTH] IN BLOOD BY AUTOMATED COUNT: 13.4 % (ref 12.3–15.4)
GLOBULIN UR ELPH-MCNC: 2.7 GM/DL
GLUCOSE SERPL-MCNC: 121 MG/DL (ref 65–99)
HCT VFR BLD AUTO: 28.1 % (ref 34–46.6)
HGB BLD-MCNC: 9.4 G/DL (ref 12–15.9)
IMM GRANULOCYTES # BLD AUTO: 0.05 10*3/MM3 (ref 0–0.05)
IMM GRANULOCYTES NFR BLD AUTO: 0.9 % (ref 0–0.5)
LYMPHOCYTES # BLD AUTO: 1.31 10*3/MM3 (ref 0.7–3.1)
LYMPHOCYTES NFR BLD AUTO: 22.4 % (ref 19.6–45.3)
MAGNESIUM SERPL-MCNC: 1.9 MG/DL (ref 1.6–2.6)
MCH RBC QN AUTO: 28.6 PG (ref 26.6–33)
MCHC RBC AUTO-ENTMCNC: 33.5 G/DL (ref 31.5–35.7)
MCV RBC AUTO: 85.4 FL (ref 79–97)
MONOCYTES # BLD AUTO: 0.45 10*3/MM3 (ref 0.1–0.9)
MONOCYTES NFR BLD AUTO: 7.7 % (ref 5–12)
NEUTROPHILS NFR BLD AUTO: 4.02 10*3/MM3 (ref 1.7–7)
NEUTROPHILS NFR BLD AUTO: 68.5 % (ref 42.7–76)
NRBC BLD AUTO-RTO: 0 /100 WBC (ref 0–0.2)
PLATELET # BLD AUTO: 189 10*3/MM3 (ref 140–450)
PMV BLD AUTO: 9.6 FL (ref 6–12)
POTASSIUM SERPL-SCNC: 2.9 MMOL/L (ref 3.5–5.2)
PROT SERPL-MCNC: 6.2 G/DL (ref 6–8.5)
QT INTERVAL: 346 MS
QTC INTERVAL: 495 MS
RBC # BLD AUTO: 3.29 10*6/MM3 (ref 3.77–5.28)
SODIUM SERPL-SCNC: 145 MMOL/L (ref 136–145)
T3 SERPL-MCNC: 40.6 NG/DL (ref 80–200)
T3FREE SERPL-MCNC: 1.23 PG/ML (ref 2–4.4)
T4 FREE SERPL-MCNC: 2.25 NG/DL (ref 0.92–1.68)
WBC NRBC COR # BLD AUTO: 5.86 10*3/MM3 (ref 3.4–10.8)

## 2025-05-30 PROCEDURE — 99222 1ST HOSP IP/OBS MODERATE 55: CPT | Performed by: NURSE PRACTITIONER

## 2025-05-30 PROCEDURE — 83735 ASSAY OF MAGNESIUM: CPT | Performed by: FAMILY MEDICINE

## 2025-05-30 PROCEDURE — 84439 ASSAY OF FREE THYROXINE: CPT | Performed by: FAMILY MEDICINE

## 2025-05-30 PROCEDURE — 97162 PT EVAL MOD COMPLEX 30 MIN: CPT

## 2025-05-30 PROCEDURE — 80053 COMPREHEN METABOLIC PANEL: CPT | Performed by: FAMILY MEDICINE

## 2025-05-30 PROCEDURE — 85025 COMPLETE CBC W/AUTO DIFF WBC: CPT | Performed by: FAMILY MEDICINE

## 2025-05-30 PROCEDURE — 92610 EVALUATE SWALLOWING FUNCTION: CPT | Performed by: SPEECH-LANGUAGE PATHOLOGIST

## 2025-05-30 PROCEDURE — 97530 THERAPEUTIC ACTIVITIES: CPT

## 2025-05-30 PROCEDURE — 84481 FREE ASSAY (FT-3): CPT | Performed by: FAMILY MEDICINE

## 2025-05-30 PROCEDURE — 97166 OT EVAL MOD COMPLEX 45 MIN: CPT

## 2025-05-30 PROCEDURE — 25010000002 ONDANSETRON PER 1 MG: Performed by: FAMILY MEDICINE

## 2025-05-30 RX ORDER — AMLODIPINE BESYLATE 5 MG/1
5 TABLET ORAL DAILY
Status: DISCONTINUED | OUTPATIENT
Start: 2025-05-31 | End: 2025-06-04

## 2025-05-30 RX ORDER — PROPRANOLOL HYDROCHLORIDE 120 MG/1
120 CAPSULE, EXTENDED RELEASE ORAL DAILY
COMMUNITY

## 2025-05-30 RX ORDER — POLYETHYLENE GLYCOL 3350 17 G/17G
17 POWDER, FOR SOLUTION ORAL DAILY PRN
COMMUNITY

## 2025-05-30 RX ORDER — POTASSIUM CHLORIDE 1500 MG/1
40 TABLET, EXTENDED RELEASE ORAL EVERY 4 HOURS
Status: DISCONTINUED | OUTPATIENT
Start: 2025-05-30 | End: 2025-05-30

## 2025-05-30 RX ORDER — LEVOTHYROXINE SODIUM 150 UG/1
150 TABLET ORAL
Status: DISCONTINUED | OUTPATIENT
Start: 2025-05-31 | End: 2025-06-11 | Stop reason: HOSPADM

## 2025-05-30 RX ORDER — SPIRONOLACTONE 50 MG/1
50 TABLET, FILM COATED ORAL DAILY
Status: DISCONTINUED | OUTPATIENT
Start: 2025-05-31 | End: 2025-06-02

## 2025-05-30 RX ORDER — POTASSIUM CHLORIDE 1.5 G/1.58G
40 POWDER, FOR SOLUTION ORAL ONCE
Status: COMPLETED | OUTPATIENT
Start: 2025-05-30 | End: 2025-05-30

## 2025-05-30 RX ORDER — LIOTHYRONINE SODIUM 25 UG/1
25 TABLET ORAL 2 TIMES DAILY
COMMUNITY

## 2025-05-30 RX ORDER — LISINOPRIL 10 MG/1
10 TABLET ORAL
Status: DISCONTINUED | OUTPATIENT
Start: 2025-05-31 | End: 2025-06-02

## 2025-05-30 RX ADMIN — POTASSIUM CHLORIDE 40 MEQ: 1.5 POWDER, FOR SOLUTION ORAL at 21:20

## 2025-05-30 RX ADMIN — POTASSIUM CHLORIDE 40 MEQ: 1500 TABLET, EXTENDED RELEASE ORAL at 14:37

## 2025-05-30 RX ADMIN — POTASSIUM CHLORIDE 40 MEQ: 1500 TABLET, EXTENDED RELEASE ORAL at 17:55

## 2025-05-30 RX ADMIN — LEVOTHYROXINE SODIUM 175 MCG: 0.17 TABLET ORAL at 05:19

## 2025-05-30 RX ADMIN — ARIPIPRAZOLE 10 MG: 5 TABLET ORAL at 10:39

## 2025-05-30 RX ADMIN — CARVEDILOL 25 MG: 25 TABLET, FILM COATED ORAL at 10:39

## 2025-05-30 RX ADMIN — AMLODIPINE BESYLATE 10 MG: 10 TABLET ORAL at 10:39

## 2025-05-30 RX ADMIN — PANTOPRAZOLE SODIUM 40 MG: 40 TABLET, DELAYED RELEASE ORAL at 05:19

## 2025-05-30 RX ADMIN — ONDANSETRON 4 MG: 2 INJECTION INTRAMUSCULAR; INTRAVENOUS at 21:39

## 2025-05-30 RX ADMIN — LISINOPRIL 20 MG: 20 TABLET ORAL at 10:39

## 2025-05-30 NOTE — THERAPY EVALUATION
Acute Care - Occupational Therapy Initial Evaluation  [facility]     Patient Name: Lynn Magana  : 1975  MRN: [id number]  Today's Date: 2025  Onset of Illness/Injury or Date of Surgery: 25     Referring Physician: Dr. Limon    Admit Date: 2025       ICD-10-CM ICD-9-CM   1. Generalized weakness  R53.1 780.79   2. Nausea and vomiting, unspecified vomiting type  R11.2 787.01   3. Marijuana user  F12.90 305.20   4. Low TSH level  R79.89 794.5   5. Urinary tract infection without hematuria, site unspecified  N39.0 599.0   6. Dysphagia, unspecified type  R13.10 787.20     Patient Active Problem List   Diagnosis    Syncope    Graves' disease    Polysubstance abuse    Hypertension    Diabetes    Spells of decreased attentiveness    Chronic intractable headache    Nausea and vomiting    Slow transit constipation    Nonsmoker    Type 2 diabetes mellitus, with long-term current use of insulin    GERD without esophagitis    Hyperthyroidism    Thyromegaly    Post-surgical hypothyroidism    Degeneration of cervical intervertebral disc    Cervical radiculopathy    Acute pain of right shoulder    Class 1 obesity due to excess calories with body mass index (BMI) of 30.0 to 30.9 in adult    Hypoglycemia    Stage 1 acute kidney injury    Dehydration    Dysphagia    Dehydration    Hypokalemia    Steatosis of liver    Marijuana abuse    Loss of weight    Severe protein-calorie malnutrition    Hypothyroid    Generalized weakness    Acquired cerebral ventriculomegaly     Past Medical History:   Diagnosis Date    Arthritis     Carotid artery stenosis     Degenerative disc disease, cervical     Depression     Diabetes mellitus     type 1    Disease of thyroid gland     GERD (gastroesophageal reflux disease)     Graves disease     Heart palpitations     Hyperlipidemia     Hypertension     Hypothyroidism     Seizure     Thyromegaly      Past Surgical History:   Procedure Laterality Date     SECTION       CHOLECYSTECTOMY      COLONOSCOPY  11/16/2015    Normal exam Dr. Morgan     COLONOSCOPY  11/16/2015    Normal exam    CYST REMOVAL      ENDOSCOPY N/A 4/17/2018    Normal exam    ENDOSCOPY N/A 5/13/2025    Procedure: ESOPHAGOGASTRODUODENOSCOPY WITH ANESTHESIA;  Surgeon: Jadon Smith MD;  Location: [facility] ENDOSCOPY;  Service: Gastroenterology;  Laterality: N/A;  pre op: Nausea and vomiting  post op: normal  pcp: Darryl Andrews,     HYSTERECTOMY      THYROIDECTOMY Bilateral 11/19/2018    Procedure: total thyroidectomy;  Surgeon: Vitaly Givens MD;  Location: [facility] OR;  Service: ENT         OT ASSESSMENT FLOWSHEET (Last 12 Hours)       OT Evaluation and Treatment       Row Name 05/30/25 0932 05/30/25 0902                OT Time and Intention    Subjective Information no complaints  -AC (r) BH (t) AC (c) --  -AC (r) BH (t) AC (c)       Document Type evaluation  -AC (r) BH (t) AC (c) --  -AC (r) BH (t) AC (c)       Mode of Treatment occupational therapy  -AC (r) BH (t) AC (c) --  -AC (r) BH (t) AC (c)          General Information    Patient Profile Reviewed yes  -AC (r) BH (t) AC (c) --  -AC (r) BH (t) AC (c)       Onset of Illness/Injury or Date of Surgery 05/29/25  -AC (r) BH (t) AC (c) --       Referring Physician Dr. Limon  -AC (r) BH (t) AC (c) --       Prior Level of Function independent:;community mobility;all household mobility;ADL's;cooking;cleaning  -AC (r) BH (t) AC (c) --       Equipment Currently Used at Home walker, rolling;other (see comments)  Walk in shower  -AC (r) BH (t) AC (c) --  -AC (r) BH (t) AC (c)       Pertinent History of Current Functional Problem Acute cerebral ventriclomegaly r/o obstructive hyrdocephalus  -AC (r) BH (t) AC (c) --       Existing Precautions/Restrictions fall  -AC (r) BH (t) AC (c) --  -AC (r) BH (t) AC (c)       Risks Reviewed LOB;increased discomfort;change in vital signs;increased drainage;lines disloged;patient:  -CONSTANCE (r) KRISTEN (t) AC (c) --  -CONSTANCE (r) BH  (t) AC (c)       Benefits Reviewed improve function;increase independence;increase strength;increase balance;decrease pain;decrease risk of DVT;improve skin integrity;increase knowledge;patient:  -AC (r) BH (t) AC (c) --  -AC (r) BH (t) AC (c)       Barriers to Rehab cognitive status;physical barrier  -AC (r) BH (t) AC (c) --          Living Environment    Current Living Arrangements apartment  -AC (r) BH (t) AC (c) --  -AC (r) BH (t) AC (c)       Home Accessibility stairs to enter home;stairs within home  -AC (r) BH (t) AC (c) --  -AC (r) BH (t) AC (c)       People in Home child(neymar), adult  -AC (r) BH (t) AC (c) --  -AC (r) BH (t) AC (c)          Home Main Entrance    Number of Stairs, Main Entrance eight  -AC (r) BH (t) AC (c) --  -AC (r) BH (t) AC (c)       Stair Railings, Main Entrance railings on both sides of stairs  -AC (r) BH (t) AC (c) --  -AC (r) BH (t) AC (c)          Stairs Within Home, Primary    Number of Stairs, Within Home, Primary six  -AC (r) BH (t) AC (c) --  -AC (r) BH (t) AC (c)       Stair Railings, Within Home, Primary railings on both sides of stairs  -AC (r) BH (t) AC (c) --  -AC (r) BH (t) AC (c)          Pain Assessment    Pretreatment Pain Rating 0/10 - no pain  -AC (r) BH (t) AC (c) --  -AC (r) BH (t) AC (c)       Posttreatment Pain Rating 0/10 - no pain  -AC (r) BH (t) AC (c) --  -AC (r) BH (t) AC (c)       Pain Management Interventions --  -AC (r) BH (t) AC (c) --  -AC (r) BH (t) AC (c)       Response to Pain Interventions --  -AC (r) BH (t) AC (c) --  -AC (r) BH (t) AC (c)          Cognition    Affect/Mental Status (Cognition) flat/blunted affect;low arousal/lethargic;confused  -AC (r) BH (t) AC (c) --  -AC (r) BH (t) AC (c)       Behavioral Issues (Cognition) withdrawn  -AC (r) BH (t) AC (c) --  -AC (r) BH (t) AC (c)       Orientation Status (Cognition) oriented to;person;place;situation  -AC (r) BH (t) AC (c) --  -AC (r) BH (t) AC (c)       Follows Commands (Cognition) follows  one-step commands;75-90% accuracy;delayed response/completion;increased processing time needed;initiation impaired;verbal cues/prompting required;visual cue  -AC (r) BH (t) AC (c) --  -AC (r) BH (t) AC (c)       Cognitive Function (Cognition) executive function deficit;safety deficit  -AC (r) BH (t) AC (c) --       Executive Function Deficit (Cognition) problem-solving;reasoning;judgment;insight/awareness of deficits;information processing;moderate deficit;sequencing  -AC (r) BH (t) AC (c) --       Safety Deficit (Cognition) moderate deficit;awareness of need for assistance;impulsivity;insight into deficits/self-awareness;safety precautions awareness;safety precautions follow-through/compliance  -AC (r) BH (t) AC (c) --       Personal Safety Interventions fall prevention program maintained;gait belt;muscle strengthening facilitated;nonskid shoes/slippers when out of bed;supervised activity;elopement precautions initiated  -AC (r) BH (t) AC (c) --  -AC (r) BH (t) AC (c)          Range of Motion Comprehensive    General Range of Motion bilateral upper extremity ROM WFL  -AC (r) BH (t) AC (c) --  -AC (r) BH (t) AC (c)          Strength Comprehensive (MMT)    Comment, General Manual Muscle Testing (MMT) Assessment B UE 4-/5  -AC (r) BH (t) AC (c) --  -AC (r) BH (t) AC (c)          Activities of Daily Living    BADL Assessment/Intervention lower body dressing  -AC (r) BH (t) AC (c) --  -AC (r) BH (t) AC (c)          Lower Body Dressing Assessment/Training    [place] Level (Lower Body Dressing) don;doff;pants/bottoms;shoes/slippers;moderate assist (50% patient effort)  -AC (r) BH (t) AC (c) --  -AC (r) BH (t) AC (c)       Position (Lower Body Dressing) edge of bed sitting  -AC (r) BH (t) AC (c) --  -AC (r) BH (t) AC (c)          BADL Safety/Performance    Impairments, BADL Safety/Performance balance;endurance/activity tolerance;strength;cognition  -AC (r) BH (t) AC (c) --  -AC (r) BH (t) AC (c)          Bed Mobility     Bed Mobility supine-sit;sit-supine  -AC (r) BH (t) AC (c) --  -AC (r) BH (t) AC (c)       Supine-Sit [place] (Bed Mobility) minimum assist (75% patient effort)  -AC (r) BH (t) AC (c) --       Sit-Supine [place] (Bed Mobility) minimum assist (75% patient effort)  -AC (r) BH (t) AC (c) --       Assistive Device (Bed Mobility) bed rails;head of bed elevated  -AC (r) BH (t) AC (c) --  -AC (r) BH (t) AC (c)          Functional Mobility    Functional Mobility- Ind. Level moderate assist (50% patient effort);nonverbal cues required (demo/gesture);verbal cues required  -AC (r) BH (t) AC (c) --       Functional Mobility- Device walker, front-wheeled  -AC (r) BH (t) AC (c) --  -AC (r) BH (t) AC (c)       Functional Mobility- Comment two small steps forward/backwards. LOB towards posterior noted requiring ModA to correct.  -AC (r) BH (t) AC (c) --          Transfer Assessment/Treatment    Transfers sit-stand transfer;stand-sit transfer  -AC (r) BH (t) AC (c) --  -AC (r) BH (t) AC (c)          Sit-Stand Transfer    Sit-Stand [place] (Transfers) maximum assist (25% patient effort);nonverbal cues (demo/gesture);verbal cues  -AC (r) BH (t) AC (c) --       Assistive Device (Sit-Stand Transfers) walker, front-wheeled  -AC (r) BH (t) AC (c) --          Stand-Sit Transfer    Stand-Sit [place] (Transfers) maximum assist (25% patient effort);nonverbal cues (demo/gesture);verbal cues  -AC (r) BH (t) AC (c) --       Assistive Device (Stand-Sit Transfers) walker, front-wheeled  -AC (r) BH (t) AC (c) --          Safety Issues/Impairments Affecting Functional Mobility    Impairments Affecting Function (Mobility) balance;strength;endurance/activity tolerance;cognition  -AC (r) BH (t) AC (c) --  -AC (r) BH (t) AC (c)          Balance    Balance Assessment sitting static balance;sitting dynamic balance;standing static balance;standing dynamic balance  -AC (r) BH (t) AC (c) --  -AC (r) BH (t) AC (c)       Static Sitting  Balance contact guard  -AC (r) BH (t) AC (c) --       Dynamic Sitting Balance contact guard  -AC (r) BH (t) AC (c) --       Position, Sitting Balance sitting edge of bed  -AC (r) BH (t) AC (c) --  -AC (r) BH (t) AC (c)       Static Standing Balance moderate assist;verbal cues;non-verbal cues (demo/gesture)  -AC (r) BH (t) AC (c) --       Dynamic Standing Balance moderate assist;verbal cues;non-verbal cues (demo/gesture)  -AC (r) BH (t) AC (c) --       Position/Device Used, Standing Balance walker, front-wheeled  -AC (r) BH (t) AC (c) --  -AC (r) BH (t) AC (c)       Comment, Balance Posterior lean noted upon standing requiring ModA to correct. (P)   -BH --          Wound 05/29/25 1930 Left lower leg    Wound - Properties Group Placement Date: 05/29/25  -AR Placement Time: 1930  -AR Present on Original Admission: Y  -AR Side: Left  -AR Orientation: lower  -AR Location: leg  -AR    Retired Wound - Properties Group Placement Date: 05/29/25  -AR Placement Time: 1930  -AR Present on Original Admission: Y  -AR Side: Left  -AR Orientation: lower  -AR Location: leg  -AR    Retired Wound - Properties Group Placement Date: 05/29/25  -AR Placement Time: 1930  -AR Present on Original Admission: Y  -AR Side: Left  -AR Orientation: lower  -AR Location: leg  -AR    Retired Wound - Properties Group Date first assessed: 05/29/25  -AR Time first assessed: 1930  -AR Present on Original Admission: Y  -AR Side: Left  -AR Location: leg  -AR       Wound 05/29/25 1931 Right lower leg    Wound - Properties Group Placement Date: 05/29/25  -AR Placement Time: 1931  -AR Present on Original Admission: Y  -AR Side: Right  -AR Orientation: lower  -AR Location: leg  -AR    Retired Wound - Properties Group Placement Date: 05/29/25  -AR Placement Time: 1931  -AR Present on Original Admission: Y  -AR Side: Right  -AR Orientation: lower  -AR Location: leg  -AR    Retired Wound - Properties Group Placement Date: 05/29/25  -AR Placement Time: 1931  -AR  Present on Original Admission: Y  -AR Side: Right  -AR Orientation: lower  -AR Location: leg  -AR    Retired Wound - Properties Group Date first assessed: 05/29/25  -AR Time first assessed: 1931  -AR Present on Original Admission: Y  -AR Side: Right  -AR Location: leg  -AR       Plan of Care Review    Plan of Care Reviewed With patient  -AC (r) BH (t) AC (c) --  -AC (r) BH (t) AC (c)       Progress no change  -AC (r) BH (t) AC (c) --       Outcome Evaluation OT eval completed.  Pt alert and oriented only to person, place, and situation. Could not orient to time. Pt seen in fowlers with no c/o of pain but presents with general confusion and takes increased time to process situation, initate tasks, and complete tasks. Pt able to complete all bed mobility with Whitney. Pt requried ModA to earl/doff socks and shoes while seated EOB. Pt presents WFL B UE AROM and 4-/5 with B UE strength. Pt required MaxA with sit <> stand transitions. Pt completed functional mobility with 2 small steps forward/backwards with LOB noted towards posterior requiring ModA to correct. Pt presents I at baseline but currently requires substantial assist with tasks due to cognitive deficits that include increased processing time, general confusion, insight into deficits/awareness of need for assist, and initiation deficits. At this time, pt is a high fall risk and is unsafe to return home. OT will continue to tx to address deficits related to strength, balance, cognition, and act jamin. Recommend sub acute rehab at d/c.  -AC (r) BH (t) AC (c) --  -AC (r) BH (t) AC (c)          Positioning and Restraints    Pre-Treatment Position in bed  -AC (r) BH (t) AC (c) --  -AC (r) BH (t) AC (c)       Post Treatment Position bed  -AC (r) BH (t) AC (c) --       In Bed fowlers;call light within reach;encouraged to call for assist;exit alarm on;side rails up x2  -AC (r) BH (t) AC (c) --          Therapy Assessment/Plan (OT)    OT Diagnosis decreased adl  -AC (r) BH  (t) AC (c) --  -AC (r) BH (t) AC (c)       Rehab Potential (OT) good  -AC (r) BH (t) AC (c) --  -AC (r) BH (t) AC (c)       Criteria for Skilled Therapeutic Interventions Met (OT) yes;meets criteria;skilled treatment is necessary  -AC (r) BH (t) AC (c) --  -AC (r) BH (t) AC (c)       Therapy Frequency (OT) 5 times/wk  -AC (r) BH (t) AC (c) --  -AC (r) BH (t) AC (c)       Predicted Duration of Therapy Intervention (OT) 10 days  -AC (r) BH (t) AC (c) --  -AC (r) BH (t) AC (c)       Activity Limitations Related to Problem List (OT) BADLs not performed adequately or safely  -AC (r) BH (t) AC (c) --  -AC (r) BH (t) AC (c)       Planned Therapy Interventions (OT) activity tolerance training;adaptive equipment training;BADL retraining;functional balance retraining;occupation/activity based interventions;patient/caregiver education/training;strengthening exercise;transfer/mobility retraining  -AC (r) BH (t) AC (c) --  -AC (r) BH (t) AC (c)          OT Goals    Transfer Goal Selection (OT) transfer, OT goal 1  -AC (r) BH (t) AC (c) --  -AC (r) BH (t) AC (c)       Bathing Goal Selection (OT) --  -AC (r) BH (t) AC (c) --  -AC (r) BH (t) AC (c)       Dressing Goal Selection (OT) dressing, OT goal 1  -AC (r) BH (t) AC (c) --  -AC (r) BH (t) AC (c)       Toileting Goal Selection (OT) toileting, OT goal 1  -AC (r) BH (t) AC (c) --       Problem Specific Goal Selection (OT) --  -AC (r) BH (t) AC (c) --  -AC (r) BH (t) AC (c)          Transfer Goal 1 (OT)    Activity/Assistive Device (Transfer Goal 1, OT) bed-to-chair/chair-to-bed;commode, bedside without drop arms (P)   -BH --  -AC (r) BH (t) AC (c)       [place] Level/Cues Needed (Transfer Goal 1, OT) minimum assist (75% or more patient effort)  -AC (r) BH (t) AC (c) --       Time Frame (Transfer Goal 1, OT) long term goal (LTG);10 days  -AC (r) BH (t) AC (c) --  -AC (r) BH (t) AC (c)       Progress/Outcome (Transfer Goal 1, OT) new goal  -AC (r) BH (t) AC (c) --  -AC (r) BH  (t) AC (c)          Bathing Goal 1 (OT)    Activity/Device (Bathing Goal 1, OT) --  -AC (r) BH (t) AC (c) --  -AC (r) BH (t) AC (c)       Time Frame (Bathing Goal 1, OT) --  -AC (r) BH (t) AC (c) --  -AC (r) BH (t) AC (c)       Progress/Outcomes (Bathing Goal 1, OT) --  -AC (r) BH (t) AC (c) --  -AC (r) BH (t) AC (c)          Dressing Goal 1 (OT)    Activity/Device (Dressing Goal 1, OT) lower body dressing  -AC (r) BH (t) AC (c) --  -AC (r) BH (t) AC (c)       [place]/Cues Needed (Dressing Goal 1, OT) contact guard required  -AC (r) BH (t) AC (c) --       Time Frame (Dressing Goal 1, OT) long term goal (LTG);10 days  -AC (r) BH (t) AC (c) --  -AC (r) BH (t) AC (c)       Progress/Outcome (Dressing Goal 1, OT) new goal  -AC (r) BH (t) AC (c) --  -AC (r) BH (t) AC (c)          Toileting Goal 1 (OT)    Activity/Device (Toileting Goal 1, OT) commode, bedside without drop arms (P)   -BH --       [place] Level/Cues Needed (Toileting Goal 1, OT) contact guard required  -AC (r) BH (t) AC (c) --       Time Frame (Toileting Goal 1, OT) long term goal (LTG);10 days  -AC (r) BH (t) AC (c) --       Progress/Outcome (Toileting Goal 1, OT) new goal  -AC (r) BH (t) AC (c) --          Problem Specific Goal 1 (OT)    Problem Specific Goal 1 (OT) --  -AC (r) BH (t) AC (c) --  -AC (r) BH (t) AC (c)       Time Frame (Problem Specific Goal 1, OT) --  -AC (r) BH (t) AC (c) --  -AC (r) BH (t) AC (c)       Progress/Outcome (Problem Specific Goal 1, OT) --  -AC (r) BH (t) AC (c) --  -AC (r) BH (t) AC (c)                 User Key  (r) = Recorded By, (t) = Taken By, (c) = Cosigned By      Initials Name Effective Dates    Niels Perez, OTR/L, CNT 02/03/23 -     Deborah Christie, RN 05/24/22 -     Prakash Fuentes, OT Student 05/12/25 -                      Occupational Therapy Education       Title: PT OT SLP Therapies (In Progress)       Topic: Occupational Therapy (In Progress)       Point: ADL training (Done)       Learning  Progress Summary            Patient Acceptance, E, VU,NR,NL by  at 5/30/2025 1045    Comment: OT POC, OT role in care, t/f training, bed mobility training.                      Point: Body mechanics (Done)       Learning Progress Summary            Patient Acceptance, E, VU,NR,NL by  at 5/30/2025 1045    Comment: OT POC, OT role in care, t/f training, bed mobility training.                                      User Key       Initials Effective Dates Name Provider Type Discipline     05/12/25 -  Prakash Badillo OT Student OT Student OT                      OT Recommendation and Plan  Planned Therapy Interventions (OT): activity tolerance training, adaptive equipment training, BADL retraining, functional balance retraining, occupation/activity based interventions, patient/caregiver education/training, strengthening exercise, transfer/mobility retraining  Therapy Frequency (OT): 5 times/wk  Plan of Care Review  Plan of Care Reviewed With: patient  Progress: no change  Outcome Evaluation: OT eval completed.  Pt alert and oriented only to person, place, and situation. Could not orient to time. Pt seen in fowlers with no c/o of pain but presents with general confusion and takes increased time to process situation, initate tasks, and complete tasks. Pt able to complete all bed mobility with Whitney. Pt requried ModA to earl/doff socks and shoes while seated EOB. Pt presents WFL B UE AROM and 4-/5 with B UE strength. Pt required MaxA with sit <> stand transitions. Pt completed functional mobility with 2 small steps forward/backwards with LOB noted towards posterior requiring ModA to correct. Pt presents I at baseline but currently requires substantial assist with tasks due to cognitive deficits that include increased processing time, general confusion, insight into deficits/awareness of need for assist, and initiation deficits. At this time, pt is a high fall risk and is unsafe to return home. OT will continue to tx to  address deficits related to strength, balance, cognition, and act jamin. Recommend sub acute rehab at d/.  Plan of Care Reviewed With: patient  Outcome Evaluation: OT eval completed.  Pt alert and oriented only to person, place, and situation. Could not orient to time. Pt seen in fowlers with no c/o of pain but presents with general confusion and takes increased time to process situation, initate tasks, and complete tasks. Pt able to complete all bed mobility with [place]. Pt requried ModA to earl/doff socks and shoes while seated EOB. Pt presents WFL B UE AROM and 4-/5 with B UE strength. Pt required MaxA with sit <> stand transitions. Pt completed functional mobility with 2 small steps forward/backwards with LOB noted towards posterior requiring ModA to correct. Pt presents I at baseline but currently requires substantial assist with tasks due to cognitive deficits that include increased processing time, general confusion, insight into deficits/awareness of need for assist, and initiation deficits. At this time, pt is a high fall risk and is unsafe to return home. OT will continue to tx to address deficits related to strength, balance, cognition, and act jamin. Recommend sub acute rehab at d/.     Outcome Measures       Row Name 05/30/25 0932             How much help from another is currently needed...    Putting on and taking off regular lower body clothing? 2  -AC (r) BH (t) AC (c)      Bathing (including washing, rinsing, and drying) 2  -AC (r) BH (t) AC (c)      Toileting (which includes using toilet bed pan or urinal) 2  -AC (r) BH (t) AC (c)      Putting on and taking off regular upper body clothing 3  -AC (r) BH (t) AC (c)      Taking care of personal grooming (such as brushing teeth) 3  -AC (r) BH (t) AC (c)      Eating meals 3  -AC (r) BH (t) AC (c)      AM-PAC 6 Clicks Score (OT) 15  -AC (r) BH (t)         Functional Assessment    Outcome Measure Options AM-PAC 6 Clicks Daily Activity (OT)  -AC (r) BH (t) AC  (c)                User Key  (r) = Recorded By, (t) = Taken By, (c) = Cosigned By      Initials Name Provider Type    Niels Perez, OTR/L, CNT Occupational Therapist    Prakash Fuentes, OT Student OT Student                    Time Calculation:    Time Calculation- OT       Row Name 05/30/25 1016             Time Calculation- OT    OT Start Time 0932  +10 min for chart review  -AC (r) BH (t) AC (c)      OT Stop Time 1016  -AC (r) BH (t) AC (c)      OT Time Calculation (min) 44 min  -AC (r) BH (t)      OT Received On 05/30/25  -AC (r) BH (t) AC (c)      OT Goal Re-Cert Due Date 06/09/25  -AC (r) BH (t) AC (c)         Untimed Charges    OT Eval/Re-eval Minutes 54  -AC (r) BH (t) AC (c)         Total Minutes    Untimed Charges Total Minutes 54  -AC (r) BH (t)       Total Minutes 54  -AC (r) BH (t)                User Key  (r) = Recorded By, (t) = Taken By, (c) = Cosigned By      Initials Name Provider Type    Niels Perez, OTR/L, CNT Occupational Therapist    Prakash Fuentes, OT Student OT Student                           Prakash Badillo, OT Student  5/30/2025

## 2025-05-30 NOTE — PROGRESS NOTES
[facility] Medicine Services  INPATIENT PROGRESS NOTE    Patient Name: Lynn Magana  Date of Admission: 5/29/2025  Today's Date: 05/30/25  Length of Stay: 1  Primary Care Physician: Darryl Andrews DO    Subjective   Chief Complaint: Lightheadedness.  HPI   49-year-old female with history of hypothyroidism, liver steatosis, cannabis use, malnutrition, diabetes mellitus type 2, hypertension, admitted May 11 through May 14, 2025; at that time she was admitted due to poor oral intake and weight loss.  She had an upper endoscopy performed 5/13/2025 with findings of a small hiatal hernia and no other abnormalities reported.  Per reports reviewed the patient was not taking levothyroxine at the time, and her TSH was markedly elevated.  She was restarted on levothyroxine.  Her blood glucose was low, and did not require insulin management.  Drug screening urine was positive for cannabis, and considered to be secondary to cannabinoid hyperemesis syndrome.  The patient left the hospital AGAINST MEDICAL ADVICE, apparently she eloped; after being called, she stated was already home and did not plan to return to the hospital.  On 5/16/2025, the patient returned to the hospital reporting a syncopal event.  There was questionable seizure-like activity.   A CT brain performed on that evaluation showed moderate further increase in size of the lateral and third ventricle since the previous study. The fourth ventricle reported as normal and unchanged. A small obstructing lesion at the level of aqueduct of Sylvius not excluded. Further evaluation with contrast enhanced MR imaging of the brain was suggested. She again left the ER AGAINST MEDICAL ADVICE.      Patient came to hospital 5/30/25 reporting dizziness, lightheadedness, worsening when standing up, abnormal gait with unsteadiness;  reported confusion.  Symptom has been going on for approximately 1 month.   Blood pressure has  been variable, initially elevated.  The patient had not taken medications the day of admission.  The patient states that she has been stable.  She has not been evaluated by physical therapy.  She reports no other symptomatology.        Review of Systems   All pertinent negatives and positives are as above. All other systems have been reviewed and are negative unless otherwise stated.     Objective    Temp:  [97.6 °F (36.4 °C)-98.4 °F (36.9 °C)] 97.6 °F (36.4 °C)  Heart Rate:  [] 98  Resp:  [15-16] 16  BP: (116-157)/() 133/90  Physical Exam  Constitutional:       Appearance: Alert, oriented to person and place.  No respiratory distress.  HENT:      Head: Normocephalic and atraumatic.      Nose: Nose normal.      Mouth/Throat:      Mouth: Mucous membranes are moist.   Neck, old transverse scar  Eyes:      Extraocular Movements: Not able to evaluate due to dizziness on exam     Conjunctiva/sclera: Conjunctivae normal.      Pupils: Pupils are equal, round  Cardiovascular:      Rate and Rhythm: Normal rate and regular rhythm.      Pulses: Normal pulses.   Pulmonary:      Effort: No respiratory distress.      Breath sounds: Normal breath sounds. No wheezing, rhonchi or rales.   Abdominal:      General: Abdomen is flat. Bowel sounds are normal.      Palpations: Abdomen is soft.      Tenderness: There is no guarding or rebound.   Extremities:  No lower extremity edema.  Skin:     Capillary Refill: Capillary refill takes less than 2 seconds.      Coloration: Skin is not jaundiced.      Findings: No rash.   Neurological:   Alert and oriented.  Normal speech.  Follows commands.  Symmetric strength 4 out of 5 in upper and lower extremities.  Gait was not evaluated.  No gross visual defects.      Results Review:  I have reviewed the labs, radiology results, and diagnostic studies.    Laboratory Data:   Results from last 7 days   Lab Units 05/30/25  0500 05/29/25  1247   WBC 10*3/mm3 5.86 8.72   HEMOGLOBIN g/dL 9.4*  "13.0   HEMATOCRIT % 28.1* 41.1   PLATELETS 10*3/mm3 189 200        Results from last 7 days   Lab Units 05/30/25  0500 05/29/25  1247   SODIUM mmol/L 145 142   POTASSIUM mmol/L 2.9* 3.3*   CHLORIDE mmol/L 109* 103   CO2 mmol/L 20.0* 16.0*   BUN mg/dL 32.3* 29.0*   CREATININE mg/dL 0.70 0.98   CALCIUM mg/dL 9.2 10.2   BILIRUBIN mg/dL 0.6 1.1   ALK PHOS U/L 37* 50   ALT (SGPT) U/L 61* 88*   AST (SGOT) U/L 30 58*   GLUCOSE mg/dL 121* 144*       Culture Data:   No results found for: \"BLOODCX\", \"URINECX\", \"WOUNDCX\", \"MRSACX\", \"RESPCX\", \"STOOLCX\"    Radiology Data:   Imaging Results (Last 24 Hours)       Procedure Component Value Units Date/Time    MRI Brain With & Without Contrast [[id number]] Collected: 05/29/25 1845     Updated: 05/29/25 1858    Narrative:      EXAMINATION: MRI BRAIN W WO CONTRAST- 5/29/2025 6:45 PM     HISTORY: ventriculomegaly, syncope; R53.1-Weakness; R11.2-Nausea with  vomiting, unspecified; F12.90-Cannabis use, unspecified, uncomplicated;  R79.89-Other specified abnormal findings of blood chemistry;  N39.0-Urinary tract infection, site not specified.     REPORT: Multiplanar multisequence MR imaging of the brain was performed  with and without contrast.     COMPARISON: Noncontrast CT of the head 5/16/2025, noncontrast CT of the  head 4/18/2025 MRI brain with and without contrast 9/4/2019.     No diffusion restriction is identified. There is diffuse age compatible  cerebral atrophy as before. No intracranial mass effect is identified.  There is mild dilation of the lateral and third ventricles as  demonstrated on recent head CT. There appears to be minimal dilation of  the fourth ventricle. FLAIR images demonstrate confluent increased  signal within the periventricular white matter tracts which appears  slightly greater compared with the previous MRI in 2019. This could be  related to mild transependymal edema or a combination of transependymal  edema and chronic small vessel white matter ischemic " disease.. There is  no mass or abnormal enhancement involving the aqueduct of Sylvius,  though it is small in caliber. This may represent aqueductal stenosis.  No abnormal enhancement is seen elsewhere within the brain.  Susceptibility weighted images demonstrate no evidence of intracranial  hemorrhage.       Impression:      1. Persistent mild asymmetric dilation of the lateral and third  ventricles compared to the fourth ventricle, with small caliber of the  aqueduct suggestive of aqueduct of stenosis. Given fluid increased  periventricular FLAIR signal possibly related to mild transependymal  edema, versus a combination of transependymal edema and chronic small  vessel white matter ischemic disease. No obstructing mass at the sylvian  aqueduct, no abnormal enhancement is identified within the brain.     This report was signed and finalized on 5/29/2025 6:55 PM by Dr. Triston Escobedo MD.       XR Chest 1 View [[id number]] Collected: 05/29/25 1356     Updated: 05/29/25 1403    Narrative:      EXAMINATION: XR CHEST 1 VW-  5/29/2025 1:56 PM 1 view     HISTORY: shortness of breath     COMPARISON: 4/18/2025     TECHNIQUE: A single frontal view of the chest was obtained.     FINDINGS:  The lungs are clear. The cardiomediastinal silhouette and pulmonary  vascularity are within normal limits. The osseous structures and  surrounding soft tissues demonstrate no acute abnormality.       Impression:         1.  No acute cardiopulmonary process.           This report was signed and finalized on 5/29/2025 1:59 PM by Dr. Ori Woods MD.               I have reviewed the patient's current medications.     Assessment/Plan   Assessment  Active Hospital Problems    Diagnosis     **Acquired cerebral ventriculomegaly     Generalized weakness        Ventriculomegaly, rule out obstructive hydrocephalus  Hypokalemia   Hypothyroidism,after thyroidectomy  Sinus tachycardia  Hypertension,  Hepatic steatosis  Chronic gastritis  History  of Graves' disease status post thyroidectomy      Sodium 145, potassium 2.9.  Creatinine 0.7.  Glucose 131.  Magnesium 1.9.  AST 30, ALT 61.  Normal bilirubin.      MRI brain  1. Persistent mild asymmetric dilation of the lateral and third  ventricles compared to the fourth ventricle, with small caliber of the  aqueduct suggestive of aqueduct of stenosis. Given fluid increased  periventricular FLAIR signal possibly related to mild transependymal  edema, versus a combination of transependymal edema and chronic small  vessel white matter ischemic disease. No obstructing mass at the sylvian  aqueduct, no abnormal enhancement is identified within the brain.    Treatment Plan  Given findings on MRI, and symptomatology, neurosurgery consult placed for further recommendations regarding ventriculomegaly.    Replace potassium. Follow values; per protocol ordered.    Decrease dose of levothyroxine from 175 to 150 mcg p.o. daily.    Due to blood pressure values today, will decrease amlodipine from 10 to 5 mg p.o. daily.  Will continue carvedilol 25 p.o. twice daily.  Will decrease lisinopril from 20 to 10 mg p.o. daily.  Will decrease Aldactone, from 50 mg p.o. twice daily to 50 mg p.o. daily.    Patient will require endocrinology outpatient follow up.    PT OT evaluations.      Medical Decision Making  Number and Complexity of problems: 8, moderate complexity    Differential Diagnosis: see above    Conditions and Status        Condition is unchanged.     MDM Data  External documents reviewed: no  Cardiac tracing (EKG, telemetry) interpretation: no new  Radiology interpretation: reports reviewed  Labs reviewed: yes  Any tests that were considered but not ordered: no     Decision rules/scores evaluated (example [id number], Wells, etc): none     Discussed with: patient     Care Planning  Shared decision making: patient  Code status and discussions: FC    Disposition  Social Determinants of Health that impact treatment or  disposition: none      Electronically signed by Bautista Limon MD, 05/30/25, 09:33 CDT.

## 2025-05-30 NOTE — PLAN OF CARE
"Goal Outcome Evaluation:  Plan of Care Reviewed With: patient, spouse        Progress: no change  Outcome Evaluation: PT eval completed.  Pt resting in bed w/ nsg present completing assessment.  Pt w/ flat affect.  Oriented to self, however hesitation w/ last name.  Disoriented to place, time and situation.  Initially told this therapist she was at the  home then told the APRN she was at the laundry mat.  Pt demonstrates generalized weakness, slow to respond, requires encouragement to complete tasks.  Frequently request to hold on and give her a minute.  Pt denies N/T.  Difficulty tracking objects w/ eyes only, tends to turn head also.  Eyes, R more than L appear deviated more medially.  Pt demonstrated difficulty following commands worse when performing tasks w/ the L hand.  Pt w/ decrease strength at L UE compared to R.  When asked to move LEs, frequently would just state \"No\".  When asked why she wouldn't move her R LE, she stated her R knee hurt, but when assisted to move the R LE, no reported c/os pain or outward signs of pain.  Requires mod to max assist of 1 to 2 to scoot up in bed.  Supine to sit w/ min assist, sit to supine w/ CGA.  Sit to stand attempted twice w/ inability to rise from bed on the 1st attempt, max assist of 2 to stand the 2nd attempt.  Flexed posture w/ flexed hips and knees.  Pt unable to advance her LEs.  In sitting, pt w/ flexed postue requiring CGA.  Pt will benefit from continued PT services to improve strength, balance, activity tolerance, and to progress functional mobility and motor planning.  Discharge planning unclear at this time.  Will follow for progress and needs.    Anticipated Discharge Disposition (PT): other (see comments) (unclear at this time)                        "

## 2025-05-30 NOTE — THERAPY EVALUATION
Acute Care - Speech Language Pathology   Swallow Initial Evaluation [facility]     Patient Name: Lynn Magana  : 1975  MRN: [id number]  Today's Date: 2025               Admit Date: 2025  SPEECH-LANGUAGE PATHOLOGY EVALUATION - SWALLOW  Subjective: The patient was seen on this date for a Clinical Swallow evaluation.  Patient was alert and cooperative.  Significant history: Presented due to confusion, abnomal gait. Medical history seizure, GERD, Graves Disease, DM, thyroidectomy 2018.   Objective: Oral motor examination results: WFL.  Textures given during assessment of swallow function included thin liquid and regular consistency.  Assessment: Difficulties were noted with none of the above consistencies.  Observations: Patient reports chronic issues with sensation of material sticking when eating more so with solids that are dry. No overt s/s of aspiration observed.   SLP Findings:  Patient presents with minimal pharyngeal dysphagia, without esophageal component.   Recommendations: Diet Textures: soft to chew whole meats and thin liquids  Medications should be taken whole as tolerated.   Recommended Strategies: upright for PO, small bites and sips, and alternate liquids and solids. Oral care 2x a day.  Other Recommended Evaluations: None    Dysphagia therapy is recommended to ensure patient is tolerating soft whole foods and following for education for compensations for chronic concerns voiced.     Elizabeth Hackett MS CCC-SLP 2025 09:14 CDT    Visit Dx:     ICD-10-CM ICD-9-CM   1. Generalized weakness  R53.1 780.79   2. Nausea and vomiting, unspecified vomiting type  R11.2 787.01   3. Marijuana user  F12.90 305.20   4. Low TSH level  R79.89 794.5   5. Urinary tract infection without hematuria, site unspecified  N39.0 599.0   6. Dysphagia, unspecified type  R13.10 787.20     Patient Active Problem List   Diagnosis    Syncope    Graves' disease    Polysubstance abuse    Hypertension     Diabetes    Spells of decreased attentiveness    Chronic intractable headache    Nausea and vomiting    Slow transit constipation    Nonsmoker    Type 2 diabetes mellitus, with long-term current use of insulin    GERD without esophagitis    Hyperthyroidism    Thyromegaly    Post-surgical hypothyroidism    Degeneration of cervical intervertebral disc    Cervical radiculopathy    Acute pain of right shoulder    Class 1 obesity due to excess calories with body mass index (BMI) of 30.0 to 30.9 in adult    Hypoglycemia    Stage 1 acute kidney injury    Dehydration    Dysphagia    Dehydration    Hypokalemia    Steatosis of liver    Marijuana abuse    Loss of weight    Severe protein-calorie malnutrition    Hypothyroid    Generalized weakness    Acquired cerebral ventriculomegaly     Past Medical History:   Diagnosis Date    Arthritis     Carotid artery stenosis     Degenerative disc disease, cervical     Depression     Diabetes mellitus     type 1    Disease of thyroid gland     GERD (gastroesophageal reflux disease)     Graves disease     Heart palpitations     Hyperlipidemia     Hypertension     Hypothyroidism     Seizure     Thyromegaly      Past Surgical History:   Procedure Laterality Date     SECTION      CHOLECYSTECTOMY      COLONOSCOPY  2015    Normal exam Dr. Morgan     COLONOSCOPY  2015    Normal exam    CYST REMOVAL      ENDOSCOPY N/A 2018    Normal exam    ENDOSCOPY N/A 2025    Procedure: ESOPHAGOGASTRODUODENOSCOPY WITH ANESTHESIA;  Surgeon: Jadon Smith MD;  Location: [facility] ENDOSCOPY;  Service: Gastroenterology;  Laterality: N/A;  pre op: Nausea and vomiting  post op: normal  pcp: Darryl Andrews,     HYSTERECTOMY      THYROIDECTOMY Bilateral 2018    Procedure: total thyroidectomy;  Surgeon: Vitaly Givens MD;  Location: [facility] OR;  Service: ENT       SLP Recommendation and Plan  SLP Swallowing Diagnosis: functional oral phase, suspect chronic,  pharyngeal dysphagia (05/30/25 0800)  SLP Diet Recommendation: soft to chew textures, whole, thin liquids (05/30/25 0800)  Recommended Precautions and Strategies: upright posture during/after eating, small bites of food and sips of liquid, alternate between small bites of food and sips of liquid, general aspiration precautions (05/30/25 0800)  SLP Rec. for Method of Medication Administration: as tolerated (05/30/25 0800)     Monitor for Signs of Aspiration: yes, notify SLP if any concerns (05/30/25 0800)     Swallow Criteria for Skilled Therapeutic Interventions Met: demonstrates skilled criteria (05/30/25 0800)  Anticipated Discharge Disposition (SLP): unknown (05/30/25 0800)  Rehab Potential/Prognosis, Swallowing: good, to achieve stated therapy goals (05/30/25 0800)  Therapy Frequency (Swallow): PRN (05/30/25 0800)  Predicted Duration Therapy Intervention (Days): until discharge (05/30/25 0800)  Oral Care Recommendations: Oral Care BID/PRN, Toothbrush (05/30/25 0800)                                        Progress: no change (Initial Evaluation)      SWALLOW EVALUATION (Last 72 Hours)       SLP Adult Swallow Evaluation       Row Name 05/30/25 0800                   Rehab Evaluation    Document Type evaluation  -MG        Subjective Information no complaints  -MG        Patient Observations alert;cooperative;agree to therapy  -MG        Patient/Family/Caregiver Comments/Observations No family present.  -MG        Patient Effort good  -MG        Symptoms Noted During/After Treatment none  -MG           General Information    Patient Profile Reviewed yes  -MG        Pertinent History Of Current Problem Presented due to confusion, abnomal gait. Medical history seizure, GERD, Graves Disease, DM, thyroidectomy 2018.  -MG        Current Method of Nutrition regular textures;thin liquids  -MG        Precautions/Limitations, Vision WFL;for purposes of eval  -MG        Precautions/Limitations, Hearing WFL;for purposes of  eval  -MG        Prior Level of Function-Communication unknown  -MG        Prior Level of Function-Swallowing soft to chew  sensation of material stuck when swallowing since thyroid sx.  -MG        Plans/Goals Discussed with patient;agreed upon  -MG        Barriers to Rehab previous functional deficit  -MG        Patient's Goals for Discharge patient did not state  -MG           Pain    Additional Documentation Pain Scale: FACES Pre/Post-Treatment (Group)  -MG           Pain Scale: FACES Pre/Post-Treatment    Pain: FACES Scale, Pretreatment 0-->no hurt  -MG        Posttreatment Pain Rating 0-->no hurt  -MG           Oral Motor Structure and Function    Dentition Assessment natural, present and adequate  -MG        Secretion Management WNL/WFL  -MG        Mucosal Quality moist, healthy  -MG        Volitional Swallow WFL  -MG        Volitional Cough WFL  -MG           Oral Musculature and Cranial Nerve Assessment    Oral Motor General Assessment WFL  -MG           General Eating/Swallowing Observations    Respiratory Support Currently in Use room air  -MG        Eating/Swallowing Skills self-fed  -MG        Positioning During Eating upright in bed  -MG        Utensils Used straw  -MG        Consistencies Trialed regular textures;thin liquids  -MG           Clinical Swallow Eval    Oral Prep Phase WFL  -MG        Oral Transit WFL  -MG        Oral Residue WFL  -MG        Pharyngeal Phase no overt signs/symptoms of pharyngeal impairment  -MG        Esophageal Phase unremarkable  -MG        Clinical Swallow Evaluation Summary See note  -MG           SLP Evaluation Clinical Impression    SLP Swallowing Diagnosis functional oral phase;suspect chronic;pharyngeal dysphagia  -MG        Functional Impact risk of aspiration/pneumonia;risk of malnutrition;risk of dehydration  -MG        Rehab Potential/Prognosis, Swallowing good, to achieve stated therapy goals  -MG        Swallow Criteria for Skilled Therapeutic Interventions  Met demonstrates skilled criteria  -MG           Recommendations    Therapy Frequency (Swallow) PRN  -MG        Predicted Duration Therapy Intervention (Days) until discharge  -MG        SLP Diet Recommendation soft to chew textures;whole;thin liquids  -MG        Recommended Precautions and Strategies upright posture during/after eating;small bites of food and sips of liquid;alternate between small bites of food and sips of liquid;general aspiration precautions  -MG        Oral Care Recommendations Oral Care BID/PRN;Toothbrush  -MG        SLP Rec. for Method of Medication Administration as tolerated  -MG        Monitor for Signs of Aspiration yes;notify SLP if any concerns  -MG        Anticipated Discharge Disposition (SLP) unknown  -MG           Swallow Goals (SLP)    Swallow LTGs Swallow Long Term Goal (free text)  -MG        Swallow STGs diet tolerance goal selection (SLP);swallow management recall goal selection (SLP)  -MG        Diet Tolerance Goal Selection (SLP) Patient will tolerate trials of  -MG        Swallow Management Recall Goal Selection (SLP) swallow management recall, SLP goal 1  -MG           (LTG) Swallow    (LTG) Swallow Patient will tolerate least restrictive diet without overt s/s of aspiration.  -MG        [place] (Swallow Long Term Goal) independently (over 90% accuracy)  -MG        Time Frame (Swallow Long Term Goal) by discharge  -MG        Barriers (Swallow Long Term Goal) none  -MG        Progress/Outcomes (Swallow Long Term Goal) new goal  -MG           (STG) Patient will tolerate trials of    Consistencies Trialed (Tolerate trials) soft to chew (whole) textures;regular textures;thin liquids  -MG        Desired Outcome (Tolerate trials) without signs/symptoms of aspiration;without signs of distress;with adequate oral prep/transit/clearance  -MG        [place] (Tolerate trials) independently (over 90% accuracy)  -MG        Time Frame (Tolerate trials) by discharge  -MG         Progress/Outcomes (Tolerate trials) new goal  -MG           (STG) Swallow Management Recall Goal 1 (SLP)    Activity (Swallow Management Recall Goal 1, SLP) independent recall of;compensatory swallow strategies/techniques;postural techniques;rationale for use of strategies/techniques  for sensation of material stuck (extra swallow, liquid wash)  -MG        [place]/Accuracy (Swallow Management Recall Goal 1, SLP) independently (over 90% accuracy)  -MG        Time Frame (Swallow Management Recall Goal 1, SLP) by discharge  -MG        Barriers (Swallow Management Recall Goal 1, SLP) none  -MG        Progress/Outcomes (Swallow Management Recall Goal 1, SLP) new goal  -MG                  User Key  (r) = Recorded By, (t) = Taken By, (c) = Cosigned By      Initials Name Effective Dates    MG Elizabeth Hackett, MS [facility]-SLP 07/11/23 -                     EDUCATION  The patient has been educated in the following areas:   Dysphagia (Swallowing Impairment) Oral Care/Hydration Modified Diet Instruction.        SLP GOALS       Row Name 05/30/25 0800             (LTG) Swallow    (LTG) Swallow Patient will tolerate least restrictive diet without overt s/s of aspiration.  -MG      [place] (Swallow Long Term Goal) independently (over 90% accuracy)  -MG      Time Frame (Swallow Long Term Goal) by discharge  -MG      Barriers (Swallow Long Term Goal) none  -MG      Progress/Outcomes (Swallow Long Term Goal) new goal  -MG         (STG) Patient will tolerate trials of    Consistencies Trialed (Tolerate trials) soft to chew (whole) textures;regular textures;thin liquids  -MG      Desired Outcome (Tolerate trials) without signs/symptoms of aspiration;without signs of distress;with adequate oral prep/transit/clearance  -MG      [place] (Tolerate trials) independently (over 90% accuracy)  -MG      Time Frame (Tolerate trials) by discharge  -MG      Progress/Outcomes (Tolerate trials) new goal  -MG         (STG) Swallow  Management Recall Goal 1 (SLP)    Activity (Swallow Management Recall Goal 1, SLP) independent recall of;compensatory swallow strategies/techniques;postural techniques;rationale for use of strategies/techniques  for sensation of material stuck (extra swallow, liquid wash)  -MG      [place]/Accuracy (Swallow Management Recall Goal 1, SLP) independently (over 90% accuracy)  -MG      Time Frame (Swallow Management Recall Goal 1, SLP) by discharge  -MG      Barriers (Swallow Management Recall Goal 1, SLP) none  -MG      Progress/Outcomes (Swallow Management Recall Goal 1, SLP) new goal  -MG                User Key  (r) = Recorded By, (t) = Taken By, (c) = Cosigned By      Initials Name Provider Type    Elizabeth Garzon MS CCC-SLP Speech and Language Pathologist                         Time Calculation:    Time Calculation- SLP       Row Name 05/30/25 0911             Time Calculation- SLP    SLP Start Time 0800  -MG      SLP Stop Time 0911  -MG      SLP Time Calculation (min) 71 min  -MG      SLP Received On 05/30/25  -MG      SLP Goal Re-Cert Due Date 06/09/25  -MG         Untimed Charges    SLP Eval/Re-eval  ST Eval Oral Pharyng Swallow - 59017  -MG      [id number]-QM Eval Oral Pharyng Swallow Minutes 71  -MG         Total Minutes    Untimed Charges Total Minutes 71  -MG       Total Minutes 71  -MG                User Key  (r) = Recorded By, (t) = Taken By, (c) = Cosigned By      Initials Name Provider Type    Elizabeth Garzon MS CCC-SLP Speech and Language Pathologist                    Therapy Charges for Today       Code Description Service Date Service Provider Modifiers Qty    [id number] HC ST EVAL ORAL PHARYNG SWALLOW 5 5/30/2025 Elizabeth Hackett MS CCC-SLP GN 1                 Elizabeth Hackett MS CCC-MICHAEL  5/30/2025

## 2025-05-30 NOTE — CONSULTS
Reason for consult: Ventriculomegaly    Chief Complaint   Patient presents with    Weakness - Generalized         Requesting provider: Dr. Bautista Limon    HPI: This is a 49-year-old female patient who asked to see in consult in regards to ventriculomegaly.  The patient was admitted to the hospital on May 11 due to weight loss and poor oral intake.  It was found that she was not taking her levothyroxine and she did have elevation of her TSH and was restarted on her thyroid medication.  The patient did leave the hospital AMA from that admission but did get readmitted to the hospital on May 16, 2025 for concern for syncopal episode and even question of a seizure activity.  Imaging of her head was done at that time which did show concern for ventriculomegaly and it was recommended that she undergo an MRI however the patient left AMA again.  She presents back to the hospital today with dizziness and lightheadedness and difficulty with her gait.  Family is also reporting confusion.  Patient also does relate to having trouble with nausea and vomiting.  Poor appetite is noted.  She is having increased difficulty with ambulating over the last 1 to 2 months to where her  is having difficulty with moving her around.  She also does complain of double vision and blurred vision.  According to her  6 months ago she was walking independently.  Pain does not seem to be a  a factor limiting her mobilization.    Review of Systems   Constitutional:  Positive for activity change. Negative for fever.   Eyes:  Positive for visual disturbance.   Gastrointestinal:  Positive for nausea and vomiting.   Musculoskeletal:  Positive for gait problem.   Neurological:  Positive for dizziness and weakness. Negative for headaches.   All other systems reviewed and are negative.       Pertinent positives/negatives documented in HPI.  All other systems reviewed and negative.    Past Medical History:  has a past medical history of  Arthritis, Carotid artery stenosis, Degenerative disc disease, cervical, Depression, Diabetes mellitus, Disease of thyroid gland, GERD (gastroesophageal reflux disease), Graves disease, Heart palpitations, Hyperlipidemia, Hypertension, Hypothyroidism, Seizure, and Thyromegaly.    Past Surgical History:  has a past surgical history that includes  section; Cholecystectomy; Hysterectomy; Cyst Removal; Colonoscopy (2015); Esophagogastroduodenoscopy (N/A, 2018); Thyroidectomy (Bilateral, 2018); Colonoscopy (2015); and Esophagogastroduodenoscopy (N/A, 2025).    Family History: family history includes Breast cancer in her maternal aunt and sister; Colon cancer in her paternal uncle; Coronary artery disease in her brother; Diabetes in her brother, father, mother, and sister; Seizures in her sister; Stroke in her father, mother, and sister.    Social History:  reports that she has never smoked. She has never used smokeless tobacco. She reports current alcohol use of about 6.0 standard drinks of alcohol per week. She reports current drug use. Drug: Marijuana.    Allergies: Oxycodone-acetaminophen    Medications: Scheduled Meds:amLODIPine, 10 mg, Oral, Daily  ARIPiprazole, 10 mg, Oral, Daily  carvedilol, 25 mg, Oral, BID With Meals  levothyroxine, 175 mcg, Oral, Q AM  lisinopril, 20 mg, Oral, Q24H  pantoprazole, 40 mg, Oral, Q AM  potassium chloride, 40 mEq, Oral, Once  [START ON 2025] spironolactone, 50 mg, Oral, Daily      Continuous Infusions:   PRN Meds:.  senna-docusate sodium **AND** polyethylene glycol **AND** bisacodyl **AND** bisacodyl    Calcium Replacement - Follow Nurse / BPA Driven Protocol    hydrALAZINE    Magnesium Low Dose Replacement - Follow Nurse / BPA Driven Protocol    melatonin    ondansetron ODT **OR** ondansetron    Phosphorus Replacement - Follow Nurse / BPA Driven Protocol    Potassium Replacement - Follow Nurse / BPA Driven Protocol    [COMPLETED] Insert  "Peripheral IV **AND** sodium chloride     Objective:  General Appearance:  Comfortable, well-appearing, in no acute distress and not in pain.    Vital signs: (most recent): Blood pressure 133/90, pulse 98, temperature 97.6 °F (36.4 °C), temperature source Oral, resp. rate 16, height 188 cm (74\"), weight 67.3 kg (148 lb 4.8 oz), SpO2 100%, not currently breastfeeding.  Vital signs are normal.  No fever.    Output: Producing urine.    HEENT: Normal HEENT exam.    Lungs:  Normal effort and normal respiratory rate.  She is not in respiratory distress.    Chest: Symmetric chest wall expansion.   Extremities: Normal range of motion.    Skin:  Warm and dry.    Pulses: Distal pulses are intact.        Neurological Exam  Mental Status  Awake and alert. Oriented only to person. Speech is normal. Language is fluent with no aphasia. Attention and concentration are normal.    Cranial Nerves  CN I: Sense of smell is normal.  CN II: Right normal visual field. Left normal visual field.  CN III, IV, VI: Normal lids and orbits bilaterally. Pupils equal round and reactive to light bilaterally.  CN V: Facial sensation is normal.  CN VII:  Right: There is no facial weakness.  Left: There is no facial weakness.  CN XI: Shoulder shrug strength is normal.  CN XII: Tongue midline without atrophy or fasciculations.  Esotropia noted  No visual field deficit noted.    Motor  Decreased muscle bulk throughout. Normal muscle tone.  Moving all extremities.  Mild ataxia noted in upper extremities.    Reflexes                                            Right                      Left  Patellar                                0                         0    Right pathological reflexes: Yelitza's absent. Ankle clonus absent.  Left pathological reflexes: Yelitza's absent. Ankle clonus absent.    Gait   Abnormal gait.  75% assistance needed to help stand the patient.  Unable to take a step.  .      Vital Signs  Temp:  [97.6 °F (36.4 °C)-98.4 °F (36.9 " °C)] 97.6 °F (36.4 °C)  Heart Rate:  [] 98  Resp:  [15-16] 16  BP: (116-157)/() 133/90    Physical Exam  Constitutional:       General: She is awake. Vital signs are normal.      Appearance: Normal appearance. She is well-developed.   HENT:      Head: Normocephalic.   Eyes:      General: Lids are normal.      Conjunctiva/sclera: Conjunctivae normal.      Pupils: Pupils are equal, round, and reactive to light.   Cardiovascular:      Pulses: Intact distal pulses.   Pulmonary:      Effort: Pulmonary effort is normal. No respiratory distress.      Breath sounds: Normal breath sounds.   Musculoskeletal:         General: Normal range of motion.      Cervical back: Normal range of motion.   Skin:     General: Skin is warm and dry.   Neurological:      Mental Status: She is alert. She is confused.      GCS: GCS eye subscore is 4. GCS verbal subscore is 5. GCS motor subscore is 6.      Cranial Nerves: Cranial nerve deficit present.      Sensory: No sensory deficit.      Motor: Weakness present.      Gait: Gait abnormal.      Deep Tendon Reflexes: Reflexes normal.      Reflex Scores:       Patellar reflexes are 0 on the right side and 0 on the left side.  Psychiatric:         Speech: Speech normal.         Behavior: Behavior normal.         Thought Content: Thought content normal.         Results Review:   I reviewed the patient's new clinical results.  I reviewed the patient's new imaging results and agree with the interpretation.  I reviewed the patient's other test results and agree with the interpretation          Lab Results (last 24 hours)       Procedure Component Value Units Date/Time    T3, Free [[id number]] Collected: 05/30/25 0500    Specimen: Blood Updated: 05/30/25 1127    T3 [[id number]] Collected: 05/30/25 0500    Specimen: Blood Updated: 05/30/25 1127    T4, Free [[id number]]  (Abnormal) Collected: 05/30/25 0500    Specimen: Blood Updated: 05/30/25 0816     Free T4 2.25 ng/dL     Comprehensive  Metabolic Panel [[id number]]  (Abnormal) Collected: 05/30/25 0500    Specimen: Blood Updated: 05/30/25 0543     Glucose 121 mg/dL      BUN 32.3 mg/dL      Creatinine 0.70 mg/dL      Sodium 145 mmol/L      Potassium 2.9 mmol/L      Chloride 109 mmol/L      CO2 20.0 mmol/L      Calcium 9.2 mg/dL      Total Protein 6.2 g/dL      Albumin 3.5 g/dL      ALT (SGPT) 61 U/L      AST (SGOT) 30 U/L      Alkaline Phosphatase 37 U/L      Total Bilirubin 0.6 mg/dL      Globulin 2.7 gm/dL      A/G Ratio 1.3 g/dL      BUN/Creatinine Ratio 46.1     Anion Gap 16.0 mmol/L      eGFR 105.5 mL/min/1.73     Narrative:      GFR Categories in Chronic Kidney Disease (CKD)              GFR Category          GFR (mL/min/1.73)    Interpretation  G1                    90 or greater        Normal or high (1)  G2                    60-89                Mild decrease (1)  G3a                   45-59                Mild to moderate decrease  G3b                   30-44                Moderate to severe decrease  G4                    15-29                Severe decrease  G5                    14 or less           Kidney failure    (1)In the absence of evidence of kidney disease, neither GFR category G1 or G2 fulfill the criteria for CKD.    eGFR calculation 2021 CKD-EPI creatinine equation, which does not include race as a factor    Magnesium [[id number]]  (Normal) Collected: 05/30/25 0500    Specimen: Blood Updated: 05/30/25 0543     Magnesium 1.9 mg/dL     CBC Auto Differential [[id number]]  (Abnormal) Collected: 05/30/25 0500    Specimen: Blood Updated: 05/30/25 0521     WBC 5.86 10*3/mm3      RBC 3.29 10*6/mm3      Hemoglobin 9.4 g/dL      Hematocrit 28.1 %      MCV 85.4 fL      MCH 28.6 pg      MCHC 33.5 g/dL      RDW 13.4 %      RDW-SD 42.2 fl      MPV 9.6 fL      Platelets 189 10*3/mm3      Neutrophil % 68.5 %      Lymphocyte % 22.4 %      Monocyte % 7.7 %      Eosinophil % 0.2 %      Basophil % 0.3 %      Immature Grans % 0.9 %       Neutrophils, Absolute 4.02 10*3/mm3      Lymphocytes, Absolute 1.31 10*3/mm3      Monocytes, Absolute 0.45 10*3/mm3      Eosinophils, Absolute 0.01 10*3/mm3      Basophils, Absolute 0.02 10*3/mm3      Immature Grans, Absolute 0.05 10*3/mm3      nRBC 0.0 /100 WBC     Blood Culture - Blood, Arm, Left [[id number]] Collected: 05/29/25 1941    Specimen: Blood from Arm, Left Updated: 05/29/25 1947    Blood Culture - Blood, Arm, Left [[id number]] Collected: 05/29/25 1547    Specimen: Blood from Arm, Left Updated: 05/29/25 1553    Urinalysis With Microscopic If Indicated (No Culture) - Straight Cath [[id number]]  (Abnormal) Collected: 05/29/25 1301    Specimen: Urine from Straight Cath Updated: 05/29/25 1343     Color, UA [place]     Appearance, UA Clear     pH, UA 6.0     Specific Gravity, UA >1.030     Glucose,  mg/dL (2+)     Ketones, UA 15 mg/dL (1+)     Bilirubin, UA Large (3+)     Blood, UA Negative     Protein, UA 30 mg/dL (1+)     Leuk Esterase, UA Small (1+)     Nitrite, UA Positive     Urobilinogen, UA 1.0 E.U./dL    Narrative:      Dipstick results may be inaccurate due to color interference.    Comprehensive Metabolic Panel [[id number]]  (Abnormal) Collected: 05/29/25 1247    Specimen: Blood Updated: 05/29/25 1327     Glucose 144 mg/dL      BUN 29.0 mg/dL      Creatinine 0.98 mg/dL      Sodium 142 mmol/L      Potassium 3.3 mmol/L      Comment: Slight hemolysis detected by analyzer. Result may be falsely elevated.        Chloride 103 mmol/L      CO2 16.0 mmol/L      Calcium 10.2 mg/dL      Total Protein 8.0 g/dL      Albumin 4.0 g/dL      ALT (SGPT) 88 U/L      AST (SGOT) 58 U/L      Alkaline Phosphatase 50 U/L      Total Bilirubin 1.1 mg/dL      Globulin 4.0 gm/dL      A/G Ratio 1.0 g/dL      BUN/Creatinine Ratio 29.6     Anion Gap 23.0 mmol/L      eGFR 70.5 mL/min/1.73     Narrative:      GFR Categories in Chronic Kidney Disease (CKD)              GFR Category          GFR (mL/min/1.73)    Interpretation  G1                     90 or greater        Normal or high (1)  G2                    60-89                Mild decrease (1)  G3a                   45-59                Mild to moderate decrease  G3b                   30-44                Moderate to severe decrease  G4                    15-29                Severe decrease  G5                    14 or less           Kidney failure    (1)In the absence of evidence of kidney disease, neither GFR category G1 or G2 fulfill the criteria for CKD.    eGFR calculation 2021 CKD-EPI creatinine equation, which does not include race as a factor    TSH [[id number]]  (Abnormal) Collected: 05/29/25 1247    Specimen: Blood Updated: 05/29/25 1327     TSH 0.050 uIU/mL     Magnesium [[id number]]  (Normal) Collected: 05/29/25 1247    Specimen: Blood Updated: 05/29/25 1327     Magnesium 2.4 mg/dL     Ethanol [[id number]] Collected: 05/29/25 1247    Specimen: Blood Updated: 05/29/25 1327     Ethanol % <0.010 %     Narrative:      Not for legal purposes. Chain of Custody not followed.     Urinalysis, Microscopic Only - Straight Cath [[id number]]  (Abnormal) Collected: 05/29/25 1301    Specimen: Urine from Straight Cath Updated: 05/29/25 1326     RBC, UA None Seen /HPF      WBC, UA 3-5 /HPF      Bacteria, UA Trace /HPF      Squamous Epithelial Cells, UA 3-6 /HPF      Hyaline Casts, UA None Seen /LPF      Methodology Manual Light Microscopy    Fentanyl, Urine - Straight Cath [[id number]]  (Normal) Collected: 05/29/25 1301    Specimen: Urine from Straight Cath Updated: 05/29/25 1322     Fentanyl, Urine Negative    Narrative:      Negative Threshold:      Fentanyl 5 ng/mL     The normal value for the drug tested is negative. This report includes final unconfirmed screening results to be used for medical treatment purposes only. Unconfirmed results must not be used for non-medical purposes such as employment or legal testing. Clinical consideration should be applied to any drug of abuse test,  particularly when unconfirmed results are used.           Ammonia [[id number]]  (Normal) Collected: 05/29/25 1247    Specimen: Blood Updated: 05/29/25 1320     Ammonia 36 umol/L     Urine Drug Screen - Straight Cath [[id number]]  (Abnormal) Collected: 05/29/25 1301    Specimen: Urine from Straight Cath Updated: 05/29/25 1319     THC, Screen, Urine Positive     Phencyclidine (PCP), Urine Negative     Cocaine Screen, Urine Negative     Methamphetamine, Ur Negative     Opiate Screen Negative     Amphetamine Screen, Urine Negative     Benzodiazepine Screen, Urine Negative     Tricyclic Antidepressants Screen Negative     Methadone Screen, Urine Negative     Barbiturates Screen, Urine Negative     Oxycodone Screen, Urine Negative     Buprenorphine, Screen, Urine Negative    Narrative:      Cutoff For Drugs Screened:    Amphetamines               500 ng/ml  Barbiturates               200 ng/ml  Benzodiazepines            150 ng/ml  Cocaine                    150 ng/ml  Methadone                  200 ng/ml  Opiates                    100 ng/ml  Phencyclidine               25 ng/ml  THC                         50 ng/ml  Methamphetamine            500 ng/ml  Tricyclic Antidepressants  300 ng/ml  Oxycodone                  100 ng/ml  Buprenorphine               10 ng/ml    The normal value for all drugs tested is negative. This report includes unconfirmed screening results, with the cutoff values listed, to be used for medical treatment purposes only.  Unconfirmed results must not be used for non-medical purposes such as employment or legal testing.  Clinical consideration should be applied to any drug of abuse test, particularly when unconfirmed results are used.      Protime-INR [[id number]]  (Normal) Collected: 05/29/25 1247    Specimen: Blood Updated: 05/29/25 1309     Protime 13.3 Seconds      INR 0.97    CBC & Differential [[id number]]  (Abnormal) Collected: 05/29/25 1247    Specimen: Blood Updated: 05/29/25 1301     Narrative:      The following orders were created for panel order CBC & Differential.  Procedure                               Abnormality         Status                     ---------                               -----------         ------                     CBC Auto Differential[[id number]]        Abnormal            Final result                 Please view results for these tests on the individual orders.    CBC Auto Differential [[id number]]  (Abnormal) Collected: 05/29/25 1247    Specimen: Blood Updated: 05/29/25 1301     WBC 8.72 10*3/mm3      RBC 4.45 10*6/mm3      Hemoglobin 13.0 g/dL      Hematocrit 41.1 %      MCV 92.4 fL      MCH 29.2 pg      MCHC 31.6 g/dL      RDW 13.6 %      RDW-SD 46.4 fl      MPV 10.8 fL      Platelets 200 10*3/mm3      Neutrophil % 81.5 %      Lymphocyte % 12.2 %      Monocyte % 4.1 %      Eosinophil % 0.2 %      Basophil % 0.5 %      Immature Grans % 1.5 %      Neutrophils, Absolute 7.11 10*3/mm3      Lymphocytes, Absolute 1.06 10*3/mm3      Monocytes, Absolute 0.36 10*3/mm3      Eosinophils, Absolute 0.02 10*3/mm3      Basophils, Absolute 0.04 10*3/mm3      Immature Grans, Absolute 0.13 10*3/mm3      nRBC 0.2 /100 WBC           MRI of the brain that was done on May 29, 2025 shows asymmetric dilation of the lateral ventricles along with enlargement of the third ventricle.  The fourth ventricle does appear to be normal.  There is concern for stenosis of the aqueduct however no masses or obstruction is noted.  Ventricle sizes are larger when compared to imaging that was done in 2019 with progression of ventricles size since 2019 2025 2019      Assessment/Plan:   The patient does show ventriculomegaly on imaging that has progressed since 2019.  The patient does have a variety of complaints however this may be multifactorial and not limited only to the ventriculomegaly that is seen on imaging.  I will discuss this patient with Dr. Rivera and have him review the imaging.  The  patient may need to undergo a lumbar puncture to check opening pressures and also to see if drainage of spinal fluid would offer improvement in her overall status.  Further recommendations to come after Dr. Rivera has reviewed the imaging and evaluated the patient.  Thank you for the opportunity to participate in this patient's plan of care.      Acquired cerebral ventriculomegaly    Generalized weakness      I discussed the patient's findings and my recommendations with patient and family    Jefferson Donovan, APRN  05/30/25  11:52 CDT    I spent 58 minutes caring for Lynn on this date of service. This time includes time spent by me in the following activities: preparing for the visit, reviewing tests, performing a medically appropriate examination and/or evaluation, counseling and educating the patient/family/caregiver, referring and communicating with other health care professionals, documenting information in the medical record, independently interpreting results and communicating that information with the patient/family/caregiver, and obtaining a separately obtained history

## 2025-05-30 NOTE — PLAN OF CARE
Goal Outcome Evaluation:  Plan of Care Reviewed With: patient        Progress: no change  Outcome Evaluation: Patient A/O x 4, however exhibiting extremely flat affect. Unable to void, straight cath this morning yielded 650 ml very dark urine. Tachycardic on telemetry. On room air. Refused anything to eat or drink since admission. Safety maintained.

## 2025-05-30 NOTE — PROGRESS NOTES
Inpatient Nutrition Services  Malnutrition Severity Assessment  Patient Name:  Lynn Magana  YOB: 1975  MRN: [id number]  Admit Date:  5/29/2025   Reason for Assessment       Row Name 05/30/25 1519          Reason for Assessment    Reason For Assessment per organizational policy     Diagnosis neurologic conditions;substance use/abuse     Identified At Risk by Screening Criteria MST SCORE 2+          Nutrition/Diet History       Row Name 05/30/25 1519          Nutrition/Diet History    Typical Intake (Food/Fluid/EN/PN) Pt and pt spouse in room. Pt with minimal conversation. NKFA. Poor appetite due to N/V per spouse. Able to keep liquids down, enjoys jello, Sprite, etc. Added Italian Ice to all trays (lemon for lunch, orange for breakfast and dinner). NFPE complete; qualifies for malnutrition. MSA/NFPE completed 5/12/25: severe malnutrition, acute context. This MSA/NFPE will qualify in chronic context based on weight hx last 18 days and one month (4/18/25-5/11/25). SLP recommending soft to chew, whole meats. Dental decay, dry mouth noted during visit. Unsure last BM; RN reports pt provided bowel hx yesterday and stated BM in the last two days. Discontinued healthy heart diet modifier; liberalized, to regular/house diet at this time. Improved nutrient and energy intake a greater benefit than fat, sodium restriction this time d/t poor appetite; inadequate oral intake, malnutrition. Will follow per protocol.     Food Intolerance(s) NKFA     Factors Affecting Nutritional Intake appetite;cognitive status/motor function;other (see comments)  N/V          Malnutrition Severity Assessment       Row Name 05/30/25 1534          Malnutrition Severity Assessment    Malnutrition Type Chronic Disease - Related Malnutrition        Insufficient Energy Intake     Insufficient Energy Intake Findings Severe     Insufficient Energy Intake  <75% of est. energy requirement for > or equal to 1 month        Unintentional  Weight Loss     Unintentional Weight Loss Findings Severe     Unintentional Weight Loss  Weight loss greater than 5% in one month        Muscle Loss    Loss of Muscle Mass Findings Severe     Oriental orthodox Region Moderate - slight depression     Clavicle Bone Region Moderate - some protrusion in females, visible in males     Acromion Bone Region Moderate - acromion may slightly protrude     Scapular Bone Region Moderate - mild depression, bones may show slightly     Dorsal Hand Region Severe - prominent depression     Patellar Region Severe - prominent bone, square looking, very little muscle definition     Anterior Thigh Region Severe - line/depression along thigh, obviously thin     Posterior Calf Region Severe - thin with very little definition/firmness        Fat Loss    Subcutaneous Fat Loss Findings Severe     Orbital Region  Severe - pronounced hollowness/depression, dark circles, loose saggy skin     Upper Arm Region Severe - mostly skin, very little space between folds, fingers touch     Thoracic & Lumbar Region Moderate - ribs visible with mild depressions, iliac crest somewhat prominent        Criteria Met (Must meet criteria for severity in at least 2 of these categories: M Wasting, Fat Loss, Fluid, Secondary Signs, Wt. Status, Intake)    Patient meets criteria for  Severe Malnutrition          Labs/Tests/Procedures/Meds       Row Name 05/30/25 1525          Labs/Procedures/Meds    Lab Results Reviewed reviewed, pertinent     Lab Results Comments A1c 4.4% (5/11/25), 10.4% (2/16/25)        Diagnostic Tests/Procedures    Diagnostic Test/Procedure Reviewed reviewed, pertinent        Medications    Pertinent Medications Reviewed reviewed, pertinent     Pertinent Medications Comments See MAR          Physical Findings       Row Name 05/30/25 1525          Physical Findings    Overall Physical Appearance Room air, BM 1-2 days ago, Jag score 16, right and left lower leg skin breakdown, dry skin, dry mouth, dental  decay, see MSA.          Estimated/Assessed Needs - Anthropometrics       Row Name 05/30/25 1526          Anthropometrics    Calculation Weight 67.3 kg (148 lb 4.8 oz)        Usual Body Weight (UBW)    Weight Change Time Frame 3.1lb loss (2%) x 18 days (5/11-5/29); -8.6lb (5.4%) x 1 m (4/18-5/11)        Estimated/Assessed Needs    Additional Documentation Fluid Requirements (Group);KCAL/KG (Group);Protein Requirements (Group)        KCAL/KG    KCAL/KG 30 Kcal/Kg (kcal);35 Kcal/Kg (kcal)     30 Kcal/Kg (kcal) 2018.04     35 Kcal/Kg (kcal) 2354.38        Protein Requirements    Weight Used For Protein Calculations 67.3 kg (148 lb 5.9 oz)     Est Protein Requirement Amount (gms/kg) 1.1 gm protein     Estimated Protein Requirements (gms/day) 74.03        Fluid Requirements    Fluid Requirements (mL/day) 2018          Nutrition Prescription Ordered       Row Name 05/30/25 1528          Nutrition Prescription PO    Current PO Diet Soft Texture     Texture Whole foods     Fluid Consistency Thin     Common Modifiers Cardiac          Evaluation of Received Nutrient/Fluid Intake       Row Name 05/30/25 1528          Nutrient/Fluid Evaluation    Number of Days Evaluated Other (comment)  admit to present total        Fluid Intake Evaluation    Oral Fluid (mL) 360        PO Evaluation    Number of Meals 1     % PO Intake 25         Problem/Interventions:   Problem 1       Row Name 05/30/25 1535          Nutrition Diagnoses Problem 1    Problem 1 Other (comment)  Severe malnutrition in context of chronic illness/disease     Etiology (related to) Medical Diagnosis;Other (comment)  hx of difficulty swallowing     Cardiac HTN     Endocrine DM     Gastrointestinal Other (comment);Vomiting;Nausea  chronic; suspect cyclic vomiting syndrome     Neurological AMS     Substance Use Drug/illicit/recreational     Signs/Symptoms (evidenced by) Unintended Weight Change;Report/Observation     Unintended Weight Change Loss     Number of Pounds  Lost -3.1lb (2%); -8.6lb (5.4%)     Weight loss time period x 18 d, 1 m (4/18/25-5/11/25)     Reported/Observed By RD/Tech     Other Comment muscle and fat loss per NFPE          Intervention Goal       Row Name 05/30/25 1548          Intervention Goal    General Disease management/therapy;Meet nutritional needs for age/condition;Reduce/improve symptoms     PO Tolerate PO;Establish PO     Weight Maintain weight          Nutrition Intervention       Row Name 05/30/25 1548          Nutrition Intervention    RD/Tech Action Follow Tx progress;Supplement offered/refused;Encourage intake;Recommend/ordered     Recommended/Ordered Diet          Nutrition Prescription       Row Name 05/30/25 1548          Nutrition Prescription PO    PO Prescription Begin/change diet  observe consistency per SLP     Begin/Change Diet to Regular               Education/Evaluation       Row Name 05/30/25 1554          Education    Education Provided education regarding;Previous education by RD/RENETTA     Provided education regarding Nutrition related factor        Monitor/Evaluation    Monitor Per protocol       Electronically signed by:  Rhea Huber RDN, RENETTA  05/30/25 15:54 CDT

## 2025-05-30 NOTE — THERAPY EVALUATION
Patient Name: Lynn Magana  : 1975    MRN: [id number]                              Today's Date: 2025       Admit Date: 2025    Visit Dx:     ICD-10-CM ICD-9-CM   1. Generalized weakness  R53.1 780.79   2. Nausea and vomiting, unspecified vomiting type  R11.2 787.01   3. Marijuana user  F12.90 305.20   4. Low TSH level  R79.89 794.5   5. Urinary tract infection without hematuria, site unspecified  N39.0 599.0   6. Dysphagia, unspecified type  R13.10 787.20   7. Impaired functional mobility and activity tolerance [Z74.09]  Z74.09 V49.89     Patient Active Problem List   Diagnosis    Syncope    Graves' disease    Polysubstance abuse    Hypertension    Diabetes    Spells of decreased attentiveness    Chronic intractable headache    Nausea and vomiting    Slow transit constipation    Nonsmoker    Type 2 diabetes mellitus, with long-term current use of insulin    GERD without esophagitis    Hyperthyroidism    Thyromegaly    Post-surgical hypothyroidism    Degeneration of cervical intervertebral disc    Cervical radiculopathy    Acute pain of right shoulder    Class 1 obesity due to excess calories with body mass index (BMI) of 30.0 to 30.9 in adult    Hypoglycemia    Stage 1 acute kidney injury    Dehydration    Dysphagia    Dehydration    Hypokalemia    Steatosis of liver    Marijuana abuse    Loss of weight    Severe protein-calorie malnutrition    Hypothyroid    Generalized weakness    Acquired cerebral ventriculomegaly     Past Medical History:   Diagnosis Date    Arthritis     Carotid artery stenosis     Degenerative disc disease, cervical     Depression     Diabetes mellitus     type 1    Disease of thyroid gland     GERD (gastroesophageal reflux disease)     Graves disease     Heart palpitations     Hyperlipidemia     Hypertension     Hypothyroidism     Seizure     Thyromegaly      Past Surgical History:   Procedure Laterality Date     SECTION      CHOLECYSTECTOMY      COLONOSCOPY   11/16/2015    Normal exam Dr. Morgan     COLONOSCOPY  11/16/2015    Normal exam    CYST REMOVAL      ENDOSCOPY N/A 4/17/2018    Normal exam    ENDOSCOPY N/A 5/13/2025    Procedure: ESOPHAGOGASTRODUODENOSCOPY WITH ANESTHESIA;  Surgeon: Jadon Smith MD;  Location: [facility] ENDOSCOPY;  Service: Gastroenterology;  Laterality: N/A;  pre op: Nausea and vomiting  post op: normal  pcp: Darryl Andrews,     HYSTERECTOMY      THYROIDECTOMY Bilateral 11/19/2018    Procedure: total thyroidectomy;  Surgeon: Vitaly Givens MD;  Location: [facility] OR;  Service: ENT      General Information       Row Name 05/30/25 1033          Physical Therapy Time and Intention    Document Type evaluation;other (see comments)  see MAR  -ROBERTA     Mode of Treatment physical therapy  -       Row Name 05/30/25 1033          General Information    Patient Profile Reviewed yes  -JE     Prior Level of Function dependent:;bathing;dressing;grooming;home management;driving;shopping  reports she can't walk at all; decline over last mth and half; prior to this I and able to walk  -JE     Existing Precautions/Restrictions fall  -JE     Barriers to Rehab medically complex;cognitive status;previous functional deficit;visual deficit;physical barrier  -       Row Name 05/30/25 1033          Living Environment    Current Living Arrangements apartment  -JE     People in Home spouse;child(neymar), dependent  -ROBERTA     Name(s) of People in Home spouse - Ge; dtr - Stewart 16 yr old dtr.  -       Row Name 05/30/25 1033          Home Main Entrance    Number of Stairs, Main Entrance one  -JE     Stair Railings, Main Entrance railings on both sides of stairs  -       Row Name 05/30/25 1033          Stairs Within Home, Primary    Number of Stairs, Within Home, Primary none  -JE       Row Name 05/30/25 1033          Cognition    Orientation Status (Cognition) disoriented to;place;situation;time;other (see comments)  oriented to self; hesitated w/ last  name  -       Row Name 05/30/25 1033          Safety Issues/Impairments Affecting Functional Mobility    Safety Issues Affecting Function (Mobility) at risk behavior observed;friction/shear risk;insight into deficits/self-awareness;safety precaution awareness;ability to follow commands  -     Impairments Affecting Function (Mobility) balance;cognition;coordination;endurance/activity tolerance;motor planning;strength;visual/perceptual  -     Cognitive Impairments, Mobility Safety/Performance attention;safety precaution awareness;sequencing abilities  -               User Key  (r) = Recorded By, (t) = Taken By, (c) = Cosigned By      Initials Name Provider Type    Selin Capone, PT Physical Therapist                   Mobility       Row Name 05/30/25 1033          Bed Mobility    Bed Mobility rolling left;scooting/bridging;supine-sit;sit-supine  -     Rolling Left [place] (Bed Mobility) contact guard;verbal cues  -     Scooting/Bridging [place] (Bed Mobility) moderate assist (50% patient effort);verbal cues;maximum assist (25% patient effort);1 person assist;2 person assist  -     Supine-Sit [place] (Bed Mobility) minimum assist (75% patient effort);verbal cues  -     Sit-Supine [place] (Bed Mobility) contact guard;verbal cues  -     Assistive Device (Bed Mobility) head of bed elevated;bed rails  -       Row Name 05/30/25 1033          Sit-Stand Transfer    Sit-Stand [place] (Transfers) other (see comments)  attempted unable to stand initially; attempts made a 2nd time requiring max A X 2; pt w/ flexed knees,hips, trunk and did not maintain or obtain upright  -       Row Name 05/30/25 1033          Gait/Stairs (Locomotion)    Distance in Feet (Gait) 0  unable to to advance feet/LEs  -               User Key  (r) = Recorded By, (t) = Taken By, (c) = Cosigned By      Initials Name Provider Type    Selin Capone, PT Physical Therapist                    Obj/Interventions       Row Name 05/30/25 1033          Range of Motion Comprehensive    Comment, General Range of Motion AROM all 4 extremities WFLs, requires increase effort to complete at times  -[facility] Name 05/30/25 1033          Strength Comprehensive (MMT)    Comment, General Manual Muscle Testing (MMT) Assessment R UE grosslly 4/5, L shld 3+ to 4-/5, L elbow flex 3+ to 4-/5, L elbow ext 4/5;  decreased B; B hamstrings 3+/5, B knee ext at least 3/5, unable to maintain against resistance for this therapist, however able to complete for APRN; B ankle DF/PF 4/5  -[facility] Name 05/30/25 1033          Motor Skills    Motor Skills other (see comments)  decrease accuracy for FTN on L, intact finger opposition  -JE       Row Name 05/30/25 1033          Balance    Balance Assessment sitting static balance;sitting dynamic balance;standing static balance  -     Static Sitting Balance contact guard  -     Dynamic Sitting Balance contact guard;verbal cues  -     Position, Sitting Balance supported;unsupported;sitting edge of bed  -     Static Standing Balance moderate assist;maximum assist;2-person assist;verbal cues  -     Position/Device Used, Standing Balance supported;other (see comments)  by 2 staff  -JE       Row Name 05/30/25 1033          Sensory Assessment (Somatosensory)    Sensory Assessment (Somatosensory) other (see comments)  denies N/T; unable to track object w/ eyes w/out moving her head; eyes R more than L appear possibly deviated centrally  -               User Key  (r) = Recorded By, (t) = Taken By, (c) = Cosigned By      Initials Name Provider Type    Selin Capone, PT Physical Therapist                   Goals/Plan       Row Name 05/30/25 1033          Bed Mobility Goal 1 (PT)    Activity/Assistive Device (Bed Mobility Goal 1, PT) rolling to left;rolling to right;scooting;sit to supine/supine to sit;sidelying to sit/sit to sidelying  -     [place] Level/Cues  Needed (Bed Mobility Goal 1, PT) contact guard required  -     Time Frame (Bed Mobility Goal 1, PT) long term goal (LTG);10 days  -JE     Progress/Outcomes (Bed Mobility Goal 1, PT) goal ongoing  -       Row Name 05/30/25 1033          Transfer Goal 1 (PT)    Activity/Assistive Device (Transfer Goal 1, PT) sit-to-stand/stand-to-sit;bed-to-chair/chair-to-bed;other (see comments)  rwx for bed to/from chair  -     [place] Level/Cues Needed (Transfer Goal 1, PT) contact guard required;minimum assist (75% or more patient effort)  -     Time Frame (Transfer Goal 1, PT) long term goal (LTG);10 days  -JE     Progress/Outcome (Transfer Goal 1, PT) goal ongoing  -       Row Name 05/30/25 1033          Gait Training Goal 1 (PT)    Activity/Assistive Device (Gait Training Goal 1, PT) gait (walking locomotion);assistive device use;decrease fall risk;improve balance and speed;increase endurance/gait distance;walker, rolling  -     [place] Level (Gait Training Goal 1, PT) contact guard required;minimum assist (75% or more patient effort)  -     Distance (Gait Training Goal 1, PT) 8-12 ft  -     Time Frame (Gait Training Goal 1, PT) long term goal (LTG);10 days  -     Progress/Outcome (Gait Training Goal 1, PT) goal ongoing  -       Row Name 05/30/25 1033          Problem Specific Goal 1 (PT)    Problem Specific Goal 1 (PT) pt able to actively move all 4 extremities against gravity repetitively 8-10 times demonstrating improved strength throughout  -     Time Frame (Problem Specific Goal 1, PT) long-term goal (LTG);other (see comments)  10 days  -     Progress/Outcome (Problem Specific Goal 1, PT) goal ongoing  -       Row Name 05/30/25 1033          Therapy Assessment/Plan (PT)    Planned Therapy Interventions (PT) balance training;bed mobility training;gait training;home exercise program;motor coordination training;patient/family education;postural re-education;ROM (range of  "motion);strengthening;transfer training;other (see comments)  safety/falls prevention  -               User Key  (r) = Recorded By, (t) = Taken By, (c) = Cosigned By      Initials Name Provider Type    Selin Capone, PT Physical Therapist                   Clinical Impression       Row Name 25 1033          Pain    Pretreatment Pain Rating 0/10 - no pain  -     Posttreatment Pain Rating 0/10 - no pain  -     Pre/Posttreatment Pain Comment denied pain, however when asked to move her R LE, told this therapist she couldn't move her R LE because her R knee hurt  -       Row Name 25 1033          Plan of Care Review    Plan of Care Reviewed With patient;spouse  -     Progress no change  -     Outcome Evaluation PT eval completed.  Pt resting in bed w/ nsg present completing assessment.  Pt w/ flat affect.  Oriented to self, however hesitation w/ last name.  Disoriented to place, time and situation.  Initially told this therapist she was at the  home then told the APRN she was at the laundry mat.  Pt demonstrates generalized weakness, slow to respond, requires encouragement to complete tasks.  Frequently request to hold on and give her a minute.  Pt denies N/T.  Difficulty tracking objects w/ eyes only, tends to turn head also.  Eyes, R more than L appear deviated more medially.  Pt demonstrated difficulty following commands worse when performing tasks w/ the L hand.  Pt w/ decrease strength at L UE compared to R.  When asked to move LEs, frequently would just state \"No\".  When asked why she wouldn't move her R LE, she stated her R knee hurt, but when assisted to move the R LE, no reported c/os pain or outward signs of pain.  Requires mod to max assist of 1 to 2 to scoot up in bed.  Supine to sit w/ min assist, sit to supine w/ CGA.  Sit to stand attempted twice w/ inability to rise from bed on the 1st attempt, max assist of 2 to stand the 2nd attempt.  Flexed posture w/ flexed hips " and knees.  Pt unable to advance her LEs.  In sitting, pt w/ flexed postue requiring CGA.  Pt will benefit from continued PT services to improve strength, balance, activity tolerance, and to progress functional mobility and motor planning.  Discharge planning unclear at this time.  Will follow for progress and needs.  -ROBERTA       Row Name 05/30/25 1033          Therapy Assessment/Plan (PT)    Patient/Family Therapy Goals Statement (PT) to determine why the pt is having these problems and to get it treated for her to return to her previous ability  -     Rehab Potential (PT) fair  -     Criteria for Skilled Interventions Met (PT) yes;meets criteria;skilled treatment is necessary  -     Therapy Frequency (PT) 2 times/day  -     Predicted Duration of Therapy Intervention (PT) until discharge or goals achieved  -       Row Name 05/30/25 1033          Vital Signs    Posttreatment Heart Rate (beats/min) 91  -JE     O2 Delivery Pre Treatment room air  -     O2 Delivery Intra Treatment room air  -     Post SpO2 (%) 99  -     O2 Delivery Post Treatment room air  -     Pre Patient Position Supine  -     Intra Patient Position Standing  -     Post Patient Position Supine  -       Row Name 05/30/25 1033          Positioning and Restraints    Pre-Treatment Position in bed  -     Post Treatment Position bed  -JE     In Bed notified nsg;fowlers;call light within reach;encouraged to call for assist;exit alarm on;with family/caregiver;side rails up x3;legs elevated  -               User Key  (r) = Recorded By, (t) = Taken By, (c) = Cosigned By      Initials Name Provider Type    Selin Capone, PT Physical Therapist                   Outcome Measures       Row Name 05/30/25 1033 05/30/25 0750       How much help from another person do you currently need...    Turning from your back to your side while in flat bed without using bedrails? 3  -JE 3  -KH    Moving from lying on back to sitting on the side  of a flat bed without bedrails? 3  - 3  -KH    Moving to and from a bed to a chair (including a wheelchair)? 1  - 2  -KH    Standing up from a chair using your arms (e.g., wheelchair, bedside chair)? 2  - 2  -KH    Climbing 3-5 steps with a railing? 1  - 2  -KH    To walk in hospital room? 1  -JE 2  -KH    AM-PAC 6 Clicks Score (PT) 11  - 14  -KH    Highest Level of Mobility Goal Move to Chair/Commode-4  - Move to Chair/Commode-4  -KH      Row Name 05/30/25 1033 05/30/25 0932       Functional Assessment    Outcome Measure Options AM-PAC 6 Clicks Basic Mobility (PT)  - AM-PAC 6 Clicks Daily Activity (OT)  -AC (r)  (t) AC (c)              User Key  (r) = Recorded By, (t) = Taken By, (c) = Cosigned By      Initials Name Provider Type    Niels Perez, OTR/L, CNT Occupational Therapist    Selin Capone, PT Physical Therapist    Lynn Ribeiro, RN Registered Nurse    Prakash Fuentes, OT Student OT Student                                 Physical Therapy Education       Title: PT OT SLP Therapies (In Progress)       Topic: Physical Therapy (In Progress)       Point: Mobility training (Done)       Learning Progress Summary            Patient Acceptance, E,TB,D, VU,NR by  at 5/30/2025 1155    Comment: Education re: purpose of PT/importance of activity, safety/falls prevention, improved tech w/ bed mobility, tfers   Significant Other Acceptance, E,TB,D, VU,NR by  at 5/30/2025 1155    Comment: Education re: purpose of PT/importance of activity, safety/falls prevention, improved tech w/ bed mobility, tfers                      Point: Home exercise program (Not Started)       Learner Progress:  Not documented in this visit.              Point: Precautions (Done)       Learning Progress Summary            Patient Acceptance, E,TB,D, VU,NR by  at 5/30/2025 1155    Comment: Education re: purpose of PT/importance of activity, safety/falls prevention, improved tech w/ bed mobility, tfers  "  Significant Other Acceptance, E,TB,D, VU,NR by ROBERTA at 2025 0227    Comment: Education re: purpose of PT/importance of activity, safety/falls prevention, improved tech w/ bed mobility, tfers                                      User Key       Initials Effective Dates Name Provider Type Discipline    ROBERTA 18 -  Selin Leon, PT Physical Therapist PT                  PT Recommendation and Plan  Planned Therapy Interventions (PT): balance training, bed mobility training, gait training, home exercise program, motor coordination training, patient/family education, postural re-education, ROM (range of motion), strengthening, transfer training, other (see comments) (safety/falls prevention)  Progress: no change  Outcome Evaluation: PT eval completed.  Pt resting in bed w/ nsg present completing assessment.  Pt w/ flat affect.  Oriented to self, however hesitation w/ last name.  Disoriented to place, time and situation.  Initially told this therapist she was at the  home then told the APRN she was at the laundry mat.  Pt demonstrates generalized weakness, slow to respond, requires encouragement to complete tasks.  Frequently request to hold on and give her a minute.  Pt denies N/T.  Difficulty tracking objects w/ eyes only, tends to turn head also.  Eyes, R more than L appear deviated more medially.  Pt demonstrated difficulty following commands worse when performing tasks w/ the L hand.  Pt w/ decrease strength at L UE compared to R.  When asked to move LEs, frequently would just state \"No\".  When asked why she wouldn't move her R LE, she stated her R knee hurt, but when assisted to move the R LE, no reported c/os pain or outward signs of pain.  Requires mod to max assist of 1 to 2 to scoot up in bed.  Supine to sit w/ min assist, sit to supine w/ CGA.  Sit to stand attempted twice w/ inability to rise from bed on the 1st attempt, max assist of 2 to stand the 2nd attempt.  Flexed posture w/ flexed hips " and knees.  Pt unable to advance her LEs.  In sitting, pt w/ flexed postue requiring CGA.  Pt will benefit from continued PT services to improve strength, balance, activity tolerance, and to progress functional mobility and motor planning.  Discharge planning unclear at this time.  Will follow for progress and needs.     Time Calculation:         PT Charges       Row Name 05/30/25 1216             Time Calculation    Start Time 1033  -      Stop Time 1146  -      Time Calculation (min) 73 min  -      PT Received On 05/30/25  -      PT Goal Re-Cert Due Date 06/09/25  -                User Key  (r) = Recorded By, (t) = Taken By, (c) = Cosigned By      Initials Name Provider Type    Selin Capone, PT Physical Therapist                  Therapy Charges for Today       Code Description Service Date Service Provider Modifiers Qty    77106524588 HC PT EVAL MOD COMPLEXITY 4 5/30/2025 Selin Leon, PT GP 1    70067689816 HC PT THERAPEUTIC ACT EA 15 MIN 5/30/2025 Selin Leon, PT GP 1            PT G-Codes  Outcome Measure Options: AM-PAC 6 Clicks Basic Mobility (PT)  AM-PAC 6 Clicks Score (PT): 11  AM-PAC 6 Clicks Score (OT): 15  PT Discharge Summary  Anticipated Discharge Disposition (PT): other (see comments) (unclear at this time)    Selin Leon PT  5/30/2025

## 2025-05-30 NOTE — PLAN OF CARE
Goal Outcome Evaluation:      Pt Aox2. Disoriented to situation and time. VSS on RA. Incontinent, but not producing much urine. BS showed little retention. Spouse at bedside part of shift. OT, PT, ST, neurosurgery and nutrition consults carried out today. Turned Q2. Pt required some pills crushed in applesauce. Potassium replaced per protocol. Orthostatic BP unable to be performed d/t pt limitations. Safety maintained.

## 2025-05-30 NOTE — PLAN OF CARE
Goal Outcome Evaluation:  Plan of Care Reviewed With: patient, caregiver (MYRIAM Bailey)        Progress: no change (Initial Evaluation)       Anticipated Discharge Disposition (SLP): unknown          SLP Swallowing Diagnosis: functional oral phase, suspect chronic, pharyngeal dysphagia (05/30/25 0800)             SPEECH-LANGUAGE PATHOLOGY EVALUATION - SWALLOW  Subjective: The patient was seen on this date for a Clinical Swallow evaluation.  Patient was alert and cooperative.  Significant history: Presented due to confusion, abnomal gait. Medical history seizure, GERD, Graves Disease, DM, thyroidectomy 2018.   Objective: Oral motor examination results: WFL.  Textures given during assessment of swallow function included thin liquid and regular consistency.  Assessment: Difficulties were noted with none of the above consistencies.  Observations: Patient reports chronic issues with sensation of material sticking when eating more so with solids that are dry. No overt s/s of aspiration observed.   SLP Findings:  Patient presents with minimal pharyngeal dysphagia, without esophageal component.   Recommendations: Diet Textures: soft to chew whole meats and thin liquids  Medications should be taken whole as tolerated.   Recommended Strategies: upright for PO, small bites and sips, and alternate liquids and solids. Oral care 2x a day.  Other Recommended Evaluations: None    Dysphagia therapy is recommended to ensure patient is tolerating soft whole foods and following for education for compensations for chronic concerns voiced.     Elizabeth Hackett MS CCC-SLP 5/30/2025 09:09 CDT

## 2025-05-30 NOTE — PAYOR COMM NOTE
"Lynn Magana (49 y.o. Female)   [id number]      05/30 New Request , In PT Admit  [facility]   Kerri [phone]   Date of Birth   1975    Social Security Number       Address   [address] [facility] [address] [facility] [address]    Home Phone   [phone]    MRN   [id number]       Latter-day   Nondenominational    Marital Status                               Admission Date   5/29/2025    Admission Type   Emergency    Admitting Provider   Bautista Limon MD    Attending Provider   Bautista Limon MD    Department, Room/Bed   [facility] 3A, 337/1       Discharge Date       Discharge Disposition       Discharge Destination                                 Attending Provider: Bautista Limon MD    Allergies: Oxycodone-acetaminophen    Isolation: None   Infection: None   Code Status: CPR    Ht: 188 cm (74\")   Wt: 67.3 kg (148 lb 4.8 oz)    Admission Cmt: None   Principal Problem: Acquired cerebral ventriculomegaly [G91.1]                   Active Insurance as of 5/29/2025       Primary Coverage       Payor Plan Insurance Group Employer/Plan Group    [employer] [place] [employer] [place]ICA[place]        Payor Plan Address Payor Plan Phone Number Payor Plan Fax Number Effective Dates    [address] [address] [phone]  3/14/2017 - None Entered    [place] [address]         Subscriber Name Subscriber Birth Date Member ID       LYNN MAGANA 1975 [id number]                     Emergency Contacts        (Rel.) Home Phone Work Phone Mobile Phone    Evens Cifuentes (Spouse) [phone] -- [phone]                 History & Physical        Bautista Limon MD at 05/29/25 1618              [facility] Medicine Services  HISTORY AND PHYSICAL    Date of Admission: 5/29/2025  Primary Care Physician: Darryl Andrews DO    Subjective   Primary Historian:  and patient.    Chief Complaint: Confusion, abnormal gait    History of " Present Illness  49-year-old female with history of hypothyroidism, liver steatosis, cannabis use, malnutrition, diabetes mellitus type 2, hypertension, admitted May 11 through May 14, 2025; at that time she was admitted due to poor oral intake and weight loss.  She had an upper endoscopy performed 5/13/2025 with findings of a small hiatal hernia and no other abnormalities reported.  Per reports reviewed the patient was not taking levothyroxine at the time, and her TSH was markedly elevated.  She was restarted on levothyroxine.  Her blood glucose was low, and did not require insulin management.  Drug screening urine was positive for cannabis, and considered to be secondary to cannabinoid hyperemesis syndrome.  The patient left the hospital AGAINST MEDICAL ADVICE, apparently she eloped; after being called, she stated was already home and did not plan to return to the hospital.  On 5/16/2025, the patient returned to the hospital reporting a syncopal event.  There was questionable seizure-like activity.   A CT brain performed on that evaluation showed moderate further increase in size of the lateral and third ventricle since the previous study. The fourth ventricle reported as normal and unchanged. A small obstructing lesion at the level of aqueduct of Sylvius not excluded. Further evaluation with contrast enhanced MR imaging of the brain was suggested. She again left the ER AGAINST MEDICAL ADVICE.  Patient comes to hospital today reporting dizziness, lightheadedness, worsening when standing up, abnormal gait with unsteadiness and  reports confusion.  Symptom has been going on for approximately 1 month.        Review of Systems   Otherwise complete ROS reviewed and negative except as mentioned in the HPI.    Past Medical History:   Past Medical History:   Diagnosis Date    Arthritis     Carotid artery stenosis     Degenerative disc disease, cervical     Depression     Diabetes mellitus     type 1    Disease of  thyroid gland     GERD (gastroesophageal reflux disease)     Graves disease     Heart palpitations     Hyperlipidemia     Hypertension     Hypothyroidism     Seizure     Thyromegaly      Past Surgical History:  Past Surgical History:   Procedure Laterality Date     SECTION      CHOLECYSTECTOMY      COLONOSCOPY  2015    Normal exam Dr. Morgan     COLONOSCOPY  2015    Normal exam    CYST REMOVAL      ENDOSCOPY N/A 2018    Normal exam    ENDOSCOPY N/A 2025    Procedure: ESOPHAGOGASTRODUODENOSCOPY WITH ANESTHESIA;  Surgeon: Jadon Smith MD;  Location: [facility] ENDOSCOPY;  Service: Gastroenterology;  Laterality: N/A;  pre op: Nausea and vomiting  post op: normal  pcp: Darryl Andrews DO    HYSTERECTOMY      THYROIDECTOMY Bilateral 2018    Procedure: total thyroidectomy;  Surgeon: Vitaly Givens MD;  Location: [facility] OR;  Service: ENT     Social History:  reports that she has never smoked. She has never used smokeless tobacco. She reports current alcohol use of about 6.0 standard drinks of alcohol per week. She reports current drug use. Drug: Marijuana.    Family History: family history includes Breast cancer in her maternal aunt and sister; Colon cancer in her paternal uncle; Coronary artery disease in her brother; Diabetes in her brother, father, mother, and sister; Seizures in her sister; Stroke in her father, mother, and sister.   reviewed    Allergies:  Allergies   Allergen Reactions    Oxycodone-Acetaminophen Swelling     Other reaction(s): Throat swelling       Medications:  Prior to Admission medications    Medication Sig Start Date End Date Taking? Authorizing Provider   amLODIPine (NORVASC) 10 MG tablet Take 1 tablet by mouth Daily.    ProviderLuciano MD   ARIPiprazole (ABILIFY) 10 MG tablet Take 1 tablet by mouth Daily.    ProviderLuciano MD   benazepril (LOTENSIN) 20 MG tablet Take 1 tablet by mouth Daily.    ProviderLuciano MD   carvedilol  (COREG) 25 MG tablet Take 1 tablet by mouth 2 (Two) Times a Day With Meals.    Luciano Hayes MD   empagliflozin (JARDIANCE) 25 MG tablet tablet Take 1 tablet by mouth Daily.    Luciano Hayes MD   Evolocumab (REPATHA) solution auto-injector SureClick injection Inject 1 mL under the skin into the appropriate area as directed Every 14 (Fourteen) Days.    Luciano Hayes MD   hydrOXYzine pamoate (VISTARIL) 25 MG capsule Take 1 capsule by mouth 3 (Three) Times a Day As Needed for Anxiety.    Luciano Hayes MD   insulin glargine (LANTUS, SEMGLEE) 100 UNIT/ML injection Inject 75 Units under the skin into the appropriate area as directed Every Night.  Patient taking differently: Inject 30 Units under the skin into the appropriate area as directed Every Night.    Luciano Hayes MD   levothyroxine (SYNTHROID, LEVOTHROID) 175 MCG tablet Take 1 tablet by mouth Every Morning. 5/14/25   Uri Rhodes MD   liothyronine (CYTOMEL) 25 MCG tablet Take 1 tablet by mouth Daily. 5/14/25   Uri Rhodes MD   Melatonin 10 MG tablet Take 1 tablet by mouth At Night As Needed (sleep).    Luciano Hayes MD   metFORMIN ER (GLUCOPHAGE-XR) 500 MG 24 hr tablet Take 2 tablets by mouth Daily With Breakfast.    Luciano Hayes MD   mirtazapine (REMERON) 15 MG tablet Take 1 tablet by mouth Every Night.    Luciano Hayes MD   omeprazole (priLOSEC) 20 MG capsule Take 1 capsule by mouth Daily.    Luciano Hayes MD   ondansetron ODT (ZOFRAN-ODT) 4 MG disintegrating tablet Take 1 tablet by mouth Every 6 (Six) Hours As Needed for Nausea or Vomiting. 2/16/25   Donna APRN   rosuvastatin (CRESTOR) 20 MG tablet Take 1 tablet by mouth Every Night.    Luciano Hayes MD   spironolactone (ALDACTONE) 50 MG tablet Take 1 tablet by mouth 2 (Two) Times a Day.    Luciano Hayes MD   vitamin D (ERGOCALCIFEROL) 1.25 MG (40211 UT) capsule capsule Take 1 capsule by mouth  "1 (One) Time Per Week.    Provider, MD Luciano     I have utilized all available immediate resources to obtain, update, or review the patient's current medications (including all prescriptions, over-the-counter products, herbals, cannabis/cannabidiol products, and vitamin/mineral/dietary (nutritional) supplements).    Objective     Vital Signs: BP (!) 154/110   Pulse 118   Temp 97.6 °F (36.4 °C)   Resp 15   Ht 188 cm (74\")   Wt 66 kg (145 lb 9.6 oz)   SpO2 100%   BMI 18.69 kg/m²   Physical Exam   Constitutional:       Appearance: Alert, oriented to person and place.  No respiratory distress.  HENT:      Head: Normocephalic and atraumatic.      Nose: Nose normal.      Mouth/Throat:      Mouth: Mucous membranes are moist.   Eyes:      Extraocular Movements: Not able to evaluate due to dizziness on exam     Conjunctiva/sclera: Conjunctivae normal.      Pupils: Pupils are equal, round  Cardiovascular:      Rate and Rhythm: Normal rate and regular rhythm.      Pulses: Normal pulses.   Pulmonary:      Effort: No respiratory distress.      Breath sounds: Normal breath sounds. No wheezing, rhonchi or rales.   Abdominal:      General: Abdomen is flat. Bowel sounds are normal.      Palpations: Abdomen is soft.      Tenderness: There is no guarding or rebound.   Extremities:  No lower extremity edema.  Skin:     Capillary Refill: Capillary refill takes less than 2 seconds.      Coloration: Skin is not jaundiced.      Findings: No rash.   Neurological:   Alert and oriented.  Normal speech.  Follows commands.  Symmetric strength 4 out of 5 in upper and lower extremities.  Gait was not evaluated.  No gross visual defects.          Results Reviewed:  Lab Results (last 24 hours)       Procedure Component Value Units Date/Time    Blood Culture - Blood, Arm, Left [[id number]] Collected: 05/29/25 1543    Specimen: Blood from Arm, Left Updated: 05/29/25 0259    Urinalysis With Microscopic If Indicated (No Culture) - Straight " Cath [[id number]]  (Abnormal) Collected: 05/29/25 1301    Specimen: Urine from Straight Cath Updated: 05/29/25 1343     Color, UA Trimble     Appearance, UA Clear     pH, UA 6.0     Specific Gravity, UA >1.030     Glucose,  mg/dL (2+)     Ketones, UA 15 mg/dL (1+)     Bilirubin, UA Large (3+)     Blood, UA Negative     Protein, UA 30 mg/dL (1+)     Leuk Esterase, UA Small (1+)     Nitrite, UA Positive     Urobilinogen, UA 1.0 E.U./dL    Narrative:      Dipstick results may be inaccurate due to color interference.    Comprehensive Metabolic Panel [[id number]]  (Abnormal) Collected: 05/29/25 1247    Specimen: Blood Updated: 05/29/25 1327     Glucose 144 mg/dL      BUN 29.0 mg/dL      Creatinine 0.98 mg/dL      Sodium 142 mmol/L      Potassium 3.3 mmol/L      Comment: Slight hemolysis detected by analyzer. Result may be falsely elevated.        Chloride 103 mmol/L      CO2 16.0 mmol/L      Calcium 10.2 mg/dL      Total Protein 8.0 g/dL      Albumin 4.0 g/dL      ALT (SGPT) 88 U/L      AST (SGOT) 58 U/L      Alkaline Phosphatase 50 U/L      Total Bilirubin 1.1 mg/dL      Globulin 4.0 gm/dL      A/G Ratio 1.0 g/dL      BUN/Creatinine Ratio 29.6     Anion Gap 23.0 mmol/L      eGFR 70.5 mL/min/1.73     Narrative:      GFR Categories in Chronic Kidney Disease (CKD)              GFR Category          GFR (mL/min/1.73)    Interpretation  G1                    90 or greater        Normal or high (1)  G2                    60-89                Mild decrease (1)  G3a                   45-59                Mild to moderate decrease  G3b                   30-44                Moderate to severe decrease  G4                    15-29                Severe decrease  G5                    14 or less           Kidney failure    (1)In the absence of evidence of kidney disease, neither GFR category G1 or G2 fulfill the criteria for CKD.    eGFR calculation 2021 CKD-EPI creatinine equation, which does not include race as a factor     TSH [[id number]]  (Abnormal) Collected: 05/29/25 1247    Specimen: Blood Updated: 05/29/25 1327     TSH 0.050 uIU/mL     Magnesium [[id number]]  (Normal) Collected: 05/29/25 1247    Specimen: Blood Updated: 05/29/25 1327     Magnesium 2.4 mg/dL     Ethanol [[id number]] Collected: 05/29/25 1247    Specimen: Blood Updated: 05/29/25 1327     Ethanol % <0.010 %     Narrative:      Not for legal purposes. Chain of Custody not followed.     Urinalysis, Microscopic Only - Straight Cath [[id number]]  (Abnormal) Collected: 05/29/25 1301    Specimen: Urine from Straight Cath Updated: 05/29/25 1326     RBC, UA None Seen /HPF      WBC, UA 3-5 /HPF      Bacteria, UA Trace /HPF      Squamous Epithelial Cells, UA 3-6 /HPF      Hyaline Casts, UA None Seen /LPF      Methodology Manual Light Microscopy    Fentanyl, Urine - Straight Cath [[id number]]  (Normal) Collected: 05/29/25 1301    Specimen: Urine from Straight Cath Updated: 05/29/25 1322     Fentanyl, Urine Negative    Narrative:      Negative Threshold:      Fentanyl 5 ng/mL     The normal value for the drug tested is negative. This report includes final unconfirmed screening results to be used for medical treatment purposes only. Unconfirmed results must not be used for non-medical purposes such as employment or legal testing. Clinical consideration should be applied to any drug of abuse test, particularly when unconfirmed results are used.           Ammonia [[id number]]  (Normal) Collected: 05/29/25 1247    Specimen: Blood Updated: 05/29/25 1320     Ammonia 36 umol/L     Urine Drug Screen - Straight Cath [[id number]]  (Abnormal) Collected: 05/29/25 1301    Specimen: Urine from Straight Cath Updated: 05/29/25 1319     THC, Screen, Urine Positive     Phencyclidine (PCP), Urine Negative     Cocaine Screen, Urine Negative     Methamphetamine, Ur Negative     Opiate Screen Negative     Amphetamine Screen, Urine Negative     Benzodiazepine Screen, Urine Negative     Tricyclic  Antidepressants Screen Negative     Methadone Screen, Urine Negative     Barbiturates Screen, Urine Negative     Oxycodone Screen, Urine Negative     Buprenorphine, Screen, Urine Negative    Narrative:      Cutoff For Drugs Screened:    Amphetamines               500 ng/ml  Barbiturates               200 ng/ml  Benzodiazepines            150 ng/ml  Cocaine                    150 ng/ml  Methadone                  200 ng/ml  Opiates                    100 ng/ml  Phencyclidine               25 ng/ml  THC                         50 ng/ml  Methamphetamine            500 ng/ml  Tricyclic Antidepressants  300 ng/ml  Oxycodone                  100 ng/ml  Buprenorphine               10 ng/ml    The normal value for all drugs tested is negative. This report includes unconfirmed screening results, with the cutoff values listed, to be used for medical treatment purposes only.  Unconfirmed results must not be used for non-medical purposes such as employment or legal testing.  Clinical consideration should be applied to any drug of abuse test, particularly when unconfirmed results are used.      Protime-INR [[id number]]  (Normal) Collected: 05/29/25 1247    Specimen: Blood Updated: 05/29/25 1309     Protime 13.3 Seconds      INR 0.97    CBC & Differential [[id number]]  (Abnormal) Collected: 05/29/25 1247    Specimen: Blood Updated: 05/29/25 1301    Narrative:      The following orders were created for panel order CBC & Differential.  Procedure                               Abnormality         Status                     ---------                               -----------         ------                     CBC Auto Differential[[id number]]        Abnormal            Final result                 Please view results for these tests on the individual orders.    CBC Auto Differential [[id number]]  (Abnormal) Collected: 05/29/25 1247    Specimen: Blood Updated: 05/29/25 1301     WBC 8.72 10*3/mm3      RBC 4.45 10*6/mm3      Hemoglobin 13.0  g/dL      Hematocrit 41.1 %      MCV 92.4 fL      MCH 29.2 pg      MCHC 31.6 g/dL      RDW 13.6 %      RDW-SD 46.4 fl      MPV 10.8 fL      Platelets 200 10*3/mm3      Neutrophil % 81.5 %      Lymphocyte % 12.2 %      Monocyte % 4.1 %      Eosinophil % 0.2 %      Basophil % 0.5 %      Immature Grans % 1.5 %      Neutrophils, Absolute 7.11 10*3/mm3      Lymphocytes, Absolute 1.06 10*3/mm3      Monocytes, Absolute 0.36 10*3/mm3      Eosinophils, Absolute 0.02 10*3/mm3      Basophils, Absolute 0.04 10*3/mm3      Immature Grans, Absolute 0.13 10*3/mm3      nRBC 0.2 /100 WBC     POC Glucose Once [[id number]]  (Abnormal) Collected: 05/29/25 1138    Specimen: Blood Updated: 05/29/25 1150     Glucose 140 mg/dL      Comment: : [id number] [facility] Services[facility] ID: [id number]             Imaging Results (Last 24 Hours)       Procedure Component Value Units Date/Time    XR Chest 1 View [[id number]] Collected: 05/29/25 1356     Updated: 05/29/25 1403    Narrative:      EXAMINATION: XR CHEST 1 VW-  5/29/2025 1:56 PM 1 view     HISTORY: shortness of breath     COMPARISON: 4/18/2025     TECHNIQUE: A single frontal view of the chest was obtained.     FINDINGS:  The lungs are clear. The cardiomediastinal silhouette and pulmonary  vascularity are within normal limits. The osseous structures and  surrounding soft tissues demonstrate no acute abnormality.       Impression:         1.  No acute cardiopulmonary process.           This report was signed and finalized on 5/29/2025 1:59 PM by Dr. Ori Woods MD.             I have personally reviewed and interpreted the radiology studies and ECG obtained at time of admission.     Assessment / Plan   Assessment:   Active Hospital Problems    Diagnosis     **Acquired cerebral ventriculomegaly     Generalized weakness        Ventriculomegaly, rule out obstructive hydrocephalus  Secondary syncope??  Hypothyroidism  Sinus tachycardia  Hypertension, not controlled  Psychiatric disorder,  NOS  Hepatic steatosis  Chronic gastritis      Sodium 142, potassium 3.3.  Creatinine 2.98.  Glucose 144.  AST 50, ALT 88.  TSH 0.05  White blood cell count 8700 hemoglobin 13.      CT brain 5/16/25  1. Moderate further increase in size of the lateral and third ventricle  since the previous study. The fourth ventricle is a normal and  unchanged. A small obstructing lesion at the level of aqueduct of  Sylvius may not be excluded. Further evaluation with contrast enhanced  MR imaging of the brain may be obtained.  2. No visible acute intracranial abnormality. No mass. No intracranial  hemorrhage or hematoma.  3. Chronic ischemic and atrophic changes.    Treatment Plan  The patient will be admitted to my service here at [facility].     Fall precautions.  MRI of the brain with and without contrast as recommended per last imaging performed 5/16/2025.  Once report reviewed, will consider neurosurgery evaluation.      Free T4, T3 levels    Restart antihypertensive medications, amlodipine, lisinopril, and carvedilol.  Orthostatic vital signs.    Replace potassium.    PT OT evaluation.        Medical Decision Making  Number and Complexity of problems: 8, moderate to high    Differential Diagnosis: see above    Conditions and Status        Condition is unchanged.     [facility] Data  External documents reviewed: no  Cardiac tracing (EKG, telemetry) interpretation: sinus tachycardia  Radiology interpretation: radiology report reviewed  Labs reviewed: yes  Any tests that were considered but not ordered: no     Decision rules/scores evaluated (example [id number], [place], etc): none     Discussed with: patient and      Care Planning  Shared decision making: patient  Code status and discussions: FC    Disposition  Social Determinants of Health that impact treatment or disposition: none  I expect the patient to be discharged to SNF once approved.  Electronically signed by Bautista Limon MD, 05/29/25, 16:49  CDT.               Electronically signed by Bautista Limon MD at 05/29/25 1653          Emergency Department Notes        Zaid Joy RN at 05/29/25 1813          2 attempts to contact 3A for patient reports. No answer.     Electronically signed by Zaid Joy RN at 05/29/25 1814       Zaid Joy RN at 05/29/25 1600          Lab only able to collect one set of blood cultures. Safia Wood APRN notified.     Electronically signed by Zaid Joy RN at 05/29/25 1629       Zaid Joy RN at 05/29/25 1428          Phlebotomy contacted for collection of blood cultures.     Electronically signed by Zaid Joy RN at 05/29/25 1429       Zaid Joy RN at 05/29/25 1231          Patient assisted to bedpan for clean catch urine specimen collection. Patient unable to urinate at this time. Patient agreed for in and out cath urine collection. Phlebotomy at bedside, then will collect urine.     Electronically signed by Zaid Joy RN at 05/29/25 1232       Zaid Joy RN at 05/29/25 1228          Nati from lab contacted stating that she was on the way to stick patient.     Electronically signed by Zaid Joy RN at 05/29/25 1229       Safia Wood APRN at 05/29/25 1219       Attestation signed by Basim Henry Jr., MD at 05/29/25 1701          SUPERVISE: For this patient encounter, I reviewed the APC's documentation, treatment plan, and medical decision making.  Basim Henry Jr, MD 5/29/2025 17:01 CDT                             Subjective   History of Present Illness  Patient is a 49-year-old female who presents to the ER via EMS due to generalized weakness.  She was evaluated early this morning with what was described as seizure-like activity.  Glucose was 48.  She had a CT of the head and abdomen and pelvis.    Ct of head:  IMPRESSION:  1. Moderate further increase in size of the lateral and third ventricle  since the previous study. The fourth  ventricle is a normal and  unchanged. A small obstructing lesion at the level of aqueduct of  Sylvius may not be excluded. Further evaluation with contrast enhanced  MR imaging of the brain may be obtained.  2. No visible acute intracranial abnormality. No mass. No intracranial  hemorrhage or hematoma.  3. Chronic ischemic and atrophic changes.    Ct abd/pelvis:  Mild fluid-filled dilated small bowel loops with air-fluid levels and   the normal distal ileum. This may represent an ileus, acute nonspecific   enteritis or involving small bowel obstruction. Further follow-up may be   obtained.   2. Appendix is normal. The gallbladder is surgically absent.   3. Fatty infiltration of the liver.   4. Stable adrenal nodules.     Patient was hypoglycemic and hypokalemic.  Per review of note this was replaced.  CT finding of the head appeared to be a chronic finding and recommended follow-up with neurosurgery.  Patient wanted discharge and left before discharge papers given.  See ER note for details.    Per review patient had a recent admission May 11, 2025 to May 14, 2025 for dehydration, hypothyroid, hypokalemia, steatosis of liver, marijuana abuse, malnutrition, nausea vomiting, type 2 diabetes.  She had an EGD on May 13 which showed a small hiatal hernia and normal duodenum as well the stomach.  No specimens were obtained.  CT of the abdomen pelvis showed bladder wall thickening, acute cystitis, no inflammatory process.  The note mentioned that patient had a decreased p.o. intake and question depression.  Family members stated there was a recent loss in the family and she may be experiencing decreased p.o. intake due to depression.  Patient also is a regular marijuana user and it was felt that the nausea/vomiting may be contributed from marijuana use.  Patient had told nursing staff she was going to step outside and return and apparently had removed her IV and eloped from the hospital.  See admission note for details.     Additionally she had another recent admission at [facility] with similar complaints of ongoing abdominal pain and nausea with vomiting.  She had an EGD performed by Dr. Mcallister at [facility] with biopsies obtained.  Findings were consistent with gastritis and H. pylori specimens were obtained.    Patient returns seemingly groggy and states that she is just weak all over.  She is tachycardic on exam.  She had followed up with her PCP this morning after being evaluated in the ER late last night/early this morning and it was recommended for her to return to the ER.  Past medical history significant for carotid artery stenosis, arthritis, degenerative disc disease, depression, diabetes, thyroid disease, GERD, Graves' disease, heart palpitations, hyperlipidemia, hypertension, hypothyroidism, seizure, thyroid megaly        Review of Systems   HENT: Negative.     Eyes: Negative.    Respiratory: Negative.     Cardiovascular: Negative.    Gastrointestinal: Negative.    Endocrine: Negative.    Genitourinary: Negative.    Musculoskeletal: Negative.    Skin: Negative.    Allergic/Immunologic: Negative.    Neurological:  Positive for weakness.   Hematological: Negative.    Psychiatric/Behavioral: Negative.     All other systems reviewed and are negative.      Past Medical History:   Diagnosis Date    Arthritis     Carotid artery stenosis     Degenerative disc disease, cervical     Depression     Diabetes mellitus     type 1    Disease of thyroid gland     GERD (gastroesophageal reflux disease)     Graves disease     Heart palpitations     Hyperlipidemia     Hypertension     Hypothyroidism     Seizure     Thyromegaly        Allergies   Allergen Reactions    Oxycodone-Acetaminophen Swelling     Other reaction(s): Throat swelling       Past Surgical History:   Procedure Laterality Date     SECTION      CHOLECYSTECTOMY      COLONOSCOPY  2015    Normal exam Dr. Morgan     COLONOSCOPY  2015    Normal  exam    CYST REMOVAL      ENDOSCOPY N/A 4/17/2018    Normal exam    ENDOSCOPY N/A 5/13/2025    Procedure: ESOPHAGOGASTRODUODENOSCOPY WITH ANESTHESIA;  Surgeon: Jadon Smith MD;  Location: [facility];  Service: Gastroenterology;  Laterality: N/A;  pre op: Nausea and vomiting  post op: normal  pcp: Darryl Andrews,     HYSTERECTOMY      THYROIDECTOMY Bilateral 11/19/2018    Procedure: total thyroidectomy;  Surgeon: Vitaly Givesn MD;  Location: [facility] OR;  Service: ENT       Family History   Problem Relation Age of Onset    Stroke Mother     Diabetes Mother     Stroke Father     Diabetes Father     Breast cancer Sister     Diabetes Sister     Stroke Sister     Seizures Sister     Coronary artery disease Brother     Diabetes Brother     Breast cancer Maternal Aunt     Colon cancer Paternal Uncle     Colon polyps Neg Hx        Social History     Socioeconomic History    Marital status:      Spouse name: Evens    Number of children: 1    Years of education: 12   Tobacco Use    Smoking status: Never    Smokeless tobacco: Never   Vaping Use    Vaping status: Never Used   Substance and Sexual Activity    Alcohol use: Yes     Alcohol/week: 6.0 standard drinks of alcohol     Types: 6 Cans of beer per week    Drug use: Yes     Types: Marijuana    Sexual activity: Defer     Partners: Male           Objective   Physical Exam  Vitals and nursing note reviewed.   Constitutional:       Appearance: She is well-developed.   HENT:      Head: Normocephalic and atraumatic.      Nose: Nose normal.   Eyes:      Conjunctiva/sclera: Conjunctivae normal.      Pupils: Pupils are equal, round, and reactive to light.   Cardiovascular:      Rate and Rhythm: Regular rhythm. Tachycardia present.      Heart sounds: Normal heart sounds.   Pulmonary:      Effort: Pulmonary effort is normal.      Breath sounds: Normal breath sounds.   Abdominal:      General: Bowel sounds are normal.      Palpations: Abdomen is soft.    Musculoskeletal:         General: Normal range of motion.      Cervical back: Normal range of motion and neck supple.   Skin:     General: Skin is warm and dry.   Neurological:      Mental Status: She is alert and oriented to person, place, and time.   Psychiatric:      Comments: Patient appears lethargic and groggy on exam, answers questions appropriately however slow to answer, no focal deficit identified         Procedures          ED Course                                Total (NIH Stroke Scale): 2                      Medical Decision Making  Patient is a 49-year-old female who presents to the ER via EMS due to generalized weakness.  She was evaluated early this morning with what was described as seizure-like activity.  Glucose was 48.  She had a CT of the head and abdomen and pelvis.    Ct of head:  IMPRESSION:  1. Moderate further increase in size of the lateral and third ventricle  since the previous study. The fourth ventricle is a normal and  unchanged. A small obstructing lesion at the level of aqueduct of  Sylvius may not be excluded. Further evaluation with contrast enhanced  MR imaging of the brain may be obtained.  2. No visible acute intracranial abnormality. No mass. No intracranial  hemorrhage or hematoma.  3. Chronic ischemic and atrophic changes.    Ct abd/pelvis:  Mild fluid-filled dilated small bowel loops with air-fluid levels and   the normal distal ileum. This may represent an ileus, acute nonspecific   enteritis or involving small bowel obstruction. Further follow-up may be   obtained.   2. Appendix is normal. The gallbladder is surgically absent.   3. Fatty infiltration of the liver.   4. Stable adrenal nodules.     Patient was hypoglycemic and hypokalemic.  Per review of note this was replaced.  CT finding of the head appeared to be a chronic finding and recommended follow-up with neurosurgery.  Patient wanted discharge and left before discharge papers given.  See ER note for  details.    Per review patient had a recent admission May 11, 2025 to May 14, 2025 for dehydration, hypothyroid, hypokalemia, steatosis of liver, marijuana abuse, malnutrition, nausea vomiting, type 2 diabetes.  She had an EGD on May 13 which showed a small hiatal hernia and normal duodenum as well the stomach.  No specimens were obtained.  CT of the abdomen pelvis showed bladder wall thickening, acute cystitis, no inflammatory process.  The note mentioned that patient had a decreased p.o. intake and question depression.  Family members stated there was a recent loss in the family and she may be experiencing decreased p.o. intake due to depression.  Patient also is a regular marijuana user and it was felt that the nausea/vomiting may be contributed from marijuana use.  Patient had told nursing staff she was going to step outside and return and apparently had removed her IV and eloped from the hospital.  See admission note for details.    Additionally she had another recent admission at [facility] with similar complaints of ongoing abdominal pain and nausea with vomiting.  She had an EGD performed by Dr. Mcallister at [facility] with biopsies obtained.  Findings were consistent with gastritis and H. pylori specimens were obtained.    Patient returns seemingly groggy and states that she is just weak all over.  She is tachycardic on exam.  She had followed up with her PCP this morning after being evaluated in the ER late last night/early this morning and it was recommended for her to return to the ER.  Past medical history significant for carotid artery stenosis, arthritis, degenerative disc disease, depression, diabetes, thyroid disease, GERD, Graves' disease, heart palpitations, hyperlipidemia, hypertension, hypothyroidism, seizure, thyroid megaly    Differential diagnosis includes but not limited to hypoglycemia, positive drug screen, electrolyte abnormality, and other etiologies    Problems  Addressed:  Generalized weakness: complicated acute illness or injury  Low TSH level: complicated acute illness or injury  Marijuana user: complicated acute illness or injury  Nausea and vomiting, unspecified vomiting type: complicated acute illness or injury  Urinary tract infection without hematuria, site unspecified: complicated acute illness or injury    Amount and/or Complexity of Data Reviewed  Labs: ordered.  Radiology: ordered.  ECG/medicine tests: ordered.    Risk  Prescription drug management.  Decision regarding hospitalization.      Labs Reviewed   COMPREHENSIVE METABOLIC PANEL - Abnormal; Notable for the following components:       Result Value    Glucose 144 (*)     BUN 29.0 (*)     Potassium 3.3 (*)     CO2 16.0 (*)     ALT (SGPT) 88 (*)     AST (SGOT) 58 (*)     BUN/Creatinine Ratio 29.6 (*)     Anion Gap 23.0 (*)     All other components within normal limits    Narrative:     GFR Categories in Chronic Kidney Disease (CKD)              GFR Category          GFR (mL/min/1.73)    Interpretation  G1                    90 or greater        Normal or high (1)  G2                    60-89                Mild decrease (1)  G3a                   45-59                Mild to moderate decrease  G3b                   30-44                Moderate to severe decrease  G4                    15-29                Severe decrease  G5                    14 or less           Kidney failure    (1)In the absence of evidence of kidney disease, neither GFR category G1 or G2 fulfill the criteria for CKD.    eGFR calculation 2021 CKD-EPI creatinine equation, which does not include race as a factor   TSH - Abnormal; Notable for the following components:    TSH 0.050 (*)     All other components within normal limits   CBC WITH AUTO DIFFERENTIAL - Abnormal; Notable for the following components:    Neutrophil % 81.5 (*)     Lymphocyte % 12.2 (*)     Monocyte % 4.1 (*)     Eosinophil % 0.2 (*)     Immature Grans % 1.5 (*)      Neutrophils, Absolute 7.11 (*)     Immature Grans, Absolute 0.13 (*)     All other components within normal limits   URINE DRUG SCREEN - Abnormal; Notable for the following components:    THC, Screen, Urine Positive (*)     All other components within normal limits    Narrative:     Cutoff For Drugs Screened:    Amphetamines               500 ng/ml  Barbiturates               200 ng/ml  Benzodiazepines            150 ng/ml  Cocaine                    150 ng/ml  Methadone                  200 ng/ml  Opiates                    100 ng/ml  Phencyclidine               25 ng/ml  THC                         50 ng/ml  Methamphetamine            500 ng/ml  Tricyclic Antidepressants  300 ng/ml  Oxycodone                  100 ng/ml  Buprenorphine               10 ng/ml    The normal value for all drugs tested is negative. This report includes unconfirmed screening results, with the cutoff values listed, to be used for medical treatment purposes only.  Unconfirmed results must not be used for non-medical purposes such as employment or legal testing.  Clinical consideration should be applied to any drug of abuse test, particularly when unconfirmed results are used.     URINALYSIS W/ MICROSCOPIC IF INDICATED (NO CULTURE) - Abnormal; Notable for the following components:    Color, UA Orange (*)     Specific Gravity, UA >1.030 (*)     Glucose,  mg/dL (2+) (*)     Ketones, UA 15 mg/dL (1+) (*)     Bilirubin, UA Large (3+) (*)     Protein, UA 30 mg/dL (1+) (*)     Leuk Esterase, UA Small (1+) (*)     Nitrite, UA Positive (*)     All other components within normal limits    Narrative:     Dipstick results may be inaccurate due to color interference.   URINALYSIS, MICROSCOPIC ONLY - Abnormal; Notable for the following components:    WBC, UA 3-5 (*)     Bacteria, UA Trace (*)     Squamous Epithelial Cells, UA 3-6 (*)     All other components within normal limits   POCT GLUCOSE FINGERSTICK - Abnormal; Notable for the following  components:    Glucose 140 (*)     All other components within normal limits   PROTIME-INR - Normal   MAGNESIUM - Normal   AMMONIA - Normal   FENTANYL, URINE - Normal    Narrative:     Negative Threshold:      Fentanyl 5 ng/mL     The normal value for the drug tested is negative. This report includes final unconfirmed screening results to be used for medical treatment purposes only. Unconfirmed results must not be used for non-medical purposes such as employment or legal testing. Clinical consideration should be applied to any drug of abuse test, particularly when unconfirmed results are used.          BLOOD CULTURE   BLOOD CULTURE   ETHANOL    Narrative:     Not for legal purposes. Chain of Custody not followed.    POCT GLUCOSE FINGERSTICK   CBC AND DIFFERENTIAL    Narrative:     The following orders were created for panel order CBC & Differential.  Procedure                               Abnormality         Status                     ---------                               -----------         ------                     CBC Auto Differential[[id number]]        Abnormal            Final result                 Please view results for these tests on the individual orders.      XR Chest 1 View   Final Result       1.  No acute cardiopulmonary process.               This report was signed and finalized on 5/29/2025 1:59 PM by Dr. Ori Woods MD.          previous CT scan findings performed earlier this morning described above.  Patient presented with generalized weakness and tachycardia.  She has a low tsh and UTI -she was given IV Rocephin and IV fluids in the ER.  She is positive for marijuana.  Plan is to admit to the hospitalist for complaints of nausea, vomiting, and has a UTI.  Additionally she had an abnormal CT scan of the head performed earlier today.  Uncertain if there is any relation with the symptoms.  She may need MR performed while admitted. Discussed with hospitalist who is agreeable for  admission.    Final diagnoses:   Generalized weakness   Nausea and vomiting, unspecified vomiting type   Marijuana user   Low TSH level   Urinary tract infection without hematuria, site unspecified       ED Disposition  ED Disposition       ED Disposition   Decision to Admit    Condition   --    Comment   --               No follow-up provider specified.       Medication List        PAUSE taking these medications      amLODIPine 10 MG tablet  Wait to take this until your doctor or other care provider tells you to start again.  Commonly known as: NORVASC     carvedilol 25 MG tablet  Wait to take this until your doctor or other care provider tells you to start again.  Commonly known as: COREG     empagliflozin 25 MG tablet tablet  Wait to take this until your doctor or other care provider tells you to start again.  Commonly known as: JARDIANCE     insulin glargine 100 UNIT/ML injection  Wait to take this until your doctor or other care provider tells you to start again.  Commonly known as: LANTUS, SEMGLEE     metFORMIN  MG 24 hr tablet  Wait to take this until your doctor or other care provider tells you to start again.  Commonly known as: GLUCOPHAGE-XR     spironolactone 50 MG tablet  Wait to take this until your doctor or other care provider tells you to start again.  Commonly known as: ALDACTONE                 Safia Wood, APRN  05/29/25 1627      Electronically signed by Basim Henry Jr., MD at 05/29/25 1701       Zaid Joy, RN at 05/29/25 1145          Phlebotomy contacted in regards to patient lab specimen collection. Spoke with Lola. States that it will be at least 30 minute before they can collect lab specimens.     Electronically signed by Zaid Joy RN at 05/29/25 1147       Zaid Joy RN at 05/29/25 1122          Two attempt to collect blood specimen with iv insertion. No success. IV insertion to left hand unable to collect blood specimen. Charge nurse Ngozi  notified with request to come and attempt blood specimen collection.     Electronically signed by aZid Joy RN at 05/29/25 1144       Vital Signs (last day)       Date/Time Temp Temp src Pulse Resp BP Patient Position SpO2    05/30/25 1150 96.2 (35.7) Axillary 101 16 91/72 Lying 99    05/30/25 0800 97.6 (36.4) Oral 98 16 133/90 Lying 100    05/30/25 0353 97.6 (36.4) Axillary 118 16 148/94 Lying 100    05/29/25 2245 98.4 (36.9) Oral 111 16 146/101 Lying 99    05/29/25 1930 98.1 (36.7) Oral 125 16 144/107 Lying 100    05/29/25 1908 98.1 (36.7) -- 114 15 148/105 -- --    05/29/25 1731 -- -- -- -- 116/99 -- --    05/29/25 1707 -- -- 118 -- -- -- --    05/29/25 17:06:35 -- -- 117 16 157/107 -- 100    05/29/25 1522 -- -- 118 -- -- -- 100    05/29/25 1516 -- -- -- -- 154/110 -- --    05/29/25 1306 -- -- 98 -- 145/117 -- 100    05/29/25 1052 97.6 (36.4) -- 124 15 120/102 -- 100          Current Facility-Administered Medications   Medication Dose Route Frequency Provider Last Rate Last Admin    [START ON 5/31/2025] amLODIPine (NORVASC) tablet 5 mg  5 mg Oral Daily Bautista Limon MD        ARIPiprazole (ABILIFY) tablet 10 mg  10 mg Oral Daily Bautista Limon MD   10 mg at 05/30/25 1039    sennosides-docusate (PERICOLACE) 8.6-50 MG per tablet 2 tablet  2 tablet Oral BID PRN Bautista Limon MD        And    polyethylene glycol (MIRALAX) packet 17 g  17 g Oral Daily PRN Bautista Limon MD        And    bisacodyl (DULCOLAX) EC tablet 5 mg  5 mg Oral Daily PRN Bautista Limon MD        And    bisacodyl (DULCOLAX) suppository 10 mg  10 mg Rectal Daily PRN Bautista Limon MD        Calcium Replacement - Follow Nurse / BPA Driven Protocol   Not Applicable PRN Bautista Limon MD        carvedilol (COREG) tablet 25 mg  25 mg Oral BID With Meals Bautista Limon MD   25 mg at 05/30/25 1039    hydrALAZINE (APRESOLINE) injection 10 mg  10 mg Intravenous Q6H PRN Bautista Limon MD   10 mg at 05/29/25 7505     [START ON 5/31/2025] levothyroxine (SYNTHROID, LEVOTHROID) tablet 150 mcg  150 mcg Oral Q AM Bautista Limon MD        [START ON 5/31/2025] lisinopril (PRINIVIL,ZESTRIL) tablet 10 mg  10 mg Oral Q24H Bautista Limon MD        Magnesium Low Dose Replacement - Follow Nurse / BPA Driven Protocol   Not Applicable PRN Bautista Limon MD        melatonin tablet 10 mg  10 mg Oral Nightly PRN Bautista Limon MD        ondansetron ODT (ZOFRAN-ODT) disintegrating tablet 4 mg  4 mg Oral Q6H PRN Bautista Limon MD        Or    ondansetron (ZOFRAN) injection 4 mg  4 mg Intravenous Q6H PRN Bautista Limon MD        pantoprazole (PROTONIX) EC tablet 40 mg  40 mg Oral Q AM Bautista Limon MD   40 mg at 05/30/25 0519    Phosphorus Replacement - Follow Nurse / BPA Driven Protocol   Not Applicable PRN Bautista Limon MD        potassium chloride (KLOR-CON M20) CR tablet 40 mEq  40 mEq Oral Once Bautista Limon MD        Potassium Replacement - Follow Nurse / BPA Driven Protocol   Not Applicable PRN Bautista Limon MD        sodium chloride 0.9 % flush 10 mL  10 mL Intravenous PRN Safia Wood APRN        [START ON 5/31/2025] spironolactone (ALDACTONE) tablet 50 mg  50 mg Oral Daily Bautista Limon MD            Physician Progress Notes (last 24 hours)        Bautista Limon MD at 05/30/25 0933              [facility] Medicine Services  INPATIENT PROGRESS NOTE    Patient Name: Lynn Magana  Date of Admission: 5/29/2025  Today's Date: 05/30/25  Length of Stay: 1  Primary Care Physician: Darryl Andrews DO    Subjective   Chief Complaint: Lightheadedness.  HPI   49-year-old female with history of hypothyroidism, liver steatosis, cannabis use, malnutrition, diabetes mellitus type 2, hypertension, admitted May 11 through May 14, 2025; at that time she was admitted due to poor oral intake and weight loss.  She had an upper endoscopy performed  5/13/2025 with findings of a small hiatal hernia and no other abnormalities reported.  Per reports reviewed the patient was not taking levothyroxine at the time, and her TSH was markedly elevated.  She was restarted on levothyroxine.  Her blood glucose was low, and did not require insulin management.  Drug screening urine was positive for cannabis, and considered to be secondary to cannabinoid hyperemesis syndrome.  The patient left the hospital AGAINST MEDICAL ADVICE, apparently she eloped; after being called, she stated was already home and did not plan to return to the hospital.  On 5/16/2025, the patient returned to the hospital reporting a syncopal event.  There was questionable seizure-like activity.   A CT brain performed on that evaluation showed moderate further increase in size of the lateral and third ventricle since the previous study. The fourth ventricle reported as normal and unchanged. A small obstructing lesion at the level of aqueduct of Sylvius not excluded. Further evaluation with contrast enhanced MR imaging of the brain was suggested. She again left the ER AGAINST MEDICAL ADVICE.      Patient came to hospital 5/30/25 reporting dizziness, lightheadedness, worsening when standing up, abnormal gait with unsteadiness;  reported confusion.  Symptom has been going on for approximately 1 month.   Blood pressure has been variable, initially elevated.  The patient had not taken medications the day of admission.  The patient states that she has been stable.  She has not been evaluated by physical therapy.  She reports no other symptomatology.        Review of Systems   All pertinent negatives and positives are as above. All other systems have been reviewed and are negative unless otherwise stated.     Objective    Temp:  [97.6 °F (36.4 °C)-98.4 °F (36.9 °C)] 97.6 °F (36.4 °C)  Heart Rate:  [] 98  Resp:  [15-16] 16  BP: (116-157)/() 133/90  Physical Exam  Constitutional:        "Appearance: Alert, oriented to person and place.  No respiratory distress.  HENT:      Head: Normocephalic and atraumatic.      Nose: Nose normal.      Mouth/Throat:      Mouth: Mucous membranes are moist.   Neck, old transverse scar  Eyes:      Extraocular Movements: Not able to evaluate due to dizziness on exam     Conjunctiva/sclera: Conjunctivae normal.      Pupils: Pupils are equal, round  Cardiovascular:      Rate and Rhythm: Normal rate and regular rhythm.      Pulses: Normal pulses.   Pulmonary:      Effort: No respiratory distress.      Breath sounds: Normal breath sounds. No wheezing, rhonchi or rales.   Abdominal:      General: Abdomen is flat. Bowel sounds are normal.      Palpations: Abdomen is soft.      Tenderness: There is no guarding or rebound.   Extremities:  No lower extremity edema.  Skin:     Capillary Refill: Capillary refill takes less than 2 seconds.      Coloration: Skin is not jaundiced.      Findings: No rash.   Neurological:   Alert and oriented.  Normal speech.  Follows commands.  Symmetric strength 4 out of 5 in upper and lower extremities.  Gait was not evaluated.  No gross visual defects.      Results Review:  I have reviewed the labs, radiology results, and diagnostic studies.    Laboratory Data:   Results from last 7 days   Lab Units 05/30/25  0500 05/29/25  1247   WBC 10*3/mm3 5.86 8.72   HEMOGLOBIN g/dL 9.4* 13.0   HEMATOCRIT % 28.1* 41.1   PLATELETS 10*3/mm3 189 200        Results from last 7 days   Lab Units 05/30/25  0500 05/29/25  1247   SODIUM mmol/L 145 142   POTASSIUM mmol/L 2.9* 3.3*   CHLORIDE mmol/L 109* 103   CO2 mmol/L 20.0* 16.0*   BUN mg/dL 32.3* 29.0*   CREATININE mg/dL 0.70 0.98   CALCIUM mg/dL 9.2 10.2   BILIRUBIN mg/dL 0.6 1.1   ALK PHOS U/L 37* 50   ALT (SGPT) U/L 61* 88*   AST (SGOT) U/L 30 58*   GLUCOSE mg/dL 121* 144*       Culture Data:   No results found for: \"BLOODCX\", \"URINECX\", \"WOUNDCX\", \"MRSACX\", \"RESPCX\", \"STOOLCX\"    Radiology Data:   Imaging " Results (Last 24 Hours)       Procedure Component Value Units Date/Time    MRI Brain With & Without Contrast [[id number]] Collected: 05/29/25 1845     Updated: 05/29/25 1858    Narrative:      EXAMINATION: MRI BRAIN W WO CONTRAST- 5/29/2025 6:45 PM     HISTORY: ventriculomegaly, syncope; R53.1-Weakness; R11.2-Nausea with  vomiting, unspecified; F12.90-Cannabis use, unspecified, uncomplicated;  R79.89-Other specified abnormal findings of blood chemistry;  N39.0-Urinary tract infection, site not specified.     REPORT: Multiplanar multisequence MR imaging of the brain was performed  with and without contrast.     COMPARISON: Noncontrast CT of the head 5/16/2025, noncontrast CT of the  head 4/18/2025 MRI brain with and without contrast 9/4/2019.     No diffusion restriction is identified. There is diffuse age compatible  cerebral atrophy as before. No intracranial mass effect is identified.  There is mild dilation of the lateral and third ventricles as  demonstrated on recent head CT. There appears to be minimal dilation of  the fourth ventricle. FLAIR images demonstrate confluent increased  signal within the periventricular white matter tracts which appears  slightly greater compared with the previous MRI in 2019. This could be  related to mild transependymal edema or a combination of transependymal  edema and chronic small vessel white matter ischemic disease.. There is  no mass or abnormal enhancement involving the aqueduct of Sylvius,  though it is small in caliber. This may represent aqueductal stenosis.  No abnormal enhancement is seen elsewhere within the brain.  Susceptibility weighted images demonstrate no evidence of intracranial  hemorrhage.       Impression:      1. Persistent mild asymmetric dilation of the lateral and third  ventricles compared to the fourth ventricle, with small caliber of the  aqueduct suggestive of aqueduct of stenosis. Given fluid increased  periventricular FLAIR signal possibly related  to mild transependymal  edema, versus a combination of transependymal edema and chronic small  vessel white matter ischemic disease. No obstructing mass at the sylvian  aqueduct, no abnormal enhancement is identified within the brain.     This report was signed and finalized on 5/29/2025 6:55 PM by Dr. Triston Escobedo MD.       XR Chest 1 View [[id number]] Collected: 05/29/25 1356     Updated: 05/29/25 1403    Narrative:      EXAMINATION: XR CHEST 1 VW-  5/29/2025 1:56 PM 1 view     HISTORY: shortness of breath     COMPARISON: 4/18/2025     TECHNIQUE: A single frontal view of the chest was obtained.     FINDINGS:  The lungs are clear. The cardiomediastinal silhouette and pulmonary  vascularity are within normal limits. The osseous structures and  surrounding soft tissues demonstrate no acute abnormality.       Impression:         1.  No acute cardiopulmonary process.           This report was signed and finalized on 5/29/2025 1:59 PM by Dr. Ori Woods MD.               I have reviewed the patient's current medications.     Assessment/Plan   Assessment  Active Hospital Problems    Diagnosis     **Acquired cerebral ventriculomegaly     Generalized weakness        Ventriculomegaly, rule out obstructive hydrocephalus  Hypokalemia   Hypothyroidism,after thyroidectomy  Sinus tachycardia  Hypertension,  Hepatic steatosis  Chronic gastritis  History of Graves' disease status post thyroidectomy      Sodium 145, potassium 2.9.  Creatinine 0.7.  Glucose 131.  Magnesium 1.9.  AST 30, ALT 61.  Normal bilirubin.      MRI brain  1. Persistent mild asymmetric dilation of the lateral and third  ventricles compared to the fourth ventricle, with small caliber of the  aqueduct suggestive of aqueduct of stenosis. Given fluid increased  periventricular FLAIR signal possibly related to mild transependymal  edema, versus a combination of transependymal edema and chronic small  vessel white matter ischemic disease. No obstructing mass  at the sylvian  aqueduct, no abnormal enhancement is identified within the brain.    Treatment Plan  Given findings on MRI, and symptomatology, neurosurgery consult placed for further recommendations regarding ventriculomegaly.    Replace potassium. Follow values; per protocol ordered.    Decrease dose of levothyroxine from 175 to 150 mcg p.o. daily.    Due to blood pressure values today, will decrease amlodipine from 10 to 5 mg p.o. daily.  Will continue carvedilol 25 p.o. twice daily.  Will decrease lisinopril from 20 to 10 mg p.o. daily.  Will decrease Aldactone, from 50 mg p.o. twice daily to 50 mg p.o. daily.    Patient will require endocrinology outpatient follow up.    PT OT evaluations.      Medical Decision Making  Number and Complexity of problems: 8, moderate complexity    Differential Diagnosis: see above    Conditions and Status        Condition is unchanged.     MDM Data  External documents reviewed: no  Cardiac tracing (EKG, telemetry) interpretation: no new  Radiology interpretation: reports reviewed  Labs reviewed: yes  Any tests that were considered but not ordered: no     Decision rules/scores evaluated (example [id number], Wells, etc): none     Discussed with: patient     Care Planning  Shared decision making: patient  Code status and discussions: FC    Disposition  Social Determinants of Health that impact treatment or disposition: none      Electronically signed by Bautista Limon MD, 05/30/25, 09:33 CDT.     Electronically signed by Bautista Limon MD at 05/30/25 1211          Consult Notes (all)        Jefferson Donovan APRN at 05/30/25 0952        Consult Orders    1. Inpatient Neurosurgery Consult [[id number]] ordered by Bautista Limon MD at 05/30/25 0744                 Reason for consult: Ventriculomegaly    Chief Complaint   Patient presents with    Weakness - Generalized         Requesting provider: Dr. Bautista Limon    HPI: This is a 49-year-old female patient who asked to  see in consult in regards to ventriculomegaly.  The patient was admitted to the hospital on May 11 due to weight loss and poor oral intake.  It was found that she was not taking her levothyroxine and she did have elevation of her TSH and was restarted on her thyroid medication.  The patient did leave the hospital AMA from that admission but did get readmitted to the hospital on May 16, 2025 for concern for syncopal episode and even question of a seizure activity.  Imaging of her head was done at that time which did show concern for ventriculomegaly and it was recommended that she undergo an MRI however the patient left AMA again.  She presents back to the hospital today with dizziness and lightheadedness and difficulty with her gait.  Family is also reporting confusion.  Patient also does relate to having trouble with nausea and vomiting.  Poor appetite is noted.  She is having increased difficulty with ambulating over the last 1 to 2 months to where her  is having difficulty with moving her around.  She also does complain of double vision and blurred vision.  According to her  6 months ago she was walking independently.  Pain does not seem to be a  a factor limiting her mobilization.    Review of Systems   Constitutional:  Positive for activity change. Negative for fever.   Eyes:  Positive for visual disturbance.   Gastrointestinal:  Positive for nausea and vomiting.   Musculoskeletal:  Positive for gait problem.   Neurological:  Positive for dizziness and weakness. Negative for headaches.   All other systems reviewed and are negative.       Pertinent positives/negatives documented in HPI.  All other systems reviewed and negative.    Past Medical History:  has a past medical history of Arthritis, Carotid artery stenosis, Degenerative disc disease, cervical, Depression, Diabetes mellitus, Disease of thyroid gland, GERD (gastroesophageal reflux disease), Graves disease, Heart palpitations,  Hyperlipidemia, Hypertension, Hypothyroidism, Seizure, and Thyromegaly.    Past Surgical History:  has a past surgical history that includes  section; Cholecystectomy; Hysterectomy; Cyst Removal; Colonoscopy (2015); Esophagogastroduodenoscopy (N/A, 2018); Thyroidectomy (Bilateral, 2018); Colonoscopy (2015); and Esophagogastroduodenoscopy (N/A, 2025).    Family History: family history includes Breast cancer in her maternal aunt and sister; Colon cancer in her paternal uncle; Coronary artery disease in her brother; Diabetes in her brother, father, mother, and sister; Seizures in her sister; Stroke in her father, mother, and sister.    Social History:  reports that she has never smoked. She has never used smokeless tobacco. She reports current alcohol use of about 6.0 standard drinks of alcohol per week. She reports current drug use. Drug: Marijuana.    Allergies: Oxycodone-acetaminophen    Medications: Scheduled Meds:amLODIPine, 10 mg, Oral, Daily  ARIPiprazole, 10 mg, Oral, Daily  carvedilol, 25 mg, Oral, BID With Meals  levothyroxine, 175 mcg, Oral, Q AM  lisinopril, 20 mg, Oral, Q24H  pantoprazole, 40 mg, Oral, Q AM  potassium chloride, 40 mEq, Oral, Once  [START ON 2025] spironolactone, 50 mg, Oral, Daily      Continuous Infusions:   PRN Meds:.  senna-docusate sodium **AND** polyethylene glycol **AND** bisacodyl **AND** bisacodyl    Calcium Replacement - Follow Nurse / BPA Driven Protocol    hydrALAZINE    Magnesium Low Dose Replacement - Follow Nurse / BPA Driven Protocol    melatonin    ondansetron ODT **OR** ondansetron    Phosphorus Replacement - Follow Nurse / BPA Driven Protocol    Potassium Replacement - Follow Nurse / BPA Driven Protocol    [COMPLETED] Insert Peripheral IV **AND** sodium chloride     Objective:  General Appearance:  Comfortable, well-appearing, in no acute distress and not in pain.    Vital signs: (most recent): Blood pressure 133/90, pulse 98,  "temperature 97.6 °F (36.4 °C), temperature source Oral, resp. rate 16, height 188 cm (74\"), weight 67.3 kg (148 lb 4.8 oz), SpO2 100%, not currently breastfeeding.  Vital signs are normal.  No fever.    Output: Producing urine.    HEENT: Normal HEENT exam.    Lungs:  Normal effort and normal respiratory rate.  She is not in respiratory distress.    Chest: Symmetric chest wall expansion.   Extremities: Normal range of motion.    Skin:  Warm and dry.    Pulses: Distal pulses are intact.        Neurological Exam  Mental Status  Awake and alert. Oriented only to person. Speech is normal. Language is fluent with no aphasia. Attention and concentration are normal.    Cranial Nerves  CN I: Sense of smell is normal.  CN II: Right normal visual field. Left normal visual field.  CN III, IV, VI: Normal lids and orbits bilaterally. Pupils equal round and reactive to light bilaterally.  CN V: Facial sensation is normal.  CN VII:  Right: There is no facial weakness.  Left: There is no facial weakness.  CN XI: Shoulder shrug strength is normal.  CN XII: Tongue midline without atrophy or fasciculations.  Esotropia noted  No visual field deficit noted.    Motor  Decreased muscle bulk throughout. Normal muscle tone.  Moving all extremities.  Mild ataxia noted in upper extremities.    Reflexes                                            Right                      Left  Patellar                                0                         0    Right pathological reflexes: Yelitza's absent. Ankle clonus absent.  Left pathological reflexes: Yelitza's absent. Ankle clonus absent.    Gait   Abnormal gait.  75% assistance needed to help stand the patient.  Unable to take a step.  .      Vital Signs  Temp:  [97.6 °F (36.4 °C)-98.4 °F (36.9 °C)] 97.6 °F (36.4 °C)  Heart Rate:  [] 98  Resp:  [15-16] 16  BP: (116-157)/() 133/90    Physical Exam  Constitutional:       General: She is awake. Vital signs are normal.      Appearance: " Normal appearance. She is well-developed.   HENT:      Head: Normocephalic.   Eyes:      General: Lids are normal.      Conjunctiva/sclera: Conjunctivae normal.      Pupils: Pupils are equal, round, and reactive to light.   Cardiovascular:      Pulses: Intact distal pulses.   Pulmonary:      Effort: Pulmonary effort is normal. No respiratory distress.      Breath sounds: Normal breath sounds.   Musculoskeletal:         General: Normal range of motion.      Cervical back: Normal range of motion.   Skin:     General: Skin is warm and dry.   Neurological:      Mental Status: She is alert. She is confused.      GCS: GCS eye subscore is 4. GCS verbal subscore is 5. GCS motor subscore is 6.      Cranial Nerves: Cranial nerve deficit present.      Sensory: No sensory deficit.      Motor: Weakness present.      Gait: Gait abnormal.      Deep Tendon Reflexes: Reflexes normal.      Reflex Scores:       Patellar reflexes are 0 on the right side and 0 on the left side.  Psychiatric:         Speech: Speech normal.         Behavior: Behavior normal.         Thought Content: Thought content normal.         Results Review:   I reviewed the patient's new clinical results.  I reviewed the patient's new imaging results and agree with the interpretation.  I reviewed the patient's other test results and agree with the interpretation          Lab Results (last 24 hours)       Procedure Component Value Units Date/Time    T3, Free [[id number]] Collected: 05/30/25 0500    Specimen: Blood Updated: 05/30/25 1127    T3 [[id number]] Collected: 05/30/25 0500    Specimen: Blood Updated: 05/30/25 1127    T4, Free [[id number]]  (Abnormal) Collected: 05/30/25 0500    Specimen: Blood Updated: 05/30/25 0816     Free T4 2.25 ng/dL     Comprehensive Metabolic Panel [[id number]]  (Abnormal) Collected: 05/30/25 0500    Specimen: Blood Updated: 05/30/25 0543     Glucose 121 mg/dL      BUN 32.3 mg/dL      Creatinine 0.70 mg/dL      Sodium 145 mmol/L       Potassium 2.9 mmol/L      Chloride 109 mmol/L      CO2 20.0 mmol/L      Calcium 9.2 mg/dL      Total Protein 6.2 g/dL      Albumin 3.5 g/dL      ALT (SGPT) 61 U/L      AST (SGOT) 30 U/L      Alkaline Phosphatase 37 U/L      Total Bilirubin 0.6 mg/dL      Globulin 2.7 gm/dL      A/G Ratio 1.3 g/dL      BUN/Creatinine Ratio 46.1     Anion Gap 16.0 mmol/L      eGFR 105.5 mL/min/1.73     Narrative:      GFR Categories in Chronic Kidney Disease (CKD)              GFR Category          GFR (mL/min/1.73)    Interpretation  G1                    90 or greater        Normal or high (1)  G2                    60-89                Mild decrease (1)  G3a                   45-59                Mild to moderate decrease  G3b                   30-44                Moderate to severe decrease  G4                    15-29                Severe decrease  G5                    14 or less           Kidney failure    (1)In the absence of evidence of kidney disease, neither GFR category G1 or G2 fulfill the criteria for CKD.    eGFR calculation 2021 CKD-EPI creatinine equation, which does not include race as a factor    Magnesium [[id number]]  (Normal) Collected: 05/30/25 0500    Specimen: Blood Updated: 05/30/25 0543     Magnesium 1.9 mg/dL     CBC Auto Differential [[id number]]  (Abnormal) Collected: 05/30/25 0500    Specimen: Blood Updated: 05/30/25 0521     WBC 5.86 10*3/mm3      RBC 3.29 10*6/mm3      Hemoglobin 9.4 g/dL      Hematocrit 28.1 %      MCV 85.4 fL      MCH 28.6 pg      MCHC 33.5 g/dL      RDW 13.4 %      RDW-SD 42.2 fl      MPV 9.6 fL      Platelets 189 10*3/mm3      Neutrophil % 68.5 %      Lymphocyte % 22.4 %      Monocyte % 7.7 %      Eosinophil % 0.2 %      Basophil % 0.3 %      Immature Grans % 0.9 %      Neutrophils, Absolute 4.02 10*3/mm3      Lymphocytes, Absolute 1.31 10*3/mm3      Monocytes, Absolute 0.45 10*3/mm3      Eosinophils, Absolute 0.01 10*3/mm3      Basophils, Absolute 0.02 10*3/mm3      Immature  Grans, Absolute 0.05 10*3/mm3      nRBC 0.0 /100 WBC     Blood Culture - Blood, Arm, Left [[id number]] Collected: 05/29/25 1941    Specimen: Blood from Arm, Left Updated: 05/29/25 1947    Blood Culture - Blood, Arm, Left [[id number]] Collected: 05/29/25 1547    Specimen: Blood from Arm, Left Updated: 05/29/25 1553    Urinalysis With Microscopic If Indicated (No Culture) - Straight Cath [[id number]]  (Abnormal) Collected: 05/29/25 1301    Specimen: Urine from Straight Cath Updated: 05/29/25 1343     Color, UA [place]     Appearance, UA Clear     pH, UA 6.0     Specific Gravity, UA >1.030     Glucose,  mg/dL (2+)     Ketones, UA 15 mg/dL (1+)     Bilirubin, UA Large (3+)     Blood, UA Negative     Protein, UA 30 mg/dL (1+)     Leuk Esterase, UA Small (1+)     Nitrite, UA Positive     Urobilinogen, UA 1.0 E.U./dL    Narrative:      Dipstick results may be inaccurate due to color interference.    Comprehensive Metabolic Panel [[id number]]  (Abnormal) Collected: 05/29/25 1247    Specimen: Blood Updated: 05/29/25 1327     Glucose 144 mg/dL      BUN 29.0 mg/dL      Creatinine 0.98 mg/dL      Sodium 142 mmol/L      Potassium 3.3 mmol/L      Comment: Slight hemolysis detected by analyzer. Result may be falsely elevated.        Chloride 103 mmol/L      CO2 16.0 mmol/L      Calcium 10.2 mg/dL      Total Protein 8.0 g/dL      Albumin 4.0 g/dL      ALT (SGPT) 88 U/L      AST (SGOT) 58 U/L      Alkaline Phosphatase 50 U/L      Total Bilirubin 1.1 mg/dL      Globulin 4.0 gm/dL      A/G Ratio 1.0 g/dL      BUN/Creatinine Ratio 29.6     Anion Gap 23.0 mmol/L      eGFR 70.5 mL/min/1.73     Narrative:      GFR Categories in Chronic Kidney Disease (CKD)              GFR Category          GFR (mL/min/1.73)    Interpretation  G1                    90 or greater        Normal or high (1)  G2                    60-89                Mild decrease (1)  G3a                   45-59                Mild to moderate decrease  G3b                    30-44                Moderate to severe decrease  G4                    15-29                Severe decrease  G5                    14 or less           Kidney failure    (1)In the absence of evidence of kidney disease, neither GFR category G1 or G2 fulfill the criteria for CKD.    eGFR calculation 2021 CKD-EPI creatinine equation, which does not include race as a factor    TSH [[id number]]  (Abnormal) Collected: 05/29/25 1247    Specimen: Blood Updated: 05/29/25 1327     TSH 0.050 uIU/mL     Magnesium [[id number]]  (Normal) Collected: 05/29/25 1247    Specimen: Blood Updated: 05/29/25 1327     Magnesium 2.4 mg/dL     Ethanol [[id number]] Collected: 05/29/25 1247    Specimen: Blood Updated: 05/29/25 1327     Ethanol % <0.010 %     Narrative:      Not for legal purposes. Chain of Custody not followed.     Urinalysis, Microscopic Only - Straight Cath [[id number]]  (Abnormal) Collected: 05/29/25 1301    Specimen: Urine from Straight Cath Updated: 05/29/25 1326     RBC, UA None Seen /HPF      WBC, UA 3-5 /HPF      Bacteria, UA Trace /HPF      Squamous Epithelial Cells, UA 3-6 /HPF      Hyaline Casts, UA None Seen /LPF      Methodology Manual Light Microscopy    Fentanyl, Urine - Straight Cath [[id number]]  (Normal) Collected: 05/29/25 1301    Specimen: Urine from Straight Cath Updated: 05/29/25 1322     Fentanyl, Urine Negative    Narrative:      Negative Threshold:      Fentanyl 5 ng/mL     The normal value for the drug tested is negative. This report includes final unconfirmed screening results to be used for medical treatment purposes only. Unconfirmed results must not be used for non-medical purposes such as employment or legal testing. Clinical consideration should be applied to any drug of abuse test, particularly when unconfirmed results are used.           Ammonia [[id number]]  (Normal) Collected: 05/29/25 1247    Specimen: Blood Updated: 05/29/25 1320     Ammonia 36 umol/L     Urine Drug Screen - Straight Cath  [[id number]]  (Abnormal) Collected: 05/29/25 1301    Specimen: Urine from Straight Cath Updated: 05/29/25 1319     THC, Screen, Urine Positive     Phencyclidine (PCP), Urine Negative     Cocaine Screen, Urine Negative     Methamphetamine, Ur Negative     Opiate Screen Negative     Amphetamine Screen, Urine Negative     Benzodiazepine Screen, Urine Negative     Tricyclic Antidepressants Screen Negative     Methadone Screen, Urine Negative     Barbiturates Screen, Urine Negative     Oxycodone Screen, Urine Negative     Buprenorphine, Screen, Urine Negative    Narrative:      Cutoff For Drugs Screened:    Amphetamines               500 ng/ml  Barbiturates               200 ng/ml  Benzodiazepines            150 ng/ml  Cocaine                    150 ng/ml  Methadone                  200 ng/ml  Opiates                    100 ng/ml  Phencyclidine               25 ng/ml  THC                         50 ng/ml  Methamphetamine            500 ng/ml  Tricyclic Antidepressants  300 ng/ml  Oxycodone                  100 ng/ml  Buprenorphine               10 ng/ml    The normal value for all drugs tested is negative. This report includes unconfirmed screening results, with the cutoff values listed, to be used for medical treatment purposes only.  Unconfirmed results must not be used for non-medical purposes such as employment or legal testing.  Clinical consideration should be applied to any drug of abuse test, particularly when unconfirmed results are used.      Protime-INR [[id number]]  (Normal) Collected: 05/29/25 1247    Specimen: Blood Updated: 05/29/25 1309     Protime 13.3 Seconds      INR 0.97    CBC & Differential [[id number]]  (Abnormal) Collected: 05/29/25 1247    Specimen: Blood Updated: 05/29/25 1301    Narrative:      The following orders were created for panel order CBC & Differential.  Procedure                               Abnormality         Status                     ---------                                -----------         ------                     CBC Auto Differential[[id number]]        Abnormal            Final result                 Please view results for these tests on the individual orders.    CBC Auto Differential [[id number]]  (Abnormal) Collected: 05/29/25 1247    Specimen: Blood Updated: 05/29/25 1301     WBC 8.72 10*3/mm3      RBC 4.45 10*6/mm3      Hemoglobin 13.0 g/dL      Hematocrit 41.1 %      MCV 92.4 fL      MCH 29.2 pg      MCHC 31.6 g/dL      RDW 13.6 %      RDW-SD 46.4 fl      MPV 10.8 fL      Platelets 200 10*3/mm3      Neutrophil % 81.5 %      Lymphocyte % 12.2 %      Monocyte % 4.1 %      Eosinophil % 0.2 %      Basophil % 0.5 %      Immature Grans % 1.5 %      Neutrophils, Absolute 7.11 10*3/mm3      Lymphocytes, Absolute 1.06 10*3/mm3      Monocytes, Absolute 0.36 10*3/mm3      Eosinophils, Absolute 0.02 10*3/mm3      Basophils, Absolute 0.04 10*3/mm3      Immature Grans, Absolute 0.13 10*3/mm3      nRBC 0.2 /100 WBC           MRI of the brain that was done on May 29, 2025 shows asymmetric dilation of the lateral ventricles along with enlargement of the third ventricle.  The fourth ventricle does appear to be normal.  There is concern for stenosis of the aqueduct however no masses or obstruction is noted.  Ventricle sizes are larger when compared to imaging that was done in 2019 with progression of ventricles size since 2019 2025 2019      Assessment/Plan:   The patient does show ventriculomegaly on imaging that has progressed since 2019.  The patient does have a variety of complaints however this may be multifactorial and not limited only to the ventriculomegaly that is seen on imaging.  I will discuss this patient with Dr. Rivera and have him review the imaging.  The patient may need to undergo a lumbar puncture to check opening pressures and also to see if drainage of spinal fluid would offer improvement in her overall status.  Further recommendations to come after Dr. Rivera has  reviewed the imaging and evaluated the patient.  Thank you for the opportunity to participate in this patient's plan of care.      Acquired cerebral ventriculomegaly    Generalized weakness      I discussed the patient's findings and my recommendations with patient and family    BRIANA Kulkarni  05/30/25  11:52 CDT    I spent 58 minutes caring for Lynn on this date of service. This time includes time spent by me in the following activities: preparing for the visit, reviewing tests, performing a medically appropriate examination and/or evaluation, counseling and educating the patient/family/caregiver, referring and communicating with other health care professionals, documenting information in the medical record, independently interpreting results and communicating that information with the patient/family/caregiver, and obtaining a separately obtained history           Electronically signed by Jefferson Donovan APRN at 05/30/25 1205

## 2025-05-30 NOTE — PLAN OF CARE
Goal Outcome Evaluation:  Plan of Care Reviewed With: patient        Progress: no change  Outcome Evaluation: OT eval completed.  Pt alert and oriented only to person, place, and situation. Could not orient to time. Pt seen in fowlers with no c/o of pain but presents with general confusion and takes increased time to process situation, initate tasks, and complete tasks. Pt able to complete all bed mobility with Whitney. Pt requried ModA to earl/doff socks and shoes while seated EOB. Pt presents WFL B UE AROM and 4-/5 with B UE strength. Pt required MaxA with sit <> stand transitions. Pt completed functional mobility with 2 small steps forward/backwards with LOB noted towards posterior requiring ModA to correct. Pt presents I at baseline but currently requires substantial assist with tasks due to cognitive deficits that include increased processing time, general confusion, insight into deficits/awareness of need for assist, and initiation deficits. At this time, pt is a high fall risk and is unsafe to return home. OT will continue to tx to address deficits related to strength, balance, cognition, and act jamin. Recommend sub acute rehab at d/c.

## 2025-05-31 ENCOUNTER — APPOINTMENT (OUTPATIENT)
Dept: MRI IMAGING | Facility: HOSPITAL | Age: 50
End: 2025-05-31
Payer: COMMERCIAL

## 2025-05-31 LAB
ANION GAP SERPL CALCULATED.3IONS-SCNC: 13 MMOL/L (ref 5–15)
BUN SERPL-MCNC: 34.2 MG/DL (ref 6–20)
BUN/CREAT SERPL: 44.4 (ref 7–25)
CALCIUM SPEC-SCNC: 9.6 MG/DL (ref 8.6–10.5)
CHLORIDE SERPL-SCNC: 109 MMOL/L (ref 98–107)
CO2 SERPL-SCNC: 19 MMOL/L (ref 22–29)
CREAT SERPL-MCNC: 0.77 MG/DL (ref 0.57–1)
EGFRCR SERPLBLD CKD-EPI 2021: 94.1 ML/MIN/1.73
GLUCOSE SERPL-MCNC: 140 MG/DL (ref 65–99)
HIV 1+2 AB+HIV1 P24 AG SERPL QL IA: NORMAL
MAGNESIUM SERPL-MCNC: 2.1 MG/DL (ref 1.6–2.6)
POTASSIUM SERPL-SCNC: 3.7 MMOL/L (ref 3.5–5.2)
SODIUM SERPL-SCNC: 141 MMOL/L (ref 136–145)

## 2025-05-31 PROCEDURE — 86592 SYPHILIS TEST NON-TREP QUAL: CPT | Performed by: FAMILY MEDICINE

## 2025-05-31 PROCEDURE — G0432 EIA HIV-1/HIV-2 SCREEN: HCPCS | Performed by: FAMILY MEDICINE

## 2025-05-31 PROCEDURE — 97530 THERAPEUTIC ACTIVITIES: CPT

## 2025-05-31 PROCEDURE — 80048 BASIC METABOLIC PNL TOTAL CA: CPT | Performed by: FAMILY MEDICINE

## 2025-05-31 PROCEDURE — 72158 MRI LUMBAR SPINE W/O & W/DYE: CPT

## 2025-05-31 PROCEDURE — A9573 GADOPICLENOL 0.5 MMOL/ML SOLUTION: HCPCS | Performed by: FAMILY MEDICINE

## 2025-05-31 PROCEDURE — 99231 SBSQ HOSP IP/OBS SF/LOW 25: CPT | Performed by: NEUROLOGICAL SURGERY

## 2025-05-31 PROCEDURE — 83735 ASSAY OF MAGNESIUM: CPT | Performed by: FAMILY MEDICINE

## 2025-05-31 PROCEDURE — 72156 MRI NECK SPINE W/O & W/DYE: CPT

## 2025-05-31 PROCEDURE — 25510000001 GADOPICLENOL 0.5 MMOL/ML SOLUTION: Performed by: FAMILY MEDICINE

## 2025-05-31 RX ORDER — CARVEDILOL 6.25 MG/1
12.5 TABLET ORAL 2 TIMES DAILY WITH MEALS
Status: DISCONTINUED | OUTPATIENT
Start: 2025-05-31 | End: 2025-06-02

## 2025-05-31 RX ADMIN — GADOPICLENOL 6 ML: 485.1 INJECTION INTRAVENOUS at 15:49

## 2025-05-31 RX ADMIN — ARIPIPRAZOLE 10 MG: 5 TABLET ORAL at 09:56

## 2025-05-31 RX ADMIN — LEVOTHYROXINE SODIUM 150 MCG: 75 TABLET ORAL at 04:47

## 2025-05-31 RX ADMIN — PANTOPRAZOLE SODIUM 40 MG: 40 TABLET, DELAYED RELEASE ORAL at 04:47

## 2025-05-31 NOTE — PROGRESS NOTES
"Lynn HAINES [place]  49 y.o.      Chief complaint:   Abnormal imaging of the brain.    Subjective  See consult note for recent history.    Temp:  [96.2 °F (35.7 °C)-98.3 °F (36.8 °C)] 98.2 °F (36.8 °C)  Heart Rate:  [] 88  Resp:  [16-18] 16  BP: ()/(56-79) 112/79      Objective:  Vital signs: (most recent): Blood pressure 112/79, pulse 88, temperature 98.2 °F (36.8 °C), temperature source Oral, resp. rate 16, height 188 cm (74\"), weight 67.3 kg (148 lb 4.8 oz), SpO2 99%, not currently breastfeeding.        Neurological Exam  Mental Status  Awake and alert. Oriented only to person. Speech is normal. Language is fluent with no aphasia. Attention and concentration are normal.     Cranial Nerves  CN I: Sense of smell is normal.  CN II: Right normal visual field. Left normal visual field.  CN III, IV, VI: Normal lids and orbits bilaterally. Pupils equal round and reactive to light bilaterally.  CN V: Facial sensation is normal.  CN VII:  Right: There is no facial weakness.  Left: There is no facial weakness.  CN XI: Shoulder shrug strength is normal.  CN XII: Tongue midline without atrophy or fasciculations.  Esotropia noted  No visual field deficit noted.     Motor  Decreased muscle bulk throughout. Normal muscle tone.  Moving all extremities.  Mild ataxia noted in upper extremities.     Reflexes                                            Right                      Left  Patellar                                0                         0     Right pathological reflexes: Yelitza's absent. Ankle clonus absent.  Left pathological reflexes: Yelitza's absent. Ankle clonus absent.     Gait   Abnormal gait.  75% assistance needed to help stand the patient.  Unable to take a step.  .  Lab Results (last 24 hours)       Procedure Component Value Units Date/Time    Magnesium [[id number]]  (Normal) Collected: 05/31/25 0502    Specimen: Blood Updated: 05/31/25 0549     Magnesium 2.1 mg/dL     Basic Metabolic Panel " [[id number]]  (Abnormal) Collected: 05/31/25 0502    Specimen: Blood Updated: 05/31/25 0548     Glucose 140 mg/dL      BUN 34.2 mg/dL      Creatinine 0.77 mg/dL      Sodium 141 mmol/L      Potassium 3.7 mmol/L      Comment: Slight hemolysis detected by analyzer. Result may be falsely elevated.        Chloride 109 mmol/L      CO2 19.0 mmol/L      Calcium 9.6 mg/dL      BUN/Creatinine Ratio 44.4     Anion Gap 13.0 mmol/L      eGFR 94.1 mL/min/1.73     Narrative:      GFR Categories in Chronic Kidney Disease (CKD)              GFR Category          GFR (mL/min/1.73)    Interpretation  G1                    90 or greater        Normal or high (1)  G2                    60-89                Mild decrease (1)  G3a                   45-59                Mild to moderate decrease  G3b                   30-44                Moderate to severe decrease  G4                    15-29                Severe decrease  G5                    14 or less           Kidney failure    (1)In the absence of evidence of kidney disease, neither GFR category G1 or G2 fulfill the criteria for CKD.    eGFR calculation 2021 CKD-EPI creatinine equation, which does not include race as a factor    T3, Free [[id number]]  (Abnormal) Collected: 05/30/25 0500    Specimen: Blood Updated: 05/30/25 2004     T3, Free 1.23 pg/mL     T3 [[id number]]  (Abnormal) Collected: 05/30/25 0500    Specimen: Blood Updated: 05/30/25 2004     T3, Total 40.6 ng/dl     Narrative:      Results may be falsely increased if patient taking Biotin.      Blood Culture - Blood, Arm, Left [[id number]]  (Normal) Collected: 05/29/25 1941    Specimen: Blood from Arm, Left Updated: 05/30/25 2000     Blood Culture No growth at 24 hours    Blood Culture - Blood, Arm, Left [[id number]]  (Normal) Collected: 05/29/25 1547    Specimen: Blood from Arm, Left Updated: 05/30/25 1600     Blood Culture No growth at 24 hours          5/29/2025 5/16/2025 9/17/2024    Plan:   Imaging the  last 9 months of is some relative increase in ventricle size.  There is some degree of diffuse cortical atrophy related to this.  While this is a change in the imaging this certainly does not explain all of the struggles and concerns that she has had the last several months.  I think would be reasonable to consult interventional radiology for a spinal tap with spinal fluid sampling and pressure measurements at some point.  I would also recommend a neurology consult.  These recommendations were discussed with the patient and her family.      Acquired cerebral ventriculomegaly    Generalized weakness        Basim Rivera MD

## 2025-05-31 NOTE — THERAPY TREATMENT NOTE
Acute Care - Physical Therapy Treatment Note  [facility]     Patient Name: Lynn Magana  : 1975  MRN: [id number]  Today's Date: 2025   Onset of Illness/Injury or Date of Surgery: 25  Visit Dx:     ICD-10-CM ICD-9-CM   1. Generalized weakness  R53.1 780.79   2. Nausea and vomiting, unspecified vomiting type  R11.2 787.01   3. Marijuana user  F12.90 305.20   4. Low TSH level  R79.89 794.5   5. Urinary tract infection without hematuria, site unspecified  N39.0 599.0   6. Dysphagia, unspecified type  R13.10 787.20   7. Impaired functional mobility and activity tolerance [Z74.09]  Z74.09 V49.89     Patient Active Problem List   Diagnosis    Syncope    Graves' disease    Polysubstance abuse    Hypertension    Diabetes    Spells of decreased attentiveness    Chronic intractable headache    Nausea and vomiting    Slow transit constipation    Nonsmoker    Type 2 diabetes mellitus, with long-term current use of insulin    GERD without esophagitis    Hyperthyroidism    Thyromegaly    Post-surgical hypothyroidism    Degeneration of cervical intervertebral disc    Cervical radiculopathy    Acute pain of right shoulder    Class 1 obesity due to excess calories with body mass index (BMI) of 30.0 to 30.9 in adult    Hypoglycemia    Stage 1 acute kidney injury    Dehydration    Dysphagia    Dehydration    Hypokalemia    Steatosis of liver    Marijuana abuse    Loss of weight    Severe protein-calorie malnutrition    Hypothyroid    Generalized weakness    Acquired cerebral ventriculomegaly     Past Medical History:   Diagnosis Date    Arthritis     Carotid artery stenosis     Degenerative disc disease, cervical     Depression     Diabetes mellitus     type 1    Disease of thyroid gland     GERD (gastroesophageal reflux disease)     Graves disease     Heart palpitations     Hyperlipidemia     Hypertension     Hypothyroidism     Seizure     Thyromegaly      Past Surgical History:   Procedure Laterality Date     " SECTION      CHOLECYSTECTOMY      COLONOSCOPY  2015    Normal exam Dr. Morgan     COLONOSCOPY  2015    Normal exam    CYST REMOVAL      ENDOSCOPY N/A 2018    Normal exam    ENDOSCOPY N/A 2025    Procedure: ESOPHAGOGASTRODUODENOSCOPY WITH ANESTHESIA;  Surgeon: Jadon Smith MD;  Location: [facility] ENDOSCOPY;  Service: Gastroenterology;  Laterality: N/A;  pre op: Nausea and vomiting  post op: normal  pcp: Darryl Andrews,     HYSTERECTOMY      THYROIDECTOMY Bilateral 2018    Procedure: total thyroidectomy;  Surgeon: Vitaly Givens MD;  Location: [facility] OR;  Service: ENT     PT Assessment (Last 12 Hours)       PT Evaluation and Treatment       Row Name 25 1039          Physical Therapy Time and Intention    Subjective Information complains of  -NW     Document Type therapy note (daily note)  -NW     Mode of Treatment physical therapy  -NW     Comment family hesitant for therapy to work w/ pt, \"she is too weak\" explained that is the importance of therapy. pt would speak but wouldn't make sense to what was ask of pt.  -NW       Row Name 25 1039          General Information    Existing Precautions/Restrictions fall  -NW       Row Name 25 1039          Pain Scale: FACES Pre/Post-Treatment    Pain: FACES Scale, Pretreatment 0-->no hurt  -NW       Row Name 25 1039          Bed Mobility    Scooting/Bridging [place] (Bed Mobility) maximum assist (25% patient effort)  -NW     Supine-Sit [place] (Bed Mobility) verbal cues;minimum assist (75% patient effort)  -NW     Sit-Supine [place] (Bed Mobility) minimum assist (75% patient effort)  -NW     Assistive Device (Bed Mobility) repositioning sheet  -NW     Comment, (Bed Mobility) pt initiated movement but didn't follow through  -NW       Row Name 25 1039          Gait/Stairs (Locomotion)    Comment, (Gait/Stairs) sat EOB working on sitting balance, pt required cga/sba. pt would lean " fwd and place head in hands. encouraged to sit upright and worked through ex's and sitting balance activities, however pt wouldn't follow commands.  -NW       Row Name 05/31/25 1039          Safety Issues/Impairments Affecting Functional Mobility    Impairments Affecting Function (Mobility) balance;cognition  -NW       Row Name             Wound 05/29/25 1930 Left lower leg    Wound - Properties Group Placement Date: 05/29/25  -AR Placement Time: 1930  -AR Present on Original Admission: Y  -AR Side: Left  -AR Orientation: lower  -AR Location: leg  -AR    Retired Wound - Properties Group Placement Date: 05/29/25  -AR Placement Time: 1930  -AR Present on Original Admission: Y  -AR Side: Left  -AR Orientation: lower  -AR Location: leg  -AR    Retired Wound - Properties Group Placement Date: 05/29/25  -AR Placement Time: 1930  -AR Present on Original Admission: Y  -AR Side: Left  -AR Orientation: lower  -AR Location: leg  -AR    Retired Wound - Properties Group Date first assessed: 05/29/25  -AR Time first assessed: 1930  -AR Present on Original Admission: Y  -AR Side: Left  -AR Location: leg  -AR      Row Name             Wound 05/29/25 1931 Right lower leg    Wound - Properties Group Placement Date: 05/29/25  -AR Placement Time: 1931  -AR Present on Original Admission: Y  -AR Side: Right  -AR Orientation: lower  -AR Location: leg  -AR    Retired Wound - Properties Group Placement Date: 05/29/25  -AR Placement Time: 1931  -AR Present on Original Admission: Y  -AR Side: Right  -AR Orientation: lower  -AR Location: leg  -AR    Retired Wound - Properties Group Placement Date: 05/29/25  -AR Placement Time: 1931  -AR Present on Original Admission: Y  -AR Side: Right  -AR Orientation: lower  -AR Location: leg  -AR    Retired Wound - Properties Group Date first assessed: 05/29/25  -AR Time first assessed: 1931  -AR Present on Original Admission: Y  -AR Side: Right  -AR Location: leg  -AR      Row Name 05/31/25 1039           Positioning and Restraints    Pre-Treatment Position in bed  -NW     Post Treatment Position bed  -NW     In Bed fowlers;call light within reach;exit alarm on;notified nsg  -NW               User Key  (r) = Recorded By, (t) = Taken By, (c) = Cosigned By      Initials Name Provider Type    Ely Hooper, PTA Physical Therapist Assistant    Deborah Christie, RN Registered Nurse                    Physical Therapy Education       Title: PT OT SLP Therapies (In Progress)       Topic: Physical Therapy (In Progress)       Point: Mobility training (Done)       Learning Progress Summary            Patient Acceptance, E,TB,D, VU,NR by  at 5/30/2025 1155    Comment: Education re: purpose of PT/importance of activity, safety/falls prevention, improved tech w/ bed mobility, tfers   Significant Other Acceptance, E,TB,D, VU,NR by  at 5/30/2025 1155    Comment: Education re: purpose of PT/importance of activity, safety/falls prevention, improved tech w/ bed mobility, tfers                      Point: Home exercise program (Not Started)       Learner Progress:  Not documented in this visit.              Point: Precautions (Done)       Learning Progress Summary            Patient Acceptance, E,TB,D, VU,NR by  at 5/30/2025 1155    Comment: Education re: purpose of PT/importance of activity, safety/falls prevention, improved tech w/ bed mobility, tfers   Significant Other Acceptance, E,TB,D, VU,NR by  at 5/30/2025 1155    Comment: Education re: purpose of PT/importance of activity, safety/falls prevention, improved tech w/ bed mobility, tfers                                      User Key       Initials Effective Dates Name Provider Type Discipline     08/02/18 -  Selin Leon, PT Physical Therapist PT                  PT Recommendation and Plan         Outcome Measures       Row Name 05/30/25 0932             How much help from another is currently needed...    Putting on and taking off regular lower body clothing?  2  -AC (r) BH (t) AC (c)      Bathing (including washing, rinsing, and drying) 2  -AC (r) BH (t) AC (c)      Toileting (which includes using toilet bed pan or urinal) 2  -AC (r) BH (t) AC (c)      Putting on and taking off regular upper body clothing 3  -AC (r) BH (t) AC (c)      Taking care of personal grooming (such as brushing teeth) 3  -AC (r) BH (t) AC (c)      Eating meals 3  -AC (r) BH (t) AC (c)      AM-PAC 6 Clicks Score (OT) 15  -AC (r) BH (t)         Functional Assessment    Outcome Measure Options AM-PAC 6 Clicks Daily Activity (OT)  -AC (r) BH (t) AC (c)                User Key  (r) = Recorded By, (t) = Taken By, (c) = Cosigned By      Initials Name Provider Type    AC Niels Shirley, OTR/L, CNT Occupational Therapist     Prakash Badillo, OT Student OT Student                     Time Calculation:         PT G-Codes  Outcome Measure Options: AM-PAC 6 Clicks Basic Mobility (PT)  AM-PAC 6 Clicks Score (PT): 9  AM-PAC 6 Clicks Score (OT): 15    Ely Lizarraga, CARLOS MANUEL  5/31/2025

## 2025-05-31 NOTE — PLAN OF CARE
Goal Outcome Evaluation:  Plan of Care Reviewed With: patient        Progress: no change  Outcome Evaluation: Patient alert to self only. Speech is mostly illogical.  NSR on telemetry. On room air.  Able to swallow small pills only; if Potassium replacement is needed again, please consider IV. Very poor appetite. Patient had slight emesis after 4 sips of apple juice. Still unable to void. In out cath performed on night shift; with only 100 ml urine returned. Bladder scan performed early this morning with only 7 ml showing. Unable to obtain orthostatic vitals due to patient limitations. Safety maintained.

## 2025-05-31 NOTE — PROGRESS NOTES
[facility] Medicine Services  INPATIENT PROGRESS NOTE    Patient Name: Lynn Magana  Date of Admission: 5/29/2025  Today's Date: 05/31/25  Length of Stay: 2  Primary Care Physician: Darryl Andrews DO    Subjective   Chief Complaint: Lightheadedness.  Weakness - Generalized     49-year-old female with history of hypothyroidism, liver steatosis, cannabis use, malnutrition, diabetes mellitus type 2, hypertension, admitted May 11 through May 14, 2025; at that time she was admitted due to poor oral intake and weight loss.  She had an upper endoscopy performed 5/13/2025 with findings of a small hiatal hernia and no other abnormalities reported.  Per reports reviewed the patient was not taking levothyroxine at the time, and her TSH was markedly elevated.  She was restarted on levothyroxine.  Her blood glucose was low, and did not require insulin management.  Drug screening urine was positive for cannabis, and considered to be secondary to cannabinoid hyperemesis syndrome.  The patient left the hospital AGAINST MEDICAL ADVICE, apparently she eloped; after being called, she stated was already home and did not plan to return to the hospital.  On 5/16/2025, the patient returned to the hospital reporting a syncopal event.  There was questionable seizure-like activity.   A CT brain performed on that evaluation showed moderate further increase in size of the lateral and third ventricle since the previous study. The fourth ventricle reported as normal and unchanged. A small obstructing lesion at the level of aqueduct of Sylvius not excluded. Further evaluation with contrast enhanced MR imaging of the brain was suggested. She again left the ER AGAINST MEDICAL ADVICE.      Patient came to hospital 5/30/25 reporting dizziness, lightheadedness, worsening when standing up, abnormal gait with unsteadiness;  reported confusion.  Symptom has been going on for approximately 1-2  months.  Blood pressure has been variable, initially elevated.    Petty present in the room.  Evaluated with family members present in the room.  Discussed with neurosurgery, and will attempt lumbar puncture by interventional radiology.  Sheis alert, and follows commands partially.  She has been nonambulatory for several weeks.  She reports weakness of the lower extremities.  She has had urinary retention requiring straight catheterization during this admission. No abdominal pain.         Review of Systems   All pertinent negatives and positives are as above. All other systems have been reviewed and are negative unless otherwise stated.     Objective    Temp:  [96.2 °F (35.7 °C)-98.3 °F (36.8 °C)] 98.2 °F (36.8 °C)  Heart Rate:  [] 88  Resp:  [16-18] 16  BP: ()/(56-79) 112/79  Physical Exam  Constitutional:       Appearance: Alert, oriented to person and place.  No respiratory distress.  HENT:      Head: Normocephalic and atraumatic.      Nose: Nose normal.      Mouth/Throat:      Mouth: Mucous membranes are moist.   Neck, old transverse scar  Eyes:      Extraocular Movements: Not able to evaluate due to dizziness on exam     Conjunctiva/sclera: Conjunctivae normal.      Pupils: Pupils are equal, round  Cardiovascular:      Rate and Rhythm: Normal rate and regular rhythm.      Pulses: Normal pulses.   Pulmonary:      Effort: No respiratory distress.      Breath sounds: Normal breath sounds. No wheezing, rhonchi or rales.   Abdominal:      General: Abdomen is flat. Bowel sounds are normal.      Palpations: Abdomen is soft.      Tenderness: There is no guarding or rebound.   Extremities:  No lower extremity edema.  Skin:     Capillary Refill: Capillary refill takes less than 2 seconds.      Coloration: Skin is not jaundiced.      Findings: No rash.   Neurological:   Alert and oriented.  Normal speech.  Follows commands.    Lower extremity strength difficult to evaluate; she is not able to hold legs  "when elevated. Left upper extremity strength 4/5; right upper 5/5. Gait was not evaluated.        Results Review:  I have reviewed the labs, radiology results, and diagnostic studies.    Laboratory Data:   Results from last 7 days   Lab Units 05/30/25  0500 05/29/25  1247   WBC 10*3/mm3 5.86 8.72   HEMOGLOBIN g/dL 9.4* 13.0   HEMATOCRIT % 28.1* 41.1   PLATELETS 10*3/mm3 189 200        Results from last 7 days   Lab Units 05/31/25  0502 05/30/25  0500 05/29/25  1247   SODIUM mmol/L 141 145 142   POTASSIUM mmol/L 3.7 2.9* 3.3*   CHLORIDE mmol/L 109* 109* 103   CO2 mmol/L 19.0* 20.0* 16.0*   BUN mg/dL 34.2* 32.3* 29.0*   CREATININE mg/dL 0.77 0.70 0.98   CALCIUM mg/dL 9.6 9.2 10.2   BILIRUBIN mg/dL  --  0.6 1.1   ALK PHOS U/L  --  37* 50   ALT (SGPT) U/L  --  61* 88*   AST (SGOT) U/L  --  30 58*   GLUCOSE mg/dL 140* 121* 144*       Culture Data:   No results found for: \"BLOODCX\", \"URINECX\", \"WOUNDCX\", \"MRSACX\", \"RESPCX\", \"STOOLCX\"    Radiology Data:   Imaging Results (Last 24 Hours)       ** No results found for the last 24 hours. **            I have reviewed the patient's current medications.     Assessment/Plan   Assessment  Active Hospital Problems    Diagnosis     **Acquired cerebral ventriculomegaly     Generalized weakness      Acute encephalopathy, unknown etiology  Ventriculomegaly, rule out obstructive hydrocephalus  Hypokalemia, resolved  Hypothyroidism,after thyroidectomy  Sinus tachycardia  Hypertension,  Hepatic steatosis  Chronic gastritis  History of Graves' disease status post thyroidectomy      Sodium 141.  Potassium improved from 2.9-3.7.  Creatinine 0.77.  Glucose 140.  Magnesium 2.1.        MRI brain  1. Persistent mild asymmetric dilation of the lateral and third  ventricles compared to the fourth ventricle, with small caliber of the  aqueduct suggestive of aqueduct of stenosis. Given fluid increased  periventricular FLAIR signal possibly related to mild transependymal  edema, versus a combination " of transependymal edema and chronic small  vessel white matter ischemic disease. No obstructing mass at the sylvian  aqueduct, no abnormal enhancement is identified within the brain.    Treatment Plan  Nurosurgery recommendations noted. Lumbar puncture for pressure measurements and additional testing. I have placed order.  I requested MRI cervical and lumbar spine with and without contrast.  Once LP performed, send fluid for testing.  Will consider Neurology evaluation after imaging performed MRI mentioned above.  HIV test; RPR requested    Follow electrolytes    Patient has had variable thyroid function tests on prior admissions and now. Will continue levothyroxine 150 mcg p.o. daily. I believe she will require endocrinology outpatient follow up, and this was explained to family.    Due to blood pressure values today, hold amlodipine lisinoprill, aldactone; and decrease carvedilol to 12.5 p.o. twice daily.     Continue PT/OT/ST  Diet as per speech therapist recommendations.      Medical Decision Making  Number and Complexity of problems: 8, moderate complexity    Differential Diagnosis: see above    Conditions and Status        Condition is unchanged.     MDM Data  External documents reviewed: no  Cardiac tracing (EKG, telemetry) interpretation: no new  Radiology interpretation: reports reviewed  Labs reviewed: yes  Any tests that were considered but not ordered: no     Decision rules/scores evaluated (example [id number], Wells, etc): none     Discussed with: patient     Care Planning  Shared decision making: patient  Code status and discussions: FC    Disposition  Social Determinants of Health that impact treatment or disposition: none      Electronically signed by Bautista Limon MD, 05/31/25, 10:49 CDT.

## 2025-05-31 NOTE — PLAN OF CARE
"Goal Outcome Evaluation:  Plan of Care Reviewed With: patient        Progress: no change  Outcome Evaluation: family hesitant for therapy to work w/ pt, \"she is too weak\" explained that is the importance of therapy. pt would speak but wouldn't make sense to what was ask of pt. Bed mobility mod x1 w/ cues and extra time pt initiated some movement but had to use draw sheet to get pt to EOB.sat EOB working on sitting balance, pt required cga/sba. pt would lean fwd and place head in hands. encouraged to sit upright and worked through ex's and sitting balance activities, however pt wouldn't follow commands. pt once medically stable will need reahb upon d/c                             "

## 2025-05-31 NOTE — PLAN OF CARE
Goal Outcome Evaluation:           Progress: no change        Pt alert to self only. Room air. Incontinent. Unable to follow commands. Q2 turns. LP and MRI ordered today. Pt has difficulty swallowing pills/. Doesn't eat or drink and when she does she vomits it back up. Family at bedside. PPP. VSS. Pt unable to move lower extremities but can move upper arms. Call light within reach. Safety maintained

## 2025-06-01 LAB
AMMONIA BLD-SCNC: 15 UMOL/L (ref 11–51)
APPEARANCE CSF: CLEAR
BACTERIA UR QL AUTO: ABNORMAL /HPF
BILIRUB UR QL STRIP: ABNORMAL
CLARITY UR: CLEAR
COLOR CSF: COLORLESS
COLOR SPUN CSF: COLORLESS
COLOR UR: ABNORMAL
GLUCOSE CSF-MCNC: 69 MG/DL (ref 40–70)
GLUCOSE UR STRIP-MCNC: ABNORMAL MG/DL
HGB UR QL STRIP.AUTO: NEGATIVE
HYALINE CASTS UR QL AUTO: ABNORMAL /LPF
KETONES UR QL STRIP: ABNORMAL
LEUKOCYTE ESTERASE UR QL STRIP.AUTO: ABNORMAL
METHOD: NORMAL
MUCOUS THREADS URNS QL MICRO: ABNORMAL /HPF
NITRITE UR QL STRIP: POSITIVE
NUC CELL # CSF MANUAL: 0 /MM3 (ref 0–5)
PH UR STRIP.AUTO: 5.5 [PH] (ref 5–8)
PROT CSF-MCNC: 55.7 MG/DL (ref 15–45)
PROT UR QL STRIP: ABNORMAL
RBC # CSF MANUAL: 0 /MM3 (ref 0–0)
RBC # UR STRIP: ABNORMAL /HPF
REF LAB TEST METHOD: ABNORMAL
RPR SER QL: NORMAL
SP GR UR STRIP: >1.03 (ref 1–1.03)
SPECIMEN VOL CSF: 16 ML
SQUAMOUS #/AREA URNS HPF: ABNORMAL /HPF
T-UPTAKE NFR SERPL: 0.87 TBI (ref 0.8–1.3)
T4 SERPL-MCNC: 11.3 MCG/DL (ref 4.5–11.7)
TSH SERPL DL<=0.05 MIU/L-ACNC: 0.03 UIU/ML (ref 0.27–4.2)
TUBE # CSF: 4
UROBILINOGEN UR QL STRIP: ABNORMAL
WBC # UR STRIP: ABNORMAL /HPF

## 2025-06-01 PROCEDURE — 87070 CULTURE OTHR SPECIMN AEROBIC: CPT | Performed by: NEUROLOGICAL SURGERY

## 2025-06-01 PROCEDURE — 89050 BODY FLUID CELL COUNT: CPT | Performed by: NEUROLOGICAL SURGERY

## 2025-06-01 PROCEDURE — 82945 GLUCOSE OTHER FLUID: CPT | Performed by: NEUROLOGICAL SURGERY

## 2025-06-01 PROCEDURE — 25810000003 SODIUM CHLORIDE 0.9 % SOLUTION: Performed by: NEUROLOGICAL SURGERY

## 2025-06-01 PROCEDURE — 99231 SBSQ HOSP IP/OBS SF/LOW 25: CPT | Performed by: NEUROLOGICAL SURGERY

## 2025-06-01 PROCEDURE — 81001 URINALYSIS AUTO W/SCOPE: CPT | Performed by: PSYCHIATRY & NEUROLOGY

## 2025-06-01 PROCEDURE — 84436 ASSAY OF TOTAL THYROXINE: CPT | Performed by: PSYCHIATRY & NEUROLOGY

## 2025-06-01 PROCEDURE — 84443 ASSAY THYROID STIM HORMONE: CPT | Performed by: PSYCHIATRY & NEUROLOGY

## 2025-06-01 PROCEDURE — 84479 ASSAY OF THYROID (T3 OR T4): CPT | Performed by: PSYCHIATRY & NEUROLOGY

## 2025-06-01 PROCEDURE — 84207 ASSAY OF VITAMIN B-6: CPT | Performed by: PSYCHIATRY & NEUROLOGY

## 2025-06-01 PROCEDURE — 82164 ANGIOTENSIN I ENZYME TEST: CPT | Performed by: NEUROLOGICAL SURGERY

## 2025-06-01 PROCEDURE — 82140 ASSAY OF AMMONIA: CPT | Performed by: PSYCHIATRY & NEUROLOGY

## 2025-06-01 PROCEDURE — 99221 1ST HOSP IP/OBS SF/LOW 40: CPT | Performed by: PSYCHIATRY & NEUROLOGY

## 2025-06-01 PROCEDURE — 87015 SPECIMEN INFECT AGNT CONCNTJ: CPT | Performed by: NEUROLOGICAL SURGERY

## 2025-06-01 PROCEDURE — 62270 DX LMBR SPI PNXR: CPT | Performed by: NEUROLOGICAL SURGERY

## 2025-06-01 PROCEDURE — 25010000002 THIAMINE HCL 200 MG/2ML SOLUTION 2 ML VIAL: Performed by: PSYCHIATRY & NEUROLOGY

## 2025-06-01 PROCEDURE — 009U3ZX DRAINAGE OF SPINAL CANAL, PERCUTANEOUS APPROACH, DIAGNOSTIC: ICD-10-PCS | Performed by: NEUROLOGICAL SURGERY

## 2025-06-01 PROCEDURE — 84157 ASSAY OF PROTEIN OTHER: CPT | Performed by: NEUROLOGICAL SURGERY

## 2025-06-01 PROCEDURE — 87205 SMEAR GRAM STAIN: CPT | Performed by: NEUROLOGICAL SURGERY

## 2025-06-01 RX ORDER — MORPHINE SULFATE 2 MG/ML
0.5 INJECTION, SOLUTION INTRAMUSCULAR; INTRAVENOUS EVERY 4 HOURS PRN
Status: DISCONTINUED | OUTPATIENT
Start: 2025-06-01 | End: 2025-06-05

## 2025-06-01 RX ADMIN — THIAMINE HYDROCHLORIDE 500 MG: 100 INJECTION, SOLUTION INTRAMUSCULAR; INTRAVENOUS at 20:50

## 2025-06-01 RX ADMIN — PANTOPRAZOLE SODIUM 40 MG: 40 TABLET, DELAYED RELEASE ORAL at 05:03

## 2025-06-01 RX ADMIN — CARVEDILOL 12.5 MG: 6.25 TABLET, FILM COATED ORAL at 17:33

## 2025-06-01 RX ADMIN — THIAMINE HYDROCHLORIDE 500 MG: 100 INJECTION, SOLUTION INTRAMUSCULAR; INTRAVENOUS at 10:42

## 2025-06-01 RX ADMIN — THIAMINE HYDROCHLORIDE 500 MG: 100 INJECTION, SOLUTION INTRAMUSCULAR; INTRAVENOUS at 15:51

## 2025-06-01 RX ADMIN — LEVOTHYROXINE SODIUM 150 MCG: 75 TABLET ORAL at 05:03

## 2025-06-01 RX ADMIN — SODIUM CHLORIDE 1000 ML: 9 INJECTION, SOLUTION INTRAVENOUS at 11:20

## 2025-06-01 NOTE — PLAN OF CARE
Goal Outcome Evaluation:  Plan of Care Reviewed With: patient        Progress: no change  Outcome Evaluation: A&Ox1. Pt talking and moving more as shift progressed. C/O HA. Bladder scan I/O cath. UA collected. Turning. Not sleeping overnight. Tele ST. Plan is for LP today.

## 2025-06-01 NOTE — PROGRESS NOTES
"Lynn Magana  49 y.o.      Chief complaint:   Altered mental status    Subjective  No complaints overnight.  A little brighter and more engaging this morning.  Complaining of a headache.    Temp:  [98 °F (36.7 °C)-98.4 °F (36.9 °C)] 98.4 °F (36.9 °C)  Heart Rate:  [80-93] 80  Resp:  [16-18] 16  BP: (102-128)/(63-87) 111/70      Objective:  Vital signs: (most recent): Blood pressure 111/70, pulse 80, temperature 98.4 °F (36.9 °C), temperature source Axillary, resp. rate 16, height 188 cm (74\"), weight 67.3 kg (148 lb 4.8 oz), SpO2 100%, not currently breastfeeding.        Neurological Exam  Mental Status  Awake and alert. Oriented only to person. Speech is normal. Language is fluent with no aphasia. Attention and concentration altered.     Cranial Nerves  CN I: Sense of smell is normal.  CN II: Right normal visual field. Left normal visual field.  CN III, IV, VI: Normal lids and orbits bilaterally. Pupils equal round and reactive to light bilaterally.  CN V: Facial sensation is normal.  CN VII:  Right: There is no facial weakness.  Left: There is no facial weakness.  CN XI: Shoulder shrug strength is normal.  CN XII: Tongue midline without atrophy or fasciculations.  Esotropia noted  No visual field deficit noted.     Motor  Decreased muscle bulk throughout. Normal muscle tone.  Moving all extremities.  Mild ataxia noted in upper extremities.  Generalized symmetric weakness throughout    Reflexes                                            Right                      Left  Patellar                                0                         0     Right pathological reflexes: Yelitza's absent. Ankle clonus absent.  Left pathological reflexes: Yelitza's absent. Ankle clonus absent.     Gait  Max assist of 2 to go from sitting to standing  Lab Results (last 24 hours)       Procedure Component Value Units Date/Time    Urinalysis, Microscopic Only - Urine, Clean Catch [[id number]]  (Abnormal) Collected: 06/01/25 0000    " Specimen: Urine, Clean Catch Updated: 06/01/25 0041     RBC, UA 0-2 /HPF      WBC, UA 3-5 /HPF      Comment: Urine culture not indicated.        Bacteria, UA Trace /HPF      Squamous Epithelial Cells, UA 0-2 /HPF      Hyaline Casts, UA 0-2 /LPF      Mucus, UA Large/3+ /HPF      Methodology Manual Light Microscopy    Urinalysis With Culture If Indicated - Urine, Clean Catch [[id number]]  (Abnormal) Collected: 06/01/25 0000    Specimen: Urine, Clean Catch Updated: 06/01/25 0017     Color, UA [place]     Appearance, UA Clear     pH, UA 5.5     Specific Gravity, UA >1.030     Glucose,  mg/dL (2+)     Ketones, UA 15 mg/dL (1+)     Bilirubin, UA Moderate (2+)     Blood, UA Negative     Protein, UA 30 mg/dL (1+)     Leuk Esterase, UA Moderate (2+)     Nitrite, UA Positive     Urobilinogen, UA 1.0 E.U./dL    Narrative:      Dipstick results may be inaccurate due to color interference.    Blood Culture - Blood, Arm, Left [[id number]]  (Normal) Collected: 05/29/25 1941    Specimen: Blood from Arm, Left Updated: 05/31/25 2000     Blood Culture No growth at 2 days    Blood Culture - Blood, Arm, Left [[id number]]  (Normal) Collected: 05/29/25 1547    Specimen: Blood from Arm, Left Updated: 05/31/25 1600     Blood Culture No growth at 2 days    HIV-1 & HIV-2 Antibodies [[id number]]  (Normal) Collected: 05/31/25 1153    Specimen: Blood Updated: 05/31/25 1306    Narrative:      The following orders were created for panel order HIV-1 & HIV-2 Antibodies.  Procedure                               Abnormality         Status                     ---------                               -----------         ------                     HIV-1 / O / 2 Ag / Antibody[[id number]]  Normal              Final result                 Please view results for these tests on the individual orders.    HIV-1 / O / 2 Ag / Antibody [[id number]]  (Normal) Collected: 05/31/25 1153    Specimen: Blood Updated: 05/31/25 1306     HIV-1/ HIV-2 Non-Reactive     Narrative:      The HIV antibody/antigen combo assay is a qualitative assay for HIV that includes the p24 antigen as well as antibodies to HIV types 1 and 2. This test is intended to be used as a screening assay in the diagnosis of HIV infection in patients over the age of 2.    RPR Qualitative with Reflex to Quant [[id number]] Collected: 05/31/25 1153    Specimen: Blood Updated: 05/31/25 1227                Plan:   Neurologic exam essentially stable but she is a little brighter and more alert.  She does have a headache this morning.  Spinal tap was performed at the bedside.  Opening pressure right now is normal.  Fluid sent for analysis      Acquired cerebral ventriculomegaly    Generalized weakness        Basim Rivera MD

## 2025-06-01 NOTE — CONSULTS
NEUROLOGY CONSULT    DOS: 2025  NAME: Lynn Magana   : 1975  PCP: Darryl Andrews DO  CC: Stroke  Referring MD: Provider, No Known    Neurological Problem and Interval History: 49 y.o. who presents with Sx of AMS and gait dysfunction. She was found to have hydrocephalus, which may be due to aqueduct stenosis, abnormal gait and AMS. She additionally has restricted extraocular eye movements. During her course she had multiple episodes of nausea and vomiting which could lead to Thiamine deficiency etc. There was also report of previous seizure. She presented with abnormal gait and cyclic vomiting and was inially felt to have cyclic vomiting from cannabis use. She decline x weeks and is re hospitalized. She had several admissions where she has left AMA. She was ambulatory 6 months ago.       Past Medical/Surgical Hx:  Past Medical History:   Diagnosis Date    Arthritis     Carotid artery stenosis     Degenerative disc disease, cervical     Depression     Diabetes mellitus     type 1    Disease of thyroid gland     GERD (gastroesophageal reflux disease)     Graves disease     Heart palpitations     Hyperlipidemia     Hypertension     Hypothyroidism     Seizure     Thyromegaly      Past Surgical History:   Procedure Laterality Date     SECTION      CHOLECYSTECTOMY      COLONOSCOPY  2015    Normal exam Dr. Morgan     COLONOSCOPY  2015    Normal exam    CYST REMOVAL      ENDOSCOPY N/A 2018    Normal exam    ENDOSCOPY N/A 2025    Procedure: ESOPHAGOGASTRODUODENOSCOPY WITH ANESTHESIA;  Surgeon: Jadon Smith MD;  Location: [facility] ENDOSCOPY;  Service: Gastroenterology;  Laterality: N/A;  pre op: Nausea and vomiting  post op: normal  pcp: Darryl Andrews DO    HYSTERECTOMY      THYROIDECTOMY Bilateral 2018    Procedure: total thyroidectomy;  Surgeon: Vitaly Givens MD;  Location: [facility] OR;  Service: ENT       Review of Systems:  negative except as  noted above.    Medications On Admission  Medications Prior to Admission   Medication Sig Dispense Refill Last Dose/Taking    [Paused] amLODIPine (NORVASC) 10 MG tablet Take 1 tablet by mouth Daily.   Taking    ARIPiprazole (ABILIFY) 10 MG tablet Take 1 tablet by mouth Daily.   Taking    benazepril (LOTENSIN) 20 MG tablet Take 1 tablet by mouth Daily.   Taking    [Paused] carvedilol (COREG) 25 MG tablet Take 1 tablet by mouth 2 (Two) Times a Day With Meals.   Taking    [Paused] empagliflozin (JARDIANCE) 25 MG tablet tablet Take 1 tablet by mouth Daily.   Taking    Evolocumab (REPATHA) solution auto-injector SureClick injection Inject 1 mL under the skin into the appropriate area as directed Every 14 (Fourteen) Days.   5/29/2025    levothyroxine (SYNTHROID, LEVOTHROID) 175 MCG tablet Take 1 tablet by mouth Every Morning. 30 tablet 2 Taking    liothyronine (CYTOMEL) 25 MCG tablet Take 1 tablet by mouth 2 (Two) Times a Day.   Taking    [Paused] metFORMIN ER (GLUCOPHAGE-XR) 500 MG 24 hr tablet Take 2 tablets by mouth Daily With Breakfast.   Taking    mirtazapine (REMERON) 15 MG tablet Take 1 tablet by mouth Every Night.   Taking    omeprazole (priLOSEC) 20 MG capsule Take 1 capsule by mouth Daily.   Taking    propranolol LA (INDERAL LA) 120 MG 24 hr capsule Take 1 capsule by mouth Daily.   Taking    rosuvastatin (CRESTOR) 20 MG tablet Take 1 tablet by mouth Every Night.   Taking    [Paused] spironolactone (ALDACTONE) 50 MG tablet Take 1 tablet by mouth 2 (Two) Times a Day.   Taking    vitamin D (ERGOCALCIFEROL) 1.25 MG (95534 UT) capsule capsule Take 1 capsule by mouth 1 (One) Time Per Week.   Taking    hydrOXYzine pamoate (VISTARIL) 25 MG capsule Take 1 capsule by mouth 3 (Three) Times a Day As Needed for Anxiety.       [Paused] insulin glargine (LANTUS, SEMGLEE) 100 UNIT/ML injection Inject 75 Units under the skin into the appropriate area as directed Every Night. (Patient not taking: Reported on 5/30/2025)   Not  Taking    Melatonin 10 MG tablet Take 1 tablet by mouth At Night As Needed (sleep).       ondansetron ODT (ZOFRAN-ODT) 4 MG disintegrating tablet Take 1 tablet by mouth Every 6 (Six) Hours As Needed for Nausea or Vomiting. 30 tablet 0     polyethylene glycol (MIRALAX) 17 g packet Take 17 g by mouth Daily As Needed (constipation).          Allergies:  Allergies   Allergen Reactions    Oxycodone-Acetaminophen Swelling     Other reaction(s): Throat swelling       Social Hx:  Social History     Socioeconomic History    Marital status:      Spouse name: Evens    Number of children: 1    Years of education: 12   Tobacco Use    Smoking status: Never    Smokeless tobacco: Never   Vaping Use    Vaping status: Never Used   Substance and Sexual Activity    Alcohol use: Yes     Alcohol/week: 6.0 standard drinks of alcohol     Types: 6 Cans of beer per week    Drug use: Yes     Types: Marijuana    Sexual activity: Defer     Partners: Male       Family Hx:  Family History   Problem Relation Age of Onset    Stroke Mother     Diabetes Mother     Stroke Father     Diabetes Father     Breast cancer Sister     Diabetes Sister     Stroke Sister     Seizures Sister     Coronary artery disease Brother     Diabetes Brother     Breast cancer Maternal Aunt     Colon cancer Paternal Uncle     Colon polyps Neg Hx        Review of Imaging (Interpretation of images not reports):  -MRI brain:   1. Persistent mild asymmetric dilation of the lateral and third  ventricles compared to the fourth ventricle, with small caliber of the  aqueduct suggestive of aqueduct of stenosis. Given fluid increased  periventricular FLAIR signal possibly related to mild transependymal  edema, versus a combination of transependymal edema and chronic small  vessel white matter ischemic disease. No obstructing mass at the sylvian  aqueduct, no abnormal enhancement is identified within the brain.    Laboratory Results:   Lab Results   Component Value Date     "GLUCOSE 140 (H) 05/31/2025    CALCIUM 9.6 05/31/2025     05/31/2025    K 3.7 05/31/2025    CO2 19.0 (L) 05/31/2025     (H) 05/31/2025    BUN 34.2 (H) 05/31/2025    CREATININE 0.77 05/31/2025    EGFRIFAFRI 81 08/19/2019    BCR 44.4 (H) 05/31/2025    ANIONGAP 13.0 05/31/2025     Lab Results   Component Value Date    WBC 5.86 05/30/2025    HGB 9.4 (L) 05/30/2025    HCT 28.1 (L) 05/30/2025    MCV 85.4 05/30/2025     05/30/2025     Lab Results   Component Value Date    CHOL 141 12/04/2024    CHOL 200 11/09/2017     Lab Results   Component Value Date    HDL 68 (H) 12/04/2024    HDL 84 11/09/2017     Lab Results   Component Value Date    LDL 56 12/04/2024    LDL 96 11/09/2017     Lab Results   Component Value Date    TRIG 94 12/04/2024    TRIG 120 11/09/2017     Lab Results   Component Value Date    HGBA1C 4.40 (L) 05/11/2025     Lab Results   Component Value Date    INR 0.97 05/29/2025    INR 0.98 09/17/2024    INR 1.03 07/08/2017    PROTIME 13.3 05/29/2025    PROTIME 13.4 09/17/2024    PROTIME 13.8 07/08/2017         Physical Examination:   /78 (BP Location: Left arm, Patient Position: Lying)   Pulse 82   Temp 98.2 °F (36.8 °C) (Axillary)   Resp 16   Ht 188 cm (74\")   Wt 67.3 kg (148 lb 4.8 oz)   SpO2 100%   BMI 19.04 kg/m²     Higher Integrative  Function: Oriented to self, follows commands and answers simple questions.     CN II: Pupils are equal, round, and reactive to light. Blinks to visual threat and counts fingers in all fields  CN III IV VI: Extraocular movements are restricted variably in different planes of gaze without nystagmus.   CN V: Normal facial sensation   CN VII: Facial movements are symmetric. No dysarthria  CN VIII:   Auditory acuity is normal.  CN IX & X:   No palatal or pharnygeal dysarthria.  CN XI: Turns head without abnormality.   CN XII:   The tongue is midline.  No lingual dysarthria.   Motor: B/L LE move within the plane of gravity  Reflexes: 2+ in the upper " and lower extremities. Plantar responses are flexor.  Sensation: Normal to light touch in arms and legs.   Station and Gait: Deferred for bed rest    Coordination: Finger-to-nose test shows no dysmetria.     Diagnoses / Discussion:  49 y.o. who presents with Sx of AMS and gait dysfunction. She was found to have hydrocephalus which may be the cause of abnormal gait and AMS. She additionally has restricted extraocular eye movements. During her course she had multiple episodes of nausea and vomiting which could lead to Thiamine deficiency etc. There was also report of previous seizure.    Plan:  -B12, ammonia, folate, TSH, B6, thiamine  -MRI t-spine  -EEG  -LP with NSG  -Thiamine 500 mg IV TID     I have discussed the above with the patient and family.  Time spent with patient: 30min    Payton Weems MD  Neurology

## 2025-06-01 NOTE — PROGRESS NOTES
[facility] Medicine Services  INPATIENT PROGRESS NOTE    Patient Name: Lynn Magana  Date of Admission: 5/29/2025  Today's Date: 06/01/25  Length of Stay: 3  Primary Care Physician: Darryl Andrews DO    Subjective   Chief Complaint: Lightheadedness.  Weakness - Generalized     49-year-old female with history of hypothyroidism, liver steatosis, cannabis use, malnutrition, diabetes mellitus type 2, hypertension, admitted May 11 through May 14, 2025; at that time she was admitted due to poor oral intake and weight loss.  She had an upper endoscopy performed 5/13/2025 with findings of a small hiatal hernia and no other abnormalities reported.  Per reports reviewed the patient was not taking levothyroxine at the time, and her TSH was markedly elevated.  She was restarted on levothyroxine.  Her blood glucose was low, and did not require insulin management.  Drug screening urine was positive for cannabis, and considered to be secondary to cannabinoid hyperemesis syndrome.  The patient left the hospital AGAINST MEDICAL ADVICE, apparently she eloped; after being called, she stated was already home and did not plan to return to the hospital.  On 5/16/2025, the patient returned to the hospital reporting a syncopal event.  There was questionable seizure-like activity.   A CT brain performed on that evaluation showed moderate further increase in size of the lateral and third ventricle since the previous study. The fourth ventricle reported as normal and unchanged. A small obstructing lesion at the level of aqueduct of Sylvius not excluded. Further evaluation with contrast enhanced MR imaging of the brain was suggested. She again left the ER AGAINST MEDICAL ADVICE.      Patient came to hospital 5/30/25 reporting dizziness, lightheadedness, worsening when standing up, abnormal gait with unsteadiness;  reported confusion.  Symptom has been going on for approximately 1-2  months.  Blood pressure has been variable, initially elevated.    Evaluated with family members omn 5/31/25.  Today, not present.  Discussed with neurosurgery, patient had lumbar puncture performed today, with opening pressure of 17, and fluid sent for analysis.    She is drowsy, on bed rest due to recent LP; follows commands partially.  She has been nonambulatory for several weeks.  She reports weakness of the lower extremities.  She has had urinary retention requiring straight catheterization during this admission. No significant changes.    Review of Systems   All pertinent negatives and positives are as above. All other systems have been reviewed and are negative unless otherwise stated.     Objective    Temp:  [98 °F (36.7 °C)-98.4 °F (36.9 °C)] 98.4 °F (36.9 °C)  Heart Rate:  [80-93] 80  Resp:  [16-18] 16  BP: (102-128)/(63-87) 111/70  Physical Exam  Constitutional:       Appearance: Alert, oriented to person.  No respiratory distress.  HENT:      Head: Normocephalic and atraumatic.      Nose: Nose normal.      Mouth/Throat:      Mouth: Mucous membranes are moist.   Neck, old transverse scar  Eyes:      Conjunctiva/sclera: Conjunctivae normal.      Pupils: Pupils are equal, round  Cardiovascular:      Rate and Rhythm: Normal rate and regular rhythm.      Pulses: Normal pulses.   Pulmonary:      Effort: No respiratory distress.      Breath sounds: Normal breath sounds. No wheezing, rhonchi or rales.   Abdominal:      General: Abdomen is flat. Bowel sounds are normal.      Palpations: Abdomen is soft.      Tenderness: There is no guarding or rebound.   Extremities:  No lower extremity edema.  Skin:     Capillary Refill: Capillary refill takes less than 2 seconds.      Coloration: Skin is not jaundiced.      Findings: No rash.   Neurological:   Drowsy. Slow speech.  Follows commands.    Lower extremity strength difficult to evaluate; she is not able to hold legs when elevated. Left upper extremity strength 4/5;  "right upper 5/5. Gait was not evaluated.        Results Review:  I have reviewed the labs, radiology results, and diagnostic studies.    Laboratory Data:   Results from last 7 days   Lab Units 05/30/25  0500 05/29/25  1247   WBC 10*3/mm3 5.86 8.72   HEMOGLOBIN g/dL 9.4* 13.0   HEMATOCRIT % 28.1* 41.1   PLATELETS 10*3/mm3 189 200        Results from last 7 days   Lab Units 05/31/25  0502 05/30/25  0500 05/29/25  1247   SODIUM mmol/L 141 145 142   POTASSIUM mmol/L 3.7 2.9* 3.3*   CHLORIDE mmol/L 109* 109* 103   CO2 mmol/L 19.0* 20.0* 16.0*   BUN mg/dL 34.2* 32.3* 29.0*   CREATININE mg/dL 0.77 0.70 0.98   CALCIUM mg/dL 9.6 9.2 10.2   BILIRUBIN mg/dL  --  0.6 1.1   ALK PHOS U/L  --  37* 50   ALT (SGPT) U/L  --  61* 88*   AST (SGOT) U/L  --  30 58*   GLUCOSE mg/dL 140* 121* 144*       Culture Data:   No results found for: \"BLOODCX\", \"URINECX\", \"WOUNDCX\", \"MRSACX\", \"RESPCX\", \"STOOLCX\"    Radiology Data:   Imaging Results (Last 24 Hours)       Procedure Component Value Units Date/Time    MRI Cervical Spine With & Without Contrast [[id number]] Collected: 05/31/25 1629     Updated: 05/31/25 1639    Narrative:      EXAM: MRI CERVICAL SPINE W WO CONTRAST- - 5/31/2025 1:27 PM     HISTORY: weakness left upper, and bilateral lower extremities with  urinay retention; R53.1-Weakness; R11.2-Nausea with vomiting,  unspecified; F12.90-Cannabis use, unspecified, uncomplicated;  R79.89-Other specified abnormal findings of blood chemistry;  N39.0-Urinary tract infection, site not specified; R13.10-Dysphagia,  unspecified; Z74.09-Other reduced mobility       COMPARISON: 4/1/2022.     TECHNIQUE: Routine MR of the cervical spine was performed without and  with intravenous contrast.     FINDINGS:      The visualized posterior fossa is unremarkable. There is normal signal  in the visualized spinal cord.     No acute fracture or marrow edema identified.     C1-C2: There is no central canal or neuroforaminal stenosis.     C2-C3: No disc bulge, " spinal canal, or foraminal stenosis. There is left  facet arthropathy which does not cause significant neuroforaminal  stenosis.     C3-C4: There is disc space narrowing and spurring of the endplates.  There is posterior disc osteophyte complex formation causing mild  central canal stenosis and effacement of the ventral thecal sac. Right  greater than left uncinate process spurring causes moderate right  neuroforaminal narrowing.     C4-C5: No disc bulge, spinal canal, or foraminal stenosis.      C5-C6: There is spondylosis with disc base narrowing and spurring of the  endplates and mild effacement of the ventral thecal sac. There is  uncinate process spurring bilaterally with severe bilateral  neuroforaminal stenosis.     C6-C7: Spondylosis with spurring anteriorly without significant central  canal stenosis and severe left neuroforaminal narrowing.     C7-T1: No disc bulge, spinal canal, or foraminal stenosis.     There is no pathologic contrast enhancement.     Extraspinal soft tissues within normal limits.          Impression:      1. Multilevel spondylosis and uncinate process spurring causing  multilevel varying degrees of neuroforaminal stenosis as above. There is  no severe central canal stenosis or pathologic contrast enhancement.     This report was signed and finalized on 5/31/2025 4:36 PM by Ramon Cifuentes.       MRI Lumbar Spine With & Without Contrast [[id number]] Collected: 05/31/25 1625     Updated: 05/31/25 1632    Narrative:      EXAM: MRI LUMBAR SPINE W WO CONTRAST- - 5/31/2025 1:47 PM     HISTORY: weakness left upper, and bilateral lower extremities with  urinay retention; R53.1-Weakness; R11.2-Nausea with vomiting,  unspecified; F12.90-Cannabis use, unspecified, uncomplicated;  R79.89-Other specified abnormal findings of blood chemistry;  N39.0-Urinary tract infection, site not specified; R13.10-Dysphagia,  unspecified; Z74.09-Other reduced mobility       COMPARISON: None available.      TECHNIQUE: Routine MR of the lumbar spine was performed without and with  intravenous contrast.     FINDINGS:         Spinal cord ends normally at the L1 level. There is normal signal in  the conus medullaris. No abnormal enhancement is seen. No acute fracture  identified. There are degenerative endplate signal changes at L4-L5 and  L5-S1.        L5-S1: There is spurring of the endplates and posterior disc space  narrowing. There is no significant acquired central canal stenosis. Mild  facet arthropathy is noted with mild right neuroforaminal narrowing and  no spondylolisthesis.     L4-L5: There is spurring of the endplates and mild disc base narrowing.  Disc bulging causes moderate central canal stenosis and encroachment on  the right greater than left subarticular recess. There is facet  arthropathy and ligamentum flavum hypertrophy with no severe  neuroforaminal narrowing and no spondylolisthesis.     L3-L4: No disc bulge, spinal canal or foraminal stenosis.      L2-L3: No disc bulge, spinal canal or foraminal stenosis.      L1-L2: No disc bulge, spinal canal or foraminal stenosis.      Extraspinal soft tissues within normal limits.          Impression:      1. Lower lumbar spondylosis and facet arthropathy at L4-L5 and L5-S1  with no severe central canal or neuroforaminal stenosis identified.     This report was signed and finalized on 5/31/2025 4:29 PM by Ramon Cifuentes.               I have reviewed the patient's current medications.     Assessment/Plan   Assessment  Active Hospital Problems    Diagnosis     **Acquired cerebral ventriculomegaly     Generalized weakness      Acute encephalopathy, unknown etiology  Ventriculomegaly, rule out obstructive hydrocephalus  Hypokalemia, resolved  Hypothyroidism,after thyroidectomy  Lumbar spine spondylosis  Cervical spine spondylosis  Hypertension  Hepatic steatosis  Chronic gastritis  History of Graves' disease status post thyroidectomy    Labs 5/31  Sodium 141.   Potassium improved from 2.9 to 3.7.  Creatinine 0.77.  Glucose 140.  Magnesium 2.1.        Treatment Plan  Nurosurgery recommendations noted. Lumbar puncture performed. CSF additional testing sent.     Neurology evaluation took place. Recommendations noted.    Follow labs in AM    Patient has had variable thyroid function tests on prior admissions and currently. Will continue levothyroxine 150 mcg p.o. daily. I believe she will require endocrinology outpatient follow up, and this was explained to family.    Due to variable blood pressure values, holding amlodipine lisinoprill, aldactone; and decrease carvedilol to 12.5 p.o. twice daily.     Continue PT/OT  Diet as per speech therapist recommendations.      Medical Decision Making  Number and Complexity of problems: 8, moderate complexity    Differential Diagnosis: see above    Conditions and Status        Condition is unchanged.     [facility] Data  External documents reviewed: no  Cardiac tracing (EKG, telemetry) interpretation: no new  Radiology interpretation: reports reviewed  Labs reviewed: yes  Any tests that were considered but not ordered: no     Decision rules/scores evaluated (example [id number], Wells, etc): none     Discussed with: patient     Care Planning  Shared decision making: patient  Code status and discussions: FC    Disposition  Social Determinants of Health that impact treatment or disposition: none      Electronically signed by Bautista Limon MD, 06/01/25, 10:51 CDT.

## 2025-06-01 NOTE — PLAN OF CARE
Goal Outcome Evaluation:  Plan of Care Reviewed With: patient        Progress: no change     Pt alert to self only but was able to tell me she was at the hospital this evening. LP completed today at bedside by Dr. Rivera. Pt tolerated well. Stayed flat for a few hours and once able to sit up, she fed herself some lunch. Pt seems to be improving based on my initial assessment this morning. Pt still confused but able to follow more commands. SCDs on. Bolus given per orders. No c/o pain. Tele in place. PPP. VSS. Able to move arms. Call light within reach. Safety maintained

## 2025-06-01 NOTE — OP NOTE
Procedure Note  Preop Diagnosis: Altered mental status, ventriculomegaly   Postop diagnosis: Altered mental status, ventriculomegaly  * No surgery found *     Procedure Name: Spinal tap    Indications:  A clinical exam and brain imaging revealed findings of possible ventriculomegaly. The patient now presents for spinal tap after discussing therapeutic alternatives.          Surgeon: Basim Rivera MD     Assistants: None    Anesthesia: Not reallyLocal anesthesia on1% buffered lidocaine    ASA Class: 13    Procedure Details   After obtaining informed consent, having the risks and benefits of the procedure explained including but not limited to infection, bleeding, spinal fluid leak.  The patient was laid on her right side in the fetal position.  The lumbar spine was prepped and draped in standard sterile fashion.  1% lidocaine was injected.  A spinal needle was then advanced proximally at L4-5.  Spontaneous flow of spinal fluid was obtained.  The fluid was clear.  Opening pressure was 17 cm of water.  Approximately 25 cc of fluid was obtained for analysis.  Patient tolerated procedure well.    Findings:  Spinal fluid obtained.  Opening pressure 17 cmH2O.]    Estimated Blood Loss:  noner           Drains: None           Total IV Fluids:  ml           Specimens: None           Implants: * No surgical log found *           Complications:  none           Disposition:  Remain in room flat for 2 hours           Condition: stable        Basim Rivera MD

## 2025-06-02 ENCOUNTER — APPOINTMENT (OUTPATIENT)
Dept: NEUROLOGY | Facility: HOSPITAL | Age: 50
End: 2025-06-02
Payer: COMMERCIAL

## 2025-06-02 LAB
ANION GAP SERPL CALCULATED.3IONS-SCNC: 11 MMOL/L (ref 5–15)
BUN SERPL-MCNC: 27 MG/DL (ref 6–20)
BUN/CREAT SERPL: 32.9 (ref 7–25)
CALCIUM SPEC-SCNC: 9.2 MG/DL (ref 8.6–10.5)
CHLORIDE SERPL-SCNC: 114 MMOL/L (ref 98–107)
CO2 SERPL-SCNC: 21 MMOL/L (ref 22–29)
CREAT SERPL-MCNC: 0.82 MG/DL (ref 0.57–1)
EGFRCR SERPLBLD CKD-EPI 2021: 87.3 ML/MIN/1.73
FOLATE SERPL-MCNC: 2.78 NG/ML (ref 4.78–24.2)
GLUCOSE SERPL-MCNC: 119 MG/DL (ref 65–99)
POTASSIUM SERPL-SCNC: 3.3 MMOL/L (ref 3.5–5.2)
SODIUM SERPL-SCNC: 146 MMOL/L (ref 136–145)
VIT B12 BLD-MCNC: >2000 PG/ML (ref 211–946)

## 2025-06-02 PROCEDURE — 97535 SELF CARE MNGMENT TRAINING: CPT

## 2025-06-02 PROCEDURE — 83516 IMMUNOASSAY NONANTIBODY: CPT | Performed by: PSYCHIATRY & NEUROLOGY

## 2025-06-02 PROCEDURE — 82746 ASSAY OF FOLIC ACID SERUM: CPT | Performed by: PSYCHIATRY & NEUROLOGY

## 2025-06-02 PROCEDURE — 97530 THERAPEUTIC ACTIVITIES: CPT

## 2025-06-02 PROCEDURE — 80048 BASIC METABOLIC PNL TOTAL CA: CPT | Performed by: FAMILY MEDICINE

## 2025-06-02 PROCEDURE — 99233 SBSQ HOSP IP/OBS HIGH 50: CPT | Performed by: PSYCHIATRY & NEUROLOGY

## 2025-06-02 PROCEDURE — 82607 VITAMIN B-12: CPT | Performed by: PSYCHIATRY & NEUROLOGY

## 2025-06-02 PROCEDURE — 25010000002 FOLIC ACID 5 MG/ML SOLUTION 10 ML VIAL: Performed by: PSYCHIATRY & NEUROLOGY

## 2025-06-02 PROCEDURE — 95816 EEG AWAKE AND DROWSY: CPT

## 2025-06-02 PROCEDURE — 83921 ORGANIC ACID SINGLE QUANT: CPT | Performed by: PSYCHIATRY & NEUROLOGY

## 2025-06-02 PROCEDURE — 99231 SBSQ HOSP IP/OBS SF/LOW 25: CPT | Performed by: NURSE PRACTITIONER

## 2025-06-02 PROCEDURE — 25010000002 THIAMINE HCL 200 MG/2ML SOLUTION 2 ML VIAL: Performed by: PSYCHIATRY & NEUROLOGY

## 2025-06-02 PROCEDURE — 95816 EEG AWAKE AND DROWSY: CPT | Performed by: PSYCHIATRY & NEUROLOGY

## 2025-06-02 RX ORDER — POTASSIUM CHLORIDE 1500 MG/1
40 TABLET, EXTENDED RELEASE ORAL EVERY 4 HOURS
Status: COMPLETED | OUTPATIENT
Start: 2025-06-02 | End: 2025-06-02

## 2025-06-02 RX ORDER — CARVEDILOL 6.25 MG/1
6.25 TABLET ORAL 2 TIMES DAILY WITH MEALS
Status: DISCONTINUED | OUTPATIENT
Start: 2025-06-02 | End: 2025-06-11 | Stop reason: HOSPADM

## 2025-06-02 RX ADMIN — POTASSIUM CHLORIDE 40 MEQ: 1500 TABLET, EXTENDED RELEASE ORAL at 05:10

## 2025-06-02 RX ADMIN — POTASSIUM CHLORIDE 40 MEQ: 1500 TABLET, EXTENDED RELEASE ORAL at 09:20

## 2025-06-02 RX ADMIN — CARVEDILOL 12.5 MG: 6.25 TABLET, FILM COATED ORAL at 09:20

## 2025-06-02 RX ADMIN — ARIPIPRAZOLE 10 MG: 5 TABLET ORAL at 09:20

## 2025-06-02 RX ADMIN — THIAMINE HYDROCHLORIDE 500 MG: 100 INJECTION, SOLUTION INTRAMUSCULAR; INTRAVENOUS at 20:26

## 2025-06-02 RX ADMIN — FOLIC ACID 1 MG: 5 INJECTION, SOLUTION INTRAMUSCULAR; INTRAVENOUS; SUBCUTANEOUS at 17:36

## 2025-06-02 RX ADMIN — PANTOPRAZOLE SODIUM 40 MG: 40 TABLET, DELAYED RELEASE ORAL at 04:48

## 2025-06-02 RX ADMIN — THIAMINE HYDROCHLORIDE 500 MG: 100 INJECTION, SOLUTION INTRAMUSCULAR; INTRAVENOUS at 16:57

## 2025-06-02 RX ADMIN — LEVOTHYROXINE SODIUM 150 MCG: 75 TABLET ORAL at 04:48

## 2025-06-02 RX ADMIN — THIAMINE HYDROCHLORIDE 500 MG: 100 INJECTION, SOLUTION INTRAMUSCULAR; INTRAVENOUS at 09:20

## 2025-06-02 RX ADMIN — CARVEDILOL 6.25 MG: 6.25 TABLET, FILM COATED ORAL at 17:36

## 2025-06-02 NOTE — PLAN OF CARE
Goal Outcome Evaluation:  Plan of Care Reviewed With: patient        Progress: improving  Outcome Evaluation: pt trans to EOB min assist, sat EOB 20 minutes cga-sba, B LAQ x 10 reps, pt able to follow commands did require repeated directions to stay on task, attempted to stand, pt not able to stand, pt was able to push thru arms and get her bottom off the bed and scoot towards the HOB, trans back to bed min assist, pt would benefit from rehab

## 2025-06-02 NOTE — THERAPY TREATMENT NOTE
Acute Care - Occupational Therapy Treatment Note  [facility]     Patient Name: Lynn Magana  : 1975  MRN: [id number]  Today's Date: 2025  Onset of Illness/Injury or Date of Surgery: 25     Referring Physician: Dr. Limon    Admit Date: 2025       ICD-10-CM ICD-9-CM   1. Generalized weakness  R53.1 780.79   2. Nausea and vomiting, unspecified vomiting type  R11.2 787.01   3. Marijuana user  F12.90 305.20   4. Low TSH level  R79.89 794.5   5. Urinary tract infection without hematuria, site unspecified  N39.0 599.0   6. Dysphagia, unspecified type  R13.10 787.20   7. Impaired functional mobility and activity tolerance [Z74.09]  Z74.09 V49.89     Patient Active Problem List   Diagnosis    Syncope    Graves' disease    Polysubstance abuse    Hypertension    Diabetes    Spells of decreased attentiveness    Chronic intractable headache    Nausea and vomiting    Slow transit constipation    Nonsmoker    Type 2 diabetes mellitus, with long-term current use of insulin    GERD without esophagitis    Hyperthyroidism    Thyromegaly    Post-surgical hypothyroidism    Degeneration of cervical intervertebral disc    Cervical radiculopathy    Acute pain of right shoulder    Class 1 obesity due to excess calories with body mass index (BMI) of 30.0 to 30.9 in adult    Hypoglycemia    Stage 1 acute kidney injury    Dehydration    Dysphagia    Dehydration    Hypokalemia    Steatosis of liver    Marijuana abuse    Loss of weight    Severe protein-calorie malnutrition    Hypothyroid    Generalized weakness    Acquired cerebral ventriculomegaly     Past Medical History:   Diagnosis Date    Arthritis     Carotid artery stenosis     Degenerative disc disease, cervical     Depression     Diabetes mellitus     type 1    Disease of thyroid gland     GERD (gastroesophageal reflux disease)     Graves disease     Heart palpitations     Hyperlipidemia     Hypertension     Hypothyroidism     Seizure     Thyromegaly       Past Surgical History:   Procedure Laterality Date     SECTION      CHOLECYSTECTOMY      COLONOSCOPY  2015    Normal exam Dr. Morgan     COLONOSCOPY  2015    Normal exam    CYST REMOVAL      ENDOSCOPY N/A 2018    Normal exam    ENDOSCOPY N/A 2025    Procedure: ESOPHAGOGASTRODUODENOSCOPY WITH ANESTHESIA;  Surgeon: Jadon Smith MD;  Location:  [facility];  Service: Gastroenterology;  Laterality: N/A;  pre op: Nausea and vomiting  post op: normal  pcp: Darryl Andrews,     HYSTERECTOMY      THYROIDECTOMY Bilateral 2018    Procedure: total thyroidectomy;  Surgeon: Vitaly Givens MD;  Location: [facility] OR;  Service: ENT         OT ASSESSMENT FLOWSHEET (Last 12 Hours)       OT Evaluation and Treatment       Row Name 25 1005                   OT Time and Intention    Subjective Information no complaints  -AC (r) BH (t) AC (c)        Document Type therapy note (daily note)  -AC (r) BH (t) AC (c)        Mode of Treatment occupational therapy  -AC (r) BH (t) AC (c)           General Information    Existing Precautions/Restrictions fall  -AC (r) BH (t) AC (c)           Pain Assessment    Pretreatment Pain Rating 0/10 - no pain  -AC (r) BH (t) AC (c)        Posttreatment Pain Rating 0/10 - no pain  -AC (r) BH (t) AC (c)        Pain Management Interventions --  -AC (r) BH (t) AC (c)        Response to Pain Interventions --  -AC (r) BH (t) AC (c)           Cognition    Affect/Mental Status (Cognition) --  -AC (r) BH (t) AC (c)        Behavioral Issues (Cognition) --  -AC (r) BH (t) AC (c)        Orientation Status (Cognition) oriented to;person;place;disoriented to;situation;time  -AC (r) BH (t) AC (c)        Personal Safety Interventions fall prevention program maintained;gait belt;muscle strengthening facilitated;nonskid shoes/slippers when out of bed;supervised activity  -AC (r) BH (t) AC (c)           Activities of Daily Living    BADL  Assessment/Intervention lower body dressing;grooming  -AC (r) BH (t) AC (c)           Lower Body Dressing Assessment/Training    Hunt Level (Lower Body Dressing) don;doff;socks;moderate assist (50% patient effort)  -AC (r) BH (t) AC (c)        Position (Lower Body Dressing) edge of bed sitting  -AC (r) BH (t) AC (c)           Grooming Assessment/Training    Hunt Level (Grooming) oral care regimen;wash face, hands;minimum assist (75% patient effort)  -AC (r) BH (t) AC (c)        Position (Grooming) edge of bed sitting;supported sitting;unsupported sitting  -AC (r) BH (t) AC (c)           Bed Mobility    Bed Mobility supine-sit;sit-supine  -AC (r) BH (t) AC (c)        Supine-Sit Hunt (Bed Mobility) minimum assist (75% patient effort);verbal cues;nonverbal cues (demo/gesture)  -AC (r) BH (t) AC (c)        Sit-Supine Hunt (Bed Mobility) moderate assist (50% patient effort);verbal cues;nonverbal cues (demo/gesture)  -AC (r) BH (t) AC (c)        Assistive Device (Bed Mobility) bed rails;head of bed elevated;air-assisted device  -AC (r) BH (t) AC (c)        Comment, (Bed Mobility) Sat EOB for 30 minutes completing personal hygiene tasks and B UE exercise  -AC (r) BH (t) AC (c)           Sit-Stand Transfer    Sit-Stand Hunt (Transfers) moderate assist (50% patient effort);2 person assist;verbal cues;nonverbal cues (demo/gesture)  -AC (r) BH (t) AC (c)        Assistive Device (Sit-Stand Transfers) walker, front-wheeled;other (see comments)  Bed raised  -AC (r) BH (t) AC (c)        Comment, (Sit-Stand Transfer) Pt expressed feeling dizzy upon standing.  -AC (r) BH (t) AC (c)           Stand-Sit Transfer    Stand-Sit Hunt (Transfers) moderate assist (50% patient effort);2 person assist;verbal cues;nonverbal cues (demo/gesture)  -AC (r) BH (t) AC (c)        Assistive Device (Stand-Sit Transfers) walker, front-wheeled;other (see comments)  Bed elevated  -AC (r) BH (t) AC (c)            Safety Issues/Impairments Affecting Functional Mobility    Impairments Affecting Function (Mobility) balance;cognition  -AC (r) BH (t) AC (c)           Motor Skills    Therapeutic Exercise aerobic  -AC (r) BH (t) AC (c)        Comments, Therapeutic Exercise B UE exercise 1 x 10 reps shoulder flex/ext and elbow flex/ext with frequent visual/verbal cues to initate and maintain attention to task  -AC (r) BH (t) AC (c)           Balance    Static Sitting Balance contact guard;verbal cues;non-verbal cues (demo/gesture)  -AC (r) BH (t) AC (c)        Dynamic Sitting Balance minimal assist;verbal cues;non-verbal cues (demo/gesture)  -AC (r) BH (t) AC (c)        Comment, Balance One episode of LOB noted towards anterior while seated EOB during grooming requiring Whitney to correct  -AC (r) BH (t) AC (c)           Wound 05/29/25 1930 Left lower leg    Wound - Properties Group Placement Date: 05/29/25  -AR Placement Time: 1930  -AR Present on Original Admission: Y  -AR Side: Left  -AR Orientation: lower  -AR Location: leg  -AR    Retired Wound - Properties Group Placement Date: 05/29/25  -AR Placement Time: 1930  -AR Present on Original Admission: Y  -AR Side: Left  -AR Orientation: lower  -AR Location: leg  -AR    Retired Wound - Properties Group Placement Date: 05/29/25  -AR Placement Time: 1930  -AR Present on Original Admission: Y  -AR Side: Left  -AR Orientation: lower  -AR Location: leg  -AR    Retired Wound - Properties Group Date first assessed: 05/29/25  -AR Time first assessed: 1930  -AR Present on Original Admission: Y  -AR Side: Left  -AR Location: leg  -AR       Wound 05/29/25 1931 Right lower leg    Wound - Properties Group Placement Date: 05/29/25  -AR Placement Time: 1931  -AR Present on Original Admission: Y  -AR Side: Right  -AR Orientation: lower  -AR Location: leg  -AR    Retired Wound - Properties Group Placement Date: 05/29/25  -AR Placement Time: 1931  -AR Present on Original Admission: Y  -AR Side:  Right  -AR Orientation: lower  -AR Location: leg  -AR    Retired Wound - Properties Group Placement Date: 05/29/25  -AR Placement Time: 1931  -AR Present on Original Admission: Y  -AR Side: Right  -AR Orientation: lower  -AR Location: leg  -AR    Retired Wound - Properties Group Date first assessed: 05/29/25  -AR Time first assessed: 1931  -AR Present on Original Admission: Y  -AR Side: Right  -AR Location: leg  -AR       Plan of Care Review    Plan of Care Reviewed With patient  -AC (r) BH (t) AC (c)        Progress improving  -AC (r) BH (t) AC (c)        Outcome Evaluation OT tx complete. Pt oriented to person and place but disoriented to situation and time. Pt seen in fowlers with daughter at bedside. Pt presented more alert today and agreeable to participating in tx. Pt required Whitney for all bed mobility tasks on this date and personal hygiene task to correctly identify hygiene items. During task, LOB noted towards anterior requring Whitney to correct.Pt able to complete donning socks while seated EOB with ModA for balance due to lateral left lean when completing. Pt completed B UE exercise x 10 reps with frequent visual/verbal cues. Pt completed 1 sit <> stand transition with ModA x2 with c/o of dizziness while standing. Pt presented more alert today but still requires increased verbal/tactile/visual cues throughout tasks and increased processing time. OT will continue to tx as indicated. Recommend sub acute rehab at d/c.  -AC (r) BH (t) AC (c)           Positioning and Restraints    Pre-Treatment Position in bed  -AC (r) BH (t) AC (c)        Post Treatment Position bed  -AC (r) BH (t) AC (c)        In Bed fowlers;call light within reach;encouraged to call for assist;with family/caregiver  -AC (r) BH (t) AC (c)           Therapy Plan Review/Discharge Plan (OT)    Anticipated Discharge Disposition (OT) sub acute care setting  -AC (r) BH (t) AC (c)                  User Key  (r) = Recorded By, (t) = Taken By, (c) =  Cosigned By      Initials Name Effective Dates    AC Niels Shirley PRAVIN, OTR/L, CNT 02/03/23 -     Deborah Christie RN 05/24/22 -      Prakash Badillo, OT Student 05/12/25 -                      Occupational Therapy Education       Title: PT OT SLP Therapies (In Progress)       Topic: Occupational Therapy (In Progress)       Point: ADL training (Done)       Learning Progress Summary            Patient Acceptance, E,D, VU,NR by  at 6/2/2025 1119    Comment: OT POC, B UE exercise, bed mobility training, t/f training.    Acceptance, E, VU,NR,NL by  at 5/30/2025 1045    Comment: OT POC, OT role in care, t/f training, bed mobility training.   Family Acceptance, E,D, VU,NR by  at 6/2/2025 1119    Comment: OT POC, B UE exercise, bed mobility training, t/f training.                      Point: Home exercise program (Done)       Learning Progress Summary            Patient Acceptance, E,D, VU,NR by  at 6/2/2025 1119    Comment: OT POC, B UE exercise, bed mobility training, t/f training.   Family Acceptance, E,D, VU,NR by  at 6/2/2025 1119    Comment: OT POC, B UE exercise, bed mobility training, t/f training.                      Point: Body mechanics (Done)       Learning Progress Summary            Patient Acceptance, E,D, VU,NR by  at 6/2/2025 1119    Comment: OT POC, B UE exercise, bed mobility training, t/f training.    Acceptance, E, VU,NR,NL by  at 5/30/2025 1045    Comment: OT POC, OT role in care, t/f training, bed mobility training.   Family Acceptance, E,D, VU,NR by  at 6/2/2025 1119    Comment: OT POC, B UE exercise, bed mobility training, t/f training.                                      User Key       Initials Effective Dates Name Provider Type Discipline     05/12/25 -  Prakash Badillo, OT Student OT Student OT                      OT Recommendation and Plan  Planned Therapy Interventions (OT): activity tolerance training, adaptive equipment training, BADL retraining, functional balance  retraining, occupation/activity based interventions, patient/caregiver education/training, strengthening exercise, transfer/mobility retraining  Therapy Frequency (OT): 5 times/wk  Plan of Care Review  Plan of Care Reviewed With: patient  Progress: improving  Outcome Evaluation: OT tx complete. Pt oriented to person and place but disoriented to situation and time. Pt seen in fowlers with daughter at bedside. Pt presented more alert today and agreeable to participating in tx. Pt required Whitney for all bed mobility tasks on this date and personal hygiene task to correctly identify hygiene items. During task, LOB noted towards anterior requring Whitney to correct.Pt able to complete donning socks while seated EOB with ModA for balance due to lateral left lean when completing. Pt completed B UE exercise x 10 reps with frequent visual/verbal cues. Pt completed 1 sit <> stand transition with ModA x2 with c/o of dizziness while standing. Pt presented more alert today but still requires increased verbal/tactile/visual cues throughout tasks and increased processing time. OT will continue to tx as indicated. Recommend sub acute rehab at d/c.  Plan of Care Reviewed With: patient  Outcome Evaluation: OT tx complete. Pt oriented to person and place but disoriented to situation and time. Pt seen in fowlers with daughter at bedside. Pt presented more alert today and agreeable to participating in tx. Pt required Whitney for all bed mobility tasks on this date and personal hygiene task to correctly identify hygiene items. During task, LOB noted towards anterior requring Whitney to correct.Pt able to complete donning socks while seated EOB with ModA for balance due to lateral left lean when completing. Pt completed B UE exercise x 10 reps with frequent visual/verbal cues. Pt completed 1 sit <> stand transition with ModA x2 with c/o of dizziness while standing. Pt presented more alert today but still requires increased verbal/tactile/visual  cues throughout tasks and increased processing time. OT will continue to tx as indicated. Recommend sub acute rehab at d/c.     Outcome Measures       Row Name 06/02/25 1005 06/02/25 0900          How much help from another person do you currently need...    Turning from your back to your side while in flat bed without using bedrails? -- 3  -AH     Moving from lying on back to sitting on the side of a flat bed without bedrails? -- 3  -AH     Moving to and from a bed to a chair (including a wheelchair)? -- 1  -AH     Standing up from a chair using your arms (e.g., wheelchair, bedside chair)? -- 1  -AH     Climbing 3-5 steps with a railing? -- 1  -AH     To walk in hospital room? -- 1  -AH     AM-PAC 6 Clicks Score (PT) -- 10  -AH        How much help from another is currently needed...    Putting on and taking off regular lower body clothing? 2  -AC (r) BH (t) AC (c) --     Bathing (including washing, rinsing, and drying) 2  -AC (r) BH (t) AC (c) --     Toileting (which includes using toilet bed pan or urinal) 2  -AC (r) BH (t) AC (c) --     Putting on and taking off regular upper body clothing 2  -AC (r) BH (t) AC (c) --     Taking care of personal grooming (such as brushing teeth) 3  -AC (r) BH (t) AC (c) --     Eating meals 3  -AC (r) BH (t) AC (c) --     AM-PAC 6 Clicks Score (OT) 14  -AC (r) BH (t) --        Functional Assessment    Outcome Measure Options AM-PAC 6 Clicks Daily Activity (OT)  -AC (r) BH (t) AC (c) AM-PAC 6 Clicks Basic Mobility (PT)  -               User Key  (r) = Recorded By, (t) = Taken By, (c) = Cosigned By      Initials Name Provider Type    Niels Perez, OTR/L, CNT Occupational Therapist    Magi Hebert, PTA Physical Therapist Assistant     Prakash Badillo, OT Student OT Student                    Time Calculation:    Time Calculation- OT       Row Name 06/02/25 1056             Time Calculation- OT    OT Start Time 1002  -AC (r) BH (t) AC (c)      OT Stop Time 1057  -AC (r)  BH (t) AC (c)      OT Time Calculation (min) 55 min  -AC (r) BH (t)      Total Timed Code Minutes- OT 55 minute(s)  -AC (r) BH (t) AC (c)      OT Received On 06/02/25  -AC (r) BH (t) AC (c)         Timed Charges    57099 - OT Self Care/Mgmt Minutes 55  -AC (r) BH (t) AC (c)         Total Minutes    Timed Charges Total Minutes 55  -AC (r) BH (t)       Total Minutes 55  -AC (r) BH (t)                User Key  (r) = Recorded By, (t) = Taken By, (c) = Cosigned By      Initials Name Provider Type    AC Niels Shirley, OTR/L, CNT Occupational Therapist    Prakash Fuentes, OT Student OT Student                           Prakash Badillo, OT Student  6/2/2025

## 2025-06-02 NOTE — THERAPY TREATMENT NOTE
Acute Care - Physical Therapy Treatment Note  [facility]     Patient Name: Lynn Magana  : 1975  MRN: [id number]  Today's Date: 2025   Onset of Illness/Injury or Date of Surgery: 25  Visit Dx:     ICD-10-CM ICD-9-CM   1. Generalized weakness  R53.1 780.79   2. Nausea and vomiting, unspecified vomiting type  R11.2 787.01   3. Marijuana user  F12.90 305.20   4. Low TSH level  R79.89 794.5   5. Urinary tract infection without hematuria, site unspecified  N39.0 599.0   6. Dysphagia, unspecified type  R13.10 787.20   7. Impaired functional mobility and activity tolerance [Z74.09]  Z74.09 V49.89     Patient Active Problem List   Diagnosis    Syncope    Graves' disease    Polysubstance abuse    Hypertension    Diabetes    Spells of decreased attentiveness    Chronic intractable headache    Nausea and vomiting    Slow transit constipation    Nonsmoker    Type 2 diabetes mellitus, with long-term current use of insulin    GERD without esophagitis    Hyperthyroidism    Thyromegaly    Post-surgical hypothyroidism    Degeneration of cervical intervertebral disc    Cervical radiculopathy    Acute pain of right shoulder    Class 1 obesity due to excess calories with body mass index (BMI) of 30.0 to 30.9 in adult    Hypoglycemia    Stage 1 acute kidney injury    Dehydration    Dysphagia    Dehydration    Hypokalemia    Steatosis of liver    Marijuana abuse    Loss of weight    Severe protein-calorie malnutrition    Hypothyroid    Generalized weakness    Acquired cerebral ventriculomegaly     Past Medical History:   Diagnosis Date    Arthritis     Carotid artery stenosis     Degenerative disc disease, cervical     Depression     Diabetes mellitus     type 1    Disease of thyroid gland     GERD (gastroesophageal reflux disease)     Graves disease     Heart palpitations     Hyperlipidemia     Hypertension     Hypothyroidism     Seizure     Thyromegaly      Past Surgical History:   Procedure Laterality Date      SECTION      CHOLECYSTECTOMY      COLONOSCOPY  2015    Normal exam Dr. Morgan     COLONOSCOPY  2015    Normal exam    CYST REMOVAL      ENDOSCOPY N/A 2018    Normal exam    ENDOSCOPY N/A 2025    Procedure: ESOPHAGOGASTRODUODENOSCOPY WITH ANESTHESIA;  Surgeon: Jadon Smith MD;  [facility];  Service: Gastroenterology;  Laterality: N/A;  pre op: Nausea and vomiting  post op: normal  pcp: Darryl Andrews,     HYSTERECTOMY      THYROIDECTOMY Bilateral 2018    Procedure: total thyroidectomy;  Surgeon: Vitaly Givens MD;  [facility] OR;  Service: ENT     PT Assessment (Last 12 Hours)       PT Evaluation and Treatment       [facility] Name 25 0843          Physical Therapy Time and Intention    Subjective Information no complaints  -     Document Type therapy note (daily note)  -     Mode of Treatment physical therapy  -[facility] Name 25 0843          General Information    Existing Precautions/Restrictions fall  -[facility] Name 25 0843          Pain Scale: FACES Pre/Post-Treatment    Pain: FACES Scale, Pretreatment 0-->no hurt  -     Posttreatment Pain Rating 0-->no hurt  -[facility] Name 25 0843          Bed Mobility    Supine-Sit [place] (Bed Mobility) verbal cues;minimum assist (75% patient effort)  -     Sit-Supine [place] (Bed Mobility) moderate assist (50% patient effort)  -     Comment, (Bed Mobility) sat EOB 20 minutes cga-sba  -[facility] Name 25 0843          Transfers    Comment, (Transfers) attempted to stand, pt not able to stand, pt was able to push thru arms, raise bottom off bed to scoot bottom toward HOB  -[facility] Name 25 0843          Safety Issues/Impairments Affecting Functional Mobility    Impairments Affecting Function (Mobility) balance;cognition  -[facility] Name 25 0843          Balance    Static Sitting Balance contact guard;standby assist  -        Row Name 06/02/25 0843          Motor Skills    Comments, Therapeutic Exercise B LAQ, cues to stay on task and repeated directions  -     Additional Documentation Comments, Therapeutic Exercise (Minda)  -       Row Name             Wound 05/29/25 1930 Left lower leg    Wound - Properties Group Placement Date: 05/29/25  -AR Placement Time: 1930  -AR Present on Original Admission: Y  -AR Side: Left  -AR Orientation: lower  -AR Location: leg  -AR    Retired Wound - Properties Group Placement Date: 05/29/25  -AR Placement Time: 1930  -AR Present on Original Admission: Y  -AR Side: Left  -AR Orientation: lower  -AR Location: leg  -AR    Retired Wound - Properties Group Placement Date: 05/29/25  -AR Placement Time: 1930  -AR Present on Original Admission: Y  -AR Side: Left  -AR Orientation: lower  -AR Location: leg  -AR    Retired Wound - Properties Group Date first assessed: 05/29/25  -AR Time first assessed: 1930  -AR Present on Original Admission: Y  -AR Side: Left  -AR Location: leg  -AR      Row Name             Wound 05/29/25 1931 Right lower leg    Wound - Properties Group Placement Date: 05/29/25  -AR Placement Time: 1931  -AR Present on Original Admission: Y  -AR Side: Right  -AR Orientation: lower  -AR Location: leg  -AR    Retired Wound - Properties Group Placement Date: 05/29/25  -AR Placement Time: 1931  -AR Present on Original Admission: Y  -AR Side: Right  -AR Orientation: lower  -AR Location: leg  -AR    Retired Wound - Properties Group Placement Date: 05/29/25  -AR Placement Time: 1931  -AR Present on Original Admission: Y  -AR Side: Right  -AR Orientation: lower  -AR Location: leg  -AR    Retired Wound - Properties Group Date first assessed: 05/29/25  -AR Time first assessed: 1931  -AR Present on Original Admission: Y  -AR Side: Right  -AR Location: leg  -AR      Row Name 06/02/25 0843          Plan of Care Review    Plan of Care Reviewed With patient  -     Progress improving  -     Outcome  Evaluation pt trans to EOB min assist, sat EOB 20 minutes cga-sba, B LAQ x 10 reps, pt able to follow commands did require repeated directions to stay on task, attempted to stand, pt not able to stand, pt was able to push thru arms and get her bottom off the bed and scoot towards the HOB, trans back to bed min assist, pt would benefit from rehab  -       Row Name 06/02/25 0843          Positioning and Restraints    Pre-Treatment Position in bed  -     Post Treatment Position bed  -     In Bed fowlers;call light within reach;encouraged to call for assist;with family/caregiver  -               User Key  (r) = Recorded By, (t) = Taken By, (c) = Cosigned By      Initials Name Provider Type    Magi Hebert PTA Physical Therapist Assistant    eDborah Christie, RN Registered Nurse                    Physical Therapy Education       Title: PT OT SLP Therapies (In Progress)       Topic: Physical Therapy (In Progress)       Point: Mobility training (Done)       Learning Progress Summary            Patient Acceptance, E,TB,D, VU,NR by  at 5/30/2025 1155    Comment: Education re: purpose of PT/importance of activity, safety/falls prevention, improved tech w/ bed mobility, tfers   Significant Other Acceptance, E,TB,D, VU,NR by  at 5/30/2025 1155    Comment: Education re: purpose of PT/importance of activity, safety/falls prevention, improved tech w/ bed mobility, tfers                      Point: Home exercise program (Not Started)       Learner Progress:  Not documented in this visit.              Point: Precautions (Done)       Learning Progress Summary            Patient Acceptance, E,TB,D, VU,NR by  at 5/30/2025 1155    Comment: Education re: purpose of PT/importance of activity, safety/falls prevention, improved tech w/ bed mobility, tfers   Significant Other Acceptance, E,TB,D, VU,NR by  at 5/30/2025 1155    Comment: Education re: purpose of PT/importance of activity, safety/falls prevention,  improved tech w/ bed mobility, tfers                                      User Key       Initials Effective Dates Name Provider Type Discipline     08/02/18 -  Selin Leon, PT Physical Therapist PT                  PT Recommendation and Plan     Plan of Care Reviewed With: patient  Progress: improving  Outcome Evaluation: pt trans to EOB min assist, sat EOB 20 minutes cga-sba, B LAQ x 10 reps, pt able to follow commands did require repeated directions to stay on task, attempted to stand, pt not able to stand, pt was able to push thru arms and get her bottom off the bed and scoot towards the HOB, trans back to bed min assist, pt would benefit from rehab   Outcome Measures       Row Name 06/02/25 0900 05/30/25 0932          How much help from another person do you currently need...    Turning from your back to your side while in flat bed without using bedrails? 3  -AH --     Moving from lying on back to sitting on the side of a flat bed without bedrails? 3  -AH --     Moving to and from a bed to a chair (including a wheelchair)? 1  -AH --     Standing up from a chair using your arms (e.g., wheelchair, bedside chair)? 1  -AH --     Climbing 3-5 steps with a railing? 1  -AH --     To walk in hospital room? 1  -AH --     AM-PAC 6 Clicks Score (PT) 10  -AH --        How much help from another is currently needed...    Putting on and taking off regular lower body clothing? -- 2  -AC (r) BH (t) AC (c)     Bathing (including washing, rinsing, and drying) -- 2  -AC (r) BH (t) AC (c)     Toileting (which includes using toilet bed pan or urinal) -- 2  -AC (r) BH (t) AC (c)     Putting on and taking off regular upper body clothing -- 3  -AC (r) BH (t) AC (c)     Taking care of personal grooming (such as brushing teeth) -- 3  -AC (r) BH (t) AC (c)     Eating meals -- 3  -AC (r) BH (t) AC (c)     AM-PAC 6 Clicks Score (OT) -- 15  -AC (r) BH (t)        Functional Assessment    Outcome Measure Options AM-PAC 6 Clicks Basic  Mobility (PT)  -AH AM-PAC 6 Clicks Daily Activity (OT)  -AC (r) BH (t) AC (c)               User Key  (r) = Recorded By, (t) = Taken By, (c) = Cosigned By      Initials Name Provider Type    Niels Perez, OTR/L, CNT Occupational Therapist    Magi Hebert, CARLOS MANUEL Physical Therapist Assistant     Prakash Badillo, OT Student OT Student                     Time Calculation:    PT Charges       Row Name 06/02/25 0925             Time Calculation    Start Time 0843  -AH      Stop Time 0913  -      Time Calculation (min) 30 min  -AH      PT Received On 06/02/25  -         Time Calculation- PT    Total Timed Code Minutes- PT 30 minute(s)  -         Timed Charges    47033 - PT Therapeutic Activity Minutes 30  -AH         Total Minutes    Timed Charges Total Minutes 30  -AH       Total Minutes 30  -AH                User Key  (r) = Recorded By, (t) = Taken By, (c) = Cosigned By      Initials Name Provider Type    Magi Hebert, PTA Physical Therapist Assistant                  Therapy Charges for Today       Code Description Service Date Service Provider Modifiers Qty    55969964822 HC PT THERAPEUTIC ACT EA 15 MIN 6/2/2025 Magi Cordova PTA GP 2            PT G-Codes  Outcome Measure Options: AM-PAC 6 Clicks Basic Mobility (PT)  AM-PAC 6 Clicks Score (PT): 10  AM-PAC 6 Clicks Score (OT): 15    Magi Cordova PTA  6/2/2025

## 2025-06-02 NOTE — PROGRESS NOTES
"Lynn Magana  49 y.o.      Chief complaint:   Altered mental status    Subjective:  Symptoms:  Stable.    Diet:  Poor intake.    Activity level: Impaired due to weakness.    Pain:  She reports no pain.      No events overnight.  Patient still with confusion and difficulty with mobilizing.    Temp:  [97.5 °F (36.4 °C)-98.2 °F (36.8 °C)] 97.5 °F (36.4 °C)  Heart Rate:  [79-86] 83  Resp:  [16-18] 16  BP: (114-128)/(78-97) 120/79      Objective:  General Appearance:  Comfortable, well-appearing, in no acute distress and not in pain.    Vital signs: (most recent): Blood pressure 120/79, pulse 83, temperature 97.5 °F (36.4 °C), temperature source Oral, resp. rate 16, height 188 cm (74\"), weight 67.3 kg (148 lb 4.8 oz), SpO2 100%, not currently breastfeeding.  Vital signs are normal.  No fever.    Output: Producing urine.    HEENT: Normal HEENT exam.    Lungs:  Normal effort and normal respiratory rate.  She is not in respiratory distress.    Chest: Symmetric chest wall expansion.   Extremities: Decreased range of motion.    Skin:  Warm and dry.    Pulses: Distal pulses are intact.        Neurological Exam  Mental Status  Awake. Oriented only to person and place. Speech is normal. Language is fluent with no aphasia.  Following some commands.    Cranial Nerves  CN I: Sense of smell is normal.  CN II: Right normal visual field. Left normal visual field.  CN III, IV, VI: Pupils equal round and reactive to light bilaterally.  CN V: Facial sensation is normal.  CN VII:  Right: There is no facial weakness.  Left: There is no facial weakness.  CN XI: Shoulder shrug strength is normal.  CN XII: Tongue midline without atrophy or fasciculations.  Esotropia noted.    Motor  Decreased muscle bulk throughout.  Generalized weakness more prominent in the lower extremities  Muscle wasting noted in lower extremities.      Lab Results (last 24 hours)       Procedure Component Value Units Date/Time    Basic Metabolic Panel [[id number]]  " (Abnormal) Collected: 06/02/25 0416    Specimen: Blood Updated: 06/02/25 0456     Glucose 119 mg/dL      BUN 27.0 mg/dL      Creatinine 0.82 mg/dL      Sodium 146 mmol/L      Potassium 3.3 mmol/L      Chloride 114 mmol/L      CO2 21.0 mmol/L      Calcium 9.2 mg/dL      BUN/Creatinine Ratio 32.9     Anion Gap 11.0 mmol/L      eGFR 87.3 mL/min/1.73     Narrative:      GFR Categories in Chronic Kidney Disease (CKD)              GFR Category          GFR (mL/min/1.73)    Interpretation  G1                    90 or greater        Normal or high (1)  G2                    60-89                Mild decrease (1)  G3a                   45-59                Mild to moderate decrease  G3b                   30-44                Moderate to severe decrease  G4                    15-29                Severe decrease  G5                    14 or less           Kidney failure    (1)In the absence of evidence of kidney disease, neither GFR category G1 or G2 fulfill the criteria for CKD.    eGFR calculation 2021 CKD-EPI creatinine equation, which does not include race as a factor    Vitamin B12 [[id number]] Collected: 06/02/25 0416    Specimen: Blood Updated: 06/02/25 0433    Folate [[id number]] Collected: 06/02/25 0416    Specimen: Blood Updated: 06/02/25 0433    Thyroid Panel With TSH [[id number]]  (Abnormal) Collected: 06/01/25 1100    Specimen: Blood Updated: 06/01/25 2113     TSH 0.027 uIU/mL      T Uptake 0.87 TBI      T4, Total 11.30 mcg/dL     Blood Culture - Blood, Arm, Left [[id number]]  (Normal) Collected: 05/29/25 1941    Specimen: Blood from Arm, Left Updated: 06/01/25 2001     Blood Culture No growth at 3 days    RPR Qualitative with Reflex to Quant [[id number]]  (Normal) Collected: 05/31/25 1153    Specimen: Blood Updated: 06/01/25 1632     RPR Non-Reactive    Blood Culture - Blood, Arm, Left [[id number]]  (Normal) Collected: 05/29/25 1547    Specimen: Blood from Arm, Left Updated: 06/01/25 1600     Blood Culture No growth  at 3 days    Culture, CSF - Cerebrospinal Fluid, Lumbar Puncture [[id number]] Collected: 06/01/25 0920    Specimen: Cerebrospinal Fluid from Lumbar Puncture Updated: 06/01/25 1230     Gram Stain No WBCs or organisms seen    Ammonia [[id number]]  (Normal) Collected: 06/01/25 1100    Specimen: Blood Updated: 06/01/25 1131     Ammonia 15 umol/L     Vitamin B6 [[id number]] Collected: 06/01/25 1100    Specimen: Blood Updated: 06/01/25 1109    Glucose, CSF - Cerebrospinal Fluid, Lumbar Puncture [[id number]]  (Normal) Collected: 06/01/25 0920    Specimen: Cerebrospinal Fluid from Lumbar Puncture Updated: 06/01/25 1025     Glucose, CSF 69 mg/dL     Protein, CSF - Cerebrospinal Fluid, Lumbar Puncture [[id number]]  (Abnormal) Collected: 06/01/25 0920    Specimen: Cerebrospinal Fluid from Lumbar Puncture Updated: 06/01/25 1025     Protein, Total (CSF) 55.7 mg/dL     Cell Count With Differential, CSF [[id number]] Collected: 06/01/25 0920    Specimen: Cerebrospinal Fluid from Lumbar Puncture Updated: 06/01/25 1016    Narrative:      The following orders were created for panel order Cell Count With Differential, CSF.  Procedure                               Abnormality         Status                     ---------                               -----------         ------                     Cell Count, CSF - Cerebr...[[id number]]                      Final result                 Please view results for these tests on the individual orders.    Cell Count, CSF - Cerebrospinal Fluid, Lumbar Puncture [[id number]] Collected: 06/01/25 0920    Specimen: Cerebrospinal Fluid from Lumbar Puncture Updated: 06/01/25 1016     Color, CSF Colorless     Supernatant Color, CSF Colorless     Appearance, CSF Clear     RBC, CSF 0 /mm3      Nucleated Cells, CSF 0 /mm3      Volume, CSF 16.0 mL      Tube Number, CSF 4     Method: Hemacytometer Method    Narrative:      Differential not indicated.    ACE, CSF - Cerebrospinal Fluid, [[id number]] Collected: 06/01/25  1009    Specimen: Cerebrospinal Fluid Updated: 06/01/25 1009                Plan:   CV: Heart regular send  Respiratory: Oxygen level stable  GI: Tolerating some p.o.  : Adequate urine output  Neuro: Exam stable    Opening pressure on LP was 17.  No significant change in patient's neurological status since LP was done.  Continue to work with medical and neurologist.   CSF labs pending.      Acquired cerebral ventriculomegaly    Generalized weakness        Jefferson Donovan, APRN

## 2025-06-02 NOTE — PROGRESS NOTES
[facility] Medicine Services  INPATIENT PROGRESS NOTE    Patient Name: Lynn Magana  Date of Admission: 5/29/2025  Today's Date: 06/02/25  Length of Stay: 4  Primary Care Physician: Darryl Andrews DO    Subjective   Chief Complaint: Lightheadedness.  Weakness - Generalized     49-year-old female with history of hypothyroidism, liver steatosis, cannabis use, malnutrition, diabetes mellitus type 2, hypertension, admitted May 11 through May 14, 2025; at that time she was admitted due to poor oral intake and weight loss.  She had an upper endoscopy performed 5/13/2025 with findings of a small hiatal hernia and no other abnormalities reported.  Per reports reviewed the patient was not taking levothyroxine at the time, and her TSH was markedly elevated.  She was restarted on levothyroxine.  Her blood glucose was low, and did not require insulin management.  Drug screening urine was positive for cannabis, and considered to be secondary to cannabinoid hyperemesis syndrome.  The patient left the hospital AGAINST MEDICAL ADVICE, apparently she eloped; after being called, she stated was already home and did not plan to return to the hospital.  On 5/16/2025, the patient returned to the hospital reporting a syncopal event.  There was questionable seizure-like activity.   A CT brain performed on that evaluation showed moderate further increase in size of the lateral and third ventricle since the previous study. The fourth ventricle reported as normal and unchanged. A small obstructing lesion at the level of aqueduct of Sylvius not excluded. Further evaluation with contrast enhanced MR imaging of the brain was suggested. She again left the ER AGAINST MEDICAL ADVICE.      Patient came to hospital 5/30/25 reporting dizziness, lightheadedness, worsening when standing up, abnormal gait with unsteadiness;  reported confusion.  Symptom has been going on for approximately 1-2  months.  Blood pressure has been variable, initially elevated.      Evaluated with family members. Today,  found with OT sitting in bed, holding herself with no assistance.  Discussed with neurosurgery, patient had lumbar puncture performed 6/1/25, with opening pressure of 17, and fluid sent for analysis.    She is more alert.  She has been nonambulatory for several weeks.  She reports weakness of the lower extremities.  She has had urinary retention requiring straight catheterization during this admission.    Review of Systems   All pertinent negatives and positives are as above. All other systems have been reviewed and are negative unless otherwise stated.     Objective    Temp:  [97.4 °F (36.3 °C)-98.2 °F (36.8 °C)] 97.4 °F (36.3 °C)  Heart Rate:  [79-83] 82  Resp:  [16] 16  BP: (110-128)/(76-97) 110/76  Physical Exam  Constitutional:       Appearance: Alert, oriented to person.  No respiratory distress.  HENT:      Head: Normocephalic and atraumatic.      Nose: Nose normal.      Mouth/Throat:      Mouth: Mucous membranes are moist.   Neck, old transverse scar  Eyes:      Conjunctiva/sclera: Conjunctivae normal.      Pupils: Pupils are equal, round  Cardiovascular:      Rate and Rhythm: Normal rate and regular rhythm.      Pulses: Normal pulses.   Pulmonary:      Effort: No respiratory distress.      Breath sounds: Normal breath sounds. No wheezing, rhonchi or rales.   Abdominal:      General: Abdomen is flat. Bowel sounds are normal.      Palpations: Abdomen is soft.      Tenderness: There is no guarding or rebound.   Extremities:  No lower extremity edema.  Skin:     Capillary Refill: Capillary refill takes less than 2 seconds.      Coloration: Skin is not jaundiced.      Findings: No rash.   Neurological:   Drowsy. Slow speech.  Follows commands.          Results Review:  I have reviewed the labs, radiology results, and diagnostic studies.    Laboratory Data:   Results from last 7 days   Lab Units  "05/30/25  0500 05/29/25  1247   WBC 10*3/mm3 5.86 8.72   HEMOGLOBIN g/dL 9.4* 13.0   HEMATOCRIT % 28.1* 41.1   PLATELETS 10*3/mm3 189 200        Results from last 7 days   Lab Units 06/02/25  0416 05/31/25  0502 05/30/25  0500 05/29/25  1247   SODIUM mmol/L 146* 141 145 142   POTASSIUM mmol/L 3.3* 3.7 2.9* 3.3*   CHLORIDE mmol/L 114* 109* 109* 103   CO2 mmol/L 21.0* 19.0* 20.0* 16.0*   BUN mg/dL 27.0* 34.2* 32.3* 29.0*   CREATININE mg/dL 0.82 0.77 0.70 0.98   CALCIUM mg/dL 9.2 9.6 9.2 10.2   BILIRUBIN mg/dL  --   --  0.6 1.1   ALK PHOS U/L  --   --  37* 50   ALT (SGPT) U/L  --   --  61* 88*   AST (SGOT) U/L  --   --  30 58*   GLUCOSE mg/dL 119* 140* 121* 144*       Culture Data:   No results found for: \"BLOODCX\", \"URINECX\", \"WOUNDCX\", \"MRSACX\", \"RESPCX\", \"STOOLCX\"    Radiology Data:   Imaging Results (Last 24 Hours)       ** No results found for the last 24 hours. **            I have reviewed the patient's current medications.     Assessment/Plan   Assessment  Active Hospital Problems    Diagnosis     **Acquired cerebral ventriculomegaly     Generalized weakness      Acute encephalopathy, unknown etiology  Ventriculomegaly, rule out obstructive hydrocephalus  Hypokalemia, resolved  Hypothyroidism,after thyroidectomy  Lumbar spine spondylosis  Cervical spine spondylosis  Hypertension  Hepatic steatosis  Chronic gastritis  History of Graves' disease status post thyroidectomy      CSF protein 55.7, glucose 69. No WBC  CSF culture no growth    Treatment Plan  Nurosurgery recommendations noted.   Lumbar puncture performed.   CSF additional testing sent.   Neurology evaluation took place.     Patient has had variable thyroid function tests on prior admissions and currently. Will continue levothyroxine 150 mcg p.o. daily. I have recommended endocrinology outpatient follow up, and this was explained to family.    Due to variable blood pressure values, holding amlodipine lisinoprill, aldactone; and decrease carvedilol to " 12.5 p.o. twice daily.     Attempting orthostatic vital signs today.    Continue PT/OT  Diet as per speech therapist recommendations.      Medical Decision Making  Number and Complexity of problems: 8, moderate complexity    Differential Diagnosis: see above    Conditions and Status        Condition is unchanged.     MDM Data  External documents reviewed: no  Cardiac tracing (EKG, telemetry) interpretation: no new  Radiology interpretation: reports reviewed  Labs reviewed: yes  Any tests that were considered but not ordered: no     Decision rules/scores evaluated (example [id number], Wells, etc): none     Discussed with: patient     Care Planning  Shared decision making: patient  Code status and discussions: FC    Disposition  Social Determinants of Health that impact treatment or disposition: none      Electronically signed by Bautista Limon MD, 06/02/25, 11:13 CDT.

## 2025-06-02 NOTE — CASE MANAGEMENT/SOCIAL WORK
Continued Stay Note   Chucky     Patient Name: Lynn Magana  MRN: [id number]  Today's Date: 6/2/2025    Admit Date: 5/29/2025        Discharge Plan       Row Name 06/02/25 4335       Plan    Plan Comments Weekend SW spoke to pt/spouse about dc planning needs. Pt/spouse prefer to avoid [facility] if at all possible. Pt would lose her Medicaid check if she goes to a Sn[facility]. Pt/spouse prefer home with HH/DME. Will follow to see how pt progresses with therapy.                   Discharge Codes    No documentation.                       NADIA Williamson

## 2025-06-02 NOTE — PROGRESS NOTES
Neurology Progress Note      Chief Complaint:  AMS  Length of Stay:  4   Subjective     Subjective:    Laying in bed.  Slowed cognition.  Denies diplopia.  Denies headache.  Denies weakness.  Denies dyscoordination.    Medications:  Current Facility-Administered Medications   Medication Dose Route Frequency Provider Last Rate Last Admin    [Held by provider] amLODIPine (NORVASC) tablet 5 mg  5 mg Oral Daily Bautista Limon MD        ARIPiprazole (ABILIFY) tablet 10 mg  10 mg Oral Daily Bautista Limon MD   10 mg at 06/02/25 0920    sennosides-docusate (PERICOLACE) 8.6-50 MG per tablet 2 tablet  2 tablet Oral BID PRN Bautista Limon MD        And    polyethylene glycol (MIRALAX) packet 17 g  17 g Oral Daily PRN Bautista Limon MD        And    bisacodyl (DULCOLAX) EC tablet 5 mg  5 mg Oral Daily PRN Bautista Limon MD        And    bisacodyl (DULCOLAX) suppository 10 mg  10 mg Rectal Daily PRN Bautista Limon MD        Calcium Replacement - Follow Nurse / BPA Driven Protocol   Not Applicable PRN Bautista Limon MD        carvedilol (COREG) tablet 6.25 mg  6.25 mg Oral BID With Meals Bautista Limon MD        hydrALAZINE (APRESOLINE) injection 10 mg  10 mg Intravenous Q6H PRN Bautista Limon MD   10 mg at 05/29/25 2352    levothyroxine (SYNTHROID, LEVOTHROID) tablet 150 mcg  150 mcg Oral Q AM Bautista Limon MD   150 mcg at 06/02/25 0448    Magnesium Low Dose Replacement - Follow Nurse / BPA Driven Protocol   Not Applicable PRN Bautista Limon MD        melatonin tablet 10 mg  10 mg Oral Nightly PRN Bautista Limon MD        Morphine sulfate (PF) injection 0.5 mg  0.5 mg Intravenous Q4H PRN Basim Rivera MD        ondansetron ODT (ZOFRAN-ODT) disintegrating tablet 4 mg  4 mg Oral Q6H PRN Bautista Limon MD        Or    ondansetron (ZOFRAN) injection 4 mg  4 mg Intravenous Q6H PRN Bautista Limon, MD   4 mg at 05/30/25 4652    pantoprazole (PROTONIX) EC tablet 40 mg  40 mg  Oral Q AM Bautista Limon MD   40 mg at 06/02/25 0448    Phosphorus Replacement - Follow Nurse / BPA Driven Protocol   Not Applicable PRN Bautista Limon MD        Potassium Replacement - Follow Nurse / BPA Driven Protocol   Not Applicable PRN Bautista Limon MD        sodium chloride 0.9 % flush 10 mL  10 mL Intravenous PRN Safia Wood APRN        thiamine (B-1) 500 mg in sodium chloride 0.9 % 100 mL IVPB  500 mg Intravenous TID Payton Weems  mL/hr at 06/02/25 0920 500 mg at 06/02/25 0920             Objective      Vital Signs  Temp:  [97.4 °F (36.3 °C)-98.2 °F (36.8 °C)] 97.6 °F (36.4 °C)  Heart Rate:  [79-83] 80  Resp:  [16] 16  BP: (103-128)/(76-97) 103/77    Physical Exam:    HEENT:  Neck is supple  CVS:  RRR  Lungs:  CTA - B/L  Abd:  NT/ND  Ext:  No edema  Skin:  No rashes    Pertinent Neuro Exam:  Awake  Slowed cognition.  Does not know name nor location currently.  Can follow commands but takes repetition and requests  Pupils equal.  Difficulties with extraocular muscles in any direction.  Unable to count fingers correctly.  She does eventually find her cup which she picks up for me and takes a drink of.  She has difficulties finding this  No unilateral facial droop  Waxy mobility.  No focal weakness  No signs of tremors or increased tone  No obvious finger-to-nose ataxia.  She will only attempt to take a drink out of a straw from her right hand and not her left  Last nurse assessment:  Interval: baseline  1a. Level of Consciousness: 0-->Alert, keenly responsive  1b. LOC Questions: 0-->Answers both questions correctly  1c. LOC Commands: 0-->Performs both tasks correctly  2. Best Gaze: 0-->Normal  3. Visual: 0-->No visual loss  4. Facial Palsy: 0-->Normal symmetrical movements  5a. Motor Arm, Left: 0-->No drift, limb holds 90 (or 45) degrees for full 10 secs  5b. Motor Arm, Right: 0-->No drift, limb holds 90 (or 45) degrees for full 10 secs  6a. Motor Leg, Left: 1-->Drift, leg  falls by the end of the 5-sec period but does not hit bed  6b. Motor Leg, Right: 1-->Drift, leg falls by the end of the 5-sec period but does not hit bed  7. Limb Ataxia: 0-->Absent  8. Sensory: 0-->Normal, no sensory loss  9. Best Language: 0-->No aphasia, normal  10. Dysarthria: 0-->Normal  11. Extinction and Inattention (formerly Neglect): 0-->No abnormality    Total (NIH Stroke Scale): 2       Results Review:      Labs:  Labs reviewed as below  LAB RESULTS:      Lab 05/30/25  0500 05/29/25  1247   WBC 5.86 8.72   HEMOGLOBIN 9.4* 13.0   HEMATOCRIT 28.1* 41.1   PLATELETS 189 200   NEUTROS ABS 4.02 7.11*   IMMATURE GRANS (ABS) 0.05 0.13*   LYMPHS ABS 1.31 1.06   MONOS ABS 0.45 0.36   EOS ABS 0.01 0.02   MCV 85.4 92.4   PROTIME  --  13.3         Lab 06/02/25  0416 06/01/25  1100 05/31/25  0502 05/30/25  0500 05/29/25  1247   SODIUM 146*  --  141 145 142   POTASSIUM 3.3*  --  3.7 2.9* 3.3*   CHLORIDE 114*  --  109* 109* 103   CO2 21.0*  --  19.0* 20.0* 16.0*   ANION GAP 11.0  --  13.0 16.0* 23.0*   BUN 27.0*  --  34.2* 32.3* 29.0*   CREATININE 0.82  --  0.77 0.70 0.98   EGFR 87.3  --  94.1 105.5 70.5   GLUCOSE 119*  --  140* 121* 144*   CALCIUM 9.2  --  9.6 9.2 10.2   MAGNESIUM  --   --  2.1 1.9 2.4   TSH  --  0.027*  --   --  0.050*         Lab 05/30/25  0500 05/29/25  1247   TOTAL PROTEIN 6.2 8.0   ALBUMIN 3.5 4.0   GLOBULIN 2.7 4.0   ALT (SGPT) 61* 88*   AST (SGOT) 30 58*   BILIRUBIN 0.6 1.1   ALK PHOS 37* 50         Lab 05/29/25  1247   PROTIME 13.3   INR 0.97             Lab 06/02/25  0416   FOLATE 2.78*   VITAMIN B 12 >2,000*         Brief Urine Lab Results  (Last result in the past 365 days)        Color   Clarity   Blood   Leuk Est   Nitrite   Protein   CREAT   Urine HCG        06/01/25 0000 Orange   Clear   Negative   Moderate (2+)   Positive   30 mg/dL (1+)                 Microbiology Results (last 10 days)       Procedure Component Value - Date/Time    Culture, CSF - Cerebrospinal Fluid, Lumbar Puncture  [[id number]] Collected: 25 0920    Lab Status: Preliminary result Specimen: Cerebrospinal Fluid from Lumbar Puncture Updated: 25 0826     CSF Culture No growth     Gram Stain No WBCs or organisms seen    Blood Culture - Blood, Arm, Left [[id number]]  (Normal) Collected: 25 1941    Lab Status: Preliminary result Specimen: Blood from Arm, Left Updated: 25     Blood Culture No growth at 3 days    Blood Culture - Blood, Arm, Left [[id number]]  (Normal) Collected: 25 1547    Lab Status: Preliminary result Specimen: Blood from Arm, Left Updated: 25 1600     Blood Culture No growth at 3 days        Other labs:  RPR negative  HIV pending  Urine analysis positive  TSH low at 0.027  B12 elevated next line folate low at 2.78  Ammonia 36  Ethanol level negligible  CSF:  W:  0  R:  0  P:  55.7        Imaging:  MRI of the brain shows ventriculomegaly but not consistently out of proportion compared to the overall degree of atrophy.  MRI of the cervical and lumbar spine couple  CT of the abdomen shows some nonspecific findings possibly consistent with enteritis but no signs of a primary malignancy.  CT of pelvis done on  showed urinary bladder wall thickening but no signs of an occult malignancy.    Assessment/Plan     Hospital Problem List      Acquired cerebral ventriculomegaly    Generalized weakness    Impression:  Progressive neurologic decline for several months with global weakness, opthalmoplegia, and ataxia.  Considered Joy Quinonez Variant  Folate deficiency  Ventriculomegally - opening pressure unimpressive  HTN  Hx of cannabis usage    Plan:  MMA level  Replace Folic acid: started   EEG  GQ1B antibiodies - working with lab to order (serum test)  May consider NCV's      Medical Decision Making    Number/Complexity of Problems  Moderate  1 undiagnosed new problem with uncertain prognosis -   1 acute illness with systemic symptoms -   High  1 acute or chronic illness that  poses a threat to life/body function -   High     MDM Data  Moderate - 1/3 categories  Extensive - 2/3 categories    Category 1: 3 of the following  Review of external notes  Review of results  Ordering of each unique test  Independent historian  Category 2:  Independent interpretation of test (ex: imaging)  Category 3:  Discussion of management with another provider    Extensive     Treatment Plan  Moderate - Prescription Drug management  High  Drug therapy requiring intensive monitoring for toxicity  Decision regarding hospitalization or escalation of care  De-escalate care/DNR decisions         Ori Banuelos MD  06/02/25  14:39 CDT

## 2025-06-02 NOTE — PLAN OF CARE
Goal Outcome Evaluation:  Plan of Care Reviewed With: patient, spouse        Progress: no change  Outcome Evaluation: A&Ox1.Lethargic.  Slow to process/respond to questions. Tele, NSR. Bladder scan. Pt still not voiding. Turning. RA. SCD.UA x2 in chart. Preventative dressings added to gluteal and heels.

## 2025-06-02 NOTE — PLAN OF CARE
Goal Outcome Evaluation:  Plan of Care Reviewed With: patient        Progress: improving  Outcome Evaluation: OT tx complete. Pt oriented to person and place but disoriented to situation and time. Pt seen in fowlers with daughter at bedside. Pt presented more alert today and agreeable to participating in tx. Pt required Whitney for all bed mobility tasks on this date and personal hygiene task to correctly identify hygiene items. During task, LOB noted towards anterior requring Whitney to correct.Pt able to complete donning socks while seated EOB with ModA for balance due to lateral left lean when completing. Pt completed B UE exercise x 10 reps with frequent visual/verbal cues. Pt completed 1 sit <> stand transition with ModA x2 with c/o of dizziness while standing. Pt presented more alert today but still requires increased verbal/tactile/visual cues throughout tasks and increased processing time. OT will continue to tx as indicated. Recommend sub acute rehab at d/c.    Anticipated Discharge Disposition (OT): sub acute care setting

## 2025-06-03 ENCOUNTER — APPOINTMENT (OUTPATIENT)
Dept: NEUROLOGY | Facility: HOSPITAL | Age: 50
End: 2025-06-03
Payer: COMMERCIAL

## 2025-06-03 LAB
ANION GAP SERPL CALCULATED.3IONS-SCNC: 9 MMOL/L (ref 5–15)
BACTERIA SPEC AEROBE CULT: NORMAL
BACTERIA SPEC AEROBE CULT: NORMAL
BUN SERPL-MCNC: 18.8 MG/DL (ref 6–20)
BUN/CREAT SERPL: 21.1 (ref 7–25)
CALCIUM SPEC-SCNC: 9.4 MG/DL (ref 8.6–10.5)
CHLORIDE SERPL-SCNC: 114 MMOL/L (ref 98–107)
CO2 SERPL-SCNC: 23 MMOL/L (ref 22–29)
CREAT SERPL-MCNC: 0.89 MG/DL (ref 0.57–1)
EGFRCR SERPLBLD CKD-EPI 2021: 79.1 ML/MIN/1.73
GLUCOSE SERPL-MCNC: 146 MG/DL (ref 65–99)
MAGNESIUM SERPL-MCNC: 2 MG/DL (ref 1.6–2.6)
POTASSIUM SERPL-SCNC: 3.8 MMOL/L (ref 3.5–5.2)
SODIUM SERPL-SCNC: 146 MMOL/L (ref 136–145)

## 2025-06-03 PROCEDURE — 80048 BASIC METABOLIC PNL TOTAL CA: CPT | Performed by: FAMILY MEDICINE

## 2025-06-03 PROCEDURE — 97530 THERAPEUTIC ACTIVITIES: CPT

## 2025-06-03 PROCEDURE — 95910 NRV CNDJ TEST 7-8 STUDIES: CPT

## 2025-06-03 PROCEDURE — 25010000002 THIAMINE HCL 200 MG/2ML SOLUTION 2 ML VIAL: Performed by: PSYCHIATRY & NEUROLOGY

## 2025-06-03 PROCEDURE — 99233 SBSQ HOSP IP/OBS HIGH 50: CPT | Performed by: PSYCHIATRY & NEUROLOGY

## 2025-06-03 PROCEDURE — 83735 ASSAY OF MAGNESIUM: CPT | Performed by: FAMILY MEDICINE

## 2025-06-03 PROCEDURE — 25010000002 FOLIC ACID 5 MG/ML SOLUTION 10 ML VIAL: Performed by: PSYCHIATRY & NEUROLOGY

## 2025-06-03 RX ADMIN — LEVOTHYROXINE SODIUM 150 MCG: 75 TABLET ORAL at 05:17

## 2025-06-03 RX ADMIN — DOCUSATE SODIUM 50 MG AND SENNOSIDES 8.6 MG 2 TABLET: 8.6; 5 TABLET, FILM COATED ORAL at 08:44

## 2025-06-03 RX ADMIN — ARIPIPRAZOLE 10 MG: 5 TABLET ORAL at 08:46

## 2025-06-03 RX ADMIN — FOLIC ACID 1 MG: 5 INJECTION, SOLUTION INTRAMUSCULAR; INTRAVENOUS; SUBCUTANEOUS at 08:35

## 2025-06-03 RX ADMIN — THIAMINE HYDROCHLORIDE 500 MG: 100 INJECTION, SOLUTION INTRAMUSCULAR; INTRAVENOUS at 20:22

## 2025-06-03 RX ADMIN — PANTOPRAZOLE SODIUM 40 MG: 40 TABLET, DELAYED RELEASE ORAL at 05:17

## 2025-06-03 RX ADMIN — THIAMINE HYDROCHLORIDE 500 MG: 100 INJECTION, SOLUTION INTRAMUSCULAR; INTRAVENOUS at 10:33

## 2025-06-03 RX ADMIN — THIAMINE HYDROCHLORIDE 500 MG: 100 INJECTION, SOLUTION INTRAMUSCULAR; INTRAVENOUS at 14:33

## 2025-06-03 RX ADMIN — CARVEDILOL 6.25 MG: 6.25 TABLET, FILM COATED ORAL at 18:16

## 2025-06-03 RX ADMIN — CARVEDILOL 6.25 MG: 6.25 TABLET, FILM COATED ORAL at 08:52

## 2025-06-03 NOTE — PLAN OF CARE
Goal Outcome Evaluation:  Plan of Care Reviewed With: patient, family        Progress: no change  Outcome Evaluation: Pt alert to self only. VSS. I/O cath x1 for retention. pt not voiding. IV meds given per order. No c/o pain. refusing to be turned. Family at bedside.EEG done today. Called daughter to give updates. Call light in reach.

## 2025-06-03 NOTE — PROGRESS NOTES
Neurology Progress Note      Chief Complaint:  AMS  Length of Stay:  5   Subjective     Subjective:    More awake today.  Answering questions a little quicker but still has a delay.  Medications:  Current Facility-Administered Medications   Medication Dose Route Frequency Provider Last Rate Last Admin    [Held by provider] amLODIPine (NORVASC) tablet 5 mg  5 mg Oral Daily Bautista Limon MD        ARIPiprazole (ABILIFY) tablet 10 mg  10 mg Oral Daily Bautista Limon MD   10 mg at 06/03/25 0846    sennosides-docusate (PERICOLACE) 8.6-50 MG per tablet 2 tablet  2 tablet Oral BID PRN Bautista Limon MD   2 tablet at 06/03/25 0844    And    polyethylene glycol (MIRALAX) packet 17 g  17 g Oral Daily PRN Bautista Limon MD        And    bisacodyl (DULCOLAX) EC tablet 5 mg  5 mg Oral Daily PRN Bautista Limon MD        And    bisacodyl (DULCOLAX) suppository 10 mg  10 mg Rectal Daily PRN Bautista Limon MD        Calcium Replacement - Follow Nurse / BPA Driven Protocol   Not Applicable PRN Bautista Limon MD        carvedilol (COREG) tablet 6.25 mg  6.25 mg Oral BID With Meals Bautista Limon MD   6.25 mg at 06/03/25 0852    folic acid 1 mg in sodium chloride 0.9 % 50 mL IVPB  1 mg Intravenous Daily Ori Banuelos  mL/hr at 06/03/25 0835 1 mg at 06/03/25 0835    hydrALAZINE (APRESOLINE) injection 10 mg  10 mg Intravenous Q6H PRN Bautista Limon MD   10 mg at 05/29/25 2352    levothyroxine (SYNTHROID, LEVOTHROID) tablet 150 mcg  150 mcg Oral Q AM Bautista Limon MD   150 mcg at 06/03/25 0517    Magnesium Low Dose Replacement - Follow Nurse / BPA Driven Protocol   Not Applicable PRN Bautista Limon MD        melatonin tablet 10 mg  10 mg Oral Nightly PRN Bautista Limon MD        Morphine sulfate (PF) injection 0.5 mg  0.5 mg Intravenous Q4H PRN Basim Rivera MD        ondansetron ODT (ZOFRAN-ODT) disintegrating tablet 4 mg  4 mg Oral Q6H PRN Bautista Limon MD        Or     ondansetron (ZOFRAN) injection 4 mg  4 mg Intravenous Q6H PRN Bautista Limon MD   4 mg at 05/30/25 2139    pantoprazole (PROTONIX) EC tablet 40 mg  40 mg Oral Q AM Bautista Limon MD   40 mg at 06/03/25 0517    Phosphorus Replacement - Follow Nurse / BPA Driven Protocol   Not Applicable PRN Bautista Limon MD        Potassium Replacement - Follow Nurse / BPA Driven Protocol   Not Applicable PRN Bautista Limon MD        sodium chloride 0.9 % flush 10 mL  10 mL Intravenous PRN Safia Wood APRN        thiamine (B-1) 500 mg in sodium chloride 0.9 % 100 mL IVPB  500 mg Intravenous TID Payton Weems  mL/hr at 06/03/25 1033 500 mg at 06/03/25 1033             Objective      Vital Signs  Temp:  [97.5 °F (36.4 °C)-98.1 °F (36.7 °C)] 97.5 °F (36.4 °C)  Heart Rate:  [79-86] 82  Resp:  [16] 16  BP: (105-131)/(61-92) 131/91    Physical Exam:    HEENT:  Neck is supple  CVS:  RRR  Lungs:  CTA - B/L  Abd:  NT/ND  Ext:  No edema  Skin:  No rashes    Pertinent Neuro Exam:  Awake  Slowed cognition.  Does not know name nor location currently.  Can follow commands but takes repetition and requests  Pupils equal.  Difficulties with extraocular muscles in any direction.  Unable to count fingers correctly.  She does eventually find her cup which she picks up for me and takes a drink of.  She has difficulties finding this  No unilateral facial droop  Waxy mobility.  No focal weakness  No signs of tremors or increased tone  No obvious finger-to-nose ataxia.  She will only attempt to take a drink out of a straw from her right hand and not her left  Last nurse assessment:  Interval: baseline  1a. Level of Consciousness: 0-->Alert, keenly responsive  1b. LOC Questions: 0-->Answers both questions correctly  1c. LOC Commands: 0-->Performs both tasks correctly  2. Best Gaze: 0-->Normal  3. Visual: 0-->No visual loss  4. Facial Palsy: 0-->Normal symmetrical movements  5a. Motor Arm, Left: 0-->No drift, limb  holds 90 (or 45) degrees for full 10 secs  5b. Motor Arm, Right: 0-->No drift, limb holds 90 (or 45) degrees for full 10 secs  6a. Motor Leg, Left: 1-->Drift, leg falls by the end of the 5-sec period but does not hit bed  6b. Motor Leg, Right: 1-->Drift, leg falls by the end of the 5-sec period but does not hit bed  7. Limb Ataxia: 0-->Absent  8. Sensory: 0-->Normal, no sensory loss  9. Best Language: 0-->No aphasia, normal  10. Dysarthria: 0-->Normal  11. Extinction and Inattention (formerly Neglect): 0-->No abnormality    Total (NIH Stroke Scale): 2       Results Review:      Labs:  Labs reviewed as below  LAB RESULTS:      Lab 05/30/25  0500 05/29/25  1247   WBC 5.86 8.72   HEMOGLOBIN 9.4* 13.0   HEMATOCRIT 28.1* 41.1   PLATELETS 189 200   NEUTROS ABS 4.02 7.11*   IMMATURE GRANS (ABS) 0.05 0.13*   LYMPHS ABS 1.31 1.06   MONOS ABS 0.45 0.36   EOS ABS 0.01 0.02   MCV 85.4 92.4   PROTIME  --  13.3         Lab 06/03/25  1013 06/02/25  0416 06/01/25  1100 05/31/25  0502 05/30/25  0500 05/29/25  1247   SODIUM 146* 146*  --  141 145 142   POTASSIUM 3.8 3.3*  --  3.7 2.9* 3.3*   CHLORIDE 114* 114*  --  109* 109* 103   CO2 23.0 21.0*  --  19.0* 20.0* 16.0*   ANION GAP 9.0 11.0  --  13.0 16.0* 23.0*   BUN 18.8 27.0*  --  34.2* 32.3* 29.0*   CREATININE 0.89 0.82  --  0.77 0.70 0.98   EGFR 79.1 87.3  --  94.1 105.5 70.5   GLUCOSE 146* 119*  --  140* 121* 144*   CALCIUM 9.4 9.2  --  9.6 9.2 10.2   MAGNESIUM 2.0  --   --  2.1 1.9 2.4   TSH  --   --  0.027*  --   --  0.050*         Lab 05/30/25  0500 05/29/25  1247   TOTAL PROTEIN 6.2 8.0   ALBUMIN 3.5 4.0   GLOBULIN 2.7 4.0   ALT (SGPT) 61* 88*   AST (SGOT) 30 58*   BILIRUBIN 0.6 1.1   ALK PHOS 37* 50         Lab 05/29/25  1247   PROTIME 13.3   INR 0.97             Lab 06/02/25  0416   FOLATE 2.78*   VITAMIN B 12 >2,000*         Brief Urine Lab Results  (Last result in the past 365 days)        Color   Clarity   Blood   Leuk Est   Nitrite   Protein   CREAT   Urine HCG         25 0000 Orange   Clear   Negative   Moderate (2+)   Positive   30 mg/dL (1+)                 Microbiology Results (last 10 days)       Procedure Component Value - Date/Time    Culture, CSF - Cerebrospinal Fluid, Lumbar Puncture [[id number]] Collected: 25 09    Lab Status: Preliminary result Specimen: Cerebrospinal Fluid from Lumbar Puncture Updated: 25 09     CSF Culture No growth at 2 days     Gram Stain No WBCs or organisms seen    Blood Culture - Blood, Arm, Left [[id number]]  (Normal) Collected: 25 1941    Lab Status: Preliminary result Specimen: Blood from Arm, Left Updated: 25 2000     Blood Culture No growth at 4 days    Blood Culture - Blood, Arm, Left [[id number]]  (Normal) Collected: 25 1547    Lab Status: Preliminary result Specimen: Blood from Arm, Left Updated: 25 1600     Blood Culture No growth at 4 days        Other labs:  RPR negative  HIV pending  Urine analysis positive  TSH low at 0.027  B12 elevated next line folate low at 2.78  Ammonia 36  Ethanol level negligible  CSF:  W:  0  R:  0  P:  55.7        Imaging:  MRI of the brain shows ventriculomegaly but not consistently out of proportion compared to the overall degree of atrophy.  MRI of the cervical and lumbar spine couple  CT of the abdomen shows some nonspecific findings possibly consistent with enteritis but no signs of a primary malignancy.  CT of pelvis done on  showed urinary bladder wall thickening but no signs of an occult malignancy.    Assessment/Plan     Hospital Problem List      Acquired cerebral ventriculomegaly    Generalized weakness    Impression:  Progressive neurologic decline for several months with global weakness, opthalmoplegia, and ataxia.  Considered Joy Quinonez Variant  Folate deficiency  Ventriculomegally - opening pressure unimpressive  HTN  Hx of cannabis usage    Plan:  MMA level  Replace Folic acid: started   EEG  GQ1B antibiodies - working with lab to  order (serum test)  Requesting a nerve conduction study of the right arm and leg today to see if there is any evidence of demyelination.      Medical Decision Making    Number/Complexity of Problems  Moderate  1 undiagnosed new problem with uncertain prognosis -   1 acute illness with systemic symptoms -   High  1 acute or chronic illness that poses a threat to life/body function -   High     MDM Data  Moderate - 1/3 categories  Extensive - 2/3 categories    Category 1: 3 of the following  Review of external notes  Review of results  Ordering of each unique test  Independent historian  Category 2:  Independent interpretation of test (ex: imaging)  Category 3:  Discussion of management with another provider    Extensive     Treatment Plan  Moderate - Prescription Drug management  High  Drug therapy requiring intensive monitoring for toxicity  Decision regarding hospitalization or escalation of care  De-escalate care/DNR decisions         Ori Banuelos MD  06/03/25  12:24 CDT

## 2025-06-03 NOTE — PROGRESS NOTES
[facility] Medicine Services  INPATIENT PROGRESS NOTE    Patient Name: Lynn Magana  Date of Admission: 5/29/2025  Today's Date: 06/03/25  Length of Stay: 5  Primary Care Physician: Darryl Andrews DO    Subjective   Chief Complaint: Lightheadedness.  Weakness - Generalized     49-year-old female with history of hypothyroidism, liver steatosis, cannabis use, malnutrition, diabetes mellitus type 2, hypertension, admitted May 11 through May 14, 2025; at that time she was admitted due to poor oral intake and weight loss.  She had an upper endoscopy performed 5/13/2025 with findings of a small hiatal hernia and no other abnormalities reported.  Per reports reviewed the patient was not taking levothyroxine at the time, and her TSH was markedly elevated.  She was restarted on levothyroxine.  Her blood glucose was low, and did not require insulin management.  Drug screening urine was positive for cannabis, and considered to be secondary to cannabinoid hyperemesis syndrome.  The patient left the hospital AGAINST MEDICAL ADVICE, apparently she eloped; after being called, she stated was already home and did not plan to return to the hospital.  On 5/16/2025, the patient returned to the hospital reporting a syncopal event.  There was questionable seizure-like activity.   A CT brain performed on that evaluation showed moderate further increase in size of the lateral and third ventricle since the previous study. The fourth ventricle reported as normal and unchanged. A small obstructing lesion at the level of aqueduct of Sylvius not excluded. Further evaluation with contrast enhanced MR imaging of the brain was suggested. She again left the ER AGAINST MEDICAL ADVICE.      Patient came to hospital 5/30/25 reporting dizziness, lightheadedness, worsening when standing up, abnormal gait with unsteadiness;  reported confusion.  Symptom has been going on for approximately 1-2  months.  Blood pressure has been variable, initially elevated.      She has been nonambulatory for several weeks.  She reports weakness of the lower extremities and right upper extremity.  She has had urinary retention requiring straight catheterization during this admission.  Evaluated with family members.   Lumbar puncture performed 6/1/25, with opening pressure of 17, and fluid sent for analysis.    She is more alert. Following commands.     Review of Systems   All pertinent negatives and positives are as above. All other systems have been reviewed and are negative unless otherwise stated.     Objective    Temp:  [97.6 °F (36.4 °C)-98.1 °F (36.7 °C)] 97.6 °F (36.4 °C)  Heart Rate:  [79-86] 86  Resp:  [16] 16  BP: (103-127)/(61-92) 127/92  Physical Exam  Constitutional:       Appearance: Alert, oriented to person.  No respiratory distress.  HENT:      Head: Normocephalic and atraumatic.      Nose: Nose normal.      Mouth/Throat:      Mouth: Mucous membranes are moist.   Neck, old transverse scar  Eyes:      Conjunctiva/sclera: Conjunctivae normal.      Pupils: Pupils are equal, round  Cardiovascular:      Rate and Rhythm: Normal rate and regular rhythm.      Pulses: Normal pulses.   Pulmonary:      Effort: No respiratory distress.      Breath sounds: Normal breath sounds. No wheezing, rhonchi or rales.   Abdominal:      General: Abdomen is flat. Bowel sounds are normal.      Palpations: Abdomen is soft.      Tenderness: There is no guarding or rebound.   Extremities:  No lower extremity edema.  Skin:     Capillary Refill: Capillary refill takes less than 2 seconds.      Coloration: Skin is not jaundiced.      Findings: No rash.   Neurological:   Drowsy. Slow speech.  Follows commands.          Results Review:  I have reviewed the labs, radiology results, and diagnostic studies.    Laboratory Data:   Results from last 7 days   Lab Units 05/30/25  0500 05/29/25  1247   WBC 10*3/mm3 5.86 8.72   HEMOGLOBIN g/dL 9.4*  "13.0   HEMATOCRIT % 28.1* 41.1   PLATELETS 10*3/mm3 189 200        Results from last 7 days   Lab Units 06/02/25  0416 05/31/25  0502 05/30/25  0500 05/29/25  1247   SODIUM mmol/L 146* 141 145 142   POTASSIUM mmol/L 3.3* 3.7 2.9* 3.3*   CHLORIDE mmol/L 114* 109* 109* 103   CO2 mmol/L 21.0* 19.0* 20.0* 16.0*   BUN mg/dL 27.0* 34.2* 32.3* 29.0*   CREATININE mg/dL 0.82 0.77 0.70 0.98   CALCIUM mg/dL 9.2 9.6 9.2 10.2   BILIRUBIN mg/dL  --   --  0.6 1.1   ALK PHOS U/L  --   --  37* 50   ALT (SGPT) U/L  --   --  61* 88*   AST (SGOT) U/L  --   --  30 58*   GLUCOSE mg/dL 119* 140* 121* 144*       Culture Data:   No results found for: \"BLOODCX\", \"URINECX\", \"WOUNDCX\", \"MRSACX\", \"RESPCX\", \"STOOLCX\"    Radiology Data:   Imaging Results (Last 24 Hours)       ** No results found for the last 24 hours. **            I have reviewed the patient's current medications.     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 No WBC  CSF culture no growth 2 days    EEG  Diffuse cerebral dysfunction of moderate degree, nonspecific.  This is most commonly seen due to toxic/metabolic or hypoxemic/ischemic cause. No evidence for epilepsy is seen    Treatment Plan    Neurology evaluation took place.   Pending Antibodies against GQ1b    Nerve conduction studies  Discussed with family; explained to patient, daughter in the room, and  over the phone.    Variable thyroid function tests on prior admissions and currently. Continue levothyroxine 150 mcg p.o. daily. I have recommended endocrinology outpatient " follow up, and this was explained to family.    Due to variable blood pressure values, holding amlodipine lisinoprill, aldactone; Carvedilol to 6.25 mg p.o. twice daily.     Continue PT/OT  Diet as per speech therapist recommendations.    Recommend SNF/rehab      Medical Decision Making  Number and Complexity of problems: 8, moderate complexity    Differential Diagnosis: see above    Conditions and Status        Condition is unchanged.     MDM Data  External documents reviewed: no  Cardiac tracing (EKG, telemetry) interpretation: no new  Radiology interpretation: reports reviewed  Labs reviewed: yes  Any tests that were considered but not ordered: no     Decision rules/scores evaluated (example [id number], Wells, etc): none     Discussed with: patient     Care Planning  Shared decision making: patient  Code status and discussions: FC    Disposition  Social Determinants of Health that impact treatment or disposition: none      Electronically signed by Bautista Limon MD, 06/03/25, 10:58 CDT.

## 2025-06-03 NOTE — PLAN OF CARE
Goal Outcome Evaluation:  Plan of Care Reviewed With: patient, spouse        Progress: improving  Outcome Evaluation: pt more alert, motivated to work with therapy, wants to go home, trans to EOB min-mod 2,sat EOB 20 minutes, BLE A-AAROM x 10 reps, cues to stay on task, repeated directions for each ex, sit-stand with bed elevated mod-max 2, took 2-3 side scoots to L with rwx min 2, B knees did buckle, trans back to bed min 2, husbands assisted with trans, pt would benefit from rehab    Anticipated Discharge Disposition (PT): inpatient rehabilitation facility, sub acute care setting

## 2025-06-03 NOTE — THERAPY TREATMENT NOTE
Acute Care - Physical Therapy Treatment Note  [facility]     Patient Name: Lynn Magana  : 1975  MRN: [id number]  Today's Date: 6/3/2025   Onset of Illness/Injury or Date of Surgery: 25  Visit Dx:     ICD-10-CM ICD-9-CM   1. Generalized weakness  R53.1 780.79   2. Nausea and vomiting, unspecified vomiting type  R11.2 787.01   3. Marijuana user  F12.90 305.20   4. Low TSH level  R79.89 794.5   5. Urinary tract infection without hematuria, site unspecified  N39.0 599.0   6. Dysphagia, unspecified type  R13.10 787.20   7. Impaired functional mobility and activity tolerance [Z74.09]  Z74.09 V49.89     Patient Active Problem List   Diagnosis    Syncope    Graves' disease    Polysubstance abuse    Hypertension    Diabetes    Spells of decreased attentiveness    Chronic intractable headache    Nausea and vomiting    Slow transit constipation    Nonsmoker    Type 2 diabetes mellitus, with long-term current use of insulin    GERD without esophagitis    Hyperthyroidism    Thyromegaly    Post-surgical hypothyroidism    Degeneration of cervical intervertebral disc    Cervical radiculopathy    Acute pain of right shoulder    Class 1 obesity due to excess calories with body mass index (BMI) of 30.0 to 30.9 in adult    Hypoglycemia    Stage 1 acute kidney injury    Dehydration    Dysphagia    Dehydration    Hypokalemia    Steatosis of liver    Marijuana abuse    Loss of weight    Severe protein-calorie malnutrition    Hypothyroid    Generalized weakness    Acquired cerebral ventriculomegaly     Past Medical History:   Diagnosis Date    Arthritis     Carotid artery stenosis     Degenerative disc disease, cervical     Depression     Diabetes mellitus     type 1    Disease of thyroid gland     GERD (gastroesophageal reflux disease)     Graves disease     Heart palpitations     Hyperlipidemia     Hypertension     Hypothyroidism     Seizure     Thyromegaly      Past Surgical History:   Procedure Laterality Date      SECTION      CHOLECYSTECTOMY      COLONOSCOPY  2015    Normal exam Dr. Morgan     COLONOSCOPY  2015    Normal exam    CYST REMOVAL      ENDOSCOPY N/A 2018    Normal exam    ENDOSCOPY N/A 2025    Procedure: ESOPHAGOGASTRODUODENOSCOPY WITH ANESTHESIA;  Surgeon: Jadon Smith MD;  Location: [facility] ENDOSCOPY;  Service: Gastroenterology;  Laterality: N/A;  pre op: Nausea and vomiting  post op: normal  pcp: Darryl Andrews,     HYSTERECTOMY      THYROIDECTOMY Bilateral 2018    Procedure: total thyroidectomy;  Surgeon: Vitaly Givens MD;  Location: [facility] OR;  Service: ENT     PT Assessment (Last 12 Hours)       PT Evaluation and Treatment       [facility] Name 25 1510 25 1332       Physical Therapy Time and Intention    Subjective Information no complaints  - --    Document Type therapy note (daily note)  - --    Mode of Treatment physical therapy  - --    Session Not Performed -- other (see comments)  -    Comment, Session Not Performed -- EEG  -[facility] Name 25 0843          Physical Therapy Time and Intention    Subjective Information --  -     Session Not Performed other (see comments)  -     Comment, Session Not Performed on bedpan with nsg  -[facility] Name 25 1510          General Information    Patient/Family/Caregiver Comments/Observations   -     Existing Precautions/Restrictions fall  -[facility] Name 25 1510          Pain Scale: FACES Pre/Post-Treatment    Pain: FACES Scale, Pretreatment 0-->no hurt  -     Posttreatment Pain Rating 0-->no hurt  -[facility] Name 25 1510          Bed Mobility    Supine-Sit [place] (Bed Mobility) verbal cues;minimum assist (75% patient effort)  -     Sit-Supine [place] (Bed Mobility) moderate assist (50% patient effort)  -     Comment, (Bed Mobility) sat EOB 20 minutes cga  -[facility] Name 25 1510          Sit-Stand Transfer    Sit-Stand  [place] (Transfers) moderate assist (50% patient effort);maximum assist (25% patient effort);2 person assist  -       Row Name 06/03/25 1510          Stand-Sit Transfer    Stand-Sit [place] (Transfers) minimum assist (75% patient effort);2 person assist;verbal cues  -       Row Name 06/03/25 1510          Gait/Stairs (Locomotion)    [place] Level (Gait) moderate assist (50% patient effort);2 person assist;verbal cues  -     Assistive Device (Gait) walker, front-wheeled  -     Distance in Feet (Gait) 1  side scoots at [facility]     Bilateral Gait Deviations knee buckling bilaterally   -       Row Name 06/03/25 1510          Safety Issues/Impairments Affecting Functional Mobility    Impairments Affecting Function (Mobility) balance;cognition  -       Row Name 06/03/25 1510          Motor Skills    Comments, Therapeutic Exercise BLE A-AAR sitting EOB, cues to stay on task and repeated directions for each ex  -       Row Name             Wound 05/29/25 1930 Left lower leg    Wound - Properties Group Placement Date: 05/29/25  -AR Placement Time: 1930  -AR Present on Original Admission: Y  -AR Side: Left  -AR Orientation: lower  -AR Location: leg  -AR    Retired Wound - Properties Group Placement Date: 05/29/25  -AR Placement Time: 1930  -AR Present on Original Admission: Y  -AR Side: Left  -AR Orientation: lower  -AR Location: leg  -AR    Retired Wound - Properties Group Placement Date: 05/29/25  -AR Placement Time: 1930  -AR Present on Original Admission: Y  -AR Side: Left  -AR Orientation: lower  -AR Location: leg  -AR    Retired Wound - Properties Group Date first assessed: 05/29/25  -AR Time first assessed: 1930  -AR Present on Original Admission: Y  -AR Side: Left  -AR Location: leg  -AR      Row Name             Wound 05/29/25 1931 Right lower leg    Wound - Properties Group Placement Date: 05/29/25  -AR Placement Time: 1931  -AR Present on Original Admission: Y  -AR Side: Right  -AR  Orientation: lower  -AR Location: leg  -AR    Retired Wound - Properties Group Placement Date: 05/29/25  -AR Placement Time: 1931 -AR Present on Original Admission: Y  -AR Side: Right  -AR Orientation: lower  -AR Location: leg  -AR    Retired Wound - Properties Group Placement Date: 05/29/25  -AR Placement Time: 1931 -AR Present on Original Admission: Y  -AR Side: Right  -AR Orientation: lower  -AR Location: leg  -AR    Retired Wound - Properties Group Date first assessed: 05/29/25  -AR Time first assessed: 1931  -AR Present on Original Admission: Y  -AR Side: Right  -AR Location: leg  -AR      Row Name 06/03/25 1510          Plan of Care Review    Plan of Care Reviewed With patient;spouse  -     Progress improving  -     Outcome Evaluation pt more alert, motivated to work with therapy, wants to go home, trans to EOB min-mod 2,sat EOB 20 minutes, BLE A-AAROM x 10 reps, cues to stay on task, repeated directions for each ex, sit-stand with bed elevated mod-max 2, took 2-3 side scoots to L with rwx min 2, B knees did buckle, trans back to bed min 2, husbands assisted with trans, pt would benefit from rehab  [facility]       Row Name 06/03/25 1510          Positioning and Restraints    Pre-Treatment Position in bed  -     Post Treatment Position bed  -     In Bed fowlers;call light within reach;encouraged to call for assist;exit alarm on;with family/caregiver;SCD pump applied  -               User Key  (r) = Recorded By, (t) = Taken By, (c) = Cosigned By      Initials Name Provider Type     Magi Cordova PTA Physical Therapist Assistant    Deborah Christie, RN Registered Nurse                    Physical Therapy Education       Title: PT OT SLP Therapies (In Progress)       Topic: Physical Therapy (In Progress)       Point: Mobility training (Done)       Learning Progress Summary            Patient Acceptance, E,TB,D, VU,NR by ROBERTA at 5/30/2025 6674    Comment: Education re: purpose of PT/importance of activity,  safety/falls prevention, improved tech w/ bed mobility, tfers   Significant Other Acceptance, E,TB,D, VU,NR by  at 5/30/2025 1155    Comment: Education re: purpose of PT/importance of activity, safety/falls prevention, improved tech w/ bed mobility, tfers                      Point: Home exercise program (Not Started)       Learner Progress:  Not documented in this visit.              Point: Precautions (Done)       Learning Progress Summary            Patient Acceptance, E,TB,D, VU,NR by  at 5/30/2025 1155    Comment: Education re: purpose of PT/importance of activity, safety/falls prevention, improved tech w/ bed mobility, tfers   Significant Other Acceptance, E,TB,D, VU,NR by  at 5/30/2025 1155    Comment: Education re: purpose of PT/importance of activity, safety/falls prevention, improved tech w/ bed mobility, tfers                                      User Key       Initials Effective Dates Name Provider Type Discipline     08/02/18 -  Selin Leon, PT Physical Therapist PT                  PT Recommendation and Plan  Anticipated Discharge Disposition (PT): inpatient rehabilitation facility, sub acute care setting  Plan of Care Reviewed With: patient, spouse  Progress: improving  Outcome Evaluation: pt more alert, motivated to work with therapy, wants to go home, trans to EOB min-mod 2,sat EOB 20 minutes, BLE A-AAROM x 10 reps, cues to stay on task, repeated directions for each ex, sit-stand with bed elevated mod-max 2, took 2-3 side scoots to L with rwx min 2, B knees did buckle, trans back to bed min 2, husbands assisted with trans, pt would benefit from rehab   Outcome Measures       Row Name 06/03/25 1500 06/02/25 1005 06/02/25 0900       How much help from another person do you currently need...    Turning from your back to your side while in flat bed without using bedrails? 3  -AH -- 3  -AH    Moving from lying on back to sitting on the side of a flat bed without bedrails? 3  -AH -- 3  -AH     Moving to and from a bed to a chair (including a wheelchair)? 2  -AH -- 1  -AH    Standing up from a chair using your arms (e.g., wheelchair, bedside chair)? 2  -AH -- 1  -AH    Climbing 3-5 steps with a railing? 1  -AH -- 1  -AH    To walk in hospital room? 1  -AH -- 1  -AH    AM-PAC 6 Clicks Score (PT) 12  -AH -- 10  -AH       How much help from another is currently needed...    Putting on and taking off regular lower body clothing? -- 2  -AC (r) BH (t) AC (c) --    Bathing (including washing, rinsing, and drying) -- 2  -AC (r) BH (t) AC (c) --    Toileting (which includes using toilet bed pan or urinal) -- 2  -AC (r) BH (t) AC (c) --    Putting on and taking off regular upper body clothing -- 2  -AC (r) BH (t) AC (c) --    Taking care of personal grooming (such as brushing teeth) -- 3  -AC (r) BH (t) AC (c) --    Eating meals -- 3  -AC (r) BH (t) AC (c) --    AM-PAC 6 Clicks Score (OT) -- 14  -AC (r) BH (t) --       Functional Assessment    Outcome Measure Options AM-PAC 6 Clicks Basic Mobility (PT)  - AM-PAC 6 Clicks Daily Activity (OT)  -AC (r) BH (t) AC (c) AM-PAC 6 Clicks Basic Mobility (PT)  -              User Key  (r) = Recorded By, (t) = Taken By, (c) = Cosigned By      Initials Name Provider Type    Niels Perez, OTR/L, CNT Occupational Therapist    Magi Hebert, PTA Physical Therapist Assistant    Prakash Fuentes, OT Student OT Student                     Time Calculation:    PT Charges       Row Name 06/03/25 1541             Time Calculation    Start Time 1510  -      Stop Time 1535  -      Time Calculation (min) 25 min  -      PT Received On 06/03/25  -         Time Calculation- PT    Total Timed Code Minutes- PT 25 minute(s)  -         Timed Charges    48389 - PT Therapeutic Activity Minutes 25  -AH         Total Minutes    Timed Charges Total Minutes 25  -AH       Total Minutes 25  -AH                User Key  (r) = Recorded By, (t) = Taken By, (c) = Cosigned By       Initials Name Provider Type     Magi Cordova PTA Physical Therapist Assistant                  Therapy Charges for Today       Code Description Service Date Service Provider Modifiers Qty    93706192549 HC PT THERAPEUTIC ACT EA 15 MIN 6/2/2025 Magi Cordova PTA GP 2    61070145642 HC PT THERAPEUTIC ACT EA 15 MIN 6/3/2025 Magi Cordova PTA GP 2            PT G-Codes  Outcome Measure Options: AM-PAC 6 Clicks Basic Mobility (PT)  AM-PAC 6 Clicks Score (PT): 12  AM-PAC 6 Clicks Score (OT): 14    Magi Cordova PTA  6/3/2025

## 2025-06-03 NOTE — PLAN OF CARE
Problem: Malnutrition  Goal: Improved Nutritional Intake  Outcome: Progressing   Goal Outcome Evaluation:      Follow Up Assessment Complete: Pt present along with visitor. Visitor answered questions for pt. Pt began eating more yesterday, consumed some of B-L-D per visitor. Consumed 19% of 8 meals per Nutrition Intake Report. Pt was able able to consume food and keep it down. Consumed full piece of sausage with breakfast, didn't prefer the eggs. Pt is not able to feed on her own. Pt dislike italian ice so this was changed to vanilla ice cream to increase PO intake. Consitpation per Flowsheets, last BM 5/29. Follow per protocol.

## 2025-06-03 NOTE — PLAN OF CARE
Goal Outcome Evaluation:  Plan of Care Reviewed With: patient        Progress: no change  Outcome Evaluation: Alert to self. Follows commands. Slow to process and respond. Needs direction and time to swallow pills.  No c/o pain. RA. Tele, NSR. SCD. Turning. Bladder scan. Bed bath given.

## 2025-06-04 LAB
ACE CSF-CCNC: <1.5 U/L (ref 0–2.5)
BACTERIA SPEC AEROBE CULT: NORMAL
DEPRECATED RDW RBC AUTO: 42.6 FL (ref 37–54)
ERYTHROCYTE [DISTWIDTH] IN BLOOD BY AUTOMATED COUNT: 13.4 % (ref 12.3–15.4)
GRAM STN SPEC: NORMAL
HBA1C MFR BLD: 4.9 % (ref 4.8–5.6)
HCT VFR BLD AUTO: 28.4 % (ref 34–46.6)
HGB BLD-MCNC: 9.3 G/DL (ref 12–15.9)
MCH RBC QN AUTO: 28.6 PG (ref 26.6–33)
MCHC RBC AUTO-ENTMCNC: 32.7 G/DL (ref 31.5–35.7)
MCV RBC AUTO: 87.4 FL (ref 79–97)
PLATELET # BLD AUTO: 196 10*3/MM3 (ref 140–450)
PMV BLD AUTO: 10.3 FL (ref 6–12)
RBC # BLD AUTO: 3.25 10*6/MM3 (ref 3.77–5.28)
WBC NRBC COR # BLD AUTO: 5.87 10*3/MM3 (ref 3.4–10.8)

## 2025-06-04 PROCEDURE — 25010000002 DIPHENHYDRAMINE PER 50 MG: Performed by: PSYCHIATRY & NEUROLOGY

## 2025-06-04 PROCEDURE — 25010000002 IMMUNE GLOBULIN (HUMAN) 5 GM/50ML SOLUTION: Performed by: PSYCHIATRY & NEUROLOGY

## 2025-06-04 PROCEDURE — 97530 THERAPEUTIC ACTIVITIES: CPT

## 2025-06-04 PROCEDURE — 83036 HEMOGLOBIN GLYCOSYLATED A1C: CPT | Performed by: FAMILY MEDICINE

## 2025-06-04 PROCEDURE — 25010000002 THIAMINE HCL 200 MG/2ML SOLUTION 2 ML VIAL: Performed by: PSYCHIATRY & NEUROLOGY

## 2025-06-04 PROCEDURE — 63710000001 PREDNISONE PER 5 MG: Performed by: PSYCHIATRY & NEUROLOGY

## 2025-06-04 PROCEDURE — 99233 SBSQ HOSP IP/OBS HIGH 50: CPT | Performed by: PSYCHIATRY & NEUROLOGY

## 2025-06-04 PROCEDURE — 92526 ORAL FUNCTION THERAPY: CPT

## 2025-06-04 PROCEDURE — 85027 COMPLETE CBC AUTOMATED: CPT | Performed by: FAMILY MEDICINE

## 2025-06-04 PROCEDURE — 25010000002 FOLIC ACID 5 MG/ML SOLUTION 10 ML VIAL: Performed by: PSYCHIATRY & NEUROLOGY

## 2025-06-04 PROCEDURE — 97535 SELF CARE MNGMENT TRAINING: CPT

## 2025-06-04 RX ORDER — PREDNISONE 10 MG/1
10 TABLET ORAL DAILY
Status: COMPLETED | OUTPATIENT
Start: 2025-06-04 | End: 2025-06-08

## 2025-06-04 RX ORDER — DIPHENHYDRAMINE HYDROCHLORIDE 50 MG/ML
25 INJECTION, SOLUTION INTRAMUSCULAR; INTRAVENOUS DAILY
Status: DISCONTINUED | OUTPATIENT
Start: 2025-06-04 | End: 2025-06-11

## 2025-06-04 RX ORDER — ACETAMINOPHEN 325 MG/1
650 TABLET ORAL DAILY
Status: DISCONTINUED | OUTPATIENT
Start: 2025-06-04 | End: 2025-06-04

## 2025-06-04 RX ADMIN — THIAMINE HYDROCHLORIDE 500 MG: 100 INJECTION, SOLUTION INTRAMUSCULAR; INTRAVENOUS at 08:06

## 2025-06-04 RX ADMIN — DIPHENHYDRAMINE HYDROCHLORIDE 25 MG: 50 INJECTION, SOLUTION INTRAMUSCULAR; INTRAVENOUS at 13:57

## 2025-06-04 RX ADMIN — FOLIC ACID 1 MG: 5 INJECTION, SOLUTION INTRAMUSCULAR; INTRAVENOUS; SUBCUTANEOUS at 08:06

## 2025-06-04 RX ADMIN — PANTOPRAZOLE SODIUM 40 MG: 40 TABLET, DELAYED RELEASE ORAL at 05:54

## 2025-06-04 RX ADMIN — THIAMINE HYDROCHLORIDE 500 MG: 100 INJECTION, SOLUTION INTRAMUSCULAR; INTRAVENOUS at 17:55

## 2025-06-04 RX ADMIN — Medication 10 MG: at 20:18

## 2025-06-04 RX ADMIN — CARVEDILOL 6.25 MG: 6.25 TABLET, FILM COATED ORAL at 17:56

## 2025-06-04 RX ADMIN — LEVOTHYROXINE SODIUM 150 MCG: 75 TABLET ORAL at 05:55

## 2025-06-04 RX ADMIN — THIAMINE HYDROCHLORIDE 500 MG: 100 INJECTION, SOLUTION INTRAMUSCULAR; INTRAVENOUS at 20:18

## 2025-06-04 RX ADMIN — ARIPIPRAZOLE 10 MG: 5 TABLET ORAL at 08:05

## 2025-06-04 RX ADMIN — IMMUNE GLOBULIN (HUMAN) 25 G: 10 INJECTION INTRAVENOUS; SUBCUTANEOUS at 13:58

## 2025-06-04 RX ADMIN — PREDNISONE 10 MG: 10 TABLET ORAL at 15:12

## 2025-06-04 RX ADMIN — CARVEDILOL 6.25 MG: 6.25 TABLET, FILM COATED ORAL at 08:05

## 2025-06-04 NOTE — PLAN OF CARE
Goal Outcome Evaluation:      Oriented to self and knew birthdate but it is hard for her to complete her thoughts and get out words at times. Does not follow instruction well at all. No complaints VSS, CTM     Progress: no change

## 2025-06-04 NOTE — THERAPY TREATMENT NOTE
Acute Care - Physical Therapy Treatment Note  [facility]     Patient Name: Lynn Magana  : 1975  MRN: [id number]  Today's Date: 2025   Onset of Illness/Injury or Date of Surgery: 25  Visit Dx:     ICD-10-CM ICD-9-CM   1. Generalized weakness  R53.1 780.79   2. Nausea and vomiting, unspecified vomiting type  R11.2 787.01   3. Marijuana user  F12.90 305.20   4. Low TSH level  R79.89 794.5   5. Urinary tract infection without hematuria, site unspecified  N39.0 599.0   6. Dysphagia, unspecified type  R13.10 787.20   7. Impaired functional mobility and activity tolerance [Z74.09]  Z74.09 V49.89     Patient Active Problem List   Diagnosis    Syncope    Graves' disease    Polysubstance abuse    Hypertension    Diabetes    Spells of decreased attentiveness    Chronic intractable headache    Nausea and vomiting    Slow transit constipation    Nonsmoker    Type 2 diabetes mellitus, with long-term current use of insulin    GERD without esophagitis    Hyperthyroidism    Thyromegaly    Post-surgical hypothyroidism    Degeneration of cervical intervertebral disc    Cervical radiculopathy    Acute pain of right shoulder    Class 1 obesity due to excess calories with body mass index (BMI) of 30.0 to 30.9 in adult    Hypoglycemia    Stage 1 acute kidney injury    Dehydration    Dysphagia    Dehydration    Hypokalemia    Steatosis of liver    Marijuana abuse    Loss of weight    Severe protein-calorie malnutrition    Hypothyroid    Generalized weakness    Acquired cerebral ventriculomegaly     Past Medical History:   Diagnosis Date    Arthritis     Carotid artery stenosis     Degenerative disc disease, cervical     Depression     Diabetes mellitus     type 1    Disease of thyroid gland     GERD (gastroesophageal reflux disease)     Graves disease     Heart palpitations     Hyperlipidemia     Hypertension     Hypothyroidism     Seizure     Thyromegaly      Past Surgical History:   Procedure Laterality Date      SECTION      CHOLECYSTECTOMY      COLONOSCOPY  2015    Normal exam Dr. Morgan     COLONOSCOPY  2015    Normal exam    CYST REMOVAL      ENDOSCOPY N/A 2018    Normal exam    ENDOSCOPY N/A 2025    Procedure: ESOPHAGOGASTRODUODENOSCOPY WITH ANESTHESIA;  Surgeon: Jadon Smith MD;  Location: [facility] ENDOSCOPY;  Service: Gastroenterology;  Laterality: N/A;  pre op: Nausea and vomiting  post op: normal  pcp: Darryl Andrews,     HYSTERECTOMY      THYROIDECTOMY Bilateral 2018    Procedure: total thyroidectomy;  Surgeon: Vitaly Givens MD;  Location: [facility] OR;  Service: ENT     PT Assessment (Last 12 Hours)       PT Evaluation and Treatment       Row Name 25 1438 25 0959       Physical Therapy Time and Intention    Subjective Information no complaints  - --    Document Type therapy note (daily note)  - --    Mode of Treatment physical therapy  - --    Session Not Performed -- other (see comments)  -    Comment, Session Not Performed -- with OT  -AH      Row Name 25 1438          General Information    Patient/Family/Caregiver Comments/Observations S.O.  -     Existing Precautions/Restrictions fall  -     Limitations/Impairments safety/cognitive   -AH       Row Name 25 1438          Pain Scale: FACES Pre/Post-Treatment    Pain: FACES Scale, Pretreatment 0-->no hurt  -     Posttreatment Pain Rating 0-->no hurt  -AH       Row Name 25 1438          Bed Mobility    Supine-Sit [place] (Bed Mobility) verbal cues;moderate assist (50% patient effort)  -     Sit-Supine [place] (Bed Mobility) moderate assist (50% patient effort);maximum assist (25% patient effort);verbal cues  -AH       Row Name 25 1438          Transfers    Comment, (Transfers) STOOD X 2  -AH       Row Name 25 1438          Sit-Stand Transfer    Sit-Stand [place] (Transfers) maximum assist (25% patient effort)  -AH       Row Name 25  1438          Stand-Sit Transfer    Stand-Sit [place] (Transfers) minimum assist (75% patient effort);2 person assist;verbal cues  -       Row Name 06/04/25 1438          Safety Issues/Impairments Affecting Functional Mobility    Impairments Affecting Function (Mobility) balance;cognition  -       Row Name 06/04/25 1438          Motor Skills    Comments, Therapeutic Exercise BLE A-AAROM sitting EOB, constant cues to stay on task  -       Row Name             Wound 05/29/25 1930 Left lower leg    Wound - Properties Group Placement Date: 05/29/25  -AR Placement Time: 1930  -AR Present on Original Admission: Y  -AR Side: Left  -AR Orientation: lower  -AR Location: leg  -AR    Retired Wound - Properties Group Placement Date: 05/29/25  -AR Placement Time: 1930  -AR Present on Original Admission: Y  -AR Side: Left  -AR Orientation: lower  -AR Location: leg  -AR    Retired Wound - Properties Group Placement Date: 05/29/25  -AR Placement Time: 1930  -AR Present on Original Admission: Y  -AR Side: Left  -AR Orientation: lower  -AR Location: leg  -AR    Retired Wound - Properties Group Date first assessed: 05/29/25  -AR Time first assessed: 1930  -AR Present on Original Admission: Y  -AR Side: Left  -AR Location: leg  -AR      Row Name             Wound 05/29/25 1931 Right lower leg    Wound - Properties Group Placement Date: 05/29/25  -AR Placement Time: 1931  -AR Present on Original Admission: Y  -AR Side: Right  -AR Orientation: lower  -AR Location: leg  -AR    Retired Wound - Properties Group Placement Date: 05/29/25  -AR Placement Time: 1931  -AR Present on Original Admission: Y  -AR Side: Right  -AR Orientation: lower  -AR Location: leg  -AR    Retired Wound - Properties Group Placement Date: 05/29/25  -AR Placement Time: 1931  -AR Present on Original Admission: Y  -AR Side: Right  -AR Orientation: lower  -AR Location: leg  -AR    Retired Wound - Properties Group Date first assessed: 05/29/25  -AR Time first  assessed: 1931  -AR Present on Original Admission: Y  -AR Side: Right  -AR Location: leg  -AR      Row Name 06/04/25 1438          Plan of Care Review    Plan of Care Reviewed With patient;significant other  -     Progress no change  -     Outcome Evaluation pt trans to EOB mod-max, sat EOB 20 minutes cga-sba, BLE A-AAROM constant cues to stay on task, slow to respond at times, ortho BP's with nsg, trans back to bed mod-max, pt would benefit from rehab  -       Row Name 06/04/25 1438          Positioning and Restraints    Pre-Treatment Position in bed  -AH     Post Treatment Position bed  -AH     In Bed fowlers;call light within reach;encouraged to call for assist;exit alarm on;with family/caregiver;SCD pump applied;with nsg  -               User Key  (r) = Recorded By, (t) = Taken By, (c) = Cosigned By      Initials Name Provider Type     Magi Cordova, PTA Physical Therapist Assistant    Deborah Christie, RN Registered Nurse                    Physical Therapy Education       Title: PT OT SLP Therapies (In Progress)       Topic: Physical Therapy (In Progress)       Point: Mobility training (Done)       Learning Progress Summary            Patient Acceptance, E,TB,D, VU,NR by  at 5/30/2025 1155    Comment: Education re: purpose of PT/importance of activity, safety/falls prevention, improved tech w/ bed mobility, tfers   Significant Other Acceptance, E,TB,D, VU,NR by  at 5/30/2025 1155    Comment: Education re: purpose of PT/importance of activity, safety/falls prevention, improved tech w/ bed mobility, tfers                      Point: Home exercise program (Not Started)       Learner Progress:  Not documented in this visit.              Point: Precautions (Done)       Learning Progress Summary            Patient Acceptance, E,TB,D, VU,NR by  at 5/30/2025 1155    Comment: Education re: purpose of PT/importance of activity, safety/falls prevention, improved tech w/ bed mobility, tfers   Significant  Other Acceptance, E,TB,D, VU,NR by ROBERTA at 5/30/2025 3537    Comment: Education re: purpose of PT/importance of activity, safety/falls prevention, improved tech w/ bed mobility, tfers                                      User Key       Initials Effective Dates Name Provider Type Discipline    ROBERTA 08/02/18 -  Selin Leon, PT Physical Therapist PT                  PT Recommendation and Plan  Anticipated Discharge Disposition (PT): inpatient rehabilitation facility, sub acute care setting  Plan of Care Reviewed With: patient, significant other  Progress: no change  Outcome Evaluation: pt trans to EOB mod-max, sat EOB 20 minutes cga-sba, BLE A-AAROM constant cues to stay on task, slow to respond at times, ortho BP's with nsg, trans back to bed mod-max, pt would benefit from rehab   Outcome Measures       Row Name 06/04/25 1500 06/04/25 1400 06/03/25 1500       How much help from another person do you currently need...    Turning from your back to your side while in flat bed without using bedrails? 3  -AH -- 3  -AH    Moving from lying on back to sitting on the side of a flat bed without bedrails? 2  -AH -- 3  -AH    Moving to and from a bed to a chair (including a wheelchair)? 1  -AH -- 2  -AH    Standing up from a chair using your arms (e.g., wheelchair, bedside chair)? 1  -AH -- 2  -AH    Climbing 3-5 steps with a railing? 1  -AH -- 1  -AH    To walk in hospital room? 1  -AH -- 1  -AH    AM-PAC 6 Clicks Score (PT) 9  -AH -- 12  -AH       How much help from another is currently needed...    Putting on and taking off regular lower body clothing? -- 2  -TS --    Bathing (including washing, rinsing, and drying) -- 2  -TS --    Toileting (which includes using toilet bed pan or urinal) -- 2  -TS --    Putting on and taking off regular upper body clothing -- 2  -TS --    Taking care of personal grooming (such as brushing teeth) -- 3  -TS --    Eating meals -- 3  -TS --    AM-PAC 6 Clicks Score (OT) -- 14  -TS --        Functional Assessment    Outcome Measure Options -- -- AM-PAC 6 Clicks Basic Mobility (PT)  -      Row Name 06/02/25 1005 06/02/25 0900          How much help from another person do you currently need...    Turning from your back to your side while in flat bed without using bedrails? -- 3  -AH     Moving from lying on back to sitting on the side of a flat bed without bedrails? -- 3  -AH     Moving to and from a bed to a chair (including a wheelchair)? -- 1  -AH     Standing up from a chair using your arms (e.g., wheelchair, bedside chair)? -- 1  -AH     Climbing 3-5 steps with a railing? -- 1  -AH     To walk in hospital room? -- 1  -AH     AM-PAC 6 Clicks Score (PT) -- 10  -AH        How much help from another is currently needed...    Putting on and taking off regular lower body clothing? 2  -AC (r) BH (t) AC (c) --     Bathing (including washing, rinsing, and drying) 2  -AC (r) BH (t) AC (c) --     Toileting (which includes using toilet bed pan or urinal) 2  -AC (r) BH (t) AC (c) --     Putting on and taking off regular upper body clothing 2  -AC (r) BH (t) AC (c) --     Taking care of personal grooming (such as brushing teeth) 3  -AC (r) BH (t) AC (c) --     Eating meals 3  -AC (r) BH (t) AC (c) --     AM-PAC 6 Clicks Score (OT) 14  -AC (r) BH (t) --        Functional Assessment    Outcome Measure Options AM-PAC 6 Clicks Daily Activity (OT)  -AC (r) BH (t) AC (c) AM-PAC 6 Clicks Basic Mobility (PT)  -               User Key  (r) = Recorded By, (t) = Taken By, (c) = Cosigned By      Initials Name Provider Type    Niels Perez, OTR/L, CNT Occupational Therapist    Magi Hebert, PTA Physical Therapist Assistant    Safia Rush, CLOVIS Occupational Therapist Assistant    Prakash Fuentes, OT Student OT Student                     Time Calculation:    PT Charges       Row Name 06/04/25 1517             Time Calculation    Start Time 1438  -AH      Stop Time 1516  -AH      Time Calculation  (min) 38 min  -      PT Received On 06/04/25  -         Time Calculation- PT    Total Timed Code Minutes- PT 38 minute(s)  -         Timed Charges    86175 - PT Therapeutic Activity Minutes 38  -AH         Total Minutes    Timed Charges Total Minutes 38  -AH       Total Minutes 38  -AH                User Key  (r) = Recorded By, (t) = Taken By, (c) = Cosigned By      Initials Name Provider Type     Magi Cordova PTA Physical Therapist Assistant                  Therapy Charges for Today       Code Description Service Date Service Provider Modifiers Qty    71921076549 HC PT THERAPEUTIC ACT EA 15 MIN 6/3/2025 Magi Cordova, CARLOS MANUEL GP 2    20486537554 HC PT THERAPEUTIC ACT EA 15 MIN 6/4/2025 Magi Cordova, CARLOS MANUEL GP 3            PT G-Codes  Outcome Measure Options: AM-PAC 6 Clicks Basic Mobility (PT)  AM-PAC 6 Clicks Score (PT): 9  AM-PAC 6 Clicks Score (OT): 14    Magi Cordova PTA  6/4/2025

## 2025-06-04 NOTE — PROGRESS NOTES
Neurology Progress Note      Chief Complaint:  AMS  Length of Stay:  6   Subjective     Subjective:  She is somewhat more awake today.  Her  is at the bedside today we speak to him.  She continues to have global weakness and decreasing cognition.  She has shown some mild improvement since admission.  We discussed the risk and benefits of IVIG therapy and she expresses understanding.  She would like to proceed with this.  After I left the room the patient did get up and ambulate with assistance by physical therapy to the bedside commode.  After being there briefly she slumped over and had some disconjugate gaze.  There may have been greater right than left sided weakness.  She was returned to the bed and recovered immediately.    Medications:  Current Facility-Administered Medications   Medication Dose Route Frequency Provider Last Rate Last Admin    acetaminophen (TYLENOL) tablet 650 mg  650 mg Oral Daily Ori Banuelos MD        ARIPiprazole (ABILIFY) tablet 10 mg  10 mg Oral Daily Bautista Limon MD   10 mg at 06/04/25 0805    sennosides-docusate (PERICOLACE) 8.6-50 MG per tablet 2 tablet  2 tablet Oral BID PRN Bautista Limon MD   2 tablet at 06/03/25 0844    And    polyethylene glycol (MIRALAX) packet 17 g  17 g Oral Daily PRN Bautista Limon MD        And    bisacodyl (DULCOLAX) EC tablet 5 mg  5 mg Oral Daily PRN Bautista Limon MD        And    bisacodyl (DULCOLAX) suppository 10 mg  10 mg Rectal Daily PRN Bautista Limon MD        Calcium Replacement - Follow Nurse / BPA Driven Protocol   Not Applicable PRN Bautista Limon MD        carvedilol (COREG) tablet 6.25 mg  6.25 mg Oral BID With Meals Bautista Limon MD   6.25 mg at 06/04/25 0805    diphenhydrAMINE (BENADRYL) injection 25 mg  25 mg Intravenous Daily Ori Banuelos MD        folic acid 1 mg in sodium chloride 0.9 % 50 mL IVPB  1 mg Intravenous Daily rOi Banuelos  mL/hr at 06/04/25 0806 1 mg at  06/04/25 0806    hydrALAZINE (APRESOLINE) injection 10 mg  10 mg Intravenous Q6H PRN Bautista Limon MD   10 mg at 05/29/25 2352    immune globulin (human) (GAMUNEX-C) 25 g infusion 250 mL  400 mg/kg Intravenous Daily Ori Banuelos MD        levothyroxine (SYNTHROID, LEVOTHROID) tablet 150 mcg  150 mcg Oral Q AM Bautista Limon MD   150 mcg at 06/04/25 0555    Magnesium Low Dose Replacement - Follow Nurse / BPA Driven Protocol   Not Applicable PRN Bautista Limon MD        melatonin tablet 10 mg  10 mg Oral Nightly PRN Bautista Limon MD        Morphine sulfate (PF) injection 0.5 mg  0.5 mg Intravenous Q4H PRN Basim Rivera MD        ondansetron ODT (ZOFRAN-ODT) disintegrating tablet 4 mg  4 mg Oral Q6H PRN Batuista Limon MD        Or    ondansetron (ZOFRAN) injection 4 mg  4 mg Intravenous Q6H PRN Bautista Limon MD   4 mg at 05/30/25 2139    pantoprazole (PROTONIX) EC tablet 40 mg  40 mg Oral Q AM Bautista Limon MD   40 mg at 06/04/25 0554    Phosphorus Replacement - Follow Nurse / BPA Driven Protocol   Not Applicable PRN Bautista Limon MD        Potassium Replacement - Follow Nurse / BPA Driven Protocol   Not Applicable PRN Bautista Limon MD        sodium chloride 0.9 % flush 10 mL  10 mL Intravenous PRN Sfaia Wood APRN        thiamine (B-1) 500 mg in sodium chloride 0.9 % 100 mL IVPB  500 mg Intravenous TID Payton Weems  mL/hr at 06/04/25 0806 500 mg at 06/04/25 0806             Objective      Vital Signs  Temp:  [97.5 °F (36.4 °C)-98.1 °F (36.7 °C)] 97.6 °F (36.4 °C)  Heart Rate:  [82-92] 91  Resp:  [16-18] 18  BP: ()/(62-98) 90/62    Physical Exam:    HEENT:  Neck is supple  CVS:  RRR  Lungs:  CTA - B/L  Abd:  NT/ND  Ext:  No edema  Skin:  No rashes    Pertinent Neuro Exam:  Awake  Slowed cognition.  Knows where she is at today.  Knows her .  Can follow commands but takes repetition and requests  Pupils equal.  Able to count fingers  today.  Disconjugate gaze chronically  No unilateral facial droop  Antigravity strength in all 4 extremities  No signs of tremors or increased tone  Mild ataxia noted bilaterally but there is some functional components of weakness noted as well.  Last nurse assessment:  Interval: baseline  1a. Level of Consciousness: 0-->Alert, keenly responsive  1b. LOC Questions: 0-->Answers both questions correctly  1c. LOC Commands: 0-->Performs both tasks correctly  2. Best Gaze: 0-->Normal  3. Visual: 0-->No visual loss  4. Facial Palsy: 0-->Normal symmetrical movements  5a. Motor Arm, Left: 0-->No drift, limb holds 90 (or 45) degrees for full 10 secs  5b. Motor Arm, Right: 0-->No drift, limb holds 90 (or 45) degrees for full 10 secs  6a. Motor Leg, Left: 1-->Drift, leg falls by the end of the 5-sec period but does not hit bed  6b. Motor Leg, Right: 1-->Drift, leg falls by the end of the 5-sec period but does not hit bed  7. Limb Ataxia: 0-->Absent  8. Sensory: 0-->Normal, no sensory loss  9. Best Language: 0-->No aphasia, normal  10. Dysarthria: 0-->Normal  11. Extinction and Inattention (formerly Neglect): 0-->No abnormality    Total (NIH Stroke Scale): 2       Results Review:      Labs:  Labs reviewed as below  LAB RESULTS:      Lab 06/04/25  0532 05/30/25  0500 05/29/25  1247   WBC 5.87 5.86 8.72   HEMOGLOBIN 9.3* 9.4* 13.0   HEMATOCRIT 28.4* 28.1* 41.1   PLATELETS 196 189 200   NEUTROS ABS  --  4.02 7.11*   IMMATURE GRANS (ABS)  --  0.05 0.13*   LYMPHS ABS  --  1.31 1.06   MONOS ABS  --  0.45 0.36   EOS ABS  --  0.01 0.02   MCV 87.4 85.4 92.4   PROTIME  --   --  13.3         Lab 06/03/25  1013 06/02/25  0416 06/01/25  1100 05/31/25  0502 05/30/25  0500 05/29/25  1247   SODIUM 146* 146*  --  141 145 142   POTASSIUM 3.8 3.3*  --  3.7 2.9* 3.3*   CHLORIDE 114* 114*  --  109* 109* 103   CO2 23.0 21.0*  --  19.0* 20.0* 16.0*   ANION GAP 9.0 11.0  --  13.0 16.0* 23.0*   BUN 18.8 27.0*  --  34.2* 32.3* 29.0*   CREATININE 0.89  0.82  --  0.77 0.70 0.98   EGFR 79.1 87.3  --  94.1 105.5 70.5   GLUCOSE 146* 119*  --  140* 121* 144*   CALCIUM 9.4 9.2  --  9.6 9.2 10.2   MAGNESIUM 2.0  --   --  2.1 1.9 2.4   TSH  --   --  0.027*  --   --  0.050*         Lab 25  0500 25  1247   TOTAL PROTEIN 6.2 8.0   ALBUMIN 3.5 4.0   GLOBULIN 2.7 4.0   ALT (SGPT) 61* 88*   AST (SGOT) 30 58*   BILIRUBIN 0.6 1.1   ALK PHOS 37* 50         Lab 25  1247   PROTIME 13.3   INR 0.97             Lab 25  0416   FOLATE 2.78*   VITAMIN B 12 >2,000*         Brief Urine Lab Results  (Last result in the past 365 days)        Color   Clarity   Blood   Leuk Est   Nitrite   Protein   CREAT   Urine HCG        25 0000 Orange   Clear   Negative   Moderate (2+)   Positive   30 mg/dL (1+)                 Microbiology Results (last 10 days)       Procedure Component Value - Date/Time    Culture, CSF - Cerebrospinal Fluid, Lumbar Puncture [[id number]] Collected: 25 0920    Lab Status: Final result Specimen: Cerebrospinal Fluid from Lumbar Puncture Updated: 25 0628     CSF Culture No growth at 3 days     Gram Stain No WBCs or organisms seen    Blood Culture - Blood, Arm, Left [[id number]]  (Normal) Collected: 25 1941    Lab Status: Final result Specimen: Blood from Arm, Left Updated: 25 2000     Blood Culture No growth at 5 days    Blood Culture - Blood, Arm, Left [[id number]]  (Normal) Collected: 25 1547    Lab Status: Final result Specimen: Blood from Arm, Left Updated: 25 1600     Blood Culture No growth at 5 days        Other labs:  RPR normal  HIV negative  Urine analysis positive  TSH low at 0.027  B12 elevated next line folate low at 2.78  Ammonia 36  Ethanol level negligible  CSF:  W:  0  R:  0  P:  55.7        Imaging:  MRI of the brain shows ventriculomegaly but not consistently out of proportion compared to the overall degree of atrophy.  MRI of the cervical and lumbar spine couple  CT of the abdomen shows  some nonspecific findings possibly consistent with enteritis but no signs of a primary malignancy.  CT of pelvis done on 5/11 showed urinary bladder wall thickening but no signs of an occult malignancy.  EEG shows nonspecific slowing  Nerve conduction velocities performed on 6/3 show velocities in the 40 m/s range.  This is slow although not necessarily in the demyelinating range.    Assessment/Plan     Hospital Problem List      Acquired cerebral ventriculomegaly    Generalized weakness    Impression:  Progressive neurologic decline for several months with global weakness, opthalmoplegia, and ataxia.  Considered Joy Quinonez Variant  Nerve conduction studies did reveal slowed velocities but not in the demyelinating range.  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  We discussed the risk and benefits of this and her and her  would like to proceed with this.  I think is reasonable to initiate 5 days of IVIG therapy to see if she improves from this.  Atypical spell - functional component?  Poor effort on exam  Folate deficiency  Ventriculomegally - opening pressure unimpressive  HTN  Hx of cannabis usage    Plan:  Will initiate 400 mg/kg daily dosing of IVIG starting today (6/4) with a plan to complete 5 days of therapy.  Will premedicate each day with Benadryl and Tylenol  Will check orthostatics  MMA level  Replace Folic acid: started 6/2  GQ1B antibiodies -send out lab.  Results pending.  Requesting a nerve conduction study of the right arm and leg today to see if there is any evidence of demyelination.      Medical Decision Making    Number/Complexity of Problems  Moderate  1 undiagnosed new problem with uncertain prognosis -   1 acute illness with systemic symptoms -   High  1 acute or chronic illness that poses a threat to life/body function -   High     MDM Data  Moderate - 1/3 categories  Extensive - 2/3 categories    Category 1: 3 of the  following  Review of external notes  Review of results  Ordering of each unique test  Independent historian  Category 2:  Independent interpretation of test (ex: imaging)  Category 3:  Discussion of management with another provider    Extensive     Treatment Plan  Moderate - Prescription Drug management  High  Drug therapy requiring intensive monitoring for toxicity  Decision regarding hospitalization or escalation of care  De-escalate care/DNR decisions         Ori Banuelos MD  06/04/25  11:31 CDT

## 2025-06-04 NOTE — PROGRESS NOTES
[facility] Medicine Services  INPATIENT PROGRESS NOTE    Patient Name: Lynn Magana  Date of Admission: 5/29/2025  Today's Date: 06/04/25  Length of Stay: 6  Primary Care Physician: Darryl Andrews DO    Subjective   Chief Complaint: Lightheadedness.  Weakness - Generalized     49-year-old female with history of hypothyroidism, liver steatosis, cannabis use, malnutrition, diabetes mellitus type 2, hypertension, admitted May 11 through May 14, 2025; at that time she was admitted due to poor oral intake and weight loss.  She had an upper endoscopy performed 5/13/2025 with findings of a small hiatal hernia and no other abnormalities reported.  Per reports reviewed the patient was not taking levothyroxine at the time, and her TSH was markedly elevated.  She was restarted on levothyroxine.  Her blood glucose was low, and did not require insulin management.  Drug screening urine was positive for cannabis, and considered to be secondary to cannabinoid hyperemesis syndrome.  The patient left the hospital AGAINST MEDICAL ADVICE, apparently she eloped; after being called, she stated was already home and did not plan to return to the hospital.  On 5/16/2025, the patient returned to the hospital reporting a syncopal event.  There was questionable seizure-like activity.   A CT brain performed on that evaluation showed moderate further increase in size of the lateral and third ventricle since the previous study. The fourth ventricle reported as normal and unchanged. A small obstructing lesion at the level of aqueduct of Sylvius not excluded. Further evaluation with contrast enhanced MR imaging of the brain was suggested. She again left the ER AGAINST MEDICAL ADVICE.    Patient came to hospital 5/30/25 reporting dizziness, lightheadedness, worsening when standing up, abnormal gait with unsteadiness;  reported confusion.  Symptom has been going on for approximately 1-2 months.   Blood pressure has been variable, initially elevated.      She has been nonambulatory for several weeks.  She reported weakness of the lower extremities and right upper extremity.  She has had urinary retention requiring straight catheterization during this admission.  Lumbar puncture performed 6/1/25, with opening pressure of 17, and fluid sent for analysis.    Nerve conduction studies performed yesterday.  Being evaluated for Joy Quinonez variant.  Today found with , trying to urinate in the bedside commode.  Weakness persists.    Review of Systems   All pertinent negatives and positives are as above. All other systems have been reviewed and are negative unless otherwise stated.     Objective    Temp:  [97.6 °F (36.4 °C)-98.1 °F (36.7 °C)] 97.6 °F (36.4 °C)  Heart Rate:  [85-92] 91  Resp:  [16-18] 18  BP: ()/(62-98) 90/62  Physical Exam  Constitutional:       Appearance: Alert, oriented to person.  No respiratory distress.  HENT:      Head: Normocephalic and atraumatic.      Nose: Nose normal.      Mouth/Throat:      Mouth: Mucous membranes are moist.   Neck, old transverse scar  Eyes:      Conjunctiva/sclera: Conjunctivae normal.      Pupils: Pupils are equal, round  Cardiovascular:      Rate and Rhythm: Normal rate and regular rhythm.      Pulses: Normal pulses.   Pulmonary:      Effort: No respiratory distress.      Breath sounds: Normal breath sounds. No wheezing, rhonchi or rales.   Abdominal:      General: Abdomen is flat. Bowel sounds are normal.      Palpations: Abdomen is soft.      Tenderness: There is no guarding or rebound.   Extremities:  No lower extremity edema.  Skin:     Capillary Refill: Capillary refill takes less than 2 seconds.      Coloration: Skin is not jaundiced.      Findings: No rash.   Neurological:   Weakness of neck extension.  Abnormal ocular movements.  Flaccid 3+ out of 5 lower extremity bilateral weakness  Drowsy. Slow speech.  Follows commands.          Results  "Review:  I have reviewed the labs, radiology results, and diagnostic studies.    Laboratory Data:   Results from last 7 days   Lab Units 06/04/25  0532 05/30/25  0500 05/29/25  1247   WBC 10*3/mm3 5.87 5.86 8.72   HEMOGLOBIN g/dL 9.3* 9.4* 13.0   HEMATOCRIT % 28.4* 28.1* 41.1   PLATELETS 10*3/mm3 196 189 200        Results from last 7 days   Lab Units 06/03/25  1013 06/02/25  0416 05/31/25  0502 05/30/25  0500 05/29/25  1247   SODIUM mmol/L 146* 146* 141 145 142   POTASSIUM mmol/L 3.8 3.3* 3.7 2.9* 3.3*   CHLORIDE mmol/L 114* 114* 109* 109* 103   CO2 mmol/L 23.0 21.0* 19.0* 20.0* 16.0*   BUN mg/dL 18.8 27.0* 34.2* 32.3* 29.0*   CREATININE mg/dL 0.89 0.82 0.77 0.70 0.98   CALCIUM mg/dL 9.4 9.2 9.6 9.2 10.2   BILIRUBIN mg/dL  --   --   --  0.6 1.1   ALK PHOS U/L  --   --   --  37* 50   ALT (SGPT) U/L  --   --   --  61* 88*   AST (SGOT) U/L  --   --   --  30 58*   GLUCOSE mg/dL 146* 119* 140* 121* 144*       Culture Data:   No results found for: \"BLOODCX\", \"URINECX\", \"WOUNDCX\", \"MRSACX\", \"RESPCX\", \"STOOLCX\"    Radiology Data:   Imaging Results (Last 24 Hours)       ** No results found for the last 24 hours. **            I have reviewed the patient's current medications.     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 No WBC  CSF culture no growth 2 days    EEG  Diffuse cerebral dysfunction of moderate degree, nonspecific.  This is most commonly seen due to toxic/metabolic or hypoxemic/ischemic cause. No evidence for epilepsy " is seen    Treatment Plan    Neurology recommendations being followed.  Pending Antibodies GQ1b    Nerve conduction studies review per specialist.  Will have treatment with IV IgG for 5 days, starting 6/4/25.    Discussed with family.    Variable thyroid function tests on prior admissions and currently. Continue levothyroxine 150 mcg p.o. daily. I have recommended endocrinology outpatient follow up, and this was explained to family.    Due to variable blood pressure values, discontinued amlodipine lisinoprill, aldactone; On Carvedilol 6.25 mg p.o. twice daily.     Continue PT/OT  Diet as per speech therapist recommendations.    I recommend SNF/rehab after inpatient treatment. Family and patient to decide.      Medical Decision Making  Number and Complexity of problems: 8, moderate complexity    Differential Diagnosis: see above    Conditions and Status        Condition is unchanged.     MDM Data  External documents reviewed: no  Cardiac tracing (EKG, telemetry) interpretation: no new  Radiology interpretation: reports reviewed  Labs reviewed: yes  Any tests that were considered but not ordered: no     Decision rules/scores evaluated (example [id number], Wells, etc): none     Discussed with: patient     Care Planning  Shared decision making: patient  Code status and discussions: FC    Disposition  Social Determinants of Health that impact treatment or disposition: none      Electronically signed by Bautista Limon MD, 06/04/25, 12:44 CDT.

## 2025-06-04 NOTE — THERAPY TREATMENT NOTE
Acute Care - Occupational Therapy Treatment Note  [facility]     Patient Name: Lynn Magana  : 1975  MRN: [id number]  Today's Date: 2025  Onset of Illness/Injury or Date of Surgery: 25     Referring Physician: Dr. Limon    Admit Date: 2025       ICD-10-CM ICD-9-CM   1. Generalized weakness  R53.1 780.79   2. Nausea and vomiting, unspecified vomiting type  R11.2 787.01   3. Marijuana user  F12.90 305.20   4. Low TSH level  R79.89 794.5   5. Urinary tract infection without hematuria, site unspecified  N39.0 599.0   6. Dysphagia, unspecified type  R13.10 787.20   7. Impaired functional mobility and activity tolerance [Z74.09]  Z74.09 V49.89     Patient Active Problem List   Diagnosis    Syncope    Graves' disease    Polysubstance abuse    Hypertension    Diabetes    Spells of decreased attentiveness    Chronic intractable headache    Nausea and vomiting    Slow transit constipation    Nonsmoker    Type 2 diabetes mellitus, with long-term current use of insulin    GERD without esophagitis    Hyperthyroidism    Thyromegaly    Post-surgical hypothyroidism    Degeneration of cervical intervertebral disc    Cervical radiculopathy    Acute pain of right shoulder    Class 1 obesity due to excess calories with body mass index (BMI) of 30.0 to 30.9 in adult    Hypoglycemia    Stage 1 acute kidney injury    Dehydration    Dysphagia    Dehydration    Hypokalemia    Steatosis of liver    Marijuana abuse    Loss of weight    Severe protein-calorie malnutrition    Hypothyroid    Generalized weakness    Acquired cerebral ventriculomegaly     Past Medical History:   Diagnosis Date    Arthritis     Carotid artery stenosis     Degenerative disc disease, cervical     Depression     Diabetes mellitus     type 1    Disease of thyroid gland     GERD (gastroesophageal reflux disease)     Graves disease     Heart palpitations     Hyperlipidemia     Hypertension     Hypothyroidism     Seizure     Thyromegaly       Past Surgical History:   Procedure Laterality Date     SECTION      CHOLECYSTECTOMY      COLONOSCOPY  2015    Normal exam Dr. Morgan     COLONOSCOPY  2015    Normal exam    CYST REMOVAL      ENDOSCOPY N/A 2018    Normal exam    ENDOSCOPY N/A 2025    Procedure: ESOPHAGOGASTRODUODENOSCOPY WITH ANESTHESIA;  Surgeon: Jadon Smith MD;  Location: [facility] ENDOSCOPY;  Service: Gastroenterology;  Laterality: N/A;  pre op: Nausea and vomiting  post op: normal  pcp: Darryl Andrews,     HYSTERECTOMY      THYROIDECTOMY Bilateral 2018    Procedure: total thyroidectomy;  Surgeon: Vitaly Givens MD;  Location: [facility] OR;  Service: ENT         OT ASSESSMENT FLOWSHEET (Last 12 Hours)       OT Evaluation and Treatment       Row Name 25 0922                   OT Time and Intention    Subjective Information complains of;weakness;fatigue  -TS        Document Type therapy note (daily note)  -TS        Mode of Treatment occupational therapy  -TS        Patient Effort good  -TS        Comment L side weak  -TS           General Information    Existing Precautions/Restrictions fall  -TS           Pain Assessment    Pretreatment Pain Rating 0/10 - no pain  -TS        Posttreatment Pain Rating 0/10 - no pain  -TS           Cognition    Orientation Status (Cognition) oriented x 4  -TS        Follows Commands (Cognition) WFL  -TS        Personal Safety Interventions fall prevention program maintained;gait belt;nonskid shoes/slippers when out of bed  -TS           Activities of Daily Living    BADL Assessment/Intervention toileting  -TS           Toileting Assessment/Training    [place] Level (Toileting) toileting skills;standby assist;adjust/manage clothing;maximum assist (25% patient effort)  -TS        Assistive Devices (Toileting) commode, bedside with drop arms  -TS        Position (Toileting) supported sitting;supported standing  -TS        Comment, (Toileting) pt  requested time on BS to attempt bowel movement  -TS           Bed Mobility    Bed Mobility rolling left;supine-sit;sit-supine  -TS        Rolling Left Ramsey (Bed Mobility) contact guard  -TS        Scooting/Bridging Ramsey (Bed Mobility) dependent (less than 25% patient effort);2 person assist  -TS        Supine-Sit Ramsey (Bed Mobility) minimum assist (75% patient effort)  -TS        Sit-Supine Ramsey (Bed Mobility) maximum assist (25% patient effort)  -TS        Assistive Device (Bed Mobility) bed rails;repositioning sheet;head of bed elevated  -TS           Transfer Assessment/Treatment    Transfers stand pivot/stand step transfer;sit-stand transfer;toilet transfer  -TS           Sit-Stand Transfer    Sit-Stand Ramsey (Transfers) minimum assist (75% patient effort)  [place] [facility]  [place]           Stand-Sit Transfer    Stand-Sit Ramsey (Transfers) contact guard;minimum assist (75% patient effort)  -TS        Comment, (Stand-Sit Transfer) sitting on BS  -TS           Stand Pivot/Stand Step Transfer    Stand Pivot/Stand Step Ramsey (Transfers) minimum assist (75% patient effort);2 person assist  -TS        Comment, (Stand Pivot Transfer) EOB to BS  -TS           Toilet Transfer    Type (Toilet Transfer) sit-stand;stand pivot/stand step  -TS        Ramsey Level (Toilet Transfer) maximum assist (25% patient effort);dependent (less than 25% patient effort);2 person assist  -TS        Assistive Device (Toilet Transfer) commode, bedside with drop arms  -TS        Comment, (Toilet Transfer) after sitting on BSC pt was max A x2 back to EOB  -TS           Balance    Static Sitting Balance standby assist  -TS        Position, Sitting Balance sitting edge of bed  -TS           Wound 05/29/25 1930 Left lower leg    Wound - Properties Group Placement Date: 05/29/25  -AR Placement Time: 1930  -AR Present on Original Admission: Y  -AR Side: Left  -AR Orientation:  "lower  -AR Location: leg  -AR    Retired Wound - Properties Group Placement Date: 05/29/25  -AR Placement Time: 1930  -AR Present on Original Admission: Y  -AR Side: Left  -AR Orientation: lower  -AR Location: leg  -AR    Retired Wound - Properties Group Placement Date: 05/29/25  -AR Placement Time: 1930  -AR Present on Original Admission: Y  -AR Side: Left  -AR Orientation: lower  -AR Location: leg  -AR    Retired Wound - Properties Group Date first assessed: 05/29/25  -AR Time first assessed: 1930  -AR Present on Original Admission: Y  -AR Side: Left  -AR Location: leg  -AR       Wound 05/29/25 1931 Right lower leg    Wound - Properties Group Placement Date: 05/29/25  -AR Placement Time: 1931  -AR Present on Original Admission: Y  -AR Side: Right  -AR Orientation: lower  -AR Location: leg  -AR    Retired Wound - Properties Group Placement Date: 05/29/25  -AR Placement Time: 1931  -AR Present on Original Admission: Y  -AR Side: Right  -AR Orientation: lower  -AR Location: leg  -AR    Retired Wound - Properties Group Placement Date: 05/29/25  -AR Placement Time: 1931  -AR Present on Original Admission: Y  -AR Side: Right  -AR Orientation: lower  -AR Location: leg  -AR    Retired Wound - Properties Group Date first assessed: 05/29/25  -AR Time first assessed: 1931  -AR Present on Original Admission: Y  -AR Side: Right  -AR Location: leg  -AR       Plan of Care Review    Plan of Care Reviewed With patient;spouse  -TS        Progress no change  -TS        Outcome Evaluation Pt awake/alert at beginning of tx noted increased movement of L UE/LE during supine position in bed and upon initial t/f to BSC. Pt requested to sit on BSC for increased period of time to attempt bowel movement as she did feel the \"urge\" to go. Pt noted to demonstrate decreased alertness and increased fatigue after sitting up on BSC for 5-10 minutes with spouse present in room. Pt min A x2 for t/f EOB to BSC. Tranfers BSC back to EOB max A/dependent " x2. Pt would benefit from continued rehab at discharge. Continue OT POC  -TS           Positioning and Restraints    Pre-Treatment Position in bed  -TS        Post Treatment Position bed  -TS        In Bed fowlers;call light within reach;encouraged to call for assist;with family/caregiver;side rails up x2  -TS                  User Key  (r) = Recorded By, (t) = Taken By, (c) = Cosigned By      Initials Name Effective Dates     Safia Quezada COTA 02/03/23 -     Deborah Christie RN 05/24/22 -                      Occupational Therapy Education       Title: PT OT SLP Therapies (In Progress)       Topic: Occupational Therapy (In Progress)       Point: ADL training (Done)       Learning Progress Summary            Patient Acceptance, E, VU by  at 6/4/2025 1234    Acceptance, E,D, VU,NR by  at 6/2/2025 1119    Comment: OT POC, B UE exercise, bed mobility training, t/f training.   Family Acceptance, E,D, VU,NR by  at 6/2/2025 1119    Comment: OT POC, B UE exercise, bed mobility training, t/f training.                      Point: Home exercise program (Done)       Learning Progress Summary            Patient Acceptance, E, VU by  at 6/4/2025 1234    Acceptance, E,D, VU,NR by  at 6/2/2025 1119    Comment: OT POC, B UE exercise, bed mobility training, t/f training.   Family Acceptance, E,D, VU,NR by  at 6/2/2025 1119    Comment: OT POC, B UE exercise, bed mobility training, t/f training.                      Point: Body mechanics (Done)       Learning Progress Summary            Patient Acceptance, E,D, VU,NR by  at 6/2/2025 1119    Comment: OT POC, B UE exercise, bed mobility training, t/f training.   Family Acceptance, E,D, VU,NR by  at 6/2/2025 1119    Comment: OT POC, B UE exercise, bed mobility training, t/f training.                                      User Key       Initials Effective Dates Name Provider Type Discipline     02/03/23 -  Safia Quezada COTA Occupational Therapist  "Assistant OT     05/12/25 -  Prakash Badillo OT Student OT Student OT                      OT Recommendation and Plan     Plan of Care Review  Plan of Care Reviewed With: patient, spouse  Progress: no change  Outcome Evaluation: Pt awake/alert at beginning of tx noted increased movement of L UE/LE during supine position in bed and upon initial t/f to BSC. Pt requested to sit on BSC for increased period of time to attempt bowel movement as she did feel the \"urge\" to go. Pt noted to demonstrate decreased alertness and increased fatigue after sitting up on BSC for 5-10 minutes with spouse present in room. Pt min A x2 for t/f EOB to BSC. Tranfers BSC back to EOB max A/dependent x2. Pt would benefit from continued rehab at discharge. Continue OT POC  Plan of Care Reviewed With: patient, spouse  Outcome Evaluation: Pt awake/alert at beginning of tx noted increased movement of L UE/LE during supine position in bed and upon initial t/f to BSC. Pt requested to sit on BSC for increased period of time to attempt bowel movement as she did feel the \"urge\" to go. Pt noted to demonstrate decreased alertness and increased fatigue after sitting up on BSC for 5-10 minutes with spouse present in room. Pt min A x2 for t/f EOB to BSC. Tranfers BSC back to EOB max A/dependent x2. Pt would benefit from continued rehab at discharge. Continue OT POC     Outcome Measures       Row Name 06/04/25 1400 06/03/25 1500 06/02/25 1005       How much help from another person do you currently need...    Turning from your back to your side while in flat bed without using bedrails? -- 3  -AH --    Moving from lying on back to sitting on the side of a flat bed without bedrails? -- 3  -AH --    Moving to and from a bed to a chair (including a wheelchair)? -- 2  -AH --    Standing up from a chair using your arms (e.g., wheelchair, bedside chair)? -- 2  -AH --    Climbing 3-5 steps with a railing? -- 1  -AH --    To walk in hospital room? -- 1  -AH --    " AM-PAC 6 Clicks Score (PT) -- 12  -AH --       How much help from another is currently needed...    Putting on and taking off regular lower body clothing? 2  -TS -- 2  -AC (r) BH (t) AC (c)    Bathing (including washing, rinsing, and drying) 2  -TS -- 2  -AC (r) BH (t) AC (c)    Toileting (which includes using toilet bed pan or urinal) 2  -TS -- 2  -AC (r) BH (t) AC (c)    Putting on and taking off regular upper body clothing 2  -TS -- 2  -AC (r) BH (t) AC (c)    Taking care of personal grooming (such as brushing teeth) 3  -TS -- 3  -AC (r) BH (t) AC (c)    Eating meals 3  -TS -- 3  -AC (r) BH (t) AC (c)    AM-PAC 6 Clicks Score (OT) 14  -TS -- 14  -AC (r) BH (t)       Functional Assessment    Outcome Measure Options -- AM-PAC 6 Clicks Basic Mobility (PT)  - AM-PAC 6 Clicks Daily Activity (OT)  -AC (r) BH (t) AC (c)      Row Name 06/02/25 0900             How much help from another person do you currently need...    Turning from your back to your side while in flat bed without using bedrails? 3  -AH      Moving from lying on back to sitting on the side of a flat bed without bedrails? 3  -AH      Moving to and from a bed to a chair (including a wheelchair)? 1  -AH      Standing up from a chair using your arms (e.g., wheelchair, bedside chair)? 1  -AH      Climbing 3-5 steps with a railing? 1  -AH      To walk in hospital room? 1  -AH      AM-PAC 6 Clicks Score (PT) 10  -AH         Functional Assessment    Outcome Measure Options AM-PAC 6 Clicks Basic Mobility (PT)  -                User Key  (r) = Recorded By, (t) = Taken By, (c) = Cosigned By      Initials Name Provider Type    Niels Perez, OTR/L, CNT Occupational Therapist     Magi Cordova, PTA Physical Therapist Assistant     Safia Quezada, CLOVIS Occupational Therapist Assistant    Prakash Fuentes, OT Student OT Student                    Time Calculation:    Time Calculation- OT       Row Name 06/04/25 4449             Time Calculation-  OT    OT Start Time 0922  -TS      OT Stop Time 1020  -TS      OT Time Calculation (min) 58 min  -TS      Total Timed Code Minutes- OT 58 minute(s)  -TS      OT Received On 06/04/25  -TS         Timed Charges    99479 - OT Self Care/Mgmt Minutes 58  -TS         Total Minutes    Timed Charges Total Minutes 58  -TS       Total Minutes 58  -TS                User Key  (r) = Recorded By, (t) = Taken By, (c) = Cosigned By      Initials Name Provider Type     Safia Quezada COTA Occupational Therapist Assistant                  Therapy Charges for Today       Code Description Service Date Service Provider Modifiers Qty    [id number] HC OT SELF CARE/MGMT/TRAIN EA 15 MIN 6/4/2025 Safia Quezada COTA GO 4                 Safia COSTELLO. CLOVIS Quezada  6/4/2025

## 2025-06-04 NOTE — CASE MANAGEMENT/SOCIAL WORK
"Continued Stay Note   [place]     Patient Name: Lynn Magana  MRN: [id number]  Today's Date: 6/4/2025    Admit Date: 5/29/2025        Discharge Plan       Row Name 06/04/25 1423       Plan    Plan Comments Events noted. Per Physician note \"Will initiate 400 mg/kg daily dosing of IVIG starting today (6/4) with a plan to complete 5 days of therapy\". Will follow to see if pt makes any progress.                   Discharge Codes    No documentation.                 Expected Discharge Date and Time       Expected Discharge Date Expected Discharge Time    Jun 5, 2025               NADIA Williamson    "

## 2025-06-04 NOTE — PLAN OF CARE
Goal Outcome Evaluation:  Plan of Care Reviewed With: patient        Progress: no change        Pt alert to self and place today but still confused when carrying a conversation. Unable to tell me what year. Room air. No c/o pain. Still having trouble voiding on her own. Q2 turns. Working with therapy today. Pt able to void on her own when [place] [facility] is very weak and knees buckle but will no void in brief. LEY. PPP. VSS. Immune globulin started today. Pt tolerated well. VAT team IV. Call light within reach. Safety maintained

## 2025-06-04 NOTE — THERAPY DISCHARGE NOTE
Acute Care - Speech Language Pathology   Swallow Treatment Note/Discharge [facility]     Patient Name: Lynn Magana  : 1975  MRN: [id number]  Today's Date: 2025  Onset of Illness/Injury or Date of Surgery: 25     Referring Physician: Dr. Limon      Admit Date: 2025  Pt was lying in bed, alert and cooperative. She reports no s/s of aspiration with soft solids or thin liquids. She took sips of thin liquids with no overt s/s of aspiration. She feels she has done better since changing to soft solids. Recommend continuing current diet which appears to be baseline. SLP will sign off.   Lola Ventura CCC-SLP 2025 10:36 CDT    Visit Dx:    ICD-10-CM ICD-9-CM   1. Generalized weakness  R53.1 780.79   2. Nausea and vomiting, unspecified vomiting type  R11.2 787.01   3. Marijuana user  F12.90 305.20   4. Low TSH level  R79.89 794.5   5. Urinary tract infection without hematuria, site unspecified  N39.0 599.0   6. Dysphagia, unspecified type  R13.10 787.20   7. Impaired functional mobility and activity tolerance [Z74.09]  Z74.09 V49.89     Patient Active Problem List   Diagnosis    Syncope    Graves' disease    Polysubstance abuse    Hypertension    Diabetes    Spells of decreased attentiveness    Chronic intractable headache    Nausea and vomiting    Slow transit constipation    Nonsmoker    Type 2 diabetes mellitus, with long-term current use of insulin    GERD without esophagitis    Hyperthyroidism    Thyromegaly    Post-surgical hypothyroidism    Degeneration of cervical intervertebral disc    Cervical radiculopathy    Acute pain of right shoulder    Class 1 obesity due to excess calories with body mass index (BMI) of 30.0 to 30.9 in adult    Hypoglycemia    Stage 1 acute kidney injury    Dehydration    Dysphagia    Dehydration    Hypokalemia    Steatosis of liver    Marijuana abuse    Loss of weight    Severe protein-calorie malnutrition    Hypothyroid    Generalized weakness     Acquired cerebral ventriculomegaly     Past Medical History:   Diagnosis Date    Arthritis     Carotid artery stenosis     Degenerative disc disease, cervical     Depression     Diabetes mellitus     type 1    Disease of thyroid gland     GERD (gastroesophageal reflux disease)     Graves disease     Heart palpitations     Hyperlipidemia     Hypertension     Hypothyroidism     Seizure     Thyromegaly      Past Surgical History:   Procedure Laterality Date     SECTION      CHOLECYSTECTOMY      COLONOSCOPY  2015    Normal exam Dr. Morgan     COLONOSCOPY  2015    Normal exam    CYST REMOVAL      ENDOSCOPY N/A 2018    Normal exam    ENDOSCOPY N/A 2025    Procedure: ESOPHAGOGASTRODUODENOSCOPY WITH ANESTHESIA;  Surgeon: Jadon Smith MD;  Location: [facility] ENDOSCOPY;  Service: Gastroenterology;  Laterality: N/A;  pre op: Nausea and vomiting  post op: normal  pcp: Darryl Andrews,     HYSTERECTOMY      THYROIDECTOMY Bilateral 2018    Procedure: total thyroidectomy;  Surgeon: Vitaly Givens MD;  Location: [facility] OR;  Service: ENT       SLP Recommendation and Plan                    Anticipated Discharge Disposition (SLP): unknown (25 1036)              Daily Summary of Progress (SLP): progress toward functional goals as expected (25 1019)     Anticipated Discharge Disposition (SLP): unknown (25 1036)           Reason for Discharge: all goals and outcomes met, no further needs identified (25 103)     Treatment Assessment (SLP): continued (25)  Treatment Assessment Comments (SLP): Discharge (25 101)  Plan for Continued Treatment (SLP): treatment no longer indicated as all goals met (25 101)    Progress: no change (25 1034)  Outcome Evaluation: See note (25 1034)    SWALLOW EVALUATION (Last 72 Hours)       SLP Adult Swallow Evaluation       Row Name 25 101                   Rehab Evaluation    Document Type  discharge treatment  -MB        Subjective Information no complaints  -MB        Patient Observations alert;cooperative  -MB        Patient/Family/Caregiver Comments/Observations No family present  -MB        Patient Effort good  -MB           General Information    Patient Profile Reviewed yes  -MB           Pain Scale: FACES Pre/Post-Treatment    Pain: FACES Scale, Pretreatment 2-->hurts little bit  -MB        Pre/Posttreatment Pain Comment Pt reports stiff legs  -MB           SLP Treatment Clinical Impressions    Treatment Assessment (SLP) continued  -MB        Treatment Assessment Comments (SLP) Discharge  -MB        Daily Summary of Progress (SLP) progress toward functional goals as expected  -MB        Plan for Continued Treatment (SLP) treatment no longer indicated as all goals met  -MB           (LTG) Swallow    (LTG) Swallow Patient will tolerate least restrictive diet without overt s/s of aspiration.  -MB        [place] (Swallow Long Term Goal) independently (over 90% accuracy)  -MB        Time Frame (Swallow Long Term Goal) by discharge  -MB        Barriers (Swallow Long Term Goal) none  -MB        Progress/Outcomes (Swallow Long Term Goal) goal met  -MB           (STG) Patient will tolerate trials of    Consistencies Trialed (Tolerate trials) soft to chew (whole) textures;regular textures;thin liquids  -MB        Desired Outcome (Tolerate trials) without signs/symptoms of aspiration;without signs of distress;with adequate oral prep/transit/clearance  -MB        [place] (Tolerate trials) independently (over 90% accuracy)  -MB        Time Frame (Tolerate trials) by discharge  -MB        Progress/Outcomes (Tolerate trials) goal met  -MB           (STG) Swallow Management Recall Goal 1 (SLP)    Activity (Swallow Management Recall Goal 1, SLP) independent recall of;compensatory swallow strategies/techniques;postural techniques;rationale for use of strategies/techniques  -MB         [place]/Accuracy (Swallow Management Recall Goal 1, SLP) independently (over 90% accuracy)  -MB        Time Frame (Swallow Management Recall Goal 1, SLP) by discharge  -MB        Barriers (Swallow Management Recall Goal 1, SLP) none  -MB        Progress/Outcomes (Swallow Management Recall Goal 1, SLP) goal met  -MB                  User Key  (r) = Recorded By, (t) = Taken By, (c) = Cosigned By      Initials Name Effective Dates    Lola Veloz, CCC-SLP 02/03/23 -                     EDUCATION  The patient has been educated in the following areas:   Dysphagia (Swallowing Impairment).         SLP GOALS       Row Name 06/04/25 1019             (LTG) Swallow    (LTG) Swallow Patient will tolerate least restrictive diet without overt s/s of aspiration.  -MB      [place] (Swallow Long Term Goal) independently (over 90% accuracy)  -MB      Time Frame (Swallow Long Term Goal) by discharge  -MB      Barriers (Swallow Long Term Goal) none  -MB      Progress/Outcomes (Swallow Long Term Goal) goal met  -MB         (STG) Patient will tolerate trials of    Consistencies Trialed (Tolerate trials) soft to chew (whole) textures;regular textures;thin liquids  -MB      Desired Outcome (Tolerate trials) without signs/symptoms of aspiration;without signs of distress;with adequate oral prep/transit/clearance  -MB      [place] (Tolerate trials) independently (over 90% accuracy)  -MB      Time Frame (Tolerate trials) by discharge  -MB      Progress/Outcomes (Tolerate trials) goal met  -MB         (STG) Swallow Management Recall Goal 1 (SLP)    Activity (Swallow Management Recall Goal 1, SLP) independent recall of;compensatory swallow strategies/techniques;postural techniques;rationale for use of strategies/techniques  -MB      [place]/Accuracy (Swallow Management Recall Goal 1, SLP) independently (over 90% accuracy)  -MB      Time Frame (Swallow Management Recall Goal 1, SLP) by discharge  -MB      Barriers  (Swallow Management Recall Goal 1, SLP) none  -MB      Progress/Outcomes (Swallow Management Recall Goal 1, SLP) goal met  -MB                User Key  (r) = Recorded By, (t) = Taken By, (c) = Cosigned By      Initials Name Provider Type    Lola Veloz CCC-SLP Speech and Language Pathologist                           Time Calculation:    Time Calculation- SLP       Row Name 06/04/25 1036             Time Calculation- SLP    SLP Start Time 1019  -MB      SLP Stop Time 1036  -MB      SLP Time Calculation (min) 17 min  -MB      SLP Received On 06/04/25  -MB         Untimed Charges    [id number] Treatment Swallow Minutes 17  -MB         Total Minutes    Untimed Charges Total Minutes 17  -MB       Total Minutes 17  -MB                User Key  (r) = Recorded By, (t) = Taken By, (c) = Cosigned By      Initials Name Provider Type    Lola Veloz CCC-SLP Speech and Language Pathologist                    Therapy Charges for Today       Code Description Service Date Service Provider Modifiers Qty    [id number] HC ST TREATMENT SWALLOW 1 6/4/2025 Lola Ventura CCC-SLP GN 1                 SLP Discharge Summary  Anticipated Discharge Disposition (SLP): unknown  Reason for Discharge: all goals and outcomes met, no further needs identified  Progress Toward Achieving Short/long Term Goals: all goals met within established timelines  Discharge Destination: other (see comments) (Still in acute care)    VICTOR MANUEL Medina  6/4/2025

## 2025-06-04 NOTE — PLAN OF CARE
Goal Outcome Evaluation:  Plan of Care Reviewed With: patient, significant other        Progress: no change  Outcome Evaluation: pt trans to EOB mod-max, sat EOB 20 minutes cga-sba, BLE A-AAROM constant cues to stay on task, slow to respond at times, ortho BP's with nsg, trans back to bed mod-max, pt would benefit from rehab    Anticipated Discharge Disposition (PT): inpatient rehabilitation facility, sub acute care setting

## 2025-06-04 NOTE — PLAN OF CARE
"Goal Outcome Evaluation:  Plan of Care Reviewed With: patient, spouse        Progress: no change  Outcome Evaluation: Pt awake/alert at beginning of tx noted increased movement of L UE/LE during supine position in bed and upon initial t/f to BSC. Pt requested to sit on BSC for increased period of time to attempt bowel movement as she did feel the \"urge\" to go. Pt noted to demonstrate decreased alertness and increased fatigue after sitting up on BSC for 5-10 minutes with spouse present in room. Pt min A x2 for t/f EOB to BSC. Tranfers BSC back to EOB max A/dependent x2. Pt would benefit from continued rehab at discharge. Continue OT POC                             "

## 2025-06-04 NOTE — PLAN OF CARE
Goal Outcome Evaluation:   Pt is alert to self. Up with PT only. Q2 turns. Bowel regimen started. No c/o pain this shift. Nerve conduction study done by Neurology today. Results pending. In and out cathed at 1033am and got 375ml out. IV IID. Call light within reach. Safety maintained.

## 2025-06-04 NOTE — PLAN OF CARE
Goal Outcome Evaluation:  Plan of Care Reviewed With: patient        Progress: no change  Outcome Evaluation: See note                  Treatment Assessment (SLP): continued (06/04/25 1019)  Treatment Assessment Comments (SLP): Discharge (06/04/25 1019)  Plan for Continued Treatment (SLP): treatment no longer indicated as all goals met (06/04/25 1019)     Straightforward: Basic instructions, no meds, no home treatment/Patient asked questions/Verbalized Understanding

## 2025-06-05 PROCEDURE — 25010000002 FOLIC ACID 5 MG/ML SOLUTION 10 ML VIAL: Performed by: PSYCHIATRY & NEUROLOGY

## 2025-06-05 PROCEDURE — 25010000002 IMMUNE GLOBULIN (HUMAN) 10 GM/100ML SOLUTION 100 ML VIAL: Performed by: PSYCHIATRY & NEUROLOGY

## 2025-06-05 PROCEDURE — 25010000002 ENOXAPARIN PER 10 MG: Performed by: INTERNAL MEDICINE

## 2025-06-05 PROCEDURE — 99233 SBSQ HOSP IP/OBS HIGH 50: CPT | Performed by: CLINICAL NURSE SPECIALIST

## 2025-06-05 PROCEDURE — 25010000002 HYDRALAZINE PER 20 MG: Performed by: FAMILY MEDICINE

## 2025-06-05 PROCEDURE — 25010000002 IMMUNE GLOBULIN (HUMAN) 5 GM/50ML SOLUTION 50 ML VIAL: Performed by: PSYCHIATRY & NEUROLOGY

## 2025-06-05 PROCEDURE — 25010000002 THIAMINE HCL 200 MG/2ML SOLUTION 2 ML VIAL: Performed by: PSYCHIATRY & NEUROLOGY

## 2025-06-05 PROCEDURE — 25010000002 DIPHENHYDRAMINE PER 50 MG: Performed by: PSYCHIATRY & NEUROLOGY

## 2025-06-05 PROCEDURE — 97530 THERAPEUTIC ACTIVITIES: CPT

## 2025-06-05 PROCEDURE — 63710000001 PREDNISONE PER 5 MG: Performed by: PSYCHIATRY & NEUROLOGY

## 2025-06-05 RX ORDER — HYDRALAZINE HYDROCHLORIDE 20 MG/ML
10 INJECTION INTRAMUSCULAR; INTRAVENOUS EVERY 6 HOURS PRN
Status: DISCONTINUED | OUTPATIENT
Start: 2025-06-05 | End: 2025-06-11 | Stop reason: HOSPADM

## 2025-06-05 RX ORDER — ENOXAPARIN SODIUM 100 MG/ML
40 INJECTION SUBCUTANEOUS NIGHTLY
Status: DISCONTINUED | OUTPATIENT
Start: 2025-06-05 | End: 2025-06-11 | Stop reason: HOSPADM

## 2025-06-05 RX ORDER — FOLIC ACID 1 MG/1
1 TABLET ORAL DAILY
Status: DISCONTINUED | OUTPATIENT
Start: 2025-06-06 | End: 2025-06-11 | Stop reason: HOSPADM

## 2025-06-05 RX ADMIN — PANTOPRAZOLE SODIUM 40 MG: 40 TABLET, DELAYED RELEASE ORAL at 05:42

## 2025-06-05 RX ADMIN — THIAMINE HCL TAB 100 MG 500 MG: 100 TAB at 16:02

## 2025-06-05 RX ADMIN — CARVEDILOL 6.25 MG: 6.25 TABLET, FILM COATED ORAL at 08:12

## 2025-06-05 RX ADMIN — THIAMINE HCL TAB 100 MG 500 MG: 100 TAB at 20:08

## 2025-06-05 RX ADMIN — CARVEDILOL 6.25 MG: 6.25 TABLET, FILM COATED ORAL at 18:38

## 2025-06-05 RX ADMIN — ENOXAPARIN SODIUM 40 MG: 100 INJECTION SUBCUTANEOUS at 20:08

## 2025-06-05 RX ADMIN — THIAMINE HYDROCHLORIDE 500 MG: 100 INJECTION, SOLUTION INTRAMUSCULAR; INTRAVENOUS at 08:45

## 2025-06-05 RX ADMIN — FOLIC ACID 1 MG: 5 INJECTION, SOLUTION INTRAMUSCULAR; INTRAVENOUS; SUBCUTANEOUS at 08:12

## 2025-06-05 RX ADMIN — IMMUNE GLOBULIN (HUMAN) 25 G: 10 INJECTION INTRAVENOUS; SUBCUTANEOUS at 10:03

## 2025-06-05 RX ADMIN — PREDNISONE 10 MG: 10 TABLET ORAL at 08:12

## 2025-06-05 RX ADMIN — ARIPIPRAZOLE 10 MG: 5 TABLET ORAL at 08:12

## 2025-06-05 RX ADMIN — HYDRALAZINE HYDROCHLORIDE 10 MG: 20 INJECTION INTRAMUSCULAR; INTRAVENOUS at 00:48

## 2025-06-05 RX ADMIN — LEVOTHYROXINE SODIUM 150 MCG: 75 TABLET ORAL at 05:42

## 2025-06-05 RX ADMIN — Medication 10 MG: at 20:09

## 2025-06-05 RX ADMIN — DIPHENHYDRAMINE HYDROCHLORIDE 25 MG: 50 INJECTION, SOLUTION INTRAMUSCULAR; INTRAVENOUS at 09:55

## 2025-06-05 NOTE — PROGRESS NOTES
[facility] Clinical Pharmacy Services: IV to PO Interchange    Relevant clinical data and objective history reviewed:  Diet Order   Procedures    Diet: Regular/House; Texture: Soft to Chew (NDD 3); Soft to Chew: Whole Meat; Fluid Consistency: Thin (IDDSI 0)       The patient meets the following criteria to be switched from IV administration to PO including:  Functioning GI tract  Hemodynamically stable  Showing signs of clinical improvement  Tolerating other oral medications or enteral diet    Assessment/Plan:  Folic acid and Thiamine have been changed from IV to PO formulation based on our [facility] P&T approved Adult IV to PO Switching policy    KEYON Connor, PharmD  6/5/2025  09:01 CDT

## 2025-06-05 NOTE — PLAN OF CARE
Goal Outcome Evaluation:              Outcome Evaluation: Alert self and place, disoriented to time and situation. VSS on RA. No c/o pain. Elevated DBP, MD made aware, see new orders. Hydralazine given, pt responded well to medication. VS recorded, BP stable. Pt became more alert and agiated during shift, stated she wanted to go to the store. The patient also undressed and stated she was getting dressed. While she attempted to get up she stated how dizzy she was before we were able to lay her back down. Bed alarmed on the second sensitivity, SCDs on. Tele on. Brief on. Frequent rounding increased. Safety precautions maintained, call light within reach at all times.       Partial orthostatic BP taken, patient unable to standup at bedside. Pt refused in and out catheter due to pain, PW in place.

## 2025-06-05 NOTE — PROGRESS NOTES
Neurology Progress Note      Chief Complaint:  AMS  Length of Stay:  7   Subjective     Subjective:    6/5/2025: lying in bed.  at bedside. Tolerated IV IG yesterday. Today is #2/5. Patient is more alert and interactive. Still with dysconjugate gaze. She appears to have more strength in extremities and  thinks this as well. Improved effort on exam. Orthostatic BP positive as below.         6/4/2025:She is somewhat more awake today.  Her  is at the bedside today we speak to him.  She continues to have global weakness and decreasing cognition.  She has shown some mild improvement since admission.  We discussed the risk and benefits of IVIG therapy and she expresses understanding.  She would like to proceed with this.  After I left the room the patient did get up and ambulate with assistance by physical therapy to the bedside commode.  After being there briefly she slumped over and had some disconjugate gaze.  There may have been greater right than left sided weakness.  She was returned to the bed and recovered immediately.    Medications:  Current Facility-Administered Medications   Medication Dose Route Frequency Provider Last Rate Last Admin    ARIPiprazole (ABILIFY) tablet 10 mg  10 mg Oral Daily Bautista Limon MD   10 mg at 06/05/25 0812    sennosides-docusate (PERICOLACE) 8.6-50 MG per tablet 2 tablet  2 tablet Oral BID PRN Bautista Limon MD   2 tablet at 06/03/25 0844    And    polyethylene glycol (MIRALAX) packet 17 g  17 g Oral Daily PRN Bautista Limon MD        And    bisacodyl (DULCOLAX) EC tablet 5 mg  5 mg Oral Daily PRN Bautista Limon MD        And    bisacodyl (DULCOLAX) suppository 10 mg  10 mg Rectal Daily PRN Bautista Limon MD        Calcium Replacement - Follow Nurse / BPA Driven Protocol   Not Applicable PRN Bautista Limon MD        carvedilol (COREG) tablet 6.25 mg  6.25 mg Oral BID With Meals Bautista Limon MD   6.25 mg at 06/05/25 0812     diphenhydrAMINE (BENADRYL) injection 25 mg  25 mg Intravenous Daily Ori Banuelos MD   25 mg at 06/04/25 1357    [START ON 6/6/2025] folic acid (FOLVITE) tablet 1 mg  1 mg Oral Daily Yovanny Abernathy MD        hydrALAZINE (APRESOLINE) injection 10 mg  10 mg Intravenous Q6H PRN Alireza Lam MD   10 mg at 06/05/25 0048    immune globulin (human) 25 g  25 g Intravenous Daily Ori Banuelos MD        levothyroxine (SYNTHROID, LEVOTHROID) tablet 150 mcg  150 mcg Oral Q AM Bautista Limon MD   150 mcg at 06/05/25 0542    Magnesium Low Dose Replacement - Follow Nurse / BPA Driven Protocol   Not Applicable PRN Bautista Limno MD        melatonin tablet 10 mg  10 mg Oral Nightly PRN Bautista Limon MD   10 mg at 06/04/25 2018    Morphine sulfate (PF) injection 0.5 mg  0.5 mg Intravenous Q4H PRN Basim Rivera MD        ondansetron ODT (ZOFRAN-ODT) disintegrating tablet 4 mg  4 mg Oral Q6H PRN Bautista Limon MD        Or    ondansetron (ZOFRAN) injection 4 mg  4 mg Intravenous Q6H PRN Bautista Limon MD   4 mg at 05/30/25 2139    pantoprazole (PROTONIX) EC tablet 40 mg  40 mg Oral Q AM Bautista Limon MD   40 mg at 06/05/25 0542    Phosphorus Replacement - Follow Nurse / BPA Driven Protocol   Not Applicable PRN Bautista Limon MD        Potassium Replacement - Follow Nurse / BPA Driven Protocol   Not Applicable PRN Bautista Limon MD        predniSONE (DELTASONE) tablet 10 mg  10 mg Oral Daily Ori Banuelos MD   10 mg at 06/05/25 0812    sodium chloride 0.9 % flush 10 mL  10 mL Intravenous PRN Safia Wood APRN        thiamine (VITAMIN B-1) tablet 500 mg  500 mg Oral TID Yovanny Abernathy MD                 Objective      Vital Signs  Temp:  [97.5 °F (36.4 °C)-98.2 °F (36.8 °C)] 98.2 °F (36.8 °C)  Heart Rate:  [] 94  Resp:  [16-18] 16  BP: ()/() 110/73    Physical Exam:    HEENT:  Neck is supple  CVS:  RRR  Lungs:  CTA - B/L  Abd:   NT/ND  Ext:  No edema  Skin:  No rashes    Pertinent Neuro Exam:  Awake  Slowed cognition.  Knows where she is at today.  Knows her .  Can follow commands but takes repetition and requests  Pupils equal.  Able to count fingers today.  Disconjugate gaze chronically  No unilateral facial droop  Antigravity strength in all 4 extremities  No signs of tremors or increased tone  Mild ataxia noted bilaterally but there is some functional components of weakness noted as well.    Last nurse assessment:  Interval: baseline  1a. Level of Consciousness: 0-->Alert, keenly responsive  1b. LOC Questions: 0-->Answers both questions correctly  1c. LOC Commands: 0-->Performs both tasks correctly  2. Best Gaze: 0-->Normal  3. Visual: 0-->No visual loss  4. Facial Palsy: 0-->Normal symmetrical movements  5a. Motor Arm, Left: 0-->No drift, limb holds 90 (or 45) degrees for full 10 secs  5b. Motor Arm, Right: 0-->No drift, limb holds 90 (or 45) degrees for full 10 secs  6a. Motor Leg, Left: 1-->Drift, leg falls by the end of the 5-sec period but does not hit bed  6b. Motor Leg, Right: 1-->Drift, leg falls by the end of the 5-sec period but does not hit bed  7. Limb Ataxia: 0-->Absent  8. Sensory: 0-->Normal, no sensory loss  9. Best Language: 0-->No aphasia, normal  10. Dysarthria: 0-->Normal  11. Extinction and Inattention (formerly Neglect): 0-->No abnormality    Total (NIH Stroke Scale): 2       Results Review:      Labs:  Labs reviewed as below  LAB RESULTS:      Lab 06/04/25  0532 05/30/25  0500 05/29/25  1247   WBC 5.87 5.86 8.72   HEMOGLOBIN 9.3* 9.4* 13.0   HEMATOCRIT 28.4* 28.1* 41.1   PLATELETS 196 189 200   NEUTROS ABS  --  4.02 7.11*   IMMATURE GRANS (ABS)  --  0.05 0.13*   LYMPHS ABS  --  1.31 1.06   MONOS ABS  --  0.45 0.36   EOS ABS  --  0.01 0.02   MCV 87.4 85.4 92.4   PROTIME  --   --  13.3         Lab 06/04/25  0532 06/03/25  1013 06/02/25  0416 06/01/25  1100 05/31/25  0502 05/30/25  0500 05/29/25  1247   SODIUM   --  146* 146*  --  141 145 142   POTASSIUM  --  3.8 3.3*  --  3.7 2.9* 3.3*   CHLORIDE  --  114* 114*  --  109* 109* 103   CO2  --  23.0 21.0*  --  19.0* 20.0* 16.0*   ANION GAP  --  9.0 11.0  --  13.0 16.0* 23.0*   BUN  --  18.8 27.0*  --  34.2* 32.3* 29.0*   CREATININE  --  0.89 0.82  --  0.77 0.70 0.98   EGFR  --  79.1 87.3  --  94.1 105.5 70.5   GLUCOSE  --  146* 119*  --  140* 121* 144*   CALCIUM  --  9.4 9.2  --  9.6 9.2 10.2   MAGNESIUM  --  2.0  --   --  2.1 1.9 2.4   HEMOGLOBIN A1C 4.90  --   --   --   --   --   --    TSH  --   --   --  0.027*  --   --  0.050*         Lab 05/30/25  0500 05/29/25  1247   TOTAL PROTEIN 6.2 8.0   ALBUMIN 3.5 4.0   GLOBULIN 2.7 4.0   ALT (SGPT) 61* 88*   AST (SGOT) 30 58*   BILIRUBIN 0.6 1.1   ALK PHOS 37* 50         Lab 05/29/25  1247   PROTIME 13.3   INR 0.97             Lab 06/02/25  0416   FOLATE 2.78*   VITAMIN B 12 >2,000*         Brief Urine Lab Results  (Last result in the past 365 days)        Color   Clarity   Blood   Leuk Est   Nitrite   Protein   CREAT   Urine HCG        06/01/25 0000 Orange   Clear   Negative   Moderate (2+)   Positive   30 mg/dL (1+)                 Microbiology Results (last 10 days)       Procedure Component Value - Date/Time    Culture, CSF - Cerebrospinal Fluid, Lumbar Puncture [[id number]] Collected: 06/01/25 0920    Lab Status: Final result Specimen: Cerebrospinal Fluid from Lumbar Puncture Updated: 06/04/25 0628     CSF Culture No growth at 3 days     Gram Stain No WBCs or organisms seen    Blood Culture - Blood, Arm, Left [[id number]]  (Normal) Collected: 05/29/25 1941    Lab Status: Final result Specimen: Blood from Arm, Left Updated: 06/03/25 2000     Blood Culture No growth at 5 days    Blood Culture - Blood, Arm, Left [[id number]]  (Normal) Collected: 05/29/25 1547    Lab Status: Final result Specimen: Blood from Arm, Left Updated: 06/03/25 1600     Blood Culture No growth at 5 days        Other labs:  RPR normal  HIV negative  Urine  analysis positive  TSH low at 0.027  B12 elevated next line folate low at 2.78  Ammonia 36  Ethanol level negligible  2025 LP:  CSF:  W:  0  R:  0  P:  55.7        Imaging:  MRI of the brain shows ventriculomegaly but not consistently out of proportion compared to the overall degree of atrophy.  MRI of the cervical and lumbar spine couple  CT of the abdomen shows some nonspecific findings possibly consistent with enteritis but no signs of a primary malignancy.  CT of pelvis done on  showed urinary bladder wall thickening but no signs of an occult malignancy.  EEG shows nonspecific slowing  Nerve conduction velocities performed on 6/3 show velocities in the 40 m/s range.  This is slow although not necessarily in the demyelinating range.    Assessment/Plan     Hospital Problem List      Acquired cerebral ventriculomegaly    Generalized weakness    Impression:  Progressive neurologic decline for several months with global weakness, opthalmoplegia, and ataxia.  Considered Joy Quinonez Variant  Nerve conduction studies did reveal slowed velocities but not in the demyelinating range.  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  We discussed the risk and benefits of this and her and her  would like to proceed with this.  Initiated 5 days of IVIG on 2025 with fifth dose on 2025 therapy to see if she improves from this.  Atypical spell - functional component?  Poor effort on exam  Folate deficiency  Ventriculomegally - opening pressure unimpressive  HTN  Hx of cannabis usage  Orthostatic hypotension.    Plan:  Will initiate 400 mg/kg daily dosing of IVIG starting today () with a plan to complete 5 days of therapy.  Will premedicate each day with Benadryl and Prednisone 10 mg.(Tylenol was initially ordered but there is questionable allergy)  MMA level  Replace Folic acid: started   GQ1B antibiodies -send out lab.  Results  pending.      Medical Decision Making    Number/Complexity of Problems  Moderate  1 undiagnosed new problem with uncertain prognosis -   1 acute illness with systemic symptoms -   High  1 acute or chronic illness that poses a threat to life/body function -   High     MDM Data  Moderate - 1/3 categories  Extensive - 2/3 categories    Category 1: 3 of the following  Review of external notes  Review of results  Ordering of each unique test  Independent historian  Category 2:  Independent interpretation of test (ex: imaging)  Category 3:  Discussion of management with another provider    Extensive     Treatment Plan  Moderate - Prescription Drug management  High  Drug therapy requiring intensive monitoring for toxicity  Decision regarding hospitalization or escalation of care  De-escalate care/DNR decisions         Abi Frausto, APRN  06/05/25  09:21 CDT

## 2025-06-05 NOTE — PROGRESS NOTES
[facility] Medicine Services  INPATIENT PROGRESS NOTE    Patient Name: Lynn Magana  Date of Admission: 5/29/2025  Today's Date: 06/05/25  Length of Stay: 7  Primary Care Physician: Darryl Andrews DO    Subjective   Chief Complaint: Lightheadedness.  Current    Weakness - Generalized     49-year-old female with history of hypothyroidism, liver steatosis, cannabis use, malnutrition, diabetes mellitus type 2, hypertension, admitted May 11 through May 14, 2025; at that time she was admitted due to poor oral intake and weight loss.  She had an upper endoscopy performed 5/13/2025 with findings of a small hiatal hernia and no other abnormalities reported.  Per reports reviewed the patient was not taking levothyroxine at the time, and her TSH was markedly elevated.  She was restarted on levothyroxine.  Her blood glucose was low, and did not require insulin management.  Drug screening urine was positive for cannabis, and considered to be secondary to cannabinoid hyperemesis syndrome.  The patient left the hospital AGAINST MEDICAL ADVICE, apparently she eloped; after being called, she stated was already home and did not plan to return to the hospital.  On 5/16/2025, the patient returned to the hospital reporting a syncopal event.  There was questionable seizure-like activity.   A CT brain performed on that evaluation showed moderate further increase in size of the lateral and third ventricle since the previous study. The fourth ventricle reported as normal and unchanged. A small obstructing lesion at the level of aqueduct of Sylvius not excluded. Further evaluation with contrast enhanced MR imaging of the brain was suggested. She again left the ER AGAINST MEDICAL ADVICE.    Patient came to hospital 5/30/25 reporting dizziness, lightheadedness, worsening when standing up, abnormal gait with unsteadiness;  reported confusion.  Symptom has been going on for approximately  1-2 months.  Blood pressure has been variable, initially elevated.      She has been nonambulatory for several weeks.  She reported weakness of the lower extremities and right upper extremity.  She has had urinary retention requiring straight catheterization during this admission.  Lumbar puncture performed 6/1/25, with opening pressure of 17, and fluid sent for analysis.    Nerve conduction studies performed yesterday.  Being evaluated for Joy Quinonez variant.  Today found with , trying to urinate in the bedside commode.  Weakness persists.    Review of Systems   All pertinent negatives and positives are as above. All other systems have been reviewed and are negative unless otherwise stated.     Objective    Temp:  [97.5 °F (36.4 °C)-98.3 °F (36.8 °C)] 98.3 °F (36.8 °C)  Heart Rate:  [] 93  Resp:  [16-18] 16  BP: (102-148)/() 122/65  Physical Exam  Constitutional:       Appearance: Alert, oriented to person.  No respiratory distress.  HENT:      Head: Normocephalic and atraumatic.      Nose: Nose normal.      Mouth/Throat:      Mouth: Mucous membranes are moist.   Neck, old transverse scar  Eyes:      Conjunctiva/sclera: Conjunctivae normal.      Pupils: Pupils are equal, round  Cardiovascular:      Rate and Rhythm: Normal rate and regular rhythm.      Pulses: Normal pulses.   Pulmonary:      Effort: No respiratory distress.      Breath sounds: Normal breath sounds. No wheezing, rhonchi or rales.   Abdominal:      General: Abdomen is flat. Bowel sounds are normal.      Palpations: Abdomen is soft.      Tenderness: There is no guarding or rebound.   Extremities:  No lower extremity edema.  Skin:     Capillary Refill: Capillary refill takes less than 2 seconds.      Coloration: Skin is not jaundiced.      Findings: No rash.   Neurological:   Weakness of neck extension.  Abnormal ocular movements.  Flaccid 3+ out of 5 lower extremity bilateral weakness  Drowsy. Slow speech.  Follows commands.   "        Results Review:  I have reviewed the labs, radiology results, and diagnostic studies.    Laboratory Data:   Results from last 7 days   Lab Units 06/04/25  0532 05/30/25  0500   WBC 10*3/mm3 5.87 5.86   HEMOGLOBIN g/dL 9.3* 9.4*   HEMATOCRIT % 28.4* 28.1*   PLATELETS 10*3/mm3 196 189        Results from last 7 days   Lab Units 06/03/25  1013 06/02/25  0416 05/31/25  0502 05/30/25  0500   SODIUM mmol/L 146* 146* 141 145   POTASSIUM mmol/L 3.8 3.3* 3.7 2.9*   CHLORIDE mmol/L 114* 114* 109* 109*   CO2 mmol/L 23.0 21.0* 19.0* 20.0*   BUN mg/dL 18.8 27.0* 34.2* 32.3*   CREATININE mg/dL 0.89 0.82 0.77 0.70   CALCIUM mg/dL 9.4 9.2 9.6 9.2   BILIRUBIN mg/dL  --   --   --  0.6   ALK PHOS U/L  --   --   --  37*   ALT (SGPT) U/L  --   --   --  61*   AST (SGOT) U/L  --   --   --  30   GLUCOSE mg/dL 146* 119* 140* 121*       Culture Data:   No results found for: \"BLOODCX\", \"URINECX\", \"WOUNDCX\", \"MRSACX\", \"RESPCX\", \"STOOLCX\"    Radiology Data:   Imaging Results (Last 24 Hours)       ** No results found for the last 24 hours. **            I have reviewed the patient's current medications.     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  EEG showed-  Diffuse cerebral dysfunction of moderate degree, nonspecific.  This is most commonly seen due to toxic/metabolic or hypoxemic/ischemic cause. No evidence for epilepsy is seen  Pending Antibodies GQ1b    Nerve conduction studies review per " specialist [ Nerve conduction studies did reveal slowed velocities but not in the demyelinating range.]  Neurology recommended a 5-day treatment with IVIG [today is day 2 of 5]    -Hypothyroidism  Variable thyroid function tests on prior admissions and currently. Continue levothyroxine 150 mcg p.o. daily. I have recommended endocrinology outpatient follow up, and this was explained to family.    -Hypertension  Due to variable blood pressure values, discontinued amlodipine lisinoprill, aldactone; On Carvedilol 6.25 mg p.o. twice daily.     -Acquired cerebral ventriculomegaly   Opening pressure on lumbar puncture was unimpressive as by neurologist    Continue PT/OT  Diet as per speech therapist recommendations.    DVT prophylaxis-we will start patient on Lovenox    Disposition-I recommend SNF/rehab after inpatient treatment. Family and patient to decide.      Electronically signed by Yovanny Abernathy MD, 06/05/25, 16:14 CDT.

## 2025-06-05 NOTE — THERAPY TREATMENT NOTE
Acute Care - Physical Therapy Treatment Note  [facility]     Patient Name: Lynn Magana  : 1975  MRN: [id number]  Today's Date: 2025   Onset of Illness/Injury or Date of Surgery: 25  Visit Dx:     ICD-10-CM ICD-9-CM   1. Generalized weakness  R53.1 780.79   2. Nausea and vomiting, unspecified vomiting type  R11.2 787.01   3. Marijuana user  F12.90 305.20   4. Low TSH level  R79.89 794.5   5. Urinary tract infection without hematuria, site unspecified  N39.0 599.0   6. Dysphagia, unspecified type  R13.10 787.20   7. Impaired functional mobility and activity tolerance [Z74.09]  Z74.09 V49.89     Patient Active Problem List   Diagnosis    Syncope    Graves' disease    Polysubstance abuse    Hypertension    Diabetes    Spells of decreased attentiveness    Chronic intractable headache    Nausea and vomiting    Slow transit constipation    Nonsmoker    Type 2 diabetes mellitus, with long-term current use of insulin    GERD without esophagitis    Hyperthyroidism    Thyromegaly    Post-surgical hypothyroidism    Degeneration of cervical intervertebral disc    Cervical radiculopathy    Acute pain of right shoulder    Class 1 obesity due to excess calories with body mass index (BMI) of 30.0 to 30.9 in adult    Hypoglycemia    Stage 1 acute kidney injury    Dehydration    Dysphagia    Dehydration    Hypokalemia    Steatosis of liver    Marijuana abuse    Loss of weight    Severe protein-calorie malnutrition    Hypothyroid    Generalized weakness    Acquired cerebral ventriculomegaly     Past Medical History:   Diagnosis Date    Arthritis     Carotid artery stenosis     Degenerative disc disease, cervical     Depression     Diabetes mellitus     type 1    Disease of thyroid gland     GERD (gastroesophageal reflux disease)     Graves disease     Heart palpitations     Hyperlipidemia     Hypertension     Hypothyroidism     Seizure     Thyromegaly      Past Surgical History:   Procedure Laterality Date      SECTION      CHOLECYSTECTOMY      COLONOSCOPY  2015    Normal exam Dr. Morgan     COLONOSCOPY  2015    Normal exam    CYST REMOVAL      ENDOSCOPY N/A 2018    Normal exam    ENDOSCOPY N/A 2025    Procedure: ESOPHAGOGASTRODUODENOSCOPY WITH ANESTHESIA;  Surgeon: Jadon Smith MD;  Location: [facility] ENDOSCOPY;  Service: Gastroenterology;  Laterality: N/A;  pre op: Nausea and vomiting  post op: normal  pcp: Darryl Andrews,     HYSTERECTOMY      THYROIDECTOMY Bilateral 2018    Procedure: total thyroidectomy;  Surgeon: Vitaly Givens MD;  Location: [facility] OR;  Service: ENT     PT Assessment (Last 12 Hours)       PT Evaluation and Treatment       [facility] Name 25 1057          Physical Therapy Time and Intention    Subjective Information complains of;weakness  -     Document Type therapy note (daily note)  -     Mode of Treatment physical therapy  -[facility] Name 25 1057          General Information    Patient/Family/Caregiver Comments/Observations S.O. and daughter  -     Existing Precautions/Restrictions fall  -     Limitations/Impairments safety/cognitive  -[facility] Name 25 1057          Pain Scale: FACES Pre/Post-Treatment    Pain: FACES Scale, Pretreatment 0-->no hurt  -     Posttreatment Pain Rating 0-->no hurt  -[facility] Name 25 1057          Bed Mobility    Supine-Sit [place] (Bed Mobility) verbal cues;moderate assist (50% patient effort)  -     Sit-Supine [place] (Bed Mobility) moderate assist (50% patient effort);verbal cues  -     Comment, (Bed Mobility) sat EOB 20 minutes  -[facility] Name 25 1057          Transfers    Comment, (Transfers) stood x 4, 2-3 side scoots to L  -AH       Row Name 25 1057          Sit-Stand Transfer    Sit-Stand [place] (Transfers) moderate assist (50% patient effort);2 person assist  -     Comment, (Sit-Stand Transfer) nsg assisted for ortho BP, then S.O.  assisted with other stands  -[facility] Name 06/05/25 1057          Stand-Sit Transfer    Stand-Sit [place] (Transfers) minimum assist (75% patient effort);2 person assist;verbal cues  -[facility] Name 06/05/25 1057          Safety Issues/Impairments Affecting Functional Mobility    Impairments Affecting Function (Mobility) balance;cognition  -[facility] Name 06/05/25 1057          Motor Skills    Comments, Therapeutic Exercise BLE AROM sitting EOB, worked on pt follow commands and staying on task  -       Row Name             Wound 05/29/25 1930 Left lower leg    Wound - Properties Group Placement Date: 05/29/25  -AR Placement Time: 1930  -AR Present on Original Admission: Y  -AR Side: Left  -AR Orientation: lower  -AR Location: leg  -AR    Retired Wound - Properties Group Placement Date: 05/29/25  -AR Placement Time: 1930  -AR Present on Original Admission: Y  -AR Side: Left  -AR Orientation: lower  -AR Location: leg  -AR    Retired Wound - Properties Group Placement Date: 05/29/25  -AR Placement Time: 1930  -AR Present on Original Admission: Y  -AR Side: Left  -AR Orientation: lower  -AR Location: leg  -AR    Retired Wound - Properties Group Date first assessed: 05/29/25  -AR Time first assessed: 1930  -AR Present on Original Admission: Y  -AR Side: Left  -AR Location: leg  -AR      Row Name             Wound 05/29/25 1931 Right lower leg    Wound - Properties Group Placement Date: 05/29/25  -AR Placement Time: 1931  -AR Present on Original Admission: Y  -AR Side: Right  -AR Orientation: lower  -AR Location: leg  -AR    Retired Wound - Properties Group Placement Date: 05/29/25  -AR Placement Time: 1931  -AR Present on Original Admission: Y  -AR Side: Right  -AR Orientation: lower  -AR Location: leg  -AR    Retired Wound - Properties Group Placement Date: 05/29/25  -AR Placement Time: 1931  -AR Present on Original Admission: Y  -AR Side: Right  -AR Orientation: lower  -AR Location: leg  -AR    Retired  Wound - Properties Group Date first assessed: 05/29/25  -AR Time first assessed: 1931  -AR Present on Original Admission: Y  -AR Side: Right  -AR Location: leg  -AR      Row Name 06/05/25 1057          Plan of Care Review    Plan of Care Reviewed With patient;significant other;daughter  -     Progress no change  -     Outcome Evaluation ortho BP's with nsg,  pt trans to EOB mod assist, sit-stand mod 2, pt stood x 4 reps, was able to take 2-3 side scoots to L, pt sat EOB 20 minutes cga-min, worked on pt following commands and staying on task, pt is easily distracted, trans back to bed mod assist, pt would benefit from rehab  -       Row Name 06/05/25 1057          Positioning and Restraints    Pre-Treatment Position in bed  -     Post Treatment Position bed  -     In Bed fowlers;call light within reach;encouraged to call for assist;exit alarm on;with family/caregiver;SCD pump applied  -               User Key  (r) = Recorded By, (t) = Taken By, (c) = Cosigned By      Initials Name Provider Type     Magi Cordova, PTA Physical Therapist Assistant    Deborah Christie, RN Registered Nurse                    Physical Therapy Education       Title: PT OT SLP Therapies (In Progress)       Topic: Physical Therapy (In Progress)       Point: Mobility training (Done)       Learning Progress Summary            Patient Acceptance, E,TB,D, VU,NR by  at 5/30/2025 1155    Comment: Education re: purpose of PT/importance of activity, safety/falls prevention, improved tech w/ bed mobility, tfers   Significant Other Acceptance, E,TB,D, VU,NR by  at 5/30/2025 1155    Comment: Education re: purpose of PT/importance of activity, safety/falls prevention, improved tech w/ bed mobility, tfers                      Point: Home exercise program (Not Started)       Learner Progress:  Not documented in this visit.              Point: Precautions (Done)       Learning Progress Summary            Patient Acceptance, E,TB,D,  VU,NR by ROBERTA at 5/30/2025 1155    Comment: Education re: purpose of PT/importance of activity, safety/falls prevention, improved tech w/ bed mobility, tfers   Significant Other Acceptance, E,TB,D, VU,NR by ROBERTA at 5/30/2025 1155    Comment: Education re: purpose of PT/importance of activity, safety/falls prevention, improved tech w/ bed mobility, tfers                                      User Key       Initials Effective Dates Name Provider Type Discipline     08/02/18 -  Selin Loen, PT Physical Therapist PT                  PT Recommendation and Plan  Anticipated Discharge Disposition (PT): inpatient rehabilitation facility, sub acute care setting  Plan of Care Reviewed With: patient, significant other, daughter  Progress: no change  Outcome Evaluation: ortho BP's with nsg,  pt trans to EOB mod assist, sit-stand mod 2, pt stood x 4 reps, was able to take 2-3 side scoots to L, pt sat EOB 20 minutes cga-min, worked on pt following commands and staying on task, pt is easily distracted, trans back to bed mod assist, pt would benefit from rehab   Outcome Measures       Row Name 06/05/25 1100 06/04/25 1500 06/04/25 1400       How much help from another person do you currently need...    Turning from your back to your side while in flat bed without using bedrails? 3  -AH 3  -AH --    Moving from lying on back to sitting on the side of a flat bed without bedrails? 2  -AH 2  -AH --    Moving to and from a bed to a chair (including a wheelchair)? 2  -AH 1  -AH --    Standing up from a chair using your arms (e.g., wheelchair, bedside chair)? 2  -AH 1  -AH --    Climbing 3-5 steps with a railing? 1  -AH 1  -AH --    To walk in hospital room? 1  -AH 1  -AH --    AM-PAC 6 Clicks Score (PT) 11  -AH 9  -AH --       How much help from another is currently needed...    Putting on and taking off regular lower body clothing? -- -- 2  -TS    Bathing (including washing, rinsing, and drying) -- -- 2  -TS    Toileting (which  includes using toilet bed pan or urinal) -- -- 2  -TS    Putting on and taking off regular upper body clothing -- -- 2  -TS    Taking care of personal grooming (such as brushing teeth) -- -- 3  -TS    Eating meals -- -- 3  -TS    AM-[facility] 6 Clicks Score (OT) -- -- 14  -TS       Functional Assessment    Outcome Measure Options AM-[facility] 6 Clicks Basic Mobility (PT)  - -- --      Row Name 06/03/25 1500             How much help from another person do you currently need...    Turning from your back to your side while in flat bed without using bedrails? 3  -AH      Moving from lying on back to sitting on the side of a flat bed without bedrails? 3  -AH      Moving to and from a bed to a chair (including a wheelchair)? 2  -AH      Standing up from a chair using your arms (e.g., wheelchair, bedside chair)? 2  -AH      Climbing 3-5 steps with a railing? 1  -AH      To walk in hospital room? 1  -AH      AM-PAC 6 Clicks Score (PT) 12  -         Functional Assessment    Outcome Measure Options AM-[facility] 6 Clicks Basic Mobility (PT)  -                User Key  (r) = Recorded By, (t) = Taken By, (c) = Cosigned By      Initials Name Provider Type    Magi Hebert PTA Physical Therapist Assistant    Safia Rush COTA Occupational Therapist Assistant                     Time Calculation:    PT Charges       Row Name 06/05/25 1140             Time Calculation    Start Time 1057  -      Stop Time 1130  -      Time Calculation (min) 33 min  -      PT Received On 06/05/25  -         Time Calculation- PT    Total Timed Code Minutes- PT 33 minute(s)  -         Timed Charges    26402 - PT Therapeutic Activity Minutes 33  -AH         Total Minutes    Timed Charges Total Minutes 33  -AH       Total Minutes 33  -AH                User Key  (r) = Recorded By, (t) = Taken By, (c) = Cosigned By      Initials Name Provider Type    Magi Hebert PTA Physical Therapist Assistant                  Therapy Charges for  Today       Code Description Service Date Service Provider Modifiers Qty    43719384171 HC PT THERAPEUTIC ACT EA 15 MIN 6/4/2025 Magi Cordova, CARLOS MANUEL GP 3    59406858242 HC PT THERAPEUTIC ACT EA 15 MIN 6/5/2025 Magi Cordova, CARLOS MANUEL GP 2            PT G-Codes  Outcome Measure Options: AM-PAC 6 Clicks Basic Mobility (PT)  AM-PAC 6 Clicks Score (PT): 11  AM-PAC 6 Clicks Score (OT): 14    Magi Cordova PTA  6/5/2025

## 2025-06-05 NOTE — PLAN OF CARE
Goal Outcome Evaluation:  Plan of Care Reviewed With: patient, significant other, daughter        Progress: no change  Outcome Evaluation: ortho BP's with nsg,  pt trans to EOB mod assist, sit-stand mod 2, pt stood x 4 reps, was able to take 2-3 side scoots to L, pt sat EOB 20 minutes cga-min, worked on pt following commands and staying on task, pt is easily distracted, trans back to bed mod assist, pt would benefit from rehab    Anticipated Discharge Disposition (PT): inpatient rehabilitation facility, sub acute care setting

## 2025-06-06 PROCEDURE — 25010000002 ENOXAPARIN PER 10 MG: Performed by: INTERNAL MEDICINE

## 2025-06-06 PROCEDURE — 63710000001 PREDNISONE PER 5 MG: Performed by: PSYCHIATRY & NEUROLOGY

## 2025-06-06 PROCEDURE — 97530 THERAPEUTIC ACTIVITIES: CPT

## 2025-06-06 PROCEDURE — 97110 THERAPEUTIC EXERCISES: CPT

## 2025-06-06 PROCEDURE — 99233 SBSQ HOSP IP/OBS HIGH 50: CPT | Performed by: CLINICAL NURSE SPECIALIST

## 2025-06-06 PROCEDURE — 25010000002 IMMUNE GLOBULIN (HUMAN) 10 GM/100ML SOLUTION 100 ML VIAL: Performed by: PSYCHIATRY & NEUROLOGY

## 2025-06-06 PROCEDURE — 25010000002 DIPHENHYDRAMINE PER 50 MG: Performed by: PSYCHIATRY & NEUROLOGY

## 2025-06-06 PROCEDURE — 97535 SELF CARE MNGMENT TRAINING: CPT

## 2025-06-06 PROCEDURE — 25010000002 IMMUNE GLOBULIN (HUMAN) 5 GM/50ML SOLUTION 50 ML VIAL: Performed by: PSYCHIATRY & NEUROLOGY

## 2025-06-06 RX ADMIN — Medication 10 MG: at 19:53

## 2025-06-06 RX ADMIN — ENOXAPARIN SODIUM 40 MG: 100 INJECTION SUBCUTANEOUS at 19:53

## 2025-06-06 RX ADMIN — THIAMINE HCL TAB 100 MG 500 MG: 100 TAB at 19:53

## 2025-06-06 RX ADMIN — IMMUNE GLOBULIN (HUMAN) 25 G: 10 INJECTION INTRAVENOUS; SUBCUTANEOUS at 13:25

## 2025-06-06 RX ADMIN — CARVEDILOL 6.25 MG: 6.25 TABLET, FILM COATED ORAL at 08:35

## 2025-06-06 RX ADMIN — PANTOPRAZOLE SODIUM 40 MG: 40 TABLET, DELAYED RELEASE ORAL at 08:20

## 2025-06-06 RX ADMIN — THIAMINE HCL TAB 100 MG 500 MG: 100 TAB at 14:58

## 2025-06-06 RX ADMIN — THIAMINE HCL TAB 100 MG 500 MG: 100 TAB at 08:20

## 2025-06-06 RX ADMIN — LEVOTHYROXINE SODIUM 150 MCG: 75 TABLET ORAL at 08:20

## 2025-06-06 RX ADMIN — PREDNISONE 10 MG: 10 TABLET ORAL at 08:20

## 2025-06-06 RX ADMIN — CARVEDILOL 6.25 MG: 6.25 TABLET, FILM COATED ORAL at 18:21

## 2025-06-06 RX ADMIN — FOLIC ACID 1 MG: 1 TABLET ORAL at 08:20

## 2025-06-06 RX ADMIN — DIPHENHYDRAMINE HYDROCHLORIDE 25 MG: 50 INJECTION, SOLUTION INTRAMUSCULAR; INTRAVENOUS at 13:03

## 2025-06-06 RX ADMIN — ARIPIPRAZOLE 10 MG: 5 TABLET ORAL at 08:19

## 2025-06-06 NOTE — PLAN OF CARE
Goal Outcome Evaluation:  Plan of Care Reviewed With: patient        Progress: declining  Outcome Evaluation: Pt oriented to self only. Disoriented to place, time and situation. Pt confused and hallucinating during tx. Continue OT POC

## 2025-06-06 NOTE — PLAN OF CARE
Goal Outcome Evaluation:   Pt is alert to self and can state that she is in [place]. Was able to get up x1 assit to the chair with PT today. In & out cathed and got 575 out. IVIG infused per order. Tele d/c'd. Bed alarm in place. No c/o pain this shift. Pt has visualized hallucinations about being at [employer] and seeing family members.MD aware. Call light within reach. Safety maintained.

## 2025-06-06 NOTE — THERAPY TREATMENT NOTE
Acute Care - Physical Therapy Treatment Note  [facility]     Patient Name: Lynn Magana  : 1975  MRN: [id number]  Today's Date: 2025   Onset of Illness/Injury or Date of Surgery: 25  Visit Dx:     ICD-10-CM ICD-9-CM   1. Generalized weakness  R53.1 780.79   2. Nausea and vomiting, unspecified vomiting type  R11.2 787.01   3. Marijuana user  F12.90 305.20   4. Low TSH level  R79.89 794.5   5. Urinary tract infection without hematuria, site unspecified  N39.0 599.0   6. Dysphagia, unspecified type  R13.10 787.20   7. Impaired functional mobility and activity tolerance [Z74.09]  Z74.09 V49.89     Patient Active Problem List   Diagnosis    Syncope    Graves' disease    Polysubstance abuse    Hypertension    Diabetes    Spells of decreased attentiveness    Chronic intractable headache    Nausea and vomiting    Slow transit constipation    Nonsmoker    Type 2 diabetes mellitus, with long-term current use of insulin    GERD without esophagitis    Hyperthyroidism    Thyromegaly    Post-surgical hypothyroidism    Degeneration of cervical intervertebral disc    Cervical radiculopathy    Acute pain of right shoulder    Class 1 obesity due to excess calories with body mass index (BMI) of 30.0 to 30.9 in adult    Hypoglycemia    Stage 1 acute kidney injury    Dehydration    Dysphagia    Dehydration    Hypokalemia    Steatosis of liver    Marijuana abuse    Loss of weight    Severe protein-calorie malnutrition    Hypothyroid    Generalized weakness    Acquired cerebral ventriculomegaly     Past Medical History:   Diagnosis Date    Arthritis     Carotid artery stenosis     Degenerative disc disease, cervical     Depression     Diabetes mellitus     type 1    Disease of thyroid gland     GERD (gastroesophageal reflux disease)     Graves disease     Heart palpitations     Hyperlipidemia     Hypertension     Hypothyroidism     Seizure     Thyromegaly      Past Surgical History:   Procedure Laterality Date      SECTION      CHOLECYSTECTOMY      COLONOSCOPY  2015    Normal exam Dr. Morgan     COLONOSCOPY  2015    Normal exam    CYST REMOVAL      ENDOSCOPY N/A 2018    Normal exam    ENDOSCOPY N/A 2025    Procedure: ESOPHAGOGASTRODUODENOSCOPY WITH ANESTHESIA;  Surgeon: Jadon Smith MD;  Location: [facility] ENDOSCOPY;  Service: Gastroenterology;  Laterality: N/A;  pre op: Nausea and vomiting  post op: normal  pcp: Darryl Andrews,     HYSTERECTOMY      THYROIDECTOMY Bilateral 2018    Procedure: total thyroidectomy;  Surgeon: Vitaly Givens MD;  Location: [facility] OR;  Service: ENT     PT Assessment (Last 12 Hours)       PT Evaluation and Treatment       Row Name 25 0932          Physical Therapy Time and Intention    Subjective Information no complaints  confused  -KJ     Document Type therapy note (daily note)  -KJ     Mode of Treatment physical therapy  -KJ     Patient Effort adequate  -KJ     Comment confusion but follows commands  -KJ       Row Name 25 0932          General Information    Existing Precautions/Restrictions fall  left side weakness  -KJ       Row Name 25 0932          Pain    Pretreatment Pain Rating 0/10 - no pain  -KJ     Posttreatment Pain Rating 0/10 - no pain  -KJ       Row Name 25 0932          Bed Mobility    Supine-Sit [place] (Bed Mobility) supervision  -KJ     Sit-Supine [place] (Bed Mobility) supervision  -KJ       Row Name 25 0932          Sit-Stand Transfer    Sit-Stand [place] (Transfers) verbal cues;minimum assist (75% patient effort)  -KJ     Assistive Device (Sit-Stand Transfers) walker, front-wheeled  -KJ       Row Name 25 0932          Stand-Sit Transfer    Stand-Sit [place] (Transfers) verbal cues;contact guard;minimum assist (75% patient effort)  -KJ     Assistive Device (Stand-Sit Transfers) walker, front-wheeled  -KJ       Row Name 25 0932          Gait/Stairs (Locomotion)     [place] Level (Gait) verbal cues;minimum assist (75% patient effort)  -KJ     Assistive Device (Gait) walker, front-wheeled  -KJ     Distance in Feet (Gait) 5  -KJ     Comment, (Gait/Stairs) BLE weak, stood x 3 at bedside. Transfer to chair and sat x 35 minutes with exit alarm  -KJ       Row Name 06/06/25 0932          Motor Skills    Comments, Therapeutic Exercise BLE AROM x 20; sit<>stand activities  -KJ       Row Name             Wound 05/29/25 1930 Left lower leg    Wound - Properties Group Placement Date: 05/29/25  -AR Placement Time: 1930  -AR Present on Original Admission: Y  -AR Side: Left  -AR Orientation: lower  -AR Location: leg  -AR    Retired Wound - Properties Group Placement Date: 05/29/25  -AR Placement Time: 1930  -AR Present on Original Admission: Y  -AR Side: Left  -AR Orientation: lower  -AR Location: leg  -AR    Retired Wound - Properties Group Placement Date: 05/29/25  -AR Placement Time: 1930  -AR Present on Original Admission: Y  -AR Side: Left  -AR Orientation: lower  -AR Location: leg  -AR    Retired Wound - Properties Group Date first assessed: 05/29/25  -AR Time first assessed: 1930  -AR Present on Original Admission: Y  -AR Side: Left  -AR Location: leg  -AR      Row Name             Wound 05/29/25 1931 Right lower leg    Wound - Properties Group Placement Date: 05/29/25  -AR Placement Time: 1931  -AR Present on Original Admission: Y  -AR Side: Right  -AR Orientation: lower  -AR Location: leg  -AR    Retired Wound - Properties Group Placement Date: 05/29/25  -AR Placement Time: 1931  -AR Present on Original Admission: Y  -AR Side: Right  -AR Orientation: lower  -AR Location: leg  -AR    Retired Wound - Properties Group Placement Date: 05/29/25  -AR Placement Time: 1931  -AR Present on Original Admission: Y  -AR Side: Right  -AR Orientation: lower  -AR Location: leg  -AR    Retired Wound - Properties Group Date first assessed: 05/29/25  -AR Time first assessed: 1931  -AR Present  on Original Admission: Y  -AR Side: Right  -AR Location: leg  -AR      Row Name 06/06/25 0932          Positioning and Restraints    Pre-Treatment Position in bed  -KJ     Post Treatment Position bed  -KJ               User Key  (r) = Recorded By, (t) = Taken By, (c) = Cosigned By      Initials Name Provider Type    Dina Gonzalez, PTA Physical Therapist Assistant    Deborah Christie, RN Registered Nurse                    Physical Therapy Education       Title: PT OT SLP Therapies (In Progress)       Topic: Physical Therapy (Done)       Point: Mobility training (Done)       Learning Progress Summary            Patient Acceptance, E,TB,D, VU,NR by  at 5/30/2025 1155    Comment: Education re: purpose of PT/importance of activity, safety/falls prevention, improved tech w/ bed mobility, tfers   Significant Other Acceptance, E,TB,D, VU,NR by  at 5/30/2025 1155    Comment: Education re: purpose of PT/importance of activity, safety/falls prevention, improved tech w/ bed mobility, tfers                      Point: Home exercise program (Done)       Learning Progress Summary            Patient Acceptance, E, VU by  at 6/5/2025 1944                      Point: Precautions (Done)       Learning Progress Summary            Patient Acceptance, E,TB,D, VU,NR by  at 5/30/2025 1155    Comment: Education re: purpose of PT/importance of activity, safety/falls prevention, improved tech w/ bed mobility, tfers   Significant Other Acceptance, E,TB,D, VU,NR by  at 5/30/2025 1155    Comment: Education re: purpose of PT/importance of activity, safety/falls prevention, improved tech w/ bed mobility, tfers                                      User Key       Initials Effective Dates Name Provider Type Discipline     08/02/18 -  Selin Leon, PT Physical Therapist PT     02/10/25 -  Lesia Ricketts, RN Registered Nurse Nurse                  PT Recommendation and Plan     Progress: improving  Outcome Evaluation: PT tx  "completed. Pt supine in bed, no complaints. Follows direction with reinforcement, increased time and cues. Bed mobility S, sit<>stand Whitney. Worked on sit<>stand several times working on strengthening. Steps taken to chair Whitney. BLEs weak and \"shaky\". Recommend inpatient rehab.   Outcome Measures       Row Name 06/05/25 1100 06/04/25 1500 06/04/25 1400       How much help from another person do you currently need...    Turning from your back to your side while in flat bed without using bedrails? 3  -AH 3  -AH --    Moving from lying on back to sitting on the side of a flat bed without bedrails? 2  -AH 2  -AH --    Moving to and from a bed to a chair (including a wheelchair)? 2  -AH 1  -AH --    Standing up from a chair using your arms (e.g., wheelchair, bedside chair)? 2  -AH 1  -AH --    Climbing 3-5 steps with a railing? 1  -AH 1  -AH --    To walk in hospital room? 1  -AH 1  -AH --    AM-PAC 6 Clicks Score (PT) 11  -AH 9  -AH --       How much help from another is currently needed...    Putting on and taking off regular lower body clothing? -- -- 2  -TS    Bathing (including washing, rinsing, and drying) -- -- 2  -TS    Toileting (which includes using toilet bed pan or urinal) -- -- 2  -TS    Putting on and taking off regular upper body clothing -- -- 2  -TS    Taking care of personal grooming (such as brushing teeth) -- -- 3  -TS    Eating meals -- -- 3  -TS    AM-PAC 6 Clicks Score (OT) -- -- 14  -TS       Functional Assessment    Outcome Measure Options AM-PAC 6 Clicks Basic Mobility (PT)  -AH -- --      Row Name 06/03/25 1500             How much help from another person do you currently need...    Turning from your back to your side while in flat bed without using bedrails? 3  -AH      Moving from lying on back to sitting on the side of a flat bed without bedrails? 3  -AH      Moving to and from a bed to a chair (including a wheelchair)? 2  -AH      Standing up from a chair using your arms (e.g., wheelchair, " bedside chair)? 2  -AH      Climbing 3-5 steps with a railing? 1  -AH      To walk in hospital room? 1  -AH      AM-PAC 6 Clicks Score (PT) 12  -         Functional Assessment    Outcome Measure Options AM-PAC 6 Clicks Basic Mobility (PT)  -AH                User Key  (r) = Recorded By, (t) = Taken By, (c) = Cosigned By      Initials Name Provider Type     Magi Cordova, CARLOS MANUEL Physical Therapist Assistant    Safia Rush COTA Occupational Therapist Assistant                     Time Calculation:    PT Charges       Row Name 06/06/25 1045             Time Calculation    Start Time 0932  -KJ      Stop Time 0958  -KJ      Time Calculation (min) 26 min  -KJ      PT Received On 06/06/25  -KJ      PT Goal Re-Cert Due Date 06/09/25  -KJ         Time Calculation- PT    Total Timed Code Minutes- PT 26 minute(s)  -KJ                User Key  (r) = Recorded By, (t) = Taken By, (c) = Cosigned By      Initials Name Provider Type     Dina Garza PTA Physical Therapist Assistant                  Therapy Charges for Today       Code Description Service Date Service Provider Modifiers Qty    81494361726 HC PT THER PROC EA 15 MIN 6/6/2025 Dina Garza, CARLOS MANUEL GP 1    55805393520 HC PT THERAPEUTIC ACT EA 15 MIN 6/6/2025 Dina Garza, CARLOS MANUEL GP 1            PT G-Codes  Outcome Measure Options: AM-PAC 6 Clicks Basic Mobility (PT)  AM-PAC 6 Clicks Score (PT): 11  AM-PAC 6 Clicks Score (OT): 14    Dina Garza PTA  6/6/2025

## 2025-06-06 NOTE — PROGRESS NOTES
[facility] Medicine Services  INPATIENT PROGRESS NOTE    Patient Name: Lynn Magana  Date of Admission: 5/29/2025  Today's Date: 06/06/25  Length of Stay: 8  Primary Care Physician: Darryl Andrews DO    Subjective   Chief Complaint: Lightheadedness.  Current    Weakness - Generalized     49-year-old female with history of hypothyroidism, liver steatosis, cannabis use, malnutrition, diabetes mellitus type 2, hypertension, admitted May 11 through May 14, 2025; at that time she was admitted due to poor oral intake and weight loss.  She had an upper endoscopy performed 5/13/2025 with findings of a small hiatal hernia and no other abnormalities reported.  Per reports reviewed the patient was not taking levothyroxine at the time, and her TSH was markedly elevated.  She was restarted on levothyroxine.  Her blood glucose was low, and did not require insulin management.  Drug screening urine was positive for cannabis, and considered to be secondary to cannabinoid hyperemesis syndrome.  The patient left the hospital AGAINST MEDICAL ADVICE, apparently she eloped; after being called, she stated was already home and did not plan to return to the hospital.  On 5/16/2025, the patient returned to the hospital reporting a syncopal event.  There was questionable seizure-like activity.   A CT brain performed on that evaluation showed moderate further increase in size of the lateral and third ventricle since the previous study. The fourth ventricle reported as normal and unchanged. A small obstructing lesion at the level of aqueduct of Sylvius not excluded. Further evaluation with contrast enhanced MR imaging of the brain was suggested. She again left the ER AGAINST MEDICAL ADVICE.    Patient came to hospital 5/30/25 reporting dizziness, lightheadedness, worsening when standing up, abnormal gait with unsteadiness;  reported confusion.  Symptom has been going on for approximately  1-2 months.  Blood pressure has been variable, initially elevated.      She has been nonambulatory for several weeks.  She reported weakness of the lower extremities and right upper extremity.  She has had urinary retention requiring straight catheterization during this admission.  Lumbar puncture performed 6/1/25, with opening pressure of 17, and fluid sent for analysis.    Nerve conduction studies performed yesterday.  Being evaluated for Joy Quinonez variant.  Today found with , trying to urinate in the bedside commode.  Weakness persists.      Today  Patient has no new complaint, but still somewhat confused and hallucinating.      Review of Systems   All pertinent negatives and positives are as above. All other systems have been reviewed and are negative unless otherwise stated.     Objective    Temp:  [97.5 °F (36.4 °C)-98.4 °F (36.9 °C)] 97.8 °F (36.6 °C)  Heart Rate:  [] 89  Resp:  [16-18] 16  BP: (121-143)/(77-94) 142/94  Physical Exam  Constitutional:       Appearance: Alert, oriented to person.  No respiratory distress.  HENT:      Head: Normocephalic and atraumatic.      Nose: Nose normal.      Mouth/Throat:      Mouth: Mucous membranes are moist.   Neck, old transverse scar  Eyes:      Conjunctiva/sclera: Conjunctivae normal.      Pupils: Pupils are equal, round  Cardiovascular:      Rate and Rhythm: Normal rate and regular rhythm.      Pulses: Normal pulses.   Pulmonary:      Effort: No respiratory distress.      Breath sounds: Normal breath sounds. No wheezing, rhonchi or rales.   Abdominal:      General: Abdomen is flat. Bowel sounds are normal.      Palpations: Abdomen is soft.      Tenderness: There is no guarding or rebound.   Extremities:  No lower extremity edema.  Skin:     Capillary Refill: Capillary refill takes less than 2 seconds.      Coloration: Skin is not jaundiced.      Findings: No rash.   Neurological:   Weakness of neck extension.  Abnormal ocular movements.  Flaccid 3+  "out of 5 lower extremity bilateral weakness  Drowsy. Slow speech.  Follows commands.          Results Review:  I have reviewed the labs, radiology results, and diagnostic studies.    Laboratory Data:   Results from last 7 days   Lab Units 06/04/25  0532   WBC 10*3/mm3 5.87   HEMOGLOBIN g/dL 9.3*   HEMATOCRIT % 28.4*   PLATELETS 10*3/mm3 196        Results from last 7 days   Lab Units 06/03/25  1013 06/02/25  0416 05/31/25  0502   SODIUM mmol/L 146* 146* 141   POTASSIUM mmol/L 3.8 3.3* 3.7   CHLORIDE mmol/L 114* 114* 109*   CO2 mmol/L 23.0 21.0* 19.0*   BUN mg/dL 18.8 27.0* 34.2*   CREATININE mg/dL 0.89 0.82 0.77   CALCIUM mg/dL 9.4 9.2 9.6   GLUCOSE mg/dL 146* 119* 140*       Culture Data:   No results found for: \"BLOODCX\", \"URINECX\", \"WOUNDCX\", \"MRSACX\", \"RESPCX\", \"STOOLCX\"    Radiology Data:   Imaging Results (Last 24 Hours)       ** No results found for the last 24 hours. **            I have reviewed the patient's current medications.     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  EEG showed-  Diffuse cerebral dysfunction of moderate degree, nonspecific.  This is most commonly seen due to toxic/metabolic or hypoxemic/ischemic cause. No evidence for epilepsy is seen  Pending Antibodies GQ1b    Nerve conduction studies review per specialist [ Nerve conduction studies did reveal slowed velocities but not in the demyelinating range.]  Neurology recommended a 5-day treatment " with IVIG [today is day 3 of of 5]    -Hypothyroidism  Variable thyroid function tests on prior admissions and currently. Continue levothyroxine 150 mcg p.o. daily. I have recommended endocrinology outpatient follow up, and this was explained to family.    -Hypertension  Due to variable blood pressure values, discontinued amlodipine lisinoprill, aldactone; On Carvedilol 6.25 mg p.o. twice daily.     -Acquired cerebral ventriculomegaly   Opening pressure on lumbar puncture was unimpressive as by neurologist    Continue PT/OT  Diet as per speech therapist recommendations.    DVT prophylaxis-we will start patient on Lovenox    Disposition-I recommend SNF/rehab after inpatient treatment. Family and patient to decide.      Electronically signed by Yovanny Abernathy MD, 06/06/25, 15:54 CDT.

## 2025-06-06 NOTE — CASE MANAGEMENT/SOCIAL WORK
Continued Stay Note   [place]     Patient Name: Lynn Magana  MRN: [id number]  Today's Date: 6/6/2025    Admit Date: 5/29/2025        Discharge Plan       Row Name 06/06/25 1437       Plan    Plan Comments Events noted. Pt is day 3 out of 5 for IVIG. Will continue to follow.                   Discharge Codes    No documentation.                 Expected Discharge Date and Time       Expected Discharge Date Expected Discharge Time    Jun 9, 2025               NADIA Williamson

## 2025-06-06 NOTE — PROGRESS NOTES
Neurology Progress Note      Chief Complaint:  AMS  Length of Stay:  8   Subjective     Subjective:  6/6/2025: patient sitting up in bed. 16 y.o. daughter at bedside. Patient looks brighter today. Nursing reports patient had visual hallucinations last PM. Daughter reports she is having confusion and has been stating she is at [facility] but she is currently oriented to person and place. Today #3/5 IV IG.   Patient seen with tele neurology Dr. LAUREN Patrick.    6/5/2025: lying in bed.  at bedside. Tolerated IV IG yesterday. Today is #2/5. Patient is more alert and interactive. Still with dysconjugate gaze. She appears to have more strength in extremities and  thinks this as well. Improved effort on exam. Orthostatic BP positive as below.         6/4/2025:She is somewhat more awake today.  Her  is at the bedside today we speak to him.  She continues to have global weakness and decreasing cognition.  She has shown some mild improvement since admission.  We discussed the risk and benefits of IVIG therapy and she expresses understanding.  She would like to proceed with this.  After I left the room the patient did get up and ambulate with assistance by physical therapy to the bedside commode.  After being there briefly she slumped over and had some disconjugate gaze.  There may have been greater right than left sided weakness.  She was returned to the bed and recovered immediately.    Medications:  Current Facility-Administered Medications   Medication Dose Route Frequency Provider Last Rate Last Admin    ARIPiprazole (ABILIFY) tablet 10 mg  10 mg Oral Daily Bautista Limon MD   10 mg at 06/06/25 0819    sennosides-docusate (PERICOLACE) 8.6-50 MG per tablet 2 tablet  2 tablet Oral BID PRN Bautista Limon MD   2 tablet at 06/03/25 0844    And    polyethylene glycol (MIRALAX) packet 17 g  17 g Oral Daily PRN Bautista Limon MD        And    bisacodyl (DULCOLAX) EC tablet 5 mg  5 mg Oral Daily PRN  Bautista Limon MD        And    bisacodyl (DULCOLAX) suppository 10 mg  10 mg Rectal Daily PRN Bautista Limon MD        Calcium Replacement - Follow Nurse / BPA Driven Protocol   Not Applicable PRN Bautista Limon MD        carvedilol (COREG) tablet 6.25 mg  6.25 mg Oral BID With Meals Bautista Limon MD   6.25 mg at 06/06/25 0835    diphenhydrAMINE (BENADRYL) injection 25 mg  25 mg Intravenous Daily Ori Banuelos MD   25 mg at 06/05/25 0955    enoxaparin sodium (LOVENOX) syringe 40 mg  40 mg Subcutaneous Nightly Yovanny Abernathy MD   40 mg at 06/05/25 2008    folic acid (FOLVITE) tablet 1 mg  1 mg Oral Daily Yovanny Abernathy MD   1 mg at 06/06/25 0820    hydrALAZINE (APRESOLINE) injection 10 mg  10 mg Intravenous Q6H PRN Alireza Lam MD   10 mg at 06/05/25 0048    immune globulin (human) 25 g  25 g Intravenous Daily Ori Banuelos MD   25 g at 06/05/25 1003    levothyroxine (SYNTHROID, LEVOTHROID) tablet 150 mcg  150 mcg Oral Q AM Bautista Limon MD   150 mcg at 06/06/25 0820    Magnesium Low Dose Replacement - Follow Nurse / BPA Driven Protocol   Not Applicable PRN Bautista Limon MD        melatonin tablet 10 mg  10 mg Oral Nightly PRN Bautista Limon MD   10 mg at 06/05/25 2009    ondansetron ODT (ZOFRAN-ODT) disintegrating tablet 4 mg  4 mg Oral Q6H PRN Bautista Limon MD        Or    ondansetron (ZOFRAN) injection 4 mg  4 mg Intravenous Q6H PRN Bautista Limon MD   4 mg at 05/30/25 2139    pantoprazole (PROTONIX) EC tablet 40 mg  40 mg Oral Q AM Bautista Limon MD   40 mg at 06/06/25 0820    Phosphorus Replacement - Follow Nurse / BPA Driven Protocol   Not Applicable PRN Bautista Limon MD        Potassium Replacement - Follow Nurse / BPA Driven Protocol   Not Applicable PRN Bautista Limon MD        predniSONE (DELTASONE) tablet 10 mg  10 mg Oral Daily Ori Banuelos MD   10 mg at 06/06/25 0820    sodium chloride 0.9 % flush 10 mL  10 mL  Intravenous PRN Safia Wood APRN        thiamine (VITAMIN B-1) tablet 500 mg  500 mg Oral TID Yovanny Abernathy MD   500 mg at 06/06/25 0820             Objective      Vital Signs  Temp:  [97.5 °F (36.4 °C)-98.4 °F (36.9 °C)] 97.5 °F (36.4 °C)  Heart Rate:  [] 101  Resp:  [16-18] 16  BP: (102-143)/(65-94) 121/77    Physical Exam:    HEENT:  Neck is supple  CVS:  RRR  Lungs:  CTA - B/L  Abd:  NT/ND  Ext:  No edema  Skin:  No rashes    Pertinent Neuro Exam:  Awake. More alert and brighter today.   Slowed cognition.  States name, location. Unable to state age.   Knows her daughter  Can follow commands   Pupils equal.  Able to count fingers today.  Disconjugate gaze chronically  No unilateral facial droop  Antigravity strength in all 4 extremities  No signs of tremors or increased tone  Mild ataxia noted bilaterally but there is some functional components of weakness noted as well.    Last nurse assessment:  Interval: baseline  1a. Level of Consciousness: 0-->Alert, keenly responsive  1b. LOC Questions: 0-->Answers both questions correctly  1c. LOC Commands: 0-->Performs both tasks correctly  2. Best Gaze: 0-->Normal  3. Visual: 0-->No visual loss  4. Facial Palsy: 0-->Normal symmetrical movements  5a. Motor Arm, Left: 0-->No drift, limb holds 90 (or 45) degrees for full 10 secs  5b. Motor Arm, Right: 0-->No drift, limb holds 90 (or 45) degrees for full 10 secs  6a. Motor Leg, Left: 1-->Drift, leg falls by the end of the 5-sec period but does not hit bed  6b. Motor Leg, Right: 1-->Drift, leg falls by the end of the 5-sec period but does not hit bed  7. Limb Ataxia: 0-->Absent  8. Sensory: 0-->Normal, no sensory loss  9. Best Language: 0-->No aphasia, normal  10. Dysarthria: 0-->Normal  11. Extinction and Inattention (formerly Neglect): 0-->No abnormality    Total (NIH Stroke Scale): 2       Results Review:      Labs:  Labs reviewed as below  LAB RESULTS:      Lab 06/04/25  0532   WBC 5.87   HEMOGLOBIN  9.3*   HEMATOCRIT 28.4*   PLATELETS 196   MCV 87.4         Lab 25  0532 25  1013 25  0416 25  1100 25  0502   SODIUM  --  146* 146*  --  141   POTASSIUM  --  3.8 3.3*  --  3.7   CHLORIDE  --  114* 114*  --  109*   CO2  --  23.0 21.0*  --  19.0*   ANION GAP  --  9.0 11.0  --  13.0   BUN  --  18.8 27.0*  --  34.2*   CREATININE  --  0.89 0.82  --  0.77   EGFR  --  79.1 87.3  --  94.1   GLUCOSE  --  146* 119*  --  140*   CALCIUM  --  9.4 9.2  --  9.6   MAGNESIUM  --  2.0  --   --  2.1   HEMOGLOBIN A1C 4.90  --   --   --   --    TSH  --   --   --  0.027*  --                          Lab 25  0416   FOLATE 2.78*   VITAMIN B 12 >2,000*         Brief Urine Lab Results  (Last result in the past 365 days)        Color   Clarity   Blood   Leuk Est   Nitrite   Protein   CREAT   Urine HCG        25 0000 Orange   Clear   Negative   Moderate (2+)   Positive   30 mg/dL (1+)                 Microbiology Results (last 10 days)       Procedure Component Value - Date/Time    Culture, CSF - Cerebrospinal Fluid, Lumbar Puncture [[id number]] Collected: 25 0920    Lab Status: Final result Specimen: Cerebrospinal Fluid from Lumbar Puncture Updated: 25 0628     CSF Culture No growth at 3 days     Gram Stain No WBCs or organisms seen    Blood Culture - Blood, Arm, Left [[id number]]  (Normal) Collected: 25 1941    Lab Status: Final result Specimen: Blood from Arm, Left Updated: 25 2000     Blood Culture No growth at 5 days    Blood Culture - Blood, Arm, Left [[id number]]  (Normal) Collected: 25 1547    Lab Status: Final result Specimen: Blood from Arm, Left Updated: 25 1600     Blood Culture No growth at 5 days        Other labs:  RPR normal  HIV negative  Urine analysis positive  TSH low at 0.027  B12 elevated next line folate low at 2.78  Ammonia 36  Ethanol level negligible  2025 LP:  CSF:  W:  0  R:  0  P:  55.7        Imaging:  MRI of the brain shows  ventriculomegaly but not consistently out of proportion compared to the overall degree of atrophy.  MRI of the cervical and lumbar spine couple  CT of the abdomen shows some nonspecific findings possibly consistent with enteritis but no signs of a primary malignancy.  CT of pelvis done on 5/11 showed urinary bladder wall thickening but no signs of an occult malignancy.  EEG shows nonspecific slowing  Nerve conduction velocities performed on 6/3 show velocities in the 40 m/s range.  This is slow although not necessarily in the demyelinating range.    Assessment/Plan     Hospital Problem List      Acquired cerebral ventriculomegaly    Generalized weakness    Impression:  Progressive neurologic decline for several months with global weakness, opthalmoplegia, and ataxia.  Considered Joy Quinonez Variant  Nerve conduction studies did reveal slowed velocities but not in the demyelinating range.  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  We discussed the risk and benefits of this and her and her  would like to proceed with this.  Initiated 5 days of IVIG on 6/4/2025 with fifth dose on 6/8/2025 therapy to see if she improves from this.  Atypical spell - functional component?  Poor effort on exam  Folate deficiency  Ventriculomegally - opening pressure unimpressive  HTN  Hx of cannabis usage  Orthostatic hypotension.    Plan:  Will initiate 400 mg/kg daily dosing of IVIG starting today (6/4) with a plan to complete 5 days of therapy.  Will premedicate each day with Benadryl and Prednisone 10 mg.(Tylenol was initially ordered but there is questionable allergy)  Check CBC and CMP in AM 6/7/2025.   MMA level  Replace Folic acid: started 6/2  GQ1B antibiodies -send out lab.  Results pending.      Medical Decision Making    Number/Complexity of Problems  Moderate  1 undiagnosed new problem with uncertain prognosis -   1 acute illness with systemic symptoms -    High  1 acute or chronic illness that poses a threat to life/body function -   High     MDM Data  Moderate - 1/3 categories  Extensive - 2/3 categories    Category 1: 3 of the following  Review of external notes  Review of results  Ordering of each unique test  Independent historian  Category 2:  Independent interpretation of test (ex: imaging)  Category 3:  Discussion of management with another provider    Extensive     Treatment Plan  Moderate - Prescription Drug management  High  Drug therapy requiring intensive monitoring for toxicity  Decision regarding hospitalization or escalation of care  De-escalate care/DNR decisions         Abi Frausto, APRN  06/06/25  10:02 T

## 2025-06-06 NOTE — PLAN OF CARE
Goal Outcome Evaluation:  Plan of Care Reviewed With: patient        Progress: no change  Outcome Evaluation: Alert to self only, more agitated and confused this shift. Pt verablized hallucinations, seeing a dog in the room and a baby in the bed with her. She was able to void with coaching and soiled a brief. Due to high agitation she kept trying to exit her bed, this was worse when her  was not present. She rested very little this shift, PRN melationin was given with little affect. Bed alarm set on second sensitivity, frequent rounding increased, safety precautions maintained, call light within reach at all times.

## 2025-06-06 NOTE — PLAN OF CARE
Goal Outcome Evaluation:   Pt is alert to self. Bedbound. IVIG given. VSS. RA. Bed alarm on. In and Out cath and got 550ml out. No c/o pain this shift. Pills crushed in applesauce. Call light within reach. Safety maintained.

## 2025-06-06 NOTE — THERAPY TREATMENT NOTE
Acute Care - Occupational Therapy Treatment Note  [facility]     Patient Name: Lynn Magana  : 1975  MRN: [id number]  Today's Date: 2025  Onset of Illness/Injury or Date of Surgery: 25     Referring Physician: Dr. Limon    Admit Date: 2025       ICD-10-CM ICD-9-CM   1. Generalized weakness  R53.1 780.79   2. Nausea and vomiting, unspecified vomiting type  R11.2 787.01   3. Marijuana user  F12.90 305.20   4. Low TSH level  R79.89 794.5   5. Urinary tract infection without hematuria, site unspecified  N39.0 599.0   6. Dysphagia, unspecified type  R13.10 787.20   7. Impaired functional mobility and activity tolerance [Z74.09]  Z74.09 V49.89     Patient Active Problem List   Diagnosis    Syncope    Graves' disease    Polysubstance abuse    Hypertension    Diabetes    Spells of decreased attentiveness    Chronic intractable headache    Nausea and vomiting    Slow transit constipation    Nonsmoker    Type 2 diabetes mellitus, with long-term current use of insulin    GERD without esophagitis    Hyperthyroidism    Thyromegaly    Post-surgical hypothyroidism    Degeneration of cervical intervertebral disc    Cervical radiculopathy    Acute pain of right shoulder    Class 1 obesity due to excess calories with body mass index (BMI) of 30.0 to 30.9 in adult    Hypoglycemia    Stage 1 acute kidney injury    Dehydration    Dysphagia    Dehydration    Hypokalemia    Steatosis of liver    Marijuana abuse    Loss of weight    Severe protein-calorie malnutrition    Hypothyroid    Generalized weakness    Acquired cerebral ventriculomegaly     Past Medical History:   Diagnosis Date    Arthritis     Carotid artery stenosis     Degenerative disc disease, cervical     Depression     Diabetes mellitus     type 1    Disease of thyroid gland     GERD (gastroesophageal reflux disease)     Graves disease     Heart palpitations     Hyperlipidemia     Hypertension     Hypothyroidism     Seizure     Thyromegaly       Past Surgical History:   Procedure Laterality Date     SECTION      CHOLECYSTECTOMY      COLONOSCOPY  2015    Normal exam Dr. Morgan     COLONOSCOPY  2015    Normal exam    CYST REMOVAL      ENDOSCOPY N/A 2018    Normal exam    ENDOSCOPY N/A 2025    Procedure: ESOPHAGOGASTRODUODENOSCOPY WITH ANESTHESIA;  Surgeon: Jadon Smith MD;  Location: [facility] ENDOSCOPY;  Service: Gastroenterology;  Laterality: N/A;  pre op: Nausea and vomiting  post op: normal  pcp: Darryl Andrews,     HYSTERECTOMY      THYROIDECTOMY Bilateral 2018    Procedure: total thyroidectomy;  Surgeon: Vitaly Givens MD;  Location: [facility] OR;  Service: ENT         OT ASSESSMENT FLOWSHEET (Last 12 Hours)       OT Evaluation and Treatment       Row Name 25 0945                   OT Time and Intention    Subjective Information --  increased confusion  -TS        Document Type therapy note (daily note)  -TS        Mode of Treatment occupational therapy  -TS        Patient Effort fair  -TS        Comment L side weak  -TS           General Information    Existing Precautions/Restrictions fall  -TS           Cognition    Orientation Status (Cognition) oriented to;person;disoriented to;place;situation;time  -TS        Personal Safety Interventions fall prevention program maintained;gait belt;elopement precautions initiated;nonskid shoes/slippers when out of bed  -TS           Bed Mobility    Scooting/Bridging [place] (Bed Mobility) standby assist  -TS        Supine-Sit [place] (Bed Mobility) standby assist  -TS        Sit-Supine [place] (Bed Mobility) standby assist  -TS        Assistive Device (Bed Mobility) bed rails  -TS           Sit-Stand Transfer    Sit-Stand [place] (Transfers) contact guard  -TS        Comment, (Sit-Stand Transfer) completed half stand from EOB to allow BRANNON to slide glide pad under  -TS           Stand-Sit Transfer    Stand-Sit [place]  (Transfers) contact guard  -TS           Wound 05/29/25 1930 Left lower leg    Wound - Properties Group Placement Date: 05/29/25  -AR Placement Time: 1930  -AR Present on Original Admission: Y  -AR Side: Left  -AR Orientation: lower  -AR Location: leg  -AR    Retired Wound - Properties Group Placement Date: 05/29/25  -AR Placement Time: 1930  -AR Present on Original Admission: Y  -AR Side: Left  -AR Orientation: lower  -AR Location: leg  -AR    Retired Wound - Properties Group Placement Date: 05/29/25  -AR Placement Time: 1930  -AR Present on Original Admission: Y  -AR Side: Left  -AR Orientation: lower  -AR Location: leg  -AR    Retired Wound - Properties Group Date first assessed: 05/29/25  -AR Time first assessed: 1930  -AR Present on Original Admission: Y  -AR Side: Left  -AR Location: leg  -AR       Wound 05/29/25 1931 Right lower leg    Wound - Properties Group Placement Date: 05/29/25  -AR Placement Time: 1931  -AR Present on Original Admission: Y  -AR Side: Right  -AR Orientation: lower  -AR Location: leg  -AR    Retired Wound - Properties Group Placement Date: 05/29/25  -AR Placement Time: 1931  -AR Present on Original Admission: Y  -AR Side: Right  -AR Orientation: lower  -AR Location: leg  -AR    Retired Wound - Properties Group Placement Date: 05/29/25  -AR Placement Time: 1931  -AR Present on Original Admission: Y  -AR Side: Right  -AR Orientation: lower  -AR Location: leg  -AR    Retired Wound - Properties Group Date first assessed: 05/29/25  -AR Time first assessed: 1931  -AR Present on Original Admission: Y  -AR Side: Right  -AR Location: leg  -AR       Plan of Care Review    Plan of Care Reviewed With patient  -TS        Progress declining  -TS        Outcome Evaluation Pt oriented to self only. Disoriented to place, time and situation. Pt confused and hallucinating during tx. Continue OT POC  -TS           Positioning and Restraints    Pre-Treatment Position in bed  -TS        Post Treatment  Position bed  -TS        In Bed fowlers;call light within reach;encouraged to call for assist;exit alarm on;side rails up x3  -TS                  User Key  (r) = Recorded By, (t) = Taken By, (c) = Cosigned By      Initials Name Effective Dates    Safia Rush COTA 02/03/23 -     Deborah Christie, RN 05/24/22 -                      Occupational Therapy Education       Title: PT OT SLP Therapies (In Progress)       Topic: Occupational Therapy (In Progress)       Point: ADL training (Done)       Learning Progress Summary            Patient Acceptance, E, VU by TS at 6/4/2025 1234                      Point: Home exercise program (Done)       Learning Progress Summary            Patient Acceptance, E, VU by TS at 6/4/2025 1234                                      User Key       Initials Effective Dates Name Provider Type Discipline     02/03/23 -  Safia Quezada COTA Occupational Therapist Assistant OT                      OT Recommendation and Plan     Plan of Care Review  Plan of Care Reviewed With: patient  Progress: declining  Outcome Evaluation: Pt oriented to self only. Disoriented to place, time and situation. Pt confused and hallucinating during tx. Continue OT POC  Plan of Care Reviewed With: patient  Outcome Evaluation: Pt oriented to self only. Disoriented to place, time and situation. Pt confused and hallucinating during tx. Continue OT POC     Outcome Measures       Row Name 06/06/25 1300 06/05/25 1100 06/04/25 1500       How much help from another person do you currently need...    Turning from your back to your side while in flat bed without using bedrails? -- 3  -AH 3  -AH    Moving from lying on back to sitting on the side of a flat bed without bedrails? -- 2  -AH 2  -AH    Moving to and from a bed to a chair (including a wheelchair)? -- 2  -AH 1  -AH    Standing up from a chair using your arms (e.g., wheelchair, bedside chair)? -- 2  -AH 1  -AH    Climbing 3-5 steps with a  railing? -- 1  - 1  -AH    To walk in hospital room? -- 1  -AH 1  -AH    AM-PAC 6 Clicks Score (PT) -- 11  -AH 9  -AH       How much help from another is currently needed...    Putting on and taking off regular lower body clothing? 2  -TS -- --    Bathing (including washing, rinsing, and drying) 2  -TS -- --    Toileting (which includes using toilet bed pan or urinal) 2  -TS -- --    Putting on and taking off regular upper body clothing 2  -TS -- --    Taking care of personal grooming (such as brushing teeth) 3  -TS -- --    Eating meals 3  -TS -- --    AM-PAC 6 Clicks Score (OT) 14  -TS -- --       Functional Assessment    Outcome Measure Options -- AM-PAC 6 Clicks Basic Mobility (PT)  - --      Row Name 06/04/25 1400 06/03/25 1500          How much help from another person do you currently need...    Turning from your back to your side while in flat bed without using bedrails? -- 3  -AH     Moving from lying on back to sitting on the side of a flat bed without bedrails? -- 3  -AH     Moving to and from a bed to a chair (including a wheelchair)? -- 2  -AH     Standing up from a chair using your arms (e.g., wheelchair, bedside chair)? -- 2  -AH     Climbing 3-5 steps with a railing? -- 1  -AH     To walk in hospital room? -- 1  -     AM-PAC 6 Clicks Score (PT) -- 12  -        How much help from another is currently needed...    Putting on and taking off regular lower body clothing? 2  -TS --     Bathing (including washing, rinsing, and drying) 2  -TS --     Toileting (which includes using toilet bed pan or urinal) 2  -TS --     Putting on and taking off regular upper body clothing 2  -TS --     Taking care of personal grooming (such as brushing teeth) 3  -TS --     Eating meals 3  -TS --     AM-PAC 6 Clicks Score (OT) 14  -TS --        Functional Assessment    Outcome Measure Options -- AM-PAC 6 Clicks Basic Mobility (PT)  -               User Key  (r) = Recorded By, (t) = Taken By, (c) = Cosigned By       Initials Name Provider Type     Magi Cordova, CARLOS MANUEL Physical Therapist Assistant    TS Safia Quezada COTA Occupational Therapist Assistant                    Time Calculation:    Time Calculation- OT       Row Name 06/06/25 1307             Time Calculation- OT    OT Start Time 0945  -TS      OT Stop Time 1015  -TS      OT Time Calculation (min) 30 min  -TS      Total Timed Code Minutes- OT 30 minute(s)  -TS      OT Received On 06/06/25  -TS         Timed Charges    27676 - OT Self Care/Mgmt Minutes 30  -TS         Total Minutes    Timed Charges Total Minutes 30  -TS       Total Minutes 30  -TS                User Key  (r) = Recorded By, (t) = Taken By, (c) = Cosigned By      Initials Name Provider Type    TS Safia Quezada COTA Occupational Therapist Assistant                  Therapy Charges for Today       Code Description Service Date Service Provider Modifiers Qty    [id number] HC OT SELF CARE/MGMT/TRAIN EA 15 MIN 6/6/2025 Safia Quezada COTA GO 2                 Safia COSTELLO. CLOVIS Quezada  6/6/2025

## 2025-06-06 NOTE — PLAN OF CARE
"Goal Outcome Evaluation:  Plan of Care Reviewed With: patient        Progress: improving  Outcome Evaluation: PT tx completed. Pt supine in bed, no complaints. Follows direction with reinforcement, increased time and cues. Bed mobility S, sit<>stand Whitney. Worked on sit<>stand several times working on strengthening. Steps taken to chair Whitney. BLEs weak and \"shaky\". Recommend inpatient rehab.                             "

## 2025-06-07 LAB
ALBUMIN SERPL-MCNC: 2.6 G/DL (ref 3.5–5.2)
ALBUMIN/GLOB SERPL: 0.7 G/DL
ALP SERPL-CCNC: 32 U/L (ref 39–117)
ALT SERPL W P-5'-P-CCNC: 58 U/L (ref 1–33)
ANION GAP SERPL CALCULATED.3IONS-SCNC: 8 MMOL/L (ref 5–15)
AST SERPL-CCNC: 24 U/L (ref 1–32)
BASOPHILS # BLD AUTO: 0.01 10*3/MM3 (ref 0–0.2)
BASOPHILS NFR BLD AUTO: 0.1 % (ref 0–1.5)
BILIRUB SERPL-MCNC: 0.6 MG/DL (ref 0–1.2)
BUN SERPL-MCNC: 13.9 MG/DL (ref 6–20)
BUN/CREAT SERPL: 17 (ref 7–25)
CALCIUM SPEC-SCNC: 8.4 MG/DL (ref 8.6–10.5)
CHLORIDE SERPL-SCNC: 109 MMOL/L (ref 98–107)
CO2 SERPL-SCNC: 22 MMOL/L (ref 22–29)
CREAT SERPL-MCNC: 0.82 MG/DL (ref 0.57–1)
DEPRECATED RDW RBC AUTO: 43.8 FL (ref 37–54)
DEPRECATED RDW RBC AUTO: 43.9 FL (ref 37–54)
EGFRCR SERPLBLD CKD-EPI 2021: 87.3 ML/MIN/1.73
EOSINOPHIL # BLD AUTO: 0.01 10*3/MM3 (ref 0–0.4)
EOSINOPHIL NFR BLD AUTO: 0.1 % (ref 0.3–6.2)
ERYTHROCYTE [DISTWIDTH] IN BLOOD BY AUTOMATED COUNT: 13.9 % (ref 12.3–15.4)
ERYTHROCYTE [DISTWIDTH] IN BLOOD BY AUTOMATED COUNT: 14 % (ref 12.3–15.4)
GLOBULIN UR ELPH-MCNC: 3.6 GM/DL
GLUCOSE SERPL-MCNC: 131 MG/DL (ref 65–99)
HCT VFR BLD AUTO: 23.4 % (ref 34–46.6)
HCT VFR BLD AUTO: 23.5 % (ref 34–46.6)
HGB BLD-MCNC: 7.7 G/DL (ref 12–15.9)
HGB BLD-MCNC: 8 G/DL (ref 12–15.9)
IMM GRANULOCYTES # BLD AUTO: 0.07 10*3/MM3 (ref 0–0.05)
IMM GRANULOCYTES NFR BLD AUTO: 0.9 % (ref 0–0.5)
LYMPHOCYTES # BLD AUTO: 2.31 10*3/MM3 (ref 0.7–3.1)
LYMPHOCYTES NFR BLD AUTO: 29.1 % (ref 19.6–45.3)
MAGNESIUM SERPL-MCNC: 1.6 MG/DL (ref 1.6–2.6)
MCH RBC QN AUTO: 28.9 PG (ref 26.6–33)
MCH RBC QN AUTO: 30.4 PG (ref 26.6–33)
MCHC RBC AUTO-ENTMCNC: 32.8 G/DL (ref 31.5–35.7)
MCHC RBC AUTO-ENTMCNC: 34.2 G/DL (ref 31.5–35.7)
MCV RBC AUTO: 88.3 FL (ref 79–97)
MCV RBC AUTO: 89 FL (ref 79–97)
MONOCYTES # BLD AUTO: 0.48 10*3/MM3 (ref 0.1–0.9)
MONOCYTES NFR BLD AUTO: 6.1 % (ref 5–12)
NEUTROPHILS NFR BLD AUTO: 5.05 10*3/MM3 (ref 1.7–7)
NEUTROPHILS NFR BLD AUTO: 63.7 % (ref 42.7–76)
NRBC BLD AUTO-RTO: 0 /100 WBC (ref 0–0.2)
PLATELET # BLD AUTO: 193 10*3/MM3 (ref 140–450)
PLATELET # BLD AUTO: 195 10*3/MM3 (ref 140–450)
PMV BLD AUTO: 10.3 FL (ref 6–12)
PMV BLD AUTO: 10.4 FL (ref 6–12)
POTASSIUM SERPL-SCNC: 3.2 MMOL/L (ref 3.5–5.2)
POTASSIUM SERPL-SCNC: 4.5 MMOL/L (ref 3.5–5.2)
PROT SERPL-MCNC: 6.2 G/DL (ref 6–8.5)
RBC # BLD AUTO: 2.63 10*6/MM3 (ref 3.77–5.28)
RBC # BLD AUTO: 2.66 10*6/MM3 (ref 3.77–5.28)
SODIUM SERPL-SCNC: 139 MMOL/L (ref 136–145)
WBC NRBC COR # BLD AUTO: 5.86 10*3/MM3 (ref 3.4–10.8)
WBC NRBC COR # BLD AUTO: 7.93 10*3/MM3 (ref 3.4–10.8)

## 2025-06-07 PROCEDURE — 97110 THERAPEUTIC EXERCISES: CPT

## 2025-06-07 PROCEDURE — 25010000002: Performed by: PSYCHIATRY & NEUROLOGY

## 2025-06-07 PROCEDURE — 85027 COMPLETE CBC AUTOMATED: CPT | Performed by: CLINICAL NURSE SPECIALIST

## 2025-06-07 PROCEDURE — 25010000002 IMMUNE GLOBULIN (HUMAN) 5 GM/50ML SOLUTION 50 ML VIAL: Performed by: PSYCHIATRY & NEUROLOGY

## 2025-06-07 PROCEDURE — 63710000001 PREDNISONE PER 5 MG: Performed by: PSYCHIATRY & NEUROLOGY

## 2025-06-07 PROCEDURE — 84132 ASSAY OF SERUM POTASSIUM: CPT | Performed by: INTERNAL MEDICINE

## 2025-06-07 PROCEDURE — 25010000002 DIPHENHYDRAMINE PER 50 MG: Performed by: PSYCHIATRY & NEUROLOGY

## 2025-06-07 PROCEDURE — 25010000002 ENOXAPARIN PER 10 MG: Performed by: INTERNAL MEDICINE

## 2025-06-07 PROCEDURE — 99233 SBSQ HOSP IP/OBS HIGH 50: CPT | Performed by: CLINICAL NURSE SPECIALIST

## 2025-06-07 PROCEDURE — 97530 THERAPEUTIC ACTIVITIES: CPT

## 2025-06-07 PROCEDURE — 85025 COMPLETE CBC W/AUTO DIFF WBC: CPT | Performed by: INTERNAL MEDICINE

## 2025-06-07 PROCEDURE — 80053 COMPREHEN METABOLIC PANEL: CPT | Performed by: CLINICAL NURSE SPECIALIST

## 2025-06-07 PROCEDURE — 83735 ASSAY OF MAGNESIUM: CPT | Performed by: INTERNAL MEDICINE

## 2025-06-07 RX ORDER — POTASSIUM CHLORIDE 1500 MG/1
40 TABLET, EXTENDED RELEASE ORAL 2 TIMES DAILY WITH MEALS
Status: DISCONTINUED | OUTPATIENT
Start: 2025-06-07 | End: 2025-06-07

## 2025-06-07 RX ORDER — POTASSIUM CHLORIDE 1.5 G/1.58G
40 POWDER, FOR SOLUTION ORAL EVERY 4 HOURS
Status: COMPLETED | OUTPATIENT
Start: 2025-06-07 | End: 2025-06-07

## 2025-06-07 RX ORDER — POTASSIUM CHLORIDE 1500 MG/1
40 TABLET, EXTENDED RELEASE ORAL DAILY
Status: DISCONTINUED | OUTPATIENT
Start: 2025-06-07 | End: 2025-06-11 | Stop reason: HOSPADM

## 2025-06-07 RX ADMIN — IMMUNE GLOBULIN (HUMAN) 25 G: 10 INJECTION INTRAVENOUS; SUBCUTANEOUS at 12:37

## 2025-06-07 RX ADMIN — FOLIC ACID 1 MG: 1 TABLET ORAL at 09:08

## 2025-06-07 RX ADMIN — ARIPIPRAZOLE 10 MG: 5 TABLET ORAL at 09:08

## 2025-06-07 RX ADMIN — LEVOTHYROXINE SODIUM 150 MCG: 75 TABLET ORAL at 05:19

## 2025-06-07 RX ADMIN — DOCUSATE SODIUM 50 MG AND SENNOSIDES 8.6 MG 2 TABLET: 8.6; 5 TABLET, FILM COATED ORAL at 09:08

## 2025-06-07 RX ADMIN — PREDNISONE 10 MG: 10 TABLET ORAL at 09:08

## 2025-06-07 RX ADMIN — THIAMINE HCL TAB 100 MG 500 MG: 100 TAB at 09:09

## 2025-06-07 RX ADMIN — DIPHENHYDRAMINE HYDROCHLORIDE 25 MG: 50 INJECTION, SOLUTION INTRAMUSCULAR; INTRAVENOUS at 09:08

## 2025-06-07 RX ADMIN — THIAMINE HCL TAB 100 MG 500 MG: 100 TAB at 20:58

## 2025-06-07 RX ADMIN — POTASSIUM CHLORIDE 40 MEQ: 1.5 POWDER, FOR SOLUTION ORAL at 09:08

## 2025-06-07 RX ADMIN — POLYETHYLENE GLYCOL 3350 17 G: 17 POWDER, FOR SOLUTION ORAL at 09:08

## 2025-06-07 RX ADMIN — PANTOPRAZOLE SODIUM 40 MG: 40 TABLET, DELAYED RELEASE ORAL at 05:19

## 2025-06-07 RX ADMIN — CARVEDILOL 6.25 MG: 6.25 TABLET, FILM COATED ORAL at 09:09

## 2025-06-07 RX ADMIN — CARVEDILOL 6.25 MG: 6.25 TABLET, FILM COATED ORAL at 17:25

## 2025-06-07 RX ADMIN — ENOXAPARIN SODIUM 40 MG: 100 INJECTION SUBCUTANEOUS at 20:58

## 2025-06-07 RX ADMIN — POTASSIUM CHLORIDE 40 MEQ: 1.5 POWDER, FOR SOLUTION ORAL at 12:36

## 2025-06-07 RX ADMIN — THIAMINE HCL TAB 100 MG 500 MG: 100 TAB at 16:00

## 2025-06-07 NOTE — PLAN OF CARE
Patient continues to have confused episodes and times where she is making sense.  Patient got up with PT and walked in her room. BM regimen was added but no luck so far of an BM. Potassium was replaced now up to 4.5. Patients Hgb was on the lower side 7.7 but is now up to 8. We will continues to watch Potassium and Hgb. If patient does have an BM an stool sample needs to be obtained. Patient was straight cath X2 this shift. Patient does continues to have post residual retention.     Problem: Adult Inpatient Plan of Care  Goal: Plan of Care Review  Outcome: Progressing  Goal: Patient-Specific Goal (Individualized)  Outcome: Progressing  Goal: Absence of Hospital-Acquired Illness or Injury  Outcome: Progressing  Intervention: Identify and Manage Fall Risk  Recent Flowsheet Documentation  Taken 6/7/2025 1704 by Evelyn Murray RN  Safety Promotion/Fall Prevention: safety round/check completed  Taken 6/7/2025 1604 by Evelyn Murray RN  Safety Promotion/Fall Prevention: safety round/check completed  Taken 6/7/2025 1504 by Evelyn Murray RN  Safety Promotion/Fall Prevention: safety round/check completed  Taken 6/7/2025 1404 by Evelyn Murray RN  Safety Promotion/Fall Prevention: safety round/check completed  Taken 6/7/2025 1304 by Evelyn Murray RN  Safety Promotion/Fall Prevention: safety round/check completed  Taken 6/7/2025 1204 by Evelyn Murray RN  Safety Promotion/Fall Prevention: safety round/check completed  Taken 6/7/2025 1104 by Evelyn Murray RN  Safety Promotion/Fall Prevention: safety round/check completed  Taken 6/7/2025 1004 by Evelyn Murray RN  Safety Promotion/Fall Prevention: safety round/check completed  Taken 6/7/2025 0904 by Evelyn Murray RN  Safety Promotion/Fall Prevention: safety round/check completed  Taken 6/7/2025 0805 by Evelyn Murray RN  Safety Promotion/Fall Prevention: safety round/check completed  Intervention: Prevent Skin Injury  Recent Flowsheet  Documentation  Taken 6/7/2025 1704 by Evelyn Murray RN  Body Position:   position changed independently   side-lying   right  Taken 6/7/2025 1504 by Evelyn Murray RN  Body Position: supine  Taken 6/7/2025 1304 by Evelyn Murray RN  Body Position:   position changed independently   side-lying   left  Taken 6/7/2025 1104 by Evelyn Murray RN  Body Position:   position changed independently   side-lying   right  Taken 6/7/2025 0904 by Evelyn Murray RN  Body Position: supine  Taken 6/7/2025 0805 by Evelyn Murray RN  Skin Protection:   incontinence pads utilized   silicone foam dressing in place  Taken 6/7/2025 0704 by Evelyn Murray RN  Body Position:   position changed independently   side-lying   right  Intervention: Prevent and Manage VTE (Venous Thromboembolism) Risk  Recent Flowsheet Documentation  Taken 6/7/2025 0805 by Evelyn Murray RN  VTE Prevention/Management: (see mar) other (see comments)  Goal: Optimal Comfort and Wellbeing  Outcome: Progressing  Intervention: Provide Person-Centered Care  Recent Flowsheet Documentation  Taken 6/7/2025 0805 by Evelyn Murray RN  Trust Relationship/Rapport:   choices provided   care explained  Goal: Readiness for Transition of Care  Outcome: Progressing     Problem: Skin Injury Risk Increased  Goal: Skin Health and Integrity  Outcome: Progressing  Intervention: Optimize Skin Protection  Recent Flowsheet Documentation  Taken 6/7/2025 1704 by Evelyn Murray RN  Activity Management: activity encouraged  Head of Bed (HOB) Positioning: HOB at 20-30 degrees  Taken 6/7/2025 1504 by Evelyn Murray RN  Activity Management: activity encouraged  Head of Bed (HOB) Positioning: HOB at 30-45 degrees  Taken 6/7/2025 1304 by Evelyn Murray RN  Head of Bed (HOB) Positioning: HOB at 30-45 degrees  Taken 6/7/2025 1104 by Evelyn Murray RN  Activity Management: activity encouraged  Head of Bed (HOB) Positioning: HOB at 30 degrees  Taken  6/7/2025 0904 by Evelyn Murray RN  Activity Management: activity encouraged  Head of Bed (HOB) Positioning: HOB at 45 degrees  Taken 6/7/2025 0805 by Evelyn Murray RN  Pressure Reduction Techniques:   weight shift assistance provided   heels elevated off bed  Pressure Reduction Devices: pressure-redistributing mattress utilized  Skin Protection:   incontinence pads utilized   silicone foam dressing in place  Taken 6/7/2025 0704 by Evelyn Murray RN  Activity Management: activity encouraged  Head of Bed (HOB) Positioning: HOB at 30 degrees     Problem: Comorbidity Management  Goal: Maintenance of Behavioral Health Symptom Control  Outcome: Progressing  Goal: Blood Pressure in Desired Range  Outcome: Progressing  Goal: Maintenance of Seizure Control  Outcome: Progressing     Problem: Fall Injury Risk  Goal: Absence of Fall and Fall-Related Injury  Outcome: Progressing  Intervention: Promote Injury-Free Environment  Recent Flowsheet Documentation  Taken 6/7/2025 1704 by Evelyn Murray RN  Safety Promotion/Fall Prevention: safety round/check completed  Taken 6/7/2025 1604 by Evelyn Murray RN  Safety Promotion/Fall Prevention: safety round/check completed  Taken 6/7/2025 1504 by Evelyn Murray RN  Safety Promotion/Fall Prevention: safety round/check completed  Taken 6/7/2025 1404 by Evelyn Murray RN  Safety Promotion/Fall Prevention: safety round/check completed  Taken 6/7/2025 1304 by Evelyn Murray RN  Safety Promotion/Fall Prevention: safety round/check completed  Taken 6/7/2025 1204 by Evelyn Murray RN  Safety Promotion/Fall Prevention: safety round/check completed  Taken 6/7/2025 1104 by Evelyn Murray RN  Safety Promotion/Fall Prevention: safety round/check completed  Taken 6/7/2025 1004 by Evelyn Murray RN  Safety Promotion/Fall Prevention: safety round/check completed  Taken 6/7/2025 0904 by Evelyn Murray RN  Safety Promotion/Fall Prevention: safety  round/check completed  Taken 6/7/2025 0805 by Evelyn Murray, RN  Safety Promotion/Fall Prevention: safety round/check completed     Problem: Malnutrition  Goal: Improved Nutritional Intake  Outcome: Progressing     Problem: Confusion Acute  Goal: Optimal Cognitive Function  Outcome: Progressing   Goal Outcome Evaluation:

## 2025-06-07 NOTE — PROGRESS NOTES
[facility]  INPATIENT PROGRESS NOTE    Patient Name: Lynn Magana  Date of Admission: 5/29/2025  Today's Date: 06/07/25  Length of Stay: 9  Primary Care Physician: Darryl Andrews DO    Subjective   Chief Complaint: Lightheadedness.  Current    Weakness - Generalized     49-year-old female with history of hypothyroidism, liver steatosis, cannabis use, malnutrition, diabetes mellitus type 2, hypertension, admitted May 11 through May 14, 2025; at that time she was admitted due to poor oral intake and weight loss.  She had an upper endoscopy performed 5/13/2025 with findings of a small hiatal hernia and no other abnormalities reported.  Per reports reviewed the patient was not taking levothyroxine at the time, and her TSH was markedly elevated.  She was restarted on levothyroxine.  Her blood glucose was low, and did not require insulin management.  Drug screening urine was positive for cannabis, and considered to be secondary to cannabinoid hyperemesis syndrome.  The patient left the hospital AGAINST MEDICAL ADVICE, apparently she eloped; after being called, she stated was already home and did not plan to return to the hospital.  On 5/16/2025, the patient returned to the hospital reporting a syncopal event.  There was questionable seizure-like activity.   A CT brain performed on that evaluation showed moderate further increase in size of the lateral and third ventricle since the previous study. The fourth ventricle reported as normal and unchanged. A small obstructing lesion at the level of aqueduct of Sylvius not excluded. Further evaluation with contrast enhanced MR imaging of the brain was suggested. She again left the ER AGAINST MEDICAL ADVICE.    Patient came to hospital 5/30/25 reporting dizziness, lightheadedness, worsening when standing up, abnormal gait with unsteadiness;  reported confusion.  Symptom has been going on for approximately  1-2 months.  Blood pressure has been variable, initially elevated.      She has been nonambulatory for several weeks.  She reported weakness of the lower extremities and right upper extremity.  She has had urinary retention requiring straight catheterization during this admission.  Lumbar puncture performed 6/1/25, with opening pressure of 17, and fluid sent for analysis.    Nerve conduction studies performed yesterday.  Being evaluated for Joy Quinonez variant.  Today found with , trying to urinate in the bedside commode.  Weakness persists.      Today  Patient has no new complaint, but still somewhat confused and hallucinating] patient's daughter at bedside this morning confirmed that her hallucination/confusion is not new].      Review of Systems   All pertinent negatives and positives are as above. All other systems have been reviewed and are negative unless otherwise stated.     Objective    Temp:  [97.6 °F (36.4 °C)-98 °F (36.7 °C)] 97.8 °F (36.6 °C)  Heart Rate:  [85-97] 85  Resp:  [16-18] 16  BP: (111-136)/(72-95) 114/72  Physical Exam  Constitutional:       Appearance: Alert, oriented to person.  No respiratory distress.  HENT:      Head: Normocephalic and atraumatic.      Nose: Nose normal.      Mouth/Throat:      Mouth: Mucous membranes are moist.   Neck, old transverse scar  Eyes:      Conjunctiva/sclera: Conjunctivae normal.      Pupils: Pupils are equal, round  Cardiovascular:      Rate and Rhythm: Normal rate and regular rhythm.      Pulses: Normal pulses.   Pulmonary:      Effort: No respiratory distress.      Breath sounds: Normal breath sounds. No wheezing, rhonchi or rales.   Abdominal:      General: Abdomen is flat. Bowel sounds are normal.      Palpations: Abdomen is soft.      Tenderness: There is no guarding or rebound.   Extremities:  No lower extremity edema.  Skin:     Capillary Refill: Capillary refill takes less than 2 seconds.      Coloration: Skin is not jaundiced.       "Findings: No rash.   Neurological:   Weakness of neck extension.  Abnormal ocular movements.  Flaccid 3+ out of 5 lower extremity bilateral weakness  Drowsy. Slow speech.  Follows commands.          Results Review:  I have reviewed the labs, radiology results, and diagnostic studies.    Laboratory Data:   Results from last 7 days   Lab Units 06/07/25  0259 06/04/25  0532   WBC 10*3/mm3 7.93 5.87   HEMOGLOBIN g/dL 7.7* 9.3*   HEMATOCRIT % 23.5* 28.4*   PLATELETS 10*3/mm3 195 196        Results from last 7 days   Lab Units 06/07/25  0300 06/03/25  1013 06/02/25  0416   SODIUM mmol/L 139 146* 146*   POTASSIUM mmol/L 3.2* 3.8 3.3*   CHLORIDE mmol/L 109* 114* 114*   CO2 mmol/L 22.0 23.0 21.0*   BUN mg/dL 13.9 18.8 27.0*   CREATININE mg/dL 0.82 0.89 0.82   CALCIUM mg/dL 8.4* 9.4 9.2   BILIRUBIN mg/dL 0.6  --   --    ALK PHOS U/L 32*  --   --    ALT (SGPT) U/L 58*  --   --    AST (SGOT) U/L 24  --   --    GLUCOSE mg/dL 131* 146* 119*       Culture Data:   No results found for: \"BLOODCX\", \"URINECX\", \"WOUNDCX\", \"MRSACX\", \"RESPCX\", \"STOOLCX\"    Radiology Data:   Imaging Results (Last 24 Hours)       ** No results found for the last 24 hours. **            I have reviewed the patient's current medications.     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  EEG showed-  Diffuse cerebral dysfunction of moderate degree, nonspecific.  This is most commonly seen due to toxic/metabolic or " hypoxemic/ischemic cause. No evidence for epilepsy is seen  Pending Antibodies GQ1b    Nerve conduction studies review per specialist [ Nerve conduction studies did reveal slowed velocities but not in the demyelinating range.]  Neurology recommended a 5-day treatment with IVIG [today is day 4 of of 5]    -Acute anemia  Patient's hemoglobin was normal on presentation, hemoglobin today is 7.7.  There is no obvious source of blood loss.  We will get fecal occult blood test and bilirubin levels tomorrow to rule out hemolysis [from autoimmune mediation].  Discussed with patient, if hemoglobin goes less than 7, we will transfuse with PRBC.    -Hypothyroidism  Variable thyroid function tests on prior admissions and currently. Continue levothyroxine 150 mcg p.o. daily. I have recommended endocrinology outpatient follow up, and this was explained to family.    -Hypertension  Due to variable blood pressure values, discontinued amlodipine lisinoprill, aldactone; On Carvedilol 6.25 mg p.o. twice daily.     -Acquired cerebral ventriculomegaly   Opening pressure on lumbar puncture was unimpressive as by neurologist    Continue PT/OT  Diet as per speech therapist recommendations.    DVT prophylaxis-we will start patient on Lovenox    Disposition-I recommend SNF/rehab after inpatient treatment. Family and patient to decide.      Electronically signed by Yovanny Abernathy MD, 06/07/25, 17:06 CDT.

## 2025-06-07 NOTE — PLAN OF CARE
Goal Outcome Evaluation:  Plan of Care Reviewed With: patient        Progress: no change  Outcome Evaluation: [facility] to self[facility] and is aware she is sick. Hallucinations still present, pt states there is a baby in the bed with her. VSS on RA. No c/o of pain. Patient tolerated medication better crushed in apple sauce. Patient can turn self in the bed and move to the edge, bed kept on the second sensitivity. Pt rested better this shift. Spouse at bedside more this lessed patient's agitation. Frequent rounding increased, safety precautions maintained, call light within reach at all times.

## 2025-06-07 NOTE — PROGRESS NOTES
Neurology Progress Note      Chief Complaint:  AMS  Length of Stay:  9   Subjective     Subjective:  6/7/2025: Lying in bed. Daughter at bedside. Patient very alert this AM. Nursing reports visual hallucinations last PM and during day time as well. She appears much stronger today. H/H 7.7/23.5 down form 9.3/28.4 on 6/4. Discussed with nursing. Patient has not had BM and no signs of bleeding. Will check STOB.  On exam today, patient able to abduct right eye past midline. Today #4/5 IVIG.  Patient seen along with Dr. Patrick, tele neurology.     6/6/2025: patient sitting up in bed. 17 y.o. daughter at bedside. Patient looks brighter today. Nursing reports patient had visual hallucinations last PM. Daughter reports she is having confusion and has been stating she is at [employer] but she is currently oriented to person and place. Today #3/5 IV IG.   Patient seen with tele neurology Dr. LAUREN Patrick.    6/5/2025: lying in bed.  at bedside. Tolerated IV IG yesterday. Today is #2/5. Patient is more alert and interactive. Still with dysconjugate gaze. She appears to have more strength in extremities and  thinks this as well. Improved effort on exam. Orthostatic BP positive as below.         6/4/2025:She is somewhat more awake today.  Her  is at the bedside today we speak to him.  She continues to have global weakness and decreasing cognition.  She has shown some mild improvement since admission.  We discussed the risk and benefits of IVIG therapy and she expresses understanding.  She would like to proceed with this.  After I left the room the patient did get up and ambulate with assistance by physical therapy to the bedside commode.  After being there briefly she slumped over and had some disconjugate gaze.  There may have been greater right than left sided weakness.  She was returned to the bed and recovered immediately.    Medications:  Current Facility-Administered Medications   Medication Dose  Route Frequency Provider Last Rate Last Admin    ARIPiprazole (ABILIFY) tablet 10 mg  10 mg Oral Daily Bautista Limon MD   10 mg at 06/07/25 0908    sennosides-docusate (PERICOLACE) 8.6-50 MG per tablet 2 tablet  2 tablet Oral BID PRN Bautista Limon MD   2 tablet at 06/07/25 0908    And    polyethylene glycol (MIRALAX) packet 17 g  17 g Oral Daily PRN Bautista Limon MD   17 g at 06/07/25 0908    And    bisacodyl (DULCOLAX) EC tablet 5 mg  5 mg Oral Daily PRN Bautista Limon MD        And    bisacodyl (DULCOLAX) suppository 10 mg  10 mg Rectal Daily PRN Bautista Limon MD        Calcium Replacement - Follow Nurse / BPA Driven Protocol   Not Applicable PRN Bautista Limon MD        carvedilol (COREG) tablet 6.25 mg  6.25 mg Oral BID With Meals Bautista Limon MD   6.25 mg at 06/07/25 0909    diphenhydrAMINE (BENADRYL) injection 25 mg  25 mg Intravenous Daily Ori Banuelos MD   25 mg at 06/07/25 0908    enoxaparin sodium (LOVENOX) syringe 40 mg  40 mg Subcutaneous Nightly Yovanny Abernathy MD   40 mg at 06/06/25 1953    folic acid (FOLVITE) tablet 1 mg  1 mg Oral Daily Yovanny Abernathy MD   1 mg at 06/07/25 0908    hydrALAZINE (APRESOLINE) injection 10 mg  10 mg Intravenous Q6H PRN Alireza Lam MD   10 mg at 06/05/25 0048    immune globulin (human) 25 g  25 g Intravenous Daily Ori Banuelos MD   25 g at 06/06/25 1325    levothyroxine (SYNTHROID, LEVOTHROID) tablet 150 mcg  150 mcg Oral Q AM Bautista Limon MD   150 mcg at 06/07/25 0519    Magnesium Low Dose Replacement - Follow Nurse / BPA Driven Protocol   Not Applicable PRN Bautista Limon MD        melatonin tablet 10 mg  10 mg Oral Nightly PRN Bautista Limon MD   10 mg at 06/06/25 1953    ondansetron ODT (ZOFRAN-ODT) disintegrating tablet 4 mg  4 mg Oral Q6H PRN Bautista Limon MD        Or    ondansetron (ZOFRAN) injection 4 mg  4 mg Intravenous Q6H PRN Bautista Limon MD   4 mg at 05/30/25 3182     pantoprazole (PROTONIX) EC tablet 40 mg  40 mg Oral Q AM Bautista Limon MD   40 mg at 06/07/25 0519    Phosphorus Replacement - Follow Nurse / BPA Driven Protocol   Not Applicable PRN Bautista Limon MD        potassium chloride (KLOR-CON) packet 40 mEq  40 mEq Oral Q4H Yovanny Abernathy MD   40 mEq at 06/07/25 0908    Potassium Replacement - Follow Nurse / BPA Driven Protocol   Not Applicable PRN Bautista Limon MD        predniSONE (DELTASONE) tablet 10 mg  10 mg Oral Daily Ori Banuelos MD   10 mg at 06/07/25 0908    sodium chloride 0.9 % flush 10 mL  10 mL Intravenous PRN Safia Wood APRN        thiamine (VITAMIN B-1) tablet 500 mg  500 mg Oral TID Yovanny Abernathy MD   500 mg at 06/07/25 0909             Objective      Vital Signs  Temp:  [97.6 °F (36.4 °C)-98 °F (36.7 °C)] 98 °F (36.7 °C)  Heart Rate:  [89-98] 94  Resp:  [16-18] 16  BP: (111-142)/(72-95) 111/72    Physical Exam:    HEENT:  Neck is supple  CVS:  RRR  Lungs:  CTA - B/L  Abd:  NT/ND  Ext:  No edema  Skin:  No rashes    Pertinent Neuro Exam:  Awake. More alert and brighter today.   Slowed cognition.  States name, at that she is in [place]. Able to state age with cues.   Knows her daughter  Can follow commands   Pupils equal.  Able to count fingers today.  Disconjugate gaze chronically and now able to abduct right eye past midline.  No unilateral facial droop  Antigravity strength in all 4 extremities  No signs of tremors or increased tone  Mild ataxia noted bilaterally but there is some functional components of weakness noted as well.    Last nurse assessment:  Interval: baseline  1a. Level of Consciousness: 0-->Alert, keenly responsive  1b. LOC Questions: 0-->Answers both questions correctly  1c. LOC Commands: 0-->Performs both tasks correctly  2. Best Gaze: 0-->Normal  3. Visual: 0-->No visual loss  4. Facial Palsy: 0-->Normal symmetrical movements  5a. Motor Arm, Left: 0-->No drift, limb holds 90 (or 45)  degrees for full 10 secs  5b. Motor Arm, Right: 0-->No drift, limb holds 90 (or 45) degrees for full 10 secs  6a. Motor Leg, Left: 1-->Drift, leg falls by the end of the 5-sec period but does not hit bed  6b. Motor Leg, Right: 1-->Drift, leg falls by the end of the 5-sec period but does not hit bed  7. Limb Ataxia: 0-->Absent  8. Sensory: 0-->Normal, no sensory loss  9. Best Language: 0-->No aphasia, normal  10. Dysarthria: 0-->Normal  11. Extinction and Inattention (formerly Neglect): 0-->No abnormality    Total (NIH Stroke Scale): 2       Results Review:      Labs:  Labs reviewed as below  LAB RESULTS:      Lab 06/07/25  0259 06/04/25  0532   WBC 7.93 5.87   HEMOGLOBIN 7.7* 9.3*   HEMATOCRIT 23.5* 28.4*   PLATELETS 195 196   NEUTROS ABS 5.05  --    IMMATURE GRANS (ABS) 0.07*  --    LYMPHS ABS 2.31  --    MONOS ABS 0.48  --    EOS ABS 0.01  --    MCV 88.3 87.4         Lab 06/07/25  0300 06/04/25  0532 06/03/25  1013 06/02/25  0416 06/01/25  1100   SODIUM 139  --  146* 146*  --    POTASSIUM 3.2*  --  3.8 3.3*  --    CHLORIDE 109*  --  114* 114*  --    CO2 22.0  --  23.0 21.0*  --    ANION GAP 8.0  --  9.0 11.0  --    BUN 13.9  --  18.8 27.0*  --    CREATININE 0.82  --  0.89 0.82  --    EGFR 87.3  --  79.1 87.3  --    GLUCOSE 131*  --  146* 119*  --    CALCIUM 8.4*  --  9.4 9.2  --    MAGNESIUM 1.6  --  2.0  --   --    HEMOGLOBIN A1C  --  4.90  --   --   --    TSH  --   --   --   --  0.027*         Lab 06/07/25  0300   TOTAL PROTEIN 6.2   ALBUMIN 2.6*   GLOBULIN 3.6   ALT (SGPT) 58*   AST (SGOT) 24   BILIRUBIN 0.6   ALK PHOS 32*                     Lab 06/02/25  0416   FOLATE 2.78*   VITAMIN B 12 >2,000*         Brief Urine Lab Results  (Last result in the past 365 days)        Color   Clarity   Blood   Leuk Est   Nitrite   Protein   CREAT   Urine HCG        06/01/25 0000 Orange   Clear   Negative   Moderate (2+)   Positive   30 mg/dL (1+)                 Microbiology Results (last 10 days)       Procedure Component  Value - Date/Time    Culture, CSF - Cerebrospinal Fluid, Lumbar Puncture [[id number]] Collected: 25 0920    Lab Status: Final result Specimen: Cerebrospinal Fluid from Lumbar Puncture Updated: 25 0628     CSF Culture No growth at 3 days     Gram Stain No WBCs or organisms seen    Blood Culture - Blood, Arm, Left [[id number]]  (Normal) Collected: 25 1941    Lab Status: Final result Specimen: Blood from Arm, Left Updated: 25 2000     Blood Culture No growth at 5 days    Blood Culture - Blood, Arm, Left [[id number]]  (Normal) Collected: 25 1547    Lab Status: Final result Specimen: Blood from Arm, Left Updated: 25 1600     Blood Culture No growth at 5 days        Other labs:  RPR normal  HIV negative  Urine analysis positive  TSH low at 0.027  B12 elevated next line folate low at 2.78  Ammonia 36  Ethanol level negligible  2025 LP:  CSF:  W:  0  R:  0  P:  55.7        Imaging:  MRI of the brain shows ventriculomegaly but not consistently out of proportion compared to the overall degree of atrophy.  MRI of the cervical and lumbar spine couple  CT of the abdomen shows some nonspecific findings possibly consistent with enteritis but no signs of a primary malignancy.  CT of pelvis done on  showed urinary bladder wall thickening but no signs of an occult malignancy.  EEG shows nonspecific slowing  Nerve conduction velocities performed on 6/3 show velocities in the 40 m/s range.  This is slow although not necessarily in the demyelinating range.    Assessment/Plan     Hospital Problem List      Acquired cerebral ventriculomegaly    Generalized weakness    Impression:  Progressive neurologic decline for several months with global weakness, opthalmoplegia, and ataxia.  Considered Joy Quinonez Variant  Nerve conduction studies did reveal slowed velocities but not in the demyelinating range.  I still believe that the patient may have a form of Joy Quinonez and it may be reasonable  to try IVIG.  We discussed the risk and benefits of this and her and her  would like to proceed with this.  Initiated 5 days of IVIG on 6/4/2025 with fifth dose on 6/8/2025 therapy to see if she improves from this.  Atypical spell - functional component?  Poor effort on exam  Encephalopathy with visual hallucinations.  anemia  Folate deficiency  Ventriculomegally - opening pressure unimpressive  HTN  Hx of cannabis usage  Orthostatic hypotension.  Unintentional weight loss for several months.       Plan:  IVIG 400 mg/kg daily  initiated on (6/4) with a plan to complete 5 days (last dose on 6/8/2025)  of therapy.  Will premedicate each day with Benadryl and Prednisone 10 mg.(Tylenol was initially ordered but there is questionable allergy)  Repeat CBC at 1600 today. Defer treatment to primary team.  Check stool OB  MMA level  Replace Folic acid: started 6/2  GQ1B antibiodies -send out lab.  Results pending.  Dietician consult.      Medical Decision Making    Number/Complexity of Problems  Moderate  1 undiagnosed new problem with uncertain prognosis -   1 acute illness with systemic symptoms -   High  1 acute or chronic illness that poses a threat to life/body function -   High     MDM Data  Moderate - 1/3 categories  Extensive - 2/3 categories    Category 1: 3 of the following  Review of external notes  Review of results  Ordering of each unique test  Independent historian  Category 2:  Independent interpretation of test (ex: imaging)  Category 3:  Discussion of management with another provider    Extensive     Treatment Plan  Moderate - Prescription Drug management  High  Drug therapy requiring intensive monitoring for toxicity  Decision regarding hospitalization or escalation of care  De-escalate care/DNR decisions         Abi Frausto, BRIANA  06/07/25  09:22 CDT

## 2025-06-07 NOTE — THERAPY TREATMENT NOTE
Acute Care - Physical Therapy Treatment Note  [facility]     Patient Name: Lynn Magana  : 1975  MRN: [id number]  Today's Date: 2025   Onset of Illness/Injury or Date of Surgery: 25  Visit Dx:     ICD-10-CM ICD-9-CM   1. Generalized weakness  R53.1 780.79   2. Nausea and vomiting, unspecified vomiting type  R11.2 787.01   3. Marijuana user  F12.90 305.20   4. Low TSH level  R79.89 794.5   5. Urinary tract infection without hematuria, site unspecified  N39.0 599.0   6. Dysphagia, unspecified type  R13.10 787.20   7. Impaired functional mobility and activity tolerance [Z74.09]  Z74.09 V49.89     Patient Active Problem List   Diagnosis    Syncope    Graves' disease    Polysubstance abuse    Hypertension    Diabetes    Spells of decreased attentiveness    Chronic intractable headache    Nausea and vomiting    Slow transit constipation    Nonsmoker    Type 2 diabetes mellitus, with long-term current use of insulin    GERD without esophagitis    Hyperthyroidism    Thyromegaly    Post-surgical hypothyroidism    Degeneration of cervical intervertebral disc    Cervical radiculopathy    Acute pain of right shoulder    Class 1 obesity due to excess calories with body mass index (BMI) of 30.0 to 30.9 in adult    Hypoglycemia    Stage 1 acute kidney injury    Dehydration    Dysphagia    Dehydration    Hypokalemia    Steatosis of liver    Marijuana abuse    Loss of weight    Severe protein-calorie malnutrition    Hypothyroid    Generalized weakness    Acquired cerebral ventriculomegaly     Past Medical History:   Diagnosis Date    Arthritis     Carotid artery stenosis     Degenerative disc disease, cervical     Depression     Diabetes mellitus     type 1    Disease of thyroid gland     GERD (gastroesophageal reflux disease)     Graves disease     Heart palpitations     Hyperlipidemia     Hypertension     Hypothyroidism     Seizure     Thyromegaly      Past Surgical History:   Procedure Laterality Date      SECTION      CHOLECYSTECTOMY      COLONOSCOPY  2015    Normal exam Dr. Morgan     COLONOSCOPY  2015    Normal exam    CYST REMOVAL      ENDOSCOPY N/A 2018    Normal exam    ENDOSCOPY N/A 2025    Procedure: ESOPHAGOGASTRODUODENOSCOPY WITH ANESTHESIA;  Surgeon: Jadon Smith MD;  Location: [facility] ENDOSCOPY;  Service: Gastroenterology;  Laterality: N/A;  pre op: Nausea and vomiting  post op: normal  pcp: Darryl Andrews,     HYSTERECTOMY      THYROIDECTOMY Bilateral 2018    Procedure: total thyroidectomy;  Surgeon: Vitaly Givens MD;  Location: [facility] OR;  Service: ENT     PT Assessment (Last 12 Hours)       PT Evaluation and Treatment       Row Name 25 1534          Physical Therapy Time and Intention    Subjective Information no complaints  -KJ     Document Type therapy note (daily note)  -KJ     Mode of Treatment physical therapy  -KJ     Patient Effort good  -KJ       Row Name 25 1534          General Information    Existing Precautions/Restrictions fall  left side weakness  -KJ     Limitations/Impairments safety/cognitive  -KJ       Row Name 25 1534          Pain    Pretreatment Pain Rating 0/10 - no pain  -KJ     Posttreatment Pain Rating 0/10 - no pain  -KJ       Row Name 25 1534          Bed Mobility    Supine-Sit [place] (Bed Mobility) supervision  -KJ     Sit-Supine [place] (Bed Mobility) supervision  -KJ       Row Name 25 1534          Sit-Stand Transfer    Sit-Stand [place] (Transfers) minimum assist (75% patient effort)  -KJ     Assistive Device (Sit-Stand Transfers) walker, front-wheeled  -KJ       Row Name 25 1534          Stand-Sit Transfer    Stand-Sit [place] (Transfers) verbal cues;contact guard  -KJ     Assistive Device (Stand-Sit Transfers) walker, front-wheeled  -KJ       Row Name 25 1534          Gait/Stairs (Locomotion)    [place] Level (Gait) minimum assist (75% patient  effort)  -KJ     Assistive Device (Gait) walker, front-wheeled  -KJ     Distance in Feet (Gait) 5  -KJ     Comment, (Gait/Stairs) BLE shaky from weakness  -KJ       Row Name 06/07/25 1534          Motor Skills    Comments, Therapeutic Exercise BLE AROM x 20  -KJ       Row Name             Wound 05/29/25 1930 Left lower leg    Wound - Properties Group Placement Date: 05/29/25  -AR Placement Time: 1930  -AR Present on Original Admission: Y  -AR Side: Left  -AR Orientation: lower  -AR Location: leg  -AR    Retired Wound - Properties Group Placement Date: 05/29/25  -AR Placement Time: 1930  -AR Present on Original Admission: Y  -AR Side: Left  -AR Orientation: lower  -AR Location: leg  -AR    Retired Wound - Properties Group Placement Date: 05/29/25  -AR Placement Time: 1930  -AR Present on Original Admission: Y  -AR Side: Left  -AR Orientation: lower  -AR Location: leg  -AR    Retired Wound - Properties Group Date first assessed: 05/29/25  -AR Time first assessed: 1930  -AR Present on Original Admission: Y  -AR Side: Left  -AR Location: leg  -AR      Row Name             Wound 05/29/25 1931 Right lower leg    Wound - Properties Group Placement Date: 05/29/25  -AR Placement Time: 1931  -AR Present on Original Admission: Y  -AR Side: Right  -AR Orientation: lower  -AR Location: leg  -AR    Retired Wound - Properties Group Placement Date: 05/29/25  -AR Placement Time: 1931  -AR Present on Original Admission: Y  -AR Side: Right  -AR Orientation: lower  -AR Location: leg  -AR    Retired Wound - Properties Group Placement Date: 05/29/25  -AR Placement Time: 1931  -AR Present on Original Admission: Y  -AR Side: Right  -AR Orientation: lower  -AR Location: leg  -AR    Retired Wound - Properties Group Date first assessed: 05/29/25  -AR Time first assessed: 1931  -AR Present on Original Admission: Y  -AR Side: Right  -AR Location: leg  -AR      Row Name 06/07/25 1534          Positioning and Restraints    Pre-Treatment Position  in bed  -KJ     Post Treatment Position bed  -KJ               User Key  (r) = Recorded By, (t) = Taken By, (c) = Cosigned By      Initials Name Provider Type    Dina Gonzalez PTA Physical Therapist Assistant    Deborah Christie, RN Registered Nurse                    Physical Therapy Education       Title: PT OT SLP Therapies (Done)       Topic: Physical Therapy (Done)       Point: Mobility training (Done)       Learning Progress Summary            Patient Acceptance, E,TB,D, VU,NR by  at 5/30/2025 1155    Comment: Education re: purpose of PT/importance of activity, safety/falls prevention, improved tech w/ bed mobility, tfers   Significant Other Acceptance, E,TB,D, VU,NR by  at 5/30/2025 1155    Comment: Education re: purpose of PT/importance of activity, safety/falls prevention, improved tech w/ bed mobility, tfers                      Point: Home exercise program (Done)       Learning Progress Summary            Patient Acceptance, E, VU by  at 6/5/2025 1944                      Point: Precautions (Done)       Learning Progress Summary            Patient Acceptance, E,TB,D, VU,NR by  at 5/30/2025 1155    Comment: Education re: purpose of PT/importance of activity, safety/falls prevention, improved tech w/ bed mobility, tfers   Significant Other Acceptance, E,TB,D, VU,NR by  at 5/30/2025 1155    Comment: Education re: purpose of PT/importance of activity, safety/falls prevention, improved tech w/ bed mobility, tfers                                      User Key       Initials Effective Dates Name Provider Type Discipline     08/02/18 -  Selin Leon, PT Physical Therapist PT     02/10/25 -  Lesia Ricketts, RN Registered Nurse Nurse                  PT Recommendation and Plan     Progress: improving  Outcome Evaluation: PT tx completed. Pt supine in bed, no complaints. Follows direction with reinforcement, increased time and cues. Bed mobility S, sit<>stand Whitney. Worked on sit<>stand  "several times working on strengthening. Steps taken to chair Whitney. BLEs weak and \"shaky\". Recommend inpatient rehab.   Outcome Measures       Row Name 06/06/25 1300 06/05/25 1100          How much help from another person do you currently need...    Turning from your back to your side while in flat bed without using bedrails? -- 3  -AH     Moving from lying on back to sitting on the side of a flat bed without bedrails? -- 2  -AH     Moving to and from a bed to a chair (including a wheelchair)? -- 2  -AH     Standing up from a chair using your arms (e.g., wheelchair, bedside chair)? -- 2  -AH     Climbing 3-5 steps with a railing? -- 1  -AH     To walk in hospital room? -- 1  -     AM-PAC 6 Clicks Score (PT) -- 11  -        How much help from another is currently needed...    Putting on and taking off regular lower body clothing? 2  -TS --     Bathing (including washing, rinsing, and drying) 2  -TS --     Toileting (which includes using toilet bed pan or urinal) 2  -TS --     Putting on and taking off regular upper body clothing 2  -TS --     Taking care of personal grooming (such as brushing teeth) 3  -TS --     Eating meals 3  -TS --     AM-PAC 6 Clicks Score (OT) 14  -TS --        Functional Assessment    Outcome Measure Options -- AM-PAC 6 Clicks Basic Mobility (PT)  -               User Key  (r) = Recorded By, (t) = Taken By, (c) = Cosigned By      Initials Name Provider Type     Magi Cordova, CARLOS MANUEL Physical Therapist Assistant     Safia Quezada COTA Occupational Therapist Assistant                     Time Calculation:    PT Charges       Row Name 06/07/25 1553             Time Calculation    Start Time 1534  -KJ      Stop Time 1557  -KJ      Time Calculation (min) 23 min  -KJ      PT Received On 06/07/25  -KJ      PT Goal Re-Cert Due Date 06/09/25  -KJ         Time Calculation- PT    Total Timed Code Minutes- PT 23 minute(s)  -KJ                User Key  (r) = Recorded By, (t) = Taken By, " (c) = Cosigned By      Initials Name Provider Type    Dina Gonzalez PTA Physical Therapist Assistant                  Therapy Charges for Today       Code Description Service Date Service Provider Modifiers Qty    25619158934 HC PT THER PROC EA 15 MIN 6/6/2025 Dina Garza PTA GP 1    52916864787 HC PT THERAPEUTIC ACT EA 15 MIN 6/6/2025 Dina Garza PTA GP 1            PT G-Codes  Outcome Measure Options: AM-PAC 6 Clicks Basic Mobility (PT)  AM-PAC 6 Clicks Score (PT): 11  AM-PAC 6 Clicks Score (OT): 14    Dina Garza PTA  6/7/2025

## 2025-06-07 NOTE — CONSULTS
Nutrition Services    Patient Name:  Lynn Magana  YOB: 1975  MRN: [id number]  Admit Date:  5/29/2025    Nutrition consult received this day for malnutrition severity assessment. Previous RD already following pt since admission. Malnutrition Severity assessment completed on 5/30 and pt qualifies for severe malnutrition in the context of chronic disease, see full note/assessment dated 5/30. Nutrition interventions already in place including liberalizing diet restrictions from heart healthy to regular to promote intake. SLP changed diet consistency to soft to chew, whole meat for ease of chewing secondary to poor dentition. Preferences noted and being observed. She is being seen daily by a dining ambassador for meal choices. Will add an oral nutrition supplement and assess for acceptance. Will start Boost Original, vanilla (she does not like chocolate flavor) with breakfast and dinner. RD will cont to follow per previous schedule.    Electronically signed by:  Shahana Torres RDN, LD  06/07/25 11:02 CDT

## 2025-06-08 LAB
ALBUMIN SERPL-MCNC: 2.6 G/DL (ref 3.5–5.2)
ALBUMIN/GLOB SERPL: 0.7 G/DL
ALP SERPL-CCNC: 32 U/L (ref 39–117)
ALT SERPL W P-5'-P-CCNC: 48 U/L (ref 1–33)
ANION GAP SERPL CALCULATED.3IONS-SCNC: 7 MMOL/L (ref 5–15)
AST SERPL-CCNC: 20 U/L (ref 1–32)
BASOPHILS # BLD AUTO: 0.01 10*3/MM3 (ref 0–0.2)
BASOPHILS NFR BLD AUTO: 0.2 % (ref 0–1.5)
BILIRUB SERPL-MCNC: 0.6 MG/DL (ref 0–1.2)
BUN SERPL-MCNC: 12.5 MG/DL (ref 6–20)
BUN/CREAT SERPL: 17.9 (ref 7–25)
CALCIUM SPEC-SCNC: 8.4 MG/DL (ref 8.6–10.5)
CHLORIDE SERPL-SCNC: 110 MMOL/L (ref 98–107)
CO2 SERPL-SCNC: 23 MMOL/L (ref 22–29)
CREAT SERPL-MCNC: 0.7 MG/DL (ref 0.57–1)
DEPRECATED RDW RBC AUTO: 43 FL (ref 37–54)
EGFRCR SERPLBLD CKD-EPI 2021: 105.5 ML/MIN/1.73
EOSINOPHIL # BLD AUTO: 0 10*3/MM3 (ref 0–0.4)
EOSINOPHIL NFR BLD AUTO: 0 % (ref 0.3–6.2)
ERYTHROCYTE [DISTWIDTH] IN BLOOD BY AUTOMATED COUNT: 13.8 % (ref 12.3–15.4)
GLOBULIN UR ELPH-MCNC: 3.7 GM/DL
GLUCOSE SERPL-MCNC: 130 MG/DL (ref 65–99)
HCT VFR BLD AUTO: 22.2 % (ref 34–46.6)
HGB BLD-MCNC: 7.5 G/DL (ref 12–15.9)
IMM GRANULOCYTES # BLD AUTO: 0.08 10*3/MM3 (ref 0–0.05)
IMM GRANULOCYTES NFR BLD AUTO: 1.4 % (ref 0–0.5)
LYMPHOCYTES # BLD AUTO: 1.6 10*3/MM3 (ref 0.7–3.1)
LYMPHOCYTES NFR BLD AUTO: 27.7 % (ref 19.6–45.3)
MAGNESIUM SERPL-MCNC: 1.6 MG/DL (ref 1.6–2.6)
MCH RBC QN AUTO: 29.4 PG (ref 26.6–33)
MCHC RBC AUTO-ENTMCNC: 33.8 G/DL (ref 31.5–35.7)
MCV RBC AUTO: 87.1 FL (ref 79–97)
METHYLMALONATE SERPL-SCNC: 166 NMOL/L (ref 0–378)
MONOCYTES # BLD AUTO: 0.35 10*3/MM3 (ref 0.1–0.9)
MONOCYTES NFR BLD AUTO: 6.1 % (ref 5–12)
NEUTROPHILS NFR BLD AUTO: 3.74 10*3/MM3 (ref 1.7–7)
NEUTROPHILS NFR BLD AUTO: 64.6 % (ref 42.7–76)
NRBC BLD AUTO-RTO: 0 /100 WBC (ref 0–0.2)
PLATELET # BLD AUTO: 173 10*3/MM3 (ref 140–450)
PMV BLD AUTO: 10.1 FL (ref 6–12)
POTASSIUM SERPL-SCNC: 3.7 MMOL/L (ref 3.5–5.2)
PROT SERPL-MCNC: 6.3 G/DL (ref 6–8.5)
RBC # BLD AUTO: 2.55 10*6/MM3 (ref 3.77–5.28)
SODIUM SERPL-SCNC: 140 MMOL/L (ref 136–145)
WBC NRBC COR # BLD AUTO: 5.78 10*3/MM3 (ref 3.4–10.8)

## 2025-06-08 PROCEDURE — 25010000002 DIPHENHYDRAMINE PER 50 MG: Performed by: PSYCHIATRY & NEUROLOGY

## 2025-06-08 PROCEDURE — 25010000002 IMMUNE GLOBULIN (HUMAN) 5 GM/50ML SOLUTION 50 ML VIAL: Performed by: PSYCHIATRY & NEUROLOGY

## 2025-06-08 PROCEDURE — 80053 COMPREHEN METABOLIC PANEL: CPT | Performed by: INTERNAL MEDICINE

## 2025-06-08 PROCEDURE — 97116 GAIT TRAINING THERAPY: CPT

## 2025-06-08 PROCEDURE — 99233 SBSQ HOSP IP/OBS HIGH 50: CPT | Performed by: CLINICAL NURSE SPECIALIST

## 2025-06-08 PROCEDURE — 85025 COMPLETE CBC W/AUTO DIFF WBC: CPT | Performed by: INTERNAL MEDICINE

## 2025-06-08 PROCEDURE — 25010000002 ENOXAPARIN PER 10 MG: Performed by: INTERNAL MEDICINE

## 2025-06-08 PROCEDURE — 25010000002: Performed by: PSYCHIATRY & NEUROLOGY

## 2025-06-08 PROCEDURE — 63710000001 PREDNISONE PER 5 MG: Performed by: PSYCHIATRY & NEUROLOGY

## 2025-06-08 PROCEDURE — 83735 ASSAY OF MAGNESIUM: CPT | Performed by: INTERNAL MEDICINE

## 2025-06-08 RX ORDER — POTASSIUM CHLORIDE 20MEQ/15ML
40 LIQUID (ML) ORAL DAILY
Status: DISCONTINUED | OUTPATIENT
Start: 2025-06-08 | End: 2025-06-11 | Stop reason: HOSPADM

## 2025-06-08 RX ADMIN — PREDNISONE 10 MG: 10 TABLET ORAL at 09:28

## 2025-06-08 RX ADMIN — DIPHENHYDRAMINE HYDROCHLORIDE 25 MG: 50 INJECTION, SOLUTION INTRAMUSCULAR; INTRAVENOUS at 09:25

## 2025-06-08 RX ADMIN — PANTOPRAZOLE SODIUM 40 MG: 40 TABLET, DELAYED RELEASE ORAL at 05:17

## 2025-06-08 RX ADMIN — CARVEDILOL 6.25 MG: 6.25 TABLET, FILM COATED ORAL at 09:16

## 2025-06-08 RX ADMIN — IMMUNE GLOBULIN (HUMAN) 25 G: 10 INJECTION INTRAVENOUS; SUBCUTANEOUS at 11:50

## 2025-06-08 RX ADMIN — ENOXAPARIN SODIUM 40 MG: 100 INJECTION SUBCUTANEOUS at 20:43

## 2025-06-08 RX ADMIN — THIAMINE HCL TAB 100 MG 500 MG: 100 TAB at 15:47

## 2025-06-08 RX ADMIN — POTASSIUM CHLORIDE 40 MEQ: 20 SOLUTION ORAL at 11:49

## 2025-06-08 RX ADMIN — ARIPIPRAZOLE 10 MG: 5 TABLET ORAL at 09:28

## 2025-06-08 RX ADMIN — CARVEDILOL 6.25 MG: 6.25 TABLET, FILM COATED ORAL at 17:00

## 2025-06-08 RX ADMIN — FOLIC ACID 1 MG: 1 TABLET ORAL at 09:28

## 2025-06-08 RX ADMIN — LEVOTHYROXINE SODIUM 150 MCG: 75 TABLET ORAL at 05:17

## 2025-06-08 RX ADMIN — THIAMINE HCL TAB 100 MG 500 MG: 100 TAB at 09:19

## 2025-06-08 RX ADMIN — THIAMINE HCL TAB 100 MG 500 MG: 100 TAB at 20:43

## 2025-06-08 NOTE — PROGRESS NOTES
Neurology Progress Note      Chief Complaint:  AMS  Length of Stay:  10   Subjective     Subjective:  6/8/2025: HGB stable 7.5 this AM. Still having episodes of confusion and visual hallucinations.  Daughter at bedside. She continues to look brighter every day. Today is #5/5 IVIG.      6/7/2025: Lying in bed. Daughter at bedside. Patient very alert this AM. Nursing reports visual hallucinations last PM and during day time as well. She appears much stronger today. H/H 7.7/23.5 down form 9.3/28.4 on 6/4. Discussed with nursing. Patient has not had BM and no signs of bleeding. Will check STOB.  On exam today, patient able to abduct right eye past midline. Today #4/5 IVIG.  Patient seen along with Dr. Patrick, tele neurology.     6/6/2025: patient sitting up in bed. 17 y.o. daughter at bedside. Patient looks brighter today. Nursing reports patient had visual hallucinations last PM. Daughter reports she is having confusion and has been stating she is at Mandaeism but she is currently oriented to person and place. Today #3/5 IV IG.   Patient seen with tele neurology Dr. LAUREN Patrick.    6/5/2025: lying in bed.  at bedside. Tolerated IV IG yesterday. Today is #2/5. Patient is more alert and interactive. Still with dysconjugate gaze. She appears to have more strength in extremities and  thinks this as well. Improved effort on exam. Orthostatic BP positive as below.         6/4/2025:She is somewhat more awake today.  Her  is at the bedside today we speak to him.  She continues to have global weakness and decreasing cognition.  She has shown some mild improvement since admission.  We discussed the risk and benefits of IVIG therapy and she expresses understanding.  She would like to proceed with this.  After I left the room the patient did get up and ambulate with assistance by physical therapy to the bedside commode.  After being there briefly she slumped over and had some disconjugate gaze.  There may  have been greater right than left sided weakness.  She was returned to the bed and recovered immediately.    Medications:  Current Facility-Administered Medications   Medication Dose Route Frequency Provider Last Rate Last Admin    ARIPiprazole (ABILIFY) tablet 10 mg  10 mg Oral Daily Bautista Limon MD   10 mg at 06/07/25 0908    sennosides-docusate (PERICOLACE) 8.6-50 MG per tablet 2 tablet  2 tablet Oral BID PRN Bautista Limon MD   2 tablet at 06/07/25 0908    And    polyethylene glycol (MIRALAX) packet 17 g  17 g Oral Daily PRN Bautista Limon MD   17 g at 06/07/25 0908    And    bisacodyl (DULCOLAX) EC tablet 5 mg  5 mg Oral Daily PRN Bautista Limon MD        And    bisacodyl (DULCOLAX) suppository 10 mg  10 mg Rectal Daily PRN Bautista Limon MD        Calcium Replacement - Follow Nurse / BPA Driven Protocol   Not Applicable PRN Bautista Limon MD        carvedilol (COREG) tablet 6.25 mg  6.25 mg Oral BID With Meals Bautista Limon MD   6.25 mg at 06/07/25 1725    diphenhydrAMINE (BENADRYL) injection 25 mg  25 mg Intravenous Daily Ori Banuelos MD   25 mg at 06/07/25 0908    enoxaparin sodium (LOVENOX) syringe 40 mg  40 mg Subcutaneous Nightly Yovanny Abernathy MD   40 mg at 06/07/25 2058    folic acid (FOLVITE) tablet 1 mg  1 mg Oral Daily Yovanny Abernathy MD   1 mg at 06/07/25 0908    hydrALAZINE (APRESOLINE) injection 10 mg  10 mg Intravenous Q6H PRN Alireza Lam MD   10 mg at 06/05/25 0048    immune globulin (human) 25 g  25 g Intravenous Daily Ori Banuelos MD   25 g at 06/07/25 1237    levothyroxine (SYNTHROID, LEVOTHROID) tablet 150 mcg  150 mcg Oral Q AM Bautista Limon MD   150 mcg at 06/08/25 0517    Magnesium Low Dose Replacement - Follow Nurse / BPA Driven Protocol   Not Applicable PRN Braxton, Bautista F, MD        melatonin tablet 10 mg  10 mg Oral Nightly PRN Bautista Limon MD   10 mg at 06/06/25 1953    ondansetron ODT (ZOFRAN-ODT)  disintegrating tablet 4 mg  4 mg Oral Q6H PRN Bautista Limon MD        Or    ondansetron (ZOFRAN) injection 4 mg  4 mg Intravenous Q6H PRN Bautista Limon MD   4 mg at 05/30/25 2139    pantoprazole (PROTONIX) EC tablet 40 mg  40 mg Oral Q AM Bautista Limon MD   40 mg at 06/08/25 0517    Phosphorus Replacement - Follow Nurse / BPA Driven Protocol   Not Applicable PRN Bautista Limon MD        potassium chloride (KLOR-CON M20) CR tablet 40 mEq  40 mEq Oral Daily Yovanny Abernathy MD        Potassium Replacement - Follow Nurse / BPA Driven Protocol   Not Applicable PRN Bautista Limon MD        predniSONE (DELTASONE) tablet 10 mg  10 mg Oral Daily Ori Banuelos MD   10 mg at 06/07/25 0908    sodium chloride 0.9 % flush 10 mL  10 mL Intravenous PRN Safia Wood, BRIANA        thiamine (VITAMIN B-1) tablet 500 mg  500 mg Oral TID Yovanny Abernathy MD   500 mg at 06/07/25 2058             Objective      Vital Signs  Temp:  [97.6 °F (36.4 °C)-98.2 °F (36.8 °C)] 97.8 °F (36.6 °C)  Heart Rate:  [83-95] 91  Resp:  [14-16] 14  BP: (106-128)/(72-93) 106/92    Physical Exam:    HEENT:  Neck is supple  CVS:  RRR  Lungs:  CTA - B/L  Abd:  NT/ND  Ext:  No edema  Skin:  No rashes    Pertinent Neuro Exam:  Awake. alert and brighter today.   Slowed cognition.  States name, at that she is in [place]. Able to state age with cues.   Knows her daughter  Can follow commands   Pupils equal.  Able to count fingers today.  Disconjugate gaze chronically and now able to abduct right eye past midline.  No unilateral facial droop  Antigravity strength in all 4 extremities  No signs of tremors or increased tone  Mild ataxia noted bilaterally  Improved strength bilateral upper and lower extremities 4/5.     Last nurse assessment:  Interval: baseline  1a. Level of Consciousness: 0-->Alert, keenly responsive  1b. LOC Questions: 0-->Answers both questions correctly  1c. LOC Commands: 0-->Performs both tasks  correctly  2. Best Gaze: 0-->Normal  3. Visual: 0-->No visual loss  4. Facial Palsy: 0-->Normal symmetrical movements  5a. Motor Arm, Left: 0-->No drift, limb holds 90 (or 45) degrees for full 10 secs  5b. Motor Arm, Right: 0-->No drift, limb holds 90 (or 45) degrees for full 10 secs  6a. Motor Leg, Left: 1-->Drift, leg falls by the end of the 5-sec period but does not hit bed  6b. Motor Leg, Right: 1-->Drift, leg falls by the end of the 5-sec period but does not hit bed  7. Limb Ataxia: 0-->Absent  8. Sensory: 0-->Normal, no sensory loss  9. Best Language: 0-->No aphasia, normal  10. Dysarthria: 0-->Normal  11. Extinction and Inattention (formerly Neglect): 0-->No abnormality    Total (NIH Stroke Scale): 2       Results Review:      Labs:  Labs reviewed as below  LAB RESULTS:      Lab 06/08/25 0337 06/07/25 1652 06/07/25  0259 06/04/25  0532   WBC 5.78 5.86 7.93 5.87   HEMOGLOBIN 7.5* 8.0* 7.7* 9.3*   HEMATOCRIT 22.2* 23.4* 23.5* 28.4*   PLATELETS 173 193 195 196   NEUTROS ABS 3.74  --  5.05  --    IMMATURE GRANS (ABS) 0.08*  --  0.07*  --    LYMPHS ABS 1.60  --  2.31  --    MONOS ABS 0.35  --  0.48  --    EOS ABS 0.00  --  0.01  --    MCV 87.1 89.0 88.3 87.4         Lab 06/08/25  0337 06/07/25  1652 06/07/25  0300 06/04/25  0532 06/03/25  1013 06/02/25  0416 06/01/25  1100   SODIUM 140  --  139  --  146* 146*  --    POTASSIUM 3.7 4.5 3.2*  --  3.8 3.3*  --    CHLORIDE 110*  --  109*  --  114* 114*  --    CO2 23.0  --  22.0  --  23.0 21.0*  --    ANION GAP 7.0  --  8.0  --  9.0 11.0  --    BUN 12.5  --  13.9  --  18.8 27.0*  --    CREATININE 0.70  --  0.82  --  0.89 0.82  --    EGFR 105.5  --  87.3  --  79.1 87.3  --    GLUCOSE 130*  --  131*  --  146* 119*  --    CALCIUM 8.4*  --  8.4*  --  9.4 9.2  --    MAGNESIUM 1.6  --  1.6  --  2.0  --   --    HEMOGLOBIN A1C  --   --   --  4.90  --   --   --    TSH  --   --   --   --   --   --  0.027*         Lab 06/08/25  0337 06/07/25  0300   TOTAL PROTEIN 6.3 6.2    ALBUMIN 2.6* 2.6*   GLOBULIN 3.7 3.6   ALT (SGPT) 48* 58*   AST (SGOT) 20 24   BILIRUBIN 0.6 0.6   ALK PHOS 32* 32*                     Lab 25  0416   FOLATE 2.78*   VITAMIN B 12 >2,000*         Brief Urine Lab Results  (Last result in the past 365 days)        Color   Clarity   Blood   Leuk Est   Nitrite   Protein   CREAT   Urine HCG        25 0000 Orange   Clear   Negative   Moderate (2+)   Positive   30 mg/dL (1+)                 Microbiology Results (last 10 days)       Procedure Component Value - Date/Time    Culture, CSF - Cerebrospinal Fluid, Lumbar Puncture [[id number]] Collected: 25 0920    Lab Status: Final result Specimen: Cerebrospinal Fluid from Lumbar Puncture Updated: 25 0628     CSF Culture No growth at 3 days     Gram Stain No WBCs or organisms seen    Blood Culture - Blood, Arm, Left [[id number]]  (Normal) Collected: 25 1941    Lab Status: Final result Specimen: Blood from Arm, Left Updated: 25 2000     Blood Culture No growth at 5 days    Blood Culture - Blood, Arm, Left [[id number]]  (Normal) Collected: 25 1547    Lab Status: Final result Specimen: Blood from Arm, Left Updated: 25 1600     Blood Culture No growth at 5 days        Other labs:  RPR normal  HIV negative  Urine analysis positive  TSH low at 0.027  B12 elevated next line folate low at 2.78  Ammonia 36  Ethanol level negligible  2025 LP:  CSF:  W:  0  R:  0  P:  55.7        Imaging:  MRI of the brain shows ventriculomegaly but not consistently out of proportion compared to the overall degree of atrophy.  MRI of the cervical and lumbar spine couple  CT of the abdomen shows some nonspecific findings possibly consistent with enteritis but no signs of a primary malignancy.  CT of pelvis done on  showed urinary bladder wall thickening but no signs of an occult malignancy.  EEG shows nonspecific slowing  Nerve conduction velocities performed on 6/3 show velocities in the 40 m/s  range.  This is slow although not necessarily in the demyelinating range.    Assessment/Plan     Hospital Problem List      Acquired cerebral ventriculomegaly    Generalized weakness    Impression:  Progressive neurologic decline for several months with global weakness, opthalmoplegia, and ataxia.  Considered Joy Quinonez Variant  Nerve conduction studies did reveal slowed velocities but not in the demyelinating range.  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  We discussed the risk and benefits of this and her and her  would like to proceed with this.  Initiated 5 days of IVIG on 6/4/2025 with fifth dose on 6/8/2025 therapy to see if she improves from this.  Atypical spell - functional component?  Poor effort on exam  Encephalopathy with visual hallucinations.  anemia  Folate deficiency  Ventriculomegally - opening pressure unimpressive  HTN  Hx of cannabis usage  Orthostatic hypotension.  Unintentional weight loss for several months.       Plan:  IVIG 400 mg/kg daily  initiated on (6/4) with a plan to complete 5 days (last dose on 6/8/2025)  of therapy.  Will premedicate each day with Benadryl and Prednisone 10 mg.(Tylenol was initially ordered but there is questionable allergy)  Repeat CBC at 1600 today. Defer treatment to primary team.  Check stool OB  MMA level  Replace Folic acid: started 6/2  GQ1B antibiodies -send out lab.  Results pending.  Dietician following patient.       Medical Decision Making    Number/Complexity of Problems  Moderate  1 undiagnosed new problem with uncertain prognosis -   1 acute illness with systemic symptoms -   High  1 acute or chronic illness that poses a threat to life/body function -   High     MDM Data  Moderate - 1/3 categories  Extensive - 2/3 categories    Category 1: 3 of the following  Review of external notes  Review of results  Ordering of each unique test  Independent historian  Category 2:   Independent interpretation of test (ex: imaging)  Category 3:  Discussion of management with another provider    Extensive     Treatment Plan  Moderate - Prescription Drug management  High  Drug therapy requiring intensive monitoring for toxicity  Decision regarding hospitalization or escalation of care  De-escalate care/DNR decisions         Abi Frausto, APRN  06/08/25  08:39 CDT

## 2025-06-08 NOTE — THERAPY TREATMENT NOTE
Acute Care - Physical Therapy Treatment Note  [facility]     Patient Name: Lynn Magana  : 1975  MRN: [id number]  Today's Date: 2025   Onset of Illness/Injury or Date of Surgery: 25  Visit Dx:     ICD-10-CM ICD-9-CM   1. Generalized weakness  R53.1 780.79   2. Nausea and vomiting, unspecified vomiting type  R11.2 787.01   3. Marijuana user  F12.90 305.20   4. Low TSH level  R79.89 794.5   5. Urinary tract infection without hematuria, site unspecified  N39.0 599.0   6. Dysphagia, unspecified type  R13.10 787.20   7. Impaired functional mobility and activity tolerance [Z74.09]  Z74.09 V49.89     Patient Active Problem List   Diagnosis    Syncope    Graves' disease    Polysubstance abuse    Hypertension    Diabetes    Spells of decreased attentiveness    Chronic intractable headache    Nausea and vomiting    Slow transit constipation    Nonsmoker    Type 2 diabetes mellitus, with long-term current use of insulin    GERD without esophagitis    Hyperthyroidism    Thyromegaly    Post-surgical hypothyroidism    Degeneration of cervical intervertebral disc    Cervical radiculopathy    Acute pain of right shoulder    Class 1 obesity due to excess calories with body mass index (BMI) of 30.0 to 30.9 in adult    Hypoglycemia    Stage 1 acute kidney injury    Dehydration    Dysphagia    Dehydration    Hypokalemia    Steatosis of liver    Marijuana abuse    Loss of weight    Severe protein-calorie malnutrition    Hypothyroid    Generalized weakness    Acquired cerebral ventriculomegaly     Past Medical History:   Diagnosis Date    Arthritis     Carotid artery stenosis     Degenerative disc disease, cervical     Depression     Diabetes mellitus     type 1    Disease of thyroid gland     GERD (gastroesophageal reflux disease)     Graves disease     Heart palpitations     Hyperlipidemia     Hypertension     Hypothyroidism     Seizure     Thyromegaly      Past Surgical History:   Procedure Laterality Date      SECTION      CHOLECYSTECTOMY      COLONOSCOPY  2015    Normal exam Dr. Morgan     COLONOSCOPY  2015    Normal exam    CYST REMOVAL      ENDOSCOPY N/A 2018    Normal exam    ENDOSCOPY N/A 2025    Procedure: ESOPHAGOGASTRODUODENOSCOPY WITH ANESTHESIA;  Surgeon: Jadon Smith MD;  Location: [facility] ENDOSCOPY;  Service: Gastroenterology;  Laterality: N/A;  pre op: Nausea and vomiting  post op: normal  pcp: Darryl Andrews,     HYSTERECTOMY      THYROIDECTOMY Bilateral 2018    Procedure: total thyroidectomy;  Surgeon: Vitaly Givens MD;  Location: [facility] OR;  Service: ENT     PT Assessment (Last 12 Hours)       PT Evaluation and Treatment       Row Name 25 1310          Physical Therapy Time and Intention    Subjective Information no complaints  -KJ     Document Type therapy note (daily note)  -KJ     Mode of Treatment physical therapy  -KJ     Patient Effort adequate  -KJ       Row Name 25 1310          General Information    Existing Precautions/Restrictions fall  left side weakness  -KJ       Row Name 25 1310          Pain    Pretreatment Pain Rating 0/10 - no pain  -KJ     Posttreatment Pain Rating 0/10 - no pain  -KJ       Row Name 25 1310          Bed Mobility    Supine-Sit [place] (Bed Mobility) supervision  -KJ     Sit-Supine [place] (Bed Mobility) supervision  -KJ       Row Name 25 1310          Sit-Stand Transfer    Sit-Stand [place] (Transfers) verbal cues;minimum assist (75% patient effort)  -KJ     Assistive Device (Sit-Stand Transfers) walker, front-wheeled  -KJ       Row Name 25 1310          Stand-Sit Transfer    Stand-Sit [place] (Transfers) verbal cues;contact guard  -KJ     Assistive Device (Stand-Sit Transfers) walker, front-wheeled  -KJ       Row Name 25 1310          Gait/Stairs (Locomotion)    [place]way Level (Gait) verbal cues;minimum assist (75% patient effort)  -KJ      Assistive Device (Gait) walker, front-wheeled  -KJ     Distance in Feet (Gait) 50  x 2  -KJ     Comment, (Gait/Stairs) followed with a chair  -KJ       Row Name             Wound 05/29/25 1930 Left lower leg    Wound - Properties Group Placement Date: 05/29/25  -AR Placement Time: 1930 -AR Present on Original Admission: Y  -AR Side: Left  -AR Orientation: lower  -AR Location: leg  -AR    Retired Wound - Properties Group Placement Date: 05/29/25  -AR Placement Time: 1930  -AR Present on Original Admission: Y  -AR Side: Left  -AR Orientation: lower  -AR Location: leg  -AR    Retired Wound - Properties Group Placement Date: 05/29/25  -AR Placement Time: 1930 -AR Present on Original Admission: Y  -AR Side: Left  -AR Orientation: lower  -AR Location: leg  -AR    Retired Wound - Properties Group Date first assessed: 05/29/25  -AR Time first assessed: 1930  -AR Present on Original Admission: Y  -AR Side: Left  -AR Location: leg  -AR      Row Name             Wound 05/29/25 1931 Right lower leg    Wound - Properties Group Placement Date: 05/29/25  -AR Placement Time: 1931  -AR Present on Original Admission: Y  -AR Side: Right  -AR Orientation: lower  -AR Location: leg  -AR    Retired Wound - Properties Group Placement Date: 05/29/25  -AR Placement Time: 1931  -AR Present on Original Admission: Y  -AR Side: Right  -AR Orientation: lower  -AR Location: leg  -AR    Retired Wound - Properties Group Placement Date: 05/29/25  -AR Placement Time: 1931  -AR Present on Original Admission: Y  -AR Side: Right  -AR Orientation: lower  -AR Location: leg  -AR    Retired Wound - Properties Group Date first assessed: 05/29/25  -AR Time first assessed: 1931  -AR Present on Original Admission: Y  -AR Side: Right  -AR Location: leg  -AR      Row Name 06/08/25 1310          Positioning and Restraints    Pre-Treatment Position in bed  -KJ     Post Treatment Position bed  -KJ     In Bed call light within reach  -KJ               User Key  (r)  = Recorded By, (t) = Taken By, (c) = Cosigned By      Initials Name Provider Type    Dina Gonzalez, PTA Physical Therapist Assistant    Deborah Christie, RN Registered Nurse                    Physical Therapy Education       Title: PT OT SLP Therapies (Done)       Topic: Physical Therapy (Done)       Point: Mobility training (Done)       Learning Progress Summary            Patient Acceptance, E,TB,D, VU,NR by  at 5/30/2025 1155    Comment: Education re: purpose of PT/importance of activity, safety/falls prevention, improved tech w/ bed mobility, tfers   Significant Other Acceptance, E,TB,D, VU,NR by  at 5/30/2025 1155    Comment: Education re: purpose of PT/importance of activity, safety/falls prevention, improved tech w/ bed mobility, tfers                      Point: Home exercise program (Done)       Learning Progress Summary            Patient Acceptance, E, VU by  at 6/5/2025 1944                      Point: Precautions (Done)       Learning Progress Summary            Patient Acceptance, E,TB,D, VU,NR by  at 5/30/2025 1155    Comment: Education re: purpose of PT/importance of activity, safety/falls prevention, improved tech w/ bed mobility, tfers   Significant Other Acceptance, E,TB,D, VU,NR by  at 5/30/2025 1155    Comment: Education re: purpose of PT/importance of activity, safety/falls prevention, improved tech w/ bed mobility, tfers                                      User Key       Initials Effective Dates Name Provider Type Discipline     08/02/18 -  Selin Leon, PT Physical Therapist PT     02/10/25 -  Lesia Ricketts, RN Registered Nurse Nurse                  PT Recommendation and Plan     Progress: improving  Outcome Evaluation: PT tx completed. Pt supine in bed, no complaints. Follows direction with reinforcement, increased time and cues. Bed mobility S, sit<>stand Whitney. Worked on sit<>stand several times working on strengthening. Steps taken to chair Whitney. BLEs weak and  "\"shaky\". Recommend inpatient rehab.   Outcome Measures       Row Name 06/06/25 1300             How much help from another is currently needed...    Putting on and taking off regular lower body clothing? 2  -TS      Bathing (including washing, rinsing, and drying) 2  -TS      Toileting (which includes using toilet bed pan or urinal) 2  -TS      Putting on and taking off regular upper body clothing 2  -TS      Taking care of personal grooming (such as brushing teeth) 3  -TS      Eating meals 3  -TS      AM-PAC 6 Clicks Score (OT) 14  -TS                User Key  (r) = Recorded By, (t) = Taken By, (c) = Cosigned By      Initials Name Provider Type    Safia Rush COTA Occupational Therapist Assistant                     Time Calculation:    PT Charges       Row Name 06/08/25 1342             Time Calculation    Start Time 1310  -KJ      Stop Time 1333  -KJ      Time Calculation (min) 23 min  -KJ      PT Received On 06/08/25  -KJ      PT Goal Re-Cert Due Date 06/09/25  -KJ         Time Calculation- PT    Total Timed Code Minutes- PT 23 minute(s)  -KJ                User Key  (r) = Recorded By, (t) = Taken By, (c) = Cosigned By      Initials Name Provider Type    Dina Gonzalez, CARLOS MANUEL Physical Therapist Assistant                  Therapy Charges for Today       Code Description Service Date Service Provider Modifiers Qty    19918440681 HC PT THER PROC EA 15 MIN 6/7/2025 Dina Garza PTA GP 1    06387627555 HC PT THERAPEUTIC ACT EA 15 MIN 6/7/2025 Dina Garza, CARLOS MANUEL GP 1    02293672135 HC GAIT TRAINING EA 15 MIN 6/8/2025 Dina Garza, CARLOS MANUEL GP 2            PT G-Codes  Outcome Measure Options: AM-PAC 6 Clicks Basic Mobility (PT)  AM-PAC 6 Clicks Score (PT): 14  AM-PAC 6 Clicks Score (OT): 14    Dina Garza PTA  6/8/2025    "

## 2025-06-08 NOTE — PROGRESS NOTES
[facility] Medicine Services  INPATIENT PROGRESS NOTE    Patient Name: Lynn Magana  Date of Admission: 5/29/2025  Today's Date: 06/08/25  Length of Stay: 10  Primary Care Physician: Darryl Andrews DO    Subjective   Chief Complaint: Lightheadedness.  Current    Weakness - Generalized     49-year-old female with history of hypothyroidism, liver steatosis, cannabis use, malnutrition, diabetes mellitus type 2, hypertension, admitted May 11 through May 14, 2025; at that time she was admitted due to poor oral intake and weight loss.  She had an upper endoscopy performed 5/13/2025 with findings of a small hiatal hernia and no other abnormalities reported.  Per reports reviewed the patient was not taking levothyroxine at the time, and her TSH was markedly elevated.  She was restarted on levothyroxine.  Her blood glucose was low, and did not require insulin management.  Drug screening urine was positive for cannabis, and considered to be secondary to cannabinoid hyperemesis syndrome.  The patient left the hospital AGAINST MEDICAL ADVICE, apparently she eloped; after being called, she stated was already home and did not plan to return to the hospital.  On 5/16/2025, the patient returned to the hospital reporting a syncopal event.  There was questionable seizure-like activity.   A CT brain performed on that evaluation showed moderate further increase in size of the lateral and third ventricle since the previous study. The fourth ventricle reported as normal and unchanged. A small obstructing lesion at the level of aqueduct of Sylvius not excluded. Further evaluation with contrast enhanced MR imaging of the brain was suggested. She again left the ER AGAINST MEDICAL ADVICE.    Patient came to hospital 5/30/25 reporting dizziness, lightheadedness, worsening when standing up, abnormal gait with unsteadiness;  reported confusion.  Symptom has been going on for approximately  1-2 months.  Blood pressure has been variable, initially elevated.      She has been nonambulatory for several weeks.  She reported weakness of the lower extremities and right upper extremity.  She has had urinary retention requiring straight catheterization during this admission.  Lumbar puncture performed 6/1/25, with opening pressure of 17, and fluid sent for analysis.    Nerve conduction studies performed yesterday.  Being evaluated for Joy Quinonez variant.  Today found with , trying to urinate in the bedside commode.  Weakness persists.      Today  Patient has no new complaint, discussed case with patient, daughter and  over the phone.  Patient's  wants her discharged tomorrow.      Review of Systems   All pertinent negatives and positives are as above. All other systems have been reviewed and are negative unless otherwise stated.     Objective    Temp:  [97.6 °F (36.4 °C)-99.3 °F (37.4 °C)] 98.1 °F (36.7 °C)  Heart Rate:  [76-96] 96  Resp:  [14-16] 16  BP: (106-128)/(72-93) 122/82  Physical Exam  Constitutional:       Appearance: Alert, oriented to person.  No respiratory distress.  HENT:      Head: Normocephalic and atraumatic.      Nose: Nose normal.      Mouth/Throat:      Mouth: Mucous membranes are moist.   Neck, old transverse scar  Eyes:      Conjunctiva/sclera: Conjunctivae normal.      Pupils: Pupils are equal, round  Cardiovascular:      Rate and Rhythm: Normal rate and regular rhythm.      Pulses: Normal pulses.   Pulmonary:      Effort: No respiratory distress.      Breath sounds: Normal breath sounds. No wheezing, rhonchi or rales.   Abdominal:      General: Abdomen is flat. Bowel sounds are normal.      Palpations: Abdomen is soft.      Tenderness: There is no guarding or rebound.   Extremities:  No lower extremity edema.  Skin:     Capillary Refill: Capillary refill takes less than 2 seconds.      Coloration: Skin is not jaundiced.      Findings: No rash.   Neurological:  "  Weakness of neck extension.  Abnormal ocular movements.  Flaccid 3+ out of 5 lower extremity bilateral weakness  Drowsy. Slow speech.  Follows commands.          Results Review:  I have reviewed the labs, radiology results, and diagnostic studies.    Laboratory Data:   Results from last 7 days   Lab Units 06/08/25 0337 06/07/25 1652 06/07/25  0259   WBC 10*3/mm3 5.78 5.86 7.93   HEMOGLOBIN g/dL 7.5* 8.0* 7.7*   HEMATOCRIT % 22.2* 23.4* 23.5*   PLATELETS 10*3/mm3 173 193 195        Results from last 7 days   Lab Units 06/08/25  0337 06/07/25  1652 06/07/25  0300 06/03/25  1013   SODIUM mmol/L 140  --  139 146*   POTASSIUM mmol/L 3.7 4.5 3.2* 3.8   CHLORIDE mmol/L 110*  --  109* 114*   CO2 mmol/L 23.0  --  22.0 23.0   BUN mg/dL 12.5  --  13.9 18.8   CREATININE mg/dL 0.70  --  0.82 0.89   CALCIUM mg/dL 8.4*  --  8.4* 9.4   BILIRUBIN mg/dL 0.6  --  0.6  --    ALK PHOS U/L 32*  --  32*  --    ALT (SGPT) U/L 48*  --  58*  --    AST (SGOT) U/L 20  --  24  --    GLUCOSE mg/dL 130*  --  131* 146*       Culture Data:   No results found for: \"BLOODCX\", \"URINECX\", \"WOUNDCX\", \"MRSACX\", \"RESPCX\", \"STOOLCX\"    Radiology Data:   Imaging Results (Last 24 Hours)       ** No results found for the last 24 hours. **            I have reviewed the patient's current medications.     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  EEG showed-  Diffuse cerebral dysfunction of moderate degree, " nonspecific.  This is most commonly seen due to toxic/metabolic or hypoxemic/ischemic cause. No evidence for epilepsy is seen  Pending Antibodies GQ1b    Nerve conduction studies review per specialist [ Nerve conduction studies did reveal slowed velocities but not in the demyelinating range.]  Neurology recommended a 5-day treatment with IVIG [today is day 5 of of 5]    -Acute anemia  Patient's hemoglobin was normal on presentation, hemoglobin today is 7.5.  There is no obvious source of blood loss.  Fecal occult blood test is still pending  Discussed with patient, if hemoglobin goes less than 7, we will transfuse with PRBC.    -Hypothyroidism  Variable thyroid function tests on prior admissions and currently. Continue levothyroxine 150 mcg p.o. daily. I have recommended endocrinology outpatient follow up, and this was explained to family.    -Hypertension  Due to variable blood pressure values, discontinued amlodipine lisinoprill, aldactone; On Carvedilol 6.25 mg p.o. twice daily.     -Acquired cerebral ventriculomegaly   Opening pressure on lumbar puncture was unimpressive as by neurologist    Continue PT/OT  Diet as per speech therapist recommendations.    DVT prophylaxis-we will start patient on Lovenox    Disposition-I recommend SNF/rehab after inpatient treatment. Family and patient to decide [patient's  wants to take her home tomorrow].      Electronically signed by Yovanny Abernathy MD, 06/08/25, 16:49 CDT.

## 2025-06-08 NOTE — PLAN OF CARE
Goal Outcome Evaluation:              Outcome Evaluation: Alert to self. Episodes of hallucinations. VSS on RA. No C/O pain reported. Pt straight cath'd due to urinary retention. Call light within reach, safety maintained.

## 2025-06-09 LAB
ABO GROUP BLD: NORMAL
BASOPHILS # BLD AUTO: 0.02 10*3/MM3 (ref 0–0.2)
BASOPHILS NFR BLD AUTO: 0.3 % (ref 0–1.5)
BLD GP AB SCN SERPL QL: NEGATIVE
DEPRECATED RDW RBC AUTO: 42.8 FL (ref 37–54)
EOSINOPHIL # BLD AUTO: 0.01 10*3/MM3 (ref 0–0.4)
EOSINOPHIL NFR BLD AUTO: 0.1 % (ref 0.3–6.2)
ERYTHROCYTE [DISTWIDTH] IN BLOOD BY AUTOMATED COUNT: 13.6 % (ref 12.3–15.4)
GD1B GANGL IGG SER IA-ACNC: 3 IV (ref 0–50)
GD1B GANGL IGM SER IA-ACNC: 3 IV (ref 0–50)
GM1 GANGL IGG SER IA-ACNC: 3 IV (ref 0–50)
GM1 GANGL IGM SER IA-ACNC: 3 IV (ref 0–50)
GQ1B GANGL IGG SER IA-ACNC: 3 IV (ref 0–50)
GQ1B GANGL IGM SER IA-ACNC: 2 IV (ref 0–50)
HCT VFR BLD AUTO: 22.7 % (ref 34–46.6)
HGB BLD-MCNC: 7.4 G/DL (ref 12–15.9)
IMM GRANULOCYTES # BLD AUTO: 0.1 10*3/MM3 (ref 0–0.05)
IMM GRANULOCYTES NFR BLD AUTO: 1.3 % (ref 0–0.5)
LYMPHOCYTES # BLD AUTO: 3.18 10*3/MM3 (ref 0.7–3.1)
LYMPHOCYTES NFR BLD AUTO: 42.9 % (ref 19.6–45.3)
MCH RBC QN AUTO: 28.9 PG (ref 26.6–33)
MCHC RBC AUTO-ENTMCNC: 32.6 G/DL (ref 31.5–35.7)
MCV RBC AUTO: 88.7 FL (ref 79–97)
MONOCYTES # BLD AUTO: 0.36 10*3/MM3 (ref 0.1–0.9)
MONOCYTES NFR BLD AUTO: 4.9 % (ref 5–12)
NEUTROPHILS NFR BLD AUTO: 3.74 10*3/MM3 (ref 1.7–7)
NEUTROPHILS NFR BLD AUTO: 50.5 % (ref 42.7–76)
NRBC BLD AUTO-RTO: 0 /100 WBC (ref 0–0.2)
PLATELET # BLD AUTO: 180 10*3/MM3 (ref 140–450)
PMV BLD AUTO: 10.3 FL (ref 6–12)
RBC # BLD AUTO: 2.56 10*6/MM3 (ref 3.77–5.28)
RH BLD: POSITIVE
T&S EXPIRATION DATE: NORMAL
WBC NRBC COR # BLD AUTO: 7.41 10*3/MM3 (ref 3.4–10.8)

## 2025-06-09 PROCEDURE — 97535 SELF CARE MNGMENT TRAINING: CPT

## 2025-06-09 PROCEDURE — 99233 SBSQ HOSP IP/OBS HIGH 50: CPT | Performed by: CLINICAL NURSE SPECIALIST

## 2025-06-09 PROCEDURE — 36430 TRANSFUSION BLD/BLD COMPNT: CPT

## 2025-06-09 PROCEDURE — 86900 BLOOD TYPING SEROLOGIC ABO: CPT | Performed by: INTERNAL MEDICINE

## 2025-06-09 PROCEDURE — 86850 RBC ANTIBODY SCREEN: CPT | Performed by: INTERNAL MEDICINE

## 2025-06-09 PROCEDURE — 25810000003 SODIUM CHLORIDE 0.9 % SOLUTION: Performed by: INTERNAL MEDICINE

## 2025-06-09 PROCEDURE — 25010000002 ENOXAPARIN PER 10 MG: Performed by: INTERNAL MEDICINE

## 2025-06-09 PROCEDURE — 85025 COMPLETE CBC W/AUTO DIFF WBC: CPT | Performed by: INTERNAL MEDICINE

## 2025-06-09 PROCEDURE — 86920 COMPATIBILITY TEST SPIN: CPT

## 2025-06-09 PROCEDURE — 97168 OT RE-EVAL EST PLAN CARE: CPT

## 2025-06-09 PROCEDURE — 86900 BLOOD TYPING SEROLOGIC ABO: CPT

## 2025-06-09 PROCEDURE — P9016 RBC LEUKOCYTES REDUCED: HCPCS

## 2025-06-09 PROCEDURE — 86901 BLOOD TYPING SEROLOGIC RH(D): CPT | Performed by: INTERNAL MEDICINE

## 2025-06-09 RX ORDER — TAMSULOSIN HYDROCHLORIDE 0.4 MG/1
0.4 CAPSULE ORAL DAILY
Status: DISCONTINUED | OUTPATIENT
Start: 2025-06-09 | End: 2025-06-11 | Stop reason: HOSPADM

## 2025-06-09 RX ORDER — LACTULOSE 10 G/15ML
30 SOLUTION ORAL ONCE
Status: COMPLETED | OUTPATIENT
Start: 2025-06-09 | End: 2025-06-09

## 2025-06-09 RX ADMIN — DOCUSATE SODIUM 50 MG AND SENNOSIDES 8.6 MG 2 TABLET: 8.6; 5 TABLET, FILM COATED ORAL at 08:58

## 2025-06-09 RX ADMIN — PANTOPRAZOLE SODIUM 40 MG: 40 TABLET, DELAYED RELEASE ORAL at 05:59

## 2025-06-09 RX ADMIN — THIAMINE HCL TAB 100 MG 500 MG: 100 TAB at 08:58

## 2025-06-09 RX ADMIN — POTASSIUM CHLORIDE 40 MEQ: 20 SOLUTION ORAL at 08:52

## 2025-06-09 RX ADMIN — ENOXAPARIN SODIUM 40 MG: 100 INJECTION SUBCUTANEOUS at 20:26

## 2025-06-09 RX ADMIN — BISACODYL 5 MG: 5 TABLET, COATED ORAL at 08:58

## 2025-06-09 RX ADMIN — SODIUM CHLORIDE 1000 ML: 9 INJECTION, SOLUTION INTRAVENOUS at 09:44

## 2025-06-09 RX ADMIN — FOLIC ACID 1 MG: 1 TABLET ORAL at 08:58

## 2025-06-09 RX ADMIN — ARIPIPRAZOLE 10 MG: 5 TABLET ORAL at 08:58

## 2025-06-09 RX ADMIN — LEVOTHYROXINE SODIUM 150 MCG: 75 TABLET ORAL at 05:59

## 2025-06-09 RX ADMIN — THIAMINE HCL TAB 100 MG 500 MG: 100 TAB at 20:26

## 2025-06-09 RX ADMIN — TAMSULOSIN HYDROCHLORIDE 0.4 MG: 0.4 CAPSULE ORAL at 18:35

## 2025-06-09 RX ADMIN — LACTULOSE 30 G: 20 SOLUTION ORAL at 18:35

## 2025-06-09 RX ADMIN — CARVEDILOL 6.25 MG: 6.25 TABLET, FILM COATED ORAL at 18:35

## 2025-06-09 RX ADMIN — THIAMINE HCL TAB 100 MG 500 MG: 100 TAB at 18:35

## 2025-06-09 RX ADMIN — POLYETHYLENE GLYCOL 3350 17 G: 17 POWDER, FOR SOLUTION ORAL at 08:52

## 2025-06-09 NOTE — PLAN OF CARE
Goal Outcome Evaluation:  Plan of Care Reviewed With: patient        Progress: improving  Outcome Evaluation: OT re-cert complete. Pt seen in fowlers with  and daughter at bedside. Pt AxOx2 but considerably more alert and motivated to participate in tx today. Pt presents less confused than initial eval as she was able to follow commands with no time needed for initation and communicated well. Pt completed all bed mobility and donning/doffing socks EOB with SBA. Pt WFL B UE AROM and strength is 4/5. Pt completed all functional mobility and transitions with Whitney due to posterior lean upon standing and 1 episode of LOB noted towards posterior while ambulating requiring Whitney to correct. Pt has continued to improve cognitively and functionally but still requires some assist with performing ADL's and balance deficits due to increased weakness, cognitive deficits, and fatigue.  states plan is to go to inpatient rehab at d/c. She will benefit from continued therapy and OT will continue to tx as indicated. Recommend IP rehab at d/c.    Anticipated Discharge Disposition (OT): inpatient rehabilitation facility

## 2025-06-09 NOTE — CASE MANAGEMENT/SOCIAL WORK
Continued Stay Note   [place]     Patient Name: Lynn Magana  MRN: [id number]  Today's Date: 6/9/2025    Admit Date: 5/29/2025        Discharge Plan       Row Name 06/09/25 1041       Plan    Plan Comments SW spoke to pt/spouse in room regarding dc planning and rehab recommendations. Acute rehab is being recommended and pt/spouse are agreeable for referral to be sent to [facility] rehab. Referral has been sent to [facility] rehab, await answer.                   Discharge Codes    No documentation.                 Expected Discharge Date and Time       Expected Discharge Date Expected Discharge Time    Jun 9, 2025               NADIA Williamson

## 2025-06-09 NOTE — PLAN OF CARE
Goal Outcome Evaluation:  Plan of Care Reviewed With: patient        Progress: improving  Outcome Evaluation: Alert to self. Episodes of hallucinations. VSS on RA. No C/O pain reported. Urinary retention. Call light within reach, safety maintained.

## 2025-06-09 NOTE — PROGRESS NOTES
Neurology Progress Note      Chief Complaint:  AMS  Length of Stay:  11   Subjective     Subjective:    6/9: sitting up in chair. Patient has had shower this AM and also ambulated in the chair.  had told attending he wanted to take patient home today. HGB steady at 7.4. Attending had ordered transfusion and patient/ refused binotically but after discussion has now consented to blood transfusion. Patient also had episode of hypotension. Still with some confusion and hallucinations. Patient has had gradual improvement since initiation of IV IG. Still with opthalmologic.    6/8/2025: HGB stable 7.5 this AM. Still having episodes of confusion and visual hallucinations.  Daughter at bedside. She continues to look brighter every day. Today is #5/5 IVIG.      6/7/2025: Lying in bed. Daughter at bedside. Patient very alert this AM. Nursing reports visual hallucinations last PM and during day time as well. She appears much stronger today. H/H 7.7/23.5 down form 9.3/28.4 on 6/4. Discussed with nursing. Patient has not had BM and no signs of bleeding. Will check STOB.  On exam today, patient able to abduct right eye past midline. Today #4/5 IVIG.  Patient seen along with Dr. Patrick, tele neurology.     6/6/2025: patient sitting up in bed. 17 y.o. daughter at bedside. Patient looks brighter today. Nursing reports patient had visual hallucinations last PM. Daughter reports she is having confusion and has been stating she is at [employer] but she is currently oriented to person and place. Today #3/5 IV IG.   Patient seen with tele neurology Dr. LAUREN Patrick.    6/5/2025: lying in bed.  at bedside. Tolerated IV IG yesterday. Today is #2/5. Patient is more alert and interactive. Still with dysconjugate gaze. She appears to have more strength in extremities and  thinks this as well. Improved effort on exam. Orthostatic BP positive as below.         6/4/2025:She is somewhat more awake today.  Her   is at the bedside today we speak to him.  She continues to have global weakness and decreasing cognition.  She has shown some mild improvement since admission.  We discussed the risk and benefits of IVIG therapy and she expresses understanding.  She would like to proceed with this.  After I left the room the patient did get up and ambulate with assistance by physical therapy to the bedside commode.  After being there briefly she slumped over and had some disconjugate gaze.  There may have been greater right than left sided weakness.  She was returned to the bed and recovered immediately.    Medications:  Current Facility-Administered Medications   Medication Dose Route Frequency Provider Last Rate Last Admin    ARIPiprazole (ABILIFY) tablet 10 mg  10 mg Oral Daily Bautista Limon MD   10 mg at 06/09/25 0858    sennosides-docusate (PERICOLACE) 8.6-50 MG per tablet 2 tablet  2 tablet Oral BID PRN Bautista Limon MD   2 tablet at 06/09/25 0858    And    polyethylene glycol (MIRALAX) packet 17 g  17 g Oral Daily PRN Bautista Lmion MD   17 g at 06/09/25 0852    And    bisacodyl (DULCOLAX) EC tablet 5 mg  5 mg Oral Daily PRN Bautista Limon MD   5 mg at 06/09/25 0858    And    bisacodyl (DULCOLAX) suppository 10 mg  10 mg Rectal Daily PRN Bautista Limon MD        Calcium Replacement - Follow Nurse / BPA Driven Protocol   Not Applicable PRN Bautista Limon MD        carvedilol (COREG) tablet 6.25 mg  6.25 mg Oral BID With Meals Bautista Limon MD   6.25 mg at 06/08/25 1700    diphenhydrAMINE (BENADRYL) injection 25 mg  25 mg Intravenous Daily Ori Banuelos MD   25 mg at 06/08/25 0925    enoxaparin sodium (LOVENOX) syringe 40 mg  40 mg Subcutaneous Nightly Yovanny Abernathy MD   40 mg at 06/08/25 2043    folic acid (FOLVITE) tablet 1 mg  1 mg Oral Daily Yovanny Abernathy MD   1 mg at 06/09/25 0858    hydrALAZINE (APRESOLINE) injection 10 mg  10 mg Intravenous Q6H PRN Alireza Lam,  MD   10 mg at 06/05/25 0048    levothyroxine (SYNTHROID, LEVOTHROID) tablet 150 mcg  150 mcg Oral Q AM Bautista Limon MD   150 mcg at 06/09/25 0559    Magnesium Low Dose Replacement - Follow Nurse / BPA Driven Protocol   Not Applicable PRN Bautista Limon MD        melatonin tablet 10 mg  10 mg Oral Nightly PRN Bautista Limon MD   10 mg at 06/06/25 1953    ondansetron ODT (ZOFRAN-ODT) disintegrating tablet 4 mg  4 mg Oral Q6H PRN Bautista Limon MD        Or    ondansetron (ZOFRAN) injection 4 mg  4 mg Intravenous Q6H PRN Bautista Limon MD   4 mg at 05/30/25 2139    pantoprazole (PROTONIX) EC tablet 40 mg  40 mg Oral Q AM Bautista Limon MD   40 mg at 06/09/25 0559    Phosphorus Replacement - Follow Nurse / BPA Driven Protocol   Not Applicable PRN Bautista Limon MD        potassium chloride (KAYCIEL) 20 mEq/15 mL solution 40 mEq  40 mEq Oral Daily Yovanny Abernathy MD   40 mEq at 06/09/25 0852    potassium chloride (KLOR-CON M20) CR tablet 40 mEq  40 mEq Oral Daily Yovanny Abernathy MD        Potassium Replacement - Follow Nurse / BPA Driven Protocol   Not Applicable PRN Bautista Limon MD        sodium chloride 0.9 % flush 10 mL  10 mL Intravenous PRN Safia Wood APRN        thiamine (VITAMIN B-1) tablet 500 mg  500 mg Oral TID Yovanny Abernathy MD   500 mg at 06/09/25 0858             Objective      Vital Signs  Temp:  [98.1 °F (36.7 °C)-99.3 °F (37.4 °C)] 98.5 °F (36.9 °C)  Heart Rate:  [] 97  Resp:  [16] 16  BP: ()/(36-86) 74/49    Physical Exam:    HEENT:  Neck is supple  CVS:  RRR  Lungs:  CTA - B/L  Abd:  NT/ND  Ext:  No edema  Skin:  No rashes    Pertinent Neuro Exam:  Awake. alert and brighter today.   Slowed cognition.  States name, at that she is in [place]. Able to state age with cues.   Knows her daughter  Can follow commands   Pupils equal.  Able to count fingers today.  Disconjugate gaze chronically and now able to abduct right eye past  midline.  No unilateral facial droop  Antigravity strength in all 4 extremities  No signs of tremors or increased tone  Mild ataxia noted bilaterally  Improved strength bilateral upper and lower extremities 4/5.     Last nurse assessment:  Interval: baseline  1a. Level of Consciousness: 0-->Alert, keenly responsive  1b. LOC Questions: 0-->Answers both questions correctly  1c. LOC Commands: 0-->Performs both tasks correctly  2. Best Gaze: 0-->Normal  3. Visual: 0-->No visual loss  4. Facial Palsy: 0-->Normal symmetrical movements  5a. Motor Arm, Left: 0-->No drift, limb holds 90 (or 45) degrees for full 10 secs  5b. Motor Arm, Right: 0-->No drift, limb holds 90 (or 45) degrees for full 10 secs  6a. Motor Leg, Left: 1-->Drift, leg falls by the end of the 5-sec period but does not hit bed  6b. Motor Leg, Right: 1-->Drift, leg falls by the end of the 5-sec period but does not hit bed  7. Limb Ataxia: 0-->Absent  8. Sensory: 0-->Normal, no sensory loss  9. Best Language: 0-->No aphasia, normal  10. Dysarthria: 0-->Normal  11. Extinction and Inattention (formerly Neglect): 0-->No abnormality    Total (NIH Stroke Scale): 2       Results Review:      Labs:  Labs reviewed as below  LAB RESULTS:      Lab 06/09/25  0455 06/08/25  0337 06/07/25  1652 06/07/25  0259 06/04/25  0532   WBC 7.41 5.78 5.86 7.93 5.87   HEMOGLOBIN 7.4* 7.5* 8.0* 7.7* 9.3*   HEMATOCRIT 22.7* 22.2* 23.4* 23.5* 28.4*   PLATELETS 180 173 193 195 196   NEUTROS ABS 3.74 3.74  --  5.05  --    IMMATURE GRANS (ABS) 0.10* 0.08*  --  0.07*  --    LYMPHS ABS 3.18* 1.60  --  2.31  --    MONOS ABS 0.36 0.35  --  0.48  --    EOS ABS 0.01 0.00  --  0.01  --    MCV 88.7 87.1 89.0 88.3 87.4         Lab 06/08/25  0337 06/07/25  1652 06/07/25  0300 06/04/25  0532 06/03/25  1013   SODIUM 140  --  139  --  146*   POTASSIUM 3.7 4.5 3.2*  --  3.8   CHLORIDE 110*  --  109*  --  114*   CO2 23.0  --  22.0  --  23.0   ANION GAP 7.0  --  8.0  --  9.0   BUN 12.5  --  13.9  --  18.8    CREATININE 0.70  --  0.82  --  0.89   EGFR 105.5  --  87.3  --  79.1   GLUCOSE 130*  --  131*  --  146*   CALCIUM 8.4*  --  8.4*  --  9.4   MAGNESIUM 1.6  --  1.6  --  2.0   HEMOGLOBIN A1C  --   --   --  4.90  --          Lab 25  0337 25  0300   TOTAL PROTEIN 6.3 6.2   ALBUMIN 2.6* 2.6*   GLOBULIN 3.7 3.6   ALT (SGPT) 48* 58*   AST (SGOT) 20 24   BILIRUBIN 0.6 0.6   ALK PHOS 32* 32*                           Brief Urine Lab Results  (Last result in the past 365 days)        Color   Clarity   Blood   Leuk Est   Nitrite   Protein   CREAT   Urine HCG        25 0000 Orange   Clear   Negative   Moderate (2+)   Positive   30 mg/dL (1+)                 Microbiology Results (last 10 days)       Procedure Component Value - Date/Time    Culture, CSF - Cerebrospinal Fluid, Lumbar Puncture [[id number]] Collected: 25 0920    Lab Status: Final result Specimen: Cerebrospinal Fluid from Lumbar Puncture Updated: 25     CSF Culture No growth at 3 days     Gram Stain No WBCs or organisms seen        Other labs:  RPR normal  HIV negative  Urine analysis positive  TSH low at 0.027  B12 elevated next line folate low at 2.78  Ammonia 36  Ethanol level negligible  2025 LP:  CSF:  W:  0  R:  0  P:  55.7        Imaging:  MRI of the brain shows ventriculomegaly but not consistently out of proportion compared to the overall degree of atrophy.  MRI of the cervical and lumbar spine couple  CT of the abdomen shows some nonspecific findings possibly consistent with enteritis but no signs of a primary malignancy.  CT of pelvis done on  showed urinary bladder wall thickening but no signs of an occult malignancy.  EEG shows nonspecific slowing  Nerve conduction velocities performed on 6/3 show velocities in the 40 m/s range.  This is slow although not necessarily in the demyelinating range.    Assessment/Plan     Hospital Problem List      Acquired cerebral ventriculomegaly    Generalized  weakness    Impression:  Progressive neurologic decline for several months with global weakness, opthalmoplegia, and ataxia.  Right now considering Guillia [place] syndrome with  Joy Quinonez Variant  Nerve conduction studies did reveal slowed velocities but not in the demyelinating range.  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  We discussed the risk and benefits of this and her and her  would like to proceed with this.  Initiated 5 days of IVIG on 6/4/2025 with fifth dose on 6/8/2025 therapy to see if she improves from this.  Atypical spell - functional component?  Poor effort on exam  Encephalopathy with visual hallucinations.  anemia  Folate deficiency  Ventriculomegally - opening pressure unimpressive  HTN  Hx of cannabis usage  Orthostatic hypotension.  Unintentional weight loss for several months.       Plan:  IVIG 400 mg/kg daily  with 5th and final dose on  6/8/2025.  Monitor CBC. Defer treatment of anemia to attending. Patient and  has consented to transfusion after our discussion.  Check stool OB  MMA level  Replace Folic acid: started 6/2  GQ1B antibiodies -send out lab.  Results pending.  Dietician following patient.   Long discussion with patient and  regarding d/c plan and recommend acute rehab. Right now do not recommend family take patient home. She continues to require in/out catheter and requires assist of at least 2 for standing and transfers.       Medical Decision Making    Number/Complexity of Problems  Moderate  1 undiagnosed new problem with uncertain prognosis -   1 acute illness with systemic symptoms -   High  1 acute or chronic illness that poses a threat to life/body function -   High     MDM Data  Moderate - 1/3 categories  Extensive - 2/3 categories    Category 1: 3 of the following  Review of external notes  Review of results  Ordering of each unique test  Independent historian  Category 2:   Independent interpretation of test (ex: imaging)  Category 3:  Discussion of management with another provider    Extensive     Treatment Plan  Moderate - Prescription Drug management  High  Drug therapy requiring intensive monitoring for toxicity  Decision regarding hospitalization or escalation of care  De-escalate care/DNR decisions         Abi Frausto, APRN  06/09/25  10:45 CDT

## 2025-06-09 NOTE — CASE MANAGEMENT/SOCIAL WORK
Continued Stay Note   [place]     Patient Name: Lynn Magana  MRN: [id number]  Today's Date: 6/9/2025    Admit Date: 5/29/2025        Discharge Plan       Row Name 06/09/25 1574       Plan    Plan Comments Per Luiz with [facility] rehab they can accept and precert has been started. Spouse updated on plan.                   Discharge Codes    No documentation.                 Expected Discharge Date and Time       Expected Discharge Date Expected Discharge Time    Jun 11, 2025               NADIA Williamson

## 2025-06-09 NOTE — THERAPY PROGRESS REPORT/RE-CERT
Acute Care - Occupational Therapy Re-Assessment  [facility]     Patient Name: Lynn Magana  : 1975  MRN: [id number]  Today's Date: 2025  Onset of Illness/Injury or Date of Surgery: 25     Referring Physician: Dr. Limon    Admit Date: 2025       ICD-10-CM ICD-9-CM   1. Generalized weakness  R53.1 780.79   2. Nausea and vomiting, unspecified vomiting type  R11.2 787.01   3. Marijuana user  F12.90 305.20   4. Low TSH level  R79.89 794.5   5. Urinary tract infection without hematuria, site unspecified  N39.0 599.0   6. Dysphagia, unspecified type  R13.10 787.20   7. Impaired functional mobility and activity tolerance [Z74.09]  Z74.09 V49.89     Patient Active Problem List   Diagnosis    Syncope    Graves' disease    Polysubstance abuse    Hypertension    Diabetes    Spells of decreased attentiveness    Chronic intractable headache    Nausea and vomiting    Slow transit constipation    Nonsmoker    Type 2 diabetes mellitus, with long-term current use of insulin    GERD without esophagitis    Hyperthyroidism    Thyromegaly    Post-surgical hypothyroidism    Degeneration of cervical intervertebral disc    Cervical radiculopathy    Acute pain of right shoulder    Class 1 obesity due to excess calories with body mass index (BMI) of 30.0 to 30.9 in adult    Hypoglycemia    Stage 1 acute kidney injury    Dehydration    Dysphagia    Dehydration    Hypokalemia    Steatosis of liver    Marijuana abuse    Loss of weight    Severe protein-calorie malnutrition    Hypothyroid    Generalized weakness    Acquired cerebral ventriculomegaly     Past Medical History:   Diagnosis Date    Arthritis     Carotid artery stenosis     Degenerative disc disease, cervical     Depression     Diabetes mellitus     type 1    Disease of thyroid gland     GERD (gastroesophageal reflux disease)     Graves disease     Heart palpitations     Hyperlipidemia     Hypertension     Hypothyroidism     Seizure     Thyromegaly       Past Surgical History:   Procedure Laterality Date     SECTION      CHOLECYSTECTOMY      COLONOSCOPY  2015    Normal exam Dr. Morgan     COLONOSCOPY  2015    Normal exam    CYST REMOVAL      ENDOSCOPY N/A 2018    Normal exam    ENDOSCOPY N/A 2025    Procedure: ESOPHAGOGASTRODUODENOSCOPY WITH ANESTHESIA;  Surgeon: Jadon Smith MD;  Location: [facility] ENDOSCOPY;  Service: Gastroenterology;  Laterality: N/A;  pre op: Nausea and vomiting  post op: normal  pcp: Darryl Andrews,     HYSTERECTOMY      THYROIDECTOMY Bilateral 2018    Procedure: total thyroidectomy;  Surgeon: Vitaly Givens MD;  Location: [facility] OR;  Service: ENT         OT ASSESSMENT FLOWSHEET (Last 12 Hours)       OT Evaluation and Treatment       Row Name 25 1514                   OT Time and Intention    Subjective Information no complaints  -AC (r) BH (t) AC (c)        Document Type progress note/recertification  -AC (r) BH (t) AC (c)        Mode of Treatment occupational therapy  -AC (r) BH (t) AC (c)           General Information    Existing Precautions/Restrictions fall  Left sided weakness  -AC (r) BH (t) AC (c)           Pain Assessment    Pretreatment Pain Rating 0/10 - no pain  -AC (r) BH (t) AC (c)        Posttreatment Pain Rating 0/10 - no pain  -AC (r) BH (t) AC (c)           Cognition    Affect/Mental Status (Cognition) confused;flat/blunted affect  -AC (r) BH (t) AC (c)        Orientation Status (Cognition) oriented to;person;place;disoriented to;time;situation;verbal cues/prompts needed for orientation  -AC (r) BH (t) AC (c)        Follows Commands (Cognition) WFL  -AC (r) BH (t) AC (c)        Executive Function Deficit (Cognition) moderate deficit;insight/awareness of deficits;judgment;planning/decision-making  -AC (r) BH (t) AC (c)        Safety Deficit (Cognition) moderate deficit;awareness of need for assistance;insight into deficits/self-awareness;safety precautions  awareness;safety precautions follow-through/compliance  -AC (r) BH (t) AC (c)        Personal Safety Interventions fall prevention program maintained;gait belt;muscle strengthening facilitated;nonskid shoes/slippers when out of bed;supervised activity;elopement precautions initiated  -AC (r) BH (t) AC (c)           Range of Motion Comprehensive    General Range of Motion bilateral upper extremity ROM WFL  -AC (r) BH (t) AC (c)           Strength Comprehensive (MMT)    Comment, General Manual Muscle Testing (MMT) Assessment B UE 4/5  -AC (r) BH (t) AC (c)           Sensory    Additional Documentation Sensory Assessment (Somatosensory) (Group)  -AC (r) BH (t) AC (c)           Sensory Assessment (Somatosensory)    Sensory Assessment (Somatosensory) UE sensation intact  -AC (r) BH (t) AC (c)           Activities of Daily Living    BADL Assessment/Intervention lower body dressing;grooming  -AC (r) BH (t) AC (c)           Lower Body Dressing Assessment/Training    [place] Level (Lower Body Dressing) don;doff;socks;contact guard assist  -AC (r) BH (t) AC (c)        Position (Lower Body Dressing) edge of bed sitting  -AC (r) BH (t) AC (c)           Grooming Assessment/Training    [place] Level (Grooming) wash face, hands;hair care, combing/brushing;contact guard assist;verbal cues;nonverbal cues (demo/gesture)  -AC (r) BH (t) AC (c)        Position (Grooming) sink side;supported standing  -AC (r) BH (t) AC (c)           Bed Mobility    Bed Mobility rolling left;rolling right;sit-supine;supine-sit  -AC (r) BH (t) AC (c)        Rolling Left [place] (Bed Mobility) standby assist  -AC (r) BH (t) AC (c)        Rolling Right [place] (Bed Mobility) standby assist  -AC (r) BH (t) AC (c)        Scooting/Bridging [place] (Bed Mobility) standby assist  -AC (r) BH (t) AC (c)        Supine-Sit [place] (Bed Mobility) standby assist  -AC (r) BH (t) AC (c)        Sit-Supine [place] (Bed Mobility) standby  assist  -AC (r) BH (t) AC (c)        Assistive Device (Bed Mobility) bed rails  -AC (r) BH (t) AC (c)           Functional Mobility    Functional Mobility- Ind. Level minimum assist (75% patient effort);verbal cues required;nonverbal cues required (demo/gesture)  -AC (r) BH (t) AC (c)        Functional Mobility- Device walker, front-wheeled  -AC (r) BH (t) AC (c)        Functional Mobility- Comment From bed, to door, back to bed. Sat and stood up again and took 6 lateral steps towards EOB  -AC (r) BH (t) AC (c)           Transfer Assessment/Treatment    Transfers sit-stand transfer;stand-sit transfer  -AC (r) BH (t) AC (c)           Sit-Stand Transfer    Sit-Stand [place] (Transfers) minimum assist (75% patient effort);verbal cues;nonverbal cues (demo/gesture)  -AC (r) BH (t) AC (c)           Stand-Sit Transfer    Stand-Sit [place] (Transfers) minimum assist (75% patient effort);verbal cues;nonverbal cues (demo/gesture)  -AC (r) BH (t) AC (c)           Wound 05/29/25 1930 Left lower leg    Wound - Properties Group Placement Date: 05/29/25  -AR Placement Time: 1930  -AR Present on Original Admission: Y  -AR Side: Left  -AR Orientation: lower  -AR Location: leg  -AR    Retired Wound - Properties Group Placement Date: 05/29/25  -AR Placement Time: 1930  -AR Present on Original Admission: Y  -AR Side: Left  -AR Orientation: lower  -AR Location: leg  -AR    Retired Wound - Properties Group Placement Date: 05/29/25  -AR Placement Time: 1930  -AR Present on Original Admission: Y  -AR Side: Left  -AR Orientation: lower  -AR Location: leg  -AR    Retired Wound - Properties Group Date first assessed: 05/29/25  -AR Time first assessed: 1930  -AR Present on Original Admission: Y  -AR Side: Left  -AR Location: leg  -AR       Wound 05/29/25 1931 Right lower leg    Wound - Properties Group Placement Date: 05/29/25  -AR Placement Time: 1931  -AR Present on Original Admission: Y  -AR Side: Right  -AR Orientation: lower   -AR Location: leg  -AR    Retired Wound - Properties Group Placement Date: 05/29/25  -AR Placement Time: 1931  -AR Present on Original Admission: Y  -AR Side: Right  -AR Orientation: lower  -AR Location: leg  -AR    Retired Wound - Properties Group Placement Date: 05/29/25  -AR Placement Time: 1931 -AR Present on Original Admission: Y  -AR Side: Right  -AR Orientation: lower  -AR Location: leg  -AR    Retired Wound - Properties Group Date first assessed: 05/29/25  -AR Time first assessed: 1931  -AR Present on Original Admission: Y  -AR Side: Right  -AR Location: leg  -AR       Plan of Care Review    Plan of Care Reviewed With patient  -AC (r) BH (t) AC (c)        Progress improving  -AC (r) BH (t) AC (c)        Outcome Evaluation OT re-cert complete. Pt seen in fowlers with  and daughter at bedside. Pt AxOx2 but considerably more alert and motivated to participate in tx today. Pt presents less confused than initial eval as she was able to follow commands with no time needed for initation and communicated well. Pt completed all bed mobility and donning/doffing socks EOB with SBA. Pt WFL B UE AROM and strength is 4/5. Pt completed all functional mobility and transitions with Whitney due to posterior lean upon standing and 1 episode of LOB noted towards posterior while ambulating requiring Whitney to correct. Pt has continued to improve cognitively and functionally but still requires some assist with performing ADL's and balance deficits due to increased weakness, cognitive deficits, and fatigue.  states plan is to go to inpatient rehab at d/c. She will benefit from continued therapy and OT will continue to tx as indicated. Recommend IP rehab at d/c.  -AC (r) BH (t) AC (c)           Positioning and Restraints    Pre-Treatment Position in bed  -AC (r) BH (t) AC (c)        Post Treatment Position bed  -AC (r) BH (t) AC (c)        In Bed fowlers;call light within reach;encouraged to call for assist;exit alarm  on;with family/caregiver  -AC (r) BH (t) AC (c)           Therapy Plan Review/Discharge Plan (OT)    Anticipated Discharge Disposition (OT) inpatient rehabilitation facility  -AC (r) BH (t) AC (c)           OT Goals    Transfer Goal Selection (OT) transfer, OT goal 1  -AC (r) BH (t) AC (c)        Dressing Goal Selection (OT) dressing, OT goal 1  -AC (r) BH (t) AC (c)        Toileting Goal Selection (OT) toileting, OT goal 1  -AC (r) BH (t) AC (c)           Transfer Goal 1 (OT)    Activity/Assistive Device (Transfer Goal 1, OT) bed-to-chair/chair-to-bed;commode, bedside without drop arms  -AC (r) BH (t) AC (c)        [place] Level/Cues Needed (Transfer Goal 1, OT) contact guard required  -AC (r) BH (t) AC (c)        Time Frame (Transfer Goal 1, OT) long term goal (LTG);10 days  -AC (r) BH (t) AC (c)        Progress/Outcome (Transfer Goal 1, OT) goal revised this date  -AC (r) BH (t) AC (c)           Dressing Goal 1 (OT)    Activity/Device (Dressing Goal 1, OT) lower body dressing  -AC (r) BH (t) AC (c)        [place]/Cues Needed (Dressing Goal 1, OT) standby assist  -AC (r) BH (t) AC (c)        Time Frame (Dressing Goal 1, OT) long term goal (LTG);10 days  -AC (r) BH (t) AC (c)        Progress/Outcome (Dressing Goal 1, OT) goal revised this date  -AC (r) BH (t) AC (c)           Toileting Goal 1 (OT)    Activity/Device (Toileting Goal 1, OT) commode, bedside without drop arms  -AC (r) BH (t) AC (c)        [place] Level/Cues Needed (Toileting Goal 1, OT) contact guard required  -AC (r) BH (t) AC (c)        Time Frame (Toileting Goal 1, OT) long term goal (LTG);10 days  -AC (r) BH (t) AC (c)        Progress/Outcome (Toileting Goal 1, OT) goal ongoing  -AC (r) BH (t) AC (c)                  User Key  (r) = Recorded By, (t) = Taken By, (c) = Cosigned By      Initials Name Effective Dates    Niels Perez, OTR/L, CNT 02/03/23 -     Deborah Christie, MYRIAM 05/24/22 -     Prakash Fuentes, OT Student  05/12/25 -                      Occupational Therapy Education       Title: PT OT SLP Therapies (Done)       Topic: Occupational Therapy (Done)       Point: ADL training (Done)       Learning Progress Summary            Patient Acceptance, E,TB, VU by  at 6/9/2025 1553    Comment: OT POC, OT role in care, d/c planning, fall risk precautions, t/f training, bed mobility training.                      Point: Precautions (Done)       Learning Progress Summary            Patient Acceptance, E,TB, VU by  at 6/9/2025 1553    Comment: OT POC, OT role in care, d/c planning, fall risk precautions, t/f training, bed mobility training.                      Point: Body mechanics (Done)       Learning Progress Summary            Patient Acceptance, E,TB, VU by  at 6/9/2025 1553    Comment: OT POC, OT role in care, d/c planning, fall risk precautions, t/f training, bed mobility training.                                      User Key       Initials Effective Dates Name Provider Type Discipline     05/12/25 -  Prakash Badillo OT Student OT Student OT                      OT Recommendation and Plan  Planned Therapy Interventions (OT): activity tolerance training, adaptive equipment training, BADL retraining, functional balance retraining, occupation/activity based interventions, patient/caregiver education/training, strengthening exercise, transfer/mobility retraining  Therapy Frequency (OT): 5 times/wk  Plan of Care Review  Plan of Care Reviewed With: patient  Progress: improving  Outcome Evaluation: OT re-cert complete. Pt seen in fowlers with  and daughter at bedside. Pt AxOx2 but considerably more alert and motivated to participate in tx today. Pt presents less confused than initial eval as she was able to follow commands with no time needed for initation and communicated well. Pt completed all bed mobility and donning/doffing socks EOB with SBA. Pt WFL B UE AROM and strength is 4/5. Pt completed all functional  mobility and transitions with Whitney due to posterior lean upon standing and 1 episode of LOB noted towards posterior while ambulating requiring Whitney to correct. Pt has continued to improve cognitively and functionally but still requires some assist with performing ADL's and balance deficits due to increased weakness, cognitive deficits, and fatigue.  states plan is to go to inpatient rehab at d/c. She will benefit from continued therapy and OT will continue to tx as indicated. Recommend IP rehab at d/c.  Plan of Care Reviewed With: patient  Outcome Evaluation: OT re-cert complete. Pt seen in fowlers with  and daughter at bedside. Pt AxOx2 but considerably more alert and motivated to participate in tx today. Pt presents less confused than initial eval as she was able to follow commands with no time needed for initation and communicated well. Pt completed all bed mobility and donning/doffing socks EOB with SBA. Pt WFL B UE AROM and strength is 4/5. Pt completed all functional mobility and transitions with Whitney due to posterior lean upon standing and 1 episode of LOB noted towards posterior while ambulating requiring Whitney to correct. Pt has continued to improve cognitively and functionally but still requires some assist with performing ADL's and balance deficits due to increased weakness, cognitive deficits, and fatigue.  states plan is to go to inpatient rehab at d/c. She will benefit from continued therapy and OT will continue to tx as indicated. Recommend IP rehab at d/c.     Outcome Measures       Row Name 06/09/25 9454             How much help from another is currently needed...    Putting on and taking off regular lower body clothing? 3  -AC (r) BH (t) AC (c)      Bathing (including washing, rinsing, and drying) 2  -AC (r) BH (t) AC (c)      Toileting (which includes using toilet bed pan or urinal) 2  -AC (r) BH (t) AC (c)      Putting on and taking off regular upper body clothing 3  -AC (r)  BH (t) AC (c)      Taking care of personal grooming (such as brushing teeth) 3  -AC (r) BH (t) AC (c)      Eating meals 3  -AC (r) BH (t) AC (c)      AM-PAC 6 Clicks Score (OT) 16  -AC (r) BH (t)         Functional Assessment    Outcome Measure Options AM-PAC 6 Clicks Daily Activity (OT)  -AC (r) BH (t) AC (c)                User Key  (r) = Recorded By, (t) = Taken By, (c) = Cosigned By      Initials Name Provider Type    Niels Perez, OTR/L, CNT Occupational Therapist    Prakash Fuentes, OT Student OT Student                    Time Calculation:    Time Calculation- OT       Row Name 06/09/25 1553             Time Calculation- OT    OT Start Time 1514  + 10 min for chart review  -AC (r) BH (t) AC (c)      OT Stop Time 1544  -AC (r) BH (t) AC (c)      OT Time Calculation (min) 30 min  -AC (r) BH (t)      Total Timed Code Minutes- OT 10 minute(s)  -AC (r) BH (t) AC (c)      OT Received On 06/09/25  -AC (r) BH (t) AC (c)      OT Goal Re-Cert Due Date 06/19/25  -AC (r) BH (t) AC (c)         Timed Charges    80636 - OT Self Care/Mgmt Minutes 10  -AC (r) BH (t) AC (c)         Untimed Charges    OT Eval/Re-eval Minutes 30  -AC (r) BH (t) AC (c)         Total Minutes    Timed Charges Total Minutes 10  -AC (r) BH (t)      Untimed Charges Total Minutes 30  -AC (r) BH (t)       Total Minutes 40  -AC (r) BH (t)                User Key  (r) = Recorded By, (t) = Taken By, (c) = Cosigned By      Initials Name Provider Type    Niels Perez, OTR/L, CNT Occupational Therapist    Prakash Fuentes, OT Student OT Student                           Prakash Badillo, OT Student  6/9/2025

## 2025-06-09 NOTE — PROGRESS NOTES
[facility] Medicine Services  INPATIENT PROGRESS NOTE    Patient Name: Lynn Magana  Date of Admission: 5/29/2025  Today's Date: 06/09/25  Length of Stay: 11  Primary Care Physician: Darryl Andrews DO    Subjective   Chief Complaint: Lightheadedness.  Current    Weakness - Generalized     49-year-old female with history of hypothyroidism, liver steatosis, cannabis use, malnutrition, diabetes mellitus type 2, hypertension, admitted May 11 through May 14, 2025; at that time she was admitted due to poor oral intake and weight loss.  She had an upper endoscopy performed 5/13/2025 with findings of a small hiatal hernia and no other abnormalities reported.  Per reports reviewed the patient was not taking levothyroxine at the time, and her TSH was markedly elevated.  She was restarted on levothyroxine.  Her blood glucose was low, and did not require insulin management.  Drug screening urine was positive for cannabis, and considered to be secondary to cannabinoid hyperemesis syndrome.  The patient left the hospital AGAINST MEDICAL ADVICE, apparently she eloped; after being called, she stated was already home and did not plan to return to the hospital.  On 5/16/2025, the patient returned to the hospital reporting a syncopal event.  There was questionable seizure-like activity.   A CT brain performed on that evaluation showed moderate further increase in size of the lateral and third ventricle since the previous study. The fourth ventricle reported as normal and unchanged. A small obstructing lesion at the level of aqueduct of Sylvius not excluded. Further evaluation with contrast enhanced MR imaging of the brain was suggested. She again left the ER AGAINST MEDICAL ADVICE.    Patient came to hospital 5/30/25 reporting dizziness, lightheadedness, worsening when standing up, abnormal gait with unsteadiness;  reported confusion.  Symptom has been going on for approximately  1-2 months.  Blood pressure has been variable, initially elevated.      She has been nonambulatory for several weeks.  She reported weakness of the lower extremities and right upper extremity.  She has had urinary retention requiring straight catheterization during this admission.  Lumbar puncture performed 6/1/25, with opening pressure of 17, and fluid sent for analysis.    Nerve conduction studies performed yesterday.  Being evaluated for Joy Quinonez variant.  Today found with , trying to urinate in the bedside commode.  Weakness persists.      Today  Patient has no new complaint, discussed blood transfusion again and she is agreeable.  Patient has not been able to micturate spontaneously, we agreed to place Roberts catheter and start Flomax [which I do not think will help since patient's problem is likely neurological].      Review of Systems   All pertinent negatives and positives are as above. All other systems have been reviewed and are negative unless otherwise stated.     Objective    Temp:  [97.8 °F (36.6 °C)-99.3 °F (37.4 °C)] 97.8 °F (36.6 °C)  Heart Rate:  [] 100  Resp:  [16] 16  BP: ()/(36-97) 130/97  Physical Exam  Constitutional:       Appearance: Alert, oriented to person.  No respiratory distress.  HENT:      Head: Normocephalic and atraumatic.      Nose: Nose normal.      Mouth/Throat:      Mouth: Mucous membranes are moist.   Neck, old transverse scar  Eyes:      Conjunctiva/sclera: Conjunctivae normal.      Pupils: Pupils are equal, round  Cardiovascular:      Rate and Rhythm: Normal rate and regular rhythm.      Pulses: Normal pulses.   Pulmonary:      Effort: No respiratory distress.      Breath sounds: Normal breath sounds. No wheezing, rhonchi or rales.   Abdominal:      General: Abdomen is flat. Bowel sounds are normal.      Palpations: Abdomen is soft.      Tenderness: There is no guarding or rebound.   Extremities:  No lower extremity edema.  Skin:     Capillary Refill:  "Capillary refill takes less than 2 seconds.      Coloration: Skin is not jaundiced.      Findings: No rash.   Neurological:   Weakness of neck extension.  Abnormal ocular movements.  Flaccid 3+ out of 5 lower extremity bilateral weakness  Drowsy. Slow speech.  Follows commands.          Results Review:  I have reviewed the labs, radiology results, and diagnostic studies.    Laboratory Data:   Results from last 7 days   Lab Units 06/09/25  0455 06/08/25  0337 06/07/25  1652   WBC 10*3/mm3 7.41 5.78 5.86   HEMOGLOBIN g/dL 7.4* 7.5* 8.0*   HEMATOCRIT % 22.7* 22.2* 23.4*   PLATELETS 10*3/mm3 180 173 193        Results from last 7 days   Lab Units 06/08/25  0337 06/07/25  1652 06/07/25  0300 06/03/25  1013   SODIUM mmol/L 140  --  139 146*   POTASSIUM mmol/L 3.7 4.5 3.2* 3.8   CHLORIDE mmol/L 110*  --  109* 114*   CO2 mmol/L 23.0  --  22.0 23.0   BUN mg/dL 12.5  --  13.9 18.8   CREATININE mg/dL 0.70  --  0.82 0.89   CALCIUM mg/dL 8.4*  --  8.4* 9.4   BILIRUBIN mg/dL 0.6  --  0.6  --    ALK PHOS U/L 32*  --  32*  --    ALT (SGPT) U/L 48*  --  58*  --    AST (SGOT) U/L 20  --  24  --    GLUCOSE mg/dL 130*  --  131* 146*       Culture Data:   No results found for: \"BLOODCX\", \"URINECX\", \"WOUNDCX\", \"MRSACX\", \"RESPCX\", \"STOOLCX\"    Radiology Data:   Imaging Results (Last 24 Hours)       ** No results found for the last 24 hours. **            I have reviewed the patient's current medications.     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  EEG showed-  Diffuse cerebral dysfunction of moderate degree, nonspecific.  This is most commonly seen due to toxic/metabolic or hypoxemic/ischemic cause. No evidence for epilepsy is seen  Pending Antibodies GQ1b    Nerve conduction studies review per specialist [ Nerve conduction studies did reveal slowed velocities but not in the demyelinating range.]  Neurology recommended a 5-day treatment with IVIG [day 5 of of 5 on 6/8/2025]    -Acute anemia  Patient's hemoglobin was normal on presentation, hemoglobin today is 7.4.  There is no obvious source of blood loss.  Fecal occult blood test is still pending  Patient will be transfused with 1 unit of PRBC today.  I will give patient 1 dose of lactulose today to get stool sample for FOBT    - Acute urine retention  Bladder scan on multiple occasion has showed urinary retention [this morning had 700 mL], intermittent catheterization has been going on.  Will place Roberts catheter, start patient on Flomax [which I do not think will help, patient's problem is likely neurological], and if her retention continues, we will consult urology.      -Hypothyroidism  Variable thyroid function tests on prior admissions and currently. Continue levothyroxine 150 mcg p.o. daily. I have recommended endocrinology outpatient follow up, and this was explained to family.    -Hypertension  Due to variable blood pressure values, discontinued amlodipine lisinoprill, aldactone; On Carvedilol 6.25 mg p.o. twice daily.     -Acquired cerebral ventriculomegaly   Opening pressure on lumbar puncture was unimpressive as by neurologist    Continue PT/OT  Diet as per speech therapist recommendations.    DVT prophylaxis-we will start patient on Lovenox    Disposition-I recommend SNF/rehab after inpatient treatment. Family and patient to decide [patient's  wants to take her home if].      Electronically signed by Yovanny Abernathy MD,  06/09/25, 14:48 CDT.

## 2025-06-09 NOTE — DISCHARGE PLACEMENT REQUEST
"ChinoRimaLynn ZAKI (49 y.o. Female)       Date of Birth   1975    Social Security Number       Address   [address] [place] [address]    Home Phone   [phone]    MRN   [id number]       [facility]    Marital Status                               Admission Date   5/29/2025    Admission Type   Emergency    Admitting Provider   Yovanny Abernathy MD    Attending Provider   Yovanny Abernathy MD    Department, Room/Bed   [facility] 3A, 337/1       Discharge Date       Discharge Disposition       Discharge Destination                                 Attending Provider: Yovanny Abernathy MD    Allergies: Oxycodone-acetaminophen    Isolation: None   Infection: None   Code Status: CPR    Ht: 188 cm (74\")   Wt: 67.3 kg (148 lb 4.8 oz)    Admission Cmt: None   Principal Problem: Acquired cerebral ventriculomegaly [G91.1]                   Active Insurance as of 5/29/2025       Primary Coverage       Payor Plan Insurance Group Employer/Plan Group    [employer] [place] [employer] [place]ICA[place]        Payor Plan Address Payor Plan Phone Number Payor Plan Fax Number Effective Dates    [address] [address] [phone]  3/14/2017 - None Entered    [facility] [id number]         Subscriber Name Subscriber Birth Date Member ID       LYNN MAGANA 1975 [id number]                     Emergency Contacts        (Rel.) Home Phone Work Phone Mobile Phone    Evens Cifuentes (Spouse) [phone] -- [phone]    Katya Menjivar (Sister) -- -- [phone]              Insurance Information                  [facility] MEDICAID Phone: [phone]    Subscriber: Lynn Magana Subscriber#: [id number]    Group#: -- Precert#: --    Authorization#: [id number] Effective Date: --          "

## 2025-06-10 LAB
ANION GAP SERPL CALCULATED.3IONS-SCNC: 8 MMOL/L (ref 5–15)
BASOPHILS # BLD AUTO: 0.02 10*3/MM3 (ref 0–0.2)
BASOPHILS NFR BLD AUTO: 0.3 % (ref 0–1.5)
BH BB BLOOD EXPIRATION DATE: NORMAL
BH BB BLOOD TYPE BARCODE: 5100
BH BB DISPENSE STATUS: NORMAL
BH BB PRODUCT CODE: NORMAL
BH BB UNIT NUMBER: NORMAL
BUN SERPL-MCNC: 12.2 MG/DL (ref 6–20)
BUN/CREAT SERPL: 16.5 (ref 7–25)
CALCIUM SPEC-SCNC: 8.2 MG/DL (ref 8.6–10.5)
CHLORIDE SERPL-SCNC: 109 MMOL/L (ref 98–107)
CO2 SERPL-SCNC: 23 MMOL/L (ref 22–29)
CREAT SERPL-MCNC: 0.74 MG/DL (ref 0.57–1)
CROSSMATCH INTERPRETATION: NORMAL
DEPRECATED RDW RBC AUTO: 41.6 FL (ref 37–54)
EGFRCR SERPLBLD CKD-EPI 2021: 98.7 ML/MIN/1.73
EOSINOPHIL # BLD AUTO: 0.02 10*3/MM3 (ref 0–0.4)
EOSINOPHIL NFR BLD AUTO: 0.3 % (ref 0.3–6.2)
ERYTHROCYTE [DISTWIDTH] IN BLOOD BY AUTOMATED COUNT: 13.3 % (ref 12.3–15.4)
GLUCOSE SERPL-MCNC: 104 MG/DL (ref 65–99)
HCT VFR BLD AUTO: 24.6 % (ref 34–46.6)
HEMOCCULT STL QL: NEGATIVE
HGB BLD-MCNC: 8.2 G/DL (ref 12–15.9)
IMM GRANULOCYTES # BLD AUTO: 0.06 10*3/MM3 (ref 0–0.05)
IMM GRANULOCYTES NFR BLD AUTO: 0.8 % (ref 0–0.5)
LYMPHOCYTES # BLD AUTO: 2.65 10*3/MM3 (ref 0.7–3.1)
LYMPHOCYTES NFR BLD AUTO: 33.6 % (ref 19.6–45.3)
MAGNESIUM SERPL-MCNC: 1.5 MG/DL (ref 1.6–2.6)
MCH RBC QN AUTO: 29 PG (ref 26.6–33)
MCHC RBC AUTO-ENTMCNC: 33.3 G/DL (ref 31.5–35.7)
MCV RBC AUTO: 86.9 FL (ref 79–97)
MONOCYTES # BLD AUTO: 0.43 10*3/MM3 (ref 0.1–0.9)
MONOCYTES NFR BLD AUTO: 5.4 % (ref 5–12)
NEUTROPHILS NFR BLD AUTO: 4.71 10*3/MM3 (ref 1.7–7)
NEUTROPHILS NFR BLD AUTO: 59.6 % (ref 42.7–76)
NRBC BLD AUTO-RTO: 0 /100 WBC (ref 0–0.2)
PLATELET # BLD AUTO: 182 10*3/MM3 (ref 140–450)
PMV BLD AUTO: 10.1 FL (ref 6–12)
POTASSIUM SERPL-SCNC: 3.6 MMOL/L (ref 3.5–5.2)
POTASSIUM SERPL-SCNC: 4.9 MMOL/L (ref 3.5–5.2)
RBC # BLD AUTO: 2.83 10*6/MM3 (ref 3.77–5.28)
SODIUM SERPL-SCNC: 140 MMOL/L (ref 136–145)
UNIT  ABO: NORMAL
UNIT  RH: NORMAL
WBC NRBC COR # BLD AUTO: 7.89 10*3/MM3 (ref 3.4–10.8)

## 2025-06-10 PROCEDURE — 25010000002 ENOXAPARIN PER 10 MG: Performed by: INTERNAL MEDICINE

## 2025-06-10 PROCEDURE — 25010000002 MAGNESIUM SULFATE IN D5W 1G/100ML (PREMIX) 1-5 GM/100ML-% SOLUTION: Performed by: INTERNAL MEDICINE

## 2025-06-10 PROCEDURE — 80048 BASIC METABOLIC PNL TOTAL CA: CPT | Performed by: INTERNAL MEDICINE

## 2025-06-10 PROCEDURE — 82272 OCCULT BLD FECES 1-3 TESTS: CPT | Performed by: INTERNAL MEDICINE

## 2025-06-10 PROCEDURE — 97535 SELF CARE MNGMENT TRAINING: CPT

## 2025-06-10 PROCEDURE — 63710000001 PREDNISONE PER 5 MG: Performed by: CLINICAL NURSE SPECIALIST

## 2025-06-10 PROCEDURE — 83735 ASSAY OF MAGNESIUM: CPT | Performed by: INTERNAL MEDICINE

## 2025-06-10 PROCEDURE — 97164 PT RE-EVAL EST PLAN CARE: CPT

## 2025-06-10 PROCEDURE — 84132 ASSAY OF SERUM POTASSIUM: CPT | Performed by: INTERNAL MEDICINE

## 2025-06-10 PROCEDURE — 99233 SBSQ HOSP IP/OBS HIGH 50: CPT | Performed by: CLINICAL NURSE SPECIALIST

## 2025-06-10 PROCEDURE — 85025 COMPLETE CBC W/AUTO DIFF WBC: CPT | Performed by: INTERNAL MEDICINE

## 2025-06-10 PROCEDURE — 25010000002 POTASSIUM CHLORIDE 10 MEQ/100ML SOLUTION: Performed by: INTERNAL MEDICINE

## 2025-06-10 PROCEDURE — 97116 GAIT TRAINING THERAPY: CPT

## 2025-06-10 PROCEDURE — 97110 THERAPEUTIC EXERCISES: CPT

## 2025-06-10 PROCEDURE — 25010000002 DIPHENHYDRAMINE PER 50 MG: Performed by: PSYCHIATRY & NEUROLOGY

## 2025-06-10 RX ORDER — PREDNISONE 50 MG/1
50 TABLET ORAL
Status: DISCONTINUED | OUTPATIENT
Start: 2025-06-10 | End: 2025-06-10

## 2025-06-10 RX ORDER — MAGNESIUM SULFATE 1 G/100ML
1 INJECTION INTRAVENOUS
Status: COMPLETED | OUTPATIENT
Start: 2025-06-10 | End: 2025-06-10

## 2025-06-10 RX ORDER — POTASSIUM CHLORIDE 7.45 MG/ML
10 INJECTION INTRAVENOUS
Status: COMPLETED | OUTPATIENT
Start: 2025-06-10 | End: 2025-06-10

## 2025-06-10 RX ADMIN — THIAMINE HCL TAB 100 MG 500 MG: 100 TAB at 20:00

## 2025-06-10 RX ADMIN — ENOXAPARIN SODIUM 40 MG: 100 INJECTION SUBCUTANEOUS at 20:00

## 2025-06-10 RX ADMIN — CARVEDILOL 6.25 MG: 6.25 TABLET, FILM COATED ORAL at 09:46

## 2025-06-10 RX ADMIN — MAGNESIUM SULFATE IN DEXTROSE 1 G: 10 INJECTION, SOLUTION INTRAVENOUS at 09:58

## 2025-06-10 RX ADMIN — BISACODYL 5 MG: 5 TABLET, COATED ORAL at 04:17

## 2025-06-10 RX ADMIN — POTASSIUM CHLORIDE 10 MEQ: 7.46 INJECTION, SOLUTION INTRAVENOUS at 11:14

## 2025-06-10 RX ADMIN — FOLIC ACID 1 MG: 1 TABLET ORAL at 09:46

## 2025-06-10 RX ADMIN — TAMSULOSIN HYDROCHLORIDE 0.4 MG: 0.4 CAPSULE ORAL at 09:46

## 2025-06-10 RX ADMIN — BISACODYL 10 MG: 10 SUPPOSITORY RECTAL at 09:46

## 2025-06-10 RX ADMIN — THIAMINE HCL TAB 100 MG 500 MG: 100 TAB at 09:46

## 2025-06-10 RX ADMIN — CARVEDILOL 6.25 MG: 6.25 TABLET, FILM COATED ORAL at 17:20

## 2025-06-10 RX ADMIN — POTASSIUM CHLORIDE 10 MEQ: 7.46 INJECTION, SOLUTION INTRAVENOUS at 12:18

## 2025-06-10 RX ADMIN — THIAMINE HCL TAB 100 MG 500 MG: 100 TAB at 14:52

## 2025-06-10 RX ADMIN — MAGNESIUM SULFATE IN DEXTROSE 1 G: 10 INJECTION, SOLUTION INTRAVENOUS at 12:15

## 2025-06-10 RX ADMIN — PREDNISONE 50 MG: 50 TABLET ORAL at 09:51

## 2025-06-10 RX ADMIN — PANTOPRAZOLE SODIUM 40 MG: 40 TABLET, DELAYED RELEASE ORAL at 04:19

## 2025-06-10 RX ADMIN — POTASSIUM CHLORIDE 10 MEQ: 7.46 INJECTION, SOLUTION INTRAVENOUS at 09:55

## 2025-06-10 RX ADMIN — MAGNESIUM SULFATE IN DEXTROSE 1 G: 10 INJECTION, SOLUTION INTRAVENOUS at 11:12

## 2025-06-10 RX ADMIN — ARIPIPRAZOLE 10 MG: 5 TABLET ORAL at 09:46

## 2025-06-10 RX ADMIN — DIPHENHYDRAMINE HYDROCHLORIDE 25 MG: 50 INJECTION, SOLUTION INTRAMUSCULAR; INTRAVENOUS at 08:57

## 2025-06-10 RX ADMIN — POTASSIUM CHLORIDE 10 MEQ: 7.46 INJECTION, SOLUTION INTRAVENOUS at 13:30

## 2025-06-10 RX ADMIN — LEVOTHYROXINE SODIUM 150 MCG: 75 TABLET ORAL at 04:19

## 2025-06-10 NOTE — THERAPY TREATMENT NOTE
Acute Care - Occupational Therapy Treatment Note  [facility]     Patient Name: Lynn Magana  : 1975  MRN: [id number]  Today's Date: 6/10/2025  Onset of Illness/Injury or Date of Surgery: 25     Referring Physician: Dr. Limon    Admit Date: 2025       ICD-10-CM ICD-9-CM   1. Generalized weakness  R53.1 780.79   2. Nausea and vomiting, unspecified vomiting type  R11.2 787.01   3. Marijuana user  F12.90 305.20   4. Low TSH level  R79.89 794.5   5. Urinary tract infection without hematuria, site unspecified  N39.0 599.0   6. Dysphagia, unspecified type  R13.10 787.20   7. Impaired functional mobility and activity tolerance [Z74.09]  Z74.09 V49.89     Patient Active Problem List   Diagnosis    Syncope    Graves' disease    Polysubstance abuse    Hypertension    Diabetes    Spells of decreased attentiveness    Chronic intractable headache    Nausea and vomiting    Slow transit constipation    Nonsmoker    Type 2 diabetes mellitus, with long-term current use of insulin    GERD without esophagitis    Hyperthyroidism    Thyromegaly    Post-surgical hypothyroidism    Degeneration of cervical intervertebral disc    Cervical radiculopathy    Acute pain of right shoulder    Class 1 obesity due to excess calories with body mass index (BMI) of 30.0 to 30.9 in adult    Hypoglycemia    Stage 1 acute kidney injury    Dehydration    Dysphagia    Dehydration    Hypokalemia    Steatosis of liver    Marijuana abuse    Loss of weight    Severe protein-calorie malnutrition    Hypothyroid    Generalized weakness    Acquired cerebral ventriculomegaly     Past Medical History:   Diagnosis Date    Arthritis     Carotid artery stenosis     Degenerative disc disease, cervical     Depression     Diabetes mellitus     type 1    Disease of thyroid gland     GERD (gastroesophageal reflux disease)     Graves disease     Heart palpitations     Hyperlipidemia     Hypertension     Hypothyroidism     Seizure     Thyromegaly       Past Surgical History:   Procedure Laterality Date     SECTION      CHOLECYSTECTOMY      COLONOSCOPY  2015    Normal exam Dr. Morgan     COLONOSCOPY  2015    Normal exam    CYST REMOVAL      ENDOSCOPY N/A 2018    Normal exam    ENDOSCOPY N/A 2025    Procedure: ESOPHAGOGASTRODUODENOSCOPY WITH ANESTHESIA;  Surgeon: Jadon Smith MD;  Location:  [facility];  Service: Gastroenterology;  Laterality: N/A;  pre op: Nausea and vomiting  post op: normal  pcp: Darryl Andrews,     HYSTERECTOMY      THYROIDECTOMY Bilateral 2018    Procedure: total thyroidectomy;  Surgeon: Vitaly Givens MD;  Location: [facility] OR;  Service: ENT         OT ASSESSMENT FLOWSHEET (Last 12 Hours)       OT Evaluation and Treatment       Row Name 06/10/25 1325                   OT Time and Intention    Subjective Information no complaints  -AC (r) BH (t) AC (c)        Document Type therapy note (daily note)  -AC (r) BH (t) AC (c)        Mode of Treatment occupational therapy  -AC (r) BH (t) AC (c)           General Information    Existing Precautions/Restrictions fall  -AC (r) BH (t) AC (c)        Barriers to Rehab medically complex;cognitive status;previous functional deficit  -AC (r) BH (t) AC (c)           Pain Assessment    Pretreatment Pain Rating 0/10 - no pain  -AC (r) BH (t) AC (c)        Posttreatment Pain Rating 0/10 - no pain  -AC (r) BH (t) AC (c)           Cognition    Orientation Status (Cognition) oriented to;person;place;verbal cues/prompts needed for orientation;situation;time  -AC (r) BH (t) AC (c)        Personal Safety Interventions fall prevention program maintained;gait belt;muscle strengthening facilitated;nonskid shoes/slippers when out of bed;supervised activity  -AC (r) BH (t) AC (c)           Activities of Daily Living    BADL Assessment/Intervention grooming;toileting  -AC (r) BH (t) AC (c)           Grooming Assessment/Training    [place] Level (Grooming)  wash face, hands;set up  -AC (r) BH (t) AC (c)        Position (Grooming) supported sitting  BSC  -AC (r) BH (t) AC (c)           Toileting Assessment/Training    [place] Level (Toileting) adjust/manage clothing;perform perineal hygiene;minimum assist (75% patient effort);verbal cues;nonverbal cues (demo/gesture)  -AC (r) BH (t) AC (c)        Assistive Devices (Toileting) commode, bedside with drop arms  -AC (r) BH (t) AC (c)        Position (Toileting) supported sitting;supported standing  -AC (r) BH (t) AC (c)           Bed Mobility    Bed Mobility sit-supine;supine-sit;rolling left  -AC (r) BH (t) AC (c)        Rolling Left [place] (Bed Mobility) standby assist  -AC (r) BH (t) AC (c)        Rolling Right [place] (Bed Mobility) standby assist  -AC (r) BH (t) AC (c)        Scooting/Bridging [place] (Bed Mobility) standby assist  -AC (r) BH (t) AC (c)        Supine-Sit [place] (Bed Mobility) standby assist;verbal cues  -AC (r) BH (t) AC (c)        Sit-Supine [place] (Bed Mobility) standby assist;verbal cues  -AC (r) BH (t) AC (c)        Assistive Device (Bed Mobility) bed rails  -AC (r) BH (t) AC (c)           Functional Mobility    Functional Mobility- Ind. Level minimum assist (75% patient effort);verbal cues required;nonverbal cues required (demo/gesture)  -AC (r) BH (t) AC (c)        Functional Mobility- Device walker, front-wheeled  -AC (r) BH (t) AC (c)        Functional Mobility- Comment From bed to bedside commode  -AC (r) BH (t) AC (c)           Transfer Assessment/Treatment    Transfers sit-stand transfer;stand-sit transfer;toilet transfer  -AC (r) BH (t) AC (c)           Sit-Stand Transfer    Sit-Stand [place] (Transfers) minimum assist (75% patient effort);verbal cues  -AC (r) BH (t) AC (c)        Assistive Device (Sit-Stand Transfers) walker, front-wheeled  -AC (r) BH (t) AC (c)           Stand-Sit Transfer    Stand-Sit [place] (Transfers) minimum assist (75%  patient effort);verbal cues;nonverbal cues (demo/gesture)  -AC (r) BH (t) AC (c)        Assistive Device (Stand-Sit Transfers) walker, front-wheeled  -AC (r) BH (t) AC (c)           Toilet Transfer    Type (Toilet Transfer) sit-stand;stand-sit  -AC (r) BH (t) AC (c)        [place] Level (Toilet Transfer) verbal cues;nonverbal cues (demo/gesture);moderate assist (50% patient effort)  -AC (r) BH (t) AC (c)        Assistive Device (Toilet Transfer) commode, bedside with drop arms  -AC (r) BH (t) AC (c)           Motor Skills    Comments, Therapeutic Exercise 3 x 10 reps of B UE shoulder flex/ext, elbow flex/ext, and scapular protraction/retraction to increase act jamin and UE skilled components  -AC (r) BH (t) AC (c)           Wound 05/29/25 1930 Left lower leg    Wound - Properties Group Placement Date: 05/29/25  -AR Placement Time: 1930  -AR Present on Original Admission: Y  -AR Side: Left  -AR Orientation: lower  -AR Location: leg  -AR    Retired Wound - Properties Group Placement Date: 05/29/25  -AR Placement Time: 1930  -AR Present on Original Admission: Y  -AR Side: Left  -AR Orientation: lower  -AR Location: leg  -AR    Retired Wound - Properties Group Placement Date: 05/29/25  -AR Placement Time: 1930  -AR Present on Original Admission: Y  -AR Side: Left  -AR Orientation: lower  -AR Location: leg  -AR    Retired Wound - Properties Group Date first assessed: 05/29/25  -AR Time first assessed: 1930  -AR Present on Original Admission: Y  -AR Side: Left  -AR Location: leg  -AR       Wound 05/29/25 1931 Right lower leg    Wound - Properties Group Placement Date: 05/29/25  -AR Placement Time: 1931  -AR Present on Original Admission: Y  -AR Side: Right  -AR Orientation: lower  -AR Location: leg  -AR    Retired Wound - Properties Group Placement Date: 05/29/25  -AR Placement Time: 1931  -AR Present on Original Admission: Y  -AR Side: Right  -AR Orientation: lower  -AR Location: leg  -AR    Retired Wound - Properties  Group Placement Date: 05/29/25  -AR Placement Time: 1931  -AR Present on Original Admission: Y  -AR Side: Right  -AR Orientation: lower  -AR Location: leg  -AR    Retired Wound - Properties Group Date first assessed: 05/29/25  -AR Time first assessed: 1931  -AR Present on Original Admission: Y  -AR Side: Right  -AR Location: leg  -AR       Plan of Care Review    Plan of Care Reviewed With patient  -AC (r) BH (t) AC (c)        Progress improving  -AC (r) BH (t) AC (c)        Outcome Evaluation OT tx complete. Pt seen in fowlers with daughter and  at bedside. Pt oriented to person and place but required verbal cues/choices to orient to time and situation. Pt pleasant and agreeable to working with tx. Pt completed all bed mobility with SBA. Pt completed bed to bedside commode t/f with Whitney and frequent verbal cues for upright posture due to posterior lean. Pt required ModA to t/f off BSC but completed all functional mobility with Whitney using FWW. Once back EOB, pt completed 3x10 reps of B UE exercise requiring less visual/verbal cues to maintain attention to task this day than previous. Pt continues to make progress with skilled tx and continues to be motivated to participate but at this moment  continues to have cognitive and strength deficits that limit her I with ADL's. OT will continue to tx as indicated. Recommend IP rehab at d/c.  -AC (r) BH (t) AC (c)           Positioning and Restraints    Pre-Treatment Position in bed  -AC (r) BH (t) AC (c)        Post Treatment Position bed  -AC (r) BH (t) AC (c)        In Bed fowlers;call light within reach;encouraged to call for assist;exit alarm on;with family/caregiver  -AC (r) BH (t) AC (c)           Therapy Plan Review/Discharge Plan (OT)    Anticipated Discharge Disposition (OT) inpatient rehabilitation facility  -AC (r) BH (t) AC (c)                  User Key  (r) = Recorded By, (t) = Taken By, (c) = Cosigned By      Initials Name Effective Dates    CONSTANCE Shirley  Niels COSTELLO, OTR/L, CNT 02/03/23 -     Deborah Christie, MYRIAM 05/24/22 -      Prakash Badillo, OT Student 05/12/25 -                      Occupational Therapy Education       Title: PT OT SLP Therapies (Done)       Topic: Occupational Therapy (Done)       Point: ADL training (Done)       Learning Progress Summary            Patient Acceptance, E,TB, VU by  at 6/10/2025 1505    Comment: OT POC, d/c planning, B UE exercise, t/f training, bed mobility training    Acceptance, E,TB, VU by  at 6/9/2025 1553    Comment: OT POC, OT role in care, d/c planning, fall risk precautions, t/f training, bed mobility training.   Family Acceptance, E,TB, VU by  at 6/10/2025 1505    Comment: OT POC, d/c planning, B UE exercise, t/f training, bed mobility training                      Point: Home exercise program (Done)       Learning Progress Summary            Patient Acceptance, E,TB, VU by  at 6/10/2025 1505    Comment: OT POC, d/c planning, B UE exercise, t/f training, bed mobility training   Family Acceptance, E,TB, VU by  at 6/10/2025 1505    Comment: OT POC, d/c planning, B UE exercise, t/f training, bed mobility training                      Point: Precautions (Done)       Learning Progress Summary            Patient Acceptance, E,TB, VU by  at 6/9/2025 1553    Comment: OT POC, OT role in care, d/c planning, fall risk precautions, t/f training, bed mobility training.                      Point: Body mechanics (Done)       Learning Progress Summary            Patient Acceptance, E,TB, VU by  at 6/10/2025 1505    Comment: OT POC, d/c planning, B UE exercise, t/f training, bed mobility training    Acceptance, E,TB, VU by  at 6/9/2025 1553    Comment: OT POC, OT role in care, d/c planning, fall risk precautions, t/f training, bed mobility training.   Family Acceptance, E,TB, VU by  at 6/10/2025 1505    Comment: OT POC, d/c planning, B UE exercise, t/f training, bed mobility training                                       User Key       Initials Effective Dates Name Provider Type Discipline     05/12/25 -  Prakash Badillo, MARTY Student OT Student OT                      OT Recommendation and Plan  Planned Therapy Interventions (OT): activity tolerance training, adaptive equipment training, BADL retraining, functional balance retraining, occupation/activity based interventions, patient/caregiver education/training, strengthening exercise, transfer/mobility retraining  Therapy Frequency (OT): 5 times/wk  Plan of Care Review  Plan of Care Reviewed With: patient  Progress: improving  Outcome Evaluation: OT tx complete. Pt seen in fowlers with daughter and  at bedside. Pt oriented to person and place but required verbal cues/choices to orient to time and situation. Pt pleasant and agreeable to working with tx. Pt completed all bed mobility with SBA. Pt completed bed to bedside commode t/f with Whitney and frequent verbal cues for upright posture due to posterior lean. Pt required ModA to t/f off BSC but completed all functional mobility with Whitney using FWW. Once back EOB, pt completed 3x10 reps of B UE exercise requiring less visual/verbal cues to maintain attention to task this day than previous. Pt continues to make progress with skilled tx and continues to be motivated to participate but at this moment  continues to have cognitive and strength deficits that limit her I with ADL's. OT will continue to tx as indicated. Recommend IP rehab at d/c.  Plan of Care Reviewed With: patient  Outcome Evaluation: OT tx complete. Pt seen in fowlers with daughter and  at bedside. Pt oriented to person and place but required verbal cues/choices to orient to time and situation. Pt pleasant and agreeable to working with tx. Pt completed all bed mobility with SBA. Pt completed bed to bedside commode t/f with Whitney and frequent verbal cues for upright posture due to posterior lean. Pt required ModA to t/f off BSC but completed all functional  mobility with Whitney using FWW. Once back EOB, pt completed 3x10 reps of B UE exercise requiring less visual/verbal cues to maintain attention to task this day than previous. Pt continues to make progress with skilled tx and continues to be motivated to participate but at this moment  continues to have cognitive and strength deficits that limit her I with ADL's. OT will continue to tx as indicated. Recommend IP rehab at d/c.     Outcome Measures       Row Name 06/10/25 1325 06/09/25 7456          How much help from another is currently needed...    Putting on and taking off regular lower body clothing? 3  -AC (r) BH (t) AC (c) 3  -AC (r) BH (t) AC (c)     Bathing (including washing, rinsing, and drying) 2  -AC (r) BH (t) AC (c) 2  -AC (r) BH (t) AC (c)     Toileting (which includes using toilet bed pan or urinal) 3  -AC (r) BH (t) AC (c) 2  -AC (r) BH (t) AC (c)     Putting on and taking off regular upper body clothing 3  -AC (r) BH (t) AC (c) 3  -AC (r) BH (t) AC (c)     Taking care of personal grooming (such as brushing teeth) 3  -AC (r) BH (t) AC (c) 3  -AC (r) BH (t) AC (c)     Eating meals 3  -AC (r) BH (t) AC (c) 3  -AC (r) BH (t) AC (c)     AM-PAC 6 Clicks Score (OT) 17  -AC (r) BH (t) 16  -AC (r) BH (t)        Functional Assessment    Outcome Measure Options AM-PAC 6 Clicks Daily Activity (OT)  -AC (r) BH (t) AC (c) AM-PAC 6 Clicks Daily Activity (OT)  -AC (r) BH (t) AC (c)               User Key  (r) = Recorded By, (t) = Taken By, (c) = Cosigned By      Initials Name Provider Type    Niels Perez, OTR/L, CNT Occupational Therapist    Prakash Fuentes, OT Student OT Student                    Time Calculation:    Time Calculation- OT       Row Name 06/10/25 1456             Time Calculation- OT    OT Start Time 1325  -AC (r) BH (t) AC (c)      OT Stop Time 1404  -AC (r) BH (t) AC (c)      OT Time Calculation (min) 39 min  -AC (r) BH (t)      Total Timed Code Minutes- OT 39 minute(s)  -AC (r) BH (t) AC  (c)      OT Received On 06/10/25  -AC (r) BH (t) AC (c)         Timed Charges    39768 - OT Therapeutic Exercise Minutes 13  -AC (r) BH (t) AC (c)      92062 - OT Self Care/Mgmt Minutes 26  -AC (r) BH (t) AC (c)         Total Minutes    Timed Charges Total Minutes 39  -AC (r) BH (t)       Total Minutes 39  -AC (r) BH (t)                User Key  (r) = Recorded By, (t) = Taken By, (c) = Cosigned By      Initials Name Provider Type    Niels Perez, OTR/L, CNT Occupational Therapist    Prakash Fuentes, OT Student OT Student                           Prakash Badillo, MARTY Student  6/10/2025

## 2025-06-10 NOTE — CASE MANAGEMENT/SOCIAL WORK
Continued Stay Note  [facility]     Patient Name: Lynn Magana  MRN: [id number]  Today's Date: 6/10/2025    Admit Date: 5/29/2025    Plan: [facility]   Discharge Plan       Row Name 06/10/25 1412       Plan    Plan [facility]    Plan Comments Patient's insurance has approved for her to go to [facility].  [facility] can accept patient on 6/11/25.    Final Discharge Disposition Code 62 - inpatient rehab facility                   Discharge Codes    No documentation.                 Expected Discharge Date and Time       Expected Discharge Date Expected Discharge Time    Jun 11, 2025               EMI Figueroa

## 2025-06-10 NOTE — PROGRESS NOTES
[facility] Medicine Services  INPATIENT PROGRESS NOTE    Patient Name: Lynn Magana  Date of Admission: 5/29/2025  Today's Date: 06/10/25  Length of Stay: 12  Primary Care Physician: Darryl Andrews DO    Subjective   Chief Complaint: Lightheadedness.  Current    Weakness - Generalized     49-year-old female with history of hypothyroidism, liver steatosis, cannabis use, malnutrition, diabetes mellitus type 2, hypertension, admitted May 11 through May 14, 2025; at that time she was admitted due to poor oral intake and weight loss.  She had an upper endoscopy performed 5/13/2025 with findings of a small hiatal hernia and no other abnormalities reported.  Per reports reviewed the patient was not taking levothyroxine at the time, and her TSH was markedly elevated.  She was restarted on levothyroxine.  Her blood glucose was low, and did not require insulin management.  Drug screening urine was positive for cannabis, and considered to be secondary to cannabinoid hyperemesis syndrome.  The patient left the hospital AGAINST MEDICAL ADVICE, apparently she eloped; after being called, she stated was already home and did not plan to return to the hospital.  On 5/16/2025, the patient returned to the hospital reporting a syncopal event.  There was questionable seizure-like activity.   A CT brain performed on that evaluation showed moderate further increase in size of the lateral and third ventricle since the previous study. The fourth ventricle reported as normal and unchanged. A small obstructing lesion at the level of aqueduct of Sylvius not excluded. Further evaluation with contrast enhanced MR imaging of the brain was suggested. She again left the ER AGAINST MEDICAL ADVICE.    Patient came to hospital 5/30/25 reporting dizziness, lightheadedness, worsening when standing up, abnormal gait with unsteadiness;  reported confusion.  Symptom has been going on for approximately  1-2 months.  Blood pressure has been variable, initially elevated.      She has been nonambulatory for several weeks.  She reported weakness of the lower extremities and right upper extremity.  She has had urinary retention requiring straight catheterization during this admission.  Lumbar puncture performed 6/1/25, with opening pressure of 17, and fluid sent for analysis.    Nerve conduction studies performed yesterday.  Being evaluated for Joy Quinonez variant.  Today found with , trying to urinate in the bedside commode.  Weakness persists.      Today  Patient has no new complaint today, discussed discharge planning to rehab facility.    Review of Systems   All pertinent negatives and positives are as above. All other systems have been reviewed and are negative unless otherwise stated.     Objective    Temp:  [97.9 °F (36.6 °C)-99 °F (37.2 °C)] 97.9 °F (36.6 °C)  Heart Rate:  [89-98] 98  Resp:  [16] 16  BP: ()/(66-94) 103/66  Physical Exam  Constitutional:       Appearance: Alert, oriented to person.  No respiratory distress.  HENT:      Head: Normocephalic and atraumatic.      Nose: Nose normal.      Mouth/Throat:      Mouth: Mucous membranes are moist.   Neck, old transverse scar  Eyes:      Conjunctiva/sclera: Conjunctivae normal.      Pupils: Pupils are equal, round  Cardiovascular:      Rate and Rhythm: Normal rate and regular rhythm.      Pulses: Normal pulses.   Pulmonary:      Effort: No respiratory distress.      Breath sounds: Normal breath sounds. No wheezing, rhonchi or rales.   Abdominal:      General: Abdomen is flat. Bowel sounds are normal.      Palpations: Abdomen is soft.      Tenderness: There is no guarding or rebound.   Extremities:  No lower extremity edema.  Skin:     Capillary Refill: Capillary refill takes less than 2 seconds.      Coloration: Skin is not jaundiced.      Findings: No rash.   Neurological:   Weakness of neck extension.  Abnormal ocular movements.  Flaccid 3+  "out of 5 lower extremity bilateral weakness  Drowsy. Slow speech.  Follows commands.          Results Review:  I have reviewed the labs, radiology results, and diagnostic studies.    Laboratory Data:   Results from last 7 days   Lab Units 06/10/25  0627 06/09/25  0455 06/08/25  0337   WBC 10*3/mm3 7.89 7.41 5.78   HEMOGLOBIN g/dL 8.2* 7.4* 7.5*   HEMATOCRIT % 24.6* 22.7* 22.2*   PLATELETS 10*3/mm3 182 180 173        Results from last 7 days   Lab Units 06/10/25  0627 06/08/25  0337 06/07/25  1652 06/07/25  0300   SODIUM mmol/L 140 140  --  139   POTASSIUM mmol/L 3.6 3.7 4.5 3.2*   CHLORIDE mmol/L 109* 110*  --  109*   CO2 mmol/L 23.0 23.0  --  22.0   BUN mg/dL 12.2 12.5  --  13.9   CREATININE mg/dL 0.74 0.70  --  0.82   CALCIUM mg/dL 8.2* 8.4*  --  8.4*   BILIRUBIN mg/dL  --  0.6  --  0.6   ALK PHOS U/L  --  32*  --  32*   ALT (SGPT) U/L  --  48*  --  58*   AST (SGOT) U/L  --  20  --  24   GLUCOSE mg/dL 104* 130*  --  131*       Culture Data:   No results found for: \"BLOODCX\", \"URINECX\", \"WOUNDCX\", \"MRSACX\", \"RESPCX\", \"STOOLCX\"    Radiology Data:   Imaging Results (Last 24 Hours)       ** No results found for the last 24 hours. **            I have reviewed the patient's current medications.     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  EEG showed-  Diffuse cerebral dysfunction of moderate degree, nonspecific.  This is most commonly seen due to toxic/metabolic or " hypoxemic/ischemic cause. No evidence for epilepsy is seen  Pending Antibodies GQ1b    Nerve conduction studies review per specialist [ Nerve conduction studies did reveal slowed velocities but not in the demyelinating range.]  Neurology recommended a 5-day treatment with IVIG [day 5 of of 5 on 6/8/2025]    -Acute anemia  Patient's hemoglobin was normal on presentation, hemoglobin today is 7.4.  There is no obvious source of blood loss.  Fecal occult blood test is still pending  Patient will be transfused with 1 unit of PRBC today.  Fecal occult blood test was negative    - Acute urine retention  Bladder scan on multiple occasion has showed urinary retention [had 700 mL], intermittent catheterization has been going on.  Will place Roberts catheter, start patient on Flomax [which I do not think will help, patient's problem is likely neurological], and if her retention continues, we will consult urology.  She will be discharged on Roberts catheter and voiding trial will be done at the rehab.    -Hypothyroidism  Variable thyroid function tests on prior admissions and currently. Continue levothyroxine 150 mcg p.o. daily. I have recommended endocrinology outpatient follow up, and this was explained to family.    -Hypertension  Due to variable blood pressure values, discontinued amlodipine lisinoprill, aldactone; On Carvedilol 6.25 mg p.o. twice daily.     -Acquired cerebral ventriculomegaly   Opening pressure on lumbar puncture was unimpressive as by neurologist    Continue PT/OT  Diet as per speech therapist recommendations.    DVT prophylaxis-we will start patient on Lovenox    Disposition-discharge planning for tomorrow to rehab facility    Electronically signed by Yovanny Abernathy MD, 06/10/25, 15:34 CDT.

## 2025-06-10 NOTE — PLAN OF CARE
Goal Outcome Evaluation:     Outcome Evaluation: (P) Follow Up Assessment Complete: Pt alone today at time of visit. Pt is a lot more talkative this visit and able to give updates on eating and her food choices. Pt is able to do more self-feeding. Pt states she did not like the ONS, but would like to keep the vanilla ice cream TID. Canceled the Boost Orginal for now. Pt states that appetite is much better since last meeting. Pt consumed 240 ml PO fluid and 50% of breakfast and lunch today. UOP 1700 mL x 2 days. BM 6/10; soft. Labs: Cl: 109, Glu 104-130, Ca 8.2, Mg 1.5, RBC 2.83, HGB 8.2, HCT 24.6. Follow per protocol.

## 2025-06-10 NOTE — CASE MANAGEMENT/SOCIAL WORK
Continued Stay Note  [facility]     Patient Name: Lynn Magana  MRN: [id number]  Today's Date: 6/10/2025    Admit Date: 5/29/2025        Discharge Plan       Row Name 06/10/25 1102       Plan    Plan Comments Precert is still pending for [facility] rehab                   Discharge Codes    No documentation.                 Expected Discharge Date and Time       Expected Discharge Date Expected Discharge Time    Jun 11, 2025               NADIA Williamson

## 2025-06-10 NOTE — PLAN OF CARE
Goal Outcome Evaluation:  Plan of Care Reviewed With: patient        Progress: improving  Outcome Evaluation: Alert to self. Episodes of hallucinations. VSS on RA. No C/O pain reported. Voiding via F/C. Call light within reach, safety maintained.

## 2025-06-10 NOTE — NURSING NOTE
This nurse was prompted by electrolyte protocol to replace potassium. PO doses of potassium are currently ordered. Notified MD Abernathy of replacement needs, gave okay for IV replacement instead.    Scheduled PO doses will be omitted this AM and IV potassium runs will be given per electrolyte replacement protocol.

## 2025-06-10 NOTE — THERAPY RE-EVALUATION
Patient Name: Lynn Magana  : 1975    MRN: [id number]                              Today's Date: 6/10/2025       Admit Date: 2025    Visit Dx:     ICD-10-CM ICD-9-CM   1. Generalized weakness  R53.1 780.79   2. Nausea and vomiting, unspecified vomiting type  R11.2 787.01   3. Marijuana user  F12.90 305.20   4. Low TSH level  R79.89 794.5   5. Urinary tract infection without hematuria, site unspecified  N39.0 599.0   6. Dysphagia, unspecified type  R13.10 787.20   7. Impaired functional mobility and activity tolerance [Z74.09]  Z74.09 V49.89     Patient Active Problem List   Diagnosis    Syncope    Graves' disease    Polysubstance abuse    Hypertension    Diabetes    Spells of decreased attentiveness    Chronic intractable headache    Nausea and vomiting    Slow transit constipation    Nonsmoker    Type 2 diabetes mellitus, with long-term current use of insulin    GERD without esophagitis    Hyperthyroidism    Thyromegaly    Post-surgical hypothyroidism    Degeneration of cervical intervertebral disc    Cervical radiculopathy    Acute pain of right shoulder    Class 1 obesity due to excess calories with body mass index (BMI) of 30.0 to 30.9 in adult    Hypoglycemia    Stage 1 acute kidney injury    Dehydration    Dysphagia    Dehydration    Hypokalemia    Steatosis of liver    Marijuana abuse    Loss of weight    Severe protein-calorie malnutrition    Hypothyroid    Generalized weakness    Acquired cerebral ventriculomegaly     Past Medical History:   Diagnosis Date    Arthritis     Carotid artery stenosis     Degenerative disc disease, cervical     Depression     Diabetes mellitus     type 1    Disease of thyroid gland     GERD (gastroesophageal reflux disease)     Graves disease     Heart palpitations     Hyperlipidemia     Hypertension     Hypothyroidism     Seizure     Thyromegaly      Past Surgical History:   Procedure Laterality Date     SECTION      CHOLECYSTECTOMY      COLONOSCOPY   11/16/2015    Normal exam Dr. Morgan     COLONOSCOPY  11/16/2015    Normal exam    CYST REMOVAL      ENDOSCOPY N/A 4/17/2018    Normal exam    ENDOSCOPY N/A 5/13/2025    Procedure: ESOPHAGOGASTRODUODENOSCOPY WITH ANESTHESIA;  Surgeon: Jadon Smith MD;  Location: [facility] ENDOSCOPY;  Service: Gastroenterology;  Laterality: N/A;  pre op: Nausea and vomiting  post op: normal  pcp: Darryl Andrews,     HYSTERECTOMY      THYROIDECTOMY Bilateral 11/19/2018    Procedure: total thyroidectomy;  Surgeon: Vitaly Givens MD;  Location: [facility] OR;  Service: ENT      General Information       Row Name 06/10/25 1109          Physical Therapy Time and Intention    Document Type re-evaluation;other (see comments)  see MAR  -     Mode of Treatment physical therapy  -       Row Name 06/10/25 1109          General Information    Patient Profile Reviewed yes  -JE     Prior Level of Function --   decline over the last month and half prior to admission and was not able to walk upon admission, but prior to that was I  -JE     Existing Precautions/Restrictions fall  -JE     Barriers to Rehab medically complex;cognitive status;impaired sensation;previous functional deficit  -       Row Name 06/10/25 1109          Living Environment    Current Living Arrangements home  -JE     People in Home spouse;child(neymar), dependent  -     Name(s) of People in Home spouse - Chase; mattr - Stewart 16 yr old dtr  -       Row Name 06/10/25 1109          Home Main Entrance    Number of Stairs, Main Entrance one  -JE     Stair Railings, Main Entrance railings on both sides of stairs  -       Row Name 06/10/25 1109          Stairs Within Home, Primary    Number of Stairs, Within Home, Primary none  -       Row Name 06/10/25 1109          Cognition    Orientation Status (Cognition) oriented to;person;other (see comments);disoriented to;situation  hints for place, time  -       Row Name 06/10/25 1109          Safety  Issues/Impairments Affecting Functional Mobility    Safety Issues Affecting Function (Mobility) ability to follow commands;at risk behavior observed;friction/shear risk;impulsivity;insight into deficits/self-awareness;judgment;safety precaution awareness  -     Impairments Affecting Function (Mobility) balance;cognition;coordination;endurance/activity tolerance;motor control;sensation/sensory awareness  -     Cognitive Impairments, Mobility Safety/Performance impulsivity;insight into deficits/self-awareness;judgment;safety precaution awareness;safety precaution follow-through  -               User Key  (r) = Recorded By, (t) = Taken By, (c) = Cosigned By      Initials Name Provider Type    Selin Capone, PT Physical Therapist                   Mobility       Row Name 06/10/25 1109          Bed Mobility    Bed Mobility sit-supine;supine-sit  -ROBERTA     Supine-Sit [place] (Bed Mobility) standby assist;verbal cues  -ROBERTA     Sit-Supine [place] (Bed Mobility) standby assist;verbal cues  -ROBERTA     Assistive Device (Bed Mobility) bed rails  -       Row Name 06/10/25 1109          Sit-Stand Transfer    Sit-Stand [place] (Transfers) minimum assist (75% patient effort);verbal cues  -ROBERTA     Comment, (Sit-Stand Transfer) worked on sit to/from stand from w/ work on proper tech w/ emphasis on hand placement  -       Row Name 06/10/25 1109          Gait/Stairs (Locomotion)    [place] Level (Gait) minimum assist (75% patient effort);verbal cues  -ROBERTA     Assistive Device (Gait) walker, front-wheeled  -ROBERTA     Distance in Feet (Gait) 30  -ROBERTA     Deviations/Abnormal Patterns (Gait) gait speed decreased  -ROBERTA     Bilateral Gait Deviations heel strike decreased  -ROBERTA     Comment, (Gait/Stairs) decrease foot clearance and step length; noted trembling in her LEs  -ROBERTA               User Key  (r) = Recorded By, (t) = Taken By, (c) = Cosigned By      Initials Name Provider Type    Selin Capone, PT Physical  Therapist                   Obj/Interventions       Row Name 06/10/25 1109          Range of Motion Comprehensive    Comment, General Range of Motion AROM all 4 extremities WFLs  -       Row Name 06/10/25 1109          Strength Comprehensive (MMT)    Comment, General Manual Muscle Testing (MMT) Assessment B shld flex grossly 4/5, all other UE ms groups grossly 4+ to 5/5; B hip flexors 3+/5, all other B LE ms groups grossly 4 to 4+/5,  -       Row Name 06/10/25 1109          Motor Skills    Motor Skills --  intact finger opposition B, intact KENNY B UE, intact FTN, difficulty w/ KENNY at feet, increase difficult w/ heel to shin w/ L LE compared to R  -[facility] Name 06/10/25 1109          Balance    Balance Assessment sitting static balance;sitting dynamic balance;standing static balance;standing dynamic balance  -     Static Sitting Balance standby assist  -     Dynamic Sitting Balance standby assist;contact guard;verbal cues  -     Position, Sitting Balance supported;unsupported;sitting edge of bed  -     Static Standing Balance contact guard;verbal cues  -     Dynamic Standing Balance minimal assist;verbal cues  -     Position/Device Used, Standing Balance supported;walker, front-wheeled  -[facility] Name 06/10/25 1109          Sensory Assessment (Somatosensory)    Sensory Assessment (Somatosensory) other (see comments)  reports difference in sensation w/ L LE being less from lower leg to foot, however difficulty w/ assessment and unclear of accuracy, but did recognize there was a reported difference  -               User Key  (r) = Recorded By, (t) = Taken By, (c) = Cosigned By      Initials Name Provider Type    Selin Capone, PT Physical Therapist                   Goals/Plan       Row Name 06/10/25 1109          Bed Mobility Goal 1 (PT)    Activity/Assistive Device (Bed Mobility Goal 1, PT) rolling to left;rolling to right;scooting;sit to supine/supine to sit;sidelying to sit/sit to  sidelying  -     [place] Level/Cues Needed (Bed Mobility Goal 1, PT) standby assist;independent  -     Time Frame (Bed Mobility Goal 1, PT) long term goal (LTG);10 days  -     Progress/Outcomes (Bed Mobility Goal 1, PT) goal revised this date  -       Row Name 06/10/25 1109          Transfer Goal 1 (PT)    Activity/Assistive Device (Transfer Goal 1, PT) sit-to-stand/stand-to-sit;bed-to-chair/chair-to-bed;other (see comments)  rwx for bed to/from chair  -     [place] Level/Cues Needed (Transfer Goal 1, PT) contact guard required;standby assist  -     Time Frame (Transfer Goal 1, PT) long term goal (LTG);10 days  -     Progress/Outcome (Transfer Goal 1, PT) goal revised this date  -       Row Name 06/10/25 1109          Gait Training Goal 1 (PT)    Activity/Assistive Device (Gait Training Goal 1, PT) gait (walking locomotion);assistive device use;decrease fall risk;improve balance and speed;increase endurance/gait distance;walker, rolling;diminish gait deviation  -     [place] Level (Gait Training Goal 1, PT) contact guard required;standby assist  -     Distance (Gait Training Goal 1, PT) 40-50 ft  -     Time Frame (Gait Training Goal 1, PT) long term goal (LTG);10 days  -     Progress/Outcome (Gait Training Goal 1, PT) goal revised this date  -       Row Name 06/10/25 1109          Problem Specific Goal 1 (PT)    Problem Specific Goal 1 (PT) pt able to actively move all 4 extremities against gravity repetitively 8-10 times demonstrating improved strength throughout  -     Time Frame (Problem Specific Goal 1, PT) long-term goal (LTG);other (see comments)  -     Progress/Outcome (Problem Specific Goal 1, PT) goal met  -       Row Name 06/10/25 1109          Therapy Assessment/Plan (PT)    Planned Therapy Interventions (PT) balance training;bed mobility training;gait training;home exercise program;motor coordination training;patient/family education;postural  re-education;ROM (range of motion);strengthening;transfer training;other (see comments)  safety/falls prevention  -               User Key  (r) = Recorded By, (t) = Taken By, (c) = Cosigned By      Initials Name Provider Type    Selin Capone, PT Physical Therapist                   Clinical Impression       Row Name 06/10/25 110          Pain    Pretreatment Pain Rating 0/10 - no pain  -     Posttreatment Pain Rating 0/10 - no pain  -ROBERTA       Row Name 06/10/25 1101          Plan of Care Review    Plan of Care Reviewed With patient;spouse;child  -     Progress improving  -     Outcome Evaluation PT re eval completed.  Pt pleasant and agreeable to therapy.  Oriented to her family and self, however required hints to know place, time.  Disoriented to situations.  Pt intact to FTN, KENNY at B UEs, and finger opposition.  Pt w/ difficulty performing KENNY at feet and increase difficulty w/ heel to shin at L compared to R.  Pt reports a difference in sensation at L lower leg and foot compared to R, but difficulty fully describing.  Pt performed bed mobility w/ SBA w/ bedrails, tfers w/ min assist.  Gait w/ rwx w/ min assist ~ 30 ft.  Flexed posture, decrease step length, foot clearance and heel strike.  Noted trembling at LEs w/ standing/gait activity.  Pt returned to bed w/ SBA.  Pt will benefit from continued PT services to improve activity tolerance, motor control/coordination, overall strength, balance, safety awareness and to progress I w/ functional mobility reducing fall risk.  Recommend continued skilled care at discharge in acute rehab.  Will follow for progress and needs.  -ROBERTA       Row Name 06/10/25 1108          Therapy Assessment/Plan (PT)    Patient/Family Therapy Goals Statement (PT) to return home; improve her ability to perform functional tasks; eagerly awaits admission into acute rehab to further her progress toward returning home  -     Rehab Potential (PT) good  -     Criteria for  Skilled Interventions Met (PT) yes;meets criteria;skilled treatment is necessary  -     Therapy Frequency (PT) 2 times/day  -     Predicted Duration of Therapy Intervention (PT) until discharge or goals achieved  -       Row Name 06/10/25 1109          Vital Signs    O2 Delivery Pre Treatment room air  -JE     O2 Delivery Intra Treatment room air  -JE     O2 Delivery Post Treatment room air  -JE     Pre Patient Position Supine  -JE     Intra Patient Position Standing  -JE     Post Patient Position Supine  -JE       Row Name 06/10/25 1109          Positioning and Restraints    Pre-Treatment Position in bed  -JE     Post Treatment Position bed  -JE     In Bed notified nsg;fowlers;call light within reach;encouraged to call for assist;side rails up x2;exit alarm on  -               User Key  (r) = Recorded By, (t) = Taken By, (c) = Cosigned By      Initials Name Provider Type    Selin Capone, PT Physical Therapist                   Outcome Measures       Row Name 06/10/25 1109 06/10/25 0845       How much help from another person do you currently need...    Turning from your back to your side while in flat bed without using bedrails? 3  -JE 2  -SD    Moving from lying on back to sitting on the side of a flat bed without bedrails? 3  -JE 2  -SD    Moving to and from a bed to a chair (including a wheelchair)? 3  -JE 2  -SD    Standing up from a chair using your arms (e.g., wheelchair, bedside chair)? 3  -JE 2  -SD    Climbing 3-5 steps with a railing? 2  -JE 1  -SD    To walk in hospital room? 3  -JE 2  -SD    AM-PAC 6 Clicks Score (PT) 17  -JE 11  -SD    Highest Level of Mobility Goal Stand (1 or More Minutes)-5  -JE Move to Chair/Commode-4  -SD      Row Name 06/10/25 1109          Functional Assessment    Outcome Measure Options AM-PAC 6 Clicks Basic Mobility (PT)  -               User Key  (r) = Recorded By, (t) = Taken By, (c) = Cosigned By      Initials Name Provider Type    Selin Capone, PT  Physical Therapist    Princess Rodriguez LPN Licensed Nurse                                 Physical Therapy Education       Title: PT OT SLP Therapies (Done)       Topic: Physical Therapy (Done)       Point: Mobility training (Done)       Learning Progress Summary            Patient Acceptance, E,TB,D, VU,NR by  at 5/30/2025 1155    Comment: Education re: purpose of PT/importance of activity, safety/falls prevention, improved tech w/ bed mobility, tfers   Significant Other Acceptance, E,TB,D, VU,NR by  at 5/30/2025 1155    Comment: Education re: purpose of PT/importance of activity, safety/falls prevention, improved tech w/ bed mobility, tfers                      Point: Home exercise program (Done)       Learning Progress Summary            Patient Acceptance, E, VU by  at 6/5/2025 1944                      Point: Precautions (Done)       Learning Progress Summary            Patient Acceptance, E,TB,D, VU,NR by  at 5/30/2025 1155    Comment: Education re: purpose of PT/importance of activity, safety/falls prevention, improved tech w/ bed mobility, tfers   Significant Other Acceptance, E,TB,D, VU,NR by  at 5/30/2025 1155    Comment: Education re: purpose of PT/importance of activity, safety/falls prevention, improved tech w/ bed mobility, tfers                                      User Key       Initials Effective Dates Name Provider Type Discipline     08/02/18 -  Selin Leon, PT Physical Therapist PT     02/10/25 -  Lesia Ricketts, RN Registered Nurse Nurse                  PT Recommendation and Plan  Planned Therapy Interventions (PT): balance training, bed mobility training, gait training, home exercise program, motor coordination training, patient/family education, postural re-education, ROM (range of motion), strengthening, transfer training, other (see comments) (safety/falls prevention)  Progress: improving  Outcome Evaluation: PT re eval completed.  Pt pleasant and agreeable to  therapy.  Oriented to her family and self, however required hints to know place, time.  Disoriented to situations.  Pt intact to FTN, KENNY at B UEs, and finger opposition.  Pt w/ difficulty performing KENNY at feet and increase difficulty w/ heel to shin at L compared to R.  Pt reports a difference in sensation at L lower leg and foot compared to R, but difficulty fully describing.  Pt performed bed mobility w/ SBA w/ bedrails, tfers w/ min assist.  Gait w/ rwx w/ min assist ~ 30 ft.  Flexed posture, decrease step length, foot clearance and heel strike.  Noted trembling at LEs w/ standing/gait activity.  Pt returned to bed w/ SBA.  Pt will benefit from continued PT services to improve activity tolerance, motor control/coordination, overall strength, balance, safety awareness and to progress I w/ functional mobility reducing fall risk.  Recommend continued skilled care at discharge in acute rehab.  Will follow for progress and needs.     Time Calculation:         PT Charges       Row Name 06/10/25 1230             Time Calculation    Start Time 1109  -      Stop Time 1226  -      Time Calculation (min) 77 min  -      PT Received On 06/10/25  -      PT Goal Re-Cert Due Date 06/20/25  -                User Key  (r) = Recorded By, (t) = Taken By, (c) = Cosigned By      Initials Name Provider Type    Selin Capone, PT Physical Therapist                  Therapy Charges for Today       Code Description Service Date Service Provider Modifiers Qty    [id number]  PT RE-EVAL ESTABLISHED PLAN 2 6/10/2025 Selin Leon, PT GP 1    [id number]  GAIT TRAINING EA 15 MIN 6/10/2025 Selin Leon, PT GP 3            PT G-Codes  Outcome Measure Options: AM-PAC 6 Clicks Basic Mobility (PT)  AM-PAC 6 Clicks Score (PT): 17  AM-PAC 6 Clicks Score (OT): 16  PT Discharge Summary  Anticipated Discharge Disposition (PT): inpatient rehabilitation facility    Selin Leon PT  6/10/2025

## 2025-06-10 NOTE — PLAN OF CARE
Goal Outcome Evaluation:  Plan of Care Reviewed With: patient        Progress: improving  Outcome Evaluation: OT tx complete. Pt seen in fowlers with daughter and  at bedside. Pt oriented to person and place but required verbal cues/choices to orient to time and situation. Pt pleasant and agreeable to working with tx. Pt completed all bed mobility with SBA. Pt completed bed to bedside commode t/f with Whitney and frequent verbal cues for upright posture due to posterior lean. Pt required ModA to t/f off BSC but completed all functional mobility with Whitney using FWW. Once back EOB, pt completed 3x10 reps of B UE exercise requiring less visual/verbal cues to maintain attention to task this day than previous. Pt continues to make progress with skilled tx and continues to be motivated to participate but at this moment  continues to have cognitive and strength deficits that limit her I with ADL's. OT will continue to tx as indicated. Recommend IP rehab at d/c.    Anticipated Discharge Disposition (OT): inpatient rehabilitation facility

## 2025-06-10 NOTE — PLAN OF CARE
Goal Outcome Evaluation:           Progress: no change  Outcome Evaluation: Alert, Oriented to self only. Room air. No c/o pain. Up with x2/PT assistance with gait belt with walker. BP very low this morning, MD joshi notified, received new orders. IV bolus given with good results. 1 unit PRBC's given per orders, pt tolerated well. Meds given crushed in applesauce this shift. Meds given to promote BM with no results. Roberts catheter placed this shift, pt unable to void on own even sitting on BSC. Call light within reach.

## 2025-06-10 NOTE — PLAN OF CARE
Goal Outcome Evaluation:  Plan of Care Reviewed With: patient, spouse, child        Progress: improving  Outcome Evaluation: PT re eval completed.  Pt pleasant and agreeable to therapy.  Oriented to her family and self, however required hints to know place, time.  Disoriented to situations.  Pt intact to FTN, KENNY at B UEs, and finger opposition.  Pt w/ difficulty performing KENNY at feet and increase difficulty w/ heel to shin at L compared to R.  Pt reports a difference in sensation at L lower leg and foot compared to R, but difficulty fully describing.  Pt performed bed mobility w/ SBA w/ bedrails, tfers w/ min assist.  Gait w/ rwx w/ min assist ~ 30 ft.  Flexed posture, decrease step length, foot clearance and heel strike.  Noted trembling at LEs w/ standing/gait activity.  Pt returned to bed w/ SBA.  Pt will benefit from continued PT services to improve activity tolerance, motor control/coordination, overall strength, balance, safety awareness and to progress I w/ functional mobility reducing fall risk.  Recommend continued skilled care at discharge in acute rehab.  Will follow for progress and needs.    Anticipated Discharge Disposition (PT): inpatient rehabilitation facility

## 2025-06-10 NOTE — PROGRESS NOTES
Neurology Progress Note      Chief Complaint:  AMS  Length of Stay:  12   Subjective     Subjective:  6/10: sitting up in bed feeding self breakfast. No visitors present. She received 1 UPRBCs yesterday and states she feels stronger this AM. HGB 8.2 today. Mg+ 1.5 and RN states plans to be replaced. Patient continues to have periods of confusion and visual hallucinations but is easily redirected. Unable to void yesterday and jamison cath placed.   MMA results 166, GQ1b negative. (GQ1b antibodies are seen in more than 80% of patients with Joy-Quinonez syndrome and may be elevated in Guillain-[place] syndrome patients with ophthalmoplegia. These tests by themselves are not diagnostic and should be used in conjunction with other clinical parameters to confirm disease.)     6/9: sitting up in chair. Patient has had shower this AM and also ambulated in the chair.  had told attending he wanted to take patient home today. HGB steady at 7.4. Attending had ordered transfusion and patient/ refused binotically but after discussion has now consented to blood transfusion. Patient also had episode of hypotension. Still with some confusion and hallucinations. Patient has had gradual improvement since initiation of IV IG. Still with opthalmologic.    6/8/2025: HGB stable 7.5 this AM. Still having episodes of confusion and visual hallucinations.  Daughter at bedside. She continues to look brighter every day. Today is #5/5 IVIG.      6/7/2025: Lying in bed. Daughter at bedside. Patient very alert this AM. Nursing reports visual hallucinations last PM and during day time as well. She appears much stronger today. H/H 7.7/23.5 down form 9.3/28.4 on 6/4. Discussed with nursing. Patient has not had BM and no signs of bleeding. Will check STOB.  On exam today, patient able to abduct right eye past midline. Today #4/5 IVIG.  Patient seen along with Dr. Patrick, tele neurology.     6/6/2025: patient sitting up in bed. 16 y.o.  daughter at bedside. Patient looks brighter today. Nursing reports patient had visual hallucinations last PM. Daughter reports she is having confusion and has been stating she is at [place] but she is currently oriented to person and place. Today #3/5 IV IG.   Patient seen with tele neurology Dr. LAUREN Patrick.    6/5/2025: lying in bed.  at bedside. Tolerated IV IG yesterday. Today is #2/5. Patient is more alert and interactive. Still with dysconjugate gaze. She appears to have more strength in extremities and  thinks this as well. Improved effort on exam. Orthostatic BP positive as below.         6/4/2025:She is somewhat more awake today.  Her  is at the bedside today we speak to him.  She continues to have global weakness and decreasing cognition.  She has shown some mild improvement since admission.  We discussed the risk and benefits of IVIG therapy and she expresses understanding.  She would like to proceed with this.  After I left the room the patient did get up and ambulate with assistance by physical therapy to the bedside commode.  After being there briefly she slumped over and had some disconjugate gaze.  There may have been greater right than left sided weakness.  She was returned to the bed and recovered immediately.    Medications:  Current Facility-Administered Medications   Medication Dose Route Frequency Provider Last Rate Last Admin    ARIPiprazole (ABILIFY) tablet 10 mg  10 mg Oral Daily Bautista Limon MD   10 mg at 06/09/25 0858    sennosides-docusate (PERICOLACE) 8.6-50 MG per tablet 2 tablet  2 tablet Oral BID PRN Bautista Limon MD   2 tablet at 06/09/25 0858    And    polyethylene glycol (MIRALAX) packet 17 g  17 g Oral Daily PRN Bautista Limon MD   17 g at 06/09/25 0852    And    bisacodyl (DULCOLAX) EC tablet 5 mg  5 mg Oral Daily PRN Bautista Limon MD   5 mg at 06/10/25 0417    And    bisacodyl (DULCOLAX) suppository 10 mg  10 mg Rectal Daily PRN Braxton  Bautista CALVILLO MD        Calcium Replacement - Follow Nurse / BPA Driven Protocol   Not Applicable PRN Bautista Limon MD        carvedilol (COREG) tablet 6.25 mg  6.25 mg Oral BID With Meals Bautista Limon MD   6.25 mg at 06/09/25 1835    diphenhydrAMINE (BENADRYL) injection 25 mg  25 mg Intravenous Daily Ori Banuelos MD   25 mg at 06/08/25 0925    enoxaparin sodium (LOVENOX) syringe 40 mg  40 mg Subcutaneous Nightly Yovanny Abernathy MD   40 mg at 06/09/25 2026    folic acid (FOLVITE) tablet 1 mg  1 mg Oral Daily Yovanny Abernathy MD   1 mg at 06/09/25 0858    hydrALAZINE (APRESOLINE) injection 10 mg  10 mg Intravenous Q6H PRN Alireza Lam MD   10 mg at 06/05/25 0048    levothyroxine (SYNTHROID, LEVOTHROID) tablet 150 mcg  150 mcg Oral Q AM Bautista Limon MD   150 mcg at 06/10/25 0419    Magnesium Low Dose Replacement - Follow Nurse / BPA Driven Protocol   Not Applicable PRN Bautista Limon MD        magnesium sulfate in D5W 1g/100mL (PREMIX)  1 g Intravenous Q1H Yovanny Abernathy MD        melatonin tablet 10 mg  10 mg Oral Nightly PRN Bautista Limon MD   10 mg at 06/06/25 1953    ondansetron ODT (ZOFRAN-ODT) disintegrating tablet 4 mg  4 mg Oral Q6H PRN Bautista Limon MD        Or    ondansetron (ZOFRAN) injection 4 mg  4 mg Intravenous Q6H PRN Bautista Limon MD   4 mg at 05/30/25 2139    pantoprazole (PROTONIX) EC tablet 40 mg  40 mg Oral Q AM Bautista Limon MD   40 mg at 06/10/25 0419    Phosphorus Replacement - Follow Nurse / BPA Driven Protocol   Not Applicable PRN Bautista Limon MD        potassium chloride (KAYCIEL) 20 mEq/15 mL solution 40 mEq  40 mEq Oral Daily Yovanny Abernathy MD   40 mEq at 06/09/25 0852    potassium chloride (KLOR-CON M20) CR tablet 40 mEq  40 mEq Oral Daily Yovanny Abernathy MD        potassium chloride 10 mEq in 100 mL IVPB  10 mEq Intravenous Q1H Yovanny Abernathy MD        Potassium Replacement - Follow Nurse / BPA Driven  Protocol   Not Applicable PRN Bautista Limon MD        sodium chloride 0.9 % flush 10 mL  10 mL Intravenous PRN Safia Wood APRN        tamsulosin (FLOMAX) 24 hr capsule 0.4 mg  0.4 mg Oral Daily Yovanny Abernathy MD   0.4 mg at 06/09/25 1835    thiamine (VITAMIN B-1) tablet 500 mg  500 mg Oral TID Yovanny Abernathy MD   500 mg at 06/09/25 2026             Objective      Vital Signs  Temp:  [97.8 °F (36.6 °C)-99 °F (37.2 °C)] 98.3 °F (36.8 °C)  Heart Rate:  [] 93  Resp:  [16] 16  BP: ()/(36-97) 121/89    Physical Exam:    HEENT:  Neck is supple  CVS:  RRR  Lungs:  CTA - B/L  Abd:  NT/ND  Ext:  No edema  Skin:  No rashes    Pertinent Neuro Exam:  Awake. alert and brighter today.   Can follow commands   Pupils equal.  Able to count fingers today.  Disconjugate gaze chronically and intermittently able to abduct right eye past midline.  No unilateral facial droop  Antigravity strength in all 4 extremities  No signs of tremors or increased tone  Mild ataxia noted bilaterally  Improved strength bilateral upper and lower extremities 4/5.     Last nurse assessment:  Interval: baseline  1a. Level of Consciousness: 0-->Alert, keenly responsive  1b. LOC Questions: 0-->Answers both questions correctly  1c. LOC Commands: 0-->Performs both tasks correctly  2. Best Gaze: 0-->Normal  3. Visual: 0-->No visual loss  4. Facial Palsy: 0-->Normal symmetrical movements  5a. Motor Arm, Left: 0-->No drift, limb holds 90 (or 45) degrees for full 10 secs  5b. Motor Arm, Right: 0-->No drift, limb holds 90 (or 45) degrees for full 10 secs  6a. Motor Leg, Left: 1-->Drift, leg falls by the end of the 5-sec period but does not hit bed  6b. Motor Leg, Right: 1-->Drift, leg falls by the end of the 5-sec period but does not hit bed  7. Limb Ataxia: 0-->Absent  8. Sensory: 0-->Normal, no sensory loss  9. Best Language: 0-->No aphasia, normal  10. Dysarthria: 0-->Normal  11. Extinction and Inattention (formerly Neglect):  0-->No abnormality    Total (NIH Stroke Scale): 2       Results Review:      Labs:  Labs reviewed as below  LAB RESULTS:      Lab 06/10/25  0627 06/09/25  0455 06/08/25 0337 06/07/25 1652 06/07/25  0259   WBC 7.89 7.41 5.78 5.86 7.93   HEMOGLOBIN 8.2* 7.4* 7.5* 8.0* 7.7*   HEMATOCRIT 24.6* 22.7* 22.2* 23.4* 23.5*   PLATELETS 182 180 173 193 195   NEUTROS ABS 4.71 3.74 3.74  --  5.05   IMMATURE GRANS (ABS) 0.06* 0.10* 0.08*  --  0.07*   LYMPHS ABS 2.65 3.18* 1.60  --  2.31   MONOS ABS 0.43 0.36 0.35  --  0.48   EOS ABS 0.02 0.01 0.00  --  0.01   MCV 86.9 88.7 87.1 89.0 88.3         Lab 06/10/25  0627 06/08/25  0337 06/07/25  1652 06/07/25  0300 06/04/25  0532 06/03/25  1013   SODIUM 140 140  --  139  --  146*   POTASSIUM 3.6 3.7 4.5 3.2*  --  3.8   CHLORIDE 109* 110*  --  109*  --  114*   CO2 23.0 23.0  --  22.0  --  23.0   ANION GAP 8.0 7.0  --  8.0  --  9.0   BUN 12.2 12.5  --  13.9  --  18.8   CREATININE 0.74 0.70  --  0.82  --  0.89   EGFR 98.7 105.5  --  87.3  --  79.1   GLUCOSE 104* 130*  --  131*  --  146*   CALCIUM 8.2* 8.4*  --  8.4*  --  9.4   MAGNESIUM 1.5* 1.6  --  1.6  --  2.0   HEMOGLOBIN A1C  --   --   --   --  4.90  --          Lab 06/08/25 0337 06/07/25  0300   TOTAL PROTEIN 6.3 6.2   ALBUMIN 2.6* 2.6*   GLOBULIN 3.7 3.6   ALT (SGPT) 48* 58*   AST (SGOT) 20 24   BILIRUBIN 0.6 0.6   ALK PHOS 32* 32*                     Lab 06/09/25  1041   ABO TYPING A   RH TYPING Positive   ANTIBODY SCREEN Negative           Brief Urine Lab Results  (Last result in the past 365 days)        Color   Clarity   Blood   Leuk Est   Nitrite   Protein   CREAT   Urine HCG        06/01/25 0000 Orange   Clear   Negative   Moderate (2+)   Positive   30 mg/dL (1+)                 Microbiology Results (last 10 days)       Procedure Component Value - Date/Time    Culture, CSF - Cerebrospinal Fluid, Lumbar Puncture [[id number]] Collected: 06/01/25 0920    Lab Status: Final result Specimen: Cerebrospinal Fluid from Lumbar Puncture  Updated: 25 0628     CSF Culture No growth at 3 days     Gram Stain No WBCs or organisms seen        Other labs:  RPR normal  HIV negative  Urine analysis positive  TSH low at 0.027  B12 elevated next line folate low at 2.78  Ammonia 36  Ethanol level negligible  MMA - 166  GQ1b negative. (GQ1b antibodies are seen in more than 80% of patients with Joy-Quinonez syndrome and may be elevated in Guillain-[place] syndrome patients with ophthalmoplegia. These tests by themselves are not diagnostic and should be used in conjunction with other clinical parameters to confirm disease.)   2025 LP:  CSF:  W:  0  R:  0  P:  55.7        Imaging:  MRI of the brain shows ventriculomegaly but not consistently out of proportion compared to the overall degree of atrophy.  MRI of the cervical and lumbar spine couple  CT of the abdomen shows some nonspecific findings possibly consistent with enteritis but no signs of a primary malignancy.  CT of pelvis done on  showed urinary bladder wall thickening but no signs of an occult malignancy.  EEG shows nonspecific slowing  Nerve conduction velocities performed on 6/3 show velocities in the 40 m/s range.  This is slow although not necessarily in the demyelinating range.    Assessment/Plan     Hospital Problem List      Acquired cerebral ventriculomegaly    Generalized weakness    Impression:  Progressive neurologic decline for several months with global weakness, opthalmoplegia, and ataxia.  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  We discussed the risk and benefits of this and her and her  would like to proceed with this.  Initiated 5 days of IVIG on 2025 with fifth dose on 2025 therapy to see if she improves from this.  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  Poor effort on exam  Encephalopathy with visual hallucinations.  anemia  Folate deficiency  Ventriculomegally - opening pressure unimpressive  HTN  Hx of cannabis usage  Orthostatic hypotension.  Unintentional weight loss for several months.       Plan:  IVIG 400 mg/kg daily  with 5th and final dose on  6/8/2025.  Monitor CBC. Improved after 1 UPRBCs on 6/9/2025.  Defer treatment of anemia to attending. Patient and  has consented to transfusion after our discussion.  Check stool OB  Replace Folic acid: started 6/2  Dietician following patient.   Long discussion with patient and  regarding d/c plan and recommend acute rehab. Right now do not recommend family take patient home. She continues to require in/out catheter and requires assist of at least 2 for standing and transfers.       Medical Decision Making    Number/Complexity of Problems  Moderate  1 undiagnosed new problem with uncertain prognosis -   1 acute illness with systemic symptoms -   High  1 acute or chronic illness that poses a threat to life/body function -   High     MDM Data  Moderate - 1/3 categories  Extensive - 2/3 categories    Category 1: 3 of the following  Review of external notes  Review of results  Ordering of each unique test  Independent historian  Category 2:  Independent interpretation of test (ex: imaging)  Category 3:  Discussion of management with another provider    Extensive     Treatment Plan  Moderate - Prescription Drug management  High  Drug therapy requiring intensive monitoring for toxicity  Decision regarding hospitalization or escalation of care  De-escalate care/DNR decisions         Abi Frausto, APRN  06/10/25  08:43 CDT

## 2025-06-11 ENCOUNTER — HOSPITAL ENCOUNTER (INPATIENT)
Age: 50
LOS: 13 days | Discharge: HOME HEALTH | DRG: 094 | End: 2025-06-24
Attending: PSYCHIATRY & NEUROLOGY | Admitting: PSYCHIATRY & NEUROLOGY
Payer: MEDICAID

## 2025-06-11 VITALS
DIASTOLIC BLOOD PRESSURE: 76 MMHG | OXYGEN SATURATION: 98 % | HEART RATE: 69 BPM | HEIGHT: 72 IN | SYSTOLIC BLOOD PRESSURE: 109 MMHG | BODY MASS INDEX: 20.09 KG/M2 | RESPIRATION RATE: 16 BRPM | TEMPERATURE: 97.8 F | WEIGHT: 148.3 LBS

## 2025-06-11 DIAGNOSIS — G61.0 GBS (GUILLAIN BARRE SYNDROME): Primary | ICD-10-CM

## 2025-06-11 LAB
ANION GAP SERPL CALCULATED.3IONS-SCNC: 6 MMOL/L (ref 5–15)
BASOPHILS # BLD AUTO: 0.01 10*3/MM3 (ref 0–0.2)
BASOPHILS NFR BLD AUTO: 0.1 % (ref 0–1.5)
BUN SERPL-MCNC: 11.5 MG/DL (ref 6–20)
BUN/CREAT SERPL: 15.5 (ref 7–25)
CALCIUM SPEC-SCNC: 8.6 MG/DL (ref 8.6–10.5)
CHLORIDE SERPL-SCNC: 108 MMOL/L (ref 98–107)
CO2 SERPL-SCNC: 23 MMOL/L (ref 22–29)
CREAT SERPL-MCNC: 0.74 MG/DL (ref 0.57–1)
DEPRECATED RDW RBC AUTO: 41.4 FL (ref 37–54)
EGFRCR SERPLBLD CKD-EPI 2021: 98.7 ML/MIN/1.73
EOSINOPHIL # BLD AUTO: 0 10*3/MM3 (ref 0–0.4)
EOSINOPHIL NFR BLD AUTO: 0 % (ref 0.3–6.2)
ERYTHROCYTE [DISTWIDTH] IN BLOOD BY AUTOMATED COUNT: 13.4 % (ref 12.3–15.4)
GLUCOSE SERPL-MCNC: 175 MG/DL (ref 65–99)
HCT VFR BLD AUTO: 25.1 % (ref 34–46.6)
HGB BLD-MCNC: 8.4 G/DL (ref 12–15.9)
IMM GRANULOCYTES # BLD AUTO: 0.06 10*3/MM3 (ref 0–0.05)
IMM GRANULOCYTES NFR BLD AUTO: 0.7 % (ref 0–0.5)
LYMPHOCYTES # BLD AUTO: 1.28 10*3/MM3 (ref 0.7–3.1)
LYMPHOCYTES NFR BLD AUTO: 14.4 % (ref 19.6–45.3)
MAGNESIUM SERPL-MCNC: 2.3 MG/DL (ref 1.6–2.6)
MCH RBC QN AUTO: 29.1 PG (ref 26.6–33)
MCHC RBC AUTO-ENTMCNC: 33.5 G/DL (ref 31.5–35.7)
MCV RBC AUTO: 86.9 FL (ref 79–97)
MONOCYTES # BLD AUTO: 0.3 10*3/MM3 (ref 0.1–0.9)
MONOCYTES NFR BLD AUTO: 3.4 % (ref 5–12)
NEUTROPHILS NFR BLD AUTO: 7.25 10*3/MM3 (ref 1.7–7)
NEUTROPHILS NFR BLD AUTO: 81.4 % (ref 42.7–76)
NRBC BLD AUTO-RTO: 0 /100 WBC (ref 0–0.2)
PLATELET # BLD AUTO: 185 10*3/MM3 (ref 140–450)
PMV BLD AUTO: 10.5 FL (ref 6–12)
POTASSIUM SERPL-SCNC: 4.1 MMOL/L (ref 3.5–5.2)
PREALB SERPL-MCNC: 16 MG/DL (ref 20–40)
PYRIDOXAL PHOS SERPL-MCNC: 1.2 UG/L (ref 3.4–65.2)
RBC # BLD AUTO: 2.89 10*6/MM3 (ref 3.77–5.28)
SODIUM SERPL-SCNC: 137 MMOL/L (ref 136–145)
WBC NRBC COR # BLD AUTO: 8.9 10*3/MM3 (ref 3.4–10.8)

## 2025-06-11 PROCEDURE — 97535 SELF CARE MNGMENT TRAINING: CPT

## 2025-06-11 PROCEDURE — 36415 COLL VENOUS BLD VENIPUNCTURE: CPT

## 2025-06-11 PROCEDURE — 83735 ASSAY OF MAGNESIUM: CPT | Performed by: INTERNAL MEDICINE

## 2025-06-11 PROCEDURE — 99232 SBSQ HOSP IP/OBS MODERATE 35: CPT | Performed by: PSYCHIATRY & NEUROLOGY

## 2025-06-11 PROCEDURE — 97110 THERAPEUTIC EXERCISES: CPT

## 2025-06-11 PROCEDURE — 85025 COMPLETE CBC W/AUTO DIFF WBC: CPT | Performed by: INTERNAL MEDICINE

## 2025-06-11 PROCEDURE — 80048 BASIC METABOLIC PNL TOTAL CA: CPT | Performed by: INTERNAL MEDICINE

## 2025-06-11 PROCEDURE — 84134 ASSAY OF PREALBUMIN: CPT

## 2025-06-11 PROCEDURE — 1180000000 HC REHAB R&B

## 2025-06-11 PROCEDURE — 6370000000 HC RX 637 (ALT 250 FOR IP): Performed by: PSYCHIATRY & NEUROLOGY

## 2025-06-11 PROCEDURE — 6360000002 HC RX W HCPCS: Performed by: PSYCHIATRY & NEUROLOGY

## 2025-06-11 RX ORDER — TAMSULOSIN HYDROCHLORIDE 0.4 MG/1
0.4 CAPSULE ORAL DAILY
Status: DISCONTINUED | OUTPATIENT
Start: 2025-06-12 | End: 2025-06-24 | Stop reason: HOSPADM

## 2025-06-11 RX ORDER — GAUZE BANDAGE 2" X 2"
500 BANDAGE TOPICAL 3 TIMES DAILY
Status: DISCONTINUED | OUTPATIENT
Start: 2025-06-11 | End: 2025-06-24 | Stop reason: HOSPADM

## 2025-06-11 RX ORDER — LANOLIN ALCOHOL/MO/W.PET/CERES
500 CREAM (GRAM) TOPICAL 3 TIMES DAILY
Status: DISCONTINUED | OUTPATIENT
Start: 2025-06-11 | End: 2025-06-11 | Stop reason: SDUPTHER

## 2025-06-11 RX ORDER — PANTOPRAZOLE SODIUM 40 MG/1
40 TABLET, DELAYED RELEASE ORAL
Status: DISCONTINUED | OUTPATIENT
Start: 2025-06-12 | End: 2025-06-24 | Stop reason: HOSPADM

## 2025-06-11 RX ORDER — ARIPIPRAZOLE 10 MG/1
10 TABLET ORAL DAILY
Status: DISCONTINUED | OUTPATIENT
Start: 2025-06-12 | End: 2025-06-13

## 2025-06-11 RX ORDER — ROSUVASTATIN CALCIUM 20 MG/1
20 TABLET, COATED ORAL NIGHTLY
COMMUNITY

## 2025-06-11 RX ORDER — ARIPIPRAZOLE 10 MG/1
10 TABLET ORAL DAILY
Status: ON HOLD | COMMUNITY
Start: 2025-06-12 | End: 2025-06-24 | Stop reason: HOSPADM

## 2025-06-11 RX ORDER — FOLIC ACID 1 MG/1
1 TABLET ORAL DAILY
Start: 2025-06-12

## 2025-06-11 RX ORDER — POLYETHYLENE GLYCOL 3350 17 G/17G
17 POWDER, FOR SOLUTION ORAL DAILY
Start: 2025-06-11

## 2025-06-11 RX ORDER — ERGOCALCIFEROL 1.25 MG/1
50000 CAPSULE, LIQUID FILLED ORAL WEEKLY
Status: DISCONTINUED | OUTPATIENT
Start: 2025-06-12 | End: 2025-06-24 | Stop reason: HOSPADM

## 2025-06-11 RX ORDER — THIAMINE MONONITRATE (VIT B1) 100 MG
500 TABLET ORAL 3 TIMES DAILY
Status: ON HOLD | COMMUNITY
End: 2025-06-24 | Stop reason: HOSPADM

## 2025-06-11 RX ORDER — POLYETHYLENE GLYCOL 3350 17 G/17G
17 POWDER, FOR SOLUTION ORAL DAILY PRN
Status: ON HOLD | COMMUNITY
End: 2025-06-24 | Stop reason: HOSPADM

## 2025-06-11 RX ORDER — CHOLECALCIFEROL (VITAMIN D3) 1250 MCG
50000 CAPSULE ORAL WEEKLY
Status: ON HOLD | COMMUNITY
End: 2025-06-24 | Stop reason: HOSPADM

## 2025-06-11 RX ORDER — MIRTAZAPINE 15 MG/1
15 TABLET, FILM COATED ORAL NIGHTLY
Status: ON HOLD | COMMUNITY
End: 2025-06-24

## 2025-06-11 RX ORDER — ONDANSETRON 4 MG/1
4 TABLET, ORALLY DISINTEGRATING ORAL EVERY 6 HOURS PRN
Status: ON HOLD | COMMUNITY
End: 2025-06-24 | Stop reason: HOSPADM

## 2025-06-11 RX ORDER — ACETAMINOPHEN 325 MG/1
650 TABLET ORAL EVERY 4 HOURS PRN
Status: DISCONTINUED | OUTPATIENT
Start: 2025-06-11 | End: 2025-06-11

## 2025-06-11 RX ORDER — LIOTHYRONINE SODIUM 25 UG/1
25 TABLET ORAL 2 TIMES DAILY
Status: DISCONTINUED | OUTPATIENT
Start: 2025-06-11 | End: 2025-06-24 | Stop reason: HOSPADM

## 2025-06-11 RX ORDER — LEVOTHYROXINE SODIUM 150 UG/1
150 TABLET ORAL
Status: ON HOLD | COMMUNITY
End: 2025-06-24 | Stop reason: HOSPADM

## 2025-06-11 RX ORDER — CARVEDILOL 6.25 MG/1
6.25 TABLET ORAL 2 TIMES DAILY WITH MEALS
Status: DISCONTINUED | OUTPATIENT
Start: 2025-06-11 | End: 2025-06-24 | Stop reason: HOSPADM

## 2025-06-11 RX ORDER — CHOLECALCIFEROL (VITAMIN D3) 1250 MCG
50000 CAPSULE ORAL WEEKLY
Status: DISCONTINUED | OUTPATIENT
Start: 2025-06-11 | End: 2025-06-11 | Stop reason: CLARIF

## 2025-06-11 RX ORDER — HYDROXYZINE HYDROCHLORIDE 25 MG/1
25 TABLET, FILM COATED ORAL 3 TIMES DAILY PRN
Status: DISCONTINUED | OUTPATIENT
Start: 2025-06-11 | End: 2025-06-24 | Stop reason: HOSPADM

## 2025-06-11 RX ORDER — ONDANSETRON 4 MG/1
4 TABLET, ORALLY DISINTEGRATING ORAL EVERY 6 HOURS PRN
Status: DISCONTINUED | OUTPATIENT
Start: 2025-06-11 | End: 2025-06-24 | Stop reason: HOSPADM

## 2025-06-11 RX ORDER — ENOXAPARIN SODIUM 100 MG/ML
40 INJECTION SUBCUTANEOUS NIGHTLY
Status: DISCONTINUED | OUTPATIENT
Start: 2025-06-11 | End: 2025-06-24 | Stop reason: HOSPADM

## 2025-06-11 RX ORDER — LIOTHYRONINE SODIUM 25 UG/1
25 TABLET ORAL 2 TIMES DAILY
COMMUNITY

## 2025-06-11 RX ORDER — MIRTAZAPINE 15 MG/1
15 TABLET, FILM COATED ORAL NIGHTLY
Status: DISCONTINUED | OUTPATIENT
Start: 2025-06-11 | End: 2025-06-24 | Stop reason: HOSPADM

## 2025-06-11 RX ORDER — POTASSIUM CHLORIDE 1500 MG/1
40 TABLET, EXTENDED RELEASE ORAL DAILY
Status: DISCONTINUED | OUTPATIENT
Start: 2025-06-12 | End: 2025-06-11

## 2025-06-11 RX ORDER — TAMSULOSIN HYDROCHLORIDE 0.4 MG/1
0.4 CAPSULE ORAL DAILY
Start: 2025-06-12

## 2025-06-11 RX ORDER — HYDROXYZINE HYDROCHLORIDE 25 MG/1
25 TABLET, FILM COATED ORAL 3 TIMES DAILY PRN
Status: ON HOLD | COMMUNITY
End: 2025-06-24 | Stop reason: HOSPADM

## 2025-06-11 RX ORDER — CARVEDILOL 6.25 MG/1
6.25 TABLET ORAL 2 TIMES DAILY WITH MEALS
Status: DISCONTINUED | OUTPATIENT
Start: 2025-06-11 | End: 2025-06-11

## 2025-06-11 RX ORDER — PROPRANOLOL HYDROCHLORIDE 60 MG/1
120 CAPSULE, EXTENDED RELEASE ORAL DAILY
Status: DISCONTINUED | OUTPATIENT
Start: 2025-06-12 | End: 2025-06-11 | Stop reason: CLARIF

## 2025-06-11 RX ORDER — SENNA AND DOCUSATE SODIUM 50; 8.6 MG/1; MG/1
1 TABLET, FILM COATED ORAL 2 TIMES DAILY
Status: DISCONTINUED | OUTPATIENT
Start: 2025-06-11 | End: 2025-06-24 | Stop reason: HOSPADM

## 2025-06-11 RX ORDER — TAMSULOSIN HYDROCHLORIDE 0.4 MG/1
0.4 CAPSULE ORAL DAILY
Status: ON HOLD | COMMUNITY
Start: 2025-06-12 | End: 2025-06-24 | Stop reason: HOSPADM

## 2025-06-11 RX ORDER — HYDRALAZINE HYDROCHLORIDE 20 MG/ML
10 INJECTION INTRAMUSCULAR; INTRAVENOUS EVERY 6 HOURS PRN
Status: DISCONTINUED | OUTPATIENT
Start: 2025-06-11 | End: 2025-06-24 | Stop reason: HOSPADM

## 2025-06-11 RX ORDER — PHENOL 1.4 %
10 AEROSOL, SPRAY (ML) MUCOUS MEMBRANE NIGHTLY PRN
Status: ON HOLD | COMMUNITY
End: 2025-06-24 | Stop reason: HOSPADM

## 2025-06-11 RX ORDER — ROSUVASTATIN CALCIUM 20 MG/1
20 TABLET, COATED ORAL NIGHTLY
Status: DISCONTINUED | OUTPATIENT
Start: 2025-06-11 | End: 2025-06-24 | Stop reason: HOSPADM

## 2025-06-11 RX ORDER — OMEPRAZOLE 20 MG/1
20 CAPSULE, DELAYED RELEASE ORAL DAILY
Status: ON HOLD | COMMUNITY
Start: 2025-06-12 | End: 2025-06-24 | Stop reason: HOSPADM

## 2025-06-11 RX ORDER — MELATONIN 10 MG
10 CAPSULE ORAL NIGHTLY PRN
Status: DISCONTINUED | OUTPATIENT
Start: 2025-06-11 | End: 2025-06-24 | Stop reason: HOSPADM

## 2025-06-11 RX ORDER — LEVOTHYROXINE SODIUM 75 UG/1
150 TABLET ORAL
Status: DISCONTINUED | OUTPATIENT
Start: 2025-06-12 | End: 2025-06-24 | Stop reason: HOSPADM

## 2025-06-11 RX ORDER — LISINOPRIL 20 MG/1
20 TABLET ORAL DAILY
Status: DISCONTINUED | OUTPATIENT
Start: 2025-06-12 | End: 2025-06-14

## 2025-06-11 RX ORDER — POLYETHYLENE GLYCOL 3350 17 G/17G
17 POWDER, FOR SOLUTION ORAL DAILY PRN
Status: DISCONTINUED | OUTPATIENT
Start: 2025-06-11 | End: 2025-06-24 | Stop reason: HOSPADM

## 2025-06-11 RX ORDER — CARVEDILOL 6.25 MG/1
6.25 TABLET ORAL 2 TIMES DAILY WITH MEALS
Status: ON HOLD | COMMUNITY
End: 2025-06-24 | Stop reason: HOSPADM

## 2025-06-11 RX ORDER — BISACODYL 10 MG
10 SUPPOSITORY, RECTAL RECTAL DAILY PRN
Status: DISCONTINUED | OUTPATIENT
Start: 2025-06-11 | End: 2025-06-24 | Stop reason: HOSPADM

## 2025-06-11 RX ORDER — FOLIC ACID 1 MG/1
1 TABLET ORAL DAILY
Status: DISCONTINUED | OUTPATIENT
Start: 2025-06-12 | End: 2025-06-24 | Stop reason: HOSPADM

## 2025-06-11 RX ORDER — FOLIC ACID 1 MG/1
1 TABLET ORAL DAILY
COMMUNITY
Start: 2025-06-12

## 2025-06-11 RX ADMIN — CARVEDILOL 6.25 MG: 6.25 TABLET, FILM COATED ORAL at 20:28

## 2025-06-11 RX ADMIN — Medication 500 MG: at 20:28

## 2025-06-11 RX ADMIN — ENOXAPARIN SODIUM 40 MG: 100 INJECTION SUBCUTANEOUS at 20:28

## 2025-06-11 RX ADMIN — ARIPIPRAZOLE 10 MG: 5 TABLET ORAL at 09:29

## 2025-06-11 RX ADMIN — LEVOTHYROXINE SODIUM 150 MCG: 75 TABLET ORAL at 06:09

## 2025-06-11 RX ADMIN — SENNOSIDES AND DOCUSATE SODIUM 1 TABLET: 8.6; 5 TABLET ORAL at 20:28

## 2025-06-11 RX ADMIN — MIRTAZAPINE 15 MG: 15 TABLET, FILM COATED ORAL at 20:28

## 2025-06-11 RX ADMIN — POTASSIUM CHLORIDE 40 MEQ: 20 SOLUTION ORAL at 09:29

## 2025-06-11 RX ADMIN — POTASSIUM CHLORIDE 40 MEQ: 1500 TABLET, EXTENDED RELEASE ORAL at 09:30

## 2025-06-11 RX ADMIN — PANTOPRAZOLE SODIUM 40 MG: 40 TABLET, DELAYED RELEASE ORAL at 06:09

## 2025-06-11 RX ADMIN — TAMSULOSIN HYDROCHLORIDE 0.4 MG: 0.4 CAPSULE ORAL at 09:29

## 2025-06-11 RX ADMIN — FOLIC ACID 1 MG: 1 TABLET ORAL at 09:29

## 2025-06-11 RX ADMIN — CARVEDILOL 6.25 MG: 6.25 TABLET, FILM COATED ORAL at 09:29

## 2025-06-11 RX ADMIN — THIAMINE HCL TAB 100 MG 500 MG: 100 TAB at 09:29

## 2025-06-11 RX ADMIN — LIOTHYRONINE SODIUM 25 MCG: 25 TABLET ORAL at 20:28

## 2025-06-11 RX ADMIN — ROSUVASTATIN CALCIUM 20 MG: 20 TABLET, FILM COATED ORAL at 20:28

## 2025-06-11 SDOH — ECONOMIC STABILITY: INCOME INSECURITY: HOW HARD IS IT FOR YOU TO PAY FOR THE VERY BASICS LIKE FOOD, HOUSING, MEDICAL CARE, AND HEATING?: NOT HARD AT ALL

## 2025-06-11 SDOH — ECONOMIC STABILITY: FOOD INSECURITY: WITHIN THE PAST 12 MONTHS, YOU WORRIED THAT YOUR FOOD WOULD RUN OUT BEFORE YOU GOT MONEY TO BUY MORE.: NEVER TRUE

## 2025-06-11 SDOH — ECONOMIC STABILITY: INCOME INSECURITY: IN THE PAST 12 MONTHS, HAS THE ELECTRIC, GAS, OIL, OR WATER COMPANY THREATENED TO SHUT OFF SERVICE IN YOUR HOME?: NO

## 2025-06-11 ASSESSMENT — PATIENT HEALTH QUESTIONNAIRE - PHQ9
SUM OF ALL RESPONSES TO PHQ QUESTIONS 1-9: 1
SUM OF ALL RESPONSES TO PHQ QUESTIONS 1-9: 1
1. LITTLE INTEREST OR PLEASURE IN DOING THINGS: NOT AT ALL
SUM OF ALL RESPONSES TO PHQ QUESTIONS 1-9: 1
2. FEELING DOWN, DEPRESSED OR HOPELESS: SEVERAL DAYS
SUM OF ALL RESPONSES TO PHQ QUESTIONS 1-9: 1

## 2025-06-11 NOTE — PROGRESS NOTES
Neurology Progress Note      Chief Complaint:  AMS  Length of Stay:  13   Subjective     Subjective:      6/11: Doing well this morning.  Ate the entirety of her breakfast.  Daughter is at bedside.  Feels much better today.  Ready to discharge to rehab.      6/10: sitting up in bed feeding self breakfast. No visitors present. She received 1 UPRBCs yesterday and states she feels stronger this AM. HGB 8.2 today. Mg+ 1.5 and RN states plans to be replaced. Patient continues to have periods of confusion and visual hallucinations but is easily redirected. Unable to void yesterday and jamison cath placed.   MMA results 166, GQ1b negative. (GQ1b antibodies are seen in more than 80% of patients with Joy-Quinonez syndrome and may be elevated in Guillain-Ponce syndrome patients with ophthalmoplegia. These tests by themselves are not diagnostic and should be used in conjunction with other clinical parameters to confirm disease.)     6/9: sitting up in chair. Patient has had shower this AM and also ambulated in the chair.  had told attending he wanted to take patient home today. HGB steady at 7.4. Attending had ordered transfusion and patient/ refused binotically but after discussion has now consented to blood transfusion. Patient also had episode of hypotension. Still with some confusion and hallucinations. Patient has had gradual improvement since initiation of IV IG. Still with opthalmologic.    6/8/2025: HGB stable 7.5 this AM. Still having episodes of confusion and visual hallucinations.  Daughter at bedside. She continues to look brighter every day. Today is #5/5 IVIG.      6/7/2025: Lying in bed. Daughter at bedside. Patient very alert this AM. Nursing reports visual hallucinations last PM and during day time as well. She appears much stronger today. H/H 7.7/23.5 down form 9.3/28.4 on 6/4. Discussed with nursing. Patient has not had BM and no signs of bleeding. Will check STOB.  On exam today, patient able  to abduct right eye past midline. Today #4/5 IVIG.  Patient seen along with Dr. Patrick, tele neurology.     6/6/2025: patient sitting up in bed. 17 y.o. daughter at bedside. Patient looks brighter today. Nursing reports patient had visual hallucinations last PM. Daughter reports she is having confusion and has been stating she is at Gnosticism but she is currently oriented to person and place. Today #3/5 IV IG.   Patient seen with tele neurology Dr. LAUREN Patrick.    6/5/2025: lying in bed.  at bedside. Tolerated IV IG yesterday. Today is #2/5. Patient is more alert and interactive. Still with dysconjugate gaze. She appears to have more strength in extremities and  thinks this as well. Improved effort on exam. Orthostatic BP positive as below.         6/4/2025:She is somewhat more awake today.  Her  is at the bedside today we speak to him.  She continues to have global weakness and decreasing cognition.  She has shown some mild improvement since admission.  We discussed the risk and benefits of IVIG therapy and she expresses understanding.  She would like to proceed with this.  After I left the room the patient did get up and ambulate with assistance by physical therapy to the bedside commode.  After being there briefly she slumped over and had some disconjugate gaze.  There may have been greater right than left sided weakness.  She was returned to the bed and recovered immediately.    Medications:  Current Facility-Administered Medications   Medication Dose Route Frequency Provider Last Rate Last Admin    ARIPiprazole (ABILIFY) tablet 10 mg  10 mg Oral Daily Bautista Limon MD   10 mg at 06/11/25 0929    sennosides-docusate (PERICOLACE) 8.6-50 MG per tablet 2 tablet  2 tablet Oral BID PRN Bautista Limon MD   2 tablet at 06/09/25 0858    And    polyethylene glycol (MIRALAX) packet 17 g  17 g Oral Daily PRN Bautista Limon MD   17 g at 06/09/25 0852    And    bisacodyl (DULCOLAX) EC  tablet 5 mg  5 mg Oral Daily PRN Bautista Limon MD   5 mg at 06/10/25 0417    And    bisacodyl (DULCOLAX) suppository 10 mg  10 mg Rectal Daily PRN Bautista Limon MD   10 mg at 06/10/25 0946    Calcium Replacement - Follow Nurse / BPA Driven Protocol   Not Applicable PRN Bautista Limon MD        carvedilol (COREG) tablet 6.25 mg  6.25 mg Oral BID With Meals Bautista Limon MD   6.25 mg at 06/11/25 0929    enoxaparin sodium (LOVENOX) syringe 40 mg  40 mg Subcutaneous Nightly Yovanny Abernathy MD   40 mg at 06/10/25 2000    folic acid (FOLVITE) tablet 1 mg  1 mg Oral Daily Yovanny Abernathy MD   1 mg at 06/11/25 0929    hydrALAZINE (APRESOLINE) injection 10 mg  10 mg Intravenous Q6H PRN Alireza Lam MD   10 mg at 06/05/25 0048    levothyroxine (SYNTHROID, LEVOTHROID) tablet 150 mcg  150 mcg Oral Q AM Bautista Limon MD   150 mcg at 06/11/25 0609    Magnesium Low Dose Replacement - Follow Nurse / BPA Driven Protocol   Not Applicable PRN Bautista Limon MD        melatonin tablet 10 mg  10 mg Oral Nightly PRN Bautista Limon MD   10 mg at 06/06/25 1953    ondansetron ODT (ZOFRAN-ODT) disintegrating tablet 4 mg  4 mg Oral Q6H PRN Bautista Limon MD        Or    ondansetron (ZOFRAN) injection 4 mg  4 mg Intravenous Q6H PRN Bautista Limon MD   4 mg at 05/30/25 2139    pantoprazole (PROTONIX) EC tablet 40 mg  40 mg Oral Q AM Bautista Limon MD   40 mg at 06/11/25 0609    Phosphorus Replacement - Follow Nurse / BPA Driven Protocol   Not Applicable PRN Bautista Limon MD        potassium chloride (KAYCIEL) 20 mEq/15 mL solution 40 mEq  40 mEq Oral Daily Yovanny Abernathy MD   40 mEq at 06/11/25 0929    potassium chloride (KLOR-CON M20) CR tablet 40 mEq  40 mEq Oral Daily Yovanny Abernathy MD   40 mEq at 06/11/25 0930    Potassium Replacement - Follow Nurse / BPA Driven Protocol   Not Applicable Bautista Kraus MD        sodium chloride 0.9 % flush 10 mL  10 mL  Intravenous PRN Safia Wood APRN        tamsulosin (FLOMAX) 24 hr capsule 0.4 mg  0.4 mg Oral Daily Yovanny Abernathy MD   0.4 mg at 06/11/25 0929    thiamine (VITAMIN B-1) tablet 500 mg  500 mg Oral TID Yovanny Abernathy MD   500 mg at 06/11/25 0929             Objective      Vital Signs  Temp:  [97.7 °F (36.5 °C)-98.3 °F (36.8 °C)] 97.7 °F (36.5 °C)  Heart Rate:  [] 82  Resp:  [16] 16  BP: ()/(54-78) 116/68    Physical Exam:    HEENT:  Neck is supple  CVS:  RRR  Lungs:  CTA - B/L  Abd:  NT/ND  Ext:  No edema  Skin:  No rashes    Pertinent Neuro Exam:  Awake. alert and brighter today.   Can follow commands   Pupils equal.  Able to count fingers today.  Disconjugate gaze chronically and intermittently able to abduct right eye past midline.  No unilateral facial droop  Antigravity strength in all 4 extremities  No signs of tremors or increased tone  Mild ataxia noted bilaterally  Improved strength bilateral upper and lower extremities 4/5.     Last nurse assessment:  Interval: baseline  1a. Level of Consciousness: 0-->Alert, keenly responsive  1b. LOC Questions: 0-->Answers both questions correctly  1c. LOC Commands: 0-->Performs both tasks correctly  2. Best Gaze: 0-->Normal  3. Visual: 0-->No visual loss  4. Facial Palsy: 0-->Normal symmetrical movements  5a. Motor Arm, Left: 0-->No drift, limb holds 90 (or 45) degrees for full 10 secs  5b. Motor Arm, Right: 0-->No drift, limb holds 90 (or 45) degrees for full 10 secs  6a. Motor Leg, Left: 1-->Drift, leg falls by the end of the 5-sec period but does not hit bed  6b. Motor Leg, Right: 1-->Drift, leg falls by the end of the 5-sec period but does not hit bed  7. Limb Ataxia: 0-->Absent  8. Sensory: 0-->Normal, no sensory loss  9. Best Language: 0-->No aphasia, normal  10. Dysarthria: 0-->Normal  11. Extinction and Inattention (formerly Neglect): 0-->No abnormality    Total (NIH Stroke Scale): 2       Results Review:      Labs:  Labs reviewed  as below  LAB RESULTS:      Lab 06/11/25  0529 06/10/25  0627 06/09/25  0455 06/08/25  0337 06/07/25  1652 06/07/25  0259   WBC 8.90 7.89 7.41 5.78 5.86 7.93   HEMOGLOBIN 8.4* 8.2* 7.4* 7.5* 8.0* 7.7*   HEMATOCRIT 25.1* 24.6* 22.7* 22.2* 23.4* 23.5*   PLATELETS 185 182 180 173 193 195   NEUTROS ABS 7.25* 4.71 3.74 3.74  --  5.05   IMMATURE GRANS (ABS) 0.06* 0.06* 0.10* 0.08*  --  0.07*   LYMPHS ABS 1.28 2.65 3.18* 1.60  --  2.31   MONOS ABS 0.30 0.43 0.36 0.35  --  0.48   EOS ABS 0.00 0.02 0.01 0.00  --  0.01   MCV 86.9 86.9 88.7 87.1 89.0 88.3         Lab 06/11/25  0529 06/10/25  1726 06/10/25  0627 06/08/25  0337 06/07/25  1652 06/07/25  0300   SODIUM 137  --  140 140  --  139   POTASSIUM 4.1 4.9 3.6 3.7 4.5 3.2*   CHLORIDE 108*  --  109* 110*  --  109*   CO2 23.0  --  23.0 23.0  --  22.0   ANION GAP 6.0  --  8.0 7.0  --  8.0   BUN 11.5  --  12.2 12.5  --  13.9   CREATININE 0.74  --  0.74 0.70  --  0.82   EGFR 98.7  --  98.7 105.5  --  87.3   GLUCOSE 175*  --  104* 130*  --  131*   CALCIUM 8.6  --  8.2* 8.4*  --  8.4*   MAGNESIUM 2.3  --  1.5* 1.6  --  1.6         Lab 06/08/25  0337 06/07/25  0300   TOTAL PROTEIN 6.3 6.2   ALBUMIN 2.6* 2.6*   GLOBULIN 3.7 3.6   ALT (SGPT) 48* 58*   AST (SGOT) 20 24   BILIRUBIN 0.6 0.6   ALK PHOS 32* 32*                     Lab 06/09/25  1041   ABO TYPING A   RH TYPING Positive   ANTIBODY SCREEN Negative           Brief Urine Lab Results  (Last result in the past 365 days)        Color   Clarity   Blood   Leuk Est   Nitrite   Protein   CREAT   Urine HCG        06/01/25 0000 Orange   Clear   Negative   Moderate (2+)   Positive   30 mg/dL (1+)                 Microbiology Results (last 10 days)       ** No results found for the last 240 hours. **        Other labs:  RPR normal  HIV negative  Urine analysis positive  TSH low at 0.027  B12 elevated next line folate low at 2.78  Ammonia 36  Ethanol level negligible  MMA - 166  GQ1b negative. (GQ1b antibodies are seen in more than 80% of  patients with Joy-Quinonez syndrome and may be elevated in Guillain-Galva syndrome patients with ophthalmoplegia. These tests by themselves are not diagnostic and should be used in conjunction with other clinical parameters to confirm disease.)   2025 LP:  CSF:  W:  0  R:  0  P:  55.7        Imaging:  MRI of the brain shows ventriculomegaly but not consistently out of proportion compared to the overall degree of atrophy.  MRI of the cervical and lumbar spine couple  CT of the abdomen shows some nonspecific findings possibly consistent with enteritis but no signs of a primary malignancy.  CT of pelvis done on  showed urinary bladder wall thickening but no signs of an occult malignancy.  EEG shows nonspecific slowing  Nerve conduction velocities performed on 6/3 show velocities in the 40 m/s range.  This is slow although not necessarily in the demyelinating range.    Assessment/Plan     Hospital Problem List      Acquired cerebral ventriculomegaly    Generalized weakness    Impression:  Progressive neurologic decline for several months with global weakness, opthalmoplegia, and ataxia.  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  We discussed the risk and benefits of this and her and her  would like to proceed with this.  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  anemia  Folate deficiency  Ventriculomegally - opening pressure unimpressive  HTN  Hx of cannabis usage  Orthostatic hypotension.  Unintentional weight loss for several months.        Plan:  IVIG 400 mg/kg daily  with 5th and final dose on  6/8/2025.  Monitor CBC. Improved after 1 UPRBCs on 6/9/2025.  Defer treatment of anemia to attending. Patient and  has consented to transfusion after our discussion.  Replace Folic acid: started 6/2  Dietician following patient.   Clear to DC to inpatient rehab today.  Follow up with neuron in 6 weeks      Medical Decision Making    Number/Complexity of Problems  Moderate  1 undiagnosed new problem with uncertain prognosis -   1 acute illness with systemic symptoms -   High  1 acute or chronic illness that poses a threat to life/body function -   High     MDM Data  Moderate - 1/3 categories  Extensive - 2/3 categories    Category 1: 3 of the following  Review of external notes  Review of results  Ordering of each unique test  Independent historian  Category 2:  Independent interpretation of test (ex: imaging)  Category 3:  Discussion of management with another provider    Extensive     Treatment Plan  Moderate - Prescription Drug management  High  Drug therapy requiring intensive monitoring for toxicity  Decision regarding hospitalization or escalation of care  De-escalate care/DNR decisions         Ori Banuelos MD  06/11/25  10:00 CDT

## 2025-06-11 NOTE — PLAN OF CARE
Goal Outcome Evaluation:  Plan of Care Reviewed With: patient        Progress: improving  Outcome Evaluation: OT tx complete. Pt seen in fowlers with daughter at bedside. Pt oriented to person and place. Pt pleasant and agreeable to participating in tx. Pt completed all bed mobility with SBA. Pt completed donning socks EOB with CGA. Pt required Whitney with sit <> stand transition and proper technique when transitioning. Pt able to complete personal hygiene tasks in standing with Whitney for balance and 1 rest break to complete task. Once back EOB, pt completed B UE exercise 3 x 10 reps to increase act jamin and UE skilled components. Pt continues to progress with skilled tx and is highly motivated to participate and increase I with ADL's. Currently the patient continues to have deficits related to balance, strength and cognition that limit her I with ADL's. Pt plans to d/c to [facility] today. OT will sign off at this time.    Anticipated Discharge Disposition (OT): inpatient rehabilitation facility

## 2025-06-11 NOTE — THERAPY DISCHARGE NOTE
Acute Care - Occupational Therapy Treatment Note/Discharge  [facility]     Patient Name: Lynn Magana  : 1975  MRN: [id number]  Today's Date: 2025  Onset of Illness/Injury or Date of Surgery: 25     Referring Physician: Dr. Limon      Admit Date: 2025       ICD-10-CM ICD-9-CM   1. Generalized weakness  R53.1 780.79   2. Nausea and vomiting, unspecified vomiting type  R11.2 787.01   3. Marijuana user  F12.90 305.20   4. Low TSH level  R79.89 794.5   5. Urinary tract infection without hematuria, site unspecified  N39.0 599.0   6. Dysphagia, unspecified type  R13.10 787.20   7. Impaired functional mobility and activity tolerance [Z74.09]  Z74.09 V49.89     Patient Active Problem List   Diagnosis    Syncope    Graves' disease    Polysubstance abuse    Hypertension    Diabetes    Spells of decreased attentiveness    Chronic intractable headache    Nausea and vomiting    Slow transit constipation    Nonsmoker    Type 2 diabetes mellitus, with long-term current use of insulin    GERD without esophagitis    Hyperthyroidism    Thyromegaly    Post-surgical hypothyroidism    Degeneration of cervical intervertebral disc    Cervical radiculopathy    Acute pain of right shoulder    Class 1 obesity due to excess calories with body mass index (BMI) of 30.0 to 30.9 in adult    Hypoglycemia    Stage 1 acute kidney injury    Dehydration    Dysphagia    Dehydration    Hypokalemia    Steatosis of liver    Marijuana abuse    Loss of weight    Severe protein-calorie malnutrition    Hypothyroid    Generalized weakness    Acquired cerebral ventriculomegaly     Past Medical History:   Diagnosis Date    Arthritis     Carotid artery stenosis     Degenerative disc disease, cervical     Depression     Diabetes mellitus     type 1    Disease of thyroid gland     GERD (gastroesophageal reflux disease)     Graves disease     Heart palpitations     Hyperlipidemia     Hypertension     Hypothyroidism     Seizure      Thyromegaly      Past Surgical History:   Procedure Laterality Date     SECTION      CHOLECYSTECTOMY      COLONOSCOPY  2015    Normal exam Dr. Morgan     COLONOSCOPY  2015    Normal exam    CYST REMOVAL      ENDOSCOPY N/A 2018    Normal exam    ENDOSCOPY N/A 2025    Procedure: ESOPHAGOGASTRODUODENOSCOPY WITH ANESTHESIA;  Surgeon: Jadon Smith MD;  Location:  [facility];  Service: Gastroenterology;  Laterality: N/A;  pre op: Nausea and vomiting  post op: normal  pcp: Darryl Andrews DO    HYSTERECTOMY      THYROIDECTOMY Bilateral 2018    Procedure: total thyroidectomy;  Surgeon: Vitaly Givens MD;  Location: [facility] OR;  Service: ENT       OT ASSESSMENT FLOWSHEET (Last 12 Hours)       OT Evaluation and Treatment       Row Name 25 0947 25 0939                OT Time and Intention    Subjective Information -- no complaints  -AC (r) BH (t) AC (c)       Document Type -- discharge treatment  -AC (r) BH (t) AC (c)       Mode of Treatment --  -AC (r) BH (t) AC (c) occupational therapy  -AC (r) BH (t) AC (c)          General Information    Existing Precautions/Restrictions -- fall  -AC (r) BH (t) AC (c)       Limitations/Impairments -- safety/cognitive  -AC (r) BH (t) AC (c)          Pain Assessment    Pretreatment Pain Rating -- 0/10 - no pain  -AC (r) BH (t) AC (c)       Posttreatment Pain Rating -- 0/10 - no pain  -AC (r) BH (t) AC (c)       Pain Management Interventions -- --  -AC (r) BH (t) AC (c)       Response to Pain Interventions -- --  -AC (r) BH (t) AC (c)          Cognition    Orientation Status (Cognition) -- oriented to;person;place;disoriented to;situation;time  -AC (r) BH (t) AC (c)       Follows Commands (Cognition) -- WFL  -AC (r) BH (t) AC (c)       Personal Safety Interventions -- fall prevention program maintained;gait belt;muscle strengthening facilitated;nonskid shoes/slippers when out of bed;supervised activity  -AC (r) BH (t) AC  (c)          Activities of Daily Living    BADL Assessment/Intervention -- grooming;lower body dressing  -AC (r) BH (t) AC (c)          Lower Body Dressing Assessment/Training    [place] Level (Lower Body Dressing) -- don;doff;socks;contact guard assist  -AC (r) BH (t) AC (c)       Position (Lower Body Dressing) -- edge of bed sitting  -AC (r) BH (t) AC (c)          Grooming Assessment/Training    [place] Level (Grooming) -- wash face, hands;oral care regimen;hair care, combing/brushing;minimum assist (75% patient effort)  -AC (r) BH (t) AC (c)       Position (Grooming) -- supported standing;other (see comments)  Standing completing personal hygiene with objects postioned on food tray  -AC (r) BH (t) AC (c)          Bed Mobility    Bed Mobility -- sit-supine;supine-sit;rolling left  -AC (r) BH (t) AC (c)       Rolling Left [place] (Bed Mobility) -- standby assist  -AC (r) BH (t) AC (c)       Rolling Right [place] (Bed Mobility) -- standby assist  -AC (r) BH (t) AC (c)       Scooting/Bridging [place] (Bed Mobility) -- standby assist  -AC (r) BH (t) AC (c)       Supine-Sit [place] (Bed Mobility) -- standby assist;verbal cues  -AC (r) BH (t) AC (c)       Sit-Supine [place] (Bed Mobility) -- standby assist;verbal cues  -AC (r) BH (t) AC (c)       Assistive Device (Bed Mobility) -- bed rails  -AC (r) BH (t) AC (c)          Functional Mobility    Functional Mobility- Ind. Level -- minimum assist (75% patient effort);verbal cues required;nonverbal cues required (demo/gesture)  -AC (r) BH (t) AC (c)       Functional Mobility- Device -- walker, front-wheeled  -AC (r) BH (t) AC (c)       Functional Mobility- Comment -- 2 steps foward to complete personal hygiene standing at food tray. 2 steps backwards, and 2 steps towards HOB  -AC (r) BH (t) AC (c)          Transfer Assessment/Treatment    Transfers -- sit-stand transfer;stand-sit transfer  -AC (r) BH (t) AC (c)          Sit-Stand Transfer     Sit-Stand [place] (Transfers) -- minimum assist (75% patient effort);verbal cues;nonverbal cues (demo/gesture)  -AC (r) BH (t) AC (c)          Stand-Sit Transfer    Stand-Sit [place] (Transfers) -- minimum assist (75% patient effort);verbal cues;nonverbal cues (demo/gesture)  -AC (r) BH (t) AC (c)          Motor Skills    Comments, Therapeutic Exercise -- B UE exercise 3 sets of 10 reps of shoulder flex/ext, elbow flex/ext, and scapular protraction/retraction to increase act jamin and UE skilled components  -AC (r) BH (t) AC (c)          Wound 05/29/25 1930 Left lower leg    Wound - Properties Group Placement Date: 05/29/25  -AR Placement Time: 1930  -AR Present on Original Admission: Y  -AR Side: Left  -AR Orientation: lower  -AR Location: leg  -AR    Retired Wound - Properties Group Placement Date: 05/29/25  -AR Placement Time: 1930  -AR Present on Original Admission: Y  -AR Side: Left  -AR Orientation: lower  -AR Location: leg  -AR    Retired Wound - Properties Group Placement Date: 05/29/25  -AR Placement Time: 1930  -AR Present on Original Admission: Y  -AR Side: Left  -AR Orientation: lower  -AR Location: leg  -AR    Retired Wound - Properties Group Date first assessed: 05/29/25  -AR Time first assessed: 1930  -AR Present on Original Admission: Y  -AR Side: Left  -AR Location: leg  -AR       Wound 05/29/25 1931 Right lower leg    Wound - Properties Group Placement Date: 05/29/25  -AR Placement Time: 1931  -AR Present on Original Admission: Y  -AR Side: Right  -AR Orientation: lower  -AR Location: leg  -AR    Retired Wound - Properties Group Placement Date: 05/29/25  -AR Placement Time: 1931  -AR Present on Original Admission: Y  -AR Side: Right  -AR Orientation: lower  -AR Location: leg  -AR    Retired Wound - Properties Group Placement Date: 05/29/25  -AR Placement Time: 1931  -AR Present on Original Admission: Y  -AR Side: Right  -AR Orientation: lower  -AR Location: leg  -AR    Retired Wound -  Properties Group Date first assessed: 05/29/25  -AR Time first assessed: 1931  -AR Present on Original Admission: Y  -AR Side: Right  -AR Location: leg  -AR       Plan of Care Review    Plan of Care Reviewed With -- patient  -AC (r) BH (t) AC (c)       Progress -- improving  -AC (r) BH (t) AC (c)       Outcome Evaluation -- OT tx complete. Pt seen in [place] with daughter at bedside. Pt oriented to person and place. Pt pleasant and agreeable to participating in tx. Pt completed all bed mobility with SBA. Pt completed donning socks EOB with CGA. Pt required Whitney with sit <> stand transition and proper technique when transitioning. Pt able to complete personal hygiene tasks in standing with Whitney for balance and 1 rest break to complete task. Once back EOB, pt completed B UE exercise 3 x 10 reps to increase act jamin and UE skilled components. Pt continues to progress with skilled tx and is highly motivated to participate and increase I with ADL's. Currently the patient continues to have deficits related to balance, strength and cognition that limit her I with ADL's. [facility]. OT will sign off at this time.  -AC (r) BH (t) AC (c)          Positioning and Restraints    Pre-Treatment Position -- in bed  -AC (r) BH (t) AC (c)       Post Treatment Position -- bed  -AC (r) BH (t) AC (c)       In Bed -- fowlers;call light within reach;encouraged to call for assist;exit alarm on;with family/caregiver  -AC (r) BH (t) AC (c)          Therapy Plan Review/Discharge Plan (OT)    Anticipated Discharge Disposition (OT) -- inpatient rehabilitation facility  -AC (r) BH (t) AC (c)          OT Goals    Transfer Goal Selection (OT) -- transfer, OT goal 1  -AC (r) BH (t) AC (c)       Dressing Goal Selection (OT) -- dressing, OT goal 1  -AC (r) BH (t) AC (c)       Toileting Goal Selection (OT) -- toileting, OT goal 1  -AC (r) BH (t) AC (c)          Transfer Goal 1 (OT)    Activity/Assistive Device (Transfer Goal  1, OT) -- bed-to-chair/chair-to-bed;commode, bedside without drop arms  -AC (r) BH (t) AC (c)       [place] Level/Cues Needed (Transfer Goal 1, OT) -- contact guard required  -AC (r) BH (t) AC (c)       Time Frame (Transfer Goal 1, OT) -- long term goal (LTG);10 days  -AC (r) BH (t) AC (c)       Progress/Outcome (Transfer Goal 1, OT) -- goal not met  -AC (r) BH (t) AC (c)          Dressing Goal 1 (OT)    Activity/Device (Dressing Goal 1, OT) -- lower body dressing  -AC (r) BH (t) AC (c)       [place]/Cues Needed (Dressing Goal 1, OT) -- standby assist  -AC (r) BH (t) AC (c)       Time Frame (Dressing Goal 1, OT) -- long term goal (LTG);10 days  -AC (r) BH (t) AC (c)       Progress/Outcome (Dressing Goal 1, OT) -- goal not met  -AC (r) BH (t) AC (c)          Toileting Goal 1 (OT)    Activity/Device (Toileting Goal 1, OT) -- commode, bedside without drop arms  -AC (r) BH (t) AC (c)       [place] Level/Cues Needed (Toileting Goal 1, OT) -- contact guard required  -AC (r) BH (t) AC (c)       Time Frame (Toileting Goal 1, OT) -- long term goal (LTG);10 days  -AC (r) BH (t) AC (c)       Progress/Outcome (Toileting Goal 1, OT) -- goal met  -AC (r) BH (t) AC (c)                 User Key  (r) = Recorded By, (t) = Taken By, (c) = Cosigned By      Initials Name Effective Dates    Niels Perez, OTR/L, CNT 02/03/23 -     Deborah Christie, MYRIAM 05/24/22 -     Prakash Fuentes, OT Student 05/12/25 -                     Occupational Therapy Education       Title: PT OT SLP Therapies (Done)       Topic: Occupational Therapy (Done)       Point: ADL training (Done)       Learning Progress Summary            Patient Acceptance, E,TB, VU by  at 6/11/2025 1033    Comment: OT POC, d/c planning, B UE exercise, t/f training    Acceptance, E,TB, VU by  at 6/10/2025 1505    Comment: OT POC, d/c planning, B UE exercise, t/f training, bed mobility training    Acceptance, E,TB, VU by  at 6/9/2025 1553    Comment: OT POC,  OT role in care, d/c planning, fall risk precautions, t/f training, bed mobility training.   Family Acceptance, E,TB, VU by  at 6/11/2025 1033    Comment: OT POC, d/c planning, B UE exercise, t/f training    Acceptance, E,TB, VU by  at 6/10/2025 1505    Comment: OT POC, d/c planning, B UE exercise, t/f training, bed mobility training                      Point: Home exercise program (Done)       Learning Progress Summary            Patient Acceptance, E,TB, VU by  at 6/11/2025 1033    Comment: OT POC, d/c planning, B UE exercise, t/f training    Acceptance, E,TB, VU by  at 6/10/2025 1505    Comment: OT POC, d/c planning, B UE exercise, t/f training, bed mobility training   Family Acceptance, E,TB, VU by  at 6/11/2025 1033    Comment: OT POC, d/c planning, B UE exercise, t/f training    Acceptance, E,TB, VU by  at 6/10/2025 1505    Comment: OT POC, d/c planning, B UE exercise, t/f training, bed mobility training                      Point: Precautions (Done)       Learning Progress Summary            Patient Acceptance, E,TB, VU by  at 6/9/2025 1553    Comment: OT POC, OT role in care, d/c planning, fall risk precautions, t/f training, bed mobility training.                      Point: Body mechanics (Done)       Learning Progress Summary            Patient Acceptance, E,TB, VU by  at 6/11/2025 1033    Comment: OT POC, d/c planning, B UE exercise, t/f training    Acceptance, E,TB, VU by  at 6/10/2025 1505    Comment: OT POC, d/c planning, B UE exercise, t/f training, bed mobility training    Acceptance, E,TB, VU by  at 6/9/2025 1553    Comment: OT POC, OT role in care, d/c planning, fall risk precautions, t/f training, bed mobility training.   Family Acceptance, E,TB, VU by  at 6/11/2025 1033    Comment: OT POC, d/c planning, B UE exercise, t/f training    Acceptance, E,TB, VU by  at 6/10/2025 1505    Comment: OT POC, d/c planning, B UE exercise, t/f training, bed mobility training                                       User Key       Initials Effective Dates Name Provider Type Discipline     05/12/25 -  Prakash Badillo, OT Student OT Student OT                    OT Recommendation and Plan  Planned Therapy Interventions (OT): activity tolerance training, adaptive equipment training, BADL retraining, functional balance retraining, occupation/activity based interventions, patient/caregiver education/training, strengthening exercise, transfer/mobility retraining  Therapy Frequency (OT): 5 times/wk  Plan of Care Review  Plan of Care Reviewed With: patient  Progress: improving  Outcome Evaluation: OT tx complete. Pt seen in [facility] with daughter at bedside. Pt oriented to person and place. Pt pleasant and agreeable to participating in tx. Pt completed all bed mobility with SBA. Pt completed donning socks EOB with CGA. Pt required Whitney with sit <> stand transition and proper technique when transitioning. Pt able to complete personal hygiene tasks in standing with Whitney for balance and 1 rest break to complete task. Once back EOB, pt completed B UE exercise 3 x 10 reps to increase act jamin and UE skilled components. Pt continues to progress with skilled tx and is highly motivated to participate and increase I with ADL's. Currently the patient continues to have deficits related to balance, strength and cognition that limit her I with ADL's. Pt plans to d/c to [facility] today. OT will sign off at this time.  Plan of Care Reviewed With: patient  Outcome Evaluation: OT tx complete. Pt seen in [facility] with daughter at bedside. Pt oriented to person and place. Pt pleasant and agreeable to participating in tx. Pt completed all bed mobility with SBA. Pt completed donning socks EOB with CGA. Pt required Whitney with sit <> stand transition and proper technique when transitioning. Pt able to complete personal hygiene tasks in standing with Whitney for balance and 1 rest break to complete task. Once back EOB, pt completed B UE  exercise 3 x 10 reps to increase act jamin and UE skilled components. Pt continues to progress with skilled tx and is highly motivated to participate and increase I with ADL's. Currently the patient continues to have deficits related to balance, strength and cognition that limit her I with ADL's. Pt plans to d/c to [facility] today. OT will sign off at this time.      OT Rehab Goals       Row Name 06/11/25 0939             Occupational Therapy Goals    Transfer Goal Selection (OT) transfer, OT goal 1  -AC (r) BH (t) AC (c)      Dressing Goal Selection (OT) dressing, OT goal 1  -AC (r) BH (t) AC (c)      Toileting Goal Selection (OT) toileting, OT goal 1  -AC (r) BH (t) AC (c)         Transfer Goal 1 (OT)    Activity/Assistive Device (Transfer Goal 1, OT) bed-to-chair/chair-to-bed;commode, bedside without drop arms  -AC (r) BH (t) AC (c)      [place] Level/Cues Needed (Transfer Goal 1, OT) contact guard required  -AC (r) BH (t) AC (c)      Time Frame (Transfer Goal 1, OT) long term goal (LTG);10 days  -AC (r) BH (t) AC (c)      Progress/Outcome (Transfer Goal 1, OT) goal not met  -AC (r) BH (t) AC (c)         Dressing Goal 1 (OT)    Activity/Device (Dressing Goal 1, OT) lower body dressing  -AC (r) BH (t) AC (c)      [place]/Cues Needed (Dressing Goal 1, OT) standby assist  -AC (r) BH (t) AC (c)      Time Frame (Dressing Goal 1, OT) long term goal (LTG);10 days  -AC (r) BH (t) AC (c)      Progress/Outcome (Dressing Goal 1, OT) goal not met  -AC (r) BH (t) AC (c)         Toileting Goal 1 (OT)    Activity/Device (Toileting Goal 1, OT) commode, bedside without drop arms  -AC (r) BH (t) AC (c)      [place] Level/Cues Needed (Toileting Goal 1, OT) contact guard required  -AC (r) BH (t) AC (c)      Time Frame (Toileting Goal 1, OT) long term goal (LTG);10 days  -AC (r) BH (t) AC (c)      Progress/Outcome (Toileting Goal 1, OT) goal met  -AC (r) BH (t) AC (c)                User Key  (r) = Recorded By, (t) =  Taken By, (c) = Cosigned By      Initials Name Provider Type Discipline    AC Niels Shirley, OTR/L, CNT Occupational Therapist OT     Prakash Badillo, OT Student OT Student OT                     Outcome Measures       Row Name 06/11/25 0939 06/10/25 1325 06/09/25 6158       How much help from another is currently needed...    Putting on and taking off regular lower body clothing? 3  -AC (r) BH (t) AC (c) 3  -AC (r) BH (t) AC (c) 3  -AC (r) BH (t) AC (c)    Bathing (including washing, rinsing, and drying) 2  -AC (r) BH (t) AC (c) 2  -AC (r) BH (t) AC (c) 2  -AC (r) BH (t) AC (c)    Toileting (which includes using toilet bed pan or urinal) 3  -AC (r) BH (t) AC (c) 3  -AC (r) BH (t) AC (c) 2  -AC (r) BH (t) AC (c)    Putting on and taking off regular upper body clothing 3  -AC (r) BH (t) AC (c) 3  -AC (r) BH (t) AC (c) 3  -AC (r) BH (t) AC (c)    Taking care of personal grooming (such as brushing teeth) 3  -AC (r) BH (t) AC (c) 3  -AC (r) BH (t) AC (c) 3  -AC (r) BH (t) AC (c)    Eating meals 3  -AC (r) BH (t) AC (c) 3  -AC (r) BH (t) AC (c) 3  -AC (r) BH (t) AC (c)    AM-PAC 6 Clicks Score (OT) 17  -AC (r) BH (t) 17  -AC (r) BH (t) 16  -AC (r) BH (t)       Functional Assessment    Outcome Measure Options AM-PAC 6 Clicks Daily Activity (OT)  -AC (r) BH (t) AC (c) AM-PAC 6 Clicks Daily Activity (OT)  -AC (r) BH (t) AC (c) AM-PAC 6 Clicks Daily Activity (OT)  -AC (r) BH (t) AC (c)              User Key  (r) = Recorded By, (t) = Taken By, (c) = Cosigned By      Initials Name Provider Type    Niels Perez, OTR/L, CNT Occupational Therapist    Prakash Fuentes, OT Student OT Student                    Time Calculation:    Time Calculation- OT       Row Name 06/11/25 1016             Time Calculation- OT    OT Start Time 0939  -AC (r) BH (t) AC (c)      OT Stop Time 1020  -AC (r) BH (t) AC (c)      OT Time Calculation (min) 41 min  -AC (r) BH (t)      Total Timed Code Minutes- OT 41 minute(s)  -AC (r) BH (t) AC (c)       OT Received On 06/11/25  -AC (r) BH (t) AC (c)         Timed Charges    36954 - OT Therapeutic Exercise Minutes 20  -AC (r) BH (t) AC (c)      29645 - OT Self Care/Mgmt Minutes 21  -AC (r) BH (t) AC (c)         Total Minutes    Timed Charges Total Minutes 41  -AC (r) BH (t)       Total Minutes 41  -AC (r) BH (t)                User Key  (r) = Recorded By, (t) = Taken By, (c) = Cosigned By      Initials Name Provider Type    Niels Perez, OTR/L, CNT Occupational Therapist    Prakash Fuentes, OT Student OT Student                  Timed Therapy Charges  Total Units: 3      Suggested Charges  Total Units: 3      Procedure Name Documented Minutes Units Code    HC OT SELF CARE/MGMT/TRAIN EA 15 MIN 21 2   39446 (CPT®)      HC OT THER PROC EA 15 MIN 20 1   46414 (CPT®)                 Documented Minutes  Total Minutes: 41      Therapy Provided Minutes    97833 - OT Self Care/Mgmt Minutes 21    96063 - OT Therapeutic Exercise Minutes 20                           OT Discharge Summary  Anticipated Discharge Disposition (OT): inpatient rehabilitation facility  Reason for Discharge: Discharge from facility  Outcomes Achieved: Patient able to partially acheive established goals  Discharge Destination: Inpatient rehabilitation facility    MARTY Zarate  6/11/2025

## 2025-06-11 NOTE — DISCHARGE SUMMARY
[facility] Medicine Services  DISCHARGE SUMMARY       Date of Admission: 5/29/2025  Date of Discharge:  6/11/2025  Primary Care Physician: Darryl Andrews DO    Presenting Problem/History of Present Illness:  Confusion/abnormal gait    Final Discharge Diagnoses:  Active Hospital Problems    Diagnosis     **Acquired cerebral ventriculomegaly     Generalized weakness        Consults: Neurology, neurosurgery, nutritionist    Procedures Performed: Spinal tap    Pertinent Test Results:   Results for orders placed during the hospital encounter of 02/15/25    Adult Transthoracic Echo Complete w/ Color, Spectral and Contrast if Necessary Per Protocol    Interpretation Summary    Left ventricular systolic function is normal. Left ventricular ejection fraction appears to be 66 - 70%.    Left ventricular diastolic function was normal.    Normal right ventricular cavity size and systolic function.    Normal biatrial size.    There is no significant (more than mild) valve stenosis or regurgitation appreciated.    There is a very small (<0.5cm) pericardial effusion adjacent to the right ventricle. There is no evidence of cardiac tamponade.      Imaging Results (All)       Procedure Component Value Units Date/Time    MRI Cervical Spine With & Without Contrast [[id number]] Collected: 05/31/25 1629     Updated: 05/31/25 1639    Narrative:      EXAM: MRI CERVICAL SPINE W WO CONTRAST- - 5/31/2025 1:27 PM     HISTORY: weakness left upper, and bilateral lower extremities with  urinay retention; R53.1-Weakness; R11.2-Nausea with vomiting,  unspecified; F12.90-Cannabis use, unspecified, uncomplicated;  R79.89-Other specified abnormal findings of blood chemistry;  N39.0-Urinary tract infection, site not specified; R13.10-Dysphagia,  unspecified; Z74.09-Other reduced mobility       COMPARISON: 4/1/2022.     TECHNIQUE: Routine MR of the cervical spine was performed without and  with intravenous  contrast.     FINDINGS:      The visualized posterior fossa is unremarkable. There is normal signal  in the visualized spinal cord.     No acute fracture or marrow edema identified.     C1-C2: There is no central canal or neuroforaminal stenosis.     C2-C3: No disc bulge, spinal canal, or foraminal stenosis. There is left  facet arthropathy which does not cause significant neuroforaminal  stenosis.     C3-C4: There is disc space narrowing and spurring of the endplates.  There is posterior disc osteophyte complex formation causing mild  central canal stenosis and effacement of the ventral thecal sac. Right  greater than left uncinate process spurring causes moderate right  neuroforaminal narrowing.     C4-C5: No disc bulge, spinal canal, or foraminal stenosis.      C5-C6: There is spondylosis with disc base narrowing and spurring of the  endplates and mild effacement of the ventral thecal sac. There is  uncinate process spurring bilaterally with severe bilateral  neuroforaminal stenosis.     C6-C7: Spondylosis with spurring anteriorly without significant central  canal stenosis and severe left neuroforaminal narrowing.     C7-T1: No disc bulge, spinal canal, or foraminal stenosis.     There is no pathologic contrast enhancement.     Extraspinal soft tissues within normal limits.          Impression:      1. Multilevel spondylosis and uncinate process spurring causing  multilevel varying degrees of neuroforaminal stenosis as above. There is  no severe central canal stenosis or pathologic contrast enhancement.     This report was signed and finalized on 5/31/2025 4:36 PM by Ramon Cifuentes.       MRI Lumbar Spine With & Without Contrast [[id number]] Collected: 05/31/25 1625     Updated: 05/31/25 1632    Narrative:      EXAM: MRI LUMBAR SPINE W WO CONTRAST- - 5/31/2025 1:47 PM     HISTORY: weakness left upper, and bilateral lower extremities with  urinay retention; R53.1-Weakness; R11.2-Nausea with  vomiting,  unspecified; F12.90-Cannabis use, unspecified, uncomplicated;  R79.89-Other specified abnormal findings of blood chemistry;  N39.0-Urinary tract infection, site not specified; R13.10-Dysphagia,  unspecified; Z74.09-Other reduced mobility       COMPARISON: None available.     TECHNIQUE: Routine MR of the lumbar spine was performed without and with  intravenous contrast.     FINDINGS:         Spinal cord ends normally at the L1 level. There is normal signal in  the conus medullaris. No abnormal enhancement is seen. No acute fracture  identified. There are degenerative endplate signal changes at L4-L5 and  L5-S1.        L5-S1: There is spurring of the endplates and posterior disc space  narrowing. There is no significant acquired central canal stenosis. Mild  facet arthropathy is noted with mild right neuroforaminal narrowing and  no spondylolisthesis.     L4-L5: There is spurring of the endplates and mild disc base narrowing.  Disc bulging causes moderate central canal stenosis and encroachment on  the right greater than left subarticular recess. There is facet  arthropathy and ligamentum flavum hypertrophy with no severe  neuroforaminal narrowing and no spondylolisthesis.     L3-L4: No disc bulge, spinal canal or foraminal stenosis.      L2-L3: No disc bulge, spinal canal or foraminal stenosis.      L1-L2: No disc bulge, spinal canal or foraminal stenosis.      Extraspinal soft tissues within normal limits.          Impression:      1. Lower lumbar spondylosis and facet arthropathy at L4-L5 and L5-S1  with no severe central canal or neuroforaminal stenosis identified.     This report was signed and finalized on 5/31/2025 4:29 PM by Ramon Cifuentes.       MRI Brain With & Without Contrast [[id number]] Collected: 05/29/25 1845     Updated: 05/29/25 1858    Narrative:      EXAMINATION: MRI BRAIN W WO CONTRAST- 5/29/2025 6:45 PM     HISTORY: ventriculomegaly, syncope; R53.1-Weakness; R11.2-Nausea  with  vomiting, unspecified; F12.90-Cannabis use, unspecified, uncomplicated;  R79.89-Other specified abnormal findings of blood chemistry;  N39.0-Urinary tract infection, site not specified.     REPORT: Multiplanar multisequence MR imaging of the brain was performed  with and without contrast.     COMPARISON: Noncontrast CT of the head 5/16/2025, noncontrast CT of the  head 4/18/2025 MRI brain with and without contrast 9/4/2019.     No diffusion restriction is identified. There is diffuse age compatible  cerebral atrophy as before. No intracranial mass effect is identified.  There is mild dilation of the lateral and third ventricles as  demonstrated on recent head CT. There appears to be minimal dilation of  the fourth ventricle. FLAIR images demonstrate confluent increased  signal within the periventricular white matter tracts which appears  slightly greater compared with the previous MRI in 2019. This could be  related to mild transependymal edema or a combination of transependymal  edema and chronic small vessel white matter ischemic disease.. There is  no mass or abnormal enhancement involving the aqueduct of Sylvius,  though it is small in caliber. This may represent aqueductal stenosis.  No abnormal enhancement is seen elsewhere within the brain.  Susceptibility weighted images demonstrate no evidence of intracranial  hemorrhage.       Impression:      1. Persistent mild asymmetric dilation of the lateral and third  ventricles compared to the fourth ventricle, with small caliber of the  aqueduct suggestive of aqueduct of stenosis. Given fluid increased  periventricular FLAIR signal possibly related to mild transependymal  edema, versus a combination of transependymal edema and chronic small  vessel white matter ischemic disease. No obstructing mass at the sylvian  aqueduct, no abnormal enhancement is identified within the brain.     This report was signed and finalized on 5/29/2025 6:55 PM by   Triston Escobedo MD.       XR Chest 1 View [[id number]] Collected: 05/29/25 1356     Updated: 05/29/25 1403    Narrative:      EXAMINATION: XR CHEST 1 VW-  5/29/2025 1:56 PM 1 view     HISTORY: shortness of breath     COMPARISON: 4/18/2025     TECHNIQUE: A single frontal view of the chest was obtained.     FINDINGS:  The lungs are clear. The cardiomediastinal silhouette and pulmonary  vascularity are within normal limits. The osseous structures and  surrounding soft tissues demonstrate no acute abnormality.       Impression:         1.  No acute cardiopulmonary process.           This report was signed and finalized on 5/29/2025 1:59 PM by Dr. Ori Woods MD.             LAB RESULTS:      Lab 06/11/25  0529 06/10/25  0627 06/09/25  0455 06/08/25  0337 06/07/25  1652 06/07/25  0259   WBC 8.90 7.89 7.41 5.78 5.86 7.93   HEMOGLOBIN 8.4* 8.2* 7.4* 7.5* 8.0* 7.7*   HEMATOCRIT 25.1* 24.6* 22.7* 22.2* 23.4* 23.5*   PLATELETS 185 182 180 173 193 195   NEUTROS ABS 7.25* 4.71 3.74 3.74  --  5.05   IMMATURE GRANS (ABS) 0.06* 0.06* 0.10* 0.08*  --  0.07*   LYMPHS ABS 1.28 2.65 3.18* 1.60  --  2.31   MONOS ABS 0.30 0.43 0.36 0.35  --  0.48   EOS ABS 0.00 0.02 0.01 0.00  --  0.01   MCV 86.9 86.9 88.7 87.1 89.0 88.3         Lab 06/11/25  0529 06/10/25  1726 06/10/25  0627 06/08/25  0337 06/07/25  1652 06/07/25  0300   SODIUM 137  --  140 140  --  139   POTASSIUM 4.1 4.9 3.6 3.7 4.5 3.2*   CHLORIDE 108*  --  109* 110*  --  109*   CO2 23.0  --  23.0 23.0  --  22.0   ANION GAP 6.0  --  8.0 7.0  --  8.0   BUN 11.5  --  12.2 12.5  --  13.9   CREATININE 0.74  --  0.74 0.70  --  0.82   EGFR 98.7  --  98.7 105.5  --  87.3   GLUCOSE 175*  --  104* 130*  --  131*   CALCIUM 8.6  --  8.2* 8.4*  --  8.4*   MAGNESIUM 2.3  --  1.5* 1.6  --  1.6         Lab 06/08/25  0337 06/07/25  0300   TOTAL PROTEIN 6.3 6.2   ALBUMIN 2.6* 2.6*   GLOBULIN 3.7 3.6   ALT (SGPT) 48* 58*   AST (SGOT) 20 24   BILIRUBIN 0.6 0.6   ALK PHOS 32* 32*                  Lab 06/09/25  1041   ABO TYPING A   RH TYPING Positive   ANTIBODY SCREEN Negative         Brief Urine Lab Results  (Last result in the past 365 days)        Color   Clarity   Blood   Leuk Est   Nitrite   Protein   CREAT   Urine HCG        06/01/25 0000 Orange   Clear   Negative   Moderate (2+)   Positive   30 mg/dL (1+)                 Microbiology Results (last 10 days)       ** No results found for the last 240 hours. 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  EEG showed-  Diffuse cerebral dysfunction of moderate degree, nonspecific.  This is most commonly seen due to toxic/metabolic or hypoxemic/ischemic cause. No evidence for epilepsy is seen  Antibodies GQ1b was negative  Nerve conduction studies review per specialist [ Nerve conduction studies did reveal slowed velocities but not in the demyelinating range.]  Neurology recommended a 5-day treatment with IVIG [completed on 6/8/2025]     -Acute anemia  Patient's hemoglobin was normal on presentation, blood sugar during course of treatment dropped to 7.4.  There was no obvious source of blood loss.  Fecal occult blood test was negative  Patient was transfused with 1 unit of PRBC.     - Acute urine retention  Bladder scan on multiple occasion has showed urinary retention [had 700 mL], intermittent catheterization has been going on.  Placed Roberts catheter, started patient on Flomax [which I do not think will help, patient's problem is likely neurological].  She will be discharged on Roberts catheter and voiding trial will be done at the rehab.     -Hypothyroidism  Variable thyroid function tests on prior admissions and currently. Continue levothyroxine 150 mcg p.o. daily. I have recommended endocrinology outpatient follow up, and this was explained to family.     -Hypertension  Due to variable blood pressure values, discontinued amlodipine  "lisinoprill, aldactone; On Carvedilol 6.25 mg p.o. twice daily.      -Acquired cerebral ventriculomegaly   Opening pressure on lumbar puncture was unimpressive as by neurologist    Other chronic medical conditions-  History of arthritis  Carotid artery stenosis  Degenerative disc disease of cervical spine  Diabetes mellitus  Depression  Hypothyroidism  GERD  Graves' disease  History of heart palpitations  Hyperlipidemia  Hypertension  Seizure disorder    Some of the patient's home medications were post in hospital, we will continue to post them on discharge and these medications will be restarted with improvement in her clinical status.       Physical Exam on Discharge:  /76 (BP Location: Left arm, Patient Position: Lying)   Pulse 69   Temp 97.8 °F (36.6 °C) (Oral)   Resp 16   Ht 188 cm (74\")   Wt 67.3 kg (148 lb 4.8 oz)   SpO2 98%   BMI 19.04 kg/m²   Physical Exam  HEENT:  Neck is supple  CVS:  RRR  Lungs:  CTA - B/L  Abd:  NT/ND  Ext:  No edema  Skin:  No rashes     Pertinent Neuro Exam:  Awake. alert and brighter today.   Can follow commands   Pupils equal.  Able to count fingers today.  Disconjugate gaze chronically and intermittently able to abduct right eye past midline.  No unilateral facial droop  Antigravity strength in all 4 extremities  No signs of tremors or increased tone  Mild ataxia noted bilaterally  Improved strength bilateral upper and lower extremities 4/5.     Condition on Discharge: Stable    Discharge Disposition:  Rehab Facility or Unit (DC - External)    Discharge Medications:     Discharge Medications        PAUSE taking these medications        Instructions Start Date   amLODIPine 10 MG tablet  Wait to take this until your doctor or other care provider tells you to start again.  Commonly known as: NORVASC   10 mg, Daily      carvedilol 25 MG tablet  Wait to take this until your doctor or other care provider tells you to start again.  Commonly known as: COREG   25 mg, 2 Times " Daily With Meals      empagliflozin 25 MG tablet tablet  Wait to take this until your doctor or other care provider tells you to start again.  Commonly known as: JARDIANCE   25 mg, Daily      insulin glargine 100 UNIT/ML injection  Wait to take this until your doctor or other care provider tells you to start again.  Commonly known as: LANTUS, SEMGLEE   75 Units, Nightly      metFORMIN  MG 24 hr tablet  Wait to take this until your doctor or other care provider tells you to start again.  Commonly known as: GLUCOPHAGE-XR   1,000 mg, Daily With Breakfast      spironolactone 50 MG tablet  Wait to take this until your doctor or other care provider tells you to start again.  Commonly known as: ALDACTONE   50 mg, 2 Times Daily             New Medications        Instructions Start Date   folic acid 1 MG tablet  Commonly known as: FOLVITE   1 mg, Oral, Daily   Start Date: June 12, 2025     tamsulosin 0.4 MG capsule 24 hr capsule  Commonly known as: FLOMAX   0.4 mg, Oral, Daily   Start Date: June 12, 2025     thiamine 500 MG tablet  Commonly known as: VITAMIN B1   500 mg, Oral, 3 times daily             Changes to Medications        Instructions Start Date   polyethylene glycol 17 g packet  Commonly known as: MIRALAX  What changed: You were already taking a medication with the same name, and this prescription was added. Make sure you understand how and when to take each.   17 g, Oral, Daily      polyethylene glycol 17 g packet  Commonly known as: MIRALAX  What changed: Another medication with the same name was added. Make sure you understand how and when to take each.   17 g, Oral, Daily PRN             Continue These Medications        Instructions Start Date   ARIPiprazole 10 MG tablet  Commonly known as: ABILIFY   10 mg, Daily      benazepril 20 MG tablet  Commonly known as: LOTENSIN   1 tablet, Daily      Evolocumab solution auto-injector SureClick injection  Commonly known as: REPATHA   140 mg, Every 14 Days       hydrOXYzine pamoate 25 MG capsule  Commonly known as: VISTARIL   25 mg, 3 Times Daily PRN      levothyroxine 175 MCG tablet  Commonly known as: SYNTHROID, LEVOTHROID   175 mcg, Oral, Every Early Morning      liothyronine 25 MCG tablet  Commonly known as: CYTOMEL   25 mcg, 2 Times Daily      Melatonin 10 MG tablet   1 tablet, Oral, Nightly PRN      mirtazapine 15 MG tablet  Commonly known as: REMERON   15 mg, Nightly      omeprazole 20 MG capsule  Commonly known as: priLOSEC   20 mg, Daily      ondansetron ODT 4 MG disintegrating tablet  Commonly known as: ZOFRAN-ODT   4 mg, Oral, Every 6 Hours PRN      propranolol  MG 24 hr capsule  Commonly known as: INDERAL LA   120 mg, Daily      rosuvastatin 20 MG tablet  Commonly known as: CRESTOR   20 mg, Nightly      vitamin D 1.25 MG (64745 UT) capsule capsule  Commonly known as: ERGOCALCIFEROL   50,000 Units, Weekly               This patient has current or prior documentation of an left ventricular ejection fraction (LVEF) of less than or equal to 40%.    The patient was prescribed or already taking an ACE/ARB.    The patient was prescribed already taking bisoprolol, carvedilol, or sustained release metoprolol succinate.     Discharge Diet: Diabetic    Activity at Discharge: As per physical therapy    Follow-up Appointments:   Follow-up with neurology in 6 weeks    Test Results Pending at Discharge: None    Electronically signed by Yovanny Abernathy MD, 06/11/25, 12:26 CDT.    Time: 45 minutes.

## 2025-06-11 NOTE — CASE MANAGEMENT/SOCIAL WORK
Continued Stay Note  [facility]     Patient Name: Lynn Magana  MRN: [id number]  Today's Date: 6/11/2025    Admit Date: 5/29/2025    Plan: [facility]   Discharge Plan       Row Name 06/11/25 1006       Plan    Plan Comments Pt can dc to [facility] today if medically stable. Call report number is [phone]. Fax number is [phone]  Pt dtr can transport pt to [facility].                    Discharge Codes    No documentation.                 Expected Discharge Date and Time       Expected Discharge Date Expected Discharge Time    Jun 11, 2025               NADIA Williamson

## 2025-06-11 NOTE — PLAN OF CARE
Goal Outcome Evaluation:  Plan of Care Reviewed With: patient, family        Progress: improving  Outcome Evaluation: Pt alert to self only on RA. Conversationally confused. Bed alarm set, family at bedside. Pills crushed in applesauce. Roberts catheter in place, draining. [facility]. VSS. Call light in reach. [facility] care [facility]

## 2025-06-11 NOTE — THERAPY DISCHARGE NOTE
Acute Care - Physical Therapy Discharge Summary  [facility]       Patient Name: Lynn Magana  : 1975  MRN: [id number]    Today's Date: 2025  Onset of Illness/Injury or Date of Surgery: 25       Referring Physician: Dr. Limon      Admit Date: 2025      PT Recommendation and Plan    Visit Dx:    ICD-10-CM ICD-9-CM   1. Generalized weakness  R53.1 780.79   2. Nausea and vomiting, unspecified vomiting type  R11.2 787.01   3. Marijuana user  F12.90 305.20   4. Low TSH level  R79.89 794.5   5. Urinary tract infection without hematuria, site unspecified  N39.0 599.0   6. Dysphagia, unspecified type  R13.10 787.20   7. Impaired functional mobility and activity tolerance [Z74.09]  Z74.09 V49.89        Outcome Measures       Row Name 25 0939 06/10/25 1325 25 1514       How much help from another is currently needed...    Putting on and taking off regular lower body clothing? 3  -AC (r) BH (t) AC (c) 3  -AC (r) BH (t) AC (c) 3  -AC (r) BH (t) AC (c)    Bathing (including washing, rinsing, and drying) 2  -AC (r) BH (t) AC (c) 2  -AC (r) BH (t) AC (c) 2  -AC (r) BH (t) AC (c)    Toileting (which includes using toilet bed pan or urinal) 3  -AC (r) BH (t) AC (c) 3  -AC (r) BH (t) AC (c) 2  -AC (r) BH (t) AC (c)    Putting on and taking off regular upper body clothing 3  -AC (r) BH (t) AC (c) 3  -AC (r) BH (t) AC (c) 3  -AC (r) BH (t) AC (c)    Taking care of personal grooming (such as brushing teeth) 3  -AC (r) BH (t) AC (c) 3  -AC (r) BH (t) AC (c) 3  -AC (r) BH (t) AC (c)    Eating meals 3  -AC (r) BH (t) AC (c) 3  -AC (r) BH (t) AC (c) 3  -AC (r) BH (t) AC (c)    AM-PAC 6 Clicks Score (OT) 17  -AC (r) BH (t) 17  -AC (r) BH (t) 16  -AC (r) BH (t)       Functional Assessment    Outcome Measure Options AM-PAC 6 Clicks Daily Activity (OT)  -AC (r) BH (t) AC (c) AM-PAC 6 Clicks Daily Activity (OT)  -AC (r) BH (t) AC (c) AM-PAC 6 Clicks Daily Activity (OT)  -AC (r) BH (t) AC (c)               User Key  (r) = Recorded By, (t) = Taken By, (c) = Cosigned By      Initials Name Provider Type    AC Niels Shirley, OTR/L, CNT Occupational Therapist    Prakash Fuentes, OT Student OT Student                         PT Rehab Goals       Row Name 06/11/25 1558             Bed Mobility Goal 1 (PT)    Activity/Assistive Device (Bed Mobility Goal 1, PT) rolling to left;rolling to right;scooting;sit to supine/supine to sit;sidelying to sit/sit to sidelying  -LY      [place] Level/Cues Needed (Bed Mobility Goal 1, PT) standby assist;independent  -LY      Time Frame (Bed Mobility Goal 1, PT) long term goal (LTG);10 days  -LY      Progress/Outcomes (Bed Mobility Goal 1, PT) goal not met  -LY         Transfer Goal 1 (PT)    Activity/Assistive Device (Transfer Goal 1, PT) sit-to-stand/stand-to-sit;bed-to-chair/chair-to-bed;other (see comments)  rwx for bed to/from chair  -LY      [place] Level/Cues Needed (Transfer Goal 1, PT) contact guard required;standby assist  -LY      Time Frame (Transfer Goal 1, PT) long term goal (LTG);10 days  -LY      Progress/Outcome (Transfer Goal 1, PT) goal not met  -LY         Gait Training Goal 1 (PT)    Activity/Assistive Device (Gait Training Goal 1, PT) gait (walking locomotion);assistive device use;decrease fall risk;improve balance and speed;increase endurance/gait distance;walker, rolling;diminish gait deviation  -LY      [place] Level (Gait Training Goal 1, PT) contact guard required;standby assist  -LY      Distance (Gait Training Goal 1, PT) 40-50 ft  -LY      Time Frame (Gait Training Goal 1, PT) long term goal (LTG);10 days  -LY      Progress/Outcome (Gait Training Goal 1, PT) goal not met  -LY         Problem Specific Goal 1 (PT)    Problem Specific Goal 1 (PT) pt able to actively move all 4 extremities against gravity repetitively 8-10 times demonstrating improved strength throughout  -LY      Time Frame (Problem Specific Goal 1, PT) long-term goal  (LTG);other (see comments)  -LY      Progress/Outcome (Problem Specific Goal 1, PT) goal met  -LY                User Key  (r) = Recorded By, (t) = Taken By, (c) = Cosigned By      Initials Name Provider Type Discipline    Judy Veloz, PTA Physical Therapist Assistant PT                        PT Discharge Summary  Anticipated Discharge Disposition (PT): inpatient rehabilitation facility  Reason for Discharge: Discharge from facility  Outcomes Achieved: Refer to plan of care for updates on goals achieved  Discharge Destination: Inpatient rehabilitation facility      Judy Jiang PTA   6/11/2025

## 2025-06-12 PROBLEM — E43 SEVERE MALNUTRITION: Status: ACTIVE | Noted: 2025-06-12

## 2025-06-12 LAB
ANION GAP SERPL CALCULATED.3IONS-SCNC: 7 MMOL/L (ref 8–16)
BASOPHILS # BLD: 0 K/UL (ref 0–0.2)
BASOPHILS NFR BLD: 0.2 % (ref 0–1)
BUN SERPL-MCNC: 13 MG/DL (ref 6–20)
CALCIUM SERPL-MCNC: 9.1 MG/DL (ref 8.6–10)
CHLORIDE SERPL-SCNC: 109 MMOL/L (ref 98–107)
CO2 SERPL-SCNC: 24 MMOL/L (ref 22–29)
CREAT SERPL-MCNC: 0.9 MG/DL (ref 0.5–0.9)
EOSINOPHIL # BLD: 0 K/UL (ref 0–0.6)
EOSINOPHIL NFR BLD: 0.1 % (ref 0–5)
ERYTHROCYTE [DISTWIDTH] IN BLOOD BY AUTOMATED COUNT: 13.5 % (ref 11.5–14.5)
GLUCOSE SERPL-MCNC: 99 MG/DL (ref 70–99)
HCT VFR BLD AUTO: 27 % (ref 37–47)
HGB BLD-MCNC: 9 G/DL (ref 12–16)
IMM GRANULOCYTES # BLD: 0 K/UL
LYMPHOCYTES # BLD: 3.2 K/UL (ref 1.1–4.5)
LYMPHOCYTES NFR BLD: 33.1 % (ref 20–40)
MCH RBC QN AUTO: 29.4 PG (ref 27–31)
MCHC RBC AUTO-ENTMCNC: 33.3 G/DL (ref 33–37)
MCV RBC AUTO: 88.2 FL (ref 81–99)
MONOCYTES # BLD: 0.4 K/UL (ref 0–0.9)
MONOCYTES NFR BLD: 4.2 % (ref 0–10)
NEUTROPHILS # BLD: 5.9 K/UL (ref 1.5–7.5)
NEUTS SEG NFR BLD: 62 % (ref 50–65)
PLATELET # BLD AUTO: 164 K/UL (ref 130–400)
PMV BLD AUTO: 10.8 FL (ref 9.4–12.3)
POTASSIUM SERPL-SCNC: 4.3 MMOL/L (ref 3.5–5)
RBC # BLD AUTO: 3.06 M/UL (ref 4.2–5.4)
SODIUM SERPL-SCNC: 140 MMOL/L (ref 136–145)
WBC # BLD AUTO: 9.6 K/UL (ref 4.8–10.8)

## 2025-06-12 PROCEDURE — 94760 N-INVAS EAR/PLS OXIMETRY 1: CPT

## 2025-06-12 PROCEDURE — 85025 COMPLETE CBC W/AUTO DIFF WBC: CPT

## 2025-06-12 PROCEDURE — 99223 1ST HOSP IP/OBS HIGH 75: CPT | Performed by: PSYCHIATRY & NEUROLOGY

## 2025-06-12 PROCEDURE — 97116 GAIT TRAINING THERAPY: CPT

## 2025-06-12 PROCEDURE — 97110 THERAPEUTIC EXERCISES: CPT

## 2025-06-12 PROCEDURE — 94150 VITAL CAPACITY TEST: CPT

## 2025-06-12 PROCEDURE — 6370000000 HC RX 637 (ALT 250 FOR IP): Performed by: PSYCHIATRY & NEUROLOGY

## 2025-06-12 PROCEDURE — 6360000002 HC RX W HCPCS: Performed by: PSYCHIATRY & NEUROLOGY

## 2025-06-12 PROCEDURE — 92522 EVALUATE SPEECH PRODUCTION: CPT

## 2025-06-12 PROCEDURE — 97161 PT EVAL LOW COMPLEX 20 MIN: CPT

## 2025-06-12 PROCEDURE — 80048 BASIC METABOLIC PNL TOTAL CA: CPT

## 2025-06-12 PROCEDURE — 92610 EVALUATE SWALLOWING FUNCTION: CPT

## 2025-06-12 PROCEDURE — 1180000000 HC REHAB R&B

## 2025-06-12 PROCEDURE — 97165 OT EVAL LOW COMPLEX 30 MIN: CPT

## 2025-06-12 PROCEDURE — 97535 SELF CARE MNGMENT TRAINING: CPT

## 2025-06-12 PROCEDURE — 36415 COLL VENOUS BLD VENIPUNCTURE: CPT

## 2025-06-12 RX ADMIN — MIRTAZAPINE 15 MG: 15 TABLET, FILM COATED ORAL at 20:50

## 2025-06-12 RX ADMIN — LIOTHYRONINE SODIUM 25 MCG: 25 TABLET ORAL at 08:23

## 2025-06-12 RX ADMIN — ROSUVASTATIN CALCIUM 20 MG: 20 TABLET, FILM COATED ORAL at 20:51

## 2025-06-12 RX ADMIN — ARIPIPRAZOLE 10 MG: 10 TABLET ORAL at 08:23

## 2025-06-12 RX ADMIN — SENNOSIDES AND DOCUSATE SODIUM 1 TABLET: 8.6; 5 TABLET ORAL at 08:23

## 2025-06-12 RX ADMIN — PANTOPRAZOLE SODIUM 40 MG: 40 TABLET, DELAYED RELEASE ORAL at 05:33

## 2025-06-12 RX ADMIN — FOLIC ACID 1 MG: 1 TABLET ORAL at 08:23

## 2025-06-12 RX ADMIN — LEVOTHYROXINE SODIUM 150 MCG: 75 TABLET ORAL at 05:33

## 2025-06-12 RX ADMIN — ERGOCALCIFEROL 50000 UNITS: 1.25 CAPSULE ORAL at 08:23

## 2025-06-12 RX ADMIN — LIOTHYRONINE SODIUM 25 MCG: 25 TABLET ORAL at 20:51

## 2025-06-12 RX ADMIN — Medication 500 MG: at 14:32

## 2025-06-12 RX ADMIN — CARVEDILOL 6.25 MG: 6.25 TABLET, FILM COATED ORAL at 17:09

## 2025-06-12 RX ADMIN — Medication 500 MG: at 08:23

## 2025-06-12 RX ADMIN — SENNOSIDES AND DOCUSATE SODIUM 1 TABLET: 8.6; 5 TABLET ORAL at 20:51

## 2025-06-12 RX ADMIN — ENOXAPARIN SODIUM 40 MG: 100 INJECTION SUBCUTANEOUS at 20:50

## 2025-06-12 RX ADMIN — LISINOPRIL 20 MG: 20 TABLET ORAL at 08:23

## 2025-06-12 RX ADMIN — CARVEDILOL 6.25 MG: 6.25 TABLET, FILM COATED ORAL at 08:23

## 2025-06-12 RX ADMIN — Medication 500 MG: at 20:50

## 2025-06-12 RX ADMIN — TAMSULOSIN HYDROCHLORIDE 0.4 MG: 0.4 CAPSULE ORAL at 08:23

## 2025-06-12 ASSESSMENT — PAIN SCALES - GENERAL: PAINLEVEL_OUTOF10: 0

## 2025-06-12 NOTE — PAYOR COMM NOTE
"DC TO REHAB 6-11-25    Lynn Magana (49 y.o. Female)       Date of Birth   1975    Social Security Number       Address   [address] [place] [address]    Home Phone   [phone]    MRN   [id number]       [facility]    Marital Status                               Admission Date   5/29/2025    Admission Type   Emergency    Admitting Provider   Yovanny Abernathy MD    Attending Provider       Department, Room/Bed   [facility] 3A, 337/1       Discharge Date   6/11/2025    Discharge Disposition   Rehab Facility or Unit (DC - External)    Discharge Destination                                 Attending Provider: (none)   Allergies: Oxycodone-acetaminophen    Isolation: None   Infection: None   Code Status: Prior    Ht: 188 cm (74\")   Wt: 67.3 kg (148 lb 4.8 oz)    Admission Cmt: None   Principal Problem: Acquired cerebral ventriculomegaly [G91.1]                   Active Insurance as of 5/29/2025       Primary Coverage       Payor Plan Insurance Group Employer/Plan Group    [employer] [place] [employer] [place]ICA[place]        Payor Plan Address Payor Plan Phone Number Payor Plan Fax Number Effective Dates    [address] [address] [phone]  3/14/2017 - None Entered    [facility] [id number]         Subscriber Name Subscriber Birth Date Member ID       LYNN MAGANA 1975 [id number]                     Emergency Contacts        (Rel.) Home Phone Work Phone Mobile Phone    Evens Cifuentes (Spouse) [phone] -- [phone]    Katya Menjivar (Sister) -- -- [phone]                 Discharge Summary        Yovanny Abernathy MD at 06/11/25 [address] [place] [address] Medicine Services  DISCHARGE SUMMARY       Date of Admission: 5/29/2025  Date of Discharge:  6/11/2025  Primary Care Physician: Darryl Andrews DO    Presenting Problem/History of Present Illness:  Confusion/abnormal gait    Final Discharge " Diagnoses:  Active Hospital Problems    Diagnosis     **Acquired cerebral ventriculomegaly     Generalized weakness        Consults: Neurology, neurosurgery, nutritionist    Procedures Performed: Spinal tap    Pertinent Test Results:   Results for orders placed during the hospital encounter of 02/15/25    Adult Transthoracic Echo Complete w/ Color, Spectral and Contrast if Necessary Per Protocol    Interpretation Summary    Left ventricular systolic function is normal. Left ventricular ejection fraction appears to be 66 - 70%.    Left ventricular diastolic function was normal.    Normal right ventricular cavity size and systolic function.    Normal biatrial size.    There is no significant (more than mild) valve stenosis or regurgitation appreciated.    There is a very small (<0.5cm) pericardial effusion adjacent to the right ventricle. There is no evidence of cardiac tamponade.      Imaging Results (All)       Procedure Component Value Units Date/Time    MRI Cervical Spine With & Without Contrast [[id number]] Collected: 05/31/25 1629     Updated: 05/31/25 1639    Narrative:      EXAM: MRI CERVICAL SPINE W WO CONTRAST- - 5/31/2025 1:27 PM     HISTORY: weakness left upper, and bilateral lower extremities with  urinay retention; R53.1-Weakness; R11.2-Nausea with vomiting,  unspecified; F12.90-Cannabis use, unspecified, uncomplicated;  R79.89-Other specified abnormal findings of blood chemistry;  N39.0-Urinary tract infection, site not specified; R13.10-Dysphagia,  unspecified; Z74.09-Other reduced mobility       COMPARISON: 4/1/2022.     TECHNIQUE: Routine MR of the cervical spine was performed without and  with intravenous contrast.     FINDINGS:      The visualized posterior fossa is unremarkable. There is normal signal  in the visualized spinal cord.     No acute fracture or marrow edema identified.     C1-C2: There is no central canal or neuroforaminal stenosis.     C2-C3: No disc bulge, spinal canal, or foraminal  stenosis. There is left  facet arthropathy which does not cause significant neuroforaminal  stenosis.     C3-C4: There is disc space narrowing and spurring of the endplates.  There is posterior disc osteophyte complex formation causing mild  central canal stenosis and effacement of the ventral thecal sac. Right  greater than left uncinate process spurring causes moderate right  neuroforaminal narrowing.     C4-C5: No disc bulge, spinal canal, or foraminal stenosis.      C5-C6: There is spondylosis with disc base narrowing and spurring of the  endplates and mild effacement of the ventral thecal sac. There is  uncinate process spurring bilaterally with severe bilateral  neuroforaminal stenosis.     C6-C7: Spondylosis with spurring anteriorly without significant central  canal stenosis and severe left neuroforaminal narrowing.     C7-T1: No disc bulge, spinal canal, or foraminal stenosis.     There is no pathologic contrast enhancement.     Extraspinal soft tissues within normal limits.          Impression:      1. Multilevel spondylosis and uncinate process spurring causing  multilevel varying degrees of neuroforaminal stenosis as above. There is  no severe central canal stenosis or pathologic contrast enhancement.     This report was signed and finalized on 5/31/2025 4:36 PM by Ramon Cifuentes.       MRI Lumbar Spine With & Without Contrast [[id number]] Collected: 05/31/25 1625     Updated: 05/31/25 1632    Narrative:      EXAM: MRI LUMBAR SPINE W WO CONTRAST- - 5/31/2025 1:47 PM     HISTORY: weakness left upper, and bilateral lower extremities with  urinay retention; R53.1-Weakness; R11.2-Nausea with vomiting,  unspecified; F12.90-Cannabis use, unspecified, uncomplicated;  R79.89-Other specified abnormal findings of blood chemistry;  N39.0-Urinary tract infection, site not specified; R13.10-Dysphagia,  unspecified; Z74.09-Other reduced mobility       COMPARISON: None available.     TECHNIQUE: Routine MR of the  lumbar spine was performed without and with  intravenous contrast.     FINDINGS:         Spinal cord ends normally at the L1 level. There is normal signal in  the conus medullaris. No abnormal enhancement is seen. No acute fracture  identified. There are degenerative endplate signal changes at L4-L5 and  L5-S1.        L5-S1: There is spurring of the endplates and posterior disc space  narrowing. There is no significant acquired central canal stenosis. Mild  facet arthropathy is noted with mild right neuroforaminal narrowing and  no spondylolisthesis.     L4-L5: There is spurring of the endplates and mild disc base narrowing.  Disc bulging causes moderate central canal stenosis and encroachment on  the right greater than left subarticular recess. There is facet  arthropathy and ligamentum flavum hypertrophy with no severe  neuroforaminal narrowing and no spondylolisthesis.     L3-L4: No disc bulge, spinal canal or foraminal stenosis.      L2-L3: No disc bulge, spinal canal or foraminal stenosis.      L1-L2: No disc bulge, spinal canal or foraminal stenosis.      Extraspinal soft tissues within normal limits.          Impression:      1. Lower lumbar spondylosis and facet arthropathy at L4-L5 and L5-S1  with no severe central canal or neuroforaminal stenosis identified.     This report was signed and finalized on 5/31/2025 4:29 PM by Ramon Cifuentes.       MRI Brain With & Without Contrast [[id number]] Collected: 05/29/25 1845     Updated: 05/29/25 1858    Narrative:      EXAMINATION: MRI BRAIN W WO CONTRAST- 5/29/2025 6:45 PM     HISTORY: ventriculomegaly, syncope; R53.1-Weakness; R11.2-Nausea with  vomiting, unspecified; F12.90-Cannabis use, unspecified, uncomplicated;  R79.89-Other specified abnormal findings of blood chemistry;  N39.0-Urinary tract infection, site not specified.     REPORT: Multiplanar multisequence MR imaging of the brain was performed  with and without contrast.     COMPARISON: Noncontrast CT  of the head 5/16/2025, noncontrast CT of the  head 4/18/2025 MRI brain with and without contrast 9/4/2019.     No diffusion restriction is identified. There is diffuse age compatible  cerebral atrophy as before. No intracranial mass effect is identified.  There is mild dilation of the lateral and third ventricles as  demonstrated on recent head CT. There appears to be minimal dilation of  the fourth ventricle. FLAIR images demonstrate confluent increased  signal within the periventricular white matter tracts which appears  slightly greater compared with the previous MRI in 2019. This could be  related to mild transependymal edema or a combination of transependymal  edema and chronic small vessel white matter ischemic disease.. There is  no mass or abnormal enhancement involving the aqueduct of Sylvius,  though it is small in caliber. This may represent aqueductal stenosis.  No abnormal enhancement is seen elsewhere within the brain.  Susceptibility weighted images demonstrate no evidence of intracranial  hemorrhage.       Impression:      1. Persistent mild asymmetric dilation of the lateral and third  ventricles compared to the fourth ventricle, with small caliber of the  aqueduct suggestive of aqueduct of stenosis. Given fluid increased  periventricular FLAIR signal possibly related to mild transependymal  edema, versus a combination of transependymal edema and chronic small  vessel white matter ischemic disease. No obstructing mass at the sylvian  aqueduct, no abnormal enhancement is identified within the brain.     This report was signed and finalized on 5/29/2025 6:55 PM by Dr. Triston Escobedo MD.       XR Chest 1 View [[id number]] Collected: 05/29/25 1356     Updated: 05/29/25 1403    Narrative:      EXAMINATION: XR CHEST 1 VW-  5/29/2025 1:56 PM 1 view     HISTORY: shortness of breath     COMPARISON: 4/18/2025     TECHNIQUE: A single frontal view of the chest was obtained.     FINDINGS:  The lungs are clear.  The cardiomediastinal silhouette and pulmonary  vascularity are within normal limits. The osseous structures and  surrounding soft tissues demonstrate no acute abnormality.       Impression:         1.  No acute cardiopulmonary process.           This report was signed and finalized on 5/29/2025 1:59 PM by Dr. Ori Woods MD.             LAB RESULTS:      Lab 06/11/25  0529 06/10/25  0627 06/09/25  0455 06/08/25  0337 06/07/25  1652 06/07/25  0259   WBC 8.90 7.89 7.41 5.78 5.86 7.93   HEMOGLOBIN 8.4* 8.2* 7.4* 7.5* 8.0* 7.7*   HEMATOCRIT 25.1* 24.6* 22.7* 22.2* 23.4* 23.5*   PLATELETS 185 182 180 173 193 195   NEUTROS ABS 7.25* 4.71 3.74 3.74  --  5.05   IMMATURE GRANS (ABS) 0.06* 0.06* 0.10* 0.08*  --  0.07*   LYMPHS ABS 1.28 2.65 3.18* 1.60  --  2.31   MONOS ABS 0.30 0.43 0.36 0.35  --  0.48   EOS ABS 0.00 0.02 0.01 0.00  --  0.01   MCV 86.9 86.9 88.7 87.1 89.0 88.3         Lab 06/11/25  0529 06/10/25  1726 06/10/25  0627 06/08/25  0337 06/07/25  1652 06/07/25  0300   SODIUM 137  --  140 140  --  139   POTASSIUM 4.1 4.9 3.6 3.7 4.5 3.2*   CHLORIDE 108*  --  109* 110*  --  109*   CO2 23.0  --  23.0 23.0  --  22.0   ANION GAP 6.0  --  8.0 7.0  --  8.0   BUN 11.5  --  12.2 12.5  --  13.9   CREATININE 0.74  --  0.74 0.70  --  0.82   EGFR 98.7  --  98.7 105.5  --  87.3   GLUCOSE 175*  --  104* 130*  --  131*   CALCIUM 8.6  --  8.2* 8.4*  --  8.4*   MAGNESIUM 2.3  --  1.5* 1.6  --  1.6         Lab 06/08/25  0337 06/07/25  0300   TOTAL PROTEIN 6.3 6.2   ALBUMIN 2.6* 2.6*   GLOBULIN 3.7 3.6   ALT (SGPT) 48* 58*   AST (SGOT) 20 24   BILIRUBIN 0.6 0.6   ALK PHOS 32* 32*                 Lab 06/09/25  1041   ABO TYPING A   RH TYPING Positive   ANTIBODY SCREEN Negative         Brief Urine Lab Results  (Last result in the past 365 days)        Color   Clarity   Blood   Leuk Est   Nitrite   Protein   CREAT   Urine HCG        06/01/25 0000 Orange   Clear   Negative   Moderate (2+)   Positive   30 mg/dL (1+)                  Microbiology Results (last 10 days)       ** No results found for the last 240 hours. 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  EEG showed-  Diffuse cerebral dysfunction of moderate degree, nonspecific.  This is most commonly seen due to toxic/metabolic or hypoxemic/ischemic cause. No evidence for epilepsy is seen  Antibodies GQ1b was negative  Nerve conduction studies review per specialist [ Nerve conduction studies did reveal slowed velocities but not in the demyelinating range.]  Neurology recommended a 5-day treatment with IVIG [completed on 6/8/2025]     -Acute anemia  Patient's hemoglobin was normal on presentation, blood sugar during course of treatment dropped to 7.4.  There was no obvious source of blood loss.  Fecal occult blood test was negative  Patient was transfused with 1 unit of PRBC.     - Acute urine retention  Bladder scan on multiple occasion has showed urinary retention [had 700 mL], intermittent catheterization has been going on.  Placed Roberts catheter, started patient on Flomax [which I do not think will help, patient's problem is likely neurological].  She will be discharged on Roberts catheter and voiding trial will be done at the rehab.     -Hypothyroidism  Variable thyroid function tests on prior admissions and currently. Continue levothyroxine 150 mcg p.o. daily. I have recommended endocrinology outpatient follow up, and this was explained to family.     -Hypertension  Due to variable blood pressure values, discontinued amlodipine lisinoprill, aldactone; On Carvedilol 6.25 mg p.o. twice daily.      -Acquired cerebral ventriculomegaly   Opening pressure on lumbar puncture was unimpressive as by neurologist    Other chronic medical conditions-  History of arthritis  Carotid artery stenosis  Degenerative disc disease of cervical spine  Diabetes  "mellitus  Depression  Hypothyroidism  GERD  Graves' disease  History of heart palpitations  Hyperlipidemia  Hypertension  Seizure disorder    Some of the patient's home medications were post in hospital, we will continue to post them on discharge and these medications will be restarted with improvement in her clinical status.       Physical Exam on Discharge:  /76 (BP Location: Left arm, Patient Position: Lying)   Pulse 69   Temp 97.8 °F (36.6 °C) (Oral)   Resp 16   Ht 188 cm (74\")   Wt 67.3 kg (148 lb 4.8 oz)   SpO2 98%   BMI 19.04 kg/m²   Physical Exam  HEENT:  Neck is supple  CVS:  RRR  Lungs:  CTA - B/L  Abd:  NT/ND  Ext:  No edema  Skin:  No rashes     Pertinent Neuro Exam:  Awake. alert and brighter today.   Can follow commands   Pupils equal.  Able to count fingers today.  Disconjugate gaze chronically and intermittently able to abduct right eye past midline.  No unilateral facial droop  Antigravity strength in all 4 extremities  No signs of tremors or increased tone  Mild ataxia noted bilaterally  Improved strength bilateral upper and lower extremities 4/5.     Condition on Discharge: Stable    Discharge Disposition:  Rehab Facility or Unit (DC - External)    Discharge Medications:     Discharge Medications        PAUSE taking these medications        Instructions Start Date   amLODIPine 10 MG tablet  Wait to take this until your doctor or other care provider tells you to start again.  Commonly known as: NORVASC   10 mg, Daily      carvedilol 25 MG tablet  Wait to take this until your doctor or other care provider tells you to start again.  Commonly known as: COREG   25 mg, 2 Times Daily With Meals      empagliflozin 25 MG tablet tablet  Wait to take this until your doctor or other care provider tells you to start again.  Commonly known as: JARDIANCE   25 mg, Daily      insulin glargine 100 UNIT/ML injection  Wait to take this until your doctor or other care provider tells you to start " again.  Commonly known as: LANTUS, SEMGLEE   75 Units, Nightly      metFORMIN  MG 24 hr tablet  Wait to take this until your doctor or other care provider tells you to start again.  Commonly known as: GLUCOPHAGE-XR   1,000 mg, Daily With Breakfast      spironolactone 50 MG tablet  Wait to take this until your doctor or other care provider tells you to start again.  Commonly known as: ALDACTONE   50 mg, 2 Times Daily             New Medications        Instructions Start Date   folic acid 1 MG tablet  Commonly known as: FOLVITE   1 mg, Oral, Daily   Start Date: June 12, 2025     tamsulosin 0.4 MG capsule 24 hr capsule  Commonly known as: FLOMAX   0.4 mg, Oral, Daily   Start Date: June 12, 2025     thiamine 500 MG tablet  Commonly known as: VITAMIN B1   500 mg, Oral, 3 times daily             Changes to Medications        Instructions Start Date   polyethylene glycol 17 g packet  Commonly known as: MIRALAX  What changed: You were already taking a medication with the same name, and this prescription was added. Make sure you understand how and when to take each.   17 g, Oral, Daily      polyethylene glycol 17 g packet  Commonly known as: MIRALAX  What changed: Another medication with the same name was added. Make sure you understand how and when to take each.   17 g, Oral, Daily PRN             Continue These Medications        Instructions Start Date   ARIPiprazole 10 MG tablet  Commonly known as: ABILIFY   10 mg, Daily      benazepril 20 MG tablet  Commonly known as: LOTENSIN   1 tablet, Daily      Evolocumab solution auto-injector SureClick injection  Commonly known as: REPATHA   140 mg, Every 14 Days      hydrOXYzine pamoate 25 MG capsule  Commonly known as: VISTARIL   25 mg, 3 Times Daily PRN      levothyroxine 175 MCG tablet  Commonly known as: SYNTHROID, LEVOTHROID   175 mcg, Oral, Every Early Morning      liothyronine 25 MCG tablet  Commonly known as: CYTOMEL   25 mcg, 2 Times Daily      Melatonin 10 MG  tablet   1 tablet, Oral, Nightly PRN      mirtazapine 15 MG tablet  Commonly known as: REMERON   15 mg, Nightly      omeprazole 20 MG capsule  Commonly known as: priLOSEC   20 mg, Daily      ondansetron ODT 4 MG disintegrating tablet  Commonly known as: ZOFRAN-ODT   4 mg, Oral, Every 6 Hours PRN      propranolol  MG 24 hr capsule  Commonly known as: INDERAL LA   120 mg, Daily      rosuvastatin 20 MG tablet  Commonly known as: CRESTOR   20 mg, Nightly      vitamin D 1.25 MG (51489 UT) capsule capsule  Commonly known as: ERGOCALCIFEROL   50,000 Units, Weekly               This patient has current or prior documentation of an left ventricular ejection fraction (LVEF) of less than or equal to 40%.    The patient was prescribed or already taking an ACE/ARB.    The patient was prescribed already taking bisoprolol, carvedilol, or sustained release metoprolol succinate.     Discharge Diet: Diabetic    Activity at Discharge: As per physical therapy    Follow-up Appointments:   Follow-up with neurology in 6 weeks    Test Results Pending at Discharge: None    Electronically signed by Yovanny Abernathy MD, 06/11/25, 12:26 CDT.    Time: 45 minutes.          Electronically signed by Yovanny Abernathy MD at 06/11/25 1100

## 2025-06-12 NOTE — H&P
0533    ondansetron (ZOFRAN-ODT) disintegrating tablet 4 mg, 4 mg, Oral, Q6H PRN, Gerardo King MD    polyethylene glycol (GLYCOLAX) packet 17 g, 17 g, Oral, Daily PRN, Gerardo King MD    rosuvastatin (CRESTOR) tablet 20 mg, 20 mg, Oral, Nightly, Gerardo King MD, 20 mg at 06/11/25 2028    tamsulosin (FLOMAX) capsule 0.4 mg, 0.4 mg, Oral, Daily, Gerardo King MD    thiamine mononitrate tablet 500 mg, 500 mg, Oral, TID, Gerardo King MD, 500 mg at 06/11/25 2028    enoxaparin (LOVENOX) injection 40 mg, 40 mg, SubCUTAneous, Nightly, Gerardo King MD, 40 mg at 06/11/25 2028    hydrALAZINE (APRESOLINE) injection 10 mg, 10 mg, IntraVENous, Q6H PRN, Gerardo King MD    sennosides-docusate sodium (SENOKOT-S) 8.6-50 MG tablet 1 tablet, 1 tablet, Oral, BID, Gerardo King MD, 1 tablet at 06/11/25 2028    magnesium hydroxide (MILK OF MAGNESIA) 400 MG/5ML suspension 30 mL, 30 mL, Oral, Daily PRN, Gerardo King MD    bisacodyl (DULCOLAX) suppository 10 mg, 10 mg, Rectal, Daily PRN, Gerrado King MD    levothyroxine (SYNTHROID) tablet 150 mcg, 150 mcg, Oral, QAM AC, Gerardo King MD, 150 mcg at 06/12/25 0533    vitamin D (ERGOCALCIFEROL) capsule 50,000 Units, 50,000 Units, Oral, Weekly, Gerardo King MD    carvedilol (COREG) tablet 6.25 mg, 6.25 mg, Oral, BID WC, Gerardo King MD, 6.25 mg at 06/11/25 2028    Allergies:  Oxycodone and Acetaminophen    Social History:   TOBACCO:   reports that she has never smoked. She has never used smokeless tobacco.  ETOH:   reports that she does not currently use alcohol after a past usage of about 1.0 standard drink of alcohol per week.    Family History:       Problem Relation Age of Onset    Diabetes Mother     High Blood Pressure Mother     Diabetes Father     High Blood Pressure Father     Breast Cancer Sister     Heart Disease Brother     High Blood Pressure Brother     Breast Cancer Maternal Aunt     Colon Cancer Paternal Uncle         age unknown    Colon Polyps Neg Hx

## 2025-06-12 NOTE — THERAPY EVALUATION
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)    Additional Pertinent Hx: Hypothyroidism, DM type 1, HTN, GERD  RESTRICTIONS/PRECAUTIONS:    Restrictions/Precautions  Restrictions/Precautions: Fall Risk, Swallowing - Thickened Liquids     OVERALL  ORIENTATION STATUS:     PAIN:  Pain Level: 0                    GROSS ASSESSMENT        POSTURE/BALANCE          ACTIVITY TOLERANCE         BED MOBILITY  Bed mobility  Rolling to Right: Supervision (triplanar technique from R SL)  Supine to Sit: Supervision (triplanar technique from R SL using railing)  Sit to Supine: Supervision (EOB to R SL; triplanar technique)        TRANSFERS  Transfers  Sit to Stand: Partial/Moderate assistance (from EOB)  Stand to Sit: Partial/Moderate assistance (bed to WC; wc to bed)  Bed to Chair: Partial/Moderate assistance (EOB to WC going to the L with hand on L arm rest)  Stand Pivot Transfers: Partial/Moderate assistance (WC to EOB going to the R)       AMBULATION 1     AMBULATION 2     STAIRS     WHEELCHAIR PROPULSION 1  Propulsion 1  Propulsion: Manual  Level: Level Tile  Method: RUE, LUE, RLE, LLE (alternated between LE and UE propelling)  Level of Assistance: Stand by assistance (REQUIRED REST BREAK INBETWEEN AND AFTERWARDS)  Distance: 15ft  WHEELCHAIR PROPULSION 2       GOALS:  Short Term Goals  Time Frame for Short Term Goals: 1 WEEK  Short Term Goal 1: BED MOBILITY INDEP WITH RAILING  Short Term Goal 2: TRANSFER FROM SURFACE TO SURFACE MIN A  Short Term Goal 3: WC PROPULSION 20FT SBA  Short Term Goal 4: AMB 15FT MOD A WITH // BARS  Short Term Goal 5: CAR TF MOD A    Long Term Goals  Time Frame for Long Term Goals : 2 WEEKS  Long Term Goal 1: INDEP BED MOBILITY WITH NO RAILING  Long Term

## 2025-06-13 PROCEDURE — 94760 N-INVAS EAR/PLS OXIMETRY 1: CPT

## 2025-06-13 PROCEDURE — 6370000000 HC RX 637 (ALT 250 FOR IP): Performed by: PSYCHIATRY & NEUROLOGY

## 2025-06-13 PROCEDURE — 94150 VITAL CAPACITY TEST: CPT

## 2025-06-13 PROCEDURE — 99232 SBSQ HOSP IP/OBS MODERATE 35: CPT | Performed by: PSYCHIATRY & NEUROLOGY

## 2025-06-13 PROCEDURE — 97530 THERAPEUTIC ACTIVITIES: CPT

## 2025-06-13 PROCEDURE — 6360000002 HC RX W HCPCS: Performed by: PSYCHIATRY & NEUROLOGY

## 2025-06-13 PROCEDURE — 92526 ORAL FUNCTION THERAPY: CPT

## 2025-06-13 PROCEDURE — 97130 THER IVNTJ EA ADDL 15 MIN: CPT

## 2025-06-13 PROCEDURE — 97110 THERAPEUTIC EXERCISES: CPT

## 2025-06-13 PROCEDURE — 51798 US URINE CAPACITY MEASURE: CPT

## 2025-06-13 PROCEDURE — 1180000000 HC REHAB R&B

## 2025-06-13 PROCEDURE — 97129 THER IVNTJ 1ST 15 MIN: CPT

## 2025-06-13 RX ORDER — ARIPIPRAZOLE 5 MG/1
5 TABLET ORAL DAILY
Status: DISCONTINUED | OUTPATIENT
Start: 2025-06-14 | End: 2025-06-17

## 2025-06-13 RX ADMIN — Medication 500 MG: at 16:50

## 2025-06-13 RX ADMIN — CARVEDILOL 6.25 MG: 6.25 TABLET, FILM COATED ORAL at 08:03

## 2025-06-13 RX ADMIN — Medication 500 MG: at 20:32

## 2025-06-13 RX ADMIN — LISINOPRIL 20 MG: 20 TABLET ORAL at 08:03

## 2025-06-13 RX ADMIN — MIRTAZAPINE 15 MG: 15 TABLET, FILM COATED ORAL at 20:32

## 2025-06-13 RX ADMIN — ARIPIPRAZOLE 10 MG: 10 TABLET ORAL at 08:03

## 2025-06-13 RX ADMIN — Medication 500 MG: at 08:02

## 2025-06-13 RX ADMIN — TAMSULOSIN HYDROCHLORIDE 0.4 MG: 0.4 CAPSULE ORAL at 08:03

## 2025-06-13 RX ADMIN — CARVEDILOL 6.25 MG: 6.25 TABLET, FILM COATED ORAL at 16:51

## 2025-06-13 RX ADMIN — ENOXAPARIN SODIUM 40 MG: 100 INJECTION SUBCUTANEOUS at 20:31

## 2025-06-13 RX ADMIN — LEVOTHYROXINE SODIUM 150 MCG: 75 TABLET ORAL at 05:41

## 2025-06-13 RX ADMIN — LIOTHYRONINE SODIUM 25 MCG: 25 TABLET ORAL at 20:32

## 2025-06-13 RX ADMIN — LIOTHYRONINE SODIUM 25 MCG: 25 TABLET ORAL at 08:03

## 2025-06-13 RX ADMIN — SENNOSIDES AND DOCUSATE SODIUM 1 TABLET: 8.6; 5 TABLET ORAL at 20:32

## 2025-06-13 RX ADMIN — SENNOSIDES AND DOCUSATE SODIUM 1 TABLET: 8.6; 5 TABLET ORAL at 08:02

## 2025-06-13 RX ADMIN — PANTOPRAZOLE SODIUM 40 MG: 40 TABLET, DELAYED RELEASE ORAL at 05:41

## 2025-06-13 RX ADMIN — ROSUVASTATIN CALCIUM 20 MG: 20 TABLET, FILM COATED ORAL at 20:32

## 2025-06-13 RX ADMIN — FOLIC ACID 1 MG: 1 TABLET ORAL at 08:02

## 2025-06-13 ASSESSMENT — 9 HOLE PEG TEST
TESTTIME_SECONDS: 100
TEST_RESULT: IMPAIRED
TEST_RESULT: IMPAIRED
TESTTIME_SECONDS: 82

## 2025-06-13 ASSESSMENT — PAIN SCALES - GENERAL: PAINLEVEL_OUTOF10: 0

## 2025-06-14 PROCEDURE — 97110 THERAPEUTIC EXERCISES: CPT

## 2025-06-14 PROCEDURE — 51798 US URINE CAPACITY MEASURE: CPT

## 2025-06-14 PROCEDURE — 94760 N-INVAS EAR/PLS OXIMETRY 1: CPT

## 2025-06-14 PROCEDURE — 1180000000 HC REHAB R&B

## 2025-06-14 PROCEDURE — 6370000000 HC RX 637 (ALT 250 FOR IP): Performed by: PSYCHIATRY & NEUROLOGY

## 2025-06-14 PROCEDURE — 94150 VITAL CAPACITY TEST: CPT

## 2025-06-14 PROCEDURE — 97130 THER IVNTJ EA ADDL 15 MIN: CPT

## 2025-06-14 PROCEDURE — 97129 THER IVNTJ 1ST 15 MIN: CPT

## 2025-06-14 PROCEDURE — 97530 THERAPEUTIC ACTIVITIES: CPT

## 2025-06-14 PROCEDURE — 6360000002 HC RX W HCPCS: Performed by: PSYCHIATRY & NEUROLOGY

## 2025-06-14 PROCEDURE — 99232 SBSQ HOSP IP/OBS MODERATE 35: CPT | Performed by: PSYCHIATRY & NEUROLOGY

## 2025-06-14 PROCEDURE — 92526 ORAL FUNCTION THERAPY: CPT

## 2025-06-14 RX ORDER — LISINOPRIL 10 MG/1
10 TABLET ORAL DAILY
Status: DISCONTINUED | OUTPATIENT
Start: 2025-06-14 | End: 2025-06-24 | Stop reason: HOSPADM

## 2025-06-14 RX ADMIN — Medication 500 MG: at 20:40

## 2025-06-14 RX ADMIN — LEVOTHYROXINE SODIUM 150 MCG: 75 TABLET ORAL at 05:44

## 2025-06-14 RX ADMIN — SENNOSIDES AND DOCUSATE SODIUM 1 TABLET: 8.6; 5 TABLET ORAL at 20:40

## 2025-06-14 RX ADMIN — ARIPIPRAZOLE 5 MG: 5 TABLET ORAL at 08:32

## 2025-06-14 RX ADMIN — LIOTHYRONINE SODIUM 25 MCG: 25 TABLET ORAL at 20:40

## 2025-06-14 RX ADMIN — ROSUVASTATIN CALCIUM 20 MG: 20 TABLET, FILM COATED ORAL at 20:40

## 2025-06-14 RX ADMIN — Medication 500 MG: at 15:13

## 2025-06-14 RX ADMIN — MIRTAZAPINE 15 MG: 15 TABLET, FILM COATED ORAL at 20:40

## 2025-06-14 RX ADMIN — LIOTHYRONINE SODIUM 25 MCG: 25 TABLET ORAL at 08:31

## 2025-06-14 RX ADMIN — Medication 500 MG: at 08:32

## 2025-06-14 RX ADMIN — PANTOPRAZOLE SODIUM 40 MG: 40 TABLET, DELAYED RELEASE ORAL at 05:44

## 2025-06-14 RX ADMIN — FOLIC ACID 1 MG: 1 TABLET ORAL at 08:31

## 2025-06-14 RX ADMIN — TAMSULOSIN HYDROCHLORIDE 0.4 MG: 0.4 CAPSULE ORAL at 08:31

## 2025-06-14 RX ADMIN — ENOXAPARIN SODIUM 40 MG: 100 INJECTION SUBCUTANEOUS at 20:40

## 2025-06-14 RX ADMIN — SENNOSIDES AND DOCUSATE SODIUM 1 TABLET: 8.6; 5 TABLET ORAL at 08:32

## 2025-06-15 PROCEDURE — 51798 US URINE CAPACITY MEASURE: CPT

## 2025-06-15 PROCEDURE — 6370000000 HC RX 637 (ALT 250 FOR IP): Performed by: PSYCHIATRY & NEUROLOGY

## 2025-06-15 PROCEDURE — 1180000000 HC REHAB R&B

## 2025-06-15 PROCEDURE — 6360000002 HC RX W HCPCS: Performed by: PSYCHIATRY & NEUROLOGY

## 2025-06-15 PROCEDURE — 94150 VITAL CAPACITY TEST: CPT

## 2025-06-15 PROCEDURE — 94760 N-INVAS EAR/PLS OXIMETRY 1: CPT

## 2025-06-15 RX ADMIN — Medication 500 MG: at 09:31

## 2025-06-15 RX ADMIN — PANTOPRAZOLE SODIUM 40 MG: 40 TABLET, DELAYED RELEASE ORAL at 05:31

## 2025-06-15 RX ADMIN — ENOXAPARIN SODIUM 40 MG: 100 INJECTION SUBCUTANEOUS at 20:47

## 2025-06-15 RX ADMIN — FOLIC ACID 1 MG: 1 TABLET ORAL at 09:30

## 2025-06-15 RX ADMIN — Medication 500 MG: at 16:30

## 2025-06-15 RX ADMIN — LIOTHYRONINE SODIUM 25 MCG: 25 TABLET ORAL at 20:47

## 2025-06-15 RX ADMIN — MIRTAZAPINE 15 MG: 15 TABLET, FILM COATED ORAL at 20:47

## 2025-06-15 RX ADMIN — LEVOTHYROXINE SODIUM 150 MCG: 75 TABLET ORAL at 05:31

## 2025-06-15 RX ADMIN — SENNOSIDES AND DOCUSATE SODIUM 1 TABLET: 8.6; 5 TABLET ORAL at 09:30

## 2025-06-15 RX ADMIN — Medication 500 MG: at 20:47

## 2025-06-15 RX ADMIN — SENNOSIDES AND DOCUSATE SODIUM 1 TABLET: 8.6; 5 TABLET ORAL at 20:47

## 2025-06-15 RX ADMIN — ARIPIPRAZOLE 5 MG: 5 TABLET ORAL at 09:30

## 2025-06-15 RX ADMIN — LIOTHYRONINE SODIUM 25 MCG: 25 TABLET ORAL at 09:30

## 2025-06-15 RX ADMIN — TAMSULOSIN HYDROCHLORIDE 0.4 MG: 0.4 CAPSULE ORAL at 09:30

## 2025-06-15 RX ADMIN — ROSUVASTATIN CALCIUM 20 MG: 20 TABLET, FILM COATED ORAL at 20:47

## 2025-06-16 LAB
ANION GAP SERPL CALCULATED.3IONS-SCNC: 10 MMOL/L (ref 8–16)
BASOPHILS # BLD: 0 K/UL (ref 0–0.2)
BASOPHILS NFR BLD: 0.3 % (ref 0–1)
BUN SERPL-MCNC: 14 MG/DL (ref 6–20)
CALCIUM SERPL-MCNC: 9.2 MG/DL (ref 8.6–10)
CHLORIDE SERPL-SCNC: 109 MMOL/L (ref 98–107)
CO2 SERPL-SCNC: 23 MMOL/L (ref 22–29)
CREAT SERPL-MCNC: 0.9 MG/DL (ref 0.5–0.9)
EOSINOPHIL # BLD: 0 K/UL (ref 0–0.6)
EOSINOPHIL NFR BLD: 0.1 % (ref 0–5)
ERYTHROCYTE [DISTWIDTH] IN BLOOD BY AUTOMATED COUNT: 13.7 % (ref 11.5–14.5)
GLUCOSE SERPL-MCNC: 185 MG/DL (ref 70–99)
HCT VFR BLD AUTO: 31.5 % (ref 37–47)
HGB BLD-MCNC: 10.1 G/DL (ref 12–16)
IMM GRANULOCYTES # BLD: 0 K/UL
LYMPHOCYTES # BLD: 2.4 K/UL (ref 1.1–4.5)
LYMPHOCYTES NFR BLD: 34.8 % (ref 20–40)
MAGNESIUM SERPL-MCNC: 1.8 MG/DL (ref 1.6–2.6)
MCH RBC QN AUTO: 29.1 PG (ref 27–31)
MCHC RBC AUTO-ENTMCNC: 32.1 G/DL (ref 33–37)
MCV RBC AUTO: 90.8 FL (ref 81–99)
MONOCYTES # BLD: 0.3 K/UL (ref 0–0.9)
MONOCYTES NFR BLD: 4.4 % (ref 0–10)
NEUTROPHILS # BLD: 4.1 K/UL (ref 1.5–7.5)
NEUTS SEG NFR BLD: 60.1 % (ref 50–65)
PLATELET # BLD AUTO: 189 K/UL (ref 130–400)
PMV BLD AUTO: 10.1 FL (ref 9.4–12.3)
POTASSIUM SERPL-SCNC: 3.3 MMOL/L (ref 3.5–5)
RBC # BLD AUTO: 3.47 M/UL (ref 4.2–5.4)
SODIUM SERPL-SCNC: 142 MMOL/L (ref 136–145)
WBC # BLD AUTO: 6.8 K/UL (ref 4.8–10.8)

## 2025-06-16 PROCEDURE — 97530 THERAPEUTIC ACTIVITIES: CPT

## 2025-06-16 PROCEDURE — 97129 THER IVNTJ 1ST 15 MIN: CPT

## 2025-06-16 PROCEDURE — 92526 ORAL FUNCTION THERAPY: CPT

## 2025-06-16 PROCEDURE — 97130 THER IVNTJ EA ADDL 15 MIN: CPT

## 2025-06-16 PROCEDURE — 83735 ASSAY OF MAGNESIUM: CPT

## 2025-06-16 PROCEDURE — 6360000002 HC RX W HCPCS: Performed by: PSYCHIATRY & NEUROLOGY

## 2025-06-16 PROCEDURE — 97110 THERAPEUTIC EXERCISES: CPT

## 2025-06-16 PROCEDURE — 85025 COMPLETE CBC W/AUTO DIFF WBC: CPT

## 2025-06-16 PROCEDURE — 99232 SBSQ HOSP IP/OBS MODERATE 35: CPT | Performed by: PSYCHIATRY & NEUROLOGY

## 2025-06-16 PROCEDURE — 1180000000 HC REHAB R&B

## 2025-06-16 PROCEDURE — 80048 BASIC METABOLIC PNL TOTAL CA: CPT

## 2025-06-16 PROCEDURE — 94760 N-INVAS EAR/PLS OXIMETRY 1: CPT

## 2025-06-16 PROCEDURE — 94150 VITAL CAPACITY TEST: CPT

## 2025-06-16 PROCEDURE — 6370000000 HC RX 637 (ALT 250 FOR IP): Performed by: PSYCHIATRY & NEUROLOGY

## 2025-06-16 PROCEDURE — 51798 US URINE CAPACITY MEASURE: CPT

## 2025-06-16 PROCEDURE — 36415 COLL VENOUS BLD VENIPUNCTURE: CPT

## 2025-06-16 RX ADMIN — Medication 500 MG: at 08:34

## 2025-06-16 RX ADMIN — PANTOPRAZOLE SODIUM 40 MG: 40 TABLET, DELAYED RELEASE ORAL at 05:45

## 2025-06-16 RX ADMIN — ENOXAPARIN SODIUM 40 MG: 100 INJECTION SUBCUTANEOUS at 21:12

## 2025-06-16 RX ADMIN — ARIPIPRAZOLE 5 MG: 5 TABLET ORAL at 08:34

## 2025-06-16 RX ADMIN — LEVOTHYROXINE SODIUM 150 MCG: 75 TABLET ORAL at 05:45

## 2025-06-16 RX ADMIN — LIOTHYRONINE SODIUM 25 MCG: 25 TABLET ORAL at 21:13

## 2025-06-16 RX ADMIN — MIRTAZAPINE 15 MG: 15 TABLET, FILM COATED ORAL at 21:13

## 2025-06-16 RX ADMIN — Medication 500 MG: at 21:13

## 2025-06-16 RX ADMIN — FOLIC ACID 1 MG: 1 TABLET ORAL at 08:34

## 2025-06-16 RX ADMIN — TAMSULOSIN HYDROCHLORIDE 0.4 MG: 0.4 CAPSULE ORAL at 08:34

## 2025-06-16 RX ADMIN — CARVEDILOL 6.25 MG: 6.25 TABLET, FILM COATED ORAL at 08:34

## 2025-06-16 RX ADMIN — SENNOSIDES AND DOCUSATE SODIUM 1 TABLET: 8.6; 5 TABLET ORAL at 21:12

## 2025-06-16 RX ADMIN — ROSUVASTATIN CALCIUM 20 MG: 20 TABLET, FILM COATED ORAL at 21:12

## 2025-06-16 RX ADMIN — SENNOSIDES AND DOCUSATE SODIUM 1 TABLET: 8.6; 5 TABLET ORAL at 08:34

## 2025-06-16 RX ADMIN — LISINOPRIL 10 MG: 10 TABLET ORAL at 08:34

## 2025-06-16 RX ADMIN — LIOTHYRONINE SODIUM 25 MCG: 25 TABLET ORAL at 08:34

## 2025-06-16 RX ADMIN — Medication 500 MG: at 13:39

## 2025-06-16 NOTE — PATIENT CARE CONFERENCE
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)      PHYSICAL THERAPY  GROSS ASSESSMENT       BED MOBILITY  Bed mobility  Bridging: Contact guard assistance  Rolling to Right: Supervision (triplanar technique from R SL)  Supine to Sit: Supervision  Sit to Supine: Supervision (EOB TO R SL THEN SUPINE; TRIPLANAR)  Scooting: Supervision (On EOB)  Bed Mobility Comments: On R side of bed- use of bedrail       TRANSFERS  Transfers  Sit to Stand: Partial/Moderate assistance (FROM EOB)  Stand to Sit: Partial/Moderate assistance (BED TO WC GOING TO L)  Bed to Chair: Partial/Moderate assistance (SQUAT PIVOT TF GOING TO L)  Stand Pivot Transfers: Minimal Assistance (EOB<w/c from pt L w/o AD)  Squat Pivot Transfers: Partial/Moderate assistance (WC TO EOB GOING TO R HOLDING R HANDRAIL)  Comment: Extended time to stand  WHEELCHAIR PROPULSION  Propulsion 1  Propulsion: Manual  Level: Level Tile  Method: RUE, LUE, RLE, LLE (alternated between LE and UE propelling)  Level of Assistance: Stand by assistance (REQUIRED REST BREAK INBETWEEN AND AFTERWARDS)  Distance: 15ft  AMBULATION     STAIRS     GOALS:  Short Term Goals  Time Frame for Short Term Goals: 1 WEEK  Short Term Goal 1: BED MOBILITY INDEP WITH RAILING  Short Term Goal 2: TRANSFER FROM SURFACE TO SURFACE MIN A  Short Term Goal 3: WC PROPULSION 20FT SBA  Short Term Goal 4: AMB 15FT MOD A WITH // BARS  Short Term Goal 5: CAR TF MOD A    Long Term Goals  Time Frame for Long Term Goals : 2 WEEKS  Long Term Goal 1: INDEP BED MOBILITY WITH NO RAILING  Long Term Goal 2: TRANSFER FROM SURFACE TO SURFACE CGA  Long Term Goal 3: WC PROPULSION 50FT SUPERVISION  Long Term Goal 4: AMB 30 FT CGA  Long Term Goal 5: CAR TF CGA    ASSESSMENT:  Assessment: PT DENIED AMB IN // BARS DUE TO C/O'S

## 2025-06-16 NOTE — CARE COORDINATION
Spoke to patient's spouse re discharge planning. Patient/spouse request home health care to continue with PT and OT after discharge. MSW will work on arrangements, will need to see which  agency can accept [employer]. Will keep chart updated re dc date and plans.     Addendum 1231: Patient and  informed of CMG date - 6/24. Discharger planned for that date.     Electronically signed by CARI Reyna on 6/16/2025 at 9:52 AM

## 2025-06-17 PROCEDURE — 94150 VITAL CAPACITY TEST: CPT

## 2025-06-17 PROCEDURE — 1180000000 HC REHAB R&B

## 2025-06-17 PROCEDURE — 97130 THER IVNTJ EA ADDL 15 MIN: CPT

## 2025-06-17 PROCEDURE — 51798 US URINE CAPACITY MEASURE: CPT

## 2025-06-17 PROCEDURE — 97535 SELF CARE MNGMENT TRAINING: CPT

## 2025-06-17 PROCEDURE — 97110 THERAPEUTIC EXERCISES: CPT

## 2025-06-17 PROCEDURE — 6370000000 HC RX 637 (ALT 250 FOR IP): Performed by: PSYCHIATRY & NEUROLOGY

## 2025-06-17 PROCEDURE — 94760 N-INVAS EAR/PLS OXIMETRY 1: CPT

## 2025-06-17 PROCEDURE — 97530 THERAPEUTIC ACTIVITIES: CPT

## 2025-06-17 PROCEDURE — 97129 THER IVNTJ 1ST 15 MIN: CPT

## 2025-06-17 PROCEDURE — 99232 SBSQ HOSP IP/OBS MODERATE 35: CPT | Performed by: PSYCHIATRY & NEUROLOGY

## 2025-06-17 PROCEDURE — 6360000002 HC RX W HCPCS: Performed by: PSYCHIATRY & NEUROLOGY

## 2025-06-17 RX ADMIN — ARIPIPRAZOLE 5 MG: 5 TABLET ORAL at 08:14

## 2025-06-17 RX ADMIN — Medication 500 MG: at 20:40

## 2025-06-17 RX ADMIN — SENNOSIDES AND DOCUSATE SODIUM 1 TABLET: 8.6; 5 TABLET ORAL at 20:40

## 2025-06-17 RX ADMIN — ENOXAPARIN SODIUM 40 MG: 100 INJECTION SUBCUTANEOUS at 20:40

## 2025-06-17 RX ADMIN — PANTOPRAZOLE SODIUM 40 MG: 40 TABLET, DELAYED RELEASE ORAL at 05:36

## 2025-06-17 RX ADMIN — Medication 500 MG: at 17:13

## 2025-06-17 RX ADMIN — LEVOTHYROXINE SODIUM 150 MCG: 75 TABLET ORAL at 05:36

## 2025-06-17 RX ADMIN — SENNOSIDES AND DOCUSATE SODIUM 1 TABLET: 8.6; 5 TABLET ORAL at 08:13

## 2025-06-17 RX ADMIN — LIOTHYRONINE SODIUM 25 MCG: 25 TABLET ORAL at 20:40

## 2025-06-17 RX ADMIN — Medication 500 MG: at 08:14

## 2025-06-17 RX ADMIN — FOLIC ACID 1 MG: 1 TABLET ORAL at 08:14

## 2025-06-17 RX ADMIN — LIOTHYRONINE SODIUM 25 MCG: 25 TABLET ORAL at 08:14

## 2025-06-17 RX ADMIN — MIRTAZAPINE 15 MG: 15 TABLET, FILM COATED ORAL at 20:40

## 2025-06-17 RX ADMIN — ROSUVASTATIN CALCIUM 20 MG: 20 TABLET, FILM COATED ORAL at 20:40

## 2025-06-17 NOTE — CARE COORDINATION
Spoke to Rajani with [facility] and patient is accepted pending MD orders. Will send to Rajani at [place]. [facility] contact information added to AVS.     Electronically signed by CARI Reyna on 6/17/2025 at 1:05 PM

## 2025-06-18 LAB
BH BB BLOOD EXPIRATION DATE: NORMAL
BH BB BLOOD TYPE BARCODE: 5100
BH BB DISPENSE STATUS: NORMAL
BH BB PRODUCT CODE: NORMAL
BH BB UNIT NUMBER: NORMAL
CROSSMATCH INTERPRETATION: NORMAL
UNIT  ABO: NORMAL
UNIT  RH: NORMAL

## 2025-06-18 PROCEDURE — 6370000000 HC RX 637 (ALT 250 FOR IP): Performed by: PSYCHIATRY & NEUROLOGY

## 2025-06-18 PROCEDURE — 97530 THERAPEUTIC ACTIVITIES: CPT

## 2025-06-18 PROCEDURE — 1180000000 HC REHAB R&B

## 2025-06-18 PROCEDURE — 94760 N-INVAS EAR/PLS OXIMETRY 1: CPT

## 2025-06-18 PROCEDURE — 97130 THER IVNTJ EA ADDL 15 MIN: CPT

## 2025-06-18 PROCEDURE — 97535 SELF CARE MNGMENT TRAINING: CPT

## 2025-06-18 PROCEDURE — 6360000002 HC RX W HCPCS: Performed by: PSYCHIATRY & NEUROLOGY

## 2025-06-18 PROCEDURE — 99233 SBSQ HOSP IP/OBS HIGH 50: CPT | Performed by: PSYCHIATRY & NEUROLOGY

## 2025-06-18 PROCEDURE — 94150 VITAL CAPACITY TEST: CPT

## 2025-06-18 PROCEDURE — 51798 US URINE CAPACITY MEASURE: CPT

## 2025-06-18 PROCEDURE — 97129 THER IVNTJ 1ST 15 MIN: CPT

## 2025-06-18 RX ORDER — ARIPIPRAZOLE 5 MG/1
2.5 TABLET ORAL ONCE
Status: COMPLETED | OUTPATIENT
Start: 2025-06-18 | End: 2025-06-18

## 2025-06-18 RX ORDER — ARIPIPRAZOLE 5 MG/1
2.5 TABLET ORAL NIGHTLY
Status: DISCONTINUED | OUTPATIENT
Start: 2025-06-18 | End: 2025-06-24 | Stop reason: HOSPADM

## 2025-06-18 RX ADMIN — LIOTHYRONINE SODIUM 25 MCG: 25 TABLET ORAL at 20:54

## 2025-06-18 RX ADMIN — Medication 500 MG: at 09:10

## 2025-06-18 RX ADMIN — ARIPIPRAZOLE 2.5 MG: 5 TABLET ORAL at 20:53

## 2025-06-18 RX ADMIN — Medication 500 MG: at 20:53

## 2025-06-18 RX ADMIN — ROSUVASTATIN CALCIUM 20 MG: 20 TABLET, FILM COATED ORAL at 20:53

## 2025-06-18 RX ADMIN — LIOTHYRONINE SODIUM 25 MCG: 25 TABLET ORAL at 09:10

## 2025-06-18 RX ADMIN — PANTOPRAZOLE SODIUM 40 MG: 40 TABLET, DELAYED RELEASE ORAL at 05:32

## 2025-06-18 RX ADMIN — SENNOSIDES AND DOCUSATE SODIUM 1 TABLET: 8.6; 5 TABLET ORAL at 09:13

## 2025-06-18 RX ADMIN — LEVOTHYROXINE SODIUM 150 MCG: 75 TABLET ORAL at 05:32

## 2025-06-18 RX ADMIN — MIRTAZAPINE 15 MG: 15 TABLET, FILM COATED ORAL at 20:53

## 2025-06-18 RX ADMIN — ENOXAPARIN SODIUM 40 MG: 100 INJECTION SUBCUTANEOUS at 20:53

## 2025-06-18 RX ADMIN — ARIPIPRAZOLE 2.5 MG: 5 TABLET ORAL at 15:55

## 2025-06-18 RX ADMIN — FOLIC ACID 1 MG: 1 TABLET ORAL at 09:10

## 2025-06-18 RX ADMIN — SENNOSIDES AND DOCUSATE SODIUM 1 TABLET: 8.6; 5 TABLET ORAL at 20:54

## 2025-06-19 LAB
ANION GAP SERPL CALCULATED.3IONS-SCNC: 8 MMOL/L (ref 8–16)
BASOPHILS # BLD: 0 K/UL (ref 0–0.2)
BASOPHILS NFR BLD: 0.2 % (ref 0–1)
BUN SERPL-MCNC: 18 MG/DL (ref 6–20)
CALCIUM SERPL-MCNC: 9.2 MG/DL (ref 8.6–10)
CHLORIDE SERPL-SCNC: 113 MMOL/L (ref 98–107)
CO2 SERPL-SCNC: 25 MMOL/L (ref 22–29)
CREAT SERPL-MCNC: 0.8 MG/DL (ref 0.5–0.9)
EOSINOPHIL # BLD: 0 K/UL (ref 0–0.6)
EOSINOPHIL NFR BLD: 0.2 % (ref 0–5)
ERYTHROCYTE [DISTWIDTH] IN BLOOD BY AUTOMATED COUNT: 13.2 % (ref 11.5–14.5)
GLUCOSE BLD-MCNC: 166 MG/DL (ref 70–99)
GLUCOSE SERPL-MCNC: 99 MG/DL (ref 70–99)
HCT VFR BLD AUTO: 24.5 % (ref 37–47)
HGB BLD-MCNC: 8 G/DL (ref 12–16)
IMM GRANULOCYTES # BLD: 0 K/UL
LYMPHOCYTES # BLD: 1.7 K/UL (ref 1.1–4.5)
LYMPHOCYTES NFR BLD: 35.9 % (ref 20–40)
MAGNESIUM SERPL-MCNC: 1.6 MG/DL (ref 1.6–2.6)
MCH RBC QN AUTO: 29.1 PG (ref 27–31)
MCHC RBC AUTO-ENTMCNC: 32.7 G/DL (ref 33–37)
MCV RBC AUTO: 89.1 FL (ref 81–99)
MONOCYTES # BLD: 0.3 K/UL (ref 0–0.9)
MONOCYTES NFR BLD: 6.3 % (ref 0–10)
NEUTROPHILS # BLD: 2.7 K/UL (ref 1.5–7.5)
NEUTS SEG NFR BLD: 57.2 % (ref 50–65)
PERFORMED ON: ABNORMAL
PLATELET # BLD AUTO: 195 K/UL (ref 130–400)
PMV BLD AUTO: 9.6 FL (ref 9.4–12.3)
POTASSIUM SERPL-SCNC: 3.2 MMOL/L (ref 3.5–5)
RBC # BLD AUTO: 2.75 M/UL (ref 4.2–5.4)
REASON FOR REJECTION: NORMAL
REJECTED TEST: NORMAL
SODIUM SERPL-SCNC: 146 MMOL/L (ref 136–145)
WBC # BLD AUTO: 4.7 K/UL (ref 4.8–10.8)

## 2025-06-19 PROCEDURE — 51798 US URINE CAPACITY MEASURE: CPT

## 2025-06-19 PROCEDURE — 99233 SBSQ HOSP IP/OBS HIGH 50: CPT | Performed by: PSYCHIATRY & NEUROLOGY

## 2025-06-19 PROCEDURE — 97535 SELF CARE MNGMENT TRAINING: CPT

## 2025-06-19 PROCEDURE — 94150 VITAL CAPACITY TEST: CPT

## 2025-06-19 PROCEDURE — 82962 GLUCOSE BLOOD TEST: CPT

## 2025-06-19 PROCEDURE — 97110 THERAPEUTIC EXERCISES: CPT

## 2025-06-19 PROCEDURE — 83735 ASSAY OF MAGNESIUM: CPT

## 2025-06-19 PROCEDURE — 97130 THER IVNTJ EA ADDL 15 MIN: CPT

## 2025-06-19 PROCEDURE — 6370000000 HC RX 637 (ALT 250 FOR IP): Performed by: PSYCHIATRY & NEUROLOGY

## 2025-06-19 PROCEDURE — 80048 BASIC METABOLIC PNL TOTAL CA: CPT

## 2025-06-19 PROCEDURE — 85025 COMPLETE CBC W/AUTO DIFF WBC: CPT

## 2025-06-19 PROCEDURE — 1180000000 HC REHAB R&B

## 2025-06-19 PROCEDURE — 6360000002 HC RX W HCPCS: Performed by: PSYCHIATRY & NEUROLOGY

## 2025-06-19 PROCEDURE — 97129 THER IVNTJ 1ST 15 MIN: CPT

## 2025-06-19 PROCEDURE — 94760 N-INVAS EAR/PLS OXIMETRY 1: CPT

## 2025-06-19 PROCEDURE — 36415 COLL VENOUS BLD VENIPUNCTURE: CPT

## 2025-06-19 RX ORDER — POTASSIUM CHLORIDE 1500 MG/1
20 TABLET, EXTENDED RELEASE ORAL DAILY
Status: DISCONTINUED | OUTPATIENT
Start: 2025-06-19 | End: 2025-06-19

## 2025-06-19 RX ORDER — IRON POLYSACCHARIDE COMPLEX 150 MG
150 CAPSULE ORAL DAILY
Status: DISCONTINUED | OUTPATIENT
Start: 2025-06-19 | End: 2025-06-24 | Stop reason: HOSPADM

## 2025-06-19 RX ADMIN — MIRTAZAPINE 15 MG: 15 TABLET, FILM COATED ORAL at 21:23

## 2025-06-19 RX ADMIN — LIOTHYRONINE SODIUM 25 MCG: 25 TABLET ORAL at 21:23

## 2025-06-19 RX ADMIN — ARIPIPRAZOLE 2.5 MG: 5 TABLET ORAL at 21:23

## 2025-06-19 RX ADMIN — Medication 150 MG: at 15:12

## 2025-06-19 RX ADMIN — ERGOCALCIFEROL 50000 UNITS: 1.25 CAPSULE ORAL at 09:07

## 2025-06-19 RX ADMIN — POTASSIUM BICARBONATE 20 MEQ: 782 TABLET, EFFERVESCENT ORAL at 15:12

## 2025-06-19 RX ADMIN — Medication 500 MG: at 09:07

## 2025-06-19 RX ADMIN — PANTOPRAZOLE SODIUM 40 MG: 40 TABLET, DELAYED RELEASE ORAL at 06:00

## 2025-06-19 RX ADMIN — LIOTHYRONINE SODIUM 25 MCG: 25 TABLET ORAL at 09:07

## 2025-06-19 RX ADMIN — FOLIC ACID 1 MG: 1 TABLET ORAL at 09:07

## 2025-06-19 RX ADMIN — ENOXAPARIN SODIUM 40 MG: 100 INJECTION SUBCUTANEOUS at 21:24

## 2025-06-19 RX ADMIN — ROSUVASTATIN CALCIUM 20 MG: 20 TABLET, FILM COATED ORAL at 21:23

## 2025-06-19 RX ADMIN — SENNOSIDES AND DOCUSATE SODIUM 1 TABLET: 8.6; 5 TABLET ORAL at 21:23

## 2025-06-19 RX ADMIN — LEVOTHYROXINE SODIUM 150 MCG: 75 TABLET ORAL at 06:00

## 2025-06-19 RX ADMIN — Medication 500 MG: at 15:12

## 2025-06-19 RX ADMIN — Medication 500 MG: at 21:23

## 2025-06-19 RX ADMIN — SENNOSIDES AND DOCUSATE SODIUM 1 TABLET: 8.6; 5 TABLET ORAL at 09:07

## 2025-06-20 LAB
BASOPHILS # BLD: 0 K/UL (ref 0–0.2)
BASOPHILS NFR BLD: 0.2 % (ref 0–1)
EOSINOPHIL # BLD: 0 K/UL (ref 0–0.6)
EOSINOPHIL NFR BLD: 0.2 % (ref 0–5)
ERYTHROCYTE [DISTWIDTH] IN BLOOD BY AUTOMATED COUNT: 13.2 % (ref 11.5–14.5)
GLUCOSE BLD-MCNC: 139 MG/DL (ref 70–99)
HCT VFR BLD AUTO: 23.2 % (ref 37–47)
HEMOCCULT SP1 STL QL: NORMAL
HGB BLD-MCNC: 7.7 G/DL (ref 12–16)
IMM GRANULOCYTES # BLD: 0 K/UL
LYMPHOCYTES # BLD: 1.6 K/UL (ref 1.1–4.5)
LYMPHOCYTES NFR BLD: 33.5 % (ref 20–40)
MCH RBC QN AUTO: 29.3 PG (ref 27–31)
MCHC RBC AUTO-ENTMCNC: 33.2 G/DL (ref 33–37)
MCV RBC AUTO: 88.2 FL (ref 81–99)
MONOCYTES # BLD: 0.3 K/UL (ref 0–0.9)
MONOCYTES NFR BLD: 5.6 % (ref 0–10)
NEUTROPHILS # BLD: 2.9 K/UL (ref 1.5–7.5)
NEUTS SEG NFR BLD: 60.1 % (ref 50–65)
PERFORMED ON: ABNORMAL
PLATELET # BLD AUTO: 176 K/UL (ref 130–400)
PMV BLD AUTO: 9.6 FL (ref 9.4–12.3)
RBC # BLD AUTO: 2.63 M/UL (ref 4.2–5.4)
WBC # BLD AUTO: 4.8 K/UL (ref 4.8–10.8)

## 2025-06-20 PROCEDURE — 85025 COMPLETE CBC W/AUTO DIFF WBC: CPT

## 2025-06-20 PROCEDURE — 82270 OCCULT BLOOD FECES: CPT

## 2025-06-20 PROCEDURE — 99232 SBSQ HOSP IP/OBS MODERATE 35: CPT | Performed by: PSYCHIATRY & NEUROLOGY

## 2025-06-20 PROCEDURE — 82962 GLUCOSE BLOOD TEST: CPT

## 2025-06-20 PROCEDURE — 51798 US URINE CAPACITY MEASURE: CPT

## 2025-06-20 PROCEDURE — 97530 THERAPEUTIC ACTIVITIES: CPT

## 2025-06-20 PROCEDURE — 51701 INSERT BLADDER CATHETER: CPT

## 2025-06-20 PROCEDURE — 97130 THER IVNTJ EA ADDL 15 MIN: CPT

## 2025-06-20 PROCEDURE — 97110 THERAPEUTIC EXERCISES: CPT

## 2025-06-20 PROCEDURE — 97535 SELF CARE MNGMENT TRAINING: CPT

## 2025-06-20 PROCEDURE — 97129 THER IVNTJ 1ST 15 MIN: CPT

## 2025-06-20 PROCEDURE — 6360000002 HC RX W HCPCS: Performed by: PSYCHIATRY & NEUROLOGY

## 2025-06-20 PROCEDURE — 97116 GAIT TRAINING THERAPY: CPT

## 2025-06-20 PROCEDURE — 36415 COLL VENOUS BLD VENIPUNCTURE: CPT

## 2025-06-20 PROCEDURE — 1180000000 HC REHAB R&B

## 2025-06-20 PROCEDURE — 6370000000 HC RX 637 (ALT 250 FOR IP): Performed by: PSYCHIATRY & NEUROLOGY

## 2025-06-20 RX ORDER — DONEPEZIL HYDROCHLORIDE 5 MG/1
5 TABLET, FILM COATED ORAL
Status: DISCONTINUED | OUTPATIENT
Start: 2025-06-20 | End: 2025-06-24 | Stop reason: HOSPADM

## 2025-06-20 RX ADMIN — PANTOPRAZOLE SODIUM 40 MG: 40 TABLET, DELAYED RELEASE ORAL at 05:55

## 2025-06-20 RX ADMIN — ENOXAPARIN SODIUM 40 MG: 100 INJECTION SUBCUTANEOUS at 21:05

## 2025-06-20 RX ADMIN — DONEPEZIL HYDROCHLORIDE 5 MG: 5 TABLET, FILM COATED ORAL at 09:41

## 2025-06-20 RX ADMIN — MIRTAZAPINE 15 MG: 15 TABLET, FILM COATED ORAL at 21:06

## 2025-06-20 RX ADMIN — Medication 500 MG: at 09:41

## 2025-06-20 RX ADMIN — SENNOSIDES AND DOCUSATE SODIUM 1 TABLET: 8.6; 5 TABLET ORAL at 21:06

## 2025-06-20 RX ADMIN — ARIPIPRAZOLE 2.5 MG: 5 TABLET ORAL at 21:06

## 2025-06-20 RX ADMIN — HYDROXYZINE HYDROCHLORIDE 25 MG: 25 TABLET, FILM COATED ORAL at 09:41

## 2025-06-20 RX ADMIN — FOLIC ACID 1 MG: 1 TABLET ORAL at 09:41

## 2025-06-20 RX ADMIN — Medication 150 MG: at 09:41

## 2025-06-20 RX ADMIN — LEVOTHYROXINE SODIUM 150 MCG: 75 TABLET ORAL at 05:55

## 2025-06-20 RX ADMIN — SENNOSIDES AND DOCUSATE SODIUM 1 TABLET: 8.6; 5 TABLET ORAL at 09:41

## 2025-06-20 RX ADMIN — LIOTHYRONINE SODIUM 25 MCG: 25 TABLET ORAL at 21:06

## 2025-06-20 RX ADMIN — Medication 500 MG: at 21:06

## 2025-06-20 RX ADMIN — LIOTHYRONINE SODIUM 25 MCG: 25 TABLET ORAL at 09:41

## 2025-06-20 RX ADMIN — ROSUVASTATIN CALCIUM 20 MG: 20 TABLET, FILM COATED ORAL at 21:06

## 2025-06-21 LAB
BASOPHILS # BLD: 0 K/UL (ref 0–0.2)
BASOPHILS NFR BLD: 0.2 % (ref 0–1)
EOSINOPHIL # BLD: 0 K/UL (ref 0–0.6)
EOSINOPHIL NFR BLD: 0.2 % (ref 0–5)
ERYTHROCYTE [DISTWIDTH] IN BLOOD BY AUTOMATED COUNT: 13.1 % (ref 11.5–14.5)
HCT VFR BLD AUTO: 23.3 % (ref 37–47)
HGB BLD-MCNC: 7.8 G/DL (ref 12–16)
IMM GRANULOCYTES # BLD: 0.1 K/UL
LYMPHOCYTES # BLD: 1.9 K/UL (ref 1.1–4.5)
LYMPHOCYTES NFR BLD: 39.1 % (ref 20–40)
MCH RBC QN AUTO: 29.7 PG (ref 27–31)
MCHC RBC AUTO-ENTMCNC: 33.5 G/DL (ref 33–37)
MCV RBC AUTO: 88.6 FL (ref 81–99)
MONOCYTES # BLD: 0.3 K/UL (ref 0–0.9)
MONOCYTES NFR BLD: 6 % (ref 0–10)
NEUTROPHILS # BLD: 2.6 K/UL (ref 1.5–7.5)
NEUTS SEG NFR BLD: 53.3 % (ref 50–65)
PLATELET # BLD AUTO: 191 K/UL (ref 130–400)
PMV BLD AUTO: 9.4 FL (ref 9.4–12.3)
RBC # BLD AUTO: 2.63 M/UL (ref 4.2–5.4)
WBC # BLD AUTO: 4.8 K/UL (ref 4.8–10.8)

## 2025-06-21 PROCEDURE — 85025 COMPLETE CBC W/AUTO DIFF WBC: CPT

## 2025-06-21 PROCEDURE — 36415 COLL VENOUS BLD VENIPUNCTURE: CPT

## 2025-06-21 PROCEDURE — 6360000002 HC RX W HCPCS: Performed by: PSYCHIATRY & NEUROLOGY

## 2025-06-21 PROCEDURE — 51798 US URINE CAPACITY MEASURE: CPT

## 2025-06-21 PROCEDURE — 94760 N-INVAS EAR/PLS OXIMETRY 1: CPT

## 2025-06-21 PROCEDURE — 6370000000 HC RX 637 (ALT 250 FOR IP): Performed by: PSYCHIATRY & NEUROLOGY

## 2025-06-21 PROCEDURE — 1180000000 HC REHAB R&B

## 2025-06-21 PROCEDURE — 99232 SBSQ HOSP IP/OBS MODERATE 35: CPT | Performed by: PSYCHIATRY & NEUROLOGY

## 2025-06-21 RX ADMIN — ARIPIPRAZOLE 2.5 MG: 5 TABLET ORAL at 21:31

## 2025-06-21 RX ADMIN — MIRTAZAPINE 15 MG: 15 TABLET, FILM COATED ORAL at 21:31

## 2025-06-21 RX ADMIN — ENOXAPARIN SODIUM 40 MG: 100 INJECTION SUBCUTANEOUS at 21:32

## 2025-06-21 RX ADMIN — Medication 150 MG: at 08:15

## 2025-06-21 RX ADMIN — LEVOTHYROXINE SODIUM 150 MCG: 75 TABLET ORAL at 06:58

## 2025-06-21 RX ADMIN — LIOTHYRONINE SODIUM 25 MCG: 25 TABLET ORAL at 21:32

## 2025-06-21 RX ADMIN — DONEPEZIL HYDROCHLORIDE 5 MG: 5 TABLET, FILM COATED ORAL at 08:15

## 2025-06-21 RX ADMIN — PANTOPRAZOLE SODIUM 40 MG: 40 TABLET, DELAYED RELEASE ORAL at 06:58

## 2025-06-21 RX ADMIN — Medication 500 MG: at 15:30

## 2025-06-21 RX ADMIN — Medication 500 MG: at 21:31

## 2025-06-21 RX ADMIN — POTASSIUM BICARBONATE 20 MEQ: 782 TABLET, EFFERVESCENT ORAL at 08:15

## 2025-06-21 RX ADMIN — Medication 500 MG: at 08:15

## 2025-06-21 RX ADMIN — HYDROXYZINE HYDROCHLORIDE 25 MG: 25 TABLET, FILM COATED ORAL at 21:31

## 2025-06-21 RX ADMIN — FOLIC ACID 1 MG: 1 TABLET ORAL at 08:15

## 2025-06-21 RX ADMIN — ROSUVASTATIN CALCIUM 20 MG: 20 TABLET, FILM COATED ORAL at 21:31

## 2025-06-21 RX ADMIN — LIOTHYRONINE SODIUM 25 MCG: 25 TABLET ORAL at 08:15

## 2025-06-21 RX ADMIN — Medication 10 MG: at 21:31

## 2025-06-21 RX ADMIN — SENNOSIDES AND DOCUSATE SODIUM 1 TABLET: 8.6; 5 TABLET ORAL at 21:31

## 2025-06-21 RX ADMIN — SENNOSIDES AND DOCUSATE SODIUM 1 TABLET: 8.6; 5 TABLET ORAL at 08:16

## 2025-06-21 ASSESSMENT — PAIN SCALES - GENERAL: PAINLEVEL_OUTOF10: 0

## 2025-06-22 LAB
BASOPHILS # BLD: 0 K/UL (ref 0–0.2)
BASOPHILS NFR BLD: 0.2 % (ref 0–1)
EOSINOPHIL # BLD: 0 K/UL (ref 0–0.6)
EOSINOPHIL NFR BLD: 0.4 % (ref 0–5)
ERYTHROCYTE [DISTWIDTH] IN BLOOD BY AUTOMATED COUNT: 12.9 % (ref 11.5–14.5)
HCT VFR BLD AUTO: 21.5 % (ref 37–47)
HGB BLD-MCNC: 7.2 G/DL (ref 12–16)
IMM GRANULOCYTES # BLD: 0 K/UL
LYMPHOCYTES # BLD: 2.2 K/UL (ref 1.1–4.5)
LYMPHOCYTES NFR BLD: 40.7 % (ref 20–40)
MCH RBC QN AUTO: 29 PG (ref 27–31)
MCHC RBC AUTO-ENTMCNC: 33.5 G/DL (ref 33–37)
MCV RBC AUTO: 86.7 FL (ref 81–99)
MONOCYTES # BLD: 0.3 K/UL (ref 0–0.9)
MONOCYTES NFR BLD: 6.3 % (ref 0–10)
NEUTROPHILS # BLD: 2.8 K/UL (ref 1.5–7.5)
NEUTS SEG NFR BLD: 52.2 % (ref 50–65)
PLATELET # BLD AUTO: 186 K/UL (ref 130–400)
PMV BLD AUTO: 9.1 FL (ref 9.4–12.3)
RBC # BLD AUTO: 2.48 M/UL (ref 4.2–5.4)
WBC # BLD AUTO: 5.4 K/UL (ref 4.8–10.8)

## 2025-06-22 PROCEDURE — 6370000000 HC RX 637 (ALT 250 FOR IP): Performed by: PSYCHIATRY & NEUROLOGY

## 2025-06-22 PROCEDURE — 36415 COLL VENOUS BLD VENIPUNCTURE: CPT

## 2025-06-22 PROCEDURE — 1180000000 HC REHAB R&B

## 2025-06-22 PROCEDURE — 94760 N-INVAS EAR/PLS OXIMETRY 1: CPT

## 2025-06-22 PROCEDURE — 6360000002 HC RX W HCPCS: Performed by: PSYCHIATRY & NEUROLOGY

## 2025-06-22 PROCEDURE — 85025 COMPLETE CBC W/AUTO DIFF WBC: CPT

## 2025-06-22 PROCEDURE — 51798 US URINE CAPACITY MEASURE: CPT

## 2025-06-22 PROCEDURE — 94150 VITAL CAPACITY TEST: CPT

## 2025-06-22 RX ADMIN — Medication 10 MG: at 22:16

## 2025-06-22 RX ADMIN — ROSUVASTATIN CALCIUM 20 MG: 20 TABLET, FILM COATED ORAL at 22:16

## 2025-06-22 RX ADMIN — LIOTHYRONINE SODIUM 25 MCG: 25 TABLET ORAL at 07:29

## 2025-06-22 RX ADMIN — LEVOTHYROXINE SODIUM 150 MCG: 75 TABLET ORAL at 06:43

## 2025-06-22 RX ADMIN — Medication 500 MG: at 07:29

## 2025-06-22 RX ADMIN — FOLIC ACID 1 MG: 1 TABLET ORAL at 07:29

## 2025-06-22 RX ADMIN — ENOXAPARIN SODIUM 40 MG: 100 INJECTION SUBCUTANEOUS at 22:16

## 2025-06-22 RX ADMIN — SENNOSIDES AND DOCUSATE SODIUM 1 TABLET: 8.6; 5 TABLET ORAL at 07:29

## 2025-06-22 RX ADMIN — ARIPIPRAZOLE 2.5 MG: 5 TABLET ORAL at 22:16

## 2025-06-22 RX ADMIN — SENNOSIDES AND DOCUSATE SODIUM 1 TABLET: 8.6; 5 TABLET ORAL at 22:16

## 2025-06-22 RX ADMIN — Medication 500 MG: at 14:47

## 2025-06-22 RX ADMIN — DONEPEZIL HYDROCHLORIDE 5 MG: 5 TABLET, FILM COATED ORAL at 07:29

## 2025-06-22 RX ADMIN — POTASSIUM BICARBONATE 20 MEQ: 782 TABLET, EFFERVESCENT ORAL at 07:30

## 2025-06-22 RX ADMIN — Medication 150 MG: at 07:29

## 2025-06-22 RX ADMIN — LIOTHYRONINE SODIUM 25 MCG: 25 TABLET ORAL at 22:16

## 2025-06-22 RX ADMIN — Medication 500 MG: at 22:15

## 2025-06-22 RX ADMIN — MIRTAZAPINE 15 MG: 15 TABLET, FILM COATED ORAL at 22:16

## 2025-06-22 RX ADMIN — PANTOPRAZOLE SODIUM 40 MG: 40 TABLET, DELAYED RELEASE ORAL at 06:43

## 2025-06-22 ASSESSMENT — PAIN SCALES - GENERAL: PAINLEVEL_OUTOF10: 0

## 2025-06-23 ENCOUNTER — TELEPHONE (OUTPATIENT)
Dept: NEUROLOGY | Facility: OTHER | Age: 50
End: 2025-06-23

## 2025-06-23 LAB
ANION GAP SERPL CALCULATED.3IONS-SCNC: 10 MMOL/L (ref 8–16)
BASOPHILS # BLD: 0 K/UL (ref 0–0.2)
BASOPHILS NFR BLD: 0.2 % (ref 0–1)
BUN SERPL-MCNC: 16 MG/DL (ref 6–20)
CALCIUM SERPL-MCNC: 9.2 MG/DL (ref 8.6–10)
CHLORIDE SERPL-SCNC: 109 MMOL/L (ref 98–107)
CO2 SERPL-SCNC: 25 MMOL/L (ref 22–29)
CREAT SERPL-MCNC: 0.8 MG/DL (ref 0.5–0.9)
EOSINOPHIL # BLD: 0 K/UL (ref 0–0.6)
EOSINOPHIL NFR BLD: 0.4 % (ref 0–5)
ERYTHROCYTE [DISTWIDTH] IN BLOOD BY AUTOMATED COUNT: 12.8 % (ref 11.5–14.5)
GLUCOSE SERPL-MCNC: 81 MG/DL (ref 70–99)
HCT VFR BLD AUTO: 26.2 % (ref 37–47)
HGB BLD-MCNC: 8.4 G/DL (ref 12–16)
IMM GRANULOCYTES # BLD: 0 K/UL
LYMPHOCYTES # BLD: 1.8 K/UL (ref 1.1–4.5)
LYMPHOCYTES NFR BLD: 36 % (ref 20–40)
MAGNESIUM SERPL-MCNC: 1.6 MG/DL (ref 1.6–2.6)
MCH RBC QN AUTO: 28.6 PG (ref 27–31)
MCHC RBC AUTO-ENTMCNC: 32.1 G/DL (ref 33–37)
MCV RBC AUTO: 89.1 FL (ref 81–99)
MONOCYTES # BLD: 0.4 K/UL (ref 0–0.9)
MONOCYTES NFR BLD: 7.2 % (ref 0–10)
NEUTROPHILS # BLD: 2.8 K/UL (ref 1.5–7.5)
NEUTS SEG NFR BLD: 56 % (ref 50–65)
PLATELET # BLD AUTO: 207 K/UL (ref 130–400)
PMV BLD AUTO: 9.6 FL (ref 9.4–12.3)
POTASSIUM SERPL-SCNC: 3 MMOL/L (ref 3.5–5)
RBC # BLD AUTO: 2.94 M/UL (ref 4.2–5.4)
SODIUM SERPL-SCNC: 144 MMOL/L (ref 136–145)
WBC # BLD AUTO: 5 K/UL (ref 4.8–10.8)

## 2025-06-23 PROCEDURE — 92526 ORAL FUNCTION THERAPY: CPT

## 2025-06-23 PROCEDURE — 97130 THER IVNTJ EA ADDL 15 MIN: CPT

## 2025-06-23 PROCEDURE — 85025 COMPLETE CBC W/AUTO DIFF WBC: CPT

## 2025-06-23 PROCEDURE — 97129 THER IVNTJ 1ST 15 MIN: CPT

## 2025-06-23 PROCEDURE — 51798 US URINE CAPACITY MEASURE: CPT

## 2025-06-23 PROCEDURE — 97110 THERAPEUTIC EXERCISES: CPT

## 2025-06-23 PROCEDURE — 97116 GAIT TRAINING THERAPY: CPT

## 2025-06-23 PROCEDURE — 6360000002 HC RX W HCPCS: Performed by: PSYCHIATRY & NEUROLOGY

## 2025-06-23 PROCEDURE — 80048 BASIC METABOLIC PNL TOTAL CA: CPT

## 2025-06-23 PROCEDURE — 36415 COLL VENOUS BLD VENIPUNCTURE: CPT

## 2025-06-23 PROCEDURE — 83735 ASSAY OF MAGNESIUM: CPT

## 2025-06-23 PROCEDURE — 97530 THERAPEUTIC ACTIVITIES: CPT

## 2025-06-23 PROCEDURE — 94150 VITAL CAPACITY TEST: CPT

## 2025-06-23 PROCEDURE — 97535 SELF CARE MNGMENT TRAINING: CPT

## 2025-06-23 PROCEDURE — 94760 N-INVAS EAR/PLS OXIMETRY 1: CPT

## 2025-06-23 PROCEDURE — 1180000000 HC REHAB R&B

## 2025-06-23 PROCEDURE — 99232 SBSQ HOSP IP/OBS MODERATE 35: CPT | Performed by: PSYCHIATRY & NEUROLOGY

## 2025-06-23 PROCEDURE — 6370000000 HC RX 637 (ALT 250 FOR IP): Performed by: PSYCHIATRY & NEUROLOGY

## 2025-06-23 RX ORDER — MIDODRINE HYDROCHLORIDE 2.5 MG/1
2.5 TABLET ORAL
Status: DISCONTINUED | OUTPATIENT
Start: 2025-06-23 | End: 2025-06-24

## 2025-06-23 RX ORDER — MIDODRINE HYDROCHLORIDE 2.5 MG/1
2.5 TABLET ORAL EVERY MORNING
Status: DISCONTINUED | OUTPATIENT
Start: 2025-06-24 | End: 2025-06-23

## 2025-06-23 RX ORDER — MIDODRINE HYDROCHLORIDE 2.5 MG/1
2.5 TABLET ORAL DAILY
Status: DISCONTINUED | OUTPATIENT
Start: 2025-06-23 | End: 2025-06-23

## 2025-06-23 RX ADMIN — ENOXAPARIN SODIUM 40 MG: 100 INJECTION SUBCUTANEOUS at 21:30

## 2025-06-23 RX ADMIN — ROSUVASTATIN CALCIUM 20 MG: 20 TABLET, FILM COATED ORAL at 21:30

## 2025-06-23 RX ADMIN — Medication 500 MG: at 21:30

## 2025-06-23 RX ADMIN — Medication 150 MG: at 08:09

## 2025-06-23 RX ADMIN — SENNOSIDES AND DOCUSATE SODIUM 1 TABLET: 8.6; 5 TABLET ORAL at 21:30

## 2025-06-23 RX ADMIN — POTASSIUM BICARBONATE 20 MEQ: 782 TABLET, EFFERVESCENT ORAL at 08:09

## 2025-06-23 RX ADMIN — SENNOSIDES AND DOCUSATE SODIUM 1 TABLET: 8.6; 5 TABLET ORAL at 08:09

## 2025-06-23 RX ADMIN — POTASSIUM BICARBONATE 20 MEQ: 782 TABLET, EFFERVESCENT ORAL at 21:31

## 2025-06-23 RX ADMIN — LEVOTHYROXINE SODIUM 150 MCG: 75 TABLET ORAL at 06:29

## 2025-06-23 RX ADMIN — ARIPIPRAZOLE 2.5 MG: 5 TABLET ORAL at 21:45

## 2025-06-23 RX ADMIN — FOLIC ACID 1 MG: 1 TABLET ORAL at 08:09

## 2025-06-23 RX ADMIN — LIOTHYRONINE SODIUM 25 MCG: 25 TABLET ORAL at 08:09

## 2025-06-23 RX ADMIN — Medication 500 MG: at 15:42

## 2025-06-23 RX ADMIN — DONEPEZIL HYDROCHLORIDE 5 MG: 5 TABLET, FILM COATED ORAL at 08:08

## 2025-06-23 RX ADMIN — MIRTAZAPINE 15 MG: 15 TABLET, FILM COATED ORAL at 21:30

## 2025-06-23 RX ADMIN — LIOTHYRONINE SODIUM 25 MCG: 25 TABLET ORAL at 21:31

## 2025-06-23 RX ADMIN — PANTOPRAZOLE SODIUM 40 MG: 40 TABLET, DELAYED RELEASE ORAL at 06:29

## 2025-06-23 RX ADMIN — Medication 500 MG: at 08:09

## 2025-06-23 RX ADMIN — MIDODRINE HYDROCHLORIDE 2.5 MG: 2.5 TABLET ORAL at 12:34

## 2025-06-23 NOTE — PATIENT CARE CONFERENCE
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)      PHYSICAL THERAPY  GROSS ASSESSMENT       BED MOBILITY  Bed mobility  Bridging: Contact guard assistance  Rolling to Right: Supervision (triplanar technique from R SL)  Supine to Sit: Supervision  Sit to Supine: Supervision  Scooting: Supervision  Bed Mobility Comments: On R side of bed- intermittent use of bedrail       TRANSFERS  Transfers  Sit to Stand: Minimal Assistance, Partial/Moderate assistance (Mostly Min A, Mod A from toilet)  Stand to Sit: Minimal Assistance  Bed to Chair: Minimal assistance (W/ RW)  Stand Pivot Transfers: Minimal Assistance, 2 Person Assistance (w/c<EOB from pt L w/o AD (using bedrail), PTA and sitter)  Squat Pivot Transfers: Partial/Moderate assistance (WC TO EOB GOING TO R HOLDING R HANDRAIL)  Comment: Extended time to stand  WHEELCHAIR PROPULSION  Propulsion 1  Propulsion: Manual  Level: Level Tile  Method: RUE, LUE, RLE, LLE (alternated between LE and UE propelling)  Level of Assistance: Stand by assistance (REQUIRED REST BREAK INBETWEEN AND AFTERWARDS)  Distance: 15ft  AMBULATION  Ambulation  Surface: Level tile  Device: Rolling Walker  Assistance: Minimal assistance, Partial/Moderate assistance (Min A initially, more Min/Mod A upon fatigue)  Quality of Gait: reciprocal, BLE shaking, raised shoulders  Gait Deviations: Slow [place], Decreased step length, Decreased step height, Decreased head and trunk rotation  Distance: 8', 10' X 3 (Intermittent rest breaks in between)  Comments: Multiple L/R turns, w/ shoes donned  STAIRS     GOALS:  Short Term Goals  Time Frame for Short Term Goals: 1 WEEK  Short Term Goal 1: BED MOBILITY INDEP WITH RAILING  Short Term Goal 2: TRANSFER FROM SURFACE TO SURFACE MIN A  Short Term Goal 3: WC

## 2025-06-23 NOTE — TELEPHONE ENCOUNTER
Caller: IVETTE    Relationship to patient: [facility]    Best call back number: [phone]    New or established patient?  [x] New  [] Established    Date of discharge: 6-24-25    Facility discharged from: [facility]    Diagnosis/Symptoms: Acquired cerebral ventriculomegaly / GENERAL WEAKNESS    Length of stay (If applicable): 13 DAYS    Specialty Only: Did you see a [facility] provider?    [x] Yes  [] No  If so, who? BortePayton MD     Additional Details:   6 WEEK FU NEEDED. PLEASE CALL IVETTE AT [facility] TO SCHEDULE THIS APPT FOR THE PT.

## 2025-06-24 VITALS
OXYGEN SATURATION: 96 % | TEMPERATURE: 97.5 F | SYSTOLIC BLOOD PRESSURE: 141 MMHG | DIASTOLIC BLOOD PRESSURE: 82 MMHG | RESPIRATION RATE: 16 BRPM | HEART RATE: 110 BPM | WEIGHT: 154.3 LBS | BODY MASS INDEX: 20.9 KG/M2 | HEIGHT: 72 IN

## 2025-06-24 PROCEDURE — 99239 HOSP IP/OBS DSCHRG MGMT >30: CPT | Performed by: PSYCHIATRY & NEUROLOGY

## 2025-06-24 PROCEDURE — 6370000000 HC RX 637 (ALT 250 FOR IP): Performed by: PSYCHIATRY & NEUROLOGY

## 2025-06-24 RX ORDER — DONEPEZIL HYDROCHLORIDE 5 MG/1
5 TABLET, FILM COATED ORAL
Qty: 30 TABLET | Refills: 3 | Status: SHIPPED | OUTPATIENT
Start: 2025-06-24

## 2025-06-24 RX ORDER — IRON POLYSACCHARIDE COMPLEX 150 MG
150 CAPSULE ORAL DAILY
Qty: 60 CAPSULE | Refills: 3 | Status: SHIPPED | OUTPATIENT
Start: 2025-06-24

## 2025-06-24 RX ORDER — PANTOPRAZOLE SODIUM 40 MG/1
40 TABLET, DELAYED RELEASE ORAL
Qty: 30 TABLET | Refills: 3 | Status: SHIPPED | OUTPATIENT
Start: 2025-06-25

## 2025-06-24 RX ORDER — MIRTAZAPINE 15 MG/1
15 TABLET, FILM COATED ORAL NIGHTLY
Qty: 30 TABLET | Refills: 3 | Status: SHIPPED | OUTPATIENT
Start: 2025-06-24

## 2025-06-24 RX ORDER — ARIPIPRAZOLE 5 MG/1
2.5 TABLET ORAL NIGHTLY
Qty: 30 TABLET | Refills: 3 | Status: SHIPPED | OUTPATIENT
Start: 2025-06-24

## 2025-06-24 RX ORDER — ERGOCALCIFEROL 1.25 MG/1
50000 CAPSULE ORAL WEEKLY
Qty: 5 CAPSULE | Refills: 0 | Status: SHIPPED | OUTPATIENT
Start: 2025-06-26

## 2025-06-24 RX ORDER — SENNA AND DOCUSATE SODIUM 50; 8.6 MG/1; MG/1
1 TABLET, FILM COATED ORAL 2 TIMES DAILY
Qty: 60 TABLET | Refills: 0 | Status: SHIPPED | OUTPATIENT
Start: 2025-06-24

## 2025-06-24 RX ORDER — LEVOTHYROXINE SODIUM 150 UG/1
150 TABLET ORAL
Qty: 30 TABLET | Refills: 3 | Status: SHIPPED | OUTPATIENT
Start: 2025-06-25

## 2025-06-24 RX ADMIN — MIDODRINE HYDROCHLORIDE 2.5 MG: 2.5 TABLET ORAL at 05:35

## 2025-06-24 RX ADMIN — Medication 150 MG: at 08:49

## 2025-06-24 RX ADMIN — DONEPEZIL HYDROCHLORIDE 5 MG: 5 TABLET, FILM COATED ORAL at 08:49

## 2025-06-24 RX ADMIN — LEVOTHYROXINE SODIUM 150 MCG: 75 TABLET ORAL at 05:35

## 2025-06-24 RX ADMIN — PANTOPRAZOLE SODIUM 40 MG: 40 TABLET, DELAYED RELEASE ORAL at 05:35

## 2025-06-24 RX ADMIN — POTASSIUM BICARBONATE 20 MEQ: 782 TABLET, EFFERVESCENT ORAL at 08:49

## 2025-06-24 RX ADMIN — LIOTHYRONINE SODIUM 25 MCG: 25 TABLET ORAL at 08:49

## 2025-06-24 RX ADMIN — SENNOSIDES AND DOCUSATE SODIUM 1 TABLET: 8.6; 5 TABLET ORAL at 08:49

## 2025-06-24 RX ADMIN — Medication 500 MG: at 08:49

## 2025-06-24 RX ADMIN — FOLIC ACID 1 MG: 1 TABLET ORAL at 08:49

## 2025-06-24 NOTE — PROGRESS NOTES
Post-fall Pharmacy Consult    Pharmacy has been consulted to review medication profile for fall risk.    Medications increasing risk of dizziness:   ARIPiprazole  Carvedilol  Mirtazapine  Pantoprazole  Rosuvastatin  Tamsulosin     Medications increasing risk of drowsiness:  ARIPiprazole  Carvedilol  Mirtazapine  Pantoprazole  Tamsulosin     Medications increasing risk of bleeding:   Enoxaparin  Pantoprazole     Recommendations:   Avoid use of medications that increase fall risk due to increased dizziness, drowsiness, sedation, orthostatic hypotension, etc when possible. If deemed medically necessary with no alternatives available, use the lowest effective dose. Continue to monitor and reassess to ensure the lowest effective dose is being used. Educate the patient on the increased risk of falling due to these medications and appropriate preventative measures. Monitor for signs and symptoms of bleeding.    Electronically signed by Mee Valenuzela RPH on 6/14/2025 at 3:59 PM    
    DIS Nurse Note  SUBJECTIVE: Patient assessed secondary to elevated Deterioration Index Score.      Deterioration Index Score:  Predictive Model Details          52 (Warning)  Factor Value    Calculated 6/16/2025 14:58 34% Systolic 59    Deterioration Index Model 17% Neurological exam X     15% Age 49 years old     13% Kurt coma scale 14     13% Pulse 119     3% Hematocrit abnormal (31.5 %)     3% Pulse oximetry 100 %     2% Potassium abnormal (3.3 mmol/L)     2% Sodium 142 mmol/L     0% Temperature 97.3 °F (36.3 °C)     0% Respiratory rate 16     0% WBC count 6.8 K/uL        Vital Signs:  Vitals:    06/15/25 1600 06/16/25 0409 06/16/25 1121 06/16/25 1330   BP: 112/72 (!) 140/90  (!) 59/46   Pulse: (!) 106 (!) 114  (!) 119   Resp:  16     Temp:  97.3 °F (36.3 °C)     TempSrc:  Temporal     SpO2:  100% 100% 100%   Weight:       Height:            Provider Notified: Reviewed patient status  & DIS score with Provider Dr. King    ASSESSMENT:  Hypotensive; BP meds put on hold, will monitor.    Plan of Care: Continue current plan of care.    Electronically signed by Willa Scales RN  
    DIS Nurse Note  SUBJECTIVE: Patient assessed secondary to elevated Deterioration Index Score.      Deterioration Index Score:  Predictive Model Details          56 (Warning)  Factor Value    Calculated 6/17/2025 10:12 27% Systolic 73    Deterioration Index Model 16% Neurological exam X     15% Age 49 years old     14% Pulse 124     12% Kurt coma scale 14     10% Respiratory rate 20     2% Hematocrit abnormal (31.5 %)     2% Potassium abnormal (3.3 mmol/L)     1% Sodium 142 mmol/L     1% Pulse oximetry 99 %     0% Temperature 97.7 °F (36.5 °C)     0% WBC count 6.8 K/uL        Vital Signs:  Vitals:    06/16/25 1529 06/17/25 0521 06/17/25 0751 06/17/25 0900   BP: 115/76 93/66  (!) 73/46   Pulse: (!) 108 (!) 110  (!) 124   Resp: 16 20     Temp: 98.4 °F (36.9 °C) 97.7 °F (36.5 °C)     TempSrc:  Tympanic     SpO2: 100% 100% 99%    Weight:       Height:            Provider Notified: Reviewed patient status  & DIS score with Provider Dr. King    ASSESSMENT:  No change in previous assessment    Plan of Care: Continue current plan of care    Electronically signed by Willa Scales RN  
   06/16/25 0900   Transfers   Sit to Stand Partial/Moderate assistance  (wc to // bars)   Stand to Sit Partial/Moderate assistance  (//bars to sitting in WC)   Squat Pivot Transfers Partial/Moderate assistance;Minimal Assistance  (RECLINER TO WC GOING TO R and WC TO RECLINER GOING TO L.)   PT Exercises   Exercise Treatment 2X STS 30SEC STANDING IN // BARS WITH LONG REST BREAKS.  (REQUIRING MOD A FOR STS. COMPLAINS OF DIZZINESS AND ANXIETY WHEN STANDING)   A/AROM Exercises sitting B LE ankle pumps, marches, hip abd/add 10x each   Resistive Exercises manual resistive sitting B LE ankle df/pf, LAQ, hip abd/add, hip flex/ext 10x ea   Assessment   Assessment PT DENIED WALKING WITH // BARS. SHE REPORTED FEELING DIZZY AND FEELING ANXIOUS WHEN STANDING FOR > 30 SECS IN // BARS. SHE DEMONSTRATED B LE SHAKING WHEN STANDING. REQUIRED LONG REST BREAKS BETWEEN EACH STS. WILL CONTINUE TO WORK ON STS AND AMB.   PT Individual Minutes   Time In 0900   Time Out 1000   Minutes 60       
   06/17/25 0900   Vitals   Pulse (!) 124   BP (!) 73/46   MAP (Calculated) 55   Bed mobility   Sit to Supine Supervision  (EOB TO R SL THEN SUPINE; TRIPLANAR)   Transfers   Sit to Stand Partial/Moderate assistance  (FROM EOB)   Stand to Sit Partial/Moderate assistance  (BED TO WC GOING TO L)   Bed to Chair Partial/Moderate assistance  (SQUAT PIVOT TF GOING TO L)   Squat Pivot Transfers Partial/Moderate assistance  (WC TO EOB GOING TO R HOLDING R HANDRAIL)   PT Exercises   A/AROM Exercises SUPINE B LE ANKLE PUMPS, QUAD SETS, GLUTE SETS, HEEL SLIDES, HOOKLYING HIP ABD/ADD 10X EA   Static Standing Balance Exercises 2X STS IN // BARS 45 SEC STAND  (REQUIRES MOD A FOR STS AND BLOCKING OF R KNEE)   Assessment   Assessment PT DENIED AMB IN // BARS DUE TO C/O'S FATIGUE, ANXIETY AND LEG SHAKING. PT REPORTED FEELING DIZZY. BP WAS MEASURED AT 73/46. RETURNED PT TO ROOM AND CONTINUED WITH LE STRENGTHENING/AROM EXERCISES. PT DEMONSTRATED MILD CONFUSION REGARDING LOCATION  AND ACTIVITY. WILL CONTINUE TO WORK ON STATIC STANDING AND AMB IN // BARS.   PT Individual Minutes   Time In 0900   Time Out 1000   Minutes 60       
   06/23/25 1000   Vitals   Pulse 87   BP (!) 100/56  (SUPINE - 95/70, HR-125bpm End of session - 110/77, hr-117)   MAP (Calculated) 71   BP Location Left upper arm   Patient Position Standing  (post standing-pt c/o dizziness and was more lethargic.)   Bed mobility   Rolling to Right Supervision  (FROM L SL ROLLING R TO SUPINE)   Sit to Supine Supervision  (TRIPLANAR METHOD USING BED RAIL)   Transfers   Squat Pivot Transfers Partial/Moderate assistance;Minimal Assistance  (WC TO BED GOING TO THE L USING LUE TO HOLD BED RAIL)   PT Exercises   Exercise Treatment supine B LE ankle pumps, quad sets, glute sets, heel slides, hook lying hip abd/add 10x ea   Resistive Exercises manual resistance supine B LE SAQ, leg press, ankle 10x ea   Assessment   Assessment pt presented to therapy with c/o of dizziness and lightheadedness. BP was measured at 100/56. pt tolerated supine B LE exercise in bed. BP returned to normal after session.   PT Individual Minutes   Time In 1000   Time Out 1100   Minutes 50       
   06/23/25 1400   Bed mobility   Rolling to Right Supervision  (using railing)   Supine to Sit Supervision  (triplanar method using bed rail)   Transfers   Squat Pivot Transfers Minimal Assistance  (eob to WC using armrest going to the L)   Ambulation   Surface Level tile   Device Rolling Walker   Other Apparatus Wheelchair follow   Assistance Partial/Moderate assistance;Minimal assistance   Quality of Gait reciprocal gait with B LE shaking. pt demonstrated inconsistancy in step length and base of support with feet internally rotating during amb. had slight LOB which therapist corrected. will continue with gait and initiate car tf.   Distance 20' 50'   PT Exercises   A/AROM Exercises sitting B LE ankle pumps, marches, hip abd/add 10x   Resistive Exercises sitting B LE LAQ, hip abd/flex 10x ea   Assessment   Assessment pt tolerated tx well with no c/o of dizziness. pt increased total amb distance since previou sessions but demonstrated inconsistencies in step legnth and keeping a wide DARRELL during gait with feet internally rotation at times. patient had difficulties with balance as well. will continue with gait and initiate car tfs.   PT Individual Minutes   Time In 1345   Time Out 1430   Minutes 45       
  Name: Sydney Hassan  MRN: [id number]  Date of Service:  6/18/2025 06/18/25 1100   Restrictions/Precautions   Restrictions/Precautions Fall Risk;Swallowing - Thickened Liquids   General   Chart Reviewed Yes   Patient assessed for rehabilitation services? 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   Pain   Pre-Pain 0   Post-Pain 0   Activity Tolerance   Activity Tolerance Treatment limited secondary to decreased cognition   Assessment   Assessment See subjective: despite max encouragement and attempts at orientation (PTA in pt room from 11-11:35 am), pt declines getting OOB or any participation in therapy. Pt declines transferring to recliner for lunch (reports she will be getting up soon/going w/ family): pt reminded she is at [facility] and not to get OOB by herself /to push call button. PT, OT, and RN Julissa notified of pt condition. According to chart pt d/c home w/ HH; PTA  unsure of exact home set up and if someone will be home all the time: at this time PTA recommends 24/7 assist for pt safety.   Discharge Recommendations Continue to assess pending progress;24 hour supervision or assist;Patient would benefit from continued therapy after discharge   Safety Devices   Type of Devices Patient at risk for falls;Left in bed;Bed alarm in place;Call light within reach;Sitter present;Nurse notified  (Virtual sitter)           Electronically signed by Marni Sofia PTA on 6/18/2025 at 12:08 PM   
  Name: Sydney Hassan  MRN: [id number]  Date of Service:  6/14/2025 06/14/25 0900   Restrictions/Precautions   Restrictions/Precautions Fall Risk;Swallowing - Thickened Liquids   General   Chart Reviewed Yes   Patient assessed for rehabilitation services? 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 Pt reported then and now she has no pain/discomfort.   Subjective   Subjective Pt laying in bed, agrees to participate in therapy.   Vitals   Pulse (!) 101  ()   SpO2 98 %   BP (!) 87/77  (Above in standing, also 120/88 sitting EOB, and 128/89 in supine)   MAP (Calculated) 80   BP Location Left upper arm   Comment RN aware of low BP   Pain   Pre-Pain 0   Post-Pain 0  (Pt reports no pain at moment, intermittent B knee/hip pain at night (mostly B knees) (arthritis?))   Bed mobility   Supine to Sit Supervision   Scooting Supervision  (On EOB)   Bed Mobility Comments On R side of bed- use of bedrail   Transfers   Sit to Stand Minimal Assistance   Stand to Sit Minimal Assistance   Stand Pivot Transfers Minimal Assistance  (EOB<w/c from pt L w/o AD)   Comment Extended time to stand   PT Exercises   Exercise Treatment Multiple STS to RW (X4)   Activity Tolerance   Activity Tolerance Patient limited by endurance;Treatment limited secondary to decreased cognition;Patient tolerated treatment well   Assessment   Assessment Pt presents w/ some BLE/BUE tremoring intermittently when standing, declines amb. this morning as she is limited in part due to high anxiety about fall this morning. Pt does require v/c's for correct BUE placement during STS and generally the correct technique/sequence of session (presented as generally confused). 
  Name: Sydney Hassan  MRN: [id number]  Date of Service:  6/19/2025 06/19/25 1100   Restrictions/Precautions   Restrictions/Precautions Fall Risk;Swallowing - Thickened Liquids   General   Chart Reviewed Yes   Patient assessed for rehabilitation services? 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   Pain   Pre-Pain 0   Post-Pain 0   Bed mobility   Sit to Supine Supervision   Scooting Supervision   Bed Mobility Comments On R side of bed- intermittent use of bedrail   Transfers   Sit to Stand Minimal Assistance;2 Person Assistance  (From w/c, PTA and sitter)   Stand to Sit Minimal Assistance;2 Person Assistance  (PTA and sitter)   Stand Pivot Transfers Minimal Assistance;2 Person Assistance  (w/c<EOB from pt L w/o AD (using bedrail), PTA and sitter)   PT Exercises   Exercise Treatment Sitting in w/c BLE ther-ex: DF/PF X 20, LAQ X 10 hip flexion X 10  (V/c's/assistance for correct technique/pace)   Activity Tolerance   Activity Tolerance Treatment limited secondary to decreased cognition;Patient limited by endurance;Patient tolerated treatment well   Assessment   Assessment Pt reports she has not amb. very much for awhile now, due to arthritis pain in B knees/hip + B thigh musular pain: she also reports she has RW at home she really only uses for out in community, not at home. Pt reports she will be d/c via low height car and also reports she has ~3-4 steps to enter home (w/o handrails) + some steps inside home: will likely need ramp (due to pt decreased cognition, she is unreliable historian and need to clarify home setup w/ :) FTD would be VERY beneficial. Pt limited by cognition, able to perform stand pivot transfer this morning: seems mostly limited by cognition in terms of amb. Pt reports she hasn't amb. much in past, could possily 
  Name: Sydney Hassan  MRN: [id number]  Date of Service:  6/20/2025 06/20/25 1100   Restrictions/Precautions   Restrictions/Precautions Fall Risk;Swallowing - Thickened Liquids   General   Chart Reviewed Yes   Patient assessed for rehabilitation services? 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   Vitals   Pulse (!) 124  ()   BP (!) 78/54  (Above in standing and 91/51 in sitting)   MAP (Calculated) 62   BP Location Left upper arm   BP Method Automatic   Pain   Pre-Pain 0   Post-Pain 0   Transfers   Sit to Stand Minimal Assistance;Partial/Moderate assistance  (Mostly Min A, Mod A from toilet)   Stand to Sit Minimal Assistance   Bed to Chair Minimal assistance  (W/ RW)   Ambulation   Surface Level tile   Device Rolling Walker   Assistance Minimal assistance;Partial/Moderate assistance  (Min A initially, more Min/Mod A upon fatigue)   Quality of Gait reciprocal, BLE shaking, raised shoulders   Gait Deviations Slow [place];Decreased step length;Decreased step height;Decreased head and trunk rotation   Distance 8', 10' X 3  (Intermittent rest breaks in between)   Comments Multiple L/R turns, w/ shoes donned   Activity Tolerance   Activity Tolerance Patient limited by endurance;Patient tolerated treatment well   Assessment   Assessment Pt presented as much more cognitively aware today, still required some v/c's for re-direction/orientation: does not present w/ any irritation/agitation today. Pt able to progress to amb. short distances w/ RW: benefits from extra safety reassurance/orientation to place/goals. Plan to perform a car transfer next session if appropriate.   Discharge 
  [facility]  INPATIENT SPEECH THERAPY  [facility] 8 REHAB UNIT     TIME   Time In: 07:00  Time Out: 08:00  Minutes: 60     [x]Daily Note  []Progress Note     Date: 25  Patient Name: Sydney Hassan    MRN: [id number]  Account #:  [id number]  : [birth year]  Gender: Female  Primary Provider: Christine SHIPLEY  Diet Consistency Recommendations: Easy to chew consistency with mildly thick/nectar thick liquids     Subjective:  Patient alert, in bed, upon entry. No family member present. Patient continued to exhibit confusion with utterances frequently not pertaining to topics and questions at hand. Decreased attention and decreased immediate/short-term memory observed.      Objective:   Patient, when asked, was unable to recall events of recent fall. Utilized spaced retrieval and patient demonstrated ability to identify staff assist button on remote and on bed up to 7 minute marker (with auditory/visual distractions present) at independent level.      Targeted recall. In structured activity, patient verbalized PCP independently. Patient verbalized two of her primary medical conditions in which she takes medications at independent level (DM, HTN).     Targeted mental calculations for problem solving. In structured activity, patient calculated time for medications administered every 4 hours with 40% accuracy independently.      Goals:  Short-term Goals  Goal 1: Patient will complete attention and processing tasks with provision of mod cues/prompts.  Goal 2: Patient will complete comprehension and word finding tasks with provision of mod cues/prompts.  Goal 3: Patient will complete memory functioning tasks with provision of mod cues/prompts.  Goal 4: Patient will complete reasoning/problem solving tasks with provision of mod cues/prompts.    Goal 5: Patient will complete orientation tasks with provision of mod cues/prompts.      Goal 1: Patient will tolerate easy to chew diet with mildly thick/nectar thick liquids with min S/S 
4 Eyes Skin Assessment     NAME:  Sydney Hassan  YOB: 1975  MEDICAL RECORD NUMBER:  [id number]    The patient is being assessed for  Admission    I agree that at least one RN has performed a thorough Head to Toe Skin Assessment on the patient. ALL assessment sites listed below have been assessed.      Areas assessed by both nurses:    Head, Face, Ears, Shoulders, Back, Chest, Arms, Elbows, Hands, Sacrum. Buttock, Coccyx, Ischium, Legs. Feet and Heels, and Under Medical Devices         Does the Patient have a Wound? No noted wound(s)  Pt has scattered scabs to bilateral legs, open to air.       Scout Prevention initiated by RN: Yes  Wound Care Orders initiated by RN: No    Pressure Injury (Stage 3,4, Unstageable, DTI, NWPT, and Complex wounds) if present, place Wound referral order by RN under : No    New Ostomies, if present place, Ostomy referral order under : No     Nurse 1 eSignature: Electronically signed by Mony Stafford RN on 6/11/25 at 3:37 PM CDT    **SHARE this note so that the co-signing nurse can place an eSignature**    Nurse 2 eSignature: Electronically signed by Lauren Beaulieu LPN on 6/11/25 at 4:22 PM CDT   
Comprehensive Nutrition Assessment    Type and Reason for Visit:  Initial, Positive nutrition screen    Nutrition Recommendations/Plan:   Ensure Plus High Protein w/ meals  Liberalize diet to regular easy to chew  Add severe malnutrition to problem list     Malnutrition Assessment:  Malnutrition Status:  Severe malnutrition (06/12/25 0927)    Context:  Acute Illness     Findings of the 6 clinical characteristics of malnutrition:  Energy Intake:  Mild decrease in energy intake  Weight Loss:  Greater than 7.5% over 3 months     Body Fat Loss:  Mild body fat loss Orbital   Muscle Mass Loss:  Moderate muscle mass loss Temples (temporalis)  Fluid Accumulation:  No fluid accumulation     Strength:  Not Performed    Nutrition Assessment:    Pt evaluated for admissiont to rehab and +NS for weight loss and decreased appetite. Pt reports today that her appetite is good and breakfast intake noted to range from %. Pt noted to have significant weight loss of 9.2% in past 90 days and 23.7% in past 180 days. Pt has hx of swallowing difficulty although denies difficulty at this time. Due to weight loss and last A1c 5.4, will liberalize diet to regular easy to chew. Will also add ONS w/ meals to help prevent further weight loss.    Nutrition Related Findings:    GFR 78, last A1c 5.4, BM 6-11 Wound Type: None       Current Nutrition Intake & Therapies:    Average Meal Intake: %  Average Supplements Intake: None Ordered  ADULT DIET; Easy to Chew; 4 carb choices (60 gm/meal)    Anthropometric Measures:  Height: 188 cm (6' 2.02\")  Ideal Body Weight (IBW): 170 lbs (77 kg)    Admission Body Weight: 70 kg (154 lb 5.2 oz)  Current Body Weight: 70 kg (154 lb 5.2 oz), 90.8 % IBW. Weight Source: Bed scale  Current BMI (kg/m2): 19.8  Usual Body Weight: 91.7 kg (202 lb 2.6 oz) (12-4-24, wt on 3-14-25 77.1 kg)   % Weight Change (Calculated): -23.7  Weight Adjustment For: No Adjustment   BMI Categories: Normal Weight (BMI 
Continued stay request faxed to [employer] at [phone]. Will update chart when further days are approved.     Patient has requested home health at [facility]. MSW sent preliminary referral information to Rajani with [facility]. Will await response re availability.     Electronically signed by CARI Reyna on 6/17/2025 at 9:10 AM   
Durable Medical Equipment   Physician Order     Patient Name Sydney Hassan  Patient Phone: [phone] (Home)     Patient Address: [facility] [address]     Patient Height Height: 188 cm (6' 2.02\")  Patient Weight 70 kg (154 lb 4.8 oz)   1975     DME NEEDED:  ROLLING WALKER  STANDARD WHEELCHAIR, FULL LENGTH/SWING BACK ARM RESTS, B FOOT RESTS, B ANTI-TIPPERS  STANDARD WHEELCHAIR CUSHION  BEDSIDE COMMODE    F/O Payor/Plan Precert #   [employer]/[facility]    Subscriber Subscriber #   Sydney Hassan [id number]   Address Phone   [address] [address]  [place] [address] [phone]     DIAGNOSIS:  Hospital Problem List  Date Reviewed: 3/20/2025    ICD-10-CM Priority Class Noted Diagnosed POA    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   2025  Yes   Patient:                                    Sydney Hassan  MR#:                                        [id number]  Account Number:                   [id number]              Room:                                      [address] [place] [address]      YOB: 1975  Date of Progress Note:           2025  Time of Note                           6:10 AM  Attending Physician:               Gerardo King MD           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 female  with history of hypothyroidism, liver steatosis, cannabis use, malnutrition, diabetes mellitus type 2, hypertension, admitted May 11 through May 14, 2025; at that time she was admitted due to poor oral intake and weight loss.  She had an upper endoscopy performed 2025 with findings of a small hiatal hernia and no other abnormalities reported.  Per reports reviewed the 
Facility/Department: [facility] 8 REHAB UNIT  Initial Speech/Language/Cognitive Assessment    NAME: Sydney Hassan  : 1975   MRN: [id number]  ADMISSION DATE: 2025  ADMITTING DIAGNOSIS: has Lump or mass in breast; Abnormal mammogram; GBS (Guillain [place] syndrome); and Severe malnutrition on their problem list.    Date of Eval: 2025   Evaluating Therapist: EDWIN Bueno    Pain:  Pain Assessment  Pain Assessment: None - Denies Pain    Clinical Impression     Initial Speech/Language/Cognitive Evaluation evaluation completed on this date.     Portions of the RIPA, WAB and Crash Inventory were completed. Deficits noted within the following areas: comprehension (auditory), verbal expression, attention/processing, problem solving, safety awareness, executive functioning, orientation, recall, and verbal reasoning.      SLP will continue to follow and treat.    Auditory Comprehension:  Complex Yes/No questions: 80% accuracy; answers are elicited in a timely manner  Body Part Identification: 100% accuracy; adequate timing of completion noted  Simple 2 Step Directions: 100% accuracy; adequate timing of completion noted    Verbal Expression:  Repetition/Imitation: 100% accuracy; answers are elicited in a timely manner  Confrontation Naming/Labelin% accuracy; answers are elicited in a timely manner  Sentence completion: 100% accuracy; answers are elicited in a timely manner  Responsive Speech: 100% accuracy; answers are elicited in a timely manner     Attention/Orientation:  Attention/concentration: mildly-moderately decreased selective and sustained attention  Orientation: 20% oriented     Patient able to verbalize name and . Patient unable to verbalize address, phone number, age, current location, day/month/year/date.     Memory:  Immediate Memory: 100% accuracy up to 5 components  Short-term Memory: Pt recalled 0/3 unrelated components given a 5 minute time delay.  Retention of a paragraph: 0% accuracy   
Facility/Department: [facility] 8 REHAB UNIT  Occupational Therapy Treatment Note    Name: Sydney Hassan  : 1975  MRN: [id number]  Date of Service: 2025    Discharge Recommendations:  Continue to assess pending progress          Past Medical History:  has a past medical history of Anxiety, Depression, GERD (gastroesophageal reflux disease), Heart palpitations, Hyperlipidemia, Hypertension, Hyperthyroidism, and Type 2 diabetes mellitus without complication (HCC).  Past Surgical History:  has a past surgical history that includes Cholecystectomy ();  section (); Hysterectomy, vaginal (); Thyroidectomy (2018); Colonoscopy (); Upper gastrointestinal endoscopy (N/A, 2025); and Colonoscopy (N/A, 2025).    Treatment Diagnosis: Guillain [place] syndrome    Assessment   Performance deficits / Impairments: Decreased functional mobility ;Decreased endurance;Decreased posture;Decreased ADL status;Decreased balance;Decreased strength;Decreased safe awareness;Decreased high-level IADLs;Decreased cognition  Treatment Diagnosis: Guillain [place] syndrome  Activity Tolerance  Activity Tolerance: Patient Tolerated treatment well              Plan   Occupational Therapy Plan  Specific Instructions for Next Treatment: Standing balance, UE strengthening  Current Treatment Recommendations: Strengthening, Balance training, Functional mobility training, Endurance training, Cognitive reorientation, Safety education & training, Patient/Caregiver education & training, Equipment evaluation, education, & procurement, Self-Care / ADL, Home management training, Cognitive/Perceptual training     Restrictions  Restrictions/Precautions  Restrictions/Precautions: Fall Risk, Swallowing - Thickened Liquids    Subjective   General  Chart Reviewed: Yes  Patient assessed for rehabilitation services?: Yes  Family / Caregiver Present: No  Diagnosis: Guillain [place] syndrome  Subjective  Subjective: Pt arrived from 
Facility/Department: [facility] REHAB UNIT   CLINICAL BEDSIDE SWALLOW EVALUATION    NAME: Sydney Hassan  : 1975  MRN: [id number]    ADMISSION DATE: 2025  ADMITTING DIAGNOSIS: has Lump or mass in breast; Abnormal mammogram; GBS (Guillain [place] syndrome); and Severe malnutrition on their problem list.    Date of Eval: 2025  Evaluating Therapist: EDWIN Bueno    Current Diet level:  Easy to chew; thin liquids    Primary Complaint  Patient reports frequent coughing when drinking liquids.     Pain:  Pain Assessment  Pain Assessment: None - Denies Pain  Pain Level: 0    Reason for Referral  Sydney Hassan was referred for a bedside swallow evaluation to assess the efficiency of her swallow function, identify signs and symptoms of aspiration and make recommendations regarding safe dietary consistencies, effective compensatory strategies, and safe eating environment.    Impression  Assessed patient's swallowing function. Patient demonstrated decreased mastication of ice chips and solid consistencies. Laryngeal elevation was observed to be sluggish and mildly decreased for swallow airway protection. Do note frequent audible swallows and immediate cough with thin liquids. No s/s of penetration/aspiration observed with ice chips, puree, solids, or nectar thick liquids.    Recommend continue easy to chew consistency. Trial mildly/nectar thick liquids. Meds whole in applesauce/pudding.      Requires SLP Intervention: Yes     Recommended Diet and Intervention  Diet Solids Recommendation: Easy to chew  Liquid Consistency Recommendation: Nectar thick  Recommended Form of Meds: Meds whole in pudding  Therapeutic Interventions: Patient/Family education;Diet tolerance monitoring;Therapeutic PO trials with SLP     Compensatory Swallowing Strategies  Compensatory Swallowing Strategies: Upright as possible for all oral intake;STAFF ASSIST;Small bites/sips;Eat/Feed slowly;Alternate solids and liquids;Remain upright 
Facility/Department: [facility] 8 REHAB UNIT  Occupational Therapy Treatment Note    Name: Sydney Hassan  : 1975  MRN: [id number]  Date of Service: 2025    Discharge Recommendations:  Continue to assess pending progress       Past Medical History:  has a past medical history of Anxiety, Depression, GERD (gastroesophageal reflux disease), Heart palpitations, Hyperlipidemia, Hypertension, Hyperthyroidism, and Type 2 diabetes mellitus without complication (HCC).  Past Surgical History:  has a past surgical history that includes Cholecystectomy ();  section (); Hysterectomy, vaginal (); Thyroidectomy (2018); Colonoscopy (); Upper gastrointestinal endoscopy (N/A, 2025); and Colonoscopy (N/A, 2025).    Treatment Diagnosis: Guillain [place] syndrome    Assessment   Performance deficits / Impairments: Decreased functional mobility ;Decreased endurance;Decreased posture;Decreased ADL status;Decreased balance;Decreased strength;Decreased safe awareness;Decreased high-level IADLs;Decreased cognition  Assessment: Patient was very lethargic during therapy session (more than normal) and staring off at times. At the end of the therapy session she stated that her vision was blurry and she felt like she was going to pass out. BP was 59/46. Notified RN who notified physician. BP checked manually-see vital signs.  Treatment Diagnosis: Guillain [place] syndrome  Activity Tolerance  Activity Tolerance: Patient limited by fatigue  Activity Tolerance Comments: Hypotension.       Plan   Occupational Therapy Plan  Specific Instructions for Next Treatment: Standing balance, UE strengthening  Current Treatment Recommendations: Strengthening, Balance training, Functional mobility training, Endurance training, Cognitive reorientation, Safety education & training, Patient/Caregiver education & training, Equipment evaluation, education, & procurement, Self-Care / ADL, Home management training, 
Facility/Department: [facility] 8 REHAB UNIT  Occupational Therapy Treatment Note    Name: Sydney Hassan  : 1975  MRN: [id number]  Date of Service: 2025    Discharge Recommendations:  Continue to assess pending progress        Past Medical History:  has a past medical history of Anxiety, Depression, GERD (gastroesophageal reflux disease), Heart palpitations, Hyperlipidemia, Hypertension, Hyperthyroidism, and Type 2 diabetes mellitus without complication (HCC).  Past Surgical History:  has a past surgical history that includes Cholecystectomy ();  section (); Hysterectomy, vaginal (); Thyroidectomy (2018); Colonoscopy (); Upper gastrointestinal endoscopy (N/A, 2025); and Colonoscopy (N/A, 2025).    Treatment Diagnosis: Guillain [place] syndrome    Assessment   Performance deficits / Impairments: Decreased functional mobility ;Decreased endurance;Decreased posture;Decreased ADL status;Decreased balance;Decreased strength;Decreased safe awareness;Decreased high-level IADLs;Decreased cognition  Assessment: Family therapy day with patient's daughter. Daughter assisted patient with toilet and tub transfers using TTB. Patient was very constipated-so spent majority of time on toilet. Patient's daughter then assisted patient with TTB using RW, but then patient become really dizzy and more lethargic sitting on edge of TTB. Therapist then assisted patient back to w/c due to feeling like she was going to pass out.  Treatment Diagnosis: Guillain [place] syndrome                 Plan   Occupational Therapy Plan  Specific Instructions for Next Treatment: Standing balance, UE strengthening  Current Treatment Recommendations: Strengthening, Balance training, Functional mobility training, Endurance training, Cognitive reorientation, Safety education & training, Patient/Caregiver education & training, Equipment evaluation, education, & procurement, Self-Care / ADL, Home management training, 
Facility/Department: [facility] 8 REHAB UNIT  Occupational Therapy Treatment Note    Name: Sydney Hassan  : 1975  MRN: [id number]  Date of Service: 2025    Discharge Recommendations:  Continue to assess pending progress  OT Equipment Recommendations  Equipment Needed: Yes  Mobility Devices: Walker;ADL Assistive Devices;Wheelchair  Walker: Rolling  Wheelchair: Standard;Wheelchair Cushion Standard  ADL Assistive Devices: Toileting - 3-in-1 Commode  Other: Sent order to [facility] for w/c, w/c cushion, BSC and RW. Recommonded TTB to  and plans to go and buy.       Past Medical History:  has a past medical history of Anxiety, Depression, GERD (gastroesophageal reflux disease), Heart palpitations, Hyperlipidemia, Hypertension, Hyperthyroidism, and Type 2 diabetes mellitus without complication (HCC).  Past Surgical History:  has a past surgical history that includes Cholecystectomy ();  section (); Hysterectomy, vaginal (); Thyroidectomy (2018); Colonoscopy (); Upper gastrointestinal endoscopy (N/A, 2025); and Colonoscopy (N/A, 2025).    Treatment Diagnosis: Guillain [place] syndrome    Assessment   Performance deficits / Impairments: Decreased functional mobility ;Decreased endurance;Decreased posture;Decreased ADL status;Decreased balance;Decreased strength;Decreased safe awareness;Decreased high-level IADLs;Decreased cognition  Assessment: FTD scheduled for - on Monday with .  Treatment Diagnosis: Guillain [place] syndrome  Activity Tolerance  Activity Tolerance: Patient Tolerated treatment well              Plan   Occupational Therapy Plan  Specific Instructions for Next Treatment: Standing balance, UE strengthening  Current Treatment Recommendations: Strengthening, Balance training, Functional mobility training, Endurance training, Cognitive reorientation, Safety education & training, Patient/Caregiver education & training, Equipment evaluation, 
Facility/Department: [facility] 8 REHAB UNIT  Occupational Therapy Treatment Note    Name: Sydney Hassan  : 1975  MRN: [id number]  Date of Service: 2025    Discharge Recommendations:  Patient would benefit from continued therapy after discharge, Continue to assess pending progress        Past Medical History:  has a past medical history of Anxiety, Depression, GERD (gastroesophageal reflux disease), Heart palpitations, Hyperlipidemia, Hypertension, Hyperthyroidism, and Type 2 diabetes mellitus without complication (HCC).  Past Surgical History:  has a past surgical history that includes Cholecystectomy ();  section (); Hysterectomy, vaginal (); Thyroidectomy (2018); Colonoscopy (); Upper gastrointestinal endoscopy (N/A, 2025); and Colonoscopy (N/A, 2025).    Treatment Diagnosis: Guillain [place] syndrome    Assessment   Performance deficits / Impairments: Decreased functional mobility ;Decreased endurance;Decreased posture;Decreased ADL status;Decreased balance;Decreased strength;Decreased safe awareness;Decreased high-level IADLs;Decreased cognition  Treatment Diagnosis: Guillain [place] syndrome  Activity Tolerance  Activity Tolerance: Patient Tolerated treatment well              Plan   Occupational Therapy Plan  Current Treatment Recommendations: Strengthening, Balance training, Functional mobility training, Endurance training, Cognitive reorientation, Safety education & training, Patient/Caregiver education & training, Equipment evaluation, education, & procurement, Self-Care / ADL, Home management training, Cognitive/Perceptual training     Restrictions  Restrictions/Precautions  Restrictions/Precautions: Fall Risk, Swallowing - Thickened Liquids    Subjective   General  Chart Reviewed: Yes  Patient assessed for rehabilitation services?: Yes  Family / Caregiver Present: Yes (daughter)  Diagnosis: Guillain [place] syndrome  Subjective  Subjective: Pt in bed upon 
Facility/Department: [facility] 8 REHAB UNIT  Occupational Therapy Treatment Note    Name: Sydney Hassan  : 1975  MRN: [id number]  Date of Service: 2025    Discharge Recommendations:  Continue to assess pending progress          Patient Diagnosis(es): There were no encounter diagnoses.  Past Medical History:  has a past medical history of Anxiety, Depression, GERD (gastroesophageal reflux disease), Heart palpitations, Hyperlipidemia, Hypertension, Hyperthyroidism, and Type 2 diabetes mellitus without complication (HCC).  Past Surgical History:  has a past surgical history that includes Cholecystectomy ();  section (); Hysterectomy, vaginal (); Thyroidectomy (2018); Colonoscopy (); Upper gastrointestinal endoscopy (N/A, 2025); and Colonoscopy (N/A, 2025).    Treatment Diagnosis: Guillain [place] syndrome    Assessment   Performance deficits / Impairments: Decreased functional mobility ;Decreased endurance;Decreased posture;Decreased ADL status;Decreased balance;Decreased strength;Decreased safe awareness;Decreased high-level IADLs;Decreased cognition  Treatment Diagnosis: Guillain [place] syndrome                 Plan   Occupational Therapy Plan  Specific Instructions for Next Treatment: Standing balance, UE strengthening  Current Treatment Recommendations: Strengthening, Balance training, Functional mobility training, Endurance training, Cognitive reorientation, Safety education & training, Patient/Caregiver education & training, Equipment evaluation, education, & procurement, Self-Care / ADL, Home management training, Cognitive/Perceptual training     Restrictions  Restrictions/Precautions  Restrictions/Precautions: Fall Risk, Swallowing - Thickened Liquids    Subjective   General  Chart Reviewed: Yes  Patient assessed for rehabilitation services?: Yes  Family / Caregiver Present: No  Diagnosis: Guillain [place] syndrome  Subjective  Subjective: Pt in bed upon arrival and 
Nutrition Assessment     Type and Reason for Visit: Reassess    Nutrition Recommendations/Plan:   Change ONS to Magic Cup w/ lunch and dinner     Malnutrition Assessment:  Malnutrition Status: Severe malnutrition    Nutrition Assessment:  Intakes noted to vary widely and frequently range from 25-50%. Pt is on Remeron which can help with appetite. Pt has hx of significant weight loss. Pt has changed to thickened liquids, will update ONS orders accordingly.    Estimated Daily Nutrient Needs:  Energy (kcal):  9048-1736 (25-30/kg) Weight Used for Energy Requirements: Current     Protein (g):   (1-2/kg) Weight Used for Protein Requirements: Current        Fluid (ml/day):  6792-7580 (25-30/kg) Method Used for Fluid Requirements: 1 ml/kcal    Nutrition Related Findings:   K 3.3, Glu 185, BM 6-14 Wound Type: None    Current Nutrition Therapies:    ADULT ORAL NUTRITION SUPPLEMENT; Breakfast, Lunch, Dinner; Standard High Calorie/High Protein Oral Supplement  ADULT DIET; Easy to Chew; Mildly Thick (Nectar)    Anthropometric Measures:  Height: 188 cm (6' 2.02\")  Current Body Wt: 70 kg (154 lb 5.2 oz)   BMI: 19.8      Nutrition Diagnosis:   Severe malnutrition related to inadequate protein-energy intake as evidenced by criteria as identified in malnutrition assessment    Nutrition Interventions:   Food and/or Nutrient Delivery: Continue Current Diet, Modify Oral Nutrition Supplement  Nutrition Education/Counseling: No recommendation at this time  Coordination of Nutrition Care: Continue to monitor while inpatient  Plan of Care discussed with: Pt/SLP    Goals:  Goals: PO intake 75% or greater  Type of Goal: Continue current goal  Previous Goal Met: Progressing toward Goal(s)    Nutrition Monitoring and Evaluation:   Behavioral-Environmental Outcomes: None Identified  Food/Nutrient Intake Outcomes: Food and Nutrient Intake, Supplement Intake  Physical Signs/Symptoms Outcomes: Biochemical Data, Chewing or Swallowing, Fluid 
Occupational Therapy  Facility/Department: [facility] 8 REHAB UNIT  Daily Treatment Note  NAME: Sydney Hassan  : 1975  MRN: [id number]    Date of Service: 25 0900   Restrictions/Precautions   Restrictions/Precautions Fall Risk;Swallowing - Thickened Liquids   General   Family / Caregiver Present No   Diagnosis Guillain [place] syndrome   Subjective   Subjective Pt in bed upon arrival and agreeable to participate in therapy   Pain   Pre-Pain 0   Post-Pain 0   Balance   Sitting Balance Supervision   Standing Balance Minimal assistance   Bed mobility   Supine to Sit Supervision   Sit to Supine Supervision   Transfers   Stand Step Transfers Moderate assistance;Minimal assistance   Sit to stand Minimal assistance   Stand to sit Minimal assistance   Toilet Transfers   Toilet - Technique Stand step   Equipment Used Grab bars   Toilet Transfer Minimal assistance;Moderate assistance   OT Exercises   Exercise Treatment Red grade theraband: 15 reps, 1 set, all planes   Activity Tolerance   Activity Tolerance Patient Tolerated treatment well   Assessment   Performance deficits / Impairments Decreased functional mobility ;Decreased endurance;Decreased posture;Decreased ADL status;Decreased balance;Decreased strength;Decreased safe awareness;Decreased high-level IADLs;Decreased cognition   Treatment Diagnosis Guillain [place] syndrome   Discharge Recommendations Continue to assess pending progress   Occupational Therapy Plan   Current Treatment Recommendations Strengthening;Balance training;Functional mobility training;Endurance training;Cognitive reorientation;Safety education & training;Patient/Caregiver education & training;Equipment evaluation, education, & procurement;Self-Care / ADL;Home management training;Cognitive/Perceptual training   Safety Devices   Type of Devices Call light within reach;Left in bed;Bed alarm in place   Time Code Minutes    Timed Code Treatment Minutes 60 Minutes   OT Individual 
Occupational Therapy  Facility/Department: [facility] 8 REHAB UNIT  Occupational Therapy Initial Assessment    Name: Sydney Hassan  : 1975  MRN: [id number]  Date of Service: 2025    Discharge Recommendations:  Patient would benefit from continued therapy after discharge, Continue to assess pending progress          Patient Diagnosis(es): There were no encounter diagnoses.  Past Medical History:  has a past medical history of Anxiety, Depression, GERD (gastroesophageal reflux disease), Heart palpitations, Hyperlipidemia, Hypertension, Hyperthyroidism, and Type 2 diabetes mellitus without complication (HCC).  Past Surgical History:  has a past surgical history that includes Cholecystectomy ();  section (); Hysterectomy, vaginal (); Thyroidectomy (2018); Colonoscopy (); Upper gastrointestinal endoscopy (N/A, 2025); and Colonoscopy (N/A, 2025).    Treatment Diagnosis: Guillain [place] syndrome      Assessment  Performance deficits / Impairments: Decreased functional mobility ;Decreased endurance;Decreased posture;Decreased ADL status;Decreased balance;Decreased strength;Decreased safe awareness;Decreased high-level IADLs;Decreased cognition  Assessment: Evaluation completed and treatment initiated. Pt would benefit from further skilled therapy to upgrade safety and functional independence.  Treatment Diagnosis: Guillain [place] syndrome  Activity Tolerance  Activity Tolerance: Patient limited by fatigue;Treatment limited secondary to agitation     Plan  Occupational Therapy Plan  Current Treatment Recommendations: Strengthening, Balance training, Functional mobility training, Endurance training, Cognitive reorientation, Safety education & training, Patient/Caregiver education & training, Equipment evaluation, education, & procurement, Self-Care / ADL, Home management training, Cognitive/Perceptual training    Restrictions  Restrictions/Precautions  Restrictions/Precautions: 
Occupational Therapy  Facility/Department: [facility] 8 REHAB UNIT  Daily Treatment Note  NAME: Sydney Hassan  : 1975  MRN: [id number]    Date of Service: 25 1000   Restrictions/Precautions   Restrictions/Precautions Fall Risk;Swallowing - Thickened Liquids   General   Family / Caregiver Present No   Diagnosis Guillain [place] syndrome   Subjective   Subjective Pt arrived from PT in w/c and agreeable to participate in therapy   Pain   Pre-Pain 0   Post-Pain 0   Orientation   Orientation Level Oriented to person   Cognition   Arousal/Alertness Appears intact   Following Commands Follows one step commands with repetition;Follows one step commands with increased time   Attention Span Difficulty attending to directions   Problem Solving Assistance required to generate solutions;Assistance required to implement solutions   Initiation Requires cues for some   Sequencing Requires cues for some   Cognition Comment Pt demonstrates intermittent word-finding difficulty. Improved awareness of deficits this session.   Balance   Standing Balance Contact guard assistance   Bed mobility   Sit to Supine Supervision   Transfers   Stand Step Transfers Minimal assistance   Sit to stand Minimal assistance   Stand to sit Contact guard assistance   Transfer Comments BLW trembling   OT Exercises   Exercise Treatment 2-band hand exerciser: 10 reps, 2 sets, both hands   Resistive Exercises Yellow grade theraputty   Dynamic Sitting Balance Exercises Seated reaching task (lateral reaching and crossing midline)   Motor Control/Coordination Suction cup removal and color identification task; cues for scanning   Activity Tolerance   Activity Tolerance Patient Tolerated treatment well   Assessment   Performance deficits / Impairments Decreased functional mobility ;Decreased endurance;Decreased posture;Decreased ADL status;Decreased balance;Decreased strength;Decreased safe awareness;Decreased high-level IADLs;Decreased 
Occupational Therapy  Name: Sydney Hassan  MRN:  [id number]  Date of service:  6/13/2025 06/13/25 0921   Restrictions/Precautions   Restrictions/Precautions Fall Risk;Swallowing - Thickened Liquids   General   Additional Pertinent Hx Hypothyroidism, DM type 1, HTN, GERD   Pain Assessment   Pain Assessment None - Denies Pain   Pain Level 0   Oxygen Therapy   O2 Device None (Room air)   Bed Mobility   Bridging Contact guard assistance   Rolling Stand by assistance   Supine to Sit Contact guard assistance   Sit to Supine Contact guard assistance   Scooting Contact guard assistance  (pt able to scoot up in bed with cueing and cga using rails above head and flexed les)   Transfers   Sit to Stand Minimal Assistance   Stand to Sit Minimal Assistance   Comment cues needed for ttechnique, hand placement   pt with decreased control, safety  (pt able to stand x 3 trials x 1-2 min , pt ninoska, weak min mod A)   Ambulation   WB Status side steps hob with wx and min mod A   Balance   Comments dyn sit bal eob , stand bal with wx support   pt needing cga in sit for safety,  min A in stand with wx support  (pt weak in stand, ninoska,)   Exercises   Comments pt performing sup trunk, core and erica le therex aps, qs, gs, hs, hip abd, saqs, slrs, TA series, bridges   pt also performing seated erica le aps, laqs, hip flex, hip abd, add  (all exs 2 sets x 5 each due to pt fatigue)   Short Term Goals   Time Frame for Short Term Goals 1 WEEK   Short Term Goal 1 BED MOBILITY INDEP WITH RAILING   Short Term Goal 2 TRANSFER FROM SURFACE TO SURFACE MIN A   Short Term Goal 3 WC PROPULSION 20FT SBA   Short Term Goal 4 AMB 15FT MOD A WITH // BARS   Short Term Goal 5 CAR TF MOD A   Long Term Goals   Time Frame for Long Term Goals  2 WEEKS   Long Term Goal 1 INDEP BED MOBILITY WITH NO RAILING   Long Term Goal 2 TRANSFER FROM SURFACE TO SURFACE CGA   Long Term Goal 3 WC PROPULSION 50FT SUPERVISION   Long Term Goal 4 AMB 30 FT CGA   Long Term Goal 5 
PCA notified staff that assistance was needed with patient. PCA stated that patient utilized call light for assistance with ambulating from bathroom back to bed. Per PCA and patient, patient became weak and \"legs gave out\". Patient was assisted to floor by PCA. At this time patient denies pain or injuries. Patient was wearing no skid socks at time of assisted fall. Assessed patient at this time. No injuries noted. Notified clinical house, MD, and RN manager. charge RN in room to assist with patient and aware. Hypotension noted at this time. Notified MD of assessment.   
Patient alarm and Virtual monitor went off and PCA and nurse went to room.  PCA sat patient on commode and left her in bathroom alone while straightening her bed and patient apparent tried to get up and feel in floor.  Patient assisted back on commode with gait belt.  Patient transferred from commode to wheelchair to transfer back to bed.  No apparent injuries noted.  Clinical House notified.  
Patient is currently confused ad hallucinating. She is unable to be redirected by telesitter at this time.     Order obtained for bedside .    Electronically signed by ROCÍO MarksN, RN on 6/18/2025 at 3:19 PM    
Patient is discharged home today. MSW sent HH order and discharge summary to [facility]. MSW faxed discharge summary to [facility] at [phone].  Patient's  denies any other dc needs.     Electronically signed by CARI Reyna on 6/24/2025 at 8:21 AM   
Patient stated she does better taking her medications with water than with applesauce. Will pass along to day shift so that speech can be informed.  
Patient:   Sydney Hassan  MR#:    [id number]   Room:    0824/824-02   YOB: 1975  Date of Progress Note: 6/19/2025  Time of Note                           10:07 AM  Consulting Physician:   Gerardo King M.D.  Attending Physician:  Gerardo King MD       CHIEF COMPLAINT: Encephalopathy with generalized weakness     Subjective:  This 49 y.o. female  with history of hypothyroidism, liver steatosis, cannabis use, malnutrition, diabetes mellitus type 2, hypertension, admitted May 11 through May 14, 2025; at that time she was admitted due to poor oral intake and weight loss.  She had an upper endoscopy performed 5/13/2025 with findings of a small hiatal hernia and no other abnormalities reported.  Per reports reviewed the patient was not taking levothyroxine at the time, and her TSH was markedly elevated.  She was restarted on levothyroxine.  Her blood glucose was low, and did not require insulin management.  Drug screening urine was positive for cannabis, and considered to be secondary to cannabinoid hyperemesis syndrome.  The patient left the hospital AGAINST MEDICAL ADVICE, apparently she eloped; after being called, she stated was already home and did not plan to return to the hospital.  On 5/16/2025, the patient returned to the hospital reporting a syncopal event.  There was questionable seizure-like activity.   A CT brain performed on that evaluation showed moderate further increase in size of the lateral and third ventricle since the previous study. The fourth ventricle reported as normal and unchanged. A small obstructing lesion at the level of aqueduct of Sylvius not excluded. Further evaluation with contrast enhanced MR imaging of the brain was suggested. She again left the ER AGAINST MEDICAL ADVICE.  Patient returns  to hospital on 5/29/25 reporting dizziness, lightheadedness, worsening when standing up, abnormal gait with unsteadiness and  reports confusion.  Symptom has been going on 
Patient:   Sydney Hassan  MR#:    [id number]   Room:    0824/824-02   YOB: 1975  Date of Progress Note: 6/18/2025  Time of Note                           8:13 AM  Consulting Physician:   Gerardo King M.D.  Attending Physician:  Gerardo King MD       CHIEF COMPLAINT: Encephalopathy with generalized weakness     Subjective:  This 49 y.o. female  with history of hypothyroidism, liver steatosis, cannabis use, malnutrition, diabetes mellitus type 2, hypertension, admitted May 11 through May 14, 2025; at that time she was admitted due to poor oral intake and weight loss.  She had an upper endoscopy performed 5/13/2025 with findings of a small hiatal hernia and no other abnormalities reported.  Per reports reviewed the patient was not taking levothyroxine at the time, and her TSH was markedly elevated.  She was restarted on levothyroxine.  Her blood glucose was low, and did not require insulin management.  Drug screening urine was positive for cannabis, and considered to be secondary to cannabinoid hyperemesis syndrome.  The patient left the hospital AGAINST MEDICAL ADVICE, apparently she eloped; after being called, she stated was already home and did not plan to return to the hospital.  On 5/16/2025, the patient returned to the hospital reporting a syncopal event.  There was questionable seizure-like activity.   A CT brain performed on that evaluation showed moderate further increase in size of the lateral and third ventricle since the previous study. The fourth ventricle reported as normal and unchanged. A small obstructing lesion at the level of aqueduct of Sylvius not excluded. Further evaluation with contrast enhanced MR imaging of the brain was suggested. She again left the ER AGAINST MEDICAL ADVICE.  Patient returns  to hospital on 5/29/25 reporting dizziness, lightheadedness, worsening when standing up, abnormal gait with unsteadiness and  reports confusion.  Symptom has been going on 
Patient:   Sydney Hassan  MR#:    [id number]   Room:    [facility]/811-02   YOB: 1975  Date of Progress Note: 6/23/2025  Time of Note                           7:30 AM  Consulting Physician:   Gerardo King M.D.  Attending Physician:  Gerardo King MD       CHIEF COMPLAINT: Encephalopathy with generalized weakness     Subjective:  This 49 y.o. female  with history of hypothyroidism, liver steatosis, cannabis use, malnutrition, diabetes mellitus type 2, hypertension, admitted May 11 through May 14, 2025; at that time she was admitted due to poor oral intake and weight loss.  She had an upper endoscopy performed 5/13/2025 with findings of a small hiatal hernia and no other abnormalities reported.  Per reports reviewed the patient was not taking levothyroxine at the time, and her TSH was markedly elevated.  She was restarted on levothyroxine.  Her blood glucose was low, and did not require insulin management.  Drug screening urine was positive for cannabis, and considered to be secondary to cannabinoid hyperemesis syndrome.  The patient left the hospital AGAINST MEDICAL ADVICE, apparently she eloped; after being called, she stated was already home and did not plan to return to the hospital.  On 5/16/2025, the patient returned to the hospital reporting a syncopal event.  There was questionable seizure-like activity.   A CT brain performed on that evaluation showed moderate further increase in size of the lateral and third ventricle since the previous study. The fourth ventricle reported as normal and unchanged. A small obstructing lesion at the level of aqueduct of Sylvius not excluded. Further evaluation with contrast enhanced MR imaging of the brain was suggested. She again left the ER AGAINST MEDICAL ADVICE.  Patient returns  to hospital on 5/29/25 reporting dizziness, lightheadedness, worsening when standing up, abnormal gait with unsteadiness and  reports confusion.  Symptom has been going on 
Patient:   Sydney Hassan  MR#:    [id number]   Room:    0824/824-02   YOB: 1975  Date of Progress Note: 6/13/2025  Time of Note                           8:56 AM  Consulting Physician:   Gerardo King M.D.  Attending Physician:  Gerardo King MD       CHIEF COMPLAINT: Encephalopathy with generalized weakness     Subjective:  This 49 y.o. female  with history of hypothyroidism, liver steatosis, cannabis use, malnutrition, diabetes mellitus type 2, hypertension, admitted May 11 through May 14, 2025; at that time she was admitted due to poor oral intake and weight loss.  She had an upper endoscopy performed 5/13/2025 with findings of a small hiatal hernia and no other abnormalities reported.  Per reports reviewed the patient was not taking levothyroxine at the time, and her TSH was markedly elevated.  She was restarted on levothyroxine.  Her blood glucose was low, and did not require insulin management.  Drug screening urine was positive for cannabis, and considered to be secondary to cannabinoid hyperemesis syndrome.  The patient left the hospital AGAINST MEDICAL ADVICE, apparently she eloped; after being called, she stated was already home and did not plan to return to the hospital.  On 5/16/2025, the patient returned to the hospital reporting a syncopal event.  There was questionable seizure-like activity.   A CT brain performed on that evaluation showed moderate further increase in size of the lateral and third ventricle since the previous study. The fourth ventricle reported as normal and unchanged. A small obstructing lesion at the level of aqueduct of Sylvius not excluded. Further evaluation with contrast enhanced MR imaging of the brain was suggested. She again left the ER AGAINST MEDICAL ADVICE.  Patient returns  to hospital on 5/29/25 reporting dizziness, lightheadedness, worsening when standing up, abnormal gait with unsteadiness and  reports confusion.  Symptom has been going on 
[facility]  INPATIENT SPEECH THERAPY  [facility] 8 REHAB UNIT     TIME   Time In: 07:00  Time Out: 08:00  Minutes: 60     [x]Daily Note  []Progress Note     Date: 25  Patient Name: Sydney Hassan    MRN: [id number]  Account #:  [id number]  : [birth year]  Gender: Female  Primary Provider: Christine SHIPLEY  Diet Consistency Recommendations: Easy to chew consistency with mildly thick/nectar thick liquids     Subjective:  Patient alert, in bed, upon entry. No family member present. No pain reported during treatment session.     Objective:   Targeted attention/processing. In structured activity, patient was 15/15 visual word search in a 9x9 array at independent level; patient's time of completion was 3:34. No distractors were present.    Targeted attention/processing. In structured activity, patient demonstrated ability to hold attention for 5 minutes and identify 1 visual target (presented individually every 3 seconds) with best 79% accuracy independently. Patient demonstrated ability to hold attention for 5 minutes and identify 1 visual target (presented individually every 2 seconds) with best 56% accuracy at independent level. No distractors were present.     Goals:  Short-term Goals  Goal 1: Patient will complete attention and processing tasks with provision of mod cues/prompts.  Goal 2: Patient will complete comprehension and word finding tasks with provision of mod cues/prompts.  Goal 3: Patient will complete memory functioning tasks with provision of mod cues/prompts.  Goal 4: Patient will complete reasoning/problem solving tasks with provision of mod cues/prompts.    Goal 5: Patient will complete orientation tasks with provision of mod cues/prompts.      Goal 1: Patient will tolerate easy to chew diet with mildly thick/nectar thick liquids with min S/S penetration/aspiration during PO intake.   Goal 2: Patient staff will follow swallow safety recommendations to decrease risk of penetration/aspiration during PO 
[facility]  INPATIENT SPEECH THERAPY  [facility] 8 REHAB UNIT     TIME   Time In: 07:00  Time Out: 08:00  Minutes: 60     [x]Daily Note  []Progress Note     Date: 25  Patient Name: Sydney Hassan    MRN: [id number]  Account #:  [id number]  : [birth year]  Gender: Female  Primary Provider: Christine SHIPLEY  Diet Consistency Recommendations: Easy to chew consistency with mildly thick/nectar thick liquids     Subjective:  Patient alert, in bed, upon entry. No family member present. No pain reported during treatment session.      Objective:   Targeted attention/processing this date.     In structured activity, patient was 17/17 visual word search in a 10X10 array at independent level; patient's time of completion was 6:15. Auditory distractors were present.     In structured activity, patient demonstrated ability to hold attention for 5 minutes and identify 1 visual target (presented individually every 2 seconds) with best 73% accuracy independently. No distractors were present.     In structured activity, patient demonstrated ability to organize visual targets (cards) in 4 categories (suits) at best 2:45 with 100% accuracy; this was the fastest time out of 2 attempts.      Goals:  Short-term Goals  Goal 1: Patient will complete attention and processing tasks with provision of mod cues/prompts.  Goal 2: Patient will complete comprehension and word finding tasks with provision of mod cues/prompts.  Goal 3: Patient will complete memory functioning tasks with provision of mod cues/prompts.  Goal 4: Patient will complete reasoning/problem solving tasks with provision of mod cues/prompts.    Goal 5: Patient will complete orientation tasks with provision of mod cues/prompts.      Goal 1: Patient will tolerate easy to chew diet with mildly thick/nectar thick liquids with min S/S penetration/aspiration during PO intake.   Goal 2: Patient staff will follow swallow safety recommendations to decrease risk of penetration/aspiration 
[facility]  [facility]PATIENT SPEECH [facility]ERAPY  [facility] 8 REHAB UNIT    TIME   Time In: 1100    Time Out: 1200  Minutes: 60     [x]Daily Note  []Progress Note     Date: 2025  Patient Name: Sydney Hassan       MRN: [id number]  Account #: [id number]  : 1975  Gender: female  Primary Provider: Dr. King  Precautions: fall precautions  Swallowing Status/Diet: easy to chew and nectar/mildly thick liquids     Subjective:  Pt seated upright in bed for entirety of speech therapy session. Pt was disoriented and continually saying she was ready to leave the hospital and she would be leaving with a family member soon because she had things to get done at home.      Objective:   Pt was disoriented to day, time, date, month, place, self, or year. Pt was able to correctly orient to place and year with max cues.    Targeted delayed memory recall of 1 minute with 1/3 correct and max cues. Attempted delayed memory recall of 30 seconds with no distractions with 2/3 correct with max cues.    Pt's short term memory was inconsistent, making it difficult to target cognitive goals. Pt continually discussed wanting to leave when SLP attempted to ask questions to target cognitive goals.     Pt consumed hospital provided lunch independently with no over signs or symptoms of penetration/aspiration. Pt had no interest in consuming thickened liquid with meal. Pt added salt and pepper to meal and quickly forgot and tried to add more a few minutes later. Pt also offered SLP some food 3+ times during the session. Pt displayed strong laryngeal elevation upon observation while self feeding lunch.     Swallowing Goals:  Timeframe for Short-term Goals: 1-2x/day for 3 days   Goal 1: Patient will tolerate easy to chew diet with mildly thick/nectar thick liquids with min S/S penetration/aspiration during PO intake.   Goal 2: Patient staff will follow swallow safety recommendations to decrease risk of penetration/aspiration during PO intake.  Goal 3: 
[facility]  INPATIENT SPEECH THERAPY  [facility] 8 REHAB UNIT     TIME   Time In: 08:00  Time Out: 09:00  Minutes: 60     [x]Daily Note  []Progress Note     Date: 25  Patient Name: Sydney Hassan    MRN: [id number]  Account #:  [id number]  : [birth year]  Gender: Female  Primary Provider: Christine SHIPLEY  Diet Consistency Recommendations: Easy to chew consistency with mildly thick/nectar thick liquids     Subjective:  Patient alert, in bed, upon entry. No pain reported during treatment session.      Objective:   Throughout session, patient appeared to fixate on discharge date. Targeted memory functioning. Utilized spaced retrieval and patient demonstrated ability to recall discharge date up to 3 minute marker (with auditory distractions present) at independent level.      Patient, in structured activity, demonstrated ability to recall up to 6 immediate visual objects presented in sequences independently. Break down was noted with sequences consisting of 7 immediate visual objects.     In structured activity, patient was 100% in visual working memory task manipulating and identifying 3 visual objects at independent level (5 second delays present prior to prompts).     Goals:  Short-term Goals  Goal 1: Patient will complete attention and processing tasks with provision of mod cues/prompts.  Goal 2: Patient will complete comprehension and word finding tasks with provision of mod cues/prompts.  Goal 3: Patient will complete memory functioning tasks with provision of mod cues/prompts.  Goal 4: Patient will complete reasoning/problem solving tasks with provision of mod cues/prompts.    Goal 5: Patient will complete orientation tasks with provision of mod cues/prompts.      Goal 1: Patient will tolerate easy to chew diet with mildly thick/nectar thick liquids with min S/S penetration/aspiration during PO intake.   Goal 2: Patient staff will follow swallow safety recommendations to decrease risk of penetration/aspiration during 
[facility]  INPATIENT SPEECH THERAPY  [facility] 8 REHAB UNIT    TIME   Time In: 1100  Time Out: 1200  Minutes: 60     [x]Daily Note  []Progress Note     Date: 2025  Patient Name: Sydney Hassan       MRN: [id number]  Account #: [id number]  : 1975  Gender: Female  Primary Provider: Dr. King  Precautions: Fall Precautions  Swallowing Status/Diet: easy to chew consistency with mildly thick/nectar thick liquids     Subjective:  Pt seated upright in bed for duration of session. Pt's daughter - Dyanaye - present during session and assisted in cognitive exercises with cues when necessary.      Objective:   Targeted category identification when given a list of category items with 80% accuracy and min cues.     Targeted convergent category naming of 3 items with 75% accuracy and min cues.     Daughter fed pt with spoon, cooling down bites before giving them. Pt's daughter fed pt with breaks in between and great external timing. Pt did not want to continue eating and vomited lunch into trash can. Pt said that the food was choking her in her throat. She stated that her swallowing is getting worse.     SLP educated the pt and her daughter on the importance of being able to swallow safely and if she finds that she cannot swallow at all when she is at home she needs to be in the hospital.     SLP discussed importance of safe swallowing and feeding strategies to carry over at home.     SLP gave cognitive exercises to practice at home after discharge.    Goals:  Short-term Goals  Goal 1: Patient will complete attention and processing tasks with provision of mod cues/prompts.  Goal 2: Patient will complete comprehension and word finding tasks with provision of mod cues/prompts.  Goal 3: Patient will complete memory functioning tasks with provision of mod cues/prompts.  Goal 4: Patient will complete reasoning/problem solving tasks with provision of mod cues/prompts.    Goal 5: Patient will complete orientation tasks with 
[facility]  [facility]PATIENT SPEECH THERAPY  [facility] 8 REHAB UNIT    TIME   Time In: 08:00  Time Out: 09:00  Minutes: 60     [x]Daily Note  []Progress Note     Date: 25  Patient Name: Sydney Hassan    MRN: [id number]  Account #:  [id number]  : [birth year]  Gender: Female  Primary Provider: Christine SHIPLEY  Diet Consistency Recommendations: Easy to chew consistency with mildly thick/nectar thick liquids     Subjective:  Patient alert, in bed, upon entry. No family member present. No pain reported during treatment session.    Objective:   Targeted auditory comprehension. In structured activity, patient was 80% answering general yes/no questions at independent level; patient increased to 90% with provision of repetition of questions.    Targeted recall. In structured activity, patient was 80% recall general occupations independently; patient increased to 90% with provision of mod cues/prompts.    Targeted visual processing. In structured activity, patient was 80% visual word scramble of single words at independent level; patient averaged a 2.75 second response time.    Targeted swallowing. With easy to chew consistency presentations administered independently, patient exhibited slow oral prep with mildly decreased rotary jaw movement noted. Oral transit of easy to chew consistency primarily varied from 1-3 seconds in length and min oral cavity residue was observed post swallows; residue cleared from the mouth with additional dry swallow. Oral transit of nectar thick liquid presentations, administered via cup by SLP and independently, primarily measured 1 second in length. Laryngeal elevation during swallow initiation was considered to be sluggish and mild-moderately decreased for swallow airway protection; patient produced inconsistently audible swallows with every consistency. No outward S/S penetration/aspiration was noted with any easy to chew consistency presentation or nectar thick liquid presentation  administered 
[facility]  [facility] 8 REHAB UNIT     TIME   Time In: 08:00  Time Out: 09:00  Minutes: 60     [x]Daily Note  []Progress Note     Date: 25  Patient Name: Sydney Hassan    MRN: [id number]  Account #:  [id number]  : [birth year]  Gender: Female  Primary Provider: Christine SHIPLEY  Diet Consistency Recommendations: Easy to chew consistency with mildly thick/nectar thick liquids     Subjective:  Patient alert, in bathroom, upon entry. Patient appeared unsteady when attempting to ambulate to chair with PCA. Did assist with provision of gait belt, walker, and chair follow. No pain reported during treatment session. Patient continued to exhibit confusion with utterances frequently not pertaining to topics and questions at hand. Decreased attention and decreased immediate/short-term memory observed.      Objective:   Targeted swallowing. With easy to chew consistency presentations administered independently, patient exhibited slow oral prep with mildly decreased rotary jaw movement noted. Oral transit of easy to chew consistency primarily varied from 1-3 seconds in length and min oral cavity residue was observed post swallows; residue cleared from the mouth with additional dry swallow. Oral transit of nectar thick liquid presentations, administered  independently via cup, primarily measured 1 second in length. Laryngeal elevation during swallow initiation was considered to be sluggish and mild-moderately decreased for swallow airway protection; patient produced inconsistently audible swallows with nectar thick liquid presentations. No outward S/S penetration/aspiration was noted with any easy to chew consistency presentation or nectar thick liquid presentation administered during treatment session this date; do however suspect delayed epiglottic inversion; anticipate decreased pressure during swallows.     As patient is a full code, would recommend continuation easy to chew consistency with mildly 
Spoke to patient's , Dylan, re discharge planning. Dylan is adamant about taking patient home tomorrow, 6/24.   MSW requested MD order for home health and will send to [facility] with [facility] when received.     Electronically signed by CARI Reyna on 6/23/2025 at 11:59 AM   
Sydney Hassan arrived to room # 824.   Presented with: Acquired Cerebral Ventriculomegaly  Mental Status: Patient is oriented, alert, coherent, logical, thought processes intact, and able to concentrate and follow conversation.   Vitals:    06/11/25 1510   BP: (!) 85/62   Pulse: 94   Resp: 16   Temp: 98.1 °F (36.7 °C)   SpO2: 100%     Patient safety contract and falls prevention contract reviewed with patient Yes.  Oriented Patient to room.  Call light within reach. Yes.  Needs, issues or concerns expressed at this time: yes.      Electronically signed by Mony Stafford RN on 6/11/2025 at 3:37 PM  
While this nurse was at bedside, patient states \"I'm trying to find a blunt. I need one real bad.\"     Patient proceeds to ask this nurse \"Do you know somebody that can get me a blunt?\"    Patient also tells this nurse that she will \"not be drinking tonight.\"    Electronically signed by ROCÍO MarksN, RN on 6/18/2025 at 3:20 PM    
or lesions over external nose or ears, no atrophy of tongue  Neck-symmetric, no masses noted, no jugular vein distension  Respiration- chest wall appears symmetric, good expansion,   normal effort without use of accessory muscles  Cardiovascular- RRR  Musculoskeletal - no significant wasting of muscles noted, no bony deformities, gait no gross ataxia  Extremities-no clubbing, cyanosis or edema  Skin - warm, dry, and intact.  No rash, erythema, or pallor.  Psychiatric - mood, affect, and behavior appear normal.      Neurology  NEUROLOGICAL EXAM:      Mental status   Awake, alert, fluent but disoriented to place.  \"Medical place\" very poor short-term memory.       Cranial Nerves   CN II- Visual fields grossly unremarkable  CN III, IV,VI-EOMI, No nystagmus, disconjugate eye movements with a suggestion of a right 6th nerve palsy, no ptosis  CN VII-no facial asymmetry  CN VIII-Hearing intact    Motor function  Antigravity x 4         Nursing/pcp notes, imaging,labs and vitals reviewed.         Lab Results   Component Value Date    WBC 4.8 06/20/2025    HGB 7.7 (L) 06/20/2025    HCT 23.2 (L) 06/20/2025    MCV 88.2 06/20/2025     06/20/2025     Lab Results   Component Value Date     (H) 06/19/2025    K 3.2 (L) 06/19/2025     (H) 06/19/2025    CO2 25 06/19/2025    BUN 18 06/19/2025    CREATININE 0.8 06/19/2025    GLUCOSE 99 06/19/2025    CALCIUM 9.2 06/19/2025    LABGLOM 89 06/19/2025   No results found for: \"INR\"No results found for: \"PHENYTOIN\", \"ESR\", \"CRP\"    PT,OT and/or speech notes reviewed:         06/19/25 1100   Restrictions/Precautions   Restrictions/Precautions Fall Risk;Swallowing - Thickened Liquids   General   Chart Reviewed Yes   Patient assessed for rehabilitation services? 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 
alone and as being assisted BTB her feet gave out and PCA lowered pt onto floor. Pt reported then and now she has no pain/discomfort.   Subjective   Subjective Pt laying in bed, agrees to participate in therapy.   Vitals   Pulse (!) 101  ()   SpO2 98 %   BP (!) 87/77  (Above in standing, also 120/88 sitting EOB, and 128/89 in supine)   MAP (Calculated) 80   BP Location Left upper arm   Comment RN aware of low BP   Pain   Pre-Pain 0   Post-Pain 0  (Pt reports no pain at moment, intermittent B knee/hip pain at night (mostly B knees) (arthritis?))   Bed mobility   Supine to Sit Supervision   Scooting Supervision  (On EOB)   Bed Mobility Comments On R side of bed- use of bedrail   Transfers   Sit to Stand Minimal Assistance   Stand to Sit Minimal Assistance   Stand Pivot Transfers Minimal Assistance  (EOB<w/c from pt L w/o AD)   Comment Extended time to stand   PT Exercises   Exercise Treatment Multiple STS to RW (X4)   Activity Tolerance   Activity Tolerance Patient limited by endurance;Treatment limited secondary to decreased cognition;Patient tolerated treatment well   Assessment   Assessment Pt presents w/ some BLE/BUE tremoring intermittently when standing, declines amb. this morning as she is limited in part due to high anxiety about fall this morning. Pt does require v/c's for correct BUE placement during STS and generally the correct technique/sequence of session (presented as generally confused).   Safety Devices   Type of Devices Patient at risk for falls;Gait belt;Left in chair  (W/ OT)                  Electronically signed by Marni Sofia PTA on 6/14/2025 at 4:16 PM               Objective:   Pt was disoriented to day, time, date, month, place, self, or year. Pt was able to correctly orient to place and year with max cues.     Targeted delayed memory recall of 1 minute with 1/3 correct and max cues. Attempted delayed memory recall of 30 seconds with no distractions with 2/3 correct with max cues.   
Duration 2 Weeks   Current Treatment Recommendations Strengthening;ROM;Balance training;Functional mobility training;Transfer training;ADL/Self-care training;Endurance training;Gait training;Wheelchair mobility training;Home exercise program;Safety education & training;Therapeutic activities   Safety Devices   Type of Devices Bed alarm in place;Call light within reach;Gait belt;Left in bed            Electronically signed by Nicole Cole PTA on 6/13/2025 at 10:03 AM             Cosigned by: Fermin Gonsalves PT at 6/13/2025  3:28 PM     Objective  Functional Mobility  Functional - Mobility Device: Rolling Walker  Activity: Other  Assist Level: Minimal assistance  Functional Mobility Comments: Short distance  Temp: 97.9 °F (36.6 °C)  Pulse: 92  Heart Rate Source: Monitor  Respirations: 16  SpO2: 99 %  O2 Device: None (Room air)  BP: 103/68  MAP (Calculated): 80  BP Location: Right upper arm  Patient Position: Supine       Safety Devices  Type of Devices: Bed alarm in place;Call light within reach     Transfers  Stand Step Transfers: Minimal assistance  Sit to stand: Minimal assistance  Stand to sit: Contact guard assistance  Transfer Comments: BLE trembling and risk for buckling.     RECORD REVIEW: Previous medical records, medications were reviewed at today's visit    IMPRESSION:     1.  Anderson Hernandez variant of GBS-PT/OT/SLP.  Continue thiamine for possible Warnicke's encephalopathy.  2.  DVT prophylaxis-subcu Lovenox  3.  Encephalopathy-continue Abilify.  Dose reduced.  Continue to monitor and consider reduction in the future  4.  Hypothyroidism-continue replacement  5.  GI-bowel regimen  6.  Urinary retention-Olsen catheter and Flomax added  7.  Anemia of uncertain etiology status post transfusion prior to admission here  8.  Hypertension-Coreg.  Lisinopril reduced for some low readings  ELOS: Staff next week             
attention/processing. In structured activity, patient was 15/15 visual word search in a 9x9 array at independent level; patient's time of completion was 3:34. No distractors were present.     Targeted attention/processing. In structured activity, patient demonstrated ability to hold attention for 5 minutes and identify 1 visual target (presented individually every 3 seconds) with best 79% accuracy independently. Patient demonstrated ability to hold attention for 5 minutes and identify 1 visual target (presented individually every 2 seconds) with best 56% accuracy at independent level. No distractors were present.        RECORD REVIEW: Previous medical records, medications were reviewed at today's visit    IMPRESSION:     1.  Anderson Hernandez variant of GBS-PT/OT/SLP.  Continue thiamine for possible Wernicke's encephalopathy.  Korsakoff's syndrome suspected but not verified.  Thiamine level not found but normal B12 levels.  B6 level was extremely low at [facility].  Trial of Aricept.  2.  DVT prophylaxis-subcu Lovenox  3.  Encephalopathy- Abilify discontinued 6/18 and she began hallucinating.  It has been resumed.  4.  Hypothyroidism-continue replacement  5.  GI-bowel regimen  6.  Urinary retention-Olsen catheter removed.  Bladder protocol  7.  Anemia of uncertain etiology status post transfusion prior to admission here.  Hemoglobin lower today.  Recheck in the morning.  Add Niferex.  Occult stool for blood ordered  8.  Hypertension-Coreg and lisinopril on hold for low readings.  Flomax stopped.  Improved.  Somewhat tachycardic but not terribly so.  Cognition looks better today.  ELOS:6/24           
for low readings.  Flomax stopped.  ELOS: Staff tomorrow

## 2025-06-24 NOTE — PLAN OF CARE
Problem: Chronic Conditions and Co-morbidities  Goal: Patient's chronic conditions and co-morbidity symptoms are monitored and maintained or improved  6/11/2025 1921 by Yanelis Lawrence RN  Outcome: Progressing  6/11/2025 1840 by Mony Stafford RN  Outcome: Progressing  Flowsheets (Taken 6/11/2025 1554)  Care Plan - Patient's Chronic Conditions and Co-Morbidity Symptoms are Monitored and Maintained or Improved: Monitor and assess patient's chronic conditions and comorbid symptoms for stability, deterioration, or improvement     Problem: Discharge Planning  Goal: Discharge to home or other facility with appropriate resources  6/11/2025 1921 by Yanelis Lawrence RN  Outcome: Progressing  6/11/2025 1840 by Mony Stafford RN  Outcome: Progressing  Flowsheets (Taken 6/11/2025 1554)  Discharge to home or other facility with appropriate resources: Identify barriers to discharge with patient and caregiver     Problem: Skin/Tissue Integrity  Goal: Skin integrity remains intact  Description: 1.  Monitor for areas of redness and/or skin breakdown2.  Assess vascular access sites hourly3.  Every 4-6 hours minimum:  Change oxygen saturation probe site4.  Every 4-6 hours:  If on nasal continuous positive airway pressure, respiratory therapy assess nares and determine need for appliance change or resting period  6/11/2025 1921 by Yanelis Lawrence RN  Outcome: Progressing  6/11/2025 1840 by Mony Stafford RN  Outcome: Progressing  Flowsheets  Taken 6/11/2025 1554  Skin Integrity Remains Intact: Monitor for areas of redness and/or skin breakdown  Taken 6/11/2025 1504  Skin Integrity Remains Intact: Monitor for areas of redness and/or skin breakdown     Problem: Safety - Adult  Goal: Free from fall injury  6/11/2025 1921 by Yanelis Lawrence RN  Outcome: Progressing  6/11/2025 1840 by Mony Stafford RN  Outcome: Progressing  Flowsheets (Taken 6/11/2025 1504)  Free From Fall Injury: Instruct 
  Problem: Chronic Conditions and Co-morbidities  Goal: Patient's chronic conditions and co-morbidity symptoms are monitored and maintained or improved  6/11/2025 2225 by Yanelis Lawrence RN  Outcome: Progressing  6/11/2025 1921 by Yanelis Lawrence RN  Outcome: Progressing  6/11/2025 1840 by Mony Stafford RN  Outcome: Progressing  Flowsheets (Taken 6/11/2025 1554)  Care Plan - Patient's Chronic Conditions and Co-Morbidity Symptoms are Monitored and Maintained or Improved: Monitor and assess patient's chronic conditions and comorbid symptoms for stability, deterioration, or improvement     Problem: Discharge Planning  Goal: Discharge to home or other facility with appropriate resources  6/11/2025 2225 by Yanelis Lawrence RN  Outcome: Progressing  6/11/2025 1921 by Yanelis Lawrence RN  Outcome: Progressing  6/11/2025 1840 by Mony Stafford RN  Outcome: Progressing  Flowsheets (Taken 6/11/2025 1554)  Discharge to home or other facility with appropriate resources: Identify barriers to discharge with patient and caregiver     Problem: Skin/Tissue Integrity  Goal: Skin integrity remains intact  Description: 1.  Monitor for areas of redness and/or skin breakdown2.  Assess vascular access sites hourly3.  Every 4-6 hours minimum:  Change oxygen saturation probe site4.  Every 4-6 hours:  If on nasal continuous positive airway pressure, respiratory therapy assess nares and determine need for appliance change or resting period  6/11/2025 2225 by Yanelis Lawrence RN  Outcome: Progressing  6/11/2025 1921 by Yanelis Lawrence RN  Outcome: Progressing  6/11/2025 1840 by Mony Stafford RN  Outcome: Progressing  Flowsheets  Taken 6/11/2025 1554  Skin Integrity Remains Intact: Monitor for areas of redness and/or skin breakdown  Taken 6/11/2025 1504  Skin Integrity Remains Intact: Monitor for areas of redness and/or skin breakdown     Problem: Safety - Adult  Goal: Free from fall injury  6/11/2025 
  Problem: Chronic Conditions and Co-morbidities  Goal: Patient's chronic conditions and co-morbidity symptoms are monitored and maintained or improved  6/12/2025 1050 by Lauren Culver LPN  Outcome: Progressing  Flowsheets (Taken 6/12/2025 0846)  Care Plan - Patient's Chronic Conditions and Co-Morbidity Symptoms are Monitored and Maintained or Improved: Monitor and assess patient's chronic conditions and comorbid symptoms for stability, deterioration, or improvement  6/11/2025 2225 by Yanelis Lawrence, RN  Outcome: Progressing     Problem: Discharge Planning  Goal: Discharge to home or other facility with appropriate resources  6/12/2025 1050 by Lauren Culver LPN  Outcome: Progressing  Flowsheets (Taken 6/12/2025 0846)  Discharge to home or other facility with appropriate resources: Refer to discharge planning if patient needs post-hospital services based on physician order or complex needs related to functional status, cognitive ability or social support system  6/11/2025 2225 by Yanelis Lawrence RN  Outcome: Progressing     Problem: Skin/Tissue Integrity  Goal: Skin integrity remains intact  Description: 1.  Monitor for areas of redness and/or skin breakdown2.  Assess vascular access sites hourly3.  Every 4-6 hours minimum:  Change oxygen saturation probe site4.  Every 4-6 hours:  If on nasal continuous positive airway pressure, respiratory therapy assess nares and determine need for appliance change or resting period  6/12/2025 1050 by Lauren Culver LPN  Outcome: Progressing  Flowsheets (Taken 6/12/2025 1030)  Skin Integrity Remains Intact: Monitor for areas of redness and/or skin breakdown  6/11/2025 2225 by Yanelis Lawrence, RN  Outcome: Progressing     Problem: Safety - Adult  Goal: Free from fall injury  6/12/2025 1050 by Lauren Culver LPN  Outcome: Progressing  6/11/2025 2225 by Yanelis Lawrence RN  Outcome: Progressing     Problem: Neurosensory - Adult  Goal: Achieves 
  Problem: Chronic Conditions and Co-morbidities  Goal: Patient's chronic conditions and co-morbidity symptoms are monitored and maintained or improved  6/13/2025 2054 by Corrie Cherry RN  Outcome: Progressing  6/13/2025 0950 by Willa Scales RN  Outcome: Progressing     Problem: Discharge Planning  Goal: Discharge to home or other facility with appropriate resources  6/13/2025 2054 by Corrie Cherry RN  Outcome: Progressing  6/13/2025 0950 by Willa Scales RN  Outcome: Progressing     Problem: Skin/Tissue Integrity  Goal: Skin integrity remains intact  Description: 1.  Monitor for areas of redness and/or skin breakdown2.  Assess vascular access sites hourly3.  Every 4-6 hours minimum:  Change oxygen saturation probe site4.  Every 4-6 hours:  If on nasal continuous positive airway pressure, respiratory therapy assess nares and determine need for appliance change or resting period  6/13/2025 2054 by Corrie Cherry RN  Outcome: Progressing  6/13/2025 0950 by Willa Scales RN  Outcome: Progressing     Problem: Safety - Adult  Goal: Free from fall injury  6/13/2025 2054 by Corrie Cherry RN  Outcome: Progressing  6/13/2025 0950 by Willa Scales RN  Outcome: Progressing     Problem: Neurosensory - Adult  Goal: Achieves stable or improved neurological status  6/13/2025 2054 by Corrie Cherry RN  Outcome: Progressing  6/13/2025 0950 by Willa Scales RN  Outcome: Progressing  Goal: Achieves maximal functionality and self care  6/13/2025 2054 by Corrie Cherry RN  Outcome: Progressing  6/13/2025 0950 by Willa Scales RN  Outcome: Progressing     Problem: Respiratory - Adult  Goal: Achieves optimal ventilation and oxygenation  6/13/2025 2054 by Corrie Cherry RN  Outcome: Progressing  6/13/2025 0950 by Willa Scales RN  Outcome: Progressing     Problem: Skin/Tissue Integrity - Adult  Goal: Skin integrity remains intact  Description: 1.  Monitor for areas of redness and/or skin breakdown2.  Assess 
  Problem: Chronic Conditions and Co-morbidities  Goal: Patient's chronic conditions and co-morbidity symptoms are monitored and maintained or improved  6/14/2025 1604 by Mee Padgett RN  Outcome: Progressing  6/14/2025 1250 by Mee Padgett RN  Outcome: Progressing     Problem: Discharge Planning  Goal: Discharge to home or other facility with appropriate resources  6/14/2025 1604 by Mee Padgett RN  Outcome: Progressing  6/14/2025 1250 by Mee Padgett RN  Outcome: Progressing     Problem: Skin/Tissue Integrity  Goal: Skin integrity remains intact  Description: 1.  Monitor for areas of redness and/or skin breakdown2.  Assess vascular access sites hourly3.  Every 4-6 hours minimum:  Change oxygen saturation probe site4.  Every 4-6 hours:  If on nasal continuous positive airway pressure, respiratory therapy assess nares and determine need for appliance change or resting period  6/14/2025 1604 by Mee Padgett RN  Outcome: Progressing  6/14/2025 1250 by Mee Padgett RN  Outcome: Progressing     Problem: Safety - Adult  Goal: Free from fall injury  6/14/2025 1604 by Mee Padgett RN  Outcome: Progressing  6/14/2025 1250 by Mee Padgett RN  Outcome: Progressing     Problem: Neurosensory - Adult  Goal: Achieves stable or improved neurological status  6/14/2025 1604 by Mee Padgett RN  Outcome: Progressing  6/14/2025 1250 by Mee Padgett RN  Outcome: Progressing  Goal: Achieves maximal functionality and self care  6/14/2025 1604 by Mee Padgett RN  Outcome: Progressing  6/14/2025 1250 by Mee Padgett RN  Outcome: Progressing     Problem: Respiratory - Adult  Goal: Achieves optimal ventilation and oxygenation  6/14/2025 1604 by Mee Padgett RN  Outcome: Progressing  6/14/2025 1250 by Mee Padgett RN  Outcome: Progressing     Problem: Skin/Tissue Integrity - Adult  Goal: Skin integrity remains intact  Description: 1.  Monitor for areas of redness and/or skin breakdown2.  Assess 
  Problem: Chronic Conditions and Co-morbidities  Goal: Patient's chronic conditions and co-morbidity symptoms are monitored and maintained or improved  6/14/2025 2232 by Carolina Almanza RN  Outcome: Progressing  6/14/2025 1604 by Mee Padgett RN  Outcome: Progressing  6/14/2025 1250 by Mee Padgett RN  Outcome: Progressing     Problem: Discharge Planning  Goal: Discharge to home or other facility with appropriate resources  6/14/2025 2232 by Carolina Almanza RN  Outcome: Progressing  6/14/2025 1604 by Mee Padgett RN  Outcome: Progressing  6/14/2025 1250 by Mee Padgett RN  Outcome: Progressing     Problem: Skin/Tissue Integrity  Goal: Skin integrity remains intact  Description: 1.  Monitor for areas of redness and/or skin breakdown2.  Assess vascular access sites hourly3.  Every 4-6 hours minimum:  Change oxygen saturation probe site4.  Every 4-6 hours:  If on nasal continuous positive airway pressure, respiratory therapy assess nares and determine need for appliance change or resting period  6/14/2025 2232 by Carolina Almanza RN  Outcome: Progressing  6/14/2025 1604 by Mee Padgett RN  Outcome: Progressing  6/14/2025 1250 by Mee Padgett RN  Outcome: Progressing     Problem: Safety - Adult  Goal: Free from fall injury  6/14/2025 2232 by Carolina Almanza RN  Outcome: Progressing  6/14/2025 1604 by Mee Padgett RN  Outcome: Progressing  6/14/2025 1250 by Mee Padgett RN  Outcome: Progressing     Problem: Neurosensory - Adult  Goal: Achieves stable or improved neurological status  6/14/2025 2232 by Carolina Almanza RN  Outcome: Progressing  6/14/2025 1604 by Mee Padgett RN  Outcome: Progressing  6/14/2025 1250 by Mee Padgett RN  Outcome: Progressing  Goal: Achieves maximal functionality and self care  6/14/2025 2232 by Carolina Almanza RN  Outcome: Progressing  6/14/2025 1604 by Mee Padgett RN  Outcome: Progressing  6/14/2025 1250 by Mee Padgett RN  Outcome: Progressing     Problem: 
  Problem: Chronic Conditions and Co-morbidities  Goal: Patient's chronic conditions and co-morbidity symptoms are monitored and maintained or improved  6/15/2025 1212 by Mee Padgett RN  Outcome: Progressing  6/14/2025 2232 by Carolina Almanza RN  Outcome: Progressing     Problem: Discharge Planning  Goal: Discharge to home or other facility with appropriate resources  6/15/2025 1212 by Mee Padgett RN  Outcome: Progressing  6/14/2025 2232 by Carolina Almanza RN  Outcome: Progressing     Problem: Skin/Tissue Integrity  Goal: Skin integrity remains intact  Description: 1.  Monitor for areas of redness and/or skin breakdown2.  Assess vascular access sites hourly3.  Every 4-6 hours minimum:  Change oxygen saturation probe site4.  Every 4-6 hours:  If on nasal continuous positive airway pressure, respiratory therapy assess nares and determine need for appliance change or resting period  6/15/2025 1212 by Mee Padgett RN  Outcome: Progressing  6/14/2025 2232 by Carolina Almanza RN  Outcome: Progressing     Problem: Safety - Adult  Goal: Free from fall injury  6/15/2025 1212 by Mee Padgett RN  Outcome: Progressing  6/14/2025 2232 by Carolina Almanza RN  Outcome: Progressing     Problem: Neurosensory - Adult  Goal: Achieves stable or improved neurological status  6/15/2025 1212 by Mee Padgett RN  Outcome: Progressing  6/14/2025 2232 by Carolina Almanza RN  Outcome: Progressing  Goal: Achieves maximal functionality and self care  6/15/2025 1212 by Mee Padgett RN  Outcome: Progressing  6/14/2025 2232 by Carolina Almanza RN  Outcome: Progressing     Problem: Respiratory - Adult  Goal: Achieves optimal ventilation and oxygenation  6/15/2025 1212 by Mee Padgett RN  Outcome: Progressing  6/14/2025 2232 by Carolina Almanza RN  Outcome: Progressing     Problem: Skin/Tissue Integrity - Adult  Goal: Skin integrity remains intact  Description: 1.  Monitor for areas of redness and/or skin breakdown2.  Assess vascular 
  Problem: Chronic Conditions and Co-morbidities  Goal: Patient's chronic conditions and co-morbidity symptoms are monitored and maintained or improved  6/15/2025 2158 by Henna Mccann RN  Outcome: Progressing  Flowsheets (Taken 6/15/2025 2040)  Care Plan - Patient's Chronic Conditions and Co-Morbidity Symptoms are Monitored and Maintained or Improved: Monitor and assess patient's chronic conditions and comorbid symptoms for stability, deterioration, or improvement  6/15/2025 1212 by Mee Padgett RN  Outcome: Progressing     Problem: Discharge Planning  Goal: Discharge to home or other facility with appropriate resources  6/15/2025 2158 by Henna Mccann RN  Outcome: Progressing  Flowsheets (Taken 6/15/2025 2040)  Discharge to home or other facility with appropriate resources: Refer to discharge planning if patient needs post-hospital services based on physician order or complex needs related to functional status, cognitive ability or social support system  6/15/2025 1212 by Mee Padgett RN  Outcome: Progressing     Problem: Skin/Tissue Integrity  Goal: Skin integrity remains intact  Description: 1.  Monitor for areas of redness and/or skin breakdown2.  Assess vascular access sites hourly3.  Every 4-6 hours minimum:  Change oxygen saturation probe site4.  Every 4-6 hours:  If on nasal continuous positive airway pressure, respiratory therapy assess nares and determine need for appliance change or resting period  6/15/2025 2158 by Henna Mccann RN  Outcome: Progressing  Flowsheets (Taken 6/15/2025 2040)  Skin Integrity Remains Intact: Monitor for areas of redness and/or skin breakdown  6/15/2025 1212 by Mee Padgett RN  Outcome: Progressing     Problem: Safety - Adult  Goal: Free from fall injury  6/15/2025 2158 by Henna Mccann RN  Outcome: Progressing  6/15/2025 1212 by Mee Padgett RN  Outcome: Progressing     Problem: Neurosensory - Adult  Goal: Achieves stable or improved neurological status  6/15/2025 
  Problem: Chronic Conditions and Co-morbidities  Goal: Patient's chronic conditions and co-morbidity symptoms are monitored and maintained or improved  6/16/2025 2242 by Henna Mccann RN  Outcome: Progressing  Flowsheets (Taken 6/16/2025 2115)  Care Plan - Patient's Chronic Conditions and Co-Morbidity Symptoms are Monitored and Maintained or Improved: Monitor and assess patient's chronic conditions and comorbid symptoms for stability, deterioration, or improvement  6/16/2025 1041 by Willa Scales RN  Outcome: Progressing     Problem: Discharge Planning  Goal: Discharge to home or other facility with appropriate resources  6/16/2025 2242 by Henna Mccann RN  Outcome: Progressing  Flowsheets (Taken 6/16/2025 2115)  Discharge to home or other facility with appropriate resources: Refer to discharge planning if patient needs post-hospital services based on physician order or complex needs related to functional status, cognitive ability or social support system  6/16/2025 1041 by Willa Scales RN  Outcome: Progressing     Problem: Skin/Tissue Integrity  Goal: Skin integrity remains intact  Description: 1.  Monitor for areas of redness and/or skin breakdown2.  Assess vascular access sites hourly3.  Every 4-6 hours minimum:  Change oxygen saturation probe site4.  Every 4-6 hours:  If on nasal continuous positive airway pressure, respiratory therapy assess nares and determine need for appliance change or resting period  6/16/2025 2242 by Henna Mccann RN  Outcome: Progressing  Flowsheets (Taken 6/16/2025 2115)  Skin Integrity Remains Intact: Monitor for areas of redness and/or skin breakdown  6/16/2025 1041 by Willa Scales RN  Outcome: Progressing     Problem: Safety - Adult  Goal: Free from fall injury  6/16/2025 2242 by Henna Mccann RN  Outcome: Progressing  6/16/2025 1041 by Willa Scales RN  Outcome: Progressing     Problem: Neurosensory - Adult  Goal: Achieves stable or improved neurological status  6/16/2025 
  Problem: Chronic Conditions and Co-morbidities  Goal: Patient's chronic conditions and co-morbidity symptoms are monitored and maintained or improved  6/17/2025 2207 by Henna Mccann RN  Outcome: Progressing  Flowsheets (Taken 6/17/2025 2035)  Care Plan - Patient's Chronic Conditions and Co-Morbidity Symptoms are Monitored and Maintained or Improved: Monitor and assess patient's chronic conditions and comorbid symptoms for stability, deterioration, or improvement  6/17/2025 0904 by Willa Scales RN  Outcome: Progressing     Problem: Discharge Planning  Goal: Discharge to home or other facility with appropriate resources  6/17/2025 2207 by Henna Mccann RN  Outcome: Progressing  Flowsheets (Taken 6/17/2025 2035)  Discharge to home or other facility with appropriate resources: Refer to discharge planning if patient needs post-hospital services based on physician order or complex needs related to functional status, cognitive ability or social support system  6/17/2025 0904 by Willa Scales RN  Outcome: Progressing     Problem: Skin/Tissue Integrity  Goal: Skin integrity remains intact  Description: 1.  Monitor for areas of redness and/or skin breakdown2.  Assess vascular access sites hourly3.  Every 4-6 hours minimum:  Change oxygen saturation probe site4.  Every 4-6 hours:  If on nasal continuous positive airway pressure, respiratory therapy assess nares and determine need for appliance change or resting period  6/17/2025 2207 by Henna Mccann RN  Outcome: Progressing  Flowsheets (Taken 6/17/2025 2035)  Skin Integrity Remains Intact: Monitor for areas of redness and/or skin breakdown  6/17/2025 0904 by Willa Scales RN  Outcome: Progressing     Problem: Safety - Adult  Goal: Free from fall injury  6/17/2025 2207 by Henna Mccann RN  Outcome: Progressing  6/17/2025 0904 by Willa Scales RN  Outcome: Progressing     Problem: Neurosensory - Adult  Goal: Achieves stable or improved neurological status  6/17/2025 
  Problem: Chronic Conditions and Co-morbidities  Goal: Patient's chronic conditions and co-morbidity symptoms are monitored and maintained or improved  6/18/2025 2155 by Henna Mccann RN  Outcome: Progressing  Flowsheets (Taken 6/18/2025 2055)  Care Plan - Patient's Chronic Conditions and Co-Morbidity Symptoms are Monitored and Maintained or Improved: Monitor and assess patient's chronic conditions and comorbid symptoms for stability, deterioration, or improvement  6/18/2025 1436 by Julissa Gonsalves RN  Outcome: Progressing     Problem: Discharge Planning  Goal: Discharge to home or other facility with appropriate resources  6/18/2025 2155 by Henna Mccann RN  Outcome: Progressing  Flowsheets (Taken 6/18/2025 2055)  Discharge to home or other facility with appropriate resources: Refer to discharge planning if patient needs post-hospital services based on physician order or complex needs related to functional status, cognitive ability or social support system  6/18/2025 1436 by Julissa Gonsalves RN  Outcome: Progressing     Problem: Skin/Tissue Integrity  Goal: Skin integrity remains intact  Description: 1.  Monitor for areas of redness and/or skin breakdown2.  Assess vascular access sites hourly3.  Every 4-6 hours minimum:  Change oxygen saturation probe site4.  Every 4-6 hours:  If on nasal continuous positive airway pressure, respiratory therapy assess nares and determine need for appliance change or resting period  6/18/2025 2155 by Henna Mccann RN  Outcome: Progressing  Flowsheets (Taken 6/18/2025 2055)  Skin Integrity Remains Intact: Monitor for areas of redness and/or skin breakdown  6/18/2025 1436 by Julissa Gonsalves RN  Outcome: Progressing     Problem: Safety - Adult  Goal: Free from fall injury  6/18/2025 2155 by Henna Mccann RN  Outcome: Progressing  6/18/2025 1436 by Julissa Gonsalves RN  Outcome: Progressing     Problem: Neurosensory - Adult  Goal: Achieves stable or improved neurological status  6/18/2025 
  Problem: Chronic Conditions and Co-morbidities  Goal: Patient's chronic conditions and co-morbidity symptoms are monitored and maintained or improved  6/21/2025 1148 by Lauren Culver LPN  Outcome: Progressing  Flowsheets (Taken 6/21/2025 0822)  Care Plan - Patient's Chronic Conditions and Co-Morbidity Symptoms are Monitored and Maintained or Improved: Monitor and assess patient's chronic conditions and comorbid symptoms for stability, deterioration, or improvement  6/20/2025 2336 by Barbara Mccarty, RN  Outcome: Progressing  Flowsheets (Taken 6/20/2025 2000)  Care Plan - Patient's Chronic Conditions and Co-Morbidity Symptoms are Monitored and Maintained or Improved: Monitor and assess patient's chronic conditions and comorbid symptoms for stability, deterioration, or improvement     Problem: Discharge Planning  Goal: Discharge to home or other facility with appropriate resources  6/21/2025 1148 by Lauren Culver LPN  Outcome: Progressing  Flowsheets (Taken 6/21/2025 0822)  Discharge to home or other facility with appropriate resources: Refer to discharge planning if patient needs post-hospital services based on physician order or complex needs related to functional status, cognitive ability or social support system  6/20/2025 2336 by Barbara Mccarty, RN  Outcome: Progressing  Flowsheets (Taken 6/20/2025 2000)  Discharge to home or other facility with appropriate resources: Refer to discharge planning if patient needs post-hospital services based on physician order or complex needs related to functional status, cognitive ability or social support system     Problem: Skin/Tissue Integrity  Goal: Skin integrity remains intact  Description: 1.  Monitor for areas of redness and/or skin breakdown2.  Assess vascular access sites hourly3.  Every 4-6 hours minimum:  Change oxygen saturation probe site4.  Every 4-6 hours:  If on nasal continuous positive airway pressure, respiratory therapy assess nares and determine 
  Problem: Chronic Conditions and Co-morbidities  Goal: Patient's chronic conditions and co-morbidity symptoms are monitored and maintained or improved  6/22/2025 0128 by Amy Rodriguez RN  Outcome: Progressing  Flowsheets (Taken 6/21/2025 2235)  Care Plan - Patient's Chronic Conditions and Co-Morbidity Symptoms are Monitored and Maintained or Improved:   Monitor and assess patient's chronic conditions and comorbid symptoms for stability, deterioration, or improvement   Collaborate with multidisciplinary team to address chronic and comorbid conditions and prevent exacerbation or deterioration   Update acute care plan with appropriate goals if chronic or comorbid symptoms are exacerbated and prevent overall improvement and discharge  6/21/2025 1148 by Lauren Culver LPN  Outcome: Progressing  Flowsheets (Taken 6/21/2025 0822)  Care Plan - Patient's Chronic Conditions and Co-Morbidity Symptoms are Monitored and Maintained or Improved: Monitor and assess patient's chronic conditions and comorbid symptoms for stability, deterioration, or improvement     Problem: Discharge Planning  Goal: Discharge to home or other facility with appropriate resources  6/22/2025 0128 by Amy Rodriguez RN  Outcome: Progressing  Flowsheets (Taken 6/21/2025 2235)  Discharge to home or other facility with appropriate resources:   Identify barriers to discharge with patient and caregiver   Arrange for needed discharge resources and transportation as appropriate   Identify discharge learning needs (meds, wound care, etc)   Refer to discharge planning if patient needs post-hospital services based on physician order or complex needs related to functional status, cognitive ability or social support system  6/21/2025 1148 by Lauren Culver LPN  Outcome: Progressing  Flowsheets (Taken 6/21/2025 0822)  Discharge to home or other facility with appropriate resources: Refer to discharge planning if patient needs post-hospital services based on 
  Problem: Chronic Conditions and Co-morbidities  Goal: Patient's chronic conditions and co-morbidity symptoms are monitored and maintained or improved  6/23/2025 0955 by Mee Padgett RN  Outcome: Progressing  6/23/2025 0057 by Amy Rodriguez RN  Outcome: Progressing  Flowsheets (Taken 6/22/2025 2329)  Care Plan - Patient's Chronic Conditions and Co-Morbidity Symptoms are Monitored and Maintained or Improved:   Monitor and assess patient's chronic conditions and comorbid symptoms for stability, deterioration, or improvement   Collaborate with multidisciplinary team to address chronic and comorbid conditions and prevent exacerbation or deterioration   Update acute care plan with appropriate goals if chronic or comorbid symptoms are exacerbated and prevent overall improvement and discharge     Problem: Discharge Planning  Goal: Discharge to home or other facility with appropriate resources  6/23/2025 0955 by Mee Padgett RN  Outcome: Progressing  6/23/2025 0057 by Amy Rodriguez RN  Outcome: Progressing  Flowsheets (Taken 6/22/2025 2329)  Discharge to home or other facility with appropriate resources:   Identify barriers to discharge with patient and caregiver   Arrange for needed discharge resources and transportation as appropriate   Identify discharge learning needs (meds, wound care, etc)   Refer to discharge planning if patient needs post-hospital services based on physician order or complex needs related to functional status, cognitive ability or social support system     Problem: Skin/Tissue Integrity  Goal: Skin integrity remains intact  Description: 1.  Monitor for areas of redness and/or skin breakdown2.  Assess vascular access sites hourly3.  Every 4-6 hours minimum:  Change oxygen saturation probe site4.  Every 4-6 hours:  If on nasal continuous positive airway pressure, respiratory therapy assess nares and determine need for appliance change or resting period  6/23/2025 0955 by Mee Padgett 
  Problem: Chronic Conditions and Co-morbidities  Goal: Patient's chronic conditions and co-morbidity symptoms are monitored and maintained or improved  6/23/2025 2333 by Pennie Petty LPN  Outcome: Progressing  6/23/2025 0955 by Mee Padgett RN  Outcome: Progressing     Problem: Discharge Planning  Goal: Discharge to home or other facility with appropriate resources  6/23/2025 2333 by Pennie Petty LPN  Outcome: Progressing  6/23/2025 0955 by Mee Padgett RN  Outcome: Progressing     Problem: Skin/Tissue Integrity  Goal: Skin integrity remains intact  Description: 1.  Monitor for areas of redness and/or skin breakdown2.  Assess vascular access sites hourly3.  Every 4-6 hours minimum:  Change oxygen saturation probe site4.  Every 4-6 hours:  If on nasal continuous positive airway pressure, respiratory therapy assess nares and determine need for appliance change or resting period  6/23/2025 2333 by Pennie Petty LPN  Outcome: Progressing  6/23/2025 0955 by Mee Padgett RN  Outcome: Progressing     Problem: Safety - Adult  Goal: Free from fall injury  6/23/2025 2333 by Pennie Petty LPN  Outcome: Progressing  6/23/2025 0955 by Mee Padgett RN  Outcome: Progressing     Problem: Neurosensory - Adult  Goal: Achieves stable or improved neurological status  6/23/2025 2333 by Pennie Petty LPN  Outcome: Progressing  6/23/2025 0955 by Mee Padgett RN  Outcome: Progressing  Goal: Achieves maximal functionality and self care  6/23/2025 2333 by Pennie Petty LPN  Outcome: Progressing  6/23/2025 0955 by Mee Padgett RN  Outcome: Progressing     Problem: Respiratory - Adult  Goal: Achieves optimal ventilation and oxygenation  6/23/2025 2333 by Pennie Petty LPN  Outcome: Progressing  6/23/2025 0955 by Mee Padgett RN  Outcome: Progressing     Problem: Skin/Tissue Integrity - Adult  Goal: Skin integrity remains intact  Description: 1.  Monitor for areas of redness and/or skin breakdown2.  Assess 
  Problem: Chronic Conditions and Co-morbidities  Goal: Patient's chronic conditions and co-morbidity symptoms are monitored and maintained or improved  6/24/2025 0907 by Mee Padgett RN  Outcome: Adequate for Discharge  6/23/2025 2333 by Pennie Petty LPN  Outcome: Progressing     Problem: Discharge Planning  Goal: Discharge to home or other facility with appropriate resources  6/24/2025 0907 by Mee Padgett RN  Outcome: Adequate for Discharge  6/23/2025 2333 by Pennie Petty LPN  Outcome: Progressing     Problem: Skin/Tissue Integrity  Goal: Skin integrity remains intact  Description: 1.  Monitor for areas of redness and/or skin breakdown2.  Assess vascular access sites hourly3.  Every 4-6 hours minimum:  Change oxygen saturation probe site4.  Every 4-6 hours:  If on nasal continuous positive airway pressure, respiratory therapy assess nares and determine need for appliance change or resting period  6/24/2025 0907 by Mee Padgett RN  Outcome: Adequate for Discharge  6/23/2025 2333 by Pennie Petty LPN  Outcome: Progressing     Problem: Safety - Adult  Goal: Free from fall injury  6/24/2025 0907 by Mee Padgett RN  Outcome: Adequate for Discharge  6/23/2025 2333 by Pennie Petty LPN  Outcome: Progressing     Problem: Neurosensory - Adult  Goal: Achieves stable or improved neurological status  6/24/2025 0907 by Mee Padgett RN  Outcome: Adequate for Discharge  6/23/2025 2333 by Pennie Petty LPN  Outcome: Progressing  Goal: Achieves maximal functionality and self care  6/24/2025 0907 by Mee Padgett RN  Outcome: Adequate for Discharge  6/23/2025 2333 by Pennie Petty LPN  Outcome: Progressing     Problem: Respiratory - Adult  Goal: Achieves optimal ventilation and oxygenation  6/24/2025 0907 by Mee Padgett RN  Outcome: Adequate for Discharge  6/23/2025 2333 by Pennie Petty LPN  Outcome: Progressing     Problem: Skin/Tissue Integrity - Adult  Goal: Skin integrity remains 
  Problem: Chronic Conditions and Co-morbidities  Goal: Patient's chronic conditions and co-morbidity symptoms are monitored and maintained or improved  Outcome: Progressing     Problem: Discharge Planning  Goal: Discharge to home or other facility with appropriate resources  Outcome: Progressing     Problem: Skin/Tissue Integrity  Goal: Skin integrity remains intact  Description: 1.  Monitor for areas of redness and/or skin breakdown2.  Assess vascular access sites hourly3.  Every 4-6 hours minimum:  Change oxygen saturation probe site4.  Every 4-6 hours:  If on nasal continuous positive airway pressure, respiratory therapy assess nares and determine need for appliance change or resting period  Outcome: Progressing     Problem: Safety - Adult  Goal: Free from fall injury  Outcome: Progressing     Problem: Neurosensory - Adult  Goal: Achieves stable or improved neurological status  Outcome: Progressing  Goal: Achieves maximal functionality and self care  Outcome: Progressing     Problem: Respiratory - Adult  Goal: Achieves optimal ventilation and oxygenation  Outcome: Progressing     Problem: Skin/Tissue Integrity - Adult  Goal: Skin integrity remains intact  Description: 1.  Monitor for areas of redness and/or skin breakdown2.  Assess vascular access sites hourly3.  Every 4-6 hours minimum:  Change oxygen saturation probe site4.  Every 4-6 hours:  If on nasal continuous positive airway pressure, respiratory therapy assess nares and determine need for appliance change or resting period  Outcome: Progressing     Problem: Musculoskeletal - Adult  Goal: Return mobility to safest level of function  Outcome: Progressing  Goal: Return ADL status to a safe level of function  Outcome: Progressing     Problem: Genitourinary - Adult  Goal: Absence of urinary retention  Outcome: Progressing     Problem: Nutrition Deficit:  Goal: Optimize nutritional status  Outcome: Progressing     
  Problem: Chronic Conditions and Co-morbidities  Goal: Patient's chronic conditions and co-morbidity symptoms are monitored and maintained or improved  Outcome: Progressing  Flowsheets (Taken 6/11/2025 155)  Care Plan - Patient's Chronic Conditions and Co-Morbidity Symptoms are Monitored and Maintained or Improved: Monitor and assess patient's chronic conditions and comorbid symptoms for stability, deterioration, or improvement     Problem: Discharge Planning  Goal: Discharge to home or other facility with appropriate resources  Outcome: Progressing  Flowsheets (Taken 6/11/2025 1551)  Discharge to home or other facility with appropriate resources: Identify barriers to discharge with patient and caregiver     Problem: Skin/Tissue Integrity  Goal: Skin integrity remains intact  Description: 1.  Monitor for areas of redness and/or skin breakdown2.  Assess vascular access sites hourly3.  Every 4-6 hours minimum:  Change oxygen saturation probe site4.  Every 4-6 hours:  If on nasal continuous positive airway pressure, respiratory therapy assess nares and determine need for appliance change or resting period  Outcome: Progressing  Flowsheets  Taken 6/11/2025 1554  Skin Integrity Remains Intact: Monitor for areas of redness and/or skin breakdown  Taken 6/11/2025 1504  Skin Integrity Remains Intact: Monitor for areas of redness and/or skin breakdown     Problem: Safety - Adult  Goal: Free from fall injury  Outcome: Progressing  Flowsheets (Taken 6/11/2025 1504)  Free From Fall Injury: Instruct family/caregiver on patient safety     
  Problem: Chronic Conditions and Co-morbidities  Goal: Patient's chronic conditions and co-morbidity symptoms are monitored and maintained or improved  Outcome: Progressing  Flowsheets (Taken 6/22/2025 2329)  Care Plan - Patient's Chronic Conditions and Co-Morbidity Symptoms are Monitored and Maintained or Improved:   Monitor and assess patient's chronic conditions and comorbid symptoms for stability, deterioration, or improvement   Collaborate with multidisciplinary team to address chronic and comorbid conditions and prevent exacerbation or deterioration   Update acute care plan with appropriate goals if chronic or comorbid symptoms are exacerbated and prevent overall improvement and discharge     Problem: Discharge Planning  Goal: Discharge to home or other facility with appropriate resources  Outcome: Progressing  Flowsheets (Taken 6/22/2025 2329)  Discharge to home or other facility with appropriate resources:   Identify barriers to discharge with patient and caregiver   Arrange for needed discharge resources and transportation as appropriate   Identify discharge learning needs (meds, wound care, etc)   Refer to discharge planning if patient needs post-hospital services based on physician order or complex needs related to functional status, cognitive ability or social support system     Problem: Skin/Tissue Integrity  Goal: Skin integrity remains intact  Description: 1.  Monitor for areas of redness and/or skin breakdown2.  Assess vascular access sites hourly3.  Every 4-6 hours minimum:  Change oxygen saturation probe site4.  Every 4-6 hours:  If on nasal continuous positive airway pressure, respiratory therapy assess nares and determine need for appliance change or resting period  Outcome: Progressing  Flowsheets (Taken 6/22/2025 2329)  Skin Integrity Remains Intact:   Monitor for areas of redness and/or skin breakdown   Assess vascular access sites hourly   Turn and reposition as indicated     Problem: Safety 
  Problem: Neurosensory - Adult  Goal: Achieves stable or improved neurological status  Outcome: Not Progressing  Goal: Achieves maximal functionality and self care  Outcome: Not Progressing   Electronically signed by ROCÍO MarksN, RN on 6/18/2025 at 2:37 PM    
neurological status  6/12/2025 2054 by Carolina Almanza RN  Outcome: Progressing  6/12/2025 1050 by Lauren Culver LPN  Outcome: Progressing  Flowsheets (Taken 6/12/2025 0846)  Achieves stable or improved neurological status: Assess for and report changes in neurological status  Goal: Achieves maximal functionality and self care  6/12/2025 2054 by Carolina Almanza RN  Outcome: Progressing  6/12/2025 1050 by Lauren Culver LPN  Outcome: Progressing  Flowsheets (Taken 6/12/2025 0846)  Achieves maximal functionality and self care: Encourage and assist patient to increase activity and self care with guidance from physical therapy/occupational therapy     Problem: Respiratory - Adult  Goal: Achieves optimal ventilation and oxygenation  6/12/2025 2054 by Carolina Almanza RN  Outcome: Progressing  6/12/2025 1050 by Lauren Culver LPN  Outcome: Progressing  Flowsheets (Taken 6/12/2025 0846)  Achieves optimal ventilation and oxygenation: Assess for changes in respiratory status     Problem: Skin/Tissue Integrity - Adult  Goal: Skin integrity remains intact  Description: 1.  Monitor for areas of redness and/or skin breakdown2.  Assess vascular access sites hourly3.  Every 4-6 hours minimum:  Change oxygen saturation probe site4.  Every 4-6 hours:  If on nasal continuous positive airway pressure, respiratory therapy assess nares and determine need for appliance change or resting period  6/12/2025 2054 by Carolina Almanza RN  Outcome: Progressing  6/12/2025 1050 by Lauren Culver LPN  Outcome: Progressing  Flowsheets (Taken 6/12/2025 1030)  Skin Integrity Remains Intact: Monitor for areas of redness and/or skin breakdown     Problem: Musculoskeletal - Adult  Goal: Return mobility to safest level of function  6/12/2025 2054 by Carolina Almanza RN  Outcome: Progressing  6/12/2025 1050 by Lauren Culver LPN  Outcome: Progressing  Flowsheets (Taken 6/12/2025 0846)  Return Mobility to Safest Level of Function: Assess 
physician order or complex needs related to functional status, cognitive ability or social support system     Problem: Skin/Tissue Integrity  Goal: Skin integrity remains intact  Description: 1.  Monitor for areas of redness and/or skin breakdown2.  Assess vascular access sites hourly3.  Every 4-6 hours minimum:  Change oxygen saturation probe site4.  Every 4-6 hours:  If on nasal continuous positive airway pressure, respiratory therapy assess nares and determine need for appliance change or resting period  6/22/2025 0939 by Lauren Culver LPN  Outcome: Progressing  Flowsheets (Taken 6/22/2025 0929)  Skin Integrity Remains Intact: Monitor for areas of redness and/or skin breakdown  6/22/2025 0128 by Amy Rodriguez RN  Outcome: Progressing  Flowsheets (Taken 6/21/2025 2235)  Skin Integrity Remains Intact:   Monitor for areas of redness and/or skin breakdown   Assess vascular access sites hourly     Problem: Safety - Adult  Goal: Free from fall injury  6/22/2025 0939 by Lauren Culver LPN  Outcome: Progressing  6/22/2025 0128 by Amy Rodriguez RN  Outcome: Progressing     Problem: Neurosensory - Adult  Goal: Achieves stable or improved neurological status  6/22/2025 0939 by Lauren Culver LPN  Outcome: Progressing  Flowsheets (Taken 6/22/2025 0738)  Achieves stable or improved neurological status: Assess for and report changes in neurological status  6/22/2025 0128 by Amy Rodriguez RN  Outcome: Progressing  Flowsheets (Taken 6/21/2025 2235)  Achieves stable or improved neurological status:   Assess for and report changes in neurological status   Maintain blood pressure and fluid volume within ordered parameters to optimize cerebral perfusion and minimize risk of hemorrhage   Monitor temperature, glucose, and sodium. Initiate appropriate interventions as ordered  Goal: Achieves maximal functionality and self care  6/22/2025 0939 by Lauren Culver LPN  Outcome: Progressing  Flowsheets (Taken 
with discharge planning, patient/family counseling,  and coordination with insurance during ARU stay.  Patient Goals: Increase strength, endurance, and mobility. Be able to complete ADLs. Be able to return home with assistance from significant other.                Activities Prior to Admit:               Leisure & Hobbies: Puzzle books         Sydney Hassan will be seen a minimum of 3 hours of therapy per day/a minimum of 5 out of 7 days per week.    [] In this rare instance due to the nature of this patient's medical involvement, this patient will be seen 15 hours per week (900 minutes within a 7 day period).    Treatments may include therapeutic exercises, gait training, neuromuscular re-ed, transfer training, community reintegration, bed mobility, w/c mobility and training, self care, home mgmt, cognitive training, energy conservation,dysphagia tx, speech/language/communication therapy, group therapy, and patient/family education. In addition, dietician/nutritionist may monitor calorie count as well as intake and collaboratively work with SLP on dietary upgrades.  Neuropsychology/Psychology may evaluate and provide necessary support.    Medical issues being managed closely and that require 24 hour availability of a physician:   [] Swallowing Precautions  [] Bowel/Bladder Fx  [] Weight bearing precautions   [] Wound Care    [x] Pain Mgmt   [x] Infection Protection   [x] DVT Prophylaxis   [] Fall Precautions  [] Fluid/Electrolyte/Nutrition Balance   [] Voice Protection   [] Respiratory  [] Other:    Medical Prognosis: [] Good  [x] Fair    [] Guarded   Total expected IRF days:  20  Anticipated discharge destination:    [] Home Independently   [x] Home with supervision    []SNF     [] Other                                           Physician anticipated functional outcomes:  Ambulate household distances independently with assistive device.  IPOC brief synthesis: Acute inpatient rehabilitation with occupational

## 2025-06-25 NOTE — DISCHARGE SUMMARY
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 Family plans to buy a tub transfer bench.       Pain Assessment:   No pain        Remaining Problems:  Decreased functional mobility ; Decreased endurance; Decreased posture; Decreased ADL status; Decreased balance; Decreased strength; Decreased safe awareness; Decreased high-level IADLs; Decreased cognition     STGs:  Short Term Goals  Time Frame for Short Term Goals: 1 week  Short Term Goal 1: Pt will perform LB dressing with CGA  Short Term Goal 2: Pt will perform all toileting with CGA  Short Term Goal 3: Pt will perform all bathing with CGA  Short Term Goal 4: Pt will perform standing-level task for at least 1 minute with CGA  Short Term Goal 5: Pt will be independent with HEP  Short Term Goal 6: Patient will increased L  strength by 2# .  Short Term Goal 7: Patient will increased R  strength by 2# .  Short Term Goal 8: Patient will decrease time on 9 hole peg test by 3 seconds using LUE.  Short Term Goal 9: Patient will decrease time on 9 hole peg test by 3 seconds using RUE.  MET 3/ Short Term Goals    LTGs:  Long Term Goals  Time Frame for Long Term Goals : 2 weeks  Long Term Goal 1: Pt will perform all dressing with 
Diagnosis: INTERFERENCE WITH ACTIVITY  Therapy Prognosis: Good  Decision Making: Low Complexity  History: Hypothyroidism, DM type 1, HTN, GERD  Exam: DECREASED STRENGTH, ENDURANCE, BALANCE  Clinical Presentation: STABLE  Discharge Recommendations: Continue to assess pending progress, 24 hour supervision or assist, Patient would benefit from continued therapy after discharge    PLAN:  Physical Therapy Plan  General Plan:  minutes of therapy at least 5 out of 7 days a week  Therapy Duration: 2 Weeks  Current Treatment Recommendations: Strengthening, ROM, Balance training, Functional mobility training, Transfer training, ADL/Self-care training, Endurance training, Gait training, Wheelchair mobility training, Home exercise program, Safety education & training, Therapeutic activities  Discharge Recommendations: Continue to assess pending progress, 24 hour supervision or assist, Patient would benefit from continued therapy after discharge  PATIENT REQUIRES AND IS REASONABLY EXPECTED TO ACTIVELY PARTICIPATE IN AT LEAST 3 HOURS OF INTENSIVE THERAPY PER DAY AT LEAST 5 DAYS PER WEEK, AND BE EXPECTED TO MAKE MEASURABLE IMPROVEMENT THAT WILL BE OF PRACTICAL VALUE TO IMPROVE THE PATIENT'S FUNCTIONAL CAPACITY OR ADAPTATION TO IMPAIRMENTS.   PATIENT GOAL FOR REHAB:  RETURN TO PRIOR LEVEL OF FUNCTION       IRF/FARTUN  Roll Left and Right  Assistance Needed: Supervision or touching assistance  Reason if not Attempted: Not attempted due to medical condition or safety concerns  CARE Score: 4  Discharge Goal: Independent    Sit to Lying  Assistance Needed: Supervision or touching assistance  CARE Score: 4  Discharge Goal: Independent    Lying to Sitting on Side of Bed  Assistance Needed: Supervision or touching assistance  CARE Score: 4  Discharge Goal: Independent    Sit to Stand  Assistance Needed: Partial/moderate assistance  CARE Score: 3  Discharge Goal: Supervision or touching assistance    Chair/Bed-to-Chair 
completely but began probably hallucinating and so the dose was changed/reduced to 2.5 mg each evening for now.  Olsen catheter was removed and patient is voiding on her own.  She has a continued anemia but has not required a transfusion yet.  Niferex was added.  Blood pressure was low on multiple occasions and so her blood pressure medications have all been held.  Temporary low-dose ProAmatine was added but her blood pressure got too high on it..  She is tachycardic around 4499-3186 at times.  She will need vital sign monitoring and blood work as an outpatient.  Hypokalemic at times as well and her supplement was increased.  She had no medical complications.  Disposition upon discharge-stable/improved        Discharge Instructions     Patient Instructions:   Home  Therapy orders: PT, OT, and SLP   Discharge lab work: none  Code status: Full Code   Activity: activity as tolerated  Diet: ADULT DIET; Easy to Chew; Mildly Thick (Nectar)  ADULT ORAL NUTRITION SUPPLEMENT; Lunch, Dinner; Frozen Oral Supplement    Wound Care: as directed  Equipment: as per therapy      Electronically Signed By: Gerardo King MD    At least 35 minutes were spent in discharging the patient

## 2025-06-26 LAB
ANION GAP SERPL CALCULATED.3IONS-SCNC: 11 MMOL/L (ref 8–16)
BASOPHILS # BLD: 0 K/UL (ref 0–0.2)
BASOPHILS NFR BLD: 0.4 % (ref 0–1)
BUN SERPL-MCNC: 12 MG/DL (ref 6–20)
CALCIUM SERPL-MCNC: 9.6 MG/DL (ref 8.6–10)
CHLORIDE SERPL-SCNC: 107 MMOL/L (ref 98–107)
CO2 SERPL-SCNC: 24 MMOL/L (ref 22–29)
CREAT SERPL-MCNC: 0.8 MG/DL (ref 0.5–0.9)
EOSINOPHIL # BLD: 0 K/UL (ref 0–0.6)
EOSINOPHIL NFR BLD: 0.2 % (ref 0–5)
ERYTHROCYTE [DISTWIDTH] IN BLOOD BY AUTOMATED COUNT: 12.5 % (ref 11.5–14.5)
GLUCOSE SERPL-MCNC: 72 MG/DL (ref 70–99)
HCT VFR BLD AUTO: 28.3 % (ref 37–47)
HGB BLD-MCNC: 9.1 G/DL (ref 12–16)
IMM GRANULOCYTES # BLD: 0 K/UL
LYMPHOCYTES # BLD: 1.8 K/UL (ref 1.1–4.5)
LYMPHOCYTES NFR BLD: 31 % (ref 20–40)
MCH RBC QN AUTO: 28.4 PG (ref 27–31)
MCHC RBC AUTO-ENTMCNC: 32.2 G/DL (ref 33–37)
MCV RBC AUTO: 88.4 FL (ref 81–99)
MONOCYTES # BLD: 0.3 K/UL (ref 0–0.9)
MONOCYTES NFR BLD: 5 % (ref 0–10)
NEUTROPHILS # BLD: 3.6 K/UL (ref 1.5–7.5)
NEUTS SEG NFR BLD: 63 % (ref 50–65)
PLATELET # BLD AUTO: 282 K/UL (ref 130–400)
PMV BLD AUTO: 9.7 FL (ref 9.4–12.3)
POTASSIUM SERPL-SCNC: 3.9 MMOL/L (ref 3.5–5.1)
RBC # BLD AUTO: 3.2 M/UL (ref 4.2–5.4)
SODIUM SERPL-SCNC: 142 MMOL/L (ref 136–145)
WBC # BLD AUTO: 5.7 K/UL (ref 4.8–10.8)

## 2025-06-30 LAB
ANION GAP SERPL CALCULATED.3IONS-SCNC: 13 MMOL/L (ref 8–16)
BASOPHILS # BLD: 0 K/UL (ref 0–0.2)
BASOPHILS NFR BLD: 0.2 % (ref 0–1)
BUN SERPL-MCNC: 14 MG/DL (ref 6–20)
CALCIUM SERPL-MCNC: 9.3 MG/DL (ref 8.6–10)
CHLORIDE SERPL-SCNC: 106 MMOL/L (ref 98–107)
CO2 SERPL-SCNC: 25 MMOL/L (ref 22–29)
CREAT SERPL-MCNC: 0.9 MG/DL (ref 0.5–0.9)
EOSINOPHIL # BLD: 0 K/UL (ref 0–0.6)
EOSINOPHIL NFR BLD: 0.2 % (ref 0–5)
ERYTHROCYTE [DISTWIDTH] IN BLOOD BY AUTOMATED COUNT: 12.7 % (ref 11.5–14.5)
GLUCOSE SERPL-MCNC: 88 MG/DL (ref 70–99)
HCT VFR BLD AUTO: 33 % (ref 37–47)
HGB BLD-MCNC: 10.4 G/DL (ref 12–16)
IMM GRANULOCYTES # BLD: 0 K/UL
LYMPHOCYTES # BLD: 2.4 K/UL (ref 1.1–4.5)
LYMPHOCYTES NFR BLD: 38.2 % (ref 20–40)
MCH RBC QN AUTO: 27.8 PG (ref 27–31)
MCHC RBC AUTO-ENTMCNC: 31.5 G/DL (ref 33–37)
MCV RBC AUTO: 88.2 FL (ref 81–99)
MONOCYTES # BLD: 0.4 K/UL (ref 0–0.9)
MONOCYTES NFR BLD: 5.6 % (ref 0–10)
NEUTROPHILS # BLD: 3.5 K/UL (ref 1.5–7.5)
NEUTS SEG NFR BLD: 55.5 % (ref 50–65)
PLATELET # BLD AUTO: 406 K/UL (ref 130–400)
PMV BLD AUTO: 9.9 FL (ref 9.4–12.3)
POTASSIUM SERPL-SCNC: 3.9 MMOL/L (ref 3.5–5.1)
RBC # BLD AUTO: 3.74 M/UL (ref 4.2–5.4)
SODIUM SERPL-SCNC: 144 MMOL/L (ref 136–145)
WBC # BLD AUTO: 6.3 K/UL (ref 4.8–10.8)

## 2025-07-04 LAB
ANION GAP SERPL CALCULATED.3IONS-SCNC: 17 MMOL/L (ref 8–16)
BASOPHILS # BLD: 0 K/UL (ref 0–0.2)
BASOPHILS NFR BLD: 0.3 % (ref 0–1)
BUN SERPL-MCNC: 20 MG/DL (ref 6–20)
CALCIUM SERPL-MCNC: 9.9 MG/DL (ref 8.6–10)
CHLORIDE SERPL-SCNC: 104 MMOL/L (ref 98–107)
CO2 SERPL-SCNC: 23 MMOL/L (ref 22–29)
CREAT SERPL-MCNC: 0.8 MG/DL (ref 0.5–0.9)
EOSINOPHIL # BLD: 0 K/UL (ref 0–0.6)
EOSINOPHIL NFR BLD: 0.2 % (ref 0–5)
ERYTHROCYTE [DISTWIDTH] IN BLOOD BY AUTOMATED COUNT: 12.2 % (ref 11.5–14.5)
GLUCOSE SERPL-MCNC: 75 MG/DL (ref 70–99)
HCT VFR BLD AUTO: 31.9 % (ref 37–47)
HGB BLD-MCNC: 10.6 G/DL (ref 12–16)
IMM GRANULOCYTES # BLD: 0 K/UL
LYMPHOCYTES # BLD: 1.5 K/UL (ref 1.1–4.5)
LYMPHOCYTES NFR BLD: 23.7 % (ref 20–40)
MCH RBC QN AUTO: 28.2 PG (ref 27–31)
MCHC RBC AUTO-ENTMCNC: 33.2 G/DL (ref 33–37)
MCV RBC AUTO: 84.8 FL (ref 81–99)
MONOCYTES # BLD: 0.2 K/UL (ref 0–0.9)
MONOCYTES NFR BLD: 3.6 % (ref 0–10)
NEUTROPHILS # BLD: 4.6 K/UL (ref 1.5–7.5)
NEUTS SEG NFR BLD: 71.7 % (ref 50–65)
PLATELET # BLD AUTO: 363 K/UL (ref 130–400)
PMV BLD AUTO: 9.8 FL (ref 9.4–12.3)
POTASSIUM SERPL-SCNC: 4.1 MMOL/L (ref 3.5–5.1)
RBC # BLD AUTO: 3.76 M/UL (ref 4.2–5.4)
SODIUM SERPL-SCNC: 144 MMOL/L (ref 136–145)
WBC # BLD AUTO: 6.4 K/UL (ref 4.8–10.8)

## 2025-07-05 ENCOUNTER — APPOINTMENT (OUTPATIENT)
Dept: CT IMAGING | Facility: HOSPITAL | Age: 50
End: 2025-07-05
Payer: COMMERCIAL

## 2025-07-05 ENCOUNTER — APPOINTMENT (OUTPATIENT)
Dept: GENERAL RADIOLOGY | Facility: HOSPITAL | Age: 50
End: 2025-07-05
Payer: COMMERCIAL

## 2025-07-05 ENCOUNTER — HOSPITAL ENCOUNTER (INPATIENT)
Facility: HOSPITAL | Age: 50
LOS: 2 days | Discharge: HOME | End: 2025-07-07
Attending: EMERGENCY MEDICINE | Admitting: INTERNAL MEDICINE
Payer: COMMERCIAL

## 2025-07-05 DIAGNOSIS — Z74.09 IMPAIRED MOBILITY: ICD-10-CM

## 2025-07-05 DIAGNOSIS — J18.9 PNEUMONIA DUE TO INFECTIOUS ORGANISM, UNSPECIFIED LATERALITY, UNSPECIFIED PART OF LUNG: Primary | ICD-10-CM

## 2025-07-05 DIAGNOSIS — R00.0 TACHYCARDIA: ICD-10-CM

## 2025-07-05 DIAGNOSIS — R53.1 GENERALIZED WEAKNESS: ICD-10-CM

## 2025-07-05 PROBLEM — E10.9 DIABETES MELLITUS TYPE 1: Status: ACTIVE | Noted: 2017-07-10

## 2025-07-05 PROBLEM — G61.0 MILLER-FISHER VARIANT GUILLAIN-BARRE SYNDROME: Status: ACTIVE | Noted: 2025-07-05

## 2025-07-05 LAB
ALBUMIN SERPL-MCNC: 4.2 G/DL (ref 3.5–5.2)
ALBUMIN/GLOB SERPL: 1.2 G/DL
ALP SERPL-CCNC: 54 U/L (ref 39–117)
ALT SERPL W P-5'-P-CCNC: 30 U/L (ref 1–33)
AMPHET+METHAMPHET UR QL: NEGATIVE
AMPHETAMINES UR QL: NEGATIVE
ANION GAP SERPL CALCULATED.3IONS-SCNC: 21 MMOL/L (ref 5–15)
ARTERIAL PATENCY WRIST A: ABNORMAL
AST SERPL-CCNC: 38 U/L (ref 1–32)
ATMOSPHERIC PRESS: 755 MMHG
B PARAPERT DNA SPEC QL NAA+PROBE: NOT DETECTED
B PERT DNA SPEC QL NAA+PROBE: NOT DETECTED
BACTERIA UR QL AUTO: ABNORMAL /HPF
BARBITURATES UR QL SCN: NEGATIVE
BASE EXCESS BLDA CALC-SCNC: -1.2 MMOL/L (ref 0–2)
BASOPHILS # BLD AUTO: 0.03 10*3/MM3 (ref 0–0.2)
BASOPHILS NFR BLD AUTO: 0.3 % (ref 0–1.5)
BDY SITE: ABNORMAL
BENZODIAZ UR QL SCN: NEGATIVE
BILIRUB SERPL-MCNC: 0.6 MG/DL (ref 0–1.2)
BILIRUB UR QL STRIP: NEGATIVE
BODY TEMPERATURE: 37
BUN SERPL-MCNC: 20.6 MG/DL (ref 6–20)
BUN/CREAT SERPL: 25.8 (ref 7–25)
BUPRENORPHINE SERPL-MCNC: NEGATIVE NG/ML
C PNEUM DNA NPH QL NAA+NON-PROBE: NOT DETECTED
CALCIUM SPEC-SCNC: 10.4 MG/DL (ref 8.6–10.5)
CANNABINOIDS SERPL QL: NEGATIVE
CHLORIDE SERPL-SCNC: 100 MMOL/L (ref 98–107)
CLARITY UR: CLEAR
CO2 SERPL-SCNC: 22 MMOL/L (ref 22–29)
COCAINE UR QL: NEGATIVE
COHGB MFR BLD: 1.4 % (ref 0–5)
COLOR UR: ABNORMAL
CREAT SERPL-MCNC: 0.8 MG/DL (ref 0.57–1)
CRP SERPL-MCNC: 0.83 MG/DL (ref 0–0.5)
D DIMER PPP FEU-MCNC: 0.53 MCGFEU/ML (ref 0–0.5)
D-LACTATE SERPL-SCNC: 1.7 MMOL/L (ref 0.5–2)
D-LACTATE SERPL-SCNC: 2.9 MMOL/L (ref 0.5–2)
DEPRECATED RDW RBC AUTO: 38.5 FL (ref 37–54)
EGFRCR SERPLBLD CKD-EPI 2021: 89.9 ML/MIN/1.73
EOSINOPHIL # BLD AUTO: 0.02 10*3/MM3 (ref 0–0.4)
EOSINOPHIL NFR BLD AUTO: 0.2 % (ref 0.3–6.2)
ERYTHROCYTE [DISTWIDTH] IN BLOOD BY AUTOMATED COUNT: 12.4 % (ref 12.3–15.4)
ETHANOL UR QL: <0.01 %
FENTANYL UR-MCNC: NEGATIVE NG/ML
FLUAV SUBTYP SPEC NAA+PROBE: NOT DETECTED
FLUBV RNA NPH QL NAA+NON-PROBE: NOT DETECTED
GAS FLOW AIRWAY: 4 LPM
GEN 5 1HR TROPONIN T REFLEX: 34 NG/L
GLOBULIN UR ELPH-MCNC: 3.4 GM/DL
GLUCOSE BLDC GLUCOMTR-MCNC: 109 MG/DL (ref 70–130)
GLUCOSE BLDC GLUCOMTR-MCNC: 97 MG/DL (ref 70–130)
GLUCOSE SERPL-MCNC: 88 MG/DL (ref 65–99)
GLUCOSE UR STRIP-MCNC: NEGATIVE MG/DL
HADV DNA SPEC NAA+PROBE: NOT DETECTED
HCO3 BLDA-SCNC: 23.2 MMOL/L (ref 20–26)
HCOV 229E RNA SPEC QL NAA+PROBE: NOT DETECTED
HCOV HKU1 RNA SPEC QL NAA+PROBE: NOT DETECTED
HCOV NL63 RNA SPEC QL NAA+PROBE: NOT DETECTED
HCOV OC43 RNA SPEC QL NAA+PROBE: NOT DETECTED
HCT VFR BLD AUTO: 36.7 % (ref 34–46.6)
HCT VFR BLD CALC: 34.3 % (ref 38–51)
HGB BLD-MCNC: 11.6 G/DL (ref 12–15.9)
HGB BLDA-MCNC: 11.2 G/DL (ref 12–16)
HGB UR QL STRIP.AUTO: NEGATIVE
HMPV RNA NPH QL NAA+NON-PROBE: NOT DETECTED
HPIV1 RNA ISLT QL NAA+PROBE: NOT DETECTED
HPIV2 RNA SPEC QL NAA+PROBE: NOT DETECTED
HPIV3 RNA NPH QL NAA+PROBE: NOT DETECTED
HPIV4 P GENE NPH QL NAA+PROBE: NOT DETECTED
HYALINE CASTS UR QL AUTO: ABNORMAL /LPF
IMM GRANULOCYTES # BLD AUTO: 0.04 10*3/MM3 (ref 0–0.05)
IMM GRANULOCYTES NFR BLD AUTO: 0.4 % (ref 0–0.5)
INR PPP: 1 (ref 0.91–1.09)
KETONES UR QL STRIP: ABNORMAL
L PNEUMO1 AG UR QL IA: NEGATIVE
LEUKOCYTE ESTERASE UR QL STRIP.AUTO: ABNORMAL
LYMPHOCYTES # BLD AUTO: 2.3 10*3/MM3 (ref 0.7–3.1)
LYMPHOCYTES NFR BLD AUTO: 22.5 % (ref 19.6–45.3)
Lab: ABNORMAL
M PNEUMO IGG SER IA-ACNC: NOT DETECTED
MAGNESIUM SERPL-MCNC: 1.7 MG/DL (ref 1.6–2.6)
MCH RBC QN AUTO: 27.2 PG (ref 26.6–33)
MCHC RBC AUTO-ENTMCNC: 31.6 G/DL (ref 31.5–35.7)
MCV RBC AUTO: 86.2 FL (ref 79–97)
METHADONE UR QL SCN: NEGATIVE
METHGB BLD QL: 0.5 % (ref 0–3)
MODALITY: ABNORMAL
MONOCYTES # BLD AUTO: 0.34 10*3/MM3 (ref 0.1–0.9)
MONOCYTES NFR BLD AUTO: 3.3 % (ref 5–12)
NEUTROPHILS NFR BLD AUTO: 7.48 10*3/MM3 (ref 1.7–7)
NEUTROPHILS NFR BLD AUTO: 73.3 % (ref 42.7–76)
NITRITE UR QL STRIP: POSITIVE
NRBC BLD AUTO-RTO: 0 /100 WBC (ref 0–0.2)
NT-PROBNP SERPL-MCNC: 133.6 PG/ML (ref 0–900)
OPIATES UR QL: NEGATIVE
OXYCODONE UR QL SCN: NEGATIVE
OXYHGB MFR BLDV: 94.5 % (ref 94–99)
PCO2 BLDA: 36.5 MM HG (ref 35–45)
PCO2 TEMP ADJ BLD: 36.5 MM HG (ref 35–45)
PCP UR QL SCN: NEGATIVE
PH BLDA: 7.41 PH UNITS (ref 7.35–7.45)
PH UR STRIP.AUTO: 5.5 [PH] (ref 5–8)
PH, TEMP CORRECTED: 7.41 PH UNITS (ref 7.35–7.45)
PLATELET # BLD AUTO: 396 10*3/MM3 (ref 140–450)
PMV BLD AUTO: 10.7 FL (ref 6–12)
PO2 BLDA: 76.9 MM HG (ref 83–108)
PO2 TEMP ADJ BLD: 76.9 MM HG (ref 83–108)
POTASSIUM BLDA-SCNC: 3.5 MMOL/L (ref 3.5–5.2)
POTASSIUM SERPL-SCNC: 4.1 MMOL/L (ref 3.5–5.2)
PROCALCITONIN SERPL-MCNC: 0.1 NG/ML (ref 0–0.25)
PROT SERPL-MCNC: 7.6 G/DL (ref 6–8.5)
PROT UR QL STRIP: ABNORMAL
PROTHROMBIN TIME: 13.7 SECONDS (ref 11.8–14.8)
RBC # BLD AUTO: 4.26 10*6/MM3 (ref 3.77–5.28)
RBC # UR STRIP: ABNORMAL /HPF
REF LAB TEST METHOD: ABNORMAL
RHINOVIRUS RNA SPEC NAA+PROBE: NOT DETECTED
RSV RNA NPH QL NAA+NON-PROBE: NOT DETECTED
S PNEUM AG SPEC QL LA: NEGATIVE
SAO2 % BLDCOA: 96.3 % (ref 94–99)
SARS-COV-2 RNA RESP QL NAA+PROBE: NOT DETECTED
SODIUM BLDA-SCNC: 144 MMOL/L (ref 136–145)
SODIUM SERPL-SCNC: 143 MMOL/L (ref 136–145)
SP GR UR STRIP: 1.02 (ref 1–1.03)
SQUAMOUS #/AREA URNS HPF: ABNORMAL /HPF
TRICYCLICS UR QL SCN: NEGATIVE
TROPONIN T % DELTA: -11
TROPONIN T NUMERIC DELTA: -4 NG/L
TROPONIN T SERPL HS-MCNC: 38 NG/L
UROBILINOGEN UR QL STRIP: ABNORMAL
VENTILATOR MODE: ABNORMAL
WBC # UR STRIP: ABNORMAL /HPF
WBC NRBC COR # BLD AUTO: 10.21 10*3/MM3 (ref 3.4–10.8)

## 2025-07-05 PROCEDURE — 85379 FIBRIN DEGRADATION QUANT: CPT | Performed by: EMERGENCY MEDICINE

## 2025-07-05 PROCEDURE — 83050 HGB METHEMOGLOBIN QUAN: CPT

## 2025-07-05 PROCEDURE — 85610 PROTHROMBIN TIME: CPT | Performed by: EMERGENCY MEDICINE

## 2025-07-05 PROCEDURE — 71045 X-RAY EXAM CHEST 1 VIEW: CPT

## 2025-07-05 PROCEDURE — 94799 UNLISTED PULMONARY SVC/PX: CPT

## 2025-07-05 PROCEDURE — 84484 ASSAY OF TROPONIN QUANT: CPT | Performed by: EMERGENCY MEDICINE

## 2025-07-05 PROCEDURE — 80053 COMPREHEN METABOLIC PANEL: CPT | Performed by: EMERGENCY MEDICINE

## 2025-07-05 PROCEDURE — 36415 COLL VENOUS BLD VENIPUNCTURE: CPT

## 2025-07-05 PROCEDURE — 93005 ELECTROCARDIOGRAM TRACING: CPT

## 2025-07-05 PROCEDURE — 93010 ELECTROCARDIOGRAM REPORT: CPT | Performed by: HOSPITALIST

## 2025-07-05 PROCEDURE — P9612 CATHETERIZE FOR URINE SPEC: HCPCS

## 2025-07-05 PROCEDURE — 25510000001 IOPAMIDOL PER 1 ML: Performed by: EMERGENCY MEDICINE

## 2025-07-05 PROCEDURE — 82948 REAGENT STRIP/BLOOD GLUCOSE: CPT

## 2025-07-05 PROCEDURE — 99285 EMERGENCY DEPT VISIT HI MDM: CPT | Performed by: EMERGENCY MEDICINE

## 2025-07-05 PROCEDURE — 82805 BLOOD GASES W/O2 SATURATION: CPT

## 2025-07-05 PROCEDURE — 71275 CT ANGIOGRAPHY CHEST: CPT

## 2025-07-05 PROCEDURE — 82375 ASSAY CARBOXYHB QUANT: CPT

## 2025-07-05 PROCEDURE — 25010000002 CEFTRIAXONE PER 250 MG: Performed by: EMERGENCY MEDICINE

## 2025-07-05 PROCEDURE — 25810000003 SODIUM CHLORIDE 0.9 % SOLUTION 250 ML FLEX CONT: Performed by: EMERGENCY MEDICINE

## 2025-07-05 PROCEDURE — 94640 AIRWAY INHALATION TREATMENT: CPT

## 2025-07-05 PROCEDURE — 85025 COMPLETE CBC W/AUTO DIFF WBC: CPT | Performed by: EMERGENCY MEDICINE

## 2025-07-05 PROCEDURE — 82077 ASSAY SPEC XCP UR&BREATH IA: CPT | Performed by: EMERGENCY MEDICINE

## 2025-07-05 PROCEDURE — 25810000003 SODIUM CHLORIDE 0.9 % SOLUTION: Performed by: NURSE PRACTITIONER

## 2025-07-05 PROCEDURE — 25010000002 METOPROLOL TARTRATE 5 MG/5ML SOLUTION: Performed by: EMERGENCY MEDICINE

## 2025-07-05 PROCEDURE — 80307 DRUG TEST PRSMV CHEM ANLYZR: CPT | Performed by: EMERGENCY MEDICINE

## 2025-07-05 PROCEDURE — 0202U NFCT DS 22 TRGT SARS-COV-2: CPT | Performed by: EMERGENCY MEDICINE

## 2025-07-05 PROCEDURE — 84145 PROCALCITONIN (PCT): CPT | Performed by: EMERGENCY MEDICINE

## 2025-07-05 PROCEDURE — 83605 ASSAY OF LACTIC ACID: CPT | Performed by: EMERGENCY MEDICINE

## 2025-07-05 PROCEDURE — 81001 URINALYSIS AUTO W/SCOPE: CPT | Performed by: EMERGENCY MEDICINE

## 2025-07-05 PROCEDURE — 87040 BLOOD CULTURE FOR BACTERIA: CPT | Performed by: EMERGENCY MEDICINE

## 2025-07-05 PROCEDURE — 36600 WITHDRAWAL OF ARTERIAL BLOOD: CPT

## 2025-07-05 PROCEDURE — 87449 NOS EACH ORGANISM AG IA: CPT | Performed by: NURSE PRACTITIONER

## 2025-07-05 PROCEDURE — 25010000002 ENOXAPARIN PER 10 MG: Performed by: NURSE PRACTITIONER

## 2025-07-05 PROCEDURE — 86140 C-REACTIVE PROTEIN: CPT | Performed by: EMERGENCY MEDICINE

## 2025-07-05 PROCEDURE — 83880 ASSAY OF NATRIURETIC PEPTIDE: CPT | Performed by: EMERGENCY MEDICINE

## 2025-07-05 PROCEDURE — 93005 ELECTROCARDIOGRAM TRACING: CPT | Performed by: EMERGENCY MEDICINE

## 2025-07-05 PROCEDURE — 83735 ASSAY OF MAGNESIUM: CPT | Performed by: EMERGENCY MEDICINE

## 2025-07-05 PROCEDURE — 25010000002 AZITHROMYCIN PER 500 MG: Performed by: EMERGENCY MEDICINE

## 2025-07-05 PROCEDURE — 94761 N-INVAS EAR/PLS OXIMETRY MLT: CPT

## 2025-07-05 RX ORDER — IPRATROPIUM BROMIDE AND ALBUTEROL SULFATE 2.5; .5 MG/3ML; MG/3ML
3 SOLUTION RESPIRATORY (INHALATION)
Status: DISCONTINUED | OUTPATIENT
Start: 2025-07-05 | End: 2025-07-07 | Stop reason: HOSPADM

## 2025-07-05 RX ORDER — AMOXICILLIN 250 MG
2 CAPSULE ORAL 2 TIMES DAILY PRN
Status: DISCONTINUED | OUTPATIENT
Start: 2025-07-05 | End: 2025-07-07 | Stop reason: HOSPADM

## 2025-07-05 RX ORDER — ACETAMINOPHEN 160 MG/5ML
650 SOLUTION ORAL EVERY 4 HOURS PRN
Status: DISCONTINUED | OUTPATIENT
Start: 2025-07-05 | End: 2025-07-07 | Stop reason: HOSPADM

## 2025-07-05 RX ORDER — CARVEDILOL 25 MG/1
25 TABLET ORAL 2 TIMES DAILY WITH MEALS
Status: DISCONTINUED | OUTPATIENT
Start: 2025-07-05 | End: 2025-07-06

## 2025-07-05 RX ORDER — DEXTROSE MONOHYDRATE 25 G/50ML
25 INJECTION, SOLUTION INTRAVENOUS
Status: DISCONTINUED | OUTPATIENT
Start: 2025-07-05 | End: 2025-07-07 | Stop reason: HOSPADM

## 2025-07-05 RX ORDER — SODIUM CHLORIDE 0.9 % (FLUSH) 0.9 %
10 SYRINGE (ML) INJECTION AS NEEDED
Status: DISCONTINUED | OUTPATIENT
Start: 2025-07-05 | End: 2025-07-07 | Stop reason: HOSPADM

## 2025-07-05 RX ORDER — METOPROLOL TARTRATE 1 MG/ML
5 INJECTION, SOLUTION INTRAVENOUS ONCE
Status: COMPLETED | OUTPATIENT
Start: 2025-07-05 | End: 2025-07-05

## 2025-07-05 RX ORDER — DILTIAZEM HCL/D5W 125 MG/125
5-15 PLASTIC BAG, INJECTION (ML) INTRAVENOUS
Status: DISCONTINUED | OUTPATIENT
Start: 2025-07-05 | End: 2025-07-07 | Stop reason: HOSPADM

## 2025-07-05 RX ORDER — BISACODYL 5 MG/1
5 TABLET, DELAYED RELEASE ORAL DAILY PRN
Status: DISCONTINUED | OUTPATIENT
Start: 2025-07-05 | End: 2025-07-07 | Stop reason: HOSPADM

## 2025-07-05 RX ORDER — ACETAMINOPHEN 160 MG/5ML
650 SOLUTION ORAL EVERY 4 HOURS PRN
Status: DISCONTINUED | OUTPATIENT
Start: 2025-07-05 | End: 2025-07-05 | Stop reason: SDUPTHER

## 2025-07-05 RX ORDER — SODIUM CHLORIDE 0.9 % (FLUSH) 0.9 %
10 SYRINGE (ML) INJECTION EVERY 12 HOURS SCHEDULED
Status: DISCONTINUED | OUTPATIENT
Start: 2025-07-05 | End: 2025-07-07 | Stop reason: HOSPADM

## 2025-07-05 RX ORDER — ENOXAPARIN SODIUM 100 MG/ML
1 INJECTION SUBCUTANEOUS EVERY 12 HOURS
Status: DISCONTINUED | OUTPATIENT
Start: 2025-07-05 | End: 2025-07-06

## 2025-07-05 RX ORDER — IOPAMIDOL 755 MG/ML
100 INJECTION, SOLUTION INTRAVASCULAR
Status: COMPLETED | OUTPATIENT
Start: 2025-07-05 | End: 2025-07-05

## 2025-07-05 RX ORDER — ACETAMINOPHEN 325 MG/1
650 TABLET ORAL EVERY 4 HOURS PRN
Status: DISCONTINUED | OUTPATIENT
Start: 2025-07-05 | End: 2025-07-05 | Stop reason: SDUPTHER

## 2025-07-05 RX ORDER — NICOTINE POLACRILEX 4 MG
15 LOZENGE BUCCAL
Status: DISCONTINUED | OUTPATIENT
Start: 2025-07-05 | End: 2025-07-07 | Stop reason: HOSPADM

## 2025-07-05 RX ORDER — INSULIN LISPRO 100 [IU]/ML
2-7 INJECTION, SOLUTION INTRAVENOUS; SUBCUTANEOUS
Status: DISCONTINUED | OUTPATIENT
Start: 2025-07-05 | End: 2025-07-07 | Stop reason: HOSPADM

## 2025-07-05 RX ORDER — BISACODYL 10 MG
10 SUPPOSITORY, RECTAL RECTAL DAILY PRN
Status: DISCONTINUED | OUTPATIENT
Start: 2025-07-05 | End: 2025-07-07 | Stop reason: HOSPADM

## 2025-07-05 RX ORDER — SODIUM CHLORIDE 9 MG/ML
40 INJECTION, SOLUTION INTRAVENOUS AS NEEDED
Status: DISCONTINUED | OUTPATIENT
Start: 2025-07-05 | End: 2025-07-07 | Stop reason: HOSPADM

## 2025-07-05 RX ORDER — IBUPROFEN 600 MG/1
1 TABLET ORAL
Status: DISCONTINUED | OUTPATIENT
Start: 2025-07-05 | End: 2025-07-07 | Stop reason: HOSPADM

## 2025-07-05 RX ORDER — ONDANSETRON 4 MG/1
4 TABLET, ORALLY DISINTEGRATING ORAL EVERY 6 HOURS PRN
Status: DISCONTINUED | OUTPATIENT
Start: 2025-07-05 | End: 2025-07-07 | Stop reason: HOSPADM

## 2025-07-05 RX ORDER — NITROGLYCERIN 0.4 MG/1
0.4 TABLET SUBLINGUAL
Status: DISCONTINUED | OUTPATIENT
Start: 2025-07-05 | End: 2025-07-07 | Stop reason: HOSPADM

## 2025-07-05 RX ORDER — ACETAMINOPHEN 650 MG/1
650 SUPPOSITORY RECTAL EVERY 4 HOURS PRN
Status: DISCONTINUED | OUTPATIENT
Start: 2025-07-05 | End: 2025-07-07 | Stop reason: HOSPADM

## 2025-07-05 RX ORDER — ONDANSETRON 2 MG/ML
4 INJECTION INTRAMUSCULAR; INTRAVENOUS EVERY 6 HOURS PRN
Status: DISCONTINUED | OUTPATIENT
Start: 2025-07-05 | End: 2025-07-07 | Stop reason: HOSPADM

## 2025-07-05 RX ORDER — ACETAMINOPHEN 650 MG/1
650 SUPPOSITORY RECTAL EVERY 4 HOURS PRN
Status: DISCONTINUED | OUTPATIENT
Start: 2025-07-05 | End: 2025-07-05 | Stop reason: SDUPTHER

## 2025-07-05 RX ORDER — ACETAMINOPHEN 325 MG/1
650 TABLET ORAL EVERY 4 HOURS PRN
Status: DISCONTINUED | OUTPATIENT
Start: 2025-07-05 | End: 2025-07-07 | Stop reason: HOSPADM

## 2025-07-05 RX ORDER — POLYETHYLENE GLYCOL 3350 17 G/17G
17 POWDER, FOR SOLUTION ORAL DAILY PRN
Status: DISCONTINUED | OUTPATIENT
Start: 2025-07-05 | End: 2025-07-07 | Stop reason: HOSPADM

## 2025-07-05 RX ADMIN — IPRATROPIUM BROMIDE AND ALBUTEROL SULFATE 3 ML: .5; 3 SOLUTION RESPIRATORY (INHALATION) at 14:03

## 2025-07-05 RX ADMIN — Medication 5 MG/HR: at 11:21

## 2025-07-05 RX ADMIN — AZITHROMYCIN MONOHYDRATE 500 MG: 500 INJECTION, POWDER, LYOPHILIZED, FOR SOLUTION INTRAVENOUS at 13:44

## 2025-07-05 RX ADMIN — IOPAMIDOL 100 ML: 755 INJECTION, SOLUTION INTRAVENOUS at 10:56

## 2025-07-05 RX ADMIN — SODIUM CHLORIDE 1000 ML: 9 INJECTION, SOLUTION INTRAVENOUS at 13:44

## 2025-07-05 RX ADMIN — ENOXAPARIN SODIUM 60 MG: 100 INJECTION SUBCUTANEOUS at 18:16

## 2025-07-05 RX ADMIN — METOPROLOL TARTRATE 5 MG: 5 INJECTION INTRAVENOUS at 10:44

## 2025-07-05 RX ADMIN — CEFTRIAXONE SODIUM 1000 MG: 1 INJECTION, POWDER, FOR SOLUTION INTRAMUSCULAR; INTRAVENOUS at 12:33

## 2025-07-05 RX ADMIN — CARVEDILOL 25 MG: 25 TABLET, FILM COATED ORAL at 18:16

## 2025-07-05 RX ADMIN — IPRATROPIUM BROMIDE AND ALBUTEROL SULFATE 3 ML: .5; 3 SOLUTION RESPIRATORY (INHALATION) at 20:15

## 2025-07-05 RX ADMIN — Medication 10 ML: at 20:40

## 2025-07-05 NOTE — PROGRESS NOTES
"[facility] Clinical Pharmacy Services: Enoxaparin Consult  Lynn Magana is a 49 y.o. female who has been consulted to Initiate therapeutic enoxaparin.     Indication: Full Anticoagulation - tachycardia  Home Anticoagulation: none     Relevant clinical data and objective history reviewed:  Ht: 188 cm (74\"); Wt: 64.5 kg (142 lb 3.2 oz); BMI: Body mass index is 18.26 kg/m².  Estimated Creatinine Clearance: 85.7 mL/min (by C-G formula based on SCr of 0.8 mg/dL).  Results from last 7 days   Lab Units 07/05/25  0740 07/04/25  0930 06/30/25  1050   HEMOGLOBIN g/dL 11.6* 10.6* 10.4*   HEMATOCRIT % 36.7 31.9* 33*   PLATELETS 10*3/mm3 396 363 406*   CREATININE mg/dL 0.80 0.8 0.9       Indication(s) for therapeutic anti-Xa monitoring: No indication for anti-Xa level monitoring     Asessment/Plan:  Initiate 60 mg (1mg/kg) subcutaneous every 12 hours.  Pharmacy will continue to monitor renal function and clinical status and adjust the dose and/or frequency as needed.    Sherry Linares, PharmD  7/5/2025  16:24 CDT    "

## 2025-07-05 NOTE — ED PROVIDER NOTES
Subjective   History of Present Illness  Patient is a 49-year-old lady who came to the ED with complaints of shortness of breath cough her oxygen saturation was low in the morning calling EMS.  She does state that she uses oxygen at home.  Over here on 4 L oxygen oxygen saturation 99%.  Patient has been persistently tachycardic review of the prior chart review that she has persistent inappropriate tachycardia in the past also.  Initially her blood pressure was on lower side but has improved on its own.  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  And was discharged home later.  States that currently she is able to move her legs.  But feels weak all over.  Denies any nausea or vomiting at this time.  Denies any chest pain.    Shortness of Breath  Severity:  Moderate  Onset quality:  Gradual  Timing:  Constant  Progression:  Worsening  Chronicity:  New  Context: activity    Context: not animal exposure, not emotional upset, not occupational exposure, not pollens, not URI and not weather changes    Relieved by:  Nothing  Worsened by:  Exertion  Ineffective treatments:  None tried  Associated symptoms: cough and wheezing    Associated symptoms: no abdominal pain, no chest pain, no claudication, no diaphoresis, no ear pain, no fever, no headaches, no hemoptysis, no PND, no rash, no sore throat, no sputum production, no syncope and no vomiting    Risk factors: no family hx of DVT and no obesity        Review of Systems   Constitutional: Negative.  Negative for activity change, appetite change, chills, diaphoresis, fatigue and fever.   HENT:  Negative for congestion, drooling, ear pain, facial swelling, hearing loss, sinus pressure and sore throat.    Eyes: Negative.  Negative for discharge.   Respiratory:  Positive for cough,  shortness of breath and wheezing. Negative for hemoptysis and sputum production.    Cardiovascular:  Negative for chest pain, claudication, syncope and PND.   Gastrointestinal:  Negative for abdominal distention, abdominal pain, blood in stool, diarrhea, nausea and vomiting.   Endocrine: Negative.  Negative for cold intolerance, heat intolerance, polydipsia, polyphagia and polyuria.   Genitourinary: Negative.  Negative for dysuria, flank pain and urgency.   Musculoskeletal: Negative.  Negative for arthralgias, back pain, myalgias and neck stiffness.   Skin: Negative.  Negative for color change, pallor and rash.   Allergic/Immunologic: Negative.    Neurological: Negative.  Negative for dizziness, seizures, speech difficulty, weakness, numbness and headaches.   Hematological: Negative.  Negative for adenopathy.   All other systems reviewed and are negative.      Past Medical History:   Diagnosis Date    Arthritis     Carotid artery stenosis     Degenerative disc disease, cervical     Depression     Diabetes mellitus     type 1    Disease of thyroid gland     GERD (gastroesophageal reflux disease)     Graves disease     Heart palpitations     Hyperlipidemia     Hypertension     Hypothyroidism     Seizure     Thyromegaly        Allergies   Allergen Reactions    Oxycodone-Acetaminophen Swelling     Other reaction(s): Throat swelling       Past Surgical History:   Procedure Laterality Date     SECTION      CHOLECYSTECTOMY      COLONOSCOPY  2015    Normal exam Dr. Morgan     COLONOSCOPY  2015    Normal exam    CYST REMOVAL      ENDOSCOPY N/A 2018    Normal exam    ENDOSCOPY N/A 2025    Procedure: ESOPHAGOGASTRODUODENOSCOPY WITH ANESTHESIA;  Surgeon: Jadon Smith MD;  Location: [facility] ENDOSCOPY;  Service: Gastroenterology;  Laterality: N/A;  pre op: Nausea and vomiting  post op: normal  pcp: Darryl Andrews DO    HYSTERECTOMY      THYROIDECTOMY Bilateral 2018    Procedure:  total thyroidectomy;  Surgeon: Vitaly Givens MD;  Location: [facility] OR;  Service: ENT       Family History   Problem Relation Age of Onset    Stroke Mother     Diabetes Mother     Stroke Father     Diabetes Father     Breast cancer Sister     Diabetes Sister     Stroke Sister     Seizures Sister     Coronary artery disease Brother     Diabetes Brother     Breast cancer Maternal Aunt     Colon cancer Paternal Uncle     Colon polyps Neg Hx        Social History     Socioeconomic History    Marital status:      Spouse name: Evens    Number of children: 1    Years of education: 12   Tobacco Use    Smoking status: Never    Smokeless tobacco: Never   Vaping Use    Vaping status: Never Used   Substance and Sexual Activity    Alcohol use: Yes     Alcohol/week: 6.0 standard drinks of alcohol     Types: 6 Cans of beer per week    Drug use: Yes     Types: Marijuana    Sexual activity: Defer     Partners: Male           Objective   Physical Exam  Vitals and nursing note reviewed. Exam conducted with a chaperone present.   Constitutional:       General: She is not in acute distress.     Appearance: She is well-developed and underweight. She is ill-appearing. She is not toxic-appearing.   HENT:      Head: Normocephalic and atraumatic.      Right Ear: External ear normal.      Nose: Nose normal.      Mouth/Throat:      Mouth: Mucous membranes are moist.   Eyes:      Conjunctiva/sclera: Conjunctivae normal.      Pupils: Pupils are equal, round, and reactive to light.   Neck:      Comments: No nuchal rigidity.  Cardiovascular:      Rate and Rhythm: Regular rhythm. Tachycardia present.      Chest Wall: PMI is not displaced.      Pulses: Normal pulses. No decreased pulses.      Heart sounds: Normal heart sounds. No murmur heard.     No diastolic murmur is present.   Pulmonary:      Effort: Pulmonary effort is normal. Tachypnea present. No accessory muscle usage or respiratory distress.      Breath sounds: No stridor.  Examination of the right-lower field reveals decreased breath sounds and rales. Examination of the left-lower field reveals decreased breath sounds and rales. Decreased breath sounds and rales present. No wheezing or rhonchi.   Chest:      Chest wall: No tenderness or crepitus.   Abdominal:      General: Bowel sounds are normal. There is no distension.      Palpations: Abdomen is soft.      Tenderness: There is no abdominal tenderness.   Musculoskeletal:         General: No swelling or tenderness. Normal range of motion.      Cervical back: Normal range of motion and neck supple. No rigidity.      Comments: Lower extremity exam bilaterally is unremarkable.  There is no right or left calf tenderness .  There is no palpable venous cord.  No obvious difference in the size of the legs.  No pitting edema.  The dorsalis pedis and posterior tibial femoral and popliteal pulses are palpable and +2 bilaterally.  Homans sign is negative   Skin:     General: Skin is warm.      Capillary Refill: Capillary refill takes 2 to 3 seconds.      Coloration: Skin is not jaundiced.      Findings: No erythema or rash.   Neurological:      General: No focal deficit present.      Mental Status: She is oriented to person, place, and time. Mental status is at baseline. She is lethargic.      GCS: GCS eye subscore is 4. GCS verbal subscore is 5. GCS motor subscore is 5.      Cranial Nerves: Cranial nerves 2-12 are intact. No cranial nerve deficit or facial asymmetry.      Motor: Motor function is intact. No weakness.   Psychiatric:         Mood and Affect: Mood normal.         Procedures           ED Course  ED Course as of 07/05/25 1319   Sat Jul 05, 2025   0917 Well score 6 [TS]   1314 Patient not a good historian came into the ER with tachycardia and shortness of breath CT of the chest obtained rule out a PE does show patchy infiltrates bilaterally for which she was given IV antibiotics ceftriaxone and Zithromax.  In the ED because of  persistent tachycardia she was placed on diltiazem which is controlled her rate blood pressure is stable with this.  The reason the use diltiazem was to control her blood pressure as well as the rate.  I do not see any obvious evidence of SVT or A-fib.  But the possibly of that cannot be ruled out and further evaluation assessment will have to be performed this patient for that I did not want to use adenosine as patient was already short of breath.  The patient is clinically appears sick but her lab workup is not very abnormal.  Her delta tropes are slightly elevated which is probably related to her heart rate and is type II elevation.  There is no cardiac ischemia on the EKG.  Urinalysis does not show any significant UTI.  Although nitrite positive respiratory panel is negative.  Her lactic acid was slightly elevated chemistries are within normal limits and ABGs looked pretty good at this time.  So essentially a CT angio of the chest which shows pneumonia which is atypical rest of lab workup at his baseline.  Discussed this case Virgen with the patient and her .  The patient will be admitted to the medicine service for further evaluation assessment. [TS]   1315 As far as the weakness is concerned patient is able to move both her upper and lower extremity but she says she feels very weak and cannot walk because of weakness weakness of more generalized she says is better than what it was when she went to [facility]. [TS]      ED Course User Index  [TS] Linus Rodriguez MD                                                       Medical Decision Making  Differential Diagnosis:  I considered pulmonary etiology, asthma, chronic obstructive pulmonary disease, pneumonia, pulmonary embolism, adult respiratory distress syndrome, pneumothorax, pleural effusion, pulmonary fibrosis, cardiac etiology, congestive heart failure, myocardial infarction, metabolic etiology, diabetic ketoacidosis, uremia, acidosis, sepsis,  anemia, drug related etiology, hyperventilation and CNS disease as a possible cause of dyspnea in this patient. This is a partial list of diagnoses considered.       Patient has generalized weakness she is able to move her upper and lower extremities.  The patient does not any diplopia or dysphagia or any cranial nerve palsies.    Problems Addressed:  Generalized weakness: acute illness or injury  Pneumonia due to infectious organism, unspecified laterality, unspecified part of lung: acute illness or injury  Tachycardia: acute illness or injury    Amount and/or Complexity of Data Reviewed  Labs: ordered.  Radiology: ordered.  ECG/medicine tests: ordered.    Risk  Prescription drug management.  Decision regarding hospitalization.        Final diagnoses:   Pneumonia due to infectious organism, unspecified laterality, unspecified part of lung   Generalized weakness   Tachycardia       ED Disposition  ED Disposition       ED Disposition   Decision to Admit    Condition   --    Comment   Level of Care: Telemetry [5]   Diagnosis: Pneumonia [303061]   Admitting Physician: SUDHAKAR MOHAMUD [[id number]]   Attending Physician: SUDHAKAR MOHAMUD [[id number]]   Certification: I Certify That Inpatient Hospital Services Are Medically Necessary For Greater Than 2 Midnights                 No follow-up provider specified.       Medication List        PAUSE taking these medications      amLODIPine 10 MG tablet  Wait to take this until your doctor or other care provider tells you to start again.  Commonly known as: NORVASC     carvedilol 25 MG tablet  Wait to take this until your doctor or other care provider tells you to start again.  Commonly known as: COREG     empagliflozin 25 MG tablet tablet  Wait to take this until your doctor or other care provider tells you to start again.  Commonly known as: JARDIANCE     insulin glargine 100 UNIT/ML injection  Wait to take this until your doctor or other care provider tells you to start  again.  Commonly known as: LANTUS, SEMGLEE     metFORMIN  MG 24 hr tablet  Wait to take this until your doctor or other care provider tells you to start again.  Commonly known as: GLUCOPHAGE-XR     spironolactone 50 MG tablet  Wait to take this until your doctor or other care provider tells you to start again.  Commonly known as: ALDACTONE                 Linus Rodriguez MD  07/05/25 [id number]

## 2025-07-05 NOTE — H&P
[facility] Medicine Services  HISTORY AND PHYSICAL    Date of Admission: 7/5/2025  Primary Care Physician: Darryl Andrews DO    Subjective   Primary Historian:  Patient    Chief Complaint:  Shortness of breath,     History of Present Illness  Lynn Magana is a 49-year-old lady who came to the ED with complaints of shortness of breath and cough. Her oxygen saturation was low in the morning calling EMS.  She does state that she uses oxygen at home.  I ER started on O2 at 4 L, oxygen saturation 99%.  Patient has been persistently tachycardic, she has persistent tachycardia in the past also.  Initially her blood pressure was on lower side but has improved on its own.  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  States that currently she is able to move her legs.  But feels weak all over.  Denies any nausea or vomiting at this time.  Denies any chest pain.  Patient has a history of type 1 diabetes mellitus, GERD, Graves' disease, hyperlipidemia, hypertension, hypothyroidism, seizures, and arthritis.    Patient was resting in ER.  She is awake, alert and oriented to person and place, but she was confused about which hospital she was at.  She had a hard time explaining her previous  hospital stays.   Patient just got discharged from [facility] on 6/24/2025.  She reports she is having a cough and generalized weakness.  She really does not know why she is at the hospital.  She just knows she is weak and she can tell that her heart is beating a little fast.  She did not have any oxygen on at the time of my assessment and did not appear to be any acute distress.  Patient's potassium is 4.1, sodium 143, creatinine 0.8, AST 38, ALT 30, lactate is 2.9, CRP is 0.83, procalcitonin is  0.10, D-dimer is 0.53, WBCs 10.21, hemoglobin 11.6, hematocrit 36.7, respiratory panel negative.  Patient is being admitted to hospitalist services due to atypical pneumonia.    CTA chest IMPRESSION:  1. No pulmonary embolism.  2. Mild patchy LEFT side infiltrate compatible with atypical pneumonia.    Review of Systems   Otherwise complete ROS reviewed and negative except as mentioned in the HPI.    Past Medical History:   Past Medical History:   Diagnosis Date    Arthritis     Carotid artery stenosis     Degenerative disc disease, cervical     Depression     Diabetes mellitus     type 1    Disease of thyroid gland     GERD (gastroesophageal reflux disease)     Graves disease     Heart palpitations     Hyperlipidemia     Hypertension     Hypothyroidism     Seizure     Thyromegaly      Past Surgical History:  Past Surgical History:   Procedure Laterality Date     SECTION      CHOLECYSTECTOMY      COLONOSCOPY  2015    Normal exam Dr. Morgan     COLONOSCOPY  2015    Normal exam    CYST REMOVAL      ENDOSCOPY N/A 2018    Normal exam    ENDOSCOPY N/A 2025    Procedure: ESOPHAGOGASTRODUODENOSCOPY WITH ANESTHESIA;  Surgeon: Jadon Smith MD;  Location: [facility] ENDOSCOPY;  Service: Gastroenterology;  Laterality: N/A;  pre op: Nausea and vomiting  post op: normal  pcp: Darryl Andrews DO    HYSTERECTOMY      THYROIDECTOMY Bilateral 2018    Procedure: total thyroidectomy;  Surgeon: Vitaly Givens MD;  Location: [facility] OR;  Service: ENT     Social History:  reports that she has never smoked. She has never used smokeless tobacco. She reports current alcohol use of about 6.0 standard drinks of alcohol per week. She reports current drug use. Drug: Marijuana.    Family History: family history includes Breast cancer in her maternal aunt and sister; Colon cancer in her paternal uncle; Coronary artery disease in her brother; Diabetes in her brother, father, mother, and sister;  Seizures in her sister; Stroke in her father, mother, and sister.       Allergies:  Allergies   Allergen Reactions    Oxycodone-Acetaminophen Swelling     Other reaction(s): Throat swelling       Medications:  Prior to Admission medications    Medication Sig Start Date End Date Taking? Authorizing Provider   amLODIPine (NORVASC) 10 MG tablet Take 1 tablet by mouth Daily.    Luciano Hayes MD   ARIPiprazole (ABILIFY) 10 MG tablet Take 1 tablet by mouth Daily.    Luciano Hayes MD   benazepril (LOTENSIN) 20 MG tablet Take 1 tablet by mouth Daily.    Luciano Hayes MD   carvedilol (COREG) 25 MG tablet Take 1 tablet by mouth 2 (Two) Times a Day With Meals.    Luciano Hayes MD   empagliflozin (JARDIANCE) 25 MG tablet tablet Take 1 tablet by mouth Daily.    Luciano Hayes MD   Evolocumab (REPATHA) solution auto-injector SureClick injection Inject 1 mL under the skin into the appropriate area as directed Every 14 (Fourteen) Days.    Luciano Hayes MD   folic acid (FOLVITE) 1 MG tablet Take 1 tablet by mouth Daily. 6/12/25   Yovanny Abernathy MD   hydrOXYzine pamoate (VISTARIL) 25 MG capsule Take 1 capsule by mouth 3 (Three) Times a Day As Needed for Anxiety.    Luciano Hayes MD   insulin glargine (LANTUS, SEMGLEE) 100 UNIT/ML injection Inject 75 Units under the skin into the appropriate area as directed Every Night.  Patient not taking: Reported on 5/30/2025    Luciano Hayes MD   levothyroxine (SYNTHROID, LEVOTHROID) 175 MCG tablet Take 1 tablet by mouth Every Morning. 5/14/25   Uri Rhodes MD   liothyronine (CYTOMEL) 25 MCG tablet Take 1 tablet by mouth 2 (Two) Times a Day.    Luciano Hayes MD   Melatonin 10 MG tablet Take 1 tablet by mouth At Night As Needed (sleep).    Luciano Hayes MD   metFORMIN ER (GLUCOPHAGE-XR) 500 MG 24 hr tablet Take 2 tablets by mouth Daily With Breakfast.    Luciano Hayes MD   mirtazapine (REMERON) 15  "MG tablet Take 1 tablet by mouth Every Night.    Luciano Hayes MD   omeprazole (priLOSEC) 20 MG capsule Take 1 capsule by mouth Daily.    Luciano Hayes MD   ondansetron ODT (ZOFRAN-ODT) 4 MG disintegrating tablet Take 1 tablet by mouth Every 6 (Six) Hours As Needed for Nausea or Vomiting. 2/16/25   Donna APRN   polyethylene glycol (MIRALAX) 17 g packet Take 17 g by mouth Daily As Needed (constipation).    Luciano Hayes MD   polyethylene glycol (MIRALAX) 17 g packet Take 17 g by mouth Daily. 6/11/25   Yovanyn Abernathy MD   propranolol LA (INDERAL LA) 120 MG 24 hr capsule Take 1 capsule by mouth Daily.    Luciano Hayes MD   rosuvastatin (CRESTOR) 20 MG tablet Take 1 tablet by mouth Every Night.    Luciano Hayes MD   spironolactone (ALDACTONE) 50 MG tablet Take 1 tablet by mouth 2 (Two) Times a Day.    Luciano Hayes MD   tamsulosin (FLOMAX) 0.4 MG capsule 24 hr capsule Take 1 capsule by mouth Daily. 6/12/25   Yovanny Abernathy MD   thiamine (VITAMIN B1) 500 MG tablet Take 1 tablet by mouth 3 times a day. 6/11/25   Yovanny Abernathy MD   vitamin D (ERGOCALCIFEROL) 1.25 MG (68295 UT) capsule capsule Take 1 capsule by mouth 1 (One) Time Per Week.    Luciano Hayes MD     I have utilized all available immediate resources to obtain, update, or review the patient's current medications (including all prescriptions, over-the-counter products, herbals, cannabis/cannabidiol products, and vitamin/mineral/dietary (nutritional) supplements).    Objective     Vital Signs: /87 (BP Location: Left arm, Patient Position: Lying)   Pulse 114   Temp 97.6 °F (36.4 °C) (Oral)   Resp 20   Ht 188 cm (74\")   Wt 64.5 kg (142 lb 3.2 oz)   SpO2 95%   BMI 18.26 kg/m²   Physical Exam  Constitutional:       Appearance: She is underweight. She is ill-appearing.   HENT:      Head: Normocephalic and atraumatic.      Nose: Nose normal.      Mouth/Throat:      Mouth: " Mucous membranes are moist.      Pharynx: Oropharynx is clear.   Eyes:      Extraocular Movements: Extraocular movements intact.      Conjunctiva/sclera: Conjunctivae normal.   Cardiovascular:      Rate and Rhythm: Regular rhythm. Tachycardia present.      Pulses: Normal pulses.      Heart sounds: Normal heart sounds.   Pulmonary:      Effort: Pulmonary effort is normal.      Breath sounds: Decreased breath sounds present.   Abdominal:      General: Abdomen is flat. Bowel sounds are normal.      Palpations: Abdomen is soft.   Musculoskeletal:         General: Normal range of motion.      Cervical back: Normal range of motion and neck supple.      Right lower leg: No edema.      Left lower leg: No edema.   Skin:     General: Skin is warm and dry.      Capillary Refill: Capillary refill takes 2 to 3 seconds.   Neurological:      Mental Status: She is alert. Mental status is at baseline. She is disoriented.   Psychiatric:         Mood and Affect: Mood normal.         Behavior: Behavior is cooperative.      Comments: Unable to answer questions about recent hospitalizations            Results Reviewed:  Lab Results (last 24 hours)       Procedure Component Value Units Date/Time    S. Pneumo Ag Urine or CSF - Urine, Urine, Clean Catch [[id number]]  (Normal) Collected: 07/05/25 0801    Specimen: Urine, Clean Catch Updated: 07/05/25 1356     Strep Pneumo Ag Negative    Legionella Antigen, Urine - Urine, Urine, Clean Catch [[id number]]  (Normal) Collected: 07/05/25 0801    Specimen: Urine, Clean Catch Updated: 07/05/25 1355     LEGIONELLA ANTIGEN, URINE Negative    STAT Lactic Acid, Reflex [[id number]]  (Normal) Collected: 07/05/25 1043    Specimen: Blood Updated: 07/05/25 1110     Lactate 1.7 mmol/L     High Sensitivity Troponin T 1Hr [[id number]]  (Abnormal) Collected: 07/05/25 1002    Specimen: Blood Updated: 07/05/25 1039     HS Troponin T 34 ng/L      Troponin T Numeric Delta -4 ng/L      Troponin T % Delta -11     Narrative:      High Sensitive Troponin T Reference Range:  <14.0 ng/L- Negative Female for AMI  <22.0 ng/L- Negative Male for AMI  >=14 - Abnormal Female indicating possible myocardial injury.  >=22 - Abnormal Male indicating possible myocardial injury.   Clinicians would have to utilize clinical acumen, EKG, Troponin, and serial changes to determine if it is an Acute Myocardial Infarction or myocardial injury due to an underlying chronic condition.         Blood Culture - Blood, Arm, Right [[id number]] Collected: 07/05/25 0920    Specimen: Blood from Arm, Right Updated: 07/05/25 0941    Respiratory Panel PCR w/COVID-19(SARS-CoV-2) JAMIL/ESME/GRACE/PAD/COR/BRENDON In-House, NP Swab in UTM/VTM, 2 HR TAT - Swab, Nasopharynx [[id number]]  (Normal) Collected: 07/05/25 0748    Specimen: Swab from Nasopharynx Updated: 07/05/25 0905     ADENOVIRUS, PCR Not Detected     Coronavirus 229E Not Detected     Coronavirus HKU1 Not Detected     Coronavirus NL63 Not Detected     Coronavirus OC43 Not Detected     COVID19 Not Detected     Human Metapneumovirus Not Detected     Human Rhinovirus/Enterovirus Not Detected     Influenza A PCR Not Detected     Influenza B PCR Not Detected     Parainfluenza Virus 1 Not Detected     Parainfluenza Virus 2 Not Detected     Parainfluenza Virus 3 Not Detected     Parainfluenza Virus 4 Not Detected     RSV, PCR Not Detected     Bordetella pertussis pcr Not Detected     Bordetella parapertussis PCR Not Detected     Chlamydophila pneumoniae PCR Not Detected     Mycoplasma pneumo by PCR Not Detected    Narrative:      In the setting of a positive respiratory panel with a viral infection PLUS a negative procalcitonin without other underlying concern for bacterial infection, consider observing off antibiotics or discontinuation of antibiotics and continue supportive care. If the respiratory panel is positive for atypical bacterial infection (Bordetella pertussis, Chlamydophila pneumoniae, or Mycoplasma  "pneumoniae), consider antibiotic de-escalation to target atypical bacterial infection.    Lactic Acid, Plasma [[id number]]  (Abnormal) Collected: 07/05/25 0740    Specimen: Blood from Arm, Left Updated: 07/05/25 0855     Lactate 2.9 mmol/L     Procalcitonin [[id number]]  (Normal) Collected: 07/05/25 0740    Specimen: Blood from Arm, Left Updated: 07/05/25 0845     Procalcitonin 0.10 ng/mL     Narrative:      As a Marker for Sepsis (Non-Neonates):    1. <0.5 ng/mL represents a low risk of severe sepsis and/or septic shock.  2. >2 ng/mL represents a high risk of severe sepsis and/or septic shock.    As a Marker for Lower Respiratory Tract Infections that require antibiotic therapy:    PCT on Admission    Antibiotic Therapy       6-12 Hrs later    >0.5                Strongly Recommended  >0.25 - <0.5        Recommended   0.1 - 0.25          Discouraged              Remeasure/reassess PCT  <0.1                Strongly Discouraged     Remeasure/reassess PCT    As 28 day mortality risk marker: \"Change in Procalcitonin Result\" (>80% or <=80%) if Day 0 (or Day 1) and Day 4 values are available. Refer to http://www.[employer]-pct-calculator.com    Change in PCT <=80%  A decrease of PCT levels below or equal to 80% defines a positive change in PCT test result representing a higher risk for 28-day all-cause mortality of patients diagnosed with severe sepsis for septic shock.    Change in PCT >80%  A decrease of PCT levels of more than 80% defines a negative change in PCT result representing a lower risk for 28-day all-cause mortality of patients diagnosed with severe sepsis or septic shock.       Comprehensive Metabolic Panel [[id number]]  (Abnormal) Collected: 07/05/25 0740    Specimen: Blood from Arm, Left Updated: 07/05/25 0844     Glucose 88 mg/dL      BUN 20.6 mg/dL      Creatinine 0.80 mg/dL      Sodium 143 mmol/L      Potassium 4.1 mmol/L      Comment: Slight hemolysis detected by analyzer. Result may be falsely elevated.    "     Chloride 100 mmol/L      CO2 22.0 mmol/L      Calcium 10.4 mg/dL      Total Protein 7.6 g/dL      Albumin 4.2 g/dL      ALT (SGPT) 30 U/L      AST (SGOT) 38 U/L      Comment: Slight hemolysis detected by analyzer. Result may be falsely elevated.        Alkaline Phosphatase 54 U/L      Total Bilirubin 0.6 mg/dL      Globulin 3.4 gm/dL      A/G Ratio 1.2 g/dL      BUN/Creatinine Ratio 25.8     Anion Gap 21.0 mmol/L      eGFR 89.9 mL/min/1.73     Narrative:      GFR Categories in Chronic Kidney Disease (CKD)              GFR Category          GFR (mL/min/1.73)    Interpretation  G1                    90 or greater        Normal or high (1)  G2                    60-89                Mild decrease (1)  G3a                   45-59                Mild to moderate decrease  G3b                   30-44                Moderate to severe decrease  G4                    15-29                Severe decrease  G5                    14 or less           Kidney failure    (1)In the absence of evidence of kidney disease, neither GFR category G1 or G2 fulfill the criteria for CKD.    eGFR calculation 2021 CKD-EPI creatinine equation, which does not include race as a factor    Magnesium [[id number]]  (Normal) Collected: 07/05/25 0740    Specimen: Blood from Arm, Left Updated: 07/05/25 0844     Magnesium 1.7 mg/dL     C-reactive Protein [[id number]]  (Abnormal) Collected: 07/05/25 0740    Specimen: Blood from Arm, Left Updated: 07/05/25 0841     C-Reactive Protein 0.83 mg/dL     BNP [[id number]]  (Normal) Collected: 07/05/25 0740    Specimen: Blood from Arm, Left Updated: 07/05/25 0838     proBNP 133.6 pg/mL     Narrative:      This assay is used as an aid in the diagnosis of individuals suspected of having heart failure. It can be used as an aid in the diagnosis of acute decompensated heart failure (ADHF) in patients presenting with signs and symptoms of ADHF to the emergency department (ED). In addition, NT-proBNP of <300 pg/mL  indicates ADHF is not likely.    Age Range Result Interpretation  NT-proBNP Concentration (pg/mL:      <50             Positive            >450                   Gray                 300-450                    Negative             <300    50-75           Positive            >900                  Gray                300-900                  Negative            <300      >75             Positive            >1800                  Gray                300-1800                  Negative            <300    High Sensitivity Troponin T [[id number]]  (Abnormal) Collected: 07/05/25 0740    Specimen: Blood from Arm, Left Updated: 07/05/25 0838     HS Troponin T 38 ng/L     Narrative:      High Sensitive Troponin T Reference Range:  <14.0 ng/L- Negative Female for AMI  <22.0 ng/L- Negative Male for AMI  >=14 - Abnormal Female indicating possible myocardial injury.  >=22 - Abnormal Male indicating possible myocardial injury.   Clinicians would have to utilize clinical acumen, EKG, Troponin, and serial changes to determine if it is an Acute Myocardial Infarction or myocardial injury due to an underlying chronic condition.         Ethanol [[id number]] Collected: 07/05/25 0740    Specimen: Blood from Arm, Left Updated: 07/05/25 0837     Ethanol % <0.010 %     Narrative:      Not for legal purposes.    Protime-INR [[id number]]  (Normal) Collected: 07/05/25 0740    Specimen: Blood from Arm, Left Updated: 07/05/25 0832     Protime 13.7 Seconds      INR 1.00    D-dimer, Quantitative [[id number]]  (Abnormal) Collected: 07/05/25 0740    Specimen: Blood from Arm, Left Updated: 07/05/25 0832     D-Dimer, Quantitative 0.53 MCGFEU/mL     Narrative:      According to the assay 's published package insert, a normal (<0.50 MCGFEU/mL) D-dimer result in conjunction with a non-high clinical probability assessment, excludes deep vein thrombosis (DVT) and pulmonary embolism (PE) with high sensitivity.    D-dimer values increase with age and  "this can make VTE exclusion of an older population difficult. To address this, the [employer], based on best available evidence and recent guidelines, recommends that clinicians use age-adjusted D-dimer thresholds in patients greater than 50 years of age with: a) a low probability of PE who do not meet all Pulmonary Embolism Rule Out Criteria, or b) in those with intermediate probability of PE.   The formula for an age-adjusted D-dimer cut-off is \"age/100\".  For example, a 60 year old patient would have an age-adjusted cut-off of 0.60 MCGFEU/mL and an 80 year old 0.80 MCGFEU/mL.    Fentanyl, Urine - Urine, Clean Catch [[id number]]  (Normal) Collected: 07/05/25 0801    Specimen: Urine, Clean Catch Updated: 07/05/25 0830     Fentanyl, Urine Negative    Narrative:      Negative Threshold:      Fentanyl 5 ng/mL     The normal value for the drug tested is negative. This report includes final unconfirmed screening results to be used for medical treatment purposes only. Unconfirmed results must not be used for non-medical purposes such as employment or legal testing. Clinical consideration should be applied to any drug of abuse test, particularly when unconfirmed results are used.           Urine Drug Screen - Urine, Clean Catch [[id number]]  (Normal) Collected: 07/05/25 0801    Specimen: Urine, Clean Catch Updated: 07/05/25 0829     THC, Screen, Urine Negative     Phencyclidine (PCP), Urine Negative     Cocaine Screen, Urine Negative     Methamphetamine, Ur Negative     Opiate Screen Negative     Amphetamine Screen, Urine Negative     Benzodiazepine Screen, Urine Negative     Tricyclic Antidepressants Screen Negative     Methadone Screen, Urine Negative     Barbiturates Screen, Urine Negative     Oxycodone Screen, Urine Negative     Buprenorphine, Screen, Urine Negative    Narrative:      Cutoff For Drugs Screened:    Amphetamines               500 ng/ml  Barbiturates               200 " ng/ml  Benzodiazepines            150 ng/ml  Cocaine                    150 ng/ml  Methadone                  200 ng/ml  Opiates                    100 ng/ml  Phencyclidine               25 ng/ml  THC                         50 ng/ml  Methamphetamine            500 ng/ml  Tricyclic Antidepressants  300 ng/ml  Oxycodone                  100 ng/ml  Buprenorphine               10 ng/ml    The normal value for all drugs tested is negative. This report includes unconfirmed screening results, with the cutoff values listed, to be used for medical treatment purposes only.  Unconfirmed results must not be used for non-medical purposes such as employment or legal testing.  Clinical consideration should be applied to any drug of abuse test, particularly when unconfirmed results are used.      Urinalysis With Culture If Indicated - Straight Cath [[id number]]  (Abnormal) Collected: 07/05/25 0800    Specimen: Urine from Straight Cath Updated: 07/05/25 0825     Color, UA Dark Yellow     Appearance, UA Clear     pH, UA 5.5     Specific Gravity, UA 1.025     Glucose, UA Negative     Ketones, UA 40 mg/dL (2+)     Bilirubin, UA Negative     Blood, UA Negative     Protein, UA Trace     Leuk Esterase, UA Trace     Nitrite, UA Positive     Urobilinogen, UA 1.0 E.U./dL    Narrative:      In absence of clinical symptoms, the presence of pyuria, bacteria, and/or nitrites on the urinalysis result does not correlate with infection.    Urinalysis, Microscopic Only - Straight Cath [[id number]]  (Abnormal) Collected: 07/05/25 0800    Specimen: Urine from Straight Cath Updated: 07/05/25 0825     RBC, UA 0-2 /HPF      WBC, UA 0-2 /HPF      Comment: Urine culture not indicated.        Bacteria, UA 1+ /HPF      Squamous Epithelial Cells, UA 0-2 /HPF      Hyaline Casts, UA None Seen /LPF      Methodology Automated Microscopy    CBC & Differential [[id number]]  (Abnormal) Collected: 07/05/25 0740    Specimen: Blood from Arm, Left Updated: 07/05/25 0821     Narrative:      The following orders were created for panel order CBC & Differential.  Procedure                               Abnormality         Status                     ---------                               -----------         ------                     CBC Auto Differential[[id number]]        Abnormal            Final result                 Please view results for these tests on the individual orders.    CBC Auto Differential [[id number]]  (Abnormal) Collected: 07/05/25 0740    Specimen: Blood from Arm, Left Updated: 07/05/25 0821     WBC 10.21 10*3/mm3      RBC 4.26 10*6/mm3      Hemoglobin 11.6 g/dL      Hematocrit 36.7 %      MCV 86.2 fL      MCH 27.2 pg      MCHC 31.6 g/dL      RDW 12.4 %      RDW-SD 38.5 fl      MPV 10.7 fL      Platelets 396 10*3/mm3      Neutrophil % 73.3 %      Lymphocyte % 22.5 %      Monocyte % 3.3 %      Eosinophil % 0.2 %      Basophil % 0.3 %      Immature Grans % 0.4 %      Neutrophils, Absolute 7.48 10*3/mm3      Lymphocytes, Absolute 2.30 10*3/mm3      Monocytes, Absolute 0.34 10*3/mm3      Eosinophils, Absolute 0.02 10*3/mm3      Basophils, Absolute 0.03 10*3/mm3      Immature Grans, Absolute 0.04 10*3/mm3      nRBC 0.0 /100 WBC     Blood Culture - Blood, Arm, Left [[id number]] Collected: 07/05/25 0740    Specimen: Blood from Arm, Left Updated: 07/05/25 0820    Blood Gas, Arterial With Co-Ox [[id number]]  (Abnormal) Collected: 07/05/25 0730    Specimen: Arterial Blood Updated: 07/05/25 0731     Site Right Brachial     Bill's Test N/A     pH, Arterial 7.411 pH units      pCO2, Arterial 36.5 mm Hg      pO2, Arterial 76.9 mm Hg      Comment: 84 Value below reference range        HCO3, Arterial 23.2 mmol/L      Base Excess, Arterial -1.2 mmol/L      Comment: 84 Value below reference range        O2 Saturation, Arterial 96.3 %      Hemoglobin, Blood Gas 11.2 g/dL      Comment: 84 Value below reference range        Hematocrit, Blood Gas 34.3 %      Comment: 84 Value below  reference range        Oxyhemoglobin 94.5 %      Methemoglobin 0.50 %      Carboxyhemoglobin 1.4 %      Temperature 37.0     Sodium, Arterial 144 mmol/L      Potassium, Arterial 3.5 mmol/L      Barometric Pressure for Blood Gas 755 mmHg      Modality Nasal Cannula     Flow Rate 4.0 lpm      Ventilator Mode NA     Collected by [id number]     Comment: Meter: [id number]     :  Sunita Sutton, CRT        pH, Temp Corrected 7.411 pH Units      pCO2, Temperature Corrected 36.5 mm Hg      pO2, Temperature Corrected 76.9 mm Hg           Imaging Results (Last 24 Hours)       Procedure Component Value Units Date/Time    CT Angiogram Chest Pulmonary Embolism [[id number]] Collected: 07/05/25 1131     Updated: 07/05/25 1137    Narrative:      EXAMINATION: CT ANGIOGRAM CHEST PULMONARY EMBOLISM-     HISTORY: Shortness of breath.     In order to have a CT radiation dose as low as reasonably achievable  Automated Exposure Control was utilized for adjustment of the mA and/or  KV according to patient size.     CT Dose DLP = 124.32 mGy.cm.  (If there are multiple studies performed at the same time this  represents the total dose).     Images are stored in PACS per institutional protocol.        CT angiogram chest with IV contrast injection.  CT angiography protocol.  CT imaging with bolus IV contrast injection.  Under concurrent supervision axial, sagittal, coronal,  three-dimensional, and MIP data sets were constructed on an independent  work station.     Comparison:  4/18/2025. Normal heart size.  Normal size thoracic aorta.  No mediastinal mass.  Small and poorly visualized thyroid gland.     Symmetric and normally opacified pulmonary arteries.  No pulmonary embolism.     Mild patchy LEFT side infiltrate more so within the upper lobe than the  lower lobe.  No focal consolidation.  No pneumothorax or pleural effusion.       Impression:      1. No pulmonary embolism.  2. Mild patchy LEFT side infiltrate compatible with  atypical pneumonia.     This report was signed and finalized on 7/5/2025 11:33 AM by Dr. Isaiah Diaz MD.       XR Chest 1 View [[id number]] Collected: 07/05/25 0752     Updated: 07/05/25 0756    Narrative:      EXAMINATION: XR CHEST 1 VW-     HISTORY: Shortness of air     Images are stored in PACS per institutional protocol.     1 view chest x-ray.     COMPARISON:  5/29/2025.     Heart size is normal.  The mediastinum is within normal limits.     The lungs are normally expanded with no pneumonia or pneumothorax.     No congestive failure changes.       Impression:      1. No acute disease.                 This report was signed and finalized on 7/5/2025 7:53 AM by Dr. Isaiah Diaz MD.                 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 Abernathy's service here at [facility].     1.  Atypical pneumonia-Zithromax IV x 3 days, Rocephin IV x 5 days.  CBC, BMP daily.    2.  Joy Quinonez variant Carol Barré syndrome-Recent hospitalization at [facility].  Neurology consulted.    3.  Generalized weakness-PT and OT eval.    4.  Hypertension-Monitor blood pressures per hospital policy.  Adjust medications as needed.      5.  Diabetes mellitus type 1-Sliding scale insulin as needed.  Monitor blood sugars per hospital policy.  Dietitian consult.    6.  Tachycardia, unspecified-Telemetry monitoring.  Cardizem drip started in ER.  When medication list is updated, will switch to p.o. or home medications.        Medical Decision Making  Atypical pneumonia-acute, moderate complexity, unchanged  Joy Quinonez variant [place] Barré syndrome-acute, moderate complexity, unchanged  Generalized weakness-acute, moderate complexity,  unchanged  Hypertension-chronic, moderate complexity, stable  Diabetes mellitus type 1-chronic, moderate complexity, stable  Tachycardia, unspecified-acute, moderate complexity, unchanged    Number and Complexity of problems: 6  Differential Diagnosis: none    Conditions and Status        Condition is unchanged.     [facility] Data  External documents reviewed: Epic records  Cardiac tracing (EKG, telemetry) interpretation: EKG per cardiology 7/5/2025  Radiology interpretation: CTA  Labs reviewed: CBC, CMP, troponin, PTT 7/5/2025  Any tests that were considered but not ordered: none     Decision rules/scores evaluated (example [id number], Wells, etc): SIRS, Sepsis, and Septic Shock Criteria - MDCalc  Calculated on Jul 05 2025 1:56 PM  This patient does not meet SIRS criteria.     Discussed with: Patient and Dr. Abernathy     Care Planning  Shared decision making: Patient and Dr. Abernathy  Code status and discussions: CPR-full support    Disposition  Social Determinants of Health that impact treatment or disposition: none  Estimated length of stay is 2-3 days.     I confirmed that the patient's advanced care plan is present, code status is documented, and a surrogate decision maker is listed in the patient's medical record.     The patient's surrogate decision maker is Santy Cifuentes.     The patient was seen and examined by me on 07/05/2025 at 1254.    Electronically signed by BRIANA Preciado, 07/05/25, 15:02 CDT.

## 2025-07-06 LAB
ANION GAP SERPL CALCULATED.3IONS-SCNC: 13 MMOL/L (ref 5–15)
BASOPHILS # BLD AUTO: 0.01 10*3/MM3 (ref 0–0.2)
BASOPHILS NFR BLD AUTO: 0.2 % (ref 0–1.5)
BUN SERPL-MCNC: 20.1 MG/DL (ref 6–20)
BUN/CREAT SERPL: 28.7 (ref 7–25)
CALCIUM SPEC-SCNC: 9.2 MG/DL (ref 8.6–10.5)
CHLORIDE SERPL-SCNC: 105 MMOL/L (ref 98–107)
CO2 SERPL-SCNC: 24 MMOL/L (ref 22–29)
CREAT SERPL-MCNC: 0.7 MG/DL (ref 0.57–1)
DEPRECATED RDW RBC AUTO: 37.6 FL (ref 37–54)
EGFRCR SERPLBLD CKD-EPI 2021: 105.5 ML/MIN/1.73
EOSINOPHIL # BLD AUTO: 0.01 10*3/MM3 (ref 0–0.4)
EOSINOPHIL NFR BLD AUTO: 0.2 % (ref 0.3–6.2)
ERYTHROCYTE [DISTWIDTH] IN BLOOD BY AUTOMATED COUNT: 12.3 % (ref 12.3–15.4)
GLUCOSE BLDC GLUCOMTR-MCNC: 116 MG/DL (ref 70–130)
GLUCOSE BLDC GLUCOMTR-MCNC: 73 MG/DL (ref 70–130)
GLUCOSE BLDC GLUCOMTR-MCNC: 79 MG/DL (ref 70–130)
GLUCOSE BLDC GLUCOMTR-MCNC: 90 MG/DL (ref 70–130)
GLUCOSE SERPL-MCNC: 80 MG/DL (ref 65–99)
HCT VFR BLD AUTO: 26.6 % (ref 34–46.6)
HGB BLD-MCNC: 8.4 G/DL (ref 12–15.9)
IMM GRANULOCYTES # BLD AUTO: 0.01 10*3/MM3 (ref 0–0.05)
IMM GRANULOCYTES NFR BLD AUTO: 0.2 % (ref 0–0.5)
LYMPHOCYTES # BLD AUTO: 1.89 10*3/MM3 (ref 0.7–3.1)
LYMPHOCYTES NFR BLD AUTO: 37.1 % (ref 19.6–45.3)
MAGNESIUM SERPL-MCNC: 1.7 MG/DL (ref 1.6–2.6)
MCH RBC QN AUTO: 26.8 PG (ref 26.6–33)
MCHC RBC AUTO-ENTMCNC: 31.6 G/DL (ref 31.5–35.7)
MCV RBC AUTO: 85 FL (ref 79–97)
MONOCYTES # BLD AUTO: 0.29 10*3/MM3 (ref 0.1–0.9)
MONOCYTES NFR BLD AUTO: 5.7 % (ref 5–12)
MRSA DNA SPEC QL NAA+PROBE: NORMAL
NEUTROPHILS NFR BLD AUTO: 2.89 10*3/MM3 (ref 1.7–7)
NEUTROPHILS NFR BLD AUTO: 56.6 % (ref 42.7–76)
NRBC BLD AUTO-RTO: 0 /100 WBC (ref 0–0.2)
PLATELET # BLD AUTO: 225 10*3/MM3 (ref 140–450)
PMV BLD AUTO: 10.1 FL (ref 6–12)
POTASSIUM SERPL-SCNC: 3.2 MMOL/L (ref 3.5–5.2)
POTASSIUM SERPL-SCNC: 4.3 MMOL/L (ref 3.5–5.2)
QT INTERVAL: 270 MS
QTC INTERVAL: 420 MS
RBC # BLD AUTO: 3.13 10*6/MM3 (ref 3.77–5.28)
SODIUM SERPL-SCNC: 142 MMOL/L (ref 136–145)
WBC NRBC COR # BLD AUTO: 5.1 10*3/MM3 (ref 3.4–10.8)

## 2025-07-06 PROCEDURE — 97165 OT EVAL LOW COMPLEX 30 MIN: CPT

## 2025-07-06 PROCEDURE — 94799 UNLISTED PULMONARY SVC/PX: CPT

## 2025-07-06 PROCEDURE — 94760 N-INVAS EAR/PLS OXIMETRY 1: CPT

## 2025-07-06 PROCEDURE — 82948 REAGENT STRIP/BLOOD GLUCOSE: CPT | Performed by: NURSE PRACTITIONER

## 2025-07-06 PROCEDURE — 87581 M.PNEUMON DNA AMP PROBE: CPT | Performed by: NURSE PRACTITIONER

## 2025-07-06 PROCEDURE — 83735 ASSAY OF MAGNESIUM: CPT | Performed by: NURSE PRACTITIONER

## 2025-07-06 PROCEDURE — 84132 ASSAY OF SERUM POTASSIUM: CPT | Performed by: INTERNAL MEDICINE

## 2025-07-06 PROCEDURE — 82948 REAGENT STRIP/BLOOD GLUCOSE: CPT

## 2025-07-06 PROCEDURE — 97162 PT EVAL MOD COMPLEX 30 MIN: CPT

## 2025-07-06 PROCEDURE — 25010000002 CEFTRIAXONE PER 250 MG: Performed by: NURSE PRACTITIONER

## 2025-07-06 PROCEDURE — 25010000002 ENOXAPARIN PER 10 MG: Performed by: NURSE PRACTITIONER

## 2025-07-06 PROCEDURE — 85025 COMPLETE CBC W/AUTO DIFF WBC: CPT | Performed by: NURSE PRACTITIONER

## 2025-07-06 PROCEDURE — 80048 BASIC METABOLIC PNL TOTAL CA: CPT | Performed by: NURSE PRACTITIONER

## 2025-07-06 PROCEDURE — 94664 DEMO&/EVAL PT USE INHALER: CPT

## 2025-07-06 PROCEDURE — 87641 MR-STAPH DNA AMP PROBE: CPT | Performed by: NURSE PRACTITIONER

## 2025-07-06 RX ORDER — ARIPIPRAZOLE 5 MG/1
10 TABLET ORAL DAILY
Status: DISCONTINUED | OUTPATIENT
Start: 2025-07-06 | End: 2025-07-06

## 2025-07-06 RX ORDER — IRON POLYSACCHARIDE COMPLEX 150 MG
150 CAPSULE ORAL DAILY
COMMUNITY

## 2025-07-06 RX ORDER — AZITHROMYCIN 250 MG/1
500 TABLET, FILM COATED ORAL EVERY 24 HOURS
Status: DISCONTINUED | OUTPATIENT
Start: 2025-07-06 | End: 2025-07-07 | Stop reason: HOSPADM

## 2025-07-06 RX ORDER — PANTOPRAZOLE SODIUM 40 MG/1
40 TABLET, DELAYED RELEASE ORAL DAILY
Status: ON HOLD | COMMUNITY
End: 2025-07-10

## 2025-07-06 RX ORDER — ENOXAPARIN SODIUM 100 MG/ML
40 INJECTION SUBCUTANEOUS EVERY 24 HOURS
Status: DISCONTINUED | OUTPATIENT
Start: 2025-07-06 | End: 2025-07-07 | Stop reason: HOSPADM

## 2025-07-06 RX ORDER — ARIPIPRAZOLE 5 MG/1
2.5 TABLET ORAL DAILY
Status: DISCONTINUED | OUTPATIENT
Start: 2025-07-07 | End: 2025-07-07 | Stop reason: HOSPADM

## 2025-07-06 RX ORDER — TAMSULOSIN HYDROCHLORIDE 0.4 MG/1
0.4 CAPSULE ORAL DAILY
Status: DISCONTINUED | OUTPATIENT
Start: 2025-07-06 | End: 2025-07-06

## 2025-07-06 RX ORDER — CHOLECALCIFEROL (VITAMIN D3) 25 MCG
5000 TABLET ORAL DAILY
Status: DISCONTINUED | OUTPATIENT
Start: 2025-07-06 | End: 2025-07-07 | Stop reason: HOSPADM

## 2025-07-06 RX ORDER — IRON POLYSACCHARIDE COMPLEX 150 MG
150 CAPSULE ORAL DAILY
Status: DISCONTINUED | OUTPATIENT
Start: 2025-07-06 | End: 2025-07-07 | Stop reason: HOSPADM

## 2025-07-06 RX ORDER — ROSUVASTATIN CALCIUM 20 MG/1
20 TABLET, COATED ORAL NIGHTLY
Status: DISCONTINUED | OUTPATIENT
Start: 2025-07-06 | End: 2025-07-07 | Stop reason: HOSPADM

## 2025-07-06 RX ORDER — LIOTHYRONINE SODIUM 25 UG/1
25 TABLET ORAL 2 TIMES DAILY
Status: DISCONTINUED | OUTPATIENT
Start: 2025-07-06 | End: 2025-07-07 | Stop reason: HOSPADM

## 2025-07-06 RX ORDER — PANTOPRAZOLE SODIUM 40 MG/1
40 TABLET, DELAYED RELEASE ORAL DAILY
Status: DISCONTINUED | OUTPATIENT
Start: 2025-07-06 | End: 2025-07-07 | Stop reason: HOSPADM

## 2025-07-06 RX ORDER — AMOXICILLIN 250 MG
1 CAPSULE ORAL 2 TIMES DAILY
COMMUNITY

## 2025-07-06 RX ORDER — FOLIC ACID 1 MG/1
1 TABLET ORAL DAILY
Status: DISCONTINUED | OUTPATIENT
Start: 2025-07-06 | End: 2025-07-06

## 2025-07-06 RX ORDER — POLYETHYLENE GLYCOL 3350 17 G/17G
17 POWDER, FOR SOLUTION ORAL DAILY
Status: DISCONTINUED | OUTPATIENT
Start: 2025-07-06 | End: 2025-07-06

## 2025-07-06 RX ORDER — DONEPEZIL HYDROCHLORIDE 5 MG/1
5 TABLET, FILM COATED ORAL NIGHTLY
Status: DISCONTINUED | OUTPATIENT
Start: 2025-07-06 | End: 2025-07-07 | Stop reason: HOSPADM

## 2025-07-06 RX ORDER — MIRTAZAPINE 15 MG/1
15 TABLET, FILM COATED ORAL NIGHTLY
Status: DISCONTINUED | OUTPATIENT
Start: 2025-07-06 | End: 2025-07-07 | Stop reason: HOSPADM

## 2025-07-06 RX ORDER — POTASSIUM CHLORIDE 1500 MG/1
40 TABLET, EXTENDED RELEASE ORAL EVERY 4 HOURS
Status: COMPLETED | OUTPATIENT
Start: 2025-07-06 | End: 2025-07-06

## 2025-07-06 RX ORDER — DONEPEZIL HYDROCHLORIDE 5 MG/1
5 TABLET, FILM COATED ORAL NIGHTLY
COMMUNITY

## 2025-07-06 RX ORDER — POTASSIUM CHLORIDE 1500 MG/1
40 TABLET, EXTENDED RELEASE ORAL DAILY
Status: DISCONTINUED | OUTPATIENT
Start: 2025-07-07 | End: 2025-07-07 | Stop reason: HOSPADM

## 2025-07-06 RX ADMIN — LEVOTHYROXINE SODIUM 175 MCG: 0.1 TABLET ORAL at 09:39

## 2025-07-06 RX ADMIN — POTASSIUM CHLORIDE 40 MEQ: 1500 TABLET, EXTENDED RELEASE ORAL at 13:22

## 2025-07-06 RX ADMIN — Medication 10 ML: at 09:40

## 2025-07-06 RX ADMIN — IPRATROPIUM BROMIDE AND ALBUTEROL SULFATE 3 ML: .5; 3 SOLUTION RESPIRATORY (INHALATION) at 10:18

## 2025-07-06 RX ADMIN — POTASSIUM CHLORIDE 40 MEQ: 1500 TABLET, EXTENDED RELEASE ORAL at 09:39

## 2025-07-06 RX ADMIN — LIOTHYRONINE SODIUM 25 MCG: 25 TABLET ORAL at 21:31

## 2025-07-06 RX ADMIN — IPRATROPIUM BROMIDE AND ALBUTEROL SULFATE 3 ML: .5; 3 SOLUTION RESPIRATORY (INHALATION) at 14:27

## 2025-07-06 RX ADMIN — FOLIC ACID 1 MG: 1 TABLET ORAL at 09:40

## 2025-07-06 RX ADMIN — PANTOPRAZOLE SODIUM 40 MG: 40 TABLET, DELAYED RELEASE ORAL at 17:41

## 2025-07-06 RX ADMIN — DONEPEZIL HYDROCHLORIDE 5 MG: 5 TABLET, FILM COATED ORAL at 21:31

## 2025-07-06 RX ADMIN — IPRATROPIUM BROMIDE AND ALBUTEROL SULFATE 3 ML: .5; 3 SOLUTION RESPIRATORY (INHALATION) at 05:30

## 2025-07-06 RX ADMIN — Medication 150 MG: at 17:52

## 2025-07-06 RX ADMIN — ARIPIPRAZOLE 10 MG: 5 TABLET ORAL at 09:39

## 2025-07-06 RX ADMIN — TAMSULOSIN HYDROCHLORIDE 0.4 MG: 0.4 CAPSULE ORAL at 09:39

## 2025-07-06 RX ADMIN — PROPRANOLOL HYDROCHLORIDE 60 MG: 20 TABLET ORAL at 09:39

## 2025-07-06 RX ADMIN — Medication 5000 UNITS: at 09:40

## 2025-07-06 RX ADMIN — IPRATROPIUM BROMIDE AND ALBUTEROL SULFATE 3 ML: .5; 3 SOLUTION RESPIRATORY (INHALATION) at 20:21

## 2025-07-06 RX ADMIN — AZITHROMYCIN 500 MG: 250 TABLET, FILM COATED ORAL at 17:52

## 2025-07-06 RX ADMIN — ROSUVASTATIN CALCIUM 20 MG: 20 TABLET, FILM COATED ORAL at 21:31

## 2025-07-06 RX ADMIN — MIRTAZAPINE 15 MG: 15 TABLET, FILM COATED ORAL at 21:31

## 2025-07-06 RX ADMIN — ENOXAPARIN SODIUM 60 MG: 100 INJECTION SUBCUTANEOUS at 05:25

## 2025-07-06 RX ADMIN — CEFTRIAXONE SODIUM 1000 MG: 1 INJECTION, POWDER, FOR SOLUTION INTRAMUSCULAR; INTRAVENOUS at 14:19

## 2025-07-06 NOTE — PLAN OF CARE
Goal Outcome Evaluation:  Plan of Care Reviewed With: patient        Progress: improving  Outcome Evaluation: VSS on RA.  No c/o pain.  Telemetry -122.  Bed exit alarm on.  Safety maintained.

## 2025-07-06 NOTE — PLAN OF CARE
Problem: Adult Inpatient Plan of Care  Goal: Plan of Care Review  Recent Flowsheet Documentation  Taken 7/6/2025 0934 by Bacilio Zelaya, PT  Outcome Evaluation: PT IE complete.  A&O to person and situation.  Pt/dtr report she is independent with household distances w/ RW, utilizes wc for community distances, mod A for dressing/bathing at baseline.  Today she is min A to sit EOB.  CGA sit<>stand.  Ambulated 30ft CGA x1 w/ RW.  Pt with decreased endurance and strength.  PT to see for progressive ambulation and strengthening.  Recommend home with family and HH at [place].  Thank you for referral.  Plan of Care Reviewed With:   patient   child   Goal Outcome Evaluation:  Plan of Care Reviewed With: patient, child           Outcome Evaluation: PT IE complete.  A&O to person and situation.  Pt/dtr report she is independent with household distances w/ RW, utilizes wc for community distances, mod A for dressing/bathing at baseline.  Today she is min A to sit EOB.  CGA sit<>stand.  Ambulated 30ft CGA x1 w/ RW.  Pt with decreased endurance and strength.  PT to see for progressive ambulation and strengthening.  Recommend home with family and HH at [place].  Thank you for referral.    Anticipated Discharge Disposition (PT): home with assist, home with home health

## 2025-07-06 NOTE — THERAPY EVALUATION
Acute Care - Occupational Therapy Initial Evaluation  [facility]     Patient Name: Lynn Magana  : 1975  MRN: [id number]  Today's Date: 2025     Date of Referral to OT: 25       Admit Date: 2025       ICD-10-CM ICD-9-CM   1. Pneumonia due to infectious organism, unspecified laterality, unspecified part of lung  J18.9 486   2. Generalized weakness  R53.1 780.79   3. Tachycardia  R00.0 785.0   4. Impaired mobility [Z74.09]  Z74.09 799.89     Patient Active Problem List   Diagnosis    Syncope    Graves' disease    Polysubstance abuse    Hypertension    Diabetes mellitus type 1    Spells of decreased attentiveness    Chronic intractable headache    Nausea and vomiting    Slow transit constipation    Nonsmoker    Type 2 diabetes mellitus, with long-term current use of insulin    GERD without esophagitis    Hyperthyroidism    Thyromegaly    Post-surgical hypothyroidism    Degeneration of cervical intervertebral disc    Cervical radiculopathy    Acute pain of right shoulder    Class 1 obesity due to excess calories with body mass index (BMI) of 30.0 to 30.9 in adult    Hypoglycemia    Stage 1 acute kidney injury    Dehydration    Dysphagia    Dehydration    Hypokalemia    Steatosis of liver    Marijuana abuse    Loss of weight    Severe protein-calorie malnutrition    Hypothyroid    Generalized weakness    Acquired cerebral ventriculomegaly    Atypical pneumonia    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 Morgan     COLONOSCOPY  11/16/2015    Normal exam    CYST REMOVAL      ENDOSCOPY N/A 4/17/2018    Normal exam    ENDOSCOPY N/A 5/13/2025    Procedure: ESOPHAGOGASTRODUODENOSCOPY WITH ANESTHESIA;  Surgeon: Jadon Smith MD;  Location: [facility] ENDOSCOPY;  Service: Gastroenterology;  Laterality: N/A;  pre op: Nausea and vomiting  post op: normal  pcp: Darryl Andrews,     HYSTERECTOMY      THYROIDECTOMY Bilateral 11/19/2018    Procedure: total thyroidectomy;  Surgeon: Vitaly Givens MD;  Location: [facility] OR;  Service: ENT         OT ASSESSMENT FLOWSHEET (Last 12 Hours)       OT Evaluation and Treatment       Row Name 07/06/25 0933                   OT Time and Intention    Subjective Information no complaints  -EC        Document Type evaluation  -EC        Mode of Treatment occupational therapy  -EC           General Information    Patient Profile Reviewed yes  -EC        Prior Level of Function independent:;all household mobility;feeding;grooming;mod assist:;dressing;bathing  -EC        Equipment Currently Used at Home walker, rolling;bath bench  -EC        Pertinent History of Current Functional Problem presents with weakness, tahycardia, atypical pneumonia PMH:diagnosed with Joy Quinonez variant of GBS  -EC        Existing Precautions/Restrictions fall  -EC        Barriers to Rehab medically complex;previous functional deficit  -EC           Living Environment    Current Living Arrangements home  tub shower with a bench  -EC        Home Accessibility stairs within home;stairs to enter home  -EC        People in Home spouse  -EC           Home Main Entrance    Number of Stairs, Main Entrance none  -EC           Stairs Within Home, Primary    Number of Stairs, Within Home, Primary none  -EC           Pain Assessment    Pretreatment Pain Rating 0/10 - no pain  -EC        Posttreatment Pain Rating 0/10 - no pain  -EC           Cognition    Orientation  Status (Cognition) oriented to;person;situation  stated we were at [facility]  -           Range of Motion Comprehensive    General Range of Motion bilateral upper extremity ROM WFL  -EC           Strength Comprehensive (MMT)    Comment, General Manual Muscle Testing (MMT) Assessment BUE 4-/5  -EC           Activities of Daily Living    BADL Assessment/Intervention lower body dressing  -EC           Lower Body Dressing Assessment/Training    [place] Level (Lower Body Dressing) lower body dressing skills;standby assist  -EC        Position (Lower Body Dressing) edge of bed sitting  -EC        Comment, (Lower Body Dressing) adjust B socks  -EC           BADL Safety/Performance    Impairments, BADL Safety/Performance endurance/activity tolerance;strength  -EC           Bed Mobility    Bed Mobility supine-sit;sit-supine  -EC        Supine-Sit [place] (Bed Mobility) standby assist  -EC        Sit-Supine [place] (Bed Mobility) standby assist  -EC        Assistive Device (Bed Mobility) bed rails;head of bed elevated  -EC           Functional Mobility    Functional Mobility- Ind. Level contact guard assist  -EC        Functional Mobility- Device walker, front-wheeled  -EC        Functional Mobility- Comment bed<>monahan  -EC           Transfer Assessment/Treatment    Transfers sit-stand transfer;stand-sit transfer  -EC           Sit-Stand Transfer    Sit-Stand [place] (Transfers) verbal cues;contact guard  -EC           Stand-Sit Transfer    Stand-Sit [place] (Transfers) verbal cues;contact guard  -EC           Safety Issues/Impairments Affecting Functional Mobility    Safety Issues Affecting Function (Mobility) ability to follow commands;insight into deficits/self-awareness;problem-solving;safety precaution awareness;safety precautions follow-through/compliance  -EC        Impairments Affecting Function (Mobility) balance;cognition;endurance/activity tolerance;strength;shortness of breath  -EC            Balance    Balance Assessment sitting static balance;sitting dynamic balance;standing static balance;standing dynamic balance  -EC        Static Sitting Balance standby assist  -EC        Dynamic Sitting Balance standby assist  -EC        Position, Sitting Balance unsupported;sitting edge of bed  -EC        Static Standing Balance contact guard  -EC        Dynamic Standing Balance contact guard  -EC        Position/Device Used, Standing Balance supported;walker, front-wheeled  -EC           Plan of Care Review    Plan of Care Reviewed With patient  -EC        Progress no change  -EC        Outcome Evaluation OT eval completed. Pt presents alert & oriented to person and situation but often relies on daughter for information and PLOF. She was recently d/c'd from [facility] for GBS and was participating in home health. She requires assistance with ADLs and amb with a rwx. Today she demonstrates BUE weakness and decreased act jamin. She was able to sit EOB and don her socks with no LOB. Able to mobilize to monahan with CGA but had to turn around d/t fatigue and weakness. She would benefit from skilled OT during hospital stay to progress back to PLOF and increase her safety. Rec discharge home with continued home health.  -EC           Positioning and Restraints    Pre-Treatment Position in bed  -EC        Post Treatment Position bed  -EC        In Bed notified nsg;fowlers;call light within reach;encouraged to call for assist;with family/caregiver;side rails up x2  -EC           Therapy Assessment/Plan (OT)    Date of Referral to OT 07/05/25  -EC        OT Diagnosis decreased ADLs  -EC        Rehab Potential (OT) good  -EC        Criteria for Skilled Therapeutic Interventions Met (OT) yes;meets criteria;skilled treatment is necessary  -EC        Therapy Frequency (OT) 5 times/wk  -EC        Predicted Duration of Therapy Intervention (OT) 10 days  -EC        Planned Therapy Interventions (OT) activity tolerance  training;adaptive equipment training;BADL retraining;functional balance retraining;occupation/activity based interventions;patient/caregiver education/training;strengthening exercise;transfer/mobility retraining  -EC           Therapy Plan Review/Discharge Plan (OT)    Anticipated Discharge Disposition (OT) home with home health  -EC           OT Goals    Transfer Goal Selection (OT) transfer, OT goal 1  -EC        Grooming Goal Selection (OT) grooming, OT goal 1  -EC        Strength Goal Selection (OT) strength, OT goal 1  -EC        Problem Specific Goal Selection (OT) --  -EC           Transfer Goal 1 (OT)    Activity/Assistive Device (Transfer Goal 1, OT) transfers, all  -EC        [place] Level/Cues Needed (Transfer Goal 1, OT) independent  -EC        Time Frame (Transfer Goal 1, OT) long term goal (LTG)  -EC        Progress/Outcome (Transfer Goal 1, OT) new goal  -EC           Grooming Goal 1 (OT)    Activity/Device (Grooming Goal 1, OT) grooming skills, all  -EC        [place] (Grooming Goal 1, OT) independent  -EC        Time Frame (Grooming Goal 1, OT) long term goal (LTG)  -EC        Strategies/Barriers (Grooming Goal 1, OT) standing sinkside  -EC        Progress/Outcome (Grooming Goal 1, OT) new goal  -EC           Strength Goal 1 (OT)    Strength Goal 1 (OT) Pt will be indep with BUE HEP for increased functional performance  -EC        Time Frame (Strength Goal 1, OT) long term goal (LTG)  -EC        Progress/Outcome (Strength Goal 1, OT) new goal  -EC           Problem Specific Goal 1 (OT)    Problem Specific Goal 1 (OT) --  -EC        Time Frame (Problem Specific Goal 1, OT) --  -EC        Progress/Outcome (Problem Specific Goal 1, OT) --  -EC                  User Key  (r) = Recorded By, (t) = Taken By, (c) = Cosigned By      Initials Name Effective Dates    EC Kizzy Ramirez OTR/L 10/13/23 -                      Occupational Therapy Education       Title: PT OT SLP Therapies (In Progress)        Topic: Occupational Therapy (In Progress)       Point: ADL training (Done)       Learning Progress Summary            Patient Acceptance, E, VU,NR by  at 7/6/2025 1313                      Point: Precautions (Done)       Learning Progress Summary            Patient Acceptance, E, VU,NR by  at 7/6/2025 1313                      Point: Body mechanics (Done)       Learning Progress Summary            Patient Acceptance, E, VU,NR by  at 7/6/2025 1313                                      User Key       Initials Effective Dates Name Provider Type Discipline     10/13/23 -  Kizzy Ramirez, OTR/L Occupational Therapist OT                      OT Recommendation and Plan  Planned Therapy Interventions (OT): activity tolerance training, adaptive equipment training, BADL retraining, functional balance retraining, occupation/activity based interventions, patient/caregiver education/training, strengthening exercise, transfer/mobility retraining  Therapy Frequency (OT): 5 times/wk  Plan of Care Review  Plan of Care Reviewed With: patient  Progress: no change  Outcome Evaluation: OT eval completed. Pt presents alert & oriented to person and situation but often relies on daughter for information and PLOF. She was [facility] and was participating in home health. She requires assistance with ADLs and amb with a rwx. Today she demonstrates BUE weakness and decreased act jamin. She was able to sit EOB and don her socks with no LOB. Able to mobilize to monahan with CGA but had to turn around d/t fatigue and weakness. She would benefit from skilled OT during hospital stay to progress back to PLOF and increase her safety. Rec discharge home with continued home health.  Plan of Care Reviewed With: patient  Outcome Evaluation: OT eval completed. Pt presents alert & oriented to person and situation but often relies on daughter for information and PLOF. She was recently d/c'd from [facility] for GBS and  was participating in home health. She requires assistance with ADLs and amb with a rwx. Today she demonstrates BUE weakness and decreased act jamin. She was able to sit EOB and don her socks with no LOB. Able to mobilize to monahan with CGA but had to turn around d/t fatigue and weakness. She would benefit from skilled OT during hospital stay to progress back to PLOF and increase her safety. Rec discharge home with continued home health.     Outcome Measures       Row Name 07/06/25 1300             How much help from another is currently needed...    Putting on and taking off regular lower body clothing? 3  -EC      Bathing (including washing, rinsing, and drying) 3  -EC      Toileting (which includes using toilet bed pan or urinal) 3  -EC      Putting on and taking off regular upper body clothing 3  -EC      Taking care of personal grooming (such as brushing teeth) 3  -EC      Eating meals 4  -EC      AM-PAC 6 Clicks Score (OT) 19  -EC         Functional Assessment    Outcome Measure Options AM-PAC 6 Clicks Daily Activity (OT)  -EC                User Key  (r) = Recorded By, (t) = Taken By, (c) = Cosigned By      Initials Name Provider Type    EC Kizzy Ramirez, OTR/L Occupational Therapist                    Time Calculation:    Time Calculation- OT       Row Name 07/06/25 0956             Time Calculation- OT    OT Start Time 0932  +15 min CR  -EC      OT Stop Time 0955  -EC      OT Time Calculation (min) 23 min  -EC      OT Received On 07/06/25  -EC      OT Goal Re-Cert Due Date 07/16/25  -EC         Untimed Charges    OT Eval/Re-eval Minutes 38  -EC         Total Minutes    Untimed Charges Total Minutes 38  -EC       Total Minutes 38  -EC                User Key  (r) = Recorded By, (t) = Taken By, (c) = Cosigned By      Initials Name Provider Type    Kizzy Fernandez, OTR/L Occupational Therapist                  Therapy Charges for Today       Code Description Service Date Service Provider Modifiers Qty     [id number]  OT EVAL LOW COMPLEXITY 3 7/6/2025 Kizzy Ramriez, OTR/L GO 1                 Kizzy Ramirez OTR/L  7/6/2025

## 2025-07-06 NOTE — PROGRESS NOTES
"[facility] Clinical Pharmacy Services: Enoxaparin Consult  Lynn Magana is a 49 y.o. female who has been consulted to continue enoxaparin for VTE prophylaxis.     Indication: VTE Prophylaxis  Home Anticoagulation: None    Relevant clinical data and objective history reviewed:  Ht: 188 cm (74\"); Wt: 64.5 kg (142 lb 3.2 oz); BMI: Body mass index is 18.26 kg/m².  Estimated Creatinine Clearance: 97.9 mL/min (by C-G formula based on SCr of 0.7 mg/dL).    Results from last 7 days   Lab Units 07/06/25  0533 07/05/25  0740 07/04/25  0930   HEMOGLOBIN g/dL 8.4* 11.6* 10.6*   HEMATOCRIT % 26.6* 36.7 31.9*   PLATELETS 10*3/mm3 225 396 363   CREATININE mg/dL 0.70 0.80 0.8       Asessment/Plan:  Change enoxaparin from therapeutic to dosing for VTE prophylaxis. Adjusted dose from 1mg/kg SQ Q12h to 40mg SQ Q24h.   Pharmacy will continue to monitor renal function and clinical status and adjust the dose and/or frequency as needed.    Morales Brown, PharmD  7/6/2025  16:35 CDT      "

## 2025-07-06 NOTE — PROGRESS NOTES
[facility] Clinical Pharmacy Services: IV to PO Interchange    Relevant clinical data and objective history reviewed:  Diet Order   Procedures    Diet: Diabetic; Consistent Carbohydrate; Fluid Consistency: Thin (IDDSI 0)       I/O last 3 completed shifts:  In: 100 [IV Piggyback:100]  Out: -     The patient meets the following criteria to be switched from IV administration to PO including:  Functioning GI tract  Hemodynamically stable  Showing signs of clinical improvement  Tolerating other oral medications or enteral diet    Assessment/Plan:  Azithromycin 500 mg has been changed from IV to PO formulation based on our [facility] P&T approved Adult IV to PO Switching policy    Morales Brown, PharmD  7/6/2025  16:54 CDT

## 2025-07-06 NOTE — PROGRESS NOTES
[facility] Medicine Services  INPATIENT PROGRESS NOTE    Patient Name: Lynn Magana  Date of Admission: 7/5/2025  Today's Date: 07/06/25  Length of Stay: 1  Primary Care Physician: Darryl Andrews DO    Subjective   Chief Complaint: Shortness of breath    Patient has no new complaint this morning, said her shortness of breath is improved.      Review of Systems   All pertinent negatives and positives are as above. All other systems have been reviewed and are negative unless otherwise stated.     Objective    Temp:  [97.6 °F (36.4 °C)-98.5 °F (36.9 °C)] 98.5 °F (36.9 °C)  Heart Rate:  [105-116] 109  Resp:  [16-20] 16  BP: ()/(61-99) 110/65  Physical Exam  Constitutional:       Appearance: She is underweight, alert and awake, communicative.   HENT:      Head: Normocephalic and atraumatic.      Nose: Nose normal.      Mouth/Throat:      Mouth: Mucous membranes are moist.      Pharynx: Oropharynx is clear.   Eyes:      Extraocular Movements: Extraocular movements intact.      Conjunctiva/sclera: Conjunctivae normal.   Cardiovascular:      Rate and Rhythm: Regular rhythm. Tachycardia present.      Pulses: Normal pulses.      Heart sounds: Normal heart sounds.   Pulmonary:      Effort: Pulmonary effort is normal.      Breath sounds: Decreased breath sounds present.   Abdominal:      General: Abdomen is flat. Bowel sounds are normal.      Palpations: Abdomen is soft.   Musculoskeletal:         General: Normal range of motion.      Cervical back: Normal range of motion and neck supple.      Right lower leg: No edema.      Left lower leg: No edema.   Skin:     General: Skin is warm and dry.      Capillary Refill: Capillary refill takes 2 to 3 seconds.   Neurological:      Mental Status: She is alert. Mental status is at baseline.   Psychiatric:         Mood and Affect: Mood normal.         Behavior: Behavior is cooperative.      Comments: Unable to answer questions about  recent hospitalizations          Results Review:  I have reviewed the labs, radiology results, and diagnostic studies.    Laboratory Data:   Results from last 7 days   Lab Units 07/06/25  0533 07/05/25  0740 07/04/25  0930   WBC 10*3/mm3 5.10 10.21 6.4   HEMOGLOBIN g/dL 8.4* 11.6* 10.6*   HEMATOCRIT % 26.6* 36.7 31.9*   PLATELETS 10*3/mm3 225 396 363        Results from last 7 days   Lab Units 07/06/25  0533 07/05/25  0740 07/05/25  0730 07/04/25  0930   SODIUM mmol/L 142 143  --  144   SODIUM, ARTERIAL mmol/L  --   --  144  --    POTASSIUM mmol/L 3.2* 4.1  --  4.1   CHLORIDE mmol/L 105 100  --  104   TOTAL CO2 mmol/L  --   --   --  23   CO2 mmol/L 24.0 22.0  --   --    BUN mg/dL 20.1* 20.6*  --  20   CREATININE mg/dL 0.70 0.80  --  0.8   CALCIUM mg/dL 9.2 10.4  --  9.9   BILIRUBIN mg/dL  --  0.6  --   --    ALK PHOS U/L  --  54  --   --    ALT (SGPT) U/L  --  30  --   --    AST (SGOT) U/L  --  38*  --   --    GLUCOSE mg/dL 80 88  --  75       Culture Data:   Blood Culture   Date Value Ref Range Status   07/05/2025 No growth at 24 hours  Preliminary   07/05/2025 No growth at 24 hours  Preliminary       Radiology Data:   Imaging Results (Last 24 Hours)       ** No results found for the last 24 hours. **            I have reviewed the patient's current medications.     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  Yesterday she developed shortness of breath, CTA chest in the emergency room showed infiltrates/probable atypical pneumonia.  She is currently on ceftriaxone and azithromycin, clinically improved this morning.  We will continue current  management/antibiotics and if she continues to improve, will discharge home tomorrow.    - Tachycardia  She has history of tachycardia/palpitation  Patient was on beta-blocker at home, probably having rebound tachycardia.  Will restart patient's propranolol.    -Anemia  Patient's hemoglobin dropped from 11.1 to 8 this morning, this could be hemodilution.  We will repeat CBC in the morning.    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  Discussed with neurologist, will cancel consults.    Chronic medical conditions-  Diabetes mellitus  Hypertension  Carotid artery stenosis  Degenerative disease of the cervical spine  Depression  GERD  Graves' disease  Hyperlipidemia  Hypertension  Hypothyroidism  Seizure disorder  Thyromegaly    DVT prophylaxis-Lovenox    Disposition-discharge planning for tomorrow    Electronically signed by Yovanny Abernathy MD, 07/06/25, 11:48 CDT.

## 2025-07-06 NOTE — THERAPY EVALUATION
Patient Name: Lynn Magana  : 1975    MRN: [id number]                              Today's Date: 2025       Admit Date: 2025    Visit Dx:     ICD-10-CM ICD-9-CM   1. Pneumonia due to infectious organism, unspecified laterality, unspecified part of lung  J18.9 486   2. Generalized weakness  R53.1 780.79   3. Tachycardia  R00.0 785.0   4. Impaired mobility [Z74.09]  Z74.09 799.89     Patient Active Problem List   Diagnosis    Syncope    Graves' disease    Polysubstance abuse    Hypertension    Diabetes mellitus type 1    Spells of decreased attentiveness    Chronic intractable headache    Nausea and vomiting    Slow transit constipation    Nonsmoker    Type 2 diabetes mellitus, with long-term current use of insulin    GERD without esophagitis    Hyperthyroidism    Thyromegaly    Post-surgical hypothyroidism    Degeneration of cervical intervertebral disc    Cervical radiculopathy    Acute pain of right shoulder    Class 1 obesity due to excess calories with body mass index (BMI) of 30.0 to 30.9 in adult    Hypoglycemia    Stage 1 acute kidney injury    Dehydration    Dysphagia    Dehydration    Hypokalemia    Steatosis of liver    Marijuana abuse    Loss of weight    Severe protein-calorie malnutrition    Hypothyroid    Generalized weakness    Acquired cerebral ventriculomegaly    Atypical pneumonia    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 Morgan      COLONOSCOPY  11/16/2015    Normal exam    CYST REMOVAL      ENDOSCOPY N/A 4/17/2018    Normal exam    ENDOSCOPY N/A 5/13/2025    Procedure: ESOPHAGOGASTRODUODENOSCOPY WITH ANESTHESIA;  Surgeon: Jadon Smith MD;  Location: [facility] ENDOSCOPY;  Service: Gastroenterology;  Laterality: N/A;  pre op: Nausea and vomiting  post op: normal  pcp: Darryl Andrews,     HYSTERECTOMY      THYROIDECTOMY Bilateral 11/19/2018    Procedure: total thyroidectomy;  Surgeon: Vitaly Givens MD;  Location: [facility] OR;  Service: ENT      General Information       Row Name 07/06/25 0934          Physical Therapy Time and Intention    Document Type evaluation  cough, weakness, short of breath, Dx:  pneumonia  -     Mode of Treatment co-treatment;physical therapy  -       Row Name 07/06/25 0934          General Information    Patient Profile Reviewed yes  [facility]  -     Prior Level of Function independent:;all household mobility;mod assist:;dressing;bathing  DME: qc, sc, rw, tub bench.  -     Existing Precautions/Restrictions fall  -     Barriers to Rehab medically complex;previous functional deficit;cognitive status;physical barrier  -       Row Name 07/06/25 0934          Living Environment    Current Living Arrangements home  tub/shower.  -     People in Home spouse  -       Row Name 07/06/25 0934          Home Main Entrance    Number of Stairs, Main Entrance none  -       Row Name 07/06/25 0934          Stairs Within Home, Primary    Number of Stairs, Within Home, Primary none  -       Row Name 07/06/25 0934          Cognition    Orientation Status (Cognition) oriented to;person;situation  relys on dtr for information  -       Row Name 07/06/25 0934          Safety Issues/Impairments Affecting Functional Mobility    Safety Issues Affecting Function (Mobility) ability to follow commands;insight into deficits/self-awareness;problem-solving;safety precaution  awareness;safety precautions follow-through/compliance  -     Impairments Affecting Function (Mobility) balance;cognition;endurance/activity tolerance;strength;shortness of breath  -               User Key  (r) = Recorded By, (t) = Taken By, (c) = Cosigned By      Initials Name Provider Type     Bacilio Zelaya, PT Physical Therapist                   Mobility       Row Name 07/06/25 0934          Bed Mobility    Bed Mobility supine-sit;sit-supine  -     Supine-Sit [place] (Bed Mobility) minimum assist (75% patient effort)  -     Sit-Supine [place] (Bed Mobility) standby assist  -[facility] Name 07/06/25 0934          Sit-Stand Transfer    Sit-Stand [place] (Transfers) verbal cues;contact guard  -[facility] Name 07/06/25 0934          Gait/Stairs (Locomotion)    [place] Level (Gait) contact guard  -     Assistive Device (Gait) walker, front-wheeled  -     Distance in Feet (Gait) 30  -     Deviations/Abnormal Patterns (Gait) gait speed decreased;stride length decreased;sin decreased  -               User Key  (r) = Recorded By, (t) = Taken By, (c) = Cosigned By      Initials Name Provider Type     Bacilio Zelaya, PT Physical Therapist                   Obj/Interventions       [facility] Name 07/06/25 0934          Range of Motion Comprehensive    General Range of Motion bilateral upper extremity ROM WFL;bilateral lower extremity ROM WFL  -LH       Row Name 07/06/25 0934          Strength Comprehensive (MMT)    General Manual Muscle Testing (MMT) Assessment upper extremity strength deficits identified;lower extremity strength deficits identified  -LH       Row Name 07/06/25 0934          Sensory Assessment (Somatosensory)    Sensory Assessment (Somatosensory) --  intact to light touch and pain  -               User Key  (r) = Recorded By, (t) = Taken By, (c) = Cosigned By      Initials Name Provider Type     Bacilio Zelaya, PT Physical Therapist                   Goals/Plan        Row Name 07/06/25 0934          Bed Mobility Goal 1 (PT)    Activity/Assistive Device (Bed Mobility Goal 1, PT) bed mobility activities, all  -     [place] Level/Cues Needed (Bed Mobility Goal 1, PT) independent  -     Time Frame (Bed Mobility Goal 1, PT) 10 days  -     Progress/Outcomes (Bed Mobility Goal 1, PT) new goal  -       Row Name 07/06/25 0934          Transfer Goal 1 (PT)    Activity/Assistive Device (Transfer Goal 1, PT) sit-to-stand/stand-to-sit  -     [place] Level/Cues Needed (Transfer Goal 1, PT) independent  -     Time Frame (Transfer Goal 1, PT) 10 days  -     Progress/Outcome (Transfer Goal 1, PT) new goal  -       Row Name 07/06/25 0934          Gait Training Goal 1 (PT)    Activity/Assistive Device (Gait Training Goal 1, PT) gait (walking locomotion);assistive device use;improve balance and speed  -     [place] Level (Gait Training Goal 1, PT) standby assist  -     Distance (Gait Training Goal 1, PT) 75ft  -     Time Frame (Gait Training Goal 1, PT) 10 days  -     Progress/Outcome (Gait Training Goal 1, PT) new goal  -       Row Name 07/06/25 0934          Therapy Assessment/Plan (PT)    Planned Therapy Interventions (PT) balance training;bed mobility training;gait training;home exercise program;stretching;strengthening;ROM (range of motion);postural re-education;patient/family education;transfer training  -               User Key  (r) = Recorded By, (t) = Taken By, (c) = Cosigned By      Initials Name Provider Type     Bacilio Zelaya, PT Physical Therapist                   Clinical Impression       Row Name 07/06/25 0934          Pain    Pretreatment Pain Rating 0/10 - no pain  -     Posttreatment Pain Rating 0/10 - no pain  -[facility] Name 07/06/25 0934          Plan of Care Review    Plan of Care Reviewed With patient;child  -     Outcome Evaluation PT IE complete.  A&O to person and situation.  Pt/dtr report she is independent with  household distances w/ RW, utilizes wc for community distances, mod A for dressing/bathing at baseline.  Today she is min A to sit EOB.  CGA sit<>stand.  Ambulated 30ft CGA x1 w/ RW.  Pt with decreased endurance and strength.  PT to see for progressive ambulation and strengthening.  Recommend home with family and HH at [place].  Thank you for referral.  -       Row Name 07/06/25 0934          Therapy Assessment/Plan (PT)    Patient/Family Therapy Goals Statement (PT) return home  -     Rehab Potential (PT) fair  -     Criteria for Skilled Interventions Met (PT) yes;skilled treatment is necessary  -     Therapy Frequency (PT) 2 times/day  -     Predicted Duration of Therapy Intervention (PT) until dc  -       Row Name 07/06/25 0934          Positioning and Restraints    Pre-Treatment Position in bed  -     Post Treatment Position bed  -     In Bed fowlers;call light within reach;encouraged to call for assist;side rails up x2;with family/caregiver  -               User Key  (r) = Recorded By, (t) = Taken By, (c) = Cosigned By      Initials Name Provider Type     Bacilio Zelaya, PT Physical Therapist                   Outcome Measures       Row Name 07/06/25 0934          How much help from another person do you currently need...    Turning from your back to your side while in flat bed without using bedrails? 4  -LH     Moving from lying on back to sitting on the side of a flat bed without bedrails? 3  -LH     Moving to and from a bed to a chair (including a wheelchair)? 3  -LH     Standing up from a chair using your arms (e.g., wheelchair, bedside chair)? 3  -LH     Climbing 3-5 steps with a railing? 3  -LH     To walk in hospital room? 3  -     AM-PAC 6 Clicks Score (PT) 19  -     Highest Level of Mobility Goal Walk 10 Steps or More-6  -       Row Name 07/06/25 0934          Functional Assessment    Outcome Measure Options AM-PAC 6 Clicks Basic Mobility (PT)  -               User Key  (r) =  Recorded By, (t) = Taken By, (c) = Cosigned By      Initials Name Provider Type     Bacilio Zelaya, PT Physical Therapist                                 Physical Therapy Education       Title: PT OT SLP Therapies (Done)       Topic: Physical Therapy (Done)       Point: Mobility training (Done)       Learning Progress Summary            Patient Acceptance, E,D, DU,VU by  at 7/6/2025 1008    Comment: benefits of PT and POC, call for A OOB                      Point: Precautions (Done)       Learning Progress Summary            Patient Acceptance, E,D, DU,VU by  at 7/6/2025 1008    Comment: benefits of PT and POC, call for A OOB                                      User Key       Initials Effective Dates Name Provider Type Discipline     02/03/23 -  Bacilio Zelaya, PT Physical Therapist PT                  PT Recommendation and Plan  Planned Therapy Interventions (PT): balance training, bed mobility training, gait training, home exercise program, stretching, strengthening, ROM (range of motion), postural re-education, patient/family education, transfer training  Outcome Evaluation: PT IE complete.  A&O to person and situation.  Pt/dtr report she is independent with household distances w/ RW, utilizes wc for community distances, mod A for dressing/bathing at baseline.  Today she is min A to sit EOB.  CGA sit<>stand.  Ambulated 30ft CGA x1 w/ RW.  Pt with decreased endurance and strength.  PT to see for progressive ambulation and strengthening.  Recommend home with family and HH at [place].  Thank you for referral.     Time Calculation:         PT Charges       Row Name 07/06/25 1009             Time Calculation    Start Time 0934  -      Stop Time 1012  -      Time Calculation (min) 38 min  -      PT Received On 07/06/25  -      PT Goal Re-Cert Due Date 07/16/25  -         Untimed Charges    PT Eval/Re-eval Minutes 38  -         Total Minutes    Untimed Charges Total Minutes 38  -       Total Minutes 38   -                User Key  (r) = Recorded By, (t) = Taken By, (c) = Cosigned By      Initials Name Provider Type     Bacilio Zelaya, PT Physical Therapist                  Therapy Charges for Today       Code Description Service Date Service Provider Modifiers Qty    38817285089 HC PT EVAL MOD COMPLEXITY 3 7/6/2025 Bacilio Zelaya, PT GP 1            PT G-Codes  Outcome Measure Options: AM-PAC 6 Clicks Basic Mobility (PT)  AM-PAC 6 Clicks Score (PT): 19  PT Discharge Summary  Anticipated Discharge Disposition (PT): home with assist, home with home health    Bacilio Zelaya, PT  7/6/2025

## 2025-07-06 NOTE — PLAN OF CARE
Goal Outcome Evaluation:  Plan of Care Reviewed With: patient        Progress: no change  Outcome Evaluation: OT eval completed. Pt presents alert & oriented to person and situation but often relies on daughter for information and PLOF. She was recently d/c'd from [facility] for GBS and was participating in home health. She requires assistance with ADLs and amb with a rwx. Today she demonstrates BUE weakness and decreased act jamin. She was able to sit EOB and don her socks with no LOB. Able to mobilize to [place] with CGA but had to turn around d/t fatigue and weakness. She would benefit from skilled OT during hospital stay to progress back to PLOF and increase her safety. Rec discharge home with continued home health.    Anticipated Discharge Disposition (OT): home with home health

## 2025-07-07 ENCOUNTER — READMISSION MANAGEMENT (OUTPATIENT)
Dept: CALL CENTER | Facility: HOSPITAL | Age: 50
End: 2025-07-07
Payer: COMMERCIAL

## 2025-07-07 VITALS
TEMPERATURE: 97.7 F | SYSTOLIC BLOOD PRESSURE: 110 MMHG | DIASTOLIC BLOOD PRESSURE: 73 MMHG | RESPIRATION RATE: 16 BRPM | OXYGEN SATURATION: 98 % | WEIGHT: 142.2 LBS | HEIGHT: 72 IN | HEART RATE: 86 BPM | BODY MASS INDEX: 19.26 KG/M2

## 2025-07-07 PROBLEM — J18.9 PNEUMONIA, UNSPECIFIED ORGANISM: Status: ACTIVE | Noted: 2025-07-07

## 2025-07-07 LAB
ANION GAP SERPL CALCULATED.3IONS-SCNC: 11 MMOL/L (ref 5–15)
BASOPHILS # BLD AUTO: 0.01 10*3/MM3 (ref 0–0.2)
BASOPHILS NFR BLD AUTO: 0.2 % (ref 0–1.5)
BUN SERPL-MCNC: 14.8 MG/DL (ref 6–20)
BUN/CREAT SERPL: 23.5 (ref 7–25)
CALCIUM SPEC-SCNC: 9.2 MG/DL (ref 8.6–10.5)
CHLORIDE SERPL-SCNC: 107 MMOL/L (ref 98–107)
CO2 SERPL-SCNC: 23 MMOL/L (ref 22–29)
CREAT SERPL-MCNC: 0.63 MG/DL (ref 0.57–1)
DEPRECATED RDW RBC AUTO: 37.8 FL (ref 37–54)
EGFRCR SERPLBLD CKD-EPI 2021: 108.2 ML/MIN/1.73
EOSINOPHIL # BLD AUTO: 0.01 10*3/MM3 (ref 0–0.4)
EOSINOPHIL NFR BLD AUTO: 0.2 % (ref 0.3–6.2)
ERYTHROCYTE [DISTWIDTH] IN BLOOD BY AUTOMATED COUNT: 12.1 % (ref 12.3–15.4)
GLUCOSE BLDC GLUCOMTR-MCNC: 131 MG/DL (ref 70–130)
GLUCOSE BLDC GLUCOMTR-MCNC: 94 MG/DL (ref 70–130)
GLUCOSE SERPL-MCNC: 79 MG/DL (ref 65–99)
HCT VFR BLD AUTO: 27.3 % (ref 34–46.6)
HGB BLD-MCNC: 8.7 G/DL (ref 12–15.9)
IMM GRANULOCYTES # BLD AUTO: 0.02 10*3/MM3 (ref 0–0.05)
IMM GRANULOCYTES NFR BLD AUTO: 0.4 % (ref 0–0.5)
LYMPHOCYTES # BLD AUTO: 2.08 10*3/MM3 (ref 0.7–3.1)
LYMPHOCYTES NFR BLD AUTO: 41.7 % (ref 19.6–45.3)
MAGNESIUM SERPL-MCNC: 1.6 MG/DL (ref 1.6–2.6)
MCH RBC QN AUTO: 27.1 PG (ref 26.6–33)
MCHC RBC AUTO-ENTMCNC: 31.9 G/DL (ref 31.5–35.7)
MCV RBC AUTO: 85 FL (ref 79–97)
MONOCYTES # BLD AUTO: 0.24 10*3/MM3 (ref 0.1–0.9)
MONOCYTES NFR BLD AUTO: 4.8 % (ref 5–12)
NEUTROPHILS NFR BLD AUTO: 2.63 10*3/MM3 (ref 1.7–7)
NEUTROPHILS NFR BLD AUTO: 52.7 % (ref 42.7–76)
NRBC BLD AUTO-RTO: 0 /100 WBC (ref 0–0.2)
PLATELET # BLD AUTO: 207 10*3/MM3 (ref 140–450)
PMV BLD AUTO: 10.4 FL (ref 6–12)
POTASSIUM SERPL-SCNC: 3.9 MMOL/L (ref 3.5–5.2)
RBC # BLD AUTO: 3.21 10*6/MM3 (ref 3.77–5.28)
SODIUM SERPL-SCNC: 141 MMOL/L (ref 136–145)
WBC NRBC COR # BLD AUTO: 4.99 10*3/MM3 (ref 3.4–10.8)

## 2025-07-07 PROCEDURE — 82948 REAGENT STRIP/BLOOD GLUCOSE: CPT | Performed by: NURSE PRACTITIONER

## 2025-07-07 PROCEDURE — 82948 REAGENT STRIP/BLOOD GLUCOSE: CPT

## 2025-07-07 PROCEDURE — 94799 UNLISTED PULMONARY SVC/PX: CPT

## 2025-07-07 PROCEDURE — 25010000002 ENOXAPARIN PER 10 MG: Performed by: INTERNAL MEDICINE

## 2025-07-07 PROCEDURE — 83735 ASSAY OF MAGNESIUM: CPT | Performed by: NURSE PRACTITIONER

## 2025-07-07 PROCEDURE — 85025 COMPLETE CBC W/AUTO DIFF WBC: CPT | Performed by: NURSE PRACTITIONER

## 2025-07-07 PROCEDURE — 80048 BASIC METABOLIC PNL TOTAL CA: CPT | Performed by: NURSE PRACTITIONER

## 2025-07-07 RX ORDER — CEFDINIR 300 MG/1
300 CAPSULE ORAL 2 TIMES DAILY
Qty: 14 CAPSULE | Refills: 0 | Status: SHIPPED | OUTPATIENT
Start: 2025-07-07 | End: 2025-07-14

## 2025-07-07 RX ORDER — DOXYCYCLINE 100 MG/1
100 CAPSULE ORAL 2 TIMES DAILY
Qty: 14 CAPSULE | Refills: 0 | Status: SHIPPED | OUTPATIENT
Start: 2025-07-07

## 2025-07-07 RX ADMIN — Medication 150 MG: at 08:36

## 2025-07-07 RX ADMIN — PANTOPRAZOLE SODIUM 40 MG: 40 TABLET, DELAYED RELEASE ORAL at 08:36

## 2025-07-07 RX ADMIN — Medication 5000 UNITS: at 08:36

## 2025-07-07 RX ADMIN — ENOXAPARIN SODIUM 40 MG: 100 INJECTION SUBCUTANEOUS at 08:36

## 2025-07-07 RX ADMIN — LEVOTHYROXINE SODIUM 175 MCG: 0.1 TABLET ORAL at 05:42

## 2025-07-07 RX ADMIN — Medication 10 ML: at 08:36

## 2025-07-07 RX ADMIN — IPRATROPIUM BROMIDE AND ALBUTEROL SULFATE 3 ML: .5; 3 SOLUTION RESPIRATORY (INHALATION) at 10:50

## 2025-07-07 RX ADMIN — IPRATROPIUM BROMIDE AND ALBUTEROL SULFATE 3 ML: .5; 3 SOLUTION RESPIRATORY (INHALATION) at 06:59

## 2025-07-07 RX ADMIN — ARIPIPRAZOLE 2.5 MG: 5 TABLET ORAL at 08:36

## 2025-07-07 RX ADMIN — LIOTHYRONINE SODIUM 25 MCG: 25 TABLET ORAL at 08:35

## 2025-07-07 NOTE — OUTREACH NOTE
Prep Survey      Flowsheet Row Responses   [facility] facility patient discharged from? [facility]   Is LACE score < 7 ? No   Eligibility Readm Mgmt   Discharge diagnosis Atypical pneumonia   Does the patient have one of the following disease processes/diagnoses(primary or secondary)? Pneumonia   Prep survey completed? Yes            Dina GREENE - Registered Nurse

## 2025-07-07 NOTE — PLAN OF CARE
Goal Outcome Evaluation:      Pt. A&O to self only. When asked [place] was [place], she stated on an airplane and it was year 1976. No c/o pain. S/ST on tele , pvcs. She slept well this shift. Bed exit on. Possible d/c today per MD. Safety maintained

## 2025-07-07 NOTE — PAYOR COMM NOTE
"7/7/25 ATTENTION WELLCARE .    [facility] [phone]  [phone]    ER ADMIT TO INPATIENT ON 7/5/25. FAXING FOR INPATIENT REVIEW.            Lynn Magana (49 y.o. Female)       Date of Birth   1975    Social Security Number       Address   [address] [place] [address]    Home Phone   [phone]    MRN   [id number]       Orthodox   [facility] [place]    Marital Status                               Admission Date   7/5/2025    Admission Type   Emergency    Admitting Provider   Yovanny Abernathy MD    Attending Provider   Yovanny Abernathy MD    Department, Room/Bed   [facility] 4B, 460/1       Discharge Date       Discharge Disposition       Discharge Destination                                 Attending Provider: Yovanny Abernathy MD    Allergies: Oxycodone-acetaminophen    Isolation: None   Infection: None   Code Status: CPR    Ht: 188 cm (74\")   Wt: 64.5 kg (142 lb 3.2 oz)    Admission Cmt: None   Principal Problem: Atypical pneumonia [J18.9]                   Active Insurance as of 7/5/2025       Primary Coverage       Payor Plan Insurance Group Employer/Plan Group    [facility] MEDICAID        Payor Plan Address Payor Plan Phone Number Payor Plan Fax Number Effective Dates    [address] [address] [phone]  3/14/2017 - None Entered    [facility] [id number]         Subscriber Name Subscriber Birth Date Member ID       LYNN MAGANA 1975 [id number]                     Emergency Contacts        (Rel.) Home Phone Work Phone Mobile Phone    Evens Cifuentes (Spouse) [phone] -- [phone]    Katya Menjivar (Sister) -- -- [phone]             [facility] Encounter Date/Time: 7/5/2025 04   Hospital Account: [id number]    MRN: [id number]   Patient:  Lynn Magana   Contact Serial #: [id number]   SSN:          ENCOUNTER             Patient Class: Inpatient   Unit: [address] "   Hospital Service: Medicine     Bed: 460/1   Admitting Provider: Yovanny Abernathy MD   Referring Physician:     Attending Provider: Yovanny Abernathy MD   Adm Diagnosis: Pneumonia [J18.9]               PATIENT             Name: Lynn Magana : 1975 (49 yrs)   Address: [address] Sex: Female   City: [place] [address]   County: [facility]   Marital Status:  Ethnicity: NOT        Race: BLACK   Primary Care Provider: Darryl Andrews DO Patients Phone: Home Phone: [phone]     Mobile Phone: [phone]     EMERGENCY CONTACT   Contact Name Legal Guardian? Relationship to Patient Home Phone Work Phone Mobile Phone   1. Evens Cifuentes  2. Katya Menjivar No    Spouse  Sister [phone]   GUARANTOR             Guarantor: Lynn Magana     : 1975   Address: [address] ;[address] Sex: [place] [address]     Relation to Patient: Self       Home Phone: [phone]   Guarantor ID: [id number]       Work Phone:     GUARANTOR EMPLOYER   Employer: UNEMPLOYED         Status: NOT EMPLO*   COVERAGE          PRIMARY INSURANCE   Payor: [facility] Plan: [facility]   Group Number:   Insurance Type: INDEMNITY   Subscriber Name: LYNN MAGANA Subscriber : 1975   Subscriber ID: [id number] Coverage Address: [place] [address]   Pat. Rel. to Subscriber: Self Coverage Phone: [phone]   SECONDARY INSURANCE   Payor: N/A Plan: N/A   Group Number:   Insurance Type:     Subscriber Name:   Subscriber :     Subscriber ID:   Coverage Address:     Pat. Rel. to Subscriber:   Coverage Phone:        Contact Serial # ([id number])         2025    Chart ID ([id number])          Hoang, Michelle L, APRN   Nurse Practitioner  Hospitalist     H&P     Attested     Date of Service: 25 125  Creation Time: 25 125     Attested           Attestation signed by Yovanny Abernathy  MD at 07/06/25 0761     I have reviewed this documentation and agree.               Expand All Collapse All         [facility] Medicine Services  HISTORY AND PHYSICAL     Date of Admission: 7/5/2025  Primary Care Physician: Darryl Andrews DO     Subjective   Primary Historian:  Patient     Chief Complaint:  Shortness of breath,      History of Present Illness  Lynn Magana is a 49-year-old lady who came to the ED with complaints of shortness of breath and cough. Her oxygen saturation was low in the morning calling EMS.  She does state that she uses oxygen at home.  I ER started on O2 at 4 L, oxygen saturation 99%.  Patient has been persistently tachycardic, she has persistent tachycardia in the past also.  Initially her blood pressure was on lower side but has improved on its own.  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  States that currently she is able to move her legs.  But feels weak all over.  Denies any nausea or vomiting at this time.  Denies any chest pain.  Patient has a history of type 1 diabetes mellitus, GERD, Graves' disease, hyperlipidemia, hypertension, hypothyroidism, seizures, and arthritis.     Patient was resting in ER.  She is awake, alert and oriented to person and place, but she was confused about which hospital she was at.  She had a hard time explaining her previous  hospital stays.   Patient just got discharged from [facility] on 6/24/2025.  She reports she is having a cough and generalized weakness.  She really does not know why she is at the hospital.  She just knows she is weak and she can tell that her heart is beating a little fast.  She did not have any oxygen on at the time of my assessment and did not appear to be any acute distress.   Patient's potassium is 4.1, sodium 143, creatinine 0.8, AST 38, ALT 30, lactate is 2.9, CRP is 0.83, procalcitonin is 0.10, D-dimer is 0.53, WBCs 10.21, hemoglobin 11.6, hematocrit 36.7, respiratory panel negative.  Patient is being admitted to hospitalist services due to atypical pneumonia.     CTA chest IMPRESSION:  1. No pulmonary embolism.  2. Mild patchy LEFT side infiltrate compatible with atypical pneumonia.     Review of Systems   Otherwise complete ROS reviewed and negative except as mentioned in the HPI.     Past Medical History:   Medical History[]Expand by Default        Past Medical History:   Diagnosis Date    Arthritis      Carotid artery stenosis      Degenerative disc disease, cervical      Depression      Diabetes mellitus       type 1    Disease of thyroid gland      GERD (gastroesophageal reflux disease)      Graves disease      Heart palpitations      Hyperlipidemia      Hypertension      Hypothyroidism      Seizure      Thyromegaly           Past Surgical History:  Surgical History         Past Surgical History:   Procedure Laterality Date     SECTION        CHOLECYSTECTOMY        COLONOSCOPY   2015     Normal exam Dr. Morgan     COLONOSCOPY   2015     Normal exam    CYST REMOVAL        ENDOSCOPY N/A 2018     Normal exam    ENDOSCOPY N/A 2025     Procedure: ESOPHAGOGASTRODUODENOSCOPY WITH ANESTHESIA;  Surgeon: Jadon Smith MD;  Location: [facility] ENDOSCOPY;  Service: Gastroenterology;  Laterality: N/A;  pre op: Nausea and vomiting  post op: normal  pcp: Darryl Andrews,     HYSTERECTOMY        THYROIDECTOMY Bilateral 2018     Procedure: total thyroidectomy;  Surgeon: Vitaly Givens MD;  Location: [facility] OR;  Service: ENT         Social History:  reports that she has never smoked. She has never used smokeless tobacco. She reports current alcohol use of about 6.0 standard drinks of alcohol per week. She reports current drug use. Drug:  Marijuana.     Family History: family history includes Breast cancer in her maternal aunt and sister; Colon cancer in her paternal uncle; Coronary artery disease in her brother; Diabetes in her brother, father, mother, and sister; Seizures in her sister; Stroke in her father, mother, and sister.        Allergies:  Allergies         Allergies   Allergen Reactions    Oxycodone-Acetaminophen Swelling       Other reaction(s): Throat swelling            Medications:          Prior to Admission medications    Medication Sig Start Date End Date Taking? Authorizing Provider   amLODIPine (NORVASC) 10 MG tablet Take 1 tablet by mouth Daily.       Luciano Hayes MD   ARIPiprazole (ABILIFY) 10 MG tablet Take 1 tablet by mouth Daily.       Luciano Hayes MD   benazepril (LOTENSIN) 20 MG tablet Take 1 tablet by mouth Daily.       Luciano Hayes MD   carvedilol (COREG) 25 MG tablet Take 1 tablet by mouth 2 (Two) Times a Day With Meals.       Luciano Hayes MD   empagliflozin (JARDIANCE) 25 MG tablet tablet Take 1 tablet by mouth Daily.       Luciano Hayes MD   Evolocumab (REPATHA) solution auto-injector SureClick injection Inject 1 mL under the skin into the appropriate area as directed Every 14 (Fourteen) Days.       Luciano Hayes MD   folic acid (FOLVITE) 1 MG tablet Take 1 tablet by mouth Daily. 6/12/25     Yovanny Abernathy MD   hydrOXYzine pamoate (VISTARIL) 25 MG capsule Take 1 capsule by mouth 3 (Three) Times a Day As Needed for Anxiety.       Luciano Hayes MD   insulin glargine (LANTUS, SEMGLEE) 100 UNIT/ML injection Inject 75 Units under the skin into the appropriate area as directed Every Night.  Patient not taking: Reported on 5/30/2025       Luciano Hayes MD   levothyroxine (SYNTHROID, LEVOTHROID) 175 MCG tablet Take 1 tablet by mouth Every Morning. 5/14/25     Uri Rhodes MD   liothyronine (CYTOMEL) 25 MCG tablet Take 1 tablet by mouth 2 (Two)  Times a Day.       Luciano Hayes MD   Melatonin 10 MG tablet Take 1 tablet by mouth At Night As Needed (sleep).       Luciano Hayes MD   metFORMIN ER (GLUCOPHAGE-XR) 500 MG 24 hr tablet Take 2 tablets by mouth Daily With Breakfast.       Luciano Hayes MD   mirtazapine (REMERON) 15 MG tablet Take 1 tablet by mouth Every Night.       Luciano Hayes MD   omeprazole (priLOSEC) 20 MG capsule Take 1 capsule by mouth Daily.       Luciano Hayes MD   ondansetron ODT (ZOFRAN-ODT) 4 MG disintegrating tablet Take 1 tablet by mouth Every 6 (Six) Hours As Needed for Nausea or Vomiting. 2/16/25     Donna Parks APRN   polyethylene glycol (MIRALAX) 17 g packet Take 17 g by mouth Daily As Needed (constipation).       Luciano Hayes MD   polyethylene glycol (MIRALAX) 17 g packet Take 17 g by mouth Daily. 6/11/25     Yovanny Abernathy MD   propranolol LA (INDERAL LA) 120 MG 24 hr capsule Take 1 capsule by mouth Daily.       Luciano Hayes MD   rosuvastatin (CRESTOR) 20 MG tablet Take 1 tablet by mouth Every Night.       Luciano Hayes MD   spironolactone (ALDACTONE) 50 MG tablet Take 1 tablet by mouth 2 (Two) Times a Day.       Luciano Hayes MD   tamsulosin (FLOMAX) 0.4 MG capsule 24 hr capsule Take 1 capsule by mouth Daily. 6/12/25     Yovanny Abernathy MD   thiamine (VITAMIN B1) 500 MG tablet Take 1 tablet by mouth 3 times a day. 6/11/25     Yovanny Abernathy MD   vitamin D (ERGOCALCIFEROL) 1.25 MG (82555 UT) capsule capsule Take 1 capsule by mouth 1 (One) Time Per Week.       Luciano Hayes MD      I have utilized all available immediate resources to obtain, update, or review the patient's current medications (including all prescriptions, over-the-counter products, herbals, cannabis/cannabidiol products, and vitamin/mineral/dietary (nutritional) supplements).     Objective      Vital Signs: /87 (BP Location: Left arm, Patient Position:  "Lying)   Pulse 114   Temp 97.6 °F (36.4 °C) (Oral)   Resp 20   Ht 188 cm (74\")   Wt 64.5 kg (142 lb 3.2 oz)   SpO2 95%   BMI 18.26 kg/m²   Physical Exam  Constitutional:       Appearance: She is underweight. She is ill-appearing.   HENT:      Head: Normocephalic and atraumatic.      Nose: Nose normal.      Mouth/Throat:      Mouth: Mucous membranes are moist.      Pharynx: Oropharynx is clear.   Eyes:      Extraocular Movements: Extraocular movements intact.      Conjunctiva/sclera: Conjunctivae normal.   Cardiovascular:      Rate and Rhythm: Regular rhythm. Tachycardia present.      Pulses: Normal pulses.      Heart sounds: Normal heart sounds.   Pulmonary:      Effort: Pulmonary effort is normal.      Breath sounds: Decreased breath sounds present.   Abdominal:      General: Abdomen is flat. Bowel sounds are normal.      Palpations: Abdomen is soft.   Musculoskeletal:         General: Normal range of motion.      Cervical back: Normal range of motion and neck supple.      Right lower leg: No edema.      Left lower leg: No edema.   Skin:     General: Skin is warm and dry.      Capillary Refill: Capillary refill takes 2 to 3 seconds.   Neurological:      Mental Status: She is alert. Mental status is at baseline. She is disoriented.   Psychiatric:         Mood and Affect: Mood normal.         Behavior: Behavior is cooperative.      Comments: Unable to answer questions about recent hospitalizations               Results Reviewed:  Lab Results (last 24 hours)         Procedure Component Value Units Date/Time     S. Pneumo Ag Urine or CSF - Urine, Urine, Clean Catch [[id number]]  (Normal) Collected: 07/05/25 0801     Specimen: Urine, Clean Catch Updated: 07/05/25 1356       Strep Pneumo Ag Negative     Legionella Antigen, Urine - Urine, Urine, Clean Catch [[id number]]  (Normal) Collected: 07/05/25 0801     Specimen: Urine, Clean Catch Updated: 07/05/25 1355       LEGIONELLA ANTIGEN, URINE Negative     STAT Lactic " Acid, Reflex [[id number]]  (Normal) Collected: 07/05/25 1043     Specimen: Blood Updated: 07/05/25 1110       Lactate 1.7 mmol/L       High Sensitivity Troponin T 1Hr [[id number]]  (Abnormal) Collected: 07/05/25 1002     Specimen: Blood Updated: 07/05/25 1039       HS Troponin T 34 ng/L         Troponin T Numeric Delta -4 ng/L         Troponin T % Delta -11     Narrative:       High Sensitive Troponin T Reference Range:  <14.0 ng/L- Negative Female for AMI  <22.0 ng/L- Negative Male for AMI  >=14 - Abnormal Female indicating possible myocardial injury.  >=22 - Abnormal Male indicating possible myocardial injury.   Clinicians would have to utilize clinical acumen, EKG, Troponin, and serial changes to determine if it is an Acute Myocardial Infarction or myocardial injury due to an underlying chronic condition.           Blood Culture - Blood, Arm, Right [[id number]] Collected: 07/05/25 0920     Specimen: Blood from Arm, Right Updated: 07/05/25 0941     Respiratory Panel PCR w/COVID-19(SARS-CoV-2) JAMIL/ESME/GRACE/PAD/COR/BRENDON In-House, NP Swab in UTM/VTM, 2 HR TAT - Swab, Nasopharynx [[id number]]  (Normal) Collected: 07/05/25 0748     Specimen: Swab from Nasopharynx Updated: 07/05/25 0905       ADENOVIRUS, PCR Not Detected       Coronavirus 229E Not Detected       Coronavirus HKU1 Not Detected       Coronavirus NL63 Not Detected       Coronavirus OC43 Not Detected       COVID19 Not Detected       Human Metapneumovirus Not Detected       Human Rhinovirus/Enterovirus Not Detected       Influenza A PCR Not Detected       Influenza B PCR Not Detected       Parainfluenza Virus 1 Not Detected       Parainfluenza Virus 2 Not Detected       Parainfluenza Virus 3 Not Detected       Parainfluenza Virus 4 Not Detected       RSV, PCR Not Detected       Bordetella pertussis pcr Not Detected       Bordetella parapertussis PCR Not Detected       Chlamydophila pneumoniae PCR Not Detected       Mycoplasma pneumo by PCR Not Detected      "Narrative:       In the setting of a positive respiratory panel with a viral infection PLUS a negative procalcitonin without other underlying concern for bacterial infection, consider observing off antibiotics or discontinuation of antibiotics and continue supportive care. If the respiratory panel is positive for atypical bacterial infection (Bordetella pertussis, Chlamydophila pneumoniae, or Mycoplasma pneumoniae), consider antibiotic de-escalation to target atypical bacterial infection.     Lactic Acid, Plasma [[id number]]  (Abnormal) Collected: 07/05/25 0740     Specimen: Blood from Arm, Left Updated: 07/05/25 0855       Lactate 2.9 mmol/L       Procalcitonin [[id number]]  (Normal) Collected: 07/05/25 0740     Specimen: Blood from Arm, Left Updated: 07/05/25 0845       Procalcitonin 0.10 ng/mL       Narrative:       As a Marker for Sepsis (Non-Neonates):     1. <0.5 ng/mL represents a low risk of severe sepsis and/or septic shock.  2. >2 ng/mL represents a high risk of severe sepsis and/or septic shock.     As a Marker for Lower Respiratory Tract Infections that require antibiotic therapy:     PCT on Admission    Antibiotic Therapy       6-12 Hrs later     >0.5                Strongly Recommended  >0.25 - <0.5        Recommended   0.1 - 0.25          Discouraged              Remeasure/reassess PCT  <0.1                Strongly Discouraged     Remeasure/reassess PCT     As 28 day mortality risk marker: \"Change in Procalcitonin Result\" (>80% or <=80%) if Day 0 (or Day 1) and Day 4 values are available. Refer to http://www.[facility]-pct-calculator.com     Change in PCT <=80%  A decrease of PCT levels below or equal to 80% defines a positive change in PCT test result representing a higher risk for 28-day all-cause mortality of patients diagnosed with severe sepsis for septic shock.     Change in PCT >80%  A decrease of PCT levels of more than 80% defines a negative change in PCT result representing a lower risk for 28-day " all-cause mortality of patients diagnosed with severe sepsis or septic shock.         Comprehensive Metabolic Panel [[id number]]  (Abnormal) Collected: 07/05/25 0740     Specimen: Blood from Arm, Left Updated: 07/05/25 0844       Glucose 88 mg/dL         BUN 20.6 mg/dL         Creatinine 0.80 mg/dL         Sodium 143 mmol/L         Potassium 4.1 mmol/L         Comment: Slight hemolysis detected by analyzer. Result may be falsely elevated.          Chloride 100 mmol/L         CO2 22.0 mmol/L         Calcium 10.4 mg/dL         Total Protein 7.6 g/dL         Albumin 4.2 g/dL         ALT (SGPT) 30 U/L         AST (SGOT) 38 U/L         Comment: Slight hemolysis detected by analyzer. Result may be falsely elevated.          Alkaline Phosphatase 54 U/L         Total Bilirubin 0.6 mg/dL         Globulin 3.4 gm/dL         A/G Ratio 1.2 g/dL         BUN/Creatinine Ratio 25.8       Anion Gap 21.0 mmol/L         eGFR 89.9 mL/min/1.73       Narrative:       GFR Categories in Chronic Kidney Disease (CKD)              GFR Category          GFR (mL/min/1.73)    Interpretation  G1                    90 or greater        Normal or high (1)  G2                    60-89                Mild decrease (1)  G3a                   45-59                Mild to moderate decrease  G3b                   30-44                Moderate to severe decrease  G4                    15-29                Severe decrease  G5                    14 or less           Kidney failure     (1)In the absence of evidence of kidney disease, neither GFR category G1 or G2 fulfill the criteria for CKD.     eGFR calculation 2021 CKD-EPI creatinine equation, which does not include race as a factor     Magnesium [[id number]]  (Normal) Collected: 07/05/25 0740     Specimen: Blood from Arm, Left Updated: 07/05/25 0844       Magnesium 1.7 mg/dL       C-reactive Protein [[id number]]  (Abnormal) Collected: 07/05/25 0740     Specimen: Blood from Arm, Left Updated: 07/05/25 0841        C-Reactive Protein 0.83 mg/dL       BNP [[id number]]  (Normal) Collected: 07/05/25 0740     Specimen: Blood from Arm, Left Updated: 07/05/25 0838       proBNP 133.6 pg/mL       Narrative:       This assay is used as an aid in the diagnosis of individuals suspected of having heart failure. It can be used as an aid in the diagnosis of acute decompensated heart failure (ADHF) in patients presenting with signs and symptoms of ADHF to the emergency department (ED). In addition, NT-proBNP of <300 pg/mL indicates ADHF is not likely.     Age Range         Result Interpretation  NT-proBNP Concentration (pg/mL:        <50             Positive            >450                         Meraz                           300-450                           Negative               <300     50-75           Positive            >900                  Gray                300-900                  Negative            <300        >75             Positive            >1800                  Gray                300-1800                  Negative            <300     High Sensitivity Troponin T [[id number]]  (Abnormal) Collected: 07/05/25 0740     Specimen: Blood from Arm, Left Updated: 07/05/25 0838       HS Troponin T 38 ng/L       Narrative:       High Sensitive Troponin T Reference Range:  <14.0 ng/L- Negative Female for AMI  <22.0 ng/L- Negative Male for AMI  >=14 - Abnormal Female indicating possible myocardial injury.  >=22 - Abnormal Male indicating possible myocardial injury.   Clinicians would have to utilize clinical acumen, EKG, Troponin, and serial changes to determine if it is an Acute Myocardial Infarction or myocardial injury due to an underlying chronic condition.           Ethanol [[id number]] Collected: 07/05/25 0740     Specimen: Blood from Arm, Left Updated: 07/05/25 0837       Ethanol % <0.010 %       Narrative:       Not for legal purposes.     Protime-INR [[id number]]  (Normal) Collected: 07/05/25 0740     Specimen: Blood from  "Arm, Left Updated: 07/05/25 0832       Protime 13.7 Seconds         INR 1.00     D-dimer, Quantitative [[id number]]  (Abnormal) Collected: 07/05/25 0740     Specimen: Blood from Arm, Left Updated: 07/05/25 0832       D-Dimer, Quantitative 0.53 MCGFEU/mL       Narrative:       According to the assay 's published package insert, a normal (<0.50 MCGFEU/mL) D-dimer result in conjunction with a non-high clinical probability assessment, excludes deep vein thrombosis (DVT) and pulmonary embolism (PE) with high sensitivity.     D-dimer values increase with age and this can make VTE exclusion of an older population difficult. To address this, the American College of Physicians, based on best available evidence and recent guidelines, recommends that clinicians use age-adjusted D-dimer thresholds in patients greater than 50 years of age with: a) a low probability of PE who do not meet all Pulmonary Embolism Rule Out Criteria, or b) in those with intermediate probability of PE.   The formula for an age-adjusted D-dimer cut-off is \"age/100\".  For example, a 60 year old patient would have an age-adjusted cut-off of 0.60 MCGFEU/mL and an 80 year old 0.80 MCGFEU/mL.     Fentanyl, Urine - Urine, Clean Catch [[id number]]  (Normal) Collected: 07/05/25 0801     Specimen: Urine, Clean Catch Updated: 07/05/25 0830       Fentanyl, Urine Negative     Narrative:       Negative Threshold:       Fentanyl 5 ng/mL      The normal value for the drug tested is negative. This report includes final unconfirmed screening results to be used for medical treatment purposes only. Unconfirmed results must not be used for non-medical purposes such as employment or legal testing. Clinical consideration should be applied to any drug of abuse test, particularly when unconfirmed results are used.            Urine Drug Screen - Urine, Clean Catch [[id number]]  (Normal) Collected: 07/05/25 0801     Specimen: Urine, Clean Catch Updated: 07/05/25 0829    "    THC, Screen, Urine Negative       Phencyclidine (PCP), Urine Negative       Cocaine Screen, Urine Negative       Methamphetamine, Ur Negative       Opiate Screen Negative       Amphetamine Screen, Urine Negative       Benzodiazepine Screen, Urine Negative       Tricyclic Antidepressants Screen Negative       Methadone Screen, Urine Negative       Barbiturates Screen, Urine Negative       Oxycodone Screen, Urine Negative       Buprenorphine, Screen, Urine Negative     Narrative:       Cutoff For Drugs Screened:     Amphetamines               500 ng/ml  Barbiturates               200 ng/ml  Benzodiazepines            150 ng/ml  Cocaine                    150 ng/ml  Methadone                  200 ng/ml  Opiates                    100 ng/ml  Phencyclidine               25 ng/ml  THC                         50 ng/ml  Methamphetamine            500 ng/ml  Tricyclic Antidepressants  300 ng/ml  Oxycodone                  100 ng/ml  Buprenorphine               10 ng/ml     The normal value for all drugs tested is negative. This report includes unconfirmed screening results, with the cutoff values listed, to be used for medical treatment purposes only.  Unconfirmed results must not be used for non-medical purposes such as employment or legal testing.  Clinical consideration should be applied to any drug of abuse test, particularly when unconfirmed results are used.       Urinalysis With Culture If Indicated - Straight Cath [[id number]]  (Abnormal) Collected: 07/05/25 0800     Specimen: Urine from Straight Cath Updated: 07/05/25 0825       Color, UA Dark Yellow       Appearance, UA Clear       pH, UA 5.5       Specific Gravity, UA 1.025       Glucose, UA Negative       Ketones, UA 40 mg/dL (2+)       Bilirubin, UA Negative       Blood, UA Negative       Protein, UA Trace       Leuk Esterase, UA Trace       Nitrite, UA Positive       Urobilinogen, UA 1.0 E.U./dL     Narrative:       In absence of clinical symptoms, the  presence of pyuria, bacteria, and/or nitrites on the urinalysis result does not correlate with infection.     Urinalysis, Microscopic Only - Straight Cath [[id number]]  (Abnormal) Collected: 07/05/25 0800     Specimen: Urine from Straight Cath Updated: 07/05/25 0825       RBC, UA 0-2 /HPF         WBC, UA 0-2 /HPF         Comment: Urine culture not indicated.          Bacteria, UA 1+ /HPF         Squamous Epithelial Cells, UA 0-2 /HPF         Hyaline Casts, UA None Seen /LPF         Methodology Automated Microscopy     CBC & Differential [[id number]]  (Abnormal) Collected: 07/05/25 0740     Specimen: Blood from Arm, Left Updated: 07/05/25 0821     Narrative:       The following orders were created for panel order CBC & Differential.  Procedure                               Abnormality         Status                     ---------                               -----------         ------                     CBC Auto Differential[[id number]]        Abnormal            Final result                  Please view results for these tests on the individual orders.     CBC Auto Differential [[id number]]  (Abnormal) Collected: 07/05/25 0740     Specimen: Blood from Arm, Left Updated: 07/05/25 0821       WBC 10.21 10*3/mm3         RBC 4.26 10*6/mm3         Hemoglobin 11.6 g/dL         Hematocrit 36.7 %         MCV 86.2 fL         MCH 27.2 pg         MCHC 31.6 g/dL         RDW 12.4 %         RDW-SD 38.5 fl         MPV 10.7 fL         Platelets 396 10*3/mm3         Neutrophil % 73.3 %         Lymphocyte % 22.5 %         Monocyte % 3.3 %         Eosinophil % 0.2 %         Basophil % 0.3 %         Immature Grans % 0.4 %         Neutrophils, Absolute 7.48 10*3/mm3         Lymphocytes, Absolute 2.30 10*3/mm3         Monocytes, Absolute 0.34 10*3/mm3         Eosinophils, Absolute 0.02 10*3/mm3         Basophils, Absolute 0.03 10*3/mm3         Immature Grans, Absolute 0.04 10*3/mm3         nRBC 0.0 /100 WBC       Blood Culture - Blood, Arm,  Left [[id number]] Collected: 07/05/25 0740     Specimen: Blood from Arm, Left Updated: 07/05/25 0820     Blood Gas, Arterial With Co-Ox [[id number]]  (Abnormal) Collected: 07/05/25 0730     Specimen: Arterial Blood Updated: 07/05/25 0731       Site Right Brachial       Bill's Test N/A       pH, Arterial 7.411 pH units         pCO2, Arterial 36.5 mm Hg         pO2, Arterial 76.9 mm Hg         Comment: 84 Value below reference range          HCO3, Arterial 23.2 mmol/L         Base Excess, Arterial -1.2 mmol/L         Comment: 84 Value below reference range          O2 Saturation, Arterial 96.3 %         Hemoglobin, Blood Gas 11.2 g/dL         Comment: 84 Value below reference range          Hematocrit, Blood Gas 34.3 %         Comment: 84 Value below reference range          Oxyhemoglobin 94.5 %         Methemoglobin 0.50 %         Carboxyhemoglobin 1.4 %         Temperature 37.0       Sodium, Arterial 144 mmol/L         Potassium, Arterial 3.5 mmol/L         Barometric Pressure for Blood Gas 755 mmHg         Modality Nasal Cannula       Flow Rate 4.0 lpm         Ventilator Mode NA       Collected by [id number]       Comment: Meter: [id number]     :  Sunita Sutton, JEREMY          pH, Temp Corrected 7.411 pH Units         pCO2, Temperature Corrected 36.5 mm Hg         pO2, Temperature Corrected 76.9 mm Hg               Imaging Results (Last 24 Hours)         Procedure Component Value Units Date/Time     CT Angiogram Chest Pulmonary Embolism [[id number]] Collected: 07/05/25 1131       Updated: 07/05/25 1137     Narrative:       EXAMINATION: CT ANGIOGRAM CHEST PULMONARY EMBOLISM-     HISTORY: Shortness of breath.     In order to have a CT radiation dose as low as reasonably achievable  Automated Exposure Control was utilized for adjustment of the mA and/or  KV according to patient size.     CT Dose DLP = 124.32 mGy.cm.  (If there are multiple studies performed at the same time this  represents the total  dose).     Images are stored in PACS per institutional protocol.        CT angiogram chest with IV contrast injection.  CT angiography protocol.  CT imaging with bolus IV contrast injection.  Under concurrent supervision axial, sagittal, coronal,  three-dimensional, and MIP data sets were constructed on an independent  work station.     Comparison:  4/18/2025. Normal heart size.  Normal size thoracic aorta.  No mediastinal mass.  Small and poorly visualized thyroid gland.     Symmetric and normally opacified pulmonary arteries.  No pulmonary embolism.     Mild patchy LEFT side infiltrate more so within the upper lobe than the  lower lobe.  No focal consolidation.  No pneumothorax or pleural effusion.        Impression:       1. No pulmonary embolism.  2. Mild patchy LEFT side infiltrate compatible with atypical pneumonia.     This report was signed and finalized on 7/5/2025 11:33 AM by Dr. Isaiah Diaz MD.        XR Chest 1 View [[id number]] Collected: 07/05/25 0752       Updated: 07/05/25 0756     Narrative:       EXAMINATION: XR CHEST 1 VW-     HISTORY: Shortness of air     Images are stored in PACS per institutional protocol.     1 view chest x-ray.     COMPARISON:  5/29/2025.     Heart size is normal.  The mediastinum is within normal limits.     The lungs are normally expanded with no pneumonia or pneumothorax.     No congestive failure changes.        Impression:       1. No acute disease.                 This report was signed and finalized on 7/5/2025 7:53 AM by Dr. Isaiah Diaz MD.                      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 Abernathy's service here at [facility].      1.  Atypical pneumonia-Zithromax  IV x 3 days, Rocephin IV x 5 days.  CBC, BMP daily.     2.  Joy Quinonez variant [place] Barré syndrome-Recent hospitalization at [facility].  Neurology consulted.     3.  Generalized weakness-PT and OT eval.     4.  Hypertension-Monitor blood pressures per hospital policy.  Adjust medications as needed.       5.  Diabetes mellitus type 1-Sliding scale insulin as needed.  Monitor blood sugars per hospital policy.  Dietitian consult.     6.  Tachycardia, unspecified-Telemetry monitoring.  Cardizem drip started in ER.  When medication list is updated, will switch to p.o. or home medications.          Medical Decision Making  Atypical pneumonia-acute, moderate complexity, unchanged  Joy Quinonez variant [place] Barré syndrome-acute, moderate complexity, unchanged  Generalized weakness-acute, moderate complexity, unchanged  Hypertension-chronic, moderate complexity, stable  Diabetes mellitus type 1-chronic, moderate complexity, stable  Tachycardia, unspecified-acute, moderate complexity, unchanged     Number and Complexity of problems: 6  Differential Diagnosis: none     Conditions and Status        Condition is unchanged.     [facility] Data  External documents reviewed: Epic records  Cardiac tracing (EKG, telemetry) interpretation: EKG per cardiology 7/5/2025  Radiology interpretation: CTA  Labs reviewed: CBC, CMP, troponin, PTT 7/5/2025  Any tests that were considered but not ordered: none     Decision rules/scores evaluated (example [id number], Wells, etc): SIRS, Sepsis, and Septic Shock Criteria - MDCalc  Calculated on Jul 05 2025 1:56 PM  This patient does not meet SIRS criteria.      Discussed with: Patient and Dr. Abernathy     Care Planning  Shared decision making: Patient and Dr. Abernathy  Code status and discussions: CPR-full support     Disposition  Social Determinants of Health that impact treatment or disposition: none  Estimated length of stay is 2-3 days.      I confirmed that the patient's advanced care plan  is present, code status is documented, and a surrogate decision maker is listed in the patient's medical record.      The patient's surrogate decision maker is Santy Cifuentes.      The patient was seen and examined by me on 07/05/2025 at 1254.     Electronically signed by BRIANA Preciado, 07/05/25, 15:02 CDT.                           Cosigned by: Yovanny Abernathy MD at 07/06/25 0726         Yovanny Abernathy MD   Physician  Hospitalist     Progress Notes     Signed     Date of Service: 07/06/25 1148  Creation Time: 07/06/25 1148     Signed              [facility] Emergency Room Medicine Services  INPATIENT PROGRESS NOTE     Patient Name: Lynn Magana  Date of Admission: 7/5/2025  Today's Date: 07/06/25  Length of Stay: 1  Primary Care Physician: Darryl Andrews DO     Subjective   Chief Complaint: Shortness of breath     Patient has no new complaint this morning, said her shortness of breath is improved.        Review of Systems   All pertinent negatives and positives are as above. All other systems have been reviewed and are negative unless otherwise stated.      Objective    Temp:  [97.6 °F (36.4 °C)-98.5 °F (36.9 °C)] 98.5 °F (36.9 °C)  Heart Rate:  [105-116] 109  Resp:  [16-20] 16  BP: ()/(61-99) 110/65  Physical Exam  Constitutional:       Appearance: She is underweight, alert and awake, communicative.   HENT:      Head: Normocephalic and atraumatic.      Nose: Nose normal.      Mouth/Throat:      Mouth: Mucous membranes are moist.      Pharynx: Oropharynx is clear.   Eyes:      Extraocular Movements: Extraocular movements intact.      Conjunctiva/sclera: Conjunctivae normal.   Cardiovascular:      Rate and Rhythm: Regular rhythm. Tachycardia present.      Pulses: Normal pulses.      Heart sounds: Normal heart sounds.   Pulmonary:      Effort: Pulmonary effort is normal.      Breath sounds: Decreased breath sounds present.   Abdominal:      General: Abdomen  is flat. Bowel sounds are normal.      Palpations: Abdomen is soft.   Musculoskeletal:         General: Normal range of motion.      Cervical back: Normal range of motion and neck supple.      Right lower leg: No edema.      Left lower leg: No edema.   Skin:     General: Skin is warm and dry.      Capillary Refill: Capillary refill takes 2 to 3 seconds.   Neurological:      Mental Status: She is alert. Mental status is at baseline.   Psychiatric:         Mood and Affect: Mood normal.         Behavior: Behavior is cooperative.      Comments: Unable to answer questions about recent hospitalizations            Results Review:  I have reviewed the labs, radiology results, and diagnostic studies.     Laboratory Data:          Results from last 7 days   Lab Units 07/06/25 0533 07/05/25 0740 07/04/25  0930   WBC 10*3/mm3 5.10 10.21 6.4   HEMOGLOBIN g/dL 8.4* 11.6* 10.6*   HEMATOCRIT % 26.6* 36.7 31.9*   PLATELETS 10*3/mm3 225 396 363                 Results from last 7 days   Lab Units 07/06/25 0533 07/05/25 0740 07/05/25 0730 07/04/25  0930   SODIUM mmol/L 142 143  --  144   SODIUM, ARTERIAL mmol/L  --   --  144  --    POTASSIUM mmol/L 3.2* 4.1  --  4.1   CHLORIDE mmol/L 105 100  --  104   TOTAL CO2 mmol/L  --   --   --  23   CO2 mmol/L 24.0 22.0  --   --    BUN mg/dL 20.1* 20.6*  --  20   CREATININE mg/dL 0.70 0.80  --  0.8   CALCIUM mg/dL 9.2 10.4  --  9.9   BILIRUBIN mg/dL  --  0.6  --   --    ALK PHOS U/L  --  54  --   --    ALT (SGPT) U/L  --  30  --   --    AST (SGOT) U/L  --  38*  --   --    GLUCOSE mg/dL 80 88  --  75         Culture Data:         Blood Culture   Date Value Ref Range Status   07/05/2025 No growth at 24 hours   Preliminary   07/05/2025 No growth at 24 hours   Preliminary         Radiology Data:   Imaging Results (Last 24 Hours)         ** No results found for the last 24 hours. **                I have reviewed the patient's current medications.      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  Yesterday she developed shortness of breath, CTA chest in the emergency room showed infiltrates/probable atypical pneumonia.  She is currently on ceftriaxone and azithromycin, clinically improved this morning.  We will continue current management/antibiotics and if she continues to improve, will discharge home tomorrow.     - Tachycardia  She has history of tachycardia/palpitation  Patient was on beta-blocker at home, probably having rebound tachycardia.  Will restart patient's propranolol.     -Anemia  Patient's hemoglobin dropped from 11.1 to 8 this morning, this could be hemodilution.  We will repeat CBC in the morning.     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  Discussed with neurologist, will cancel consults.     Chronic medical conditions-  Diabetes mellitus  Hypertension  Carotid artery stenosis  Degenerative disease of the cervical spine  Depression  GERD  Graves' disease  Hyperlipidemia  Hypertension  Hypothyroidism  Seizure disorder  Thyromegaly     DVT prophylaxis-Lovenox     Disposition-discharge planning for tomorrow     Electronically signed by Yovanny Abernathy MD, 07/06/25, 11:48 CDT.                     /25 0846 -- 135 Abnormal  -- 121/87 -- -- -- 100   07/05/25 0726 97.8 (36.6) 147 Abnormal  23 105/81 Sitting nasal cannula 4 100   07/05/25 0709 -- -- -- 97/78 Sitting -- -- --   07/05/25 0706 -- 146 Abnormal  24 -- -- nasal cannula with ETCO2 4 98           tains critical data Lactic Acid, Plasma  Order: [id number]    Status: Final result       Next appt: 08/05/2025 [place] 11[place]30 [place] [place] Result Released: Yes (not seen)    Specimen Information: Arm, Left; Blood   0 Result Notes            Component  Ref Range & Units (hover) 2 d ago  (7/5/25) 1 mo ago  (5/11/25) 4 mo ago  (2/15/25) 6 yr ago  (12/8/18) 7 yr ago  (3/26/18) 7 yr ago  (3/26/18) 8 yr ago  (3/14/17)   Lactate 2.9 High Critical  1                   ve Protein  Order: [id number]   Status: Final result       Next appt: 08/05/2025 [place] 11[place]30 [place] [place]    Test Result Released: Yes (not seen)    Specimen Information: Arm, Left; Blood   0 Result Notes       Component  Ref Range & Units (hover) 2 d ago 8 yr ago   C-Reactive Protein 0.83 High                  1 mo ago  (6/1/25) 1 mo ago  (5/29/25) 1 mo ago  (5/11/25) 2 mo ago  (4/18/25) 4 mo ago  (2/15/25) 7 mo ago  (12/4/24)     Color, UA Dark Yellow Abnormal  Orange Abnormal  Orange Abnormal  Dark Yellow Abnormal  Orange Abnormal  Dark Yellow Abnormal  Yellow   Appearance, UA Clear Clear Clear Cloudy Abnormal  Cloudy Abnormal  Clear Clear   pH, UA 5.5 5.5 6.0 5.5 5.5 <=5.0 5.5   Specific Gravity, UA 1.025 >1.030 High  >1.030 High  1.029 1.026 1.028 1.029   Glucose, UA Negative 500 mg/dL (2+) Abnormal  500 mg/dL (2+) Abnormal  500 mg/dL (2+) Abnormal  Negative >=1000 mg/dL (3+) Abnormal  >=1000 mg/dL (3+) Abnormal    Ketones, UA 40 mg/dL (2+) Abnormal  15 mg/dL (1+) Abnormal  15 mg/dL (1+) Abnormal  15 mg/dL (1+) Abnormal  80 mg/dL (3+) Abnormal  80 mg/dL (3+) Abnormal  Negative   Bilirubin, UA Negative Moderate (2+) Abnormal  Large (3+) Abnormal  Small (1+) Abnormal  Large (3+) Abnormal  Negative Negative   Blood, UA Negative Negative Negative Negative Negative Negative Negative   Protein, UA Trace Abnormal  30 mg/dL (1+) Abnormal  30 mg/dL (1+) Abnormal  30 mg/dL (1+) Abnormal  30 mg/dL (1+) Abnormal  Negative Negative   Leuk Esterase, UA Trace Abnormal   Moderate (2+) Abnormal  Small (1+) Abnormal  Trace Abnormal  Trace Abnormal  Negative Negative   Nitrite, UA Positive Abnormal  Positive Abnormal  Positive Abnormal  Negative Negative Negative Negative   Urobilinogen, UA 1.0                                              Current Facility-Administered Medications   Medication Dose Route Frequency Provider Last Rate Last Admin    acetaminophen (TYLENOL) tablet 650 mg  650 mg Oral Q4H PRN Michelle Hoang APRN        Or    acetaminophen (TYLENOL) 160 MG/5ML oral solution 650 mg  650 mg Oral Q4H PRN Michelle Hoang APRN        Or    acetaminophen (TYLENOL) suppository 650 mg  650 mg Rectal Q4H PRN Michelle Hoang APRN        ARIPiprazole (ABILIFY) tablet 2.5 mg  2.5 mg Oral Daily Yovanny Abernathy MD   2.5 mg at 07/07/25 0836    azithromycin (ZITHROMAX) tablet 500 mg  500 mg Oral Q24H Yovanny Abernathy MD   500 mg at 07/06/25 1752    sennosides-docusate (PERICOLACE) 8.6-50 MG per tablet 2 tablet  2 tablet Oral BID PRN Michelle Hoang APRN        And    polyethylene glycol (MIRALAX) packet 17 g  17 g Oral Daily PRN Michelle Hoang APRN        And    bisacodyl (DULCOLAX) EC tablet 5 mg  5 mg Oral Daily PRN Michelle Hoang APRN        And    bisacodyl (DULCOLAX) suppository 10 mg  10 mg Rectal Daily PRN Michelle Hoang APRN        Calcium Replacement - Follow Nurse / BPA Driven Protocol   Not Applicable PRN Michelle Hoang APRN        cefTRIAXone (ROCEPHIN) 1,000 mg in sodium chloride 0.9 % 100 mL MBP  1,000 mg Intravenous Q24H Michelle Hoang APRN 200 mL/hr at 07/06/25 1419 1,000 mg at 07/06/25 1419    cholecalciferol (VITAMIN D3) tablet 5,000 Units  5,000 Units Oral Daily Yovanny Abernathy MD   5,000 Units at 07/07/25 0836    dextrose (D50W) (25 g/50 mL) IV injection 25 g  25 g Intravenous Q15 Min PRN Hoang, Michelle L, APRN        dextrose (GLUTOSE) oral gel 15 g  15 g Oral Q15 Min PRN Michelle Hoang APRN        dilTIAZem (CARDIZEM) 125 mg in 125 mL D5W  infusion  5-15 mg/hr Intravenous Titrated Linus Rodriguez MD   Stopped at 07/05/25 1530    donepezil (ARICEPT) tablet 5 mg  5 mg Oral Nightly Yovanny Abernathy MD   5 mg at 07/06/25 2131    enoxaparin sodium (LOVENOX) syringe 40 mg  40 mg Subcutaneous Q24H Yovanny Abernathy MD   40 mg at 07/07/25 0836    glucagon (GLUCAGEN) injection 1 mg  1 mg Intramuscular Q15 Min PRN Michelle Hoang APRN        Insulin Lispro (humaLOG) injection 2-7 Units  2-7 Units Subcutaneous 4x Daily AC & at Bedtime Michelle Hoang APRN        ipratropium-albuterol (DUO-NEB) nebulizer solution 3 mL  3 mL Nebulization 4x Daily - RT Michelle Hoang APRN   3 mL at 07/07/25 0659    iron polysaccharides (NIFEREX) capsule 150 mg  150 mg Oral Daily Yovanny Abernathy MD   150 mg at 07/07/25 0836    levothyroxine (SYNTHROID, LEVOTHROID) tablet 175 mcg  175 mcg Oral Q AM Yovanny Abernathy MD   175 mcg at 07/07/25 0542    liothyronine (CYTOMEL) tablet 25 mcg  25 mcg Oral BID Yovanny Abernathy MD   25 mcg at 07/07/25 0835    Magnesium Standard Dose Replacement - Follow Nurse / BPA Driven Protocol   Not Applicable PRN Michelle Hoang APRN        melatonin tablet 10 mg  10 mg Oral Nightly PRN Yovanny Abernathy MD        mirtazapine (REMERON) tablet 15 mg  15 mg Oral Nightly Yovanny Abernathy MD   15 mg at 07/06/25 2131    nitroglycerin (NITROSTAT) SL tablet 0.4 mg  0.4 mg Sublingual Q5 Min PRN Michelle Hoang APRN        ondansetron ODT (ZOFRAN-ODT) disintegrating tablet 4 mg  4 mg Oral Q6H PRN Michelle Hoang APRN        Or    ondansetron (ZOFRAN) injection 4 mg  4 mg Intravenous Q6H PRN Michelle Hoang APRN        pantoprazole (PROTONIX) EC tablet 40 mg  40 mg Oral Daily Yovanny Abernathy MD   40 mg at 07/07/25 0836    Phosphorus Replacement - Follow Nurse / BPA Driven Protocol   Not Applicable PRN Michelle Hoang APRN        potassium chloride (KLOR-CON M20) CR tablet 40 mEq  40 mEq Oral Daily Yovanny Abernathy MD         Potassium Replacement - Follow Nurse / BPA Driven Protocol   Not Applicable PRN Michelle Hoang, APRN        rosuvastatin (CRESTOR) tablet 20 mg  20 mg Oral Nightly Yovanny Abernathy MD   20 mg at 07/06/25 2131    sodium chloride 0.9 % flush 10 mL  10 mL Intravenous Q12H Michelle Hoang APRN   10 mL at 07/07/25 0836    sodium chloride 0.9 % flush 10 mL  10 mL Intravenous PRN Michelle Hoang, BRIANA        sodium chloride 0.9 % infusion 40 mL  40 mL Intravenous PRN Michelle Hoang, APRN

## 2025-07-07 NOTE — THERAPY DISCHARGE NOTE
Acute Care - Physical Therapy Discharge Summary  [facility]       Patient Name: Lynn Magana  : 1975  MRN: [id number]    Today's Date: 2025                 Admit Date: 2025      PT Recommendation and Plan    Visit Dx:    ICD-10-CM ICD-9-CM   1. Pneumonia due to infectious organism, unspecified laterality, unspecified part of lung  J18.9 486   2. Generalized weakness  R53.1 780.79   3. Tachycardia  R00.0 785.0   4. Impaired mobility [Z74.09]  Z74.09 799.89        Outcome Measures       Row Name 25 1300             How much help from another is currently needed...    Putting on and taking off regular lower body clothing? 3  -EC      Bathing (including washing, rinsing, and drying) 3  -EC      Toileting (which includes using toilet bed pan or urinal) 3  -EC      Putting on and taking off regular upper body clothing 3  -EC      Taking care of personal grooming (such as brushing teeth) 3  -EC      Eating meals 4  -EC      AM-PAC 6 Clicks Score (OT) 19  -EC         Functional Assessment    Outcome Measure Options AM-PAC 6 Clicks Daily Activity (OT)  -EC                User Key  (r) = Recorded By, (t) = Taken By, (c) = Cosigned By      Initials Name Provider Type    EC Kizzy Ramirez OTR/L Occupational Therapist                         PT Rehab Goals       Row Name 25 1300             Bed Mobility Goal 1 (PT)    Activity/Assistive Device (Bed Mobility Goal 1, PT) bed mobility activities, all  -AB      [place] Level/Cues Needed (Bed Mobility Goal 1, PT) independent  -AB      Time Frame (Bed Mobility Goal 1, PT) 10 days  -AB      Progress/Outcomes (Bed Mobility Goal 1, PT) goal not met  -AB         Transfer Goal 1 (PT)    Activity/Assistive Device (Transfer Goal 1, PT) sit-to-stand/stand-to-sit  -AB      [place] Level/Cues Needed (Transfer Goal 1, PT) independent  -AB      Time Frame (Transfer Goal 1, PT) 10 days  -AB      Progress/Outcome (Transfer Goal 1, PT) goal not met  -AB          Gait Training Goal 1 (PT)    Activity/Assistive Device (Gait Training Goal 1, PT) gait (walking locomotion);assistive device use;improve balance and speed  -AB      [place] Level (Gait Training Goal 1, PT) standby assist  -AB      Distance (Gait Training Goal 1, PT) 75ft  -AB      Time Frame (Gait Training Goal 1, PT) 10 days  -AB      Progress/Outcome (Gait Training Goal 1, PT) goal not met  -AB                User Key  (r) = Recorded By, (t) = Taken By, (c) = Cosigned By      Initials Name Provider Type Discipline    Deborah Mcgowan, PTA Physical Therapist Assistant PT                        PT Discharge Summary  Anticipated Discharge Disposition (PT): home with assist, home with home health  Reason for Discharge: Discharge from facility  Outcomes Achieved: Refer to plan of care for updates on goals achieved  Discharge Destination: Home with assist      Deborah Brown PTA   7/7/2025

## 2025-07-07 NOTE — DISCHARGE SUMMARY
[facility] Medicine Services  DISCHARGE SUMMARY       Date of Admission: 7/5/2025  Date of Discharge:  7/7/2025  Primary Care Physician: Darryl Andrews DO    Presenting Problem/History of Present Illness:  Shortness of breath    Final Discharge Diagnoses:  Active Hospital Problems    Diagnosis     **Atypical pneumonia     Pneumonia, unspecified organism     Joy-Quinonez variant Guillain-Doniphan syndrome     Tachycardia, unspecified     Generalized weakness     Hypertension     Diabetes mellitus type 1        Consults: None    Procedures Performed: None    Pertinent Test Results:   Results for orders placed during the hospital encounter of 02/15/25    Adult Transthoracic Echo Complete w/ Color, Spectral and Contrast if Necessary Per Protocol    Interpretation Summary    Left ventricular systolic function is normal. Left ventricular ejection fraction appears to be 66 - 70%.    Left ventricular diastolic function was normal.    Normal right ventricular cavity size and systolic function.    Normal biatrial size.    There is no significant (more than mild) valve stenosis or regurgitation appreciated.    There is a very small (<0.5cm) pericardial effusion adjacent to the right ventricle. There is no evidence of cardiac tamponade.      Imaging Results (All)       Procedure Component Value Units Date/Time    CT Angiogram Chest Pulmonary Embolism [[id number]] Collected: 07/05/25 1131     Updated: 07/05/25 1137    Narrative:      EXAMINATION: CT ANGIOGRAM CHEST PULMONARY EMBOLISM-     HISTORY: Shortness of breath.     In order to have a CT radiation dose as low as reasonably achievable  Automated Exposure Control was utilized for adjustment of the mA and/or  KV according to patient size.     CT Dose DLP = 124.32 mGy.cm.  (If there are multiple studies performed at the same time this  represents the total dose).     Images are stored in PACS per institutional protocol.        CT  angiogram chest with IV contrast injection.  CT angiography protocol.  CT imaging with bolus IV contrast injection.  Under concurrent supervision axial, sagittal, coronal,  three-dimensional, and MIP data sets were constructed on an independent  work station.     Comparison:  4/18/2025. Normal heart size.  Normal size thoracic aorta.  No mediastinal mass.  Small and poorly visualized thyroid gland.     Symmetric and normally opacified pulmonary arteries.  No pulmonary embolism.     Mild patchy LEFT side infiltrate more so within the upper lobe than the  lower lobe.  No focal consolidation.  No pneumothorax or pleural effusion.       Impression:      1. No pulmonary embolism.  2. Mild patchy LEFT side infiltrate compatible with atypical pneumonia.     This report was signed and finalized on 7/5/2025 11:33 AM by Dr. Isaiah Diaz MD.       XR Chest 1 View [[id number]] Collected: 07/05/25 0752     Updated: 07/05/25 0756    Narrative:      EXAMINATION: XR CHEST 1 VW-     HISTORY: Shortness of air     Images are stored in PACS per institutional protocol.     1 view chest x-ray.     COMPARISON:  5/29/2025.     Heart size is normal.  The mediastinum is within normal limits.     The lungs are normally expanded with no pneumonia or pneumothorax.     No congestive failure changes.       Impression:      1. No acute disease.                 This report was signed and finalized on 7/5/2025 7:53 AM by Dr. Isaiah Diaz MD.             LAB RESULTS:      Lab 07/07/25  0459 07/06/25  0533 07/05/25  1043 07/05/25  0740 07/04/25  0930   WBC 4.99 5.10  --  10.21 6.4   HEMOGLOBIN 8.7* 8.4*  --  11.6* 10.6*   HEMATOCRIT 27.3* 26.6*  --  36.7 31.9*   PLATELETS 207 225  --  396 363   NEUTROS ABS 2.63 2.89  --  7.48* 4.6   IMMATURE GRANS (ABS) 0.02 0.01  --  0.04 0   LYMPHS ABS 2.08 1.89  --  2.30 1.5   MONOS ABS 0.24 0.29  --  0.34 0.2   EOS ABS 0.01 0.01  --  0.02 0   MCV 85.0 85.0  --  86.2 84.8   CRP  --   --   --  0.83*  --     PROCALCITONIN  --   --   --  0.10  --    LACTATE  --   --  1.7 2.9*  --    PROTIME  --   --   --  13.7  --    D DIMER QUANT  --   --   --  0.53*  --          Lab 07/07/25  0459 07/06/25  1612 07/06/25  0533 07/05/25  0740 07/05/25  0730 07/04/25  0930   SODIUM 141  --  142 143  --  144   SODIUM, ARTERIAL  --   --   --   --  144  --    POTASSIUM 3.9 4.3 3.2* 4.1  --  4.1   CHLORIDE 107  --  105 100  --  104   TOTAL CO2  --   --   --   --   --  23   CO2 23.0  --  24.0 22.0  --   --    ANION GAP 11.0  --  13.0 21.0*  --  17*   BUN 14.8  --  20.1* 20.6*  --  20   CREATININE 0.63  --  0.70 0.80  --  0.8   EGFR 108.2  --  105.5 89.9  --   --    GLUCOSE 79  --  80 88  --  75   CALCIUM 9.2  --  9.2 10.4  --  9.9   MAGNESIUM 1.6  --  1.7 1.7  --   --          Lab 07/05/25  0740   TOTAL PROTEIN 7.6   ALBUMIN 4.2   GLOBULIN 3.4   ALT (SGPT) 30   AST (SGOT) 38*   BILIRUBIN 0.6   ALK PHOS 54         Lab 07/05/25  1002 07/05/25  0740   PROBNP  --  133.6   HSTROP T 34* 38*   PROTIME  --  13.7   INR  --  1.00                 Lab 07/05/25  0730   PH, ARTERIAL 7.411   PCO2, ARTERIAL 36.5   PO2 ART 76.9*   O2 SATURATION ART 96.3   HCO3 ART 23.2   BASE EXCESS ART -1.2*   CARBOXYHEMOGLOBIN 1.4     Brief Urine Lab Results  (Last result in the past 365 days)        Color   Clarity   Blood   Leuk Est   Nitrite   Protein   CREAT   Urine HCG        07/05/25 0800 Dark Yellow   Clear   Negative   Trace   Positive   Trace                 Microbiology Results (last 10 days)       Procedure Component Value - Date/Time    MRSA Screen, PCR (Inpatient) - Swab, Nares [[id number]]  (Normal) Collected: 07/06/25 1322    Lab Status: Final result Specimen: Swab from Nares Updated: 07/06/25 1450     MRSA PCR No MRSA Detected    Narrative:      The negative predictive value of this diagnostic test is high and should only be used to consider de-escalating anti-MRSA therapy. A positive result may indicate colonization with MRSA and must be correlated  clinically.    Blood Culture - Blood, Arm, Right [[id number]]  (Normal) Collected: 07/05/25 0920    Lab Status: Preliminary result Specimen: Blood from Arm, Right Updated: 07/07/25 0945     Blood Culture No growth at 2 days    Legionella Antigen, Urine - Urine, Urine, Clean Catch [[id number]]  (Normal) Collected: 07/05/25 0801    Lab Status: Final result Specimen: Urine, Clean Catch Updated: 07/05/25 1355     LEGIONELLA ANTIGEN, URINE Negative    S. Pneumo Ag Urine or CSF - Urine, Urine, Clean Catch [[id number]]  (Normal) Collected: 07/05/25 0801    Lab Status: Final result Specimen: Urine, Clean Catch Updated: 07/05/25 1356     Strep Pneumo Ag Negative    Respiratory Panel PCR w/COVID-19(SARS-CoV-2) JAMIL/ESME/GRACE/[facility] In-House, NP Swab in UTM/VTM, 2 HR TAT - Swab, Nasopharynx [[id number]]  (Normal) Collected: 07/05/25 0748    Lab Status: Final result Specimen: Swab from Nasopharynx Updated: 07/05/25 0905     ADENOVIRUS, PCR Not Detected     Coronavirus 229E Not Detected     Coronavirus HKU1 Not Detected     Coronavirus NL63 Not Detected     Coronavirus OC43 Not Detected     COVID19 Not Detected     Human Metapneumovirus Not Detected     Human Rhinovirus/Enterovirus Not Detected     Influenza A PCR Not Detected     Influenza B PCR Not Detected     Parainfluenza Virus 1 Not Detected     Parainfluenza Virus 2 Not Detected     Parainfluenza Virus 3 Not Detected     Parainfluenza Virus 4 Not Detected     RSV, PCR Not Detected     Bordetella pertussis pcr Not Detected     Bordetella parapertussis PCR Not Detected     Chlamydophila pneumoniae PCR Not Detected     Mycoplasma pneumo by PCR Not Detected    Narrative:      In the setting of a positive respiratory panel with a viral infection PLUS a negative procalcitonin without other underlying concern for bacterial infection, consider observing off antibiotics or discontinuation of antibiotics and continue supportive care. If the respiratory panel is positive for atypical  "bacterial infection (Bordetella pertussis, Chlamydophila pneumoniae, or Mycoplasma pneumoniae), consider antibiotic de-escalation to target atypical bacterial infection.    Blood Culture - Blood, Arm, Left [[id number]]  (Normal) Collected: 07/05/25 0740    Lab Status: Preliminary result Specimen: Blood from Arm, Left Updated: 07/07/25 0830     Blood Culture No growth at 2 days            Hospital Course:     -Atypical Pneumonia  Patient was recently treated in hospital for Joy Quinonez variant of Guillain-Barré and was discharged to rehab facility.  She developed shortness of breath, CTA chest in the emergency room showed infiltrates/probable atypical pneumonia.  She was treated in hospital with ceftriaxone and azithromycin and will be discharged on Omnicef and doxycycline to complete a full course of antibiotic.  She will follow-up with her PCP within 3 to 5 days of discharge     - Tachycardia  She has history of tachycardia/palpitation, her home medications were restarted and tachycardia resolved.     -Anemia  Patient's hemoglobin was 8.7, compared to prior admission which was above 10.  Serial hemoglobin level remained stable.  She will follow-up with her PCP within 3 to 5 days of discharge and we recommend recheck of CBC by PCP.    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  Discussed with neurologist, canceled consults.     Chronic medical conditions-  Diabetes mellitus  Hypertension  Carotid artery stenosis  Degenerative disease of the cervical spine  Depression  GERD  Graves' disease  Hyperlipidemia  Hypertension  Hypothyroidism  Seizure disorder  Thyromegaly         Physical Exam on Discharge:  /73 (BP Location: Right arm, Patient Position: Lying)   Pulse 86   Temp 97.7 °F (36.5 °C) (Oral)   Resp 16   Ht 188 cm (74\")   Wt 64.5 kg (142 lb 3.2 oz)   SpO2 98%   BMI 18.26 kg/m²   Physical " Exam  Constitutional:       Appearance: She is underweight, alert and awake, communicative.   HENT:      Head: Normocephalic and atraumatic.      Nose: Nose normal.      Mouth/Throat:      Mouth: Mucous membranes are moist.      Pharynx: Oropharynx is clear.   Eyes:      Extraocular Movements: Extraocular movements intact.      Conjunctiva/sclera: Conjunctivae normal.   Cardiovascular:      Rate and Rhythm: Regular rhythm. tachycardia resolved      Pulses: Normal pulses.      Heart sounds: Normal heart sounds.   Pulmonary:      Effort: Pulmonary effort is normal.      Breath sounds: Decreased breath sounds present.   Abdominal:      General: Abdomen is flat. Bowel sounds are normal.      Palpations: Abdomen is soft.   Musculoskeletal:         General: Normal range of motion.      Cervical back: Normal range of motion and neck supple.      Right lower leg: No edema.      Left lower leg: No edema.   Skin:     General: Skin is warm and dry.      Capillary Refill: Capillaryis normal  Neurological:      Mental Status: She is alert. Mental status is at baseline.   Psychiatric:         Mood and Affect: Mood normal.         Behavior: Behavior is cooperative.      Comments: Unable to answer questions about recent hospitalizations     Condition on Discharge: Improved    Discharge Disposition:  Home or Self Care    Discharge Medications:     Discharge Medications        New Medications        Instructions Start Date   cefdinir 300 MG capsule  Commonly known as: OMNICEF   300 mg, Oral, 2 Times Daily      doxycycline 100 MG capsule  Commonly known as: VIBRAMYCIN   100 mg, Oral, 2 Times Daily             Continue These Medications        Instructions Start Date   ARIPiprazole 5 MG tablet  Commonly known as: ABILIFY   2.5 mg, Daily      donepezil 5 MG tablet  Commonly known as: ARICEPT   5 mg, Nightly      EFFER-K PO   1 tablet, Oral, 2 Times Daily, EFFER-K 1.68 GM-2 GM Tablet Effervescent      Evolocumab solution auto-injector  SureClick injection  Commonly known as: REPATHA   140 mg, Every 14 Days      iron polysaccharides 150 MG capsule  Commonly known as: NIFEREX   150 mg, Daily      levothyroxine 175 MCG tablet  Commonly known as: SYNTHROID, LEVOTHROID   175 mcg, Oral, Every Early Morning      liothyronine 25 MCG tablet  Commonly known as: CYTOMEL   25 mcg, 2 Times Daily      mirtazapine 15 MG tablet  Commonly known as: REMERON   15 mg, Nightly      pantoprazole 40 MG EC tablet  Commonly known as: PROTONIX   40 mg, Daily      rosuvastatin 20 MG tablet  Commonly known as: CRESTOR   20 mg, Nightly      sennosides-docusate 8.6-50 MG per tablet  Commonly known as: PERICOLACE   1 tablet, 2 Times Daily      vitamin D 1.25 MG (60926 UT) capsule capsule  Commonly known as: ERGOCALCIFEROL   50,000 Units, Weekly             Discharge Diet: Diabetic    Activity at Discharge: As tolerated    Follow-up Appointments:   Future Appointments   Date Time Provider Department Center   8/5/2025 11:30 AM Bhavani, BRIANA [facility]       Test Results Pending at Discharge: None    Electronically signed by Yovanny Abernathy MD, 07/07/25, 14:09 CDT.    Time: 40 minutes.

## 2025-07-08 NOTE — PAYOR COMM NOTE
"ME HOME 7-7-25  UR  [phone]    Lynn Magana (49 y.o. Female)       Date of Birth   1975    Social Security Number       Address   [address] [place] [address]    Home Phone   [phone]    MRN   [id number]       [facility]    Marital Status                               Admission Date   7/5/2025    Admission Type   Emergency    Admitting Provider   Yovanny Abernathy MD    Attending Provider       Department, Room/Bed   [facility] 4B, 460/1       Discharge Date   7/7/2025    Discharge Disposition   Home or Self Care    Discharge Destination                                 Attending Provider: (none)   Allergies: Oxycodone-acetaminophen    Isolation: None   Infection: None   Code Status: Prior    Ht: 188 cm (74\")   Wt: 64.5 kg (142 lb 3.2 oz)    Admission Cmt: None   Principal Problem: Atypical pneumonia [J18.9]                   Active Insurance as of 7/5/2025       Primary Coverage       Payor Plan Insurance Group Employer/Plan Group    [employer] [place] [employer] [place]        Payor Plan Address Payor Plan Phone Number Payor Plan Fax Number Effective Dates    [address] [address] [phone]  3/14/2017 - None Entered    [facility] [id number]         Subscriber Name Subscriber Birth Date Member ID       LYNN MAGANA 1975 [id number]                     Emergency Contacts        (Rel.) Home Phone Work Phone Mobile Phone    Evens Cifuentes (Spouse) [phone] -- [phone]    Katya Menjivar (Sister) -- -- [phone]                 Discharge Summary        Yovanny Abernathy MD at 07/07/25 [address] [place] [address] Medicine Services  DISCHARGE SUMMARY       Date of Admission: 7/5/2025  Date of Discharge:  7/7/2025  Primary Care Physician: Darryl Andrews DO    Presenting Problem/History of Present Illness:  Shortness of breath    Final Discharge Diagnoses:  Active Hospital Problems    " Diagnosis     **Atypical pneumonia     Pneumonia, unspecified organism     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 Left ventricular ejection fraction appears to be 66 - 70%.    Left ventricular diastolic function was normal.    Normal right ventricular cavity size and systolic function.    Normal biatrial size.    There is no significant (more than mild) valve stenosis or regurgitation appreciated.    There is a very small (<0.5cm) pericardial effusion adjacent to the right ventricle. There is no evidence of cardiac tamponade.      Imaging Results (All)       Procedure Component Value Units Date/Time    CT Angiogram Chest Pulmonary Embolism [[id number]] Collected: 07/05/25 1131     Updated: 07/05/25 1137    Narrative:      EXAMINATION: CT ANGIOGRAM CHEST PULMONARY EMBOLISM-     HISTORY: Shortness of breath.     In order to have a CT radiation dose as low as reasonably achievable  Automated Exposure Control was utilized for adjustment of the mA and/or  KV according to patient size.     CT Dose DLP = 124.32 mGy.cm.  (If there are multiple studies performed at the same time this  represents the total dose).     Images are stored in PACS per institutional protocol.        CT angiogram chest with IV contrast injection.  CT angiography protocol.  CT imaging with bolus IV contrast injection.  Under concurrent supervision axial, sagittal, coronal,  three-dimensional, and MIP data sets were constructed on an independent  work station.     Comparison:  4/18/2025. Normal heart size.  Normal size thoracic  aorta.  No mediastinal mass.  Small and poorly visualized thyroid gland.     Symmetric and normally opacified pulmonary arteries.  No pulmonary embolism.     Mild patchy LEFT side infiltrate more so within the upper lobe than the  lower lobe.  No focal consolidation.  No pneumothorax or pleural effusion.       Impression:      1. No pulmonary embolism.  2. Mild patchy LEFT side infiltrate compatible with atypical pneumonia.     This report was signed and finalized on 7/5/2025 11:33 AM by Dr. Isaiah Diaz MD.       XR Chest 1 View [[id number]] Collected: 07/05/25 0752     Updated: 07/05/25 0756    Narrative:      EXAMINATION: XR CHEST 1 VW-     HISTORY: Shortness of air     Images are stored in PACS per institutional protocol.     1 view chest x-ray.     COMPARISON:  5/29/2025.     Heart size is normal.  The mediastinum is within normal limits.     The lungs are normally expanded with no pneumonia or pneumothorax.     No congestive failure changes.       Impression:      1. No acute disease.                 This report was signed and finalized on 7/5/2025 7:53 AM by Dr. Isaiah Diaz MD.             LAB RESULTS:      Lab 07/07/25  0459 07/06/25  0533 07/05/25  1043 07/05/25  0740 07/04/25  0930   WBC 4.99 5.10  --  10.21 6.4   HEMOGLOBIN 8.7* 8.4*  --  11.6* 10.6*   HEMATOCRIT 27.3* 26.6*  --  36.7 31.9*   PLATELETS 207 225  --  396 363   NEUTROS ABS 2.63 2.89  --  7.48* 4.6   IMMATURE GRANS (ABS) 0.02 0.01  --  0.04 0   LYMPHS ABS 2.08 1.89  --  2.30 1.5   MONOS ABS 0.24 0.29  --  0.34 0.2   EOS ABS 0.01 0.01  --  0.02 0   MCV 85.0 85.0  --  86.2 84.8   CRP  --   --   --  0.83*  --    PROCALCITONIN  --   --   --  0.10  --    LACTATE  --   --  1.7 2.9*  --    PROTIME  --   --   --  13.7  --    D DIMER QUANT  --   --   --  0.53*  --          Lab 07/07/25  0459 07/06/25  1612 07/06/25  0533 07/05/25  0740 07/05/25  0730 07/04/25  0930   SODIUM 141  --  142 143  --  144   SODIUM, ARTERIAL  --   --   --   --  144  --     POTASSIUM 3.9 4.3 3.2* 4.1  --  4.1   CHLORIDE 107  --  105 100  --  104   TOTAL CO2  --   --   --   --   --  23   CO2 23.0  --  24.0 22.0  --   --    ANION GAP 11.0  --  13.0 21.0*  --  17*   BUN 14.8  --  20.1* 20.6*  --  20   CREATININE 0.63  --  0.70 0.80  --  0.8   EGFR 108.2  --  105.5 89.9  --   --    GLUCOSE 79  --  80 88  --  75   CALCIUM 9.2  --  9.2 10.4  --  9.9   MAGNESIUM 1.6  --  1.7 1.7  --   --          Lab 07/05/25  0740   TOTAL PROTEIN 7.6   ALBUMIN 4.2   GLOBULIN 3.4   ALT (SGPT) 30   AST (SGOT) 38*   BILIRUBIN 0.6   ALK PHOS 54         Lab 07/05/25  1002 07/05/25  0740   PROBNP  --  133.6   HSTROP T 34* 38*   PROTIME  --  13.7   INR  --  1.00                 Lab 07/05/25  0730   PH, ARTERIAL 7.411   PCO2, ARTERIAL 36.5   PO2 ART 76.9*   O2 SATURATION ART 96.3   HCO3 ART 23.2   BASE EXCESS ART -1.2*   CARBOXYHEMOGLOBIN 1.4     Brief Urine Lab Results  (Last result in the past 365 days)        Color   Clarity   Blood   Leuk Est   Nitrite   Protein   CREAT   Urine HCG        07/05/25 0800 Dark Yellow   Clear   Negative   Trace   Positive   Trace                 Microbiology Results (last 10 days)       Procedure Component Value - Date/Time    MRSA Screen, PCR (Inpatient) - Swab, Nares [[id number]]  (Normal) Collected: 07/06/25 1322    Lab Status: Final result Specimen: Swab from Nares Updated: 07/06/25 1450     MRSA PCR No MRSA Detected    Narrative:      The negative predictive value of this diagnostic test is high and should only be used to consider de-escalating anti-MRSA therapy. A positive result may indicate colonization with MRSA and must be correlated clinically.    Blood Culture - Blood, Arm, Right [[id number]]  (Normal) Collected: 07/05/25 0920    Lab Status: Preliminary result Specimen: Blood from Arm, Right Updated: 07/07/25 0945     Blood Culture No growth at 2 days    Legionella Antigen, Urine - Urine, Urine, Clean Catch [[id number]]  (Normal) Collected: 07/05/25 0801    Lab Status:  Final result Specimen: Urine, Clean Catch Updated: 07/05/25 1355     LEGIONELLA ANTIGEN, URINE Negative    S. Pneumo Ag Urine or CSF - Urine, Urine, Clean Catch [[id number]]  (Normal) Collected: 07/05/25 0801    Lab Status: Final result Specimen: Urine, Clean Catch Updated: 07/05/25 1356     Strep Pneumo Ag Negative    Respiratory Panel PCR w/COVID-19(SARS-CoV-2) JAMIL/ESME/GRACE/PAD/COR/BRENDON In-House, NP Swab in UTM/VTM, 2 HR TAT - Swab, Nasopharynx [[id number]]  (Normal) Collected: 07/05/25 0748    Lab Status: Final result Specimen: Swab from Nasopharynx Updated: 07/05/25 0905     ADENOVIRUS, PCR Not Detected     Coronavirus 229E Not Detected     Coronavirus HKU1 Not Detected     Coronavirus NL63 Not Detected     Coronavirus OC43 Not Detected     COVID19 Not Detected     Human Metapneumovirus Not Detected     Human Rhinovirus/Enterovirus Not Detected     Influenza A PCR Not Detected     Influenza B PCR Not Detected     Parainfluenza Virus 1 Not Detected     Parainfluenza Virus 2 Not Detected     Parainfluenza Virus 3 Not Detected     Parainfluenza Virus 4 Not Detected     RSV, PCR Not Detected     Bordetella pertussis pcr Not Detected     Bordetella parapertussis PCR Not Detected     Chlamydophila pneumoniae PCR Not Detected     Mycoplasma pneumo by PCR Not Detected    Narrative:      In the setting of a positive respiratory panel with a viral infection PLUS a negative procalcitonin without other underlying concern for bacterial infection, consider observing off antibiotics or discontinuation of antibiotics and continue supportive care. If the respiratory panel is positive for atypical bacterial infection (Bordetella pertussis, Chlamydophila pneumoniae, or Mycoplasma pneumoniae), consider antibiotic de-escalation to target atypical bacterial infection.    Blood Culture - Blood, Arm, Left [[id number]]  (Normal) Collected: 07/05/25 0740    Lab Status: Preliminary result Specimen: Blood from Arm, Left Updated: 07/07/25 0830  "    Blood Culture No growth at 2 days            Hospital Course:     -Atypical Pneumonia  Patient was recently treated in hospital for Joy Quinonez variant of Guillain-Barré and was discharged to rehab facility.  She developed shortness of breath, CTA chest in the emergency room showed infiltrates/probable atypical pneumonia.  She was treated in hospital with ceftriaxone and azithromycin and will be discharged on Omnicef and doxycycline to complete a full course of antibiotic.  She will follow-up with her PCP within 3 to 5 days of discharge     - Tachycardia  She has history of tachycardia/palpitation, her home medications were restarted and tachycardia resolved.     -Anemia  Patient's hemoglobin was 8.7, compared to prior admission which was above 10.  Serial hemoglobin level remained stable.  She will follow-up with her PCP within 3 to 5 days of discharge and we recommend recheck of CBC by PCP.    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  Discussed with neurologist, canceled consults.     Chronic medical conditions-  Diabetes mellitus  Hypertension  Carotid artery stenosis  Degenerative disease of the cervical spine  Depression  GERD  Graves' disease  Hyperlipidemia  Hypertension  Hypothyroidism  Seizure disorder  Thyromegaly         Physical Exam on Discharge:  /73 (BP Location: Right arm, Patient Position: Lying)   Pulse 86   Temp 97.7 °F (36.5 °C) (Oral)   Resp 16   Ht 188 cm (74\")   Wt 64.5 kg (142 lb 3.2 oz)   SpO2 98%   BMI 18.26 kg/m²   Physical Exam  Constitutional:       Appearance: She is underweight, alert and awake, communicative.   HENT:      Head: Normocephalic and atraumatic.      Nose: Nose normal.      Mouth/Throat:      Mouth: Mucous membranes are moist.      Pharynx: Oropharynx is clear.   Eyes:      Extraocular Movements: Extraocular movements intact.      Conjunctiva/sclera: " Conjunctivae normal.   Cardiovascular:      Rate and Rhythm: Regular rhythm. tachycardia resolved      Pulses: Normal pulses.      Heart sounds: Normal heart sounds.   Pulmonary:      Effort: Pulmonary effort is normal.      Breath sounds: Decreased breath sounds present.   Abdominal:      General: Abdomen is flat. Bowel sounds are normal.      Palpations: Abdomen is soft.   Musculoskeletal:         General: Normal range of motion.      Cervical back: Normal range of motion and neck supple.      Right lower leg: No edema.      Left lower leg: No edema.   Skin:     General: Skin is warm and dry.      Capillary Refill: Capillaryis normal  Neurological:      Mental Status: She is alert. Mental status is at baseline.   Psychiatric:         Mood and Affect: Mood normal.         Behavior: Behavior is cooperative.      Comments: Unable to answer questions about recent hospitalizations     Condition on Discharge: Improved    Discharge Disposition:  Home or Self Care    Discharge Medications:     Discharge Medications        New Medications        Instructions Start Date   cefdinir 300 MG capsule  Commonly known as: OMNICEF   300 mg, Oral, 2 Times Daily      doxycycline 100 MG capsule  Commonly known as: VIBRAMYCIN   100 mg, Oral, 2 Times Daily             Continue These Medications        Instructions Start Date   ARIPiprazole 5 MG tablet  Commonly known as: ABILIFY   2.5 mg, Daily      donepezil 5 MG tablet  Commonly known as: ARICEPT   5 mg, Nightly      EFFER-K PO   1 tablet, Oral, 2 Times Daily, EFFER-K 1.68 GM-2 GM Tablet Effervescent      Evolocumab solution auto-injector SureClick injection  Commonly known as: REPATHA   140 mg, Every 14 Days      iron polysaccharides 150 MG capsule  Commonly known as: NIFEREX   150 mg, Daily      levothyroxine 175 MCG tablet  Commonly known as: SYNTHROID, LEVOTHROID   175 mcg, Oral, Every Early Morning      liothyronine 25 MCG tablet  Commonly known as: CYTOMEL   25 mcg, 2 Times  Daily      mirtazapine 15 MG tablet  Commonly known as: REMERON   15 mg, Nightly      pantoprazole 40 MG EC tablet  Commonly known as: PROTONIX   40 mg, Daily      rosuvastatin 20 MG tablet  Commonly known as: CRESTOR   20 mg, Nightly      sennosides-docusate 8.6-50 MG per tablet  Commonly known as: PERICOLACE   1 tablet, 2 Times Daily      vitamin D 1.25 MG (23207 UT) capsule capsule  Commonly known as: ERGOCALCIFEROL   50,000 Units, Weekly             Discharge Diet: Diabetic    Activity at Discharge: As tolerated    Follow-up Appointments:   Future Appointments   Date Time Provider Department Center   8/5/2025 11:30 AM Bhavani, BRIANA [facility]hat [facility]       Test Results Pending at Discharge: None    Electronically signed by Yovanny Abernathy MD, 07/07/25, 14:09 CDT.    Time: 40 minutes.          Electronically signed by Yovanny Abernathy MD at 07/07/25 1427

## 2025-07-08 NOTE — THERAPY DISCHARGE NOTE
Acute Care - Occupational Therapy Discharge Summary  [facility]     Patient Name: Lynn Magana  : 1975  MRN: [id number]    Today's Date: 2025       Date of Referral to OT: 25         Admit Date: 2025        OT Recommendation and Plan    Visit Dx:    ICD-10-CM ICD-9-CM   1. Pneumonia due to infectious organism, unspecified laterality, unspecified part of lung  J18.9 486   2. Generalized weakness  R53.1 780.79   3. Tachycardia  R00.0 785.0   4. Impaired mobility [Z74.09]  Z74.09 799.89                OT Rehab Goals       Row Name 25 1600             Transfer Goal 1 (OT)    Activity/Assistive Device (Transfer Goal 1, OT) transfers, all  -LS      [place] Level/Cues Needed (Transfer Goal 1, OT) independent  -LS      Time Frame (Transfer Goal 1, OT) long term goal (LTG)  -LS      Progress/Outcome (Transfer Goal 1, OT) goal not met  -LS         Grooming Goal 1 (OT)    Activity/Device (Grooming Goal 1, OT) grooming skills, all  -LS      [place] (Grooming Goal 1, OT) independent  -LS      Time Frame (Grooming Goal 1, OT) long term goal (LTG)  -LS      Strategies/Barriers (Grooming Goal 1, OT) standing sinkside  -LS      Progress/Outcome (Grooming Goal 1, OT) goal not met  -LS         Strength Goal 1 (OT)    Strength Goal 1 (OT) Pt will be indep with SUZI HEP for increased functional performance  -LS      Time Frame (Strength Goal 1, OT) long term goal (LTG)  -LS      Progress/Outcome (Strength Goal 1, OT) goal not met  -LS                User Key  (r) = Recorded By, (t) = Taken By, (c) = Cosigned By      Initials Name Provider Type Discipline    LS Lakshmi Veronica COTA Occupational Therapist Assistant THERAPIES                     Outcome Measures       Row Name 25 1300             How much help from another is currently needed...    Putting on and taking off regular lower body clothing? 3  -EC      Bathing (including washing, rinsing, and drying) 3  -EC      Toileting (which  includes using toilet bed pan or urinal) 3  -EC      Putting on and taking off regular upper body clothing 3  -EC      Taking care of personal grooming (such as brushing teeth) 3  -EC      Eating meals 4  -EC      AM-PAC 6 Clicks Score (OT) 19  -EC         Functional Assessment    Outcome Measure Options AM-PAC 6 Clicks Daily Activity (OT)  -EC                User Key  (r) = Recorded By, (t) = Taken By, (c) = Cosigned By      Initials Name Provider Type    EC Kizzy Ramirez, OTR/L Occupational Therapist                            OT Discharge Summary  Anticipated Discharge Disposition (OT): home with home health  Reason for Discharge: Discharge from facility  Outcomes Achieved: Refer to plan of care for updates on goals achieved  Discharge Destination: Home with assist      CLOVIS Vickers  7/8/2025

## 2025-07-09 ENCOUNTER — APPOINTMENT (OUTPATIENT)
Dept: CT IMAGING | Facility: HOSPITAL | Age: 50
End: 2025-07-09
Payer: COMMERCIAL

## 2025-07-09 ENCOUNTER — HOSPITAL ENCOUNTER (OUTPATIENT)
Facility: HOSPITAL | Age: 50
Setting detail: OBSERVATION
LOS: 1 days | Discharge: LEFT BEFORE DISCHARGE | End: 2025-07-11
Attending: FAMILY MEDICINE | Admitting: INTERNAL MEDICINE
Payer: COMMERCIAL

## 2025-07-09 DIAGNOSIS — E83.42 HYPOMAGNESEMIA: ICD-10-CM

## 2025-07-09 DIAGNOSIS — N39.0 URINARY TRACT INFECTION WITHOUT HEMATURIA, SITE UNSPECIFIED: ICD-10-CM

## 2025-07-09 DIAGNOSIS — Z74.09 IMPAIRED MOBILITY: ICD-10-CM

## 2025-07-09 DIAGNOSIS — R41.82 ALTERED MENTAL STATUS, UNSPECIFIED ALTERED MENTAL STATUS TYPE: Primary | ICD-10-CM

## 2025-07-09 DIAGNOSIS — I95.89 OTHER HYPOTENSION: ICD-10-CM

## 2025-07-09 DIAGNOSIS — R00.0 SINUS TACHYCARDIA: ICD-10-CM

## 2025-07-09 LAB
ALBUMIN SERPL-MCNC: 4.1 G/DL (ref 3.5–5.2)
ALBUMIN/GLOB SERPL: 1.3 G/DL
ALP SERPL-CCNC: 50 U/L (ref 39–117)
ALT SERPL W P-5'-P-CCNC: 36 U/L (ref 1–33)
AMMONIA BLD-SCNC: 16 UMOL/L (ref 11–51)
AMPHET+METHAMPHET UR QL: NEGATIVE
AMPHETAMINES UR QL: NEGATIVE
ANION GAP SERPL CALCULATED.3IONS-SCNC: 15 MMOL/L (ref 5–15)
APTT PPP: 23.1 SECONDS (ref 24.5–36)
AST SERPL-CCNC: 30 U/L (ref 1–32)
BACTERIA UR QL AUTO: ABNORMAL /HPF
BARBITURATES UR QL SCN: NEGATIVE
BASOPHILS # BLD AUTO: 0.02 10*3/MM3 (ref 0–0.2)
BASOPHILS NFR BLD AUTO: 0.3 % (ref 0–1.5)
BENZODIAZ UR QL SCN: NEGATIVE
BILIRUB SERPL-MCNC: 0.5 MG/DL (ref 0–1.2)
BILIRUB UR QL STRIP: NEGATIVE
BUN SERPL-MCNC: 13.4 MG/DL (ref 6–20)
BUN/CREAT SERPL: 12.3 (ref 7–25)
BUPRENORPHINE SERPL-MCNC: NEGATIVE NG/ML
CALCIUM SPEC-SCNC: 10.5 MG/DL (ref 8.6–10.5)
CANNABINOIDS SERPL QL: NEGATIVE
CHLORIDE SERPL-SCNC: 105 MMOL/L (ref 98–107)
CK SERPL-CCNC: 27 U/L (ref 20–180)
CLARITY UR: CLEAR
CO2 SERPL-SCNC: 23 MMOL/L (ref 22–29)
COCAINE UR QL: NEGATIVE
COLOR UR: ABNORMAL
CREAT SERPL-MCNC: 1.09 MG/DL (ref 0.57–1)
CRP SERPL-MCNC: 0.86 MG/DL (ref 0–0.5)
D-LACTATE SERPL-SCNC: 2 MMOL/L (ref 0.5–2)
DEPRECATED RDW RBC AUTO: 37.2 FL (ref 37–54)
EGFRCR SERPLBLD CKD-EPI 2021: 62 ML/MIN/1.73
EOSINOPHIL # BLD AUTO: 0 10*3/MM3 (ref 0–0.4)
EOSINOPHIL NFR BLD AUTO: 0 % (ref 0.3–6.2)
ERYTHROCYTE [DISTWIDTH] IN BLOOD BY AUTOMATED COUNT: 12.2 % (ref 12.3–15.4)
ERYTHROCYTE [SEDIMENTATION RATE] IN BLOOD: 27 MM/HR (ref 0–20)
ETHANOL UR QL: <0.01 %
FENTANYL UR-MCNC: NEGATIVE NG/ML
GEN 5 1HR TROPONIN T REFLEX: 22 NG/L
GLOBULIN UR ELPH-MCNC: 3.1 GM/DL
GLUCOSE SERPL-MCNC: 141 MG/DL (ref 65–99)
GLUCOSE UR STRIP-MCNC: NEGATIVE MG/DL
HCT VFR BLD AUTO: 34.3 % (ref 34–46.6)
HGB BLD-MCNC: 11 G/DL (ref 12–15.9)
HGB UR QL STRIP.AUTO: NEGATIVE
HYALINE CASTS UR QL AUTO: ABNORMAL /LPF
IMM GRANULOCYTES # BLD AUTO: 0.03 10*3/MM3 (ref 0–0.05)
IMM GRANULOCYTES NFR BLD AUTO: 0.4 % (ref 0–0.5)
INR PPP: 1.05 (ref 0.91–1.09)
IRON 24H UR-MRATE: 48 MCG/DL (ref 37–145)
IRON SATN MFR SERPL: 21 % (ref 20–50)
KETONES UR QL STRIP: ABNORMAL
LDH SERPL-CCNC: 174 U/L (ref 135–214)
LEUKOCYTE ESTERASE UR QL STRIP.AUTO: ABNORMAL
LYMPHOCYTES # BLD AUTO: 1.19 10*3/MM3 (ref 0.7–3.1)
LYMPHOCYTES NFR BLD AUTO: 16.3 % (ref 19.6–45.3)
MAGNESIUM SERPL-MCNC: 1.5 MG/DL (ref 1.6–2.6)
MCH RBC QN AUTO: 27.2 PG (ref 26.6–33)
MCHC RBC AUTO-ENTMCNC: 32.1 G/DL (ref 31.5–35.7)
MCV RBC AUTO: 84.9 FL (ref 79–97)
METHADONE UR QL SCN: NEGATIVE
MONOCYTES # BLD AUTO: 0.22 10*3/MM3 (ref 0.1–0.9)
MONOCYTES NFR BLD AUTO: 3 % (ref 5–12)
NEUTROPHILS NFR BLD AUTO: 5.85 10*3/MM3 (ref 1.7–7)
NEUTROPHILS NFR BLD AUTO: 80 % (ref 42.7–76)
NITRITE UR QL STRIP: NEGATIVE
NRBC BLD AUTO-RTO: 0 /100 WBC (ref 0–0.2)
NT-PROBNP SERPL-MCNC: 177.6 PG/ML (ref 0–900)
OPIATES UR QL: NEGATIVE
OXYCODONE UR QL SCN: NEGATIVE
PCP UR QL SCN: NEGATIVE
PH UR STRIP.AUTO: 5.5 [PH] (ref 5–8)
PLATELET # BLD AUTO: 217 10*3/MM3 (ref 140–450)
PMV BLD AUTO: 11.5 FL (ref 6–12)
POTASSIUM SERPL-SCNC: 4.3 MMOL/L (ref 3.5–5.2)
PROCALCITONIN SERPL-MCNC: 0.16 NG/ML (ref 0–0.25)
PROT SERPL-MCNC: 7.2 G/DL (ref 6–8.5)
PROT UR QL STRIP: ABNORMAL
PROTHROMBIN TIME: 14.2 SECONDS (ref 11.8–14.8)
RBC # BLD AUTO: 4.04 10*6/MM3 (ref 3.77–5.28)
RBC # UR STRIP: ABNORMAL /HPF
REF LAB TEST METHOD: ABNORMAL
RETICS # AUTO: 0.06 10*6/MM3 (ref 0.02–0.13)
RETICS/RBC NFR AUTO: 1.55 % (ref 0.7–1.9)
SODIUM SERPL-SCNC: 143 MMOL/L (ref 136–145)
SP GR UR STRIP: 1.02 (ref 1–1.03)
SQUAMOUS #/AREA URNS HPF: ABNORMAL /HPF
T4 FREE SERPL-MCNC: 2.17 NG/DL (ref 0.92–1.68)
TIBC SERPL-MCNC: 234 MCG/DL (ref 298–536)
TRANSFERRIN SERPL-MCNC: 157 MG/DL (ref 200–360)
TRICYCLICS UR QL SCN: NEGATIVE
TROPONIN T % DELTA: -35
TROPONIN T NUMERIC DELTA: -12 NG/L
TROPONIN T SERPL HS-MCNC: 34 NG/L
TSH SERPL DL<=0.05 MIU/L-ACNC: 0.01 UIU/ML (ref 0.27–4.2)
UROBILINOGEN UR QL STRIP: ABNORMAL
WBC # UR STRIP: ABNORMAL /HPF
WBC NRBC COR # BLD AUTO: 7.31 10*3/MM3 (ref 3.4–10.8)

## 2025-07-09 PROCEDURE — G0378 HOSPITAL OBSERVATION PER HR: HCPCS

## 2025-07-09 PROCEDURE — 93005 ELECTROCARDIOGRAM TRACING: CPT | Performed by: FAMILY MEDICINE

## 2025-07-09 PROCEDURE — 96365 THER/PROPH/DIAG IV INF INIT: CPT

## 2025-07-09 PROCEDURE — 82077 ASSAY SPEC XCP UR&BREATH IA: CPT | Performed by: FAMILY MEDICINE

## 2025-07-09 PROCEDURE — 87040 BLOOD CULTURE FOR BACTERIA: CPT | Performed by: FAMILY MEDICINE

## 2025-07-09 PROCEDURE — 82607 VITAMIN B-12: CPT | Performed by: FAMILY MEDICINE

## 2025-07-09 PROCEDURE — 82140 ASSAY OF AMMONIA: CPT | Performed by: FAMILY MEDICINE

## 2025-07-09 PROCEDURE — 85730 THROMBOPLASTIN TIME PARTIAL: CPT | Performed by: FAMILY MEDICINE

## 2025-07-09 PROCEDURE — 83615 LACTATE (LD) (LDH) ENZYME: CPT | Performed by: FAMILY MEDICINE

## 2025-07-09 PROCEDURE — 85045 AUTOMATED RETICULOCYTE COUNT: CPT | Performed by: FAMILY MEDICINE

## 2025-07-09 PROCEDURE — P9612 CATHETERIZE FOR URINE SPEC: HCPCS

## 2025-07-09 PROCEDURE — 83921 ORGANIC ACID SINGLE QUANT: CPT | Performed by: FAMILY MEDICINE

## 2025-07-09 PROCEDURE — 96361 HYDRATE IV INFUSION ADD-ON: CPT

## 2025-07-09 PROCEDURE — 84145 PROCALCITONIN (PCT): CPT | Performed by: FAMILY MEDICINE

## 2025-07-09 PROCEDURE — 36415 COLL VENOUS BLD VENIPUNCTURE: CPT

## 2025-07-09 PROCEDURE — 83605 ASSAY OF LACTIC ACID: CPT | Performed by: FAMILY MEDICINE

## 2025-07-09 PROCEDURE — 84439 ASSAY OF FREE THYROXINE: CPT | Performed by: FAMILY MEDICINE

## 2025-07-09 PROCEDURE — 84484 ASSAY OF TROPONIN QUANT: CPT | Performed by: FAMILY MEDICINE

## 2025-07-09 PROCEDURE — 85610 PROTHROMBIN TIME: CPT | Performed by: FAMILY MEDICINE

## 2025-07-09 PROCEDURE — 93005 ELECTROCARDIOGRAM TRACING: CPT

## 2025-07-09 PROCEDURE — 99285 EMERGENCY DEPT VISIT HI MDM: CPT | Performed by: FAMILY MEDICINE

## 2025-07-09 PROCEDURE — 84481 FREE ASSAY (FT-3): CPT | Performed by: FAMILY MEDICINE

## 2025-07-09 PROCEDURE — 86140 C-REACTIVE PROTEIN: CPT | Performed by: FAMILY MEDICINE

## 2025-07-09 PROCEDURE — 80307 DRUG TEST PRSMV CHEM ANLYZR: CPT | Performed by: FAMILY MEDICINE

## 2025-07-09 PROCEDURE — 25010000002 CEFTRIAXONE PER 250 MG: Performed by: FAMILY MEDICINE

## 2025-07-09 PROCEDURE — 83540 ASSAY OF IRON: CPT | Performed by: FAMILY MEDICINE

## 2025-07-09 PROCEDURE — 80050 GENERAL HEALTH PANEL: CPT | Performed by: FAMILY MEDICINE

## 2025-07-09 PROCEDURE — 25510000001 IOPAMIDOL PER 1 ML: Performed by: FAMILY MEDICINE

## 2025-07-09 PROCEDURE — 96367 TX/PROPH/DG ADDL SEQ IV INF: CPT

## 2025-07-09 PROCEDURE — 81001 URINALYSIS AUTO W/SCOPE: CPT | Performed by: FAMILY MEDICINE

## 2025-07-09 PROCEDURE — 83010 ASSAY OF HAPTOGLOBIN QUANT: CPT | Performed by: FAMILY MEDICINE

## 2025-07-09 PROCEDURE — 82550 ASSAY OF CK (CPK): CPT | Performed by: FAMILY MEDICINE

## 2025-07-09 PROCEDURE — 83880 ASSAY OF NATRIURETIC PEPTIDE: CPT | Performed by: FAMILY MEDICINE

## 2025-07-09 PROCEDURE — 70450 CT HEAD/BRAIN W/O DYE: CPT

## 2025-07-09 PROCEDURE — 85652 RBC SED RATE AUTOMATED: CPT | Performed by: FAMILY MEDICINE

## 2025-07-09 PROCEDURE — 25010000002 MAGNESIUM SULFATE 2 GM/50ML SOLUTION: Performed by: FAMILY MEDICINE

## 2025-07-09 PROCEDURE — 84466 ASSAY OF TRANSFERRIN: CPT | Performed by: FAMILY MEDICINE

## 2025-07-09 PROCEDURE — 96375 TX/PRO/DX INJ NEW DRUG ADDON: CPT

## 2025-07-09 PROCEDURE — 25010000002 METOPROLOL TARTRATE 5 MG/5ML SOLUTION: Performed by: FAMILY MEDICINE

## 2025-07-09 PROCEDURE — 25810000003 SODIUM CHLORIDE 0.9 % SOLUTION: Performed by: FAMILY MEDICINE

## 2025-07-09 PROCEDURE — 71275 CT ANGIOGRAPHY CHEST: CPT

## 2025-07-09 PROCEDURE — 83735 ASSAY OF MAGNESIUM: CPT | Performed by: FAMILY MEDICINE

## 2025-07-09 RX ORDER — METOPROLOL TARTRATE 1 MG/ML
2.5 INJECTION, SOLUTION INTRAVENOUS ONCE
Status: COMPLETED | OUTPATIENT
Start: 2025-07-09 | End: 2025-07-09

## 2025-07-09 RX ORDER — MAGNESIUM SULFATE HEPTAHYDRATE 40 MG/ML
2 INJECTION, SOLUTION INTRAVENOUS ONCE
Status: DISCONTINUED | OUTPATIENT
Start: 2025-07-09 | End: 2025-07-09

## 2025-07-09 RX ORDER — IOPAMIDOL 755 MG/ML
100 INJECTION, SOLUTION INTRAVASCULAR
Status: COMPLETED | OUTPATIENT
Start: 2025-07-09 | End: 2025-07-09

## 2025-07-09 RX ORDER — MAGNESIUM SULFATE HEPTAHYDRATE 40 MG/ML
2 INJECTION, SOLUTION INTRAVENOUS ONCE
Status: COMPLETED | OUTPATIENT
Start: 2025-07-09 | End: 2025-07-09

## 2025-07-09 RX ADMIN — MAGNESIUM SULFATE HEPTAHYDRATE 2 G: 40 INJECTION, SOLUTION INTRAVENOUS at 21:02

## 2025-07-09 RX ADMIN — METOPROLOL TARTRATE 2.5 MG: 5 INJECTION INTRAVENOUS at 22:08

## 2025-07-09 RX ADMIN — CEFTRIAXONE SODIUM 1000 MG: 1 INJECTION, POWDER, FOR SOLUTION INTRAMUSCULAR; INTRAVENOUS at 22:34

## 2025-07-09 RX ADMIN — IOPAMIDOL 72 ML: 755 INJECTION, SOLUTION INTRAVENOUS at 20:38

## 2025-07-09 RX ADMIN — SODIUM CHLORIDE 1935 ML: 9 INJECTION, SOLUTION INTRAVENOUS at 19:41

## 2025-07-09 NOTE — ED NOTES
"Spouse speaking on behalf of patient at triage window. This nurse asked chief complaint and spouse states \"the doctor called ahead you should know everything going on.\" This nurse expressed understanding but explained to pt spouse that this nurse did not take or receive a report on the patient, but it is likely that one of the nurses or providers in the back took the report and will know what the patient was sent to the ED for. This nurse again asked patient's spouse chief complaint and spouse got very angry and loudly stated \"this makes no sense! I'm tired of this place! How is this gonna work if you dont even know what is going on!\" This nurse brought pt and spouse behind window and explained to pt spouse that if he can just give me a brief summary of the patient's symptoms or issues I will be able to complete her triage and place her in a room. Pt's spouse continued to mumble under his breath about this nurse and triage techMaverick, while pt was in triage area. Triage was completed and this nurse called charge RN to see if room 46 was clean to place patient in due to vital signs and acuity.   "

## 2025-07-09 NOTE — ED PROVIDER NOTES
HPI:    Patient is a 49-year-old -American female who presents to the emergency room with multiple complaints.  However she has been very weak and dizzy especially going from laying to sitting and sitting to standing.  After seeing the PCP today a lot of labs were ordered for the patient but due to the symptoms and the blood pressure being low and the heart rate being fast which has been present for the last 2 to 3 days they recommended patient to come here to the emergency room for further evaluation.      REVIEW OF SYSTEMS    CONSTITUTIONAL:  No complaints of fever, chills,or weakness  EYES: Positive for jaundice  ENT: No complaints of sore throat or ear pain  CARDIOVASCULAR: Positive for hypotension RESPIRATORY:  No complaints of cough or shortness of breath  GI:  No complaints of abdominal pain, nausea, vomiting, or diarrhea  MUSCULOSKELETAL:  No complaints of back pain  SKIN: Positive for dry frail skin   NEUROLOGIC: Positive for headache and dizziness ENDOCRINE:  No complaints of polyuria or polydipsia  LYMPHATIC:  No complaints of swollen glands  GENITOURINARY: No complaints of urinary frequency or hematuria        PAST MEDICAL HISTORY  Past Medical History:   Diagnosis Date    Arthritis     Carotid artery stenosis     Degenerative disc disease, cervical     Depression     Diabetes mellitus     type 1    Disease of thyroid gland     GERD (gastroesophageal reflux disease)     Graves disease     Heart palpitations     Hyperlipidemia     Hypertension     Hypothyroidism     Seizure     Thyromegaly        FAMILY HISTORY  Family History   Problem Relation Age of Onset    Stroke Mother     Diabetes Mother     Stroke Father     Diabetes Father     Breast cancer Sister     Diabetes Sister     Stroke Sister     Seizures Sister     Coronary artery disease Brother     Diabetes Brother     Breast cancer Maternal Aunt     Colon cancer Paternal Uncle     Colon polyps Neg Hx        SOCIAL HISTORY  Social History      Socioeconomic History    Marital status:      Spouse name: Evens    Number of children: 1    Years of education: 12   Tobacco Use    Smoking status: Never    Smokeless tobacco: Never   Vaping Use    Vaping status: Never Used   Substance and Sexual Activity    Alcohol use: Yes     Alcohol/week: 6.0 standard drinks of alcohol     Types: 6 Cans of beer per week    Drug use: Yes     Types: Marijuana    Sexual activity: Defer     Partners: Male       IMMUNIZATION HISTORY  Deferred to primary care physician.    SURGICAL HISTORY  Past Surgical History:   Procedure Laterality Date     SECTION      CHOLECYSTECTOMY      COLONOSCOPY  2015    Normal exam Dr. Morgan     COLONOSCOPY  2015    Normal exam    CYST REMOVAL      ENDOSCOPY N/A 2018    Normal exam    ENDOSCOPY N/A 2025    Procedure: ESOPHAGOGASTRODUODENOSCOPY WITH ANESTHESIA;  Surgeon: Jadon Smith MD;  Location: [facility] ENDOSCOPY;  Service: Gastroenterology;  Laterality: N/A;  pre op: Nausea and vomiting  post op: normal  pcp: Darryl Andrews,     HYSTERECTOMY      THYROIDECTOMY Bilateral 2018    Procedure: total thyroidectomy;  Surgeon: Vitaly Givens MD;  Location: [facility] OR;  Service: ENT       CURRENT MEDICATIONS    Current Facility-Administered Medications:     cefTRIAXone (ROCEPHIN) 1,000 mg in sodium chloride 0.9 % 100 mL MBP, 1,000 mg, Intravenous, Once, Basim Lantigua Jr., MD    Current Outpatient Medications:     ARIPiprazole (ABILIFY) 5 MG tablet, Take 0.5 tablets by mouth Daily., Disp: , Rfl:     cefdinir (OMNICEF) 300 MG capsule, Take 1 capsule by mouth 2 (Two) Times a Day for 7 days., Disp: 14 capsule, Rfl: 0    donepezil (ARICEPT) 5 MG tablet, Take 1 tablet by mouth Every Night., Disp: , Rfl:     doxycycline (VIBRAMYCIN) 100 MG capsule, Take 1 capsule by mouth 2 (Two) Times a Day., Disp: 14 capsule, Rfl: 0    Evolocumab (REPATHA) solution auto-injector SureClick injection, Inject 1  "mL under the skin into the appropriate area as directed Every 14 (Fourteen) Days., Disp: , Rfl:     iron polysaccharides (NIFEREX) 150 MG capsule, Take 1 capsule by mouth Daily., Disp: , Rfl:     levothyroxine (SYNTHROID, LEVOTHROID) 175 MCG tablet, Take 1 tablet by mouth Every Morning., Disp: 30 tablet, Rfl: 2    liothyronine (CYTOMEL) 25 MCG tablet, Take 1 tablet by mouth 2 (Two) Times a Day., Disp: , Rfl:     mirtazapine (REMERON) 15 MG tablet, Take 1 tablet by mouth Every Night., Disp: , Rfl:     pantoprazole (PROTONIX) 40 MG EC tablet, Take 1 tablet by mouth Daily., Disp: , Rfl:     Potassium Bicarbonate (EFFER-K PO), Take 1 tablet by mouth 2 (Two) Times a Day. EFFER-K 1.68 GM-2 GM Tablet Effervescent, Disp: , Rfl:     rosuvastatin (CRESTOR) 20 MG tablet, Take 1 tablet by mouth Every Night., Disp: , Rfl:     sennosides-docusate (senna-docusate sodium) 8.6-50 MG per tablet, Take 1 tablet by mouth 2 (Two) Times a Day., Disp: , Rfl:     vitamin D (ERGOCALCIFEROL) 1.25 MG (44158 UT) capsule capsule, Take 1 capsule by mouth 1 (One) Time Per Week., Disp: , Rfl:     ALLERGIES  Allergies   Allergen Reactions    Oxycodone-Acetaminophen Swelling     Other reaction(s): Throat swelling       Neuro exam    VITAL SIGNS:   BP (!) 165/125   Pulse (!) 124   Temp 97.2 °F (36.2 °C) (Oral)   Resp 20   Ht 188 cm (74\")   Wt 64.5 kg (142 lb 3.2 oz)   SpO2 100%   BMI 18.26 kg/m²     Constitutional: Patient is alert and in no  distress.  Patient with generalized body and head discomfort.    Head: Atraumatic normocephalic.    Eyes: Mildly jaundiced EOMI and PERRLA intact.  There appears to be some puffiness and discoloration around the left eye greater than the right eye.  Especially along the lower eyelids.    ENT: There is a normal pharynx with no acute erythema or exudate and oral mucosa is moist.  Nose is clear with no drainage.  Tympanic membranes intact and non-erythemic.    Cardiovascular: S1-S2 tachycardic rate with " normal rhythm.  No murmurs, rubs or gallops are noted.    Respiratory: Patient is clear to auscultation bilaterally with no wheezing or rhonchi.  Chest wall is nontender.  There are no external lesions on the chest.  There is no crepitance.    Abdomen: Soft, nontender.  Bowel sounds are normal in all 4 quadrants. There is no rebound or guarding noted.  There is no abdominal distention or hepatosplenomegaly.    Genitourinary: Patient is voiding appropriately.    Integument: No acute lesions noted.  Color appears to be normal.    Neurological: CN's 2-12 is grossly intact there is no focal deficits.  Strength is 5+ in bilateral upper and lower extremity.    Veteran Coma Scale: 15      RADIOLOGY/PROCEDURES      CT Head Without Contrast   Final Result   1. Chronic changes and no acute intracranial findings.        This report was signed and finalized on 7/9/2025 8:51 PM by Ramon Cifuentes.          CT Angiogram Chest   Final Result   1. No pulmonary embolism identified.   2. Bronchial wall thickening likely bronchitis and subtle peribronchial   groundglass nodular opacities likely infectious in etiology.       This report was signed and finalized on 7/9/2025 8:55 PM by Ramon Cifuentes.                  FUTURE APPOINTMENTS     Future Appointments   Date Time Provider Department Center   8/5/2025 11:30 AM Karrie Beckham APRN [facility]          EKG (reviewed and interpreted by me):  Normal sinus tachycardic rhythm. No acute ischemia. Heart rate 136 with no acute ST segment elevation or depression noted.           COURSE & MEDICAL DECISION MAKING     Patient's partial differential diagnosis can include:    FRANK, electrolyte abnormality, dehydration, UTI, brain tumor, TIA, ischemic stroke, hemorrhagic stroke, atypical migraine, hemiplegic migraine, other headache, and others    CBC, CMP, magnesium, PT, PTT, ammonia, procalcitonin, lactic acid, CK, ethanol, urine drug screen, B12, reticulocyte, free T3,  haptoglobin, LDH, and TSH.    The patient's continued altered mental state and tachycardia did order CT of the head which showed no acute bleed.  There was no acute finding on CT scan      Discussed case with hospitalist Dr. Armendariz who will admit the patient to his medical service on telemetry      Patient's level of risk: Moderate        CRITICAL CARE    CRITICAL CARE: No    CRITICAL CARE TIME: None    Also Old charts were reviewed per [employer] EMR.  Pertinent details are summarized above.  All laboratory, radiologic, and EKG studies that were performed in the Emergency Department were a necessary part of the evaluation needed to exclude unstable or  emergent medical conditions:     Patient was hemodynamically and neurologically stable in the ED.   Pertinent studies were reviewed as above.     Recent Results (from the past 24 hours)   ECG 12 Lead Tachycardia    Collection Time: 07/09/25  5:38 PM   Result Value Ref Range    QT Interval 294 ms    QTC Interval 442 ms   Protime-INR    Collection Time: 07/09/25  7:27 PM    Specimen: Blood   Result Value Ref Range    Protime 14.2 11.8 - 14.8 Seconds    INR 1.05 0.91 - 1.09   aPTT    Collection Time: 07/09/25  7:27 PM    Specimen: Blood   Result Value Ref Range    PTT 23.1 (L) 24.5 - 36.0 seconds   Comprehensive Metabolic Panel    Collection Time: 07/09/25  7:27 PM    Specimen: Blood   Result Value Ref Range    Glucose 141 (H) 65 - 99 mg/dL    BUN 13.4 6.0 - 20.0 mg/dL    Creatinine 1.09 (H) 0.57 - 1.00 mg/dL    Sodium 143 136 - 145 mmol/L    Potassium 4.3 3.5 - 5.2 mmol/L    Chloride 105 98 - 107 mmol/L    CO2 23.0 22.0 - 29.0 mmol/L    Calcium 10.5 8.6 - 10.5 mg/dL    Total Protein 7.2 6.0 - 8.5 g/dL    Albumin 4.1 3.5 - 5.2 g/dL    ALT (SGPT) 36 (H) 1 - 33 U/L    AST (SGOT) 30 1 - 32 U/L    Alkaline Phosphatase 50 39 - 117 U/L    Total Bilirubin 0.5 0.0 - 1.2 mg/dL    Globulin 3.1 gm/dL    A/G Ratio 1.3 g/dL    BUN/Creatinine Ratio 12.3 7.0 - 25.0    Anion Gap 15.0 5.0 -  15.0 mmol/L    eGFR 62.0 >60.0 mL/min/1.73   High Sensitivity Troponin T    Collection Time: 07/09/25  7:27 PM    Specimen: Blood   Result Value Ref Range    HS Troponin T 34 (H) <14 ng/L   BNP    Collection Time: 07/09/25  7:27 PM    Specimen: Blood   Result Value Ref Range    proBNP 177.6 0.0 - 900.0 pg/mL   Lactic Acid, Plasma    Collection Time: 07/09/25  7:27 PM    Specimen: Blood   Result Value Ref Range    Lactate 2.0 0.5 - 2.0 mmol/L   Procalcitonin    Collection Time: 07/09/25  7:27 PM    Specimen: Blood   Result Value Ref Range    Procalcitonin 0.16 0.00 - 0.25 ng/mL   Magnesium    Collection Time: 07/09/25  7:27 PM    Specimen: Blood   Result Value Ref Range    Magnesium 1.5 (L) 1.6 - 2.6 mg/dL   CK    Collection Time: 07/09/25  7:27 PM    Specimen: Blood   Result Value Ref Range    Creatine Kinase 27 20 - 180 U/L   TSH Rfx On Abnormal To Free T4    Collection Time: 07/09/25  7:27 PM    Specimen: Blood   Result Value Ref Range    TSH 0.008 (L) 0.270 - 4.200 uIU/mL   CBC Auto Differential    Collection Time: 07/09/25  7:27 PM    Specimen: Blood   Result Value Ref Range    WBC 7.31 3.40 - 10.80 10*3/mm3    RBC 4.04 3.77 - 5.28 10*6/mm3    Hemoglobin 11.0 (L) 12.0 - 15.9 g/dL    Hematocrit 34.3 34.0 - 46.6 %    MCV 84.9 79.0 - 97.0 fL    MCH 27.2 26.6 - 33.0 pg    MCHC 32.1 31.5 - 35.7 g/dL    RDW 12.2 (L) 12.3 - 15.4 %    RDW-SD 37.2 37.0 - 54.0 fl    MPV 11.5 6.0 - 12.0 fL    Platelets 217 140 - 450 10*3/mm3    Neutrophil % 80.0 (H) 42.7 - 76.0 %    Lymphocyte % 16.3 (L) 19.6 - 45.3 %    Monocyte % 3.0 (L) 5.0 - 12.0 %    Eosinophil % 0.0 (L) 0.3 - 6.2 %    Basophil % 0.3 0.0 - 1.5 %    Immature Grans % 0.4 0.0 - 0.5 %    Neutrophils, Absolute 5.85 1.70 - 7.00 10*3/mm3    Lymphocytes, Absolute 1.19 0.70 - 3.10 10*3/mm3    Monocytes, Absolute 0.22 0.10 - 0.90 10*3/mm3    Eosinophils, Absolute 0.00 0.00 - 0.40 10*3/mm3    Basophils, Absolute 0.02 0.00 - 0.20 10*3/mm3    Immature Grans, Absolute 0.03 0.00 -  0.05 10*3/mm3    nRBC 0.0 0.0 - 0.2 /100 WBC   Ammonia    Collection Time: 07/09/25  7:27 PM    Specimen: Blood   Result Value Ref Range    Ammonia 16 11 - 51 umol/L   Iron Profile w/o Ferritin    Collection Time: 07/09/25  7:27 PM    Specimen: Blood   Result Value Ref Range    Iron 48 37 - 145 mcg/dL    Iron Saturation (TSAT) 21 20 - 50 %    Transferrin 157 (L) 200 - 360 mg/dL    TIBC 234 (L) 298 - 536 mcg/dL   C-reactive Protein    Collection Time: 07/09/25  7:27 PM    Specimen: Blood   Result Value Ref Range    C-Reactive Protein 0.86 (H) 0.00 - 0.50 mg/dL   Sedimentation Rate    Collection Time: 07/09/25  7:27 PM    Specimen: Blood   Result Value Ref Range    Sed Rate 27 (H) 0 - 20 mm/hr   Reticulocytes    Collection Time: 07/09/25  7:27 PM    Specimen: Blood   Result Value Ref Range    Reticulocyte % 1.55 0.70 - 1.90 %    Reticulocyte Absolute 0.0626 0.0200 - 0.1300 10*6/mm3   Lactate Dehydrogenase    Collection Time: 07/09/25  7:27 PM    Specimen: Blood   Result Value Ref Range     135 - 214 U/L   Ethanol    Collection Time: 07/09/25  7:27 PM    Specimen: Blood   Result Value Ref Range    Ethanol % <0.010 %   T4, Free    Collection Time: 07/09/25  7:27 PM    Specimen: Blood   Result Value Ref Range    Free T4 2.17 (H) 0.92 - 1.68 ng/dL   High Sensitivity Troponin T 1Hr    Collection Time: 07/09/25  9:10 PM    Specimen: Blood   Result Value Ref Range    HS Troponin T 22 (H) <14 ng/L    Troponin T Numeric Delta -12 ng/L    Troponin T % Delta -35 Abnormal if >/= 20%   Urine Drug Screen - Straight Cath    Collection Time: 07/09/25  9:15 PM    Specimen: Straight Cath; Urine   Result Value Ref Range    THC, Screen, Urine Negative Negative    Phencyclidine (PCP), Urine Negative Negative    Cocaine Screen, Urine Negative Negative    Methamphetamine, Ur Negative Negative    Opiate Screen Negative Negative    Amphetamine Screen, Urine Negative Negative    Benzodiazepine Screen, Urine Negative Negative     Tricyclic Antidepressants Screen Negative Negative    Methadone Screen, Urine Negative Negative    Barbiturates Screen, Urine Negative Negative    Oxycodone Screen, Urine Negative Negative    Buprenorphine, Screen, Urine Negative Negative   Fentanyl, Urine - Straight Cath    Collection Time: 07/09/25  9:15 PM    Specimen: Straight Cath; Urine   Result Value Ref Range    Fentanyl, Urine Negative Negative   Urinalysis With Culture If Indicated - Straight Cath    Collection Time: 07/09/25  9:16 PM    Specimen: Straight Cath; Urine   Result Value Ref Range    Color, UA Dark Yellow (A) Yellow, Straw    Appearance, UA Clear Clear    pH, UA 5.5 5.0 - 8.0    Specific Gravity, UA 1.019 1.005 - 1.030    Glucose, UA Negative Negative    Ketones, UA 15 mg/dL (1+) (A) Negative    Bilirubin, UA Negative Negative    Blood, UA Negative Negative    Protein, UA 30 mg/dL (1+) (A) Negative    Leuk Esterase, UA Trace (A) Negative    Nitrite, UA Negative Negative    Urobilinogen, UA 1.0 E.U./dL 0.2 - 1.0 E.U./dL   Urinalysis, Microscopic Only - Straight Cath    Collection Time: 07/09/25  9:16 PM    Specimen: Straight Cath; Urine   Result Value Ref Range    RBC, UA 0-2 None Seen, 0-2 /HPF    WBC, UA 3-5 (A) None Seen, 0-2 /HPF    Bacteria, UA None Seen None Seen /HPF    Squamous Epithelial Cells, UA 3-6 (A) None Seen, 0-2 /HPF    Hyaline Casts, UA 7-12 None Seen /LPF    Methodology Automated Microscopy          The patient received:  Medications   cefTRIAXone (ROCEPHIN) 1,000 mg in sodium chloride 0.9 % 100 mL MBP (has no administration in time range)   sodium chloride 0.9 % bolus 1,935 mL (0 mL Intravenous Stopped 7/9/25 2128)   iopamidol (ISOVUE-370) 76 % injection 100 mL (72 mL Intravenous Given 7/9/25 2038)   magnesium sulfate 2g/50 mL (PREMIX) infusion (0 g Intravenous Stopped 7/9/25 2128)   metoprolol tartrate (LOPRESSOR) injection 2.5 mg (2.5 mg Intravenous Given 7/9/25 2208)              ED Disposition       ED Disposition    Decision to Admit    Condition   --    Comment   Level of Care: Telemetry [5]   Diagnosis: Altered mental state [608385]   Admitting Physician: LEONELA SALGUERO [[id number]]   Attending Physician: LEONELA SALGUERO [[id number]]   Certification: I Certify That Inpatient Hospital Services Are Medically Necessary For Greater Than 2 Midnights                     Dragon disclaimer:  Part of this note may be an electronic transcription/translation of spoken language to printed text using the Dragon Dictation System.     This information is consistent with my knowledge of the patient’s controlled substance use history.      FINAL IMPRESSION   Diagnosis Plan   1. Altered mental status, unspecified altered mental status type        2. Sinus tachycardia        3. Other hypotension        4. Hypomagnesemia        5. Urinary tract infection without hematuria, site unspecified              MD Grzegorz Saravia Jr, Thomas Mark Jr., MD  07/09/25 5554

## 2025-07-10 ENCOUNTER — READMISSION MANAGEMENT (OUTPATIENT)
Dept: CALL CENTER | Facility: HOSPITAL | Age: 50
End: 2025-07-10
Payer: COMMERCIAL

## 2025-07-10 PROBLEM — R53.1 GENERALIZED WEAKNESS: Status: ACTIVE | Noted: 2025-07-10

## 2025-07-10 PROBLEM — R00.0 SINUS TACHYCARDIA: Status: ACTIVE | Noted: 2025-07-10

## 2025-07-10 PROBLEM — R41.82 ALTERED MENTAL STATE: Status: RESOLVED | Noted: 2025-07-09 | Resolved: 2025-07-10

## 2025-07-10 PROBLEM — R41.82 ALTERED MENTAL STATUS: Status: ACTIVE | Noted: 2025-07-10

## 2025-07-10 LAB
BACTERIA SPEC AEROBE CULT: NORMAL
BACTERIA SPEC AEROBE CULT: NORMAL
DEVICE COMMENT: NORMAL
GLUCOSE BLDC GLUCOMTR-MCNC: 108 MG/DL (ref 70–130)
GLUCOSE BLDC GLUCOMTR-MCNC: 72 MG/DL (ref 70–130)
GLUCOSE BLDC GLUCOMTR-MCNC: 80 MG/DL (ref 70–130)
GLUCOSE BLDC GLUCOMTR-MCNC: 84 MG/DL (ref 70–130)
HAPTOGLOB SERPL-MCNC: 134 MG/DL (ref 30–200)
M PNEUMO DNA SPEC QL NAA+PROBE: NEGATIVE
T3FREE SERPL-MCNC: 4.73 PG/ML (ref 2–4.4)
VIT B12 BLD-MCNC: >2000 PG/ML (ref 211–946)

## 2025-07-10 PROCEDURE — 25810000003 SODIUM CHLORIDE 0.9 % SOLUTION

## 2025-07-10 PROCEDURE — 97161 PT EVAL LOW COMPLEX 20 MIN: CPT

## 2025-07-10 PROCEDURE — 82948 REAGENT STRIP/BLOOD GLUCOSE: CPT

## 2025-07-10 PROCEDURE — 96372 THER/PROPH/DIAG INJ SC/IM: CPT

## 2025-07-10 PROCEDURE — 96361 HYDRATE IV INFUSION ADD-ON: CPT

## 2025-07-10 PROCEDURE — 97166 OT EVAL MOD COMPLEX 45 MIN: CPT | Performed by: OCCUPATIONAL THERAPIST

## 2025-07-10 PROCEDURE — 25010000002 ENOXAPARIN PER 10 MG: Performed by: INTERNAL MEDICINE

## 2025-07-10 PROCEDURE — G0378 HOSPITAL OBSERVATION PER HR: HCPCS

## 2025-07-10 PROCEDURE — 25010000002 DOXYCYCLINE 100 MG RECONSTITUTED SOLUTION 1 EACH VIAL

## 2025-07-10 PROCEDURE — 25010000002 CEFTRIAXONE PER 250 MG

## 2025-07-10 RX ORDER — AMOXICILLIN 250 MG
1 CAPSULE ORAL 2 TIMES DAILY
Status: DISCONTINUED | OUTPATIENT
Start: 2025-07-10 | End: 2025-07-11 | Stop reason: HOSPADM

## 2025-07-10 RX ORDER — ENOXAPARIN SODIUM 100 MG/ML
30 INJECTION SUBCUTANEOUS NIGHTLY
Status: DISCONTINUED | OUTPATIENT
Start: 2025-07-10 | End: 2025-07-11

## 2025-07-10 RX ORDER — INSULIN LISPRO 100 [IU]/ML
2-7 INJECTION, SOLUTION INTRAVENOUS; SUBCUTANEOUS
Status: DISCONTINUED | OUTPATIENT
Start: 2025-07-10 | End: 2025-07-11 | Stop reason: HOSPADM

## 2025-07-10 RX ORDER — DEXTROSE MONOHYDRATE 25 G/50ML
25 INJECTION, SOLUTION INTRAVENOUS
Status: DISCONTINUED | OUTPATIENT
Start: 2025-07-10 | End: 2025-07-11 | Stop reason: HOSPADM

## 2025-07-10 RX ORDER — CARVEDILOL 25 MG/1
25 TABLET ORAL 2 TIMES DAILY WITH MEALS
COMMUNITY

## 2025-07-10 RX ORDER — DONEPEZIL HYDROCHLORIDE 5 MG/1
5 TABLET, FILM COATED ORAL NIGHTLY
Status: DISCONTINUED | OUTPATIENT
Start: 2025-07-10 | End: 2025-07-11 | Stop reason: HOSPADM

## 2025-07-10 RX ORDER — NICOTINE POLACRILEX 4 MG
15 LOZENGE BUCCAL
Status: DISCONTINUED | OUTPATIENT
Start: 2025-07-10 | End: 2025-07-11 | Stop reason: HOSPADM

## 2025-07-10 RX ORDER — IPRATROPIUM BROMIDE AND ALBUTEROL SULFATE 2.5; .5 MG/3ML; MG/3ML
3 SOLUTION RESPIRATORY (INHALATION) EVERY 4 HOURS PRN
Status: DISCONTINUED | OUTPATIENT
Start: 2025-07-10 | End: 2025-07-11 | Stop reason: HOSPADM

## 2025-07-10 RX ORDER — ARIPIPRAZOLE 5 MG/1
2.5 TABLET ORAL DAILY
Status: DISCONTINUED | OUTPATIENT
Start: 2025-07-10 | End: 2025-07-11 | Stop reason: HOSPADM

## 2025-07-10 RX ORDER — ROSUVASTATIN CALCIUM 20 MG/1
20 TABLET, COATED ORAL NIGHTLY
Status: DISCONTINUED | OUTPATIENT
Start: 2025-07-10 | End: 2025-07-11 | Stop reason: HOSPADM

## 2025-07-10 RX ORDER — SODIUM CHLORIDE 9 MG/ML
40 INJECTION, SOLUTION INTRAVENOUS AS NEEDED
Status: DISCONTINUED | OUTPATIENT
Start: 2025-07-10 | End: 2025-07-11 | Stop reason: HOSPADM

## 2025-07-10 RX ORDER — MIRTAZAPINE 15 MG/1
15 TABLET, FILM COATED ORAL NIGHTLY
Status: DISCONTINUED | OUTPATIENT
Start: 2025-07-10 | End: 2025-07-11 | Stop reason: HOSPADM

## 2025-07-10 RX ORDER — SODIUM CHLORIDE 0.9 % (FLUSH) 0.9 %
10 SYRINGE (ML) INJECTION AS NEEDED
Status: DISCONTINUED | OUTPATIENT
Start: 2025-07-10 | End: 2025-07-11 | Stop reason: HOSPADM

## 2025-07-10 RX ORDER — PANTOPRAZOLE SODIUM 40 MG/1
40 TABLET, DELAYED RELEASE ORAL DAILY
Status: DISCONTINUED | OUTPATIENT
Start: 2025-07-10 | End: 2025-07-11

## 2025-07-10 RX ORDER — NITROGLYCERIN 0.4 MG/1
0.4 TABLET SUBLINGUAL
Status: DISCONTINUED | OUTPATIENT
Start: 2025-07-10 | End: 2025-07-11 | Stop reason: HOSPADM

## 2025-07-10 RX ORDER — AMLODIPINE BESYLATE 10 MG/1
10 TABLET ORAL DAILY
COMMUNITY

## 2025-07-10 RX ORDER — LIOTHYRONINE SODIUM 25 UG/1
25 TABLET ORAL 2 TIMES DAILY
Status: DISCONTINUED | OUTPATIENT
Start: 2025-07-10 | End: 2025-07-11 | Stop reason: HOSPADM

## 2025-07-10 RX ORDER — SODIUM CHLORIDE 9 MG/ML
100 INJECTION, SOLUTION INTRAVENOUS CONTINUOUS
Status: DISPENSED | OUTPATIENT
Start: 2025-07-10 | End: 2025-07-10

## 2025-07-10 RX ORDER — OMEPRAZOLE 20 MG/1
20 CAPSULE, DELAYED RELEASE ORAL DAILY
COMMUNITY

## 2025-07-10 RX ORDER — METFORMIN HYDROCHLORIDE 500 MG/1
500 TABLET, EXTENDED RELEASE ORAL 2 TIMES DAILY
COMMUNITY

## 2025-07-10 RX ORDER — IBUPROFEN 600 MG/1
1 TABLET ORAL
Status: DISCONTINUED | OUTPATIENT
Start: 2025-07-10 | End: 2025-07-11 | Stop reason: HOSPADM

## 2025-07-10 RX ORDER — SPIRONOLACTONE 50 MG/1
50 TABLET, FILM COATED ORAL 2 TIMES DAILY
COMMUNITY

## 2025-07-10 RX ORDER — SODIUM CHLORIDE 0.9 % (FLUSH) 0.9 %
10 SYRINGE (ML) INJECTION EVERY 12 HOURS SCHEDULED
Status: DISCONTINUED | OUTPATIENT
Start: 2025-07-10 | End: 2025-07-11 | Stop reason: HOSPADM

## 2025-07-10 RX ADMIN — DONEPEZIL HYDROCHLORIDE 5 MG: 5 TABLET, FILM COATED ORAL at 21:11

## 2025-07-10 RX ADMIN — PANTOPRAZOLE SODIUM 40 MG: 40 TABLET, DELAYED RELEASE ORAL at 08:01

## 2025-07-10 RX ADMIN — MIRTAZAPINE 15 MG: 15 TABLET, FILM COATED ORAL at 21:11

## 2025-07-10 RX ADMIN — DOXYCYCLINE 100 MG: 100 INJECTION, POWDER, LYOPHILIZED, FOR SOLUTION INTRAVENOUS at 17:07

## 2025-07-10 RX ADMIN — SODIUM CHLORIDE 100 ML/HR: 9 INJECTION, SOLUTION INTRAVENOUS at 02:37

## 2025-07-10 RX ADMIN — ROSUVASTATIN CALCIUM 20 MG: 20 TABLET, FILM COATED ORAL at 21:11

## 2025-07-10 RX ADMIN — DOXYCYCLINE 100 MG: 100 INJECTION, POWDER, LYOPHILIZED, FOR SOLUTION INTRAVENOUS at 02:36

## 2025-07-10 RX ADMIN — MIRTAZAPINE 15 MG: 15 TABLET, FILM COATED ORAL at 02:35

## 2025-07-10 RX ADMIN — Medication 10 ML: at 02:36

## 2025-07-10 RX ADMIN — CEFTRIAXONE SODIUM 1000 MG: 1 INJECTION, POWDER, FOR SOLUTION INTRAMUSCULAR; INTRAVENOUS at 21:11

## 2025-07-10 RX ADMIN — DONEPEZIL HYDROCHLORIDE 5 MG: 5 TABLET, FILM COATED ORAL at 02:35

## 2025-07-10 RX ADMIN — ARIPIPRAZOLE 2.5 MG: 5 TABLET ORAL at 08:02

## 2025-07-10 RX ADMIN — ENOXAPARIN SODIUM 30 MG: 100 INJECTION SUBCUTANEOUS at 21:15

## 2025-07-10 RX ADMIN — ROSUVASTATIN CALCIUM 20 MG: 20 TABLET, FILM COATED ORAL at 02:35

## 2025-07-10 RX ADMIN — Medication 10 ML: at 21:16

## 2025-07-10 NOTE — H&P
[facility] Medicine Services  HISTORY AND PHYSICAL    Date of Admission: 2025  Primary Care Physician: Darryl Andrews DO    Subjective   Primary Historian: Patient and collateral data    Chief Complaint: Generalized weakness    History of Present Illness  History taking is limited as the patient is poor historian.  As reported, and according to collateral data, this is a 49-year-old female patient with a medical history of GBS, Graves' disease status post thyroidectomy, and hypothyroidism on combination thyroid replacement, hypertension, hyperlipidemia, recent admission to the hospital with pneumonia, presenting to the hospital for the evaluation of generalized weakness.  She reports feeling weak and dizzy and this is happening more often upon changing in position from sitting to standing.  She was noticed to have tachycardia and palpitations for the last 2 to 3 days.  She denies having any fever, chills or chest pain.        Review of Systems   Could not be fully assessed as the patient is poor historian.    Past Medical History:   Past Medical History:   Diagnosis Date    Arthritis     Carotid artery stenosis     Degenerative disc disease, cervical     Depression     Diabetes mellitus     type 1    Disease of thyroid gland     GERD (gastroesophageal reflux disease)     Graves disease     Heart palpitations     Hyperlipidemia     Hypertension     Hypothyroidism     Seizure     Thyromegaly      Past Surgical History:  Past Surgical History:   Procedure Laterality Date     SECTION      CHOLECYSTECTOMY      COLONOSCOPY  2015    Normal exam Dr. Morgan     COLONOSCOPY  2015    Normal exam    CYST REMOVAL      ENDOSCOPY N/A 2018    Normal exam    ENDOSCOPY N/A 2025    Procedure: ESOPHAGOGASTRODUODENOSCOPY WITH ANESTHESIA;  Surgeon: Jadon Smith MD;  Location: [facility] ENDOSCOPY;  Service: Gastroenterology;  Laterality: N/A;  pre op: Nausea  and vomiting  post op: normal  pcp: Darryl Andrews,     HYSTERECTOMY      THYROIDECTOMY Bilateral 11/19/2018    Procedure: total thyroidectomy;  Surgeon: Vitaly Givens MD;  Location: [facility];  Service: ENT     Social History:  reports that she has never smoked. She has never used smokeless tobacco. She reports that she does not currently use alcohol after a past usage of about 6.0 standard drinks of alcohol per week. She reports that she does not currently use drugs after having used the following drugs: Marijuana.    Family History: family history includes Breast cancer in her maternal aunt and sister; Colon cancer in her paternal uncle; Coronary artery disease in her brother; Diabetes in her brother, father, mother, and sister; Seizures in her sister; Stroke in her father, mother, and sister.       Allergies:  Allergies   Allergen Reactions    Oxycodone-Acetaminophen Swelling     Other reaction(s): Throat swelling    Tylenol [Acetaminophen] Swelling     Mouth swelling       Medications:  Prior to Admission medications    Medication Sig Start Date End Date Taking? Authorizing Provider   ARIPiprazole (ABILIFY) 5 MG tablet Take 0.5 tablets by mouth Daily.    Luciano Hayes MD   cefdinir (OMNICEF) 300 MG capsule Take 1 capsule by mouth 2 (Two) Times a Day for 7 days. 7/7/25 7/14/25  Yovanny Abernathy MD   donepezil (ARICEPT) 5 MG tablet Take 1 tablet by mouth Every Night.    Luciano Hayes MD   doxycycline (VIBRAMYCIN) 100 MG capsule Take 1 capsule by mouth 2 (Two) Times a Day. 7/7/25   Yovanny Abernathy MD   Evolocumab (REPATHA) solution auto-injector SureClick injection Inject 1 mL under the skin into the appropriate area as directed Every 14 (Fourteen) Days.    Luciano Hayes MD   iron polysaccharides (NIFEREX) 150 MG capsule Take 1 capsule by mouth Daily.    Luciano Hayes MD   levothyroxine (SYNTHROID, LEVOTHROID) 175 MCG tablet Take 1 tablet by mouth Every  "Morning. 5/14/25   Uri Rhodes MD   liothyronine (CYTOMEL) 25 MCG tablet Take 1 tablet by mouth 2 (Two) Times a Day.    Luciano Hayes MD   mirtazapine (REMERON) 15 MG tablet Take 1 tablet by mouth Every Night.    Luciano Hayes MD   pantoprazole (PROTONIX) 40 MG EC tablet Take 1 tablet by mouth Daily.    Luciano Hayes MD   Potassium Bicarbonate (EFFER-K PO) Take 1 tablet by mouth 2 (Two) Times a Day. EFFER-K 1.68 GM-2 GM Tablet Effervescent    Luciano Hayes MD   rosuvastatin (CRESTOR) 20 MG tablet Take 1 tablet by mouth Every Night.    Luciano Hayes MD   sennosides-docusate (senna-docusate sodium) 8.6-50 MG per tablet Take 1 tablet by mouth 2 (Two) Times a Day.    Luciano Hayes MD   vitamin D (ERGOCALCIFEROL) 1.25 MG (55092 UT) capsule capsule Take 1 capsule by mouth 1 (One) Time Per Week.    Luciano Hayes MD     I have utilized all available immediate resources to obtain, update, or review the patient's current medications (including all prescriptions, over-the-counter products, herbals, cannabis/cannabidiol products, and vitamin/mineral/dietary (nutritional) supplements).    Objective     Vital Signs: /89 (BP Location: Left arm, Patient Position: Lying)   Pulse 118   Temp 97.2 °F (36.2 °C) (Oral)   Resp 16   Ht 188 cm (74\")   Wt 64.9 kg (143 lb 1.3 oz)   SpO2 100%   BMI 18.37 kg/m²   Physical Exam  Constitutional:       Appearance: She is ill-appearing.   Cardiovascular:      Rate and Rhythm: Regular rhythm. Tachycardia present.      Pulses: Normal pulses.      Heart sounds: Normal heart sounds. No murmur heard.  Pulmonary:      Effort: Pulmonary effort is normal. No respiratory distress.      Breath sounds: Normal breath sounds. No wheezing or rales.   Abdominal:      General: Bowel sounds are normal. There is no distension.      Palpations: Abdomen is soft.      Tenderness: There is no abdominal tenderness. There is no guarding. "   Musculoskeletal:      Right lower leg: No edema.      Left lower leg: No edema.   Skin:     General: Skin is warm.   Neurological:      Mental Status: She is alert and oriented to person, place, and time. Mental status is at baseline.              Results Reviewed:  Lab Results (last 24 hours)       Procedure Component Value Units Date/Time    High Sensitivity Troponin T 1Hr [[id number]]  (Abnormal) Collected: 07/09/25 2110    Specimen: Blood Updated: 07/09/25 2215     HS Troponin T 22 ng/L      Troponin T Numeric Delta -12 ng/L      Troponin T % Delta -35    Narrative:      High Sensitive Troponin T Reference Range:  <14.0 ng/L- Negative Female for AMI  <22.0 ng/L- Negative Male for AMI  >=14 - Abnormal Female indicating possible myocardial injury.  >=22 - Abnormal Male indicating possible myocardial injury.   Clinicians would have to utilize clinical acumen, EKG, Troponin, and serial changes to determine if it is an Acute Myocardial Infarction or myocardial injury due to an underlying chronic condition.         Fentanyl, Urine - Straight Cath [[id number]]  (Normal) Collected: 07/09/25 2115    Specimen: Urine from Straight Cath Updated: 07/09/25 2136     Fentanyl, Urine Negative    Narrative:      Negative Threshold:      Fentanyl 5 ng/mL     The normal value for the drug tested is negative. This report includes final unconfirmed screening results to be used for medical treatment purposes only. Unconfirmed results must not be used for non-medical purposes such as employment or legal testing. Clinical consideration should be applied to any drug of abuse test, particularly when unconfirmed results are used.           Urine Drug Screen - Straight Cath [[id number]]  (Normal) Collected: 07/09/25 2115    Specimen: Urine from Straight Cath Updated: 07/09/25 2135     THC, Screen, Urine Negative     Phencyclidine (PCP), Urine Negative     Cocaine Screen, Urine Negative     Methamphetamine, Ur Negative     Opiate Screen  Negative     Amphetamine Screen, Urine Negative     Benzodiazepine Screen, Urine Negative     Tricyclic Antidepressants Screen Negative     Methadone Screen, Urine Negative     Barbiturates Screen, Urine Negative     Oxycodone Screen, Urine Negative     Buprenorphine, Screen, Urine Negative    Narrative:      Cutoff For Drugs Screened:    Amphetamines               500 ng/ml  Barbiturates               200 ng/ml  Benzodiazepines            150 ng/ml  Cocaine                    150 ng/ml  Methadone                  200 ng/ml  Opiates                    100 ng/ml  Phencyclidine               25 ng/ml  THC                         50 ng/ml  Methamphetamine            500 ng/ml  Tricyclic Antidepressants  300 ng/ml  Oxycodone                  100 ng/ml  Buprenorphine               10 ng/ml    The normal value for all drugs tested is negative. This report includes unconfirmed screening results, with the cutoff values listed, to be used for medical treatment purposes only.  Unconfirmed results must not be used for non-medical purposes such as employment or legal testing.  Clinical consideration should be applied to any drug of abuse test, particularly when unconfirmed results are used.      Urinalysis With Culture If Indicated - Straight Cath [[id number]]  (Abnormal) Collected: 07/09/25 2116    Specimen: Urine from Straight Cath Updated: 07/09/25 2132     Color, UA Dark Yellow     Appearance, UA Clear     pH, UA 5.5     Specific Gravity, UA 1.019     Glucose, UA Negative     Ketones, UA 15 mg/dL (1+)     Bilirubin, UA Negative     Blood, UA Negative     Protein, UA 30 mg/dL (1+)     Leuk Esterase, UA Trace     Nitrite, UA Negative     Urobilinogen, UA 1.0 E.U./dL    Narrative:      In absence of clinical symptoms, the presence of pyuria, bacteria, and/or nitrites on the urinalysis result does not correlate with infection.    Urinalysis, Microscopic Only - Straight Cath [[id number]]  (Abnormal) Collected: 07/09/25 2116     "Specimen: Urine from Straight Cath Updated: 07/09/25 2132     RBC, UA 0-2 /HPF      WBC, UA 3-5 /HPF      Comment: Urine culture not indicated.        Bacteria, UA None Seen /HPF      Squamous Epithelial Cells, UA 3-6 /HPF      Hyaline Casts, UA 7-12 /LPF      Methodology Automated Microscopy    T4, Free [[id number]]  (Abnormal) Collected: 07/09/25 1927    Specimen: Blood Updated: 07/09/25 2053     Free T4 2.17 ng/dL     TSH Rfx On Abnormal To Free T4 [[id number]]  (Abnormal) Collected: 07/09/25 1927    Specimen: Blood Updated: 07/09/25 2022     TSH 0.008 uIU/mL     Iron Profile w/o Ferritin [[id number]]  (Abnormal) Collected: 07/09/25 1927    Specimen: Blood Updated: 07/09/25 2022     Iron 48 mcg/dL      Iron Saturation (TSAT) 21 %      Transferrin 157 mg/dL      TIBC 234 mcg/dL     C-reactive Protein [[id number]]  (Abnormal) Collected: 07/09/25 1927    Specimen: Blood Updated: 07/09/25 2021     C-Reactive Protein 0.86 mg/dL     Procalcitonin [[id number]]  (Normal) Collected: 07/09/25 1927    Specimen: Blood Updated: 07/09/25 2021     Procalcitonin 0.16 ng/mL     Narrative:      As a Marker for Sepsis (Non-Neonates):    1. <0.5 ng/mL represents a low risk of severe sepsis and/or septic shock.  2. >2 ng/mL represents a high risk of severe sepsis and/or septic shock.    As a Marker for Lower Respiratory Tract Infections that require antibiotic therapy:    PCT on Admission    Antibiotic Therapy       6-12 Hrs later    >0.5                Strongly Recommended  >0.25 - <0.5        Recommended   0.1 - 0.25          Discouraged              Remeasure/reassess PCT  <0.1                Strongly Discouraged     Remeasure/reassess PCT    As 28 day mortality risk marker: \"Change in Procalcitonin Result\" (>80% or <=80%) if Day 0 (or Day 1) and Day 4 values are available. Refer to http://www.[facility]-pct-calculator.com    Change in PCT <=80%  A decrease of PCT levels below or equal to 80% defines a positive change in PCT test result " representing a higher risk for 28-day all-cause mortality of patients diagnosed with severe sepsis for septic shock.    Change in PCT >80%  A decrease of PCT levels of more than 80% defines a negative change in PCT result representing a lower risk for 28-day all-cause mortality of patients diagnosed with severe sepsis or septic shock.       Comprehensive Metabolic Panel [[id number]]  (Abnormal) Collected: 07/09/25 1927    Specimen: Blood Updated: 07/09/25 2021     Glucose 141 mg/dL      BUN 13.4 mg/dL      Creatinine 1.09 mg/dL      Sodium 143 mmol/L      Potassium 4.3 mmol/L      Chloride 105 mmol/L      CO2 23.0 mmol/L      Calcium 10.5 mg/dL      Total Protein 7.2 g/dL      Albumin 4.1 g/dL      ALT (SGPT) 36 U/L      AST (SGOT) 30 U/L      Alkaline Phosphatase 50 U/L      Total Bilirubin 0.5 mg/dL      Globulin 3.1 gm/dL      A/G Ratio 1.3 g/dL      BUN/Creatinine Ratio 12.3     Anion Gap 15.0 mmol/L      eGFR 62.0 mL/min/1.73     Narrative:      GFR Categories in Chronic Kidney Disease (CKD)              GFR Category          GFR (mL/min/1.73)    Interpretation  G1                    90 or greater        Normal or high (1)  G2                    60-89                Mild decrease (1)  G3a                   45-59                Mild to moderate decrease  G3b                   30-44                Moderate to severe decrease  G4                    15-29                Severe decrease  G5                    14 or less           Kidney failure    (1)In the absence of evidence of kidney disease, neither GFR category G1 or G2 fulfill the criteria for CKD.    eGFR calculation 2021 CKD-EPI creatinine equation, which does not include race as a factor    Magnesium [[id number]]  (Abnormal) Collected: 07/09/25 1927    Specimen: Blood Updated: 07/09/25 2021     Magnesium 1.5 mg/dL     Ammonia [[id number]]  (Normal) Collected: 07/09/25 1927    Specimen: Blood Updated: 07/09/25 2018     Ammonia 16 umol/L     CK [[id number]]   (Normal) Collected: 07/09/25 1927    Specimen: Blood Updated: 07/09/25 2017     Creatine Kinase 27 U/L     Lactate Dehydrogenase [[id number]]  (Normal) Collected: 07/09/25 1927    Specimen: Blood Updated: 07/09/25 2017      U/L     Lactic Acid, Plasma [[id number]]  (Normal) Collected: 07/09/25 1927    Specimen: Blood Updated: 07/09/25 2016     Lactate 2.0 mmol/L     High Sensitivity Troponin T [[id number]]  (Abnormal) Collected: 07/09/25 1927    Specimen: Blood Updated: 07/09/25 2015     HS Troponin T 34 ng/L     Narrative:      High Sensitive Troponin T Reference Range:  <14.0 ng/L- Negative Female for AMI  <22.0 ng/L- Negative Male for AMI  >=14 - Abnormal Female indicating possible myocardial injury.  >=22 - Abnormal Male indicating possible myocardial injury.   Clinicians would have to utilize clinical acumen, EKG, Troponin, and serial changes to determine if it is an Acute Myocardial Infarction or myocardial injury due to an underlying chronic condition.         Ethanol [[id number]] Collected: 07/09/25 1927    Specimen: Blood Updated: 07/09/25 2013     Ethanol % <0.010 %     Narrative:      Not for legal purposes.    BNP [[id number]]  (Normal) Collected: 07/09/25 1927    Specimen: Blood Updated: 07/09/25 2012     proBNP 177.6 pg/mL     Narrative:      This assay is used as an aid in the diagnosis of individuals suspected of having heart failure. It can be used as an aid in the diagnosis of acute decompensated heart failure (ADHF) in patients presenting with signs and symptoms of ADHF to the emergency department (ED). In addition, NT-proBNP of <300 pg/mL indicates ADHF is not likely.    Age Range Result Interpretation  NT-proBNP Concentration (pg/mL:      <50             Positive            >450                   Gray                 300-450                    Negative             <300    50-75           Positive            >900                  Gray                300-900                  Negative             <300      >75             Positive            >1800                  Gray                300-1800                  Negative            <300    Sedimentation Rate [[id number]]  (Abnormal) Collected: 07/09/25 1927    Specimen: Blood Updated: 07/09/25 2006     Sed Rate 27 mm/hr     Protime-INR [[id number]]  (Normal) Collected: 07/09/25 1927    Specimen: Blood Updated: 07/09/25 1957     Protime 14.2 Seconds      INR 1.05    aPTT [[id number]]  (Abnormal) Collected: 07/09/25 1927    Specimen: Blood Updated: 07/09/25 1957     PTT 23.1 seconds     Narrative:      PTT = The equivalent PTT values for the therapeutic range of heparin levels at 0.3 to 0.7 U/ml are 77 - 99 seconds.    CBC & Differential [[id number]]  (Abnormal) Collected: 07/09/25 1927    Specimen: Blood Updated: 07/09/25 1947    Narrative:      The following orders were created for panel order CBC & Differential.  Procedure                               Abnormality         Status                     ---------                               -----------         ------                     CBC Auto Differential[[id number]]        Abnormal            Final result                 Please view results for these tests on the individual orders.    CBC Auto Differential [[id number]]  (Abnormal) Collected: 07/09/25 1927    Specimen: Blood Updated: 07/09/25 1947     WBC 7.31 10*3/mm3      RBC 4.04 10*6/mm3      Hemoglobin 11.0 g/dL      Hematocrit 34.3 %      MCV 84.9 fL      MCH 27.2 pg      MCHC 32.1 g/dL      RDW 12.2 %      RDW-SD 37.2 fl      MPV 11.5 fL      Platelets 217 10*3/mm3      Neutrophil % 80.0 %      Lymphocyte % 16.3 %      Monocyte % 3.0 %      Eosinophil % 0.0 %      Basophil % 0.3 %      Immature Grans % 0.4 %      Neutrophils, Absolute 5.85 10*3/mm3      Lymphocytes, Absolute 1.19 10*3/mm3      Monocytes, Absolute 0.22 10*3/mm3      Eosinophils, Absolute 0.00 10*3/mm3      Basophils, Absolute 0.02 10*3/mm3      Immature Grans, Absolute 0.03 10*3/mm3      nRBC  0.0 /100 WBC     Reticulocytes [[id number]]  (Normal) Collected: 07/09/25 1927    Specimen: Blood Updated: 07/09/25 1947     Reticulocyte % 1.55 %      Reticulocyte Absolute 0.0626 10*6/mm3     Blood Culture - Blood, Wrist, Right [[id number]] Collected: 07/09/25 1920    Specimen: Blood from Wrist, Right Updated: 07/09/25 1944    Blood Culture - Blood, Arm, Right [[id number]] Collected: 07/09/25 1927    Specimen: Blood from Arm, Right Updated: 07/09/25 1943    Vitamin B12 [[id number]] Collected: 07/09/25 1927    Specimen: Blood Updated: 07/09/25 1939    T3, Free [[id number]] Collected: 07/09/25 1927    Specimen: Blood Updated: 07/09/25 1939    Haptoglobin [[id number]] Collected: 07/09/25 1927    Specimen: Blood Updated: 07/09/25 1939    Methylmalonic Acid, Serum [[id number]] Collected: 07/09/25 1927    Specimen: Blood Updated: 07/09/25 1939          Imaging Results (Last 24 Hours)       Procedure Component Value Units Date/Time    CT Angiogram Chest [[id number]] Collected: 07/09/25 2051     Updated: 07/09/25 2058    Narrative:      EXAM: CT ANGIOGRAM CHEST-         HISTORY: Palpitations tachycardia dizziness shortness of breath;  I95.89-Other hypotension; R00.0-Tachycardia, unspecified;  E05.90-Thyrotoxicosis, unspecified without thyrotoxic crisis or storm;  E83.42-Hypomagnesemia       COMPARISON: 7/5/2025.     DOSE LENGTH PRODUCT: 227.55 mGy.cm mGy cm. Automatic exposure control  was utilized to make radiation dose as low as reasonably achievable.     TECHNIQUE: IV enhanced CT images of the chest obtained with multiplanar  reformats. 3D MIP images were submitted and reviewed.     FINDINGS:     MEDIASTINUM/EXTRATHORACIC:   Thoracic aorta is unremarkable. There is no  significant coronary artery calcification. No pericardial or pleural  effusion is identified and no thoracic lymphadenopathy is seen.     PULMONARY ARTERIES: Pulmonary arteries are opacified and no filling  defects are identified.     LUNGS/AIRWAYS: The lungs  are grossly clear. Bronchial wall thickening is  noted may represent bronchitis. Subtle peribronchial groundglass nodular  opacities are likely infectious in etiology.        INCLUDED UPPER ABDOMEN: There is stable nodularity of both adrenal  glands. There are clips from cholecystectomy.     SOFT TISSUES: Submitted soft tissues of the chest are unremarkable.     BONES: No suspicious osseous lesions identified.       Impression:      1. No pulmonary embolism identified.  2. Bronchial wall thickening likely bronchitis and subtle peribronchial  groundglass nodular opacities likely infectious in etiology.     This report was signed and finalized on 7/9/2025 8:55 PM by Ramon Cifuentes.       CT Head Without Contrast [[id number]] Collected: 07/09/25 2047     Updated: 07/09/25 2054    Narrative:      EXAM: CT HEAD WO CONTRAST-         HISTORY: altered mental state; I95.89-Other hypotension;  R00.0-Tachycardia, unspecified; E05.90-Thyrotoxicosis, unspecified  without thyrotoxic crisis or storm; E83.42-Hypomagnesemia       COMPARISON: 5/16/2025.     DOSE LENGTH PRODUCT: 603.35 mGy.cm  Automated exposure control was also  utilized to decrease patient radiation dose.     TECHNIQUE: Unenhanced CT images obtained from vertex to skull base with  multiplanar reformats.     FINDINGS:  There is no acute intracranial hemorrhage, midline shift, mass effect,  or hydrocephalus. There is no CT evidence for acute infarct.     There are chronic changes with volume loss and chronic small vessel  ischemic change of the periventricular white matter.      SOFT TISSUES: The scalp soft tissues are unremarkable.        SINUS:The visualized paranasal sinuses and mastoid air cells are clear.      ORBITS: The visualized orbits and globes are unremarkable.          Impression:      1. Chronic changes and no acute intracranial findings.      This report was signed and finalized on 7/9/2025 8:51 PM by Ramon Cifuentes.             I have personally  reviewed and interpreted the radiology studies and ECG obtained at time of admission.     Assessment / Plan   Assessment:   Active Hospital Problems    Diagnosis     **Generalized weakness     Sinus tachycardia        Treatment Plan  The patient will be admitted to my service here at [facility].     Assessment and plan:  #Generalized weakness.  #Sinus tachycardia.  #Very low TSH, most likely in the setting of combination thyroid replacement therapy.  Patient has a history of thyroidectomy due to Graves' disease.  #Recent admission with pneumonia.  #Recent diagnosis of GBS    - Admitting to floor.  PT and OT ordered.  - CT head done with no acute findings.  Day team may consider consulting with neurology and/or further brain imaging if they find appropriate.  - Holding thyroid replacement medications for today.  To note that patient has combination therapy with levothyroxine and liothyronine?  Patient received a dose of metoprolol 2.5 IV once in the ED to slow her heart rate which has helped.  Resuming at a lower doses can be considered later on.  Day team to follow.  Patient would benefit from outpatient endocrinology follow-up.  Pending T3 level.  - Continue antibiotics to complete the course for pneumonia.  - DVT prophylaxis with SCDs for now.        Medical Decision Making  Number and Complexity of problems: 2 acute and moderate  Differential Diagnosis: As above    Conditions and Status        Condition is worsening.     MDM Data  External documents reviewed: Yes  Cardiac tracing (EKG, telemetry) interpretation: Yes  Radiology interpretation: Yes  Labs reviewed: Yes   Any tests that were considered but not ordered: No     Decision rules/scores evaluated (example [id number], [place], etc): No     Discussed with: Patient ED provider     Care Planning    Code status and discussions: Full code    Disposition  Social Determinants of Health that impact treatment or disposition: Not at the moment  Estimated  length of stay is 2 to 3 days.     I confirmed that the patient's advanced care plan is present, code status is documented, and a surrogate decision maker is listed in the patient's medical record.       The patient was seen and examined by me on 7/9 at 10:30 PM.    Electronically signed by Olvin Armendariz MD, 07/10/25, 05:59 CDT.

## 2025-07-10 NOTE — OUTREACH NOTE
COPD/PN Week 1 Survey      Flowsheet Row Responses   [facility] facility patient discharged from? [facility]   Does the patient have one of the following disease processes/diagnoses(primary or secondary)? Pneumonia   Week 1 attempt successful? No   Unsuccessful attempts Attempt 1   Revoke Readmitted            Coty H - Registered Nurse

## 2025-07-10 NOTE — THERAPY EVALUATION
Patient Name: Lynn Magana  : 1975    MRN: [id number]                              Today's Date: 7/10/2025       Admit Date: 2025    Visit Dx:     ICD-10-CM ICD-9-CM   1. Altered mental status, unspecified altered mental status type  R41.82 780.97   2. Sinus tachycardia  R00.0 427.89   3. Other hypotension  I95.89 458.8   4. Hypomagnesemia  E83.42 275.2   5. Urinary tract infection without hematuria, site unspecified  N39.0 599.0     Patient Active Problem List   Diagnosis    Syncope    Graves' disease    Polysubstance abuse    Hypertension    Diabetes mellitus type 1    Spells of decreased attentiveness    Chronic intractable headache    Nausea and vomiting    Slow transit constipation    Nonsmoker    Type 2 diabetes mellitus, with long-term current use of insulin    GERD without esophagitis    Hyperthyroidism    Thyromegaly    Post-surgical hypothyroidism    Degeneration of cervical intervertebral disc    Cervical radiculopathy    Acute pain of right shoulder    Class 1 obesity due to excess calories with body mass index (BMI) of 30.0 to 30.9 in adult    Hypoglycemia    Stage 1 acute kidney injury    Dehydration    Dysphagia    Dehydration    Hypokalemia    Steatosis of liver    Marijuana abuse    Loss of weight    Severe protein-calorie malnutrition    Hypothyroid    Generalized weakness    Acquired cerebral ventriculomegaly    Atypical pneumonia    Joy-Quinonez variant Guillain-Reedley syndrome    Tachycardia, unspecified    Pneumonia, unspecified organism    Generalized weakness    Sinus tachycardia    Altered mental status     Past Medical History:   Diagnosis Date    Arthritis     Carotid artery stenosis     Degenerative disc disease, cervical     Depression     Diabetes mellitus     type 1    Disease of thyroid gland     GERD (gastroesophageal reflux disease)     Graves disease     Heart palpitations     Hyperlipidemia     Hypertension     Hypothyroidism     Seizure     Thyromegaly      Past  Surgical History:   Procedure Laterality Date     SECTION      CHOLECYSTECTOMY      COLONOSCOPY  2015    Normal exam Dr. Morgan     COLONOSCOPY  2015    Normal exam    CYST REMOVAL      ENDOSCOPY N/A 2018    Normal exam    ENDOSCOPY N/A 2025    Procedure: ESOPHAGOGASTRODUODENOSCOPY WITH ANESTHESIA;  Surgeon: Jadon Smith MD;  Location:  [facility];  Service: Gastroenterology;  Laterality: N/A;  pre op: Nausea and vomiting  post op: normal  pcp: Darryl Andrews,     HYSTERECTOMY      THYROIDECTOMY Bilateral 2018    Procedure: total thyroidectomy;  Surgeon: Vitaly Givens MD;  Location: [facility] OR;  Service: ENT      General Information       Row Name 07/10/25 1022          OT Time and Intention    Subjective Information no complaints  -CASTILLO (marcus) ALFREDO (lisa) CASTILLO (c)     Document Type evaluation  Presented to ED after leaving PCP due to generalized weakness. Pt reportedly feels weak and dizzy and this is happening more often upon changing in position from sitting to standing. Reports low BP fast HR which has been present for the last 2 to 3 days.  -CASTILLO (marcus) ALFREDO (t) CASTILLO (c)     Mode of Treatment occupational therapy  PMH: GBS, Graves' disease status post thyroidectomy, and hypothyroidism on combination thyroid replacement, hypertension, hyperlipidemia, recent admission to the hospital with pneumonia. Pt dx: altered mental state.  -CASTILLO (marcus) ALFREDO (lisa) CASTILLO (c)     Patient Effort good  -CASTILLO (marcus) ALFREDO (t) CASTILLO (c)       Row Name 07/10/25 1022          General Information    Prior Level of Function min assist:;dressing;bathing;all household mobility;independent:;grooming  Pt demonstrated to be poor historian of PLOF  -CASTILLO (marcus) ALFREDO (lisa) CASTILLO (c)     Existing Precautions/Restrictions fall  -CASTILLO (marcus) ALFREDO (t) CASTILLO (c)     Barriers to Rehab medically complex;cognitive status  -CASTILLO (marcus) ALFREDO (t) CASTILLO (c)       Row Name 07/10/25 1022          Occupational Profile    Environmental Supports and Barriers (Occupational  Profile) Has a walker and a cane at home; has a shower chair, grab bars and BSC  -JW (r) BS (t) JW (c)       Row Name 07/10/25 1022          Living Environment    Current Living Arrangements home  -JW (r) BS (t) JW (c)     People in Home child(neymar), dependent;spouse  -JW (r) BS (t) JW (c)       Row Name 07/10/25 1022          Home Main Entrance    Number of Stairs, Main Entrance none  -JW (r) BS (t) JW (c)     Stair Railings, Main Entrance none  -JW (r) BS (t) JW (c)       Row Name 07/10/25 1022          Stairs Within Home, Primary    Number of Stairs, Within Home, Primary none  -JW (r) BS (t) JW (c)     Stair Railings, Within Home, Primary none  -JW (r) BS (t) JW (c)       Row Name 07/10/25 1022          Cognition    Orientation Status (Cognition) oriented to;person;disoriented to;place;situation;time  -JW (r) BS (t) JW (c)       Row Name 07/10/25 1022          Safety Issues/Impairments Affecting Functional Mobility    Safety Issues Affecting Function (Mobility) ability to follow commands;awareness of need for assistance;friction/shear risk;insight into deficits/self-awareness;judgment;problem-solving;safety precaution awareness;safety precautions follow-through/compliance;sequencing abilities  -JW (r) BS (t) JW (c)     Impairments Affecting Function (Mobility) balance;cognition;endurance/activity tolerance;strength  -JW (r) BS (t) JW (c)     Cognitive Impairments, Mobility Safety/Performance attention;awareness, need for assistance;insight into deficits/self-awareness;problem-solving/reasoning;safety precaution awareness;safety precaution follow-through;sequencing abilities  -JW (r) BS (t) JW (c)               User Key  (r) = Recorded By, (t) = Taken By, (c) = Cosigned By      Initials Name Provider Type    Zora Villanueva, OTR/L, CSRS Occupational Therapist    Desiree Price, OT Student OT Student                     Mobility/ADL's       Row Name 07/10/25 1022          Bed Mobility    Bed Mobility  scooting/bridging;supine-sit;sit-supine  -JW (r) BS (t) JW (c)     Scooting/Bridging Crane (Bed Mobility) standby assist;1 person assist  -JW (r) BS (t) JW (c)     Supine-Sit Crane (Bed Mobility) standby assist;1 person assist  -JW (r) BS (t) JW (c)     Sit-Supine Crane (Bed Mobility) standby assist;1 person assist  -JW (r) BS (t) JW (c)     Bed Mobility, Safety Issues cognitive deficits limit understanding  -JW (r) BS (t) JW (c)     Assistive Device (Bed Mobility) bed rails;head of bed elevated  -JW (r) BS (t) JW (c)       Row Name 07/10/25 1022          Transfers    Transfers sit-stand transfer;stand-sit transfer  -JW (r) BS (t) JW (c)       Row Name 07/10/25 1022          Sit-Stand Transfer    Sit-Stand Crane (Transfers) moderate assist (50% patient effort);1 person assist  -JW (r) BS (t) JW (c)       Row Name 07/10/25 1022          Stand-Sit Transfer    Stand-Sit Crane (Transfers) minimum assist (75% patient effort);1 person assist  -JW (r) BS (t) JW (c)       Row Name 07/10/25 1022          Functional Mobility    Functional Mobility- Ind. Level not appropriate to assess  -JW (r) BS (t) JW (c)     Patient was able to Ambulate no, other medical factors prevent ambulation  -JW (r) BS (t) JW (c)     Reason Patient was unable to Ambulate Excessive Weakness;Dizziness  -JW (r) BS (t) JW (c)       Row Name 07/10/25 1022          Activities of Daily Living    BADL Assessment/Intervention lower body dressing  -JW (r) BS (t) JW (c)       Row Name 07/10/25 1022          Lower Body Dressing Assessment/Training    Crane Level (Lower Body Dressing) doff;don;socks;independent  -JW (r) BS (t) JW (c)     Position (Lower Body Dressing) edge of bed sitting  -JW (r) BS (t) JW (c)               User Key  (r) = Recorded By, (t) = Taken By, (c) = Cosigned By      Initials Name Provider Type    JW [place], Zora, OTR/L, CSRS Occupational Therapist    Desiree Price, OT Student OT  Student                   Obj/Interventions       Row Name 07/10/25 1022          Sensory Assessment (Somatosensory)    Sensory Assessment (Somatosensory) UE sensation intact  -JW (r) BS (t) JW (c)       Row Name 07/10/25 1022          Vision Assessment/Intervention    Visual Impairment/Limitations corrective lenses full-time  -JW (r) BS (t) JW (c)       Row Name 07/10/25 1022          Range of Motion Comprehensive    General Range of Motion no range of motion deficits identified  -JW (r) BS (t) JW (c)     Comment, General Range of Motion Pt demonstrated BUE ROM WFL at all joints.  -JW (r) BS (t) JW (c)       Row Name 07/10/25 1022          Strength Comprehensive (MMT)    General Manual Muscle Testing (MMT) Assessment no strength deficits identified  -JW (r) BS (t) JW (c)     Comment, General Manual Muscle Testing (MMT) Assessment Pt demonstrated 4/5 MMT BUE at shoulder joint; 5/5 MMT at elbow joint.  -JW (r) BS (t) JW (c)       Row Name 07/10/25 1022          Balance    Balance Assessment sitting static balance;sitting dynamic balance;sit to stand dynamic balance;standing static balance  -JW (r) BS (t) JW (c)     Static Sitting Balance supervision  -JW (r) BS (t) JW (c)     Dynamic Sitting Balance supervision  -JW (r) BS (t) JW (c)     Position, Sitting Balance unsupported;sitting edge of bed  -JW (r) BS (t) JW (c)     Sit to Stand Dynamic Balance moderate assist;1-person assist  -JW (r) BS (t) JW (c)     Static Standing Balance minimal assist;1-person assist  -JW (r) BS (t) JW (c)     Position/Device Used, Standing Balance supported  supported with gait belt.  -JW (r) BS (t) JW (c)     Balance Interventions sitting;standing;sit to stand;supported;static;dynamic;occupation based/functional task  -JW (r) BS (t) JW (c)               User Key  (r) = Recorded By, (t) = Taken By, (c) = Cosigned By      Initials Name Provider Type    Zora Villanueva, DAVID/L, CSRS Occupational Therapist    Desiree Price, OT  Student OT Student                   Goals/Plan       Row Name 07/10/25 1208 07/10/25 1022       Transfer Goal 1 (OT)    Activity/Assistive Device (Transfer Goal 1, OT) --  -JW (r) BS (t) JW (c) sit-to-stand/stand-to-sit;commode, 3-in-1;bed-to-chair/chair-to-bed  -JW (r) BS (t) JW (c)    Marathon Level/Cues Needed (Transfer Goal 1, OT) --  -JW (r) BS (t) JW (c) contact guard required  -JW (r) BS (t) JW (c)    Time Frame (Transfer Goal 1, OT) --  -JW (r) BS (t) JW (c) long term goal (LTG);by discharge  -JW (r) BS (t) JW (c)    Progress/Outcome (Transfer Goal 1, OT) --  -JW (r) BS (t) JW (c) new goal  -JW (r) BS (t) JW (c)      Row Name 07/10/25 1208 07/10/25 1022       Grooming Goal 1 (OT)    Activity/Device (Grooming Goal 1, OT) --  -JW (r) BS (t) JW (c) hair care;oral care;wash face, hands  -JW (r) BS (t) JW (c)    Marathon (Grooming Goal 1, OT) --  -JW (r) BS (t) JW (c) contact guard required  -JW (r) BS (t) JW (c)    Time Frame (Grooming Goal 1, OT) --  -JW (r) BS (t) JW (c) long term goal (LTG);by discharge  -JW (r) BS (t) JW (c)    Strategies/Barriers (Grooming Goal 1, OT) --  -JW (r) BS (t) JW (c) While in standing position  -JW (r) BS (t) JW (c)    Progress/Outcome (Grooming Goal 1, OT) --  -JW (r) BS (t) JW (c) new goal  -JW (r) BS (t) JW (c)      Row Name 07/10/25 1208 07/10/25 1022       Problem Specific Goal 1 (OT)    Problem Specific Goal 1 (OT) --  -JW (r) BS (t) JW (marion) Pt will independently implement one pain management technique to decrease pain and improve functional adl performance.  -CASTILLO FUENTES (r) (lisa) CASTILLO barraza)    Time Frame (Problem Specific Goal 1, OT) --  -CASTILLO nagy (r)) CASTILLO barraza) long term goal (LTG);by discharge  -CASTILLO barraza (r t))    Progress/Outcome (Problem Specific Goal 1, OT) --  -CASTILLO FUENTES (r) (lisa) CASTILLO barraza) new goal  -CASTILLO FUENTES (r) (lisa) CASTILLO (marion)      Row Name 07/10/25 1208 07/10/25 1022       Therapy Assessment/Plan (OT)    Planned Therapy Interventions (OT) --  -CASTILLO FUENTES (r) (lisa) CASTILLO (marion) transfer/mobility  "retraining;strengthening exercise;patient/caregiver education/training;occupation/activity based interventions;functional balance retraining;activity tolerance training;BADL retraining;adaptive equipment training;ROM/therapeutic exercise  -CASTILLO (marcus) ALFREDO (lisa) CASTILLO (marion)              User Key  (r) = Recorded By, (t) = Taken By, (c) = Cosigned By      Initials Name Provider Type    CASTILLO Zora Miller, OTR/L, CSRS Occupational Therapist    Desiree Price, OT Student OT Student                   Clinical Impression       Row Name 07/10/25 1022          Pain Assessment    Pretreatment Pain Rating 9/10  -CASTILLO (r) ALFREDO (lisa) CASTILLO (c)     Posttreatment Pain Rating 9/10  -CASTILLO (r) ALFREDO (t) CASTILLO (marion)     Pain Location hand  -CASTILLO (r) ALFREDO (lisa) CASTILLO (marion)     Pain Side/Orientation bilateral  -CASTILLO (rYolanda FUENTES (lisa) CASTILLO (marion)     Pre/Posttreatment Pain Comment Pt reported that she has \"tingling\" pain in hands at all times.  -CASTILLO (marcus) ALFREDO (lisa) CASTILLO (marion)       Row Name 07/10/25 1022          Plan of Care Review    Plan of Care Reviewed With patient;daughter  -CASTILLO (marcus) ALFREDO (lisa) CASTILLO (marion)     Outcome Evaluation OT eval completed. Pt presented supine in bed upon entering and was agreeable to participate in therapy. She was oriented to person; disoriented to place, situation, and time. Pt demonstrated to be poor historian during PLOF questions. She was able to transition from supine to sitting EOB with SBA. Pt reported dizziness after transition. She was able to perform ROM and MMT testing while sitting EOB demonstrating good sitting balance. She required Mod A to transition from sitting to standing demonstrating increased weakness.  Pt returned back to supine with SBA. Pt was able to don/doff socks independently this date. Ms. Magana would benefit from skilled OT intervention to address deficits in fxl mobility, fxl activity tolerance, balance, strength, cognition, and use of adaptive techniques/equipment during performance of BADLs. Recommend d/c to SNF.  -CASTILLO magana) ALFREDO (lisa) CASTILLO (marion)  "      Row Name 07/10/25 1022          Therapy Assessment/Plan (OT)    Rehab Potential (OT) good  -JW (r) BS (t) JW (c)     Criteria for Skilled Therapeutic Interventions Met (OT) yes;skilled treatment is necessary  -JW (r) BS (t) JW (c)     Therapy Frequency (OT) 5 times/wk  -JW (r) BS (t) JW (c)     Predicted Duration of Therapy Intervention (OT) 10 days  -JW (r) BS (t) JW (c)       Row Name 07/10/25 1022          Therapy Plan Review/Discharge Plan (OT)    Anticipated Discharge Disposition (OT) Health system  -JW (r) BS (t) JW (c)       Row Name 07/10/25 1022          Vital Signs    O2 Delivery Pre Treatment room air  -JW (r) BS (t) JW (c)     O2 Delivery Intra Treatment room air  -JW (r) BS (t) JW (c)     O2 Delivery Post Treatment room air  -JW (r) BS (t) JW (c)     Pre Patient Position Supine  -JW (r) BS (t) JW (c)     Intra Patient Position Sitting  able to perform sit-to-stand  -JW (r) BS (t) JW (c)     Post Patient Position Supine  -JW (r) BS (t) JW (c)       Row Name 07/10/25 1022          Positioning and Restraints    Pre-Treatment Position in bed  -JW (r) BS (t) JW (c)     Post Treatment Position bed  -JW (r) BS (t) JW (c)     In Bed notified nsg;supine;call light within reach;encouraged to call for assist;with family/caregiver;side rails up x3  -JW (r) BS (t) JW (c)               User Key  (r) = Recorded By, (t) = Taken By, (c) = Cosigned By      Initials Name Provider Type    Zora Villanueva, OTR/L, CSRS Occupational Therapist    Desiree Price, OT Student OT Student                   Outcome Measures       Row Name 07/10/25 1022          How much help from another is currently needed...    Putting on and taking off regular lower body clothing? 3  -JW (r) BS (t) JW (c)     Bathing (including washing, rinsing, and drying) 2  -JW (r) BS (t) JW (c)     Toileting (which includes using toilet bed pan or urinal) 2  -CASTILLO (r) BS (t) JW (c)     Putting on and taking off regular upper body  clothing 3  -JW (r) BS (t) JW (c)     Taking care of personal grooming (such as brushing teeth) 3  -JW (r) BS (t) JW (c)     Eating meals 3  -JW (r) BS (t) JW (c)     AM-PAC 6 Clicks Score (OT) 16  -JW (r) BS (t)       Row Name 07/10/25 0759          How much help from another person do you currently need...    Turning from your back to your side while in flat bed without using bedrails? 4  -CP     Moving from lying on back to sitting on the side of a flat bed without bedrails? 4  -CP     Moving to and from a bed to a chair (including a wheelchair)? 3  -CP     Standing up from a chair using your arms (e.g., wheelchair, bedside chair)? 3  -CP     Climbing 3-5 steps with a railing? 3  -CP     To walk in hospital room? 3  -CP     AM-PAC 6 Clicks Score (PT) 20  -CP       Row Name 07/10/25 1022          Functional Assessment    Outcome Measure Options AM-PAC 6 Clicks Daily Activity (OT)  -JW (r) BS (t) JW (c)               User Key  (r) = Recorded By, (t) = Taken By, (c) = Cosigned By      Initials Name Provider Type    Zora Villanueva OTR/L, CSRS Occupational Therapist    Aixa Ramos, RN Registered Nurse    Desiree Price, OT Student OT Student                    Occupational Therapy Education       Title: PT OT SLP Therapies (In Progress)       Topic: Occupational Therapy (In Progress)       Point: ADL training (In Progress)       Learning Progress Summary            Patient Acceptance, E, NR,NL by BS at 7/10/2025 1022                      Point: Body mechanics (In Progress)       Learning Progress Summary            Patient Acceptance, E, NR,NL by BS at 7/10/2025 1022                                      User Key       Initials Effective Dates Name Provider Type Discipline    ALFREDO 05/12/25 -  Desiree Mccarty, OT Student OT Student OT                  OT Recommendation and Plan  Planned Therapy Interventions (OT): transfer/mobility retraining, strengthening exercise, patient/caregiver  education/training, occupation/activity based interventions, functional balance retraining, activity tolerance training, BADL retraining, adaptive equipment training, ROM/therapeutic exercise  Therapy Frequency (OT): 5 times/wk  Plan of Care Review  Plan of Care Reviewed With: patient, daughter  Outcome Evaluation: OT eval completed. Pt presented supine in bed upon entering and was agreeable to participate in therapy. She was oriented to person; disoriented to place, situation, and time. Pt demonstrated to be poor historian during PLOF questions. She was able to transition from supine to sitting EOB with SBA. Pt reported dizziness after transition. She was able to perform ROM and MMT testing while sitting EOB demonstrating good sitting balance. She required Mod A to transition from sitting to standing demonstrating increased weakness.  Pt returned back to supine with SBA. Pt was able to don/doff socks independently this date. Ms. Magana would benefit from skilled OT intervention to address deficits in fxl mobility, fxl activity tolerance, balance, strength, cognition, and use of adaptive techniques/equipment during performance of BADLs. Recommend d/c to SNF.     Time Calculation:         Time Calculation- OT       Row Name 07/10/25 1022             Time Calculation- OT    OT Start Time 1022  -JW (r) BS (t) JW (c)      OT Stop Time 1105  -JW (r) BS (t) JW (c)      OT Time Calculation (min) 43 min  -JW (r) BS (t)      Total Timed Code Minutes- OT 43 minute(s)  -JW (r) BS (t) JW (c)      OT Received On 07/10/25  -JW (r) BS (t) JW (c)      OT Goal Re-Cert Due Date 07/20/25  -JW (r) BS (t) JW (c)         Untimed Charges    OT Eval/Re-eval Minutes 43  -JW (r) BS (t) JW (c)         Total Minutes    Untimed Charges Total Minutes 43  -JW (r) BS (t)       Total Minutes 43  -JW (r) BS (t)                User Key  (r) = Recorded By, (t) = Taken By, (c) = Cosigned By      Initials Name Provider Type    Zora Villanueva  OTR/L, CSRS Occupational Therapist    Desiree Price, OT Student OT Student                           Desiree Mccarty, OT Student  7/10/2025

## 2025-07-10 NOTE — CASE MANAGEMENT/SOCIAL WORK
Continued Stay Note  [facility]     Patient Name: Lynn Magana  MRN: [id number]  Today's Date: 7/10/2025    Admit Date: 7/9/2025    Plan: Home and resume [facility]   Discharge Plan       Row Name 07/10/25 1777       Plan    Plan Home and resume [facility]    Patient/Family in Agreement with Plan yes    Plan Comments SW spoke to pt's spouse, Evens at [phone].  Pt states that he is pt's caretaker at home.  He plans for pt to return home and did not want rehab services.  Pt is current with [facility].  Pt has a rolling walker, wc, bp cuff and shower chair.  Pt has PCP, RX coverage and can afford medications.  [facility] will need [facility] orders upon dc.  [facility] will follow.    Final Discharge Disposition Code 06 - home with home health care                   Discharge Codes    No documentation.                       EMI Manuel

## 2025-07-10 NOTE — PLAN OF CARE
Goal Outcome Evaluation:  Plan of Care Reviewed With: patient, daughter           Outcome Evaluation: OT eval completed. Pt presented supine in bed upon entering and was agreeable to participate in therapy. She was oriented to person; disoriented to place, situation, and time. Pt demonstrated to be poor historian during PLOF questions. She was able to transition from supine to sitting EOB with SBA. Pt reported dizziness after transition. She was able to perform ROM and MMT testing while sitting EOB demonstrating good sitting balance. She required Mod A to transition from sitting to standing demonstrating increased weakness.  Pt returned back to supine with SBA. Pt was able to don/doff socks independently this date. Ms. Magana would benefit from skilled OT intervention to address deficits in fxl mobility, fxl activity tolerance, balance, strength, cognition, and use of adaptive techniques/equipment during performance of BADLs. Recommend d/c to SNF.    Anticipated Discharge Disposition (OT): skilled nursing facility

## 2025-07-10 NOTE — PROGRESS NOTES
[facility] Medicine Services  INPATIENT PROGRESS NOTE    Patient Name: Lynn Magana  Date of Admission: 7/9/2025  Today's Date: 07/10/25  Length of Stay: 1  Primary Care Physician: Darryl Andrews DO    Subjective   Chief Complaint: Tachycardia    Patient has no new complaint, denied any complaint of PCPs office and does not understand why she was sent to the emergency room [she was found to be tachycardic at PCPs office and sent to the hospital].      Review of Systems   All pertinent negatives and positives are as above. All other systems have been reviewed and are negative unless otherwise stated.   He  Objective    Temp:  [97.2 °F (36.2 °C)-98.2 °F (36.8 °C)] 98.2 °F (36.8 °C)  Heart Rate:  [] 69  Resp:  [16-20] 18  BP: ()/() 123/91  Physical Exam  Constitutional:       Appearance: She is ill-appearing.   Cardiovascular:      Rate and Rhythm: Regular rhythm.       Pulses: Normal pulses.      Heart sounds: Normal heart sounds. No murmur heard.  Pulmonary:      Effort: Pulmonary effort is normal. No respiratory distress.      Breath sounds: Normal breath sounds. No wheezing or rales.   Abdominal:      General: Bowel sounds are normal. There is no distension.      Palpations: Abdomen is soft.      Tenderness: There is no abdominal tenderness. There is no guarding.   Musculoskeletal:      Right lower leg: No edema.      Left lower leg: No edema.   Skin:     General: Skin is warm.   Neurological:      Mental Status: She is alert and oriented to person, place, and time. Mental status is at baseline.       Results Review:  I have reviewed the labs, radiology results, and diagnostic studies.    Laboratory Data:   Results from last 7 days   Lab Units 07/09/25  1927 07/07/25  0459 07/06/25  0533   WBC 10*3/mm3 7.31 4.99 5.10   HEMOGLOBIN g/dL 11.0* 8.7* 8.4*   HEMATOCRIT % 34.3 27.3* 26.6*   PLATELETS 10*3/mm3 217 207 225        Results from last 7 days    Lab Units 07/09/25  1927 07/07/25  0459 07/06/25  1612 07/06/25  0533 07/05/25  0740   SODIUM mmol/L 143 141  --  142 143   POTASSIUM mmol/L 4.3 3.9 4.3 3.2* 4.1   CHLORIDE mmol/L 105 107  --  105 100   CO2 mmol/L 23.0 23.0  --  24.0 22.0   BUN mg/dL 13.4 14.8  --  20.1* 20.6*   CREATININE mg/dL 1.09* 0.63  --  0.70 0.80   CALCIUM mg/dL 10.5 9.2  --  9.2 10.4   BILIRUBIN mg/dL 0.5  --   --   --  0.6   ALK PHOS U/L 50  --   --   --  54   ALT (SGPT) U/L 36*  --   --   --  30   AST (SGOT) U/L 30  --   --   --  38*   GLUCOSE mg/dL 141* 79  --  80 88       Culture Data:   Blood Culture   Date Value Ref Range Status   07/05/2025 No growth at 5 days  Final   07/05/2025 No growth at 5 days  Final       Radiology Data:   Imaging Results (Last 24 Hours)       Procedure Component Value Units Date/Time    CT Angiogram Chest [[id number]] Collected: 07/09/25 2051     Updated: 07/09/25 2058    Narrative:      EXAM: CT ANGIOGRAM CHEST-         HISTORY: Palpitations tachycardia dizziness shortness of breath;  I95.89-Other hypotension; R00.0-Tachycardia, unspecified;  E05.90-Thyrotoxicosis, unspecified without thyrotoxic crisis or storm;  E83.42-Hypomagnesemia       COMPARISON: 7/5/2025.     DOSE LENGTH PRODUCT: 227.55 mGy.cm mGy cm. Automatic exposure control  was utilized to make radiation dose as low as reasonably achievable.     TECHNIQUE: IV enhanced CT images of the chest obtained with multiplanar  reformats. 3D MIP images were submitted and reviewed.     FINDINGS:     MEDIASTINUM/EXTRATHORACIC:   Thoracic aorta is unremarkable. There is no  significant coronary artery calcification. No pericardial or pleural  effusion is identified and no thoracic lymphadenopathy is seen.     PULMONARY ARTERIES: Pulmonary arteries are opacified and no filling  defects are identified.     LUNGS/AIRWAYS: The lungs are grossly clear. Bronchial wall thickening is  noted may represent bronchitis. Subtle peribronchial groundglass nodular  opacities  are likely infectious in etiology.        INCLUDED UPPER ABDOMEN: There is stable nodularity of both adrenal  glands. There are clips from cholecystectomy.     SOFT TISSUES: Submitted soft tissues of the chest are unremarkable.     BONES: No suspicious osseous lesions identified.       Impression:      1. No pulmonary embolism identified.  2. Bronchial wall thickening likely bronchitis and subtle peribronchial  groundglass nodular opacities likely infectious in etiology.     This report was signed and finalized on 7/9/2025 8:55 PM by Ramon Cifuentes.       CT Head Without Contrast [[id number]] Collected: 07/09/25 2047     Updated: 07/09/25 2054    Narrative:      EXAM: CT HEAD WO CONTRAST-         HISTORY: altered mental state; I95.89-Other hypotension;  R00.0-Tachycardia, unspecified; E05.90-Thyrotoxicosis, unspecified  without thyrotoxic crisis or storm; E83.42-Hypomagnesemia       COMPARISON: 5/16/2025.     DOSE LENGTH PRODUCT: 603.35 mGy.cm  Automated exposure control was also  utilized to decrease patient radiation dose.     TECHNIQUE: Unenhanced CT images obtained from vertex to skull base with  multiplanar reformats.     FINDINGS:  There is no acute intracranial hemorrhage, midline shift, mass effect,  or hydrocephalus. There is no CT evidence for acute infarct.     There are chronic changes with volume loss and chronic small vessel  ischemic change of the periventricular white matter.      SOFT TISSUES: The scalp soft tissues are unremarkable.        SINUS:The visualized paranasal sinuses and mastoid air cells are clear.      ORBITS: The visualized orbits and globes are unremarkable.          Impression:      1. Chronic changes and no acute intracranial findings.      This report was signed and finalized on 7/9/2025 8:51 PM by Ramon Cifuentes.               I have reviewed the patient's current medications.     Assessment/Plan   Assessment  Active Hospital Problems    Diagnosis     **Generalized weakness      Sinus tachycardia     Altered mental status        Treatment Plan    -Sinus tachycardia secondary to probable hypothyroidism  She presented with sinus tachycardia, currently on Synthroid and Cytomel for hypothyroidism secondary to thyroidectomy from Graves' disease.  TSH showed hyperthyroidism, both thyroid replacement therapy on hold.  On discharge I will hold off on Cytomel and she will be discharged on Synthroid only with recommendation to follow-up immediately with endocrinologist.    - Pneumonia treatment  Patient was on treatment for pneumonia from home and therefore ceftriaxone and doxycycline will be continued on admission.    Other chronic medical conditions-  Carotid artery stenosis  Arthritis  Degenerative disc disease of cervical spine  Depression  Diabetes mellitus  Graves' disease status post surgery  Hyperlipidemia  Hypertension  Hypothyroidism  Seizure disorder  Thyromegaly    DVT prophylaxis-will start Lovenox    Disposition discharge planning for tomorrow      Electronically signed by Yovanny Abernathy MD, 07/10/25, 15:20 CDT.

## 2025-07-10 NOTE — PLAN OF CARE
Goal Outcome Evaluation:   Pt resting in bed, resp even, pt has denied pain since arriving to floor, no needs voiced this am, call light in reach

## 2025-07-10 NOTE — PLAN OF CARE
Goal Outcome Evaluation:  Plan of Care Reviewed With: patient, spouse        Progress: no change  Outcome Evaluation: PT eval completed. Pt presented in bed with spouse at bedside. Pt was oriented to person only, demonstrating confusion during history questions and throughout the eval. Prior to hospitalization, pt reports that she was independent with grooming and ambulating with rollator. She reports that she was min A with dressing and bathing.  was in room and did not disagree with these statements. On arrival, pt stated that she was tired and did not want to get up, but she eventually agreed that she would stand at least one time. Pt performed supine<>sit with supervision. Pt performed sit<>stand CGA with verbal cues to push up from bed before grabbing onto walker. Once standing, pt was convinced to walk a small distance in her room. She ambulated CGA about 5 feet before stating that she would not walk further with the FWW, as it is not as smooth as her rollator at home. Pt's BLE strength ranged from 3+/5 to 5/5. Pt would benefit from continued PT services to address the following impairments: decreased functional mobility, gait impairments, balance impairments, weakness, and decreased activity tolerance. Pt's  reported that they were going home tomorrow, so recommend dc home with HH.

## 2025-07-10 NOTE — CONSULTS
Nutrition Services    Patient Name:  Lynn Magana  YOB: 1975  MRN: [id number]  Admit Date:  2025    Patient Name: Lynn Magana  YOB: 1975  MRN: [id number]  Admission date: 2025  Reason for Encounter: MST 2-3 or Nursing Admission Screen    [facility] Clinical Nutrition Assessment     Subjective    Subjective Information     7/10: Nursing consult received.  Pt is resting in bed upon visit.  She is known to the nutrition team.  Returns this admission d/t AMS.  Her current wt is 143# 1.3oz.  She could not recall a UBW.  Historically, pt is losing wt.  Wt was 160# in April.  Down 17.8# or 11.1% in three months.  MSA completed on pt and pt qualifies for severe malnutrition in the context of chronic disease; consistent with assessments on  and .  Daughter was at bedside. Pt and daughter stated that they are bringing her food; that she doesn't like the hospital food. Given wt loss, will liberalize diet so that patient may find more food preferences she enjoys.  She has a hx of receiving vanilla ice cream tid and would like that again. She does have a hx of T1DM; however, A1c is 4.9 and glu ranging from .  She does not like ONS.  Skin is WDL.  Jag 19.  Will continue to monitor and f/u as needed throughout clinical course.       Assessment    H&P and Current Problems      H&P  Past Medical History:   Diagnosis Date    Arthritis     Carotid artery stenosis     Degenerative disc disease, cervical     Depression     Diabetes mellitus     type 1    Disease of thyroid gland     GERD (gastroesophageal reflux disease)     Graves disease     Heart palpitations     Hyperlipidemia     Hypertension     Hypothyroidism     Seizure     Thyromegaly       Past Surgical History:   Procedure Laterality Date     SECTION      CHOLECYSTECTOMY      COLONOSCOPY  2015    Normal exam Dr. Morgan     COLONOSCOPY  2015    Normal exam    CYST REMOVAL       "ENDOSCOPY N/A 4/17/2018    Normal exam    ENDOSCOPY N/A 5/13/2025    Procedure: ESOPHAGOGASTRODUODENOSCOPY WITH ANESTHESIA;  Surgeon: Jadon Smith MD;  Location: [facility] ENDOSCOPY;  Service: Gastroenterology;  Laterality: N/A;  pre op: Nausea and vomiting  post op: normal  pcp: Darryl Andrews DO    HYSTERECTOMY      THYROIDECTOMY Bilateral 11/19/2018    Procedure: total thyroidectomy;  Surgeon: Vitaly Givens MD;  Location: [facility] OR;  Service: ENT      Current Problems   Admission Diagnosis:  Altered mental state [R41.82]  Altered mental status [R41.82]    Problem List:    Generalized weakness    Sinus tachycardia    Altered mental status         Anthropometrics      BMI, Height, Weight Estimated body mass index is 18.37 kg/m² as calculated from the following:    Height as of this encounter: 188 cm (74\").    Weight as of this encounter: 64.9 kg (143 lb 1.3 oz).    Weight Method: Bed scale       Trending Weight Changes weight loss of 17.8 lbs (11.1%) over 3 month(s)    Significant?  Yes       Weight History  Wt Readings from Last 20 Encounters:   07/09/25 2353 64.9 kg (143 lb 1.3 oz)   07/09/25 1719 64.5 kg (142 lb 3.2 oz)   07/05/25 1439 64.5 kg (142 lb 3.2 oz)   07/05/25 0706 63.5 kg (140 lb)   05/29/25 1930 67.3 kg (148 lb 4.8 oz)   05/29/25 1052 66 kg (145 lb 9.6 oz)   05/29/25 1702 66 kg (145 lb 8.1 oz)   05/16/25 0302 70.3 kg (155 lb)   05/11/25 1616 68.7 kg (151 lb 6.4 oz)   04/18/25 0836 72.6 kg (160 lb)   02/16/25 1202 79.4 kg (175 lb)   02/15/25 0833 79.4 kg (175 lb)   12/04/24 0601 91.7 kg (202 lb 3.2 oz)   12/03/24 1344 101 kg (223 lb)   09/17/24 1554 104 kg (230 lb)   06/21/23 0510 108 kg (238 lb)   03/15/23 1718 108 kg (239 lb)   06/15/22 0947 109 kg (240 lb)   12/31/21 1233 106 kg (234 lb)   12/10/20 1334 112 kg (248 lb)   05/05/20 0925 105 kg (232 lb)   08/19/19 1331 104 kg (230 lb)   05/20/19 1220 102 kg (224 lb 3.2 oz)   12/11/18 0938 104 kg (229 lb)   12/08/18 0900 102 kg (225 lb) "            Labs        Results from last 7 days   Lab Units 07/09/25 1927 07/07/25 0459 07/06/25  1612 07/06/25  0533 07/05/25  1043 07/05/25  0740   SODIUM mmol/L 143 141  --  142  --  143   POTASSIUM mmol/L 4.3 3.9 4.3 3.2*  --  4.1   GLUCOSE mg/dL 141* 79  --  80  --  88   BUN mg/dL 13.4 14.8  --  20.1*  --  20.6*   CREATININE mg/dL 1.09* 0.63  --  0.70  --  0.80   CALCIUM mg/dL 10.5 9.2  --  9.2  --  10.4   MAGNESIUM mg/dL 1.5* 1.6  --  1.7  --  1.7   ALBUMIN g/dL 4.1  --   --   --   --  4.2   CRP mg/dL 0.86*  --   --   --   --  0.83*   LACTATE mmol/L 2.0  --   --   --  1.7 2.9*   BILIRUBIN mg/dL 0.5  --   --   --   --  0.6   ALK PHOS U/L 50  --   --   --   --  54   AST (SGOT) U/L 30  --   --   --   --  38*   ALT (SGPT) U/L 36*  --   --   --   --  30   PROBNP pg/mL 177.6  --   --   --   --  133.6     Results from last 7 days   Lab Units 07/09/25 1927 07/07/25 0459 07/06/25  0533   PLATELETS 10*3/mm3 217 207 225   HEMOGLOBIN g/dL 11.0* 8.7* 8.4*   HEMATOCRIT % 34.3 27.3* 26.6*   IRON mcg/dL 48  --   --      Lab Results   Component Value Date    HGBA1C 4.90 06/04/2025          Medications       Scheduled Medications ARIPiprazole, 2.5 mg, Oral, Daily  cefTRIAXone, 1,000 mg, Intravenous, Q24H  donepezil, 5 mg, Oral, Nightly  doxycycline, 100 mg, Intravenous, Q12H  insulin lispro, 2-7 Units, Subcutaneous, 4x Daily AC & at Bedtime  [Held by provider] levothyroxine, 175 mcg, Oral, Q AM  [Held by provider] liothyronine, 25 mcg, Oral, BID  mirtazapine, 15 mg, Oral, Nightly  pantoprazole, 40 mg, Oral, Daily  rosuvastatin, 20 mg, Oral, Nightly  sennosides-docusate, 1 tablet, Oral, BID  sodium chloride, 10 mL, Intravenous, Q12H        Infusions      PRN Medications   dextrose    dextrose    glucagon (human recombinant)    ipratropium-albuterol    nitroglycerin    sodium chloride    sodium chloride     Physical Findings      Chewing/Swallowing  Teeth Status: Mouth/Teeth WDL: WDL    Chewing/Swallowing Issues: No issues  identified at this time   Edema                            Bowel Function     Last Bowel Movement: 07/06/25   I/Os  Intake & Output (last 3 days)       None           Lines, Drains, Airways, & Wounds       Active LDAs       Name Placement date Placement time Site Days Last dressing change    Peripheral IV 07/09/25 1939 20 G Anterior;Right Wrist 07/09/25 1939  Wrist  less than 1     Wound 05/29/25 1930 Left lower leg 05/29/25 1930  -- 41     Wound 05/29/25 1931 Right lower leg 05/29/25 1931  -- 41                       Nutrition Focused Physical Exam     Trending NFPE MSA completed; see below.     Malnutrition Severity Assessment      Patient meets criteria for : Severe Malnutrition (in the context of chronic disease.)  Malnutrition Type (Last 8 Hours)       Malnutrition Severity Assessment       Row Name 07/10/25 1419       Malnutrition Severity Assessment    Malnutrition Type Chronic Disease - Related Malnutrition      Row Name 07/10/25 1419       Unintentional Weight Loss     Unintentional Weight Loss Findings Severe    Unintentional Weight Loss  Weight loss greater than 7.5% in three months      Row Name 07/10/25 1419       Muscle Loss    Loss of Muscle Mass Findings Severe    [place] Region Moderate - slight depression    Clavicle Bone Region Moderate - some protrusion in females, visible in males    Acromion Bone Region Moderate - acromion may slightly protrude    Scapular Bone Region Moderate - mild depression, bones may show slightly    Dorsal Hand Region Severe - prominent depression    Patellar Region Severe - prominent bone, square looking, very little muscle definition    Anterior Thigh Region Severe - line/depression along thigh, obviously thin    Posterior Calf Region Severe - thin with very little definition/firmness      Row Name 07/10/25 1419       Fat Loss    Subcutaneous Fat Loss Findings Severe    Orbital Region  Severe - pronounced hollowness/depression, dark circles, loose saggy skin    Upper  Arm Region Severe - mostly skin, very little space between folds, fingers touch    Thoracic & Lumbar Region Moderate - ribs visible with mild depressions, iliac crest somewhat prominent      Row Name 07/10/25 1419       Criteria Met (Must meet criteria for severity in at least 2 of these categories: M Wasting, Fat Loss, Fluid, Secondary Signs, Wt. Status, Intake)    Patient meets criteria for  Severe Malnutrition  in the context of chronic disease.                     Estimated Needs      Energy Requirements    Weight for Calculation 64.9kg   Method for Estimation  25-30 kcals/kg   Daily Needs (kcal/day) 0345-3126 kcals/day   Protein Requirements    Weight for Calculation 64.9kg   Method for Estimation 1.0 gm/kg   Daily Needs (g/day) ~65 gm pro/day   Fluid Requirements     Method for Estimation 1 mL/kcal    Daily Needs (mL/day) 4087-0858 cc/day          Current Nutrition Orders & Evaluation of Intake      Oral Nutrition     Food Allergies  and Intolerances NKFA   Current PO Diet Diet: Cardiac, Diabetic; Healthy Heart (2-3 Na+); Consistent Carbohydrate; Fluid Consistency: Thin (IDDSI 0)   Oral Nutrition Supplement None     Trending % PO Intake None documented at this time; pt reports eating outside food provided by her daughter and .     Enteral Nutrition     Current EN Order Patient isn't on Tube Feeding  Patient doesn't have any tube feeding modular orders     EN Route      EN Tolerance     EN Observation/Intake         Parenteral Nutrition     Current TPN Order    TPN Route    Lipids (mL/%/frequency)     Total # Days on TPN    TPN Observation/Intake       Assessment & Plan   Nutrition Diagnosis and Goals       Nutrition Diagnosis 1 Severe chronic disease or condition related malnutrition r/t diagnosis AEB BMI 18.37, wt loss of 11.1% in three months, po intake.             Goal(s) Establish PO Intake, Meets Estimated Needs , and No Significant Weight Loss     Nutrition Intervention and Prescription        Intervention  Liberalize diet, Provide patient with menu, Obtain food preferences, Advised alternate menu selections, and Advised available snacks      Diet Prescription Change to Regular    Supplement Prescription Provide SF Vanilla Ice Cream tid with meals.    Education Provided       Enteral Prescription        TPN Prescription      Monitoring/Evaluation       Monitor/Evaluation Per Protocol     RD Follow-Up Encounter 3-5 days     Electronically signed by:  Riley Calix RD  07/10/25 14:20 CDT      Electronically signed by:  Riley Calix RD  07/10/25 14:20 CDT

## 2025-07-10 NOTE — THERAPY EVALUATION
Patient Name: Lynn Magana  : 1975    MRN: [id number]                              Today's Date: 7/10/2025       Admit Date: 2025    Visit Dx:     ICD-10-CM ICD-9-CM   1. Altered mental status, unspecified altered mental status type  R41.82 780.97   2. Sinus tachycardia  R00.0 427.89   3. Other hypotension  I95.89 458.8   4. Hypomagnesemia  E83.42 275.2   5. Urinary tract infection without hematuria, site unspecified  N39.0 599.0   6. Impaired mobility [Z74.09]  Z74.09 799.89     Patient Active Problem List   Diagnosis    Syncope    Graves' disease    Polysubstance abuse    Hypertension    Diabetes mellitus type 1    Spells of decreased attentiveness    Chronic intractable headache    Nausea and vomiting    Slow transit constipation    Nonsmoker    Type 2 diabetes mellitus, with long-term current use of insulin    GERD without esophagitis    Hyperthyroidism    Thyromegaly    Post-surgical hypothyroidism    Degeneration of cervical intervertebral disc    Cervical radiculopathy    Acute pain of right shoulder    Class 1 obesity due to excess calories with body mass index (BMI) of 30.0 to 30.9 in adult    Hypoglycemia    Stage 1 acute kidney injury    Dehydration    Dysphagia    Dehydration    Hypokalemia    Steatosis of liver    Marijuana abuse    Loss of weight    Severe protein-calorie malnutrition    Hypothyroid    Generalized weakness    Acquired cerebral ventriculomegaly    Atypical pneumonia    Joy-Quinonez variant Guillain-Young Harris syndrome    Tachycardia, unspecified    Pneumonia, unspecified organism    Generalized weakness    Sinus tachycardia    Altered mental status     Past Medical History:   Diagnosis Date    Arthritis     Carotid artery stenosis     Degenerative disc disease, cervical     Depression     Diabetes mellitus     type 1    Disease of thyroid gland     GERD (gastroesophageal reflux disease)     Graves disease     Heart palpitations     Hyperlipidemia     Hypertension      Hypothyroidism     Seizure     Thyromegaly      Past Surgical History:   Procedure Laterality Date     SECTION      CHOLECYSTECTOMY      COLONOSCOPY  2015    Normal exam Dr. Morgan     COLONOSCOPY  2015    Normal exam    CYST REMOVAL      ENDOSCOPY N/A 2018    Normal exam    ENDOSCOPY N/A 2025    Procedure: ESOPHAGOGASTRODUODENOSCOPY WITH ANESTHESIA;  Surgeon: Jadon Smith MD;  Location: [facility] ENDOSCOPY;  Service: Gastroenterology;  Laterality: N/A;  pre op: Nausea and vomiting  post op: normal  pcp: Darryl Andrews,     HYSTERECTOMY      THYROIDECTOMY Bilateral 2018    Procedure: total thyroidectomy;  Surgeon: Vitaly Givens MD;  Location: [facility] OR;  Service: ENT      General Information       Row Name 07/10/25 145          Physical Therapy Time and Intention    Document Type evaluation  Pt presents with generalized weakness and dizziness. Hx: Graves dx, GBS, HTN, DM, seizure. Dx: altered mental status, sinus tachycardia, hypotension,  -CYRIL (r) GM (t) CYRIL (c)     Mode of Treatment physical therapy  -CYRIL (r) GM (t) CYRIL (c)       Row Name 07/10/25 145          General Information    Patient Profile Reviewed yes  -CYRIL (r) GM (t) CYRIL (c)     Prior Level of Function independent:;gait;grooming;min assist:;dressing;bathing  -CYRIL (r) GM (t) CYRIL (c)     Existing Precautions/Restrictions fall  -CYRIL (r) GM (t) CYRIL (c)     Barriers to Rehab medically complex;cognitive status  -CYRIL (r) GM (t) CYRIL (c)       Row Name 07/10/25 145          Living Environment    Current Living Arrangements home  -CYRIL (r) GM (t) CYRIL (c)     People in Home child(neymar), dependent;spouse  -CYRIL (r) GM (t) CYRIL (c)       Row Name 07/10/25 145          Home Main Entrance    Number of Stairs, Main Entrance none  -CYRIL (r) GM (t) CYRIL (c)     Stair Railings, Main Entrance none  -CYRIL (r) GM (t) CYRIL (c)       Row Name 07/10/25 145          Stairs Within Home, Primary    Number of Stairs, Within Home, Primary none  -CYRIL  (r) GM (t) CYRIL (c)     Stair Railings, Within Home, Primary none  -CYRIL (r) GM (t) CYRIL (c)       Row Name 07/10/25 1451          Cognition    Orientation Status (Cognition) oriented to;person;disoriented to;place;situation;time  -CYRIL (r) GM (t) CYRIL (c)       Row Name 07/10/25 1451          Safety Issues/Impairments Affecting Functional Mobility    Safety Issues Affecting Function (Mobility) ability to follow commands;awareness of need for assistance;friction/shear risk;safety precautions follow-through/compliance;safety precaution awareness;insight into deficits/self-awareness;judgment;problem-solving  -CYRIL (r) GM (t) CYRIL (c)     Impairments Affecting Function (Mobility) balance;cognition;endurance/activity tolerance;strength  -CYRIL (r) GM (t) CYRIL (c)     Cognitive Impairments, Mobility Safety/Performance attention;awareness, need for assistance;insight into deficits/self-awareness;judgment;problem-solving/reasoning;safety precaution awareness;safety precaution follow-through  -CYRIL (r) GM (t) CYRIL (c)               User Key  (r) = Recorded By, (t) = Taken By, (c) = Cosigned By      Initials Name Provider Type    Silvino Jiang, PT DPT Physical Therapist    Raina Mcgee, DEBBI Student PT Student                   Mobility       Row Name 07/10/25 1451          Bed Mobility    Bed Mobility sit-supine;supine-sit  -CYRIL (r) GM (t) CYRIL (c)     Supine-Sit [place] (Bed Mobility) supervision  -CYRIL (r) GM (t) CYRIL (c)     Sit-Supine [place] (Bed Mobility) supervision  -CYRIL (r) GM (t) CYRIL (c)     Assistive Device (Bed Mobility) bed rails;head of bed elevated  -CYRIL (r) GM (t) CYRIL (c)       Row Name 07/10/25 1451          Sit-Stand Transfer    Sit-Stand [place] (Transfers) contact guard;verbal cues;1 person assist  -CYRIL (r) GM (t) CYRIL (c)     Assistive Device (Sit-Stand Transfers) walker, front-wheeled  -CYRIL (r) GM (t) CYRIL (c)     Comment, (Sit-Stand Transfer) verbal cues to push up from bed before reaching to walker  -CYRIL (r) GM  (t) CYRIL (c)       Row Name 07/10/25 1451          Gait/Stairs (Locomotion)    [place] Level (Gait) contact guard;verbal cues  -CYRIL (r) GM (t) CYRIL (c)     Assistive Device (Gait) walker, front-wheeled  -CYRIL (r) GM (t) CYRIL (c)     Distance in Feet (Gait) 5  -CYRIL (r) GM (t) CYRIL (c)     Comment, (Gait/Stairs) pt reported she would not continue to walk with FWW because she is used to her rollator at home  -CYRIL (r) GM (t) CYRIL (c)               User Key  (r) = Recorded By, (t) = Taken By, (c) = Cosigned By      Initials Name Provider Type    Silvino Jiang, PT DPT Physical Therapist    Raina Mcgee, PT Student PT Student                   Obj/Interventions       Row Name 07/10/25 1451          Range of Motion Comprehensive    General Range of Motion no range of motion deficits identified  -CYRIL (r) GM (t) CYRIL (c)     Comment, General Range of Motion BLE WFL  -CYRIL (r) GM (t) CYRIL (c)       Row Name 07/10/25 1451          Strength Comprehensive (MMT)    Comment, General Manual Muscle Testing (MMT) Assessment BLE: ankle 5/5, knee ext 4+/5, knee flx 4/5, hip flx 3+/5  -CYRIL (r) GM (t) CYRIL (c)       Row Name 07/10/25 1451          Balance    Balance Assessment sitting static balance;standing static balance;standing dynamic balance  -CYRIL (r) GM (t) CYRIL (c)     Static Sitting Balance supervision  -CYRIL (r) GM (t) CYRIL (c)     Position, Sitting Balance unsupported;sitting edge of bed  -CYRIL (r) GM (t) CYRIL (c)     Static Standing Balance contact guard  -CYRIL (r) GM (t) CYRIL (c)     Dynamic Standing Balance contact guard  -CYRIL (r) GM (t) CYRIL (c)     Position/Device Used, Standing Balance supported;walker, front-wheeled  -CYRIL (r) GM (t) CYRIL (c)       Row Name 07/10/25 1451          Sensory Assessment (Somatosensory)    Sensory Assessment (Somatosensory) LE sensation intact  -CYRIL (r) GM (t) CYRIL (c)               User Key  (r) = Recorded By, (t) = Taken By, (c) = Cosigned By      Initials Name Provider Type    Silvino Jiang, PT DPT Physical  Therapist    Raina Mcgee, PT Student PT Student                   Goals/Plan       Row Name 07/10/25 1451          Bed Mobility Goal 1 (PT)    Activity/Assistive Device (Bed Mobility Goal 1, PT) sit to supine/supine to sit  -CYRIL (r) GM (t) CYRIL (c)     [place] Level/Cues Needed (Bed Mobility Goal 1, PT) independent  -CYRIL (r) GM (t) CYRIL (c)     Time Frame (Bed Mobility Goal 1, PT) long term goal (LTG);10 days  -CYRIL (r) GM (t) CYRIL (c)     Progress/Outcomes (Bed Mobility Goal 1, PT) new goal  -CYRIL (r) GM (t) CYRIL (c)       Row Name 07/10/25 1451          Transfer Goal 1 (PT)    Activity/Assistive Device (Transfer Goal 1, PT) sit-to-stand/stand-to-sit;bed-to-chair/chair-to-bed  -CYRIL (r) GM (t) CYRIL (c)     [place] Level/Cues Needed (Transfer Goal 1, PT) supervision required  -CYRIL (r) GM (t) CYRIL (c)     Time Frame (Transfer Goal 1, PT) long term goal (LTG);10 days  -CYRIL (r) GM (t) CYRIL (c)     Progress/Outcome (Transfer Goal 1, PT) new goal  -CYRIL (r) GM (t) CYRIL (c)       Row Name 07/10/25 1451          Gait Training Goal 1 (PT)    Activity/Assistive Device (Gait Training Goal 1, PT) gait (walking locomotion)  -CYRIL (r) GM (t) CYRIL (c)     [place] Level (Gait Training Goal 1, PT) contact guard required  -CYRIL (r) GM (t) CYRIL (c)     Distance (Gait Training Goal 1, PT) 50 ft  -CYRIL (r) GM (t) CYRIL (c)     Time Frame (Gait Training Goal 1, PT) long term goal (LTG);10 days  -CYRIL (r) GM (t) CYRIL (c)     Progress/Outcome (Gait Training Goal 1, PT) new goal  -CYRIL (r) GM (t) CYRIL (c)       Row Name 07/10/25 1451          Therapy Assessment/Plan (PT)    Planned Therapy Interventions (PT) balance training;bed mobility training;gait training;home exercise program;patient/family education;postural re-education;strengthening;transfer training  -CYRIL (r) GM (t) CYRIL (c)               User Key  (r) = Recorded By, (t) = Taken By, (c) = Cosigned By      Initials Name Provider Type    Silvino Jiang, PT DPT Physical Therapist    Raina Mcgee,  PT Student PT Student                   Clinical Impression       Row Name 07/10/25 1453          Pain    Pretreatment Pain Rating 0/10 - no pain  -CYRIL (r) GM (t) CYRIL (c)       Row Name 07/10/25 1458          Plan of Care Review    Plan of Care Reviewed With patient;spouse  -CYRIL (r) GM (t) CYRIL (c)     Progress no change  -CYRIL (r) GM (t) CYRIL (c)     Outcome Evaluation PT eval completed. Pt presented in bed with spouse at bedside. Pt was oriented to person only, demonstrating confusion during history questions and throughout the eval. Prior to hospitalization, pt reports that she was independent with grooming and ambulating with rollator. She reports that she was min A with dressing and bathing.  was in room and did not disagree with these statements. On arrival, pt stated that she was tired and did not want to get up, but she eventually agreed that she would stand at least one time. Pt performed supine<>sit with supervision. Pt performed sit<>stand CGA with verbal cues to push up from bed before grabbing onto walker. Once standing, pt was convinced to walk a small distance in her room. She ambulated CGA about 5 feet before stating that she would not walk further with the FWW, as it is not as smooth as her rollator at home. Pt's BLE strength ranged from 3+/5 to 5/5. Pt would benefit from continued PT services to address the following impairments: decreased functional mobility, gait impairments, balance impairments, weakness, and decreased activity tolerance. Pt's  reported that they were going home tomorrow, so recommend dc home with HH.  -CYRIL (r) GM (t) CYRIL (c)       Row Name 07/10/25 1459          Therapy Assessment/Plan (PT)    Patient/Family Therapy Goals Statement (PT) go home  -CYRIL (r) GM (t) CYRIL (c)     Rehab Potential (PT) fair  -CYRIL (r) GM (t) CYRIL (c)     Criteria for Skilled Interventions Met (PT) yes;skilled treatment is necessary;meets criteria  -CYRIL (r) GM (t) CYRIL (c)     Therapy Frequency (PT) 2  times/day  -CYRIL (r) GM (t) CYRIL (c)     Predicted Duration of Therapy Intervention (PT) until dc  -CYRIL (r) GM (t) CYRIL (c)       Row Name 07/10/25 1451          Positioning and Restraints    Pre-Treatment Position in bed  -CYRIL (r) GM (t) CYRIL (c)     Post Treatment Position bed  -CYRIL (r) GM (t) CYRIL (c)     In Bed fowlers;call light within reach;encouraged to call for assist;with family/caregiver  -CYRIL (r) GM (t) CYRIL (c)               User Key  (r) = Recorded By, (t) = Taken By, (c) = Cosigned By      Initials Name Provider Type    Silvino Jiang, PT DPT Physical Therapist    Raina Mcgee, DEBBI Student PT Student                   Outcome Measures       Row Name 07/10/25 1451 07/10/25 0758       How much help from another person do you currently need...    Turning from your back to your side while in flat bed without using bedrails? 4  -CYRIL (r) GM (t) CYRIL (c) 4  -CP    Moving from lying on back to sitting on the side of a flat bed without bedrails? 4  -CYRIL (r) GM (t) CYRIL (c) 4  -CP    Moving to and from a bed to a chair (including a wheelchair)? 3  -CYRIL (r) GM (t) CYRIL (c) 3  -CP    Standing up from a chair using your arms (e.g., wheelchair, bedside chair)? 3  -CYRIL (r) GM (t) CYRIL (c) 3  -CP    Climbing 3-5 steps with a railing? 2  -CYRIL (r) GM (t) CYRIL (c) 3  -CP    To walk in hospital room? 3  -CYRIL (r) GM (t) CYRIL (c) 3  -CP    AM-PAC 6 Clicks Score (PT) 19  -CRYIL (r) GM (t) 20  -CP    Highest Level of Mobility Goal Walk 10 Steps or More-6  -CYRIL (r) GM (t) Walk 10 Steps or More-6  -CP      Row Name 07/10/25 1451 07/10/25 1022       Functional Assessment    Outcome Measure Options AM-PAC 6 Clicks Basic Mobility (PT)  -CYRIL AM-PAC 6 Clicks Daily Activity (OT)  -JW (r) ALFREDO (t) CASTILLO (c)              User Key  (r) = Recorded By, (t) = Taken By, (c) = Cosigned By      Initials Name Provider Type    Silvino Jiang, PT DPT Physical Therapist    Zora Villanueva, OTR/L, CSRS Occupational Therapist    Aixa Ramos, MYRIAM Registered  Nurse     Raina Negrete, PT Student PT Student    Desiree Price, OT Student OT Student                                 Physical Therapy Education       Title: PT OT SLP Therapies (In Progress)       Topic: Physical Therapy (Done)       Point: Mobility training (Done)       Learning Progress Summary            Patient Acceptance, E,TB, VU,DU by  at 7/10/2025 1451    Comment: educated on body mechanics during transitions, benefits of PT and mobility   Family Acceptance, E,TB, VU,DU by  at 7/10/2025 1451    Comment: educated on body mechanics during transitions, benefits of PT and mobility                      Point: Home exercise program (Done)       Learning Progress Summary            Patient Acceptance, E,TB, VU,DU by  at 7/10/2025 1451    Comment: educated on body mechanics during transitions, benefits of PT and mobility   Family Acceptance, E,TB, VU,DU by  at 7/10/2025 1451    Comment: educated on body mechanics during transitions, benefits of PT and mobility                      Point: Body mechanics (Done)       Learning Progress Summary            Patient Acceptance, E,TB, VU,DU by  at 7/10/2025 1451    Comment: educated on body mechanics during transitions, benefits of PT and mobility   Family Acceptance, E,TB, VU,DU by  at 7/10/2025 1451    Comment: educated on body mechanics during transitions, benefits of PT and mobility                      Point: Precautions (Done)       Learning Progress Summary            Patient Acceptance, E,TB, VU,DU by  at 7/10/2025 1451    Comment: educated on body mechanics during transitions, benefits of PT and mobility   Family Acceptance, E,TB, VU,DU by  at 7/10/2025 1451    Comment: educated on body mechanics during transitions, benefits of PT and mobility                                      User Key       Initials Effective Dates Name Provider Type Discipline     04/21/25 -  Raina Negrete, PT Student PT Student PT                  PT  Recommendation and Plan  Planned Therapy Interventions (PT): balance training, bed mobility training, gait training, home exercise program, patient/family education, postural re-education, strengthening, transfer training  Progress: no change  Outcome Evaluation: PT eval completed. Pt presented in bed with spouse at bedside. Pt was oriented to person only, demonstrating confusion during history questions and throughout the eval. Prior to hospitalization, pt reports that she was independent with grooming and ambulating with rollator. She reports that she was min A with dressing and bathing.  was in room and did not disagree with these statements. On arrival, pt stated that she was tired and did not want to get up, but she eventually agreed that she would stand at least one time. Pt performed supine<>sit with supervision. Pt performed sit<>stand CGA with verbal cues to push up from bed before grabbing onto walker. Once standing, pt was convinced to walk a small distance in her room. She ambulated CGA about 5 feet before stating that she would not walk further with the FWW, as it is not as smooth as her rollator at home. Pt's BLE strength ranged from 3+/5 to 5/5. Pt would benefit from continued PT services to address the following impairments: decreased functional mobility, gait impairments, balance impairments, weakness, and decreased activity tolerance. Pt's  reported that they were going home tomorrow, so recommend dc home with HH.     Time Calculation:         PT Charges       Row Name 07/10/25 1451             Time Calculation    Start Time 1451  -CYRIL (r) GM (t) CYRIL (c)      Stop Time 1530  -CYRIL (r) GM (t) CYRIL (c)      Time Calculation (min) 39 min  -CYRIL (r) GM (t)      PT Received On 07/10/25  -CYRIL (r) GM (t) CYRIL (c)      PT Goal Re-Cert Due Date 07/20/25  -CYRIL (r) GM (t) CYRIL (c)         Untimed Charges    PT Eval/Re-eval Minutes 39  -CYRIL (r) GM (t) CYRIL (c)         Total Minutes    Untimed Charges Total  Minutes 39  -CYRIL (r) GM (t)       Total Minutes 39  -CYRIL (r) GM (t)                User Key  (r) = Recorded By, (t) = Taken By, (c) = Cosigned By      Initials Name Provider Type    Silvino Jiang, PT DPT Physical Therapist    Raina Mcgee, PT Student PT Student                      PT G-Codes  Outcome Measure Options: AM-PAC 6 Clicks Basic Mobility (PT)  AM-PAC 6 Clicks Score (PT): 19  AM-PAC 6 Clicks Score (OT): 16       Raina Negrete PT Student  7/10/2025

## 2025-07-11 VITALS
BODY MASS INDEX: 19.38 KG/M2 | DIASTOLIC BLOOD PRESSURE: 86 MMHG | TEMPERATURE: 98 F | HEIGHT: 72 IN | RESPIRATION RATE: 18 BRPM | SYSTOLIC BLOOD PRESSURE: 155 MMHG | WEIGHT: 143.08 LBS | OXYGEN SATURATION: 100 % | HEART RATE: 123 BPM

## 2025-07-11 LAB
ANION GAP SERPL CALCULATED.3IONS-SCNC: 12 MMOL/L (ref 5–15)
BUN SERPL-MCNC: 11.8 MG/DL (ref 6–20)
BUN/CREAT SERPL: 18.7 (ref 7–25)
CALCIUM SPEC-SCNC: 8.9 MG/DL (ref 8.6–10.5)
CHLORIDE SERPL-SCNC: 113 MMOL/L (ref 98–107)
CO2 SERPL-SCNC: 21 MMOL/L (ref 22–29)
CREAT SERPL-MCNC: 0.63 MG/DL (ref 0.57–1)
EGFRCR SERPLBLD CKD-EPI 2021: 108.2 ML/MIN/1.73
GLUCOSE BLDC GLUCOMTR-MCNC: 69 MG/DL (ref 70–130)
GLUCOSE BLDC GLUCOMTR-MCNC: 76 MG/DL (ref 70–130)
GLUCOSE SERPL-MCNC: 81 MG/DL (ref 65–99)
POTASSIUM SERPL-SCNC: 3.2 MMOL/L (ref 3.5–5.2)
SODIUM SERPL-SCNC: 146 MMOL/L (ref 136–145)

## 2025-07-11 PROCEDURE — 25010000002 DOXYCYCLINE 100 MG RECONSTITUTED SOLUTION 1 EACH VIAL

## 2025-07-11 PROCEDURE — 80048 BASIC METABOLIC PNL TOTAL CA: CPT | Performed by: INTERNAL MEDICINE

## 2025-07-11 PROCEDURE — G0378 HOSPITAL OBSERVATION PER HR: HCPCS

## 2025-07-11 PROCEDURE — 97535 SELF CARE MNGMENT TRAINING: CPT

## 2025-07-11 PROCEDURE — 82948 REAGENT STRIP/BLOOD GLUCOSE: CPT

## 2025-07-11 PROCEDURE — 97110 THERAPEUTIC EXERCISES: CPT

## 2025-07-11 RX ORDER — DOXYCYCLINE 100 MG/1
100 CAPSULE ORAL EVERY 12 HOURS
Status: DISCONTINUED | OUTPATIENT
Start: 2025-07-11 | End: 2025-07-11 | Stop reason: HOSPADM

## 2025-07-11 RX ORDER — CHOLECALCIFEROL (VITAMIN D3) 25 MCG
5000 TABLET ORAL DAILY
Status: DISCONTINUED | OUTPATIENT
Start: 2025-07-11 | End: 2025-07-11 | Stop reason: HOSPADM

## 2025-07-11 RX ORDER — ENOXAPARIN SODIUM 100 MG/ML
40 INJECTION SUBCUTANEOUS NIGHTLY
Status: DISCONTINUED | OUTPATIENT
Start: 2025-07-11 | End: 2025-07-11 | Stop reason: HOSPADM

## 2025-07-11 RX ORDER — POTASSIUM CHLORIDE 1500 MG/1
40 TABLET, EXTENDED RELEASE ORAL EVERY 4 HOURS
Status: COMPLETED | OUTPATIENT
Start: 2025-07-11 | End: 2025-07-11

## 2025-07-11 RX ORDER — IRON POLYSACCHARIDE COMPLEX 150 MG
150 CAPSULE ORAL DAILY
Status: DISCONTINUED | OUTPATIENT
Start: 2025-07-11 | End: 2025-07-11 | Stop reason: HOSPADM

## 2025-07-11 RX ORDER — AMLODIPINE BESYLATE 10 MG/1
10 TABLET ORAL DAILY
Status: DISCONTINUED | OUTPATIENT
Start: 2025-07-11 | End: 2025-07-11 | Stop reason: HOSPADM

## 2025-07-11 RX ORDER — PANTOPRAZOLE SODIUM 40 MG/1
40 TABLET, DELAYED RELEASE ORAL
Status: DISCONTINUED | OUTPATIENT
Start: 2025-07-12 | End: 2025-07-11 | Stop reason: HOSPADM

## 2025-07-11 RX ORDER — CARVEDILOL 25 MG/1
25 TABLET ORAL 2 TIMES DAILY WITH MEALS
Status: DISCONTINUED | OUTPATIENT
Start: 2025-07-11 | End: 2025-07-11 | Stop reason: HOSPADM

## 2025-07-11 RX ORDER — SPIRONOLACTONE 50 MG/1
50 TABLET, FILM COATED ORAL 2 TIMES DAILY
Status: DISCONTINUED | OUTPATIENT
Start: 2025-07-11 | End: 2025-07-11 | Stop reason: HOSPADM

## 2025-07-11 RX ADMIN — ARIPIPRAZOLE 2.5 MG: 5 TABLET ORAL at 08:54

## 2025-07-11 RX ADMIN — Medication 10 ML: at 09:06

## 2025-07-11 RX ADMIN — DOXYCYCLINE 100 MG: 100 INJECTION, POWDER, LYOPHILIZED, FOR SOLUTION INTRAVENOUS at 06:19

## 2025-07-11 RX ADMIN — POTASSIUM CHLORIDE 40 MEQ: 1500 TABLET, EXTENDED RELEASE ORAL at 08:54

## 2025-07-11 RX ADMIN — PANTOPRAZOLE SODIUM 40 MG: 40 TABLET, DELAYED RELEASE ORAL at 08:54

## 2025-07-11 RX ADMIN — CARVEDILOL 25 MG: 25 TABLET, FILM COATED ORAL at 12:29

## 2025-07-11 RX ADMIN — SENNOSIDES AND DOCUSATE SODIUM 1 TABLET: 50; 8.6 TABLET ORAL at 08:54

## 2025-07-11 RX ADMIN — POTASSIUM CHLORIDE 40 MEQ: 1500 TABLET, EXTENDED RELEASE ORAL at 12:21

## 2025-07-11 RX ADMIN — AMLODIPINE BESYLATE 10 MG: 10 TABLET ORAL at 12:22

## 2025-07-11 NOTE — PLAN OF CARE
Goal Outcome Evaluation:              Outcome Evaluation: VSS. AAO x 4. Denies dizziness and weakness this shift. No c/o voiced. Sinus tach 114-123 per telemetry. Possibly home later today.

## 2025-07-11 NOTE — PLAN OF CARE
Goal Outcome Evaluation:  Plan of Care Reviewed With: patient, spouse        Progress: no change  Outcome Evaluation: Performed supine>sit S. Pt adjusted self EOB and able to push self off bed with UEs and scoot around into long sitting. Pt spouse frustrated throughout tx on wanting to be d/c'd BRANNON/L educated after discussion with NSG on reason for hold on d/c at current time. Spouse reports wanting to leave AMA. Educated on benefits of MD release and safety for pt. Pt provided with and educated on HEP BUEs to be performed daily. Pt reports no difficulty with dressing self this date. Recommend assist at home continued OT POC as pt continues to demo weakness.

## 2025-07-11 NOTE — DISCHARGE SUMMARY
[facility]  DISCHARGE SUMMARY       Date of Admission: 7/9/2025  Date of Discharge:  7/11/2025  Primary Care Physician: Darryl Andrews DO    Presenting Problem/History of Present Illness:  Tachycardia    Final Discharge Diagnoses:  Active Hospital Problems    Diagnosis     **Generalized weakness     Sinus tachycardia     Altered mental status        Consults: None    Procedures Performed: None    Pertinent Test Results:   Results for orders placed during the hospital encounter of 02/15/25    Adult Transthoracic Echo Complete w/ Color, Spectral and Contrast if Necessary Per Protocol    Interpretation Summary    Left ventricular systolic function is normal. Left ventricular ejection fraction appears to be 66 - 70%.    Left ventricular diastolic function was normal.    Normal right ventricular cavity size and systolic function.    Normal biatrial size.    There is no significant (more than mild) valve stenosis or regurgitation appreciated.    There is a very small (<0.5cm) pericardial effusion adjacent to the right ventricle. There is no evidence of cardiac tamponade.      Imaging Results (All)       Procedure Component Value Units Date/Time    CT Angiogram Chest [[id number]] Collected: 07/09/25 2051     Updated: 07/09/25 2058    Narrative:      EXAM: CT ANGIOGRAM CHEST-         HISTORY: Palpitations tachycardia dizziness shortness of breath;  I95.89-Other hypotension; R00.0-Tachycardia, unspecified;  E05.90-Thyrotoxicosis, unspecified without thyrotoxic crisis or storm;  E83.42-Hypomagnesemia       COMPARISON: 7/5/2025.     DOSE LENGTH PRODUCT: 227.55 mGy.cm mGy cm. Automatic exposure control  was utilized to make radiation dose as low as reasonably achievable.     TECHNIQUE: IV enhanced CT images of the chest obtained with multiplanar  reformats. 3D MIP images were submitted and reviewed.     FINDINGS:     MEDIASTINUM/EXTRATHORACIC:   Thoracic aorta is  unremarkable. There is no  significant coronary artery calcification. No pericardial or pleural  effusion is identified and no thoracic lymphadenopathy is seen.     PULMONARY ARTERIES: Pulmonary arteries are opacified and no filling  defects are identified.     LUNGS/AIRWAYS: The lungs are grossly clear. Bronchial wall thickening is  noted may represent bronchitis. Subtle peribronchial groundglass nodular  opacities are likely infectious in etiology.        INCLUDED UPPER ABDOMEN: There is stable nodularity of both adrenal  glands. There are clips from cholecystectomy.     SOFT TISSUES: Submitted soft tissues of the chest are unremarkable.     BONES: No suspicious osseous lesions identified.       Impression:      1. No pulmonary embolism identified.  2. Bronchial wall thickening likely bronchitis and subtle peribronchial  groundglass nodular opacities likely infectious in etiology.     This report was signed and finalized on 7/9/2025 8:55 PM by Ramon Cifuentes.       CT Head Without Contrast [[id number]] Collected: 07/09/25 2047     Updated: 07/09/25 2054    Narrative:      EXAM: CT HEAD WO CONTRAST-         HISTORY: altered mental state; I95.89-Other hypotension;  R00.0-Tachycardia, unspecified; E05.90-Thyrotoxicosis, unspecified  without thyrotoxic crisis or storm; E83.42-Hypomagnesemia       COMPARISON: 5/16/2025.     DOSE LENGTH PRODUCT: 603.35 mGy.cm  Automated exposure control was also  utilized to decrease patient radiation dose.     TECHNIQUE: Unenhanced CT images obtained from vertex to skull base with  multiplanar reformats.     FINDINGS:  There is no acute intracranial hemorrhage, midline shift, mass effect,  or hydrocephalus. There is no CT evidence for acute infarct.     There are chronic changes with volume loss and chronic small vessel  ischemic change of the periventricular white matter.      SOFT TISSUES: The scalp soft tissues are unremarkable.        SINUS:The visualized paranasal sinuses and  mastoid air cells are clear.      ORBITS: The visualized orbits and globes are unremarkable.          Impression:      1. Chronic changes and no acute intracranial findings.      This report was signed and finalized on 7/9/2025 8:51 PM by Ramon Cifuentes.             LAB RESULTS:      Lab 07/09/25 1927 07/07/25 0459 07/06/25  0533 07/05/25  1043 07/05/25  0740   WBC 7.31 4.99 5.10  --  10.21   HEMOGLOBIN 11.0* 8.7* 8.4*  --  11.6*   HEMATOCRIT 34.3 27.3* 26.6*  --  36.7   PLATELETS 217 207 225  --  396   NEUTROS ABS 5.85 2.63 2.89  --  7.48*   IMMATURE GRANS (ABS) 0.03 0.02 0.01  --  0.04   LYMPHS ABS 1.19 2.08 1.89  --  2.30   MONOS ABS 0.22 0.24 0.29  --  0.34   EOS ABS 0.00 0.01 0.01  --  0.02   MCV 84.9 85.0 85.0  --  86.2   SED RATE 27*  --   --   --   --    CRP 0.86*  --   --   --  0.83*   PROCALCITONIN 0.16  --   --   --  0.10   LACTATE 2.0  --   --  1.7 2.9*     --   --   --   --    PROTIME 14.2  --   --   --  13.7   APTT 23.1*  --   --   --   --    D DIMER QUANT  --   --   --   --  0.53*         Lab 07/11/25  0415 07/09/25 1927 07/07/25 0459 07/06/25  1612 07/06/25  0533 07/05/25  0740   SODIUM 146* 143 141  --  142 143   POTASSIUM 3.2* 4.3 3.9 4.3 3.2* 4.1   CHLORIDE 113* 105 107  --  105 100   CO2 21.0* 23.0 23.0  --  24.0 22.0   ANION GAP 12.0 15.0 11.0  --  13.0 21.0*   BUN 11.8 13.4 14.8  --  20.1* 20.6*   CREATININE 0.63 1.09* 0.63  --  0.70 0.80   EGFR 108.2 62.0 108.2  --  105.5 89.9   GLUCOSE 81 141* 79  --  80 88   CALCIUM 8.9 10.5 9.2  --  9.2 10.4   MAGNESIUM  --  1.5* 1.6  --  1.7 1.7   TSH  --  0.008*  --   --   --   --          Lab 07/09/25 1927 07/05/25  0740   TOTAL PROTEIN 7.2 7.6   ALBUMIN 4.1 4.2   GLOBULIN 3.1 3.4   ALT (SGPT) 36* 30   AST (SGOT) 30 38*   BILIRUBIN 0.5 0.6   ALK PHOS 50 54         Lab 07/09/25 2110 07/09/25 1927 07/05/25  1002 07/05/25  0740   PROBNP  --  177.6  --  133.6   HSTROP T 22* 34* 34* 38*   PROTIME  --  14.2  --  13.7   INR  --  1.05  --  1.00              Lab 07/09/25 1927   IRON 48   IRON SATURATION (TSAT) 21   TIBC 234*   TRANSFERRIN 157*   VITAMIN B 12 >2,000*         Lab 07/05/25  0730   PH, ARTERIAL 7.411   PCO2, ARTERIAL 36.5   PO2 ART 76.9*   O2 SATURATION ART 96.3   HCO3 ART 23.2   BASE EXCESS ART -1.2*   CARBOXYHEMOGLOBIN 1.4     Brief Urine Lab Results  (Last result in the past 365 days)        Color   Clarity   Blood   Leuk Est   Nitrite   Protein   CREAT   Urine HCG        07/09/25 2116 Dark Yellow   Clear   Negative   Trace   Negative   30 mg/dL (1+)                 Microbiology Results (last 10 days)       Procedure Component Value - Date/Time    Blood Culture - Blood, Arm, Right [[id number]]  (Normal) Collected: 07/09/25 1927    Lab Status: Preliminary result Specimen: Blood from Arm, Right Updated: 07/10/25 1945     Blood Culture No growth at 24 hours    Blood Culture - Blood, Wrist, Right [[id number]]  (Normal) Collected: 07/09/25 1920    Lab Status: Preliminary result Specimen: Blood from Wrist, Right Updated: 07/10/25 1945     Blood Culture No growth at 24 hours    MRSA Screen, PCR (Inpatient) - Swab, Nares [[id number]]  (Normal) Collected: 07/06/25 1322    Lab Status: Final result Specimen: Swab from Nares Updated: 07/06/25 1450     MRSA PCR No MRSA Detected    Narrative:      The negative predictive value of this diagnostic test is high and should only be used to consider de-escalating anti-MRSA therapy. A positive result may indicate colonization with MRSA and must be correlated clinically.    Mycoplasma Pneumoniae PCR - Swab, Nasopharynx [[id number]] Collected: 07/06/25 1322    Lab Status: Final result Specimen: Swab from Nasopharynx Updated: 07/10/25 0811     Mycoplasma pneumo by PCR Negative     Comment: No Mycoplasma pneumoniae DNA detected.  This test was developed and its performance characteristics determined  by [facility].  It has not been cleared or approved by the Food and Drug  Administration.  The FDA has determined that such  clearance or  approval is not necessary.       Narrative:      Performed at:  [address] [place] [address]  [id number]  : Jessica Groves MD, Phone:  [phone]    Blood Culture - Blood, Arm, Right [[id number]]  (Normal) Collected: 07/05/25 0920    Lab Status: Final result Specimen: Blood from Arm, Right Updated: 07/10/25 0945     Blood Culture No growth at 5 days    Legionella Antigen, Urine - Urine, Urine, Clean Catch [[id number]]  (Normal) Collected: 07/05/25 0801    Lab Status: Final result Specimen: Urine, Clean Catch Updated: 07/05/25 1355     LEGIONELLA ANTIGEN, URINE Negative    S. Pneumo Ag Urine or CSF - Urine, Urine, Clean Catch [[id number]]  (Normal) Collected: 07/05/25 0801    Lab Status: Final result Specimen: Urine, Clean Catch Updated: 07/05/25 1356     Strep Pneumo Ag Negative    Respiratory Panel PCR w/COVID-19(SARS-CoV-2) JAMIL/ESME/GRACE/PAD/COR/BRENDON In-House, NP Swab in UTM/VTM, 2 HR TAT - Swab, Nasopharynx [[id number]]  (Normal) Collected: 07/05/25 0748    Lab Status: Final result Specimen: Swab from Nasopharynx Updated: 07/05/25 0905     ADENOVIRUS, PCR Not Detected     Coronavirus 229E Not Detected     Coronavirus HKU1 Not Detected     Coronavirus NL63 Not Detected     Coronavirus OC43 Not Detected     COVID19 Not Detected     Human Metapneumovirus Not Detected     Human Rhinovirus/Enterovirus Not Detected     Influenza A PCR Not Detected     Influenza B PCR Not Detected     Parainfluenza Virus 1 Not Detected     Parainfluenza Virus 2 Not Detected     Parainfluenza Virus 3 Not Detected     Parainfluenza Virus 4 Not Detected     RSV, PCR Not Detected     Bordetella pertussis pcr Not Detected     Bordetella parapertussis PCR Not Detected     Chlamydophila pneumoniae PCR Not Detected     Mycoplasma pneumo by PCR Not Detected    Narrative:      In the setting of a positive respiratory panel with a viral infection PLUS a negative procalcitonin without other underlying  "concern for bacterial infection, consider observing off antibiotics or discontinuation of antibiotics and continue supportive care. If the respiratory panel is positive for atypical bacterial infection (Bordetella pertussis, Chlamydophila pneumoniae, or Mycoplasma pneumoniae), consider antibiotic de-escalation to target atypical bacterial infection.    Blood Culture - Blood, Arm, Left [[id number]]  (Normal) Collected: 07/05/25 0740    Lab Status: Final result Specimen: Blood from Arm, Left Updated: 07/10/25 0830     Blood Culture No growth at 5 days            Hospital Course:     -Sinus tachycardia secondary to probable hyperthyroidism  She presented with sinus tachycardia, currently on Synthroid and Cytomel for hypothyroidism secondary to thyroidectomy from Graves' disease.  TSH showed hyperthyroidism, both thyroid replacement therapy on hold.  Plan at discharge was to hold Cytomel and patient will be on only levothyroxine with immediate referral to endocrinologist, but unfortunately patient left AGAINST MEDICAL ADVICE before we were able to titrate her medications.     -Pneumonia treatment  Patient was on treatment for pneumonia from home and therefore ceftriaxone and doxycycline was used in hospital and plan was to discontinue on discharge.     Other chronic medical conditions-  Carotid artery stenosis  Arthritis  Degenerative disc disease of cervical spine  Depression  Diabetes mellitus  Graves' disease status post surgery  Hyperlipidemia  Hypertension  Hypothyroidism  Seizure disorder  Thyromegaly      Physical Exam on Discharge:  /86 (BP Location: Right arm, Patient Position: Lying)   Pulse (!) 123 Comment: reported to MYRIAM Kruse  Temp 98 °F (36.7 °C) (Oral)   Resp 18   Ht 188 cm (74\")   Wt 64.9 kg (143 lb 1.3 oz)   SpO2 100%   BMI 18.37 kg/m²   Physical Exam  Constitutional:       Appearance: She is ill-appearing.   Cardiovascular:      Rate and Rhythm: Regular rhythm.       Pulses: Normal " pulses.      Heart sounds: Normal heart sounds. No murmur heard.  Pulmonary:      Effort: Pulmonary effort is normal. No respiratory distress.      Breath sounds: Normal breath sounds. No wheezing or rales.   Abdominal:      General: Bowel sounds are normal. There is no distension.      Palpations: Abdomen is soft.      Tenderness: There is no abdominal tenderness. There is no guarding.   Musculoskeletal:      Right lower leg: No edema.      Left lower leg: No edema.   Skin:     General: Skin is warm.   Neurological:      Mental Status: She is alert and oriented to person, place, and time. Mental status is at baseline.     Condition on Discharge: Left AGAINST MEDICAL ADVICE    Discharge Disposition:  Left Against Medical Advice    Discharge Medications:     Discharge Medications        ASK your doctor about these medications        Instructions Start Date   amLODIPine 10 MG tablet  Commonly known as: NORVASC   10 mg, Oral, Daily      ARIPiprazole 5 MG tablet  Commonly known as: ABILIFY   2.5 mg, Daily      carvedilol 25 MG tablet  Commonly known as: COREG   25 mg, Oral, 2 Times Daily With Meals      cefdinir 300 MG capsule  Commonly known as: OMNICEF   300 mg, Oral, 2 Times Daily      donepezil 5 MG tablet  Commonly known as: ARICEPT   5 mg, Oral, Nightly      doxycycline 100 MG capsule  Commonly known as: VIBRAMYCIN   100 mg, Oral, 2 Times Daily      EFFER-K PO   1 tablet, Oral, 2 Times Daily, EFFER-K 1.68 GM-2 GM Tablet Effervescent      Evolocumab solution auto-injector SureClick injection  Commonly known as: REPATHA   140 mg, Every 14 Days      iron polysaccharides 150 MG capsule  Commonly known as: NIFEREX   150 mg, Oral, Daily      Jardiance 25 MG tablet tablet  Generic drug: empagliflozin   25 mg, Oral, Daily      levothyroxine 175 MCG tablet  Commonly known as: SYNTHROID, LEVOTHROID   175 mcg, Oral, Every Early Morning      liothyronine 25 MCG tablet  Commonly known as: CYTOMEL   25 mcg, Oral, 2 Times  Daily      metFORMIN  MG 24 hr tablet  Commonly known as: GLUCOPHAGE-XR   500 mg, Oral, 2 Times Daily      mirtazapine 15 MG tablet  Commonly known as: REMERON   15 mg, Nightly      omeprazole 20 MG capsule  Commonly known as: priLOSEC   20 mg, Oral, Daily      rosuvastatin 20 MG tablet  Commonly known as: CRESTOR   20 mg, Nightly      sennosides-docusate 8.6-50 MG per tablet  Commonly known as: PERICOLACE   1 tablet, Oral, 2 Times Daily      spironolactone 50 MG tablet  Commonly known as: ALDACTONE   50 mg, Oral, 2 Times Daily      vitamin D 1.25 MG (81427 UT) capsule capsule  Commonly known as: ERGOCALCIFEROL   50,000 Units, Weekly             Discharge Diet: Diabetic    Activity at Discharge: As tolerated    Follow-up Appointments:   Future Appointments   Date Time Provider Department Center   8/5/2025 11:30 AM Bhavani, BRIANA Bhat N [facility]       Test Results Pending at Discharge: AMA    Electronically signed by Yovanny Abernathy MD, 07/11/25, 16:09 CDT.    Time: 40 minutes.

## 2025-07-11 NOTE — NURSING NOTE
Called to go speak to the patient and  in room,  upset about wait time for discharge. Staff witnessed  in the hallway with voiced raised trying to stop everybody asking about when his wife was getting to go home, even after Aixa MIGUEL explained that she had spoken to doctor and it would be this afternoon. I was trying to explain to the patient risks of leaving AMA and what she wanted to do. Her  kept trying to speak over her. She first wanted to stay, but changed her mind, signed the AMA form. Tele and IV removed. Physician notified,  wheeled patient down in personal wheelchair.

## 2025-07-11 NOTE — PROGRESS NOTES
[facility] Clinical Pharmacy Services: IV to PO Interchange    Relevant clinical data and objective history reviewed:  Diet Order   Procedures    Diet: Regular/House; Fluid Consistency: Thin (IDDSI 0)       I/O last 3 completed shifts:  In: 200 [IV Piggyback:200]  Out: -     The patient meets the following criteria to be switched from IV administration to PO including:  Functioning GI tract  Hemodynamically stable  Showing signs of clinical improvement  Tolerating other oral medications or enteral diet    Assessment/Plan:  Doxycycline 100 mg has been changed from IV to PO formulation based on our [facility] P&T approved Adult IV to PO Switching policy    FROILAN Leung [facility]  7/11/2025  10:33 CDT

## 2025-07-11 NOTE — PROGRESS NOTES
[facility] Clinical Pharmacy Services: Renal Dose Adjustment    Relevant clinical data and objective history reviewed:  Estimated Creatinine Clearance: 109.5 mL/min (by C-G formula based on SCr of 0.63 mg/dL).     Results from last 7 days   Lab Units 07/11/25  0415 07/09/25  1927 07/07/25  0459   CREATININE mg/dL 0.63 1.09* 0.63   BUN / CREAT RATIO  18.7 12.3 23.5   EGFR mL/min/1.73 108.2 62.0 108.2   BUN mg/dL 11.8 13.4 14.8       Assessment/Plan:  Lovenox 40 mg Subcutaneous has been appropriately renally dose adjusted based on our [facility] P&T approved Adult Renal Dosing of Medication policy  Pharmacy will continue to monitor renal function and clinical status and adjust the dose and/or frequency as needed.    FROILAN Leung RPH  7/11/2025  10:28 CDT

## 2025-07-11 NOTE — THERAPY DISCHARGE NOTE
Acute Care - Occupational Therapy Discharge Summary  [facility]     Patient Name: Lynn Magana  : 1975  MRN: [id number]    Today's Date: 2025                 Admit Date: 2025        OT Recommendation and Plan    Visit Dx:    ICD-10-CM ICD-9-CM   1. Altered mental status, unspecified altered mental status type  R41.82 780.97   2. Sinus tachycardia  R00.0 427.89   3. Other hypotension  I95.89 458.8   4. Hypomagnesemia  E83.42 275.2   5. Urinary tract infection without hematuria, site unspecified  N39.0 599.0   6. Impaired mobility [Z74.09]  Z74.09 799.89          Time Calculation- OT       Row Name 25 1338             Time Calculation- OT    OT Start Time 1338  -MT      OT Stop Time 1401  -MT      OT Time Calculation (min) 23 min  -MT      Total Timed Code Minutes- OT 23 minute(s)  -MT      OT Received On 25  -MT         Timed Charges    20058 - OT Therapeutic Exercise Minutes 10  -MT      80066 - OT Self Care/Mgmt Minutes 13  -MT         Total Minutes    Timed Charges Total Minutes 23  -MT       Total Minutes 23  -MT                User Key  (r) = Recorded By, (t) = Taken By, (c) = Cosigned By      Initials Name Provider Type    MT Kelsea Parada COTA Occupational Therapist Assistant                       OT Rehab Goals       Row Name 25 1500             Transfer Goal 1 (OT)    Activity/Assistive Device (Transfer Goal 1, OT) sit-to-stand/stand-to-sit;commode, 3-in-1;bed-to-chair/chair-to-bed  -AC      [place] Level/Cues Needed (Transfer Goal 1, OT) contact guard required  -AC      Time Frame (Transfer Goal 1, OT) long term goal (LTG);by discharge  -AC      Progress/Outcome (Transfer Goal 1, OT) goal not met  -AC         Grooming Goal 1 (OT)    Activity/Device (Grooming Goal 1, OT) hair care;oral care;wash face, hands  -AC      [place] (Grooming Goal 1, OT) contact guard required  -AC      Time Frame (Grooming Goal 1, OT) long term goal (LTG);by discharge  -AC       Strategies/Barriers (Grooming Goal 1, OT) While in standing position  -AC      Progress/Outcome (Grooming Goal 1, OT) goal not met  -AC         Problem Specific Goal 1 (OT)    Problem Specific Goal 1 (OT) Pt will independently implement one pain management technique to decrease pain and improve functional adl performance.  -AC      Time Frame (Problem Specific Goal 1, OT) long term goal (LTG);by discharge  -AC      Progress/Outcome (Problem Specific Goal 1, OT) goal not met  -AC                User Key  (r) = Recorded By, (t) = Taken By, (c) = Cosigned By      Initials Name Provider Type Discipline    AC Niels Shirley OTR/L, ODILIA Occupational Therapist OT                        Timed Therapy Charges  Total Units: 2      Suggested Charges  Total Units: 2      Procedure Name Documented Minutes Units Code    HC OT SELF CARE/MGMT/TRAIN EA 15 MIN 13 1   19953 (CPT®)      HC OT THER PROC EA 15 MIN 10 1   77761 (CPT®)                 Documented Minutes  Total Minutes: 23      Therapy Provided Minutes    56267 - OT Self Care/Mgmt Minutes 13    09067 - OT Therapeutic Exercise Minutes 10                        OT Discharge Summary  Anticipated Discharge Disposition (OT): home with assist  Reason for Discharge: other (comment) (left AMA)  Outcomes Achieved: Refer to plan of care for updates on goals achieved  Discharge Destination: Home with assist      DAVID Wheeler/L, CNT  7/11/2025

## 2025-07-11 NOTE — PROGRESS NOTES
[facility] Clinical Pharmacy Services: Renal Dose Adjustment    Relevant clinical data and objective history reviewed:  Estimated Creatinine Clearance: 109.5 mL/min (by C-G formula based on SCr of 0.63 mg/dL).     Results from last 7 days   Lab Units 07/11/25  0415 07/09/25  1927 07/07/25  0459   CREATININE mg/dL 0.63 1.09* 0.63   BUN / CREAT RATIO  18.7 12.3 23.5   EGFR mL/min/1.73 108.2 62.0 108.2   BUN mg/dL 11.8 13.4 14.8       Assessment/Plan:  Lovenox 40 mg Subcutaneouns has been appropriately renally dose adjusted based on our [facility] P&T approved Adult Renal Dosing of Medication policy  Pharmacy will continue to monitor renal function and clinical status and adjust the dose and/or frequency as needed.    FROILAN Leung RPH  7/11/2025  10:28 CDT

## 2025-07-11 NOTE — THERAPY TREATMENT NOTE
Patient Name: Lynn Magana  : 1975    MRN: [id number]                              Today's Date: 2025       Admit Date: 2025    Visit Dx:     ICD-10-CM ICD-9-CM   1. Altered mental status, unspecified altered mental status type  R41.82 780.97   2. Sinus tachycardia  R00.0 427.89   3. Other hypotension  I95.89 458.8   4. Hypomagnesemia  E83.42 275.2   5. Urinary tract infection without hematuria, site unspecified  N39.0 599.0   6. Impaired mobility [Z74.09]  Z74.09 799.89     Patient Active Problem List   Diagnosis    Syncope    Graves' disease    Polysubstance abuse    Hypertension    Diabetes mellitus type 1    Spells of decreased attentiveness    Chronic intractable headache    Nausea and vomiting    Slow transit constipation    Nonsmoker    Type 2 diabetes mellitus, with long-term current use of insulin    GERD without esophagitis    Hyperthyroidism    Thyromegaly    Post-surgical hypothyroidism    Degeneration of cervical intervertebral disc    Cervical radiculopathy    Acute pain of right shoulder    Class 1 obesity due to excess calories with body mass index (BMI) of 30.0 to 30.9 in adult    Hypoglycemia    Stage 1 acute kidney injury    Dehydration    Dysphagia    Dehydration    Hypokalemia    Steatosis of liver    Marijuana abuse    Loss of weight    Severe protein-calorie malnutrition    Hypothyroid    Generalized weakness    Acquired cerebral ventriculomegaly    Atypical pneumonia    Joy-Quinonez variant Guillain-Altmar syndrome    Tachycardia, unspecified    Pneumonia, unspecified organism    Generalized weakness    Sinus tachycardia    Altered mental status     Past Medical History:   Diagnosis Date    Arthritis     Carotid artery stenosis     Degenerative disc disease, cervical     Depression     Diabetes mellitus     type 1    Disease of thyroid gland     GERD (gastroesophageal reflux disease)     Graves disease     Heart palpitations     Hyperlipidemia     Hypertension      Hypothyroidism     Seizure     Thyromegaly      Past Surgical History:   Procedure Laterality Date     SECTION      CHOLECYSTECTOMY      COLONOSCOPY  2015    Normal exam Dr. Morgan     COLONOSCOPY  2015    Normal exam    CYST REMOVAL      ENDOSCOPY N/A 2018    Normal exam    ENDOSCOPY N/A 2025    Procedure: ESOPHAGOGASTRODUODENOSCOPY WITH ANESTHESIA;  Surgeon: Jadon Smith MD;  Location: [facility] ENDOSCOPY;  Service: Gastroenterology;  Laterality: N/A;  pre op: Nausea and vomiting  post op: normal  pcp: Darryl Andrews,     HYSTERECTOMY      THYROIDECTOMY Bilateral 2018    Procedure: total thyroidectomy;  Surgeon: Vitaly Givens MD;  Location: [facility] OR;  Service: ENT      General Information       Row Name 25 8482          OT Time and Intention    Subjective Information complains of  spouse wanting to leave and be d/c'd  -MT     Document Type therapy note (daily note)  -MT     Mode of Treatment occupational therapy  -MT     Patient Effort adequate  -MT       Row Name 25 6135          General Information    Patient Profile Reviewed yes  -MT     Existing Precautions/Restrictions fall  -MT               User Key  (r) = Recorded By, (t) = Taken By, (c) = Cosigned By      Initials Name Provider Type    MT Kelsea Parada COTA Occupational Therapist Assistant                     Mobility/ADL's       Row Name 25 1334          Bed Mobility    Supine-Sit [place] (Bed Mobility) supervision  -MT     Sit-Supine [place] (Bed Mobility) supervision  -MT       Row Name 25 1338          Sit-Stand Transfer    Comment, (Sit-Stand Transfer) Pt adjusted self EOB and able to push self off bed with UEs and scoot around into long sitting  -MT               User Key  (r) = Recorded By, (t) = Taken By, (c) = Cosigned By      Initials Name Provider Type    MT Kelsea Parada COTA Occupational Therapist Assistant                   Obj/Interventions    No  documentation.                  Goals/Plan    No documentation.                  Clinical Impression       Row Name 07/11/25 1338          Pain Assessment    Pre/Posttreatment Pain Comment no report  -MT       Row Name 07/11/25 1338          Plan of Care Review    Plan of Care Reviewed With patient;spouse  -MT       Row Name 07/11/25 1338          Therapy Plan Review/Discharge Plan (OT)    Anticipated Discharge Disposition (OT) sub acute care setting  -MT       Row Name 07/11/25 1338          Positioning and Restraints    Pre-Treatment Position in bed  -MT     Post Treatment Position bed  -MT     In Bed fowlers;call light within reach;encouraged to call for assist;notified nsg;with family/caregiver;side rails up x2  -MT               User Key  (r) = Recorded By, (t) = Taken By, (c) = Cosigned By      Initials Name Provider Type    MT Kelsea Parada COTA Occupational Therapist Assistant                   Outcome Measures       Row Name 07/11/25 1338          How much help from another is currently needed...    Putting on and taking off regular lower body clothing? 3  -MT     Bathing (including washing, rinsing, and drying) 2  -MT     Toileting (which includes using toilet bed pan or urinal) 2  -MT     Putting on and taking off regular upper body clothing 3  -MT     Taking care of personal grooming (such as brushing teeth) 3  -MT     Eating meals 3  -MT     AM-PAC 6 Clicks Score (OT) 16  -MT       Row Name 07/11/25 0820          How much help from another person do you currently need...    Turning from your back to your side while in flat bed without using bedrails? 4  -CH     Moving from lying on back to sitting on the side of a flat bed without bedrails? 4  -CH     Moving to and from a bed to a chair (including a wheelchair)? 3  -CH     Standing up from a chair using your arms (e.g., wheelchair, bedside chair)? 3  -CH     Climbing 3-5 steps with a railing? 2  -CH     To walk in hospital room? 3  -CH     AM-PAC 6  Clicks Score (PT) 19  -CH     Highest Level of Mobility Goal Walk 10 Steps or More-6  -CH               User Key  (r) = Recorded By, (t) = Taken By, (c) = Cosigned By      Initials Name Provider Type    MT Kelsea Parada COTA Occupational Therapist Assistant    CH Aixa White, RN Registered Nurse                    Occupational Therapy Education       Title: PT OT SLP Therapies (In Progress)       Topic: Occupational Therapy (In Progress)       Point: ADL training (In Progress)       Learning Progress Summary            Patient Acceptance, E, NR,NL by BS at 7/10/2025 1022                      Point: Body mechanics (In Progress)       Learning Progress Summary            Patient Acceptance, E, NR,NL by BS at 7/10/2025 1022                                      User Key       Initials Effective Dates Name Provider Type Discipline    BS 05/12/25 -  Desiree Mccarty OT Student OT Student OT                  OT Recommendation and Plan     Plan of Care Review  Plan of Care Reviewed With: patient, spouse  Progress: no change  Outcome Evaluation: Performed supine>sit S. Pt adjusted self EOB and able to push self off bed with UEs and scoot around into long sitting. Pt spouse frustrated throughout tx on wanting to be d/c'd BRANNON/L educated after discussion with NSG on reason for hold on d/c. Spouse reports wanting to leave AMA. Educated on benefits of MD release and safety for pt but NSG aware of spouse concerns and rights of patient. Pt provided with and educated on HEP BUEs to be performed daily. Pt reports no difficulty with dressing self this date. Recommend assist at home continued OT POC as pt continues to demo weakness.     Time Calculation:         Time Calculation- OT       Row Name 07/11/25 1338             Time Calculation- OT    OT Start Time 1338  -MT      OT Stop Time 1401  -MT      OT Time Calculation (min) 23 min  -MT      Total Timed Code Minutes- OT 23 minute(s)  -MT      OT Received On 07/11/25  -MT          Timed Charges    49695 - OT Therapeutic Exercise Minutes 10  -MT      22516 - OT Self Care/Mgmt Minutes 13  -MT         Total Minutes    Timed Charges Total Minutes 23  -MT       Total Minutes 23  -MT                User Key  (r) = Recorded By, (t) = Taken By, (c) = Cosigned By      Initials Name Provider Type    MT Kelsea Parada COTA Occupational Therapist Assistant                  Therapy Charges for Today       Code Description Service Date Service Provider Modifiers Qty    09329495491 HC OT THER PROC EA 15 MIN 7/11/2025 Kelsea Parada COTA GO 1    94518061343 HC OT SELF CARE/MGMT/TRAIN EA 15 MIN 7/11/2025 Kelsea Parada COTA GO 1                 CLOVIS Garcia  7/11/2025

## 2025-07-12 NOTE — THERAPY DISCHARGE NOTE
Acute Care - Physical Therapy Discharge Summary  [facility]       Patient Name: Lynn Magana  : 1975  MRN: [id number]    Today's Date: 2025                 Admit Date: 2025      PT Recommendation and Plan    Visit Dx:    ICD-10-CM ICD-9-CM   1. Altered mental status, unspecified altered mental status type  R41.82 780.97   2. Sinus tachycardia  R00.0 427.89   3. Other hypotension  I95.89 458.8   4. Hypomagnesemia  E83.42 275.2   5. Urinary tract infection without hematuria, site unspecified  N39.0 599.0   6. Impaired mobility [Z74.09]  Z74.09 799.89                PT Rehab Goals       Row Name 25 0839             Bed Mobility Goal 1 (PT)    Activity/Assistive Device (Bed Mobility Goal 1, PT) sit to supine/supine to sit  -NW      [place] Level/Cues Needed (Bed Mobility Goal 1, PT) independent  -NW      Time Frame (Bed Mobility Goal 1, PT) long term goal (LTG);10 days  -NW      Progress/Outcomes (Bed Mobility Goal 1, PT) goal not met  -NW         Transfer Goal 1 (PT)    Activity/Assistive Device (Transfer Goal 1, PT) sit-to-stand/stand-to-sit;bed-to-chair/chair-to-bed  -NW      [place] Level/Cues Needed (Transfer Goal 1, PT) supervision required  -NW      Time Frame (Transfer Goal 1, PT) long term goal (LTG);10 days  -NW      Progress/Outcome (Transfer Goal 1, PT) goal not met  -NW         Gait Training Goal 1 (PT)    Activity/Assistive Device (Gait Training Goal 1, PT) gait (walking locomotion)  -NW      [place] Level (Gait Training Goal 1, PT) contact guard required  -NW      Distance (Gait Training Goal 1, PT) 50 ft  -NW      Time Frame (Gait Training Goal 1, PT) long term goal (LTG);10 days  -NW      Progress/Outcome (Gait Training Goal 1, PT) goal not met  -NW                User Key  (r) = Recorded By, (t) = Taken By, (c) = Cosigned By      Initials Name Provider Type Discipline    NW Ely Lizarraga, PTA Physical Therapist Assistant PT                        PT  Discharge Summary  Anticipated Discharge Disposition (PT): home  Reason for Discharge: other (comment) (pt left AMA)  Outcomes Achieved: Refer to plan of care for updates on goals achieved  Discharge Destination: other (comment)      Ely Lizarraga, PTA   7/12/2025

## 2025-07-13 LAB
QT INTERVAL: 294 MS
QTC INTERVAL: 442 MS

## 2025-07-14 LAB
BACTERIA SPEC AEROBE CULT: NORMAL
BACTERIA SPEC AEROBE CULT: NORMAL
METHYLMALONATE SERPL-SCNC: 260 NMOL/L (ref 0–378)

## 2025-07-15 LAB
ANION GAP SERPL CALCULATED.3IONS-SCNC: 12 MMOL/L (ref 8–16)
BASOPHILS # BLD: 0 K/UL (ref 0–0.2)
BASOPHILS NFR BLD: 0.2 % (ref 0–1)
BUN SERPL-MCNC: 14 MG/DL (ref 6–20)
CALCIUM SERPL-MCNC: 9.6 MG/DL (ref 8.6–10)
CHLORIDE SERPL-SCNC: 108 MMOL/L (ref 98–107)
CO2 SERPL-SCNC: 24 MMOL/L (ref 22–29)
CREAT SERPL-MCNC: 0.8 MG/DL (ref 0.5–0.9)
EOSINOPHIL # BLD: 0 K/UL (ref 0–0.6)
EOSINOPHIL NFR BLD: 0.3 % (ref 0–5)
ERYTHROCYTE [DISTWIDTH] IN BLOOD BY AUTOMATED COUNT: 12.1 % (ref 11.5–14.5)
GLUCOSE SERPL-MCNC: 75 MG/DL (ref 70–99)
HCT VFR BLD AUTO: 30.6 % (ref 37–47)
HGB BLD-MCNC: 10.2 G/DL (ref 12–16)
IMM GRANULOCYTES # BLD: 0 K/UL
LYMPHOCYTES # BLD: 2.9 K/UL (ref 1.1–4.5)
LYMPHOCYTES NFR BLD: 45.8 % (ref 20–40)
MCH RBC QN AUTO: 27.5 PG (ref 27–31)
MCHC RBC AUTO-ENTMCNC: 33.3 G/DL (ref 33–37)
MCV RBC AUTO: 82.5 FL (ref 81–99)
MONOCYTES # BLD: 0.3 K/UL (ref 0–0.9)
MONOCYTES NFR BLD: 4.9 % (ref 0–10)
NEUTROPHILS # BLD: 3.1 K/UL (ref 1.5–7.5)
NEUTS SEG NFR BLD: 48.6 % (ref 50–65)
PLATELET # BLD AUTO: 186 K/UL (ref 130–400)
PMV BLD AUTO: 11 FL (ref 9.4–12.3)
POTASSIUM SERPL-SCNC: 3.5 MMOL/L (ref 3.5–5.1)
RBC # BLD AUTO: 3.71 M/UL (ref 4.2–5.4)
SODIUM SERPL-SCNC: 144 MMOL/L (ref 136–145)
WBC # BLD AUTO: 6.4 K/UL (ref 4.8–10.8)

## 2025-07-22 LAB
ANION GAP SERPL CALCULATED.3IONS-SCNC: 10 MMOL/L (ref 8–16)
BASOPHILS # BLD: 0 K/UL (ref 0–0.2)
BASOPHILS NFR BLD: 0.3 % (ref 0–1)
BUN SERPL-MCNC: 18 MG/DL (ref 6–20)
CALCIUM SERPL-MCNC: 10.1 MG/DL (ref 8.6–10)
CHLORIDE SERPL-SCNC: 109 MMOL/L (ref 98–107)
CO2 SERPL-SCNC: 28 MMOL/L (ref 22–29)
CREAT SERPL-MCNC: 0.8 MG/DL (ref 0.5–0.9)
EOSINOPHIL # BLD: 0 K/UL (ref 0–0.6)
EOSINOPHIL NFR BLD: 0.4 % (ref 0–5)
ERYTHROCYTE [DISTWIDTH] IN BLOOD BY AUTOMATED COUNT: 13 % (ref 11.5–14.5)
GLUCOSE SERPL-MCNC: 115 MG/DL (ref 70–99)
HCT VFR BLD AUTO: 29.8 % (ref 37–47)
HGB BLD-MCNC: 9.6 G/DL (ref 12–16)
IMM GRANULOCYTES # BLD: 0 K/UL
LYMPHOCYTES # BLD: 2.5 K/UL (ref 1.1–4.5)
LYMPHOCYTES NFR BLD: 34.3 % (ref 20–40)
MCH RBC QN AUTO: 27.2 PG (ref 27–31)
MCHC RBC AUTO-ENTMCNC: 32.2 G/DL (ref 33–37)
MCV RBC AUTO: 84.4 FL (ref 81–99)
MONOCYTES # BLD: 0.4 K/UL (ref 0–0.9)
MONOCYTES NFR BLD: 4.8 % (ref 0–10)
NEUTROPHILS # BLD: 4.4 K/UL (ref 1.5–7.5)
NEUTS SEG NFR BLD: 59.9 % (ref 50–65)
PLATELET # BLD AUTO: 155 K/UL (ref 130–400)
PMV BLD AUTO: 12.3 FL (ref 9.4–12.3)
POTASSIUM SERPL-SCNC: 2.9 MMOL/L (ref 3.5–5.1)
RBC # BLD AUTO: 3.53 M/UL (ref 4.2–5.4)
SODIUM SERPL-SCNC: 147 MMOL/L (ref 136–145)
WBC # BLD AUTO: 7.3 K/UL (ref 4.8–10.8)

## 2025-07-30 LAB
ANION GAP SERPL CALCULATED.3IONS-SCNC: 12 MMOL/L (ref 8–16)
BASOPHILS # BLD: 0 K/UL (ref 0–0.2)
BASOPHILS NFR BLD: 0.2 % (ref 0–1)
BUN SERPL-MCNC: 11 MG/DL (ref 6–20)
CALCIUM SERPL-MCNC: 9.2 MG/DL (ref 8.6–10)
CHLORIDE SERPL-SCNC: 111 MMOL/L (ref 98–107)
CO2 SERPL-SCNC: 26 MMOL/L (ref 22–29)
CREAT SERPL-MCNC: 0.7 MG/DL (ref 0.5–0.9)
EOSINOPHIL # BLD: 0 K/UL (ref 0–0.6)
EOSINOPHIL NFR BLD: 0.4 % (ref 0–5)
ERYTHROCYTE [DISTWIDTH] IN BLOOD BY AUTOMATED COUNT: 13.7 % (ref 11.5–14.5)
GLUCOSE SERPL-MCNC: 103 MG/DL (ref 70–99)
HCT VFR BLD AUTO: 28.5 % (ref 37–47)
HGB BLD-MCNC: 9.4 G/DL (ref 12–16)
IMM GRANULOCYTES # BLD: 0 K/UL
LYMPHOCYTES # BLD: 2.6 K/UL (ref 1.1–4.5)
LYMPHOCYTES NFR BLD: 51 % (ref 20–40)
MCH RBC QN AUTO: 26.9 PG (ref 27–31)
MCHC RBC AUTO-ENTMCNC: 33 G/DL (ref 33–37)
MCV RBC AUTO: 81.4 FL (ref 81–99)
MONOCYTES # BLD: 0.2 K/UL (ref 0–0.9)
MONOCYTES NFR BLD: 4 % (ref 0–10)
NEUTROPHILS # BLD: 2.2 K/UL (ref 1.5–7.5)
NEUTS SEG NFR BLD: 44.2 % (ref 50–65)
PLATELET # BLD AUTO: 192 K/UL (ref 130–400)
PMV BLD AUTO: 11.2 FL (ref 9.4–12.3)
POTASSIUM SERPL-SCNC: 2.5 MMOL/L (ref 3.5–5.1)
RBC # BLD AUTO: 3.5 M/UL (ref 4.2–5.4)
SODIUM SERPL-SCNC: 149 MMOL/L (ref 136–145)
WBC # BLD AUTO: 5 K/UL (ref 4.8–10.8)

## 2025-08-04 LAB
ANION GAP SERPL CALCULATED.3IONS-SCNC: 19 MMOL/L (ref 8–16)
BUN SERPL-MCNC: 11 MG/DL (ref 6–20)
CALCIUM SERPL-MCNC: 9.6 MG/DL (ref 8.6–10)
CHLORIDE SERPL-SCNC: 104 MMOL/L (ref 98–107)
CO2 SERPL-SCNC: 24 MMOL/L (ref 22–29)
CREAT SERPL-MCNC: 0.9 MG/DL (ref 0.5–0.9)
GLUCOSE SERPL-MCNC: 42 MG/DL (ref 70–99)
POTASSIUM SERPL-SCNC: 3.9 MMOL/L (ref 3.5–5.1)
SODIUM SERPL-SCNC: 147 MMOL/L (ref 136–145)

## 2025-08-05 ENCOUNTER — OFFICE VISIT (OUTPATIENT)
Dept: NEUROLOGY | Facility: CLINIC | Age: 50
End: 2025-08-05
Payer: COMMERCIAL

## 2025-08-05 VITALS
HEIGHT: 72 IN | SYSTOLIC BLOOD PRESSURE: 130 MMHG | HEART RATE: 66 BPM | OXYGEN SATURATION: 99 % | BODY MASS INDEX: 19.37 KG/M2 | DIASTOLIC BLOOD PRESSURE: 74 MMHG | WEIGHT: 143 LBS

## 2025-08-05 DIAGNOSIS — E53.8 FOLATE DEFICIENCY: ICD-10-CM

## 2025-08-05 DIAGNOSIS — I95.1 ORTHOSTATIC HYPOTENSION: ICD-10-CM

## 2025-08-05 DIAGNOSIS — G61.0 MILLER-FISHER VARIANT GUILLAIN-BARRE SYNDROME: Primary | ICD-10-CM

## 2025-08-05 DIAGNOSIS — Z91.199 HISTORY OF NONCOMPLIANCE WITH MEDICAL TREATMENT: ICD-10-CM

## 2025-08-05 PROCEDURE — 1160F RVW MEDS BY RX/DR IN RCRD: CPT

## 2025-08-05 PROCEDURE — 3078F DIAST BP <80 MM HG: CPT

## 2025-08-05 PROCEDURE — 3075F SYST BP GE 130 - 139MM HG: CPT

## 2025-08-05 PROCEDURE — 99215 OFFICE O/P EST HI 40 MIN: CPT

## 2025-08-05 PROCEDURE — 1159F MED LIST DOCD IN RCRD: CPT

## 2025-08-09 ENCOUNTER — APPOINTMENT (OUTPATIENT)
Dept: CT IMAGING | Facility: HOSPITAL | Age: 50
End: 2025-08-09
Payer: COMMERCIAL

## 2025-08-09 ENCOUNTER — HOSPITAL ENCOUNTER (OUTPATIENT)
Facility: HOSPITAL | Age: 50
Setting detail: OBSERVATION
Discharge: HOME | End: 2025-08-11
Attending: EMERGENCY MEDICINE | Admitting: FAMILY MEDICINE
Payer: COMMERCIAL

## 2025-08-09 ENCOUNTER — APPOINTMENT (OUTPATIENT)
Dept: GENERAL RADIOLOGY | Facility: HOSPITAL | Age: 50
End: 2025-08-09
Payer: COMMERCIAL

## 2025-08-09 DIAGNOSIS — R82.998: ICD-10-CM

## 2025-08-09 DIAGNOSIS — I95.1 ORTHOSTATIC HYPOTENSION: ICD-10-CM

## 2025-08-09 DIAGNOSIS — D63.8 ANEMIA, CHRONIC DISEASE: ICD-10-CM

## 2025-08-09 DIAGNOSIS — Z74.09 IMPAIRED MOBILITY: ICD-10-CM

## 2025-08-09 DIAGNOSIS — K76.0 STEATOSIS OF LIVER: ICD-10-CM

## 2025-08-09 DIAGNOSIS — N17.9 ACUTE KIDNEY INJURY: ICD-10-CM

## 2025-08-09 DIAGNOSIS — E87.6 HYPOKALEMIA: ICD-10-CM

## 2025-08-09 DIAGNOSIS — R41.82 ALTERED MENTAL STATUS, UNSPECIFIED ALTERED MENTAL STATUS TYPE: Primary | ICD-10-CM

## 2025-08-09 DIAGNOSIS — R13.10 DYSPHAGIA, UNSPECIFIED TYPE: ICD-10-CM

## 2025-08-09 DIAGNOSIS — E86.0 DEHYDRATION: ICD-10-CM

## 2025-08-09 LAB
ALBUMIN SERPL-MCNC: 3.6 G/DL (ref 3.5–5.2)
ALBUMIN/GLOB SERPL: 1.4 G/DL
ALP SERPL-CCNC: 42 U/L (ref 39–117)
ALT SERPL W P-5'-P-CCNC: 10 U/L (ref 1–33)
AMMONIA BLD-SCNC: 42 UMOL/L (ref 11–51)
AMORPH URATE CRY URNS QL MICRO: ABNORMAL /HPF
ANION GAP SERPL CALCULATED.3IONS-SCNC: 16 MMOL/L (ref 5–15)
ANISOCYTOSIS BLD QL: ABNORMAL
AST SERPL-CCNC: 18 U/L (ref 1–32)
BACTERIA UR QL AUTO: ABNORMAL /HPF
BASOPHILS # BLD MANUAL: 0.06 10*3/MM3 (ref 0–0.2)
BASOPHILS NFR BLD MANUAL: 1 % (ref 0–1.5)
BILIRUB SERPL-MCNC: 0.4 MG/DL (ref 0–1.2)
BILIRUB UR QL STRIP: NEGATIVE
BUN SERPL-MCNC: 9.6 MG/DL (ref 6–20)
BUN/CREAT SERPL: 7.4 (ref 7–25)
CALCIUM SPEC-SCNC: 9.3 MG/DL (ref 8.6–10.5)
CHLORIDE SERPL-SCNC: 103 MMOL/L (ref 98–107)
CLARITY UR: CLEAR
CLUMPED PLATELETS: PRESENT
CO2 SERPL-SCNC: 24 MMOL/L (ref 22–29)
COLOR UR: YELLOW
CREAT SERPL-MCNC: 1.3 MG/DL (ref 0.57–1)
D-LACTATE SERPL-SCNC: 2 MMOL/L (ref 0.5–2)
D-LACTATE SERPL-SCNC: 3.5 MMOL/L (ref 0.5–2)
DEPRECATED RDW RBC AUTO: 40.9 FL (ref 37–54)
EGFRCR SERPLBLD CKD-EPI 2021: 50.2 ML/MIN/1.73
EOSINOPHIL # BLD MANUAL: 0 10*3/MM3 (ref 0–0.4)
EOSINOPHIL NFR BLD MANUAL: 0 % (ref 0.3–6.2)
ERYTHROCYTE [DISTWIDTH] IN BLOOD BY AUTOMATED COUNT: 14.1 % (ref 12.3–15.4)
GIANT PLATELETS: ABNORMAL
GLOBULIN UR ELPH-MCNC: 2.5 GM/DL
GLUCOSE BLDC GLUCOMTR-MCNC: 117 MG/DL (ref 70–130)
GLUCOSE SERPL-MCNC: 114 MG/DL (ref 65–99)
GLUCOSE UR STRIP-MCNC: NEGATIVE MG/DL
HCT VFR BLD AUTO: 34.7 % (ref 34–46.6)
HGB BLD-MCNC: 11.3 G/DL (ref 12–15.9)
HGB UR QL STRIP.AUTO: NEGATIVE
HOLD SPECIMEN: NORMAL
HOLD SPECIMEN: NORMAL
HYALINE CASTS UR QL AUTO: ABNORMAL /LPF
KETONES UR QL STRIP: ABNORMAL
LEUKOCYTE ESTERASE UR QL STRIP.AUTO: ABNORMAL
LYMPHOCYTES # BLD MANUAL: 3.97 10*3/MM3 (ref 0.7–3.1)
LYMPHOCYTES NFR BLD MANUAL: 3 % (ref 5–12)
MAGNESIUM SERPL-MCNC: 1.3 MG/DL (ref 1.6–2.6)
MCH RBC QN AUTO: 26.5 PG (ref 26.6–33)
MCHC RBC AUTO-ENTMCNC: 32.6 G/DL (ref 31.5–35.7)
MCV RBC AUTO: 81.5 FL (ref 79–97)
MONOCYTES # BLD: 0.18 10*3/MM3 (ref 0.1–0.9)
NEUTROPHILS # BLD AUTO: 1.72 10*3/MM3 (ref 1.7–7)
NEUTROPHILS NFR BLD MANUAL: 29 % (ref 42.7–76)
NITRITE UR QL STRIP: NEGATIVE
OVALOCYTES BLD QL SMEAR: ABNORMAL
PH UR STRIP.AUTO: >=9 [PH] (ref 5–8)
PLATELET # BLD AUTO: 254 10*3/MM3 (ref 140–450)
PMV BLD AUTO: 11.1 FL (ref 6–12)
POIKILOCYTOSIS BLD QL SMEAR: ABNORMAL
POTASSIUM SERPL-SCNC: 4.5 MMOL/L (ref 3.5–5.2)
PROT SERPL-MCNC: 6.1 G/DL (ref 6–8.5)
PROT UR QL STRIP: ABNORMAL
RBC # BLD AUTO: 4.26 10*6/MM3 (ref 3.77–5.28)
RBC # UR STRIP: ABNORMAL /HPF
REF LAB TEST METHOD: ABNORMAL
SODIUM SERPL-SCNC: 143 MMOL/L (ref 136–145)
SP GR UR STRIP: 1.02 (ref 1–1.03)
SQUAMOUS #/AREA URNS HPF: ABNORMAL /HPF
T4 FREE SERPL-MCNC: 1.16 NG/DL (ref 0.92–1.68)
TSH SERPL DL<=0.05 MIU/L-ACNC: 4.33 UIU/ML (ref 0.27–4.2)
UROBILINOGEN UR QL STRIP: ABNORMAL
VARIANT LYMPHS NFR BLD MANUAL: 67 % (ref 19.6–45.3)
WBC # UR STRIP: ABNORMAL /HPF
WBC MORPH BLD: NORMAL
WBC NRBC COR # BLD AUTO: 5.92 10*3/MM3 (ref 3.4–10.8)
WHOLE BLOOD HOLD COAG: NORMAL
WHOLE BLOOD HOLD SPECIMEN: NORMAL

## 2025-08-09 PROCEDURE — 87040 BLOOD CULTURE FOR BACTERIA: CPT | Performed by: EMERGENCY MEDICINE

## 2025-08-09 PROCEDURE — 25010000002 MAGNESIUM SULFATE 2 GM/50ML SOLUTION

## 2025-08-09 PROCEDURE — G0378 HOSPITAL OBSERVATION PER HR: HCPCS

## 2025-08-09 PROCEDURE — 99214 OFFICE O/P EST MOD 30 MIN: CPT | Performed by: PSYCHIATRY & NEUROLOGY

## 2025-08-09 PROCEDURE — P9612 CATHETERIZE FOR URINE SPEC: HCPCS

## 2025-08-09 PROCEDURE — 81001 URINALYSIS AUTO W/SCOPE: CPT | Performed by: EMERGENCY MEDICINE

## 2025-08-09 PROCEDURE — 93005 ELECTROCARDIOGRAM TRACING: CPT

## 2025-08-09 PROCEDURE — 25810000003 SODIUM CHLORIDE 0.9 % SOLUTION

## 2025-08-09 PROCEDURE — 83090 ASSAY OF HOMOCYSTEINE: CPT | Performed by: PSYCHIATRY & NEUROLOGY

## 2025-08-09 PROCEDURE — 71045 X-RAY EXAM CHEST 1 VIEW: CPT

## 2025-08-09 PROCEDURE — 99285 EMERGENCY DEPT VISIT HI MDM: CPT | Performed by: EMERGENCY MEDICINE

## 2025-08-09 PROCEDURE — 84439 ASSAY OF FREE THYROXINE: CPT | Performed by: PSYCHIATRY & NEUROLOGY

## 2025-08-09 PROCEDURE — 80050 GENERAL HEALTH PANEL: CPT | Performed by: EMERGENCY MEDICINE

## 2025-08-09 PROCEDURE — 70450 CT HEAD/BRAIN W/O DYE: CPT

## 2025-08-09 PROCEDURE — 82746 ASSAY OF FOLIC ACID SERUM: CPT | Performed by: PSYCHIATRY & NEUROLOGY

## 2025-08-09 PROCEDURE — 82948 REAGENT STRIP/BLOOD GLUCOSE: CPT

## 2025-08-09 PROCEDURE — 82140 ASSAY OF AMMONIA: CPT | Performed by: PSYCHIATRY & NEUROLOGY

## 2025-08-09 PROCEDURE — 83921 ORGANIC ACID SINGLE QUANT: CPT | Performed by: PSYCHIATRY & NEUROLOGY

## 2025-08-09 PROCEDURE — 83605 ASSAY OF LACTIC ACID: CPT | Performed by: EMERGENCY MEDICINE

## 2025-08-09 PROCEDURE — 36415 COLL VENOUS BLD VENIPUNCTURE: CPT | Performed by: EMERGENCY MEDICINE

## 2025-08-09 PROCEDURE — 96365 THER/PROPH/DIAG IV INF INIT: CPT

## 2025-08-09 PROCEDURE — 83735 ASSAY OF MAGNESIUM: CPT | Performed by: EMERGENCY MEDICINE

## 2025-08-09 PROCEDURE — 85007 BL SMEAR W/DIFF WBC COUNT: CPT | Performed by: EMERGENCY MEDICINE

## 2025-08-09 RX ORDER — SODIUM CHLORIDE 0.9 % (FLUSH) 0.9 %
10 SYRINGE (ML) INJECTION EVERY 12 HOURS SCHEDULED
Status: DISCONTINUED | OUTPATIENT
Start: 2025-08-09 | End: 2025-08-11 | Stop reason: HOSPADM

## 2025-08-09 RX ORDER — NITROGLYCERIN 0.4 MG/1
0.4 TABLET SUBLINGUAL
Status: DISCONTINUED | OUTPATIENT
Start: 2025-08-09 | End: 2025-08-11 | Stop reason: HOSPADM

## 2025-08-09 RX ORDER — MAGNESIUM SULFATE HEPTAHYDRATE 40 MG/ML
2 INJECTION, SOLUTION INTRAVENOUS
Status: COMPLETED | OUTPATIENT
Start: 2025-08-09 | End: 2025-08-10

## 2025-08-09 RX ORDER — SODIUM CHLORIDE 0.9 % (FLUSH) 0.9 %
10 SYRINGE (ML) INJECTION AS NEEDED
Status: DISCONTINUED | OUTPATIENT
Start: 2025-08-09 | End: 2025-08-11 | Stop reason: HOSPADM

## 2025-08-09 RX ORDER — DEXTROSE MONOHYDRATE 25 G/50ML
25 INJECTION, SOLUTION INTRAVENOUS
Status: DISCONTINUED | OUTPATIENT
Start: 2025-08-09 | End: 2025-08-11 | Stop reason: HOSPADM

## 2025-08-09 RX ORDER — LEVOTHYROXINE SODIUM 175 UG/1
175 TABLET ORAL
Status: DISCONTINUED | OUTPATIENT
Start: 2025-08-10 | End: 2025-08-11 | Stop reason: HOSPADM

## 2025-08-09 RX ORDER — IBUPROFEN 600 MG/1
1 TABLET ORAL
Status: DISCONTINUED | OUTPATIENT
Start: 2025-08-09 | End: 2025-08-11 | Stop reason: HOSPADM

## 2025-08-09 RX ORDER — PANTOPRAZOLE SODIUM 40 MG/1
40 TABLET, DELAYED RELEASE ORAL
Status: DISCONTINUED | OUTPATIENT
Start: 2025-08-10 | End: 2025-08-11 | Stop reason: HOSPADM

## 2025-08-09 RX ORDER — NICOTINE POLACRILEX 4 MG
15 LOZENGE BUCCAL
Status: DISCONTINUED | OUTPATIENT
Start: 2025-08-09 | End: 2025-08-11 | Stop reason: HOSPADM

## 2025-08-09 RX ORDER — INSULIN LISPRO 100 [IU]/ML
2-7 INJECTION, SOLUTION INTRAVENOUS; SUBCUTANEOUS
Status: DISCONTINUED | OUTPATIENT
Start: 2025-08-10 | End: 2025-08-09

## 2025-08-09 RX ORDER — SODIUM CHLORIDE 9 MG/ML
100 INJECTION, SOLUTION INTRAVENOUS CONTINUOUS
Status: DISPENSED | OUTPATIENT
Start: 2025-08-09 | End: 2025-08-10

## 2025-08-09 RX ORDER — SODIUM CHLORIDE 9 MG/ML
40 INJECTION, SOLUTION INTRAVENOUS AS NEEDED
Status: DISCONTINUED | OUTPATIENT
Start: 2025-08-09 | End: 2025-08-11 | Stop reason: HOSPADM

## 2025-08-09 RX ADMIN — MAGNESIUM SULFATE HEPTAHYDRATE 2 G: 40 INJECTION, SOLUTION INTRAVENOUS at 23:00

## 2025-08-09 RX ADMIN — SODIUM CHLORIDE 1000 ML: 9 INJECTION, SOLUTION INTRAVENOUS at 22:32

## 2025-08-09 RX ADMIN — Medication 10 ML: at 22:34

## 2025-08-10 ENCOUNTER — APPOINTMENT (OUTPATIENT)
Dept: CARDIOLOGY | Facility: HOSPITAL | Age: 50
End: 2025-08-10
Payer: COMMERCIAL

## 2025-08-10 ENCOUNTER — APPOINTMENT (OUTPATIENT)
Dept: NEUROLOGY | Facility: HOSPITAL | Age: 50
End: 2025-08-10
Payer: COMMERCIAL

## 2025-08-10 ENCOUNTER — APPOINTMENT (OUTPATIENT)
Dept: MRI IMAGING | Facility: HOSPITAL | Age: 50
End: 2025-08-10
Payer: COMMERCIAL

## 2025-08-10 PROBLEM — I95.1 ORTHOSTATIC HYPOTENSION: Status: ACTIVE | Noted: 2025-08-10

## 2025-08-10 PROBLEM — N17.9 ACUTE KIDNEY INJURY: Status: ACTIVE | Noted: 2025-08-10

## 2025-08-10 LAB
ALBUMIN SERPL-MCNC: 3.2 G/DL (ref 3.5–5.2)
ALBUMIN/GLOB SERPL: 1.5 G/DL
ALP SERPL-CCNC: 35 U/L (ref 39–117)
ALT SERPL W P-5'-P-CCNC: 9 U/L (ref 1–33)
ANION GAP SERPL CALCULATED.3IONS-SCNC: 15 MMOL/L (ref 5–15)
AST SERPL-CCNC: 13 U/L (ref 1–32)
BH CV ECHO MEAS - AO ROOT DIAM: 3.2 CM
BH CV ECHO MEAS - EDV(CUBED): 13.1 ML
BH CV ECHO MEAS - ESV(CUBED): 2.35 ML
BH CV ECHO MEAS - FS: 43.5 %
BH CV ECHO MEAS - IVS/LVPW: 0.96 CM
BH CV ECHO MEAS - IVSD: 1.1 CM
BH CV ECHO MEAS - LA DIMENSION: 2.7 CM
BH CV ECHO MEAS - LV MASS(C)D: 70.6 GRAMS
BH CV ECHO MEAS - LVIDD: 2.35 CM
BH CV ECHO MEAS - LVIDS: 1.33 CM
BH CV ECHO MEAS - LVOT AREA: 4.5 CM2
BH CV ECHO MEAS - LVOT DIAM: 2.4 CM
BH CV ECHO MEAS - LVPWD: 1.14 CM
BH CV ECHO MEAS - PA V2 MAX: 86.6 CM/SEC
BH CV ECHO MEAS - RAP SYSTOLE: 3 MMHG
BILIRUB SERPL-MCNC: 0.3 MG/DL (ref 0–1.2)
BUN SERPL-MCNC: 10.9 MG/DL (ref 6–20)
BUN/CREAT SERPL: 9.4 (ref 7–25)
CALCIUM SPEC-SCNC: 8.6 MG/DL (ref 8.6–10.5)
CHLORIDE SERPL-SCNC: 106 MMOL/L (ref 98–107)
CO2 SERPL-SCNC: 22 MMOL/L (ref 22–29)
CREAT SERPL-MCNC: 1.16 MG/DL (ref 0.57–1)
DEPRECATED RDW RBC AUTO: 43.1 FL (ref 37–54)
EGFRCR SERPLBLD CKD-EPI 2021: 57.6 ML/MIN/1.73
ERYTHROCYTE [DISTWIDTH] IN BLOOD BY AUTOMATED COUNT: 14.3 % (ref 12.3–15.4)
FOLATE SERPL-MCNC: >20 NG/ML (ref 4.78–24.2)
GLOBULIN UR ELPH-MCNC: 2.1 GM/DL
GLUCOSE BLDC GLUCOMTR-MCNC: 110 MG/DL (ref 70–130)
GLUCOSE BLDC GLUCOMTR-MCNC: 114 MG/DL (ref 70–130)
GLUCOSE BLDC GLUCOMTR-MCNC: 122 MG/DL (ref 70–130)
GLUCOSE BLDC GLUCOMTR-MCNC: 133 MG/DL (ref 70–130)
GLUCOSE BLDC GLUCOMTR-MCNC: 75 MG/DL (ref 70–130)
GLUCOSE SERPL-MCNC: 80 MG/DL (ref 65–99)
HCT VFR BLD AUTO: 30.8 % (ref 34–46.6)
HCYS SERPL-MCNC: 17.1 UMOL/L (ref 0–15)
HGB BLD-MCNC: 9.8 G/DL (ref 12–15.9)
MAGNESIUM SERPL-MCNC: 2.6 MG/DL (ref 1.6–2.6)
MCH RBC QN AUTO: 26.6 PG (ref 26.6–33)
MCHC RBC AUTO-ENTMCNC: 31.8 G/DL (ref 31.5–35.7)
MCV RBC AUTO: 83.5 FL (ref 79–97)
PHOSPHATE SERPL-MCNC: 3 MG/DL (ref 2.5–4.5)
PLATELET # BLD AUTO: 197 10*3/MM3 (ref 140–450)
PMV BLD AUTO: 10.8 FL (ref 6–12)
POTASSIUM SERPL-SCNC: 4.3 MMOL/L (ref 3.5–5.2)
PROT SERPL-MCNC: 5.3 G/DL (ref 6–8.5)
QT INTERVAL: 422 MS
QTC INTERVAL: 490 MS
RBC # BLD AUTO: 3.69 10*6/MM3 (ref 3.77–5.28)
SINUS: 2.9 CM
SODIUM SERPL-SCNC: 143 MMOL/L (ref 136–145)
VIT B12 BLD-MCNC: >2000 PG/ML (ref 211–946)
WBC NRBC COR # BLD AUTO: 6.77 10*3/MM3 (ref 3.4–10.8)

## 2025-08-10 PROCEDURE — 96366 THER/PROPH/DIAG IV INF ADDON: CPT

## 2025-08-10 PROCEDURE — 70549 MR ANGIOGRAPH NECK W/O&W/DYE: CPT

## 2025-08-10 PROCEDURE — G0378 HOSPITAL OBSERVATION PER HR: HCPCS

## 2025-08-10 PROCEDURE — 25810000003 SODIUM CHLORIDE 0.9 % SOLUTION: Performed by: NURSE PRACTITIONER

## 2025-08-10 PROCEDURE — 96361 HYDRATE IV INFUSION ADD-ON: CPT

## 2025-08-10 PROCEDURE — 82948 REAGENT STRIP/BLOOD GLUCOSE: CPT

## 2025-08-10 PROCEDURE — 25810000003 SODIUM CHLORIDE 0.9 % SOLUTION: Performed by: FAMILY MEDICINE

## 2025-08-10 PROCEDURE — 84100 ASSAY OF PHOSPHORUS: CPT

## 2025-08-10 PROCEDURE — 85027 COMPLETE CBC AUTOMATED: CPT

## 2025-08-10 PROCEDURE — A9573 GADOPICLENOL 0.5 MMOL/ML SOLUTION: HCPCS | Performed by: FAMILY MEDICINE

## 2025-08-10 PROCEDURE — 70553 MRI BRAIN STEM W/O & W/DYE: CPT

## 2025-08-10 PROCEDURE — 92610 EVALUATE SWALLOWING FUNCTION: CPT | Performed by: SPEECH-LANGUAGE PATHOLOGIST

## 2025-08-10 PROCEDURE — 70544 MR ANGIOGRAPHY HEAD W/O DYE: CPT

## 2025-08-10 PROCEDURE — 25510000001 GADOPICLENOL 0.5 MMOL/ML SOLUTION: Performed by: FAMILY MEDICINE

## 2025-08-10 PROCEDURE — 25010000002 MAGNESIUM SULFATE 2 GM/50ML SOLUTION

## 2025-08-10 PROCEDURE — 82607 VITAMIN B-12: CPT | Performed by: PSYCHIATRY & NEUROLOGY

## 2025-08-10 PROCEDURE — 99214 OFFICE O/P EST MOD 30 MIN: CPT | Performed by: PSYCHIATRY & NEUROLOGY

## 2025-08-10 PROCEDURE — 83735 ASSAY OF MAGNESIUM: CPT

## 2025-08-10 PROCEDURE — 93306 TTE W/DOPPLER COMPLETE: CPT

## 2025-08-10 PROCEDURE — 80053 COMPREHEN METABOLIC PANEL: CPT

## 2025-08-10 PROCEDURE — 93306 TTE W/DOPPLER COMPLETE: CPT | Performed by: HOSPITALIST

## 2025-08-10 RX ORDER — SODIUM CHLORIDE 9 MG/ML
100 INJECTION, SOLUTION INTRAVENOUS CONTINUOUS
Status: DISPENSED | OUTPATIENT
Start: 2025-08-11 | End: 2025-08-11

## 2025-08-10 RX ORDER — CARVEDILOL 25 MG/1
25 TABLET ORAL 2 TIMES DAILY WITH MEALS
Status: DISCONTINUED | OUTPATIENT
Start: 2025-08-10 | End: 2025-08-11

## 2025-08-10 RX ORDER — MIRTAZAPINE 15 MG/1
15 TABLET, FILM COATED ORAL NIGHTLY
Status: DISCONTINUED | OUTPATIENT
Start: 2025-08-10 | End: 2025-08-11 | Stop reason: HOSPADM

## 2025-08-10 RX ORDER — METFORMIN HYDROCHLORIDE 500 MG/1
500 TABLET, EXTENDED RELEASE ORAL 2 TIMES DAILY
Status: DISCONTINUED | OUTPATIENT
Start: 2025-08-10 | End: 2025-08-11 | Stop reason: HOSPADM

## 2025-08-10 RX ORDER — SODIUM CHLORIDE 9 MG/ML
100 INJECTION, SOLUTION INTRAVENOUS CONTINUOUS
Status: DISCONTINUED | OUTPATIENT
Start: 2025-08-11 | End: 2025-08-11 | Stop reason: HOSPADM

## 2025-08-10 RX ORDER — SODIUM CHLORIDE 9 MG/ML
100 INJECTION, SOLUTION INTRAVENOUS CONTINUOUS
Status: DISPENSED | OUTPATIENT
Start: 2025-08-10 | End: 2025-08-10

## 2025-08-10 RX ORDER — AMLODIPINE BESYLATE 10 MG/1
10 TABLET ORAL DAILY
Status: DISCONTINUED | OUTPATIENT
Start: 2025-08-10 | End: 2025-08-11 | Stop reason: HOSPADM

## 2025-08-10 RX ADMIN — Medication 10 ML: at 12:21

## 2025-08-10 RX ADMIN — GADOPICLENOL 5 ML: 485.1 INJECTION INTRAVENOUS at 09:50

## 2025-08-10 RX ADMIN — SODIUM CHLORIDE 100 ML/HR: 9 INJECTION, SOLUTION INTRAVENOUS at 12:22

## 2025-08-10 RX ADMIN — Medication 10 ML: at 20:37

## 2025-08-10 RX ADMIN — EMPAGLIFLOZIN 25 MG: 10 TABLET, FILM COATED ORAL at 12:21

## 2025-08-10 RX ADMIN — MIRTAZAPINE 15 MG: 15 TABLET, FILM COATED ORAL at 20:37

## 2025-08-10 RX ADMIN — SODIUM CHLORIDE 500 ML: 9 INJECTION, SOLUTION INTRAVENOUS at 23:48

## 2025-08-10 RX ADMIN — MAGNESIUM SULFATE HEPTAHYDRATE 2 G: 40 INJECTION, SOLUTION INTRAVENOUS at 00:29

## 2025-08-11 ENCOUNTER — APPOINTMENT (OUTPATIENT)
Dept: NEUROLOGY | Facility: HOSPITAL | Age: 50
End: 2025-08-11
Payer: COMMERCIAL

## 2025-08-11 ENCOUNTER — READMISSION MANAGEMENT (OUTPATIENT)
Dept: CALL CENTER | Facility: HOSPITAL | Age: 50
End: 2025-08-11
Payer: COMMERCIAL

## 2025-08-11 VITALS
DIASTOLIC BLOOD PRESSURE: 72 MMHG | TEMPERATURE: 97.5 F | HEIGHT: 72 IN | WEIGHT: 132 LBS | BODY MASS INDEX: 17.88 KG/M2 | HEART RATE: 73 BPM | SYSTOLIC BLOOD PRESSURE: 101 MMHG | RESPIRATION RATE: 16 BRPM | OXYGEN SATURATION: 99 %

## 2025-08-11 LAB
ANION GAP SERPL CALCULATED.3IONS-SCNC: 13 MMOL/L (ref 5–15)
BILIRUB UR QL STRIP: NEGATIVE
BUN SERPL-MCNC: 9.5 MG/DL (ref 6–20)
BUN/CREAT SERPL: 9.2 (ref 7–25)
CALCIUM SPEC-SCNC: 8.8 MG/DL (ref 8.6–10.5)
CHLORIDE SERPL-SCNC: 107 MMOL/L (ref 98–107)
CLARITY UR: CLEAR
CO2 SERPL-SCNC: 22 MMOL/L (ref 22–29)
COLOR UR: YELLOW
CREAT SERPL-MCNC: 1.03 MG/DL (ref 0.57–1)
EGFRCR SERPLBLD CKD-EPI 2021: 66.4 ML/MIN/1.73
GLUCOSE BLDC GLUCOMTR-MCNC: 109 MG/DL (ref 70–130)
GLUCOSE BLDC GLUCOMTR-MCNC: 81 MG/DL (ref 70–130)
GLUCOSE BLDC GLUCOMTR-MCNC: 85 MG/DL (ref 70–130)
GLUCOSE SERPL-MCNC: 85 MG/DL (ref 65–99)
GLUCOSE UR STRIP-MCNC: ABNORMAL MG/DL
HGB UR QL STRIP.AUTO: NEGATIVE
KETONES UR QL STRIP: ABNORMAL
LEUKOCYTE ESTERASE UR QL STRIP.AUTO: NEGATIVE
NITRITE UR QL STRIP: NEGATIVE
PH UR STRIP.AUTO: 7 [PH] (ref 5–8)
POTASSIUM SERPL-SCNC: 3.6 MMOL/L (ref 3.5–5.2)
POTASSIUM SERPL-SCNC: 4.5 MMOL/L (ref 3.5–5.2)
PROT UR QL STRIP: ABNORMAL
SODIUM SERPL-SCNC: 142 MMOL/L (ref 136–145)
SP GR UR STRIP: 1.03 (ref 1–1.03)
UROBILINOGEN UR QL STRIP: ABNORMAL

## 2025-08-11 PROCEDURE — 96361 HYDRATE IV INFUSION ADD-ON: CPT

## 2025-08-11 PROCEDURE — G0378 HOSPITAL OBSERVATION PER HR: HCPCS

## 2025-08-11 PROCEDURE — 95819 EEG AWAKE AND ASLEEP: CPT

## 2025-08-11 PROCEDURE — 99214 OFFICE O/P EST MOD 30 MIN: CPT | Performed by: PSYCHIATRY & NEUROLOGY

## 2025-08-11 PROCEDURE — 84132 ASSAY OF SERUM POTASSIUM: CPT | Performed by: FAMILY MEDICINE

## 2025-08-11 PROCEDURE — 25810000003 SODIUM CHLORIDE 0.9 % SOLUTION: Performed by: NURSE PRACTITIONER

## 2025-08-11 PROCEDURE — 93005 ELECTROCARDIOGRAM TRACING: CPT | Performed by: NURSE PRACTITIONER

## 2025-08-11 PROCEDURE — 80048 BASIC METABOLIC PNL TOTAL CA: CPT | Performed by: FAMILY MEDICINE

## 2025-08-11 PROCEDURE — 97166 OT EVAL MOD COMPLEX 45 MIN: CPT | Performed by: OCCUPATIONAL THERAPIST

## 2025-08-11 PROCEDURE — 82948 REAGENT STRIP/BLOOD GLUCOSE: CPT

## 2025-08-11 PROCEDURE — 81003 URINALYSIS AUTO W/O SCOPE: CPT | Performed by: NURSE PRACTITIONER

## 2025-08-11 PROCEDURE — 97161 PT EVAL LOW COMPLEX 20 MIN: CPT | Performed by: PHYSICAL THERAPIST

## 2025-08-11 PROCEDURE — 95819 EEG AWAKE AND ASLEEP: CPT | Performed by: PSYCHIATRY & NEUROLOGY

## 2025-08-11 RX ORDER — CARVEDILOL 12.5 MG/1
12.5 TABLET ORAL 2 TIMES DAILY WITH MEALS
Qty: 60 TABLET | Refills: 1 | Status: SHIPPED | OUTPATIENT
Start: 2025-08-11 | End: 2025-08-11 | Stop reason: HOSPADM

## 2025-08-11 RX ORDER — POTASSIUM CHLORIDE 1500 MG/1
40 TABLET, EXTENDED RELEASE ORAL EVERY 4 HOURS
Status: COMPLETED | OUTPATIENT
Start: 2025-08-11 | End: 2025-08-11

## 2025-08-11 RX ORDER — CARVEDILOL 6.25 MG/1
12.5 TABLET ORAL 2 TIMES DAILY WITH MEALS
Status: DISCONTINUED | OUTPATIENT
Start: 2025-08-11 | End: 2025-08-11 | Stop reason: HOSPADM

## 2025-08-11 RX ADMIN — EMPAGLIFLOZIN 25 MG: 10 TABLET, FILM COATED ORAL at 08:36

## 2025-08-11 RX ADMIN — LEVOTHYROXINE SODIUM 175 MCG: 0.17 TABLET ORAL at 05:04

## 2025-08-11 RX ADMIN — PANTOPRAZOLE SODIUM 40 MG: 40 TABLET, DELAYED RELEASE ORAL at 05:04

## 2025-08-11 RX ADMIN — SODIUM CHLORIDE 100 ML/HR: 9 INJECTION, SOLUTION INTRAVENOUS at 01:48

## 2025-08-11 RX ADMIN — POTASSIUM CHLORIDE 40 MEQ: 1500 TABLET, EXTENDED RELEASE ORAL at 09:23

## 2025-08-11 RX ADMIN — POTASSIUM CHLORIDE 40 MEQ: 1500 TABLET, EXTENDED RELEASE ORAL at 05:04

## 2025-08-13 ENCOUNTER — READMISSION MANAGEMENT (OUTPATIENT)
Dept: CALL CENTER | Facility: HOSPITAL | Age: 50
End: 2025-08-13
Payer: COMMERCIAL

## 2025-08-14 LAB
BACTERIA SPEC AEROBE CULT: NORMAL
BACTERIA SPEC AEROBE CULT: NORMAL
QT INTERVAL: 424 MS
QTC INTERVAL: 513 MS

## 2025-08-19 LAB — METHYLMALONATE SERPL-SCNC: 167 NMOL/L (ref 0–378)

## 2025-08-21 ENCOUNTER — READMISSION MANAGEMENT (OUTPATIENT)
Dept: CALL CENTER | Facility: HOSPITAL | Age: 50
End: 2025-08-21
Payer: COMMERCIAL

## 2025-08-25 ENCOUNTER — READMISSION MANAGEMENT (OUTPATIENT)
Dept: CALL CENTER | Facility: HOSPITAL | Age: 50
End: 2025-08-25
Payer: COMMERCIAL

## 2025-08-28 ENCOUNTER — TRANSCRIBE ORDERS (OUTPATIENT)
Dept: ADMINISTRATIVE | Age: 50
End: 2025-08-28

## 2025-08-28 DIAGNOSIS — G56.03 CARPAL TUNNEL SYNDROME, BILATERAL: Primary | ICD-10-CM

## (undated) DEVICE — ELECTRD BLD EDGE/INSUL1P 2.4X5.1MM STRL

## (undated) DEVICE — 3M™ STERI-STRIP™ REINFORCED ADHESIVE SKIN CLOSURES, R1546, 1/4 IN X 4 IN (6 MM X 100 MM), 10 STRIPS/ENVELOPE: Brand: 3M™ STERI-STRIP™

## (undated) DEVICE — CLEANING SPONGE: Brand: KOALA™

## (undated) DEVICE — RESERVOIR,SUCTION,100CC,SILICONE: Brand: MEDLINE

## (undated) DEVICE — BRUSH ENDOSCP 2 END CHN HEDGEHOG

## (undated) DEVICE — PROBE 8225101 5PK STD PRASS FL TIP ROHS

## (undated) DEVICE — STANDARD HYPODERMIC NEEDLE,POLYPROPYLENE HUB: Brand: MONOJECT

## (undated) DEVICE — SINGLE PORT MANIFOLD: Brand: NEPTUNE 2

## (undated) DEVICE — CUFF,BP,DISP,1 TUBE,ADULT,HP: Brand: MEDLINE

## (undated) DEVICE — SENSR O2 OXIMAX FNGR A/ 18IN NONSTR

## (undated) DEVICE — ANTIBACTERIAL UNDYED BRAIDED (POLYGLACTIN 910), SYNTHETIC ABSORBABLE SUTURE: Brand: COATED VICRYL

## (undated) DEVICE — ENDOGATOR AUXILIARY WATER JET CONNECTOR: Brand: ENDOGATOR

## (undated) DEVICE — THE CHANNEL CLEANING BRUSH IS A NYLON FLEXI BRUSH ATTACHED TO A FLEXIBLE PLASTIC SHEATH DESIGNED TO SAFELY REMOVE DEBRIS FROM FLEXIBLE ENDOSCOPES.

## (undated) DEVICE — DEFENDO AIR WATER SUCTION AND BIOPSY VALVE KIT FOR  OLYMPUS: Brand: DEFENDO AIR/WATER/SUCTION AND BIOPSY VALVE

## (undated) DEVICE — COLON KIT WITH 1.1 OZ ORCA HYDRA SEAL 2 GOWN

## (undated) DEVICE — 3M™ STERI-DRAPE™ INSTRUMENT POUCH 1018: Brand: STERI-DRAPE™

## (undated) DEVICE — GLV SURG BIOGEL M LTX PF 7 1/2

## (undated) DEVICE — FORCEPS BX 240CM 2.4MM L NDL RAD JAW 4 M00513334

## (undated) DEVICE — ADHS LIQ MASTISOL 2/3ML

## (undated) DEVICE — SUT SILK 3/0 SUTUPAK TIES 24IN SA74H

## (undated) DEVICE — Device: Brand: DEFENDO AIR/WATER/SUCTION AND BIOPSY VALVE

## (undated) DEVICE — SUPPLEMENT DIGESTIVE H2O SOL GI-EASE

## (undated) DEVICE — Device

## (undated) DEVICE — EMG TUBE 8229707 NIM TRIVANTAGE 7.0MM ID: Brand: NIM TRIVANTAGE™

## (undated) DEVICE — SUT MNCRYL 4/0 P3 18IN UD MCP494G

## (undated) DEVICE — CANNULA NSL AD L7FT DIV O2 CO2 W/ M LUERLOCK TRMPT CONN

## (undated) DEVICE — FRCP BIOP COLD ENDOJAW ALLGTR W/NDL 2.8X2300MM BLU

## (undated) DEVICE — SUT SILK 2/0 SUTUPAK TIES 24IN SA75H

## (undated) DEVICE — HARMONIC FOCUS SHEARS 9CM LENGTH + ADAPTIVE TISSUE TECHNOLOGY FOR USE WITH BLUE HAND PIECE ONLY: Brand: HARMONIC FOCUS

## (undated) DEVICE — TBG SMPL FLTR LINE NASL 02/C02 A/ BX/100

## (undated) DEVICE — ARGYLE YANKAUER BULB TIP WITH VENT: Brand: ARGYLE

## (undated) DEVICE — TRY PREP SCRB VAG PVP

## (undated) DEVICE — SURGICAL SUCTION CONNECTING TUBE WITH MALE CONNECTOR AND SUCTION CLAMP, 2 FT. LONG (.6 M), 5 MM I.D.: Brand: CONMED

## (undated) DEVICE — BITE BLOCK ENDOSCP AD 60 FR W/ ADJ STRP PLAS GRN BLOX

## (undated) DEVICE — PAD MINOR UNIVERSAL: Brand: MEDLINE INDUSTRIES, INC.

## (undated) DEVICE — DURAHOOK 1/4HOOK PK/6

## (undated) DEVICE — CONMED SCOPE SAVER BITE BLOCK, 20X27 MM: Brand: SCOPE SAVER

## (undated) DEVICE — ADAPTER CLEANING PORPOISE CLEANING

## (undated) DEVICE — PK TURNOVER RM ADV

## (undated) DEVICE — DISPOSABLE IRRIGATION BIPOLAR CORD, M1000 TYPE: Brand: KIRWAN

## (undated) DEVICE — SPNG DISSCT CHRRY 3/8IN STRL PK/5